# Patient Record
Sex: FEMALE | Race: WHITE | Employment: FULL TIME | URBAN - METROPOLITAN AREA
[De-identification: names, ages, dates, MRNs, and addresses within clinical notes are randomized per-mention and may not be internally consistent; named-entity substitution may affect disease eponyms.]

---

## 2017-01-06 DIAGNOSIS — M47.812 ARTHROPATHY OF CERVICAL FACET JOINT: Primary | ICD-10-CM

## 2017-01-06 DIAGNOSIS — M47.814 FACET ARTHROPATHY, THORACIC: ICD-10-CM

## 2017-01-06 NOTE — TELEPHONE ENCOUNTER
Patient informed of 48 hour refill policy excluding weekends and holidays. Further explained patient will not receive a call back once prescription is ready. Pt will  medication at [place] office, [address].

## 2017-01-09 RX ORDER — FENTANYL 75 UG/H
1 PATCH TRANSDERMAL
Qty: 10 PATCH | Refills: 0 | Status: SHIPPED | OUTPATIENT
Start: 2017-01-09 | End: 2017-02-07

## 2017-01-09 RX ORDER — HYDROCODONE BITARTRATE AND ACETAMINOPHEN 10; 325 MG/1; MG/1
1 TABLET ORAL EVERY 6 HOURS PRN
Qty: 120 TABLET | Refills: 0 | Status: SHIPPED | OUTPATIENT
Start: 2017-01-09 | End: 2017-02-07

## 2017-01-09 NOTE — TELEPHONE ENCOUNTER
Medication: Hydrocodone-acetaminophen  MG,  Fentanyl 75 MCG/HR    Date of last refill: both filled 12/10/16  [place] Reviewed both filled 12/10/16    Last office visit: 10/5/16  Due back to clinic per last office n Patch 72 Hr  10 patch  0       Sig: Place 1 patch onto the skin every third day.         TiZANidine HCl 4 MG Oral Tab  270 tablet  0       Sig: Take 1 tablet (4 mg total) by mouth 3 (three) times daily.         lidocaine 5 % External Patch  90 patch  0

## 2017-01-10 RX ORDER — PANTOPRAZOLE SODIUM 40 MG/1
TABLET, DELAYED RELEASE ORAL
Qty: 30 TABLET | Refills: 3 | Status: SHIPPED | OUTPATIENT
Start: 2017-01-10 | End: 2017-05-15

## 2017-01-10 NOTE — TELEPHONE ENCOUNTER
Medication: Lyrica    Date of last refill: 12/12/16  [place] 17[place]11 (?!)  -- called pharmacy; pt picked up Rx on 12/14/16    Last office visit: 10/5/16  Due back to clinic per last office note:  6 months  Date next off External Patch  90 patch  0       Sig: Place 3 patches onto the skin daily. Wear for 12 hours, remove for 12 hours.              Radiology orders and consultations:None    The patient indicates understanding of these issues and agrees to the plan.     Retur

## 2017-01-19 ENCOUNTER — TELEPHONE (OUTPATIENT)
Dept: INTERNAL MEDICINE CLINIC | Facility: CLINIC | Age: 72
End: 2017-01-19

## 2017-01-19 NOTE — TELEPHONE ENCOUNTER
Patient notified SD states the note from HARDEEP TAYLOR indicates Xarelto be held 3 days prior to the procedure and can restart day following procedure if ok with HARDEEP TAYLOR.  Pt verbalizes understanding.

## 2017-01-19 NOTE — TELEPHONE ENCOUNTER
Note from [facility] GI in Dec(under media) requests the xarelto be held 3 days prior to procedure. Restart day following the procedure if ok with GI pending procedure results.

## 2017-01-23 RX ORDER — SIMVASTATIN 40 MG
TABLET ORAL
Qty: 90 TABLET | Refills: 0 | Status: SHIPPED | OUTPATIENT
Start: 2017-01-23 | End: 2017-04-23

## 2017-02-01 ENCOUNTER — OFFICE VISIT (OUTPATIENT)
Dept: INTERNAL MEDICINE CLINIC | Facility: CLINIC | Age: 72
End: 2017-02-01

## 2017-02-01 VITALS
TEMPERATURE: 99 F | HEIGHT: 62 IN | DIASTOLIC BLOOD PRESSURE: 70 MMHG | HEART RATE: 92 BPM | SYSTOLIC BLOOD PRESSURE: 128 MMHG

## 2017-02-01 DIAGNOSIS — E55.9 VITAMIN D DEFICIENCY: ICD-10-CM

## 2017-02-01 DIAGNOSIS — F41.9 ANXIETY: ICD-10-CM

## 2017-02-01 DIAGNOSIS — I10 ESSENTIAL HYPERTENSION, BENIGN: ICD-10-CM

## 2017-02-01 DIAGNOSIS — R53.83 FATIGUE, UNSPECIFIED TYPE: Primary | ICD-10-CM

## 2017-02-01 DIAGNOSIS — E78.00 PURE HYPERCHOLESTEROLEMIA: ICD-10-CM

## 2017-02-01 DIAGNOSIS — D50.9 IRON DEFICIENCY ANEMIA, UNSPECIFIED IRON DEFICIENCY ANEMIA TYPE: ICD-10-CM

## 2017-02-01 DIAGNOSIS — IMO0001 UNCONTROLLED TYPE 2 DIABETES MELLITUS WITHOUT COMPLICATION, WITHOUT LONG-TERM CURRENT USE OF INSULIN: ICD-10-CM

## 2017-02-01 LAB
CARTRIDGE LOT#: 660 NUMERIC
HEMOGLOBIN A1C: 6.7 % (ref 4.3–5.6)

## 2017-02-01 PROCEDURE — 99214 OFFICE O/P EST MOD 30 MIN: CPT | Performed by: NURSE PRACTITIONER

## 2017-02-01 NOTE — PROGRESS NOTES
Chapo Craven is a 80year old female. Patient presents with:  Diarrhea: She is having diarrhea eveytime she eats and its getting worse.  She will be seeing gastro in April thats the soonest  can see her       HPI:   Presents to discuss diar BEFORE BREAKFAST Disp: 30 tablet Rfl: 3   HYDROcodone-acetaminophen  MG Oral Tab Take 1 tablet by mouth every 6 (six) hours as needed for Pain.  Disp: 120 tablet Rfl: 0   FentaNYL 75 MCG/HR Transdermal Patch 72 Hr Place 1 patch onto the skin every thi cell      Social History:    Smoking Status: Former Smoker                   Packs/Day: 0.00  Years:         Smokeless Status: Never Used                        Comment: quit 54 yrs ago    Alcohol Use: No              Family History   Problem Relation Age def   Uncontrolled type 2 diabetes mellitus without complication, without long-term current use of insulin (hcc)  Opted to hold metformin x 2 weeks to see if diarrhea improved. If yes, she will call for alternative treatment. If no, she will restart.    A

## 2017-02-02 ENCOUNTER — LAB ENCOUNTER (OUTPATIENT)
Dept: LAB | Age: 72
End: 2017-02-02
Attending: NURSE PRACTITIONER
Payer: MEDICARE

## 2017-02-02 DIAGNOSIS — D50.9 IRON DEFICIENCY ANEMIA, UNSPECIFIED IRON DEFICIENCY ANEMIA TYPE: ICD-10-CM

## 2017-02-02 DIAGNOSIS — R53.83 FATIGUE, UNSPECIFIED TYPE: ICD-10-CM

## 2017-02-02 DIAGNOSIS — F41.9 ANXIETY: ICD-10-CM

## 2017-02-02 DIAGNOSIS — E55.9 VITAMIN D DEFICIENCY: ICD-10-CM

## 2017-02-02 DIAGNOSIS — IMO0001 UNCONTROLLED TYPE 2 DIABETES MELLITUS WITHOUT COMPLICATION, WITHOUT LONG-TERM CURRENT USE OF INSULIN: ICD-10-CM

## 2017-02-02 LAB
25-HYDROXYVITAMIN D (TOTAL): 25.3 NG/ML (ref 30–100)
ALBUMIN SERPL-MCNC: 4.2 G/DL (ref 3.5–4.8)
ALP LIVER SERPL-CCNC: 82 U/L (ref 55–142)
ALT SERPL-CCNC: 25 U/L (ref 14–54)
AST SERPL-CCNC: 18 U/L (ref 15–41)
BASOPHILS # BLD AUTO: 0.05 X10(3) UL (ref 0–0.1)
BASOPHILS NFR BLD AUTO: 0.8 %
BILIRUB SERPL-MCNC: 0.4 MG/DL (ref 0.1–2)
BUN BLD-MCNC: 13 MG/DL (ref 8–20)
CALCIUM BLD-MCNC: 9.3 MG/DL (ref 8.3–10.3)
CHLORIDE: 104 MMOL/L (ref 101–111)
CHOLEST SMN-MCNC: 133 MG/DL (ref ?–200)
CO2: 26 MMOL/L (ref 22–32)
CREAT BLD-MCNC: 0.97 MG/DL (ref 0.55–1.02)
CREAT UR-SCNC: 135 MG/DL
EOSINOPHIL # BLD AUTO: 0.17 X10(3) UL (ref 0–0.3)
EOSINOPHIL NFR BLD AUTO: 2.8 %
ERYTHROCYTE [DISTWIDTH] IN BLOOD BY AUTOMATED COUNT: 12 % (ref 11.5–16)
GLUCOSE BLD-MCNC: 112 MG/DL (ref 70–99)
HAV AB SERPL IA-ACNC: 243 PG/ML (ref 193–986)
HCT VFR BLD AUTO: 38.9 % (ref 34–50)
HDLC SERPL-MCNC: 75 MG/DL (ref 45–?)
HDLC SERPL: 1.77 {RATIO} (ref ?–4.44)
HGB BLD-MCNC: 12.7 G/DL (ref 12–16)
IMMATURE GRANULOCYTE COUNT: 0.02 X10(3) UL (ref 0–1)
IMMATURE GRANULOCYTE RATIO %: 0.3 %
LDLC SERPL CALC-MCNC: 41 MG/DL (ref ?–130)
LYMPHOCYTES # BLD AUTO: 1.29 X10(3) UL (ref 0.9–4)
LYMPHOCYTES NFR BLD AUTO: 21.1 %
M PROTEIN MFR SERPL ELPH: 7.2 G/DL (ref 6.1–8.3)
MCH RBC QN AUTO: 28.7 PG (ref 27–33.2)
MCHC RBC AUTO-ENTMCNC: 32.6 G/DL (ref 31–37)
MCV RBC AUTO: 88 FL (ref 81–100)
MICROALBUMIN UR-MCNC: 3.11 MG/DL
MICROALBUMIN/CREAT 24H UR-RTO: 23 UG/MG (ref ?–30)
MONOCYTES # BLD AUTO: 0.48 X10(3) UL (ref 0.1–0.6)
MONOCYTES NFR BLD AUTO: 7.8 %
NEUTROPHIL ABS PRELIM: 4.11 X10 (3) UL (ref 1.3–6.7)
NEUTROPHILS # BLD AUTO: 4.11 X10(3) UL (ref 1.3–6.7)
NEUTROPHILS NFR BLD AUTO: 67.2 %
NONHDLC SERPL-MCNC: 58 MG/DL (ref ?–130)
PLATELET # BLD AUTO: 199 10(3)UL (ref 150–450)
POTASSIUM SERPL-SCNC: 5.1 MMOL/L (ref 3.6–5.1)
RBC # BLD AUTO: 4.42 X10(6)UL (ref 3.8–5.1)
RED CELL DISTRIBUTION WIDTH-SD: 38.7 FL (ref 35.1–46.3)
SODIUM SERPL-SCNC: 138 MMOL/L (ref 136–144)
TRIGLYCERIDES: 83 MG/DL (ref ?–150)
VLDL: 17 MG/DL (ref 5–40)
WBC # BLD AUTO: 6.1 X10(3) UL (ref 4–13)

## 2017-02-02 PROCEDURE — 80061 LIPID PANEL: CPT

## 2017-02-02 PROCEDURE — 82306 VITAMIN D 25 HYDROXY: CPT

## 2017-02-02 PROCEDURE — 82043 UR ALBUMIN QUANTITATIVE: CPT

## 2017-02-02 PROCEDURE — 82570 ASSAY OF URINE CREATININE: CPT

## 2017-02-02 PROCEDURE — 85025 COMPLETE CBC W/AUTO DIFF WBC: CPT

## 2017-02-02 PROCEDURE — 82607 VITAMIN B-12: CPT

## 2017-02-02 PROCEDURE — 80053 COMPREHEN METABOLIC PANEL: CPT

## 2017-02-03 ENCOUNTER — TELEPHONE (OUTPATIENT)
Dept: INTERNAL MEDICINE CLINIC | Facility: CLINIC | Age: 72
End: 2017-02-03

## 2017-02-03 NOTE — TELEPHONE ENCOUNTER
Pt states she could not  her Sertraline at the [address] [place] [address] because they were going to call our office to discuss drug interaction between Xarelto and Sertraline for increased risk of bleeding.   Per Nicole Orta at [facility], states all SSRI's with Xarelto

## 2017-02-03 NOTE — TELEPHONE ENCOUNTER
Called [place] to advise info per SD-Ok to trial sertraline. Karol[facility] pharmacist verbalized understanding.

## 2017-02-06 ENCOUNTER — OFFICE VISIT (OUTPATIENT)
Dept: INTERNAL MEDICINE CLINIC | Facility: CLINIC | Age: 72
End: 2017-02-06

## 2017-02-06 VITALS
TEMPERATURE: 98 F | HEART RATE: 74 BPM | HEIGHT: 62 IN | DIASTOLIC BLOOD PRESSURE: 66 MMHG | SYSTOLIC BLOOD PRESSURE: 120 MMHG

## 2017-02-06 DIAGNOSIS — E53.8 B12 DEFICIENCY: ICD-10-CM

## 2017-02-06 DIAGNOSIS — R19.7 DIARRHEA, UNSPECIFIED TYPE: ICD-10-CM

## 2017-02-06 DIAGNOSIS — R53.83 FATIGUE, UNSPECIFIED TYPE: Primary | ICD-10-CM

## 2017-02-06 DIAGNOSIS — J45.20 MILD INTERMITTENT ASTHMA WITHOUT COMPLICATION: ICD-10-CM

## 2017-02-06 DIAGNOSIS — E55.9 VITAMIN D DEFICIENCY: ICD-10-CM

## 2017-02-06 DIAGNOSIS — F41.9 ANXIETY: ICD-10-CM

## 2017-02-06 PROCEDURE — 96372 THER/PROPH/DIAG INJ SC/IM: CPT | Performed by: NURSE PRACTITIONER

## 2017-02-06 PROCEDURE — 99214 OFFICE O/P EST MOD 30 MIN: CPT | Performed by: NURSE PRACTITIONER

## 2017-02-06 RX ORDER — CYANOCOBALAMIN 1000 UG/ML
1000 INJECTION INTRAMUSCULAR; SUBCUTANEOUS ONCE
Status: COMPLETED | OUTPATIENT
Start: 2017-02-06 | End: 2017-02-06

## 2017-02-06 RX ADMIN — CYANOCOBALAMIN 1000 MCG: 1000 INJECTION INTRAMUSCULAR; SUBCUTANEOUS at 10:19:00

## 2017-02-06 NOTE — PROGRESS NOTES
Humberto Xie is a 80year old female. Patient presents with:  Test Results  Injection: vitamin B12 injection       HPI:   Presents to review her most recent labs. Excessive fatigue. B12 def. Level 243. Fatigue.   Her memory seems to be less Disp: 30 tablet Rfl: 3   HYDROcodone-acetaminophen  MG Oral Tab Take 1 tablet by mouth every 6 (six) hours as needed for Pain. Disp: 120 tablet Rfl: 0   FentaNYL 75 MCG/HR Transdermal Patch 72 Hr Place 1 patch onto the skin every third day.  Disp: 10 History:    Smoking Status: Former Smoker                   Packs/Day: 0.00  Years:         Smokeless Status: Never Used                        Comment: quit 54 yrs ago    Alcohol Use: No              Family History   Problem Relation Age of Onset   • Hear Stable. Reviewed and agree with plan as discussed. Diarrhea, unspecified type   Needs to see GI. She will call today for earlier appt.        Orders Placed This Encounter  Vitamin B12 [E]  Vitamin D, 25-Hydroxy [E]    Meds & Refills for this Visit:  No

## 2017-02-06 NOTE — PATIENT INSTRUCTIONS
B12 injections:  Weekly x 4 weeks then  Every other week x 2 doses then  Monthly   Vit B12 level in 6 months

## 2017-02-07 ENCOUNTER — TELEPHONE (OUTPATIENT)
Dept: SURGERY | Facility: CLINIC | Age: 72
End: 2017-02-07

## 2017-02-07 DIAGNOSIS — M47.812 ARTHROPATHY OF CERVICAL FACET JOINT: Primary | ICD-10-CM

## 2017-02-07 DIAGNOSIS — M47.814 FACET ARTHROPATHY, THORACIC: ICD-10-CM

## 2017-02-07 NOTE — TELEPHONE ENCOUNTER
Medication: Hydrocodone-Acetaminophen  MG,  Fentanyl 75 MCG/HR    Date of last refill: both filled 1/9/17  [place] Reviewed both filled 1/12/17    Last office visit: 10/5/16  Due back to clinic per last office note 72 Hr  10 patch  0       Sig: Place 1 patch onto the skin every third day.         TiZANidine HCl 4 MG Oral Tab  270 tablet  0       Sig: Take 1 tablet (4 mg total) by mouth 3 (three) times daily.         lidocaine 5 % External Patch  90 patch  0       Sig

## 2017-02-08 RX ORDER — FENTANYL 75 UG/H
1 PATCH TRANSDERMAL
Qty: 10 PATCH | Refills: 0 | Status: SHIPPED | OUTPATIENT
Start: 2017-02-10 | End: 2017-03-06

## 2017-02-08 RX ORDER — HYDROCODONE BITARTRATE AND ACETAMINOPHEN 10; 325 MG/1; MG/1
1 TABLET ORAL EVERY 6 HOURS PRN
Qty: 120 TABLET | Refills: 0 | Status: SHIPPED | OUTPATIENT
Start: 2017-02-10 | End: 2017-03-06

## 2017-02-09 ENCOUNTER — TELEPHONE (OUTPATIENT)
Dept: SURGERY | Facility: CLINIC | Age: 72
End: 2017-02-09

## 2017-02-09 NOTE — TELEPHONE ENCOUNTER
Medication: Lyrica 50 MG    Date of last refill: 1/12/17  [place] Reviewed 3/72/28    Last office visit: 10/5/16  Due back to clinic per last office note:  6 months  Date next office visit scheduled:  Nothing scheduled third day.         TiZANidine HCl 4 MG Oral Tab  270 tablet  0       Sig: Take 1 tablet (4 mg total) by mouth 3 (three) times daily.         lidocaine 5 % External Patch  90 patch  0       Sig: Place 3 patches onto the skin daily.  Wear for 12 hours, remove

## 2017-02-10 NOTE — TELEPHONE ENCOUNTER
Spoke w/ pharmacist James Smart regarding no signature on Rx. Rx for Lyrical called into pharmacy. Confirmed sig. No further needs.

## 2017-02-13 ENCOUNTER — NURSE ONLY (OUTPATIENT)
Dept: INTERNAL MEDICINE CLINIC | Facility: CLINIC | Age: 72
End: 2017-02-13

## 2017-02-13 PROCEDURE — 96372 THER/PROPH/DIAG INJ SC/IM: CPT | Performed by: NURSE PRACTITIONER

## 2017-02-13 RX ORDER — CYANOCOBALAMIN 1000 UG/ML
1000 INJECTION INTRAMUSCULAR; SUBCUTANEOUS ONCE
Status: COMPLETED | OUTPATIENT
Start: 2017-02-13 | End: 2017-02-13

## 2017-02-13 RX ADMIN — CYANOCOBALAMIN 1000 MCG: 1000 INJECTION INTRAMUSCULAR; SUBCUTANEOUS at 08:53:00

## 2017-02-20 ENCOUNTER — NURSE ONLY (OUTPATIENT)
Dept: INTERNAL MEDICINE CLINIC | Facility: CLINIC | Age: 72
End: 2017-02-20

## 2017-02-20 PROCEDURE — 96372 THER/PROPH/DIAG INJ SC/IM: CPT | Performed by: NURSE PRACTITIONER

## 2017-02-20 RX ORDER — CYANOCOBALAMIN 1000 UG/ML
1000 INJECTION INTRAMUSCULAR; SUBCUTANEOUS ONCE
Status: COMPLETED | OUTPATIENT
Start: 2017-02-20 | End: 2017-02-20

## 2017-02-20 RX ADMIN — CYANOCOBALAMIN 1000 MCG: 1000 INJECTION INTRAMUSCULAR; SUBCUTANEOUS at 09:49:00

## 2017-02-20 NOTE — PROGRESS NOTES
Per SD OV notes 2/6/17:   Patient Instructions    B12 injections:  Weekly x 4 weeks then  Every other week x 2 doses then  Monthly    Vit B12 level in 6 months

## 2017-02-22 ENCOUNTER — TELEPHONE (OUTPATIENT)
Dept: INTERNAL MEDICINE CLINIC | Facility: CLINIC | Age: 72
End: 2017-02-22

## 2017-02-22 ENCOUNTER — TELEPHONE (OUTPATIENT)
Dept: CASE MANAGEMENT | Age: 72
End: 2017-02-22

## 2017-02-22 NOTE — PROGRESS NOTES
2/22/2017  Spoke to pt at length about CCM, current care plan and performed CCM assessment with pt reviewed meds and compliance.  Reviewed pt Patient Active Problem List:     Fatigue     Essential hypertension, benign     Pure hypercholesterolemia     Anxie she has been \"negligent in this area\"; however, now that she is feeling better and not experiencing anxiety/depression symptoms, she plans to start walking more. Stress: Pt is having cataract sx on 03/01. Confirmed with pt that I will f/up post sx.  Pt e Plan: Reviewed, printed and sent to pt  Sending education on: Stretching and strengthening exercises  Time Spent Total: 22 min medical record review, telephone communication, and education. Provided acknowledgment and validation to patient's concerns.    Ph

## 2017-02-22 NOTE — TELEPHONE ENCOUNTER
Spoke to Keily regarding CCM and reminded her to schedule annual exam. She will be in the office tomorrow for pre-op with Dr. Zayda Acevedo. Also, I have contacted Dr. Lenora Escalante office and requested they fax DM eye exam from 01/2017.     TRIAGE: Please f/up w

## 2017-02-23 ENCOUNTER — OFFICE VISIT (OUTPATIENT)
Dept: INTERNAL MEDICINE CLINIC | Facility: CLINIC | Age: 72
End: 2017-02-23

## 2017-02-23 VITALS
BODY MASS INDEX: 30.18 KG/M2 | HEIGHT: 62 IN | RESPIRATION RATE: 16 BRPM | HEART RATE: 54 BPM | SYSTOLIC BLOOD PRESSURE: 116 MMHG | DIASTOLIC BLOOD PRESSURE: 52 MMHG | TEMPERATURE: 98 F | WEIGHT: 164 LBS

## 2017-02-23 DIAGNOSIS — J45.20 MILD INTERMITTENT ASTHMA WITHOUT COMPLICATION: ICD-10-CM

## 2017-02-23 DIAGNOSIS — H26.8 OTHER CATARACT OF BOTH EYES: ICD-10-CM

## 2017-02-23 DIAGNOSIS — I10 ESSENTIAL HYPERTENSION, BENIGN: ICD-10-CM

## 2017-02-23 DIAGNOSIS — E11.40 TYPE 2 DIABETES, CONTROLLED, WITH NEUROPATHY (HCC): ICD-10-CM

## 2017-02-23 DIAGNOSIS — Z01.818 PRE-OP EXAMINATION: Primary | ICD-10-CM

## 2017-02-23 DIAGNOSIS — M54.12 CERVICAL RADICULITIS: ICD-10-CM

## 2017-02-23 DIAGNOSIS — E78.00 PURE HYPERCHOLESTEROLEMIA: ICD-10-CM

## 2017-02-23 DIAGNOSIS — Z86.711 HISTORY OF PULMONARY EMBOLISM: ICD-10-CM

## 2017-02-23 DIAGNOSIS — M54.16 LUMBAR RADICULITIS: ICD-10-CM

## 2017-02-23 DIAGNOSIS — E53.8 B12 DEFICIENCY: ICD-10-CM

## 2017-02-23 PROCEDURE — 99214 OFFICE O/P EST MOD 30 MIN: CPT | Performed by: INTERNAL MEDICINE

## 2017-02-23 NOTE — PROGRESS NOTES
Medford Castleman is a 80year old female.   Patient presents with:  Pre-Op Exam: cataract surgery/ 3-8-17----3-22-17/ [facility] for surgery      HPI:     Patient with DM2 with neuropathy, HTN, hyperlipidemia, chronic back pain,asthma, h/o PE and DVT on SIMVASTATIN 40 MG Oral Tab TAKE 1 TABLET BY MOUTH EVERY NIGHT Disp: 90 tablet Rfl: 0   METFORMIN  MG Oral Tab TAKE 1 TABLET (500 MG TOTAL) BY MOUTH 2 (TWO) TIMES DAILY WITH MEALS.  Disp: 180 tablet Rfl: 1   PANTOPRAZOLE SODIUM 40 MG Oral Tab EC SOLITARIO clots    • Cancer ([facility].) 1999     breast, basal cell      Social History:    Smoking Status: Former Smoker                   Packs/Day: 0.00  Years:         Smokeless Status: Never Used                        Comment: quit 55 yrs ago    Alcohol Use: No focal deficits    ASSESSMENT AND PLAN:   Pre-op examination  - Patient is doing well and is medically cleared for cataract surgery without the need for further work up at this time.  Please call if any questions  Other cataract of both eyes- plan for left f

## 2017-02-28 ENCOUNTER — NURSE ONLY (OUTPATIENT)
Dept: INTERNAL MEDICINE CLINIC | Facility: CLINIC | Age: 72
End: 2017-02-28

## 2017-02-28 DIAGNOSIS — E53.8 B12 DEFICIENCY: Primary | ICD-10-CM

## 2017-02-28 PROCEDURE — 96372 THER/PROPH/DIAG INJ SC/IM: CPT | Performed by: NURSE PRACTITIONER

## 2017-02-28 RX ORDER — CYANOCOBALAMIN 1000 UG/ML
1000 INJECTION INTRAMUSCULAR; SUBCUTANEOUS ONCE
Status: COMPLETED | OUTPATIENT
Start: 2017-02-28 | End: 2017-02-28

## 2017-02-28 RX ADMIN — CYANOCOBALAMIN 1000 MCG: 1000 INJECTION INTRAMUSCULAR; SUBCUTANEOUS at 08:40:00

## 2017-02-28 NOTE — PROGRESS NOTES
Per SD Pt is to : B12 injections:Weekly x 4 weeks then , Every other week x 2 doses then Monthly . Vit B12 level in 6 months  Pt is here for B12.  Tolerated injection well

## 2017-03-06 DIAGNOSIS — F11.90 NARCOTIC DRUG USE: ICD-10-CM

## 2017-03-06 DIAGNOSIS — M47.812 ARTHROPATHY OF CERVICAL FACET JOINT: Primary | ICD-10-CM

## 2017-03-06 DIAGNOSIS — M47.814 FACET ARTHROPATHY, THORACIC: ICD-10-CM

## 2017-03-06 NOTE — PROGRESS NOTES
3/6/2017  Called to f/up with pt post left eye cataract sx on 03/01/17. Pt expressed feelings of frustration and anger stating \"it was the worst experience ever\".  Pt states that her anesthesiologist did not seem to \"understand a chronic pain pt\" and sh

## 2017-03-06 NOTE — TELEPHONE ENCOUNTER
**PATIENT NEEDS NARCOTIC CONTRACT ON FILE**    Medication: Fentanyl 75 MCG/HR,  Hydrocodone-Acetaminophen  MG    Date of last refill: both filled 2/10/17  [place] Reviewed both filled 2/10/17    Last office visit: 10/14/16.         FentaNYL 75 MCG/HR Transdermal Patch 72 Hr  10 patch  0       Sig: Place 1 patch onto the skin every third day.         TiZANidine HCl 4 MG Oral Tab  270 tablet  0       Sig: Take 1 tablet (4 mg total) by mouth 3 (three) times daily.

## 2017-03-07 RX ORDER — HYDROCODONE BITARTRATE AND ACETAMINOPHEN 10; 325 MG/1; MG/1
1 TABLET ORAL EVERY 6 HOURS PRN
Qty: 120 TABLET | Refills: 0 | Status: SHIPPED | OUTPATIENT
Start: 2017-03-11 | End: 2017-04-03

## 2017-03-07 RX ORDER — FENTANYL 75 UG/H
1 PATCH TRANSDERMAL
Qty: 10 PATCH | Refills: 0 | Status: SHIPPED | OUTPATIENT
Start: 2017-03-11 | End: 2017-04-03

## 2017-03-07 NOTE — TELEPHONE ENCOUNTER
Script ready for  at   RN needed            The following documentation was provided to patients at time of Rx pickup:      From the Office of Dr. Shanta Mitchell at [facility]:  [address] [facility] office is committed to providing the best care t

## 2017-03-13 ENCOUNTER — NURSE ONLY (OUTPATIENT)
Dept: INTERNAL MEDICINE CLINIC | Facility: CLINIC | Age: 72
End: 2017-03-13

## 2017-03-13 ENCOUNTER — MED REC SCAN ONLY (OUTPATIENT)
Dept: SURGERY | Facility: CLINIC | Age: 72
End: 2017-03-13

## 2017-03-13 ENCOUNTER — APPOINTMENT (OUTPATIENT)
Dept: LAB | Age: 72
End: 2017-03-13
Attending: ANESTHESIOLOGY
Payer: MEDICARE

## 2017-03-13 DIAGNOSIS — F11.90 NARCOTIC DRUG USE: ICD-10-CM

## 2017-03-13 PROCEDURE — 96372 THER/PROPH/DIAG INJ SC/IM: CPT | Performed by: NURSE PRACTITIONER

## 2017-03-13 PROCEDURE — 80307 DRUG TEST PRSMV CHEM ANLYZR: CPT

## 2017-03-13 RX ORDER — CYANOCOBALAMIN 1000 UG/ML
1000 INJECTION INTRAMUSCULAR; SUBCUTANEOUS ONCE
Status: COMPLETED | OUTPATIENT
Start: 2017-03-13 | End: 2017-03-13

## 2017-03-13 RX ADMIN — CYANOCOBALAMIN 1000 MCG: 1000 INJECTION INTRAMUSCULAR; SUBCUTANEOUS at 09:06:00

## 2017-03-13 NOTE — TELEPHONE ENCOUNTER
Signed narcotic contract with patient, verbalized understanding, copy of contract provided to patient.     Pt reports having a Fentanyl patch on and her last Norco dosage was this AM.

## 2017-03-16 LAB
6-ACETYLMORHINE CUTOFF 20 NG/ML: NOT DETECTED
7-AMINOCLONAZEPAM 40 NG/ML: NOT DETECTED
A-OH-ALPRAZOLM CUTOFF 20 NG/ML: NOT DETECTED
ALPRAZOLAM CUTOFF 40 NG/ML: NOT DETECTED
AMPHETAMINE CUTOFF 10 NG/ML: NOT DETECTED
BARBITURATES CUTOFF 200 NG/ML: NOT DETECTED
BENZOYLECGONINE  150 NG/ML: NOT DETECTED
BUPRENORPHINE CUTOFF 5 NG/ML: NOT DETECTED
CARISOPRODOL CUTOFF 100 NG/ML: NOT DETECTED
CODINE CUTOFF 40 NG/ML: NOT DETECTED
COLNAZEPAM CUTOFF 20 NG/ML: NOT DETECTED
CREATININE,URINE: 147 MG/DL
DIAZEPAM CUTOFF 50 NG/ML: NOT DETECTED
ETHYL-GLUCURONIDE 500 NG/ML: NOT DETECTED
FENTANYL CUTOFF 2 NG/ML: PRESENT
HYDROCODONE CUTOFF 40 NG/ML: PRESENT
HYDROMORPHONE CUTOFF 40 NG/ML: NOT DETECTED
LORAZEPAM CUTOFF 60 NG/ML: NOT DETECTED
MARIJUANA CUTOFF 20 NG/ML: NOT DETECTED
MDA CUTOFF 200 NG/ML: NOT DETECTED
MDEA EVE CUTOFF 200 NG/ML: NOT DETECTED
MDMA-ECSTASY CUTOFF 200 NG/ML: NOT DETECTED
MEPERIDINE MET CUTOFF 50 NG/ML: NOT DETECTED
METHADONE CUTOFF 150 NG/ML: NOT DETECTED
METHAMPHETMN CUTOFF 400 NG/ML: NOT DETECTED
METHYLPHENIDATE (CUTOFF 100 NG/ML): NOT DETECTED
MIDAZOLAM CUTOFF 20 NG/ML: NOT DETECTED
MORHINE CUTOFF 20 NG/ML: NOT DETECTED
NORBUPRENORPHINE  20 NG/ML: NOT DETECTED
NORDIAZEPAM CUTOFF 50 NG/ML: NOT DETECTED
NORFENTANYL CUTOFF 2 NG/ML: PRESENT
NORHYDRCODONE CUTOFF 100 NG/ML: PRESENT
NOROXYCODONE CUTOFF 100 NG/ML: NOT DETECTED
NOROXYMORPHNE CUTOFF 100 NG/ML: NOT DETECTED
OXAZEPAM CUTOFF 50 NG/ML: NOT DETECTED
OXYCODONE CUTOFF 40 NG/ML: NOT DETECTED
OXYMORPHONE CUTOFF 40 NG/ML: NOT DETECTED
PCP CUTOFF 25 NG/ML: NOT DETECTED
PHENTERMINE CUTOFF 100 NG/ML: NOT DETECTED
PROPOXYPHENE CUTOFF 300 NG/ML: NOT DETECTED
TAPENTADOL CUTOFF 100 NG/ML: NOT DETECTED
TAPENTADOL-O SULF 200 NG/ML: NOT DETECTED
TEMAZEPAM CUTOFF 50 NG/ML: NOT DETECTED
TRAMADOL CUTOFF 200 NG/ML: NOT DETECTED
ZOLPIDEM CUTOFF 20 NG/ML: NOT DETECTED

## 2017-03-16 NOTE — TELEPHONE ENCOUNTER
Medication: Lyrica 50 MG    Date of last refill: 2/9/17  [place] Reviewed 6/38/36    Last office visit: 10/5/16  Due back to clinic per last office note:  6 months  Date next office visit scheduled:  Nothing scheduled third day.         TiZANidine HCl 4 MG Oral Tab  270 tablet  0       Sig: Take 1 tablet (4 mg total) by mouth 3 (three) times daily.         lidocaine 5 % External Patch  90 patch  0       Sig: Place 3 patches onto the skin daily.  Wear for 12 hours, remove

## 2017-03-17 RX ORDER — TIZANIDINE 4 MG/1
TABLET ORAL
Qty: 270 TABLET | Refills: 0 | Status: ON HOLD | OUTPATIENT
Start: 2017-03-17 | End: 2017-06-28

## 2017-03-23 NOTE — PROGRESS NOTES
3/23/2017  Called to f/up with pt post right eye cataract sx. Pt was pleased with the sx and states the experience was so much better than previously with her left eye. She reports 20/20 vision in left eye and returns for post op in 3 weeks for right eye.

## 2017-03-27 ENCOUNTER — NURSE ONLY (OUTPATIENT)
Dept: INTERNAL MEDICINE CLINIC | Facility: CLINIC | Age: 72
End: 2017-03-27

## 2017-03-27 PROCEDURE — 96372 THER/PROPH/DIAG INJ SC/IM: CPT | Performed by: NURSE PRACTITIONER

## 2017-03-27 RX ORDER — CYANOCOBALAMIN 1000 UG/ML
1000 INJECTION INTRAMUSCULAR; SUBCUTANEOUS ONCE
Status: COMPLETED | OUTPATIENT
Start: 2017-03-27 | End: 2017-03-27

## 2017-03-27 RX ADMIN — CYANOCOBALAMIN 1000 MCG: 1000 INJECTION INTRAMUSCULAR; SUBCUTANEOUS at 08:50:00

## 2017-04-03 DIAGNOSIS — M47.814 FACET ARTHROPATHY, THORACIC: ICD-10-CM

## 2017-04-03 DIAGNOSIS — M47.812 ARTHROPATHY OF CERVICAL FACET JOINT: Primary | ICD-10-CM

## 2017-04-03 RX ORDER — HYDROCODONE BITARTRATE AND ACETAMINOPHEN 10; 325 MG/1; MG/1
1 TABLET ORAL EVERY 6 HOURS PRN
Qty: 120 TABLET | Refills: 0 | Status: SHIPPED | OUTPATIENT
Start: 2017-04-03 | End: 2017-05-03

## 2017-04-03 RX ORDER — FENTANYL 75 UG/H
1 PATCH TRANSDERMAL
Qty: 10 PATCH | Refills: 0 | Status: SHIPPED | OUTPATIENT
Start: 2017-04-03 | End: 2017-05-03

## 2017-04-03 NOTE — TELEPHONE ENCOUNTER
Patient informed of 48 hour refill policy excluding weekends and holidays. Informed patient only patient can  the prescription effective April 1, 2017. Further explained patient will not receive a call back once prescription is ready.

## 2017-04-03 NOTE — TELEPHONE ENCOUNTER
Rx ready for . The following documentation was provided to patients at time of Rx pickup:      From the Office of Dr. Shanta Mitchell at [facility]:  [address] [facility] office is committed to providing the best care to our patients.    Due to the high v

## 2017-04-03 NOTE — TELEPHONE ENCOUNTER
Medication: Norco, Fentanyl  **BOTH NOT DUE UNTIL 4/11/17**    Date of last refill: 3/13/17  [place] both refills on 3/13/17    Last office visit: 10/15/16   Due back to clinic per last office note:  6 MONTHS  Date next

## 2017-04-12 NOTE — TELEPHONE ENCOUNTER
Medication: Lyrica 50 MG    Date of last refill: 3/17/17  [place] Reviewed 8/71/70    Last office visit: 10/5/16  Due back to clinic per last office note:  6 months  Date next office visit scheduled:  Nothing scheduled third day.         TiZANidine HCl 4 MG Oral Tab  270 tablet  0       Sig: Take 1 tablet (4 mg total) by mouth 3 (three) times daily.         lidocaine 5 % External Patch  90 patch  0       Sig: Place 3 patches onto the skin daily.  Wear for 12 hours, remove

## 2017-04-13 RX ORDER — PREGABALIN 50 MG/1
CAPSULE ORAL
Qty: 60 CAPSULE | Refills: 0 | Status: SHIPPED | OUTPATIENT
Start: 2017-04-13 | End: 2017-05-17

## 2017-04-24 RX ORDER — SIMVASTATIN 40 MG
TABLET ORAL
Qty: 90 TABLET | Refills: 1 | Status: ON HOLD | OUTPATIENT
Start: 2017-04-24 | End: 2017-06-28

## 2017-05-01 RX ORDER — LISINOPRIL 20 MG/1
TABLET ORAL
Qty: 90 TABLET | Refills: 1 | Status: ON HOLD | OUTPATIENT
Start: 2017-05-01 | End: 2017-06-28

## 2017-05-03 DIAGNOSIS — M47.812 ARTHROPATHY OF CERVICAL FACET JOINT: Primary | ICD-10-CM

## 2017-05-03 DIAGNOSIS — M47.814 FACET ARTHROPATHY, THORACIC: ICD-10-CM

## 2017-05-03 NOTE — TELEPHONE ENCOUNTER
Medication: Hydrocodone-Acetaminophen  MG, Fentanyl 75 MCG/HR    Date of last refill: Both filled 4/3/17  [place] Review[place] Both filled 4/11/17    Last office visit: 10/5/16  Due back to clinic per last office note: 72 Hr  10 patch  0       Sig: Place 1 patch onto the skin every third day.         TiZANidine HCl 4 MG Oral Tab  270 tablet  0       Sig: Take 1 tablet (4 mg total) by mouth 3 (three) times daily.         lidocaine 5 % External Patch  90 patch  0       Sig

## 2017-05-04 RX ORDER — FENTANYL 75 UG/H
1 PATCH TRANSDERMAL
Qty: 10 PATCH | Refills: 0 | Status: SHIPPED | OUTPATIENT
Start: 2017-05-10 | End: 2017-06-01

## 2017-05-04 RX ORDER — HYDROCODONE BITARTRATE AND ACETAMINOPHEN 10; 325 MG/1; MG/1
1 TABLET ORAL EVERY 6 HOURS PRN
Qty: 120 TABLET | Refills: 0 | Status: SHIPPED | OUTPATIENT
Start: 2017-05-10 | End: 2017-06-01

## 2017-05-04 NOTE — TELEPHONE ENCOUNTER
Script ready for  at            The following documentation was provided to patients at time of Rx pickup:      From the Office of Dr. Mya Dior at [facility]:  [address] [facility] office is committed to providing the best care to our patien

## 2017-05-15 RX ORDER — RIVAROXABAN 20 MG/1
TABLET, FILM COATED ORAL
Qty: 90 TABLET | Refills: 1 | Status: ON HOLD | OUTPATIENT
Start: 2017-05-15 | End: 2017-06-28

## 2017-05-15 RX ORDER — PANTOPRAZOLE SODIUM 40 MG/1
TABLET, DELAYED RELEASE ORAL
Qty: 30 TABLET | Refills: 1 | Status: ON HOLD | OUTPATIENT
Start: 2017-05-15 | End: 2017-06-28

## 2017-05-15 NOTE — TELEPHONE ENCOUNTER
LOV: 2/23/17 TB Pre Op            2/6/17 SD Follow up   Future office visit: No upcoming visit   Last labs: 2/2/17 Cmp Lipid   Last RX: 11/15/16 #90 #1 Refill by SD  Per protocol: Route to provider

## 2017-05-19 ENCOUNTER — APPOINTMENT (OUTPATIENT)
Dept: LAB | Age: 72
End: 2017-05-19
Attending: NURSE PRACTITIONER
Payer: MEDICARE

## 2017-05-19 DIAGNOSIS — E55.9 VITAMIN D DEFICIENCY: ICD-10-CM

## 2017-05-19 DIAGNOSIS — E53.8 B12 DEFICIENCY: ICD-10-CM

## 2017-05-19 DIAGNOSIS — D64.9 ANEMIA, UNSPECIFIED TYPE: ICD-10-CM

## 2017-05-19 PROCEDURE — 83550 IRON BINDING TEST: CPT

## 2017-05-19 PROCEDURE — 82728 ASSAY OF FERRITIN: CPT

## 2017-05-19 PROCEDURE — 83540 ASSAY OF IRON: CPT

## 2017-05-23 ENCOUNTER — PATIENT OUTREACH (OUTPATIENT)
Dept: CASE MANAGEMENT | Age: 72
End: 2017-05-23

## 2017-05-23 NOTE — PROGRESS NOTES
Spoke with pt today and introduced myself as her new NCM for CCM. Provided my contact info to pt. Pt states she is feeling pretty good currently and has no issues or concerns at this time.   Pt states she had blood work for [facility] on Friday and is

## 2017-06-01 DIAGNOSIS — M47.812 ARTHROPATHY OF CERVICAL FACET JOINT: Primary | ICD-10-CM

## 2017-06-01 DIAGNOSIS — M47.814 FACET ARTHROPATHY, THORACIC: ICD-10-CM

## 2017-06-02 NOTE — TELEPHONE ENCOUNTER
NEED TO CALL PHARMACY TO CONFIRM LAST REFILL!       Medication: Norco  and Fentanyl 75 mcg patch     Date of last refill: [place] 5/10/17 Fentanyl 05/10/17   [place] 86/14/01   Fentanyl 04/11/17    Last office visit: for Pain.  DO NOT FILL BEFORE  10/14/16.         FentaNYL 75 MCG/HR Transdermal Patch 72 Hr  10 patch  0       Sig: Place 1 patch onto the skin every third day.         TiZANidine HCl 4 MG Oral Tab  270 tablet  0       Sig: Take 1 tablet (4 mg total) by asher

## 2017-06-05 RX ORDER — HYDROCODONE BITARTRATE AND ACETAMINOPHEN 10; 325 MG/1; MG/1
1 TABLET ORAL EVERY 6 HOURS PRN
Qty: 120 TABLET | Refills: 0 | Status: SHIPPED | OUTPATIENT
Start: 2017-06-08 | End: 2017-07-03

## 2017-06-05 RX ORDER — FENTANYL 75 UG/H
1 PATCH TRANSDERMAL
Qty: 10 PATCH | Refills: 0 | Status: SHIPPED | OUTPATIENT
Start: 2017-06-08 | End: 2017-07-03

## 2017-06-05 NOTE — TELEPHONE ENCOUNTER
[address] and spoke to pharmacist who states the last time pt refill Norco  mg and Fentanyl 75 mcg was on 3/11/17. Pt uses [facility] in [place] as well.  Called [facility] and spoke to pharmacist who confirms the last refill date was on 5

## 2017-06-05 NOTE — TELEPHONE ENCOUNTER
Rx ready for . The following documentation was provided to patients at time of Rx pickup:      From the Office of Dr. Tala Arias at [facility]:  [address] [facility] office is committed to providing the best care to our patients.    Due to the high v

## 2017-06-13 NOTE — TELEPHONE ENCOUNTER
Medication: Lyrica 50 MG    Date of last refill: 5/17/17  [place] Reviewed 3/53/34    Last office visit: 10/5/16  Due back to clinic per last office note:  6 months  Date next office visit scheduled:  Nothing scheduled third day.         TiZANidine HCl 4 MG Oral Tab  270 tablet  0       Sig: Take 1 tablet (4 mg total) by mouth 3 (three) times daily.         lidocaine 5 % External Patch  90 patch  0       Sig: Place 3 patches onto the skin daily.  Wear for 12 hours, remove

## 2017-06-13 NOTE — TELEPHONE ENCOUNTER
Prescription has been approved for this month, but she needs an office visit prior to any more narcotic/controlled medication refill.

## 2017-06-15 ENCOUNTER — HOSPITAL ENCOUNTER (OUTPATIENT)
Dept: ULTRASOUND IMAGING | Facility: HOSPITAL | Age: 72
Discharge: HOME | End: 2017-06-15
Attending: INTERNAL MEDICINE
Payer: MEDICARE

## 2017-06-15 DIAGNOSIS — D18.03 LIVER HEMANGIOMA: ICD-10-CM

## 2017-06-15 PROCEDURE — 76700 US EXAM ABDOM COMPLETE: CPT | Performed by: INTERNAL MEDICINE

## 2017-06-26 ENCOUNTER — PATIENT OUTREACH (OUTPATIENT)
Dept: CASE MANAGEMENT | Age: 72
End: 2017-06-26

## 2017-06-26 DIAGNOSIS — IMO0001 UNCONTROLLED TYPE 2 DIABETES MELLITUS WITHOUT COMPLICATION, WITHOUT LONG-TERM CURRENT USE OF INSULIN: ICD-10-CM

## 2017-06-26 DIAGNOSIS — E11.40 TYPE 2 DIABETES, CONTROLLED, WITH NEUROPATHY (HCC): ICD-10-CM

## 2017-06-26 DIAGNOSIS — E78.00 PURE HYPERCHOLESTEROLEMIA: ICD-10-CM

## 2017-06-26 DIAGNOSIS — I10 ESSENTIAL HYPERTENSION, BENIGN: ICD-10-CM

## 2017-06-26 DIAGNOSIS — F41.9 ANXIETY: ICD-10-CM

## 2017-06-26 DIAGNOSIS — J45.20 MILD INTERMITTENT ASTHMA WITHOUT COMPLICATION: ICD-10-CM

## 2017-06-26 DIAGNOSIS — R53.83 FATIGUE, UNSPECIFIED TYPE: ICD-10-CM

## 2017-06-26 PROCEDURE — 99490 CHRNC CARE MGMT STAFF 1ST 20: CPT

## 2017-06-26 NOTE — PROGRESS NOTES
Spoke to pt at length about CCM, current care plan and performed CCM assessment with pt, reviewed meds and compliance. Reviewed pt dx HTN, HLD, DM, asthma, anxiety   Support system: Grandson lives with pt. Diet: Pt states she eats more for energy.   Pt sta

## 2017-06-28 ENCOUNTER — HOSPITAL ENCOUNTER (INPATIENT)
Facility: HOSPITAL | Age: 72
LOS: 2 days | Discharge: HOME HEALTH | DRG: 871 | End: 2017-06-30
Attending: EMERGENCY MEDICINE | Admitting: HOSPITALIST
Payer: MEDICARE

## 2017-06-28 ENCOUNTER — APPOINTMENT (OUTPATIENT)
Dept: GENERAL RADIOLOGY | Facility: HOSPITAL | Age: 72
DRG: 871 | End: 2017-06-28
Attending: EMERGENCY MEDICINE
Payer: MEDICARE

## 2017-06-28 DIAGNOSIS — J18.9 SEPSIS DUE TO PNEUMONIA (HCC): ICD-10-CM

## 2017-06-28 DIAGNOSIS — A41.9 SEPSIS DUE TO PNEUMONIA (HCC): ICD-10-CM

## 2017-06-28 DIAGNOSIS — J18.9 COMMUNITY ACQUIRED PNEUMONIA: Primary | ICD-10-CM

## 2017-06-28 PROBLEM — E11.9 DIABETES MELLITUS TYPE 2 IN NONOBESE (HCC): Status: ACTIVE | Noted: 2017-02-23

## 2017-06-28 LAB
ALBUMIN SERPL-MCNC: 3.8 G/DL (ref 3.5–4.8)
ALLENS TEST: POSITIVE
ALP LIVER SERPL-CCNC: 86 U/L (ref 55–142)
ALT SERPL-CCNC: 29 U/L (ref 14–54)
APTT PPP: 28.2 SECONDS (ref 25–34)
ARTERIAL BLD GAS O2 SATURATION: 95 % (ref 92–100)
ARTERIAL BLOOD GAS BASE EXCESS: -4.1
ARTERIAL BLOOD GAS HCO3: 20.5 MEQ/L (ref 22–26)
ARTERIAL BLOOD GAS PCO2: 36 MM HG (ref 35–45)
ARTERIAL BLOOD GAS PH: 7.38 (ref 7.35–7.45)
ARTERIAL BLOOD GAS PO2: 82 MM HG (ref 80–105)
AST SERPL-CCNC: 33 U/L (ref 15–41)
ATRIAL RATE: 118 BPM
BASOPHILS # BLD AUTO: 0.02 X10(3) UL (ref 0–0.1)
BASOPHILS NFR BLD AUTO: 0.3 %
BILIRUB SERPL-MCNC: 0.6 MG/DL (ref 0.1–2)
BILIRUB UR QL STRIP.AUTO: NEGATIVE
BUN BLD-MCNC: 18 MG/DL (ref 8–20)
CALCIUM BLD-MCNC: 9 MG/DL (ref 8.3–10.3)
CALCULATED O2 SATURATION: 96 % (ref 92–100)
CARBOXYHEMOGLOBIN: 1 % SAT (ref 0–3)
CHLORIDE: 104 MMOL/L (ref 101–111)
CLARITY UR REFRACT.AUTO: CLEAR
CO2: 25 MMOL/L (ref 22–32)
COLOR UR AUTO: YELLOW
CREAT BLD-MCNC: 0.89 MG/DL (ref 0.55–1.02)
EOSINOPHIL # BLD AUTO: 0.03 X10(3) UL (ref 0–0.3)
EOSINOPHIL NFR BLD AUTO: 0.5 %
ERYTHROCYTE [DISTWIDTH] IN BLOOD BY AUTOMATED COUNT: 12.7 % (ref 11.5–16)
EST. AVERAGE GLUCOSE BLD GHB EST-MCNC: 166 MG/DL (ref 68–126)
GLUCOSE BLD-MCNC: 139 MG/DL (ref 65–99)
GLUCOSE BLD-MCNC: 148 MG/DL (ref 65–99)
GLUCOSE BLD-MCNC: 153 MG/DL (ref 70–99)
GLUCOSE UR STRIP.AUTO-MCNC: NEGATIVE MG/DL
HBA1C MFR BLD HPLC: 7.4 % (ref ?–5.7)
HCT VFR BLD AUTO: 35.5 % (ref 34–50)
HGB BLD-MCNC: 11.6 G/DL (ref 12–16)
IMMATURE GRANULOCYTE COUNT: 0.01 X10(3) UL (ref 0–1)
IMMATURE GRANULOCYTE RATIO %: 0.2 %
INR BLD: 1.16 (ref 0.89–1.11)
KETONES UR STRIP.AUTO-MCNC: NEGATIVE MG/DL
L/M: 2 L/MIN
LACTIC ACID: 1 MMOL/L (ref 0.5–2)
LACTIC ACID: 1.2 MMOL/L (ref 0.5–2)
LACTIC ACID: 2.1 MMOL/L (ref 0.5–2)
LACTIC ACID: 2.4 MMOL/L (ref 0.5–2)
LEUKOCYTE ESTERASE UR QL STRIP.AUTO: NEGATIVE
LYMPHOCYTES # BLD AUTO: 0.62 X10(3) UL (ref 0.9–4)
LYMPHOCYTES NFR BLD AUTO: 10.6 %
M PROTEIN MFR SERPL ELPH: 6.9 G/DL (ref 6.1–8.3)
MCH RBC QN AUTO: 28 PG (ref 27–33.2)
MCHC RBC AUTO-ENTMCNC: 32.7 G/DL (ref 31–37)
MCV RBC AUTO: 85.5 FL (ref 81–100)
METHEMOGLOBIN: 0.8 % SAT (ref 0.4–1.5)
MONOCYTES # BLD AUTO: 0.27 X10(3) UL (ref 0.1–0.6)
MONOCYTES NFR BLD AUTO: 4.6 %
NEUTROPHIL ABS PRELIM: 4.91 X10 (3) UL (ref 1.3–6.7)
NEUTROPHILS # BLD AUTO: 4.91 X10(3) UL (ref 1.3–6.7)
NEUTROPHILS NFR BLD AUTO: 83.8 %
NITRITE UR QL STRIP.AUTO: NEGATIVE
P AXIS: 40 DEGREES
P-R INTERVAL: 160 MS
PATIENT TEMPERATURE: 98.6 F
PH UR STRIP.AUTO: 5.5 [PH] (ref 4.5–8)
PLATELET # BLD AUTO: 179 10(3)UL (ref 150–450)
POTASSIUM SERPL-SCNC: 4.2 MMOL/L (ref 3.6–5.1)
PROCALCITONIN SERPL-MCNC: 19.98 NG/ML (ref ?–0.11)
PROT UR STRIP.AUTO-MCNC: NEGATIVE MG/DL
PSA SERPL DL<=0.01 NG/ML-MCNC: 14.9 SECONDS (ref 12–14.3)
Q-T INTERVAL: 324 MS
QRS DURATION: 82 MS
QTC CALCULATION (BEZET): 454 MS
R AXIS: -10 DEGREES
RBC # BLD AUTO: 4.15 X10(6)UL (ref 3.8–5.1)
RBC UR QL AUTO: NEGATIVE
RED CELL DISTRIBUTION WIDTH-SD: 39 FL (ref 35.1–46.3)
SODIUM SERPL-SCNC: 139 MMOL/L (ref 136–144)
SP GR UR STRIP.AUTO: 1.02 (ref 1–1.03)
T AXIS: 40 DEGREES
TOTAL HEMOGLOBIN: 10.2 G/DL (ref 11.7–16)
UROBILINOGEN UR STRIP.AUTO-MCNC: 0.2 MG/DL
VENTRICULAR RATE: 118 BPM
WBC # BLD AUTO: 5.9 X10(3) UL (ref 4–13)

## 2017-06-28 PROCEDURE — 99223 1ST HOSP IP/OBS HIGH 75: CPT | Performed by: INTERNAL MEDICINE

## 2017-06-28 PROCEDURE — 82962 GLUCOSE BLOOD TEST: CPT

## 2017-06-28 PROCEDURE — 71020 XR CHEST PA + LAT CHEST (CPT=71020): CPT | Performed by: EMERGENCY MEDICINE

## 2017-06-28 PROCEDURE — 83605 ASSAY OF LACTIC ACID: CPT | Performed by: EMERGENCY MEDICINE

## 2017-06-28 RX ORDER — HYDROCODONE BITARTRATE AND ACETAMINOPHEN 10; 325 MG/1; MG/1
1 TABLET ORAL EVERY 6 HOURS PRN
Status: DISCONTINUED | OUTPATIENT
Start: 2017-06-28 | End: 2017-06-30

## 2017-06-28 RX ORDER — MORPHINE SULFATE 2 MG/ML
2 INJECTION, SOLUTION INTRAMUSCULAR; INTRAVENOUS EVERY 2 HOUR PRN
Status: DISCONTINUED | OUTPATIENT
Start: 2017-06-28 | End: 2017-06-29

## 2017-06-28 RX ORDER — PANTOPRAZOLE SODIUM 40 MG/1
40 TABLET, DELAYED RELEASE ORAL
Status: DISCONTINUED | OUTPATIENT
Start: 2017-06-28 | End: 2017-06-30

## 2017-06-28 RX ORDER — PREGABALIN 50 MG/1
50 CAPSULE ORAL 2 TIMES DAILY
Status: DISCONTINUED | OUTPATIENT
Start: 2017-06-28 | End: 2017-06-30

## 2017-06-28 RX ORDER — MORPHINE SULFATE 4 MG/ML
4 INJECTION, SOLUTION INTRAMUSCULAR; INTRAVENOUS ONCE
Status: COMPLETED | OUTPATIENT
Start: 2017-06-28 | End: 2017-06-28

## 2017-06-28 RX ORDER — BUPROPION HYDROCHLORIDE 150 MG/1
150 TABLET, EXTENDED RELEASE ORAL 2 TIMES DAILY
Status: DISCONTINUED | OUTPATIENT
Start: 2017-06-28 | End: 2017-06-30

## 2017-06-28 RX ORDER — SODIUM CHLORIDE 9 MG/ML
INJECTION, SOLUTION INTRAVENOUS CONTINUOUS
Status: DISCONTINUED | OUTPATIENT
Start: 2017-06-28 | End: 2017-06-30

## 2017-06-28 RX ORDER — ALBUTEROL SULFATE 90 UG/1
2 AEROSOL, METERED RESPIRATORY (INHALATION) EVERY 4 HOURS PRN
COMMUNITY
End: 2017-07-10

## 2017-06-28 RX ORDER — DEXTROSE MONOHYDRATE 25 G/50ML
50 INJECTION, SOLUTION INTRAVENOUS
Status: DISCONTINUED | OUTPATIENT
Start: 2017-06-28 | End: 2017-06-30

## 2017-06-28 RX ORDER — ONDANSETRON 2 MG/ML
4 INJECTION INTRAMUSCULAR; INTRAVENOUS ONCE
Status: COMPLETED | OUTPATIENT
Start: 2017-06-28 | End: 2017-06-28

## 2017-06-28 RX ORDER — DOCUSATE SODIUM 100 MG/1
100 CAPSULE, LIQUID FILLED ORAL 2 TIMES DAILY
Status: DISCONTINUED | OUTPATIENT
Start: 2017-06-28 | End: 2017-06-30

## 2017-06-28 RX ORDER — IPRATROPIUM BROMIDE AND ALBUTEROL SULFATE 2.5; .5 MG/3ML; MG/3ML
3 SOLUTION RESPIRATORY (INHALATION)
Status: DISCONTINUED | OUTPATIENT
Start: 2017-06-28 | End: 2017-06-29

## 2017-06-28 RX ORDER — PREGABALIN 50 MG/1
50 CAPSULE ORAL 2 TIMES DAILY
COMMUNITY
End: 2017-07-12

## 2017-06-28 RX ORDER — PANTOPRAZOLE SODIUM 40 MG/1
40 TABLET, DELAYED RELEASE ORAL
COMMUNITY
End: 2017-07-17

## 2017-06-28 RX ORDER — ASPIRIN 81 MG/1
81 TABLET ORAL DAILY
Status: DISCONTINUED | OUTPATIENT
Start: 2017-06-28 | End: 2017-06-30

## 2017-06-28 RX ORDER — ACETAMINOPHEN 500 MG
1000 TABLET ORAL ONCE
Status: COMPLETED | OUTPATIENT
Start: 2017-06-28 | End: 2017-06-28

## 2017-06-28 RX ORDER — MORPHINE SULFATE 2 MG/ML
1 INJECTION, SOLUTION INTRAMUSCULAR; INTRAVENOUS EVERY 2 HOUR PRN
Status: DISCONTINUED | OUTPATIENT
Start: 2017-06-28 | End: 2017-06-29

## 2017-06-28 RX ORDER — SIMVASTATIN 40 MG
40 TABLET ORAL NIGHTLY
COMMUNITY
End: 2018-04-09

## 2017-06-28 RX ORDER — LISINOPRIL 20 MG/1
20 TABLET ORAL DAILY
COMMUNITY
End: 2018-04-09

## 2017-06-28 RX ORDER — TIZANIDINE 4 MG/1
4 TABLET ORAL 3 TIMES DAILY
COMMUNITY
End: 2017-07-12

## 2017-06-28 RX ORDER — ATORVASTATIN CALCIUM 20 MG/1
20 TABLET, FILM COATED ORAL NIGHTLY
Status: DISCONTINUED | OUTPATIENT
Start: 2017-06-28 | End: 2017-06-28

## 2017-06-28 RX ORDER — ONDANSETRON 2 MG/ML
4 INJECTION INTRAMUSCULAR; INTRAVENOUS EVERY 6 HOURS PRN
Status: DISCONTINUED | OUTPATIENT
Start: 2017-06-28 | End: 2017-06-30

## 2017-06-28 RX ORDER — POLYETHYLENE GLYCOL 3350 17 G/17G
17 POWDER, FOR SOLUTION ORAL DAILY PRN
Status: DISCONTINUED | OUTPATIENT
Start: 2017-06-28 | End: 2017-06-30

## 2017-06-28 RX ORDER — MONTELUKAST SODIUM 10 MG/1
10 TABLET ORAL NIGHTLY
COMMUNITY
End: 2017-07-06

## 2017-06-28 RX ORDER — SODIUM PHOSPHATE, DIBASIC AND SODIUM PHOSPHATE, MONOBASIC 7; 19 G/133ML; G/133ML
1 ENEMA RECTAL ONCE AS NEEDED
Status: DISCONTINUED | OUTPATIENT
Start: 2017-06-28 | End: 2017-06-30

## 2017-06-28 RX ORDER — FENTANYL 50 UG/H
1 PATCH TRANSDERMAL
Status: DISCONTINUED | OUTPATIENT
Start: 2017-06-28 | End: 2017-06-30

## 2017-06-28 RX ORDER — FENTANYL 75 UG/H
1 PATCH TRANSDERMAL
Status: DISCONTINUED | OUTPATIENT
Start: 2017-06-28 | End: 2017-06-28 | Stop reason: SDUPTHER

## 2017-06-28 RX ORDER — HYDROMORPHONE HYDROCHLORIDE 1 MG/ML
1 INJECTION, SOLUTION INTRAMUSCULAR; INTRAVENOUS; SUBCUTANEOUS ONCE
Status: DISCONTINUED | OUTPATIENT
Start: 2017-06-28 | End: 2017-06-28

## 2017-06-28 RX ORDER — MORPHINE SULFATE 4 MG/ML
4 INJECTION, SOLUTION INTRAMUSCULAR; INTRAVENOUS EVERY 2 HOUR PRN
Status: DISCONTINUED | OUTPATIENT
Start: 2017-06-28 | End: 2017-06-29

## 2017-06-28 RX ORDER — ACETAMINOPHEN 325 MG/1
650 TABLET ORAL EVERY 6 HOURS PRN
Status: DISCONTINUED | OUTPATIENT
Start: 2017-06-28 | End: 2017-06-30

## 2017-06-28 RX ORDER — MONTELUKAST SODIUM 10 MG/1
10 TABLET ORAL
Status: DISCONTINUED | OUTPATIENT
Start: 2017-06-28 | End: 2017-06-30

## 2017-06-28 RX ORDER — FENTANYL 25 UG/H
1 PATCH TRANSDERMAL
Status: DISCONTINUED | OUTPATIENT
Start: 2017-06-28 | End: 2017-06-30

## 2017-06-28 RX ORDER — RISEDRONATE SODIUM 150 MG/1
150 TABLET, FILM COATED ORAL
COMMUNITY
End: 2018-05-01

## 2017-06-28 RX ORDER — BISACODYL 10 MG
10 SUPPOSITORY, RECTAL RECTAL
Status: DISCONTINUED | OUTPATIENT
Start: 2017-06-28 | End: 2017-06-30

## 2017-06-28 RX ORDER — BUPROPION HYDROCHLORIDE 150 MG/1
150 TABLET, EXTENDED RELEASE ORAL 2 TIMES DAILY
COMMUNITY
End: 2017-07-06

## 2017-06-28 NOTE — PROGRESS NOTES
NURSING ADMISSION NOTE      Patient admitted via cart. Oriented to room. Safety precautions initiated. Bed in low position. Call light in reach. Health history obtained. Report given to [facility] YISEL.

## 2017-06-28 NOTE — PROGRESS NOTES
Received pt from ED. Pt alert and oriented x4. VSS. Maintaining 02 sats on 2L. Crackles to base of right lung. . Pt c/o a strong dry cough. Pt flushed and warm. Tachycardic. IVF infusing. Pt c/o cramping and pain to hands and legs. Oriented to unit.  Pt

## 2017-06-28 NOTE — SEPSIS REASSESSMENT
[facility]    Sepsis Reassessment Note    /67   Pulse 105   Temp 101.7 °F (38.7 °C) (Oral)   Resp 14   Ht 5' 2\" (1.575 m)   Wt 160 lb (72.6 kg)   SpO2 95%   BMI 29.26 kg/m²      11:21 AM    Cardiac:  Regularity: Regular  Rate: Normal  Heart S

## 2017-06-28 NOTE — ED INITIAL ASSESSMENT (HPI)
Patient presents with SOB x 2 days, bilateral quadriceps pain similar to DVTs she has had in the past, and fever. Per EMS her fever was >103F. She also reports nausea.

## 2017-06-28 NOTE — H&P
[facility]  History and Physical     Trupti Raymundover Patient Status:  Emergency    1945 MRN [id number]   Location [address] [facility] Attending Lisbet Lake MD   Hosp Day # 0 PCP Roscoe Conti MD     Chief Comp History:  reports that she has quit smoking. She has never used smokeless tobacco. She reports that she does not drink alcohol or use drugs.     Family History:   Family History   Problem Relation Age of Onset   • Heart Attack Paternal Grandfather    • Hear MG Oral Tab TAKE 1 TABLET (4 MG TOTAL) BY MOUTH 3 (THREE) TIMES DAILY. Disp: 270 tablet Rfl: 0   METFORMIN  MG Oral Tab TAKE 1 TABLET (500 MG TOTAL) BY MOUTH 2 (TWO) TIMES DAILY WITH MEALS.  Disp: 180 tablet Rfl: 1   BUPROPION HCL ER, SR, 150 MG Oral organomegaly. Neurologic: No focal neurological deficits. CNII-XII grossly intact. Musculoskeletal: Moves all extremities. Extremities: No edema or cyanosis. Integument: No rashes or lesions. Psychiatric: Appropriate mood and affect.       Diagnostic

## 2017-06-28 NOTE — ED PROVIDER NOTES
Patient Seen in: [facility] Emergency Department    History   Patient presents with:  Dyspnea HAYLIE SOB (respiratory)  Fever (infectious)  Deep Vein Thrombosis (cardiovascular)    Stated Complaint: SOB, FEVER, LEG PAIN    HPI    20-year-old white fema MOUTH TWO TIMES A DAY   HYDROcodone-acetaminophen  MG Oral Tab,  Take 1 tablet by mouth every 6 (six) hours as needed for Pain. DO NOT FILL BEFORE 6/8/17   FentaNYL 75 MCG/HR Transdermal Patch 72 Hr,  Place 1 patch onto the skin every third day.  DO N Smoking status: Former Smoker                                                              Packs/day: 0.00      Years: 0.00      Smokeless tobacco: Never Used                      Comment: quit 55 yrs ago  Alcohol use:  No                Review of Sys components within normal limits   COMP METABOLIC PANEL (14) - Abnormal; Notable for the following:     Glucose 153 (*)     All other components within normal limits   CBC W/ DIFFERENTIAL - Abnormal; Notable for the following:     HGB 11.6 (*)     Lymphocyt ============================================================  ED Course  ------------------------------------------------------------  MDM   Emergency room was placed on a cardiac monitor was given IV fluids and antipyretics and had laboratory

## 2017-06-29 ENCOUNTER — APPOINTMENT (OUTPATIENT)
Dept: GENERAL RADIOLOGY | Facility: HOSPITAL | Age: 72
DRG: 871 | End: 2017-06-29
Attending: INTERNAL MEDICINE
Payer: MEDICARE

## 2017-06-29 LAB
APTT PPP: 33.1 SECONDS (ref 25–34)
BASOPHILS # BLD AUTO: 0.02 X10(3) UL (ref 0–0.1)
BASOPHILS # BLD AUTO: 0.02 X10(3) UL (ref 0–0.1)
BASOPHILS NFR BLD AUTO: 0.2 %
BASOPHILS NFR BLD AUTO: 0.2 %
BUN BLD-MCNC: 13 MG/DL (ref 8–20)
CALCIUM BLD-MCNC: 8.1 MG/DL (ref 8.3–10.3)
CHLORIDE: 108 MMOL/L (ref 101–111)
CO2: 24 MMOL/L (ref 22–32)
CREAT BLD-MCNC: 0.67 MG/DL (ref 0.55–1.02)
DEPRECATED HBV CORE AB SER IA-ACNC: 56.4 NG/ML (ref 10–291)
EOSINOPHIL # BLD AUTO: 0 X10(3) UL (ref 0–0.3)
EOSINOPHIL # BLD AUTO: 0.03 X10(3) UL (ref 0–0.3)
EOSINOPHIL NFR BLD AUTO: 0 %
EOSINOPHIL NFR BLD AUTO: 0.3 %
ERYTHROCYTE [DISTWIDTH] IN BLOOD BY AUTOMATED COUNT: 13.2 % (ref 11.5–16)
ERYTHROCYTE [DISTWIDTH] IN BLOOD BY AUTOMATED COUNT: 13.3 % (ref 11.5–16)
FIBRINOGEN: 466 MG/DL (ref 200–446)
GLUCOSE BLD-MCNC: 104 MG/DL (ref 65–99)
GLUCOSE BLD-MCNC: 110 MG/DL (ref 65–99)
GLUCOSE BLD-MCNC: 117 MG/DL (ref 65–99)
GLUCOSE BLD-MCNC: 143 MG/DL (ref 65–99)
GLUCOSE BLD-MCNC: 95 MG/DL (ref 65–99)
GLUCOSE BLD-MCNC: 98 MG/DL (ref 70–99)
HCT VFR BLD AUTO: 25.5 % (ref 34–50)
HCT VFR BLD AUTO: 26.5 % (ref 34–50)
HGB BLD-MCNC: 7.9 G/DL (ref 12–16)
HGB BLD-MCNC: 8.5 G/DL (ref 12–16)
IMMATURE GRANULOCYTE COUNT: 0.05 X10(3) UL (ref 0–1)
IMMATURE GRANULOCYTE COUNT: 0.06 X10(3) UL (ref 0–1)
IMMATURE GRANULOCYTE RATIO %: 0.5 %
IMMATURE GRANULOCYTE RATIO %: 0.5 %
INR BLD: 1.52 (ref 0.89–1.11)
IRON SATURATION: 5 % (ref 13–45)
IRON: 13 UG/DL (ref 28–170)
LEGIONELLA PNEUMOPHILA AG, URI: NEGATIVE
LYMPHOCYTES # BLD AUTO: 0.98 X10(3) UL (ref 0.9–4)
LYMPHOCYTES # BLD AUTO: 1.09 X10(3) UL (ref 0.9–4)
LYMPHOCYTES NFR BLD AUTO: 8.1 %
LYMPHOCYTES NFR BLD AUTO: 9.9 %
MCH RBC QN AUTO: 27.7 PG (ref 27–33.2)
MCH RBC QN AUTO: 28.1 PG (ref 27–33.2)
MCHC RBC AUTO-ENTMCNC: 31 G/DL (ref 31–37)
MCHC RBC AUTO-ENTMCNC: 32.1 G/DL (ref 31–37)
MCV RBC AUTO: 87.5 FL (ref 81–100)
MCV RBC AUTO: 89.5 FL (ref 81–100)
MONOCYTES # BLD AUTO: 0.72 X10(3) UL (ref 0.1–0.6)
MONOCYTES # BLD AUTO: 0.75 X10(3) UL (ref 0.1–0.6)
MONOCYTES NFR BLD AUTO: 5.9 %
MONOCYTES NFR BLD AUTO: 6.8 %
NEUTROPHIL ABS PRELIM: 10.38 X10 (3) UL (ref 1.3–6.7)
NEUTROPHIL ABS PRELIM: 9.1 X10 (3) UL (ref 1.3–6.7)
NEUTROPHILS # BLD AUTO: 10.38 X10(3) UL (ref 1.3–6.7)
NEUTROPHILS # BLD AUTO: 9.1 X10(3) UL (ref 1.3–6.7)
NEUTROPHILS NFR BLD AUTO: 82.3 %
NEUTROPHILS NFR BLD AUTO: 85.3 %
PLATELET # BLD AUTO: 121 10(3)UL (ref 150–450)
PLATELET # BLD AUTO: 135 10(3)UL (ref 150–450)
POTASSIUM SERPL-SCNC: 3.9 MMOL/L (ref 3.6–5.1)
PSA SERPL DL<=0.01 NG/ML-MCNC: 18.5 SECONDS (ref 12–14.3)
RBC # BLD AUTO: 2.85 X10(6)UL (ref 3.8–5.1)
RBC # BLD AUTO: 3.03 X10(6)UL (ref 3.8–5.1)
RED CELL DISTRIBUTION WIDTH-SD: 41.8 FL (ref 35.1–46.3)
RED CELL DISTRIBUTION WIDTH-SD: 43 FL (ref 35.1–46.3)
RETIC ABS CALC AUTO: 39.4 X10(3) UL (ref 22.5–147.5)
RETIC IRF CALC: 0.1 RATIO (ref 0.09–0.3)
RETIC%: 1.3 % (ref 0.5–2.5)
RETICULOCYTE HEMOGLOBIN EQUIVALENT: 34 PG (ref 28.2–36.3)
SODIUM SERPL-SCNC: 139 MMOL/L (ref 136–144)
STREPTOCOCCUS PNEUMONIAE AG, U: NEGATIVE
TOTAL IRON BINDING CAPACITY: 277 UG/DL (ref 298–536)
TRANSFERRIN: 186 MG/DL (ref 200–360)
WBC # BLD AUTO: 11 X10(3) UL (ref 4–13)
WBC # BLD AUTO: 12.2 X10(3) UL (ref 4–13)

## 2017-06-29 PROCEDURE — 05H533Z INSERTION OF INFUSION DEVICE INTO RIGHT SUBCLAVIAN VEIN, PERCUTANEOUS APPROACH: ICD-10-PCS | Performed by: INTERNAL MEDICINE

## 2017-06-29 PROCEDURE — 71020 XR CHEST PA + LAT CHEST (CPT=71020): CPT | Performed by: INTERNAL MEDICINE

## 2017-06-29 PROCEDURE — 99232 SBSQ HOSP IP/OBS MODERATE 35: CPT | Performed by: INTERNAL MEDICINE

## 2017-06-29 PROCEDURE — 82962 GLUCOSE BLOOD TEST: CPT

## 2017-06-29 RX ORDER — SODIUM CHLORIDE 0.9 % (FLUSH) 0.9 %
10 SYRINGE (ML) INJECTION EVERY 12 HOURS
Status: DISCONTINUED | OUTPATIENT
Start: 2017-06-29 | End: 2017-06-30

## 2017-06-29 RX ORDER — IPRATROPIUM BROMIDE AND ALBUTEROL SULFATE 2.5; .5 MG/3ML; MG/3ML
3 SOLUTION RESPIRATORY (INHALATION)
Status: DISCONTINUED | OUTPATIENT
Start: 2017-06-29 | End: 2017-06-30

## 2017-06-29 RX ORDER — TIZANIDINE 4 MG/1
4 TABLET ORAL 3 TIMES DAILY
Status: DISCONTINUED | OUTPATIENT
Start: 2017-06-29 | End: 2017-06-30

## 2017-06-29 RX ORDER — MORPHINE SULFATE 4 MG/ML
2 INJECTION, SOLUTION INTRAMUSCULAR; INTRAVENOUS EVERY 2 HOUR PRN
Status: DISCONTINUED | OUTPATIENT
Start: 2017-06-29 | End: 2017-06-30

## 2017-06-29 RX ORDER — MORPHINE SULFATE 4 MG/ML
1 INJECTION, SOLUTION INTRAMUSCULAR; INTRAVENOUS EVERY 2 HOUR PRN
Status: DISCONTINUED | OUTPATIENT
Start: 2017-06-29 | End: 2017-06-30

## 2017-06-29 RX ORDER — MORPHINE SULFATE 4 MG/ML
4 INJECTION, SOLUTION INTRAMUSCULAR; INTRAVENOUS EVERY 2 HOUR PRN
Status: DISCONTINUED | OUTPATIENT
Start: 2017-06-29 | End: 2017-06-30

## 2017-06-29 NOTE — PLAN OF CARE
Diabetes/Glucose Control    • Glucose maintained within prescribed range Progressing        PAIN - ADULT    • Verbalizes/displays adequate comfort level or patient's stated pain goal Progressing        Patient/Family Goals    • Patient/Family [facility]

## 2017-06-29 NOTE — CM/SW NOTE
Patient is a 79 y/o woman admitted for pneumonia. Met with pt, pt's dtr Yuridia and pt's Ajith . Pt lives in her own 2 story town home. Her grandson, Sharda Loera lives with her. He is employed 20-25 hours/week, but otherwise home.   Her dtr, Alverto Mack

## 2017-06-29 NOTE — HOME CARE LIAISON
IAGNOSES AND PERTINENT MEDICAL HISTORY:  SOB  PNEUMONIA   WT 81.6KG  . 5CM    FACILITY NAME AND DC DATE:  [facility]  PENDING     BEDSIDE VISIT WITH: TNL MET WITH PT AT BEDSIDE    SERVICES ORDERED: RN/PT    VERIFIED PATIENT ADDRESS, PHONE NUMBER AND CAREG

## 2017-06-29 NOTE — HOME CARE LIAISON
Received referral for [facility] on d/c for SN. Met with patient who is agreeable to [facility] on d/c. Agency brochure given to patient.  Referral sent to [facility] via [facility]    Thank you for this referral,   Jay Lanza

## 2017-06-29 NOTE — PLAN OF CARE
Diabetes/Glucose Control    • Glucose maintained within prescribed range Progressing        PAIN - ADULT    • Verbalizes/displays adequate comfort level or patient's stated pain goal Progressing        Patient/Family Goals    • Patient/Family [facility] discharge: PNA    Anticipated discharge date: 6/30/17    Current discharge plan: home    Patient is at high risk for readmission.

## 2017-06-29 NOTE — PROGRESS NOTES
[facility]  Progress Note     [facility] Patient Status:  Inpatient    1945 MRN [id number]   [facility]NW-A Attending Joe Toussaint MD   Hosp Day # 1 PCP Shun Roberts MD     Chief Complaint: fever    S: Patient d Oral BID   • pregabalin  50 mg Oral BID   • Pantoprazole Sodium  40 mg Oral QAM AC   • Montelukast Sodium  10 mg Oral Daily   • Sertraline HCl  50 mg Oral Daily   • insulin aspart  1-5 Units Subcutaneous TID CC and HS   • fentaNYL  1 patch Transdermal Q72H

## 2017-06-30 VITALS
SYSTOLIC BLOOD PRESSURE: 139 MMHG | BODY MASS INDEX: 33.19 KG/M2 | HEART RATE: 77 BPM | HEIGHT: 62 IN | WEIGHT: 180.38 LBS | RESPIRATION RATE: 18 BRPM | OXYGEN SATURATION: 97 % | DIASTOLIC BLOOD PRESSURE: 64 MMHG | TEMPERATURE: 98 F

## 2017-06-30 LAB
BASOPHILS # BLD AUTO: 0 X10(3) UL (ref 0–0.1)
BASOPHILS NFR BLD AUTO: 0 %
EOSINOPHIL # BLD AUTO: 0.05 X10(3) UL (ref 0–0.3)
EOSINOPHIL NFR BLD AUTO: 0.6 %
ERYTHROCYTE [DISTWIDTH] IN BLOOD BY AUTOMATED COUNT: 13.2 % (ref 11.5–16)
HCT VFR BLD AUTO: 26.5 % (ref 34–50)
HGB BLD-MCNC: 8.3 G/DL (ref 12–16)
IMMATURE GRANULOCYTE COUNT: 0.05 X10(3) UL (ref 0–1)
IMMATURE GRANULOCYTE RATIO %: 0.6 %
LYMPHOCYTES # BLD AUTO: 0.74 X10(3) UL (ref 0.9–4)
LYMPHOCYTES NFR BLD AUTO: 8.4 %
MCH RBC QN AUTO: 28 PG (ref 27–33.2)
MCHC RBC AUTO-ENTMCNC: 31.3 G/DL (ref 31–37)
MCV RBC AUTO: 89.5 FL (ref 81–100)
MONOCYTES # BLD AUTO: 0.54 X10(3) UL (ref 0.1–0.6)
MONOCYTES NFR BLD AUTO: 6.1 %
NEUTROPHIL ABS PRELIM: 7.41 X10 (3) UL (ref 1.3–6.7)
NEUTROPHILS # BLD AUTO: 7.41 X10(3) UL (ref 1.3–6.7)
NEUTROPHILS NFR BLD AUTO: 84.3 %
PLATELET # BLD AUTO: 133 10(3)UL (ref 150–450)
RBC # BLD AUTO: 2.96 X10(6)UL (ref 3.8–5.1)
RED CELL DISTRIBUTION WIDTH-SD: 43.2 FL (ref 35.1–46.3)
WBC # BLD AUTO: 8.8 X10(3) UL (ref 4–13)

## 2017-06-30 PROCEDURE — 82962 GLUCOSE BLOOD TEST: CPT

## 2017-06-30 PROCEDURE — 99239 HOSP IP/OBS DSCHRG MGMT >30: CPT | Performed by: INTERNAL MEDICINE

## 2017-06-30 RX ORDER — ONDANSETRON 8 MG/1
8 TABLET, ORALLY DISINTEGRATING ORAL EVERY 12 HOURS PRN
Qty: 14 TABLET | Refills: 0 | Status: SHIPPED | OUTPATIENT
Start: 2017-06-30 | End: 2017-07-07

## 2017-06-30 RX ORDER — CEFDINIR 300 MG/1
300 CAPSULE ORAL 2 TIMES DAILY
Qty: 14 CAPSULE | Refills: 0 | Status: SHIPPED | OUTPATIENT
Start: 2017-06-30 | End: 2017-07-07

## 2017-06-30 RX ORDER — DOXYCYCLINE HYCLATE 100 MG/1
100 CAPSULE ORAL 2 TIMES DAILY
Qty: 14 CAPSULE | Refills: 0 | Status: SHIPPED | OUTPATIENT
Start: 2017-06-30 | End: 2017-06-30

## 2017-06-30 NOTE — CM/SW NOTE
BPCI:  Met with patient at bedside to explain the BPCI/Medicare program. Patient stated that she has a case manger through her PCP office and declined calls from [facility][note 2]. Patient was enrolled under .  BPCI/Medicare Letter and Brochure provid

## 2017-06-30 NOTE — PROGRESS NOTES
[facility]  Progress Note     Danica Colvin Patient Status:  Inpatient    1945 MRN [id number]   [facility]-A Attending Brittany Pichardo MD   Hosp Day # 2 PCP John Gonsalez MD     Chief Complaint: fever    S: Patient d mL Intravenous Q12H   • TiZANidine HCl  4 mg Oral TID   • cefTRIAXone  1 g Intravenous Q24H   • azithromycin  500 mg Intravenous Q24H   • docusate sodium  100 mg Oral BID   • aspirin  81 mg Oral Daily   • BuPROPion HCl ER (SR)  150 mg Oral BID   • pregabal

## 2017-06-30 NOTE — DISCHARGE SUMMARY
[facility]  DISCHARGE SUMMARY     Kei Mcdowell Patient Status:  Inpatient    1945 MRN [id number]   [facility] 5NW-A Attending Nicolás Casillas MD   [facility] Day # 2 PCP Antonio Michelle MD     Date of Admission: 2017  Date to follow-up with GI next week for the pill swallow. She will finish course of antibiotics as an outpatient for her pneumonia.     Procedures during hospitalization:   Xr Chest Pa + Lat Chest (dsn=[id number])    Result Date: 6/29/2017  CONCLUSION:   Substantial Refills:  0     BuPROPion HCl ER (SR) 150 MG Tb12  Commonly known as:  WELLBUTRIN SR      Take 150 mg by mouth 2 (two) times daily. Refills:  0     FentaNYL 75 MCG/HR Pt72  Commonly known as:  [address] 1 patch onto the skin every third day.  D Refills:  0        STOP taking these medications    Ondansetron HCl 4 mg tablet  Commonly known as:  Master Harmon              Where to Get Your Medications      These medications were sent to [place], [address] [facility], [address] [facility]

## 2017-06-30 NOTE — PLAN OF CARE
Diabetes/Glucose Control    • Glucose maintained within prescribed range Progressing        PAIN - ADULT    • Verbalizes/displays adequate comfort level or patient's stated pain goal Progressing        Patient/Family Goals    • Patient/Family [facility]

## 2017-06-30 NOTE — CM/SW NOTE
06/30/17 1600   Discharge disposition   Discharged to: Home-Health   Name of [facility] 1 services after discharge Skilled home care   Discharge transportation Private car

## 2017-07-03 ENCOUNTER — PATIENT OUTREACH (OUTPATIENT)
Dept: CASE MANAGEMENT | Age: 72
End: 2017-07-03

## 2017-07-03 ENCOUNTER — TELEPHONE (OUTPATIENT)
Dept: INTERNAL MEDICINE CLINIC | Facility: CLINIC | Age: 72
End: 2017-07-03

## 2017-07-03 DIAGNOSIS — Z02.9 ENCOUNTERS FOR ADMINISTRATIVE PURPOSE: ICD-10-CM

## 2017-07-03 RX ORDER — MONTELUKAST SODIUM 10 MG/1
TABLET ORAL
Qty: 90 TABLET | Refills: 0 | Status: SHIPPED | OUTPATIENT
Start: 2017-07-03 | End: 2017-10-04

## 2017-07-03 RX ORDER — BUPROPION HYDROCHLORIDE 150 MG/1
TABLET, EXTENDED RELEASE ORAL
Qty: 180 TABLET | Refills: 0 | Status: SHIPPED | OUTPATIENT
Start: 2017-07-03 | End: 2017-10-02

## 2017-07-03 NOTE — TELEPHONE ENCOUNTER
Spoke to pt for TCM today. Pt states she is not feeling well- still having abdominal pain after taking abx, slight SOB, nausea, diarrhea, no appetite, and no energy. Pt states she is not vomiting and is able to tolerate gatorade and a few crackers.   She

## 2017-07-03 NOTE — PROGRESS NOTES
Initial Post Discharge Follow Up   Discharge Date: 6/30/17  Contact Date: 7/3/2017    Consent Verification:  Assessment Completed With: Patient  HIPAA Verified?   Yes    Discharge Dx:  Pneumonia     General:   • How have you been since your discharge fro Disp:  Rfl:    lisinopril 20 MG Oral Tab Take 20 mg by mouth daily. Disp:  Rfl:    pregabalin (LYRICA) 50 MG Oral Cap Take 50 mg by mouth 2 (two) times daily. Disp:  Rfl:    MetFORMIN HCl 500 MG Oral Tab Take 500 mg by mouth 2 (two) times daily with meals. hospital? Yes  • May I go over your medications with you to make sure we are not missing anything? yes  • Are there any reasons that keep you from taking your medication as prescribed? No  Are you having any concerns with constipation?  No    Referrals/order Not Applicable     Overall Rating: How would you rate the care you received while in the hospital?  fair     Interventions by NCM:  All d/c instructions reviewed with pt. Reviewed when to call MD vs when to go to ER/call 911.   Pt states she is not fee

## 2017-07-03 NOTE — TELEPHONE ENCOUNTER
Medication: Hydrocodone-Acetaminophen  MG,  Fentanyl 75 MCG/HR    Date of last refill: 6/8/17 for both  [place] Reviewed 9/2/03 for both    Last office visit: 10/5/16  Due back to clinic per last office note:  6 mo Sig: Place 1 patch onto the skin every third day.        TiZANidine HCl 4 MG Oral Tab 270 tablet 0      Sig: Take 1 tablet (4 mg total) by mouth 3 (three) times daily.        lidocaine 5 % External Patch 90 patch 0      Sig: Place 3 patches onto the s

## 2017-07-03 NOTE — TELEPHONE ENCOUNTER
Called pt to advise info per SD. Pt verbalized understanding, agreed with POC to continue abx, add probiotic and seek ER if sxs worsen. Pt had no further questions.

## 2017-07-03 NOTE — TELEPHONE ENCOUNTER
LOV: 2/23/17  Future office visit: no upcoming visit   Last labs: 6/30/17 Cbc 6/29/17 Bmp Cbc  Last RX: 12/28/16 #180 #1 Refill  Per protocol: Route to provider

## 2017-07-03 NOTE — TELEPHONE ENCOUNTER
Will draw blood to check hbg on 7/6 at ov. Take antibiotic with food. Add probiotic.   If continues to have SOB and nausea, will need to proceed to ER

## 2017-07-05 LAB
GLUCOSE BLD-MCNC: 114 MG/DL (ref 65–99)
GLUCOSE BLD-MCNC: 141 MG/DL (ref 65–99)

## 2017-07-06 ENCOUNTER — OFFICE VISIT (OUTPATIENT)
Dept: INTERNAL MEDICINE CLINIC | Facility: CLINIC | Age: 72
End: 2017-07-06

## 2017-07-06 ENCOUNTER — LAB ENCOUNTER (OUTPATIENT)
Dept: LAB | Age: 72
End: 2017-07-06
Attending: NURSE PRACTITIONER
Payer: MEDICARE

## 2017-07-06 ENCOUNTER — APPOINTMENT (OUTPATIENT)
Dept: LAB | Age: 72
End: 2017-07-06
Attending: PHYSICIAN ASSISTANT
Payer: MEDICARE

## 2017-07-06 VITALS
HEART RATE: 66 BPM | RESPIRATION RATE: 16 BRPM | TEMPERATURE: 98 F | DIASTOLIC BLOOD PRESSURE: 64 MMHG | WEIGHT: 172.19 LBS | BODY MASS INDEX: 31.68 KG/M2 | SYSTOLIC BLOOD PRESSURE: 128 MMHG | OXYGEN SATURATION: 98 % | HEIGHT: 62 IN

## 2017-07-06 DIAGNOSIS — E11.9 DIABETES MELLITUS TYPE 2 IN NONOBESE (HCC): ICD-10-CM

## 2017-07-06 DIAGNOSIS — Z86.711 HISTORY OF PULMONARY EMBOLISM: ICD-10-CM

## 2017-07-06 DIAGNOSIS — F11.90 NARCOTIC DRUG USE: ICD-10-CM

## 2017-07-06 DIAGNOSIS — A41.9 SEPSIS DUE TO PNEUMONIA (HCC): Primary | ICD-10-CM

## 2017-07-06 DIAGNOSIS — D64.9 ANEMIA, UNSPECIFIED TYPE: ICD-10-CM

## 2017-07-06 DIAGNOSIS — D69.6 THROMBOCYTOPENIA (HCC): ICD-10-CM

## 2017-07-06 DIAGNOSIS — J18.9 COMMUNITY ACQUIRED PNEUMONIA: ICD-10-CM

## 2017-07-06 DIAGNOSIS — Z79.891 LONG TERM CURRENT USE OF OPIATE ANALGESIC: ICD-10-CM

## 2017-07-06 DIAGNOSIS — J18.9 SEPSIS DUE TO PNEUMONIA (HCC): Primary | ICD-10-CM

## 2017-07-06 DIAGNOSIS — R19.7 DIARRHEA, UNSPECIFIED TYPE: ICD-10-CM

## 2017-07-06 LAB
BASOPHILS # BLD AUTO: 0.04 X10(3) UL (ref 0–0.1)
BASOPHILS NFR BLD AUTO: 0.6 %
EOSINOPHIL # BLD AUTO: 0.14 X10(3) UL (ref 0–0.3)
EOSINOPHIL NFR BLD AUTO: 2.1 %
ERYTHROCYTE [DISTWIDTH] IN BLOOD BY AUTOMATED COUNT: 13.6 % (ref 11.5–16)
HCT VFR BLD AUTO: 30.5 % (ref 34–50)
HGB BLD-MCNC: 9.6 G/DL (ref 12–16)
IMMATURE GRANULOCYTE COUNT: 0.04 X10(3) UL (ref 0–1)
IMMATURE GRANULOCYTE RATIO %: 0.6 %
LYMPHOCYTES # BLD AUTO: 1.29 X10(3) UL (ref 0.9–4)
LYMPHOCYTES NFR BLD AUTO: 19.2 %
MCH RBC QN AUTO: 27.9 PG (ref 27–33.2)
MCHC RBC AUTO-ENTMCNC: 31.5 G/DL (ref 31–37)
MCV RBC AUTO: 88.7 FL (ref 81–100)
MONOCYTES # BLD AUTO: 0.6 X10(3) UL (ref 0.1–0.6)
MONOCYTES NFR BLD AUTO: 8.9 %
NEUTROPHIL ABS PRELIM: 4.62 X10 (3) UL (ref 1.3–6.7)
NEUTROPHILS # BLD AUTO: 4.62 X10(3) UL (ref 1.3–6.7)
NEUTROPHILS NFR BLD AUTO: 68.6 %
PLATELET # BLD AUTO: 261 10(3)UL (ref 150–450)
RBC # BLD AUTO: 3.44 X10(6)UL (ref 3.8–5.1)
RED CELL DISTRIBUTION WIDTH-SD: 43.3 FL (ref 35.1–46.3)
WBC # BLD AUTO: 6.7 X10(3) UL (ref 4–13)

## 2017-07-06 PROCEDURE — 80307 DRUG TEST PRSMV CHEM ANLYZR: CPT

## 2017-07-06 PROCEDURE — 99495 TRANSJ CARE MGMT MOD F2F 14D: CPT | Performed by: NURSE PRACTITIONER

## 2017-07-06 PROCEDURE — 85025 COMPLETE CBC W/AUTO DIFF WBC: CPT

## 2017-07-06 NOTE — TELEPHONE ENCOUNTER
Pt's language inappropriate- not following agreed pt etiquette. Please start discharge process. I have asked Dr Iglesia Alejandre to danay pt regarding discharge.

## 2017-07-06 NOTE — TELEPHONE ENCOUNTER
Rx ready for ; RN NEEDED     The following documentation was provided to patients at time of Rx pickup:      [facility]  [facility] office is committed to providing the best care to our patients.    Due to

## 2017-07-06 NOTE — PROGRESS NOTES
HPI:    Chrystal Floyd is a 80year old female here today for hospital follow up.    She was discharged from Inpatient hospital, [facility] to Home   Admission Date: 6/28/17   Discharge Date: 6/30/17  Hospital Discharge Diagnosis: No Value exist She did not see a pulmonologist inpt this hospitalization. Anemia:  EGD done in April  She follows w Dr Javier Gonzalez. D/c hgb 8.3  She is scheduled for video endoscopy. She had to cancel and reschedule. Feeling weak.      Hx PE  On xarelto    DM   Metformin Oral Tab Take 150 mg by mouth every 30 (thirty) days. Sertraline HCl 50 MG Oral Tab Take 50 mg by mouth daily. simvastatin 40 MG Oral Tab Take 40 mg by mouth nightly. TiZANidine HCl 4 MG Oral Tab Take 4 mg by mouth 3 (three) times daily.    Rivaroxaba HEALTH: feels ill  RESPIRATORY: as above  CARDIOVASCULAR: denies chest pain on exertion, no palpatations  GI: denies abdominal pain and denies heartburn, no diarrhea or constipation  MUSCULOSKELETAL:  No arthralgias or myalgias      EXAM:   See below.   GEN Options: moderate  · Amount and/or Complexity of Data to Be Reviewed: moderate  · Risk of Significant Complications, Morbidity, and/or Mortality: moderate    Overall Risk:   moderate    Patient seen within 14 days from date of discharge.      Edgard Khan,

## 2017-07-06 NOTE — TELEPHONE ENCOUNTER
Pt here in office for Rx pickup. Reviewed pain agreement with pt. Pt verbalized understanding. States last dose of Norco was noon today. Pt sent to lab for urine specimen. Pt to return to pickup Rx.     Patient was upset when told she need to provide ur

## 2017-07-08 LAB
6-ACETYLMORHINE CUTOFF 20 NG/ML: NOT DETECTED
7-AMINOCLONAZEPAM 40 NG/ML: NOT DETECTED
A-OH-ALPRAZOLM CUTOFF 20 NG/ML: NOT DETECTED
ALPRAZOLAM CUTOFF 40 NG/ML: NOT DETECTED
AMPHETAMINE CUTOFF 10 NG/ML: NOT DETECTED
BARBITURATES CUTOFF 200 NG/ML: NOT DETECTED
BENZOYLECGONINE  150 NG/ML: NOT DETECTED
BUPRENORPHINE CUTOFF 5 NG/ML: NOT DETECTED
CARISOPRODOL CUTOFF 100 NG/ML: NOT DETECTED
CODINE CUTOFF 40 NG/ML: NOT DETECTED
COLNAZEPAM CUTOFF 20 NG/ML: NOT DETECTED
CREATININE,URINE: 78.3 MG/DL
DIAZEPAM CUTOFF 50 NG/ML: NOT DETECTED
ETHYL-GLUCURONIDE 500 NG/ML: NOT DETECTED
FENTANYL CUTOFF 2 NG/ML: PRESENT
HYDROCODONE CUTOFF 40 NG/ML: PRESENT
HYDROMORPHONE CUTOFF 40 NG/ML: NOT DETECTED
LORAZEPAM CUTOFF 60 NG/ML: NOT DETECTED
MARIJUANA CUTOFF 20 NG/ML: NOT DETECTED
MDA CUTOFF 200 NG/ML: NOT DETECTED
MDEA EVE CUTOFF 200 NG/ML: NOT DETECTED
MDMA-ECSTASY CUTOFF 200 NG/ML: NOT DETECTED
MEPERIDINE MET CUTOFF 50 NG/ML: NOT DETECTED
METHADONE CUTOFF 150 NG/ML: NOT DETECTED
METHAMPHETMN CUTOFF 400 NG/ML: NOT DETECTED
METHYLPHENIDATE (CUTOFF 100 NG/ML): NOT DETECTED
MIDAZOLAM CUTOFF 20 NG/ML: NOT DETECTED
MORHINE CUTOFF 20 NG/ML: NOT DETECTED
NORBUPRENORPHINE  20 NG/ML: NOT DETECTED
NORDIAZEPAM CUTOFF 50 NG/ML: NOT DETECTED
NORFENTANYL CUTOFF 2 NG/ML: PRESENT
NORHYDRCODONE CUTOFF 100 NG/ML: PRESENT
NOROXYCODONE CUTOFF 100 NG/ML: NOT DETECTED
NOROXYMORPHNE CUTOFF 100 NG/ML: NOT DETECTED
OXAZEPAM CUTOFF 50 NG/ML: NOT DETECTED
OXYCODONE CUTOFF 40 NG/ML: NOT DETECTED
OXYMORPHONE CUTOFF 40 NG/ML: NOT DETECTED
PCP CUTOFF 25 NG/ML: NOT DETECTED
PHENTERMINE CUTOFF 100 NG/ML: NOT DETECTED
PROPOXYPHENE CUTOFF 300 NG/ML: NOT DETECTED
TAPENTADOL CUTOFF 100 NG/ML: NOT DETECTED
TAPENTADOL-O SULF 200 NG/ML: NOT DETECTED
TEMAZEPAM CUTOFF 50 NG/ML: NOT DETECTED
TRAMADOL CUTOFF 200 NG/ML: NOT DETECTED
ZOLPIDEM CUTOFF 20 NG/ML: NOT DETECTED

## 2017-07-10 RX ORDER — ALBUTEROL SULFATE 90 UG/1
2 AEROSOL, METERED RESPIRATORY (INHALATION) EVERY 4 HOURS PRN
Qty: 1 INHALER | Refills: 1 | Status: SHIPPED | OUTPATIENT
Start: 2017-07-10 | End: 2018-04-09

## 2017-07-10 NOTE — TELEPHONE ENCOUNTER
LOV: 7/6/17  Last refill: none  Last labs 7/6/17:  Future appt: None    rx pended for your approval if ok. Please advise. Thank you.

## 2017-07-12 ENCOUNTER — TELEPHONE (OUTPATIENT)
Dept: SURGERY | Facility: CLINIC | Age: 72
End: 2017-07-12

## 2017-07-12 DIAGNOSIS — M47.812 ARTHROPATHY OF CERVICAL FACET JOINT: ICD-10-CM

## 2017-07-12 DIAGNOSIS — M47.814 FACET ARTHROPATHY, THORACIC: ICD-10-CM

## 2017-07-12 RX ORDER — PREGABALIN 50 MG/1
CAPSULE ORAL
Qty: 60 CAPSULE | Refills: 0 | Status: SHIPPED | OUTPATIENT
Start: 2017-07-12 | End: 2017-08-15

## 2017-07-12 RX ORDER — TIZANIDINE 4 MG/1
TABLET ORAL
Qty: 90 TABLET | Refills: 0 | Status: SHIPPED | OUTPATIENT
Start: 2017-07-12 | End: 2018-07-24

## 2017-07-12 NOTE — TELEPHONE ENCOUNTER
Medication: Lyrica 50 MG     Date of last refill: 06/13/17  [place] 63/23/49 # 60/30     Last office visit: 10/5/16  Due back to clinic per last office note:  6 months  Date next office visit scheduled:  Nothing sched skin every third day.         TiZANidine HCl 4 MG Oral Tab  270 tablet  0       Sig: Take 1 tablet (4 mg total) by mouth 3 (three) times daily.         lidocaine 5 % External Patch  90 patch  0       Sig: Place 3 patches onto the skin daily.  Wear for 12 ho

## 2017-07-12 NOTE — TELEPHONE ENCOUNTER
Medication: Tizanidine 4 mg     Date of last refill: 03/17/17 # 270  [place] 41/65/74 # 60/30     Last office visit: 10/5/16  Due back to clinic per last office note:  6 months  Date next office visit scheduled:  Nothing the skin every third day.         TiZANidine HCl 4 MG Oral Tab  270 tablet  0       Sig: Take 1 tablet (4 mg total) by mouth 3 (three) times daily.         lidocaine 5 % External Patch  90 patch  0       Sig: Place 3 patches onto the skin daily.  Wear for 1

## 2017-07-14 ENCOUNTER — TELEPHONE (OUTPATIENT)
Dept: INTERNAL MEDICINE CLINIC | Facility: CLINIC | Age: 72
End: 2017-07-14

## 2017-07-14 RX ORDER — FLUTICASONE PROPIONATE AND SALMETEROL 250; 50 UG/1; UG/1
POWDER RESPIRATORY (INHALATION)
Qty: 60 EACH | Refills: 0 | Status: SHIPPED | OUTPATIENT
Start: 2017-07-14 | End: 2018-04-09

## 2017-07-14 NOTE — TELEPHONE ENCOUNTER
Pt states that her asthma is acting up and Pt already has an appt with pulmonology next week but needs a Rx for advair to be sent to help her until appt time, last time she refilled was last year since she rarely needs it.   Pt is taking Proair 4-6 puff wit

## 2017-07-14 NOTE — TELEPHONE ENCOUNTER
Spoke with [employer], stating they did not get the faxed prescription for the lyrica. Verbal order given for lyrica 50mg BID #60.

## 2017-07-17 RX ORDER — PANTOPRAZOLE SODIUM 40 MG/1
TABLET, DELAYED RELEASE ORAL
Qty: 90 TABLET | Refills: 1 | Status: SHIPPED | OUTPATIENT
Start: 2017-07-17 | End: 2018-01-09

## 2017-07-17 NOTE — TELEPHONE ENCOUNTER
LOV: 7/6/17  Future office visit: no upcoming visit   Last labs: 7/6/17 Cbc 6/30/17 Cbc   Last RX: 5/15/17 #30 #1 Refill  Per protocol: Route to provider

## 2017-07-25 NOTE — TELEPHONE ENCOUNTER
Discharge letter sent certified mail [phone] and standard mail. Patient discharged from [facility] effective 8/11/17.

## 2017-07-26 NOTE — TELEPHONE ENCOUNTER
Dr Matthieu Maxwell called and LM for pt that she will be discharged and she will need to find care elsewhere.

## 2017-07-27 ENCOUNTER — TELEPHONE (OUTPATIENT)
Dept: INTERNAL MEDICINE CLINIC | Facility: CLINIC | Age: 72
End: 2017-07-27

## 2017-07-27 DIAGNOSIS — M53.3 SACROILIAC JOINT DYSFUNCTION: ICD-10-CM

## 2017-07-27 DIAGNOSIS — M54.12 CERVICAL RADICULITIS: ICD-10-CM

## 2017-07-27 DIAGNOSIS — R53.83 FATIGUE, UNSPECIFIED TYPE: ICD-10-CM

## 2017-07-27 DIAGNOSIS — M47.812 ARTHROPATHY OF CERVICAL FACET JOINT: ICD-10-CM

## 2017-07-27 DIAGNOSIS — G89.29 OTHER CHRONIC PAIN: Primary | ICD-10-CM

## 2017-07-27 DIAGNOSIS — M54.9 CHRONIC BACK PAIN, UNSPECIFIED BACK LOCATION, UNSPECIFIED BACK PAIN LATERALITY: Chronic | ICD-10-CM

## 2017-07-27 DIAGNOSIS — M47.817 SPONDYLOSIS OF LUMBOSACRAL REGION, UNSPECIFIED SPINAL OSTEOARTHRITIS COMPLICATION STATUS: ICD-10-CM

## 2017-07-27 DIAGNOSIS — G89.29 CHRONIC BACK PAIN, UNSPECIFIED BACK LOCATION, UNSPECIFIED BACK PAIN LATERALITY: Chronic | ICD-10-CM

## 2017-07-27 DIAGNOSIS — M54.16 LUMBAR RADICULITIS: ICD-10-CM

## 2017-07-27 DIAGNOSIS — M47.819 ARTHROPATHY OF FACET JOINT: ICD-10-CM

## 2017-07-27 NOTE — TELEPHONE ENCOUNTER
Pt is looking for the name of another pain clinic, she is looking to see someone besides Dr. Robert Koroma.

## 2017-07-27 NOTE — TELEPHONE ENCOUNTER
Patient was sent a discharge letter from Dr. Tyrell Carson office dated 7/12/17-see letter in [facility].   LOV 7/6/17 with SD.

## 2017-07-28 NOTE — TELEPHONE ENCOUNTER
Patient notified/referred to [facility] Dr Nato Mcelroy, notified SD would be unable to fill chronic pain medications, notified to make sure she gets a refill from Dr. Saulo Alarcon office before the end of the 30 days.   Pt has an appt scheduled with Qi Rowland

## 2017-07-28 NOTE — TELEPHONE ENCOUNTER
Referral/order placed for Dr Quintana Hum [facility] and faxed to his office so pt can schedule.

## 2017-07-28 NOTE — TELEPHONE ENCOUNTER
She will need to see [facility] pain clinic if possible. I am unable to fill her chronic pain medications. Please ensure she gets a refill by Dr Erinn Vu office prior to end of 30 days and I would like to see her to discuss what happened at some point.

## 2017-07-31 ENCOUNTER — HOSPITAL ENCOUNTER (OUTPATIENT)
Dept: GENERAL RADIOLOGY | Age: 72
Discharge: HOME | End: 2017-07-31
Attending: NURSE PRACTITIONER
Payer: MEDICARE

## 2017-07-31 DIAGNOSIS — J18.9 COMMUNITY ACQUIRED PNEUMONIA: ICD-10-CM

## 2017-07-31 PROCEDURE — 71020 XR CHEST PA + LAT CHEST (CPT=71020): CPT | Performed by: NURSE PRACTITIONER

## 2017-08-02 ENCOUNTER — OFFICE VISIT (OUTPATIENT)
Dept: SURGERY | Facility: CLINIC | Age: 72
End: 2017-08-02

## 2017-08-02 ENCOUNTER — TELEPHONE (OUTPATIENT)
Dept: SURGERY | Facility: CLINIC | Age: 72
End: 2017-08-02

## 2017-08-02 ENCOUNTER — TELEPHONE (OUTPATIENT)
Dept: INTERNAL MEDICINE CLINIC | Facility: CLINIC | Age: 72
End: 2017-08-02

## 2017-08-02 VITALS
SYSTOLIC BLOOD PRESSURE: 130 MMHG | OXYGEN SATURATION: 94 % | WEIGHT: 158 LBS | HEIGHT: 62 IN | DIASTOLIC BLOOD PRESSURE: 78 MMHG | RESPIRATION RATE: 18 BRPM | BODY MASS INDEX: 29.08 KG/M2 | HEART RATE: 102 BPM

## 2017-08-02 DIAGNOSIS — M47.812 ARTHROPATHY OF CERVICAL FACET JOINT: ICD-10-CM

## 2017-08-02 DIAGNOSIS — M47.814 FACET ARTHROPATHY, THORACIC: ICD-10-CM

## 2017-08-02 PROCEDURE — 99213 OFFICE O/P EST LOW 20 MIN: CPT | Performed by: ANESTHESIOLOGY

## 2017-08-02 RX ORDER — FENTANYL 75 UG/H
1 PATCH TRANSDERMAL
Qty: 10 PATCH | Refills: 0 | Status: SHIPPED | OUTPATIENT
Start: 2017-08-06 | End: 2017-08-15

## 2017-08-02 RX ORDER — HYDROCODONE BITARTRATE AND ACETAMINOPHEN 10; 325 MG/1; MG/1
1 TABLET ORAL EVERY 6 HOURS PRN
Qty: 120 TABLET | Refills: 0 | Status: ON HOLD | OUTPATIENT
Start: 2017-08-06 | End: 2019-11-20

## 2017-08-02 NOTE — PATIENT INSTRUCTIONS
Refill policies:    • Allow 2-3 business days for refills; controlled substances may take longer.   • Contact your pharmacy at least 5 days prior to running out of medication and have them send an electronic request or submit request through the [facility] have a procedure or additional testing performed. [facility]) will contact your insurance carrier to obtain pre-certification or prior authorization.     Unfortunately, NICHOLAS has seen an increase in denial of payment even though the p

## 2017-08-02 NOTE — TELEPHONE ENCOUNTER
Called patient, message left advising ok to see [facility] Le[facility] no referral needed given insurance plan. Please call back with questions or concerns.
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
## 2017-08-02 NOTE — TELEPHONE ENCOUNTER
She will not need a referral to the provider she would like to see. I am unable to manage her chronic pain and therefore if she wants to go to [facility], that is fine with me.

## 2017-08-04 ENCOUNTER — PATIENT OUTREACH (OUTPATIENT)
Dept: CASE MANAGEMENT | Age: 72
End: 2017-08-04

## 2017-08-04 NOTE — PROGRESS NOTES
Reviewed chart for CCM. LM to pt to call back.   Coordination of education, review of chart, update to record, sending materials:[employer]  Time: 9 minutes

## 2017-08-07 NOTE — TELEPHONE ENCOUNTER
Last Office Visit: 7-6-17 with SD for hospital follow up   Last Rx Filled: historical   Last Labs: 6-30-17 cbc/fecal occult/poct glucose   Future Appointment: none     Per protocol to provider

## 2017-08-07 NOTE — PROGRESS NOTES
Name: John Aguiar   :    DOS: 2017     Pain Clinic Follow Up Visit:   John Aguiar is a 80year old  female who presents for recheck of her chronic low back, thoracic and neck pain.   She is status post radiofrequency ablation Oral Tab TAKE 1 TABLET (4 MG TOTAL) BY MOUTH 3 (THREE) TIMES DAILY.  Disp: 90 tablet Rfl: 0   pregabalin (LYRICA) 50 MG Oral Cap TAKE ONE CAPSULE BY MOUTH TWO TIMES A DAY Disp: 60 capsule Rfl: 0   Albuterol Sulfate HFA (PROAIR HFA) 108 (90 Base) MCG/ACT Inh find for an another pain physician or she could go back to [facility] pain clinic who was writing her prescription. Orders:No orders of the defined types were placed in this encounter.       Medications filled today:  Signed Prescriptio

## 2017-08-14 ENCOUNTER — PATIENT MESSAGE (OUTPATIENT)
Dept: SURGERY | Facility: CLINIC | Age: 72
End: 2017-08-14

## 2017-08-14 NOTE — TELEPHONE ENCOUNTER
Office notes faxed 8/2-confirmation received.  Copy of Dr Riley Wright 8/2 office note mailed to pts house per pts request.

## 2017-08-14 NOTE — TELEPHONE ENCOUNTER
From: Kei Mcdowell  To: Jos Roth MD  Sent: 8/14/2017 2:49 PM CDT  Subject: Referral Request    Dr Jules Berg,  I have called your office asking that your notes be sent by email to Dr. Cece Srivastava at [facility]. His fax number is [phone]

## 2017-08-15 ENCOUNTER — OFFICE VISIT (OUTPATIENT)
Dept: INTERNAL MEDICINE CLINIC | Facility: CLINIC | Age: 72
End: 2017-08-15

## 2017-08-15 VITALS
BODY MASS INDEX: 29.44 KG/M2 | SYSTOLIC BLOOD PRESSURE: 122 MMHG | HEART RATE: 104 BPM | DIASTOLIC BLOOD PRESSURE: 64 MMHG | WEIGHT: 160 LBS | TEMPERATURE: 99 F | HEIGHT: 62 IN

## 2017-08-15 DIAGNOSIS — R13.19 ESOPHAGEAL DYSPHAGIA: ICD-10-CM

## 2017-08-15 DIAGNOSIS — G89.29 CHRONIC BACK PAIN, UNSPECIFIED BACK LOCATION, UNSPECIFIED BACK PAIN LATERALITY: Chronic | ICD-10-CM

## 2017-08-15 DIAGNOSIS — Z85.828 HISTORY OF BASAL CELL CARCINOMA: ICD-10-CM

## 2017-08-15 DIAGNOSIS — M54.9 CHRONIC BACK PAIN, UNSPECIFIED BACK LOCATION, UNSPECIFIED BACK PAIN LATERALITY: Chronic | ICD-10-CM

## 2017-08-15 DIAGNOSIS — K21.9 GASTROESOPHAGEAL REFLUX DISEASE WITHOUT ESOPHAGITIS: ICD-10-CM

## 2017-08-15 DIAGNOSIS — Z87.01 HISTORY OF RECURRENT PNEUMONIA: ICD-10-CM

## 2017-08-15 DIAGNOSIS — J18.9 COMMUNITY ACQUIRED PNEUMONIA: ICD-10-CM

## 2017-08-15 DIAGNOSIS — M47.814 FACET ARTHROPATHY, THORACIC: ICD-10-CM

## 2017-08-15 DIAGNOSIS — S43.429D SPRAIN OF ROTATOR CUFF CAPSULE, UNSPECIFIED LATERALITY, SUBSEQUENT ENCOUNTER: ICD-10-CM

## 2017-08-15 DIAGNOSIS — M47.897 OTHER OSTEOARTHRITIS OF SPINE, LUMBOSACRAL REGION: ICD-10-CM

## 2017-08-15 DIAGNOSIS — M54.12 CERVICAL RADICULITIS: ICD-10-CM

## 2017-08-15 DIAGNOSIS — Z87.898 HISTORY OF SYNCOPE: ICD-10-CM

## 2017-08-15 DIAGNOSIS — M53.3 SACROILIAC JOINT DYSFUNCTION: ICD-10-CM

## 2017-08-15 DIAGNOSIS — G93.0 CYST OF BRAIN: ICD-10-CM

## 2017-08-15 DIAGNOSIS — M47.812 ARTHROPATHY OF CERVICAL FACET JOINT: ICD-10-CM

## 2017-08-15 DIAGNOSIS — E11.65 UNCONTROLLED TYPE 2 DIABETES MELLITUS WITH HYPERGLYCEMIA, WITHOUT LONG-TERM CURRENT USE OF INSULIN (HCC): ICD-10-CM

## 2017-08-15 DIAGNOSIS — I10 ESSENTIAL HYPERTENSION, BENIGN: ICD-10-CM

## 2017-08-15 DIAGNOSIS — M47.819 ARTHROPATHY OF FACET JOINT: ICD-10-CM

## 2017-08-15 DIAGNOSIS — Z12.31 ENCOUNTER FOR SCREENING MAMMOGRAM FOR MALIGNANT NEOPLASM OF BREAST: ICD-10-CM

## 2017-08-15 DIAGNOSIS — K29.50 ANTRAL GASTRITIS: ICD-10-CM

## 2017-08-15 DIAGNOSIS — F41.9 ANXIETY: ICD-10-CM

## 2017-08-15 DIAGNOSIS — D50.8 OTHER IRON DEFICIENCY ANEMIA: ICD-10-CM

## 2017-08-15 DIAGNOSIS — Z86.711 HISTORY OF PULMONARY EMBOLISM: ICD-10-CM

## 2017-08-15 DIAGNOSIS — Z00.00 ENCOUNTER FOR ANNUAL HEALTH EXAMINATION: Primary | ICD-10-CM

## 2017-08-15 DIAGNOSIS — Z85.3 HISTORY OF LEFT BREAST CANCER: ICD-10-CM

## 2017-08-15 DIAGNOSIS — Z78.0 POSTMENOPAUSAL: ICD-10-CM

## 2017-08-15 DIAGNOSIS — J45.20 REACTIVE AIRWAY DISEASE, MILD INTERMITTENT, UNCOMPLICATED: ICD-10-CM

## 2017-08-15 DIAGNOSIS — E53.8 B12 DEFICIENCY: ICD-10-CM

## 2017-08-15 DIAGNOSIS — M54.16 LUMBAR RADICULITIS: ICD-10-CM

## 2017-08-15 DIAGNOSIS — E04.9 ENLARGED THYROID: ICD-10-CM

## 2017-08-15 DIAGNOSIS — J45.20 MILD INTERMITTENT ASTHMA WITHOUT COMPLICATION: ICD-10-CM

## 2017-08-15 DIAGNOSIS — M85.859 OSTEOPENIA OF THIGH, UNSPECIFIED LATERALITY: ICD-10-CM

## 2017-08-15 DIAGNOSIS — E78.00 PURE HYPERCHOLESTEROLEMIA: ICD-10-CM

## 2017-08-15 DIAGNOSIS — R53.83 FATIGUE, UNSPECIFIED TYPE: ICD-10-CM

## 2017-08-15 DIAGNOSIS — M25.842 CYST OF JOINT OF LEFT HAND: ICD-10-CM

## 2017-08-15 DIAGNOSIS — D69.6 THROMBOCYTOPENIA (HCC): ICD-10-CM

## 2017-08-15 PROBLEM — E11.9 DIABETES MELLITUS TYPE 2 IN NONOBESE (HCC): Status: RESOLVED | Noted: 2017-02-23 | Resolved: 2017-08-15

## 2017-08-15 PROBLEM — A41.9 SEPSIS DUE TO PNEUMONIA (HCC): Status: RESOLVED | Noted: 2017-06-28 | Resolved: 2017-08-15

## 2017-08-15 PROCEDURE — G0439 PPPS, SUBSEQ VISIT: HCPCS | Performed by: NURSE PRACTITIONER

## 2017-08-15 PROCEDURE — 96372 THER/PROPH/DIAG INJ SC/IM: CPT | Performed by: NURSE PRACTITIONER

## 2017-08-15 PROCEDURE — 99214 OFFICE O/P EST MOD 30 MIN: CPT | Performed by: NURSE PRACTITIONER

## 2017-08-15 RX ORDER — CYANOCOBALAMIN 1000 UG/ML
1000 INJECTION INTRAMUSCULAR; SUBCUTANEOUS ONCE
Status: COMPLETED | OUTPATIENT
Start: 2017-08-15 | End: 2017-08-15

## 2017-08-15 RX ORDER — PREGABALIN 50 MG/1
CAPSULE ORAL
Qty: 60 CAPSULE | Refills: 3 | Status: SHIPPED | OUTPATIENT
Start: 2017-08-15 | End: 2018-05-21 | Stop reason: DRUGHIGH

## 2017-08-15 RX ADMIN — CYANOCOBALAMIN 1000 MCG: 1000 INJECTION INTRAMUSCULAR; SUBCUTANEOUS at 13:56:00

## 2017-08-15 NOTE — PROGRESS NOTES
HPI:   June Blackmon is a 80year old female who presents for a Medicare Subsequent Annual Wellness visit (Pt already had Initial Annual Wellness).   Encounter for annual health examination    Encounter for screening mammogram for malignant neoplasm basal cell carcinoma   Derm:  Taty    History of recurrent pneumonia  Seeing Dr Huma Mariano. Concern is for possible aspiration. Video swallow scheduled.       Chronic back pain, unspecified back location, unspecified back pain laterality   She is trying Merline Peach, MD (GASTROENTEROLOGY)  NICKIE Velasquez (PAIN MANAGEMENT)  Greg Lorenz, RN as CCM Care Manager (Registered Nurse)    Patient Active Problem List:     Fatigue     Essential hypertension, benign     Pure hypercholesterolemia     Anxiety     DM INFORMATION:   She  has a past medical history of Anxiety; Anxiety state, unspecified (6/29/2012); [facility] Essential hypertension, benign (6/29/2012); Fitting and adjustment of vascular catheter; HEART ATTACK;  History of blood clots; PE (pulmo mass index is 29.26 kg/m² as calculated from the following:    Height as of this encounter: 62\". Weight as of this encounter: 160 lb.     Medicare Hearing Assessment  (Required for AWV/SWV)    Finger Rub     Visual Acuity                           Gener 1000mcg, IM/SC Inj 07/17/2013, 07/18/2013, 07/19/2013, 07/20/2013, 07/26/2013, 08/02/2013, 09/13/2013, 09/27/2013, 10/11/2013, 10/25/2013, 11/08/2013, 11/22/2013, 12/06/2013, 12/20/2013       ASSESSMENT AND OTHER RELEVANT CHRONIC CONDITIONS:   Yi Lawrence intermittent asthma without complication  Controlled singulair daily. Not needing advair daily. Up to date with ACT. Dr Huma Mariano following. Cyst of brain    MRI to confirm stability.       History of basal cell carcinoma   Derm:  Taty    History o document but we do not have it in [facility], and patient is instructed to get our office a copy of it for scanning into [address] [place] [address] on file in Epic:    Lm Grimes does not have a Power of  for [employer] on file in urine?: 0-No    Do you have difficulty seeing?: 0-No    Do you have any difficulty walking or getting up?: 0-No    Do you have any tripping hazards?: 0-No    Are you on multiple medications?: 1-Yes    Does pain affect your day to day activities?: 1-Yes Colonoscopy,5 Years due on 04/21/2022 Update Health Maintenance if applicable    Flex Sigmoidoscopy Screen every 10 years No results found for this or any previous visit. No flowsheet data found.      Fecal Occult Blood Annually Occult Blood Result (no unit This may be covered with your prescription benefits, but Medicare does not cover unless Medically needed    Zoster  Not covered by Medicare Part B No vaccine history found This may be covered with your pharmacy  prescription benefits        SPECIFIC DISEAS

## 2017-08-15 NOTE — PATIENT INSTRUCTIONS
Hyun Morales's SCREENING SCHEDULE   Tests on this list are recommended by your physician but may not be covered, or covered at this frequency, by your insurer. Please check with your insurance carrier before scheduling to verify coverage.    PREVEN results found for this or any previous visit.  Limited to patients who meet one of the following criteria:   • Men who are 73-68 years old and have smoked more than 100 cigarettes in their lifetime   • Anyone with a family history    Colorectal Cancer Scree Recommend Annually to at least age 76, and as needed after 76 Mammogram,1 Yr due on 12/02/2017 Please get this Mammogram regularly   Immunizations      Influenza  Covered Annually   Orders placed or performed in visit on 10/31/16  -FLU VACC [address] [place] [address] Medical Society website. http://[email or web address] idph.state. [place]/public/books/advin.htm  A link to the ipnexus. This site has a lot of good information including definitions of the different types of Advance Directives.  It also has the [facility]

## 2017-08-15 NOTE — TELEPHONE ENCOUNTER
My Chart sent to office 8/14/17     Dr Nithya Mcdaniel,  I have called your office asking that your notes be sent by email to Dr. Reg Mims at [facility] & [facility]. [facility] fax number is [phone]. Jero Soloriont they get those notes, I can schedule an appointment with his off Sig: Take 1 tablet by mouth every 6 (six) hours as needed for Pain.  DO NOT FILL BEFORE 8/6/

## 2017-08-16 RX ORDER — PREGABALIN 50 MG/1
CAPSULE ORAL
Qty: 60 CAPSULE | Refills: 3 | Status: CANCELLED | OUTPATIENT
Start: 2017-08-16

## 2017-08-16 RX ORDER — FENTANYL 75 UG/H
1 PATCH TRANSDERMAL
Qty: 10 PATCH | Refills: 0 | Status: SHIPPED | OUTPATIENT
Start: 2017-09-04 | End: 2017-10-04

## 2017-08-16 NOTE — TELEPHONE ENCOUNTER
Script ready for  at         The following documentation was provided to patients at time of Rx pickup:      From the Office of Dr. Jonnathan Dixon at [facility]:  [facility] office is committed to providing the best care to our patients.

## 2017-08-17 RX ORDER — HYDROCODONE BITARTRATE AND ACETAMINOPHEN 10; 325 MG/1; MG/1
1 TABLET ORAL EVERY 6 HOURS PRN
Qty: 120 TABLET | Refills: 0 | OUTPATIENT
Start: 2017-09-04 | End: 2017-10-04

## 2017-08-17 NOTE — TELEPHONE ENCOUNTER
Discussed with Dr. Reggie Kathleen and we can give her a short script to last her until she sees Dr. Arie Matos.  The last prescription was filled on 8/6/17 and she can request for new prescription in September when it closer to the time to be filled and we can give her

## 2017-08-18 ENCOUNTER — OFFICE VISIT (OUTPATIENT)
Dept: INTERNAL MEDICINE CLINIC | Facility: CLINIC | Age: 72
End: 2017-08-18

## 2017-08-18 VITALS
BODY MASS INDEX: 29.44 KG/M2 | HEART RATE: 72 BPM | TEMPERATURE: 98 F | WEIGHT: 160 LBS | SYSTOLIC BLOOD PRESSURE: 120 MMHG | HEIGHT: 62 IN | RESPIRATION RATE: 12 BRPM | DIASTOLIC BLOOD PRESSURE: 70 MMHG

## 2017-08-18 DIAGNOSIS — E11.65 UNCONTROLLED TYPE 2 DIABETES MELLITUS WITH HYPERGLYCEMIA, WITHOUT LONG-TERM CURRENT USE OF INSULIN (HCC): ICD-10-CM

## 2017-08-18 DIAGNOSIS — S90.212A CONTUSION OF LEFT GREAT TOE WITH DAMAGE TO NAIL, INITIAL ENCOUNTER: Primary | ICD-10-CM

## 2017-08-18 PROCEDURE — 99213 OFFICE O/P EST LOW 20 MIN: CPT | Performed by: NURSE PRACTITIONER

## 2017-08-18 NOTE — PATIENT INSTRUCTIONS
Apply ice to left foot toe for 15-20 minutes twice daily. Apply Bacitracin ointment (available over-the-counter) to left foot great toe twice daily. May cover with bandaid if desired to keep ointment in place. Wear stiff soles shoes for support.

## 2017-08-18 NOTE — PROGRESS NOTES
Called Medication to pharmacy - S/w kirit who states that last rx was filled 7/14/17. Per JV ok to call in rx.    Nothing further

## 2017-08-18 NOTE — PROGRESS NOTES
Patient presents with: Toe Injury: left great toe       HPI:  Presents with 3 day history of redness, swelling and pain to left foot great toe, near nail after she accidentally hit it on a file cabinet. Denies N/T to toe.  Stated it has continued to ooze b back pain     Community acquired pneumonia     Anemia     Thrombocytopenia ([facility]     Reactive airway disease     Gastroesophageal reflux disease without esophagitis     Esophageal dysphagia     Enlarged thyroid     Antral gastritis     History of left heriberto mouth nightly. Disp:  Rfl:    Rivaroxaban (XARELTO) 20 MG Oral Tab Take 20 mg by mouth daily with food. Disp:  Rfl:    Cholecalciferol (VITAMIN D) 1000 units Oral Tab Take 1,000 Units by mouth daily.  Disp:  Rfl:    Acidophilus/Pectin (PROBIOTIC) Oral Cap T this Visit:  No prescriptions requested or ordered in this encounter    Imaging & Consults:  None    No Follow-up on file. Patient Instructions   Apply ice to left foot toe for 15-20 minutes twice daily.      Apply Bacitracin ointment (available over-the-c

## 2017-08-19 ENCOUNTER — HOSPITAL ENCOUNTER (OUTPATIENT)
Dept: GENERAL RADIOLOGY | Facility: HOSPITAL | Age: 72
Discharge: HOME | End: 2017-08-19
Attending: INTERNAL MEDICINE
Payer: MEDICARE

## 2017-08-19 DIAGNOSIS — R13.10 DYSPHAGIA, UNSPECIFIED TYPE: ICD-10-CM

## 2017-08-19 PROCEDURE — 92611 MOTION FLUOROSCOPY/SWALLOW: CPT

## 2017-08-19 PROCEDURE — 74230 X-RAY XM SWLNG FUNCJ C+: CPT | Performed by: INTERNAL MEDICINE

## 2017-08-19 NOTE — PROGRESS NOTES
ADULT VIDEOFLUOROSCOPIC SWALLOWING STUDY    Admission Date: 8/19/2017  Evaluation Date: 08/19/17  Radiologist: Jared Hashimoto    Dear Dr. Ember Ruiz,  This letter is to inform you of John Aguiar Videofluoroscopic Swallowing Study results and/or plan of Cancer [facility]) 1999    breast, basal cell   • Essential hypertension, benign 6/29/2012   • Fitting and adjustment of vascular catheter    • HEART ATTACK    • History of blood clots    • PE (pulmonary embolism)    • Personal history of malignant neoplasm of b

## 2017-08-22 NOTE — PROGRESS NOTES
Telephone Information:  Home Phone      [phone]  Work Phone      [phone]  Mobile          [phone] with patient and let her know.  Patient verbalized understanding

## 2017-08-23 ENCOUNTER — TELEPHONE (OUTPATIENT)
Dept: SURGERY | Facility: CLINIC | Age: 72
End: 2017-08-23

## 2017-08-23 ENCOUNTER — NURSE ONLY (OUTPATIENT)
Dept: INTERNAL MEDICINE CLINIC | Facility: CLINIC | Age: 72
End: 2017-08-23

## 2017-08-23 NOTE — TELEPHONE ENCOUNTER
Pt requesting past year's OV notes from Dr. Asmita Garcia. She is going to a different pain specialist. Dozier Schaumann her there would be a fee. She will come in tomorrow AM, 8/24/17. Pt will sign release and pay fee when she picks up the records.

## 2017-08-28 ENCOUNTER — OFFICE VISIT (OUTPATIENT)
Dept: INTERNAL MEDICINE CLINIC | Facility: CLINIC | Age: 72
End: 2017-08-28

## 2017-08-28 VITALS
RESPIRATION RATE: 17 BRPM | HEIGHT: 62 IN | BODY MASS INDEX: 29.63 KG/M2 | TEMPERATURE: 98 F | WEIGHT: 161 LBS | HEART RATE: 92 BPM | SYSTOLIC BLOOD PRESSURE: 118 MMHG | DIASTOLIC BLOOD PRESSURE: 64 MMHG

## 2017-08-28 DIAGNOSIS — S90.212D CONTUSION OF LEFT GREAT TOE WITH DAMAGE TO NAIL, SUBSEQUENT ENCOUNTER: Primary | ICD-10-CM

## 2017-08-28 PROBLEM — J18.9 COMMUNITY ACQUIRED PNEUMONIA: Status: RESOLVED | Noted: 2017-06-28 | Resolved: 2017-08-28

## 2017-08-28 PROCEDURE — 99213 OFFICE O/P EST LOW 20 MIN: CPT | Performed by: NURSE PRACTITIONER

## 2017-08-28 NOTE — PROGRESS NOTES
Patient presents with: Toe Injury: Room 7. L big toe. Tenderness, no discharge. HPI:  Presents with for follow up of left great toe injury after she accidentally hit toe on filing cabinet.  At prior visit toe was noted with edema and erythema along w facet joint     History of syncope     History of pulmonary embolism     Asthma     Cyst of brain     History of basal cell carcinoma     History of recurrent pneumonia     Chronic back pain     Anemia     Thrombocytopenia (HCC)     Reactive airway disease Disp:  Rfl:    Risedronate Sodium 150 MG Oral Tab Take 150 mg by mouth every 30 (thirty) days. Disp:  Rfl:    simvastatin 40 MG Oral Tab Take 40 mg by mouth nightly. Disp:  Rfl:    Rivaroxaban (XARELTO) 20 MG Oral Tab Take 20 mg by mouth daily with food.  D

## 2017-08-30 ENCOUNTER — PATIENT OUTREACH (OUTPATIENT)
Dept: CASE MANAGEMENT | Age: 72
End: 2017-08-30

## 2017-08-30 ENCOUNTER — NURSE ONLY (OUTPATIENT)
Dept: INTERNAL MEDICINE CLINIC | Facility: CLINIC | Age: 72
End: 2017-08-30

## 2017-08-30 PROCEDURE — 96372 THER/PROPH/DIAG INJ SC/IM: CPT | Performed by: NURSE PRACTITIONER

## 2017-08-30 RX ORDER — CYANOCOBALAMIN 1000 UG/ML
1000 INJECTION INTRAMUSCULAR; SUBCUTANEOUS ONCE
Status: COMPLETED | OUTPATIENT
Start: 2017-08-30 | End: 2017-08-30

## 2017-08-30 RX ADMIN — CYANOCOBALAMIN 1000 MCG: 1000 INJECTION INTRAMUSCULAR; SUBCUTANEOUS at 09:17:00

## 2017-08-30 NOTE — PROGRESS NOTES
Pt is here for B12 injection. Pt tolerated injection well. Pt aware she is to test b12 next ov      2/6/17 B12 injections:  Weekly x 4 weeks then  Every other week x 2 doses then  Monthly    Vit B12 level in 6 months.

## 2017-08-30 NOTE — PROGRESS NOTES
Reviewed chart for CCM. LM to call back.   Time spent this outreach: collecting and reviewing pt data 2 minutes

## 2017-08-31 ENCOUNTER — HOSPITAL ENCOUNTER (OUTPATIENT)
Dept: MRI IMAGING | Age: 72
Discharge: HOME | End: 2017-08-31
Attending: NURSE PRACTITIONER
Payer: MEDICARE

## 2017-08-31 ENCOUNTER — HOSPITAL ENCOUNTER (OUTPATIENT)
Dept: ULTRASOUND IMAGING | Age: 72
Discharge: HOME | End: 2017-08-31
Attending: NURSE PRACTITIONER
Payer: MEDICARE

## 2017-08-31 DIAGNOSIS — E04.9 ENLARGED THYROID: ICD-10-CM

## 2017-08-31 DIAGNOSIS — G93.0 CYST OF BRAIN: ICD-10-CM

## 2017-08-31 PROCEDURE — 70553 MRI BRAIN STEM W/O & W/DYE: CPT | Performed by: NURSE PRACTITIONER

## 2017-08-31 PROCEDURE — A9575 INJ GADOTERATE MEGLUMI 0.1ML: HCPCS | Performed by: NURSE PRACTITIONER

## 2017-08-31 PROCEDURE — 76536 US EXAM OF HEAD AND NECK: CPT | Performed by: NURSE PRACTITIONER

## 2017-09-05 PROBLEM — M65.342 TRIGGER RING FINGER OF LEFT HAND: Status: ACTIVE | Noted: 2017-09-05

## 2017-09-05 PROBLEM — G56.02 CARPAL TUNNEL SYNDROME OF LEFT WRIST: Status: ACTIVE | Noted: 2017-09-05

## 2017-09-06 ENCOUNTER — HOSPITAL ENCOUNTER (OUTPATIENT)
Dept: MRI IMAGING | Age: 72
Discharge: HOME | End: 2017-09-06
Attending: ANESTHESIOLOGY
Payer: MEDICARE

## 2017-09-06 ENCOUNTER — NURSE ONLY (OUTPATIENT)
Dept: INTERNAL MEDICINE CLINIC | Facility: CLINIC | Age: 72
End: 2017-09-06

## 2017-09-06 ENCOUNTER — LAB ENCOUNTER (OUTPATIENT)
Dept: LAB | Age: 72
End: 2017-09-06
Attending: NURSE PRACTITIONER
Payer: MEDICARE

## 2017-09-06 DIAGNOSIS — D50.8 OTHER IRON DEFICIENCY ANEMIA: ICD-10-CM

## 2017-09-06 DIAGNOSIS — R53.83 FATIGUE, UNSPECIFIED TYPE: ICD-10-CM

## 2017-09-06 DIAGNOSIS — E11.65 UNCONTROLLED TYPE 2 DIABETES MELLITUS WITH HYPERGLYCEMIA, WITHOUT LONG-TERM CURRENT USE OF INSULIN (HCC): ICD-10-CM

## 2017-09-06 DIAGNOSIS — E53.8 B12 DEFICIENCY: ICD-10-CM

## 2017-09-06 DIAGNOSIS — K21.9 GASTROESOPHAGEAL REFLUX DISEASE WITHOUT ESOPHAGITIS: ICD-10-CM

## 2017-09-06 DIAGNOSIS — I10 ESSENTIAL HYPERTENSION, BENIGN: ICD-10-CM

## 2017-09-06 DIAGNOSIS — E78.00 PURE HYPERCHOLESTEROLEMIA: ICD-10-CM

## 2017-09-06 DIAGNOSIS — M54.16 RADICULOPATHY OF LUMBAR REGION: ICD-10-CM

## 2017-09-06 DIAGNOSIS — E55.9 VITAMIN D DEFICIENCY: ICD-10-CM

## 2017-09-06 DIAGNOSIS — M54.12 RADICULOPATHY OF CERVICAL SPINE: ICD-10-CM

## 2017-09-06 LAB
25-HYDROXYVITAMIN D (TOTAL): 43.3 NG/ML (ref 30–100)
ALBUMIN SERPL-MCNC: 4.3 G/DL (ref 3.5–4.8)
ALP LIVER SERPL-CCNC: 91 U/L (ref 55–142)
ALT SERPL-CCNC: 21 U/L (ref 14–54)
AST SERPL-CCNC: 12 U/L (ref 15–41)
BASOPHILS # BLD AUTO: 0.04 X10(3) UL (ref 0–0.1)
BASOPHILS NFR BLD AUTO: 0.7 %
BILIRUB SERPL-MCNC: 0.5 MG/DL (ref 0.1–2)
BUN BLD-MCNC: 17 MG/DL (ref 8–20)
CALCIUM BLD-MCNC: 9.3 MG/DL (ref 8.3–10.3)
CHLORIDE: 104 MMOL/L (ref 101–111)
CHOLEST SMN-MCNC: 129 MG/DL (ref ?–200)
CO2: 24 MMOL/L (ref 22–32)
CREAT BLD-MCNC: 0.92 MG/DL (ref 0.55–1.02)
EOSINOPHIL # BLD AUTO: 0.14 X10(3) UL (ref 0–0.3)
EOSINOPHIL NFR BLD AUTO: 2.5 %
ERYTHROCYTE [DISTWIDTH] IN BLOOD BY AUTOMATED COUNT: 14.1 % (ref 11.5–16)
EST. AVERAGE GLUCOSE BLD GHB EST-MCNC: 154 MG/DL (ref 68–126)
GLUCOSE BLD-MCNC: 111 MG/DL (ref 70–99)
HAV AB SERPL IA-ACNC: 696 PG/ML (ref 193–986)
HBA1C MFR BLD HPLC: 7 % (ref ?–5.7)
HCT VFR BLD AUTO: 39 % (ref 34–50)
HDLC SERPL-MCNC: 72 MG/DL (ref 45–?)
HDLC SERPL: 1.79 {RATIO} (ref ?–4.44)
HGB BLD-MCNC: 12.4 G/DL (ref 12–16)
IMMATURE GRANULOCYTE COUNT: 0.02 X10(3) UL (ref 0–1)
IMMATURE GRANULOCYTE RATIO %: 0.4 %
LDLC SERPL CALC-MCNC: 38 MG/DL (ref ?–130)
LDLC SERPL-MCNC: 19 MG/DL (ref 5–40)
LYMPHOCYTES # BLD AUTO: 1.39 X10(3) UL (ref 0.9–4)
LYMPHOCYTES NFR BLD AUTO: 24.9 %
M PROTEIN MFR SERPL ELPH: 7.6 G/DL (ref 6.1–8.3)
MCH RBC QN AUTO: 26.9 PG (ref 27–33.2)
MCHC RBC AUTO-ENTMCNC: 31.8 G/DL (ref 31–37)
MCV RBC AUTO: 84.6 FL (ref 81–100)
MONOCYTES # BLD AUTO: 0.45 X10(3) UL (ref 0.1–0.6)
MONOCYTES NFR BLD AUTO: 8.1 %
NEUTROPHIL ABS PRELIM: 3.55 X10 (3) UL (ref 1.3–6.7)
NEUTROPHILS # BLD AUTO: 3.55 X10(3) UL (ref 1.3–6.7)
NEUTROPHILS NFR BLD AUTO: 63.4 %
NONHDLC SERPL-MCNC: 57 MG/DL (ref ?–130)
PLATELET # BLD AUTO: 227 10(3)UL (ref 150–450)
POTASSIUM SERPL-SCNC: 4.4 MMOL/L (ref 3.6–5.1)
RBC # BLD AUTO: 4.61 X10(6)UL (ref 3.8–5.1)
RED CELL DISTRIBUTION WIDTH-SD: 43.3 FL (ref 35.1–46.3)
SODIUM SERPL-SCNC: 138 MMOL/L (ref 136–144)
TRIGLYCERIDES: 95 MG/DL (ref ?–150)
WBC # BLD AUTO: 5.6 X10(3) UL (ref 4–13)

## 2017-09-06 PROCEDURE — 96372 THER/PROPH/DIAG INJ SC/IM: CPT | Performed by: NURSE PRACTITIONER

## 2017-09-06 PROCEDURE — 82607 VITAMIN B-12: CPT

## 2017-09-06 PROCEDURE — 80061 LIPID PANEL: CPT

## 2017-09-06 PROCEDURE — 72148 MRI LUMBAR SPINE W/O DYE: CPT | Performed by: ANESTHESIOLOGY

## 2017-09-06 PROCEDURE — 82306 VITAMIN D 25 HYDROXY: CPT

## 2017-09-06 PROCEDURE — 72141 MRI NECK SPINE W/O DYE: CPT | Performed by: ANESTHESIOLOGY

## 2017-09-06 PROCEDURE — 83036 HEMOGLOBIN GLYCOSYLATED A1C: CPT

## 2017-09-06 PROCEDURE — 85025 COMPLETE CBC W/AUTO DIFF WBC: CPT

## 2017-09-06 PROCEDURE — 80053 COMPREHEN METABOLIC PANEL: CPT

## 2017-09-06 RX ORDER — CYANOCOBALAMIN 1000 UG/ML
1000 INJECTION INTRAMUSCULAR; SUBCUTANEOUS ONCE
Status: COMPLETED | OUTPATIENT
Start: 2017-09-06 | End: 2017-09-06

## 2017-09-06 RX ADMIN — CYANOCOBALAMIN 1000 MCG: 1000 INJECTION INTRAMUSCULAR; SUBCUTANEOUS at 10:03:00

## 2017-09-13 ENCOUNTER — NURSE ONLY (OUTPATIENT)
Dept: INTERNAL MEDICINE CLINIC | Facility: CLINIC | Age: 72
End: 2017-09-13

## 2017-09-13 ENCOUNTER — TELEPHONE (OUTPATIENT)
Dept: INTERNAL MEDICINE CLINIC | Facility: CLINIC | Age: 72
End: 2017-09-13

## 2017-09-13 DIAGNOSIS — Z23 NEED FOR PNEUMOCOCCAL VACCINATION: Primary | ICD-10-CM

## 2017-09-13 DIAGNOSIS — Z23 NEED FOR INFLUENZA VACCINATION: Primary | ICD-10-CM

## 2017-09-13 PROCEDURE — 90653 IIV ADJUVANT VACCINE IM: CPT | Performed by: INTERNAL MEDICINE

## 2017-09-13 PROCEDURE — G0008 ADMIN INFLUENZA VIRUS VAC: HCPCS | Performed by: INTERNAL MEDICINE

## 2017-09-13 RX ORDER — CYANOCOBALAMIN 1000 UG/ML
1000 INJECTION INTRAMUSCULAR; SUBCUTANEOUS ONCE
Status: DISCONTINUED | OUTPATIENT
Start: 2017-09-13 | End: 2018-05-07

## 2017-09-13 NOTE — TELEPHONE ENCOUNTER
Called pt to advise info per JV. Pt verbalized understanding, agreed with POC and had  no further questions. Pt is aware orders are in.  Nothing further

## 2017-09-13 NOTE — TELEPHONE ENCOUNTER
LOV 8/28/17 with ALPESH    Pt would like to know if d/t her medical hx of recurrent pneumonia - she should get pneumovax 23- PPSV23    Prevnar 13 - 2/22/16  Prevnar 7 - 11/13/2008    Pt would like recs. Orders pended if appropriate.  Please advise  If appropria

## 2017-09-20 ENCOUNTER — PATIENT OUTREACH (OUTPATIENT)
Dept: CASE MANAGEMENT | Age: 72
End: 2017-09-20

## 2017-09-20 DIAGNOSIS — E78.00 PURE HYPERCHOLESTEROLEMIA: ICD-10-CM

## 2017-09-20 DIAGNOSIS — E11.65 UNCONTROLLED TYPE 2 DIABETES MELLITUS WITH HYPERGLYCEMIA, WITHOUT LONG-TERM CURRENT USE OF INSULIN (HCC): ICD-10-CM

## 2017-09-20 DIAGNOSIS — M54.9 CHRONIC BACK PAIN, UNSPECIFIED BACK LOCATION, UNSPECIFIED BACK PAIN LATERALITY: Chronic | ICD-10-CM

## 2017-09-20 DIAGNOSIS — I10 ESSENTIAL HYPERTENSION, BENIGN: ICD-10-CM

## 2017-09-20 DIAGNOSIS — J45.20 MILD INTERMITTENT ASTHMA WITHOUT COMPLICATION: ICD-10-CM

## 2017-09-20 DIAGNOSIS — G89.29 CHRONIC BACK PAIN, UNSPECIFIED BACK LOCATION, UNSPECIFIED BACK PAIN LATERALITY: Chronic | ICD-10-CM

## 2017-09-20 DIAGNOSIS — F41.9 ANXIETY: ICD-10-CM

## 2017-09-20 DIAGNOSIS — Z85.3 HISTORY OF LEFT BREAST CANCER: ICD-10-CM

## 2017-09-20 PROCEDURE — 99490 CHRNC CARE MGMT STAFF 1ST 20: CPT

## 2017-09-20 NOTE — PROGRESS NOTES
Spoke to pt for CCM today. Pt states she is in a meeting and will call me back as she has some questions. Provided my direct contact number to pt to call back. Time spent: 7 minutes collecting, reviewing pt data, communication and documentation.   Pt danay

## 2017-09-25 ENCOUNTER — OFFICE VISIT (OUTPATIENT)
Dept: INTERNAL MEDICINE CLINIC | Facility: CLINIC | Age: 72
End: 2017-09-25

## 2017-09-25 ENCOUNTER — TELEPHONE (OUTPATIENT)
Dept: INTERNAL MEDICINE CLINIC | Facility: CLINIC | Age: 72
End: 2017-09-25

## 2017-09-25 VITALS
TEMPERATURE: 98 F | RESPIRATION RATE: 16 BRPM | SYSTOLIC BLOOD PRESSURE: 118 MMHG | HEART RATE: 76 BPM | DIASTOLIC BLOOD PRESSURE: 70 MMHG

## 2017-09-25 DIAGNOSIS — Z23 NEED FOR PNEUMOCOCCAL VACCINE: ICD-10-CM

## 2017-09-25 DIAGNOSIS — E53.8 B12 DEFICIENCY: ICD-10-CM

## 2017-09-25 DIAGNOSIS — M79.675 GREAT TOE PAIN, LEFT: Primary | ICD-10-CM

## 2017-09-25 PROCEDURE — 99213 OFFICE O/P EST LOW 20 MIN: CPT | Performed by: NURSE PRACTITIONER

## 2017-09-25 PROCEDURE — 90732 PPSV23 VACC 2 YRS+ SUBQ/IM: CPT | Performed by: NURSE PRACTITIONER

## 2017-09-25 PROCEDURE — 96372 THER/PROPH/DIAG INJ SC/IM: CPT | Performed by: NURSE PRACTITIONER

## 2017-09-25 PROCEDURE — G0009 ADMIN PNEUMOCOCCAL VACCINE: HCPCS | Performed by: NURSE PRACTITIONER

## 2017-09-25 RX ORDER — CEPHALEXIN 500 MG/1
500 CAPSULE ORAL 3 TIMES DAILY
Qty: 30 CAPSULE | Refills: 0 | Status: SHIPPED | OUTPATIENT
Start: 2017-09-25 | End: 2017-10-05

## 2017-09-25 RX ORDER — CYANOCOBALAMIN 1000 UG/ML
1000 INJECTION INTRAMUSCULAR; SUBCUTANEOUS ONCE
Status: COMPLETED | OUTPATIENT
Start: 2017-09-25 | End: 2017-09-25

## 2017-09-25 RX ORDER — ONDANSETRON 4 MG/1
4 TABLET, FILM COATED ORAL EVERY 8 HOURS PRN
Qty: 27 TABLET | Refills: 0 | Status: SHIPPED | OUTPATIENT
Start: 2017-09-25 | End: 2018-08-03

## 2017-09-25 RX ADMIN — CYANOCOBALAMIN 1000 MCG: 1000 INJECTION INTRAMUSCULAR; SUBCUTANEOUS at 08:30:00

## 2017-09-25 NOTE — PROGRESS NOTES
Patient presents with: Toe Injury: re check left big toe ROOM 7       HPI:  Presents with with approx 6 week history of left foot great toe redness, swelling and mild tenderness.  I have seen her for this twice previously with conservative treatment of linda dysfunction     Cervical radiculitis     Arthropathy of cervical facet joint     History of syncope     History of pulmonary embolism     Asthma     Cyst of brain     History of basal cell carcinoma     History of recurrent pneumonia     Chronic back pain Inhalation Aero Soln Inhale 2 puffs into the lungs every 4 (four) hours as needed (cough).  Disp: 1 Inhaler Rfl: 1   MONTELUKAST SODIUM 10 MG Oral Tab TAKE 1 TABLET BY MOUTH DAILY Disp: 90 tablet Rfl: 0   BUPROPION HCL ER, SR, 150 MG Oral Tablet 12 Hr TAKE administered today.       Orders Placed This Encounter      Pneumococcal 23, Adult (Pneumovax) (28242) (Dx V03.82/Z23)    Meds & Refills for this Visit:  Signed Prescriptions Disp Refills    cephALEXin 500 MG Oral Cap 30 capsule 0      Sig: Take 1 capsule (

## 2017-09-25 NOTE — TELEPHONE ENCOUNTER
Re cephALEXin . .. Pt has pencillin allergy , is this script OK? ONDASETRON. ..  Needs prior Javier Nguyễn faxed us PW for prior auth

## 2017-09-25 NOTE — TELEPHONE ENCOUNTER
She told me in office she as tolerated PCN recently, she has many allergies to many antibiotics. I think she should tolerate this one without problems. Thanks.

## 2017-09-25 NOTE — TELEPHONE ENCOUNTER
Jose Mcduffie Pharmacist notified Cephalexin should be tolerated ok per JV. Pharmacist verbalizes understanding. Still need to address PA for Ondasetron.

## 2017-09-26 NOTE — TELEPHONE ENCOUNTER
JJOSÉ would you like for PA for Ondasetron medication usually only auth for chemo therapy   Cover my med key: Tera No

## 2017-09-26 NOTE — TELEPHONE ENCOUNTER
PA initated  via cover my meds- awaiting response  If you have any questions about your PA submission, contact [employer] at [phone], Option 5.

## 2017-09-28 ENCOUNTER — HOSPITAL ENCOUNTER (OUTPATIENT)
Dept: GENERAL RADIOLOGY | Age: 72
Discharge: HOME | End: 2017-09-28
Attending: NURSE PRACTITIONER
Payer: MEDICARE

## 2017-09-28 ENCOUNTER — TELEPHONE (OUTPATIENT)
Dept: INTERNAL MEDICINE CLINIC | Facility: CLINIC | Age: 72
End: 2017-09-28

## 2017-09-28 DIAGNOSIS — M79.675 GREAT TOE PAIN, LEFT: ICD-10-CM

## 2017-09-28 PROCEDURE — 73660 X-RAY EXAM OF TOE(S): CPT | Performed by: NURSE PRACTITIONER

## 2017-09-28 NOTE — TELEPHONE ENCOUNTER
S/w pharmacist. Roe PITT approved. They verified. They will notify pt. Called pt and notified her as well. She is very thankful for the update.

## 2017-09-28 NOTE — TELEPHONE ENCOUNTER
[facility]ena called about rx for Ondansetron HCl (ZOFRAN) 4 mg tablet stating prior auth needed for Medicare part D-please call [phone] to get prior auth

## 2017-10-02 RX ORDER — BUPROPION HYDROCHLORIDE 150 MG/1
TABLET, EXTENDED RELEASE ORAL
Qty: 180 TABLET | Refills: 0 | Status: SHIPPED | OUTPATIENT
Start: 2017-10-02 | End: 2018-01-07

## 2017-10-04 RX ORDER — MONTELUKAST SODIUM 10 MG/1
TABLET ORAL
Qty: 90 TABLET | Refills: 0 | Status: SHIPPED | OUTPATIENT
Start: 2017-10-04 | End: 2017-12-31

## 2017-10-11 ENCOUNTER — NURSE ONLY (OUTPATIENT)
Dept: INTERNAL MEDICINE CLINIC | Facility: CLINIC | Age: 72
End: 2017-10-11

## 2017-10-11 PROCEDURE — 96372 THER/PROPH/DIAG INJ SC/IM: CPT | Performed by: NURSE PRACTITIONER

## 2017-10-11 RX ORDER — CYANOCOBALAMIN 1000 UG/ML
1000 INJECTION INTRAMUSCULAR; SUBCUTANEOUS ONCE
Status: COMPLETED | OUTPATIENT
Start: 2017-10-11 | End: 2017-10-11

## 2017-10-11 RX ADMIN — CYANOCOBALAMIN 1000 MCG: 1000 INJECTION INTRAMUSCULAR; SUBCUTANEOUS at 09:36:00

## 2017-10-11 NOTE — PROGRESS NOTES
Notes Recorded by NICKIE Tan on 9/6/2017 at 4:45 PM CDT  Labs stable.  Cont monthly b12 injections.  Cont same meds.

## 2017-10-12 ENCOUNTER — PATIENT OUTREACH (OUTPATIENT)
Dept: CASE MANAGEMENT | Age: 72
End: 2017-10-12

## 2017-10-12 NOTE — PROGRESS NOTES
Coordination of education, review of chart, update to pt record, compilation and mailing resources/materials: Flu education, Why get the flu vaccine, and request to get flu vaccine with PCP and or Emani beard.   Time: 5 min

## 2017-10-26 RX ORDER — SIMVASTATIN 40 MG
TABLET ORAL
Qty: 90 TABLET | Refills: 0 | Status: SHIPPED | OUTPATIENT
Start: 2017-10-26 | End: 2018-01-07

## 2017-10-26 RX ORDER — LISINOPRIL 20 MG/1
TABLET ORAL
Qty: 90 TABLET | Refills: 0 | Status: SHIPPED | OUTPATIENT
Start: 2017-10-26 | End: 2018-01-22

## 2017-10-27 ENCOUNTER — PATIENT OUTREACH (OUTPATIENT)
Dept: CASE MANAGEMENT | Age: 72
End: 2017-10-27

## 2017-10-27 NOTE — PROGRESS NOTES
Reviewed chart for CCM. LM for pt to call back. Time spent this outreach: 3 minutes collecting, reviewing pt data, communication and documentation.

## 2017-11-02 ENCOUNTER — NURSE ONLY (OUTPATIENT)
Dept: INTERNAL MEDICINE CLINIC | Facility: CLINIC | Age: 72
End: 2017-11-02

## 2017-11-02 DIAGNOSIS — E53.8 B12 DEFICIENCY: Primary | ICD-10-CM

## 2017-11-02 PROCEDURE — 96372 THER/PROPH/DIAG INJ SC/IM: CPT | Performed by: NURSE PRACTITIONER

## 2017-11-02 RX ORDER — CYANOCOBALAMIN 1000 UG/ML
1000 INJECTION INTRAMUSCULAR; SUBCUTANEOUS ONCE
Status: COMPLETED | OUTPATIENT
Start: 2017-11-02 | End: 2017-11-02

## 2017-11-02 RX ADMIN — CYANOCOBALAMIN 1000 MCG: 1000 INJECTION INTRAMUSCULAR; SUBCUTANEOUS at 09:03:00

## 2017-11-08 ENCOUNTER — MED REC SCAN ONLY (OUTPATIENT)
Dept: INTERNAL MEDICINE CLINIC | Facility: CLINIC | Age: 72
End: 2017-11-08

## 2017-11-17 ENCOUNTER — PATIENT OUTREACH (OUTPATIENT)
Dept: CASE MANAGEMENT | Age: 72
End: 2017-11-17

## 2017-11-17 DIAGNOSIS — E11.65 UNCONTROLLED TYPE 2 DIABETES MELLITUS WITH HYPERGLYCEMIA, WITHOUT LONG-TERM CURRENT USE OF INSULIN (HCC): ICD-10-CM

## 2017-11-17 DIAGNOSIS — J45.909 UNCOMPLICATED ASTHMA, UNSPECIFIED ASTHMA SEVERITY, UNSPECIFIED WHETHER PERSISTENT: ICD-10-CM

## 2017-11-17 DIAGNOSIS — I10 ESSENTIAL HYPERTENSION, BENIGN: ICD-10-CM

## 2017-11-17 DIAGNOSIS — Z85.828 HISTORY OF BASAL CELL CARCINOMA: ICD-10-CM

## 2017-11-17 DIAGNOSIS — G89.29 CHRONIC BACK PAIN, UNSPECIFIED BACK LOCATION, UNSPECIFIED BACK PAIN LATERALITY: Chronic | ICD-10-CM

## 2017-11-17 DIAGNOSIS — F41.9 ANXIETY: ICD-10-CM

## 2017-11-17 DIAGNOSIS — E78.00 PURE HYPERCHOLESTEROLEMIA: ICD-10-CM

## 2017-11-17 DIAGNOSIS — M54.9 CHRONIC BACK PAIN, UNSPECIFIED BACK LOCATION, UNSPECIFIED BACK PAIN LATERALITY: Chronic | ICD-10-CM

## 2017-11-17 DIAGNOSIS — Z85.3 HISTORY OF LEFT BREAST CANCER: ICD-10-CM

## 2017-11-17 PROCEDURE — 99490 CHRNC CARE MGMT STAFF 1ST 20: CPT

## 2017-11-17 NOTE — PROGRESS NOTES
Spoke to pt at length about CCM, current care plan and performed CCM assessment with pt, reviewed meds and compliance. Reviewed pt dx HTN, HLD, DM2, asthma, hx breast CA, and chronic back pain. Support system: Lives alone. Grandson support person.   Diet:

## 2017-11-21 ENCOUNTER — PATIENT OUTREACH (OUTPATIENT)
Dept: CASE MANAGEMENT | Age: 72
End: 2017-11-21

## 2017-12-06 RX ORDER — RIVAROXABAN 20 MG/1
TABLET, FILM COATED ORAL
Qty: 90 TABLET | Refills: 1 | Status: SHIPPED | OUTPATIENT
Start: 2017-12-06 | End: 2018-05-31

## 2017-12-07 ENCOUNTER — LAB ENCOUNTER (OUTPATIENT)
Dept: LAB | Age: 72
End: 2017-12-07
Attending: INTERNAL MEDICINE
Payer: MEDICARE

## 2017-12-07 ENCOUNTER — PRIOR ORIGINAL RECORDS (OUTPATIENT)
Dept: OTHER | Age: 72
End: 2017-12-07

## 2017-12-07 DIAGNOSIS — E78.00 PURE HYPERCHOLESTEROLEMIA: Primary | ICD-10-CM

## 2017-12-07 PROCEDURE — 80061 LIPID PANEL: CPT

## 2017-12-07 PROCEDURE — 80053 COMPREHEN METABOLIC PANEL: CPT

## 2017-12-08 LAB
ALBUMIN: 3.8 G/DL
ALKALINE PHOSPHATATE(ALK PHOS): 82 IU/L
BILIRUBIN TOTAL: 0.6 MG/DL
BUN: 17 MG/DL
CALCIUM: 9.2 MG/DL
CHLORIDE: 106 MEQ/L
CHOLESTEROL, TOTAL: 139 MG/DL
CREATININE, SERUM: 0.84 MG/DL
GLUCOSE: 106 MG/DL
HDL CHOLESTEROL: 78 MG/DL
LDL CHOLESTEROL: 42 MG/DL
POTASSIUM, SERUM: 4.3 MEQ/L
PROTEIN, TOTAL: 7.2 G/DL
SGOT (AST): 12 IU/L
SGPT (ALT): 22 IU/L
SODIUM: 141 MEQ/L
TRIGLYCERIDES: 94 MG/DL

## 2017-12-18 ENCOUNTER — NURSE ONLY (OUTPATIENT)
Dept: INTERNAL MEDICINE CLINIC | Facility: CLINIC | Age: 72
End: 2017-12-18

## 2017-12-18 PROCEDURE — 96372 THER/PROPH/DIAG INJ SC/IM: CPT | Performed by: NURSE PRACTITIONER

## 2017-12-18 RX ORDER — CYANOCOBALAMIN 1000 UG/ML
1000 INJECTION INTRAMUSCULAR; SUBCUTANEOUS ONCE
Status: COMPLETED | OUTPATIENT
Start: 2017-12-18 | End: 2017-12-18

## 2017-12-18 RX ADMIN — CYANOCOBALAMIN 1000 MCG: 1000 INJECTION INTRAMUSCULAR; SUBCUTANEOUS at 08:50:00

## 2017-12-18 NOTE — PROGRESS NOTES
Notes Recorded by NICKIE Vee on 9/6/2017 at 4:45 PM CDT  Labs stable.  Cont monthly b12 injections.  Cont same meds.

## 2017-12-19 ENCOUNTER — PATIENT OUTREACH (OUTPATIENT)
Dept: CASE MANAGEMENT | Age: 72
End: 2017-12-19

## 2017-12-19 ENCOUNTER — MYAURORA ACCOUNT LINK (OUTPATIENT)
Dept: OTHER | Age: 72
End: 2017-12-19

## 2017-12-19 ENCOUNTER — PRIOR ORIGINAL RECORDS (OUTPATIENT)
Dept: OTHER | Age: 72
End: 2017-12-19

## 2017-12-19 DIAGNOSIS — J45.909 UNCOMPLICATED ASTHMA, UNSPECIFIED ASTHMA SEVERITY, UNSPECIFIED WHETHER PERSISTENT: ICD-10-CM

## 2017-12-19 DIAGNOSIS — E11.65 UNCONTROLLED TYPE 2 DIABETES MELLITUS WITH HYPERGLYCEMIA, WITHOUT LONG-TERM CURRENT USE OF INSULIN (HCC): ICD-10-CM

## 2017-12-19 DIAGNOSIS — F41.9 ANXIETY: ICD-10-CM

## 2017-12-19 DIAGNOSIS — Z85.3 HISTORY OF LEFT BREAST CANCER: ICD-10-CM

## 2017-12-19 DIAGNOSIS — I10 ESSENTIAL HYPERTENSION, BENIGN: ICD-10-CM

## 2017-12-19 DIAGNOSIS — E78.00 PURE HYPERCHOLESTEROLEMIA: ICD-10-CM

## 2017-12-19 DIAGNOSIS — Z85.828 HISTORY OF BASAL CELL CARCINOMA: ICD-10-CM

## 2017-12-19 PROCEDURE — 99490 CHRNC CARE MGMT STAFF 1ST 20: CPT

## 2017-12-19 NOTE — PROGRESS NOTES
Spoke to pt at length about CCM, current care plan and performed CCM assessment with pt, reviewed meds and compliance. Reviewed pt dx HTN, HLD, DM2, anxiety, and chronic back pain. Support system: Lives with grandson. Daughter lives nearby.   Close frien

## 2017-12-22 ENCOUNTER — HOSPITAL ENCOUNTER (OUTPATIENT)
Dept: CV DIAGNOSTICS | Facility: HOSPITAL | Age: 72
Discharge: HOME | End: 2017-12-22
Attending: INTERNAL MEDICINE
Payer: MEDICARE

## 2017-12-22 DIAGNOSIS — R06.00 DYSPNEA: ICD-10-CM

## 2017-12-22 DIAGNOSIS — I25.10 CAD (CORONARY ARTERY DISEASE): ICD-10-CM

## 2017-12-22 PROCEDURE — 93018 CV STRESS TEST I&R ONLY: CPT | Performed by: INTERNAL MEDICINE

## 2017-12-22 PROCEDURE — 93017 CV STRESS TEST TRACING ONLY: CPT | Performed by: INTERNAL MEDICINE

## 2017-12-22 PROCEDURE — 78452 HT MUSCLE IMAGE SPECT MULT: CPT | Performed by: INTERNAL MEDICINE

## 2017-12-26 ENCOUNTER — PRIOR ORIGINAL RECORDS (OUTPATIENT)
Dept: OTHER | Age: 72
End: 2017-12-26

## 2018-01-02 RX ORDER — MONTELUKAST SODIUM 10 MG/1
TABLET ORAL
Qty: 90 TABLET | Refills: 1 | Status: SHIPPED | OUTPATIENT
Start: 2018-01-02 | End: 2018-07-24

## 2018-01-05 NOTE — LETTER
[address] [facility], [facility], [address] [place] [address], [address] [place] [address]  Rubin Bandaarez [phone]        7/12/2017   Personal and Confidential  -  Via Certified Mail    Trupti Donald [address] [facility] [address] [address]        NICKIE De Dios, NICKIE Gramajo TOKEN:'[id number]'

## 2018-01-08 RX ORDER — BUPROPION HYDROCHLORIDE 150 MG/1
TABLET, EXTENDED RELEASE ORAL
Qty: 180 TABLET | Refills: 0 | Status: SHIPPED | OUTPATIENT
Start: 2018-01-08 | End: 2018-04-05

## 2018-01-08 RX ORDER — SIMVASTATIN 40 MG
TABLET ORAL
Qty: 90 TABLET | Refills: 0 | Status: SHIPPED | OUTPATIENT
Start: 2018-01-08 | End: 2018-04-15

## 2018-01-08 NOTE — TELEPHONE ENCOUNTER
LOV: 9/25/17   Future office visit: no upcoming visit  Last labs: 12/7/17 Lipid Cmp  Last RX: 10/2/17 #180 No Refills  Per protocol: Route to provider

## 2018-01-09 RX ORDER — PANTOPRAZOLE SODIUM 40 MG/1
TABLET, DELAYED RELEASE ORAL
Qty: 90 TABLET | Refills: 0 | Status: SHIPPED | OUTPATIENT
Start: 2018-01-09 | End: 2018-04-15

## 2018-01-09 NOTE — TELEPHONE ENCOUNTER
LOV: 9/25/17  Future office visit: No upcoming visit   Last labs: 12/7/17 Lipid Cmp  Last RX: 7/17/17 #90 #1 Refill  Per protocol: Route to provider

## 2018-01-22 RX ORDER — LISINOPRIL 20 MG/1
TABLET ORAL
Qty: 90 TABLET | Refills: 1 | Status: SHIPPED | OUTPATIENT
Start: 2018-01-22 | End: 2018-07-24

## 2018-01-25 ENCOUNTER — PATIENT OUTREACH (OUTPATIENT)
Dept: CASE MANAGEMENT | Age: 73
End: 2018-01-25

## 2018-01-25 NOTE — PROGRESS NOTES
Reviewed chart for ccm. LVM requesting a call back. Time spent for ccm: 2 min collecting, reviewing pt data, communication and documentation.

## 2018-01-29 ENCOUNTER — HOSPITAL ENCOUNTER (OUTPATIENT)
Dept: PHYSICAL THERAPY | Facility: HOSPITAL | Age: 73
Setting detail: THERAPIES SERIES
Discharge: HOME | End: 2018-01-29
Attending: ANESTHESIOLOGY
Payer: MEDICARE

## 2018-01-29 PROCEDURE — 97110 THERAPEUTIC EXERCISES: CPT

## 2018-01-29 PROCEDURE — 97162 PT EVAL MOD COMPLEX 30 MIN: CPT

## 2018-01-29 NOTE — PROGRESS NOTES
Physical Therapy  EVALUATION:   Referring Physician: Dr. Khoury ref.  provider found  Diagnosis: cerv and lumbar arthropathy, radiculopathy    Date of Service: 1/29/2018     PATIENT SUMMARY   Medford Castleman is a 71year old y/o female who presents to  Response:  Patient education provided on eval findings and treatment expectations and precautions. Patient was instructed in and issued a HEP for LTR, Supported Bridging, and sdly glut med clam shell ex. Pt performed ex in clinic under PT direction.  JUAQUIN my care.     X___________________________________________________ Date____________________    Certification From: 6/95/2418  To:4/29/2018

## 2018-02-02 ENCOUNTER — HOSPITAL ENCOUNTER (OUTPATIENT)
Dept: PHYSICAL THERAPY | Facility: HOSPITAL | Age: 73
Setting detail: THERAPIES SERIES
Discharge: HOME | End: 2018-02-02
Attending: ANESTHESIOLOGY
Payer: MEDICARE

## 2018-02-02 DIAGNOSIS — M54.16 LUMBAR RADICULOPATHY: ICD-10-CM

## 2018-02-02 DIAGNOSIS — M54.12 CERVICAL RADICULOPATHY: ICD-10-CM

## 2018-02-02 DIAGNOSIS — M47.812 ARTHROPATHY OF CERVICAL FACET JOINT: ICD-10-CM

## 2018-02-02 DIAGNOSIS — M47.816 ARTHROPATHY OF LUMBAR FACET JOINT: ICD-10-CM

## 2018-02-02 PROCEDURE — 97110 THERAPEUTIC EXERCISES: CPT

## 2018-02-02 NOTE — PROGRESS NOTES
Dx: cerv/lumbar pain        Authorized # of Visits:  8        Next MD visit: none scheduled  Fall Risk: standard         Precautions: n/a             Subjective: no endurance. Would like to get ball for HEP  Objective: progressed clinic ex.  Substituted hip

## 2018-02-05 ENCOUNTER — HOSPITAL ENCOUNTER (OUTPATIENT)
Dept: PHYSICAL THERAPY | Facility: HOSPITAL | Age: 73
Setting detail: THERAPIES SERIES
Discharge: HOME | End: 2018-02-05
Attending: ANESTHESIOLOGY
Payer: MEDICARE

## 2018-02-05 PROCEDURE — 97110 THERAPEUTIC EXERCISES: CPT

## 2018-02-05 NOTE — PROGRESS NOTES
Dx: cerv/lumbar pain        Authorized # of Visits:  8        Next MD visit: none scheduled  Fall Risk: standard         Precautions: n/a             Subjective: got a yoga step, ball and balance board. fatigued after ex at home.   Objective: progressed clin

## 2018-02-09 ENCOUNTER — APPOINTMENT (OUTPATIENT)
Dept: PHYSICAL THERAPY | Facility: HOSPITAL | Age: 73
End: 2018-02-09
Attending: ANESTHESIOLOGY
Payer: MEDICARE

## 2018-02-12 ENCOUNTER — HOSPITAL ENCOUNTER (OUTPATIENT)
Dept: PHYSICAL THERAPY | Facility: HOSPITAL | Age: 73
Setting detail: THERAPIES SERIES
Discharge: HOME | End: 2018-02-12
Attending: ANESTHESIOLOGY
Payer: MEDICARE

## 2018-02-12 PROCEDURE — 97110 THERAPEUTIC EXERCISES: CPT

## 2018-02-12 NOTE — PROGRESS NOTES
Dx: cerv/lumbar pain        Authorized # of Visits:  8        Next MD visit: none scheduled  Fall Risk: standard         Precautions: n/a             Subjective: shoveled at home. Had migraine at home due to weather. Objective: progressed clinic ex.     As

## 2018-02-14 ENCOUNTER — HOSPITAL ENCOUNTER (OUTPATIENT)
Dept: PHYSICAL THERAPY | Facility: HOSPITAL | Age: 73
Setting detail: THERAPIES SERIES
Discharge: HOME | End: 2018-02-14
Attending: ANESTHESIOLOGY
Payer: MEDICARE

## 2018-02-14 PROCEDURE — 97110 THERAPEUTIC EXERCISES: CPT

## 2018-02-14 NOTE — PROGRESS NOTES
Dx: cerv/lumbar pain        Authorized # of Visits:  8        Next MD visit: none scheduled  Fall Risk: standard         Precautions: n/a             Subjective: back pain settled down in 2 days    Objective: progressed clinic ex to include cruz Naylor

## 2018-02-19 ENCOUNTER — HOSPITAL ENCOUNTER (OUTPATIENT)
Dept: PHYSICAL THERAPY | Facility: HOSPITAL | Age: 73
Setting detail: THERAPIES SERIES
Discharge: HOME | End: 2018-02-19
Attending: ANESTHESIOLOGY
Payer: MEDICARE

## 2018-02-19 PROCEDURE — 97110 THERAPEUTIC EXERCISES: CPT

## 2018-02-19 PROCEDURE — 97140 MANUAL THERAPY 1/> REGIONS: CPT

## 2018-02-19 NOTE — PROGRESS NOTES
Dx: cerv/lumbar pain        Authorized # of Visits:  8        Next MD visit: none scheduled  Fall Risk: standard         Precautions: n/a             Subjective:very sore w/ weather change. Feels she needs B12 injection for anemia  Objective: ex per daniel Salmeron [facility]

## 2018-02-21 ENCOUNTER — HOSPITAL ENCOUNTER (OUTPATIENT)
Dept: PHYSICAL THERAPY | Facility: HOSPITAL | Age: 73
Setting detail: THERAPIES SERIES
Discharge: HOME | End: 2018-02-21
Attending: ANESTHESIOLOGY
Payer: MEDICARE

## 2018-02-21 PROCEDURE — 97110 THERAPEUTIC EXERCISES: CPT

## 2018-02-21 NOTE — PROGRESS NOTES
Dx: cerv/lumbar pain        Authorized # of Visits:  8        Next MD visit: none scheduled  Fall Risk: standard         Precautions: n/a              Progress Summary    Pt has attended 7 visits in Physical Therapy.      Subjective: R leg continues to feel 3/8   Date: 2/12/2018                 TX#: 4/ Date:  2/14/2018               TX#: 5/ Date: 2/19/2018              TX#: 6/ Date:  2/21/2018               TX#: 7/ Date:               TX#: 8/   nustep :L 5 x 5 in, 7 x 2.5, 6 x 2,5 L-6 x 10 min L-7x 5 min, L6

## 2018-02-22 ENCOUNTER — PATIENT OUTREACH (OUTPATIENT)
Dept: CASE MANAGEMENT | Age: 73
End: 2018-02-22

## 2018-02-22 NOTE — PROGRESS NOTES
Spoke to pt for ccm today. Pt states she is doing ok however is more fatigued lately and thinks she needs to have some blood work again. Pt states she is due for a B12 and CBC. Pt states she will call PCP office to schedule.   Pt states she is doing well

## 2018-03-01 ENCOUNTER — HOSPITAL ENCOUNTER (OUTPATIENT)
Dept: PHYSICAL THERAPY | Facility: HOSPITAL | Age: 73
Setting detail: THERAPIES SERIES
Discharge: HOME | End: 2018-03-01
Attending: INTERNAL MEDICINE
Payer: MEDICARE

## 2018-03-01 PROCEDURE — 97110 THERAPEUTIC EXERCISES: CPT

## 2018-03-01 NOTE — PROGRESS NOTES
Dx: cerv/lumbar pain        Authorized # of Visits:  8        Next MD visit: none scheduled  Fall Risk: standard         Precautions: n/a              Subjective: Dr. Childs Fail cont'd PT.  Scheduled for lumbar injection      Objective: MMT of glut med 5/5, glut ext on SB.  Advised re UE assit on retrurn from flex stretch --  Retro step up 6\" L, 4\" R x 10 ea cont'd            Skilled Services: ex insturction, progression and correction    Charges: ex3    Total Timed Treatment: 45 min  Total Treatment Time: 45min

## 2018-03-09 ENCOUNTER — HOSPITAL ENCOUNTER (OUTPATIENT)
Dept: PHYSICAL THERAPY | Facility: HOSPITAL | Age: 73
Setting detail: THERAPIES SERIES
Discharge: HOME | End: 2018-03-09
Attending: INTERNAL MEDICINE
Payer: MEDICARE

## 2018-03-09 PROCEDURE — 97110 THERAPEUTIC EXERCISES: CPT

## 2018-03-09 PROCEDURE — 97140 MANUAL THERAPY 1/> REGIONS: CPT

## 2018-03-09 NOTE — PROGRESS NOTES
Dx: cerv/lumbar pain        Authorized # of Visits:  8        Next MD visit: none scheduled  Fall Risk: standard         Precautions: n/a              Subjective: feeling OK-     Objective: cont'd per grid.  Added lateral quad in STM due to pt report of inc sls airex> bosu blue up wt shift> black up cw/ccw rotation  >>0HH cont'd + target tap L/R Green tband resisted hip ex x 10 L/R --  ll bars lateral step up + hip hike x 15 on 6\" step.   cont'd bosu blue- step on and hold alternating L/r         Seated fle

## 2018-03-14 ENCOUNTER — PATIENT OUTREACH (OUTPATIENT)
Dept: CASE MANAGEMENT | Age: 73
End: 2018-03-14

## 2018-03-15 ENCOUNTER — TELEPHONE (OUTPATIENT)
Dept: ENDOCRINOLOGY CLINIC | Facility: CLINIC | Age: 73
End: 2018-03-15

## 2018-03-15 DIAGNOSIS — E11.65 UNCONTROLLED TYPE 2 DIABETES MELLITUS WITH HYPERGLYCEMIA, WITHOUT LONG-TERM CURRENT USE OF INSULIN (HCC): Primary | ICD-10-CM

## 2018-03-16 ENCOUNTER — APPOINTMENT (OUTPATIENT)
Dept: PHYSICAL THERAPY | Facility: HOSPITAL | Age: 73
End: 2018-03-16
Attending: INTERNAL MEDICINE
Payer: MEDICARE

## 2018-03-23 ENCOUNTER — HOSPITAL ENCOUNTER (OUTPATIENT)
Dept: PHYSICAL THERAPY | Facility: HOSPITAL | Age: 73
Setting detail: THERAPIES SERIES
Discharge: HOME | End: 2018-03-23
Attending: INTERNAL MEDICINE
Payer: MEDICARE

## 2018-03-23 PROCEDURE — 97110 THERAPEUTIC EXERCISES: CPT

## 2018-03-23 PROCEDURE — 97140 MANUAL THERAPY 1/> REGIONS: CPT

## 2018-03-23 NOTE — PROGRESS NOTES
Dx: cerv/lumbar pain        Authorized # of Visits:  8        Next MD visit: none scheduled  Fall Risk: standard         Precautions: n/a           Progress Summary    Pt has [id number] visits in Physical Therapy.      Subjective: feeling much better after Maximiliano Price    Physician's certification required: Yes  I certify the need for these services furnished under this plan of treatment and while under my care.     X___________________________________________________ Date____________________    Certific rotation  >>0HH cont'd + target tap L/R Green tband resisted hip ex x 10 L/R --  ll bars lateral step up + hip hike x 15 on 6\" step. cont'd bosu blue- step on and hold alternating L/r         Seated flex ext on SB.  Advised re UE assit on retrurn from fle

## 2018-04-04 ENCOUNTER — PRIOR ORIGINAL RECORDS (OUTPATIENT)
Dept: OTHER | Age: 73
End: 2018-04-04

## 2018-04-04 ENCOUNTER — HOSPITAL ENCOUNTER (OUTPATIENT)
Dept: MAMMOGRAPHY | Age: 73
Discharge: HOME | End: 2018-04-04
Attending: NURSE PRACTITIONER
Payer: MEDICARE

## 2018-04-04 ENCOUNTER — LAB ENCOUNTER (OUTPATIENT)
Dept: LAB | Age: 73
End: 2018-04-04
Attending: NURSE PRACTITIONER
Payer: MEDICARE

## 2018-04-04 ENCOUNTER — HOSPITAL ENCOUNTER (OUTPATIENT)
Dept: BONE DENSITY | Age: 73
Discharge: HOME | End: 2018-04-04
Attending: NURSE PRACTITIONER
Payer: MEDICARE

## 2018-04-04 DIAGNOSIS — E11.65 UNCONTROLLED TYPE 2 DIABETES MELLITUS WITH HYPERGLYCEMIA, WITHOUT LONG-TERM CURRENT USE OF INSULIN (HCC): ICD-10-CM

## 2018-04-04 DIAGNOSIS — Z12.31 ENCOUNTER FOR SCREENING MAMMOGRAM FOR MALIGNANT NEOPLASM OF BREAST: ICD-10-CM

## 2018-04-04 DIAGNOSIS — Z78.0 POSTMENOPAUSAL: ICD-10-CM

## 2018-04-04 PROCEDURE — 77080 DXA BONE DENSITY AXIAL: CPT | Performed by: NURSE PRACTITIONER

## 2018-04-04 PROCEDURE — 82570 ASSAY OF URINE CREATININE: CPT

## 2018-04-04 PROCEDURE — 82043 UR ALBUMIN QUANTITATIVE: CPT

## 2018-04-04 PROCEDURE — 83036 HEMOGLOBIN GLYCOSYLATED A1C: CPT

## 2018-04-04 PROCEDURE — 77067 SCR MAMMO BI INCL CAD: CPT | Performed by: NURSE PRACTITIONER

## 2018-04-04 PROCEDURE — 80061 LIPID PANEL: CPT

## 2018-04-04 PROCEDURE — 80053 COMPREHEN METABOLIC PANEL: CPT

## 2018-04-05 RX ORDER — BUPROPION HYDROCHLORIDE 150 MG/1
TABLET, EXTENDED RELEASE ORAL
Qty: 180 TABLET | Refills: 0 | Status: SHIPPED | OUTPATIENT
Start: 2018-04-05 | End: 2018-07-04

## 2018-04-05 NOTE — TELEPHONE ENCOUNTER
LOV: 9/25/17 JV   Future office visit: 4/9/18   Last labs: 4/4/18 Micro A1C Lipid Cmp   Last RX: Bupropion 1/8/18 #180 No Refills  Per protocol: Route to provider

## 2018-04-06 ENCOUNTER — PATIENT OUTREACH (OUTPATIENT)
Dept: CASE MANAGEMENT | Age: 73
End: 2018-04-06

## 2018-04-06 DIAGNOSIS — J45.909 UNCOMPLICATED ASTHMA, UNSPECIFIED ASTHMA SEVERITY, UNSPECIFIED WHETHER PERSISTENT: ICD-10-CM

## 2018-04-06 DIAGNOSIS — E78.00 PURE HYPERCHOLESTEROLEMIA: ICD-10-CM

## 2018-04-06 DIAGNOSIS — Z86.711 HISTORY OF PULMONARY EMBOLISM: ICD-10-CM

## 2018-04-06 DIAGNOSIS — Z85.828 HISTORY OF BASAL CELL CARCINOMA: ICD-10-CM

## 2018-04-06 DIAGNOSIS — E11.65 UNCONTROLLED TYPE 2 DIABETES MELLITUS WITH HYPERGLYCEMIA, WITHOUT LONG-TERM CURRENT USE OF INSULIN (HCC): ICD-10-CM

## 2018-04-06 DIAGNOSIS — Z85.3 HISTORY OF LEFT BREAST CANCER: ICD-10-CM

## 2018-04-06 DIAGNOSIS — I10 ESSENTIAL HYPERTENSION, BENIGN: ICD-10-CM

## 2018-04-06 DIAGNOSIS — F41.9 ANXIETY: ICD-10-CM

## 2018-04-06 PROCEDURE — 99490 CHRNC CARE MGMT STAFF 1ST 20: CPT

## 2018-04-06 NOTE — PROGRESS NOTES
4/6/2018  Spoke to Keily for CCM. Updates to patient care team/ comments: No changes to care team  Patient reported changes in medications: No changes to medications  Med Adherence  Comment: Reviewed.   Pt states she follows her medication regiment an and to assess pt progress. Reason For Follow Up: review progress and or barriers towards patients goals. Care managers interventions: Encouraged healthy balanced diet low in carb. Encouraged daily exercises to help with pain, balance and strength.   E

## 2018-04-09 ENCOUNTER — OFFICE VISIT (OUTPATIENT)
Dept: INTERNAL MEDICINE CLINIC | Facility: CLINIC | Age: 73
End: 2018-04-09

## 2018-04-09 ENCOUNTER — APPOINTMENT (OUTPATIENT)
Dept: LAB | Age: 73
End: 2018-04-09
Attending: NURSE PRACTITIONER
Payer: MEDICARE

## 2018-04-09 VITALS
BODY MASS INDEX: 31.72 KG/M2 | HEART RATE: 104 BPM | HEIGHT: 61 IN | TEMPERATURE: 98 F | RESPIRATION RATE: 16 BRPM | SYSTOLIC BLOOD PRESSURE: 116 MMHG | DIASTOLIC BLOOD PRESSURE: 64 MMHG | WEIGHT: 168 LBS

## 2018-04-09 DIAGNOSIS — R06.02 SOB (SHORTNESS OF BREATH): ICD-10-CM

## 2018-04-09 DIAGNOSIS — E53.8 B12 DEFICIENCY: ICD-10-CM

## 2018-04-09 DIAGNOSIS — D50.8 OTHER IRON DEFICIENCY ANEMIA: ICD-10-CM

## 2018-04-09 DIAGNOSIS — J45.20 MILD INTERMITTENT ASTHMA WITHOUT COMPLICATION: Primary | ICD-10-CM

## 2018-04-09 DIAGNOSIS — I10 ESSENTIAL HYPERTENSION, BENIGN: ICD-10-CM

## 2018-04-09 DIAGNOSIS — E78.00 PURE HYPERCHOLESTEROLEMIA: ICD-10-CM

## 2018-04-09 DIAGNOSIS — E11.65 UNCONTROLLED TYPE 2 DIABETES MELLITUS WITH HYPERGLYCEMIA, WITHOUT LONG-TERM CURRENT USE OF INSULIN (HCC): ICD-10-CM

## 2018-04-09 DIAGNOSIS — Z87.01 HISTORY OF PNEUMONIA: ICD-10-CM

## 2018-04-09 DIAGNOSIS — F41.9 ANXIETY: ICD-10-CM

## 2018-04-09 DIAGNOSIS — R53.83 FATIGUE, UNSPECIFIED TYPE: ICD-10-CM

## 2018-04-09 PROCEDURE — 84443 ASSAY THYROID STIM HORMONE: CPT

## 2018-04-09 PROCEDURE — 96372 THER/PROPH/DIAG INJ SC/IM: CPT | Performed by: NURSE PRACTITIONER

## 2018-04-09 PROCEDURE — 82607 VITAMIN B-12: CPT

## 2018-04-09 PROCEDURE — 99214 OFFICE O/P EST MOD 30 MIN: CPT | Performed by: NURSE PRACTITIONER

## 2018-04-09 RX ORDER — CYANOCOBALAMIN 1000 UG/ML
1000 INJECTION INTRAMUSCULAR; SUBCUTANEOUS ONCE
Status: COMPLETED | OUTPATIENT
Start: 2018-04-09 | End: 2018-04-09

## 2018-04-09 RX ORDER — ALBUTEROL SULFATE 90 UG/1
2 AEROSOL, METERED RESPIRATORY (INHALATION) EVERY 6 HOURS PRN
Qty: 1 INHALER | Refills: 1 | Status: SHIPPED | OUTPATIENT
Start: 2018-04-09 | End: 2018-08-09

## 2018-04-09 RX ORDER — FLUTICASONE PROPIONATE AND SALMETEROL 250; 50 UG/1; UG/1
POWDER RESPIRATORY (INHALATION)
Qty: 1 PACKAGE | Refills: 3 | Status: ON HOLD | OUTPATIENT
Start: 2018-04-09 | End: 2018-07-26

## 2018-04-09 RX ORDER — METFORMIN HYDROCHLORIDE 500 MG/1
500 TABLET, EXTENDED RELEASE ORAL 2 TIMES DAILY WITH MEALS
Qty: 180 TABLET | Refills: 0 | Status: SHIPPED | OUTPATIENT
Start: 2018-04-09 | End: 2018-07-06

## 2018-04-09 RX ADMIN — CYANOCOBALAMIN 1000 MCG: 1000 INJECTION INTRAMUSCULAR; SUBCUTANEOUS at 11:20:00

## 2018-04-09 NOTE — PROGRESS NOTES
Medford Castleman is a 71year old female. Patient presents with: Follow - Up: to review labs. LB-room 11      HPI:   Here for lab follow up  Diabetes:  Metformin 500mg bid   On ACE and statin. Her weight is up.   Her a1c up to 7.3  Intolerant to high Reactive airway disease     Gastroesophageal reflux disease without esophagitis     Esophageal dysphagia     Enlarged thyroid     Antral gastritis     History of left breast cancer     Carpal tunnel syndrome of left wrist     Trigger ring finger of left h Rfl:    Cholecalciferol (VITAMIN D) 1000 units Oral Tab Take 1,000 Units by mouth daily. Disp:  Rfl:    Acidophilus/Pectin (PROBIOTIC) Oral Cap Take 1 capsule by mouth nightly. Disp:  Rfl:    aspirin 81 MG Oral Tab Take 81 mg by mouth daily.  Disp:  Rfl: Nausea only, Dizziness  Penicillins             Rash  Quinapril Hcl           Fatigue    REVIEW OF SYSTEMS:   GENERAL HEALTH: as above   RESPIRATORY: as above   CARDIOVASCULAR: denies chest pain on exertion, no palpatations  GI: as above   MUSCULOSKELETAL: Encounter      TSH W Reflex To Free T4 [E]      Vitamin B12 [E]    Meds & Refills for this Visit:  Signed Prescriptions Disp Refills    MetFORMIN HCl  MG Oral Tablet 24 Hr 180 tablet 0      Sig: Take 1 tablet (500 mg total) by mouth 2 (two) times erica

## 2018-04-16 RX ORDER — PANTOPRAZOLE SODIUM 40 MG/1
TABLET, DELAYED RELEASE ORAL
Qty: 90 TABLET | Refills: 3 | Status: SHIPPED | OUTPATIENT
Start: 2018-04-16 | End: 2018-07-24

## 2018-04-16 RX ORDER — SIMVASTATIN 40 MG
TABLET ORAL
Qty: 90 TABLET | Refills: 0 | Status: SHIPPED | OUTPATIENT
Start: 2018-04-16 | End: 2018-07-24

## 2018-04-16 NOTE — TELEPHONE ENCOUNTER
LFV:4/9/18 with SD  Future Appt: 7/9/18 with SD for follow up  Last Rx:1/9/18 for 90 days  Last Labs:4/4/18 cmp stable  Per protocol to provider

## 2018-04-19 ENCOUNTER — HOSPITAL ENCOUNTER (OUTPATIENT)
Dept: GENERAL RADIOLOGY | Age: 73
Discharge: HOME | End: 2018-04-19
Attending: NURSE PRACTITIONER
Payer: MEDICARE

## 2018-04-19 DIAGNOSIS — J45.20 MILD INTERMITTENT ASTHMA WITHOUT COMPLICATION: ICD-10-CM

## 2018-04-19 DIAGNOSIS — R53.83 FATIGUE, UNSPECIFIED TYPE: ICD-10-CM

## 2018-04-19 DIAGNOSIS — R06.02 SOB (SHORTNESS OF BREATH): ICD-10-CM

## 2018-04-19 DIAGNOSIS — Z87.01 HISTORY OF PNEUMONIA: ICD-10-CM

## 2018-04-19 PROCEDURE — 71046 X-RAY EXAM CHEST 2 VIEWS: CPT | Performed by: NURSE PRACTITIONER

## 2018-05-01 ENCOUNTER — OFFICE VISIT (OUTPATIENT)
Dept: HEMATOLOGY/ONCOLOGY | Facility: HOSPITAL | Age: 73
End: 2018-05-01
Attending: INTERNAL MEDICINE
Payer: MEDICARE

## 2018-05-01 ENCOUNTER — TELEPHONE (OUTPATIENT)
Dept: HEMATOLOGY/ONCOLOGY | Facility: HOSPITAL | Age: 73
End: 2018-05-01

## 2018-05-01 VITALS
SYSTOLIC BLOOD PRESSURE: 160 MMHG | TEMPERATURE: 99 F | OXYGEN SATURATION: 97 % | BODY MASS INDEX: 33.07 KG/M2 | RESPIRATION RATE: 18 BRPM | WEIGHT: 175.19 LBS | DIASTOLIC BLOOD PRESSURE: 83 MMHG | HEIGHT: 60.98 IN | HEART RATE: 89 BPM

## 2018-05-01 DIAGNOSIS — R53.83 FATIGUE, UNSPECIFIED TYPE: ICD-10-CM

## 2018-05-01 DIAGNOSIS — D50.0 IRON DEFICIENCY ANEMIA DUE TO CHRONIC BLOOD LOSS: Primary | ICD-10-CM

## 2018-05-01 DIAGNOSIS — D80.3 IGG DEFICIENCY (HCC): ICD-10-CM

## 2018-05-01 PROCEDURE — 99214 OFFICE O/P EST MOD 30 MIN: CPT | Performed by: INTERNAL MEDICINE

## 2018-05-01 RX ORDER — FENTANYL 50 UG/H
1 PATCH TRANSDERMAL
COMMUNITY
End: 2019-05-31 | Stop reason: DRUGHIGH

## 2018-05-01 NOTE — PROGRESS NOTES
Patient is here for MD visit. Last seen in 2015 while in the hospital for blood clots. Patient reports she has been hospitalized for Pneumonia and Sepsis in 2016 and 2017. In 2017 patient was found to be anemic.  Pt c/o feeling very exhausted and tired all

## 2018-05-01 NOTE — TELEPHONE ENCOUNTER
Test(s) completed: Iron Studies   Results: low iron studies, per Dr Maximiliano Bateman \"Her iron levels are low.  Needs iron dextran. Worthy Starch put therapy plan in place. \"  Plan: transferred to the , PSRs, to schedule appt.

## 2018-05-02 ENCOUNTER — TELEPHONE (OUTPATIENT)
Dept: HEMATOLOGY/ONCOLOGY | Facility: HOSPITAL | Age: 73
End: 2018-05-02

## 2018-05-02 NOTE — TELEPHONE ENCOUNTER
Left message on her cell that her iron levels are low and she needs iron dextran - intolerant of PO iron. Also discussed low IgG levels - may be an answer as to why she had episodes of sepsis/pneumonia.   Should consider IVIG infusions over the fall/winter

## 2018-05-04 ENCOUNTER — PATIENT OUTREACH (OUTPATIENT)
Dept: CASE MANAGEMENT | Age: 73
End: 2018-05-04

## 2018-05-04 NOTE — PROGRESS NOTES
LVM requesting a call back for ccm today. Will f/up after appt this month. Time spent: 4 min collecting, reviewing pt data, communication and documentation.

## 2018-05-07 ENCOUNTER — NURSE ONLY (OUTPATIENT)
Dept: INTERNAL MEDICINE CLINIC | Facility: CLINIC | Age: 73
End: 2018-05-07

## 2018-05-07 PROCEDURE — 96372 THER/PROPH/DIAG INJ SC/IM: CPT | Performed by: NURSE PRACTITIONER

## 2018-05-07 RX ORDER — CYANOCOBALAMIN 1000 UG/ML
1000 INJECTION INTRAMUSCULAR; SUBCUTANEOUS ONCE
Status: COMPLETED | OUTPATIENT
Start: 2018-05-07 | End: 2018-05-07

## 2018-05-07 RX ADMIN — CYANOCOBALAMIN 1000 MCG: 1000 INJECTION INTRAMUSCULAR; SUBCUTANEOUS at 08:40:00

## 2018-05-08 ENCOUNTER — OFFICE VISIT (OUTPATIENT)
Dept: HEMATOLOGY/ONCOLOGY | Facility: HOSPITAL | Age: 73
End: 2018-05-08
Attending: INTERNAL MEDICINE
Payer: MEDICARE

## 2018-05-08 VITALS
TEMPERATURE: 98 F | HEART RATE: 88 BPM | OXYGEN SATURATION: 94 % | SYSTOLIC BLOOD PRESSURE: 115 MMHG | DIASTOLIC BLOOD PRESSURE: 67 MMHG | RESPIRATION RATE: 16 BRPM

## 2018-05-08 DIAGNOSIS — D50.0 IRON DEFICIENCY ANEMIA DUE TO CHRONIC BLOOD LOSS: Primary | ICD-10-CM

## 2018-05-08 PROBLEM — D80.3 IGG DEFICIENCY (HCC): Status: ACTIVE | Noted: 2018-05-08

## 2018-05-08 PROBLEM — D84.9 IMMUNODEFICIENCY (HCC): Status: ACTIVE | Noted: 2018-05-08

## 2018-05-08 PROCEDURE — 96376 TX/PRO/DX INJ SAME DRUG ADON: CPT

## 2018-05-08 PROCEDURE — 96365 THER/PROPH/DIAG IV INF INIT: CPT

## 2018-05-08 NOTE — PATIENT INSTRUCTIONS
Iron Dextran injection  Brand Name: Jono Morales  What is this medicine? IRON DEXTRAN (AHY velasquez DEX neumann) is an iron complex. Iron is used to make healthy red blood cells, which carry oxygen and nutrients through the body.  This medicine is used to treat people It is important not to miss your dose. Call your doctor or health care professional if you are unable to keep an appointment. Where should I keep my medicine? This drug is given in a hospital or clinic and will not be stored at home.   What should I tell

## 2018-05-08 NOTE — PROGRESS NOTES
[facility]    PATIENT'S NAME: Florence Valdes   ATTENDING PHYSICIAN: Tree Argueta M.D.    PATIENT ACCOUNT #: [de-identified] LOCATION: [address] [place] [address] RECORD #: [id number] YOB: 1945   DATE OF SERVICE: 05/01/2018       CANCER pain for which she is followed by a pain specialist, undergoes physical therapy, and takes a chronic relatively stable dose of narcotics.   Her current medications include a probiotic daily; ProAir inhaler 2 puffs q.6 h. p.r.n.; aspirin 81 mg daily; bupropi has a low iron saturation at 9%. Her ferritin is borderline low at 19. IMPRESSION:    1. Iron deficiency:  The patient has ongoing iron deficiency. She has had upper and lower endoscopies.   She states she did have a capsule endoscopy at one point

## 2018-05-08 NOTE — PROGRESS NOTES
Education Record    Learner:  Patient    Disease / Diagnosis: Iron deficiency anemia due to chronic blood loss    Barriers / Limitations:  None   Comments:    Method:  Brief focused and Reinforcement   Comments:    General Topics:  Plan of care reviewed

## 2018-05-09 ENCOUNTER — TELEPHONE (OUTPATIENT)
Dept: HEMATOLOGY/ONCOLOGY | Facility: HOSPITAL | Age: 73
End: 2018-05-09

## 2018-05-09 ENCOUNTER — OFFICE VISIT (OUTPATIENT)
Dept: INTERNAL MEDICINE CLINIC | Facility: CLINIC | Age: 73
End: 2018-05-09

## 2018-05-09 VITALS
SYSTOLIC BLOOD PRESSURE: 122 MMHG | DIASTOLIC BLOOD PRESSURE: 70 MMHG | RESPIRATION RATE: 16 BRPM | TEMPERATURE: 98 F | HEIGHT: 61 IN | HEART RATE: 80 BPM

## 2018-05-09 DIAGNOSIS — R35.0 URINARY FREQUENCY: Primary | ICD-10-CM

## 2018-05-09 DIAGNOSIS — R30.0 DYSURIA: ICD-10-CM

## 2018-05-09 PROCEDURE — 87077 CULTURE AEROBIC IDENTIFY: CPT | Performed by: NURSE PRACTITIONER

## 2018-05-09 PROCEDURE — 81003 URINALYSIS AUTO W/O SCOPE: CPT | Performed by: NURSE PRACTITIONER

## 2018-05-09 PROCEDURE — 87086 URINE CULTURE/COLONY COUNT: CPT | Performed by: NURSE PRACTITIONER

## 2018-05-09 PROCEDURE — 99213 OFFICE O/P EST LOW 20 MIN: CPT | Performed by: NURSE PRACTITIONER

## 2018-05-09 PROCEDURE — 87186 SC STD MICRODIL/AGAR DIL: CPT | Performed by: NURSE PRACTITIONER

## 2018-05-09 RX ORDER — CEPHALEXIN 500 MG/1
500 CAPSULE ORAL 2 TIMES DAILY
Qty: 14 CAPSULE | Refills: 0 | Status: SHIPPED | OUTPATIENT
Start: 2018-05-09 | End: 2018-05-17

## 2018-05-09 NOTE — PROGRESS NOTES
Nicole Simmons is a 71year old female. Patient presents with:  Urinary Frequency: painful urination, bladder spasms and pain on low back. LB-room 10      HPI:   Here for 1 day history of hematuria, back pain and dysuria.    Noted blood in her urine t SIMVASTATIN 40 MG Oral Tab TAKE 1 TABLET BY MOUTH EVERY NIGHT Disp: 90 tablet Rfl: 0   MetFORMIN HCl  MG Oral Tablet 24 Hr Take 1 tablet (500 mg total) by mouth 2 (two) times daily with meals.  Disp: 180 tablet Rfl: 0   fluticasone-salmeterol (ADVAI HEART ATTACK    • History of blood clots    • PE (pulmonary embolism)    • Personal history of malignant neoplasm of breast    • Pulmonary embolism (HCC)    • Pure hypercholesterolemia 6/29/2012   • Type II or unspecified type diabetes mellitus without men no apparent distress  LUNGS: normal rate without respiratory distress, lungs clear to auscultation  CARDIO: RRR without murmur  GI: normal bowel sounds, no masses, HSM or tenderness  Bladder soft  No CVA tenderness.   EXTREMITIES: no edema, normal strength

## 2018-05-09 NOTE — TELEPHONE ENCOUNTER
----- Message from Chey Gamboa MD sent at 5/9/2018  6:42 AM CDT -----  Regarding: repeat labs  She just needs them done in three months and to see me then. Will not plan IVIG until then.   Domenic  ----- Message -----  From: Tressa Brenner RN  Sent

## 2018-05-10 ENCOUNTER — TELEPHONE (OUTPATIENT)
Dept: HEMATOLOGY/ONCOLOGY | Facility: HOSPITAL | Age: 73
End: 2018-05-10

## 2018-05-10 NOTE — TELEPHONE ENCOUNTER
Patient had her first INFED infusion on 5/8/18. Asked Dr. Maximiliano Bateman regarding follow up labs and MD.    Per Dr. Maximiliano Bateman: She just needs them done in three months and to see me then. Worthy Starch not plan IVIG until then.      Spoke to the patient about this, verbalize

## 2018-05-15 NOTE — PROGRESS NOTES
Pt called today for CCM stating that she had and US done of her toe and has not been given the results as of yet. Pt states it has been since end of October since she had imaging done.   Pt advised to contact 's office with result and treatment r
No lymphadedenopathy

## 2018-05-17 ENCOUNTER — TELEPHONE (OUTPATIENT)
Dept: INTERNAL MEDICINE CLINIC | Facility: CLINIC | Age: 73
End: 2018-05-17

## 2018-05-17 ENCOUNTER — OFFICE VISIT (OUTPATIENT)
Dept: INTERNAL MEDICINE CLINIC | Facility: CLINIC | Age: 73
End: 2018-05-17

## 2018-05-17 VITALS
WEIGHT: 170 LBS | HEIGHT: 61 IN | TEMPERATURE: 98 F | SYSTOLIC BLOOD PRESSURE: 112 MMHG | BODY MASS INDEX: 32.1 KG/M2 | RESPIRATION RATE: 18 BRPM | HEART RATE: 72 BPM | DIASTOLIC BLOOD PRESSURE: 70 MMHG

## 2018-05-17 DIAGNOSIS — L03.314 CELLULITIS OF GROIN: Primary | ICD-10-CM

## 2018-05-17 PROCEDURE — 99213 OFFICE O/P EST LOW 20 MIN: CPT | Performed by: PHYSICIAN ASSISTANT

## 2018-05-17 RX ORDER — AMOXICILLIN AND CLAVULANATE POTASSIUM 500; 125 MG/1; MG/1
1 TABLET, FILM COATED ORAL 2 TIMES DAILY
Qty: 14 TABLET | Refills: 0 | Status: SHIPPED | OUTPATIENT
Start: 2018-05-17 | End: 2018-05-24

## 2018-05-17 NOTE — TELEPHONE ENCOUNTER
Patient has an area in her groin that is red and swollen, soreness. Scheduled an appt with CB for today 5/17/18 at 11:30am.  Pt verbalizes understanding.

## 2018-05-17 NOTE — TELEPHONE ENCOUNTER
S/w pt she stated she is no longer taking Sertraline and it was an automated refill from the pharmacy. Per protocol denied.

## 2018-05-17 NOTE — PROGRESS NOTES
Patient presents with:  Eval-G (gynecologic): right side groin pain/redness/swelling/odor noticed since this AM--#2      HPI:  Pt presents with f/o a tender red spot in her R groin area that started today.   Given her hx and she is leaving town next wee Carpal tunnel syndrome of left wrist     Trigger ring finger of left hand     Iron deficiency anemia due to chronic blood loss     IgG deficiency (HCC)     Immunodeficiency (HCC)        Current Outpatient Prescriptions:  Amoxicillin-Pot Clavulanate 500-1 Take 1,000 Units by mouth daily. Disp:  Rfl:    Acidophilus/Pectin (PROBIOTIC) Oral Cap Take 1 capsule by mouth nightly. Disp:  Rfl:    aspirin 81 MG Oral Tab Take 81 mg by mouth daily.  Disp:  Rfl:    HYDROcodone-acetaminophen  MG Oral Tab Take 1 tab

## 2018-05-21 ENCOUNTER — OFFICE VISIT (OUTPATIENT)
Dept: INTERNAL MEDICINE CLINIC | Facility: CLINIC | Age: 73
End: 2018-05-21

## 2018-05-21 VITALS
DIASTOLIC BLOOD PRESSURE: 60 MMHG | TEMPERATURE: 98 F | SYSTOLIC BLOOD PRESSURE: 100 MMHG | HEIGHT: 61 IN | HEART RATE: 100 BPM

## 2018-05-21 DIAGNOSIS — L03.314 CELLULITIS OF RIGHT GROIN: Primary | ICD-10-CM

## 2018-05-21 PROCEDURE — 99213 OFFICE O/P EST LOW 20 MIN: CPT | Performed by: NURSE PRACTITIONER

## 2018-05-21 NOTE — PROGRESS NOTES
Anna Renae is a 71year old female. Patient presents with: Other: LG. Room 10. Right sided groin infection follow up. She states its much better       HPI:   Here for follow up   Seen by CB last week for cellulitis.   Placed on augmentin   Right NIGHT Disp: 90 tablet Rfl: 0   MetFORMIN HCl  MG Oral Tablet 24 Hr Take 1 tablet (500 mg total) by mouth 2 (two) times daily with meals.  Disp: 180 tablet Rfl: 0   fluticasone-salmeterol (ADVAIR DISKUS) 250-50 MCG/DOSE Inhalation Aerosol Powder, Michelle Pure hypercholesterolemia 6/29/2012   • Type II or unspecified type diabetes mellitus without mention of complication, not stated as uncontrolled 6/29/2012   • Unspecified essential hypertension       Social History:  Smoking status: Former Smoker for this Visit:  No prescriptions requested or ordered in this encounter    Imaging & Consults:  None    No Follow-up on file. There are no Patient Instructions on file for this visit.

## 2018-05-24 ENCOUNTER — PATIENT OUTREACH (OUTPATIENT)
Dept: CASE MANAGEMENT | Age: 73
End: 2018-05-24

## 2018-05-24 DIAGNOSIS — J45.20 MILD INTERMITTENT ASTHMA WITHOUT COMPLICATION: ICD-10-CM

## 2018-05-24 DIAGNOSIS — E11.65 UNCONTROLLED TYPE 2 DIABETES MELLITUS WITH HYPERGLYCEMIA, WITHOUT LONG-TERM CURRENT USE OF INSULIN (HCC): ICD-10-CM

## 2018-05-24 DIAGNOSIS — E78.00 PURE HYPERCHOLESTEROLEMIA: ICD-10-CM

## 2018-05-24 DIAGNOSIS — I10 ESSENTIAL HYPERTENSION, BENIGN: ICD-10-CM

## 2018-05-24 DIAGNOSIS — Z85.3 HISTORY OF LEFT BREAST CANCER: ICD-10-CM

## 2018-05-24 DIAGNOSIS — F41.9 ANXIETY: ICD-10-CM

## 2018-05-24 PROCEDURE — 99490 CHRNC CARE MGMT STAFF 1ST 20: CPT

## 2018-05-24 NOTE — PROGRESS NOTES
5/24/2018  Spoke to Keily for CCM. Updates to patient care team/comments: No changes to care team  Patient reported changes in medications: No changes to medications  Med Adherence  Comment: Reviewed. Taking as prescribed with no issues or concerns. balanced diet and regular exercise. - When: Daily/weekly/monthly           - How: Pt to follow new treatment plan for her infusions with  and also B12 injections per PCP.    Pt to also be eating healthy diet and doing home exercises as di

## 2018-05-31 RX ORDER — RIVAROXABAN 20 MG/1
TABLET, FILM COATED ORAL
Qty: 90 TABLET | Refills: 0 | Status: SHIPPED | OUTPATIENT
Start: 2018-05-31 | End: 2018-07-24

## 2018-05-31 NOTE — TELEPHONE ENCOUNTER
LOV: 5/21/18 w/ SD  FOV: 7/9/18  Last labs: 5/9/18 u/a  Last Refill: 12/6/17 qt:90 1 refill    Per protocol sent to provider

## 2018-06-12 ENCOUNTER — TELEPHONE (OUTPATIENT)
Dept: INTERNAL MEDICINE CLINIC | Facility: CLINIC | Age: 73
End: 2018-06-12

## 2018-06-12 RX ORDER — FLUTICASONE PROPIONATE AND SALMETEROL 113; 14 UG/1; UG/1
1 POWDER, METERED RESPIRATORY (INHALATION) 2 TIMES DAILY
Qty: 1 EACH | Refills: 1 | Status: ON HOLD | OUTPATIENT
Start: 2018-06-12 | End: 2018-07-26

## 2018-06-12 NOTE — TELEPHONE ENCOUNTER
Changed. Please inform patient. Switching from Advair to Airduo can be challenging as the dosages between the two vary, sometimes significantly (especially in regard to the Advair Diskus product).  [employer], the  of Airduo, suggests switching

## 2018-06-12 NOTE — TELEPHONE ENCOUNTER
Pharmacy calling regarding the script sent over for Jefry Cohen; Jefry Cohen is not preferred with patient's Insurance. Insurance will cover AirDuo and there is generic.

## 2018-06-14 NOTE — TELEPHONE ENCOUNTER
Patient given info from [address] [place] [address] regarding changing from Advair to [address]. Pt verbalizes understanding and will call if any problems.

## 2018-06-26 ENCOUNTER — PATIENT OUTREACH (OUTPATIENT)
Dept: CASE MANAGEMENT | Age: 73
End: 2018-06-26

## 2018-06-26 NOTE — PROGRESS NOTES
LVM requesting a call back for ccm. Next appt scheduled with NICKIE Norwood on 07/09/18. Time spent: 2 min collecting, reviewing pt data, communication and documentation.

## 2018-07-05 RX ORDER — BUPROPION HYDROCHLORIDE 150 MG/1
TABLET, EXTENDED RELEASE ORAL
Qty: 180 TABLET | Refills: 3 | Status: SHIPPED | OUTPATIENT
Start: 2018-07-05 | End: 2018-11-20 | Stop reason: ALTCHOICE

## 2018-07-05 NOTE — TELEPHONE ENCOUNTER
LOV: 5/21/18 w/ SD  FOV: 8/9/18 MWV  Last labs: 5/9/18 u/a  Last Refill: 4/5/18 qt:90    Per protocol sent to provider

## 2018-07-06 RX ORDER — METFORMIN HYDROCHLORIDE 500 MG/1
TABLET, EXTENDED RELEASE ORAL
Qty: 180 TABLET | Refills: 0 | Status: SHIPPED | OUTPATIENT
Start: 2018-07-06 | End: 2018-07-24

## 2018-07-10 ENCOUNTER — PATIENT OUTREACH (OUTPATIENT)
Dept: CASE MANAGEMENT | Age: 73
End: 2018-07-10

## 2018-07-10 NOTE — PROGRESS NOTES
Spoke with pt today for ccm. Pt states she is unable to talk at this time and will call back. Care plan: Reviewed. Pt due for a diabetic eye exam.  AWV scheduled for 08/09/18.    Time spent: 5 min collecting, reviewing pt data, communication and document

## 2018-07-24 ENCOUNTER — HOSPITAL ENCOUNTER (INPATIENT)
Facility: HOSPITAL | Age: 73
LOS: 2 days | Discharge: HOME | DRG: 871 | End: 2018-07-26
Attending: EMERGENCY MEDICINE | Admitting: HOSPITALIST
Payer: MEDICARE

## 2018-07-24 ENCOUNTER — APPOINTMENT (OUTPATIENT)
Dept: GENERAL RADIOLOGY | Facility: HOSPITAL | Age: 73
DRG: 871 | End: 2018-07-24
Attending: EMERGENCY MEDICINE
Payer: MEDICARE

## 2018-07-24 DIAGNOSIS — J18.9 SEPSIS DUE TO PNEUMONIA (HCC): Primary | ICD-10-CM

## 2018-07-24 DIAGNOSIS — A41.9 SEPSIS DUE TO PNEUMONIA (HCC): Primary | ICD-10-CM

## 2018-07-24 PROBLEM — D80.3 IGG DEFICIENCY (HCC): Status: ACTIVE | Noted: 2018-07-24

## 2018-07-24 LAB
ADENOVIRUS PCR:: NEGATIVE
ALBUMIN SERPL-MCNC: 3.6 G/DL (ref 3.5–4.8)
ALBUMIN/GLOB SERPL: 1.1 {RATIO} (ref 1–2)
ALP LIVER SERPL-CCNC: 83 U/L (ref 55–142)
ALT SERPL-CCNC: 28 U/L (ref 14–54)
ANION GAP SERPL CALC-SCNC: 11 MMOL/L (ref 0–18)
AST SERPL-CCNC: 14 U/L (ref 15–41)
ATRIAL RATE: 111 BPM
B PERT DNA SPEC QL NAA+PROBE: NEGATIVE
BASOPHILS # BLD AUTO: 0.03 X10(3) UL (ref 0–0.1)
BASOPHILS NFR BLD AUTO: 0.3 %
BILIRUB SERPL-MCNC: 0.6 MG/DL (ref 0.1–2)
BILIRUB UR QL STRIP.AUTO: NEGATIVE
BILIRUB UR QL STRIP.AUTO: NEGATIVE
BUN BLD-MCNC: 15 MG/DL (ref 8–20)
BUN/CREAT SERPL: 17.4 (ref 10–20)
C PNEUM DNA SPEC QL NAA+PROBE: NEGATIVE
CALCIUM BLD-MCNC: 9.1 MG/DL (ref 8.3–10.3)
CHLORIDE SERPL-SCNC: 104 MMOL/L (ref 101–111)
CLARITY UR REFRACT.AUTO: CLEAR
CLARITY UR REFRACT.AUTO: CLEAR
CO2 SERPL-SCNC: 25 MMOL/L (ref 22–32)
CORONAVIRUS 229E PCR:: NEGATIVE
CORONAVIRUS HKU1 PCR:: NEGATIVE
CORONAVIRUS NL63 PCR:: NEGATIVE
CORONAVIRUS OC43 PCR:: NEGATIVE
CREAT BLD-MCNC: 0.86 MG/DL (ref 0.55–1.02)
EOSINOPHIL # BLD AUTO: 0.11 X10(3) UL (ref 0–0.3)
EOSINOPHIL NFR BLD AUTO: 1.2 %
ERYTHROCYTE [DISTWIDTH] IN BLOOD BY AUTOMATED COUNT: 13.1 % (ref 11.5–16)
EST. AVERAGE GLUCOSE BLD GHB EST-MCNC: 160 MG/DL (ref 68–126)
FLUAV RNA SPEC QL NAA+PROBE: NEGATIVE
FLUBV RNA SPEC QL NAA+PROBE: NEGATIVE
GLOBULIN PLAS-MCNC: 3.3 G/DL (ref 2.5–3.7)
GLUCOSE BLD-MCNC: 120 MG/DL (ref 65–99)
GLUCOSE BLD-MCNC: 124 MG/DL (ref 65–99)
GLUCOSE BLD-MCNC: 125 MG/DL (ref 65–99)
GLUCOSE BLD-MCNC: 143 MG/DL (ref 65–99)
GLUCOSE BLD-MCNC: 144 MG/DL (ref 65–99)
GLUCOSE BLD-MCNC: 165 MG/DL (ref 70–99)
GLUCOSE UR STRIP.AUTO-MCNC: NEGATIVE MG/DL
GLUCOSE UR STRIP.AUTO-MCNC: NEGATIVE MG/DL
HBA1C MFR BLD HPLC: 7.2 % (ref ?–5.7)
HCT VFR BLD AUTO: 39.4 % (ref 34–50)
HGB BLD-MCNC: 13.2 G/DL (ref 12–16)
IMMATURE GRANULOCYTE COUNT: 0.02 X10(3) UL (ref 0–1)
IMMATURE GRANULOCYTE RATIO %: 0.2 %
KETONES UR STRIP.AUTO-MCNC: NEGATIVE MG/DL
KETONES UR STRIP.AUTO-MCNC: NEGATIVE MG/DL
LACTIC ACID: 1.3 MMOL/L (ref 0.5–2)
LACTIC ACID: 3.3 MMOL/L (ref 0.5–2)
LEUKOCYTE ESTERASE UR QL STRIP.AUTO: NEGATIVE
LEUKOCYTE ESTERASE UR QL STRIP.AUTO: NEGATIVE
LIPASE: 170 U/L (ref 73–393)
LYMPHOCYTES # BLD AUTO: 1 X10(3) UL (ref 0.9–4)
LYMPHOCYTES NFR BLD AUTO: 10.7 %
M PROTEIN MFR SERPL ELPH: 6.9 G/DL (ref 6.1–8.3)
MCH RBC QN AUTO: 29.8 PG (ref 27–33.2)
MCHC RBC AUTO-ENTMCNC: 33.5 G/DL (ref 31–37)
MCV RBC AUTO: 88.9 FL (ref 81–100)
METAPNEUMOVIRUS PCR:: NEGATIVE
MONOCYTES # BLD AUTO: 0.41 X10(3) UL (ref 0.1–1)
MONOCYTES NFR BLD AUTO: 4.4 %
MYCOPLASMA PNEUMONIA PCR:: NEGATIVE
NEUTROPHIL ABS PRELIM: 7.81 X10 (3) UL (ref 1.3–6.7)
NEUTROPHILS # BLD AUTO: 7.81 X10(3) UL (ref 1.3–6.7)
NEUTROPHILS NFR BLD AUTO: 83.2 %
NITRITE UR QL STRIP.AUTO: NEGATIVE
NITRITE UR QL STRIP.AUTO: NEGATIVE
OSMOLALITY SERPL CALC.SUM OF ELEC: 295 MOSM/KG (ref 275–295)
P AXIS: 29 DEGREES
P-R INTERVAL: 172 MS
PARAINFLUENZA 1 PCR:: NEGATIVE
PARAINFLUENZA 2 PCR:: NEGATIVE
PARAINFLUENZA 3 PCR:: NEGATIVE
PARAINFLUENZA 4 PCR:: NEGATIVE
PH UR STRIP.AUTO: 5 [PH] (ref 4.5–8)
PH UR STRIP.AUTO: 7 [PH] (ref 4.5–8)
PLATELET # BLD AUTO: 188 10(3)UL (ref 150–450)
POTASSIUM SERPL-SCNC: 4.1 MMOL/L (ref 3.6–5.1)
PROT UR STRIP.AUTO-MCNC: NEGATIVE MG/DL
PROT UR STRIP.AUTO-MCNC: NEGATIVE MG/DL
Q-T INTERVAL: 304 MS
QRS DURATION: 60 MS
QTC CALCULATION (BEZET): 413 MS
R AXIS: -13 DEGREES
RBC # BLD AUTO: 4.43 X10(6)UL (ref 3.8–5.1)
RBC UR QL AUTO: NEGATIVE
RED CELL DISTRIBUTION WIDTH-SD: 42.5 FL (ref 35.1–46.3)
RHINOVIRUS/ENTERO PCR:: NEGATIVE
RSV RNA SPEC QL NAA+PROBE: NEGATIVE
SODIUM SERPL-SCNC: 140 MMOL/L (ref 136–144)
SP GR UR STRIP.AUTO: 1 (ref 1–1.03)
SP GR UR STRIP.AUTO: 1.01 (ref 1–1.03)
T AXIS: -6 DEGREES
UROBILINOGEN UR STRIP.AUTO-MCNC: <2 MG/DL
UROBILINOGEN UR STRIP.AUTO-MCNC: <2 MG/DL
VENTRICULAR RATE: 111 BPM
WBC # BLD AUTO: 9.4 X10(3) UL (ref 4–13)

## 2018-07-24 PROCEDURE — 71045 X-RAY EXAM CHEST 1 VIEW: CPT | Performed by: EMERGENCY MEDICINE

## 2018-07-24 PROCEDURE — 99222 1ST HOSP IP/OBS MODERATE 55: CPT | Performed by: INTERNAL MEDICINE

## 2018-07-24 PROCEDURE — 99222 1ST HOSP IP/OBS MODERATE 55: CPT | Performed by: HOSPITALIST

## 2018-07-24 RX ORDER — BUPROPION HYDROCHLORIDE 150 MG/1
150 TABLET, EXTENDED RELEASE ORAL DAILY
Status: DISCONTINUED | OUTPATIENT
Start: 2018-07-24 | End: 2018-07-26

## 2018-07-24 RX ORDER — GARLIC EXTRACT 500 MG
1 CAPSULE ORAL NIGHTLY
Status: DISCONTINUED | OUTPATIENT
Start: 2018-07-24 | End: 2018-07-26

## 2018-07-24 RX ORDER — ALBUTEROL SULFATE 90 UG/1
2 AEROSOL, METERED RESPIRATORY (INHALATION) EVERY 6 HOURS PRN
Status: DISCONTINUED | OUTPATIENT
Start: 2018-07-24 | End: 2018-07-26

## 2018-07-24 RX ORDER — ASPIRIN 81 MG/1
81 TABLET, CHEWABLE ORAL DAILY
Status: DISCONTINUED | OUTPATIENT
Start: 2018-07-24 | End: 2018-07-26

## 2018-07-24 RX ORDER — PANTOPRAZOLE SODIUM 40 MG/1
40 TABLET, DELAYED RELEASE ORAL
COMMUNITY
End: 2019-05-31

## 2018-07-24 RX ORDER — METFORMIN HYDROCHLORIDE 500 MG/1
500 TABLET, EXTENDED RELEASE ORAL 2 TIMES DAILY WITH MEALS
COMMUNITY
End: 2018-10-16

## 2018-07-24 RX ORDER — IPRATROPIUM BROMIDE AND ALBUTEROL SULFATE 2.5; .5 MG/3ML; MG/3ML
3 SOLUTION RESPIRATORY (INHALATION)
Status: DISCONTINUED | OUTPATIENT
Start: 2018-07-24 | End: 2018-07-26

## 2018-07-24 RX ORDER — MONTELUKAST SODIUM 10 MG/1
10 TABLET ORAL NIGHTLY
COMMUNITY
End: 2018-08-09

## 2018-07-24 RX ORDER — PANTOPRAZOLE SODIUM 40 MG/1
40 TABLET, DELAYED RELEASE ORAL
Status: DISCONTINUED | OUTPATIENT
Start: 2018-07-24 | End: 2018-07-26

## 2018-07-24 RX ORDER — HYDROCODONE BITARTRATE AND ACETAMINOPHEN 10; 325 MG/1; MG/1
1 TABLET ORAL EVERY 6 HOURS PRN
Status: DISCONTINUED | OUTPATIENT
Start: 2018-07-24 | End: 2018-07-26

## 2018-07-24 RX ORDER — DEXTROSE MONOHYDRATE 25 G/50ML
50 INJECTION, SOLUTION INTRAVENOUS
Status: DISCONTINUED | OUTPATIENT
Start: 2018-07-24 | End: 2018-07-26

## 2018-07-24 RX ORDER — MONTELUKAST SODIUM 10 MG/1
10 TABLET ORAL NIGHTLY
Status: DISCONTINUED | OUTPATIENT
Start: 2018-07-24 | End: 2018-07-26

## 2018-07-24 RX ORDER — TIZANIDINE 4 MG/1
4 TABLET ORAL 3 TIMES DAILY
Status: DISCONTINUED | OUTPATIENT
Start: 2018-07-24 | End: 2018-07-26

## 2018-07-24 RX ORDER — IBUPROFEN 600 MG/1
600 TABLET ORAL ONCE
Status: COMPLETED | OUTPATIENT
Start: 2018-07-24 | End: 2018-07-24

## 2018-07-24 RX ORDER — SODIUM CHLORIDE 9 MG/ML
INJECTION, SOLUTION INTRAVENOUS CONTINUOUS
Status: ACTIVE | OUTPATIENT
Start: 2018-07-24 | End: 2018-07-24

## 2018-07-24 RX ORDER — PREGABALIN 75 MG/1
75 CAPSULE ORAL 2 TIMES DAILY
Status: DISCONTINUED | OUTPATIENT
Start: 2018-07-24 | End: 2018-07-26

## 2018-07-24 RX ORDER — LISINOPRIL 20 MG/1
20 TABLET ORAL DAILY
COMMUNITY
End: 2018-10-09

## 2018-07-24 RX ORDER — CHOLECALCIFEROL (VITAMIN D3) 50 MCG
2000 TABLET ORAL DAILY
COMMUNITY
End: 2018-11-20 | Stop reason: ALTCHOICE

## 2018-07-24 RX ORDER — FLUTICASONE PROPIONATE AND SALMETEROL 113; 14 UG/1; UG/1
1 POWDER, METERED RESPIRATORY (INHALATION) 2 TIMES DAILY
Status: DISCONTINUED | OUTPATIENT
Start: 2018-07-24 | End: 2018-07-24

## 2018-07-24 RX ORDER — LISINOPRIL 20 MG/1
20 TABLET ORAL DAILY
Status: DISCONTINUED | OUTPATIENT
Start: 2018-07-24 | End: 2018-07-26

## 2018-07-24 RX ORDER — SIMVASTATIN 40 MG
40 TABLET ORAL NIGHTLY
COMMUNITY
End: 2018-08-23

## 2018-07-24 RX ORDER — TIZANIDINE 4 MG/1
16 TABLET ORAL NIGHTLY
COMMUNITY
End: 2021-06-02 | Stop reason: CLARIF

## 2018-07-24 RX ORDER — FENTANYL 50 UG/H
1 PATCH TRANSDERMAL
Status: DISCONTINUED | OUTPATIENT
Start: 2018-07-24 | End: 2018-07-26

## 2018-07-24 RX ORDER — ONDANSETRON 4 MG/1
4 TABLET, FILM COATED ORAL EVERY 8 HOURS PRN
Status: DISCONTINUED | OUTPATIENT
Start: 2018-07-24 | End: 2018-07-26

## 2018-07-24 RX ORDER — ONDANSETRON 2 MG/ML
4 INJECTION INTRAMUSCULAR; INTRAVENOUS ONCE
Status: COMPLETED | OUTPATIENT
Start: 2018-07-24 | End: 2018-07-24

## 2018-07-24 RX ORDER — ATORVASTATIN CALCIUM 20 MG/1
20 TABLET, FILM COATED ORAL NIGHTLY
Status: DISCONTINUED | OUTPATIENT
Start: 2018-07-24 | End: 2018-07-26

## 2018-07-24 NOTE — PROGRESS NOTES
Full consult dictated. Pt well known to me. Distant history of breast cancer. Recurrent history of MATTY presumed related to chronic GI blood loss. Has had 3 episodes of sepsis/pneumonia over the last few years.  I had seen her recently and documented IgG

## 2018-07-24 NOTE — ED NOTES
Pt tolerated straight cath well, changed into clean dry sheets and pads. IV infusing, no distress. Pt and family updated with plan of care.

## 2018-07-24 NOTE — SEPSIS REASSESSMENT
[facility]    Sepsis Reassessment Note    /76   Pulse 116   Temp (!) 103 °F (39.4 °C) (Oral)   Resp 24   Wt 77 kg   SpO2 94%   BMI 32.07 kg/m²      7:00 AM    Cardiac:  Regularity: Regular  Rate: Normal  Heart Sounds: S1,S2    Lungs:   Right:

## 2018-07-24 NOTE — H&P
[facility]  History and Physical     Murtis Broaden Patient Status:  Inpatient    1945 MRN [id number]   [facility] 0SW-A Attending Karyn Laird MD   Hosp Day # 0 PCP Jeremy Jackson MD     Chief Complaint: Fever chill OOPHORECTOMY      Comment: @ age 40  37: OTHER SURGICAL HISTORY      Comment: endoscopy - papillotomy  2016: OTHER SURGICAL HISTORY      Comment:  under right eye, skin cancer removed  1999: RADIATION LEFT    Social History:  reports that she has quit sm BEFORE 8/6/17 Disp: 120 tablet Rfl: 0   aspirin 81 MG Oral Tab Take 81 mg by mouth daily. Disp:  Rfl:    fentaNYL 50 MCG/HR Transdermal Patch 72 Hr Place 1 patch onto the skin every third day.  Disp:  Rfl:    fluticasone-salmeterol (ADVAIR DISKUS) 250-50 MC 104   CO2  25.0   ALKPHO  83   AST  14*   ALT  28   BILT  0.6   TP  6.9       Estimated Creatinine Clearance: 47.5 mL/min (based on SCr of 0.86 mg/dL). No results for input(s): PTP, INR in the last 168 hours.     No results for input(s): TROP, CK in the

## 2018-07-24 NOTE — ED PROVIDER NOTES
Patient Seen in: [facility] Emergency Department    History   Patient presents with:  Dyspnea HAYLIE SOB (respiratory)  Fever (infectious)    Stated Complaint: HAYLIE, fever    HPI    23-year-old female with history of pulmonary embolism on Xarelto, IgG d OTHER SURGICAL HISTORY      Comment: endoscopy - papillotomy  2016: OTHER SURGICAL HISTORY      Comment:  under right eye, skin cancer removed  1999: RADIATION LEFT        Smoking status: Former Smoker REFLEX - Abnormal; Notable for the following:        Result Value    Blood Urine Small (*)     Bacteria Urine Rare (*)     All other components within normal limits   COMP METABOLIC PANEL (14) - Abnormal; Notable for the following:     Glucose 165 (*) better, patient has remained hemodynamically stable. Discussed with pulmonologist Olive Keyes, Dr Shawna Love , and Dr. Mary Toscano. Patient will be admitted for further evaluation and treatment.           Disposition and Plan     Clinical Impression:  Sepsis due to pn

## 2018-07-24 NOTE — PLAN OF CARE
NURSING ADMISSION NOTE      Patient admitted via Cart  Oriented to room. Safety precautions initiated. Bed in low position. Call light in reach. Admission complete  Report received through ED. Dr Mily Cordero in to see.

## 2018-07-24 NOTE — PROGRESS NOTES
[facility] Pharmacy Note:  Renal Adjustment for meropenem (MERREM)    Lora Fermin is a 71year old female who has been prescribed meropenem (MERREM) 500 mg every 8 hrs. CrCl is estimated creatinine clearance is 45.3 mL/min (based on SCr of 0.86 mg/dL).  s

## 2018-07-24 NOTE — CONSULTS
Lloyd [address] Patient Status:  Inpatient    1945 MRN [id number]   [facility] 0SW-A Attending Florian Caba MD   [facility] Day # 0 PCP Speedy Alejandra MD     Date of Admission: 2018  5:54 AM  Admission Diagnos Comment: endoscopy - papillotomy  2016: OTHER SURGICAL HISTORY      Comment:  under right eye, skin cancer removed  1999: RADIATION LEFT     Bactrim                 HIVES  Ciprofloxacin           HIVES  Mold                    REACTIVE AIRWAY DISEASE  Clin AS DIRECTED Disp: 180 tablet Rfl: 3   Fluticasone-Salmeterol (AIRDUO RESPICLICK 538/42) 968-50 MCG/ACT Inhalation Aerosol Powder, Breath Activated Inhale 1 Inhaler into the lungs 2 (two) times daily.  Disp: 1 each Rfl: 1   LYRICA 75 MG Oral Cap Take 75 mg b (DEX4) oral liquid 30 g, 30 g, Oral, Q15 Min PRN **OR** Glucose-Vitamin C (DEX-4) 4-0.006 g chewable tab 8 tablet, 8 tablet, Oral, Q15 Min PRN  •  Insulin Aspart Pen (NOVOLOG) 100 UNIT/ML flexpen 1-5 Units, 1-5 Units, Subcutaneous, TID CC and HS     REVIEW kg)      Intake/Output Summary (Last 24 hours) at 07/24/18 1626  Last data filed at 07/24/18 1538   Gross per 24 hour   Intake              500 ml   Output             1100 ml   Net             -600 ml        Physical Exam:                          [id number]

## 2018-07-24 NOTE — CONSULTS
[facility]    PATIENT'S NAME: Edi Tsang   ATTENDING PHYSICIAN: NOLAN Means Ada: Baljit Caicedo M.D.    PATIENT ACCOUNT#:   [de-identified]    LOCATION:  [facility]  MEDICAL RECORD #:   [id number]       DATE OF BI another episode. She had her relative bring her to the hospital.  Here in the emergency room, she had no fever but she had a chest x-ray that showed a dense right upper lobe infiltrate. She is having some shaking chills.   She had a lactic acid that was m disorders that she is aware of. Family history was reviewed. REVIEW OF SYSTEMS:  Remarkable for fever and chills. She has had a little bit of shortness of breath. She has felt as though she has wheezed a little bit.   She did have nausea and vomiting of iron deficiency anemia. She did get iron repletion in early May and her hemoglobin is better, and she states that her fatigue is improved except for today when she was acutely ill. 3.   History of breast cancer.   This is distant and seems to be inacti

## 2018-07-25 ENCOUNTER — TELEPHONE (OUTPATIENT)
Dept: HEMATOLOGY/ONCOLOGY | Facility: HOSPITAL | Age: 73
End: 2018-07-25

## 2018-07-25 LAB
GLUCOSE BLD-MCNC: 102 MG/DL (ref 65–99)
GLUCOSE BLD-MCNC: 104 MG/DL (ref 65–99)
GLUCOSE BLD-MCNC: 124 MG/DL (ref 65–99)
GLUCOSE BLD-MCNC: 191 MG/DL (ref 65–99)

## 2018-07-25 PROCEDURE — 99232 SBSQ HOSP IP/OBS MODERATE 35: CPT | Performed by: STUDENT IN AN ORGANIZED HEALTH CARE EDUCATION/TRAINING PROGRAM

## 2018-07-25 PROCEDURE — 99232 SBSQ HOSP IP/OBS MODERATE 35: CPT | Performed by: INTERNAL MEDICINE

## 2018-07-25 NOTE — PROGRESS NOTES
Heme/Onc Progress Note    Patient Name: Kiki Irving   YOB: 1945   Medical Record Number: [id number]   CSN: [id number]   Attending Physician: Melvin Gregg M.D.      Subjective:  Feels better this am.  Still had some chills overnight Q15 Min PRN **OR** glucose (DEX4) oral liquid 30 g, 30 g, Oral, Q15 Min PRN **OR** Glucose-Vitamin C (DEX-4) 4-0.006 g chewable tab 8 tablet, 8 tablet, Oral, Q15 Min PRN  •  Insulin Aspart Pen (NOVOLOG) 100 UNIT/ML flexpen 1-5 Units, 1-5 Units, Subcutaneou EXPANDED   Collection Time: 07/24/18  5:50 PM   Result Value Ref Range   Adenovirus PCR: Negative Negative   Coronavirus 229E PCR: Negative Negative   Coronavirus Hku1 PCR: Negative Negative   Coronavirus Nl63 PCR: Negative Negative   Coronavirus Oc43 PCR:

## 2018-07-25 NOTE — PLAN OF CARE
AxO x4. Room air, lungs diminished bilaterally. On tele, NSR. On xarelto. Voids. C/o chronic back pain, scheduled lyrica and zanaflex, pt refuses zanaflex due to drowsiness throughout the day.  Prn norco with relief, new fentanyl patch on left lower abd. IV

## 2018-07-25 NOTE — CM/SW NOTE
Care Management/BPCI:    Met with patient at bedside to explain the BPCI/Medicare program. Patient agreed with phone f/u for 3 months from 0 [facility] after discharge from [facility]. Patient was enrolled under .  BPCI/Medicare Letter and [place]

## 2018-07-25 NOTE — CM/SW NOTE
07/25/18 1400   CM/SW Screening   Referral Source    Information Source Chart review;Nursing rounds   Patient's Mental Status Alert;Oriented   Patient's [address]   Patient lives with (grandson)   Patient Status Prior to [employer]

## 2018-07-25 NOTE — PROGRESS NOTES
[facility] HOSPITALIST  Progress Note     [facility] Patient Status:  Inpatient    1945 MRN [id number]   [facility] 5NW-A Attending Enrique Srivastava MD   Hosp Day # 1 PCP Jocelyn Frazier MD     Chief Complaint: PNA     S: Patient 20 mg Oral Nightly   • Insulin Aspart Pen  1-5 Units Subcutaneous TID CC and HS   • azithromycin  500 mg Intravenous Q24H   • cefTRIAXone  1 g Intravenous Q24H   • ipratropium-albuterol  3 mL Nebulization Q6H WA   • TiZANidine HCl  4 mg Oral TID       ASS

## 2018-07-25 NOTE — PROGRESS NOTES
Pulmonary Progress Note        NAME: Dmitriy Steele - ROOM: [facility]712-O - MRN: [id number] - Age: 71year old - : 1945        SUBJECTIVE: No events overnight, feeling better    OBJECTIVE:   18  0428 18  0718 18  0740 18  11 MCV 88.9   .0         Recent Labs   07/24/18  0611      K 4.1      CO2 25.0   BUN 15   CA 9.1         Recent Labs   07/24/18  0611   ALT 28   AST 14*   ALB 3.6       Invalid input(s): ARTERIALPH, ARTERIALPO2, ARTERIALPCO2, ARTERIALHCO3

## 2018-07-25 NOTE — TELEPHONE ENCOUNTER
----- Message from Marco Antonio Machuca MD sent at 7/25/2018  7:41 AM CDT -----  Regarding: IVIG  She was admitted with pnemonia. Third episode in three years. IgG deficient. Will need IVIG in a week or two after discharge. I am entering therapy plan.   Evelyn Altamirano

## 2018-07-25 NOTE — DIETARY NOTE
[facility]    NUTRITION INITIAL ASSESSMENT    Pt does not meet malnutrition criteria. NUTRITION DIAGNOSIS/PROBLEM:    Unintentional weight loss related to decreased physiological causes as evidenced by 8# (4.7%)     NUTRITION INTERVENTION:       1. or per MD discretion    MONITOR AND EVALUATE/NUTRITION GOALS:    1. PO intake to meet at least 75% patient nutrition prescription  2. At least 75% intake of oral supplements  3. No signs of skin breakdown  4.  Maintain lean body mass    MEDICATIONS:  Noted

## 2018-07-26 VITALS
DIASTOLIC BLOOD PRESSURE: 82 MMHG | BODY MASS INDEX: 29.9 KG/M2 | RESPIRATION RATE: 18 BRPM | HEART RATE: 88 BPM | TEMPERATURE: 98 F | HEIGHT: 62 IN | SYSTOLIC BLOOD PRESSURE: 100 MMHG | WEIGHT: 162.5 LBS | OXYGEN SATURATION: 97 %

## 2018-07-26 LAB
GLUCOSE BLD-MCNC: 105 MG/DL (ref 65–99)
IMMUNOGLOBULIN G SUBCLASS 1: 319 MG/DL
IMMUNOGLOBULIN G SUBCLASS 2: 129 MG/DL
IMMUNOGLOBULIN G SUBCLASS 3: 17 MG/DL
IMMUNOGLOBULIN G SUBCLASS 4: 5 MG/DL
LEGIONELLA PNEUMOPHILA AG, URI: NEGATIVE
STREPTOCOCCUS PNEUMONIAE AG, U: NEGATIVE

## 2018-07-26 PROCEDURE — 99239 HOSP IP/OBS DSCHRG MGMT >30: CPT | Performed by: STUDENT IN AN ORGANIZED HEALTH CARE EDUCATION/TRAINING PROGRAM

## 2018-07-26 RX ORDER — ONDANSETRON 4 MG/1
4 TABLET, FILM COATED ORAL EVERY 8 HOURS PRN
Qty: 20 TABLET | Refills: 0 | Status: SHIPPED | OUTPATIENT
Start: 2018-07-26 | End: 2018-12-11

## 2018-07-26 RX ORDER — CEFUROXIME AXETIL 500 MG/1
500 TABLET ORAL 2 TIMES DAILY
Qty: 14 TABLET | Refills: 0 | Status: SHIPPED | OUTPATIENT
Start: 2018-07-26 | End: 2018-08-03 | Stop reason: ALTCHOICE

## 2018-07-26 NOTE — DISCHARGE SUMMARY
[facility] HOSPITALIST  DISCHARGE SUMMARY     [facility] Patient Status:  Inpatient    1945 MRN [id number]   [facility] 5NW-A Attending Andres Brower MD   Hosp Day # 2 PCP Tresa Gonzalez MD     Date of Admission: 2018  [place] · no    Consultants:  • Pulm, Hem/ Onc    Discharge Medication List:     Discharge Medications      START taking these medications      Instructions Prescription details   Cefuroxime Axetil 500 MG Tabs  Commonly known as:  CEFTIN      Take 1 tablet (500 fentaNYL 50 MCG/HR Pt72  Commonly known as:  [address] 1 patch onto the skin every third day.    Refills:  0     HYDROcodone-acetaminophen  MG Tabs  Commonly known as:  NORCO      Take 1 tablet by mouth every 6 (six) hours as needed for [place]    Follow-up appointment:   Javid Stover MD  [address] [address]    In 2 weeks      [address]  [address] [facility] [address] [phone]    In

## 2018-07-26 NOTE — PROGRESS NOTES
[address] Patient Status:  Inpatient    1945 MRN [id number]   [facility] 5NW-A Attending Andres Brower MD   Hosp Day # 2 PCP Tresa Gonzalez MD     Pulm / Critical Care Progress Note     S: pt states she atraumatic. Lungs: ctab   Chest wall: No tenderness or deformity. Heart: Regular rate and rhythm, normal S1S2, no murmur. Abdomen: soft, non-tender, non-distended, positive BS.    Extremity: no edema         Recent Labs   Lab  07/24/18   0611   WBC  9

## 2018-07-27 ENCOUNTER — PATIENT OUTREACH (OUTPATIENT)
Dept: CASE MANAGEMENT | Age: 73
End: 2018-07-27

## 2018-07-27 DIAGNOSIS — J18.9 SEPSIS DUE TO PNEUMONIA (HCC): ICD-10-CM

## 2018-07-27 DIAGNOSIS — D80.3 IGG DEFICIENCY (HCC): ICD-10-CM

## 2018-07-27 DIAGNOSIS — A41.9 SEPSIS DUE TO PNEUMONIA (HCC): ICD-10-CM

## 2018-07-27 DIAGNOSIS — D50.0 IRON DEFICIENCY ANEMIA DUE TO CHRONIC BLOOD LOSS: ICD-10-CM

## 2018-07-30 RX ORDER — MONTELUKAST SODIUM 10 MG/1
TABLET ORAL
Qty: 90 TABLET | Refills: 3 | Status: SHIPPED | OUTPATIENT
Start: 2018-07-30 | End: 2019-07-11

## 2018-07-30 NOTE — TELEPHONE ENCOUNTER
LOV: 05/21/2018 SD   Future Visit: 08/09/2018  Last Labs: 07/24/2018 Hemoglobin A1C, Lactic, Lipase, UA, CBC, COMP  Last Rx:  Historical     Per protocol failed last ACT score 18

## 2018-07-31 NOTE — PROGRESS NOTES
Initial Post Discharge Follow Up   Discharge Date: 7/26/18  Contact Date: 7/27/2018    Consent Verification:  Assessment Completed With: Patient  HIPAA Verified?   Yes    Discharge Dx:    Pneumonia, sepsis D/T pneumonia    General:   • How have you been Prescriptions:  MONTELUKAST SODIUM 10 MG Oral Tab TAKE 1 TABLET BY MOUTH DAILY Disp: 90 tablet Rfl: 3   Cefuroxime Axetil 500 MG Oral Tab Take 1 tablet (500 mg total) by mouth 2 (two) times daily.  Disp: 14 tablet Rfl: 0   Ondansetron HCl (ZOFRAN) 4 mg tabl Pain. DO NOT FILL BEFORE 8/6/17 Disp: 120 tablet Rfl: 0   Acidophilus/Pectin (PROBIOTIC) Oral Cap Take 1 capsule by mouth nightly. Disp:  Rfl:    aspirin 81 MG Oral Tab Take 81 mg by mouth daily.  Disp:  Rfl:      • When you were leaving the hospital were a [facility] in [facility])    Aug 09, 2018  8:45 AM CDT Medicare Annual Well Visit with NICKIE Baldwin [address] [facility] [place] Julia Joe ([facility]  [facility])    Aug 23, 2018  8:40 A reconciled with patient, and orders reviewed and discussed. Any changes or updates to medications and or orders sent to PCP.

## 2018-08-03 ENCOUNTER — OFFICE VISIT (OUTPATIENT)
Dept: HEMATOLOGY/ONCOLOGY | Facility: HOSPITAL | Age: 73
End: 2018-08-03
Attending: INTERNAL MEDICINE
Payer: MEDICARE

## 2018-08-03 ENCOUNTER — SOCIAL WORK SERVICES (OUTPATIENT)
Dept: HEMATOLOGY/ONCOLOGY | Facility: HOSPITAL | Age: 73
End: 2018-08-03

## 2018-08-03 VITALS
TEMPERATURE: 97 F | HEIGHT: 60.98 IN | BODY MASS INDEX: 32.17 KG/M2 | RESPIRATION RATE: 18 BRPM | DIASTOLIC BLOOD PRESSURE: 72 MMHG | WEIGHT: 170.38 LBS | SYSTOLIC BLOOD PRESSURE: 130 MMHG | HEART RATE: 101 BPM | OXYGEN SATURATION: 97 %

## 2018-08-03 DIAGNOSIS — D50.0 IRON DEFICIENCY ANEMIA DUE TO CHRONIC BLOOD LOSS: ICD-10-CM

## 2018-08-03 DIAGNOSIS — D80.3 SELECTIVE DEFICIENCY OF IGG SUBCLASSES (HCC): Primary | ICD-10-CM

## 2018-08-03 DIAGNOSIS — D50.0 IRON DEFICIENCY ANEMIA DUE TO CHRONIC BLOOD LOSS: Primary | ICD-10-CM

## 2018-08-03 DIAGNOSIS — J18.9 SEPSIS DUE TO PNEUMONIA (HCC): ICD-10-CM

## 2018-08-03 DIAGNOSIS — A41.9 SEPSIS DUE TO PNEUMONIA (HCC): ICD-10-CM

## 2018-08-03 DIAGNOSIS — D80.3 IGG DEFICIENCY (HCC): ICD-10-CM

## 2018-08-03 PROCEDURE — 96365 THER/PROPH/DIAG IV INF INIT: CPT

## 2018-08-03 PROCEDURE — 99214 OFFICE O/P EST MOD 30 MIN: CPT | Performed by: INTERNAL MEDICINE

## 2018-08-03 PROCEDURE — 96366 THER/PROPH/DIAG IV INF ADDON: CPT

## 2018-08-03 PROCEDURE — 96375 TX/PRO/DX INJ NEW DRUG ADDON: CPT

## 2018-08-03 RX ORDER — DIPHENHYDRAMINE HCL 25 MG
25 CAPSULE ORAL ONCE
Status: CANCELLED | OUTPATIENT
Start: 2018-08-03

## 2018-08-03 RX ORDER — DIPHENHYDRAMINE HYDROCHLORIDE 50 MG/ML
25 INJECTION INTRAMUSCULAR; INTRAVENOUS ONCE
Status: COMPLETED | OUTPATIENT
Start: 2018-08-03 | End: 2018-08-03

## 2018-08-03 RX ORDER — DIPHENHYDRAMINE HYDROCHLORIDE 50 MG/ML
25 INJECTION INTRAMUSCULAR; INTRAVENOUS ONCE
Status: CANCELLED | OUTPATIENT
Start: 2018-08-03

## 2018-08-03 RX ORDER — ACETAMINOPHEN 325 MG/1
650 TABLET ORAL ONCE
Status: CANCELLED | OUTPATIENT
Start: 2018-08-03

## 2018-08-03 RX ORDER — ACETAMINOPHEN 325 MG/1
650 TABLET ORAL ONCE
Status: COMPLETED | OUTPATIENT
Start: 2018-08-03 | End: 2018-08-03

## 2018-08-03 RX ADMIN — ACETAMINOPHEN 650 MG: 325 TABLET ORAL at 10:03:00

## 2018-08-03 RX ADMIN — DIPHENHYDRAMINE HYDROCHLORIDE 25 MG: 50 INJECTION INTRAMUSCULAR; INTRAVENOUS at 10:28:00

## 2018-08-03 NOTE — PROGRESS NOTES
SW met with patient who scored 10 on PHQ-9. RN was concerned because she scored a 0 in May. Patient advises that she always gets \"teary\" after a stay in the hospital for pneumonia.   She reports that she is a busy person, and doesn't like when she is slo

## 2018-08-03 NOTE — PROGRESS NOTES
Patient is here for MD f/u and IVIG infusion. Patient is recovering from Pneumonia. Still coughing and SOB with exertion. Following up with Pulmonologist in 2 weeks. Appetite has been poor these past few weeks.

## 2018-08-03 NOTE — PROGRESS NOTES
Education Record    Learner:  Patient    Disease / Diagnosis:    Barriers / Limitations:  None   Comments:    Method:  Brief focused and Reinforcement   Comments:    General Topics:  Side effects and symptom management and Plan of care reviewed   Comments:

## 2018-08-06 NOTE — PROGRESS NOTES
[facility]    PATIENT'S NAME: Marija Medico   ATTENDING PHYSICIAN: Christy Gomez M.D.    PATIENT ACCOUNT #: [de-identified] LOCATION: [address] [place] [address] RECORD #: [id number] YOB: 1945   DATE OF SERVICE: 08/03/2018       CANCER q.6 h. p.r.n., lisinopril 20 mg daily, Lyrica 75 mg twice daily, metformin 500 mg twice daily, montelukast sodium 10 mg at bedtime, ondansetron 4 mg q.8 h. p.r.n., pantoprazole 40 mg daily, rivaroxaban 20 mg daily, simvastatin 40 mg at bedtime, and tizanid M.D.  d: 08/06/2018 06:29:53  t: 08/06/2018 07:03:04  [facility] [id number]  /    cc: NOLAN Martel M.D. Olevia Brady, A.P.N. Elicia Dunker.  Zaida Gill MD

## 2018-08-07 ENCOUNTER — APPOINTMENT (OUTPATIENT)
Dept: HEMATOLOGY/ONCOLOGY | Facility: HOSPITAL | Age: 73
End: 2018-08-07
Attending: INTERNAL MEDICINE
Payer: MEDICARE

## 2018-08-09 ENCOUNTER — OFFICE VISIT (OUTPATIENT)
Dept: INTERNAL MEDICINE CLINIC | Facility: CLINIC | Age: 73
End: 2018-08-09
Payer: MEDICARE

## 2018-08-09 VITALS
TEMPERATURE: 98 F | BODY MASS INDEX: 32.1 KG/M2 | RESPIRATION RATE: 16 BRPM | DIASTOLIC BLOOD PRESSURE: 70 MMHG | OXYGEN SATURATION: 98 % | HEIGHT: 61 IN | SYSTOLIC BLOOD PRESSURE: 124 MMHG | WEIGHT: 170 LBS | HEART RATE: 92 BPM

## 2018-08-09 DIAGNOSIS — J45.20 MILD INTERMITTENT ASTHMA WITHOUT COMPLICATION: ICD-10-CM

## 2018-08-09 DIAGNOSIS — F41.9 ANXIETY: ICD-10-CM

## 2018-08-09 DIAGNOSIS — Z86.711 HISTORY OF PULMONARY EMBOLISM: ICD-10-CM

## 2018-08-09 DIAGNOSIS — G89.29 OTHER CHRONIC PAIN: ICD-10-CM

## 2018-08-09 DIAGNOSIS — Z87.01 HISTORY OF PNEUMONIA: Primary | ICD-10-CM

## 2018-08-09 DIAGNOSIS — A41.9 SEPSIS DUE TO PNEUMONIA (HCC): ICD-10-CM

## 2018-08-09 DIAGNOSIS — D80.3 IGG DEFICIENCY (HCC): ICD-10-CM

## 2018-08-09 DIAGNOSIS — E11.65 UNCONTROLLED TYPE 2 DIABETES MELLITUS WITH HYPERGLYCEMIA, WITHOUT LONG-TERM CURRENT USE OF INSULIN (HCC): ICD-10-CM

## 2018-08-09 DIAGNOSIS — E78.00 PURE HYPERCHOLESTEROLEMIA: ICD-10-CM

## 2018-08-09 DIAGNOSIS — D84.9 IMMUNODEFICIENCY (HCC): ICD-10-CM

## 2018-08-09 DIAGNOSIS — J18.9 SEPSIS DUE TO PNEUMONIA (HCC): ICD-10-CM

## 2018-08-09 DIAGNOSIS — I10 ESSENTIAL HYPERTENSION, BENIGN: ICD-10-CM

## 2018-08-09 PROCEDURE — 99495 TRANSJ CARE MGMT MOD F2F 14D: CPT | Performed by: NURSE PRACTITIONER

## 2018-08-09 PROCEDURE — 1111F DSCHRG MED/CURRENT MED MERGE: CPT | Performed by: NURSE PRACTITIONER

## 2018-08-09 RX ORDER — ALBUTEROL SULFATE 90 UG/1
2 AEROSOL, METERED RESPIRATORY (INHALATION) EVERY 6 HOURS PRN
Qty: 1 INHALER | Refills: 1 | Status: SHIPPED | OUTPATIENT
Start: 2018-08-09 | End: 2020-04-04

## 2018-08-09 NOTE — PROGRESS NOTES
HPI:    Chapo Craven is a 71year old female here today for hospital follow up.    She was discharged from Inpatient hospital, [facility] to Home   Admission Date: 7/24/18   Discharge Date: 7/26/18  Hospital Discharge Diagnoses (since 7/10/201 mellitus with hyperglycemia, without long-term current use of insulin (HCC)  Metformin 500mg bid. a1c in the hospital 7.2 which is stable for her. On statin and ace.       Other chronic pain  Seeing pain service (not [facility])  Trying to wean her fentanyl needed for Pain. DO NOT FILL BEFORE 8/6/17   Acidophilus/Pectin (PROBIOTIC) Oral Cap Take 1 capsule by mouth nightly. aspirin 81 MG Oral Tab Take 81 mg by mouth daily. No current facility-administered medications on file prior to visit.        HISTORY this encounter: 61\". Weight as of this encounter: 170 lb. /70 (BP Location: Right arm, Patient Position: Sitting, Cuff Size: adult)   Pulse 92   Temp 98.4 °F (36.9 °C) (Oral)   Resp 16   Ht 61\"   Wt 170 lb   SpO2 98%   Breastfeeding?  No   BMI above   Medical Decision Making- Based on service period of discharge to 30 days:   · Number of Possible Diagnoses and/or Management Options: moderate  · Amount and/or Complexity of Data to Be Reviewed: moderate  · Risk of Significant Complications, Morbid

## 2018-08-13 RX ORDER — SIMVASTATIN 40 MG
TABLET ORAL
Qty: 90 TABLET | Refills: 0 | Status: SHIPPED | OUTPATIENT
Start: 2018-08-13 | End: 2018-11-19

## 2018-08-20 ENCOUNTER — TELEPHONE (OUTPATIENT)
Dept: HEMATOLOGY/ONCOLOGY | Facility: HOSPITAL | Age: 73
End: 2018-08-20

## 2018-08-20 NOTE — TELEPHONE ENCOUNTER
Pt states she thinks she needs her labs drawn. She complains of leg pain and fatigue. She states she feels like this when her iron is low. Last labs drawn May 1. Per Dr. Lois Vicente note repeat labs every 4 months.    Pt transferred to [facility] to schedule la

## 2018-08-21 NOTE — TELEPHONE ENCOUNTER
Health Maintenance Summary     Topic Due On Due Status Completed On Postpone Until Reason    IMMUNIZATION - HPV   Aged Out       IMMUNIZATION - DTaP/Tdap/Td Aug 30, 2010 Postponed  Aug 21, 2018 Patient Refused    Immunization-Influenza Sep 1, 2018 Not Due       Depression Screening Aug 21, 2019 Not Due Aug 21, 2018          Over the last 2 weeks, how often have you been bothered by the following problems?         PHQ2 Score:  0  1. Little interest or pleasure in doing things?:  Not at all  2. Feeling down, depressed, or hopeless?:  Not at all       Patient is due for topics as listed above, he wishes to decline at this time .    Writer will check imm in past records        Medication: Tizanidine HCL 4 MG    Date of last refill: 10/5/16  [place]    Last office visit: 10/5/16  Due back to clinic per last office note:  6 months  Date next office visit scheduled:  Nothing scheduled    Last day.         TiZANidine HCl 4 MG Oral Tab  270 tablet  0       Sig: Take 1 tablet (4 mg total) by mouth 3 (three) times daily.         lidocaine 5 % External Patch  90 patch  0       Sig: Place 3 patches onto the skin daily.  Wear for 12 hours, remove for 1

## 2018-08-22 ENCOUNTER — NURSE ONLY (OUTPATIENT)
Dept: HEMATOLOGY/ONCOLOGY | Facility: HOSPITAL | Age: 73
End: 2018-08-22
Attending: INTERNAL MEDICINE
Payer: MEDICARE

## 2018-08-22 DIAGNOSIS — R25.2 LEG CRAMPING: ICD-10-CM

## 2018-08-22 DIAGNOSIS — D50.0 IRON DEFICIENCY ANEMIA DUE TO CHRONIC BLOOD LOSS: ICD-10-CM

## 2018-08-22 DIAGNOSIS — D80.3 IGG DEFICIENCY (HCC): ICD-10-CM

## 2018-08-22 LAB
ALBUMIN SERPL-MCNC: 4 G/DL (ref 3.5–4.8)
ALBUMIN/GLOB SERPL: 1.2 {RATIO} (ref 1–2)
ALP LIVER SERPL-CCNC: 89 U/L (ref 55–142)
ALT SERPL-CCNC: 39 U/L (ref 14–54)
ANION GAP SERPL CALC-SCNC: 9 MMOL/L (ref 0–18)
AST SERPL-CCNC: 23 U/L (ref 15–41)
BASOPHILS # BLD AUTO: 0.05 X10(3) UL (ref 0–0.1)
BASOPHILS NFR BLD AUTO: 0.9 %
BILIRUB SERPL-MCNC: 0.5 MG/DL (ref 0.1–2)
BUN BLD-MCNC: 12 MG/DL (ref 8–20)
BUN/CREAT SERPL: 14.3 (ref 10–20)
CALCIUM BLD-MCNC: 9.3 MG/DL (ref 8.3–10.3)
CHLORIDE SERPL-SCNC: 106 MMOL/L (ref 101–111)
CO2 SERPL-SCNC: 25 MMOL/L (ref 22–32)
CREAT BLD-MCNC: 0.84 MG/DL (ref 0.55–1.02)
DEPRECATED HBV CORE AB SER IA-ACNC: 355.9 NG/ML (ref 18–340)
EOSINOPHIL # BLD AUTO: 0.11 X10(3) UL (ref 0–0.3)
EOSINOPHIL NFR BLD AUTO: 2 %
ERYTHROCYTE [DISTWIDTH] IN BLOOD BY AUTOMATED COUNT: 13 % (ref 11.5–16)
GLOBULIN PLAS-MCNC: 3.4 G/DL (ref 2.5–4)
GLUCOSE BLD-MCNC: 130 MG/DL (ref 70–99)
HCT VFR BLD AUTO: 41 % (ref 34–50)
HGB BLD-MCNC: 13.5 G/DL (ref 12–16)
IMMATURE GRANULOCYTE COUNT: 0.01 X10(3) UL (ref 0–1)
IMMATURE GRANULOCYTE RATIO %: 0.2 %
IRON SATURATION: 28 % (ref 15–50)
IRON: 80 UG/DL (ref 28–170)
LYMPHOCYTES # BLD AUTO: 1.49 X10(3) UL (ref 0.9–4)
LYMPHOCYTES NFR BLD AUTO: 27.2 %
M PROTEIN MFR SERPL ELPH: 7.4 G/DL (ref 6.1–8.3)
MCH RBC QN AUTO: 29.5 PG (ref 27–33.2)
MCHC RBC AUTO-ENTMCNC: 32.9 G/DL (ref 31–37)
MCV RBC AUTO: 89.7 FL (ref 81–100)
MONOCYTES # BLD AUTO: 0.52 X10(3) UL (ref 0.1–1)
MONOCYTES NFR BLD AUTO: 9.5 %
NEUTROPHIL ABS PRELIM: 3.3 X10 (3) UL (ref 1.3–6.7)
NEUTROPHILS # BLD AUTO: 3.3 X10(3) UL (ref 1.3–6.7)
NEUTROPHILS NFR BLD AUTO: 60.2 %
OSMOLALITY SERPL CALC.SUM OF ELEC: 292 MOSM/KG (ref 275–295)
PLATELET # BLD AUTO: 211 10(3)UL (ref 150–450)
POTASSIUM SERPL-SCNC: 4 MMOL/L (ref 3.6–5.1)
RBC # BLD AUTO: 4.57 X10(6)UL (ref 3.8–5.1)
RED CELL DISTRIBUTION WIDTH-SD: 42.7 FL (ref 35.1–46.3)
SODIUM SERPL-SCNC: 140 MMOL/L (ref 136–144)
TOTAL IRON BINDING CAPACITY: 289 UG/DL (ref 240–450)
TRANSFERRIN SERPL-MCNC: 194 MG/DL (ref 200–360)
WBC # BLD AUTO: 5.5 X10(3) UL (ref 4–13)

## 2018-08-22 PROCEDURE — 85025 COMPLETE CBC W/AUTO DIFF WBC: CPT

## 2018-08-22 PROCEDURE — 80053 COMPREHEN METABOLIC PANEL: CPT

## 2018-08-22 PROCEDURE — 83540 ASSAY OF IRON: CPT

## 2018-08-22 PROCEDURE — 83735 ASSAY OF MAGNESIUM: CPT

## 2018-08-22 PROCEDURE — 36415 COLL VENOUS BLD VENIPUNCTURE: CPT

## 2018-08-22 PROCEDURE — 82728 ASSAY OF FERRITIN: CPT

## 2018-08-22 PROCEDURE — 83550 IRON BINDING TEST: CPT

## 2018-08-23 LAB — HAV IGM SER QL: 2 MG/DL (ref 1.8–2.5)

## 2018-08-27 RX ORDER — RIVAROXABAN 20 MG/1
TABLET, FILM COATED ORAL
Qty: 90 TABLET | Refills: 0 | Status: SHIPPED | OUTPATIENT
Start: 2018-08-27 | End: 2018-08-28

## 2018-08-27 NOTE — TELEPHONE ENCOUNTER
Last Office Visit: 8-9-18 with SD for hospital follow up   Last Rx Filled: Historical  Last Labs: 8-22-18 magnesium/ferritin/iron/tibc/cmp/cbc  Future Appointment: 8-28-18    Per protocol to provider

## 2018-08-28 ENCOUNTER — OFFICE VISIT (OUTPATIENT)
Dept: INTERNAL MEDICINE CLINIC | Facility: CLINIC | Age: 73
End: 2018-08-28
Payer: MEDICARE

## 2018-08-28 VITALS
SYSTOLIC BLOOD PRESSURE: 116 MMHG | HEART RATE: 92 BPM | HEIGHT: 61 IN | BODY MASS INDEX: 32.1 KG/M2 | DIASTOLIC BLOOD PRESSURE: 64 MMHG | TEMPERATURE: 98 F | WEIGHT: 170 LBS | RESPIRATION RATE: 12 BRPM

## 2018-08-28 DIAGNOSIS — E11.65 UNCONTROLLED TYPE 2 DIABETES MELLITUS WITH HYPERGLYCEMIA, WITHOUT LONG-TERM CURRENT USE OF INSULIN (HCC): ICD-10-CM

## 2018-08-28 DIAGNOSIS — M53.3 SACROILIAC JOINT DYSFUNCTION: ICD-10-CM

## 2018-08-28 DIAGNOSIS — D50.0 IRON DEFICIENCY ANEMIA DUE TO CHRONIC BLOOD LOSS: ICD-10-CM

## 2018-08-28 DIAGNOSIS — M85.859 OSTEOPENIA OF THIGH, UNSPECIFIED LATERALITY: ICD-10-CM

## 2018-08-28 DIAGNOSIS — J18.9 SEPSIS DUE TO PNEUMONIA (HCC): ICD-10-CM

## 2018-08-28 DIAGNOSIS — M47.819 ARTHROPATHY OF FACET JOINT: ICD-10-CM

## 2018-08-28 DIAGNOSIS — Z85.828 HISTORY OF BASAL CELL CARCINOMA: ICD-10-CM

## 2018-08-28 DIAGNOSIS — E04.9 ENLARGED THYROID: ICD-10-CM

## 2018-08-28 DIAGNOSIS — Z11.59 NEED FOR HEPATITIS C SCREENING TEST: ICD-10-CM

## 2018-08-28 DIAGNOSIS — M47.812 ARTHROPATHY OF CERVICAL FACET JOINT: ICD-10-CM

## 2018-08-28 DIAGNOSIS — J45.20 MILD INTERMITTENT ASTHMA WITHOUT COMPLICATION: ICD-10-CM

## 2018-08-28 DIAGNOSIS — D80.3 SELECTIVE DEFICIENCY OF IGG SUBCLASSES (HCC): ICD-10-CM

## 2018-08-28 DIAGNOSIS — M54.16 LUMBAR RADICULITIS: ICD-10-CM

## 2018-08-28 DIAGNOSIS — M65.342 TRIGGER RING FINGER OF LEFT HAND: ICD-10-CM

## 2018-08-28 DIAGNOSIS — G93.0 CYST OF BRAIN: ICD-10-CM

## 2018-08-28 DIAGNOSIS — Z85.3 HISTORY OF LEFT BREAST CANCER: ICD-10-CM

## 2018-08-28 DIAGNOSIS — A41.9 SEPSIS DUE TO PNEUMONIA (HCC): ICD-10-CM

## 2018-08-28 DIAGNOSIS — E78.00 PURE HYPERCHOLESTEROLEMIA: ICD-10-CM

## 2018-08-28 DIAGNOSIS — I10 ESSENTIAL HYPERTENSION, BENIGN: ICD-10-CM

## 2018-08-28 DIAGNOSIS — Z87.01 HISTORY OF RECURRENT PNEUMONIA: ICD-10-CM

## 2018-08-28 DIAGNOSIS — E55.9 VITAMIN D DEFICIENCY: ICD-10-CM

## 2018-08-28 DIAGNOSIS — G56.02 CARPAL TUNNEL SYNDROME OF LEFT WRIST: ICD-10-CM

## 2018-08-28 DIAGNOSIS — F41.9 ANXIETY: ICD-10-CM

## 2018-08-28 DIAGNOSIS — Z87.898 HISTORY OF SYNCOPE: ICD-10-CM

## 2018-08-28 DIAGNOSIS — M54.12 CERVICAL RADICULITIS: ICD-10-CM

## 2018-08-28 DIAGNOSIS — Z00.00 ENCOUNTER FOR ANNUAL HEALTH EXAMINATION: ICD-10-CM

## 2018-08-28 DIAGNOSIS — I70.0 ATHEROSCLEROSIS OF AORTA (HCC): Primary | ICD-10-CM

## 2018-08-28 DIAGNOSIS — E53.8 B12 DEFICIENCY: ICD-10-CM

## 2018-08-28 DIAGNOSIS — Z86.711 HISTORY OF PULMONARY EMBOLISM: ICD-10-CM

## 2018-08-28 DIAGNOSIS — G89.4 CHRONIC PAIN SYNDROME: ICD-10-CM

## 2018-08-28 DIAGNOSIS — M47.897 OTHER OSTEOARTHRITIS OF SPINE, LUMBOSACRAL REGION: ICD-10-CM

## 2018-08-28 DIAGNOSIS — D69.6 THROMBOCYTOPENIA (HCC): ICD-10-CM

## 2018-08-28 DIAGNOSIS — D50.8 OTHER IRON DEFICIENCY ANEMIA: ICD-10-CM

## 2018-08-28 DIAGNOSIS — K21.9 GASTROESOPHAGEAL REFLUX DISEASE WITHOUT ESOPHAGITIS: ICD-10-CM

## 2018-08-28 PROBLEM — D84.9 IMMUNODEFICIENCY (HCC): Status: RESOLVED | Noted: 2018-05-08 | Resolved: 2018-08-28

## 2018-08-28 PROBLEM — K29.50 ANTRAL GASTRITIS: Status: RESOLVED | Noted: 2017-08-15 | Resolved: 2018-08-28

## 2018-08-28 PROBLEM — R13.19 ESOPHAGEAL DYSPHAGIA: Status: RESOLVED | Noted: 2017-08-15 | Resolved: 2018-08-28

## 2018-08-28 PROCEDURE — G0439 PPPS, SUBSEQ VISIT: HCPCS | Performed by: NURSE PRACTITIONER

## 2018-08-28 PROCEDURE — 99214 OFFICE O/P EST MOD 30 MIN: CPT | Performed by: NURSE PRACTITIONER

## 2018-08-28 NOTE — PATIENT INSTRUCTIONS
[facility] Morales's SCREENING SCHEDULE   Tests on this list are recommended by your physician but may not be covered, or covered at this frequency, by your insurer. Please check with your insurance carrier before scheduling to verify coverage.    PREVEN results found for this or any previous visit.  Limited to patients who meet one of the following criteria:   • Men who are 73-68 years old and have smoked more than 100 cigarettes in their lifetime   • Anyone with a family history    Colorectal Cancer Scree Annually to at least age 76, and as needed after 76 Mammogram due on 04/04/2019 Please get this Mammogram regularly   Immunizations      Influenza  Covered Annually   Orders placed or performed in visit on 10/31/16  -FLU VACC PRSV FREE INC ANTIG   Orders p [employer]. [email or web address]  A link to the [employer]. This site has a lot of good information including definitions of the different types of Advance Directives.  It als

## 2018-08-28 NOTE — PROGRESS NOTES
HPI:   Regis Iglesias is a 71year old female who presents for a Medicare Subsequent Annual Wellness visit (Pt already had Initial Annual Wellness).   Atherosclerosis of aorta (HCC)  Stable   Cont statin    Encounter for annual health examination iron deficiency anemia  Stable  Following closley with IgG / Dr Jaquelin Winslow    Thrombocytopenia ([facility] [address]  Stable  Following closley with IgG / Dr Jaquelin Winslow    Gastroesophageal reflux disease without esophagitis  Stable  PPI    Enlarged thyroid  Stable  Normal US 2017 dressing and bathing based on screening of functional status. She has difficulties Affording Meds based on screening of functional status.    Are you able to afford your medications?: No              She has problems with Memory based on (OPHTHALMOLOGY)  Cecy Lynch MD ([employer] [facility])  NICKIE Rolle (PAIN MANAGEMENT)  NICKIE Osullivan (Internal Medicine)  Radha Méndez RN as Gomez Saleh (Registered Nurse)  Scarlet Arellano [address] [place] [address] Keeley as [facility] Care Manager lipitor [atorvastatin calcium]; omnicef; quinapril hcl; and sulfa antibiotics.     CURRENT MEDICATIONS:     Outpatient Prescriptions Marked as Taking for the 8/28/18 encounter (Office Visit) with NICKIE Isabel:  SIMVASTATIN 40 MG Oral Tab TAKE 1 TABLET embolism ([facility] [address] Pure hypercholesterolemia (6/29/2012); Type II or unspecified type diabetes mellitus without mention of complication, not stated as uncontrolled (6/29/2012); and Unspecified essential hypertension.     She  has a past surgical history that i Weight as of this encounter: 170 lb.     Medicare Hearing Assessment  (Required for AWV/SWV)    Finger Rub       Visual Acuity                           General Appearance:  Alert, cooperative, no distress, appears stated age   Head:  Normocephalic, withou 07/20/2013, 07/26/2013, 08/02/2013, 09/13/2013, 09/27/2013, 10/11/2013, 10/25/2013, 11/08/2013, 11/22/2013, 12/06/2013, 12/20/2013        ASSESSMENT AND OTHER RELEVANT CHRONIC CONDITIONS:   Nelly Leyva is a 71year old female who presents for a Med Cyst of brain  Stable  Cont ot monitor. History of basal cell carcinoma  Stable  Up to date with exams.      History of recurrent pneumonia  Stable  Per pulmonary     Other iron deficiency anemia  Stable  Following closley with IgG / Dr Vegas Larger External Lab or Procedure   Diabetes Screening      HbgA1C   Annually   Lab Results  Component Value Date   A1C 7.2 (H) 07/24/2018    No flowsheet data found.     Fasting Blood Sugar (FSB)Annually   Glucose (mg/dL)   Date Value   08/22/2018 130 (H)   ------ (Prevnar)  Covered Once after 65 02/22/2016 Please get once after your 65th birthday    Pneumococcal 23 (Pneumovax)  Covered Once after 65 09/25/2017 Please get once after your 65th birthday    Hepatitis B for Moderate/High Risk No vaccine history found Me 1/19/2017   Last Dilated Eye Exam 1/19/2017     No flowsheet data found.             Template: [facility] AMB MEDICARE ANNUAL ASSESSMENT FEMALE [[id number]]

## 2018-08-29 ENCOUNTER — HOSPITAL ENCOUNTER (OUTPATIENT)
Dept: INTERVENTIONAL RADIOLOGY/VASCULAR | Facility: HOSPITAL | Age: 73
Discharge: HOME | End: 2018-08-29
Attending: INTERNAL MEDICINE | Admitting: INTERNAL MEDICINE
Payer: MEDICARE

## 2018-08-29 VITALS
SYSTOLIC BLOOD PRESSURE: 126 MMHG | RESPIRATION RATE: 12 BRPM | OXYGEN SATURATION: 94 % | WEIGHT: 170 LBS | DIASTOLIC BLOOD PRESSURE: 75 MMHG | TEMPERATURE: 98 F | BODY MASS INDEX: 31.28 KG/M2 | HEART RATE: 82 BPM | HEIGHT: 62 IN

## 2018-08-29 DIAGNOSIS — D80.3 IGG DEFICIENCY (HCC): ICD-10-CM

## 2018-08-29 DIAGNOSIS — D50.0 IRON DEFICIENCY ANEMIA DUE TO CHRONIC BLOOD LOSS: ICD-10-CM

## 2018-08-29 LAB
ERYTHROCYTE [DISTWIDTH] IN BLOOD BY AUTOMATED COUNT: 13 % (ref 11.5–16)
HCT VFR BLD AUTO: 37.4 % (ref 34–50)
HGB BLD-MCNC: 12.5 G/DL (ref 12–16)
INR: 1 (ref 0.8–1.3)
MCH RBC QN AUTO: 30 PG (ref 27–33.2)
MCHC RBC AUTO-ENTMCNC: 33.4 G/DL (ref 31–37)
MCV RBC AUTO: 89.7 FL (ref 81–100)
PLATELET # BLD AUTO: 188 10(3)UL (ref 150–450)
RBC # BLD AUTO: 4.17 X10(6)UL (ref 3.8–5.1)
RED CELL DISTRIBUTION WIDTH-SD: 42.5 FL (ref 35.1–46.3)
WBC # BLD AUTO: 5.9 X10(3) UL (ref 4–13)

## 2018-08-29 PROCEDURE — 77001 FLUOROGUIDE FOR VEIN DEVICE: CPT

## 2018-08-29 PROCEDURE — 85027 COMPLETE CBC AUTOMATED: CPT | Performed by: INTERNAL MEDICINE

## 2018-08-29 PROCEDURE — 02HV33Z INSERTION OF INFUSION DEVICE INTO SUPERIOR VENA CAVA, PERCUTANEOUS APPROACH: ICD-10-PCS | Performed by: RADIOLOGY

## 2018-08-29 PROCEDURE — 36561 INSERT TUNNELED CV CATH: CPT

## 2018-08-29 PROCEDURE — 0JH60WZ INSERTION OF TOTALLY IMPLANTABLE VASCULAR ACCESS DEVICE INTO CHEST SUBCUTANEOUS TISSUE AND FASCIA, OPEN APPROACH: ICD-10-PCS | Performed by: RADIOLOGY

## 2018-08-29 PROCEDURE — 99152 MOD SED SAME PHYS/QHP 5/>YRS: CPT

## 2018-08-29 PROCEDURE — 82962 GLUCOSE BLOOD TEST: CPT

## 2018-08-29 PROCEDURE — 99153 MOD SED SAME PHYS/QHP EA: CPT

## 2018-08-29 PROCEDURE — 76937 US GUIDE VASCULAR ACCESS: CPT

## 2018-08-29 RX ORDER — MIDAZOLAM HYDROCHLORIDE 1 MG/ML
INJECTION INTRAMUSCULAR; INTRAVENOUS
Status: COMPLETED
Start: 2018-08-29 | End: 2018-08-29

## 2018-08-29 RX ORDER — LIDOCAINE HYDROCHLORIDE AND EPINEPHRINE 15; 5 MG/ML; UG/ML
INJECTION, SOLUTION EPIDURAL
Status: COMPLETED
Start: 2018-08-29 | End: 2018-08-29

## 2018-08-29 RX ORDER — BACITRACIN 50000 [USP'U]/1
INJECTION, POWDER, LYOPHILIZED, FOR SOLUTION INTRAMUSCULAR
Status: COMPLETED
Start: 2018-08-29 | End: 2018-08-29

## 2018-08-29 RX ORDER — DIPHENHYDRAMINE HYDROCHLORIDE 50 MG/ML
INJECTION INTRAMUSCULAR; INTRAVENOUS
Status: COMPLETED
Start: 2018-08-29 | End: 2018-08-29

## 2018-08-29 RX ORDER — HEPARIN SODIUM 5000 [USP'U]/ML
INJECTION, SOLUTION INTRAVENOUS; SUBCUTANEOUS
Status: COMPLETED
Start: 2018-08-29 | End: 2018-08-29

## 2018-08-29 RX ORDER — LIDOCAINE HYDROCHLORIDE 10 MG/ML
INJECTION, SOLUTION EPIDURAL; INFILTRATION; INTRACAUDAL; PERINEURAL
Status: COMPLETED
Start: 2018-08-29 | End: 2018-08-29

## 2018-08-29 RX ORDER — CHLORHEXIDINE GLUCONATE 4 G/100ML
30 SOLUTION TOPICAL ONCE
Status: COMPLETED | OUTPATIENT
Start: 2018-08-29 | End: 2018-08-29

## 2018-08-29 RX ORDER — SODIUM CHLORIDE 9 MG/ML
INJECTION, SOLUTION INTRAVENOUS CONTINUOUS
Status: DISCONTINUED | OUTPATIENT
Start: 2018-08-29 | End: 2018-08-29

## 2018-08-29 RX ADMIN — CHLORHEXIDINE GLUCONATE 30 ML: 4 SOLUTION TOPICAL at 07:30:00

## 2018-08-29 RX ADMIN — SODIUM CHLORIDE: 9 INJECTION, SOLUTION INTRAVENOUS at 08:00:00

## 2018-08-29 NOTE — PROGRESS NOTES
Discharge instructions explained to patient. VS WNL, port site clean, dry, intact. Patient able to verbalize understanding of all discharge instructions including when to remove dressings. IV removed, fully intact.  Patient taken by wheelchair to car with f

## 2018-08-30 LAB — GLUCOSE BLD-MCNC: 119 MG/DL (ref 65–99)

## 2018-08-31 ENCOUNTER — OFFICE VISIT (OUTPATIENT)
Dept: HEMATOLOGY/ONCOLOGY | Facility: HOSPITAL | Age: 73
End: 2018-08-31
Attending: INTERNAL MEDICINE
Payer: MEDICARE

## 2018-08-31 VITALS
WEIGHT: 167 LBS | OXYGEN SATURATION: 96 % | HEIGHT: 60.98 IN | TEMPERATURE: 98 F | HEART RATE: 98 BPM | BODY MASS INDEX: 31.53 KG/M2 | SYSTOLIC BLOOD PRESSURE: 136 MMHG | DIASTOLIC BLOOD PRESSURE: 79 MMHG | RESPIRATION RATE: 16 BRPM

## 2018-08-31 DIAGNOSIS — D80.3 SELECTIVE DEFICIENCY OF IGG SUBCLASSES (HCC): Primary | ICD-10-CM

## 2018-08-31 DIAGNOSIS — D50.0 IRON DEFICIENCY ANEMIA DUE TO CHRONIC BLOOD LOSS: ICD-10-CM

## 2018-08-31 PROCEDURE — 96366 THER/PROPH/DIAG IV INF ADDON: CPT

## 2018-08-31 PROCEDURE — 96365 THER/PROPH/DIAG IV INF INIT: CPT

## 2018-08-31 PROCEDURE — 96375 TX/PRO/DX INJ NEW DRUG ADDON: CPT

## 2018-08-31 RX ORDER — DIPHENHYDRAMINE HCL 25 MG
25 CAPSULE ORAL ONCE
Status: CANCELLED | OUTPATIENT
Start: 2018-08-31

## 2018-08-31 RX ORDER — DIPHENHYDRAMINE HYDROCHLORIDE 50 MG/ML
25 INJECTION INTRAMUSCULAR; INTRAVENOUS ONCE
Status: COMPLETED | OUTPATIENT
Start: 2018-08-31 | End: 2018-08-31

## 2018-08-31 RX ORDER — ACETAMINOPHEN 325 MG/1
650 TABLET ORAL ONCE
Status: CANCELLED | OUTPATIENT
Start: 2018-08-31

## 2018-08-31 RX ORDER — ACETAMINOPHEN 325 MG/1
650 TABLET ORAL ONCE
Status: COMPLETED | OUTPATIENT
Start: 2018-08-31 | End: 2018-08-31

## 2018-08-31 RX ORDER — DIPHENHYDRAMINE HYDROCHLORIDE 50 MG/ML
25 INJECTION INTRAMUSCULAR; INTRAVENOUS ONCE
Status: CANCELLED | OUTPATIENT
Start: 2018-08-31

## 2018-08-31 RX ADMIN — ACETAMINOPHEN 650 MG: 325 TABLET ORAL at 10:02:00

## 2018-08-31 RX ADMIN — DIPHENHYDRAMINE HYDROCHLORIDE 25 MG: 50 INJECTION INTRAMUSCULAR; INTRAVENOUS at 10:03:00

## 2018-09-19 ENCOUNTER — PATIENT OUTREACH (OUTPATIENT)
Dept: CASE MANAGEMENT | Age: 73
End: 2018-09-19

## 2018-09-19 DIAGNOSIS — E11.65 UNCONTROLLED TYPE 2 DIABETES MELLITUS WITH HYPERGLYCEMIA, WITHOUT LONG-TERM CURRENT USE OF INSULIN (HCC): ICD-10-CM

## 2018-09-19 DIAGNOSIS — D50.8 OTHER IRON DEFICIENCY ANEMIA: ICD-10-CM

## 2018-09-19 DIAGNOSIS — F41.9 ANXIETY: ICD-10-CM

## 2018-09-19 DIAGNOSIS — I10 ESSENTIAL HYPERTENSION, BENIGN: ICD-10-CM

## 2018-09-19 DIAGNOSIS — E53.8 B12 DEFICIENCY: ICD-10-CM

## 2018-09-19 DIAGNOSIS — E78.00 PURE HYPERCHOLESTEROLEMIA: ICD-10-CM

## 2018-09-19 NOTE — PROGRESS NOTES
Contacting patient to follow up on CCM for the month. LMCB    Time Spent This Encounter Total: 2 min medical record review, telephone communication, care plan updates where needed, and education.  Provided acknowledgment and validation to patient's concerns

## 2018-09-20 RX ORDER — LISINOPRIL 20 MG/1
TABLET ORAL
Qty: 90 TABLET | Refills: 0 | Status: SHIPPED | OUTPATIENT
Start: 2018-09-20 | End: 2018-12-16

## 2018-09-25 ENCOUNTER — TELEPHONE (OUTPATIENT)
Dept: INTERNAL MEDICINE CLINIC | Facility: CLINIC | Age: 73
End: 2018-09-25

## 2018-09-25 NOTE — TELEPHONE ENCOUNTER
Patient called states Wellbutrin is no longer covered through her insurance. Patient is requesting a substitute. Please advise.

## 2018-09-25 NOTE — TELEPHONE ENCOUNTER
Called pt, asked for pt to reach out to her insurance plan to find out covered alternatives. Pt agreed and will call us back.   FYI to SD.

## 2018-09-25 NOTE — TELEPHONE ENCOUNTER
Please call pharmacy   I would like to know reason why wellbutrin is not covered? Quantity? Is wellbutrin XL 150mg daily covered?

## 2018-09-25 NOTE — PROGRESS NOTES
Spoke to Matthias Michaud verified for CCM call.     Medical History  Patient Active Problem List:     Essential hypertension, benign     Pure hypercholesterolemia     Anxiety     DM (diabetes mellitus), type 2, uncontrolled ([facility] [address]     Osteopenia     B12 defici just wants to stay in bed all day. She has not been sleeping very well, states someday's she will sleep 4-5 hours and others 2-3 hours. Grandson and daughter live with patient and help patient out a lot with house work.     Pharmacy contacted patient states

## 2018-09-28 ENCOUNTER — OFFICE VISIT (OUTPATIENT)
Dept: HEMATOLOGY/ONCOLOGY | Facility: HOSPITAL | Age: 73
End: 2018-09-28
Attending: INTERNAL MEDICINE
Payer: MEDICARE

## 2018-09-28 VITALS
HEART RATE: 99 BPM | SYSTOLIC BLOOD PRESSURE: 118 MMHG | BODY MASS INDEX: 32 KG/M2 | TEMPERATURE: 99 F | OXYGEN SATURATION: 99 % | RESPIRATION RATE: 16 BRPM | DIASTOLIC BLOOD PRESSURE: 63 MMHG | WEIGHT: 167.81 LBS

## 2018-09-28 DIAGNOSIS — D50.0 IRON DEFICIENCY ANEMIA DUE TO CHRONIC BLOOD LOSS: ICD-10-CM

## 2018-09-28 DIAGNOSIS — D80.3 SELECTIVE DEFICIENCY OF IGG SUBCLASSES (HCC): Primary | ICD-10-CM

## 2018-09-28 PROCEDURE — 96375 TX/PRO/DX INJ NEW DRUG ADDON: CPT

## 2018-09-28 PROCEDURE — 96366 THER/PROPH/DIAG IV INF ADDON: CPT

## 2018-09-28 PROCEDURE — 96365 THER/PROPH/DIAG IV INF INIT: CPT

## 2018-09-28 RX ORDER — ACETAMINOPHEN 325 MG/1
650 TABLET ORAL ONCE
Status: COMPLETED | OUTPATIENT
Start: 2018-09-28 | End: 2018-09-28

## 2018-09-28 RX ORDER — ACETAMINOPHEN 325 MG/1
650 TABLET ORAL ONCE
Status: CANCELLED | OUTPATIENT
Start: 2018-09-28

## 2018-09-28 RX ORDER — DIPHENHYDRAMINE HYDROCHLORIDE 50 MG/ML
25 INJECTION INTRAMUSCULAR; INTRAVENOUS ONCE
Status: COMPLETED | OUTPATIENT
Start: 2018-09-28 | End: 2018-09-28

## 2018-09-28 RX ORDER — DIPHENHYDRAMINE HCL 25 MG
25 CAPSULE ORAL ONCE
Status: CANCELLED | OUTPATIENT
Start: 2018-09-28

## 2018-09-28 RX ORDER — DIPHENHYDRAMINE HYDROCHLORIDE 50 MG/ML
25 INJECTION INTRAMUSCULAR; INTRAVENOUS ONCE
Status: CANCELLED | OUTPATIENT
Start: 2018-09-28

## 2018-09-28 RX ORDER — SODIUM CHLORIDE 0.9 % (FLUSH) 0.9 %
10 SYRINGE (ML) INJECTION ONCE
Status: CANCELLED | OUTPATIENT
Start: 2018-09-28

## 2018-09-28 RX ADMIN — DIPHENHYDRAMINE HYDROCHLORIDE 25 MG: 50 INJECTION INTRAMUSCULAR; INTRAVENOUS at 09:00:00

## 2018-09-28 RX ADMIN — ACETAMINOPHEN 650 MG: 325 TABLET ORAL at 09:00:00

## 2018-09-28 NOTE — PROGRESS NOTES
Education Record    Learner:  Patient    Disease / Cedric. Wolfgang deficiency    Barriers / Limitations:  None   Comments:    Method:  Discussion   Comments:    General Topics:  Medication, Side effects and symptom management, Plan of care reviewed and Fall

## 2018-09-28 NOTE — TELEPHONE ENCOUNTER
Spoke with [address] [facility] on file stating they do not have anything on file being denied, patient picked up Wellbutrin back in July 2018 and it was only $6 dollars out of pocket.      Spoke with patient stating there was a confusion with her pharmacy, h

## 2018-09-30 PROCEDURE — 99490 CHRNC CARE MGMT STAFF 1ST 20: CPT

## 2018-10-05 ENCOUNTER — TELEPHONE (OUTPATIENT)
Dept: HEMATOLOGY/ONCOLOGY | Facility: HOSPITAL | Age: 73
End: 2018-10-05

## 2018-10-05 NOTE — TELEPHONE ENCOUNTER
Patient with c/o nausea, migraine type headache and feeling very exhausted. Patient receives IVIG monthly. Symptoms started after IVIG tx in August. Per Dr Caro Mistry, patient to f/u in the office regarding symptoms. Appt scheduled for Tuesday 10/9.

## 2018-10-09 ENCOUNTER — OFFICE VISIT (OUTPATIENT)
Dept: HEMATOLOGY/ONCOLOGY | Facility: HOSPITAL | Age: 73
End: 2018-10-09
Attending: INTERNAL MEDICINE
Payer: MEDICARE

## 2018-10-09 ENCOUNTER — PRIOR ORIGINAL RECORDS (OUTPATIENT)
Dept: OTHER | Age: 73
End: 2018-10-09

## 2018-10-09 ENCOUNTER — SOCIAL WORK SERVICES (OUTPATIENT)
Dept: HEMATOLOGY/ONCOLOGY | Facility: HOSPITAL | Age: 73
End: 2018-10-09

## 2018-10-09 VITALS
HEIGHT: 60.98 IN | WEIGHT: 165.81 LBS | DIASTOLIC BLOOD PRESSURE: 76 MMHG | HEART RATE: 106 BPM | SYSTOLIC BLOOD PRESSURE: 123 MMHG | TEMPERATURE: 98 F | BODY MASS INDEX: 31.3 KG/M2 | RESPIRATION RATE: 16 BRPM | OXYGEN SATURATION: 94 %

## 2018-10-09 DIAGNOSIS — D50.0 IRON DEFICIENCY ANEMIA DUE TO CHRONIC BLOOD LOSS: ICD-10-CM

## 2018-10-09 DIAGNOSIS — D80.3 SELECTIVE DEFICIENCY OF IGG SUBCLASSES (HCC): ICD-10-CM

## 2018-10-09 DIAGNOSIS — R53.83 FATIGUE, UNSPECIFIED TYPE: Primary | ICD-10-CM

## 2018-10-09 DIAGNOSIS — M25.50 ARTHRALGIA, UNSPECIFIED JOINT: ICD-10-CM

## 2018-10-09 PROCEDURE — 99214 OFFICE O/P EST MOD 30 MIN: CPT | Performed by: INTERNAL MEDICINE

## 2018-10-09 PROCEDURE — 36591 DRAW BLOOD OFF VENOUS DEVICE: CPT

## 2018-10-09 RX ORDER — DIPHENHYDRAMINE HYDROCHLORIDE 50 MG/ML
12.5 INJECTION INTRAMUSCULAR; INTRAVENOUS ONCE
Status: CANCELLED | OUTPATIENT
Start: 2018-10-09

## 2018-10-09 NOTE — PROGRESS NOTES
Patient is here for MD f/u. Receiving IVIG monthly. Since August patient has had a terrible headache and severe fatigue. Patient states she could sleep 18 hours a day. No cough or fevers.        Outpatient Oncology Care Plan  Problem list:  fatigue    Inter

## 2018-10-09 NOTE — PROGRESS NOTES
SW met with patient who scored 13 on PHQ-9. Patient advised that she knows now that this depression is beyond what she can manage, and will take steps necessary to feel better. Validated her wisdom in reaching out for additional help.       She states that

## 2018-10-09 NOTE — PROGRESS NOTES
Education Record    Learner:  Patient    Disease / Diagnosis: MATTY    Barriers / Limitations:  None   Comments:    Method:  Brief focused   Comments:    General Topics:  Plan of care reviewed   Comments:    Outcome:  Shows understanding   Comments:

## 2018-10-11 ENCOUNTER — PATIENT OUTREACH (OUTPATIENT)
Dept: CASE MANAGEMENT | Age: 73
End: 2018-10-11

## 2018-10-11 ENCOUNTER — TELEPHONE (OUTPATIENT)
Dept: HEMATOLOGY/ONCOLOGY | Facility: HOSPITAL | Age: 73
End: 2018-10-11

## 2018-10-11 DIAGNOSIS — Z85.828 HISTORY OF BASAL CELL CARCINOMA: ICD-10-CM

## 2018-10-11 DIAGNOSIS — Z85.3 HISTORY OF LEFT BREAST CANCER: ICD-10-CM

## 2018-10-11 DIAGNOSIS — I10 ESSENTIAL HYPERTENSION, BENIGN: ICD-10-CM

## 2018-10-11 DIAGNOSIS — E53.8 B12 DEFICIENCY: ICD-10-CM

## 2018-10-11 DIAGNOSIS — D50.8 OTHER IRON DEFICIENCY ANEMIA: ICD-10-CM

## 2018-10-11 DIAGNOSIS — E78.00 PURE HYPERCHOLESTEROLEMIA: ICD-10-CM

## 2018-10-11 NOTE — PROGRESS NOTES
Spoke to Matthias Michaud verified for CCM call.     Medical History  Patient Active Problem List:     Essential hypertension, benign     Pure hypercholesterolemia     Anxiety     DM (diabetes mellitus), type 2, uncontrolled ([facility] [address]     Osteopenia     B12 defici for now and will keep seeing  every two weeks. Patient currently has labs pending for Dr. Aric Gonzalez which she will have drawn next week.  Patient continues to eat a heart healthy diet and increasing her protein but states chemo treatments are hard

## 2018-10-12 ENCOUNTER — NURSE ONLY (OUTPATIENT)
Dept: HEMATOLOGY/ONCOLOGY | Facility: HOSPITAL | Age: 73
End: 2018-10-12

## 2018-10-12 NOTE — PROGRESS NOTES
MD Tommy Medellin, [facility]   Cc: Bella Chappell RN   Caller: Unspecified (Yesterday, 12:15 PM)             She will not respond to a flu shot - is getting IVIG to prevent infection.  No point.     Previous Messages               Medicat

## 2018-10-16 RX ORDER — METFORMIN HYDROCHLORIDE 500 MG/1
TABLET, EXTENDED RELEASE ORAL
Qty: 180 TABLET | Refills: 1 | Status: SHIPPED | OUTPATIENT
Start: 2018-10-16 | End: 2019-04-10

## 2018-10-18 ENCOUNTER — APPOINTMENT (OUTPATIENT)
Dept: LAB | Age: 73
End: 2018-10-18
Attending: NURSE PRACTITIONER
Payer: MEDICARE

## 2018-10-18 DIAGNOSIS — E53.8 B12 DEFICIENCY: ICD-10-CM

## 2018-10-18 DIAGNOSIS — Z11.59 NEED FOR HEPATITIS C SCREENING TEST: ICD-10-CM

## 2018-10-18 DIAGNOSIS — E55.9 VITAMIN D DEFICIENCY: ICD-10-CM

## 2018-10-18 PROCEDURE — 82607 VITAMIN B-12: CPT

## 2018-10-18 PROCEDURE — 82306 VITAMIN D 25 HYDROXY: CPT

## 2018-10-18 PROCEDURE — 86803 HEPATITIS C AB TEST: CPT

## 2018-10-23 NOTE — PROGRESS NOTES
[facility]    PATIENT'S NAME: Shaista Murtaza   ATTENDING PHYSICIAN: Eros Caba M.D.    PATIENT ACCOUNT #: [de-identified] LOCATION: [address] [place] [address] RECORD #: [id number] YOB: 1945   DATE OF SERVICE: 10/09/2018       CANCER 40 mg daily, rivaroxaban 20 mg daily, simvastatin 40 mg at bedtime, and tizanidine 4 mg t.i.d. p.r.n. which she is not using routine. PHYSICAL EXAMINATION:    GENERAL:  She is a relatively well appearing female in no acute distress.   VITAL SIGNS:  Perf CHLOÉ Oquendos.  Semaj Espinoza MD

## 2018-10-26 ENCOUNTER — OFFICE VISIT (OUTPATIENT)
Dept: HEMATOLOGY/ONCOLOGY | Facility: HOSPITAL | Age: 73
End: 2018-10-26
Attending: INTERNAL MEDICINE
Payer: MEDICARE

## 2018-10-26 VITALS
RESPIRATION RATE: 18 BRPM | SYSTOLIC BLOOD PRESSURE: 132 MMHG | BODY MASS INDEX: 32 KG/M2 | HEART RATE: 79 BPM | DIASTOLIC BLOOD PRESSURE: 85 MMHG | WEIGHT: 167.81 LBS | TEMPERATURE: 99 F

## 2018-10-26 DIAGNOSIS — D50.0 IRON DEFICIENCY ANEMIA DUE TO CHRONIC BLOOD LOSS: ICD-10-CM

## 2018-10-26 DIAGNOSIS — D80.3 SELECTIVE DEFICIENCY OF IGG SUBCLASSES (HCC): Primary | ICD-10-CM

## 2018-10-26 PROCEDURE — 96366 THER/PROPH/DIAG IV INF ADDON: CPT

## 2018-10-26 PROCEDURE — 96375 TX/PRO/DX INJ NEW DRUG ADDON: CPT

## 2018-10-26 PROCEDURE — 96365 THER/PROPH/DIAG IV INF INIT: CPT

## 2018-10-26 RX ORDER — DIPHENHYDRAMINE HCL 25 MG
25 CAPSULE ORAL ONCE
Status: CANCELLED | OUTPATIENT
Start: 2018-10-26

## 2018-10-26 RX ORDER — DIPHENHYDRAMINE HYDROCHLORIDE 50 MG/ML
12.5 INJECTION INTRAMUSCULAR; INTRAVENOUS ONCE
Status: COMPLETED | OUTPATIENT
Start: 2018-10-26 | End: 2018-10-26

## 2018-10-26 RX ORDER — DIPHENHYDRAMINE HYDROCHLORIDE 50 MG/ML
12.5 INJECTION INTRAMUSCULAR; INTRAVENOUS ONCE
Status: CANCELLED | OUTPATIENT
Start: 2018-10-26

## 2018-10-26 RX ORDER — ACETAMINOPHEN 325 MG/1
650 TABLET ORAL ONCE
Status: COMPLETED | OUTPATIENT
Start: 2018-10-26 | End: 2018-10-26

## 2018-10-26 RX ORDER — SODIUM CHLORIDE 0.9 % (FLUSH) 0.9 %
10 SYRINGE (ML) INJECTION ONCE
Status: COMPLETED | OUTPATIENT
Start: 2018-10-26 | End: 2018-10-26

## 2018-10-26 RX ORDER — SODIUM CHLORIDE 0.9 % (FLUSH) 0.9 %
10 SYRINGE (ML) INJECTION ONCE
Status: CANCELLED | OUTPATIENT
Start: 2018-10-26

## 2018-10-26 RX ORDER — DIPHENHYDRAMINE HCL 25 MG
25 CAPSULE ORAL ONCE
Status: DISCONTINUED | OUTPATIENT
Start: 2018-10-26 | End: 2018-10-26

## 2018-10-26 RX ORDER — ACETAMINOPHEN 325 MG/1
650 TABLET ORAL ONCE
Status: CANCELLED | OUTPATIENT
Start: 2018-10-26

## 2018-10-26 RX ADMIN — ACETAMINOPHEN 650 MG: 325 TABLET ORAL at 09:09:00

## 2018-10-26 RX ADMIN — DIPHENHYDRAMINE HYDROCHLORIDE 12.5 MG: 50 INJECTION INTRAMUSCULAR; INTRAVENOUS at 09:10:00

## 2018-10-26 RX ADMIN — SODIUM CHLORIDE 0.9 % (FLUSH) 10 ML: 0.9 % SYRINGE (ML) INJECTION at 12:50:00

## 2018-10-26 NOTE — PROGRESS NOTES
Education Record    Learner:  Patient    Disease / Diagnosis:  IgG Deficiency    Barriers / Limitations:  None   Comments:    Method:  Brief focused   Comments:    General Topics:  Plan of care reviewed   Comments:    Outcome:  Shows understanding   Comment

## 2018-10-31 PROCEDURE — 99490 CHRNC CARE MGMT STAFF 1ST 20: CPT

## 2018-11-05 ENCOUNTER — PATIENT OUTREACH (OUTPATIENT)
Dept: CASE MANAGEMENT | Age: 73
End: 2018-11-05

## 2018-11-06 ENCOUNTER — OFFICE VISIT (OUTPATIENT)
Dept: SLEEP CENTER | Facility: HOSPITAL | Age: 73
End: 2018-11-06
Attending: INTERNAL MEDICINE
Payer: MEDICARE

## 2018-11-06 DIAGNOSIS — R06.83 SNORING: ICD-10-CM

## 2018-11-06 DIAGNOSIS — R40.0 DAYTIME SOMNOLENCE: ICD-10-CM

## 2018-11-06 PROCEDURE — 95810 POLYSOM 6/> YRS 4/> PARAM: CPT

## 2018-11-09 NOTE — PROCEDURES
[address]       Accredited by the [facility] ([facility])    PATIENT'S NAME:        Florence Valdes  ATTENDING PHYSICIAN:   Devora Brunson M.D. REFERRING PHYSICIAN:   Jerry Mckinney.  Mann Boyer MD of 43.  Oxygen justa was 72%. The patient spent 22% of the record with a saturation below 90%, but only 3% of the record with a saturation below 88%. PERIODIC LIMB MOVEMENTS:  PLM index is 0.     EEG:  With the limited recording montage, no EEG abnormal

## 2018-11-19 ENCOUNTER — HOSPITAL ENCOUNTER (OUTPATIENT)
Age: 73
Discharge: HOME | End: 2018-11-19
Attending: FAMILY MEDICINE
Payer: MEDICARE

## 2018-11-19 ENCOUNTER — TELEPHONE (OUTPATIENT)
Dept: INTERNAL MEDICINE CLINIC | Facility: CLINIC | Age: 73
End: 2018-11-19

## 2018-11-19 ENCOUNTER — APPOINTMENT (OUTPATIENT)
Dept: GENERAL RADIOLOGY | Age: 73
End: 2018-11-19
Attending: FAMILY MEDICINE
Payer: MEDICARE

## 2018-11-19 VITALS
DIASTOLIC BLOOD PRESSURE: 93 MMHG | OXYGEN SATURATION: 95 % | TEMPERATURE: 98 F | HEIGHT: 61 IN | SYSTOLIC BLOOD PRESSURE: 130 MMHG | HEART RATE: 110 BPM | BODY MASS INDEX: 30.21 KG/M2 | RESPIRATION RATE: 16 BRPM | WEIGHT: 160 LBS

## 2018-11-19 DIAGNOSIS — S83.412A SPRAIN OF MEDIAL COLLATERAL LIGAMENT OF LEFT KNEE, INITIAL ENCOUNTER: Primary | ICD-10-CM

## 2018-11-19 PROCEDURE — 99203 OFFICE O/P NEW LOW 30 MIN: CPT

## 2018-11-19 PROCEDURE — 73560 X-RAY EXAM OF KNEE 1 OR 2: CPT | Performed by: FAMILY MEDICINE

## 2018-11-19 PROCEDURE — 99213 OFFICE O/P EST LOW 20 MIN: CPT

## 2018-11-19 RX ORDER — SIMVASTATIN 40 MG
TABLET ORAL
Qty: 90 TABLET | Refills: 0 | Status: SHIPPED | OUTPATIENT
Start: 2018-11-19 | End: 2019-02-07

## 2018-11-19 NOTE — TELEPHONE ENCOUNTER
Pt hurt her knee last Friday, just from walking. Thinks she may have torn her meniscus. Has tried icing it and elevating it but that has not helped. She has a lot of pain. Pain level is at least an 8. OK to wait to hear from our office.  Is asking if she sh

## 2018-11-19 NOTE — TELEPHONE ENCOUNTER
Spoke with patient stating she did not injure her left knee but believes she has a torn meniscus, left knee pain below kneecap, sore, she has tried icing and elevating the leg but pain is at around 8/10, patient does not take any thing over the counter, in

## 2018-11-21 PROBLEM — F33.9 RECURRENT MAJOR DEPRESSIVE DISORDER: Status: ACTIVE | Noted: 2018-11-21

## 2018-11-21 PROBLEM — F33.9 RECURRENT MAJOR DEPRESSIVE DISORDER (HCC): Status: ACTIVE | Noted: 2018-11-21

## 2018-11-21 NOTE — ED PROVIDER NOTES
Patient Seen in: [facility]    History   Patient presents with:  Lower Extremity Injury (musculoskeletal)    Stated Complaint: left knee injury x3 days    HPI    68year old female presents for left knee pain.  States she ha removed   • RADIATION LEFT  1999   • RADIOFREQUENCY DENERVATION OF CERVICAL MEDIAL BRANCH Left 3/5/2015    Performed by Adrienne Hoyt MD at [facility] MAIN OR       Family history reviewed and is not pertinent to presenting problem.     Social History    Tobacc

## 2018-11-21 NOTE — ED NOTES
I called the pt responding to her smart er. Pt states she was still having pain. Pt did state her pain had decreased since she started using a knee brace. Pt states pain was less when she was resting.  Pt encouraged to continue to rest , evaluate and wrap h

## 2018-11-23 ENCOUNTER — OFFICE VISIT (OUTPATIENT)
Dept: RHEUMATOLOGY | Facility: CLINIC | Age: 73
End: 2018-11-23
Payer: MEDICARE

## 2018-11-23 VITALS
RESPIRATION RATE: 16 BRPM | SYSTOLIC BLOOD PRESSURE: 122 MMHG | BODY MASS INDEX: 31 KG/M2 | WEIGHT: 166 LBS | HEART RATE: 72 BPM | DIASTOLIC BLOOD PRESSURE: 72 MMHG

## 2018-11-23 DIAGNOSIS — R76.8 RHEUMATOID FACTOR POSITIVE: Primary | ICD-10-CM

## 2018-11-23 DIAGNOSIS — M25.50 ARTHRALGIA, UNSPECIFIED JOINT: ICD-10-CM

## 2018-11-23 DIAGNOSIS — G89.4 CHRONIC PAIN SYNDROME: ICD-10-CM

## 2018-11-23 DIAGNOSIS — M15.9 PRIMARY OSTEOARTHRITIS INVOLVING MULTIPLE JOINTS: ICD-10-CM

## 2018-11-23 DIAGNOSIS — R76.8 ANA POSITIVE: ICD-10-CM

## 2018-11-23 DIAGNOSIS — Z82.69 FAMILY HISTORY OF GOUT: ICD-10-CM

## 2018-11-23 PROCEDURE — 99205 OFFICE O/P NEW HI 60 MIN: CPT | Performed by: INTERNAL MEDICINE

## 2018-11-23 NOTE — PROGRESS NOTES
?  RHEUMATOLOGY NEW PATIENT   Date of visit: 11/23/2018  ? 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 Has monthly IGG infusions.  Father and brother- gout, mother- osteoporosis Pt states 'has had 2 months or sooner depending on results. Patient verbalized understanding of above instructions. No further questions at this time.     ?  Plan:  Diagnoses and all orders for this visit:    Rheumatoid factor positive  -     DARREN BY IFA WITH REFLEX TO ENAS; F yearly. Follows with pulmonology for the chronic asthma as well as pneumonia. Does get hypoxemia with sleeping. Follows with psychiatry for years, on buproprion for years for depression.  Just changed buproprion to extended release and trying to monitor ABDOMEN SURGERY PROC UNLISTED      duodenal biopsy   • BREAST SURGERY     • CERVICAL FACET INJECTION Left 5/29/2014    Performed by Travon Simon MD at [facility] MAIN OR   • CERVICAL FACET INJECTION Right 5/15/2014    Performed by Travon Simon MD at [facility] the lungs daily. Disp: 3 each Rfl: 0   Albuterol Sulfate HFA (PROAIR HFA) 108 (90 Base) MCG/ACT Inhalation Aero Soln Inhale 2 puffs into the lungs every 6 (six) hours as needed (cough).  Disp: 1 Inhaler Rfl: 1   MONTELUKAST SODIUM 10 MG Oral Tab TAKE 1 TABL palpitations and leg swelling. Gastrointestinal: Positive for heartburn. Negative for abdominal pain and vomiting. Sees GI   Genitourinary: Negative for frequency and urgency. Skin: Positive for itching and rash.         Feels something crawling feet.  Bilateral shoulders with full ROM, no evidence of impingement with provocative maneuvers. SI joints non-tender. Bilateral knees with medial joint line tenderness, moderate crepitus, no effusion.    Lymphadenopathy:     She has no cervical adenopat age matched controls:  81      Change from prior hip examination:  -1.1%     This places patient at 10 year fracture risk at: Major Osteoporotic Fracture: 12 %  Hip Fracture:  2.4 %              ADDITIONAL FINDINGS:  No significant additional findings. 39 08/22/2018    BILT 0.5 08/22/2018    TP 7.4 08/22/2018    ALB 4.0 08/22/2018    GLOBULT 2.2 06/15/2015    GLOBULIN 3.4 08/22/2018    ALBGLOBRAT 2.2 06/15/2015     08/22/2018    K 4.0 08/22/2018     08/22/2018    CO2 25.0 08/22/2018       Add

## 2018-11-23 NOTE — ED PROVIDER NOTES
Patient Seen in: [facility]    History   Patient presents with:  Lower Extremity Injury (musculoskeletal)    Stated Complaint: left knee injury x3 days    HPI    68year old female presents for left knee pain.  States she ha removed   • RADIATION LEFT  1999   • RADIOFREQUENCY DENERVATION OF CERVICAL MEDIAL BRANCH Left 3/5/2015    Performed by Aloysius Klinefelter, MD at [facility] MAIN OR       Family history reviewed and is not pertinent to presenting problem.     Social History    Tobacc to use OTC knee brace.    Follow up with Orthopedics      Disposition and Plan     Clinical Impression:  Sprain of medial collateral ligament of left knee, initial encounter  (primary encounter diagnosis)    Disposition:  Discharge  11/19/2018 12:29 pm    F

## 2018-11-27 ENCOUNTER — OFFICE VISIT (OUTPATIENT)
Dept: INTERNAL MEDICINE CLINIC | Facility: CLINIC | Age: 73
End: 2018-11-27
Payer: MEDICARE

## 2018-11-27 VITALS
DIASTOLIC BLOOD PRESSURE: 68 MMHG | HEART RATE: 106 BPM | OXYGEN SATURATION: 98 % | TEMPERATURE: 98 F | BODY MASS INDEX: 31.34 KG/M2 | RESPIRATION RATE: 16 BRPM | SYSTOLIC BLOOD PRESSURE: 124 MMHG | WEIGHT: 166 LBS | HEIGHT: 60.98 IN

## 2018-11-27 DIAGNOSIS — E78.00 PURE HYPERCHOLESTEROLEMIA: ICD-10-CM

## 2018-11-27 DIAGNOSIS — I10 ESSENTIAL HYPERTENSION, BENIGN: ICD-10-CM

## 2018-11-27 DIAGNOSIS — G89.4 CHRONIC PAIN SYNDROME: Primary | ICD-10-CM

## 2018-11-27 DIAGNOSIS — F33.1 MODERATE EPISODE OF RECURRENT MAJOR DEPRESSIVE DISORDER (HCC): ICD-10-CM

## 2018-11-27 DIAGNOSIS — E11.65 UNCONTROLLED TYPE 2 DIABETES MELLITUS WITH HYPERGLYCEMIA (HCC): ICD-10-CM

## 2018-11-27 DIAGNOSIS — E53.8 B12 DEFICIENCY: ICD-10-CM

## 2018-11-27 PROCEDURE — 99214 OFFICE O/P EST MOD 30 MIN: CPT | Performed by: NURSE PRACTITIONER

## 2018-11-27 NOTE — PROGRESS NOTES
John Aguiar is a 68year old female. Patient presents with:  Lab Results: ROOM 11- Discuss lab results       HPI:   Here for lab follow up      It was suggested she see rheumatology   She was seen by Dr Bill Junior last week. Notes reviewed.    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  Disp: 90 Anxiety state, unspecified 6/29/2012   • Cancer ([facility] [address] 1999    breast, basal cell   • Essential hypertension, benign 6/29/2012   • Fitting and adjustment of vascular catheter    • HEART ATTACK    • History of blood clots    • MUKUND (obstructive sleep apnea) 1 or constipation  MUSCULOSKELETAL:  No arthralgias or myalgias  NEURO: denies headaches,     EXAM:   /68   Pulse 106   Temp 97.6 °F (36.4 °C) (Oral)   Resp 16   Ht 60.98\"   Wt 166 lb   SpO2 98%   Breastfeeding?  No   BMI 31.39 kg/m²   GENERAL: well de

## 2018-11-30 ENCOUNTER — OFFICE VISIT (OUTPATIENT)
Dept: HEMATOLOGY/ONCOLOGY | Facility: HOSPITAL | Age: 73
End: 2018-11-30
Attending: INTERNAL MEDICINE
Payer: MEDICARE

## 2018-11-30 VITALS
OXYGEN SATURATION: 99 % | TEMPERATURE: 98 F | DIASTOLIC BLOOD PRESSURE: 79 MMHG | RESPIRATION RATE: 18 BRPM | WEIGHT: 163.81 LBS | BODY MASS INDEX: 30.93 KG/M2 | SYSTOLIC BLOOD PRESSURE: 126 MMHG | HEIGHT: 60.98 IN | HEART RATE: 97 BPM

## 2018-11-30 DIAGNOSIS — D50.0 IRON DEFICIENCY ANEMIA DUE TO CHRONIC BLOOD LOSS: ICD-10-CM

## 2018-11-30 DIAGNOSIS — R76.8 RHEUMATOID FACTOR POSITIVE: Primary | ICD-10-CM

## 2018-11-30 DIAGNOSIS — D80.3 SELECTIVE DEFICIENCY OF IGG SUBCLASSES (HCC): ICD-10-CM

## 2018-11-30 DIAGNOSIS — M25.50 ARTHRALGIA, UNSPECIFIED JOINT: ICD-10-CM

## 2018-11-30 DIAGNOSIS — R76.8 ANA POSITIVE: ICD-10-CM

## 2018-11-30 DIAGNOSIS — Z82.69 FAMILY HISTORY OF GOUT: ICD-10-CM

## 2018-11-30 PROCEDURE — 96366 THER/PROPH/DIAG IV INF ADDON: CPT

## 2018-11-30 PROCEDURE — 86200 CCP ANTIBODY: CPT

## 2018-11-30 PROCEDURE — 84550 ASSAY OF BLOOD/URIC ACID: CPT

## 2018-11-30 PROCEDURE — 96365 THER/PROPH/DIAG IV INF INIT: CPT

## 2018-11-30 PROCEDURE — 86038 ANTINUCLEAR ANTIBODIES: CPT

## 2018-11-30 PROCEDURE — 86431 RHEUMATOID FACTOR QUANT: CPT

## 2018-11-30 PROCEDURE — 96375 TX/PRO/DX INJ NEW DRUG ADDON: CPT

## 2018-11-30 RX ORDER — DIPHENHYDRAMINE HYDROCHLORIDE 50 MG/ML
12.5 INJECTION INTRAMUSCULAR; INTRAVENOUS ONCE
Status: CANCELLED | OUTPATIENT
Start: 2018-11-30

## 2018-11-30 RX ORDER — DIPHENHYDRAMINE HCL 25 MG
25 CAPSULE ORAL ONCE
Status: CANCELLED | OUTPATIENT
Start: 2018-11-30

## 2018-11-30 RX ORDER — ACETAMINOPHEN 325 MG/1
650 TABLET ORAL ONCE
Status: CANCELLED | OUTPATIENT
Start: 2018-11-30

## 2018-11-30 RX ORDER — ACETAMINOPHEN 325 MG/1
650 TABLET ORAL ONCE
Status: COMPLETED | OUTPATIENT
Start: 2018-11-30 | End: 2018-11-30

## 2018-11-30 RX ORDER — DIPHENHYDRAMINE HCL 25 MG
25 CAPSULE ORAL ONCE
Status: DISCONTINUED | OUTPATIENT
Start: 2018-11-30 | End: 2018-11-30

## 2018-11-30 RX ORDER — DIPHENHYDRAMINE HYDROCHLORIDE 50 MG/ML
12.5 INJECTION INTRAMUSCULAR; INTRAVENOUS ONCE
Status: COMPLETED | OUTPATIENT
Start: 2018-11-30 | End: 2018-11-30

## 2018-11-30 RX ORDER — SODIUM CHLORIDE 0.9 % (FLUSH) 0.9 %
10 SYRINGE (ML) INJECTION ONCE
Status: COMPLETED | OUTPATIENT
Start: 2018-11-30 | End: 2018-11-30

## 2018-11-30 RX ORDER — SODIUM CHLORIDE 0.9 % (FLUSH) 0.9 %
10 SYRINGE (ML) INJECTION ONCE
Status: CANCELLED | OUTPATIENT
Start: 2018-11-30

## 2018-11-30 RX ADMIN — DIPHENHYDRAMINE HYDROCHLORIDE 12.5 MG: 50 INJECTION INTRAMUSCULAR; INTRAVENOUS at 09:46:00

## 2018-11-30 RX ADMIN — SODIUM CHLORIDE 0.9 % (FLUSH) 10 ML: 0.9 % SYRINGE (ML) INJECTION at 14:05:00

## 2018-11-30 RX ADMIN — ACETAMINOPHEN 650 MG: 325 TABLET ORAL at 09:47:00

## 2018-11-30 NOTE — PROGRESS NOTES
Education Record    Learner:  Patient     Disease / Diagnosis: IgG deficiency - IVIG infusion every 4 weeks. appt made for next infusion, AVS provided. Patient aware of POC.      Barriers / Limitations:  None   Comments:    Method:  Brief focused and Discus

## 2018-12-06 ENCOUNTER — PATIENT OUTREACH (OUTPATIENT)
Dept: CASE MANAGEMENT | Age: 73
End: 2018-12-06

## 2018-12-06 DIAGNOSIS — F33.1 MODERATE EPISODE OF RECURRENT MAJOR DEPRESSIVE DISORDER (HCC): ICD-10-CM

## 2018-12-06 DIAGNOSIS — Z85.3 HISTORY OF LEFT BREAST CANCER: ICD-10-CM

## 2018-12-06 DIAGNOSIS — E78.00 PURE HYPERCHOLESTEROLEMIA: ICD-10-CM

## 2018-12-06 DIAGNOSIS — E11.65 UNCONTROLLED TYPE 2 DIABETES MELLITUS WITH HYPERGLYCEMIA (HCC): ICD-10-CM

## 2018-12-06 DIAGNOSIS — F41.9 ANXIETY: ICD-10-CM

## 2018-12-06 NOTE — PROGRESS NOTES
Spoke to Matthias Michaud verified for CCM call.     Medical History  Patient Active Problem List:     Essential hypertension, benign     Pure hypercholesterolemia     Anxiety     DM (diabetes mellitus), type 2, uncontrolled ([facility] [address]     Osteopenia     B12 defici they are not helping. Patient is seeing psychiatrist two times a week.  Patient followed up with Rheumotology states her rheumatologist gave her a list of medication names that might help with depression and work well with medications patient is currently o 23  Physical assessment, complete health history, and need for CCM established by Wan Cross MD.

## 2018-12-13 ENCOUNTER — TELEPHONE (OUTPATIENT)
Dept: RHEUMATOLOGY | Facility: CLINIC | Age: 73
End: 2018-12-13

## 2018-12-13 NOTE — TELEPHONE ENCOUNTER
Called pt and notified of normal RF/CCP as well as negative DARREN. Discussed borderline uric acid level as urate crystals can precipitate in joints at above serum >6.9.  Recommended pt monitor her diet and try to make modifications that way to decrease her

## 2018-12-17 RX ORDER — LISINOPRIL 20 MG/1
TABLET ORAL
Qty: 90 TABLET | Refills: 1 | Status: SHIPPED | OUTPATIENT
Start: 2018-12-17 | End: 2019-04-12

## 2018-12-20 RX ORDER — RIVAROXABAN 20 MG/1
TABLET, FILM COATED ORAL
Qty: 90 TABLET | Refills: 0 | Status: SHIPPED | OUTPATIENT
Start: 2018-12-20 | End: 2019-03-17

## 2018-12-20 NOTE — TELEPHONE ENCOUNTER
Per patient she is currently taking medication , patient only goes off for testing and is down to her last 3 pills     Please advise,    LOV:11/27/18 SD  FOV:none on file   LAST RX:8/27/18 20 mg take 1 tab once daily 90 tabs 0 refills   LAST LABS:11/30/18

## 2018-12-21 ENCOUNTER — TELEPHONE (OUTPATIENT)
Dept: INTERNAL MEDICINE CLINIC | Facility: CLINIC | Age: 73
End: 2018-12-21

## 2018-12-28 ENCOUNTER — OFFICE VISIT (OUTPATIENT)
Dept: HEMATOLOGY/ONCOLOGY | Facility: HOSPITAL | Age: 73
End: 2018-12-28
Attending: INTERNAL MEDICINE
Payer: MEDICARE

## 2018-12-28 VITALS
HEART RATE: 94 BPM | OXYGEN SATURATION: 91 % | TEMPERATURE: 98 F | HEIGHT: 60.98 IN | BODY MASS INDEX: 30.51 KG/M2 | DIASTOLIC BLOOD PRESSURE: 79 MMHG | WEIGHT: 161.63 LBS | RESPIRATION RATE: 20 BRPM | SYSTOLIC BLOOD PRESSURE: 144 MMHG

## 2018-12-28 DIAGNOSIS — D80.3 SELECTIVE DEFICIENCY OF IGG SUBCLASSES (HCC): Primary | ICD-10-CM

## 2018-12-28 DIAGNOSIS — D50.0 IRON DEFICIENCY ANEMIA DUE TO CHRONIC BLOOD LOSS: ICD-10-CM

## 2018-12-28 PROCEDURE — 96366 THER/PROPH/DIAG IV INF ADDON: CPT

## 2018-12-28 PROCEDURE — 96375 TX/PRO/DX INJ NEW DRUG ADDON: CPT

## 2018-12-28 PROCEDURE — 96365 THER/PROPH/DIAG IV INF INIT: CPT

## 2018-12-28 RX ORDER — DIPHENHYDRAMINE HYDROCHLORIDE 50 MG/ML
12.5 INJECTION INTRAMUSCULAR; INTRAVENOUS ONCE
Status: CANCELLED | OUTPATIENT
Start: 2018-12-28

## 2018-12-28 RX ORDER — DIPHENHYDRAMINE HCL 25 MG
25 CAPSULE ORAL ONCE
Status: CANCELLED | OUTPATIENT
Start: 2018-12-28

## 2018-12-28 RX ORDER — SODIUM CHLORIDE 0.9 % (FLUSH) 0.9 %
10 SYRINGE (ML) INJECTION ONCE
Status: COMPLETED | OUTPATIENT
Start: 2018-12-28 | End: 2018-12-28

## 2018-12-28 RX ORDER — ACETAMINOPHEN 325 MG/1
650 TABLET ORAL ONCE
Status: CANCELLED | OUTPATIENT
Start: 2018-12-28

## 2018-12-28 RX ORDER — SODIUM CHLORIDE 0.9 % (FLUSH) 0.9 %
10 SYRINGE (ML) INJECTION ONCE
Status: CANCELLED | OUTPATIENT
Start: 2018-12-28

## 2018-12-28 RX ORDER — ACETAMINOPHEN 325 MG/1
650 TABLET ORAL ONCE
Status: COMPLETED | OUTPATIENT
Start: 2018-12-28 | End: 2018-12-28

## 2018-12-28 RX ORDER — DIPHENHYDRAMINE HYDROCHLORIDE 50 MG/ML
12.5 INJECTION INTRAMUSCULAR; INTRAVENOUS ONCE
Status: COMPLETED | OUTPATIENT
Start: 2018-12-28 | End: 2018-12-28

## 2018-12-28 RX ADMIN — SODIUM CHLORIDE 0.9 % (FLUSH) 10 ML: 0.9 % SYRINGE (ML) INJECTION at 12:25:00

## 2018-12-28 RX ADMIN — DIPHENHYDRAMINE HYDROCHLORIDE 12.5 MG: 50 INJECTION INTRAMUSCULAR; INTRAVENOUS at 09:02:00

## 2018-12-28 RX ADMIN — ACETAMINOPHEN 650 MG: 325 TABLET ORAL at 09:00:00

## 2018-12-31 PROCEDURE — 99490 CHRNC CARE MGMT STAFF 1ST 20: CPT

## 2019-01-23 ENCOUNTER — OFFICE VISIT (OUTPATIENT)
Dept: RHEUMATOLOGY | Facility: CLINIC | Age: 74
End: 2019-01-23
Payer: MEDICARE

## 2019-01-23 ENCOUNTER — PATIENT OUTREACH (OUTPATIENT)
Dept: CASE MANAGEMENT | Age: 74
End: 2019-01-23

## 2019-01-23 VITALS
SYSTOLIC BLOOD PRESSURE: 112 MMHG | BODY MASS INDEX: 30 KG/M2 | RESPIRATION RATE: 12 BRPM | WEIGHT: 159 LBS | HEART RATE: 72 BPM | DIASTOLIC BLOOD PRESSURE: 64 MMHG | TEMPERATURE: 98 F

## 2019-01-23 DIAGNOSIS — F41.9 ANXIETY: ICD-10-CM

## 2019-01-23 DIAGNOSIS — E11.65 UNCONTROLLED TYPE 2 DIABETES MELLITUS WITH HYPERGLYCEMIA (HCC): ICD-10-CM

## 2019-01-23 DIAGNOSIS — M25.50 ARTHRALGIA, UNSPECIFIED JOINT: ICD-10-CM

## 2019-01-23 DIAGNOSIS — E78.00 PURE HYPERCHOLESTEROLEMIA: ICD-10-CM

## 2019-01-23 DIAGNOSIS — R76.8 RHEUMATOID FACTOR POSITIVE: ICD-10-CM

## 2019-01-23 DIAGNOSIS — F33.1 MODERATE EPISODE OF RECURRENT MAJOR DEPRESSIVE DISORDER (HCC): ICD-10-CM

## 2019-01-23 DIAGNOSIS — G89.4 CHRONIC PAIN SYNDROME: ICD-10-CM

## 2019-01-23 DIAGNOSIS — I10 ESSENTIAL HYPERTENSION, BENIGN: ICD-10-CM

## 2019-01-23 DIAGNOSIS — M54.16 LUMBAR RADICULITIS: ICD-10-CM

## 2019-01-23 DIAGNOSIS — M15.9 PRIMARY OSTEOARTHRITIS INVOLVING MULTIPLE JOINTS: Primary | ICD-10-CM

## 2019-01-23 PROCEDURE — 99214 OFFICE O/P EST MOD 30 MIN: CPT | Performed by: INTERNAL MEDICINE

## 2019-01-23 NOTE — PROGRESS NOTES
?  RHEUMATOLOGY Follow up   Date of visit: 1/23/2019  ? Patient presents with: Follow - Up: 2 month f/u. Pt states 'has finished PT- girl that was working with has left, was working.'     ?   ASSESSMENT, DISCUSSION & PLAN   Assessment:  Primary osteoart increased Wellbutrin. Her new psychiatrist agrees with the addition of the Cymbalta. States the medication has improved with her pain but still feels like her depression is still not well controlled. Did need a few zofran in there.  She had noticed some sha and sepsis at least once yearly. Follows with pulmonology for the chronic asthma as well as pneumonia. Does get hypoxemia with sleeping. Follows with psychiatry for years, on buproprion for years for depression.  Just changed buproprion to extended rele Procedure Laterality Date   • ABDOMEN SURGERY PROC UNLISTED      duodenal biopsy   • BREAST SURGERY     • CERVICAL FACET INJECTION Left 5/29/2014    Performed by Delfina Gold MD at [address]   • CERVICAL FACET INJECTION Right 5/15/2014    Performed COMMENTS)    Comment:Leg cramping,  Omnicef                 NAUSEA ONLY, DIZZINESS  Quinapril Hcl           FATIGUE  Sulfa Antibiotics       UNKNOWN  ? REVIEW OF SYSTEMS   ? Review of Systems   Constitutional: Negative for malaise/fatigue.    HENT: Bridgette Herrera and breath sounds normal. No respiratory distress. She has no wheezes. Musculoskeletal:   Diffuse small heberden and joslyn nodes of the fingers, no basilar joint tenderness of the 1st CMC bilaterally.   No other swelling, tenderness, redness or restric LYMPH 5 03/08/2016    MON 5 03/08/2016    EOS 0 03/08/2016    BASO 0 03/08/2016    NEPERCENT 62.0 10/09/2018    LYPERCENT 25.4 10/09/2018    MOPERCENT 10.2 10/09/2018    EOPERCENT 1.5 10/09/2018    BAPERCENT 0.6 10/09/2018    NE 4.12 10/09/2018    LYMABS 1

## 2019-01-23 NOTE — PROGRESS NOTES
Spoke to Matthias Michaud verified for CCM call.     Medical History  Patient Active Problem List:     Essential hypertension, benign     Pure hypercholesterolemia     Anxiety     DM (diabetes mellitus), type 2, uncontrolled ([facility] [address]     Osteopenia     B12 defici 11/09/2018 indicates sleep apnea. Patient needs to schedule CPAP titration. Provided patient with sleep center scheduling phone number. Continues with a low fat,low carb diet. Exercises 2-3 times a week for 30 minutes focusing on core.     Patient sees S

## 2019-01-24 ENCOUNTER — PRIOR ORIGINAL RECORDS (OUTPATIENT)
Dept: OTHER | Age: 74
End: 2019-01-24

## 2019-01-24 ENCOUNTER — MYAURORA ACCOUNT LINK (OUTPATIENT)
Dept: OTHER | Age: 74
End: 2019-01-24

## 2019-01-25 ENCOUNTER — OFFICE VISIT (OUTPATIENT)
Dept: HEMATOLOGY/ONCOLOGY | Facility: HOSPITAL | Age: 74
End: 2019-01-25
Attending: INTERNAL MEDICINE
Payer: MEDICARE

## 2019-01-25 VITALS
WEIGHT: 159.5 LBS | SYSTOLIC BLOOD PRESSURE: 157 MMHG | TEMPERATURE: 98 F | DIASTOLIC BLOOD PRESSURE: 82 MMHG | HEIGHT: 60.98 IN | RESPIRATION RATE: 18 BRPM | OXYGEN SATURATION: 95 % | BODY MASS INDEX: 30.11 KG/M2 | HEART RATE: 99 BPM

## 2019-01-25 DIAGNOSIS — D80.3 SELECTIVE DEFICIENCY OF IGG SUBCLASSES (HCC): Primary | ICD-10-CM

## 2019-01-25 DIAGNOSIS — D50.0 IRON DEFICIENCY ANEMIA DUE TO CHRONIC BLOOD LOSS: ICD-10-CM

## 2019-01-25 PROCEDURE — 96366 THER/PROPH/DIAG IV INF ADDON: CPT

## 2019-01-25 PROCEDURE — 96375 TX/PRO/DX INJ NEW DRUG ADDON: CPT

## 2019-01-25 PROCEDURE — 96365 THER/PROPH/DIAG IV INF INIT: CPT

## 2019-01-25 RX ORDER — ACETAMINOPHEN 325 MG/1
650 TABLET ORAL ONCE
Status: CANCELLED | OUTPATIENT
Start: 2019-01-25

## 2019-01-25 RX ORDER — DIPHENHYDRAMINE HCL 25 MG
25 CAPSULE ORAL ONCE
Status: CANCELLED | OUTPATIENT
Start: 2019-01-25

## 2019-01-25 RX ORDER — DIPHENHYDRAMINE HYDROCHLORIDE 50 MG/ML
12.5 INJECTION INTRAMUSCULAR; INTRAVENOUS ONCE
Status: CANCELLED | OUTPATIENT
Start: 2019-01-25

## 2019-01-25 RX ORDER — DIPHENHYDRAMINE HYDROCHLORIDE 50 MG/ML
12.5 INJECTION INTRAMUSCULAR; INTRAVENOUS ONCE
Status: COMPLETED | OUTPATIENT
Start: 2019-01-25 | End: 2019-01-25

## 2019-01-25 RX ORDER — SODIUM CHLORIDE 0.9 % (FLUSH) 0.9 %
10 SYRINGE (ML) INJECTION ONCE
Status: CANCELLED | OUTPATIENT
Start: 2019-01-25

## 2019-01-25 RX ORDER — ACETAMINOPHEN 325 MG/1
650 TABLET ORAL ONCE
Status: COMPLETED | OUTPATIENT
Start: 2019-01-25 | End: 2019-01-25

## 2019-01-25 RX ORDER — SODIUM CHLORIDE 0.9 % (FLUSH) 0.9 %
10 SYRINGE (ML) INJECTION ONCE
Status: COMPLETED | OUTPATIENT
Start: 2019-01-25 | End: 2019-01-25

## 2019-01-25 RX ADMIN — DIPHENHYDRAMINE HYDROCHLORIDE 12.5 MG: 50 INJECTION INTRAMUSCULAR; INTRAVENOUS at 09:13:00

## 2019-01-25 RX ADMIN — SODIUM CHLORIDE 0.9 % (FLUSH) 10 ML: 0.9 % SYRINGE (ML) INJECTION at 12:50:00

## 2019-01-25 RX ADMIN — ACETAMINOPHEN 650 MG: 325 TABLET ORAL at 09:11:00

## 2019-01-25 NOTE — PROGRESS NOTES
Education Record    Learner:  Patient    Disease / Diagnosis: Deficiency of IgG- IVIG infusion    Barriers / Limitations:  None   Comments:    Method:  Brief focused and Reinforcement   Comments:    General Topics:  Plan of care reviewed   Comments:     Out

## 2019-01-31 PROCEDURE — 99490 CHRNC CARE MGMT STAFF 1ST 20: CPT

## 2019-02-06 ENCOUNTER — OFFICE VISIT (OUTPATIENT)
Dept: SLEEP CENTER | Facility: HOSPITAL | Age: 74
End: 2019-02-06
Attending: INTERNAL MEDICINE
Payer: MEDICARE

## 2019-02-06 DIAGNOSIS — G47.33 OSA (OBSTRUCTIVE SLEEP APNEA): ICD-10-CM

## 2019-02-06 PROCEDURE — 95811 POLYSOM 6/>YRS CPAP 4/> PARM: CPT

## 2019-02-07 LAB
CHOLESTEROL, TOTAL: 138 MG/DL
FREE T4: 1 MG/DL
HDL CHOLESTEROL: 75 MG/DL
HEMATOCRIT: 39.4 %
HEMOGLOBIN: 13.3 G/DL
LDL CHOLESTEROL: 48 MG/DL
PLATELETS: 218 K/UL
RED BLOOD COUNT: 4.4 X 10-6/U
THYROID STIMULATING HORMONE: 1.9 MLU/L
TRIGLYCERIDES: 77 MG/DL
WHITE BLOOD COUNT: 6.7 X 10-3/U

## 2019-02-07 RX ORDER — SIMVASTATIN 40 MG
TABLET ORAL
Qty: 90 TABLET | Refills: 0 | Status: SHIPPED | OUTPATIENT
Start: 2019-02-07 | End: 2019-05-02

## 2019-02-17 ENCOUNTER — HOSPITAL ENCOUNTER (EMERGENCY)
Facility: HOSPITAL | Age: 74
Discharge: HOME | End: 2019-02-17
Attending: EMERGENCY MEDICINE
Payer: MEDICARE

## 2019-02-17 ENCOUNTER — APPOINTMENT (OUTPATIENT)
Dept: CT IMAGING | Facility: HOSPITAL | Age: 74
End: 2019-02-17
Attending: EMERGENCY MEDICINE
Payer: MEDICARE

## 2019-02-17 ENCOUNTER — APPOINTMENT (OUTPATIENT)
Dept: GENERAL RADIOLOGY | Facility: HOSPITAL | Age: 74
End: 2019-02-17
Attending: EMERGENCY MEDICINE
Payer: MEDICARE

## 2019-02-17 VITALS
WEIGHT: 160 LBS | TEMPERATURE: 98 F | HEART RATE: 85 BPM | SYSTOLIC BLOOD PRESSURE: 123 MMHG | RESPIRATION RATE: 18 BRPM | OXYGEN SATURATION: 93 % | BODY MASS INDEX: 30.21 KG/M2 | HEIGHT: 61 IN | DIASTOLIC BLOOD PRESSURE: 83 MMHG

## 2019-02-17 DIAGNOSIS — S00.93XA CONTUSION OF HEAD, UNSPECIFIED PART OF HEAD, INITIAL ENCOUNTER: Primary | ICD-10-CM

## 2019-02-17 DIAGNOSIS — S63.681A OTHER SPRAIN OF RIGHT THUMB, INITIAL ENCOUNTER: ICD-10-CM

## 2019-02-17 DIAGNOSIS — S93.401A SPRAIN OF RIGHT ANKLE, UNSPECIFIED LIGAMENT, INITIAL ENCOUNTER: ICD-10-CM

## 2019-02-17 DIAGNOSIS — S80.02XA CONTUSION OF LEFT KNEE, INITIAL ENCOUNTER: ICD-10-CM

## 2019-02-17 PROCEDURE — 73610 X-RAY EXAM OF ANKLE: CPT | Performed by: EMERGENCY MEDICINE

## 2019-02-17 PROCEDURE — 73130 X-RAY EXAM OF HAND: CPT | Performed by: EMERGENCY MEDICINE

## 2019-02-17 PROCEDURE — 73140 X-RAY EXAM OF FINGER(S): CPT | Performed by: EMERGENCY MEDICINE

## 2019-02-17 PROCEDURE — 73562 X-RAY EXAM OF KNEE 3: CPT | Performed by: EMERGENCY MEDICINE

## 2019-02-17 PROCEDURE — 70450 CT HEAD/BRAIN W/O DYE: CPT | Performed by: EMERGENCY MEDICINE

## 2019-02-17 PROCEDURE — 99284 EMERGENCY DEPT VISIT MOD MDM: CPT

## 2019-02-17 RX ORDER — TRAMADOL HYDROCHLORIDE 50 MG/1
50 TABLET ORAL ONCE
Status: DISCONTINUED | OUTPATIENT
Start: 2019-02-17 | End: 2019-02-17

## 2019-02-17 NOTE — ED INITIAL ASSESSMENT (HPI)
Pt presents to the ED with complaints of fall yesterday. Pt landed on left knee, hit head. Pt denies LOC, but is on xarelto. Pt states she was vomiting last night and this morning but did not have anyone to take her until now.  Pt awake and alert, skin w/d,

## 2019-02-17 NOTE — ED PROVIDER NOTES
Patient Seen in: [facility] Emergency Department    History   Patient presents with:  Fall (musculoskeletal, neurologic)  Lower Extremity Injury (musculoskeletal)  Head Neck Injury (neurologic, musculoskeletal)    Stated Complaint: fall yesterday, k Performed by aLney Yeh MD at [facility] MAIN OR   • COLONOSCOPY  10/1/10, 4/21/17   • EGD  4/21/17   • FEMUR/KNEE SURG UNLISTED      right knee arthroscopy   • HERNIA SURGERY     • HYSTERECTOMY      @ age 46   • LUMPECTOMY LEFT  1999   • OOPHORECTOMY Right ankle: She exhibits normal range of motion, no swelling and no ecchymosis. No tenderness. Achilles tendon normal.        Hands:       Legs:  Neurological: She is alert. Skin: Skin is warm and dry.    Psychiatric: She has a normal mood and affec PROCEDURE:  XR KNEE ROUTINE (3 VIEWS), LEFT (CPT=73562)  TECHNIQUE:  Three views were obtained including patellar view. COMPARISON:  [facility], XR KNEE (1 OR 2 VIEWS), LEFT (CPT=73560), 11/19/2018, 11:50.   INDICATIONS:  PATIENT STATED HISTORY: (As PROCEDURE:  CT BRAIN OR HEAD (14697)  COMPARISON:  [address], MRI BRAIN (W+WO) (CPT=70553), 8/31/2017, 8:31.   INDICATIONS:  fall yesterday, knee pain, hit head, on xarelto--pt with vomiting  TECHNIQUE:  Noncontrast CT scanning is performed through the br Close outpatient follow-up was encouraged. Additional evaluation and intervention may be required, depending on the patient's clinical course. Patient was invited to return for reevaluation of any problems, worsening symptoms, or as discussed.     Lucia

## 2019-02-21 ENCOUNTER — PATIENT OUTREACH (OUTPATIENT)
Dept: CASE MANAGEMENT | Age: 74
End: 2019-02-21

## 2019-02-21 NOTE — PROGRESS NOTES
Contacted Fran Frey to follow up on CCM and recent ED visit. Patient states she's fine but asked if I may call her back tomorrow. I will follow up tomorrow.       Time Spent This Encounter Total: 3 min medical record review, telephone communication, care plan

## 2019-02-27 ENCOUNTER — OFFICE VISIT (OUTPATIENT)
Dept: INTERNAL MEDICINE CLINIC | Facility: CLINIC | Age: 74
End: 2019-02-27
Payer: MEDICARE

## 2019-02-27 VITALS
SYSTOLIC BLOOD PRESSURE: 110 MMHG | DIASTOLIC BLOOD PRESSURE: 70 MMHG | HEART RATE: 88 BPM | BODY MASS INDEX: 29.68 KG/M2 | HEIGHT: 60.98 IN | WEIGHT: 157.19 LBS | RESPIRATION RATE: 18 BRPM

## 2019-02-27 DIAGNOSIS — Z86.711 HISTORY OF PULMONARY EMBOLISM: ICD-10-CM

## 2019-02-27 DIAGNOSIS — M25.562 ACUTE PAIN OF LEFT KNEE: ICD-10-CM

## 2019-02-27 DIAGNOSIS — S09.90XD INJURY OF HEAD, SUBSEQUENT ENCOUNTER: ICD-10-CM
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
## 2019-02-27 DIAGNOSIS — S63.681D OTHER SPRAIN OF RIGHT THUMB, SUBSEQUENT ENCOUNTER: ICD-10-CM

## 2019-02-27 DIAGNOSIS — I10 ESSENTIAL HYPERTENSION, BENIGN: ICD-10-CM

## 2019-02-27 DIAGNOSIS — E11.65 UNCONTROLLED TYPE 2 DIABETES MELLITUS WITH HYPERGLYCEMIA (HCC): ICD-10-CM

## 2019-02-27 PROBLEM — J18.9 SEPSIS DUE TO PNEUMONIA (HCC): Status: RESOLVED | Noted: 2018-07-24 | Resolved: 2019-02-27

## 2019-02-27 PROBLEM — A41.9 SEPSIS DUE TO PNEUMONIA (HCC): Status: RESOLVED | Noted: 2018-07-24 | Resolved: 2019-02-27

## 2019-02-27 PROCEDURE — 99214 OFFICE O/P EST MOD 30 MIN: CPT | Performed by: NURSE PRACTITIONER

## 2019-02-27 NOTE — PROGRESS NOTES
Xin Morin is a 68year old female. Patient presents with:  Er F/u: RB exam rm 11-Patient was recently in the ER for constusion.        HPI:   Here for ER follow up   Seen in ER 2/17 S/p fall   Hit her head  No LOC    W/u done in ER including CT h Disp: 30 capsule Rfl: 0   BREO ELLIPTA 100-25 MCG/INH Inhalation Aerosol Powder, Breath Activated INHALE 1 PUFF INTO THE LUNGS DAILY Disp: 3 each Rfl: 3   XARELTO 20 MG Oral Tab TAKE ONE TABLET BY MOUTH ONE TIME DAILY  Disp: 90 tablet Rfl: 0   LISINOPRIL 2 adjustment of vascular catheter    • HEART ATTACK    • History of blood clots    • IgG deficiency (HCC)     ? ?? pt states she gets IgG infusions due to lack of IgG, unable to fight off infections   • MUKUND (obstructive sleep apnea) 11/06/2018 PS[facility]    AHI 27 R diarrhea or constipation  MUSCULOSKELETAL: knee pain and thumb pain   NEURO: denies headaches,     EXAM:   /70 (BP Location: Right arm, Patient Position: Sitting, Cuff Size: adult)   Pulse 88   Resp 18   Ht 60.98\"   Wt 157 lb 3.2 oz   BMI 29.72 kg/m

## 2019-02-28 VITALS
HEIGHT: 62 IN | DIASTOLIC BLOOD PRESSURE: 60 MMHG | BODY MASS INDEX: 32.02 KG/M2 | HEART RATE: 118 BPM | WEIGHT: 174 LBS | SYSTOLIC BLOOD PRESSURE: 122 MMHG

## 2019-02-28 VITALS
BODY MASS INDEX: 28.89 KG/M2 | HEART RATE: 104 BPM | WEIGHT: 157 LBS | DIASTOLIC BLOOD PRESSURE: 58 MMHG | SYSTOLIC BLOOD PRESSURE: 104 MMHG | HEIGHT: 62 IN

## 2019-03-01 ENCOUNTER — OFFICE VISIT (OUTPATIENT)
Dept: HEMATOLOGY/ONCOLOGY | Facility: HOSPITAL | Age: 74
End: 2019-03-01
Attending: INTERNAL MEDICINE
Payer: MEDICARE

## 2019-03-01 ENCOUNTER — PATIENT OUTREACH (OUTPATIENT)
Dept: CASE MANAGEMENT | Age: 74
End: 2019-03-01

## 2019-03-01 VITALS
HEART RATE: 100 BPM | TEMPERATURE: 99 F | WEIGHT: 158 LBS | DIASTOLIC BLOOD PRESSURE: 79 MMHG | OXYGEN SATURATION: 99 % | HEIGHT: 60.98 IN | BODY MASS INDEX: 29.83 KG/M2 | RESPIRATION RATE: 18 BRPM | SYSTOLIC BLOOD PRESSURE: 149 MMHG

## 2019-03-01 DIAGNOSIS — D80.3 SELECTIVE DEFICIENCY OF IGG SUBCLASSES (HCC): Primary | ICD-10-CM

## 2019-03-01 DIAGNOSIS — D50.0 IRON DEFICIENCY ANEMIA DUE TO CHRONIC BLOOD LOSS: ICD-10-CM

## 2019-03-01 PROCEDURE — 96365 THER/PROPH/DIAG IV INF INIT: CPT

## 2019-03-01 PROCEDURE — 96366 THER/PROPH/DIAG IV INF ADDON: CPT

## 2019-03-01 RX ORDER — DIPHENHYDRAMINE HYDROCHLORIDE 50 MG/ML
12.5 INJECTION INTRAMUSCULAR; INTRAVENOUS ONCE
Status: DISCONTINUED | OUTPATIENT
Start: 2019-03-01 | End: 2019-03-01

## 2019-03-01 RX ORDER — ACETAMINOPHEN 325 MG/1
650 TABLET ORAL ONCE
Status: COMPLETED | OUTPATIENT
Start: 2019-03-01 | End: 2019-03-01

## 2019-03-01 RX ORDER — ACETAMINOPHEN 325 MG/1
650 TABLET ORAL ONCE
Status: CANCELLED | OUTPATIENT
Start: 2019-03-01

## 2019-03-01 RX ORDER — SODIUM CHLORIDE 0.9 % (FLUSH) 0.9 %
10 SYRINGE (ML) INJECTION ONCE
Status: CANCELLED | OUTPATIENT
Start: 2019-03-01

## 2019-03-01 RX ORDER — DIPHENHYDRAMINE HYDROCHLORIDE 50 MG/ML
12.5 INJECTION INTRAMUSCULAR; INTRAVENOUS ONCE
Status: CANCELLED | OUTPATIENT
Start: 2019-03-01

## 2019-03-01 RX ORDER — DIPHENHYDRAMINE HCL 25 MG
25 CAPSULE ORAL ONCE
Status: COMPLETED | OUTPATIENT
Start: 2019-03-01 | End: 2019-03-01

## 2019-03-01 RX ORDER — DIPHENHYDRAMINE HCL 25 MG
25 CAPSULE ORAL ONCE
Status: CANCELLED | OUTPATIENT
Start: 2019-03-01

## 2019-03-01 RX ORDER — SODIUM CHLORIDE 0.9 % (FLUSH) 0.9 %
10 SYRINGE (ML) INJECTION ONCE
Status: COMPLETED | OUTPATIENT
Start: 2019-03-01 | End: 2019-03-01

## 2019-03-01 RX ADMIN — SODIUM CHLORIDE 0.9 % (FLUSH) 10 ML: 0.9 % SYRINGE (ML) INJECTION at 12:50:00

## 2019-03-01 RX ADMIN — ACETAMINOPHEN 650 MG: 325 TABLET ORAL at 09:19:00

## 2019-03-01 RX ADMIN — DIPHENHYDRAMINE HCL 25 MG: 25 MG CAPSULE ORAL at 09:19:00

## 2019-03-01 NOTE — PROGRESS NOTES
Spoke to Keily states she is feeling fine. States last month she tripped over side walk and fell hit her left knee and right thumb. Patient was seen at ED states several diagnostic test were performed. States all test ruled put any fractures.  Left knee is

## 2019-03-01 NOTE — PROGRESS NOTES
Education Record    Learner:  Patient    Disease / Diagnosis:   IgG deficiency    Barriers / Limitations:  None   Comments:    Method:  Brief focused   Comments:    General Topics:  Medication, Procedure, Side effects and symptom management and Plan of care

## 2019-03-07 ENCOUNTER — TELEPHONE (OUTPATIENT)
Dept: INTERNAL MEDICINE CLINIC | Facility: CLINIC | Age: 74
End: 2019-03-07

## 2019-03-18 RX ORDER — RIVAROXABAN 20 MG/1
TABLET, FILM COATED ORAL
Qty: 90 TABLET | Refills: 2 | Status: SHIPPED | OUTPATIENT
Start: 2019-03-18 | End: 2019-12-11

## 2019-03-18 NOTE — TELEPHONE ENCOUNTER
Last Office Visit: 2-27-19 with SD for ER follow up   Last Rx Filled: 12-20-18 90 tab with no refills   Last Labs: 10-18-18 vitamin D/vitamin B12/hcv antibody  Future Appointment: none    Per protocol to provider

## 2019-03-29 ENCOUNTER — OFFICE VISIT (OUTPATIENT)
Dept: HEMATOLOGY/ONCOLOGY | Facility: HOSPITAL | Age: 74
End: 2019-03-29
Attending: INTERNAL MEDICINE
Payer: MEDICARE

## 2019-03-29 VITALS
OXYGEN SATURATION: 95 % | TEMPERATURE: 99 F | BODY MASS INDEX: 30 KG/M2 | SYSTOLIC BLOOD PRESSURE: 118 MMHG | HEART RATE: 101 BPM | WEIGHT: 160.38 LBS | DIASTOLIC BLOOD PRESSURE: 80 MMHG | RESPIRATION RATE: 18 BRPM

## 2019-03-29 DIAGNOSIS — D50.0 IRON DEFICIENCY ANEMIA DUE TO CHRONIC BLOOD LOSS: ICD-10-CM

## 2019-03-29 DIAGNOSIS — D80.3 SELECTIVE DEFICIENCY OF IGG SUBCLASSES (HCC): Primary | ICD-10-CM

## 2019-03-29 PROCEDURE — 96365 THER/PROPH/DIAG IV INF INIT: CPT

## 2019-03-29 PROCEDURE — 96366 THER/PROPH/DIAG IV INF ADDON: CPT

## 2019-03-29 RX ORDER — DIPHENHYDRAMINE HYDROCHLORIDE 50 MG/ML
12.5 INJECTION INTRAMUSCULAR; INTRAVENOUS ONCE
Status: CANCELLED | OUTPATIENT
Start: 2019-03-29

## 2019-03-29 RX ORDER — ACETAMINOPHEN 325 MG/1
650 TABLET ORAL ONCE
Status: COMPLETED | OUTPATIENT
Start: 2019-03-29 | End: 2019-03-29

## 2019-03-29 RX ORDER — DIPHENHYDRAMINE HCL 25 MG
25 CAPSULE ORAL ONCE
Status: COMPLETED | OUTPATIENT
Start: 2019-03-29 | End: 2019-03-29

## 2019-03-29 RX ORDER — ACETAMINOPHEN 325 MG/1
650 TABLET ORAL ONCE
Status: CANCELLED | OUTPATIENT
Start: 2019-03-29

## 2019-03-29 RX ORDER — SODIUM CHLORIDE 0.9 % (FLUSH) 0.9 %
10 SYRINGE (ML) INJECTION ONCE
Status: COMPLETED | OUTPATIENT
Start: 2019-03-29 | End: 2019-03-29

## 2019-03-29 RX ORDER — SODIUM CHLORIDE 0.9 % (FLUSH) 0.9 %
10 SYRINGE (ML) INJECTION ONCE
Status: CANCELLED | OUTPATIENT
Start: 2019-03-29

## 2019-03-29 RX ORDER — DIPHENHYDRAMINE HCL 25 MG
25 CAPSULE ORAL ONCE
Status: CANCELLED | OUTPATIENT
Start: 2019-03-29

## 2019-03-29 RX ADMIN — ACETAMINOPHEN 650 MG: 325 TABLET ORAL at 09:21:00

## 2019-03-29 RX ADMIN — SODIUM CHLORIDE 0.9 % (FLUSH) 10 ML: 0.9 % SYRINGE (ML) INJECTION at 13:45:00

## 2019-03-29 RX ADMIN — DIPHENHYDRAMINE HCL 25 MG: 25 MG CAPSULE ORAL at 09:21:00

## 2019-03-29 NOTE — PROGRESS NOTES
Education Record    Learner:  Patient    Disease / Diagnosis:    Barriers / Limitations:  None   Comments:    Method:  Discussion   Comments:    General Topics:  Medication, Procedure, Side effects and symptom management and Plan of care reviewed   Comment

## 2019-04-10 RX ORDER — METFORMIN HYDROCHLORIDE 500 MG/1
TABLET, EXTENDED RELEASE ORAL
Qty: 180 TABLET | Refills: 0 | Status: SHIPPED | OUTPATIENT
Start: 2019-04-10 | End: 2019-07-06

## 2019-04-10 RX ORDER — PANTOPRAZOLE SODIUM 40 MG/1
TABLET, DELAYED RELEASE ORAL
Qty: 90 TABLET | Refills: 0 | Status: SHIPPED | OUTPATIENT
Start: 2019-04-10 | End: 2019-07-06

## 2019-04-10 NOTE — TELEPHONE ENCOUNTER
LOV: 2/27/19 w/ SD  FOV: None  Last labs: 10/18/18 VIT D,VIT B12,HCV  Last Refill: 4/16/18 qt:90 3 refills    Per protocol routed to provider

## 2019-04-12 RX ORDER — LISINOPRIL 20 MG/1
TABLET ORAL
Qty: 90 TABLET | Refills: 0 | Status: SHIPPED | OUTPATIENT
Start: 2019-04-12 | End: 2019-07-11

## 2019-04-15 ENCOUNTER — PATIENT OUTREACH (OUTPATIENT)
Dept: CASE MANAGEMENT | Age: 74
End: 2019-04-15

## 2019-04-24 RX ORDER — LIDOCAINE 50 MG/G
PATCH TOPICAL
COMMUNITY

## 2019-04-24 RX ORDER — PREGABALIN 75 MG/1
CAPSULE ORAL
COMMUNITY
End: 2021-01-20 | Stop reason: ALTCHOICE

## 2019-04-24 RX ORDER — SIMVASTATIN 40 MG
TABLET ORAL
COMMUNITY

## 2019-04-24 RX ORDER — PANTOPRAZOLE SODIUM 40 MG/1
TABLET, DELAYED RELEASE ORAL
COMMUNITY

## 2019-04-24 RX ORDER — TIZANIDINE 4 MG/1
TABLET ORAL
COMMUNITY

## 2019-04-24 RX ORDER — FENTANYL 50 UG/1
PATCH TRANSDERMAL
COMMUNITY
End: 2019-11-01 | Stop reason: DRUGHIGH

## 2019-04-24 RX ORDER — HYDROCODONE BITARTRATE AND ACETAMINOPHEN 10; 325 MG/1; MG/1
TABLET ORAL
COMMUNITY

## 2019-04-24 RX ORDER — MONTELUKAST SODIUM 10 MG/1
TABLET ORAL
COMMUNITY

## 2019-04-24 RX ORDER — LISINOPRIL 20 MG/1
TABLET ORAL
COMMUNITY

## 2019-04-24 RX ORDER — BUPROPION HYDROCHLORIDE 150 MG/1
TABLET ORAL
COMMUNITY
End: 2019-11-01 | Stop reason: DRUGHIGH

## 2019-04-26 ENCOUNTER — OFFICE VISIT (OUTPATIENT)
Dept: HEMATOLOGY/ONCOLOGY | Facility: HOSPITAL | Age: 74
End: 2019-04-26
Attending: INTERNAL MEDICINE
Payer: MEDICARE

## 2019-04-26 VITALS
TEMPERATURE: 98 F | HEART RATE: 62 BPM | SYSTOLIC BLOOD PRESSURE: 129 MMHG | WEIGHT: 161.63 LBS | DIASTOLIC BLOOD PRESSURE: 69 MMHG | BODY MASS INDEX: 31 KG/M2 | RESPIRATION RATE: 18 BRPM

## 2019-04-26 DIAGNOSIS — D80.3 SELECTIVE DEFICIENCY OF IGG SUBCLASSES (HCC): Primary | ICD-10-CM

## 2019-04-26 DIAGNOSIS — D50.0 IRON DEFICIENCY ANEMIA DUE TO CHRONIC BLOOD LOSS: ICD-10-CM

## 2019-04-26 PROCEDURE — 96365 THER/PROPH/DIAG IV INF INIT: CPT

## 2019-04-26 PROCEDURE — 96366 THER/PROPH/DIAG IV INF ADDON: CPT

## 2019-04-26 RX ORDER — SODIUM CHLORIDE 0.9 % (FLUSH) 0.9 %
10 SYRINGE (ML) INJECTION ONCE
Status: CANCELLED | OUTPATIENT
Start: 2019-05-24

## 2019-04-26 RX ORDER — DIPHENHYDRAMINE HCL 25 MG
25 CAPSULE ORAL ONCE
Status: CANCELLED | OUTPATIENT
Start: 2019-05-24

## 2019-04-26 RX ORDER — ACETAMINOPHEN 325 MG/1
650 TABLET ORAL ONCE
Status: COMPLETED | OUTPATIENT
Start: 2019-04-26 | End: 2019-04-26

## 2019-04-26 RX ORDER — DIPHENHYDRAMINE HYDROCHLORIDE 50 MG/ML
12.5 INJECTION INTRAMUSCULAR; INTRAVENOUS ONCE
Status: CANCELLED | OUTPATIENT
Start: 2019-05-24

## 2019-04-26 RX ORDER — SODIUM CHLORIDE 0.9 % (FLUSH) 0.9 %
10 SYRINGE (ML) INJECTION ONCE
Status: COMPLETED | OUTPATIENT
Start: 2019-04-26 | End: 2019-04-26

## 2019-04-26 RX ORDER — ACETAMINOPHEN 325 MG/1
650 TABLET ORAL ONCE
Status: CANCELLED | OUTPATIENT
Start: 2019-05-24

## 2019-04-26 RX ORDER — DIPHENHYDRAMINE HCL 25 MG
25 CAPSULE ORAL ONCE
Status: COMPLETED | OUTPATIENT
Start: 2019-04-26 | End: 2019-04-26

## 2019-04-26 RX ADMIN — SODIUM CHLORIDE 0.9 % (FLUSH) 10 ML: 0.9 % SYRINGE (ML) INJECTION at 12:45:00

## 2019-04-26 RX ADMIN — ACETAMINOPHEN 325 MG: 325 TABLET ORAL at 09:31:00

## 2019-04-26 RX ADMIN — DIPHENHYDRAMINE HCL 25 MG: 25 MG CAPSULE ORAL at 09:31:00

## 2019-04-26 NOTE — PROGRESS NOTES
Dr Scotty Alcantar made aware that patient was due to see him in January and that igg's haven't been drawn since July of 2018. Orders received to proceed with treatment today and have her follow up with physician with next month's infusion.   Patient states underst

## 2019-04-29 ENCOUNTER — OFFICE VISIT (OUTPATIENT)
Dept: INTERNAL MEDICINE CLINIC | Facility: CLINIC | Age: 74
End: 2019-04-29
Payer: MEDICARE

## 2019-04-29 VITALS
BODY MASS INDEX: 30.66 KG/M2 | RESPIRATION RATE: 16 BRPM | HEIGHT: 61 IN | DIASTOLIC BLOOD PRESSURE: 70 MMHG | WEIGHT: 162.38 LBS | SYSTOLIC BLOOD PRESSURE: 128 MMHG | TEMPERATURE: 98 F | HEART RATE: 68 BPM
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
## 2019-04-29 DIAGNOSIS — L75.0 URINARY BODY ODOR: ICD-10-CM

## 2019-04-29 DIAGNOSIS — E11.65 UNCONTROLLED TYPE 2 DIABETES MELLITUS WITH HYPERGLYCEMIA (HCC): ICD-10-CM

## 2019-04-29 DIAGNOSIS — M25.562 ACUTE PAIN OF LEFT KNEE: ICD-10-CM

## 2019-04-29 DIAGNOSIS — I10 ESSENTIAL HYPERTENSION, BENIGN: ICD-10-CM

## 2019-04-29 DIAGNOSIS — R35.0 URINARY FREQUENCY: Primary | ICD-10-CM

## 2019-04-29 PROCEDURE — 87186 SC STD MICRODIL/AGAR DIL: CPT | Performed by: NURSE PRACTITIONER

## 2019-04-29 PROCEDURE — 87086 URINE CULTURE/COLONY COUNT: CPT | Performed by: NURSE PRACTITIONER

## 2019-04-29 PROCEDURE — 87077 CULTURE AEROBIC IDENTIFY: CPT | Performed by: NURSE PRACTITIONER

## 2019-04-29 PROCEDURE — 81003 URINALYSIS AUTO W/O SCOPE: CPT | Performed by: NURSE PRACTITIONER

## 2019-04-29 PROCEDURE — 99214 OFFICE O/P EST MOD 30 MIN: CPT | Performed by: NURSE PRACTITIONER

## 2019-04-29 RX ORDER — CEPHALEXIN 500 MG/1
500 CAPSULE ORAL 2 TIMES DAILY
Qty: 14 CAPSULE | Refills: 0 | Status: SHIPPED | OUTPATIENT
Start: 2019-04-29 | End: 2019-05-31 | Stop reason: ALTCHOICE

## 2019-04-29 NOTE — PROGRESS NOTES
Chapo Craven is a 68year old female. Patient presents with:  Urinary: TN Exam Room 10: Urinary frequency for over one week, odor and bladder spasms. HPI:   presnets for eval of foul odor urine and frequency  Over 1 week duration.    Bladder sp 90 tablet Rfl: 0   PANTOPRAZOLE SODIUM 40 MG Oral Tab EC TAKE ONE TABLET BY MOUTH ONE TIME DAILY BEFORE BREAKFAST  Disp: 90 tablet Rfl: 0   METFORMIN HCL  MG Oral Tablet 24 Hr TAKE ONE TABLET BY MOUTH TWICE DAILY WITH MEALS Disp: 180 tablet Rfl: 0 mouth daily.  Disp:  Rfl:       Past Medical History:   Diagnosis Date   • Anxiety    • Anxiety state, unspecified 6/29/2012   • [facility]    breast, basal cell   • Essential hypertension, benign 6/29/2012   • Fitting and adjustment of vascular cath HEALTH: as above   RESPIRATORY: denies shortness of breath with exertion, no cough  CARDIOVASCULAR: denies chest pain on exertion, no palpatations  GI: denies abdominal pain and denies heartburn, no diarrhea or constipation  As above   MUSCULOSKELETAL:  As

## 2019-04-29 NOTE — TELEPHONE ENCOUNTER
Patient would like to know if Dr. Lluvia Dean recommends the Flu vaccine for her and if she should get the high dose. The H&P was reviewed, the patient was examined, and no change has occurred in the patient's condition since the H&P was completed.

## 2019-04-30 ENCOUNTER — TELEPHONE (OUTPATIENT)
Dept: INTERNAL MEDICINE CLINIC | Facility: CLINIC | Age: 74
End: 2019-04-30

## 2019-04-30 DIAGNOSIS — Z12.39 SCREENING FOR MALIGNANT NEOPLASM OF BREAST: Primary | ICD-10-CM

## 2019-04-30 RX ORDER — ONDANSETRON 4 MG/1
4 TABLET, FILM COATED ORAL EVERY 12 HOURS PRN
Qty: 20 TABLET | Refills: 1 | Status: SHIPPED | OUTPATIENT
Start: 2019-04-30 | End: 2020-08-03

## 2019-04-30 NOTE — TELEPHONE ENCOUNTER
zofran ordered. Also  Please have her complete all her diabetic labs prior to leaving for [facility] if she can. Thanks.

## 2019-04-30 NOTE — TELEPHONE ENCOUNTER
Last mammogram completed on 4/4/2018, order pended for your review and approval if ok. Please advise.

## 2019-05-01 ENCOUNTER — HOSPITAL ENCOUNTER (EMERGENCY)
Facility: HOSPITAL | Age: 74
Discharge: HOME | End: 2019-05-01
Attending: EMERGENCY MEDICINE
Payer: MEDICARE

## 2019-05-01 ENCOUNTER — TELEPHONE (OUTPATIENT)
Dept: INTERNAL MEDICINE CLINIC | Facility: CLINIC | Age: 74
End: 2019-05-01

## 2019-05-01 ENCOUNTER — HOSPITAL ENCOUNTER (OUTPATIENT)
Dept: ULTRASOUND IMAGING | Facility: HOSPITAL | Age: 74
Discharge: HOME | End: 2019-05-01
Attending: NURSE PRACTITIONER
Payer: MEDICARE

## 2019-05-01 VITALS
OXYGEN SATURATION: 93 % | WEIGHT: 161 LBS | SYSTOLIC BLOOD PRESSURE: 132 MMHG | DIASTOLIC BLOOD PRESSURE: 75 MMHG | TEMPERATURE: 99 F | RESPIRATION RATE: 16 BRPM | HEIGHT: 61 IN | HEART RATE: 86 BPM | BODY MASS INDEX: 30.4 KG/M2

## 2019-05-01 DIAGNOSIS — M25.562 ACUTE PAIN OF LEFT KNEE: ICD-10-CM
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
## 2019-05-01 DIAGNOSIS — M65.9 SYNOVITIS: Primary | ICD-10-CM

## 2019-05-01 DIAGNOSIS — S82.143D CLOSED FRACTURE OF TIBIAL PLATEAU WITH ROUTINE HEALING, UNSPECIFIED LATERALITY, SUBSEQUENT ENCOUNTER: ICD-10-CM

## 2019-05-01 DIAGNOSIS — S83.242D TEAR OF MEDIAL MENISCUS OF LEFT KNEE, CURRENT, UNSPECIFIED TEAR TYPE, SUBSEQUENT ENCOUNTER: Primary | ICD-10-CM

## 2019-05-01 PROCEDURE — 99282 EMERGENCY DEPT VISIT SF MDM: CPT | Performed by: EMERGENCY MEDICINE

## 2019-05-01 PROCEDURE — 76881 US COMPL JOINT R-T W/IMG: CPT | Performed by: NURSE PRACTITIONER

## 2019-05-01 PROCEDURE — 76882 US LMTD JT/FCL EVL NVASC XTR: CPT | Performed by: NURSE PRACTITIONER

## 2019-05-01 PROCEDURE — 99283 EMERGENCY DEPT VISIT LOW MDM: CPT | Performed by: EMERGENCY MEDICINE

## 2019-05-01 NOTE — ED PROVIDER NOTES
Patient Seen in: [facility] Emergency Department    History   Patient presents with:  Joint Effusion    Stated Complaint: knee/effusion    HPI    80-year-old female who has had pain in her left knee after suffering a fall approximately 1 month ago. • OOPHORECTOMY      @ age 46   • OTHER SURGICAL HISTORY  2010    endoscopy - papillotomy   • OTHER SURGICAL HISTORY  2016     under right eye, skin cancer removed   • PORT FOR VASCULAR ACCESS     • RADIATION LEFT  1999   • RADIOFREQUENCY DENERVATION OF C that is occurred after her initial injury. She does have an appointment to see orthopedics in approximately 1 week.   She is told to continue to try conservative therapy at this time which she agreed and did not wish to have pursuit of tap with risk benefi

## 2019-05-01 NOTE — ED INITIAL ASSESSMENT (HPI)
Rakesh Magallanes on left knee in mid march. Knee has been progressively becoming more painful. She had an ultrasound on the left knee about an hour ago and was sent straight here.

## 2019-05-02 ENCOUNTER — HOSPITAL ENCOUNTER (OUTPATIENT)
Dept: MRI IMAGING | Age: 74
Discharge: HOME | End: 2019-05-02
Attending: NURSE PRACTITIONER
Payer: MEDICARE
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
## 2019-05-02 DIAGNOSIS — M25.562 ACUTE PAIN OF LEFT KNEE: ICD-10-CM

## 2019-05-02 PROCEDURE — 73721 MRI JNT OF LWR EXTRE W/O DYE: CPT | Performed by: NURSE PRACTITIONER

## 2019-05-02 RX ORDER — SIMVASTATIN 40 MG
TABLET ORAL
Qty: 90 TABLET | Refills: 0 | Status: SHIPPED | OUTPATIENT
Start: 2019-05-02 | End: 2019-07-11

## 2019-05-02 NOTE — TELEPHONE ENCOUNTER
Did not pass protocol- pt due for LIPID   PSR please notify pt to have fasting labs done that have been ordered to [facility] lab.     Per protocol routed to provider

## 2019-05-08 ENCOUNTER — PATIENT OUTREACH (OUTPATIENT)
Dept: CASE MANAGEMENT | Age: 74
End: 2019-05-08

## 2019-05-08 ENCOUNTER — TELEPHONE (OUTPATIENT)
Dept: INTERNAL MEDICINE CLINIC | Facility: CLINIC | Age: 74
End: 2019-05-08

## 2019-05-08 DIAGNOSIS — I10 ESSENTIAL HYPERTENSION, BENIGN: ICD-10-CM

## 2019-05-08 DIAGNOSIS — F33.1 MODERATE EPISODE OF RECURRENT MAJOR DEPRESSIVE DISORDER (HCC): ICD-10-CM

## 2019-05-08 DIAGNOSIS — Z85.828 HISTORY OF BASAL CELL CARCINOMA: ICD-10-CM

## 2019-05-08 DIAGNOSIS — M85.859 OSTEOPENIA OF THIGH, UNSPECIFIED LATERALITY: ICD-10-CM

## 2019-05-08 DIAGNOSIS — E78.00 PURE HYPERCHOLESTEROLEMIA: ICD-10-CM

## 2019-05-08 NOTE — PROGRESS NOTES
Spoke to Matthias Michaud verified for CCM call.     Medical History  Patient Active Problem List:     Essential hypertension, benign     Pure hypercholesterolemia     Anxiety     DM (diabetes mellitus), type 2, uncontrolled ([facility] [address]     Osteopenia     B12 defici patient tore her left knee meniscus to the bone. Patient followed up with Ortho and states she is now on crutches for the next 3 months. States she is disappointed she is not able to obtain Cortizone injections due to contraindication of IGG.  States she ha

## 2019-05-08 NOTE — TELEPHONE ENCOUNTER
Patient called states she needs a temporary parking placard. Patient tore her left knee meniscus  States she is currently on crutches and will be for some time.  Patient wondererd if parking placard was available in office, states she could come in and fill

## 2019-05-08 NOTE — TELEPHONE ENCOUNTER
Spoke with pt informed her info listed below. Pt stated she will complete the labs now that she is all done with the antibiotic. Appreciated the call.

## 2019-05-08 NOTE — TELEPHONE ENCOUNTER
Left detailed message ok per hipaa to notify her parking placard form is ready for . Form placed at the .

## 2019-05-10 ENCOUNTER — APPOINTMENT (OUTPATIENT)
Dept: LAB | Age: 74
End: 2019-05-10
Attending: NURSE PRACTITIONER
Payer: MEDICARE

## 2019-05-10 DIAGNOSIS — E11.65 UNCONTROLLED TYPE 2 DIABETES MELLITUS WITH HYPERGLYCEMIA (HCC): ICD-10-CM

## 2019-05-10 PROCEDURE — 83036 HEMOGLOBIN GLYCOSYLATED A1C: CPT

## 2019-05-10 PROCEDURE — 80053 COMPREHEN METABOLIC PANEL: CPT

## 2019-05-10 PROCEDURE — 80061 LIPID PANEL: CPT

## 2019-05-31 ENCOUNTER — OFFICE VISIT (OUTPATIENT)
Dept: HEMATOLOGY/ONCOLOGY | Facility: HOSPITAL | Age: 74
End: 2019-05-31
Attending: INTERNAL MEDICINE
Payer: MEDICARE

## 2019-05-31 VITALS
TEMPERATURE: 99 F | RESPIRATION RATE: 18 BRPM | DIASTOLIC BLOOD PRESSURE: 77 MMHG | HEART RATE: 93 BPM | BODY MASS INDEX: 30 KG/M2 | OXYGEN SATURATION: 97 % | SYSTOLIC BLOOD PRESSURE: 151 MMHG | WEIGHT: 160 LBS

## 2019-05-31 DIAGNOSIS — D80.3 SELECTIVE DEFICIENCY OF IGG SUBCLASSES (HCC): Primary | ICD-10-CM

## 2019-05-31 DIAGNOSIS — D80.3 IGG DEFICIENCY (HCC): ICD-10-CM

## 2019-05-31 DIAGNOSIS — D50.0 IRON DEFICIENCY ANEMIA DUE TO CHRONIC BLOOD LOSS: Primary | ICD-10-CM

## 2019-05-31 DIAGNOSIS — D80.3 SELECTIVE DEFICIENCY OF IGG SUBCLASSES (HCC): ICD-10-CM

## 2019-05-31 PROCEDURE — 82728 ASSAY OF FERRITIN: CPT

## 2019-05-31 PROCEDURE — 80053 COMPREHEN METABOLIC PANEL: CPT

## 2019-05-31 PROCEDURE — 99490 CHRNC CARE MGMT STAFF 1ST 20: CPT

## 2019-05-31 PROCEDURE — 96365 THER/PROPH/DIAG IV INF INIT: CPT

## 2019-05-31 PROCEDURE — 82784 ASSAY IGA/IGD/IGG/IGM EACH: CPT

## 2019-05-31 PROCEDURE — 85025 COMPLETE CBC W/AUTO DIFF WBC: CPT

## 2019-05-31 PROCEDURE — 83550 IRON BINDING TEST: CPT

## 2019-05-31 PROCEDURE — 96366 THER/PROPH/DIAG IV INF ADDON: CPT

## 2019-05-31 PROCEDURE — 83540 ASSAY OF IRON: CPT

## 2019-05-31 RX ORDER — DIPHENHYDRAMINE HCL 25 MG
25 CAPSULE ORAL ONCE
Status: CANCELLED | OUTPATIENT
Start: 2019-06-21

## 2019-05-31 RX ORDER — FENTANYL 12 UG/H
PATCH TRANSDERMAL
Refills: 0 | COMMUNITY
Start: 2019-05-12 | End: 2019-09-16

## 2019-05-31 RX ORDER — DIPHENHYDRAMINE HYDROCHLORIDE 50 MG/ML
12.5 INJECTION INTRAMUSCULAR; INTRAVENOUS ONCE
Status: CANCELLED | OUTPATIENT
Start: 2019-06-21

## 2019-05-31 RX ORDER — ACETAMINOPHEN 325 MG/1
650 TABLET ORAL ONCE
Status: COMPLETED | OUTPATIENT
Start: 2019-05-31 | End: 2019-05-31

## 2019-05-31 RX ORDER — DIPHENHYDRAMINE HCL 25 MG
25 CAPSULE ORAL ONCE
Status: COMPLETED | OUTPATIENT
Start: 2019-05-31 | End: 2019-05-31

## 2019-05-31 RX ORDER — SODIUM CHLORIDE 0.9 % (FLUSH) 0.9 %
10 SYRINGE (ML) INJECTION ONCE
Status: COMPLETED | OUTPATIENT
Start: 2019-05-31 | End: 2019-05-31

## 2019-05-31 RX ORDER — SODIUM CHLORIDE 0.9 % (FLUSH) 0.9 %
10 SYRINGE (ML) INJECTION ONCE
Status: CANCELLED | OUTPATIENT
Start: 2019-06-21

## 2019-05-31 RX ORDER — FENTANYL 25 UG/H
PATCH TRANSDERMAL
Refills: 0 | COMMUNITY
Start: 2019-05-15 | End: 2019-09-10

## 2019-05-31 RX ORDER — ACETAMINOPHEN 325 MG/1
650 TABLET ORAL ONCE
Status: CANCELLED | OUTPATIENT
Start: 2019-06-21

## 2019-05-31 RX ADMIN — ACETAMINOPHEN 650 MG: 325 TABLET ORAL at 10:22:00

## 2019-05-31 RX ADMIN — SODIUM CHLORIDE 0.9 % (FLUSH) 10 ML: 0.9 % SYRINGE (ML) INJECTION at 14:20:00

## 2019-05-31 RX ADMIN — DIPHENHYDRAMINE HCL 25 MG: 25 MG CAPSULE ORAL at 10:22:00

## 2019-05-31 NOTE — PROGRESS NOTES
Patient presents with: Follow - Up: APN assessment     Pt is here for treatment; gammagard is expected. Pt has 10/10 pain in her left knee due to fall a few months ago - she is being treated by her chronic pain MD and orthopedics.  Aside from her knee issu

## 2019-05-31 NOTE — PROGRESS NOTES
ANP Visit Note    Patient Name: Gloria Foreman   YOB: 1945   Medical Record Number: [id number]   CSN: [id number]   Date of visit: 5/31/2019       Chief Complaint/Reason for Visit: Followup for history of recurrent iron deficiency, history 6/29/2012   • Fitting and adjustment of vascular catheter    • HEART ATTACK    • History of blood clots    • IgG deficiency ([facility] [address]     ? ?? pt states she gets IgG infusions due to lack of IgG, unable to fight off infections   • MUKUND (obstructive sleep apnea) NAUSEA ONLY, DIZZINESS  Quinapril Hcl           FATIGUE  Sulfa Antibiotics       UNKNOWN    Family History:  Family History   Problem Relation Age of Onset   • Heart Attack Paternal Grandfather    • Heart Attack Paternal Grandmother    • Other (CAD[o Caffeine Concern: Yes          1 coke a day        Occupational Exposure: Not Asked        Hobby Hazards: Not Asked        Sleep Concern: No        Stress Concern: Not Asked        Weight Concern: Not Asked        Special Diet: Not Asked        Back Care: Disp: , Rfl:   •  LYRICA 75 MG Oral Cap, Take 75 mg by mouth 2 (two) times daily. , Disp: , Rfl: 0  •  HYDROcodone-acetaminophen  MG Oral Tab, Take 1 tablet by mouth every 6 (six) hours as needed for Pain.  DO NOT FILL BEFORE 8/6/17, Disp: 120 tablet American Diabetes Association. eAG levels reflect the long term average glucose and may not correlate with random or fasting glucose levels since these represent specific points in time.          Cholesterol, Total                            Date: 05/10/201 Status: Final  Potassium                                     Date: 05/10/2019  Value: 4.2         Ref range: 3.5 - 5.1 mmol/L   Status: Final  Chloride                                      Date: 05/10/2019  Value: 104         Ref range: 98 - 112 mmol/L Date: 05/10/2019  Value: 7.8         Ref range: 6.4 - 8.2 g/dL     Status: Final  Albumin                                       Date: 05/10/2019  Value: 3.9         Ref range: 3.4 - 5.0 g/dL     Status: Final  Globulin

## 2019-06-04 ENCOUNTER — TELEPHONE (OUTPATIENT)
Dept: HEMATOLOGY/ONCOLOGY | Facility: HOSPITAL | Age: 74
End: 2019-06-04

## 2019-06-04 NOTE — TELEPHONE ENCOUNTER
Told patient it was ok for her to get cortisone into knee. Won't affect ehr pre-existing hypogammaglobulinemia.   RAFAEL Burr

## 2019-06-26 ENCOUNTER — PATIENT OUTREACH (OUTPATIENT)
Dept: CASE MANAGEMENT | Age: 74
End: 2019-06-26

## 2019-06-26 DIAGNOSIS — E78.00 PURE HYPERCHOLESTEROLEMIA: ICD-10-CM

## 2019-06-26 DIAGNOSIS — M54.12 CERVICAL RADICULITIS: ICD-10-CM

## 2019-06-26 DIAGNOSIS — F41.9 ANXIETY: ICD-10-CM

## 2019-06-26 DIAGNOSIS — F33.1 MODERATE EPISODE OF RECURRENT MAJOR DEPRESSIVE DISORDER (HCC): ICD-10-CM

## 2019-06-26 DIAGNOSIS — I10 ESSENTIAL HYPERTENSION, BENIGN: ICD-10-CM

## 2019-06-26 DIAGNOSIS — E11.65 UNCONTROLLED TYPE 2 DIABETES MELLITUS WITH HYPERGLYCEMIA (HCC): ICD-10-CM

## 2019-06-26 NOTE — PROGRESS NOTES
Spoke to Matthias Michaud verified for CCM call.     Medical History  Patient Active Problem List:     Essential hypertension, benign     Pure hypercholesterolemia     Anxiety     DM (diabetes mellitus), type 2, uncontrolled ([facility] [address]     Osteopenia     B12 defici some more. Continues to use crutches, states her hands and arms  are very sore and hands have little blisters due to crutches. States she received parking placard and its a great help.  Uses motorized scooters when available mostly at stores and if hands hu CCM established by Zulma Gunter MD.

## 2019-06-28 ENCOUNTER — OFFICE VISIT (OUTPATIENT)
Dept: HEMATOLOGY/ONCOLOGY | Facility: HOSPITAL | Age: 74
End: 2019-06-28
Attending: INTERNAL MEDICINE
Payer: MEDICARE

## 2019-06-28 VITALS
SYSTOLIC BLOOD PRESSURE: 156 MMHG | TEMPERATURE: 97 F | DIASTOLIC BLOOD PRESSURE: 92 MMHG | HEART RATE: 116 BPM | BODY MASS INDEX: 30 KG/M2 | RESPIRATION RATE: 18 BRPM | WEIGHT: 158 LBS | OXYGEN SATURATION: 98 %

## 2019-06-28 DIAGNOSIS — D80.3 SELECTIVE DEFICIENCY OF IGG SUBCLASSES (HCC): Primary | ICD-10-CM

## 2019-06-28 DIAGNOSIS — D50.0 IRON DEFICIENCY ANEMIA DUE TO CHRONIC BLOOD LOSS: ICD-10-CM

## 2019-06-28 PROCEDURE — 96365 THER/PROPH/DIAG IV INF INIT: CPT

## 2019-06-28 PROCEDURE — 96366 THER/PROPH/DIAG IV INF ADDON: CPT

## 2019-06-28 RX ORDER — DIPHENHYDRAMINE HCL 25 MG
25 CAPSULE ORAL ONCE
Status: CANCELLED | OUTPATIENT
Start: 2019-07-19

## 2019-06-28 RX ORDER — ACETAMINOPHEN 325 MG/1
650 TABLET ORAL ONCE
Status: COMPLETED | OUTPATIENT
Start: 2019-06-28 | End: 2019-06-28

## 2019-06-28 RX ORDER — DIPHENHYDRAMINE HCL 25 MG
25 CAPSULE ORAL ONCE
Status: COMPLETED | OUTPATIENT
Start: 2019-06-28 | End: 2019-06-28

## 2019-06-28 RX ORDER — SODIUM CHLORIDE 0.9 % (FLUSH) 0.9 %
10 SYRINGE (ML) INJECTION ONCE
Status: CANCELLED | OUTPATIENT
Start: 2019-07-19

## 2019-06-28 RX ORDER — DIPHENHYDRAMINE HYDROCHLORIDE 50 MG/ML
12.5 INJECTION INTRAMUSCULAR; INTRAVENOUS ONCE
Status: CANCELLED | OUTPATIENT
Start: 2019-07-19

## 2019-06-28 RX ORDER — ACETAMINOPHEN 325 MG/1
650 TABLET ORAL ONCE
Status: CANCELLED | OUTPATIENT
Start: 2019-07-19

## 2019-06-28 RX ORDER — SODIUM CHLORIDE 0.9 % (FLUSH) 0.9 %
10 SYRINGE (ML) INJECTION ONCE
Status: COMPLETED | OUTPATIENT
Start: 2019-06-28 | End: 2019-06-28

## 2019-06-28 RX ADMIN — SODIUM CHLORIDE 0.9 % (FLUSH) 10 ML: 0.9 % SYRINGE (ML) INJECTION at 13:15:00

## 2019-06-28 RX ADMIN — DIPHENHYDRAMINE HCL 25 MG: 25 MG CAPSULE ORAL at 09:59:00

## 2019-06-28 RX ADMIN — ACETAMINOPHEN 650 MG: 325 TABLET ORAL at 09:59:00

## 2019-06-28 NOTE — PROGRESS NOTES
Education Record    Learner:  Patient  thrombocytopenia  Disease / Diagnosis:    Barriers / Limitations:  None   Comments:    Method:  Discussion and Reinforcement   Comments:    General Topics:  Medication, Precautions, Procedure, Side effects and symptom

## 2019-06-30 PROCEDURE — 99490 CHRNC CARE MGMT STAFF 1ST 20: CPT

## 2019-07-06 RX ORDER — PANTOPRAZOLE SODIUM 40 MG/1
TABLET, DELAYED RELEASE ORAL
Qty: 90 TABLET | Refills: 0 | Status: SHIPPED | OUTPATIENT
Start: 2019-07-06 | End: 2019-10-02

## 2019-07-06 RX ORDER — METFORMIN HYDROCHLORIDE 500 MG/1
TABLET, EXTENDED RELEASE ORAL
Qty: 180 TABLET | Refills: 0 | Status: ON HOLD | OUTPATIENT
Start: 2019-07-06 | End: 2019-11-20

## 2019-07-06 NOTE — TELEPHONE ENCOUNTER
LOV:4/29/19 SD  FOV:none on file   LAST RX:4/10/19 40 mg take 1 tab dailhy 90 tabs 0 refills   LAST LABS:5/31/19 ferritin,iron and tibc,cmp,cbc,immunoglobulin A/G/M   PER PROTOCOL: to provider

## 2019-07-11 RX ORDER — MONTELUKAST SODIUM 10 MG/1
TABLET ORAL
Qty: 90 TABLET | Refills: 3 | Status: ON HOLD | OUTPATIENT
Start: 2019-07-11 | End: 2019-11-20

## 2019-07-11 RX ORDER — LISINOPRIL 20 MG/1
TABLET ORAL
Qty: 90 TABLET | Refills: 0 | Status: SHIPPED | OUTPATIENT
Start: 2019-07-11 | End: 2019-10-09

## 2019-07-11 RX ORDER — SIMVASTATIN 40 MG
TABLET ORAL
Qty: 90 TABLET | Refills: 0 | Status: SHIPPED | OUTPATIENT
Start: 2019-07-11 | End: 2019-10-09

## 2019-07-11 NOTE — TELEPHONE ENCOUNTER
Last Ov: 4/29/19, SD, acute  Upcoming appt: no upcoming appt  Last labs: UA, UA w Cx, A1c, Lipid, CMP 5/10/19  Last Rx:  Montelukast 10mg, #90, 3R 7/30/18  Simvastatin 40mg, #90, 0R 5/2/19    Per Protocol, montelukast failed. Pt's last Asthma action score was out of range and pt is due for AAP/ACT. Refill pending, please sign if appropriate. Simvastatin passed, Rx refill sent to pharmacy.

## 2019-07-11 NOTE — TELEPHONE ENCOUNTER
Last Ov: 4/29/19, SD, acute  Upcoming appt: no upcoming appt  Last labs: UA, UA Cx, A1c, Lipid, CMP 5/10/19  Last Rx: lisinopril 20mg, #90, 0R 4/12/19    Per Protocol, passed. Rx refill sent to pharmacy.

## 2019-07-24 ENCOUNTER — OFFICE VISIT (OUTPATIENT)
Dept: RHEUMATOLOGY | Facility: CLINIC | Age: 74
End: 2019-07-24
Payer: MEDICARE

## 2019-07-24 VITALS
HEART RATE: 60 BPM | WEIGHT: 156 LBS | SYSTOLIC BLOOD PRESSURE: 126 MMHG | RESPIRATION RATE: 12 BRPM | DIASTOLIC BLOOD PRESSURE: 86 MMHG | TEMPERATURE: 99 F | BODY MASS INDEX: 29 KG/M2

## 2019-07-24 DIAGNOSIS — Z82.69 FAMILY HISTORY OF GOUT: ICD-10-CM

## 2019-07-24 DIAGNOSIS — M15.9 PRIMARY OSTEOARTHRITIS INVOLVING MULTIPLE JOINTS: Primary | ICD-10-CM

## 2019-07-24 DIAGNOSIS — R76.8 RHEUMATOID FACTOR POSITIVE: ICD-10-CM

## 2019-07-24 DIAGNOSIS — G89.4 CHRONIC PAIN SYNDROME: ICD-10-CM

## 2019-07-24 PROCEDURE — 99213 OFFICE O/P EST LOW 20 MIN: CPT | Performed by: INTERNAL MEDICINE

## 2019-07-24 NOTE — PROGRESS NOTES
?  RHEUMATOLOGY Follow up   Date of visit: 7/24/2019  ? Patient presents with:  Osteoarthritis: 6 month f/u. Pt states 'has a left cracked kneecap and torn mensicus in March.      ?  ASSESSMENT, DISCUSSION & PLAN   Assessment:  Primary osteoarthritis inv potentially Cymbalta with her psychiatrist and presents for routine follow up today. Since her last visit, she suffered a fall in March.  Initial xray was negative, but pt had continued pain in the left knee and had an MRI performed which showed a mensi pulmonology for the chronic asthma as well as pneumonia. Does get hypoxemia with sleeping. Follows with psychiatry for years, on buproprion for years for depression.  Just changed buproprion to extended release and trying to monitor for a response.      + hypertension      Past Surgical History:  Past Surgical History:   Procedure Laterality Date   • ABDOMEN SURGERY PROC UNLISTED      duodenal biopsy   • BREAST SURGERY     • CERVICAL FACET INJECTION Left 5/29/2014    Performed by Lugenia Cowden, MD at [facility] VOMITING  Green Pepper            OTHER (SEE COMMENTS)    Comment:Sensitivity  Lipitor [Atorvastat*    OTHER (SEE COMMENTS)    Comment:Leg cramping,  Omnicef                 NAUSEA ONLY, DIZZINESS  Quinapril Hcl           FATIGUE  Sulfa Antibiotics       U regular rhythm, normal heart sounds and intact distal pulses. No murmur heard. Pulmonary/Chest: Effort normal and breath sounds normal. No respiratory distress. She has no wheezes. Musculoskeletal: She exhibits edema and tenderness.    Diffuse small he surfaces. TENDONS:            The biceps femorals, iliotibial band and femoral fibular ligaments are intact. The popliteus tendon is intact. Medial collateral ligament complex is intact.   There is mild edema adjacent to the medial collateral ligament co 05/31/2019    MOABSO 0.50 05/31/2019    EOABSO 0.20 05/31/2019    BAABSO 0.04 05/31/2019     Lab Results   Component Value Date     (H) 05/31/2019    BUN 10 05/31/2019    BUNCREA 11.9 05/31/2019    CREATSERUM 0.84 05/31/2019    ANIONGAP 5 05/31/2019

## 2019-07-26 ENCOUNTER — OFFICE VISIT (OUTPATIENT)
Dept: HEMATOLOGY/ONCOLOGY | Facility: HOSPITAL | Age: 74
End: 2019-07-26
Attending: INTERNAL MEDICINE
Payer: MEDICARE

## 2019-07-26 VITALS
RESPIRATION RATE: 18 BRPM | HEART RATE: 78 BPM | TEMPERATURE: 98 F | WEIGHT: 164.19 LBS | SYSTOLIC BLOOD PRESSURE: 139 MMHG | DIASTOLIC BLOOD PRESSURE: 81 MMHG | OXYGEN SATURATION: 96 % | BODY MASS INDEX: 31 KG/M2

## 2019-07-26 DIAGNOSIS — D80.3 SELECTIVE DEFICIENCY OF IGG SUBCLASSES (HCC): Primary | ICD-10-CM

## 2019-07-26 DIAGNOSIS — D50.0 IRON DEFICIENCY ANEMIA DUE TO CHRONIC BLOOD LOSS: ICD-10-CM

## 2019-07-26 PROCEDURE — 96366 THER/PROPH/DIAG IV INF ADDON: CPT

## 2019-07-26 PROCEDURE — 96365 THER/PROPH/DIAG IV INF INIT: CPT

## 2019-07-26 RX ORDER — DIPHENHYDRAMINE HYDROCHLORIDE 50 MG/ML
12.5 INJECTION INTRAMUSCULAR; INTRAVENOUS ONCE
Status: CANCELLED | OUTPATIENT
Start: 2019-08-16

## 2019-07-26 RX ORDER — SODIUM CHLORIDE 0.9 % (FLUSH) 0.9 %
10 SYRINGE (ML) INJECTION ONCE
Status: CANCELLED | OUTPATIENT
Start: 2019-08-16

## 2019-07-26 RX ORDER — DIPHENHYDRAMINE HCL 25 MG
25 CAPSULE ORAL ONCE
Status: COMPLETED | OUTPATIENT
Start: 2019-07-26 | End: 2019-07-26

## 2019-07-26 RX ORDER — SODIUM CHLORIDE 0.9 % (FLUSH) 0.9 %
10 SYRINGE (ML) INJECTION ONCE
Status: COMPLETED | OUTPATIENT
Start: 2019-07-26 | End: 2019-07-26

## 2019-07-26 RX ORDER — ACETAMINOPHEN 325 MG/1
650 TABLET ORAL ONCE
Status: COMPLETED | OUTPATIENT
Start: 2019-07-26 | End: 2019-07-26

## 2019-07-26 RX ADMIN — DIPHENHYDRAMINE HCL 25 MG: 25 MG CAPSULE ORAL at 09:36:00

## 2019-07-26 RX ADMIN — SODIUM CHLORIDE 0.9 % (FLUSH) 10 ML: 0.9 % SYRINGE (ML) INJECTION at 12:55:00

## 2019-07-26 RX ADMIN — ACETAMINOPHEN 650 MG: 325 TABLET ORAL at 09:36:00

## 2019-07-26 NOTE — PROGRESS NOTES
Pt here for IVIG infusion.   Arrives Ambulating independently, accompanied by Self           Modifications in dose or schedule: No     Frequency of blood return and site check throughout administration: Prior to administration and At completion of therapy

## 2019-07-29 ENCOUNTER — PATIENT OUTREACH (OUTPATIENT)
Dept: CASE MANAGEMENT | Age: 74
End: 2019-07-29

## 2019-07-29 DIAGNOSIS — Z87.828 HISTORY OF TORN MENISCUS OF LEFT KNEE: ICD-10-CM

## 2019-07-29 DIAGNOSIS — F41.9 ANXIETY: ICD-10-CM

## 2019-07-29 DIAGNOSIS — F33.1 MODERATE EPISODE OF RECURRENT MAJOR DEPRESSIVE DISORDER (HCC): ICD-10-CM

## 2019-07-30 NOTE — PROGRESS NOTES
Contacted Luis Delvalle office waited on hold for a long period of time. Managed to obtain an appointment for patient to be seen next week August 6, 2019 at 2:00 pm patient was also placed on waiting list incase of any cancellation.     Time Spent This Encoun

## 2019-07-30 NOTE — PROGRESS NOTES
Contacted patient and advised on appointment details. Patient was very pleased and thankful. Time Spent This Encounter Total: 2 min medical record review, telephone communication, care plan updates where needed, and education.  Provided acknowledgment a

## 2019-07-30 NOTE — PROGRESS NOTES
Spoke to Matthias Michaud verified for CCM call.     Medical History  Patient Active Problem List:     Essential hypertension, benign     Pure hypercholesterolemia     Anxiety     DM (diabetes mellitus), type 2, uncontrolled ([facility] [address]     Osteopenia     B12 defici managed by Dr. Pedro Darling at pain clinic. Pt. States she has been trying to schedule appointment with Dr. Justina haji but she has left several messages for his office without a return call.     Patient is seeing therapist Kieran Ren two times a week to lencho

## 2019-07-31 PROCEDURE — 99490 CHRNC CARE MGMT STAFF 1ST 20: CPT

## 2019-08-19 ENCOUNTER — PATIENT OUTREACH (OUTPATIENT)
Dept: CASE MANAGEMENT | Age: 74
End: 2019-08-19

## 2019-08-19 DIAGNOSIS — F33.1 MODERATE EPISODE OF RECURRENT MAJOR DEPRESSIVE DISORDER (HCC): ICD-10-CM

## 2019-08-19 DIAGNOSIS — I10 ESSENTIAL HYPERTENSION, BENIGN: ICD-10-CM

## 2019-08-19 DIAGNOSIS — F41.9 ANXIETY: ICD-10-CM

## 2019-08-19 DIAGNOSIS — E78.00 PURE HYPERCHOLESTEROLEMIA: ICD-10-CM

## 2019-08-19 DIAGNOSIS — E11.65 UNCONTROLLED TYPE 2 DIABETES MELLITUS WITH HYPERGLYCEMIA (HCC): ICD-10-CM

## 2019-08-19 DIAGNOSIS — M85.859 OSTEOPENIA OF THIGH, UNSPECIFIED LATERALITY: ICD-10-CM

## 2019-08-19 NOTE — PROGRESS NOTES
8/19/2019  Spoke to Keily for CCM.       Updates to patient care team/ comments: None  Patient reported changes in medications: None  Med Adherence  Comment: Taking as directed     Health Maintenance: Reviewed  Mammogram due on 04/04/2019  Influenza Vaccin goal, 5= very confident               - confidence: : 5      Care Manager Follow Up: 1 month  Reason For Follow Up: review progress and or barriers towards patients goals.      Care managers interventions: Advised pt to f/u with surgeon and pcp re: Mason Saenz

## 2019-08-23 ENCOUNTER — OFFICE VISIT (OUTPATIENT)
Dept: HEMATOLOGY/ONCOLOGY | Facility: HOSPITAL | Age: 74
End: 2019-08-23
Attending: INTERNAL MEDICINE
Payer: MEDICARE

## 2019-08-23 VITALS — BODY MASS INDEX: 31 KG/M2 | WEIGHT: 164.31 LBS

## 2019-08-23 DIAGNOSIS — D50.0 IRON DEFICIENCY ANEMIA DUE TO CHRONIC BLOOD LOSS: ICD-10-CM

## 2019-08-23 DIAGNOSIS — D80.3 SELECTIVE DEFICIENCY OF IGG SUBCLASSES (HCC): Primary | ICD-10-CM

## 2019-08-23 PROCEDURE — 96365 THER/PROPH/DIAG IV INF INIT: CPT

## 2019-08-23 PROCEDURE — 96366 THER/PROPH/DIAG IV INF ADDON: CPT

## 2019-08-23 RX ORDER — DIPHENHYDRAMINE HCL 25 MG
25 CAPSULE ORAL ONCE
Status: COMPLETED | OUTPATIENT
Start: 2019-08-23 | End: 2019-08-23

## 2019-08-23 RX ORDER — SODIUM CHLORIDE 0.9 % (FLUSH) 0.9 %
10 SYRINGE (ML) INJECTION ONCE
Status: CANCELLED | OUTPATIENT
Start: 2019-09-13

## 2019-08-23 RX ORDER — ACETAMINOPHEN 325 MG/1
650 TABLET ORAL ONCE
Status: COMPLETED | OUTPATIENT
Start: 2019-08-23 | End: 2019-08-23

## 2019-08-23 RX ORDER — DIPHENHYDRAMINE HYDROCHLORIDE 50 MG/ML
12.5 INJECTION INTRAMUSCULAR; INTRAVENOUS ONCE
Status: CANCELLED | OUTPATIENT
Start: 2019-09-13

## 2019-08-23 RX ADMIN — DIPHENHYDRAMINE HCL 25 MG: 25 MG CAPSULE ORAL at 10:16:00

## 2019-08-23 RX ADMIN — ACETAMINOPHEN 650 MG: 325 TABLET ORAL at 10:16:00

## 2019-08-23 NOTE — PROGRESS NOTES
Pt tolerated monthly IVIG infusion well today without incident or complaint. AVS printed and reviewed next scheduled appointments.      Education Record    Learner:  Patient    Disease / Diagnosis: hypogammaglobulinemia     Barriers / Limitations:  None   C

## 2019-08-30 ENCOUNTER — APPOINTMENT (OUTPATIENT)
Dept: HEMATOLOGY/ONCOLOGY | Facility: HOSPITAL | Age: 74
End: 2019-08-30
Attending: INTERNAL MEDICINE
Payer: MEDICARE

## 2019-08-31 PROCEDURE — 99490 CHRNC CARE MGMT STAFF 1ST 20: CPT

## 2019-09-10 ENCOUNTER — OFFICE VISIT (OUTPATIENT)
Dept: HEMATOLOGY/ONCOLOGY | Facility: HOSPITAL | Age: 74
End: 2019-09-10
Attending: INTERNAL MEDICINE
Payer: MEDICARE

## 2019-09-10 VITALS
SYSTOLIC BLOOD PRESSURE: 123 MMHG | OXYGEN SATURATION: 97 % | RESPIRATION RATE: 18 BRPM | BODY MASS INDEX: 30.96 KG/M2 | TEMPERATURE: 98 F | WEIGHT: 164 LBS | HEART RATE: 101 BPM | HEIGHT: 60.98 IN | DIASTOLIC BLOOD PRESSURE: 71 MMHG

## 2019-09-10 DIAGNOSIS — D50.0 IRON DEFICIENCY ANEMIA DUE TO CHRONIC BLOOD LOSS: ICD-10-CM

## 2019-09-10 DIAGNOSIS — D80.3 IGG DEFICIENCY (HCC): ICD-10-CM

## 2019-09-10 DIAGNOSIS — D80.3 SELECTIVE DEFICIENCY OF IGG SUBCLASSES (HCC): Primary | ICD-10-CM

## 2019-09-10 DIAGNOSIS — D50.0 IRON DEFICIENCY ANEMIA DUE TO CHRONIC BLOOD LOSS: Primary | ICD-10-CM

## 2019-09-10 LAB
ALBUMIN SERPL-MCNC: 3.8 G/DL (ref 3.4–5)
ALBUMIN/GLOB SERPL: 1.1 {RATIO} (ref 1–2)
ALP LIVER SERPL-CCNC: 78 U/L (ref 55–142)
ALT SERPL-CCNC: 25 U/L (ref 13–56)
ANION GAP SERPL CALC-SCNC: 9 MMOL/L (ref 0–18)
AST SERPL-CCNC: 17 U/L (ref 15–37)
BASOPHILS # BLD AUTO: 0.05 X10(3) UL (ref 0–0.2)
BASOPHILS NFR BLD AUTO: 0.6 %
BILIRUB SERPL-MCNC: 0.3 MG/DL (ref 0.1–2)
BUN BLD-MCNC: 15 MG/DL (ref 7–18)
BUN/CREAT SERPL: 14.3 (ref 10–20)
CALCIUM BLD-MCNC: 8.7 MG/DL (ref 8.5–10.1)
CHLORIDE SERPL-SCNC: 107 MMOL/L (ref 98–112)
CO2 SERPL-SCNC: 24 MMOL/L (ref 21–32)
CREAT BLD-MCNC: 1.05 MG/DL (ref 0.55–1.02)
DEPRECATED HBV CORE AB SER IA-ACNC: 128.7 NG/ML (ref 18–340)
DEPRECATED RDW RBC AUTO: 46.5 FL (ref 35.1–46.3)
EOSINOPHIL # BLD AUTO: 0.14 X10(3) UL (ref 0–0.7)
EOSINOPHIL NFR BLD AUTO: 1.8 %
ERYTHROCYTE [DISTWIDTH] IN BLOOD BY AUTOMATED COUNT: 13.8 % (ref 11–15)
GLOBULIN PLAS-MCNC: 3.4 G/DL (ref 2.8–4.4)
GLUCOSE BLD-MCNC: 160 MG/DL (ref 70–99)
HCT VFR BLD AUTO: 38.3 % (ref 35–48)
HGB BLD-MCNC: 12.4 G/DL (ref 12–16)
IMM GRANULOCYTES # BLD AUTO: 0.02 X10(3) UL (ref 0–1)
IMM GRANULOCYTES NFR BLD: 0.3 %
IRON SATURATION: 14 % (ref 15–50)
IRON SERPL-MCNC: 40 UG/DL (ref 50–170)
LYMPHOCYTES # BLD AUTO: 1.75 X10(3) UL (ref 1–4)
LYMPHOCYTES NFR BLD AUTO: 22.6 %
M PROTEIN MFR SERPL ELPH: 7.2 G/DL (ref 6.4–8.2)
MCH RBC QN AUTO: 29.8 PG (ref 26–34)
MCHC RBC AUTO-ENTMCNC: 32.4 G/DL (ref 31–37)
MCV RBC AUTO: 92.1 FL (ref 80–100)
MONOCYTES # BLD AUTO: 0.66 X10(3) UL (ref 0.1–1)
MONOCYTES NFR BLD AUTO: 8.5 %
NEUTROPHILS # BLD AUTO: 5.13 X10 (3) UL (ref 1.5–7.7)
NEUTROPHILS # BLD AUTO: 5.13 X10(3) UL (ref 1.5–7.7)
NEUTROPHILS NFR BLD AUTO: 66.2 %
OSMOLALITY SERPL CALC.SUM OF ELEC: 294 MOSM/KG (ref 275–295)
PLATELET # BLD AUTO: 233 10(3)UL (ref 150–450)
POTASSIUM SERPL-SCNC: 3.7 MMOL/L (ref 3.5–5.1)
RBC # BLD AUTO: 4.16 X10(6)UL (ref 3.8–5.3)
SODIUM SERPL-SCNC: 140 MMOL/L (ref 136–145)
TOTAL IRON BINDING CAPACITY: 288 UG/DL (ref 240–450)
TRANSFERRIN SERPL-MCNC: 193 MG/DL (ref 200–360)
WBC # BLD AUTO: 7.8 X10(3) UL (ref 4–11)

## 2019-09-10 PROCEDURE — 99214 OFFICE O/P EST MOD 30 MIN: CPT | Performed by: INTERNAL MEDICINE

## 2019-09-10 PROCEDURE — 36591 DRAW BLOOD OFF VENOUS DEVICE: CPT

## 2019-09-10 RX ORDER — SODIUM CHLORIDE 0.9 % (FLUSH) 0.9 %
10 SYRINGE (ML) INJECTION ONCE
Status: COMPLETED | OUTPATIENT
Start: 2019-09-10 | End: 2019-09-10

## 2019-09-10 RX ORDER — DIPHENHYDRAMINE HYDROCHLORIDE 50 MG/ML
12.5 INJECTION INTRAMUSCULAR; INTRAVENOUS ONCE
Status: CANCELLED | OUTPATIENT
Start: 2019-09-17

## 2019-09-10 RX ORDER — SODIUM CHLORIDE 0.9 % (FLUSH) 0.9 %
10 SYRINGE (ML) INJECTION ONCE
Status: CANCELLED | OUTPATIENT
Start: 2019-09-17

## 2019-09-10 RX ADMIN — SODIUM CHLORIDE 0.9 % (FLUSH) 10 ML: 0.9 % SYRINGE (ML) INJECTION at 15:40:00

## 2019-09-10 NOTE — PROGRESS NOTES
Patient is here for MD f/u for anemia. Patient reports she is feeling much better since the last time she saw Dr Ranjith Orr. Continues on IVIG monthly. She is being weaned off Fentanyl patch. Chronic bilateral knee pain.  Patient is scheduled for a left knee re

## 2019-09-17 NOTE — PROGRESS NOTES
[facility]    PATIENT'S NAME: Sylvie Carrasco   ATTENDING PHYSICIAN: Huma Dinh M.D.    PATIENT ACCOUNT #: [de-identified] LOCATION: [address] [place] [address] RECORD #: [id number] YOB: 1945   DATE OF SERVICE: 09/10/2019       CANCER CENT tizanidine 4 mg t.i.d. p.r.n., Xarelto 20 mg daily. PHYSICAL EXAMINATION:    GENERAL:  She is a well-appearing female in no acute distress. VITAL SIGNS:  Her performance status is 0. Weight 164.   Blood pressure 123/71, pulse 101, respiratory rate 20,

## 2019-09-20 ENCOUNTER — OFFICE VISIT (OUTPATIENT)
Dept: HEMATOLOGY/ONCOLOGY | Facility: HOSPITAL | Age: 74
End: 2019-09-20
Attending: INTERNAL MEDICINE
Payer: MEDICARE

## 2019-09-20 VITALS
WEIGHT: 161.19 LBS | RESPIRATION RATE: 16 BRPM | SYSTOLIC BLOOD PRESSURE: 168 MMHG | OXYGEN SATURATION: 95 % | HEIGHT: 60.98 IN | TEMPERATURE: 97 F | HEART RATE: 99 BPM | BODY MASS INDEX: 30.43 KG/M2 | DIASTOLIC BLOOD PRESSURE: 78 MMHG

## 2019-09-20 DIAGNOSIS — D80.3 SELECTIVE DEFICIENCY OF IGG SUBCLASSES (HCC): ICD-10-CM

## 2019-09-20 DIAGNOSIS — D50.0 IRON DEFICIENCY ANEMIA DUE TO CHRONIC BLOOD LOSS: Primary | ICD-10-CM

## 2019-09-20 PROCEDURE — 96366 THER/PROPH/DIAG IV INF ADDON: CPT

## 2019-09-20 PROCEDURE — 96365 THER/PROPH/DIAG IV INF INIT: CPT

## 2019-09-20 RX ORDER — ACETAMINOPHEN 325 MG/1
650 TABLET ORAL ONCE
Status: COMPLETED | OUTPATIENT
Start: 2019-09-20 | End: 2019-09-20

## 2019-09-20 RX ORDER — SODIUM CHLORIDE 0.9 % (FLUSH) 0.9 %
10 SYRINGE (ML) INJECTION ONCE
Status: COMPLETED | OUTPATIENT
Start: 2019-09-20 | End: 2019-09-20

## 2019-09-20 RX ORDER — DIPHENHYDRAMINE HCL 25 MG
25 CAPSULE ORAL ONCE
Status: COMPLETED | OUTPATIENT
Start: 2019-09-20 | End: 2019-09-20

## 2019-09-20 RX ORDER — DIPHENHYDRAMINE HYDROCHLORIDE 50 MG/ML
12.5 INJECTION INTRAMUSCULAR; INTRAVENOUS ONCE
Status: CANCELLED | OUTPATIENT
Start: 2019-10-11

## 2019-09-20 RX ORDER — SODIUM CHLORIDE 0.9 % (FLUSH) 0.9 %
10 SYRINGE (ML) INJECTION ONCE
Status: CANCELLED | OUTPATIENT
Start: 2019-10-11

## 2019-09-20 RX ADMIN — SODIUM CHLORIDE 0.9 % (FLUSH) 10 ML: 0.9 % SYRINGE (ML) INJECTION at 12:55:00

## 2019-09-20 RX ADMIN — ACETAMINOPHEN 650 MG: 325 TABLET ORAL at 09:16:00

## 2019-09-20 RX ADMIN — DIPHENHYDRAMINE HCL 25 MG: 25 MG CAPSULE ORAL at 09:16:00

## 2019-09-20 NOTE — PROGRESS NOTES
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

## 2019-09-23 ENCOUNTER — PATIENT OUTREACH (OUTPATIENT)
Dept: CASE MANAGEMENT | Age: 74
End: 2019-09-23

## 2019-09-23 DIAGNOSIS — E11.65 UNCONTROLLED TYPE 2 DIABETES MELLITUS WITH HYPERGLYCEMIA (HCC): ICD-10-CM

## 2019-09-23 DIAGNOSIS — F41.9 ANXIETY: ICD-10-CM

## 2019-09-23 DIAGNOSIS — I10 ESSENTIAL HYPERTENSION, BENIGN: ICD-10-CM

## 2019-09-23 RX ORDER — FENTANYL 12 UG/H
0.5 PATCH TRANSDERMAL
COMMUNITY
End: 2020-01-07

## 2019-09-23 NOTE — PROGRESS NOTES
9/23/2019  Spoke to Keily for CCM. Updates to patient care team/ comments: None  Patient reported changes in medications: Added Fentanyl Patch to medication list. Prescribed at [address] [place] [address] Pain clinic.     Med Adherence  Comment: Taking medication lately.     • Patient Reported New Barriers And Concerns: Needs to reschedule knee surgery to a sooner date                   - Plan for overcoming all barriers: Pt will contact surgeon office and inquire about sooner appointment las well as schedule pre-op

## 2019-09-24 ENCOUNTER — TELEPHONE (OUTPATIENT)
Dept: INTERNAL MEDICINE CLINIC | Facility: CLINIC | Age: 74
End: 2019-09-24

## 2019-09-24 DIAGNOSIS — Z12.31 ENCOUNTER FOR SCREENING MAMMOGRAM FOR MALIGNANT NEOPLASM OF BREAST: Primary | ICD-10-CM

## 2019-09-24 NOTE — TELEPHONE ENCOUNTER
Mammogram order diagnoses code doesn't pass medicare     Please place new order.     Pt is scheduled for 09/28/19 for her mammo

## 2019-09-25 ENCOUNTER — TELEPHONE (OUTPATIENT)
Dept: HEMATOLOGY/ONCOLOGY | Facility: HOSPITAL | Age: 74
End: 2019-09-25

## 2019-09-25 NOTE — TELEPHONE ENCOUNTER
Dr Ismael Jones office note from 9/10 faxed to [facility] preadmission testing @ [phone].

## 2019-09-25 NOTE — PROGRESS NOTES
[facility]    PATIENT'S NAME: Summer Brice   ATTENDING PHYSICIAN: Cora Jose M.D.    PATIENT ACCOUNT #: [de-identified] LOCATION: [address] [place] [address] RECORD #: [id number] YOB: 1945   DATE OF SERVICE: 09/10/2019       CANCER CENT daily, simvastatin 40 mg daily, tizanidine 4 mg t.i.d. p.r.n., Xarelto 20 mg daily. PHYSICAL EXAMINATION:    GENERAL:  She is a well-appearing female in no acute distress. VITAL SIGNS:  Her performance status is 0. Weight 164.   Blood pressure 123/71, afterward per the orthopedic protocol and then resumption of full anticoagulation as soon as the surgeon is comfortable with this afterward. She understands and agrees. Her risks from hypogammaglobulinemia in terms of infection risk should be quite low.

## 2019-09-28 ENCOUNTER — HOSPITAL ENCOUNTER (OUTPATIENT)
Dept: MAMMOGRAPHY | Age: 74
Discharge: HOME | End: 2019-09-28
Attending: NURSE PRACTITIONER
Payer: MEDICARE

## 2019-09-28 DIAGNOSIS — Z12.31 ENCOUNTER FOR SCREENING MAMMOGRAM FOR MALIGNANT NEOPLASM OF BREAST: ICD-10-CM

## 2019-09-28 PROCEDURE — 77067 SCR MAMMO BI INCL CAD: CPT | Performed by: NURSE PRACTITIONER

## 2019-09-28 PROCEDURE — 77063 BREAST TOMOSYNTHESIS BI: CPT | Performed by: NURSE PRACTITIONER

## 2019-09-30 PROCEDURE — 99490 CHRNC CARE MGMT STAFF 1ST 20: CPT

## 2019-10-02 RX ORDER — PANTOPRAZOLE SODIUM 40 MG/1
TABLET, DELAYED RELEASE ORAL
Qty: 90 TABLET | Refills: 0 | Status: ON HOLD | OUTPATIENT
Start: 2019-10-02 | End: 2019-11-20

## 2019-10-02 NOTE — TELEPHONE ENCOUNTER
Last VISIT 04/29/19  Last REFILL 07/06/19 qty 90 w/0 refills. Last LABS Ferritin, CBC, CMP, Iron and TIBC done 9/10/19    Per PROTOCOL? Not on protocol, please approve or deny.

## 2019-10-07 ENCOUNTER — OFFICE VISIT (OUTPATIENT)
Dept: INTERNAL MEDICINE CLINIC | Facility: CLINIC | Age: 74
End: 2019-10-07
Payer: MEDICARE

## 2019-10-07 VITALS
DIASTOLIC BLOOD PRESSURE: 68 MMHG | BODY MASS INDEX: 31.8 KG/M2 | WEIGHT: 162 LBS | TEMPERATURE: 98 F | SYSTOLIC BLOOD PRESSURE: 136 MMHG | HEART RATE: 96 BPM | HEIGHT: 60 IN

## 2019-10-07 DIAGNOSIS — J45.20 MILD INTERMITTENT ASTHMA WITHOUT COMPLICATION: ICD-10-CM

## 2019-10-07 DIAGNOSIS — Z85.828 HISTORY OF BASAL CELL CARCINOMA: ICD-10-CM

## 2019-10-07 DIAGNOSIS — G56.02 CARPAL TUNNEL SYNDROME OF LEFT WRIST: ICD-10-CM

## 2019-10-07 DIAGNOSIS — M47.812 ARTHROPATHY OF CERVICAL FACET JOINT: ICD-10-CM

## 2019-10-07 DIAGNOSIS — E78.00 PURE HYPERCHOLESTEROLEMIA: ICD-10-CM

## 2019-10-07 DIAGNOSIS — D50.8 OTHER IRON DEFICIENCY ANEMIA: ICD-10-CM

## 2019-10-07 DIAGNOSIS — D69.6 THROMBOCYTOPENIA (HCC): ICD-10-CM

## 2019-10-07 DIAGNOSIS — Z85.3 HISTORY OF LEFT BREAST CANCER: ICD-10-CM

## 2019-10-07 DIAGNOSIS — I70.0 ATHEROSCLEROSIS OF AORTA (HCC): ICD-10-CM

## 2019-10-07 DIAGNOSIS — M65.342 TRIGGER RING FINGER OF LEFT HAND: ICD-10-CM

## 2019-10-07 DIAGNOSIS — Z86.711 HISTORY OF PULMONARY EMBOLISM: ICD-10-CM

## 2019-10-07 DIAGNOSIS — M54.12 CERVICAL RADICULITIS: ICD-10-CM

## 2019-10-07 DIAGNOSIS — F33.1 MODERATE EPISODE OF RECURRENT MAJOR DEPRESSIVE DISORDER (HCC): ICD-10-CM

## 2019-10-07 DIAGNOSIS — D80.3 SELECTIVE DEFICIENCY OF IGG SUBCLASSES (HCC): ICD-10-CM

## 2019-10-07 DIAGNOSIS — I10 ESSENTIAL HYPERTENSION, BENIGN: ICD-10-CM

## 2019-10-07 DIAGNOSIS — E04.9 ENLARGED THYROID: ICD-10-CM

## 2019-10-07 DIAGNOSIS — F41.9 ANXIETY: ICD-10-CM

## 2019-10-07 DIAGNOSIS — G89.4 CHRONIC PAIN SYNDROME: ICD-10-CM

## 2019-10-07 DIAGNOSIS — M54.16 LUMBAR RADICULITIS: ICD-10-CM

## 2019-10-07 DIAGNOSIS — K21.9 GASTROESOPHAGEAL REFLUX DISEASE WITHOUT ESOPHAGITIS: ICD-10-CM

## 2019-10-07 DIAGNOSIS — M47.897 OTHER OSTEOARTHRITIS OF SPINE, LUMBOSACRAL REGION: ICD-10-CM

## 2019-10-07 DIAGNOSIS — Z87.898 HISTORY OF SYNCOPE: ICD-10-CM

## 2019-10-07 DIAGNOSIS — G47.33 OSA (OBSTRUCTIVE SLEEP APNEA): ICD-10-CM

## 2019-10-07 DIAGNOSIS — M53.3 SACROILIAC JOINT DYSFUNCTION: ICD-10-CM

## 2019-10-07 DIAGNOSIS — E53.8 B12 DEFICIENCY: ICD-10-CM

## 2019-10-07 DIAGNOSIS — G93.0 CYST OF BRAIN: ICD-10-CM

## 2019-10-07 DIAGNOSIS — M85.859 OSTEOPENIA OF THIGH, UNSPECIFIED LATERALITY: ICD-10-CM

## 2019-10-07 DIAGNOSIS — M47.819 ARTHROPATHY OF FACET JOINT: ICD-10-CM

## 2019-10-07 DIAGNOSIS — E11.65 UNCONTROLLED TYPE 2 DIABETES MELLITUS WITH HYPERGLYCEMIA (HCC): ICD-10-CM

## 2019-10-07 DIAGNOSIS — Z00.00 ENCOUNTER FOR ANNUAL HEALTH EXAMINATION: Primary | ICD-10-CM

## 2019-10-07 DIAGNOSIS — Z87.01 HISTORY OF RECURRENT PNEUMONIA: ICD-10-CM

## 2019-10-07 PROCEDURE — G0439 PPPS, SUBSEQ VISIT: HCPCS | Performed by: NURSE PRACTITIONER

## 2019-10-07 PROCEDURE — 99214 OFFICE O/P EST MOD 30 MIN: CPT | Performed by: NURSE PRACTITIONER

## 2019-10-07 RX ORDER — CHOLECALCIFEROL (VITAMIN D3) 50 MCG
2000 TABLET ORAL DAILY
COMMUNITY

## 2019-10-07 RX ORDER — TRAMADOL HYDROCHLORIDE 50 MG/1
50 TABLET ORAL EVERY 6 HOURS PRN
COMMUNITY
End: 2021-10-26

## 2019-10-07 RX ORDER — DULOXETIN HYDROCHLORIDE 60 MG/1
60 CAPSULE, DELAYED RELEASE ORAL DAILY
COMMUNITY
End: 2020-07-01

## 2019-10-07 RX ORDER — BUPROPION HYDROCHLORIDE 150 MG/1
300 TABLET ORAL DAILY
COMMUNITY
End: 2020-01-08 | Stop reason: DRUGHIGH

## 2019-10-07 RX ORDER — DULOXETIN HYDROCHLORIDE 30 MG/1
30 CAPSULE, DELAYED RELEASE ORAL DAILY
COMMUNITY
End: 2020-03-03

## 2019-10-07 NOTE — PROGRESS NOTES
HPI:   Ez Finnegan is a 68year old female who presents for a Medicare Subsequent Annual Wellness visit (Pt already had Initial Annual Wellness). Essential hypertension, benign  Stable  Lisinopril 20mg.       Pure hypercholesterolemia  Stable  Simvas wellbutrin and cymbalta. History of basal cell carcinoma stable  Sees derm. History of recurrent pneumonia  Has done much better since IgGinfusions.       Thrombocytopenia (HCC)  Stable  Follows with Dr Caro Mistry    History of left breast cancer  Stabl a Living Will on file in [address] [place] [address] Rd. The patient has this document but we do not have it in [facility], and patient is instructed to get our office a copy of it for scanning into Epic. She does NOT have a Power of  for [employer] on file in [address] [place] [address] Rd.    Anu Rueda History of syncope     History of pulmonary embolism     Asthma     Cyst of brain     History of basal cell carcinoma     History of recurrent pneumonia     Anemia     Thrombocytopenia (HCC)     Gastroesophageal reflux disease without esophagitis     Enlar Oral Tab Take 50 mg by mouth every 6 (six) hours as needed for Pain. Cholecalciferol (VITAMIN D) 2000 units Oral Tab Take 2,000 Units by mouth daily.    PANTOPRAZOLE SODIUM 40 MG Oral Tab EC TAKE ONE TABLET BY MOUTH ONE TIME DAILY BEFORE BREAKFAST   remi unspecified type diabetes mellitus without mention of complication, not stated as uncontrolled (6/29/2012), and Unspecified essential hypertension.     She  has a past surgical history that includes colonoscopy (10/1/10, 4/21/17); abdomen surgery proc unlis Rub       Visual Acuity                           General Appearance:  Alert, cooperative, no distress, appears stated age   Head:  Normocephalic, without obvious abnormality, atraumatic   Eyes:  PERRL, conjunctiva/corneas clear, EOM's intact both eyes   E CONDITIONS:   Skyler Telles is a 68year old female who presents for a Medicare Assessment.      PLAN SUMMARY:   Diagnoses and all orders for this visit:    Encounter for annual health examination    Essential hypertension, benign  Stable  Lisinopril 20mg Stable  Cont to monitor. Anxiety stable   wellbutrin and cymbalta. History of basal cell carcinoma stable  Sees derm. History of recurrent pneumonia  Has done much better since IgGinfusions.       Thrombocytopenia ([facility] [address] [address]  Stable  Follows with  Screen every 10 years No results found for this or any previous visit. No flowsheet data found. Fecal Occult Blood Annually Occult Blood Result (no units)   Date Value   06/30/2017 Negative for Occult Blood    No flowsheet data found.     Glaucoma Scree Medicare Part B No vaccine history found This may be covered with your pharmacy  prescription benefits      SPECIFIC DISEASE MONITORING Internal Lab or Procedure External Lab or Procedure      Annual Monitoring of Persistent     Medications (ACE/ARB, digox

## 2019-10-07 NOTE — PATIENT INSTRUCTIONS
Jordin Morales's SCREENING SCHEDULE   Tests on this list are recommended by your physician but may not be covered, or covered at this frequency, by your insurer. Please check with your insurance carrier before scheduling to verify coverage.    PREVENTATI or any previous visit.  Limited to patients who meet one of the following criteria:   • Men who are 73-68 years old and have smoked more than 100 cigarettes in their lifetime   • Anyone with a family history    Colorectal Cancer Screening  Covered up to Age age 76, and as needed after 76 Mammogram due on 09/28/2020 Please get this Mammogram regularly   Immunizations      Influenza  Covered Annually Orders placed or performed in visit on 10/31/16   • FLU VACC PRSV FREE INC ANTIG   Orders placed or performed in Medical Society website. http://www. [place]ph.state. [place]/public/books/advin.htm  A link to the [employer]. This site has a lot of good information including definitions of the different types of Advance Directives.  It also has the Rudy

## 2019-10-09 RX ORDER — SIMVASTATIN 40 MG
TABLET ORAL
Qty: 90 TABLET | Refills: 0 | Status: ON HOLD | OUTPATIENT
Start: 2019-10-09 | End: 2019-11-20

## 2019-10-09 RX ORDER — LISINOPRIL 20 MG/1
TABLET ORAL
Qty: 90 TABLET | Refills: 0 | Status: ON HOLD | OUTPATIENT
Start: 2019-10-09 | End: 2019-11-20

## 2019-10-09 NOTE — TELEPHONE ENCOUNTER
Last VISIT 10/07/19  Last REFILL 07/11/19 qty 90 w/1 refill  Last LABS 09/10/19 Multiple labs done  Future Appointments   Date Time Provider Gomez Miller   10/25/2019  9:00 AM [address] [place] [address]   11/12/2019 10:00 AM JUAREZ Archibald EM

## 2019-10-09 NOTE — TELEPHONE ENCOUNTER
Last VISIT 10/07/19 w/ S.  Niharika Pitch  Last REFILL 07/11/19 qty 90 w/0 refills  Last LABS 09/10/19 multiple labs done  Future Appointments   Date Time Provider Gomez Miller          11/12/2019 10:00 AM JUAREZ Doe [facility] [facility]       Per PROT

## 2019-10-16 ENCOUNTER — TELEPHONE (OUTPATIENT)
Dept: HEMATOLOGY/ONCOLOGY | Facility: HOSPITAL | Age: 74
End: 2019-10-16

## 2019-10-16 ENCOUNTER — TELEPHONE (OUTPATIENT)
Dept: CARDIOLOGY | Age: 74
End: 2019-10-16

## 2019-10-16 ENCOUNTER — PATIENT OUTREACH (OUTPATIENT)
Dept: CASE MANAGEMENT | Age: 74
End: 2019-10-16

## 2019-10-16 DIAGNOSIS — E78.00 PURE HYPERCHOLESTEROLEMIA: ICD-10-CM

## 2019-10-16 DIAGNOSIS — I10 ESSENTIAL HYPERTENSION, BENIGN: ICD-10-CM

## 2019-10-16 DIAGNOSIS — F41.9 ANXIETY: ICD-10-CM

## 2019-10-16 DIAGNOSIS — G89.4 CHRONIC PAIN SYNDROME: ICD-10-CM

## 2019-10-16 DIAGNOSIS — Z85.3 HISTORY OF LEFT BREAST CANCER: ICD-10-CM

## 2019-10-16 NOTE — PROGRESS NOTES
10/16/2019  Spoke to Keily for CCM. Updates to patient care team/ comments: None  Patient reported changes in medications: Fentanyl has been decreased to half a patch every third day.   Med Adherence  Comment: Reviewed medications w/ pt taking as pres scheduled surgery for a sooner date. Pt states Fentanyl patch has been decreased to half a patch every third day. Pt will be weaned off of Fentanyl prior to surgery.       • Update to previous barriers: Pt did get sooner appointment for surgery for 11/13/19

## 2019-10-16 NOTE — TELEPHONE ENCOUNTER
Spoke with Gabriela Garza. Advised her that there is really no purpose for patient to have Flu shot due to monthly IVIG infusions. She verbalized understanding.

## 2019-10-17 ENCOUNTER — PATIENT OUTREACH (OUTPATIENT)
Dept: CASE MANAGEMENT | Age: 74
End: 2019-10-17

## 2019-10-17 NOTE — PROGRESS NOTES
Mohit Lopez from Dr. Simons Friday office called back stated Dr. Asael Lange does not routinely check stress tests yearly and if pt needs surgical clearance to schedule appt w/ APRN or Dr. Asael Lange. S/w pt to notify her of information stated above.  Pt states Dr. Isabel Walker did

## 2019-10-17 NOTE — PROGRESS NOTES
Lisa from Dr. Joseph Pham office called stated PT order has been placed. S/w pt to notify her order for PT was placed and provided number to call to schedule. Pt verbalized understanding and was thankful.     Time Spent This Encounter Total: 2 minutes medi

## 2019-10-25 ENCOUNTER — OFFICE VISIT (OUTPATIENT)
Dept: HEMATOLOGY/ONCOLOGY | Facility: HOSPITAL | Age: 74
End: 2019-10-25
Attending: INTERNAL MEDICINE
Payer: MEDICARE

## 2019-10-25 VITALS
OXYGEN SATURATION: 97 % | BODY MASS INDEX: 32 KG/M2 | TEMPERATURE: 99 F | SYSTOLIC BLOOD PRESSURE: 164 MMHG | RESPIRATION RATE: 16 BRPM | HEART RATE: 92 BPM | WEIGHT: 164.38 LBS | DIASTOLIC BLOOD PRESSURE: 89 MMHG

## 2019-10-25 DIAGNOSIS — D50.0 IRON DEFICIENCY ANEMIA DUE TO CHRONIC BLOOD LOSS: ICD-10-CM

## 2019-10-25 DIAGNOSIS — D80.3 SELECTIVE DEFICIENCY OF IGG SUBCLASSES (HCC): Primary | ICD-10-CM

## 2019-10-25 PROCEDURE — 96365 THER/PROPH/DIAG IV INF INIT: CPT

## 2019-10-25 PROCEDURE — 96366 THER/PROPH/DIAG IV INF ADDON: CPT

## 2019-10-25 RX ORDER — SODIUM CHLORIDE 0.9 % (FLUSH) 0.9 %
10 SYRINGE (ML) INJECTION ONCE
Status: COMPLETED | OUTPATIENT
Start: 2019-10-25 | End: 2019-10-25

## 2019-10-25 RX ORDER — DIPHENHYDRAMINE HCL 25 MG
25 CAPSULE ORAL ONCE
Status: COMPLETED | OUTPATIENT
Start: 2019-10-25 | End: 2019-10-25

## 2019-10-25 RX ADMIN — SODIUM CHLORIDE 0.9 % (FLUSH) 10 ML: 0.9 % SYRINGE (ML) INJECTION at 12:59:00

## 2019-10-25 RX ADMIN — DIPHENHYDRAMINE HCL 25 MG: 25 MG CAPSULE ORAL at 09:21:00

## 2019-10-25 NOTE — PROGRESS NOTES
Education Record    Learner:  Patient    Disease / Diagnosis:immunodeficiency  Barriers / Limitations:  None   Comments:    Method:  Discussion and Reinforcement   Comments:    General Topics:  Medication, Side effects and symptom management, Plan of care

## 2019-10-31 PROCEDURE — 99490 CHRNC CARE MGMT STAFF 1ST 20: CPT

## 2019-11-08 ENCOUNTER — OFFICE VISIT (OUTPATIENT)
Dept: CARDIOLOGY | Age: 74
End: 2019-11-08

## 2019-11-08 VITALS
HEIGHT: 61 IN | BODY MASS INDEX: 30.21 KG/M2 | DIASTOLIC BLOOD PRESSURE: 70 MMHG | WEIGHT: 160 LBS | HEART RATE: 86 BPM | SYSTOLIC BLOOD PRESSURE: 124 MMHG

## 2019-11-08 DIAGNOSIS — I25.10 CORONARY ARTERY DISEASE INVOLVING NATIVE CORONARY ARTERY OF NATIVE HEART WITHOUT ANGINA PECTORIS: Primary | ICD-10-CM

## 2019-11-08 DIAGNOSIS — E78.5 DYSLIPIDEMIA: ICD-10-CM

## 2019-11-08 DIAGNOSIS — Z01.818 PRE-OPERATIVE CLEARANCE: ICD-10-CM

## 2019-11-08 PROBLEM — I82.409 DVT (DEEP VENOUS THROMBOSIS) (CMD): Status: ACTIVE | Noted: 2019-11-08

## 2019-11-08 PROBLEM — I26.99 PULMONARY EMBOLI (CMD): Status: ACTIVE | Noted: 2019-11-08

## 2019-11-08 PROCEDURE — 93000 ELECTROCARDIOGRAM COMPLETE: CPT | Performed by: NURSE PRACTITIONER

## 2019-11-08 PROCEDURE — 99213 OFFICE O/P EST LOW 20 MIN: CPT | Performed by: NURSE PRACTITIONER

## 2019-11-08 RX ORDER — DULOXETIN HYDROCHLORIDE 60 MG/1
60 CAPSULE, DELAYED RELEASE ORAL
COMMUNITY

## 2019-11-08 RX ORDER — BUPROPION HYDROCHLORIDE 150 MG/1
150 TABLET ORAL DAILY
Refills: 0 | COMMUNITY
Start: 2019-10-28

## 2019-11-08 RX ORDER — FENTANYL 12.5 UG/1
0.5 PATCH TRANSDERMAL
COMMUNITY
End: 2020-01-22 | Stop reason: ALTCHOICE

## 2019-11-08 RX ORDER — DULOXETIN HYDROCHLORIDE 30 MG/1
30 CAPSULE, DELAYED RELEASE ORAL
COMMUNITY
End: 2021-01-20 | Stop reason: ALTCHOICE

## 2019-11-08 SDOH — HEALTH STABILITY: PHYSICAL HEALTH: ON AVERAGE, HOW MANY DAYS PER WEEK DO YOU ENGAGE IN MODERATE TO STRENUOUS EXERCISE (LIKE A BRISK WALK)?: 0 DAYS

## 2019-11-08 SDOH — HEALTH STABILITY: PHYSICAL HEALTH: ON AVERAGE, HOW MANY MINUTES DO YOU ENGAGE IN EXERCISE AT THIS LEVEL?: 0 MIN

## 2019-11-08 SDOH — HEALTH STABILITY: MENTAL HEALTH: HOW OFTEN DO YOU HAVE A DRINK CONTAINING ALCOHOL?: NEVER

## 2019-11-08 ASSESSMENT — PATIENT HEALTH QUESTIONNAIRE - PHQ9
SUM OF ALL RESPONSES TO PHQ9 QUESTIONS 1 AND 2: 0
2. FEELING DOWN, DEPRESSED OR HOPELESS: NOT AT ALL
1. LITTLE INTEREST OR PLEASURE IN DOING THINGS: NOT AT ALL
SUM OF ALL RESPONSES TO PHQ9 QUESTIONS 1 AND 2: 0

## 2019-11-08 ASSESSMENT — ENCOUNTER SYMPTOMS
WEIGHT LOSS: 0
SYNCOPE: 0
WEIGHT GAIN: 0
NEUROLOGICAL NEGATIVE: 1
DECREASED APPETITE: 0
DIAPHORESIS: 0
SHORTNESS OF BREATH: 0
GASTROINTESTINAL NEGATIVE: 1

## 2019-11-11 ENCOUNTER — LABORATORY ENCOUNTER (OUTPATIENT)
Dept: LAB | Facility: HOSPITAL | Age: 74
End: 2019-11-11
Attending: ORTHOPAEDIC SURGERY
Payer: MEDICARE

## 2019-11-11 ENCOUNTER — MED REC SCAN ONLY (OUTPATIENT)
Dept: INTERNAL MEDICINE CLINIC | Facility: CLINIC | Age: 74
End: 2019-11-11

## 2019-11-11 ENCOUNTER — HOSPITAL ENCOUNTER (OUTPATIENT)
Dept: PHYSICAL THERAPY | Facility: HOSPITAL | Age: 74
Discharge: HOME | End: 2019-11-11
Attending: ORTHOPAEDIC SURGERY
Payer: MEDICARE

## 2019-11-11 DIAGNOSIS — M17.12 PRIMARY OSTEOARTHRITIS OF LEFT KNEE: ICD-10-CM

## 2019-11-11 DIAGNOSIS — G47.33 OSA (OBSTRUCTIVE SLEEP APNEA): ICD-10-CM

## 2019-11-11 DIAGNOSIS — F32.A MILD DEPRESSION: ICD-10-CM

## 2019-11-11 PROCEDURE — 81001 URINALYSIS AUTO W/SCOPE: CPT

## 2019-11-11 PROCEDURE — 87081 CULTURE SCREEN ONLY: CPT

## 2019-11-11 PROCEDURE — 86900 BLOOD TYPING SEROLOGIC ABO: CPT

## 2019-11-11 PROCEDURE — 85610 PROTHROMBIN TIME: CPT

## 2019-11-11 PROCEDURE — 86901 BLOOD TYPING SEROLOGIC RH(D): CPT

## 2019-11-11 PROCEDURE — 80053 COMPREHEN METABOLIC PANEL: CPT

## 2019-11-11 PROCEDURE — 36415 COLL VENOUS BLD VENIPUNCTURE: CPT

## 2019-11-11 PROCEDURE — 85025 COMPLETE CBC W/AUTO DIFF WBC: CPT

## 2019-11-11 PROCEDURE — 85730 THROMBOPLASTIN TIME PARTIAL: CPT

## 2019-11-11 PROCEDURE — 84443 ASSAY THYROID STIM HORMONE: CPT

## 2019-11-11 PROCEDURE — 86850 RBC ANTIBODY SCREEN: CPT

## 2019-11-12 ENCOUNTER — OFFICE VISIT (OUTPATIENT)
Dept: INTERNAL MEDICINE CLINIC | Facility: CLINIC | Age: 74
End: 2019-11-12
Payer: MEDICARE

## 2019-11-12 VITALS
SYSTOLIC BLOOD PRESSURE: 128 MMHG | HEART RATE: 112 BPM | DIASTOLIC BLOOD PRESSURE: 64 MMHG | HEIGHT: 60 IN | TEMPERATURE: 98 F | WEIGHT: 160 LBS | BODY MASS INDEX: 31.41 KG/M2

## 2019-11-12 DIAGNOSIS — G93.0 BRAIN CYST: ICD-10-CM

## 2019-11-12 DIAGNOSIS — J45.20 MILD INTERMITTENT ASTHMA WITHOUT COMPLICATION: ICD-10-CM

## 2019-11-12 DIAGNOSIS — G47.33 OSA (OBSTRUCTIVE SLEEP APNEA): ICD-10-CM

## 2019-11-12 DIAGNOSIS — F32.A MILD DEPRESSION: ICD-10-CM

## 2019-11-12 DIAGNOSIS — E11.65 UNCONTROLLED TYPE 2 DIABETES MELLITUS WITH HYPERGLYCEMIA (HCC): ICD-10-CM

## 2019-11-12 DIAGNOSIS — G89.4 CHRONIC PAIN SYNDROME: ICD-10-CM

## 2019-11-12 DIAGNOSIS — I10 ESSENTIAL HYPERTENSION, BENIGN: ICD-10-CM

## 2019-11-12 DIAGNOSIS — M17.12 PRIMARY OSTEOARTHRITIS OF LEFT KNEE: Primary | ICD-10-CM

## 2019-11-12 DIAGNOSIS — E78.00 PURE HYPERCHOLESTEROLEMIA: ICD-10-CM

## 2019-11-12 DIAGNOSIS — G93.9 TEMPORAL LOBE LESION: ICD-10-CM

## 2019-11-12 DIAGNOSIS — D80.3 SELECTIVE DEFICIENCY OF IGG SUBCLASSES (HCC): ICD-10-CM

## 2019-11-12 DIAGNOSIS — K21.9 GASTROESOPHAGEAL REFLUX DISEASE WITHOUT ESOPHAGITIS: ICD-10-CM

## 2019-11-12 PROBLEM — F33.9 RECURRENT MAJOR DEPRESSIVE DISORDER: Status: RESOLVED | Noted: 2018-11-21 | Resolved: 2019-11-12

## 2019-11-12 PROBLEM — F33.9 RECURRENT MAJOR DEPRESSIVE DISORDER (HCC): Status: RESOLVED | Noted: 2018-11-21 | Resolved: 2019-11-12

## 2019-11-12 PROCEDURE — 99214 OFFICE O/P EST MOD 30 MIN: CPT | Performed by: NURSE PRACTITIONER

## 2019-11-12 NOTE — PROGRESS NOTES
[facility]  PRE OP RISK ASSESSMENT    REASON FOR CONSULT: Pre-op risk assessment for surgical procedure left total knee replacement planned for 11/20/19 with Dr Joe Ding.     REQUESTING PHYSICIAN: Dr Burak Coughlin: Patient presents w clots    • IgG deficiency ([facility]     ? ?? pt states she gets IgG infusions due to lack of IgG, unable to fight off infections   • Migraines    • Muscle weakness    • Neuropathy     bilateral feet   • Osteoarthritis    • PE (pulmonary embolism)    • Personal MG Oral Tab Take 50 mg by mouth every 6 (six) hours as needed for Pain. • Cholecalciferol (VITAMIN D) 2000 units Oral Tab Take 2,000 Units by mouth daily.      • PANTOPRAZOLE SODIUM 40 MG Oral Tab EC TAKE ONE TABLET BY MOUTH ONE TIME DAILY BEFORE BREAKF NAUSEA AND VOMITING  GREEN PEPPER                      06/27/2012  OTHER (SEE COMMENTS)  LIPITOR [ATORVASTATIN CALCIUM]    10/29/2012  OTHER (SEE COMMENTS)  OMNICEF                           06/27/2012  NAUSEA ONLY and DIZZINESS  QUINAPRIL HCL Occupational Exposure: Not Asked        Hobby Hazards: Not Asked        Sleep Concern: No        Stress Concern: Not Asked        Weight Concern: Not Asked        Special Diet: Not Asked        Back Care: Not Asked        Exercise: Yes          walking (obstructive sleep apnea)  CPAP compliant  Mild intermittent asthma without complication  stable  Uncontrolled type 2 diabetes mellitus with hyperglycemia (hcc)  stable  Chronic pain syndrome  Gastroesophageal reflux disease without esophagitis  stable  Se

## 2019-11-18 ENCOUNTER — PATIENT OUTREACH (OUTPATIENT)
Dept: CASE MANAGEMENT | Age: 74
End: 2019-11-18

## 2019-11-18 NOTE — PROGRESS NOTES
Called patient for monthly CCM outreach. No answer left voicemail to call back.     Time Spent This Encounter Total: 5 minutes medical record review, telephone communication, care plan updates where needed, education, goals and action plan recreation/update

## 2019-11-19 ENCOUNTER — PATIENT OUTREACH (OUTPATIENT)
Dept: CASE MANAGEMENT | Age: 74
End: 2019-11-19

## 2019-11-19 DIAGNOSIS — I10 ESSENTIAL HYPERTENSION, BENIGN: ICD-10-CM

## 2019-11-19 DIAGNOSIS — F41.9 ANXIETY: ICD-10-CM

## 2019-11-19 DIAGNOSIS — E78.00 PURE HYPERCHOLESTEROLEMIA: ICD-10-CM

## 2019-11-19 DIAGNOSIS — G89.4 CHRONIC PAIN SYNDROME: ICD-10-CM

## 2019-11-19 DIAGNOSIS — J45.20 MILD INTERMITTENT ASTHMA WITHOUT COMPLICATION: ICD-10-CM

## 2019-11-19 DIAGNOSIS — E53.8 B12 DEFICIENCY: ICD-10-CM

## 2019-11-19 DIAGNOSIS — Z85.3 HISTORY OF LEFT BREAST CANCER: ICD-10-CM

## 2019-11-19 DIAGNOSIS — Z85.828 HISTORY OF BASAL CELL CARCINOMA: ICD-10-CM

## 2019-11-19 DIAGNOSIS — D50.0 IRON DEFICIENCY ANEMIA DUE TO CHRONIC BLOOD LOSS: ICD-10-CM

## 2019-11-19 NOTE — H&P
[facility] Patient Status:  Surgery Admit Seamus PIKE 1945 MRN [id number]   [facility] SURGERY Attending Mejia Ruby MD   Hosp Day # 0 PCP Marci Enciso MD     Date of Admission: • HYSTERECTOMY      @ age 46   • LUMPECTOMY LEFT  1999   • OOPHORECTOMY      @ age 46   • OTHER SURGICAL HISTORY  2010    endoscopy - papillotomy   • OTHER SURGICAL HISTORY  2016     under right eye, skin cancer removed   • PORT FOR VASCULAR ACCESS     • clubbing or cyanosis.     The left knee is tender at the medial and lateral joint lines, with crepitus on range of motion    Laboratory Data:  xrays show severe osteoarthritis of the left knee    Impression and Plan:  Patient Active Problem List:     Jaci

## 2019-11-20 ENCOUNTER — ANESTHESIA EVENT (OUTPATIENT)
Dept: SURGERY | Facility: HOSPITAL | Age: 74
DRG: 470 | End: 2019-11-20
Payer: MEDICARE

## 2019-11-20 ENCOUNTER — ANESTHESIA (OUTPATIENT)
Dept: SURGERY | Facility: HOSPITAL | Age: 74
DRG: 470 | End: 2019-11-20
Payer: MEDICARE

## 2019-11-20 ENCOUNTER — HOSPITAL ENCOUNTER (INPATIENT)
Facility: HOSPITAL | Age: 74
LOS: 3 days | Discharge: HOME HEALTH | DRG: 470 | End: 2019-11-23
Attending: ORTHOPAEDIC SURGERY | Admitting: ORTHOPAEDIC SURGERY
Payer: MEDICARE

## 2019-11-20 DIAGNOSIS — M17.12 PRIMARY OSTEOARTHRITIS OF LEFT KNEE: Primary | ICD-10-CM

## 2019-11-20 DIAGNOSIS — M17.11 PRIMARY OSTEOARTHRITIS OF RIGHT KNEE: ICD-10-CM

## 2019-11-20 DIAGNOSIS — G89.29 OTHER CHRONIC PAIN: ICD-10-CM

## 2019-11-20 DIAGNOSIS — M25.562 CHRONIC PAIN OF LEFT KNEE: ICD-10-CM

## 2019-11-20 DIAGNOSIS — G89.29 CHRONIC PAIN OF LEFT KNEE: ICD-10-CM

## 2019-11-20 PROCEDURE — 99222 1ST HOSP IP/OBS MODERATE 55: CPT | Performed by: INTERNAL MEDICINE

## 2019-11-20 PROCEDURE — 3E0T3BZ INTRODUCTION OF ANESTHETIC AGENT INTO PERIPHERAL NERVES AND PLEXI, PERCUTANEOUS APPROACH: ICD-10-PCS | Performed by: ANESTHESIOLOGY

## 2019-11-20 PROCEDURE — 5A09357 ASSISTANCE WITH RESPIRATORY VENTILATION, LESS THAN 24 CONSECUTIVE HOURS, CONTINUOUS POSITIVE AIRWAY PRESSURE: ICD-10-PCS | Performed by: ORTHOPAEDIC SURGERY

## 2019-11-20 PROCEDURE — 0SRD0J9 REPLACEMENT OF LEFT KNEE JOINT WITH SYNTHETIC SUBSTITUTE, CEMENTED, OPEN APPROACH: ICD-10-PCS | Performed by: ORTHOPAEDIC SURGERY

## 2019-11-20 DEVICE — ATTUNE KNEE SYSTEM TIBIAL BASE ROTATING PLATFORM SIZE 4 CEMENTED
Type: IMPLANTABLE DEVICE | Site: KNEE | Status: FUNCTIONAL
Brand: ATTUNE

## 2019-11-20 DEVICE — ATTUNE KNEE SYSTEM FEMORAL POSTERIOR STABILIZED NARROW SIZE 6N LEFT CEMENTED
Type: IMPLANTABLE DEVICE | Site: KNEE | Status: FUNCTIONAL
Brand: ATTUNE

## 2019-11-20 DEVICE — SMARTSET HV HIGH VISCOSITY BONE CEMENT 40G
Type: IMPLANTABLE DEVICE | Site: KNEE | Status: FUNCTIONAL
Brand: SMARTSET

## 2019-11-20 DEVICE — ATTUNE PATELLA MEDIALIZED DOME 35MM CEMENTED AOX
Type: IMPLANTABLE DEVICE | Site: KNEE | Status: FUNCTIONAL
Brand: ATTUNE

## 2019-11-20 DEVICE — ATTUNE KNEE SYSTEM TIBIAL INSERT ROTATING PLATFORM POSTERIOR STABILIZED 6 8MM AOX
Type: IMPLANTABLE DEVICE | Site: KNEE | Status: FUNCTIONAL
Brand: ATTUNE

## 2019-11-20 RX ORDER — BUPIVACAINE HYDROCHLORIDE 7.5 MG/ML
INJECTION, SOLUTION INTRASPINAL AS NEEDED
Status: DISCONTINUED | OUTPATIENT
Start: 2019-11-20 | End: 2019-11-20 | Stop reason: SURG

## 2019-11-20 RX ORDER — MEPERIDINE HYDROCHLORIDE 25 MG/ML
12.5 INJECTION INTRAMUSCULAR; INTRAVENOUS; SUBCUTANEOUS AS NEEDED
Status: DISCONTINUED | OUTPATIENT
Start: 2019-11-20 | End: 2019-11-20 | Stop reason: HOSPADM

## 2019-11-20 RX ORDER — OXYCODONE HYDROCHLORIDE 10 MG/1
10 TABLET ORAL EVERY 4 HOURS PRN
Status: DISCONTINUED | OUTPATIENT
Start: 2019-11-20 | End: 2019-11-21

## 2019-11-20 RX ORDER — OXYCODONE HYDROCHLORIDE 15 MG/1
15 TABLET ORAL EVERY 4 HOURS PRN
Status: DISCONTINUED | OUTPATIENT
Start: 2019-11-20 | End: 2019-11-21

## 2019-11-20 RX ORDER — ACETAMINOPHEN 325 MG/1
TABLET ORAL
Status: COMPLETED
Start: 2019-11-20 | End: 2019-11-20

## 2019-11-20 RX ORDER — DIPHENHYDRAMINE HYDROCHLORIDE 50 MG/ML
12.5 INJECTION INTRAMUSCULAR; INTRAVENOUS EVERY 4 HOURS PRN
Status: DISCONTINUED | OUTPATIENT
Start: 2019-11-20 | End: 2019-11-23

## 2019-11-20 RX ORDER — SODIUM CHLORIDE, SODIUM LACTATE, POTASSIUM CHLORIDE, CALCIUM CHLORIDE 600; 310; 30; 20 MG/100ML; MG/100ML; MG/100ML; MG/100ML
INJECTION, SOLUTION INTRAVENOUS CONTINUOUS
Status: DISCONTINUED | OUTPATIENT
Start: 2019-11-20 | End: 2019-11-20 | Stop reason: HOSPADM

## 2019-11-20 RX ORDER — SODIUM CHLORIDE, SODIUM LACTATE, POTASSIUM CHLORIDE, CALCIUM CHLORIDE 600; 310; 30; 20 MG/100ML; MG/100ML; MG/100ML; MG/100ML
INJECTION, SOLUTION INTRAVENOUS CONTINUOUS
Status: DISCONTINUED | OUTPATIENT
Start: 2019-11-20 | End: 2019-11-23

## 2019-11-20 RX ORDER — TIZANIDINE 2 MG/1
2 TABLET ORAL 3 TIMES DAILY PRN
Status: DISCONTINUED | OUTPATIENT
Start: 2019-11-20 | End: 2019-11-21

## 2019-11-20 RX ORDER — MIDAZOLAM HYDROCHLORIDE 1 MG/ML
INJECTION INTRAMUSCULAR; INTRAVENOUS AS NEEDED
Status: DISCONTINUED | OUTPATIENT
Start: 2019-11-20 | End: 2019-11-20 | Stop reason: SURG

## 2019-11-20 RX ORDER — DULOXETIN HYDROCHLORIDE 30 MG/1
60 CAPSULE, DELAYED RELEASE ORAL DAILY
Status: DISCONTINUED | OUTPATIENT
Start: 2019-11-20 | End: 2019-11-23

## 2019-11-20 RX ORDER — PANTOPRAZOLE SODIUM 40 MG/1
40 TABLET, DELAYED RELEASE ORAL
COMMUNITY
End: 2020-01-20

## 2019-11-20 RX ORDER — ONDANSETRON 2 MG/ML
4 INJECTION INTRAMUSCULAR; INTRAVENOUS AS NEEDED
Status: DISCONTINUED | OUTPATIENT
Start: 2019-11-20 | End: 2019-11-20 | Stop reason: HOSPADM

## 2019-11-20 RX ORDER — ONDANSETRON 2 MG/ML
4 INJECTION INTRAMUSCULAR; INTRAVENOUS EVERY 4 HOURS PRN
Status: DISPENSED | OUTPATIENT
Start: 2019-11-20 | End: 2019-11-22

## 2019-11-20 RX ORDER — SODIUM PHOSPHATE, DIBASIC AND SODIUM PHOSPHATE, MONOBASIC 7; 19 G/133ML; G/133ML
1 ENEMA RECTAL ONCE AS NEEDED
Status: DISCONTINUED | OUTPATIENT
Start: 2019-11-20 | End: 2019-11-23

## 2019-11-20 RX ORDER — ACETAMINOPHEN 325 MG/1
650 TABLET ORAL EVERY 6 HOURS PRN
Status: DISCONTINUED | OUTPATIENT
Start: 2019-11-20 | End: 2019-11-20 | Stop reason: HOSPADM

## 2019-11-20 RX ORDER — BUPROPION HYDROCHLORIDE 300 MG/1
300 TABLET ORAL DAILY
Status: DISCONTINUED | OUTPATIENT
Start: 2019-11-20 | End: 2019-11-23

## 2019-11-20 RX ORDER — DIPHENHYDRAMINE HYDROCHLORIDE 50 MG/ML
25 INJECTION INTRAMUSCULAR; INTRAVENOUS ONCE AS NEEDED
Status: ACTIVE | OUTPATIENT
Start: 2019-11-20 | End: 2019-11-20

## 2019-11-20 RX ORDER — SIMVASTATIN 40 MG
40 TABLET ORAL NIGHTLY
COMMUNITY
End: 2020-01-07

## 2019-11-20 RX ORDER — DOCUSATE SODIUM 100 MG/1
100 CAPSULE, LIQUID FILLED ORAL 2 TIMES DAILY
Status: DISCONTINUED | OUTPATIENT
Start: 2019-11-20 | End: 2019-11-23

## 2019-11-20 RX ORDER — METOCLOPRAMIDE HYDROCHLORIDE 5 MG/ML
10 INJECTION INTRAMUSCULAR; INTRAVENOUS AS NEEDED
Status: DISCONTINUED | OUTPATIENT
Start: 2019-11-20 | End: 2019-11-20 | Stop reason: HOSPADM

## 2019-11-20 RX ORDER — BUPIVACAINE HYDROCHLORIDE AND EPINEPHRINE 2.5; 5 MG/ML; UG/ML
INJECTION, SOLUTION EPIDURAL; INFILTRATION; INTRACAUDAL; PERINEURAL AS NEEDED
Status: DISCONTINUED | OUTPATIENT
Start: 2019-11-20 | End: 2019-11-20 | Stop reason: SURG

## 2019-11-20 RX ORDER — BISACODYL 10 MG
10 SUPPOSITORY, RECTAL RECTAL
Status: DISCONTINUED | OUTPATIENT
Start: 2019-11-20 | End: 2019-11-23

## 2019-11-20 RX ORDER — MONTELUKAST SODIUM 10 MG/1
10 TABLET ORAL NIGHTLY
Status: DISCONTINUED | OUTPATIENT
Start: 2019-11-20 | End: 2019-11-23

## 2019-11-20 RX ORDER — ASPIRIN 325 MG
325 TABLET ORAL 2 TIMES DAILY
Status: CANCELLED | OUTPATIENT
Start: 2019-11-20

## 2019-11-20 RX ORDER — HYDROCODONE BITARTRATE AND ACETAMINOPHEN 10; 325 MG/1; MG/1
2 TABLET ORAL AS NEEDED
Status: DISCONTINUED | OUTPATIENT
Start: 2019-11-20 | End: 2019-11-20 | Stop reason: HOSPADM

## 2019-11-20 RX ORDER — LISINOPRIL 20 MG/1
20 TABLET ORAL DAILY
COMMUNITY
End: 2020-01-07

## 2019-11-20 RX ORDER — POLYETHYLENE GLYCOL 3350 17 G/17G
17 POWDER, FOR SOLUTION ORAL DAILY PRN
Status: DISCONTINUED | OUTPATIENT
Start: 2019-11-20 | End: 2019-11-23

## 2019-11-20 RX ORDER — HYDROMORPHONE HYDROCHLORIDE 1 MG/ML
0.4 INJECTION, SOLUTION INTRAMUSCULAR; INTRAVENOUS; SUBCUTANEOUS EVERY 2 HOUR PRN
Status: DISPENSED | OUTPATIENT
Start: 2019-11-20 | End: 2019-11-22

## 2019-11-20 RX ORDER — HYDROMORPHONE HYDROCHLORIDE 1 MG/ML
0.2 INJECTION, SOLUTION INTRAMUSCULAR; INTRAVENOUS; SUBCUTANEOUS EVERY 2 HOUR PRN
Status: ACTIVE | OUTPATIENT
Start: 2019-11-20 | End: 2019-11-22

## 2019-11-20 RX ORDER — HYDROCODONE BITARTRATE AND ACETAMINOPHEN 10; 325 MG/1; MG/1
1 TABLET ORAL AS NEEDED
Status: DISCONTINUED | OUTPATIENT
Start: 2019-11-20 | End: 2019-11-20 | Stop reason: HOSPADM

## 2019-11-20 RX ORDER — DEXTROSE MONOHYDRATE 25 G/50ML
50 INJECTION, SOLUTION INTRAVENOUS
Status: DISCONTINUED | OUTPATIENT
Start: 2019-11-20 | End: 2019-11-20 | Stop reason: HOSPADM

## 2019-11-20 RX ORDER — HYDROMORPHONE HYDROCHLORIDE 1 MG/ML
0.4 INJECTION, SOLUTION INTRAMUSCULAR; INTRAVENOUS; SUBCUTANEOUS EVERY 5 MIN PRN
Status: DISCONTINUED | OUTPATIENT
Start: 2019-11-20 | End: 2019-11-20 | Stop reason: HOSPADM

## 2019-11-20 RX ORDER — ATORVASTATIN CALCIUM 20 MG/1
20 TABLET, FILM COATED ORAL NIGHTLY
Status: DISCONTINUED | OUTPATIENT
Start: 2019-11-20 | End: 2019-11-23

## 2019-11-20 RX ORDER — DULOXETIN HYDROCHLORIDE 30 MG/1
30 CAPSULE, DELAYED RELEASE ORAL DAILY
Status: DISCONTINUED | OUTPATIENT
Start: 2019-11-20 | End: 2019-11-23

## 2019-11-20 RX ORDER — SENNOSIDES 8.6 MG
17.2 TABLET ORAL NIGHTLY
Status: DISCONTINUED | OUTPATIENT
Start: 2019-11-20 | End: 2019-11-23

## 2019-11-20 RX ORDER — LISINOPRIL 20 MG/1
20 TABLET ORAL DAILY
Status: DISCONTINUED | OUTPATIENT
Start: 2019-11-20 | End: 2019-11-23

## 2019-11-20 RX ORDER — OXYCODONE HYDROCHLORIDE 5 MG/1
5 TABLET ORAL EVERY 4 HOURS PRN
Status: DISCONTINUED | OUTPATIENT
Start: 2019-11-20 | End: 2019-11-21

## 2019-11-20 RX ORDER — PANTOPRAZOLE SODIUM 40 MG/1
40 TABLET, DELAYED RELEASE ORAL
Status: DISCONTINUED | OUTPATIENT
Start: 2019-11-21 | End: 2019-11-23

## 2019-11-20 RX ORDER — ZOLPIDEM TARTRATE 5 MG/1
5 TABLET ORAL NIGHTLY PRN
Status: DISCONTINUED | OUTPATIENT
Start: 2019-11-20 | End: 2019-11-23

## 2019-11-20 RX ORDER — ACETAMINOPHEN 325 MG/1
650 TABLET ORAL 4 TIMES DAILY
Status: DISPENSED | OUTPATIENT
Start: 2019-11-20 | End: 2019-11-22

## 2019-11-20 RX ORDER — HYDROCODONE BITARTRATE AND ACETAMINOPHEN 10; 325 MG/1; MG/1
1-2 TABLET ORAL EVERY 4 HOURS PRN
Qty: 60 TABLET | Refills: 0 | Status: SHIPPED | OUTPATIENT
Start: 2019-11-20 | End: 2020-09-16

## 2019-11-20 RX ORDER — METOCLOPRAMIDE HYDROCHLORIDE 5 MG/ML
10 INJECTION INTRAMUSCULAR; INTRAVENOUS EVERY 6 HOURS PRN
Status: DISPENSED | OUTPATIENT
Start: 2019-11-20 | End: 2019-11-22

## 2019-11-20 RX ORDER — METFORMIN HYDROCHLORIDE 500 MG/1
500 TABLET, EXTENDED RELEASE ORAL 2 TIMES DAILY WITH MEALS
COMMUNITY
End: 2020-01-07

## 2019-11-20 RX ORDER — GARLIC EXTRACT 500 MG
1 CAPSULE ORAL NIGHTLY
Status: DISCONTINUED | OUTPATIENT
Start: 2019-11-20 | End: 2019-11-23

## 2019-11-20 RX ORDER — DEXTROSE MONOHYDRATE 25 G/50ML
50 INJECTION, SOLUTION INTRAVENOUS
Status: DISCONTINUED | OUTPATIENT
Start: 2019-11-20 | End: 2019-11-23

## 2019-11-20 RX ORDER — DIPHENHYDRAMINE HCL 25 MG
25 CAPSULE ORAL EVERY 4 HOURS PRN
Status: DISCONTINUED | OUTPATIENT
Start: 2019-11-20 | End: 2019-11-23

## 2019-11-20 RX ORDER — ROPIVACAINE HYDROCHLORIDE 5 MG/ML
INJECTION, SOLUTION EPIDURAL; INFILTRATION; PERINEURAL AS NEEDED
Status: DISCONTINUED | OUTPATIENT
Start: 2019-11-20 | End: 2019-11-20 | Stop reason: SURG

## 2019-11-20 RX ORDER — ALBUTEROL SULFATE 90 UG/1
2 AEROSOL, METERED RESPIRATORY (INHALATION) EVERY 6 HOURS PRN
Status: DISCONTINUED | OUTPATIENT
Start: 2019-11-20 | End: 2019-11-23

## 2019-11-20 RX ORDER — MELATONIN
325
Status: DISCONTINUED | OUTPATIENT
Start: 2019-11-21 | End: 2019-11-23

## 2019-11-20 RX ORDER — MONTELUKAST SODIUM 10 MG/1
10 TABLET ORAL NIGHTLY
COMMUNITY
End: 2020-07-06

## 2019-11-20 RX ORDER — NALOXONE HYDROCHLORIDE 0.4 MG/ML
80 INJECTION, SOLUTION INTRAMUSCULAR; INTRAVENOUS; SUBCUTANEOUS AS NEEDED
Status: DISCONTINUED | OUTPATIENT
Start: 2019-11-20 | End: 2019-11-20 | Stop reason: HOSPADM

## 2019-11-20 RX ORDER — TIZANIDINE 4 MG/1
8 TABLET ORAL NIGHTLY
Status: DISCONTINUED | OUTPATIENT
Start: 2019-11-20 | End: 2019-11-23

## 2019-11-20 RX ORDER — FENTANYL 12 UG/H
0.5 PATCH TRANSDERMAL
Status: DISCONTINUED | OUTPATIENT
Start: 2019-11-20 | End: 2019-11-20

## 2019-11-20 RX ORDER — HYDROMORPHONE HYDROCHLORIDE 1 MG/ML
0.8 INJECTION, SOLUTION INTRAMUSCULAR; INTRAVENOUS; SUBCUTANEOUS EVERY 2 HOUR PRN
Status: DISPENSED | OUTPATIENT
Start: 2019-11-20 | End: 2019-11-22

## 2019-11-20 RX ORDER — ACETAMINOPHEN 500 MG
1000 TABLET ORAL ONCE
Status: DISCONTINUED | OUTPATIENT
Start: 2019-11-20 | End: 2019-11-20 | Stop reason: HOSPADM

## 2019-11-20 RX ADMIN — BUPIVACAINE HYDROCHLORIDE AND EPINEPHRINE 30 ML: 2.5; 5 INJECTION, SOLUTION EPIDURAL; INFILTRATION; INTRACAUDAL; PERINEURAL at 14:11:00

## 2019-11-20 RX ADMIN — ROPIVACAINE HYDROCHLORIDE 25 ML: 5 INJECTION, SOLUTION EPIDURAL; INFILTRATION; PERINEURAL at 14:09:00

## 2019-11-20 RX ADMIN — BUPIVACAINE HYDROCHLORIDE 1.7 ML: 7.5 INJECTION, SOLUTION INTRASPINAL at 13:59:00

## 2019-11-20 RX ADMIN — MIDAZOLAM HYDROCHLORIDE 6 MG: 1 INJECTION INTRAMUSCULAR; INTRAVENOUS at 14:00:00

## 2019-11-20 NOTE — ANESTHESIA PROCEDURE NOTES
Regional Block  Performed by: Vera Petersen MD  Authorized by: Vera Petersen MD       General Information and Staff    Start Time:  11/20/2019 2:09 PM  End Time:  11/20/2019 2:11 PM  Anesthesiologist:  Vera Petersen MD  Performed by:   Anesth

## 2019-11-20 NOTE — ANESTHESIA PROCEDURE NOTES
Spinal Block  Performed by: Patsy Odonnell MD  Authorized by: Patsy Odonnell MD       General Information and Staff    Start Time:  11/20/2019 1:59 PM  End Time:  11/20/2019 2:08 PM  Anesthesiologist:  Patsy Odonnell MD  Performed by:   Anesthes

## 2019-11-20 NOTE — OPERATIVE REPORT
DATE OF PROCEDURE:  11/20/2019  PREOPERATIVE DIAGNOSIS: Left knee osteoarthritis. POSTOPERATIVE DIAGNOSIS: Left knee osteoarthritis. PROCEDURE PERFORMED: Cemented left total knee arthroplasty. SURGEON:  Renita Rebollar M.D.   FIRST ASSISTANT:  Xenia Junior extension, good tracking of the patella. Drill holes were made in the distal femoral condyles in the trial template. Trial components were removed. Bony surfaces were irrigated and dried.  Final components were precoated with cement which had been mixed und

## 2019-11-20 NOTE — ANESTHESIA PREPROCEDURE EVALUATION
PRE-OP EVALUATION    Patient Name: Leigh Madera    Pre-op Diagnosis: Primary osteoarthritis of right knee [M17.11]  Chronic pain of left knee [M25.562, G89.29]  Other chronic pain [G89.29]  Primary osteoarthritis of left knee [M17.12]    Procedure(s HCl ER, XL, 150 MG Oral Tablet 24 Hr, Take 300 mg by mouth daily. , Disp: , Rfl:   DULoxetine HCl 30 MG Oral Cap DR Particles, Take 30 mg by mouth daily.  To take along with 60mg to equal 90mg daily, Disp: , Rfl:   DULoxetine HCl 60 MG Oral Cap DR Particles, (+) arthritis       Pulmonary      (+) asthma              (+) sleep apnea       Neuro/Psych                 (+) neuromuscular disease                   Past Surgical History:   Procedure Laterality Date   • ABDOMEN SURGERY PROC UNLISTED normal.  Rhythm: regular  Rate: normal     Dental             Pulmonary    Pulmonary exam normal.  Breath sounds clear to auscultation bilaterally.                Other findings            ASA: 2   Plan: regional, MAC and spinal  NPO status verified and pat

## 2019-11-20 NOTE — ANESTHESIA POSTPROCEDURE EVALUATION
[address] Patient Status:  Surgery Admit - Inpt   Age/Gender 69year old female MRN [id number]   [facility] SURGERY Attending Phong Wilson MD   Hosp Day # 0 PCP Chiqui Sahu MD       Anesthesia Post-op Not

## 2019-11-20 NOTE — ANESTHESIA PROCEDURE NOTES
Regional Block  Performed by: Kami Stokes MD  Authorized by: Kami Stokes MD       General Information and Staff    Start Time:  11/20/2019 2:11 PM  End Time:  11/20/2019 2:13 PM  Anesthesiologist:  Kami Stokes MD  Performed by:   Anesth

## 2019-11-20 NOTE — INTERVAL H&P NOTE
Pre-op Diagnosis: Primary osteoarthritis of right knee [M17.11]  Chronic pain of left knee [M25.562, G89.29]  Other chronic pain [G89.29]  Primary osteoarthritis of left knee [M17.12]    The above referenced H&P was reviewed by Lolly Lorenz MD on 11/20

## 2019-11-21 ENCOUNTER — APPOINTMENT (OUTPATIENT)
Dept: GENERAL RADIOLOGY | Facility: HOSPITAL | Age: 74
DRG: 470 | End: 2019-11-21
Attending: INTERNAL MEDICINE
Payer: MEDICARE

## 2019-11-21 PROCEDURE — 71046 X-RAY EXAM CHEST 2 VIEWS: CPT | Performed by: INTERNAL MEDICINE

## 2019-11-21 PROCEDURE — 99232 SBSQ HOSP IP/OBS MODERATE 35: CPT | Performed by: INTERNAL MEDICINE

## 2019-11-21 RX ORDER — TRAMADOL HYDROCHLORIDE 50 MG/1
50 TABLET ORAL EVERY 6 HOURS PRN
Status: DISCONTINUED | OUTPATIENT
Start: 2019-11-21 | End: 2019-11-22

## 2019-11-21 RX ORDER — OXYCODONE HYDROCHLORIDE 15 MG/1
15 TABLET ORAL EVERY 4 HOURS PRN
Status: DISPENSED | OUTPATIENT
Start: 2019-11-21 | End: 2019-11-22

## 2019-11-21 RX ORDER — HYDROCODONE BITARTRATE AND ACETAMINOPHEN 10; 325 MG/1; MG/1
1 TABLET ORAL EVERY 4 HOURS PRN
Status: DISCONTINUED | OUTPATIENT
Start: 2019-11-22 | End: 2019-11-23

## 2019-11-21 RX ORDER — TIZANIDINE 4 MG/1
4 TABLET ORAL 3 TIMES DAILY PRN
Status: DISCONTINUED | OUTPATIENT
Start: 2019-11-21 | End: 2019-11-23

## 2019-11-21 RX ORDER — HYDROCODONE BITARTRATE AND ACETAMINOPHEN 10; 325 MG/1; MG/1
2 TABLET ORAL EVERY 4 HOURS PRN
Status: DISCONTINUED | OUTPATIENT
Start: 2019-11-22 | End: 2019-11-23

## 2019-11-21 RX ORDER — OXYCODONE HYDROCHLORIDE 5 MG/1
5 TABLET ORAL EVERY 4 HOURS PRN
Status: ACTIVE | OUTPATIENT
Start: 2019-11-21 | End: 2019-11-22

## 2019-11-21 RX ORDER — OXYCODONE HYDROCHLORIDE 10 MG/1
10 TABLET ORAL EVERY 4 HOURS PRN
Status: DISPENSED | OUTPATIENT
Start: 2019-11-21 | End: 2019-11-22

## 2019-11-21 NOTE — PHYSICAL THERAPY NOTE
PHYSICAL THERAPY KNEE TREATMENT NOTE - INPATIENT     Room Number: 365/365-A     Session: 1  Number of Visits to Meet Established Goals: 4    Presenting Problem: s/p left TKA 11/20/19     History related to current admission: Pt admitted 11/20/19 for elect CHOLECYSTECTOMY     • COLONOSCOPY  10/1/10, 4/21/17   • EGD  4/21/17   • FEMUR/KNEE SURG UNLISTED      right knee arthroscopy   • HERNIA SURGERY     • HYSTERECTOMY      @ age 46   • LUMPECTOMY LEFT  1999   • OOPHORECTOMY      @ age 46   • OTHER SURGICAL HI assist  Distance (ft): 150  Assistive Device: Rolling walker  Pattern: L Decreased stance time;L Flexed knee  Stoop/Curb Assistance: Not tested  Comment : na    Skilled Therapy Provided: AM:  Pt brought to PT gym via bedside chair.   Pt educated in goals fo Patient End of Session: Up in chair; With [facility] staff;Needs met;RN aware of session/findings; All patient questions and concerns addressed    ASSESSMENT   Pt participated in group PT QD today with fair tolerance, pt unable ot come to second session due to c

## 2019-11-21 NOTE — PHYSICAL THERAPY NOTE
PHYSICAL THERAPY KNEE EVALUATION - INPATIENT     Room Number: 365/365-A  Evaluation Date: 11/21/2019  Type of Evaluation: Initial  Physician Order: PT Eval and Treat    Presenting Problem: s/p left TKA 11/20/19  Reason for Therapy: Mobility Dysfunction and by Laney Yeh MD at [facility] MAIN OR   • CHOLECYSTECTOMY     • COLONOSCOPY  10/1/10, 4/21/17   • EGD  4/21/17   • FEMUR/KNEE SURG UNLISTED      right knee arthroscopy   • HERNIA SURGERY     • HYSTERECTOMY      @ age 46   • LUMPECTOMY LEFT  1999   • Miles Musa ADDITIONAL TESTS                                 ACTIVITY TOLERANCE                         O2 WALK                  AM-PAC '6-Clicks' INPATIENT SHORT FORM - BASIC MOBILITY  How much difficulty does the patient current discussed assist need with PCT.           Exercise/Education Provided:  Energy conservation  Functional activity tolerated  Gait training  Posture  Transfer training    Patient End of Session: Up in chair;Needs met;Call light within reach;RN aware of sandhya level: modified  independent    Goal #4    Patient will negotiate 4 stairs/one curb w/ assistive device and supervision    Goal #5    AROM 0 degrees extension to 95 degrees flexion      Goal #6        Goal Comments: Goals established on 11/21/2019

## 2019-11-21 NOTE — PLAN OF CARE
RECD ALERT ,AWAKE, ORIENTED, FROM RR PER BED ACC BY DAUGHTER. NO RESP DISTRESS. SEE ASSESSMENT. CALL LIGHT W/I REACH. OMFORTABLE. DENIES ANY PAIN YET. R.T. FOR CPAP SET UP. CALL LIGHT W/IN REACH. TO CALL FOR ALL NEEDS AND ASSISTANCE.  PT AND DAUGHTER AWARE

## 2019-11-21 NOTE — PROGRESS NOTES
[facility]  Progress Note    Xin Morin Patient Status:  Inpatient    1945 MRN [id number]   [facility] 3SW-A Attending Chaya Newell MD   Hosp Day # 1 PCP Branden North MD     CC: Medical management status post lef .0 11/21/2019    INR 1.04 11/20/2019    PTP 14.0 11/20/2019    PGLU 149 11/21/2019       Imaging:  Imaging reviewed in Epic.     Meds:   rivaroxaban (XARELTO) tab 10 mg, 10 mg, Oral, Daily with dinner  lactated ringers infusion, , Intravenous, Contin hr tab 300 mg, 300 mg, Oral, Daily  DULoxetine HCl (CYMBALTA) DR particles cap 30 mg, 30 mg, Oral, Daily  DULoxetine HCl (CYMBALTA) DR particles cap 60 mg, 60 mg, Oral, Daily  Fluticasone Furoate-Vilanterol (BREO ELLIPTA) 100-25 MCG/INH inhaler 1 puff, 1 p bedside.             Flako Gutierres MD  11/21/2019  10:45 AM

## 2019-11-21 NOTE — PROGRESS NOTES
[address] Patient Status:  Inpatient    1945 MRN [id number]   [facility] 3SW-A Attending Ilene Murray MD   Hosp Day # 1 PCP Jeremy Jackson MD     Subjective:  S/P LEFT Total Knee Arthroplasty  System

## 2019-11-21 NOTE — HOME CARE LIAISON
MET WITH PTNT AND OFFERED CHOICE  OF AGENCIES. PTNT AGREEABLE TO [facility]. MET WITH PTNT TO DISCUSS HOME HEALTH SERVICES AND COVERAGE CRITERIA. PTNT AGREEABLE TO [facility]. PTNT GIVEN RESIDENTIAL BROCHURE.  RESIDENTIAL WITH PROVIDE SN/PT ON DISC

## 2019-11-21 NOTE — PLAN OF CARE
Assumed care of patient at [facility]. Monitor on tele-NSR,  on RA, CPAP HS. L knee drsg C/D/I, ice therapy, tens. C/o L knee pain. PRN meds provided. Fall precautions in place. IV antibiotic.  Labs in am. Will continue to monitor

## 2019-11-21 NOTE — CONSULTS
[facility]  [address] Patient Status:  Inpatient    1945 MRN [id number]   [facility] 3SW-A Attending Diana Reddy MD   Hosp Day # 0 PCP Eunice Ramsay MD     Reason for consult: med management CHOLECYSTECTOMY     • COLONOSCOPY  10/1/10, 4/21/17   • EGD  4/21/17   • FEMUR/KNEE SURG UNLISTED      right knee arthroscopy   • HERNIA SURGERY     • HYSTERECTOMY      @ age 46   • LUMPECTOMY LEFT  1999   • OOPHORECTOMY      @ age 46   • OTHER SURGICAL HI Take 500 mg by mouth 2 (two) times daily with meals. , Disp: , Rfl:   Acetaminophen (ACETAMINOPHEN EXTRA STRENGTH) 500 MG Oral Cap, Take 1,000 mg by mouth one time. , Disp: , Rfl:   Montelukast Sodium 10 MG Oral Tab, Take 10 mg by mouth nightly., Disp: , Rfl rhonchi. Cardiovascular: S1, S2. Regular rate and rhythm. No murmurs, rubs or gallops. Equal pulses. Chest and Back: No tenderness or deformity. Abdomen: Soft, nontender, nondistended. Positive bowel sounds. No rebound, guarding or organomegaly.   Diana Jean-Baptiste

## 2019-11-21 NOTE — OCCUPATIONAL THERAPY NOTE
OCCUPATIONAL THERAPY EVALUATION - INPATIENT     Room Number: 365/365-A  Evaluation Date: 11/21/2019  Type of Evaluation: Initial  Presenting Problem: s/p L TKA 11/20/19    Physician Order: IP Consult to Occupational Therapy  Reason for Therapy: ADL/IADL Dy MAIN OR   • CERVICAL FACET INJECTION Right 5/15/2014    Performed by Deyanira Rea MD at [facility] MAIN OR   • CHOLECYSTECTOMY     • COLONOSCOPY  10/1/10, 4/21/17   • EGD  4/21/17   • FEMUR/KNEE SURG UNLISTED      right knee arthroscopy   • HERNIA SURGERY TESTS                                    NEUROLOGICAL FINDINGS  Neurological Findings: None                ACTIVITY TOLERANCE                         O2 SATURATIONS                ACTIVITIES OF DAILY LIVING ASSESSMENT  AM-PAC ‘6-Clicks’ Inpatient Daily Act noted above. Functional outcome measures completed include: AM-PAC \"6 Clicks\".  In this OT evaluation patient presents with the following performance deficits: increased pain, decreased balance, decreased endurance, decreased knowledge of adaptive techniq

## 2019-11-21 NOTE — RESPIRATORY THERAPY NOTE
MUKUND - Equipment Use Daily Summary:                  . Set Mode: AUTO CPAP WITH C-FLEX                . Usage in hours: 0:29                . 90% Pressure (EPAP) level: 9.0                . 90% Insp. Pressure (IPAP): Yohannes Neal  AHI: 38.8

## 2019-11-22 ENCOUNTER — APPOINTMENT (OUTPATIENT)
Dept: HEMATOLOGY/ONCOLOGY | Facility: HOSPITAL | Age: 74
End: 2019-11-22
Attending: INTERNAL MEDICINE
Payer: MEDICARE

## 2019-11-22 PROCEDURE — 99232 SBSQ HOSP IP/OBS MODERATE 35: CPT | Performed by: INTERNAL MEDICINE

## 2019-11-22 RX ORDER — PREGABALIN 75 MG/1
75 CAPSULE ORAL 2 TIMES DAILY
Status: DISCONTINUED | OUTPATIENT
Start: 2019-11-22 | End: 2019-11-23

## 2019-11-22 RX ORDER — PSEUDOEPHEDRINE HCL 30 MG
TABLET ORAL
Qty: 60 CAPSULE | Refills: 0 | Status: SHIPPED | COMMUNITY
Start: 2019-11-22 | End: 2020-01-07

## 2019-11-22 RX ORDER — TRAMADOL HYDROCHLORIDE 50 MG/1
50 TABLET ORAL EVERY 6 HOURS PRN
Status: DISCONTINUED | OUTPATIENT
Start: 2019-11-22 | End: 2019-11-23

## 2019-11-22 RX ORDER — POLYETHYLENE GLYCOL 3350 17 G/17G
17 POWDER, FOR SOLUTION ORAL DAILY PRN
Qty: 10 EACH | Refills: 0 | Status: SHIPPED | COMMUNITY
Start: 2019-11-22 | End: 2020-01-07

## 2019-11-22 NOTE — OCCUPATIONAL THERAPY NOTE
OCCUPATIONAL THERAPY TREATMENT NOTE - INPATIENT     Room Number: 365/365-A  Session: 1   Number of Visits to Meet Established Goals: 3    Presenting Problem: s/p L TKA 11/20/19    History related to current admission: Patient is 69year old female admitted CHOLECYSTECTOMY     • COLONOSCOPY  10/1/10, 4/21/17   • EGD  4/21/17   • FEMUR/KNEE SURG UNLISTED      right knee arthroscopy   • HERNIA SURGERY     • HYSTERECTOMY      @ age 46   • KNEE TOTAL REPLACEMENT Left 11/20/2019    Performed by Luna Bloch received sup in chair for session, pt completed sit to stand from chair height with supervision, pt doffed socks and donned shoes with supervision, pt amb in room with CGA and RW, pt completed t/f to and from toilet with supervision.   Patient End of Sessio

## 2019-11-22 NOTE — PROGRESS NOTES
[facility]  Progress Note    Roulazenaida Joshua Patient Status:  Inpatient    1945 MRN [id number]   [facility] 3SW-A Attending Zane Garcia MD   Hosp Day # 2 PCP Durand Sandifer, MD     CC: Medical management status post lef + LAT CHEST (CPT=71046)     INDICATIONS:  fever     COMPARISON:  [facility] , XR, XR CHEST AP PORTABLE  (CPT=71045), 7/24/2018, 6:22. TECHNIQUE:  PA and lateral chest radiographs were obtained.      PATIENT STATED HISTORY: (As transcribed by Technologist) (AMBIEN) tab 5 mg, 5 mg, Oral, Nightly PRN  acetaminophen (TYLENOL) tab 650 mg, 650 mg, Oral, QID  HYDROmorphone HCl (DILAUDID) 1 MG/ML injection 0.2 mg, 0.2 mg, Intravenous, Q2H PRN    Or  HYDROmorphone HCl (DILAUDID) 1 MG/ML injection 0.4 mg, 0.4 mg, Int insulin Novolog correction factor coverage as needed  3. Chronic pain  1. continue home medication  4. Hyperlipidemia-statin  5. History of DVT and PE  1. patient on Xarelto  6. Low-grade fever possibly due to atelectasis  1.  Fever resolved  2. chest x-ray

## 2019-11-22 NOTE — PROGRESS NOTES
[address] Patient Status:  Inpatient    1945 MRN [id number]   [facility] 3SW-A Attending Levon Last MD   [address] Day # 2 PCP Guille Vicente MD     Subjective:  S/P LEFT Total Knee Arthroplasty  System

## 2019-11-22 NOTE — PLAN OF CARE
Pt c/o severe pain to left knee today. Left leg pain is \"cramping. \" Pain improves with walking. Oxy ir 15 mg, iv dilaudid 0.8 mg, and zanaflex 4 mg given for pain control. Pt had a temp of 100.2 this am. Dr. Arlyn Rivera notified, chest xray completed.  Pt c/o

## 2019-11-22 NOTE — PLAN OF CARE
Pain controlled with PO PRN meds. BP decreased after giving scheduled Zanaflex. Denied dizziness. Ace wrap is clean and intact. Refusing ice packs. Impulsive at times when getting up to bathroom. Bed alarm on for safety. Tele monitoring, per MUKUND protocol.

## 2019-11-22 NOTE — CM/SW NOTE
68 yo sp total knee replacement. Post op protocol orders for discharge planning. No preop joint journey plan.      HOME SITUATION  Type of Home: House   Home Layout: One level  Stairs to Enter : 2  Stairs to Bedroom: 0     Lives With: Family(adult grand

## 2019-11-22 NOTE — PROGRESS NOTES
Post Op Day 2 Ortho Note: Left TKA    Assessed patient in chair. States pain is managed with medications; denies itching/nausea/dizziness. Able to bear weight on sx leg; equal sensation in BLE. No further recommendations. Will sign-off.     Plan di

## 2019-11-22 NOTE — RESPIRATORY THERAPY NOTE
MUKUND - Equipment Use Daily Summary:                  . Set Mode:                . Usage in hours:                . 90% Pressure (EPAP) level:                . 90% Insp. Pressure (IPAP): Laron Mcdaniel AHI:                .  Supplemental Oxygen:    LPM

## 2019-11-23 VITALS
BODY MASS INDEX: 30.88 KG/M2 | SYSTOLIC BLOOD PRESSURE: 147 MMHG | DIASTOLIC BLOOD PRESSURE: 65 MMHG | HEIGHT: 61 IN | WEIGHT: 163.56 LBS | TEMPERATURE: 98 F | HEART RATE: 108 BPM | OXYGEN SATURATION: 93 % | RESPIRATION RATE: 20 BRPM

## 2019-11-23 PROBLEM — Z99.89 OSA ON CPAP: Status: ACTIVE | Noted: 2019-10-07

## 2019-11-23 PROBLEM — G47.33 OSA ON CPAP: Status: ACTIVE | Noted: 2019-10-07

## 2019-11-23 PROCEDURE — 99232 SBSQ HOSP IP/OBS MODERATE 35: CPT | Performed by: INTERNAL MEDICINE

## 2019-11-23 NOTE — PLAN OF CARE
Left knee pain moderately controlled on norco and zanaflex today. Pt nausea and severe pain during physical therapy session today and was unable to complete the stairs. IV zofran and reglan given for nausea.  Plan for pt to have another physical therapy ses

## 2019-11-23 NOTE — PLAN OF CARE
PT aox4 tonight. VSS, using cpap at night. Voiding freely, ambulating to bathroom. Denies numbness or tingling. Pain controlled via po pain meds. Dressing CDI. Safety precautions in place, bed in lowest position, call light in reach. Will CTM.

## 2019-11-23 NOTE — PLAN OF CARE
States ready to go home. Patient and family viewed discharge video. Verbal and written discharge instructions reviewed with all. Verbalizes understanding. Discharged with prescription for norco, via wheel chair, accompanied by support staff.

## 2019-11-23 NOTE — PHYSICAL THERAPY NOTE
PHYSICAL THERAPY KNEE TREATMENT NOTE - INPATIENT     Room Number: 365/365-A     Session: 2  Number of Visits to Meet Established Goals: 4    Presenting Problem: s/p left TKA 11/20/19     History related to current admission: Pt admitted 11/20/19 for elect MAIN OR   • CHOLECYSTECTOMY     • COLONOSCOPY  10/1/10, 4/21/17   • EGD  4/21/17   • FEMUR/KNEE SURG UNLISTED      right knee arthroscopy   • HERNIA SURGERY     • HYSTERECTOMY      @ age 46   • KNEE TOTAL REPLACEMENT Left 11/20/2019    Performed by Deepak Graves 3-5 steps with a railing?: A Little       AM-PAC Score:  Raw Score: 22   Approx Degree of Impairment: 20.91%   Standardized Score (AM-PAC Scale): 53.28   CMS Modifier (G-Code): CJ    FUNCTIONAL ABILITY STATUS  Gait Assessment   Gait Assistance: Supervision mobility impairment. The patient is below their baseline and would benefit from continued skilled PT to address the activity limitations identified by the AM-PAC \"6 clicks\".      At this time, Pt. presents with decreased balance, impaired strength, diffi

## 2019-11-23 NOTE — RESPIRATORY THERAPY NOTE
MUKUND : EQUIPMENT USE: DAILY SUMMARY                                            SET MODE: AUTO CPAP WITH CFLEX                                          USAGE IN HOURS: 5:24                                          90

## 2019-11-23 NOTE — PROGRESS NOTES
[facility]  Progress Note    Abraham Santy Patient Status:  Inpatient    1945 MRN [id number]   [facility] 3SW-A Attending Sid Bain MD   Hosp Day # 3 PCP Diomedes Ruby MD     CC: Medical management status post lef INDICATIONS:  fever     COMPARISON:  [facility] , XR, XR CHEST AP PORTABLE  (CPT=71045), 7/24/2018, 6:22. TECHNIQUE:  PA and lateral chest radiographs were obtained.      PATIENT STATED HISTORY: (As transcribed by Technologist)           FINDINGS:    LUNG PRN  Acidophilus/Pectin (PROBIOTIC) CAPS 1 capsule, 1 capsule, Oral, Nightly  Albuterol Sulfate  (90 Base) MCG/ACT inhaler 2 puff, 2 puff, Inhalation, Q6H PRN  buPROPion HCl ER (XL) (WELLBUTRIN XL) 300 MG 24 hr tab 300 mg, 300 mg, Oral, Daily  DULox Xarelto      Estimated date of discharge: per ortho  Discharge is dependent on: Pain control, orthopedics  At this point Ms. Kaylene Baum  is expected to be discharge to:Home with home health PT; Intermittent Supervision Per PT OT      Questions/concerns an

## 2019-11-23 NOTE — PROGRESS NOTES
[address] Patient Status:  Inpatient    1945 MRN [id number]   [facility] 3SW-A Attending Cirilo Barrera MD   Hosp Day # 3 PCP Zuleyma Maxwell MD     Subjective:  POD #3 left total knee arthroplasty    P

## 2019-11-23 NOTE — PLAN OF CARE
Sitting up in chair. Cap refill to ble < 3 seconds. Denies numbness, tingling. States pain adequately controlled on oral pain medication. Also wearing fentanyl duragesic patch. States ready to go home late today after physical therapy.   Instructed on

## 2019-11-25 ENCOUNTER — TELEPHONE (OUTPATIENT)
Dept: INTERNAL MEDICINE CLINIC | Facility: CLINIC | Age: 74
End: 2019-11-25

## 2019-11-25 ENCOUNTER — PATIENT OUTREACH (OUTPATIENT)
Dept: CASE MANAGEMENT | Age: 74
End: 2019-11-25

## 2019-11-25 DIAGNOSIS — Z02.9 ENCOUNTERS FOR UNSPECIFIED ADMINISTRATIVE PURPOSE: ICD-10-CM

## 2019-11-25 DIAGNOSIS — M17.12 PRIMARY OSTEOARTHRITIS OF LEFT KNEE: ICD-10-CM

## 2019-11-25 PROCEDURE — 1111F DSCHRG MED/CURRENT MED MERGE: CPT

## 2019-11-25 NOTE — CM/SW NOTE
11/25/19 0800   Discharge disposition   Expected discharge disposition Home-Health   Name of Facillity/Home Care/Hospice Residential   Discharge transportation Private car   11/23/19

## 2019-11-25 NOTE — TELEPHONE ENCOUNTER
Spoke with Laura Tamayo RN-[facility], per SD ok to continue both Xarelto and ASA 81mg. Laura Tamayo verbalized understanding.

## 2019-11-25 NOTE — DISCHARGE SUMMARY
[facility]  Discharge Summary    Trupti Lutz Patient Status:  Inpatient    1945 MRN [id number]   [facility] 3SW-A Attending No att. providers found   Hosp Day # 3 PCP Roscoe Conti MD     Date of Admission: 2019 D This is colace, a stool softener. Over-the-counter medication. Take twice daily while you are taking pain medications. Notes to patient:   This is an over-the-counter medication, to be purchased from your pharmacy      PEG 3350 Pack  Commonly known as:  MI Commonly known as:  BETTIE     Ondansetron HCl 4 mg tablet  Commonly known as:  ZOFRAN  Take 1 tablet (4 mg total) by mouth every 12 (twelve) hours as needed for Nausea.   Notes to patient:  Last dose: 11/22/19: @ 8:30 am     Pantoprazole Sodium 40 MG Tb

## 2019-11-25 NOTE — TELEPHONE ENCOUNTER
Calling to inform start of care yesterday for nursing and PT due to just having a knee replacement, Also there is a drug interaction xarelto and aspirin. Is SD aware and if there any changes that SD would like to do?  Please advise

## 2019-11-26 NOTE — PROGRESS NOTES
Initial Post Discharge Follow Up   Discharge Date: 11/23/19  Contact Date: 11/25/2019    Consent Verification:  Assessment Completed With: Patient  HIPAA Verified?   Yes    Discharge Dx:    Primary Osteoarthritis left knee, S/P left TKA  HX: DM II    Was was your diagnoses explained to you? Yes  • Do you have any questions about your diagnoses? No  • Are you able to perform normal daily activities of living as you have prior to your hospital stay (dressing, bathing, ambulating to the bathroom, etc)?  yes  • 12 MCG/HR Transdermal Patch 72 Hr Place 0.5 patches onto the skin. Every 5 days      • Ondansetron HCl (ZOFRAN) 4 mg tablet Take 1 tablet (4 mg total) by mouth every 12 (twelve) hours as needed for Nausea.  20 tablet 1   • XARELTO 20 MG Oral Tab TAKE ONE TA remember what this might have been?         (put X by all the patient mentions:       X   repositioning            sleep/relaxation kit              music           TV         I-pad                       reading         X   Cold-pack      other____________ LISANDRO, ROHINI  [address] [place] [address] Olesya Schwarz [phone]          PCP TCM/HFU appointment: scheduled at D/C within 7-14 days  no     NCM Reviewed/scheduled/rescheduled PCP TCM/HFU appointment with pt:  Yes, N

## 2019-11-29 ENCOUNTER — OFFICE VISIT (OUTPATIENT)
Dept: HEMATOLOGY/ONCOLOGY | Facility: HOSPITAL | Age: 74
End: 2019-11-29
Attending: INTERNAL MEDICINE
Payer: MEDICARE

## 2019-11-29 VITALS
SYSTOLIC BLOOD PRESSURE: 145 MMHG | HEART RATE: 100 BPM | TEMPERATURE: 99 F | HEIGHT: 60.98 IN | OXYGEN SATURATION: 97 % | BODY MASS INDEX: 31.23 KG/M2 | DIASTOLIC BLOOD PRESSURE: 73 MMHG | WEIGHT: 165.38 LBS | RESPIRATION RATE: 18 BRPM

## 2019-11-29 DIAGNOSIS — D50.0 IRON DEFICIENCY ANEMIA DUE TO CHRONIC BLOOD LOSS: ICD-10-CM

## 2019-11-29 DIAGNOSIS — D80.3 SELECTIVE DEFICIENCY OF IGG SUBCLASSES (HCC): Primary | ICD-10-CM

## 2019-11-29 PROCEDURE — 96366 THER/PROPH/DIAG IV INF ADDON: CPT

## 2019-11-29 PROCEDURE — 96365 THER/PROPH/DIAG IV INF INIT: CPT

## 2019-11-29 RX ORDER — DIPHENHYDRAMINE HCL 25 MG
25 CAPSULE ORAL ONCE
Status: COMPLETED | OUTPATIENT
Start: 2019-11-29 | End: 2019-11-29

## 2019-11-29 RX ORDER — ACETAMINOPHEN 325 MG/1
650 TABLET ORAL ONCE
Status: COMPLETED | OUTPATIENT
Start: 2019-11-29 | End: 2019-11-29

## 2019-11-29 RX ORDER — SODIUM CHLORIDE 0.9 % (FLUSH) 0.9 %
10 SYRINGE (ML) INJECTION ONCE
Status: COMPLETED | OUTPATIENT
Start: 2019-11-29 | End: 2019-11-29

## 2019-11-29 RX ADMIN — SODIUM CHLORIDE 0.9 % (FLUSH) 10 ML: 0.9 % SYRINGE (ML) INJECTION at 12:15:00

## 2019-11-29 RX ADMIN — ACETAMINOPHEN 650 MG: 325 TABLET ORAL at 09:02:00

## 2019-11-29 RX ADMIN — DIPHENHYDRAMINE HCL 25 MG: 25 MG CAPSULE ORAL at 09:02:00

## 2019-11-29 NOTE — PROGRESS NOTES
Education Record    Learner:  Patient    Disease / Diagnosis:  IgG deficiency - IVIG infusion    Barriers / Limitations:  None   Comments:    Method:  Brief focused and Reinforcement   Comments:    General Topics:  Plan of care reviewed   Comments:    Janet Barrera

## 2019-11-30 PROCEDURE — 99490 CHRNC CARE MGMT STAFF 1ST 20: CPT

## 2019-12-03 ENCOUNTER — PATIENT OUTREACH (OUTPATIENT)
Dept: CASE MANAGEMENT | Age: 74
End: 2019-12-03

## 2019-12-03 DIAGNOSIS — Z85.828 HISTORY OF BASAL CELL CARCINOMA: ICD-10-CM

## 2019-12-03 DIAGNOSIS — G47.33 OSA ON CPAP: ICD-10-CM

## 2019-12-03 DIAGNOSIS — I10 BENIGN ESSENTIAL HTN: ICD-10-CM

## 2019-12-03 DIAGNOSIS — Z99.89 OSA ON CPAP: ICD-10-CM

## 2019-12-03 DIAGNOSIS — E78.00 PURE HYPERCHOLESTEROLEMIA: ICD-10-CM

## 2019-12-03 DIAGNOSIS — G89.4 CHRONIC PAIN SYNDROME: ICD-10-CM

## 2019-12-03 DIAGNOSIS — Z85.3 HISTORY OF LEFT BREAST CANCER: ICD-10-CM

## 2019-12-03 DIAGNOSIS — E53.8 B12 DEFICIENCY: ICD-10-CM

## 2019-12-03 DIAGNOSIS — M17.12 PRIMARY OSTEOARTHRITIS OF LEFT KNEE: ICD-10-CM

## 2019-12-03 DIAGNOSIS — F41.9 ANXIETY: ICD-10-CM

## 2019-12-03 DIAGNOSIS — E78.5 DYSLIPIDEMIA: ICD-10-CM

## 2019-12-03 DIAGNOSIS — F32.A MILD DEPRESSION: ICD-10-CM

## 2019-12-03 NOTE — PROGRESS NOTES
12/3/2019  Spoke to Keily for CCM.       Updates to patient care team/ comments: None  Patient reported changes in medications: None   Med Adherence  Comment: Taking as prescribed     Health Maintenance:   Influenza Vaccine(1) due on 09/01/2019 Pt does not Improve energy level and fatigue. Pain management from torn meniscus. • Patient reported progress toward goal: Pt is using Fentanyl every 4-5 days. Pt relates her energy level is low at this time due to recent knee replacement.       • Update to previous

## 2019-12-11 ENCOUNTER — OFFICE VISIT (OUTPATIENT)
Dept: INTERNAL MEDICINE CLINIC | Facility: CLINIC | Age: 74
End: 2019-12-11
Payer: MEDICARE

## 2019-12-11 ENCOUNTER — LAB ENCOUNTER (OUTPATIENT)
Dept: LAB | Age: 74
End: 2019-12-11
Attending: NURSE PRACTITIONER
Payer: MEDICARE

## 2019-12-11 VITALS
HEART RATE: 120 BPM | HEIGHT: 60 IN | WEIGHT: 157 LBS | SYSTOLIC BLOOD PRESSURE: 128 MMHG | TEMPERATURE: 98 F | DIASTOLIC BLOOD PRESSURE: 64 MMHG | BODY MASS INDEX: 30.82 KG/M2

## 2019-12-11 DIAGNOSIS — M17.12 PRIMARY OSTEOARTHRITIS OF LEFT KNEE: ICD-10-CM

## 2019-12-11 DIAGNOSIS — D64.9 ANEMIA, UNSPECIFIED TYPE: ICD-10-CM

## 2019-12-11 DIAGNOSIS — Z99.89 OSA ON CPAP: ICD-10-CM

## 2019-12-11 DIAGNOSIS — I10 BENIGN ESSENTIAL HTN: ICD-10-CM

## 2019-12-11 DIAGNOSIS — G47.33 OSA ON CPAP: ICD-10-CM

## 2019-12-11 DIAGNOSIS — M17.12 PRIMARY OSTEOARTHRITIS OF LEFT KNEE: Primary | ICD-10-CM

## 2019-12-11 DIAGNOSIS — E78.00 PURE HYPERCHOLESTEROLEMIA: ICD-10-CM

## 2019-12-11 DIAGNOSIS — E11.65 UNCONTROLLED TYPE 2 DIABETES MELLITUS WITH HYPERGLYCEMIA (HCC): ICD-10-CM

## 2019-12-11 DIAGNOSIS — J45.20 MILD INTERMITTENT ASTHMA WITHOUT COMPLICATION: ICD-10-CM

## 2019-12-11 PROCEDURE — 99495 TRANSJ CARE MGMT MOD F2F 14D: CPT | Performed by: NURSE PRACTITIONER

## 2019-12-11 PROCEDURE — 80053 COMPREHEN METABOLIC PANEL: CPT

## 2019-12-11 PROCEDURE — 85025 COMPLETE CBC W/AUTO DIFF WBC: CPT

## 2019-12-11 PROCEDURE — 1111F DSCHRG MED/CURRENT MED MERGE: CPT | Performed by: NURSE PRACTITIONER

## 2019-12-11 RX ORDER — RIVAROXABAN 20 MG/1
TABLET, FILM COATED ORAL
Qty: 90 TABLET | Refills: 1 | Status: SHIPPED | OUTPATIENT
Start: 2019-12-11 | End: 2020-06-05

## 2019-12-11 NOTE — TELEPHONE ENCOUNTER
Last Ov: 11/12/19, SD, pre op  Upcoming appt: no upcoming appt  Last labs: Multiple labs from multiple providers  Last Rx: xarelto 20mg, #90, 2R 3/18/19    Per Protocol, not on protocol. Rx pending.

## 2019-12-16 PROBLEM — Z96.652 STATUS POST TOTAL LEFT KNEE REPLACEMENT: Status: ACTIVE | Noted: 2019-12-16

## 2019-12-16 RX ORDER — METFORMIN HYDROCHLORIDE 500 MG/1
TABLET, EXTENDED RELEASE ORAL
Qty: 180 TABLET | Refills: 0 | Status: SHIPPED | OUTPATIENT
Start: 2019-12-16 | End: 2020-03-12

## 2019-12-27 ENCOUNTER — OFFICE VISIT (OUTPATIENT)
Dept: HEMATOLOGY/ONCOLOGY | Facility: HOSPITAL | Age: 74
End: 2019-12-27
Attending: INTERNAL MEDICINE
Payer: MEDICARE

## 2019-12-27 VITALS
TEMPERATURE: 98 F | RESPIRATION RATE: 18 BRPM | HEART RATE: 88 BPM | DIASTOLIC BLOOD PRESSURE: 81 MMHG | BODY MASS INDEX: 32 KG/M2 | WEIGHT: 164 LBS | SYSTOLIC BLOOD PRESSURE: 154 MMHG

## 2019-12-27 DIAGNOSIS — D50.0 IRON DEFICIENCY ANEMIA DUE TO CHRONIC BLOOD LOSS: ICD-10-CM

## 2019-12-27 DIAGNOSIS — D80.3 SELECTIVE DEFICIENCY OF IGG SUBCLASSES (HCC): Primary | ICD-10-CM

## 2019-12-27 PROCEDURE — 96366 THER/PROPH/DIAG IV INF ADDON: CPT

## 2019-12-27 PROCEDURE — 96365 THER/PROPH/DIAG IV INF INIT: CPT

## 2019-12-27 RX ORDER — ACETAMINOPHEN 325 MG/1
650 TABLET ORAL ONCE
Status: COMPLETED | OUTPATIENT
Start: 2019-12-27 | End: 2019-12-27

## 2019-12-27 RX ORDER — DIPHENHYDRAMINE HCL 25 MG
25 CAPSULE ORAL ONCE
Status: COMPLETED | OUTPATIENT
Start: 2019-12-27 | End: 2019-12-27

## 2019-12-27 RX ADMIN — ACETAMINOPHEN 325 MG: 325 TABLET ORAL at 09:45:00

## 2019-12-27 RX ADMIN — DIPHENHYDRAMINE HCL 25 MG: 25 MG CAPSULE ORAL at 09:45:00

## 2019-12-31 PROCEDURE — 99490 CHRNC CARE MGMT STAFF 1ST 20: CPT

## 2020-01-07 ENCOUNTER — PATIENT OUTREACH (OUTPATIENT)
Dept: CASE MANAGEMENT | Age: 75
End: 2020-01-07

## 2020-01-07 DIAGNOSIS — Z85.828 HISTORY OF BASAL CELL CARCINOMA: ICD-10-CM

## 2020-01-07 DIAGNOSIS — E78.00 PURE HYPERCHOLESTEROLEMIA: ICD-10-CM

## 2020-01-07 DIAGNOSIS — G89.4 CHRONIC PAIN SYNDROME: ICD-10-CM

## 2020-01-07 DIAGNOSIS — Z99.89 OSA ON CPAP: ICD-10-CM

## 2020-01-07 DIAGNOSIS — E78.5 DYSLIPIDEMIA: ICD-10-CM

## 2020-01-07 DIAGNOSIS — F41.9 ANXIETY: ICD-10-CM

## 2020-01-07 DIAGNOSIS — F32.A MILD DEPRESSION: ICD-10-CM

## 2020-01-07 DIAGNOSIS — Z85.3 HISTORY OF LEFT BREAST CANCER: ICD-10-CM

## 2020-01-07 DIAGNOSIS — E11.65 UNCONTROLLED TYPE 2 DIABETES MELLITUS WITH HYPERGLYCEMIA (HCC): ICD-10-CM

## 2020-01-07 DIAGNOSIS — E53.8 B12 DEFICIENCY: ICD-10-CM

## 2020-01-07 DIAGNOSIS — I10 BENIGN ESSENTIAL HTN: ICD-10-CM

## 2020-01-07 DIAGNOSIS — G47.33 OSA ON CPAP: ICD-10-CM

## 2020-01-07 RX ORDER — SIMVASTATIN 40 MG
TABLET ORAL
Qty: 90 TABLET | Refills: 0 | Status: SHIPPED | OUTPATIENT
Start: 2020-01-07 | End: 2020-02-06

## 2020-01-07 RX ORDER — LISINOPRIL 20 MG/1
TABLET ORAL
Qty: 90 TABLET | Refills: 0 | Status: SHIPPED | OUTPATIENT
Start: 2020-01-07 | End: 2020-04-06

## 2020-01-07 NOTE — PROGRESS NOTES
1/7/2020  Spoke to Keily for CCM. Updates to patient care team/ comments: None  Patient reported changes in medications: None  Med Adherence  Comment: Taking as prescribed.     Health Maintenance:  Influenza Vaccine(1) due on 09/01/2019   Diabetes Car Pt verbalized understanding. Brain cyst- Pt states she is overdue for imaging for brain cyst. Advised pt to call and schedule MRI of brain which has been ordered on 11/12/19. Pt verbalized understanding.     Previous goal met: Goal for pain management fr

## 2020-01-08 ENCOUNTER — OFFICE VISIT (OUTPATIENT)
Dept: INTERNAL MEDICINE CLINIC | Facility: CLINIC | Age: 75
End: 2020-01-08
Payer: MEDICARE

## 2020-01-08 VITALS
WEIGHT: 161 LBS | HEART RATE: 96 BPM | TEMPERATURE: 98 F | HEIGHT: 60 IN | DIASTOLIC BLOOD PRESSURE: 78 MMHG | SYSTOLIC BLOOD PRESSURE: 136 MMHG | BODY MASS INDEX: 31.61 KG/M2

## 2020-01-08 DIAGNOSIS — G89.4 CHRONIC PAIN SYNDROME: ICD-10-CM

## 2020-01-08 DIAGNOSIS — R25.1 TREMOR: ICD-10-CM

## 2020-01-08 DIAGNOSIS — G93.0 CYST OF BRAIN: ICD-10-CM

## 2020-01-08 DIAGNOSIS — F32.A MILD DEPRESSION: ICD-10-CM

## 2020-01-08 DIAGNOSIS — I10 BENIGN ESSENTIAL HTN: ICD-10-CM

## 2020-01-08 DIAGNOSIS — E11.9 TYPE 2 DIABETES MELLITUS WITHOUT COMPLICATION, WITHOUT LONG-TERM CURRENT USE OF INSULIN (HCC): Primary | ICD-10-CM

## 2020-01-08 PROCEDURE — 99214 OFFICE O/P EST MOD 30 MIN: CPT | Performed by: NURSE PRACTITIONER

## 2020-01-08 RX ORDER — BUPROPION HYDROCHLORIDE 300 MG/1
300 TABLET ORAL DAILY
COMMUNITY
End: 2020-01-27

## 2020-01-08 NOTE — PROGRESS NOTES
Vikki Bliss is a 69year old female. Patient presents with:  Referral: Room 10.  PT told her to talk about a spine specialist   Tremors: for about 3 weeks   Other: She would like to talk about getting tested for myositis       HPI:   Presents for krista subclasses (HCC)      Atherosclerosis of aorta (HCC)     MUKUND on CPAP     Chronic pain syndrome     Mild depression ([address]     Diabetes mellitus ([address]     Dyslipidemia     Primary osteoarthritis of left knee     Status post total left knee replacement    Cur MG Oral Cap Take 75 mg by mouth 2 (two) times daily. 0   • Acidophilus/Pectin (PROBIOTIC) Oral Cap Take 1 capsule by mouth nightly. • aspirin 81 MG Oral Tab Take 81 mg by mouth daily.         Past Medical History:   Diagnosis Date   • Anesthesia comp Comment:Sensitivity  Lipitor [Atorvastat*    OTHER (SEE COMMENTS)    Comment:Leg cramping,  Omnicef                 NAUSEA ONLY, DIZZINESS  Quinapril Hcl           FATIGUE  Sulfa Antibiotics       UNKNOWN  Clindamycin             DIARRHEA, NAUSEA ONLY    R requested or ordered in this encounter       Imaging & Consults:  None    No follow-ups on file. There are no Patient Instructions on file for this visit.

## 2020-01-17 ENCOUNTER — HOSPITAL ENCOUNTER (OUTPATIENT)
Dept: MRI IMAGING | Facility: HOSPITAL | Age: 75
Discharge: HOME | End: 2020-01-17
Attending: NURSE PRACTITIONER
Payer: MEDICARE

## 2020-01-17 DIAGNOSIS — G93.0 BRAIN CYST: ICD-10-CM

## 2020-01-17 DIAGNOSIS — G93.9 TEMPORAL LOBE LESION: ICD-10-CM

## 2020-01-17 PROCEDURE — A9575 INJ GADOTERATE MEGLUMI 0.1ML: HCPCS | Performed by: NURSE PRACTITIONER

## 2020-01-17 PROCEDURE — 70553 MRI BRAIN STEM W/O & W/DYE: CPT | Performed by: NURSE PRACTITIONER

## 2020-01-20 RX ORDER — PANTOPRAZOLE SODIUM 40 MG/1
TABLET, DELAYED RELEASE ORAL
Qty: 90 TABLET | Refills: 0 | Status: SHIPPED | OUTPATIENT
Start: 2020-01-20 | End: 2020-04-16

## 2020-01-20 NOTE — TELEPHONE ENCOUNTER
EQO:7/0/5828 SD  FOV:none on file   LAST RX: 10/2/19 40 mg take 1 tab daily 90 tabs 0 refills   LAST LABS:12/11/19 cbc,cmp  PER PROTOCOL: to provider

## 2020-01-22 ENCOUNTER — OFFICE VISIT (OUTPATIENT)
Dept: CARDIOLOGY | Age: 75
End: 2020-01-22

## 2020-01-22 VITALS
DIASTOLIC BLOOD PRESSURE: 66 MMHG | HEART RATE: 84 BPM | SYSTOLIC BLOOD PRESSURE: 122 MMHG | WEIGHT: 160.4 LBS | BODY MASS INDEX: 30.29 KG/M2 | HEIGHT: 61 IN

## 2020-01-22 DIAGNOSIS — E78.00 PURE HYPERCHOLESTEROLEMIA: Primary | Chronic | ICD-10-CM

## 2020-01-22 DIAGNOSIS — E11.9 DIABETES MELLITUS TYPE 2 IN NONOBESE (CMD): ICD-10-CM

## 2020-01-22 DIAGNOSIS — I25.10 CORONARY ARTERY DISEASE INVOLVING NATIVE CORONARY ARTERY OF NATIVE HEART WITHOUT ANGINA PECTORIS: ICD-10-CM

## 2020-01-22 PROBLEM — Z86.711 HISTORY OF MATERNAL PULMONARY EMBOLUS: Status: ACTIVE | Noted: 2020-01-22

## 2020-01-22 PROBLEM — Z87.59 HISTORY OF MATERNAL PULMONARY EMBOLUS: Status: RESOLVED | Noted: 2020-01-22 | Resolved: 2020-01-22

## 2020-01-22 PROBLEM — Z87.59 HISTORY OF MATERNAL PULMONARY EMBOLUS: Status: ACTIVE | Noted: 2020-01-22

## 2020-01-22 PROBLEM — Z86.711 HISTORY OF MATERNAL PULMONARY EMBOLUS: Status: RESOLVED | Noted: 2020-01-22 | Resolved: 2020-01-22

## 2020-01-22 PROCEDURE — 99214 OFFICE O/P EST MOD 30 MIN: CPT | Performed by: INTERNAL MEDICINE

## 2020-01-22 ASSESSMENT — PATIENT HEALTH QUESTIONNAIRE - PHQ9
2. FEELING DOWN, DEPRESSED OR HOPELESS: NOT AT ALL
1. LITTLE INTEREST OR PLEASURE IN DOING THINGS: NOT AT ALL
SUM OF ALL RESPONSES TO PHQ9 QUESTIONS 1 AND 2: 0
SUM OF ALL RESPONSES TO PHQ9 QUESTIONS 1 AND 2: 0

## 2020-01-22 ASSESSMENT — ENCOUNTER SYMPTOMS
HEMOPTYSIS: 0
COUGH: 0
BRUISES/BLEEDS EASILY: 0
FEVER: 0
HEMATOCHEZIA: 0
ALLERGIC/IMMUNOLOGIC COMMENTS: NO NEW FOOD ALLERGIES
SUSPICIOUS LESIONS: 0
CHILLS: 0
WEIGHT GAIN: 0
WEIGHT LOSS: 0

## 2020-01-24 ENCOUNTER — APPOINTMENT (OUTPATIENT)
Dept: HEMATOLOGY/ONCOLOGY | Facility: HOSPITAL | Age: 75
End: 2020-01-24
Attending: INTERNAL MEDICINE
Payer: MEDICARE

## 2020-01-24 ENCOUNTER — OFFICE VISIT (OUTPATIENT)
Dept: RHEUMATOLOGY | Facility: CLINIC | Age: 75
End: 2020-01-24
Payer: MEDICARE

## 2020-01-24 VITALS
HEART RATE: 94 BPM | WEIGHT: 160.38 LBS | RESPIRATION RATE: 16 BRPM | HEIGHT: 61 IN | BODY MASS INDEX: 30.28 KG/M2 | SYSTOLIC BLOOD PRESSURE: 160 MMHG | DIASTOLIC BLOOD PRESSURE: 82 MMHG

## 2020-01-24 DIAGNOSIS — M15.9 PRIMARY OSTEOARTHRITIS INVOLVING MULTIPLE JOINTS: ICD-10-CM

## 2020-01-24 DIAGNOSIS — M79.10 MYALGIA: ICD-10-CM

## 2020-01-24 DIAGNOSIS — M25.50 POLYARTHRALGIA: ICD-10-CM

## 2020-01-24 DIAGNOSIS — G89.4 CHRONIC PAIN SYNDROME: ICD-10-CM

## 2020-01-24 DIAGNOSIS — R76.8 RHEUMATOID FACTOR POSITIVE: Primary | ICD-10-CM

## 2020-01-24 PROCEDURE — 99214 OFFICE O/P EST MOD 30 MIN: CPT | Performed by: INTERNAL MEDICINE

## 2020-01-24 NOTE — PROGRESS NOTES
RHEUMATOLOGY Follow up   Date of visit: 1/24/2020  ?   Patient presents with:  Osteoarthritis: est pt, fu 6mo- Pt had left knee replaced 11/20/20- pt having trouble getting up and down      ASSESSMENT, DISCUSSION & PLAN   Assessment:  Rheumatoid factor po syndrome    Polyarthralgia  -     RHEUMATOID ARTHRITIS FACTOR; Future  -     CYCLIC CITRULLINATE PEP. IGG; Future  -     SED RATE, WESTERGREN (AUTOMATED); Future  -     C-REACTIVE PROTEIN; Future    Myalgia  -     CK CREATINE KINASE (NOT CREATININE);  Futur knees and hands. Morning stiffness significant but lasting for only 2 minutes. Pain gets worse as the day goes on. Pain also affected by changes in barometric pressure. Does have neuropathy in her feet for which she takes Lyrica.   Does note nodules ove Anxiety    • Anxiety state, unspecified 6/29/2012   • Asthma    • [facility] ([facility] 1999    breast, basal cell   • Deep vein thrombosis ([facility]    • Exposure to medical diagnostic radiation    • Fitting and adjustment of vascular catheter    • HEART ATTACK    • H Grandfather    • Heart Attack Paternal Grandmother    • Other (CAD[other]) Father    • Other (CAD[other]) Mother    • Other (CAD[other]) Brother    • Stroke Maternal Grandfather    • Stroke Maternal Grandmother    • Breast Cancer Self 49     Social History 1  Albuterol Sulfate HFA (PROAIR HFA) 108 (90 Base) MCG/ACT Inhalation Aero Soln, Inhale 2 puffs into the lungs every 6 (six) hours as needed (cough). , Disp: 1 Inhaler, Rfl: 1  TiZANidine HCl 4 MG Oral Tab, Take 16 mg by mouth nightly.  , Disp: , Rfl:   LY Pressure Heart Rate Resp Rate SpO2     BP: 160/82 Pulse: 94 Resp: 16     ? Current Weight Height BMI BSA Pain   Wt Readings from Last 1 Encounters:  01/24/20 : 160 lb 6.4 oz (72.8 kg)   Height: 61\" Body mass index is 30.31 kg/m².  Body surface area is 1.7 Previously noted focal region of volume loss in the right anterior temporal lobe is again identified. The associated FLAIR abnormalities are identified on prior exam have resolved. This is overall stable in size. 2. No enhancing lesions are identified. micro trabecular fracture involving the medial 2/3 of the tibial plateau extending to the medial metaphysis is noted. 2. Marked large tear of the posterior horn medial meniscus that has associated significant volume loss.   This tear extends into the madelyn negative     10/2018  Vit D 48.7  Vit B12 373  Hep C nonreactive  CRP 0.46 (N)  RF 15 (N<15)  ESR 36 (given age and sex, this is normal)     DO SYEDA [facility]  01/24/2020

## 2020-01-27 RX ORDER — BUPROPION HYDROCHLORIDE 300 MG/1
300 TABLET ORAL DAILY
Qty: 90 TABLET | Refills: 1 | Status: SHIPPED | OUTPATIENT
Start: 2020-01-27 | End: 2020-03-02

## 2020-01-27 NOTE — TELEPHONE ENCOUNTER
Pended order, please approve or deny. Last VISIT 11/12/19    Last REFILL Not refilled by our office. Last LABS 12/11/19 CBC, CMP done.     No Future Appointments

## 2020-01-27 NOTE — TELEPHONE ENCOUNTER
Pt was getting a rx from Dr. Travis Hartman for Welbutrin 300mg 1/day-now wants to get from SD-needs new rx sent in please

## 2020-01-31 ENCOUNTER — OFFICE VISIT (OUTPATIENT)
Dept: HEMATOLOGY/ONCOLOGY | Facility: HOSPITAL | Age: 75
End: 2020-01-31
Attending: INTERNAL MEDICINE
Payer: MEDICARE

## 2020-01-31 VITALS
DIASTOLIC BLOOD PRESSURE: 87 MMHG | WEIGHT: 154.19 LBS | RESPIRATION RATE: 16 BRPM | BODY MASS INDEX: 29.11 KG/M2 | HEART RATE: 104 BPM | OXYGEN SATURATION: 98 % | SYSTOLIC BLOOD PRESSURE: 150 MMHG | TEMPERATURE: 98 F | HEIGHT: 61 IN

## 2020-01-31 DIAGNOSIS — D80.3 SELECTIVE DEFICIENCY OF IGG SUBCLASSES (HCC): Primary | ICD-10-CM

## 2020-01-31 DIAGNOSIS — D50.0 IRON DEFICIENCY ANEMIA DUE TO CHRONIC BLOOD LOSS: ICD-10-CM

## 2020-01-31 PROCEDURE — 96365 THER/PROPH/DIAG IV INF INIT: CPT

## 2020-01-31 PROCEDURE — 96366 THER/PROPH/DIAG IV INF ADDON: CPT

## 2020-01-31 PROCEDURE — 99490 CHRNC CARE MGMT STAFF 1ST 20: CPT

## 2020-01-31 RX ORDER — DIPHENHYDRAMINE HCL 25 MG
25 CAPSULE ORAL ONCE
Status: COMPLETED | OUTPATIENT
Start: 2020-01-31 | End: 2020-01-31

## 2020-01-31 RX ORDER — ACETAMINOPHEN 325 MG/1
650 TABLET ORAL ONCE
Status: COMPLETED | OUTPATIENT
Start: 2020-01-31 | End: 2020-01-31

## 2020-01-31 RX ADMIN — ACETAMINOPHEN 650 MG: 325 TABLET ORAL at 09:40:00

## 2020-01-31 RX ADMIN — DIPHENHYDRAMINE HCL 25 MG: 25 MG CAPSULE ORAL at 09:40:00

## 2020-01-31 NOTE — PROGRESS NOTES
Education Record    Learner:  Patient    Disease / Diagnosis: monthly ivig    Barriers / Limitations:  None   Comments:    Method:  Brief focused   Comments:    General Topics:  Plan of care reviewed   Comments:    Outcome:  Shows understanding   Comments:

## 2020-02-04 ENCOUNTER — TELEPHONE (OUTPATIENT)
Dept: INTERNAL MEDICINE CLINIC | Facility: CLINIC | Age: 75
End: 2020-02-04

## 2020-02-04 ENCOUNTER — PATIENT OUTREACH (OUTPATIENT)
Dept: CASE MANAGEMENT | Age: 75
End: 2020-02-04

## 2020-02-04 DIAGNOSIS — G89.4 CHRONIC PAIN SYNDROME: ICD-10-CM

## 2020-02-04 DIAGNOSIS — E53.8 B12 DEFICIENCY: ICD-10-CM

## 2020-02-04 DIAGNOSIS — M17.12 PRIMARY OSTEOARTHRITIS OF LEFT KNEE: ICD-10-CM

## 2020-02-04 DIAGNOSIS — Z85.3 HISTORY OF LEFT BREAST CANCER: ICD-10-CM

## 2020-02-04 DIAGNOSIS — E11.65 UNCONTROLLED TYPE 2 DIABETES MELLITUS WITH HYPERGLYCEMIA (HCC): ICD-10-CM

## 2020-02-04 DIAGNOSIS — F32.A MILD DEPRESSION: ICD-10-CM

## 2020-02-04 DIAGNOSIS — G47.33 OSA ON CPAP: ICD-10-CM

## 2020-02-04 DIAGNOSIS — J45.20 MILD INTERMITTENT ASTHMA WITHOUT COMPLICATION: ICD-10-CM

## 2020-02-04 DIAGNOSIS — E78.00 PURE HYPERCHOLESTEROLEMIA: ICD-10-CM

## 2020-02-04 DIAGNOSIS — F41.9 ANXIETY: ICD-10-CM

## 2020-02-04 DIAGNOSIS — E78.5 DYSLIPIDEMIA: ICD-10-CM

## 2020-02-04 DIAGNOSIS — I10 BENIGN ESSENTIAL HTN: ICD-10-CM

## 2020-02-04 DIAGNOSIS — Z85.828 HISTORY OF BASAL CELL CARCINOMA: ICD-10-CM

## 2020-02-04 DIAGNOSIS — Z99.89 OSA ON CPAP: ICD-10-CM

## 2020-02-04 DIAGNOSIS — D50.0 IRON DEFICIENCY ANEMIA DUE TO CHRONIC BLOOD LOSS: ICD-10-CM

## 2020-02-04 NOTE — PROGRESS NOTES
2/4/2020  Spoke to Keily for CCM. Updates to patient care team/ comments: Added Dr. Mark Haynes  Patient reported changes in medications: None  Med Adherence  Comment: Taking as prescribed.      Health Maintenance:  Influenza Vaccine(1) due on 09/01/2019  LDL could be heart related. Pt mentioned she does not have symptoms of an ear infection. I will send message to PCP regarding this. Other- Pt talked to me about her knee surgery. Pt relates she has a lot of family support.  Pt states she had MRI of brain and

## 2020-02-06 ENCOUNTER — OFFICE VISIT (OUTPATIENT)
Dept: INTERNAL MEDICINE CLINIC | Facility: CLINIC | Age: 75
End: 2020-02-06
Payer: MEDICARE

## 2020-02-06 VITALS
SYSTOLIC BLOOD PRESSURE: 160 MMHG | HEIGHT: 61 IN | RESPIRATION RATE: 16 BRPM | TEMPERATURE: 98 F | WEIGHT: 156.63 LBS | DIASTOLIC BLOOD PRESSURE: 80 MMHG | OXYGEN SATURATION: 98 % | BODY MASS INDEX: 29.57 KG/M2 | HEART RATE: 92 BPM

## 2020-02-06 DIAGNOSIS — E11.65 UNCONTROLLED TYPE 2 DIABETES MELLITUS WITH HYPERGLYCEMIA (HCC): ICD-10-CM

## 2020-02-06 DIAGNOSIS — F32.A MILD DEPRESSION: ICD-10-CM

## 2020-02-06 DIAGNOSIS — E78.00 PURE HYPERCHOLESTEROLEMIA: ICD-10-CM

## 2020-02-06 DIAGNOSIS — F41.9 ANXIETY: ICD-10-CM

## 2020-02-06 DIAGNOSIS — E53.8 B12 DEFICIENCY: ICD-10-CM

## 2020-02-06 DIAGNOSIS — I10 BENIGN ESSENTIAL HTN: ICD-10-CM

## 2020-02-06 DIAGNOSIS — R42 VERTIGO: Primary | ICD-10-CM

## 2020-02-06 DIAGNOSIS — R25.1 TREMOR: ICD-10-CM

## 2020-02-06 DIAGNOSIS — J45.20 MILD INTERMITTENT ASTHMA WITHOUT COMPLICATION: ICD-10-CM

## 2020-02-06 PROCEDURE — 99214 OFFICE O/P EST MOD 30 MIN: CPT | Performed by: NURSE PRACTITIONER

## 2020-02-06 RX ORDER — MECLIZINE HYDROCHLORIDE 25 MG/1
25 TABLET ORAL NIGHTLY
Qty: 30 TABLET | Refills: 0 | Status: SHIPPED | OUTPATIENT
Start: 2020-02-06 | End: 2020-07-16 | Stop reason: ALTCHOICE

## 2020-02-06 RX ORDER — ONDANSETRON 4 MG/1
4 TABLET, FILM COATED ORAL EVERY 12 HOURS PRN
Qty: 20 TABLET | Refills: 0 | Status: SHIPPED | OUTPATIENT
Start: 2020-02-06 | End: 2020-05-29

## 2020-02-06 RX ORDER — ROSUVASTATIN CALCIUM 10 MG/1
10 TABLET, COATED ORAL NIGHTLY
Qty: 90 TABLET | Refills: 3 | Status: SHIPPED | OUTPATIENT
Start: 2020-02-06 | End: 2021-01-19

## 2020-02-06 NOTE — PROGRESS NOTES
Marianna Denver is a 69year old female. Patient presents with:  Dizziness: cn room 10: dizziness x 3 weeks       HPI:   Presents for eval of dizziness. Was having visual changes at first and she went to Dr Epi Paris. Exam done. Persistent dizziness.   Worse without esophagitis     Enlarged thyroid     History of left breast cancer     Carpal tunnel syndrome of left wrist     Trigger ring finger of left hand     Iron deficiency anemia due to chronic blood loss     Selective deficiency of IgG subclasses (HCC) Oral Tab Take 50 mg by mouth every 6 (six) hours as needed for Pain. • Cholecalciferol (VITAMIN D) 2000 units Oral Tab Take 2,000 Units by mouth daily.      • Ondansetron HCl (ZOFRAN) 4 mg tablet Take 1 tablet (4 mg total) by mouth every 12 (twelve) pao Attack Paternal Grandfather    • Heart Attack Paternal Grandmother    • Other (CAD[other]) Father    • Other (CAD[other]) Mother    • Other (CAD[other]) Brother    • Stroke Maternal Grandfather    • Stroke Maternal Grandmother    • Breast Cancer Self 49 Vertigo  (primary encounter diagnosis)  Meclizine hs only. Warned pt about sedation with this medication and advised pt about necessary precautions and activities to avoid. She would like to trial PT for vestibular exercises.    Benign essential htn

## 2020-02-13 ENCOUNTER — OFFICE VISIT (OUTPATIENT)
Dept: PHYSICAL THERAPY | Facility: HOSPITAL | Age: 75
End: 2020-02-13
Attending: NURSE PRACTITIONER
Payer: MEDICARE

## 2020-02-13 DIAGNOSIS — R42 VERTIGO: ICD-10-CM

## 2020-02-13 PROCEDURE — 95992 CANALITH REPOSITIONING PROC: CPT

## 2020-02-13 PROCEDURE — 97162 PT EVAL MOD COMPLEX 30 MIN: CPT

## 2020-02-13 NOTE — PROGRESS NOTES
VESTIBULAR EVALUATION:   Referring Physician: Dr. Juan Carlos Brooke  Diagnosis:    Vertigo (R42) Date of Service: 2/13/2020     PATIENT SUMMARY   Beverley Ovalles is a 69year old female who presents to therapy today with reports of room spinning dizziness that star dizziness, return to PLOF  Past medical history was reviewed with Kacy Hyde.  Significant findings include   has a past medical history of Anesthesia complication, Anxiety, Anxiety state, unspecified (6/29/2012), Asthma, [facility] ([facility] ([facility]), Deep vein thrombos is medically necessary to address the above impairments and reach functional goals.     Precautions:  Fall Risk  OBJECTIVE:   Physical Exam:  Posture/Observation: fwd head, rounded shoulders, kyphotic   Neuro Screen: Sensation: NT     Coordination Testing: dizziness when sitting up.   Patient was instructed in and issued a HEP for: VORx1, horizontal and vertical 1 min each x2, 3 times a day for 12 min total for efficacy per research, 2x30\" B UT stretch, 2x15 sec SLS B. Educated on nature of vertigo and avoid you for your referral. Please co-sign or sign and return this letter via fax as soon as possible to [phone].  If you have any questions, please contact me at Dept: [phone]    Sincerely,  Electronically signed by therapist: Adam Crowe PT, DPT

## 2020-02-20 ENCOUNTER — OFFICE VISIT (OUTPATIENT)
Dept: PHYSICAL THERAPY | Facility: HOSPITAL | Age: 75
End: 2020-02-20
Attending: NURSE PRACTITIONER
Payer: MEDICARE

## 2020-02-20 DIAGNOSIS — R42 VERTIGO: ICD-10-CM

## 2020-02-20 PROCEDURE — 97140 MANUAL THERAPY 1/> REGIONS: CPT

## 2020-02-20 PROCEDURE — 95992 CANALITH REPOSITIONING PROC: CPT

## 2020-02-20 PROCEDURE — 97112 NEUROMUSCULAR REEDUCATION: CPT

## 2020-02-25 ENCOUNTER — TELEPHONE (OUTPATIENT)
Dept: PHYSICAL THERAPY | Facility: HOSPITAL | Age: 75
End: 2020-02-25

## 2020-02-27 ENCOUNTER — OFFICE VISIT (OUTPATIENT)
Dept: PHYSICAL THERAPY | Facility: HOSPITAL | Age: 75
End: 2020-02-27
Attending: NURSE PRACTITIONER
Payer: MEDICARE

## 2020-02-27 DIAGNOSIS — R42 VERTIGO: ICD-10-CM

## 2020-02-27 PROCEDURE — 97112 NEUROMUSCULAR REEDUCATION: CPT

## 2020-02-27 PROCEDURE — 97140 MANUAL THERAPY 1/> REGIONS: CPT

## 2020-02-27 NOTE — PROGRESS NOTES
Dx: vertigo         Insurance (Authorized # of Visits):  Medicare: 8 per POC         Authorizing Physician: Dr. Sophia Li  Next MD visit: none scheduled  Fall Risk: standard         Precautions: n/a               Discharge Summary  Pt has attended 3, cancelled computer work and driving. GOAL MET. 6. Pt will report decreased frequency of headaches to <2/week. Progressing. Pt will be independent and compliant with comprehensive HEP to maintain progress achieved in PT. Goal met.       Plan: DC with HEP     Lucia ayala

## 2020-02-28 ENCOUNTER — OFFICE VISIT (OUTPATIENT)
Dept: HEMATOLOGY/ONCOLOGY | Facility: HOSPITAL | Age: 75
End: 2020-02-28
Attending: INTERNAL MEDICINE
Payer: MEDICARE

## 2020-02-28 VITALS
WEIGHT: 154.81 LBS | BODY MASS INDEX: 29 KG/M2 | OXYGEN SATURATION: 97 % | TEMPERATURE: 98 F | SYSTOLIC BLOOD PRESSURE: 165 MMHG | DIASTOLIC BLOOD PRESSURE: 80 MMHG | RESPIRATION RATE: 20 BRPM | HEART RATE: 93 BPM

## 2020-02-28 DIAGNOSIS — D50.0 IRON DEFICIENCY ANEMIA DUE TO CHRONIC BLOOD LOSS: ICD-10-CM

## 2020-02-28 DIAGNOSIS — D80.3 SELECTIVE DEFICIENCY OF IGG SUBCLASSES (HCC): Primary | ICD-10-CM

## 2020-02-28 PROCEDURE — 96366 THER/PROPH/DIAG IV INF ADDON: CPT

## 2020-02-28 PROCEDURE — 96365 THER/PROPH/DIAG IV INF INIT: CPT

## 2020-02-28 RX ORDER — ACETAMINOPHEN 325 MG/1
650 TABLET ORAL ONCE
Status: COMPLETED | OUTPATIENT
Start: 2020-02-28 | End: 2020-02-28

## 2020-02-28 RX ORDER — DIPHENHYDRAMINE HCL 25 MG
25 CAPSULE ORAL ONCE
Status: COMPLETED | OUTPATIENT
Start: 2020-02-28 | End: 2020-02-28

## 2020-02-28 RX ADMIN — ACETAMINOPHEN 650 MG: 325 TABLET ORAL at 09:36:00

## 2020-02-28 RX ADMIN — DIPHENHYDRAMINE HCL 25 MG: 25 MG CAPSULE ORAL at 09:36:00

## 2020-02-28 NOTE — PROGRESS NOTES
Pt tolerated IVIG infusion well today without incident or complaint. Pt will continue to return monthly for infusions as directed. She is due to f/u with Dr. Lopez Altamirano in Sept.     Education Record    Learner:  Patient    Disease / Diagnosis:  IgG deficiency

## 2020-02-29 PROCEDURE — 99490 CHRNC CARE MGMT STAFF 1ST 20: CPT

## 2020-02-29 PROCEDURE — G2058 CCM ADD 20MIN: HCPCS

## 2020-03-02 ENCOUNTER — PATIENT OUTREACH (OUTPATIENT)
Dept: CASE MANAGEMENT | Age: 75
End: 2020-03-02

## 2020-03-02 ENCOUNTER — TELEPHONE (OUTPATIENT)
Dept: INTERNAL MEDICINE CLINIC | Facility: CLINIC | Age: 75
End: 2020-03-02

## 2020-03-02 DIAGNOSIS — G47.33 OSA ON CPAP: ICD-10-CM

## 2020-03-02 DIAGNOSIS — E53.8 B12 DEFICIENCY: ICD-10-CM

## 2020-03-02 DIAGNOSIS — I10 BENIGN ESSENTIAL HTN: ICD-10-CM

## 2020-03-02 DIAGNOSIS — F41.9 ANXIETY: ICD-10-CM

## 2020-03-02 DIAGNOSIS — F32.A MILD DEPRESSION: ICD-10-CM

## 2020-03-02 DIAGNOSIS — E78.00 PURE HYPERCHOLESTEROLEMIA: ICD-10-CM

## 2020-03-02 DIAGNOSIS — G89.4 CHRONIC PAIN SYNDROME: ICD-10-CM

## 2020-03-02 DIAGNOSIS — E11.65 UNCONTROLLED TYPE 2 DIABETES MELLITUS WITH HYPERGLYCEMIA (HCC): ICD-10-CM

## 2020-03-02 DIAGNOSIS — J45.20 MILD INTERMITTENT ASTHMA WITHOUT COMPLICATION: ICD-10-CM

## 2020-03-02 DIAGNOSIS — Z99.89 OSA ON CPAP: ICD-10-CM

## 2020-03-02 DIAGNOSIS — Z85.828 HISTORY OF BASAL CELL CARCINOMA: ICD-10-CM

## 2020-03-02 DIAGNOSIS — Z85.3 HISTORY OF LEFT BREAST CANCER: ICD-10-CM

## 2020-03-02 DIAGNOSIS — E78.5 DYSLIPIDEMIA: ICD-10-CM

## 2020-03-02 DIAGNOSIS — D50.8 OTHER IRON DEFICIENCY ANEMIA: ICD-10-CM

## 2020-03-02 DIAGNOSIS — G93.0 CYST OF BRAIN: ICD-10-CM

## 2020-03-02 DIAGNOSIS — M17.12 PRIMARY OSTEOARTHRITIS OF LEFT KNEE: ICD-10-CM

## 2020-03-02 RX ORDER — BUPROPION HYDROCHLORIDE 150 MG/1
150 TABLET ORAL DAILY
Qty: 90 TABLET | Refills: 1 | Status: SHIPPED | OUTPATIENT
Start: 2020-03-02 | End: 2020-08-10

## 2020-03-02 NOTE — TELEPHONE ENCOUNTER
Spoke with patient for [employer]. Pt states she continues to have tremors with increased Wellbutrin  mg. Pt discussed with SD at recent [address] on 2/6/20. See below. Pt is requesting lower dose of Wellbutrin to be sent to [facility].  Please advise

## 2020-03-02 NOTE — PROGRESS NOTES
3/2/2020  Spoke to Keily for CCM. Updates to patient care team/ comments: None  Patient reported changes in medications: None  Med Adherence  Comment: Taking as prescribed.      Health Maintenance:  Influenza Vaccine(1) due on 09/01/2019  LDL Control Patient aware exercise will help with her fatigue and pain. Patient states exercise will make her feel better. Patient relates [facility] and possibly sign up or she will do [facility].     HTN- Patient states blood p Provided acknowledgment and validation to patient's concerns.    Monthly Minute Total including today: 22 minutes    Physical assessment, complete health history, and care plan established by Tawnya Morirs MD, need for CCM determined from these assessmen

## 2020-03-02 NOTE — TELEPHONE ENCOUNTER
Patient notified SD send new script for Wellbutrin  XL 150mg to pt's pharmacy. Pt verbalizes understanding.

## 2020-03-03 ENCOUNTER — TELEPHONE (OUTPATIENT)
Dept: INTERNAL MEDICINE CLINIC | Facility: CLINIC | Age: 75
End: 2020-03-03

## 2020-03-03 RX ORDER — DULOXETIN HYDROCHLORIDE 30 MG/1
30 CAPSULE, DELAYED RELEASE ORAL DAILY
Qty: 90 CAPSULE | Refills: 0 | Status: SHIPPED | OUTPATIENT
Start: 2020-03-03 | End: 2020-06-01

## 2020-03-03 NOTE — TELEPHONE ENCOUNTER
Please see previous TE  Medication not filled in office    Please advise,    LOV:2/6/20 SD  FOV:none on file   LAST RX:30 mg to take along with 60 mg to equal 90 mg  Daily   LAST LABS:12/11/19 cbc,cmp  PER PROTOCOL: to provider

## 2020-03-05 ENCOUNTER — APPOINTMENT (OUTPATIENT)
Dept: PHYSICAL THERAPY | Facility: HOSPITAL | Age: 75
End: 2020-03-05
Attending: NURSE PRACTITIONER
Payer: MEDICARE

## 2020-03-12 RX ORDER — METFORMIN HYDROCHLORIDE 500 MG/1
TABLET, EXTENDED RELEASE ORAL
Qty: 180 TABLET | Refills: 0 | Status: SHIPPED | OUTPATIENT
Start: 2020-03-12 | End: 2020-07-01

## 2020-03-27 ENCOUNTER — OFFICE VISIT (OUTPATIENT)
Dept: HEMATOLOGY/ONCOLOGY | Facility: HOSPITAL | Age: 75
End: 2020-03-27
Attending: INTERNAL MEDICINE
Payer: MEDICARE

## 2020-03-27 VITALS
DIASTOLIC BLOOD PRESSURE: 87 MMHG | WEIGHT: 158.38 LBS | BODY MASS INDEX: 29.9 KG/M2 | OXYGEN SATURATION: 97 % | HEART RATE: 100 BPM | TEMPERATURE: 97 F | HEIGHT: 60.98 IN | SYSTOLIC BLOOD PRESSURE: 170 MMHG | RESPIRATION RATE: 18 BRPM

## 2020-03-27 DIAGNOSIS — D50.0 IRON DEFICIENCY ANEMIA DUE TO CHRONIC BLOOD LOSS: ICD-10-CM

## 2020-03-27 DIAGNOSIS — D80.3 SELECTIVE DEFICIENCY OF IGG SUBCLASSES (HCC): Primary | ICD-10-CM

## 2020-03-27 PROCEDURE — 96365 THER/PROPH/DIAG IV INF INIT: CPT

## 2020-03-27 PROCEDURE — 96366 THER/PROPH/DIAG IV INF ADDON: CPT

## 2020-03-27 RX ORDER — SODIUM CHLORIDE 0.9 % (FLUSH) 0.9 %
10 SYRINGE (ML) INJECTION ONCE
Status: COMPLETED | OUTPATIENT
Start: 2020-03-27 | End: 2020-03-27

## 2020-03-27 RX ORDER — SODIUM CHLORIDE 0.9 % (FLUSH) 0.9 %
10 SYRINGE (ML) INJECTION ONCE
Status: DISCONTINUED | OUTPATIENT
Start: 2020-03-27 | End: 2020-03-27

## 2020-03-27 RX ORDER — DIPHENHYDRAMINE HCL 25 MG
25 CAPSULE ORAL ONCE
Status: COMPLETED | OUTPATIENT
Start: 2020-03-27 | End: 2020-03-27

## 2020-03-27 RX ORDER — ACETAMINOPHEN 325 MG/1
650 TABLET ORAL ONCE
Status: COMPLETED | OUTPATIENT
Start: 2020-03-27 | End: 2020-03-27

## 2020-03-27 RX ADMIN — ACETAMINOPHEN 650 MG: 325 TABLET ORAL at 09:23:00

## 2020-03-27 RX ADMIN — DIPHENHYDRAMINE HCL 25 MG: 25 MG CAPSULE ORAL at 09:23:00

## 2020-03-27 RX ADMIN — SODIUM CHLORIDE 0.9 % (FLUSH) 10 ML: 0.9 % SYRINGE (ML) INJECTION at 13:30:00

## 2020-03-27 NOTE — PROGRESS NOTES
Education Record    Learner:  Patient    Disease / Diagnosis:IVIG    Barriers / Limitations:  None   Comments:    Method:  Discussion   Comments:    General Topics:  Medication and Plan of care reviewed   Comments:    Outcome:  Shows understanding   Commen

## 2020-03-31 PROCEDURE — 99490 CHRNC CARE MGMT STAFF 1ST 20: CPT

## 2020-04-03 NOTE — TELEPHONE ENCOUNTER
Last Ov:2/6/2020  Upcoming appt:none  Last labs:12/11/19 CMP, CBC  Last Rx:8/9/18 [facility]    Per Protocol sent for review

## 2020-04-06 RX ORDER — SIMVASTATIN 40 MG
TABLET ORAL
Qty: 90 TABLET | Refills: 0 | OUTPATIENT
Start: 2020-04-06

## 2020-04-06 RX ORDER — LISINOPRIL 20 MG/1
TABLET ORAL
Qty: 90 TABLET | Refills: 0 | Status: SHIPPED | OUTPATIENT
Start: 2020-04-06 | End: 2020-07-06

## 2020-04-06 NOTE — TELEPHONE ENCOUNTER
Changed to Crestor at [facility] in Feb  Please confirm which statin she is taking. Simvastatin refused.

## 2020-04-10 ENCOUNTER — PATIENT OUTREACH (OUTPATIENT)
Dept: CASE MANAGEMENT | Age: 75
End: 2020-04-10

## 2020-04-10 NOTE — PROGRESS NOTES
Called patient. Patient states that she is doing well and has no concerns at this time.     TIme spent 5 min

## 2020-04-16 RX ORDER — PANTOPRAZOLE SODIUM 40 MG/1
TABLET, DELAYED RELEASE ORAL
Qty: 90 TABLET | Refills: 0 | Status: SHIPPED | OUTPATIENT
Start: 2020-04-16 | End: 2020-07-16

## 2020-04-16 NOTE — TELEPHONE ENCOUNTER
Last VISIT 2/6/20 SD    Last REFILL 1/20/20 Pantoprazole 90T 0R    Last LABS 12/11/19 CBC, CMP    Future Appointments   Date Time Provider Gomez Miller   4/24/2020  9:00 AM [address] [place] [address]   5/29/2020  9:00 AM Velma Dawkins [facility]

## 2020-04-24 ENCOUNTER — OFFICE VISIT (OUTPATIENT)
Dept: HEMATOLOGY/ONCOLOGY | Facility: HOSPITAL | Age: 75
End: 2020-04-24
Attending: INTERNAL MEDICINE
Payer: MEDICARE

## 2020-04-24 VITALS
WEIGHT: 162 LBS | BODY MASS INDEX: 30.58 KG/M2 | TEMPERATURE: 98 F | RESPIRATION RATE: 18 BRPM | DIASTOLIC BLOOD PRESSURE: 68 MMHG | HEIGHT: 60.98 IN | HEART RATE: 101 BPM | OXYGEN SATURATION: 95 % | SYSTOLIC BLOOD PRESSURE: 117 MMHG

## 2020-04-24 DIAGNOSIS — D50.0 IRON DEFICIENCY ANEMIA DUE TO CHRONIC BLOOD LOSS: ICD-10-CM

## 2020-04-24 DIAGNOSIS — D80.3 SELECTIVE DEFICIENCY OF IGG SUBCLASSES (HCC): Primary | ICD-10-CM

## 2020-04-24 PROCEDURE — 96366 THER/PROPH/DIAG IV INF ADDON: CPT

## 2020-04-24 PROCEDURE — 96365 THER/PROPH/DIAG IV INF INIT: CPT

## 2020-04-24 RX ORDER — ACETAMINOPHEN 325 MG/1
650 TABLET ORAL ONCE
Status: COMPLETED | OUTPATIENT
Start: 2020-04-24 | End: 2020-04-24

## 2020-04-24 RX ORDER — DIPHENHYDRAMINE HYDROCHLORIDE 50 MG/ML
12.5 INJECTION INTRAMUSCULAR; INTRAVENOUS ONCE
Status: CANCELLED | OUTPATIENT
Start: 2020-04-24

## 2020-04-24 RX ORDER — DIPHENHYDRAMINE HCL 25 MG
25 CAPSULE ORAL ONCE
Status: COMPLETED | OUTPATIENT
Start: 2020-04-24 | End: 2020-04-24

## 2020-04-24 RX ORDER — ACETAMINOPHEN 325 MG/1
650 TABLET ORAL ONCE
Status: CANCELLED | OUTPATIENT
Start: 2020-05-22

## 2020-04-24 RX ORDER — DIPHENHYDRAMINE HYDROCHLORIDE 50 MG/ML
12.5 INJECTION INTRAMUSCULAR; INTRAVENOUS ONCE
Status: CANCELLED | OUTPATIENT
Start: 2020-05-22

## 2020-04-24 RX ORDER — DIPHENHYDRAMINE HCL 25 MG
25 CAPSULE ORAL ONCE
Status: CANCELLED | OUTPATIENT
Start: 2020-05-22

## 2020-04-24 RX ADMIN — ACETAMINOPHEN 650 MG: 325 TABLET ORAL at 09:11:00

## 2020-04-24 RX ADMIN — DIPHENHYDRAMINE HCL 25 MG: 25 MG CAPSULE ORAL at 09:11:00

## 2020-04-24 NOTE — PROGRESS NOTES
Education Record    Learner:  Patient    Disease / Diagnosis:Selective deficient IGG    Barriers / Limitations:  None   Comments:    Method:  Discussion   Comments:    General Topics:  Medication and Plan of care reviewed   Comments:    Outcome:  Shows und

## 2020-04-29 ENCOUNTER — VIRTUAL PHONE E/M (OUTPATIENT)
Dept: INTERNAL MEDICINE CLINIC | Facility: CLINIC | Age: 75
End: 2020-04-29
Payer: MEDICARE

## 2020-04-29 DIAGNOSIS — G47.33 OSA ON CPAP: ICD-10-CM

## 2020-04-29 DIAGNOSIS — E11.65 UNCONTROLLED TYPE 2 DIABETES MELLITUS WITH HYPERGLYCEMIA (HCC): Primary | ICD-10-CM

## 2020-04-29 DIAGNOSIS — J45.31 MILD PERSISTENT ASTHMA WITH ACUTE EXACERBATION: ICD-10-CM

## 2020-04-29 DIAGNOSIS — Z99.89 OSA ON CPAP: ICD-10-CM

## 2020-04-29 DIAGNOSIS — F32.A MILD DEPRESSION: ICD-10-CM

## 2020-04-29 DIAGNOSIS — I10 BENIGN ESSENTIAL HTN: ICD-10-CM

## 2020-04-29 DIAGNOSIS — E78.00 PURE HYPERCHOLESTEROLEMIA: ICD-10-CM

## 2020-04-29 PROCEDURE — 99443 PHONE E/M BY PHYS 21-30 MIN: CPT | Performed by: NURSE PRACTITIONER

## 2020-04-29 NOTE — PROGRESS NOTES
Due to COVID-19 ACTION PLAN, the patient's office visit was converted to a phone or video visit. This visit is conducted using live video and/or audio. The patient consents to this service.   The patient understands and accepts financial responsibility fo History of basal cell carcinoma     History of recurrent pneumonia     Anemia     Thrombocytopenia (HCC)     Gastroesophageal reflux disease without esophagitis     Enlarged thyroid     History of left breast cancer     Carpal tunnel syndrome of left wrist Oral Tab Take 1-2 tablets by mouth every 4 (four) hours as needed for Pain. 60 tablet 0   • Montelukast Sodium 10 MG Oral Tab Take 10 mg by mouth nightly. • DULoxetine HCl 60 MG Oral Cap DR Particles Take 60 mg by mouth daily.  To take along with 30mg t diagnosis)  metfomin bid   Due for labs  Will have done when COVID restrictions are lifted.    Duran on cpap  Stable  complinat  Benign essential htn  Stable  Cont lisionopril   Pure hypercholesterolemia  Stable  Cont statin  Mild persistent asthma with acute

## 2020-05-14 ENCOUNTER — TELEPHONE (OUTPATIENT)
Dept: INTERNAL MEDICINE CLINIC | Facility: CLINIC | Age: 75
End: 2020-05-14

## 2020-05-14 NOTE — TELEPHONE ENCOUNTER
Received fax from [facility] w/ attached Cancer Genetics Requisition Form. Line 6 with ICD 10 codes need to be filled out and fax back. [facility]

## 2020-05-21 NOTE — TELEPHONE ENCOUNTER
Spoke with Megan Bruner, pt states this was a \"cold call\" from an outside lab. Pt notified per SD, would need to be ordered by heme/onc or by cancer risk assessment. Pt states we can discard order, is no longer interested.

## 2020-05-22 ENCOUNTER — OFFICE VISIT (OUTPATIENT)
Dept: HEMATOLOGY/ONCOLOGY | Facility: HOSPITAL | Age: 75
End: 2020-05-22
Attending: INTERNAL MEDICINE
Payer: MEDICARE

## 2020-05-22 VITALS
RESPIRATION RATE: 18 BRPM | HEART RATE: 92 BPM | DIASTOLIC BLOOD PRESSURE: 77 MMHG | OXYGEN SATURATION: 95 % | WEIGHT: 166.38 LBS | TEMPERATURE: 99 F | SYSTOLIC BLOOD PRESSURE: 134 MMHG | BODY MASS INDEX: 31 KG/M2

## 2020-05-22 DIAGNOSIS — D50.0 IRON DEFICIENCY ANEMIA DUE TO CHRONIC BLOOD LOSS: ICD-10-CM

## 2020-05-22 DIAGNOSIS — D80.3 SELECTIVE DEFICIENCY OF IGG SUBCLASSES (HCC): Primary | ICD-10-CM

## 2020-05-22 PROCEDURE — 96366 THER/PROPH/DIAG IV INF ADDON: CPT

## 2020-05-22 PROCEDURE — 96365 THER/PROPH/DIAG IV INF INIT: CPT

## 2020-05-22 RX ORDER — DIPHENHYDRAMINE HCL 25 MG
25 CAPSULE ORAL ONCE
Status: CANCELLED | OUTPATIENT
Start: 2020-06-19

## 2020-05-22 RX ORDER — ACETAMINOPHEN 325 MG/1
650 TABLET ORAL ONCE
Status: COMPLETED | OUTPATIENT
Start: 2020-05-22 | End: 2020-05-22

## 2020-05-22 RX ORDER — ACETAMINOPHEN 325 MG/1
650 TABLET ORAL ONCE
Status: CANCELLED | OUTPATIENT
Start: 2020-06-19

## 2020-05-22 RX ORDER — DIPHENHYDRAMINE HYDROCHLORIDE 50 MG/ML
12.5 INJECTION INTRAMUSCULAR; INTRAVENOUS ONCE
Status: CANCELLED | OUTPATIENT
Start: 2020-06-19

## 2020-05-22 RX ORDER — DIPHENHYDRAMINE HCL 25 MG
25 CAPSULE ORAL ONCE
Status: COMPLETED | OUTPATIENT
Start: 2020-05-22 | End: 2020-05-22

## 2020-05-22 RX ADMIN — DIPHENHYDRAMINE HCL 25 MG: 25 MG CAPSULE ORAL at 09:14:00

## 2020-05-22 RX ADMIN — ACETAMINOPHEN 325 MG: 325 TABLET ORAL at 09:14:00

## 2020-05-22 NOTE — PROGRESS NOTES
Education Record    Learner:  Patient    Disease / Diagnosis:pt here for ivig    Barriers / Limitations:  None    Method:  Brief focused, printed material and  reinforcement    General Topics:  Plan of care reviewed    Outcome:  Shows understanding

## 2020-05-29 ENCOUNTER — TELEMEDICINE (OUTPATIENT)
Dept: RHEUMATOLOGY | Facility: CLINIC | Age: 75
End: 2020-05-29

## 2020-05-29 VITALS — WEIGHT: 166 LBS | BODY MASS INDEX: 31.34 KG/M2 | HEIGHT: 60.98 IN

## 2020-05-29 DIAGNOSIS — M25.50 POLYARTHRALGIA: ICD-10-CM

## 2020-05-29 DIAGNOSIS — G89.4 CHRONIC PAIN SYNDROME: ICD-10-CM

## 2020-05-29 DIAGNOSIS — R76.8 RHEUMATOID FACTOR POSITIVE: ICD-10-CM

## 2020-05-29 DIAGNOSIS — M15.9 PRIMARY OSTEOARTHRITIS INVOLVING MULTIPLE JOINTS: Primary | ICD-10-CM

## 2020-05-29 PROCEDURE — 99214 OFFICE O/P EST MOD 30 MIN: CPT | Performed by: INTERNAL MEDICINE

## 2020-05-29 NOTE — PROGRESS NOTES
[facility] TeleHealth Audio and Visual Visit   Office visit canceled due to coronavirus pandemic    This was an audio/video conversation using Epic/Fetch It in lieu of an in-person visit due to need to limit person to person contact during the [facility] as well as depression. She was initially sent to be for evaluation of a borderline RF as well as hand and back pain. I think the RF is positive due to age as pt's history and exam is not consistent with an inflammatory arthropathy. since her knee replacement in November, her back pain has been worse. States the sciatica has been severe. Has been able to get off fentanyl patches completely. Has had her Lyrica increased to 100mg BID.  States she tried to decrease her medications but the sleeping. Follows with psychiatry for years, on buproprion for years for depression.  Just changed buproprion to extended release and trying to monitor for a response.      + iron deficiency anemia   + hx of DVT/PE on Xarelto, first one thought to be due complication, not stated as uncontrolled 6/29/2012     Past Surgical History:  Past Surgical History:   Procedure Laterality Date   • ABDOMEN SURGERY PROC UNLISTED      duodenal biopsy   • BREAST SURGERY     • CERVICAL FACET INJECTION Left 5/29/2014    Per 20 MG Oral Tab, TAKE 1 TABLET BY MOUTH DAILY, Disp: 90 tablet, Rfl: 0  PROAIR  (90 Base) MCG/ACT Inhalation Aero Soln, INHALE 2 PUFFS INTO THE LUNGS EVERY SIX HOURS AS NEEDED (COUGH), Disp: 8.5 g, Rfl: 0  METFORMIN HCL  MG Oral Tablet 24 Hr, T Duplicate therapy  ? ?   Allergies:    Bactrim                 HIVES  Ciprofloxacin           HIVES  Mold                    REACTIVE AIRWAY DISEASE  Diovan [Valsartan]      NAUSEA AND VOMITING  Dilaudid [[employer]*    NAUSEA AND VOMITING  Natalie Casillas Head: Normocephalic and atraumatic. Eyes: Conjunctivae and EOM are normal. No scleral icterus. Neck: No JVD present. No tracheal deviation present. Pulmonary/Chest: Effort normal. No respiratory distress.    Musculoskeletal:         General: Deformi collateral ligament complex. MUSCULATURE:        No evidence of strain, edema, or atrophy. BONY COMPARTMENTS:  Marked high T2 and low T1 signal abnormality extending throughout the medial 2/3 of the tibial plateau and into the medial metaphysis is noted. ANIONGAP 9 12/11/2019    GFR 70 12/07/2017    GFRNAA 53 (L) 12/11/2019    GFRAA 61 12/11/2019    CA 9.4 12/11/2019    OSMOCALC 292 12/11/2019    ALKPHO 98 12/11/2019    AST 14 (L) 12/11/2019    ALT 18 12/11/2019    BILT 0.5 12/11/2019    TP 8.0 12/11/2019

## 2020-06-01 RX ORDER — DULOXETIN HYDROCHLORIDE 30 MG/1
30 CAPSULE, DELAYED RELEASE ORAL DAILY
Qty: 90 CAPSULE | Refills: 0 | Status: SHIPPED | OUTPATIENT
Start: 2020-06-01 | End: 2020-09-25

## 2020-06-01 NOTE — TELEPHONE ENCOUNTER
LOV:11/12/19-multi issue with SD  Last CPE: 10/07/19-cpe with SD  Last refill:DULoxetine HCl 30 MG Oral Cap DR Particles-3/3/20   Quantity:90 capsules, 0 refills  Last labs that are related: cbc 12/11/19  Future appointment:  Future Appointments   Date Amee Hardin

## 2020-06-03 ENCOUNTER — TELEMEDICINE (OUTPATIENT)
Dept: INTERNAL MEDICINE CLINIC | Facility: CLINIC | Age: 75
End: 2020-06-03
Payer: MEDICARE

## 2020-06-03 DIAGNOSIS — Z99.89 OSA ON CPAP: ICD-10-CM

## 2020-06-03 DIAGNOSIS — B37.0 THRUSH: Primary | ICD-10-CM

## 2020-06-03 DIAGNOSIS — J45.31 MILD PERSISTENT ASTHMA WITH ACUTE EXACERBATION: ICD-10-CM

## 2020-06-03 DIAGNOSIS — Z86.711 HISTORY OF PULMONARY EMBOLISM: ICD-10-CM

## 2020-06-03 DIAGNOSIS — F32.A MILD DEPRESSION: ICD-10-CM

## 2020-06-03 DIAGNOSIS — G47.33 OSA ON CPAP: ICD-10-CM

## 2020-06-03 DIAGNOSIS — I10 BENIGN ESSENTIAL HTN: ICD-10-CM

## 2020-06-03 DIAGNOSIS — E11.65 UNCONTROLLED TYPE 2 DIABETES MELLITUS WITH HYPERGLYCEMIA (HCC): ICD-10-CM

## 2020-06-03 PROCEDURE — 99214 OFFICE O/P EST MOD 30 MIN: CPT | Performed by: NURSE PRACTITIONER

## 2020-06-03 RX ORDER — FLUTICASONE PROPIONATE 50 MCG
1 SPRAY, SUSPENSION (ML) NASAL 2 TIMES DAILY
Qty: 1 BOTTLE | Refills: 3 | Status: SHIPPED | OUTPATIENT
Start: 2020-06-03

## 2020-06-03 RX ORDER — CLOTRIMAZOLE 10 MG/1
10 LOZENGE ORAL; TOPICAL
Qty: 35 TROCHE | Refills: 0 | Status: SHIPPED | OUTPATIENT
Start: 2020-06-03 | End: 2020-11-04 | Stop reason: ALTCHOICE

## 2020-06-03 NOTE — PROGRESS NOTES
Due to COVID-19 ACTION PLAN, the patient's office visit was converted to a video visit. This visit is conducted using video and audio. The patient consents to this service.   The patient understands and accepts financial responsibility for any deductible, depression ([facility]     Diabetes mellitus ([facility]     Dyslipidemia     Primary osteoarthritis of left knee     Status post total left knee replacement    Current Outpatient Medications   Medication Sig Dispense Refill   • DULOXETINE HCL 30 MG Oral Cap DR Moon (Patient not taking: Reported on 5/29/2020 ) 20 tablet 1   • TiZANidine HCl 4 MG Oral Tab Take 16 mg by mouth nightly. • LYRICA 75 MG Oral Cap Take 150 mg by mouth 2 (two) times daily.     0   • Acidophilus/Pectin (PROBIOTIC) Oral Cap Take 1 capsule b No orders of the defined types were placed in this encounter.       Meds & Refills for this Visit:  Requested Prescriptions      No prescriptions requested or ordered in this encounter       Imaging & Consults:  None      Please note that the above visit

## 2020-06-03 NOTE — ADDENDUM NOTE
Addended by: Shira Shaw on: 6/3/2020 02:44 PM     Modules accepted: Orders Pt will be coming in for HFU appt on 5/9/18. May be switching to in-home PCP, Dr. Praful Davis. Dtr will notify office if pt decides to switch. Thank you.

## 2020-06-05 RX ORDER — RIVAROXABAN 20 MG/1
TABLET, FILM COATED ORAL
Qty: 90 TABLET | Refills: 1 | Status: SHIPPED | OUTPATIENT
Start: 2020-06-05 | End: 2020-12-24

## 2020-06-05 NOTE — TELEPHONE ENCOUNTER
Last Ov: 6/3/20, SD, virtual visit  Last labs: CBC, CMP 12/11/19  Last Rx: Xarelto 20mg, #90, 1R 12/11/19    Future Appointments   Date Time Provider Gomez Noi   6/19/2020  9:00 AM Yessenia Sparkledarell Gideon Toth [address] [place] [address]   8/28/2020  9:15 AM Solomon Zaldivar MD [facility]   11/16/2020  8:50 AM Marquita Dupont MD MMO NP ORTHO [facility]   11/20/2020  9:30 AM Makayla Farley, DO [facility]       Per Protocol - not on protocol. Rx pending.

## 2020-06-17 ENCOUNTER — TELEPHONE (OUTPATIENT)
Dept: INTERNAL MEDICINE CLINIC | Facility: CLINIC | Age: 75
End: 2020-06-17

## 2020-06-19 ENCOUNTER — OFFICE VISIT (OUTPATIENT)
Dept: HEMATOLOGY/ONCOLOGY | Facility: HOSPITAL | Age: 75
End: 2020-06-19
Attending: INTERNAL MEDICINE
Payer: MEDICARE

## 2020-06-19 VITALS
DIASTOLIC BLOOD PRESSURE: 80 MMHG | TEMPERATURE: 99 F | OXYGEN SATURATION: 94 % | RESPIRATION RATE: 16 BRPM | WEIGHT: 170 LBS | HEART RATE: 90 BPM | HEIGHT: 60.98 IN | BODY MASS INDEX: 32.1 KG/M2 | SYSTOLIC BLOOD PRESSURE: 160 MMHG

## 2020-06-19 DIAGNOSIS — D80.3 SELECTIVE DEFICIENCY OF IGG SUBCLASSES (HCC): Primary | ICD-10-CM

## 2020-06-19 DIAGNOSIS — D50.0 IRON DEFICIENCY ANEMIA DUE TO CHRONIC BLOOD LOSS: ICD-10-CM

## 2020-06-19 PROCEDURE — 96365 THER/PROPH/DIAG IV INF INIT: CPT

## 2020-06-19 PROCEDURE — 96366 THER/PROPH/DIAG IV INF ADDON: CPT

## 2020-06-19 RX ORDER — ACETAMINOPHEN 325 MG/1
650 TABLET ORAL ONCE
Status: COMPLETED | OUTPATIENT
Start: 2020-06-19 | End: 2020-06-19

## 2020-06-19 RX ORDER — DIPHENHYDRAMINE HYDROCHLORIDE 50 MG/ML
12.5 INJECTION INTRAMUSCULAR; INTRAVENOUS ONCE
Status: CANCELLED | OUTPATIENT
Start: 2020-07-17

## 2020-06-19 RX ORDER — DIPHENHYDRAMINE HCL 25 MG
25 CAPSULE ORAL ONCE
Status: COMPLETED | OUTPATIENT
Start: 2020-06-19 | End: 2020-06-19

## 2020-06-19 RX ORDER — ACETAMINOPHEN 325 MG/1
650 TABLET ORAL ONCE
Status: CANCELLED | OUTPATIENT
Start: 2020-07-17

## 2020-06-19 RX ORDER — DIPHENHYDRAMINE HCL 25 MG
25 CAPSULE ORAL ONCE
Status: CANCELLED | OUTPATIENT
Start: 2020-07-17

## 2020-06-19 RX ADMIN — ACETAMINOPHEN 650 MG: 325 TABLET ORAL at 09:45:00

## 2020-06-19 RX ADMIN — DIPHENHYDRAMINE HCL 25 MG: 25 MG CAPSULE ORAL at 09:45:00

## 2020-06-19 NOTE — PROGRESS NOTES
IVIG infused per MD order w/out incident. Pt d/c home in stable condition, no complaints. AVS provided.

## 2020-06-30 ENCOUNTER — PATIENT OUTREACH (OUTPATIENT)
Dept: CASE MANAGEMENT | Age: 75
End: 2020-06-30

## 2020-07-01 ENCOUNTER — PATIENT OUTREACH (OUTPATIENT)
Dept: CASE MANAGEMENT | Age: 75
End: 2020-07-01

## 2020-07-01 DIAGNOSIS — Z99.89 OSA ON CPAP: ICD-10-CM

## 2020-07-01 DIAGNOSIS — J45.31 MILD PERSISTENT ASTHMA WITH ACUTE EXACERBATION: ICD-10-CM

## 2020-07-01 DIAGNOSIS — I10 BENIGN ESSENTIAL HTN: ICD-10-CM

## 2020-07-01 DIAGNOSIS — M47.812 ARTHROPATHY OF CERVICAL FACET JOINT: ICD-10-CM

## 2020-07-01 DIAGNOSIS — E78.00 PURE HYPERCHOLESTEROLEMIA: ICD-10-CM

## 2020-07-01 DIAGNOSIS — M65.342 TRIGGER RING FINGER OF LEFT HAND: ICD-10-CM

## 2020-07-01 DIAGNOSIS — G56.02 CARPAL TUNNEL SYNDROME OF LEFT WRIST: ICD-10-CM

## 2020-07-01 DIAGNOSIS — D50.0 IRON DEFICIENCY ANEMIA DUE TO CHRONIC BLOOD LOSS: ICD-10-CM

## 2020-07-01 DIAGNOSIS — Z85.3 HISTORY OF LEFT BREAST CANCER: ICD-10-CM

## 2020-07-01 DIAGNOSIS — E78.5 DYSLIPIDEMIA: ICD-10-CM

## 2020-07-01 DIAGNOSIS — D69.6 THROMBOCYTOPENIA (HCC): ICD-10-CM

## 2020-07-01 DIAGNOSIS — E11.65 UNCONTROLLED TYPE 2 DIABETES MELLITUS WITH HYPERGLYCEMIA (HCC): ICD-10-CM

## 2020-07-01 DIAGNOSIS — F32.A MILD DEPRESSION: ICD-10-CM

## 2020-07-01 DIAGNOSIS — G47.33 OSA ON CPAP: ICD-10-CM

## 2020-07-01 DIAGNOSIS — D50.8 OTHER IRON DEFICIENCY ANEMIA: ICD-10-CM

## 2020-07-01 DIAGNOSIS — K21.9 GASTROESOPHAGEAL REFLUX DISEASE WITHOUT ESOPHAGITIS: ICD-10-CM

## 2020-07-01 DIAGNOSIS — F41.9 ANXIETY: ICD-10-CM

## 2020-07-01 DIAGNOSIS — G89.4 CHRONIC PAIN SYNDROME: ICD-10-CM

## 2020-07-01 DIAGNOSIS — M85.859 OSTEOPENIA OF THIGH, UNSPECIFIED LATERALITY: ICD-10-CM

## 2020-07-01 DIAGNOSIS — D80.3 SELECTIVE DEFICIENCY OF IGG SUBCLASSES (HCC): ICD-10-CM

## 2020-07-01 DIAGNOSIS — M17.12 PRIMARY OSTEOARTHRITIS OF LEFT KNEE: ICD-10-CM

## 2020-07-01 DIAGNOSIS — I70.0 ATHEROSCLEROSIS OF AORTA (HCC): ICD-10-CM

## 2020-07-01 DIAGNOSIS — E53.8 B12 DEFICIENCY: ICD-10-CM

## 2020-07-01 DIAGNOSIS — Z85.828 HISTORY OF BASAL CELL CARCINOMA: ICD-10-CM

## 2020-07-01 RX ORDER — DULOXETIN HYDROCHLORIDE 60 MG/1
60 CAPSULE, DELAYED RELEASE ORAL DAILY
Qty: 90 CAPSULE | Refills: 0 | Status: SHIPPED | OUTPATIENT
Start: 2020-07-01 | End: 2020-10-05

## 2020-07-01 RX ORDER — METFORMIN HYDROCHLORIDE 500 MG/1
500 TABLET, EXTENDED RELEASE ORAL
Qty: 180 TABLET | Refills: 0 | Status: SHIPPED | OUTPATIENT
Start: 2020-07-01 | End: 2020-10-05

## 2020-07-01 NOTE — TELEPHONE ENCOUNTER
S/w pt states she has been monitoring her BP and she's having elevated BP reading for the past 2 months pt states BP is around 755-975 systolic and 28-23 diastolic. Pt states her normal systolic BP is in the 522'O.  Pt took her BP while on the phone with me

## 2020-07-01 NOTE — TELEPHONE ENCOUNTER
Refills sent   She is overdue for all labs  Orders are in the system. She can take 1 1/2 lisinopril (30mg) for her bp and continue to monitor until ov.

## 2020-07-06 RX ORDER — LISINOPRIL 20 MG/1
TABLET ORAL
Qty: 90 TABLET | Refills: 0 | OUTPATIENT
Start: 2020-07-06

## 2020-07-06 RX ORDER — LISINOPRIL 20 MG/1
TABLET ORAL
Qty: 90 TABLET | Refills: 0 | Status: SHIPPED | OUTPATIENT
Start: 2020-07-06 | End: 2020-09-28

## 2020-07-06 RX ORDER — MONTELUKAST SODIUM 10 MG/1
TABLET ORAL
Qty: 90 TABLET | Refills: 1 | Status: SHIPPED | OUTPATIENT
Start: 2020-07-06 | End: 2020-07-16 | Stop reason: ALTCHOICE

## 2020-07-06 NOTE — TELEPHONE ENCOUNTER
Protocol failed due to AAP/ACT given in last 12 months,Asthma Action Score greater than or equal to 20    Please advise,    LOV:2/6/20 SD  FOV:7/15/20  LAST RX:7/11/19 10 mg take 1 tab daily 90 tabs 3 refills   LAST LABS:12/11/19 cbc,cmp  PER PROTOCOL: to provider

## 2020-07-07 ENCOUNTER — APPOINTMENT (OUTPATIENT)
Dept: LAB | Age: 75
End: 2020-07-07
Attending: NURSE PRACTITIONER
Payer: MEDICARE

## 2020-07-07 DIAGNOSIS — E53.8 B12 DEFICIENCY: ICD-10-CM

## 2020-07-07 DIAGNOSIS — E11.9 TYPE 2 DIABETES MELLITUS WITHOUT COMPLICATION, WITHOUT LONG-TERM CURRENT USE OF INSULIN (HCC): ICD-10-CM

## 2020-07-07 LAB
ALBUMIN SERPL-MCNC: 4.1 G/DL (ref 3.4–5)
ALBUMIN/GLOB SERPL: 1.1 {RATIO} (ref 1–2)
ALP LIVER SERPL-CCNC: 85 U/L (ref 55–142)
ALT SERPL-CCNC: 30 U/L (ref 13–56)
ANION GAP SERPL CALC-SCNC: 9 MMOL/L (ref 0–18)
AST SERPL-CCNC: 19 U/L (ref 15–37)
BILIRUB SERPL-MCNC: 0.6 MG/DL (ref 0.1–2)
BUN BLD-MCNC: 18 MG/DL (ref 7–18)
BUN/CREAT SERPL: 20.2 (ref 10–20)
CALCIUM BLD-MCNC: 9.4 MG/DL (ref 8.5–10.1)
CHLORIDE SERPL-SCNC: 106 MMOL/L (ref 98–112)
CHOLEST SMN-MCNC: 131 MG/DL (ref ?–200)
CO2 SERPL-SCNC: 24 MMOL/L (ref 21–32)
CREAT BLD-MCNC: 0.89 MG/DL (ref 0.55–1.02)
CREAT UR-SCNC: 116 MG/DL
EST. AVERAGE GLUCOSE BLD GHB EST-MCNC: 177 MG/DL (ref 68–126)
GLOBULIN PLAS-MCNC: 3.6 G/DL (ref 2.8–4.4)
GLUCOSE BLD-MCNC: 137 MG/DL (ref 70–99)
HBA1C MFR BLD HPLC: 7.8 % (ref ?–5.7)
HDLC SERPL-MCNC: 76 MG/DL (ref 40–59)
LDLC SERPL CALC-MCNC: 36 MG/DL (ref ?–100)
M PROTEIN MFR SERPL ELPH: 7.7 G/DL (ref 6.4–8.2)
MICROALBUMIN UR-MCNC: 6.45 MG/DL
MICROALBUMIN/CREAT 24H UR-RTO: 55.6 UG/MG (ref ?–30)
NONHDLC SERPL-MCNC: 55 MG/DL (ref ?–130)
OSMOLALITY SERPL CALC.SUM OF ELEC: 292 MOSM/KG (ref 275–295)
PATIENT FASTING Y/N/NP: YES
PATIENT FASTING Y/N/NP: YES
POTASSIUM SERPL-SCNC: 4.5 MMOL/L (ref 3.5–5.1)
SODIUM SERPL-SCNC: 139 MMOL/L (ref 136–145)
TRIGL SERPL-MCNC: 94 MG/DL (ref 30–149)
VIT B12 SERPL-MCNC: 157 PG/ML (ref 193–986)
VLDLC SERPL CALC-MCNC: 19 MG/DL (ref 0–30)

## 2020-07-07 PROCEDURE — 80061 LIPID PANEL: CPT

## 2020-07-07 PROCEDURE — 82607 VITAMIN B-12: CPT

## 2020-07-07 PROCEDURE — 82043 UR ALBUMIN QUANTITATIVE: CPT

## 2020-07-07 PROCEDURE — 82570 ASSAY OF URINE CREATININE: CPT

## 2020-07-07 PROCEDURE — 80053 COMPREHEN METABOLIC PANEL: CPT

## 2020-07-07 PROCEDURE — 83036 HEMOGLOBIN GLYCOSYLATED A1C: CPT

## 2020-07-15 ENCOUNTER — OFFICE VISIT (OUTPATIENT)
Dept: INTERNAL MEDICINE CLINIC | Facility: CLINIC | Age: 75
End: 2020-07-15
Payer: MEDICARE

## 2020-07-15 VITALS
HEART RATE: 96 BPM | RESPIRATION RATE: 18 BRPM | HEIGHT: 60.98 IN | WEIGHT: 170 LBS | SYSTOLIC BLOOD PRESSURE: 126 MMHG | DIASTOLIC BLOOD PRESSURE: 60 MMHG | OXYGEN SATURATION: 98 % | TEMPERATURE: 98 F | BODY MASS INDEX: 32.1 KG/M2

## 2020-07-15 DIAGNOSIS — Z99.89 OSA ON CPAP: ICD-10-CM

## 2020-07-15 DIAGNOSIS — F32.A MILD DEPRESSION: ICD-10-CM

## 2020-07-15 DIAGNOSIS — I10 BENIGN ESSENTIAL HTN: ICD-10-CM

## 2020-07-15 DIAGNOSIS — F41.9 ANXIETY: ICD-10-CM

## 2020-07-15 DIAGNOSIS — E11.9 TYPE 2 DIABETES MELLITUS WITHOUT COMPLICATION, WITHOUT LONG-TERM CURRENT USE OF INSULIN (HCC): Primary | ICD-10-CM

## 2020-07-15 DIAGNOSIS — G47.33 OSA ON CPAP: ICD-10-CM

## 2020-07-15 DIAGNOSIS — E53.8 B12 DEFICIENCY: ICD-10-CM

## 2020-07-15 PROCEDURE — 99214 OFFICE O/P EST MOD 30 MIN: CPT | Performed by: NURSE PRACTITIONER

## 2020-07-15 RX ORDER — GLIMEPIRIDE 2 MG/1
2 TABLET ORAL
Qty: 90 TABLET | Refills: 1 | Status: SHIPPED | OUTPATIENT
Start: 2020-07-15 | End: 2020-08-07

## 2020-07-15 RX ORDER — CLOTRIMAZOLE 10 MG/1
10 LOZENGE ORAL; TOPICAL
Qty: 50 TROCHE | Refills: 0 | Status: SHIPPED | OUTPATIENT
Start: 2020-07-15 | End: 2020-09-16

## 2020-07-15 RX ORDER — CYANOCOBALAMIN 1000 UG/ML
1000 INJECTION INTRAMUSCULAR; SUBCUTANEOUS ONCE
Status: COMPLETED | OUTPATIENT
Start: 2020-07-15 | End: 2020-07-15

## 2020-07-15 NOTE — PROGRESS NOTES
Laurel Vieyra is a 69year old female. Patient presents with:  Blood Pressure: RG rm 10 discuss BP and labs      HPI:   here for f/u and discussion of bp as well as review labs. HTN  Lisinopril 20mg and bp runnign high.    Increased to 30mg and seems carcinoma     History of recurrent pneumonia     Anemia     Thrombocytopenia (HCC)     Gastroesophageal reflux disease without esophagitis     Enlarged thyroid     History of left breast cancer     Carpal tunnel syndrome of left wrist     Trigger ring fing PANTOPRAZOLE SODIUM 40 MG Oral Tab EC TAKE ONE TABLET BY MOUTH ONE TIME DAILY BEFORE BREAKFAST.  90 tablet 0   • PROAIR  (90 Base) MCG/ACT Inhalation Aero Soln INHALE 2 PUFFS INTO THE LUNGS EVERY SIX HOURS AS NEEDED (COUGH) 8.5 g 0   • buPROPion HCl embolism)    • Personal history of malignant neoplasm of breast    • Pneumonia due to organism    • Pulmonary embolism ([facility]    • Sleep apnea    • Type II or unspecified type diabetes mellitus without mention of complication, not stated as uncontrolled 6/2 tenderness  EXTREMITIES: no edema, normal strength and tone  PSYCH: alert and oriented x 3    ASSESSMENT AND PLAN:     Type 2 diabetes mellitus without complication, without long-term current use of insulin (hcc)  (primary encounter diagnosis)  Cont metfor

## 2020-07-16 ENCOUNTER — APPOINTMENT (OUTPATIENT)
Dept: CT IMAGING | Age: 75
End: 2020-07-16
Attending: EMERGENCY MEDICINE
Payer: OTHER MISCELLANEOUS

## 2020-07-16 ENCOUNTER — APPOINTMENT (OUTPATIENT)
Dept: GENERAL RADIOLOGY | Age: 75
End: 2020-07-16
Attending: EMERGENCY MEDICINE
Payer: OTHER MISCELLANEOUS

## 2020-07-16 ENCOUNTER — HOSPITAL ENCOUNTER (OUTPATIENT)
Age: 75
Discharge: HOME | End: 2020-07-16
Attending: EMERGENCY MEDICINE
Payer: OTHER MISCELLANEOUS

## 2020-07-16 ENCOUNTER — OCC HEALTH (OUTPATIENT)
Dept: OCCUPATIONAL MEDICINE | Age: 75
End: 2020-07-16
Attending: PREVENTIVE MEDICINE

## 2020-07-16 VITALS
RESPIRATION RATE: 20 BRPM | TEMPERATURE: 99 F | HEART RATE: 88 BPM | SYSTOLIC BLOOD PRESSURE: 148 MMHG | OXYGEN SATURATION: 97 % | DIASTOLIC BLOOD PRESSURE: 80 MMHG
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
## 2020-07-16 DIAGNOSIS — S52.124A CLOSED NONDISPLACED FRACTURE OF HEAD OF RIGHT RADIUS, INITIAL ENCOUNTER: ICD-10-CM

## 2020-07-16 DIAGNOSIS — S09.90XA MINOR HEAD INJURY, INITIAL ENCOUNTER: ICD-10-CM

## 2020-07-16 DIAGNOSIS — S80.02XA CONTUSION OF LEFT KNEE, INITIAL ENCOUNTER: ICD-10-CM

## 2020-07-16 DIAGNOSIS — S00.83XA CONTUSION OF FACE, INITIAL ENCOUNTER: ICD-10-CM

## 2020-07-16 DIAGNOSIS — S01.81XA FACIAL LACERATION, INITIAL ENCOUNTER: ICD-10-CM

## 2020-07-16 PROCEDURE — 99214 OFFICE O/P EST MOD 30 MIN: CPT

## 2020-07-16 PROCEDURE — 70160 X-RAY EXAM OF NASAL BONES: CPT | Performed by: EMERGENCY MEDICINE

## 2020-07-16 PROCEDURE — 70450 CT HEAD/BRAIN W/O DYE: CPT | Performed by: EMERGENCY MEDICINE

## 2020-07-16 PROCEDURE — 73562 X-RAY EXAM OF KNEE 3: CPT | Performed by: EMERGENCY MEDICINE

## 2020-07-16 PROCEDURE — 73060 X-RAY EXAM OF HUMERUS: CPT | Performed by: EMERGENCY MEDICINE

## 2020-07-16 PROCEDURE — 90471 IMMUNIZATION ADMIN: CPT

## 2020-07-16 PROCEDURE — 29105 APPLICATION LONG ARM SPLINT: CPT

## 2020-07-16 PROCEDURE — 12011 RPR F/E/E/N/L/M 2.5 CM/<: CPT

## 2020-07-16 RX ORDER — TETANUS AND DIPHTHERIA TOXOIDS ADSORBED 2; 2 [LF]/.5ML; [LF]/.5ML
0.5 INJECTION INTRAMUSCULAR ONCE
Status: COMPLETED | OUTPATIENT
Start: 2020-07-16 | End: 2020-07-16

## 2020-07-16 RX ORDER — PANTOPRAZOLE SODIUM 40 MG/1
TABLET, DELAYED RELEASE ORAL
Qty: 90 TABLET | Refills: 0 | Status: SHIPPED | OUTPATIENT
Start: 2020-07-16 | End: 2020-10-23

## 2020-07-16 NOTE — TELEPHONE ENCOUNTER
Last Ov: 7/15/20, SD, bp f/u  Last labs: Vit B12, Microalb/creat, Lipid, CMP 7/7/20  Last Rx: Pantoprazole 40mg, #90, 0R 4/16/20    Future Appointments   Date Time Provider Gomez Miller   7/17/2020  9:00  [address]   7/22/2020 1

## 2020-07-16 NOTE — ED INITIAL ASSESSMENT (HPI)
Pt states she tripped on an uneven sidewalk and fell forward onto her right elbow, hands, left knee and face. States her head bounced off of the sidewalk. Denies loss of consciousness. Sustained laceration to right forehead over right eyebrow.   Abrasion

## 2020-07-16 NOTE — ED PROVIDER NOTES
Patient Seen in: [facility]      History   Patient presents with:  Fall  Laceration Abrasion    Stated Complaint: W/COMP LEFT KNEE, R ELBOW, HEAD     HPI    67-year-old female presents to the immediate care after a trip and fall Performed by Lucy Simon MD at [facility] MAIN OR   • CHOLECYSTECTOMY     • COLONOSCOPY  10/1/10, 4/21/17   • EGD  4/21/17   • FEMUR/KNEE SURG UNLISTED      right knee arthroscopy   • HERNIA SURGERY     • HYSTERECTOMY      @ age 46   • [facility] Nose: Signs of injury and nasal tenderness present. No nasal deformity or laceration. Eyes:      Pupils: Pupils are equal, round, and reactive to light. Neck:      Musculoskeletal: Normal range of motion and neck supple.    Cardiovascular:      Rate PROCEDURE:  XR NASAL BONES, COMPLETE (MIN 3 VIEWS) (CPT=70160)  COMPARISON:  None.   INDICATIONS:  W/COMP LEFT KNEE, R ELBOW, HEAD  PATIENT STATED HISTORY: (As transcribed by Technologist)  Patient fell face forward at 2:30 pm today and sustained a large br PROCEDURE:  XR HUMERUS (MIN 2 VIEWS), RIGHT(CPT=73060)  INDICATIONS:  W/COMP LEFT KNEE, R ELBOW, HEAD  COMPARISON:  None.   PATIENT STATED HISTORY: (As transcribed by Technologist)  Patient fell face forward at 2:30 pm today and sustained a large bruise to PROCEDURE:  CT BRAIN OR HEAD ([id number])  COMPARISON:  [facility] , MR, MRI BRAIN (W+WO) (CPT=70553), 1/17/2020, 10:59 AM.  [facility] , CT, CT BRAIN OR HEAD ([id number]), 2/17/2019, 3:08 PM.  INDICATIONS:  W/COMP LEFT KNEE, R ELBOW, HEAD  TECHNIQUE:  Noncontrast CT scanni I reviewed patient's previous records and she had no recent tetanus that I could find. Therefore we updated her. She had the above x-rays and CT scan. I reviewed the results with her.   We discussed that her radial head fracture most likely not need any

## 2020-07-17 ENCOUNTER — OFFICE VISIT (OUTPATIENT)
Dept: HEMATOLOGY/ONCOLOGY | Facility: HOSPITAL | Age: 75
End: 2020-07-17
Attending: INTERNAL MEDICINE
Payer: MEDICARE

## 2020-07-17 VITALS
DIASTOLIC BLOOD PRESSURE: 68 MMHG | SYSTOLIC BLOOD PRESSURE: 153 MMHG | HEIGHT: 60.98 IN | HEART RATE: 84 BPM | BODY MASS INDEX: 32.44 KG/M2 | RESPIRATION RATE: 18 BRPM | WEIGHT: 171.81 LBS | TEMPERATURE: 98 F | OXYGEN SATURATION: 93 %

## 2020-07-17 DIAGNOSIS — D50.0 IRON DEFICIENCY ANEMIA DUE TO CHRONIC BLOOD LOSS: ICD-10-CM

## 2020-07-17 DIAGNOSIS — D80.3 SELECTIVE DEFICIENCY OF IGG SUBCLASSES (HCC): Primary | ICD-10-CM

## 2020-07-17 PROCEDURE — 96365 THER/PROPH/DIAG IV INF INIT: CPT

## 2020-07-17 PROCEDURE — 96366 THER/PROPH/DIAG IV INF ADDON: CPT

## 2020-07-17 RX ORDER — DIPHENHYDRAMINE HYDROCHLORIDE 50 MG/ML
12.5 INJECTION INTRAMUSCULAR; INTRAVENOUS ONCE
Status: CANCELLED | OUTPATIENT
Start: 2020-08-14

## 2020-07-17 RX ORDER — DIPHENHYDRAMINE HCL 25 MG
25 CAPSULE ORAL ONCE
Status: CANCELLED | OUTPATIENT
Start: 2020-08-14

## 2020-07-17 RX ORDER — ACETAMINOPHEN 325 MG/1
650 TABLET ORAL ONCE
Status: CANCELLED | OUTPATIENT
Start: 2020-08-14

## 2020-07-17 RX ORDER — ACETAMINOPHEN 325 MG/1
650 TABLET ORAL ONCE
Status: COMPLETED | OUTPATIENT
Start: 2020-07-17 | End: 2020-07-17

## 2020-07-17 RX ORDER — DIPHENHYDRAMINE HCL 25 MG
25 CAPSULE ORAL ONCE
Status: COMPLETED | OUTPATIENT
Start: 2020-07-17 | End: 2020-07-17

## 2020-07-17 RX ADMIN — DIPHENHYDRAMINE HCL 25 MG: 25 MG CAPSULE ORAL at 09:41:00

## 2020-07-17 RX ADMIN — ACETAMINOPHEN 650 MG: 325 TABLET ORAL at 09:41:00

## 2020-07-17 NOTE — PROGRESS NOTES
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

## 2020-07-22 ENCOUNTER — NURSE ONLY (OUTPATIENT)
Dept: INTERNAL MEDICINE CLINIC | Facility: CLINIC | Age: 75
End: 2020-07-22
Payer: MEDICARE

## 2020-07-22 DIAGNOSIS — E53.8 B12 DEFICIENCY: Primary | ICD-10-CM

## 2020-07-22 PROCEDURE — 96372 THER/PROPH/DIAG INJ SC/IM: CPT | Performed by: NURSE PRACTITIONER

## 2020-07-22 RX ORDER — CYANOCOBALAMIN 1000 UG/ML
1000 INJECTION INTRAMUSCULAR; SUBCUTANEOUS ONCE
Status: COMPLETED | OUTPATIENT
Start: 2020-07-22 | End: 2020-07-22

## 2020-07-22 RX ADMIN — CYANOCOBALAMIN 1000 MCG: 1000 INJECTION INTRAMUSCULAR; SUBCUTANEOUS at 10:30:00

## 2020-07-29 ENCOUNTER — NURSE ONLY (OUTPATIENT)
Dept: INTERNAL MEDICINE CLINIC | Facility: CLINIC | Age: 75
End: 2020-07-29
Payer: MEDICARE

## 2020-07-29 DIAGNOSIS — E53.8 B12 DEFICIENCY: Primary | ICD-10-CM

## 2020-07-29 PROCEDURE — 96372 THER/PROPH/DIAG INJ SC/IM: CPT | Performed by: NURSE PRACTITIONER

## 2020-07-29 RX ORDER — CYANOCOBALAMIN 1000 UG/ML
1000 INJECTION INTRAMUSCULAR; SUBCUTANEOUS ONCE
Status: COMPLETED | OUTPATIENT
Start: 2020-07-29 | End: 2020-07-29

## 2020-07-29 RX ADMIN — CYANOCOBALAMIN 1000 MCG: 1000 INJECTION INTRAMUSCULAR; SUBCUTANEOUS at 10:23:00

## 2020-07-31 ENCOUNTER — HOSPITAL ENCOUNTER (EMERGENCY)
Facility: HOSPITAL | Age: 75
Discharge: HOME | End: 2020-07-31
Attending: EMERGENCY MEDICINE
Payer: MEDICARE

## 2020-07-31 ENCOUNTER — APPOINTMENT (OUTPATIENT)
Dept: GENERAL RADIOLOGY | Facility: HOSPITAL | Age: 75
End: 2020-07-31
Attending: EMERGENCY MEDICINE
Payer: MEDICARE

## 2020-07-31 ENCOUNTER — APPOINTMENT (OUTPATIENT)
Dept: CT IMAGING | Facility: HOSPITAL | Age: 75
End: 2020-07-31
Attending: EMERGENCY MEDICINE
Payer: MEDICARE

## 2020-07-31 VITALS
DIASTOLIC BLOOD PRESSURE: 79 MMHG | BODY MASS INDEX: 26.52 KG/M2 | RESPIRATION RATE: 16 BRPM | TEMPERATURE: 99 F | OXYGEN SATURATION: 98 % | HEART RATE: 84 BPM | WEIGHT: 165 LBS | SYSTOLIC BLOOD PRESSURE: 165 MMHG | HEIGHT: 66 IN

## 2020-07-31 DIAGNOSIS — S52.121A CLOSED DISPLACED FRACTURE OF HEAD OF RIGHT RADIUS, INITIAL ENCOUNTER: Primary | ICD-10-CM

## 2020-07-31 DIAGNOSIS — S80.02XA CONTUSION OF LEFT KNEE, INITIAL ENCOUNTER: ICD-10-CM

## 2020-07-31 DIAGNOSIS — S63.502A SPRAIN OF LEFT WRIST, INITIAL ENCOUNTER: ICD-10-CM

## 2020-07-31 DIAGNOSIS — S01.81XA FOREHEAD LACERATION, INITIAL ENCOUNTER: ICD-10-CM

## 2020-07-31 DIAGNOSIS — S00.83XA CONTUSION OF FOREHEAD, INITIAL ENCOUNTER: ICD-10-CM

## 2020-07-31 PROCEDURE — 29105 APPLICATION LONG ARM SPLINT: CPT

## 2020-07-31 PROCEDURE — 96375 TX/PRO/DX INJ NEW DRUG ADDON: CPT

## 2020-07-31 PROCEDURE — 96374 THER/PROPH/DIAG INJ IV PUSH: CPT

## 2020-07-31 PROCEDURE — 73080 X-RAY EXAM OF ELBOW: CPT | Performed by: EMERGENCY MEDICINE

## 2020-07-31 PROCEDURE — 70450 CT HEAD/BRAIN W/O DYE: CPT | Performed by: EMERGENCY MEDICINE

## 2020-07-31 PROCEDURE — 99490 CHRNC CARE MGMT STAFF 1ST 20: CPT

## 2020-07-31 PROCEDURE — 72125 CT NECK SPINE W/O DYE: CPT | Performed by: EMERGENCY MEDICINE

## 2020-07-31 PROCEDURE — 73110 X-RAY EXAM OF WRIST: CPT | Performed by: EMERGENCY MEDICINE

## 2020-07-31 PROCEDURE — G2058 CCM ADD 20MIN: HCPCS

## 2020-07-31 PROCEDURE — 99285 EMERGENCY DEPT VISIT HI MDM: CPT

## 2020-07-31 PROCEDURE — 73560 X-RAY EXAM OF KNEE 1 OR 2: CPT | Performed by: EMERGENCY MEDICINE

## 2020-07-31 RX ORDER — ONDANSETRON 2 MG/ML
4 INJECTION INTRAMUSCULAR; INTRAVENOUS ONCE
Status: COMPLETED | OUTPATIENT
Start: 2020-07-31 | End: 2020-07-31

## 2020-07-31 RX ORDER — ONDANSETRON 2 MG/ML
INJECTION INTRAMUSCULAR; INTRAVENOUS
Status: COMPLETED
Start: 2020-07-31 | End: 2020-07-31

## 2020-07-31 RX ORDER — MORPHINE SULFATE 4 MG/ML
4 INJECTION, SOLUTION INTRAMUSCULAR; INTRAVENOUS ONCE
Status: COMPLETED | OUTPATIENT
Start: 2020-07-31 | End: 2020-07-31

## 2020-07-31 NOTE — ED INITIAL ASSESSMENT (HPI)
Pt states she was walking out in a parking lot and fell. Pt is on xarelto. Pt presences AOx4. Pt states she fell recently too. She denies any weakness but states she doesn't know why she has been falling.          Pt c/o left knee, left wrist, right elbow,

## 2020-07-31 NOTE — ED PROVIDER NOTES
Patient Seen in: [facility] Emergency Department      History   Patient presents with:  Fall  Trauma    Stated Complaint: PT fell lac to head and is on thinners. HPI    71-year-old female presents with a fall and head injury.   Injury occurred 30 • Type II or unspecified type diabetes mellitus without mention of complication, not stated as uncontrolled 6/29/2012              Past Surgical History:   Procedure Laterality Date   • ABDOMEN SURGERY PROC UNLISTED      duodenal biopsy   • BREAST SURGER Physical Exam    General:  Vitals as listed. No acute distress   HEENT: Right frontal scalp has laceration and hematoma. Sclerae anicteric. Conjunctivae show no pallor.   Oropharynx clear, mucous membranes moist   Neck: Midline bony tenderness of dislocation. No lytic or blastic lesion. SOFT TISSUES:  Negative. No visible soft tissue swelling. EFFUSION:  None visible. OTHER:  Negative. CONCLUSION:  Total knee arthroplasty with anatomic alignment.    Dictated by (CST): Melissa Aguilar MD on 7 Negative. No visible soft tissue swelling. EFFUSION:  There is a joint effusion. OTHER:  Negative. CONCLUSION:  Minimally displaced fracture with a 2-3 mm step-off involving the radial head.    Dictated by (CST): Noah Mario MD on 7/31/2020 at 6: by (CST): Edward Rosales MD on 7/31/2020 at 5:52 PM     Finalized by (CST): Edward Rosales MD on 7/31/2020 at 5:53 PM       Xr Wrist Complete (min 3 Views), Right (cpt=73110)    Result Date: 7/20/2020  X-rays 3 views the right wrist show some degenerative ch BRAIN (W+WO) (CPT=70553), 1/17/2020, 10:59 AM.  [facility] , CT, CT BRAIN OR HEAD ([id number]), 2/17/2019, 3:08 PM.  INDICATIONS:  W/COMP LEFT KNEE, R ELBOW, HEAD  TECHNIQUE:  Noncontrast CT scanning is performed through the brain.  Dose reduction techniques were us ([address]) which includes the Dose Index Registry.   PATIENT STATED HISTORY: (As transcribed by Technologist)  Patient presents after a fall hx of blood thinners    FINDINGS:  CRANIOCERVICAL AREA:  Normal foramen magnum with no Chiar macerated tissue and is not suturable. It is well approximated at rest and will heal with just a bandage. Patient is well-appearing. Immunizations are up-to-date. No intracranial hemorrhage or C-spine fracture.   Discharged to follow-up with her orthope

## 2020-08-03 ENCOUNTER — PATIENT OUTREACH (OUTPATIENT)
Dept: CASE MANAGEMENT | Age: 75
End: 2020-08-03

## 2020-08-03 RX ORDER — ONDANSETRON 4 MG/1
4 TABLET, FILM COATED ORAL EVERY 12 HOURS PRN
Qty: 20 TABLET | Refills: 1 | Status: SHIPPED | OUTPATIENT
Start: 2020-08-03 | End: 2020-11-04

## 2020-08-03 NOTE — TELEPHONE ENCOUNTER
Last OV: 7/15/20 BP f/u     Future Appointments   Date Time Provider Gomez Miller   8/4/2020 10:30 AM Susie Llanos MD [facility] NP ORTHO [facility]   8/7/2020 10:40 AM JUAREZ [place] [facility] [address] [facility]   8/10/2020 10:40 AM Susie Llanos MD

## 2020-08-07 ENCOUNTER — OFFICE VISIT (OUTPATIENT)
Dept: INTERNAL MEDICINE CLINIC | Facility: CLINIC | Age: 75
End: 2020-08-07
Payer: MEDICARE

## 2020-08-07 VITALS
DIASTOLIC BLOOD PRESSURE: 80 MMHG | WEIGHT: 162 LBS | HEIGHT: 62 IN | TEMPERATURE: 97 F | BODY MASS INDEX: 29.81 KG/M2 | HEART RATE: 110 BPM | OXYGEN SATURATION: 98 % | SYSTOLIC BLOOD PRESSURE: 132 MMHG | RESPIRATION RATE: 18 BRPM

## 2020-08-07 DIAGNOSIS — G47.33 OSA ON CPAP: ICD-10-CM

## 2020-08-07 DIAGNOSIS — F41.9 ANXIETY: ICD-10-CM

## 2020-08-07 DIAGNOSIS — I10 BENIGN ESSENTIAL HTN: ICD-10-CM

## 2020-08-07 DIAGNOSIS — S52.124D CLOSED NONDISPLACED FRACTURE OF HEAD OF RIGHT RADIUS WITH ROUTINE HEALING, SUBSEQUENT ENCOUNTER: ICD-10-CM

## 2020-08-07 DIAGNOSIS — S52.502D CLOSED FRACTURE OF DISTAL END OF LEFT RADIUS WITH ROUTINE HEALING, UNSPECIFIED FRACTURE MORPHOLOGY, SUBSEQUENT ENCOUNTER: ICD-10-CM

## 2020-08-07 DIAGNOSIS — E11.65 TYPE 2 DIABETES MELLITUS WITH HYPERGLYCEMIA, WITHOUT LONG-TERM CURRENT USE OF INSULIN (HCC): ICD-10-CM
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
## 2020-08-07 DIAGNOSIS — M85.80 OSTEOPENIA, UNSPECIFIED LOCATION: ICD-10-CM

## 2020-08-07 DIAGNOSIS — Z99.89 OSA ON CPAP: ICD-10-CM

## 2020-08-07 DIAGNOSIS — Z78.0 POSTMENOPAUSAL: ICD-10-CM

## 2020-08-07 DIAGNOSIS — E78.00 PURE HYPERCHOLESTEROLEMIA: ICD-10-CM

## 2020-08-07 PROBLEM — S52.126D CLOSED NONDISPLACED FRACTURE OF HEAD OF RADIUS WITH ROUTINE HEALING: Status: ACTIVE | Noted: 2020-08-07

## 2020-08-07 PROCEDURE — 99214 OFFICE O/P EST MOD 30 MIN: CPT | Performed by: NURSE PRACTITIONER

## 2020-08-07 NOTE — PROGRESS NOTES
Jose Morton is a 69year old female. Patient presents with:  ER F/U:  RG rm 11 ER visit for fall 07/31/20       HPI:   Here for ER follow up   Seen in ER 7/31 s/p fall. This is her second fall in a 2-3 week period. No syncope or LOC.   Denied dizzine of cervical facet joint     History of syncope     History of pulmonary embolism     Asthma     Cyst of brain     History of basal cell carcinoma     History of recurrent pneumonia     Anemia     Thrombocytopenia (HCC)     Gastroesophageal reflux disease w 1 spray by Nasal route 2 (two) times daily. 1 Bottle 3   • DULOXETINE HCL 30 MG Oral Cap DR Particles Take 1 capsule (30 mg total) by mouth daily.  To take along with 60mg to equal 90mg daily 90 capsule 0   • Fluticasone Furoate-Vilanterol (BREO ELLIPTA) 20 lack of IgG, unable to fight off infections   • Migraines    • Muscle weakness    • Neuropathy     bilateral feet   • Osteoarthritis    • PE (pulmonary embolism)    • Personal history of malignant neoplasm of breast    • Pneumonia due to organism    • Pulm no apparent distress  SKIN: ecchymosis and forehead laceration.   Contusion   LUNGS: normal rate without respiratory distress, lungs clear to auscultation  CARDIO: RRR  GI: normal bowel sounds, no masses, HSM or tenderness  EXTREMITIES: no edema, normal str

## 2020-08-10 RX ORDER — BUPROPION HYDROCHLORIDE 150 MG/1
150 TABLET ORAL DAILY
Qty: 90 TABLET | Refills: 0 | Status: SHIPPED | OUTPATIENT
Start: 2020-08-10 | End: 2021-01-12

## 2020-08-10 NOTE — TELEPHONE ENCOUNTER
Last OV 8.7.20 w/ SD (ER f/up)   Last PE 10.7.19   Last REFILL 3.2.20 Bupropion ER 150mg #90 1R  Last LABS 7.7.20 VitB12, Microalb, HgA1c, Lipid, CMP    Future Appointments   Date Time Provider Gomez Miller   8/14/2020  8:30  Arnulfo Moserklyndsey [address]

## 2020-08-14 ENCOUNTER — OFFICE VISIT (OUTPATIENT)
Dept: HEMATOLOGY/ONCOLOGY | Facility: HOSPITAL | Age: 75
End: 2020-08-14
Attending: INTERNAL MEDICINE
Payer: MEDICARE

## 2020-08-14 VITALS
SYSTOLIC BLOOD PRESSURE: 150 MMHG | RESPIRATION RATE: 16 BRPM | DIASTOLIC BLOOD PRESSURE: 83 MMHG | OXYGEN SATURATION: 97 % | HEART RATE: 101 BPM | TEMPERATURE: 98 F

## 2020-08-14 DIAGNOSIS — D80.3 SELECTIVE DEFICIENCY OF IGG SUBCLASSES (HCC): Primary | ICD-10-CM

## 2020-08-14 DIAGNOSIS — D50.0 IRON DEFICIENCY ANEMIA DUE TO CHRONIC BLOOD LOSS: ICD-10-CM

## 2020-08-14 PROCEDURE — 96366 THER/PROPH/DIAG IV INF ADDON: CPT

## 2020-08-14 PROCEDURE — 96365 THER/PROPH/DIAG IV INF INIT: CPT

## 2020-08-14 RX ORDER — ACETAMINOPHEN 325 MG/1
650 TABLET ORAL ONCE
Status: CANCELLED | OUTPATIENT
Start: 2020-09-11

## 2020-08-14 RX ORDER — DIPHENHYDRAMINE HCL 25 MG
25 CAPSULE ORAL ONCE
Status: COMPLETED | OUTPATIENT
Start: 2020-08-14 | End: 2020-08-14

## 2020-08-14 RX ORDER — ACETAMINOPHEN 325 MG/1
650 TABLET ORAL ONCE
Status: COMPLETED | OUTPATIENT
Start: 2020-08-14 | End: 2020-08-14

## 2020-08-14 RX ORDER — DIPHENHYDRAMINE HYDROCHLORIDE 50 MG/ML
12.5 INJECTION INTRAMUSCULAR; INTRAVENOUS ONCE
Status: CANCELLED | OUTPATIENT
Start: 2020-09-11

## 2020-08-14 RX ORDER — DIPHENHYDRAMINE HCL 25 MG
25 CAPSULE ORAL ONCE
Status: CANCELLED | OUTPATIENT
Start: 2020-09-11

## 2020-08-14 RX ADMIN — DIPHENHYDRAMINE HCL 25 MG: 25 MG CAPSULE ORAL at 08:50:00

## 2020-08-14 RX ADMIN — ACETAMINOPHEN 325 MG: 325 TABLET ORAL at 08:49:00

## 2020-08-20 ENCOUNTER — OFFICE VISIT (OUTPATIENT)
Dept: NEUROLOGY | Facility: CLINIC | Age: 75
End: 2020-08-20
Payer: MEDICARE

## 2020-08-20 VITALS
WEIGHT: 162 LBS | SYSTOLIC BLOOD PRESSURE: 112 MMHG | BODY MASS INDEX: 30 KG/M2 | DIASTOLIC BLOOD PRESSURE: 66 MMHG | HEART RATE: 94 BPM | RESPIRATION RATE: 16 BRPM

## 2020-08-20 DIAGNOSIS — R20.2 NUMBNESS AND TINGLING: ICD-10-CM

## 2020-08-20 DIAGNOSIS — G43.019 INTRACTABLE MIGRAINE WITHOUT AURA AND WITHOUT STATUS MIGRAINOSUS: ICD-10-CM

## 2020-08-20 DIAGNOSIS — R20.0 NUMBNESS AND TINGLING: ICD-10-CM

## 2020-08-20 DIAGNOSIS — F41.9 ANXIETY: ICD-10-CM

## 2020-08-20 DIAGNOSIS — R25.1 TREMOR OF BOTH HANDS: ICD-10-CM

## 2020-08-20 DIAGNOSIS — M47.812 NECK ARTHRITIS: ICD-10-CM

## 2020-08-20 DIAGNOSIS — E53.8 B12 DEFICIENCY: ICD-10-CM

## 2020-08-20 DIAGNOSIS — R29.6 FALLS FREQUENTLY: ICD-10-CM

## 2020-08-20 DIAGNOSIS — M54.16 LUMBAR RADICULITIS: Primary | ICD-10-CM

## 2020-08-20 DIAGNOSIS — M65.342 TRIGGER RING FINGER OF LEFT HAND: ICD-10-CM

## 2020-08-20 PROBLEM — G43.009 MIGRAINE WITHOUT AURA OR STATUS MIGRAINOSUS: Status: ACTIVE | Noted: 2020-08-20

## 2020-08-20 PROCEDURE — 96372 THER/PROPH/DIAG INJ SC/IM: CPT | Performed by: OTHER

## 2020-08-20 PROCEDURE — 99205 OFFICE O/P NEW HI 60 MIN: CPT | Performed by: OTHER

## 2020-08-20 RX ORDER — CYANOCOBALAMIN 1000 UG/ML
1000 INJECTION INTRAMUSCULAR; SUBCUTANEOUS ONCE
Status: COMPLETED | OUTPATIENT
Start: 2020-08-20 | End: 2020-08-20

## 2020-08-20 RX ADMIN — CYANOCOBALAMIN 1000 MCG: 1000 INJECTION INTRAMUSCULAR; SUBCUTANEOUS at 12:06:00

## 2020-08-20 NOTE — PROGRESS NOTES
Patient reports has fallen twice in the past 5 weeks. Pt here for 1 of 4 weekly injections. Consent form signed. B12 IM injection administered per MAR. Pt tolerated well.

## 2020-08-20 NOTE — PROGRESS NOTES
[address]   Neurology; INITIAL CLINIC VISIT  2020, 11:22 AM     Cherry Cortez Patient Status:  No patient class for patient encounter    1945 MRN [id number]   Location ED [facility], [facility] [place]    • Fitting and adjustment of vascular catheter    • HEART ATTACK    • High blood pressure    • High cholesterol    • History of blood clots    • IgG deficiency (HCC)     ? ?? pt states she gets IgG infusions due to lack of IgG, unable to fight o HISTORY:   reports that she quit smoking about 52 years ago. She smoked 0.00 packs per day. She has never used smokeless tobacco. She reports current drug use. Drug: Cannabis. She reports that she does not drink alcohol.     ALLERGIES:    Bactrim 2 (two) times daily. 1 Bottle 3   • DULOXETINE HCL 30 MG Oral Cap DR Particles Take 1 capsule (30 mg total) by mouth daily.  To take along with 60mg to equal 90mg daily 90 capsule 0   • Fluticasone Furoate-Vilanterol (BREO ELLIPTA) 200-25 MCG/INH Inhalation depression or anxiety  HEMATOLOGY:  denies bruising or excessive bleeding  ENDOCRINE: denies excessive thirst or urination; denies unexpected wt gain or wt loss  ALLERGY/IMM.: denies food or seasonal allergies      PHYSICAL EXAMINATION:  VITAL SIGNS: BP 11 distribution,   DTRs; Symmetric in both arms and legs, including biceps, triceps, traced at knees, absent in ankles,  Babinski sign is negative;   Coordination: Normal FTN    Gait: Normal based,  No shuffling, Romberg sign is negative,         LABS:     Vi frequently  Plan: EEG, cyanocobalamin (VITAMIN B12) 1000 MCG/ML         injection 1,000 mcg    (R20.0,  R20.2) Numbness and tingling  Plan: DARREN, DIRECT, REFLEX TO 9 ENAS, FOLIC ACID         SERUM(FOLATE), IMMUNOELECTROPHORESIS, SERUM,         VITAMIN B6, V

## 2020-08-21 ENCOUNTER — APPOINTMENT (OUTPATIENT)
Dept: HEMATOLOGY/ONCOLOGY | Facility: HOSPITAL | Age: 75
End: 2020-08-21
Attending: INTERNAL MEDICINE
Payer: MEDICARE

## 2020-08-26 ENCOUNTER — HOSPITAL ENCOUNTER (OUTPATIENT)
Dept: MRI IMAGING | Age: 75
Discharge: HOME | End: 2020-08-26
Attending: Other
Payer: MEDICARE

## 2020-08-26 DIAGNOSIS — M54.16 LUMBAR RADICULITIS: ICD-10-CM

## 2020-08-26 DIAGNOSIS — M47.812 NECK ARTHRITIS: ICD-10-CM

## 2020-08-26 PROCEDURE — 72148 MRI LUMBAR SPINE W/O DYE: CPT | Performed by: OTHER

## 2020-08-26 PROCEDURE — 72141 MRI NECK SPINE W/O DYE: CPT | Performed by: OTHER

## 2020-08-27 ENCOUNTER — NURSE ONLY (OUTPATIENT)
Dept: NEUROLOGY | Facility: CLINIC | Age: 75
End: 2020-08-27
Payer: MEDICARE

## 2020-08-27 ENCOUNTER — TELEPHONE (OUTPATIENT)
Dept: NEUROLOGY | Facility: CLINIC | Age: 75
End: 2020-08-27

## 2020-08-27 DIAGNOSIS — E53.8 B12 DEFICIENCY: Primary | ICD-10-CM

## 2020-08-27 PROCEDURE — 96372 THER/PROPH/DIAG INJ SC/IM: CPT | Performed by: OTHER

## 2020-08-27 RX ORDER — CYANOCOBALAMIN 1000 UG/ML
1000 INJECTION INTRAMUSCULAR; SUBCUTANEOUS ONCE
Status: COMPLETED | OUTPATIENT
Start: 2020-08-27 | End: 2020-08-27

## 2020-08-27 RX ADMIN — CYANOCOBALAMIN 1000 MCG: 1000 INJECTION INTRAMUSCULAR; SUBCUTANEOUS at 10:58:00

## 2020-08-27 NOTE — TELEPHONE ENCOUNTER
Pt's daughter picked up the phone, informed her of test results (ok per HIPAA). Verbalized understanding, no further questions.      ----- Message from Joe Kim MD sent at 8/27/2020  9:45 AM CDT -----  MRI L. Spine showed DJD, no change from previous one.

## 2020-08-27 NOTE — PROGRESS NOTES
Pt here for 2nd of 4 weekly injections. Consent form signed. B12 IM injection administered per MAR. Pt tolerated well.

## 2020-09-02 ENCOUNTER — PATIENT OUTREACH (OUTPATIENT)
Dept: CASE MANAGEMENT | Age: 75
End: 2020-09-02

## 2020-09-03 ENCOUNTER — NURSE ONLY (OUTPATIENT)
Dept: NEUROLOGY | Facility: CLINIC | Age: 75
End: 2020-09-03
Payer: MEDICARE

## 2020-09-03 DIAGNOSIS — E53.8 B12 DEFICIENCY: Primary | ICD-10-CM

## 2020-09-03 PROCEDURE — 96372 THER/PROPH/DIAG INJ SC/IM: CPT | Performed by: OTHER

## 2020-09-03 RX ORDER — CYANOCOBALAMIN 1000 UG/ML
1000 INJECTION INTRAMUSCULAR; SUBCUTANEOUS ONCE
Status: COMPLETED | OUTPATIENT
Start: 2020-09-03 | End: 2020-09-03

## 2020-09-03 RX ADMIN — CYANOCOBALAMIN 1000 MCG: 1000 INJECTION INTRAMUSCULAR; SUBCUTANEOUS at 11:00:00

## 2020-09-03 NOTE — PROGRESS NOTES
Pt here for 3 of 4 weekly injections. Consent form signed. B12 IM injection administered per MAR. Pt tolerated well.

## 2020-09-04 ENCOUNTER — LAB ENCOUNTER (OUTPATIENT)
Dept: LAB | Age: 75
End: 2020-09-04
Attending: Other
Payer: MEDICARE

## 2020-09-04 DIAGNOSIS — M79.10 MYALGIA: ICD-10-CM

## 2020-09-04 DIAGNOSIS — R76.8 RHEUMATOID FACTOR POSITIVE: ICD-10-CM

## 2020-09-04 DIAGNOSIS — R20.2 NUMBNESS AND TINGLING: ICD-10-CM

## 2020-09-04 DIAGNOSIS — M25.50 POLYARTHRALGIA: ICD-10-CM

## 2020-09-04 DIAGNOSIS — R20.0 NUMBNESS AND TINGLING: ICD-10-CM

## 2020-09-04 LAB
CK SERPL-CCNC: 50 U/L (ref 26–192)
CRP SERPL-MCNC: <0.29 MG/DL (ref ?–0.3)
FOLATE SERPL-MCNC: 10 NG/ML (ref 8.7–?)
RHEUMATOID FACT SERPL-ACNC: <10 IU/ML (ref ?–15)
SED RATE-ML: 17 MM/HR (ref 0–25)

## 2020-09-04 PROCEDURE — 84446 ASSAY OF VITAMIN E: CPT

## 2020-09-04 PROCEDURE — 86334 IMMUNOFIX E-PHORESIS SERUM: CPT

## 2020-09-04 PROCEDURE — 85652 RBC SED RATE AUTOMATED: CPT

## 2020-09-04 PROCEDURE — 86431 RHEUMATOID FACTOR QUANT: CPT

## 2020-09-04 PROCEDURE — 84207 ASSAY OF VITAMIN B-6: CPT

## 2020-09-04 PROCEDURE — 86200 CCP ANTIBODY: CPT

## 2020-09-04 PROCEDURE — 82550 ASSAY OF CK (CPK): CPT

## 2020-09-04 PROCEDURE — 82746 ASSAY OF FOLIC ACID SERUM: CPT

## 2020-09-04 PROCEDURE — 86140 C-REACTIVE PROTEIN: CPT

## 2020-09-04 PROCEDURE — 86038 ANTINUCLEAR ANTIBODIES: CPT

## 2020-09-07 LAB
ALPHA-TOCOPHEROL (VIT E) -MG/L: 7.8 MG/L
GAMMA-TOCOPHEROL (VIT E) -MG/L: 2.3 MG/L

## 2020-09-08 LAB
CCP IGG SERPL-ACNC: 1.1 U/ML (ref 0–6.9)
VITAMIN B6: 76.6 NMOL/L

## 2020-09-09 ENCOUNTER — HOSPITAL ENCOUNTER (OUTPATIENT)
Dept: BONE DENSITY | Age: 75
Discharge: HOME | End: 2020-09-09
Attending: NURSE PRACTITIONER
Payer: MEDICARE

## 2020-09-09 DIAGNOSIS — M85.80 OSTEOPENIA, UNSPECIFIED LOCATION: ICD-10-CM

## 2020-09-09 DIAGNOSIS — Z78.0 POSTMENOPAUSAL: ICD-10-CM

## 2020-09-09 LAB — ANA SCREEN: NEGATIVE

## 2020-09-09 PROCEDURE — 77080 DXA BONE DENSITY AXIAL: CPT | Performed by: NURSE PRACTITIONER

## 2020-09-10 ENCOUNTER — NURSE ONLY (OUTPATIENT)
Dept: NEUROLOGY | Facility: CLINIC | Age: 75
End: 2020-09-10
Payer: MEDICARE

## 2020-09-10 DIAGNOSIS — E53.8 B12 DEFICIENCY: Primary | ICD-10-CM

## 2020-09-10 PROCEDURE — 96372 THER/PROPH/DIAG INJ SC/IM: CPT | Performed by: OTHER

## 2020-09-10 RX ORDER — CYANOCOBALAMIN 1000 UG/ML
1000 INJECTION INTRAMUSCULAR; SUBCUTANEOUS ONCE
Status: COMPLETED | OUTPATIENT
Start: 2020-09-10 | End: 2020-09-10

## 2020-09-10 RX ADMIN — CYANOCOBALAMIN 1000 MCG: 1000 INJECTION INTRAMUSCULAR; SUBCUTANEOUS at 11:15:00

## 2020-09-10 NOTE — PROGRESS NOTES
Patient received injection #3/4 weekly. Patient handle the injection well. Patient had no complaints.

## 2020-09-11 ENCOUNTER — OFFICE VISIT (OUTPATIENT)
Dept: HEMATOLOGY/ONCOLOGY | Facility: HOSPITAL | Age: 75
End: 2020-09-11
Attending: INTERNAL MEDICINE
Payer: MEDICARE

## 2020-09-11 VITALS
BODY MASS INDEX: 29.43 KG/M2 | WEIGHT: 162 LBS | RESPIRATION RATE: 16 BRPM | HEART RATE: 84 BPM | HEIGHT: 62.01 IN | SYSTOLIC BLOOD PRESSURE: 142 MMHG | OXYGEN SATURATION: 95 % | TEMPERATURE: 98 F | DIASTOLIC BLOOD PRESSURE: 83 MMHG

## 2020-09-11 DIAGNOSIS — D50.0 IRON DEFICIENCY ANEMIA DUE TO CHRONIC BLOOD LOSS: ICD-10-CM

## 2020-09-11 DIAGNOSIS — D80.3 SELECTIVE DEFICIENCY OF IGG SUBCLASSES (HCC): Primary | ICD-10-CM

## 2020-09-11 PROCEDURE — 96366 THER/PROPH/DIAG IV INF ADDON: CPT

## 2020-09-11 PROCEDURE — 96365 THER/PROPH/DIAG IV INF INIT: CPT

## 2020-09-11 RX ORDER — DIPHENHYDRAMINE HCL 25 MG
25 CAPSULE ORAL ONCE
Status: CANCELLED | OUTPATIENT
Start: 2020-10-09

## 2020-09-11 RX ORDER — DIPHENHYDRAMINE HCL 25 MG
25 CAPSULE ORAL ONCE
Status: COMPLETED | OUTPATIENT
Start: 2020-09-11 | End: 2020-09-11

## 2020-09-11 RX ORDER — ACETAMINOPHEN 325 MG/1
650 TABLET ORAL ONCE
Status: CANCELLED | OUTPATIENT
Start: 2020-10-09

## 2020-09-11 RX ORDER — DIPHENHYDRAMINE HYDROCHLORIDE 50 MG/ML
12.5 INJECTION INTRAMUSCULAR; INTRAVENOUS ONCE
Status: CANCELLED | OUTPATIENT
Start: 2020-10-09

## 2020-09-11 RX ORDER — ACETAMINOPHEN 325 MG/1
650 TABLET ORAL ONCE
Status: COMPLETED | OUTPATIENT
Start: 2020-09-11 | End: 2020-09-11

## 2020-09-11 RX ADMIN — ACETAMINOPHEN 650 MG: 325 TABLET ORAL at 09:10:00

## 2020-09-11 RX ADMIN — DIPHENHYDRAMINE HCL 25 MG: 25 MG CAPSULE ORAL at 09:10:00

## 2020-09-11 NOTE — PROGRESS NOTES
Education Record    Learner:  Patient    Disease / Diagnosis: ivig infusion. Patient tolerated well without issue.      Barriers / Limitations:  None   Comments:    Method:  Brief focused   Comments:    General Topics:  Side effects and symptom management a

## 2020-09-14 ENCOUNTER — NURSE ONLY (OUTPATIENT)
Dept: ELECTROPHYSIOLOGY | Facility: HOSPITAL | Age: 75
End: 2020-09-14
Attending: Other
Payer: MEDICARE

## 2020-09-14 DIAGNOSIS — R29.6 FALLS FREQUENTLY: ICD-10-CM

## 2020-09-14 PROCEDURE — 95819 EEG AWAKE AND ASLEEP: CPT | Performed by: OTHER

## 2020-09-16 ENCOUNTER — TELEPHONE (OUTPATIENT)
Dept: NEUROLOGY | Facility: CLINIC | Age: 75
End: 2020-09-16

## 2020-09-16 ENCOUNTER — OFFICE VISIT (OUTPATIENT)
Dept: INTERNAL MEDICINE CLINIC | Facility: CLINIC | Age: 75
End: 2020-09-16
Payer: MEDICARE

## 2020-09-16 VITALS
HEART RATE: 100 BPM | SYSTOLIC BLOOD PRESSURE: 122 MMHG | TEMPERATURE: 97 F | WEIGHT: 163.81 LBS | OXYGEN SATURATION: 96 % | BODY MASS INDEX: 30 KG/M2 | DIASTOLIC BLOOD PRESSURE: 70 MMHG

## 2020-09-16 DIAGNOSIS — I10 BENIGN ESSENTIAL HTN: ICD-10-CM

## 2020-09-16 DIAGNOSIS — G89.4 CHRONIC PAIN SYNDROME: ICD-10-CM

## 2020-09-16 DIAGNOSIS — F32.A MILD DEPRESSION: ICD-10-CM

## 2020-09-16 DIAGNOSIS — M85.859 OSTEOPENIA OF THIGH, UNSPECIFIED LATERALITY: ICD-10-CM

## 2020-09-16 DIAGNOSIS — S52.502D CLOSED FRACTURE OF DISTAL END OF LEFT RADIUS WITH ROUTINE HEALING, UNSPECIFIED FRACTURE MORPHOLOGY, SUBSEQUENT ENCOUNTER: ICD-10-CM

## 2020-09-16 DIAGNOSIS — R25.1 TREMOR OF BOTH HANDS: ICD-10-CM

## 2020-09-16 DIAGNOSIS — E55.9 VITAMIN D DEFICIENCY: Primary | ICD-10-CM

## 2020-09-16 DIAGNOSIS — S52.124D CLOSED NONDISPLACED FRACTURE OF HEAD OF RIGHT RADIUS WITH ROUTINE HEALING, SUBSEQUENT ENCOUNTER: ICD-10-CM

## 2020-09-16 PROCEDURE — 99214 OFFICE O/P EST MOD 30 MIN: CPT | Performed by: NURSE PRACTITIONER

## 2020-09-16 NOTE — TELEPHONE ENCOUNTER
----- Message from Jose Carr MD sent at 9/16/2020  3:17 PM CDT -----  EEG is normal      Relayed above via [employer].

## 2020-09-16 NOTE — PROGRESS NOTES
Ihor Cockayne is a 69year old female. Patient presents with: Follow - Up: LM rm 11      HPI:   Here for follow up osteopenia. Osteopenia/Osteoperosis Visit:   Has been on oral biphosphonates in the past  Last 2016  GI intolerance.    Left wrist an reflux disease without esophagitis     Enlarged thyroid     History of left breast cancer     Carpal tunnel syndrome of left wrist     Trigger ring finger of left hand     Iron deficiency anemia due to chronic blood loss     Selective deficiency of IgG sub 1 capsule (30 mg total) by mouth daily. To take along with 60mg to equal 90mg daily 90 capsule 0   • Fluticasone Furoate-Vilanterol (BREO ELLIPTA) 200-25 MCG/INH Inhalation Aerosol Powder, Breath Activated Inhale 1 puff into the lungs daily.  1 each 3   • P mellitus without mention of complication, not stated as uncontrolled 2012      Social History:  Social History    Tobacco Use      Smoking status: Former Smoker        Packs/day: 0.00        Quit date:         Years since quittin.7      Smoke masses, HSM or tenderness  EXTREMITIES: no edema, normal strength and tone  PSYCH: alert and oriented x 3    ASSESSMENT AND PLAN:     Vitamin d deficiency  (primary encounter diagnosis)  Check with next labs.    Closed fracture of distal end of left radius

## 2020-09-16 NOTE — PROCEDURES
[address]      PATIENT'S NAME: Richard Carvajal   ATTENDING PHYSICIAN: Kathryn Osborne M.D.    PATIENT ACCOUNT #: [de-identified] LOCATION: [facility]   MEDICAL RECORD #: [id number] DATE OF BIRTH: [birth year]

## 2020-09-17 ENCOUNTER — NURSE ONLY (OUTPATIENT)
Dept: NEUROLOGY | Facility: CLINIC | Age: 75
End: 2020-09-17
Payer: MEDICARE

## 2020-09-17 DIAGNOSIS — E53.8 B12 DEFICIENCY: Primary | ICD-10-CM

## 2020-09-17 PROCEDURE — 96372 THER/PROPH/DIAG INJ SC/IM: CPT | Performed by: OTHER

## 2020-09-17 RX ORDER — CYANOCOBALAMIN 1000 UG/ML
1000 INJECTION INTRAMUSCULAR; SUBCUTANEOUS ONCE
Status: COMPLETED | OUTPATIENT
Start: 2020-09-17 | End: 2020-09-17

## 2020-09-17 RX ADMIN — CYANOCOBALAMIN 1000 MCG: 1000 INJECTION INTRAMUSCULAR; SUBCUTANEOUS at 11:30:00

## 2020-09-22 ENCOUNTER — TELEPHONE (OUTPATIENT)
Dept: INTERNAL MEDICINE CLINIC | Facility: CLINIC | Age: 75
End: 2020-09-22

## 2020-09-22 NOTE — TELEPHONE ENCOUNTER
Per [place] @ [facility]#[phone] pt's prior auth for Prolia injections were denied based on pt's diagnosis of osteopenia. SD was informed and pt was notified that at this time her Medicare plan does not cover Prolia injections for osteopenia.

## 2020-09-25 RX ORDER — DULOXETIN HYDROCHLORIDE 30 MG/1
30 CAPSULE, DELAYED RELEASE ORAL DAILY
Qty: 90 CAPSULE | Refills: 1 | Status: SHIPPED | OUTPATIENT
Start: 2020-09-25 | End: 2020-10-29

## 2020-09-25 NOTE — TELEPHONE ENCOUNTER
Last Ov: 9/16/20, SD, F/U  Last labs: RH factor, CCP, sed rate, CK, DARREN w Ref, immunoelectrophoresis, Vit B6, Vit E 9/4/20  Last Rx: duloxetine 30mg, #90, 0R 6/1/20    Future Appointments   Date Time Provider Gomez Noi   10/1/2020 11:00 AM EMG NURSE [place] [facility] [place]   10/9/2020  9:00 AM [facility] [place] RN3 [facility] CHEMO [facility]   10/15/2020 11:00 AM EMG NURSE [place] [facility] [place]   10/21/2020 12:00 PM Mora Garcia MD [facility] EMG [facility] AT [facility]   10/29/2020 10:40 AM Mora Garcia MD [facility] [place]   11/16/2020  8:50 AM Dao Marshall MD MMO NP ORTHO DMG NPV [facility]   11/20/2020  9:30 AM Dsilva, Karyle Leaven, DO EMGRHEUM EMG [facility]   2/26/2021 10:00 AM Nancie Cai MD [facility] DERM [facility] [place]       Per Protocol - not on protocol, Rx pending.

## 2020-09-28 RX ORDER — LISINOPRIL 20 MG/1
TABLET ORAL
Qty: 90 TABLET | Refills: 1 | Status: SHIPPED | OUTPATIENT
Start: 2020-09-28 | End: 2021-03-22

## 2020-10-01 ENCOUNTER — NURSE ONLY (OUTPATIENT)
Dept: NEUROLOGY | Facility: CLINIC | Age: 75
End: 2020-10-01
Payer: MEDICARE

## 2020-10-01 DIAGNOSIS — E53.8 B12 DEFICIENCY: Primary | ICD-10-CM

## 2020-10-01 PROCEDURE — 96372 THER/PROPH/DIAG INJ SC/IM: CPT | Performed by: OTHER

## 2020-10-01 RX ORDER — CYANOCOBALAMIN 1000 UG/ML
1000 INJECTION INTRAMUSCULAR; SUBCUTANEOUS ONCE
Status: COMPLETED | OUTPATIENT
Start: 2020-10-01 | End: 2020-10-01

## 2020-10-01 RX ADMIN — CYANOCOBALAMIN 1000 MCG: 1000 INJECTION INTRAMUSCULAR; SUBCUTANEOUS at 11:37:00

## 2020-10-01 NOTE — PROGRESS NOTES
Pt here for last  of  weekly injections. , then to receive monthly. Consent form signed. B12 IM injection administered per MAR. Pt tolerated well.

## 2020-10-05 RX ORDER — DULOXETIN HYDROCHLORIDE 60 MG/1
60 CAPSULE, DELAYED RELEASE ORAL DAILY
Qty: 90 CAPSULE | Refills: 0 | Status: SHIPPED | OUTPATIENT
Start: 2020-10-05 | End: 2021-01-12

## 2020-10-05 RX ORDER — METFORMIN HYDROCHLORIDE 500 MG/1
500 TABLET, EXTENDED RELEASE ORAL
Qty: 180 TABLET | Refills: 0 | Status: SHIPPED | OUTPATIENT
Start: 2020-10-05 | End: 2021-01-28

## 2020-10-05 NOTE — TELEPHONE ENCOUNTER
Last OV: 9/16/20 F/u    Future Appointments   Date Time Provider Gomez Miller   10/9/2020  9:00 AM [address] [facility]   10/15/2020 11:00 AM EMG NURSE JOSLYN SIMENTAL [facility] [place]   10/21/2020 12:00 PM Dori Modi MD [facility] EMG [facility] AT [facility]

## 2020-10-06 ENCOUNTER — PATIENT OUTREACH (OUTPATIENT)
Dept: CASE MANAGEMENT | Age: 75
End: 2020-10-06

## 2020-10-06 DIAGNOSIS — I10 BENIGN ESSENTIAL HTN: ICD-10-CM

## 2020-10-06 DIAGNOSIS — F41.9 ANXIETY: ICD-10-CM

## 2020-10-06 DIAGNOSIS — K21.9 GASTROESOPHAGEAL REFLUX DISEASE WITHOUT ESOPHAGITIS: ICD-10-CM

## 2020-10-06 DIAGNOSIS — G89.4 CHRONIC PAIN SYNDROME: ICD-10-CM

## 2020-10-06 DIAGNOSIS — E11.65 UNCONTROLLED TYPE 2 DIABETES MELLITUS WITH HYPERGLYCEMIA (HCC): ICD-10-CM

## 2020-10-06 DIAGNOSIS — E78.00 PURE HYPERCHOLESTEROLEMIA: ICD-10-CM

## 2020-10-06 DIAGNOSIS — Z85.828 HISTORY OF BASAL CELL CARCINOMA: ICD-10-CM

## 2020-10-06 DIAGNOSIS — M17.12 PRIMARY OSTEOARTHRITIS OF LEFT KNEE: ICD-10-CM

## 2020-10-06 DIAGNOSIS — Z99.89 OSA ON CPAP: ICD-10-CM

## 2020-10-06 DIAGNOSIS — E53.8 B12 DEFICIENCY: ICD-10-CM

## 2020-10-06 DIAGNOSIS — F32.A MILD DEPRESSION: ICD-10-CM

## 2020-10-06 DIAGNOSIS — J45.31 MILD PERSISTENT ASTHMA WITH ACUTE EXACERBATION: ICD-10-CM

## 2020-10-06 DIAGNOSIS — M47.812 NECK ARTHRITIS: ICD-10-CM

## 2020-10-06 DIAGNOSIS — Z85.3 HISTORY OF LEFT BREAST CANCER: ICD-10-CM

## 2020-10-06 DIAGNOSIS — D50.8 OTHER IRON DEFICIENCY ANEMIA: ICD-10-CM

## 2020-10-06 DIAGNOSIS — G47.33 OSA ON CPAP: ICD-10-CM

## 2020-10-06 DIAGNOSIS — E78.5 DYSLIPIDEMIA: ICD-10-CM

## 2020-10-06 NOTE — PROGRESS NOTES
10/6/2020  Spoke to Keily for CCM. Updates to patient care team/ comments: None  Patient reported changes in medications: None  Med Adherence  Comment: Taking as prescribed per patient.      Health Maintenance:  Mammogram due on 09/28/2020  Influenza 15, 2020 11:00 AM  Injection with EMG [facility] ([address] [place] [address]) [address] [facility], [facility] [address] [place] [id number] [phone]      Wednesday October 21, 2020 12:00 PM  EMG <5 Mu Up Visit with Purvi Madrid, [address] ([address] Elsa [address]) [address] [facility],Fourth Floor, [address] [place] [address] [phone]      Friday February 26, 2021 10:00 AM  Established Patient with Edgar Amezcua MD  [address]

## 2020-10-09 ENCOUNTER — OFFICE VISIT (OUTPATIENT)
Dept: HEMATOLOGY/ONCOLOGY | Facility: HOSPITAL | Age: 75
End: 2020-10-09
Attending: INTERNAL MEDICINE
Payer: MEDICARE

## 2020-10-09 VITALS
DIASTOLIC BLOOD PRESSURE: 76 MMHG | WEIGHT: 168.38 LBS | HEART RATE: 98 BPM | OXYGEN SATURATION: 95 % | TEMPERATURE: 98 F | RESPIRATION RATE: 18 BRPM | SYSTOLIC BLOOD PRESSURE: 158 MMHG | BODY MASS INDEX: 30.6 KG/M2 | HEIGHT: 62.01 IN

## 2020-10-09 DIAGNOSIS — D50.0 IRON DEFICIENCY ANEMIA DUE TO CHRONIC BLOOD LOSS: ICD-10-CM

## 2020-10-09 DIAGNOSIS — D80.3 SELECTIVE DEFICIENCY OF IGG SUBCLASSES (HCC): Primary | ICD-10-CM

## 2020-10-09 PROCEDURE — 96365 THER/PROPH/DIAG IV INF INIT: CPT

## 2020-10-09 PROCEDURE — 96366 THER/PROPH/DIAG IV INF ADDON: CPT

## 2020-10-09 RX ORDER — DIPHENHYDRAMINE HCL 25 MG
25 CAPSULE ORAL ONCE
Status: COMPLETED | OUTPATIENT
Start: 2020-10-09 | End: 2020-10-09

## 2020-10-09 RX ORDER — DIPHENHYDRAMINE HYDROCHLORIDE 50 MG/ML
12.5 INJECTION INTRAMUSCULAR; INTRAVENOUS ONCE
Status: CANCELLED | OUTPATIENT
Start: 2020-11-06

## 2020-10-09 RX ORDER — DIPHENHYDRAMINE HCL 25 MG
25 CAPSULE ORAL ONCE
Status: CANCELLED | OUTPATIENT
Start: 2020-11-06

## 2020-10-09 RX ORDER — ACETAMINOPHEN 325 MG/1
650 TABLET ORAL ONCE
Status: CANCELLED | OUTPATIENT
Start: 2020-11-06

## 2020-10-09 RX ORDER — ACETAMINOPHEN 325 MG/1
650 TABLET ORAL ONCE
Status: COMPLETED | OUTPATIENT
Start: 2020-10-09 | End: 2020-10-09

## 2020-10-09 RX ADMIN — ACETAMINOPHEN 650 MG: 325 TABLET ORAL at 09:42:00

## 2020-10-09 RX ADMIN — DIPHENHYDRAMINE HCL 25 MG: 25 MG CAPSULE ORAL at 09:42:00

## 2020-10-09 NOTE — PROGRESS NOTES
Education Record    Learner:  Patient    Disease / Diagnosis:selective deficiency of IGG    Barriers / Limitations:  None   Comments:    Method:  Discussion   Comments:    General Topics:  Medication, Side effects and symptom management, Plan of care revie

## 2020-10-15 ENCOUNTER — NURSE ONLY (OUTPATIENT)
Dept: NEUROLOGY | Facility: CLINIC | Age: 75
End: 2020-10-15
Payer: MEDICARE

## 2020-10-15 DIAGNOSIS — E53.8 B12 DEFICIENCY: Primary | ICD-10-CM

## 2020-10-15 PROCEDURE — 96372 THER/PROPH/DIAG INJ SC/IM: CPT | Performed by: OTHER

## 2020-10-15 RX ORDER — CYANOCOBALAMIN 1000 UG/ML
1000 INJECTION INTRAMUSCULAR; SUBCUTANEOUS ONCE
Status: COMPLETED | OUTPATIENT
Start: 2020-10-15 | End: 2020-10-15

## 2020-10-15 RX ADMIN — CYANOCOBALAMIN 1000 MCG: 1000 INJECTION INTRAMUSCULAR; SUBCUTANEOUS at 11:30:00

## 2020-10-15 NOTE — PROGRESS NOTES
Pt here for 1 of  monthly injections. Consent form signed. B12 IM injection administered per MAR. Pt tolerated well.

## 2020-10-21 ENCOUNTER — OFFICE VISIT (OUTPATIENT)
Dept: ELECTROPHYSIOLOGY | Facility: HOSPITAL | Age: 75
End: 2020-10-21
Attending: Other
Payer: MEDICARE

## 2020-10-21 DIAGNOSIS — R20.0 NUMBNESS AND TINGLING: ICD-10-CM

## 2020-10-21 DIAGNOSIS — R20.2 NUMBNESS AND TINGLING: ICD-10-CM

## 2020-10-21 PROCEDURE — 95886 MUSC TEST DONE W/N TEST COMP: CPT | Performed by: OTHER

## 2020-10-21 PROCEDURE — 95913 NRV CNDJ TEST 13/> STUDIES: CPT | Performed by: OTHER

## 2020-10-22 NOTE — PROCEDURES
[facility], [address] [place] [address]      PATIENT'S NAME: Florence Valdes   REFERRING PHYSICIAN: Jorge Leblanc M.D.    PATIENT ACCOUNT #: [de-identified] LOCATION: [facility]   MEDICAL RECORD #: [id number] DATE OF BIRTH: [birth year]/ right L4-5 and L5-S1 levels. Correlation with cervical lumbar spine imaging study may be helpful for further evaluation. 3.  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   Test re

## 2020-10-23 RX ORDER — PANTOPRAZOLE SODIUM 40 MG/1
TABLET, DELAYED RELEASE ORAL
Qty: 90 TABLET | Refills: 0 | Status: SHIPPED | OUTPATIENT
Start: 2020-10-23 | End: 2021-01-12

## 2020-10-23 NOTE — TELEPHONE ENCOUNTER
Last OV: 9/16/20 f/u    Future Appointments   Date Time Provider Gomez Miller   10/29/2020 10:40 AM Dinesh Kee MD [facility] [place]   11/5/2020 11:00 AM EMG NURSE JOSLYN SIMENTAL [facility] [place]   11/6/2020  9:00  [address]

## 2020-10-29 ENCOUNTER — OFFICE VISIT (OUTPATIENT)
Dept: NEUROLOGY | Facility: CLINIC | Age: 75
End: 2020-10-29
Payer: MEDICARE

## 2020-10-29 VITALS
DIASTOLIC BLOOD PRESSURE: 64 MMHG | HEART RATE: 68 BPM | RESPIRATION RATE: 16 BRPM | BODY MASS INDEX: 31 KG/M2 | SYSTOLIC BLOOD PRESSURE: 122 MMHG | WEIGHT: 168 LBS

## 2020-10-29 DIAGNOSIS — G56.02 CARPAL TUNNEL SYNDROME OF LEFT WRIST: ICD-10-CM

## 2020-10-29 DIAGNOSIS — M54.16 LUMBAR RADICULITIS: ICD-10-CM

## 2020-10-29 DIAGNOSIS — R29.6 FALLS FREQUENTLY: Primary | ICD-10-CM

## 2020-10-29 DIAGNOSIS — M54.12 CERVICAL RADICULITIS: ICD-10-CM

## 2020-10-29 DIAGNOSIS — R25.1 TREMOR OF BOTH HANDS: ICD-10-CM

## 2020-10-29 DIAGNOSIS — E53.8 B12 DEFICIENCY: ICD-10-CM

## 2020-10-29 PROCEDURE — 99214 OFFICE O/P EST MOD 30 MIN: CPT | Performed by: OTHER

## 2020-10-29 NOTE — PROGRESS NOTES
Alan 1827   Neurology; follow up CLINIC VISIT  10/29/2020    Nicole Simmons Patient Status:  No patient class for patient encounter    1945 MRN [id number]   [facility] difficulty waking up after gallbladder surgery   • Anxiety    • Anxiety state, unspecified 6/29/2012   • Asthma    • Cancer ([facility] 1999    breast, basal cell   • Deep vein thrombosis ([facility]    • Exposure to medical diagnostic radiation    • Fitting and adjus Breast Cancer (age of onset: 49) in her self; CAD in her brother, father, and mother; Heart Attack in her paternal grandfather and paternal grandmother; Stroke in her maternal grandfather and maternal grandmother.     SOCIAL HISTORY:   reports that she quit tablet (10 mg total) by mouth 5 (five) times daily. 35 [place] 0   • Fluticasone Propionate 50 MCG/ACT Nasal Suspension 1 spray by Nasal route 2 (two) times daily.  1 Bottle 3   • Fluticasone Furoate-Vilanterol (BREO ELLIPTA) 200-25 MCG/INH Inhalation Aeroso bleeding  ENDOCRINE: denies excessive thirst or urination; denies unexpected wt gain or wt loss  ALLERGY/IMM.: denies food or seasonal allergies      PHYSICAL EXAMINATION:  VITAL SIGNS: /64   Pulse 68   Resp 16   Wt 168 lb (76.2 kg)   LMP  (LMP Unkno Babinski sign is negative;   Coordination: Normal FTN    Gait: Normal based,  No shuffling, Romberg sign is negative,         LABS:     Vitamin B12  193 - 986 pg/mL 157Low     Comment: Vitamin B12 Reference Range      Deficient:      <150 pg/mL   Micheal Fields Dictated by (CST): Pranay King MD on 8/26/2020 at 11:19 AM       Finalized by (CST): Pranay King MD on 8/26/2020 at 11:22 AM       Cervical spine MRI  CONCLUSION:       1. Multilevel degenerative changes in the cervical spine as above witho treatment included  Better control her DM,   B12 injection weekly times 4, then biweekly times 4, then monthly. Use cane, fall prevention, gait PT is another option, but she declined. Since she has L. Wrist splint. She has severe CTS in L.  Hand, but sh

## 2020-10-31 PROCEDURE — 99490 CHRNC CARE MGMT STAFF 1ST 20: CPT

## 2020-11-04 ENCOUNTER — DOCUMENTATION ONLY (OUTPATIENT)
Dept: CASE MANAGEMENT | Age: 75
End: 2020-11-04

## 2020-11-04 ENCOUNTER — PATIENT OUTREACH (OUTPATIENT)
Dept: CASE MANAGEMENT | Age: 75
End: 2020-11-04

## 2020-11-04 DIAGNOSIS — E11.65 TYPE 2 DIABETES MELLITUS WITH HYPERGLYCEMIA, WITHOUT LONG-TERM CURRENT USE OF INSULIN (HCC): ICD-10-CM

## 2020-11-04 DIAGNOSIS — Z86.711 HISTORY OF PULMONARY EMBOLISM: ICD-10-CM

## 2020-11-04 DIAGNOSIS — F32.A MILD DEPRESSION: ICD-10-CM

## 2020-11-04 DIAGNOSIS — M17.12 PRIMARY OSTEOARTHRITIS OF LEFT KNEE: ICD-10-CM

## 2020-11-04 DIAGNOSIS — E53.8 B12 DEFICIENCY: ICD-10-CM

## 2020-11-04 DIAGNOSIS — M47.812 NECK ARTHRITIS: ICD-10-CM

## 2020-11-04 DIAGNOSIS — Z85.828 HISTORY OF BASAL CELL CARCINOMA: ICD-10-CM

## 2020-11-04 DIAGNOSIS — I10 BENIGN ESSENTIAL HTN: ICD-10-CM

## 2020-11-04 DIAGNOSIS — J45.31 MILD PERSISTENT ASTHMA WITH ACUTE EXACERBATION: ICD-10-CM

## 2020-11-04 DIAGNOSIS — Z85.3 HISTORY OF LEFT BREAST CANCER: ICD-10-CM

## 2020-11-04 DIAGNOSIS — G47.33 OSA ON CPAP: ICD-10-CM

## 2020-11-04 DIAGNOSIS — Z99.89 OSA ON CPAP: ICD-10-CM

## 2020-11-04 DIAGNOSIS — G56.02 CARPAL TUNNEL SYNDROME OF LEFT WRIST: ICD-10-CM

## 2020-11-04 DIAGNOSIS — E78.00 PURE HYPERCHOLESTEROLEMIA: ICD-10-CM

## 2020-11-04 DIAGNOSIS — E78.5 DYSLIPIDEMIA: ICD-10-CM

## 2020-11-04 DIAGNOSIS — F41.9 ANXIETY: ICD-10-CM

## 2020-11-04 RX ORDER — ONDANSETRON 4 MG/1
4 TABLET, FILM COATED ORAL EVERY 12 HOURS PRN
Qty: 20 TABLET | Refills: 1 | Status: ON HOLD | OUTPATIENT
Start: 2020-11-04 | End: 2021-06-04

## 2020-11-04 NOTE — TELEPHONE ENCOUNTER
Last Ov:12/9/20  Upcoming appt:9/16/20  Last labs:9/4/20 vitamin e, b6, folic acid, ck creatine, c-reactive, sed-rate, RA  Last Rx:8/3/20 ondansetron 4mg    Per Protocol sent for review

## 2020-11-04 NOTE — PROGRESS NOTES
11/4/2020  Spoke to Keily for CCM. Updates to patient care team/comments: yes, reviewed and updated. Patient reported changes in medications: together we reviewed and updated.     Med Adherence  Comment: pt reports taking all medications as prescri Friday February 26, 2021 10:00 AM  Established Patient with Nilam Cain MD  [address] [place] [address] ([address]) [facility], [facility]2 [facility] [phone]      Thursday April 29, 2021 10:20 AM  Follow Up Visit w confident in achieving goal, 5= very confident               - confidence: 4    Continue independent living, healthy diet and exercise routine. Continue to provide encouragement, support, and education for healthy coping and dx.           Care Manager [facility] AT [facility]

## 2020-11-04 NOTE — TELEPHONE ENCOUNTER
Triage: Good Afternoon :)     Pt would like to know if Dr. Narciso Bonilla would refill her Zofran sent to [facility] on file. Notified I would contact Dr. Bowers Read office and a nurse will be in contact with her if any further questions.   Understanding verbalized

## 2020-11-05 ENCOUNTER — NURSE ONLY (OUTPATIENT)
Dept: NEUROLOGY | Facility: CLINIC | Age: 75
End: 2020-11-05
Payer: MEDICARE

## 2020-11-05 DIAGNOSIS — E53.8 B12 DEFICIENCY: Primary | ICD-10-CM

## 2020-11-05 PROCEDURE — 96372 THER/PROPH/DIAG INJ SC/IM: CPT | Performed by: OTHER

## 2020-11-05 RX ORDER — CYANOCOBALAMIN 1000 UG/ML
1000 INJECTION INTRAMUSCULAR; SUBCUTANEOUS ONCE
Status: COMPLETED | OUTPATIENT
Start: 2020-11-05 | End: 2020-11-05

## 2020-11-05 RX ADMIN — CYANOCOBALAMIN 1000 MCG: 1000 INJECTION INTRAMUSCULAR; SUBCUTANEOUS at 11:39:00

## 2020-11-06 ENCOUNTER — OFFICE VISIT (OUTPATIENT)
Dept: HEMATOLOGY/ONCOLOGY | Facility: HOSPITAL | Age: 75
End: 2020-11-06
Attending: INTERNAL MEDICINE
Payer: MEDICARE

## 2020-11-06 VITALS
HEART RATE: 88 BPM | SYSTOLIC BLOOD PRESSURE: 144 MMHG | RESPIRATION RATE: 18 BRPM | WEIGHT: 170.38 LBS | DIASTOLIC BLOOD PRESSURE: 79 MMHG | OXYGEN SATURATION: 96 % | TEMPERATURE: 98 F | BODY MASS INDEX: 30.96 KG/M2 | HEIGHT: 62.01 IN

## 2020-11-06 DIAGNOSIS — D80.3 SELECTIVE DEFICIENCY OF IGG SUBCLASSES (HCC): Primary | ICD-10-CM

## 2020-11-06 DIAGNOSIS — D50.0 IRON DEFICIENCY ANEMIA DUE TO CHRONIC BLOOD LOSS: ICD-10-CM

## 2020-11-06 PROCEDURE — 96366 THER/PROPH/DIAG IV INF ADDON: CPT

## 2020-11-06 PROCEDURE — 96365 THER/PROPH/DIAG IV INF INIT: CPT

## 2020-11-06 RX ORDER — DIPHENHYDRAMINE HYDROCHLORIDE 50 MG/ML
12.5 INJECTION INTRAMUSCULAR; INTRAVENOUS ONCE
Status: CANCELLED | OUTPATIENT
Start: 2020-12-04

## 2020-11-06 RX ORDER — DIPHENHYDRAMINE HCL 25 MG
25 CAPSULE ORAL ONCE
Status: CANCELLED | OUTPATIENT
Start: 2020-12-04

## 2020-11-06 RX ORDER — ACETAMINOPHEN 325 MG/1
650 TABLET ORAL ONCE
Status: CANCELLED | OUTPATIENT
Start: 2020-12-04

## 2020-11-06 RX ORDER — ACETAMINOPHEN 325 MG/1
650 TABLET ORAL ONCE
Status: COMPLETED | OUTPATIENT
Start: 2020-11-06 | End: 2020-11-06

## 2020-11-06 RX ORDER — DIPHENHYDRAMINE HCL 25 MG
25 CAPSULE ORAL ONCE
Status: COMPLETED | OUTPATIENT
Start: 2020-11-06 | End: 2020-11-06

## 2020-11-06 RX ORDER — SODIUM CHLORIDE 0.9 % (FLUSH) 0.9 %
10 SYRINGE (ML) INJECTION ONCE
Status: COMPLETED | OUTPATIENT
Start: 2020-11-06 | End: 2020-11-06

## 2020-11-06 RX ADMIN — ACETAMINOPHEN 650 MG: 325 TABLET ORAL at 09:21:00

## 2020-11-06 RX ADMIN — SODIUM CHLORIDE 0.9 % (FLUSH) 10 ML: 0.9 % SYRINGE (ML) INJECTION at 12:50:00

## 2020-11-06 RX ADMIN — DIPHENHYDRAMINE HCL 25 MG: 25 MG CAPSULE ORAL at 09:21:00

## 2020-11-06 NOTE — PROGRESS NOTES
Pt here for IVIG.   Arrives Ambulating with cane, accompanied by Self           Patient reports possible pregnancy since last therapy cycle: No    Modifications in dose or schedule: No     Frequency of blood return and site check throughout administration:

## 2020-11-17 ENCOUNTER — TELEPHONE (OUTPATIENT)
Dept: INTERNAL MEDICINE CLINIC | Facility: CLINIC | Age: 75
End: 2020-11-17

## 2020-11-17 DIAGNOSIS — Z12.31 ENCOUNTER FOR SCREENING MAMMOGRAM FOR MALIGNANT NEOPLASM OF BREAST: Primary | ICD-10-CM

## 2020-11-17 NOTE — TELEPHONE ENCOUNTER
Received fax from [facility] with  forms to be completed by provider for Hereditary Cardiology Genetic testing. Placed in SD bin to review and complete.

## 2020-11-20 ENCOUNTER — OFFICE VISIT (OUTPATIENT)
Dept: RHEUMATOLOGY | Facility: CLINIC | Age: 75
End: 2020-11-20
Payer: MEDICARE

## 2020-11-20 VITALS
SYSTOLIC BLOOD PRESSURE: 140 MMHG | WEIGHT: 174 LBS | TEMPERATURE: 97 F | RESPIRATION RATE: 16 BRPM | BODY MASS INDEX: 32.02 KG/M2 | HEIGHT: 62 IN | HEART RATE: 60 BPM | DIASTOLIC BLOOD PRESSURE: 74 MMHG

## 2020-11-20 DIAGNOSIS — M25.50 POLYARTHRALGIA: ICD-10-CM

## 2020-11-20 DIAGNOSIS — M15.9 PRIMARY OSTEOARTHRITIS INVOLVING MULTIPLE JOINTS: Primary | ICD-10-CM

## 2020-11-20 DIAGNOSIS — G89.4 CHRONIC PAIN SYNDROME: ICD-10-CM

## 2020-11-20 DIAGNOSIS — M79.10 MYALGIA: ICD-10-CM

## 2020-11-20 PROCEDURE — 99214 OFFICE O/P EST MOD 30 MIN: CPT | Performed by: INTERNAL MEDICINE

## 2020-11-20 RX ORDER — HYDROCODONE BITARTRATE AND ACETAMINOPHEN 10; 325 MG/1; MG/1
1 TABLET ORAL
COMMUNITY

## 2020-11-20 NOTE — PROGRESS NOTES
RHEUMATOLOGY Follow up   Date of visit: 11/20/2020  ? Patient presents with:  Osteoarthritis: previous video visit. Feeling pretty good. Has fallen twice this year with fractures. Feel more awake on lower dose of duloxetine.      ASSESSMENT, DISCUSSION & meantime. Patient verbalized understanding of above instructions. No further questions at this time. Spent 25 min face to face with patient, >50% of that time spent discussing potential etiology to symptoms, treatment options and coordinating care. fatigue and shaking  But has noticed some worsened depression  Has also gained about 25 pounds which she thinks is due to the inactivity. Is still working but will be transitioning to working from home.      HPI from initial consultation  States she has h rashes, Raynaud's phenomenon, prior renal disease, or history of seizures. The patient denies any history of uveitis. There are no symptoms of severe dry eyes, dry mouth, recurrent cavities, or swelling of the cheeks or under the jawbone.  No prior histor arthroscopy   • HERNIA SURGERY     • HYSTERECTOMY      @ age 46   • KNEE TOTAL REPLACEMENT Left 11/20/2019    Performed by Diana Reddy MD at [facility] MAIN OR   • LUMPECTOMY LEFT  1999   • OOPHORECTOMY      @ age 46   • OTHER SURGICAL HISTORY  2010    endos total) by mouth daily.  To take along with 30mg to equal 90mg daily, Disp: 90 capsule, Rfl: 0    •  LISINOPRIL 20 MG Oral Tab, TAKE 1 TABLET BY MOUTH DAILY, Disp: 90 tablet, Rfl: 1    •  BUPROPION HCL ER, XL, 150 MG Oral Tablet 24 Hr, Take 1 tablet (150 mg Comment:Leg cramping,  Omnicef                 NAUSEA ONLY, DIZZINESS  Quinapril Hcl           FATIGUE  Sulfa Antibiotics       UNKNOWN  Clindamycin             DIARRHEA, NAUSEA ONLY  ? REVIEW OF SYSTEMS   ?   Review of Systems   Constitutional: Negative tenderness of the 1st CMC bilaterally. Slightly larger middle finger DIPs compared to last visit. No other swelling or restriction of motion of the MCPs, wrists, elbows. Lymphadenopathy:     She has no cervical adenopathy.    Neurological: She is alert WHO definition (T score between -1.0 and -2.5). If therapy is initiated, follow-up study is recommended in 2 years for further evaluation of   therapeutic efficacy.         PROCEDURE:  MRI SPINE LUMBAR (CPT=72148)     COMPARISON:  [address], MR, MRI SPIN examination.   No significant change since prior exam.        Dictated by (CST): Yi Forrester MD on 8/26/2020 at 11:19 AM         PROCEDURE:  MRI SPINE CERVICAL (CPT=72141)     COMPARISON:  [facility] , CT, CT SPINE CERVICAL (CPT=72125), 7/31/2020, 4:30 P neural foraminal stenosis. C5-6: There is a posterior disk osteophyte complex with mild-to-moderate uncovertebral and facet joint degenerative changes. There is no significant spinal canal or neural foraminal stenosis.      C6-7: There is a posterior d 13.9 12/11/2019    NEPRELIM 3.91 12/11/2019    NEUTABS 9.36 (H) 03/08/2016    LYMPHABS 0.52 (L) 03/08/2016    EOSABS 0.00 03/08/2016    BASABS 0.00 03/08/2016    NEUT 67 03/08/2016    LYMPH 5 03/08/2016    MON 5 03/08/2016    EOS 0 03/08/2016    BASO 0 03/

## 2020-11-21 ENCOUNTER — HOSPITAL ENCOUNTER (OUTPATIENT)
Dept: MAMMOGRAPHY | Age: 75
Discharge: HOME | End: 2020-11-21
Attending: NURSE PRACTITIONER
Payer: MEDICARE

## 2020-11-21 DIAGNOSIS — Z12.31 ENCOUNTER FOR SCREENING MAMMOGRAM FOR MALIGNANT NEOPLASM OF BREAST: ICD-10-CM

## 2020-11-21 PROCEDURE — 77067 SCR MAMMO BI INCL CAD: CPT | Performed by: NURSE PRACTITIONER

## 2020-11-21 PROCEDURE — 77063 BREAST TOMOSYNTHESIS BI: CPT | Performed by: NURSE PRACTITIONER

## 2020-11-24 ENCOUNTER — OFFICE VISIT (OUTPATIENT)
Dept: RHEUMATOLOGY | Facility: CLINIC | Age: 75
End: 2020-11-24
Payer: MEDICARE

## 2020-11-24 VITALS — TEMPERATURE: 97 F | DIASTOLIC BLOOD PRESSURE: 70 MMHG | SYSTOLIC BLOOD PRESSURE: 160 MMHG

## 2020-11-24 DIAGNOSIS — G56.02 LEFT CARPAL TUNNEL SYNDROME: Primary | ICD-10-CM

## 2020-11-24 PROCEDURE — 20526 THER INJECTION CARP TUNNEL: CPT | Performed by: INTERNAL MEDICINE

## 2020-11-24 PROCEDURE — 76942 ECHO GUIDE FOR BIOPSY: CPT | Performed by: INTERNAL MEDICINE

## 2020-11-24 RX ORDER — METHYLPREDNISOLONE ACETATE 40 MG/ML
20 INJECTION, SUSPENSION INTRA-ARTICULAR; INTRALESIONAL; INTRAMUSCULAR; SOFT TISSUE ONCE
Status: COMPLETED | OUTPATIENT
Start: 2020-11-24 | End: 2020-11-24

## 2020-11-24 RX ORDER — LIDOCAINE HYDROCHLORIDE 10 MG/ML
1 INJECTION, SOLUTION INFILTRATION; PERINEURAL ONCE
Status: COMPLETED | OUTPATIENT
Start: 2020-11-24 | End: 2020-11-24

## 2020-11-24 RX ADMIN — METHYLPREDNISOLONE ACETATE 20 MG: 40 INJECTION, SUSPENSION INTRA-ARTICULAR; INTRALESIONAL; INTRAMUSCULAR; SOFT TISSUE at 15:52:00

## 2020-11-24 RX ADMIN — LIDOCAINE HYDROCHLORIDE 1 ML: 10 INJECTION, SOLUTION INFILTRATION; PERINEURAL at 15:51:00

## 2020-11-24 NOTE — PROCEDURES
Carpal Tunnel Procedure Note    Ultrasound examination of the left carpal tunnel  Equipment: Fashion To Figure II  Indication: Pain/paresthesias in fingers    Findings: Ultrasound examination is performed according to standard EULAR recommendations(1).  . Using both transverse and longitudinal views as well as identifying neurovascular structures the area was cleansed with Chloraprep x 2.  Then using sterile gel and a 13-6 MHz transducer the area around the median nerve was injected with a total solution including

## 2020-11-28 NOTE — LETTER
22      RE: Dolan Carrel     :     Dear Dr. Fernanda Hardy,    Pulmonary clearance is being requested for surgery. Your patient is being scheduled for CERVICAL 5 AND 6 LAMINECTOMIES, PARTIAL C7 LAMINECTOMY, LEFT CERVICAL 5/CERVICAL 6 AND /CERVICAL 7 FORAMINOTOMIES, POSSIBLE C6/C7 ARTHRODESIS, AUTOGRAFT, ALLOGRAFT on 22 with Dr. Mary Kim. Pre-op labs will be done at THE [facility] and are scheduled by our [facility] department. Please fax your clearance to the pre-admission department at [address] [phone] or our office [phone]    The following labs will be placed with the surgery order and are scheduled by Bertin Yates:  CBC  Comprehensive Metabolic Panel  PT/PTT/INR  UA with reflex to culture  EKG  CXR  MRSA/MSSA Nasal Swab    If you have any questions, you may contact our office at [phone], option # 2.     Thank you,     Clinical Staff Nurse  [facility]
22      RE: Shannan Eaton     : [birth year]    Dear Dr. Juancarlos Esparza clearance is being requested for surgery. Your patient is being scheduled for CERVICAL 5 AND 6 LAMINECTOMIES, PARTIAL C7 LAMINECTOMY, LEFT CERVICAL 5/CERVICAL 6 AND /CERVICAL 7 FORAMINOTOMIES, POSSIBLE C6/C7 ARTHRODESIS, AUTOGRAFT, ALLOGRAFT on 22 with Dr. Zoë Mccormack. Pre-op labs will be done at  and are scheduled by our [facility] department. Please fax your clearance to the pre-admission department at [address] [phone] or our office [phone]    The following labs will be placed with the surgery order and are scheduled by Camden Morgan:  CBC  Comprehensive Metabolic Panel  PT/PTT/INR  UA with reflex to culture  EKG  CXR  MRSA/MSSA Nasal Swab    Please give guidance when patient should hold and resume Xarelto and ASA    If you have any questions, you may contact our office at [phone], option # 2.     Thank you,     Clinical Staff Nurse  [facility]
22  RE: Danisha Scott     :     Dear Dr. Julito Hussein,    This letter is to inform you that your patient is being scheduled for surgery with Dr. Lamont Tim on 22 at [facility]. We have asked the patient to contact your office to schedule a pre-operative exam/visit. Diagnosis: Cervical myelopathy and radiculopathy, cervical radiculitis   Procedure: CERVICAL 5 AND 6 LAMINECTOMIES, PARTIAL C7 LAMINECTOMY, LEFT CERVICAL 5/CERVICAL 6 AND /CERVICAL 7 FORAMINOTOMIES, POSSIBLE C6/C7 ARTHRODESIS, AUTOGRAFT, ALLOGRAFT     A Pre-operative History & Physical is needed for medical clearance. Please address patient's active problems (i.e high blood pressure, breathing issues etc.)  Pre-op labs are scheduled through the [facility] 56.. If any labs/testing are being done through the PCP office, then results should be faxed to the pre-admission testing department at [facility] [phone]. [phone]. Our pre-operative lab orders are located in our surgery order, if the patient would like these done through your office, you will need to place separate orders. Please fax clearance letter/office visit note to the Pre-Admission department or our office at fax #: [phone]. Your office note must clearly indicate if the patient is medically cleared for surgery or not. The following orders will be placed by pre-admission testing:  CXR  EKG  CBC  Comprehensive Metabolic Panel  Type and Screen (if applicable)  PT/PTT/INR  MRSA/MSSA Nasal Swab  Covid-19 testing  (*And any other pertinent testing based on patient's current clinical condition.)    If you have any questions, you may contact our office at [phone], option # 3.     Thank you,     Clinical Staff Nurse  [facility]
will see patient in ED

## 2020-11-30 PROCEDURE — 99490 CHRNC CARE MGMT STAFF 1ST 20: CPT

## 2020-12-01 ENCOUNTER — TELEMEDICINE (OUTPATIENT)
Dept: INTERNAL MEDICINE CLINIC | Facility: CLINIC | Age: 75
End: 2020-12-01
Payer: MEDICARE

## 2020-12-01 ENCOUNTER — APPOINTMENT (OUTPATIENT)
Dept: GENERAL RADIOLOGY | Age: 75
End: 2020-12-01
Attending: PHYSICIAN ASSISTANT
Payer: MEDICARE

## 2020-12-01 ENCOUNTER — HOSPITAL ENCOUNTER (EMERGENCY)
Facility: HOSPITAL | Age: 75
Discharge: HOME | End: 2020-12-01
Attending: EMERGENCY MEDICINE
Payer: MEDICARE

## 2020-12-01 ENCOUNTER — HOSPITAL ENCOUNTER (OUTPATIENT)
Age: 75
Discharge: ANOTHER HOSPITAL | End: 2020-12-01
Payer: MEDICARE

## 2020-12-01 VITALS
SYSTOLIC BLOOD PRESSURE: 116 MMHG | WEIGHT: 160 LBS | HEART RATE: 92 BPM | TEMPERATURE: 99 F | DIASTOLIC BLOOD PRESSURE: 72 MMHG | HEIGHT: 61 IN | OXYGEN SATURATION: 92 % | BODY MASS INDEX: 30.21 KG/M2 | RESPIRATION RATE: 20 BRPM

## 2020-12-01 VITALS
HEART RATE: 112 BPM | WEIGHT: 160 LBS | TEMPERATURE: 97 F | BODY MASS INDEX: 30.21 KG/M2 | SYSTOLIC BLOOD PRESSURE: 140 MMHG | RESPIRATION RATE: 20 BRPM | OXYGEN SATURATION: 97 % | HEIGHT: 61 IN | DIASTOLIC BLOOD PRESSURE: 80 MMHG

## 2020-12-01 DIAGNOSIS — U07.1 COVID-19: Primary | ICD-10-CM

## 2020-12-01 DIAGNOSIS — R06.02 SHORTNESS OF BREATH: ICD-10-CM

## 2020-12-01 DIAGNOSIS — R05.8 PRODUCTIVE COUGH: Primary | ICD-10-CM

## 2020-12-01 LAB
ALBUMIN SERPL-MCNC: 3.6 G/DL (ref 3.4–5)
ALBUMIN/GLOB SERPL: 1 {RATIO} (ref 1–2)
ALP LIVER SERPL-CCNC: 91 U/L
ALT SERPL-CCNC: 45 U/L
ANION GAP SERPL CALC-SCNC: 7 MMOL/L (ref 0–18)
AST SERPL-CCNC: 30 U/L (ref 15–37)
BASOPHILS # BLD AUTO: 0.03 X10(3) UL (ref 0–0.2)
BASOPHILS NFR BLD AUTO: 0.5 %
BILIRUB SERPL-MCNC: 0.4 MG/DL (ref 0.1–2)
BUN BLD-MCNC: 25 MG/DL (ref 7–18)
BUN/CREAT SERPL: 24.5 (ref 10–20)
CALCIUM BLD-MCNC: 9.1 MG/DL (ref 8.5–10.1)
CHLORIDE SERPL-SCNC: 105 MMOL/L (ref 98–112)
CO2 SERPL-SCNC: 23 MMOL/L (ref 21–32)
CREAT BLD-MCNC: 1.02 MG/DL
DEPRECATED RDW RBC AUTO: 42.9 FL (ref 35.1–46.3)
EOSINOPHIL # BLD AUTO: 0.01 X10(3) UL (ref 0–0.7)
EOSINOPHIL NFR BLD AUTO: 0.2 %
ERYTHROCYTE [DISTWIDTH] IN BLOOD BY AUTOMATED COUNT: 13.7 % (ref 11–15)
GLOBULIN PLAS-MCNC: 3.5 G/DL (ref 2.8–4.4)
GLUCOSE BLD-MCNC: 115 MG/DL (ref 70–99)
HCT VFR BLD AUTO: 38 %
HGB BLD-MCNC: 12.7 G/DL
IMM GRANULOCYTES # BLD AUTO: 0.02 X10(3) UL (ref 0–1)
IMM GRANULOCYTES NFR BLD: 0.3 %
LYMPHOCYTES # BLD AUTO: 1.53 X10(3) UL (ref 1–4)
LYMPHOCYTES NFR BLD AUTO: 23.4 %
M PROTEIN MFR SERPL ELPH: 7.1 G/DL (ref 6.4–8.2)
MCH RBC QN AUTO: 28.9 PG (ref 26–34)
MCHC RBC AUTO-ENTMCNC: 33.4 G/DL (ref 31–37)
MCV RBC AUTO: 86.4 FL
MONOCYTES # BLD AUTO: 0.79 X10(3) UL (ref 0.1–1)
MONOCYTES NFR BLD AUTO: 12.1 %
NEUTROPHILS # BLD AUTO: 4.16 X10 (3) UL (ref 1.5–7.7)
NEUTROPHILS # BLD AUTO: 4.16 X10(3) UL (ref 1.5–7.7)
NEUTROPHILS NFR BLD AUTO: 63.5 %
OSMOLALITY SERPL CALC.SUM OF ELEC: 285 MOSM/KG (ref 275–295)
PLATELET # BLD AUTO: 197 10(3)UL (ref 150–450)
POTASSIUM SERPL-SCNC: 4.3 MMOL/L (ref 3.5–5.1)
RBC # BLD AUTO: 4.4 X10(6)UL
SARS-COV-2 RNA RESP QL NAA+PROBE: DETECTED
SODIUM SERPL-SCNC: 135 MMOL/L (ref 136–145)
WBC # BLD AUTO: 6.5 X10(3) UL (ref 4–11)

## 2020-12-01 PROCEDURE — 99284 EMERGENCY DEPT VISIT MOD MDM: CPT

## 2020-12-01 PROCEDURE — 80053 COMPREHEN METABOLIC PANEL: CPT | Performed by: EMERGENCY MEDICINE

## 2020-12-01 PROCEDURE — 99213 OFFICE O/P EST LOW 20 MIN: CPT | Performed by: PHYSICIAN ASSISTANT

## 2020-12-01 PROCEDURE — 71046 X-RAY EXAM CHEST 2 VIEWS: CPT | Performed by: PHYSICIAN ASSISTANT

## 2020-12-01 PROCEDURE — 99213 OFFICE O/P EST LOW 20 MIN: CPT | Performed by: INTERNAL MEDICINE

## 2020-12-01 PROCEDURE — 85025 COMPLETE CBC W/AUTO DIFF WBC: CPT | Performed by: EMERGENCY MEDICINE

## 2020-12-01 NOTE — ED PROVIDER NOTES
Patient Seen in: Immediate [address] [place] [address]      History   Patient presents with:  Cough/URI  Body ache and/or chills    Stated Complaint: Chest Congestion/body aches    TOMMY    Keily is a 76-year-old female who presents today for evaluation of coug unspecified type diabetes mellitus without mention of complication, not stated as uncontrolled 6/29/2012              Past Surgical History:   Procedure Laterality Date   • ABDOMEN SURGERY PROC UNLISTED      duodenal biopsy   • BREAST SURGERY     • CERVICA except as noted above.     Physical Exam     ED Triage Vitals [12/01/20 1511]   /80   Pulse 112   Resp 20   Temp 97.2 °F (36.2 °C)   Temp src Temporal   SpO2 97 %   O2 Device None (Room air)       Current:/80   Pulse 112   Temp 97.2 °F (36.2 °C) No acute abnormality is seen. If clinical symptoms persist consider followup radiographs or CT.    Dictated by (CST): Javier Christy MD on 12/01/2020 at 4:06 PM     Finalized by (CST): Javier Christy MD on 12/01/2020 at 4:07 PM           Given the fact that the pa

## 2020-12-01 NOTE — PROGRESS NOTES
Claire Lang  11/4/1945    No chief complaint on file. Video encounter    SUBJECTIVE   Claire Lang is a 77year old female who presents with a productive cough and shortness of breath.     The patient describes a three day history of product 200-25 MCG/INH Inhalation Aerosol Powder, Breath Activated Inhale 1 puff into the lungs daily.  1 each 3   • PROAIR  (90 Base) MCG/ACT Inhalation Aero Soln INHALE 2 PUFFS INTO THE LUNGS EVERY SIX HOURS AS NEEDED (COUGH) 8.5 g 0   • Rosuvastatin Calci weakness    • Neuropathy     bilateral feet   • Osteoarthritis    • PE (pulmonary embolism)    • Personal history of malignant neoplasm of breast    • Pneumonia due to organism    • Pulmonary embolism (HCC)    • Sleep apnea    • Type II or unspecified type CERVICAL FACET INJECTION Right 5/15/2014    Performed by Ava Jon [facility] at [facility] MAIN OR   • CHOLECYSTECTOMY     • COLONOSCOPY  10/1/10, 4/21/17   • EGD  4/21/17   • FEMUR/KNEE SURG UNLISTED      right knee arthroscopy   • HERNIA SURGERY     • HYSTERECT Refills:  Requested Prescriptions      No prescriptions requested or ordered in this encounter       Imaging & Consults:  None    No follow-ups on file. There are no Patient Instructions on file for this visit.     All questions were answered and the marty

## 2020-12-01 NOTE — ED INITIAL ASSESSMENT (HPI)
Pt sent over from [address] [place] [address] for rapid test due to coughing; with possible consideration for monoclonal antibodies    Pt reports hx of bronchitis, and that this feels similar

## 2020-12-01 NOTE — ED PROVIDER NOTES
Patient Seen in: [facility] Emergency Department      History   Patient presents with:  Cough/URI    Stated Complaint: pt c/o productive cough. cxr is negative.  pt coming to ed for rapid covid testi*    HPI    72-year-old female presents with 3 days ABDOMEN SURGERY PROC UNLISTED      duodenal biopsy   • BREAST SURGERY     • CERVICAL FACET INJECTION Left 5/29/2014    Performed by Lucy Simon MD at [facility] MAIN OR   • CERVICAL FACET INJECTION Right 5/15/2014    Performed by Lucy Simon MD at [facility] src 12/01/20 1706 Temporal   SpO2 12/01/20 1700 97 %   O2 Device 12/01/20 1706 None (Room air)       Current:/67   Pulse 91   Temp 98.8 °F (37.1 °C) (Temporal)   Resp 20   Ht 154.9 cm (5' 1\")   Wt 72.6 kg   LMP  (LMP Unknown)   SpO2 95%   BMI 30.23 CHEST AP PORTABLE  (CPT=71045), 7/24/2018, 6:22 AM.  TECHNIQUE:  PA and lateral chest radiographs were obtained. PATIENT STATED HISTORY: (As transcribed by Technologist)  Fatigue, prodctive cough, body aches since last Saturday.     FINDINGS:  Heart size i ROUTINE MAMMOGRAM AND CLINICAL EVALUATION IN 12 MONTHS. A letter explaining the results in lay terms has been sent to the patient. This exam was evaluated with a computer-aided device.   This patient's information has been entered into a reminder syste infusion and be monitored for 60 minutes after.    They have been advised that they should continue to self-isolate and use infection control measures (e.g., wear mask, isolate, social distance, avoid sharing personal items, clean and disinfect “high touch”

## 2020-12-02 ENCOUNTER — PATIENT OUTREACH (OUTPATIENT)
Dept: CASE MANAGEMENT | Age: 75
End: 2020-12-02

## 2020-12-02 NOTE — ED NOTES
Patient was provided with Incentive Spirometry/pulse ox , and instructed on use. Patient verbalized understanding.

## 2020-12-02 NOTE — PATIENT INSTRUCTIONS
Patient Instructions for Home Pulse Oximetry due to COVID-19 Pneumonia  Overview and Terminology  A pulse oximeter a small device that clips on a finger and measures oxygen saturation levels along with your heart rate.  It works by passing small beams of [id number]         [address] Monitoring Program    Your provider has placed an order for the [address] Monitoring program.  Marquis Butt will be receiving a follow up phone call during business hours, soon after discharge.   If

## 2020-12-02 NOTE — PROGRESS NOTES
Home Monitoring Condition Update    Covid19+ test date: 12/1/2020      Consent Verification:  Assessment Completed With: Patient  HIPAA Verified?   Yes    COVID-19 HOME MONITORING 12/2/2020   SPO2 (No Data)   Short of breath No   Cough Yes   Cough Lazarus Heys wheezing? No  If yes, we will contact your doctor/on call provider or if you are experiencing a severe reaction, please call 911 for emergency assistance.    If no, and you develop any symptoms, please contact your doctor/on call provider or if you are expe

## 2020-12-02 NOTE — ED NOTES
PT SATURATIONS DROPPED TO 88% WITH GOOD WAVE FORM ON MONITOR; UPON RN ARRIVAL SATURATIONS JUMPED TO 92%

## 2020-12-03 ENCOUNTER — PATIENT OUTREACH (OUTPATIENT)
Dept: CASE MANAGEMENT | Age: 75
End: 2020-12-03

## 2020-12-03 NOTE — PROGRESS NOTES
Home Monitoring Condition Update    Covid19+ test date: 12/1/20      Consent Verification:  Assessment Completed With: Patient  HIPAA Verified?   Yes    COVID-19 HOME MONITORING 12/3/2020   Temperature 97.1   Reading From Mouth   SPO2 96   Pulse 98   Puls for f/u now with NP she requested, Nena Martínez  Future Appointments   Date Time Provider Gomez Miller   12/4/2020 10:40 AM Kai Salcedo, APRN [facility] [address] [facility]     Has mychart on phone      Patient advised to inform their [address]

## 2020-12-04 ENCOUNTER — APPOINTMENT (OUTPATIENT)
Dept: HEMATOLOGY/ONCOLOGY | Facility: HOSPITAL | Age: 75
End: 2020-12-04
Attending: INTERNAL MEDICINE
Payer: MEDICARE

## 2020-12-04 ENCOUNTER — TELEMEDICINE (OUTPATIENT)
Dept: INTERNAL MEDICINE CLINIC | Facility: CLINIC | Age: 75
End: 2020-12-04

## 2020-12-04 DIAGNOSIS — D80.3 SELECTIVE DEFICIENCY OF IGG SUBCLASSES (HCC): ICD-10-CM

## 2020-12-04 DIAGNOSIS — I10 BENIGN ESSENTIAL HTN: ICD-10-CM

## 2020-12-04 DIAGNOSIS — U07.1 COVID-19 VIRUS INFECTION: Primary | ICD-10-CM

## 2020-12-04 PROCEDURE — 99214 OFFICE O/P EST MOD 30 MIN: CPT | Performed by: NURSE PRACTITIONER

## 2020-12-04 NOTE — PROGRESS NOTES
Due to COVID-19 ACTION PLAN, the patient's office visit was converted to a video visit. This visit is conducted using video and audio. The patient consents to this service.   The patient understands and accepts financial responsibility for any deductible, depression ([facility]     Diabetes mellitus ([facility]     Dyslipidemia     Primary osteoarthritis of left knee     Status post total left knee replacement     Closed fracture of lower end of left radius with routine healing     Closed nondisplaced fracture of head tablet 3   • traMADol HCl 50 MG Oral Tab Take 50 mg by mouth every 6 (six) hours as needed for Pain. • Cholecalciferol (VITAMIN D) 2000 units Oral Tab Take 2,000 Units by mouth daily. • TiZANidine HCl 4 MG Oral Tab Take 16 mg by mouth nightly. Imaging & Consults:  None      Please note that the above visit was completed using two-way, real-time interactive audio and video communication.   This has been done in good shanel to provide continuity of care in the best interest of the provider-pat

## 2020-12-07 ENCOUNTER — PATIENT OUTREACH (OUTPATIENT)
Dept: CASE MANAGEMENT | Age: 75
End: 2020-12-07

## 2020-12-07 ENCOUNTER — TELEPHONE (OUTPATIENT)
Dept: INTERNAL MEDICINE CLINIC | Facility: CLINIC | Age: 75
End: 2020-12-07

## 2020-12-07 NOTE — TELEPHONE ENCOUNTER
Received result from [employer] [employer] [facility] in regards to Comprehensive CVD panel  Placed in provider bin for review.

## 2020-12-07 NOTE — PROGRESS NOTES
Home Monitoring Condition Update    Covid19+ test date: 12/1/20      Consent Verification:  Assessment Completed With: Patient  HIPAA Verified?   Yes    COVID-19 HOME MONITORING 12/7/2020   Temperature (No Data)   Reading From -   SPO2 98   Pulse 138   Pu advised to inform their Employee Health department or Manager when they have tested positive for COVID-19.       The patient was also directed to continue to isolate away from other household members when possible and stay completely isolated from the gener

## 2020-12-08 ENCOUNTER — PATIENT OUTREACH (OUTPATIENT)
Dept: CASE MANAGEMENT | Age: 75
End: 2020-12-08

## 2020-12-08 ENCOUNTER — TELEPHONE (OUTPATIENT)
Dept: CASE MANAGEMENT | Age: 75
End: 2020-12-08

## 2020-12-08 NOTE — PROGRESS NOTES
Per PCP in TE 12/8/20, patient is cleared to be removed from program.       JUAREZ Nielsen         12/8/20 12:21 PM  Note     Spoke to patient. She is doing well. Ok to stop home monitoring program  Thanks.

## 2020-12-08 NOTE — TELEPHONE ENCOUNTER
Patient had virtual visit 12/7/20 and said she was told she is off quarantine 12/9/20  Please advise if patient is to continue home monitoring program (and indicate frequency of calls - daily, every other day, etc) and duration of days to be contacted and

## 2020-12-08 NOTE — PROGRESS NOTES
Home Monitoring Condition Update    Covid19+ test date: 12/1/20      Consent Verification:  Assessment Completed With: Patient  HIPAA Verified?   Yes    COVID-19 HOME MONITORING 12/8/2020   Temperature 96.8   Reading From Forehead   SPO2 96   Pulse 66   P questions at time of call. Patient said she is off quarantine tomorrow. Will verify with pcp if she is to continue HM. Patient advised to inform their Employee Health department or Manager when they have tested positive for COVID-19.       The patie

## 2020-12-11 ENCOUNTER — PATIENT OUTREACH (OUTPATIENT)
Dept: CASE MANAGEMENT | Age: 75
End: 2020-12-11

## 2020-12-11 ENCOUNTER — TELEPHONE (OUTPATIENT)
Dept: CASE MANAGEMENT | Age: 75
End: 2020-12-11

## 2020-12-11 ENCOUNTER — APPOINTMENT (OUTPATIENT)
Dept: HEMATOLOGY/ONCOLOGY | Facility: HOSPITAL | Age: 75
End: 2020-12-11
Attending: INTERNAL MEDICINE
Payer: MEDICARE

## 2020-12-11 DIAGNOSIS — G56.02 CARPAL TUNNEL SYNDROME OF LEFT WRIST: ICD-10-CM

## 2020-12-11 DIAGNOSIS — I10 BENIGN ESSENTIAL HTN: ICD-10-CM

## 2020-12-11 DIAGNOSIS — Z86.711 HISTORY OF PULMONARY EMBOLISM: ICD-10-CM

## 2020-12-11 DIAGNOSIS — E78.00 PURE HYPERCHOLESTEROLEMIA: ICD-10-CM

## 2020-12-11 DIAGNOSIS — F41.9 ANXIETY: ICD-10-CM

## 2020-12-11 DIAGNOSIS — Z85.828 HISTORY OF BASAL CELL CARCINOMA: ICD-10-CM

## 2020-12-11 DIAGNOSIS — E11.65 TYPE 2 DIABETES MELLITUS WITH HYPERGLYCEMIA, WITHOUT LONG-TERM CURRENT USE OF INSULIN (HCC): ICD-10-CM

## 2020-12-11 DIAGNOSIS — M17.12 PRIMARY OSTEOARTHRITIS OF LEFT KNEE: ICD-10-CM

## 2020-12-11 DIAGNOSIS — F32.A MILD DEPRESSION: ICD-10-CM

## 2020-12-11 DIAGNOSIS — E78.5 DYSLIPIDEMIA: ICD-10-CM

## 2020-12-11 DIAGNOSIS — G47.33 OSA ON CPAP: ICD-10-CM

## 2020-12-11 DIAGNOSIS — J45.31 MILD PERSISTENT ASTHMA WITH ACUTE EXACERBATION: ICD-10-CM

## 2020-12-11 DIAGNOSIS — Z99.89 OSA ON CPAP: ICD-10-CM

## 2020-12-11 DIAGNOSIS — Z85.3 HISTORY OF LEFT BREAST CANCER: ICD-10-CM

## 2020-12-11 DIAGNOSIS — K21.9 GASTROESOPHAGEAL REFLUX DISEASE WITHOUT ESOPHAGITIS: ICD-10-CM

## 2020-12-11 DIAGNOSIS — E53.8 B12 DEFICIENCY: ICD-10-CM

## 2020-12-11 NOTE — PROGRESS NOTES
12/11/2020  Spoke to Keily for CCM. Updates to patient care team/comments: reviewed with no updates. Patient reported changes in medications: yes, added reviewed and updated together.     Med Adherence  Comment: pt reports taking all medications as incorporate some exercises and resistant bands to daily routine. Has an order from Dr. Lisa Nogueira. Pt reports she is thinking to schedule soon but she is trying to wait for surge in Covid to come down.       · Active goal from previous ou

## 2020-12-11 NOTE — TELEPHONE ENCOUNTER
OK to add but watch for new sxs. If she is taking Zinc for prevention of COVID there is no solid evidence this will help.

## 2020-12-11 NOTE — TELEPHONE ENCOUNTER
Triage: Happy Friday :)     Pt would like to know if she can add Vitamin C and Zinc to her list of supplements she is taking? Notified I would contact Dr. Stephanie Villarreal office and a nurse will call her back for further assessment.   Understanding verbalized

## 2020-12-18 ENCOUNTER — OFFICE VISIT (OUTPATIENT)
Dept: HEMATOLOGY/ONCOLOGY | Facility: HOSPITAL | Age: 75
End: 2020-12-18
Attending: INTERNAL MEDICINE
Payer: MEDICARE

## 2020-12-18 VITALS
DIASTOLIC BLOOD PRESSURE: 87 MMHG | BODY MASS INDEX: 31 KG/M2 | HEIGHT: 62.01 IN | WEIGHT: 170.63 LBS | TEMPERATURE: 99 F | RESPIRATION RATE: 18 BRPM | OXYGEN SATURATION: 100 % | SYSTOLIC BLOOD PRESSURE: 158 MMHG | HEART RATE: 87 BPM

## 2020-12-18 DIAGNOSIS — T80.90XA INFUSION REACTION, INITIAL ENCOUNTER: Primary | ICD-10-CM

## 2020-12-18 DIAGNOSIS — D80.3 SELECTIVE DEFICIENCY OF IGG SUBCLASSES (HCC): Primary | ICD-10-CM

## 2020-12-18 DIAGNOSIS — D50.0 IRON DEFICIENCY ANEMIA DUE TO CHRONIC BLOOD LOSS: ICD-10-CM

## 2020-12-18 DIAGNOSIS — D80.3 SELECTIVE DEFICIENCY OF IGG SUBCLASSES (HCC): ICD-10-CM

## 2020-12-18 PROCEDURE — 96366 THER/PROPH/DIAG IV INF ADDON: CPT

## 2020-12-18 PROCEDURE — 96365 THER/PROPH/DIAG IV INF INIT: CPT

## 2020-12-18 PROCEDURE — 96375 TX/PRO/DX INJ NEW DRUG ADDON: CPT

## 2020-12-18 PROCEDURE — S0028 INJECTION, FAMOTIDINE, 20 MG: HCPCS | Performed by: CLINICAL NURSE SPECIALIST

## 2020-12-18 PROCEDURE — 99214 OFFICE O/P EST MOD 30 MIN: CPT | Performed by: CLINICAL NURSE SPECIALIST

## 2020-12-18 RX ORDER — ACETAMINOPHEN 325 MG/1
325 TABLET ORAL ONCE
Status: COMPLETED | OUTPATIENT
Start: 2020-12-18 | End: 2020-12-18

## 2020-12-18 RX ORDER — ACETAMINOPHEN 325 MG/1
650 TABLET ORAL ONCE
Status: CANCELLED | OUTPATIENT
Start: 2021-01-01

## 2020-12-18 RX ORDER — FAMOTIDINE 10 MG/ML
20 INJECTION, SOLUTION INTRAVENOUS 2 TIMES DAILY
Status: DISCONTINUED | OUTPATIENT
Start: 2020-12-18 | End: 2020-12-18

## 2020-12-18 RX ORDER — ALBUTEROL SULFATE 90 UG/1
1 AEROSOL, METERED RESPIRATORY (INHALATION) ONCE
Status: COMPLETED | OUTPATIENT
Start: 2020-12-18 | End: 2020-12-18

## 2020-12-18 RX ORDER — DIPHENHYDRAMINE HCL 25 MG
25 CAPSULE ORAL ONCE
Status: COMPLETED | OUTPATIENT
Start: 2020-12-18 | End: 2020-12-18

## 2020-12-18 RX ORDER — METHYLPREDNISOLONE SODIUM SUCCINATE 125 MG/2ML
125 INJECTION, POWDER, LYOPHILIZED, FOR SOLUTION INTRAMUSCULAR; INTRAVENOUS ONCE
Status: COMPLETED | OUTPATIENT
Start: 2020-12-18 | End: 2020-12-18

## 2020-12-18 RX ORDER — DIPHENHYDRAMINE HYDROCHLORIDE 50 MG/ML
12.5 INJECTION INTRAMUSCULAR; INTRAVENOUS ONCE
Status: CANCELLED | OUTPATIENT
Start: 2021-01-01

## 2020-12-18 RX ORDER — DIPHENHYDRAMINE HCL 25 MG
25 CAPSULE ORAL ONCE
Status: CANCELLED | OUTPATIENT
Start: 2021-01-01

## 2020-12-18 RX ORDER — DIPHENHYDRAMINE HYDROCHLORIDE 50 MG/ML
12.5 INJECTION INTRAMUSCULAR; INTRAVENOUS ONCE
Status: DISCONTINUED | OUTPATIENT
Start: 2020-12-18 | End: 2020-12-18

## 2020-12-18 RX ADMIN — METHYLPREDNISOLONE SODIUM SUCCINATE 125 MG: 125 INJECTION, POWDER, LYOPHILIZED, FOR SOLUTION INTRAMUSCULAR; INTRAVENOUS at 16:17:00

## 2020-12-18 RX ADMIN — DIPHENHYDRAMINE HCL 25 MG: 25 MG CAPSULE ORAL at 13:40:00

## 2020-12-18 RX ADMIN — FAMOTIDINE 20 MG: 10 INJECTION, SOLUTION INTRAVENOUS at 16:23:00

## 2020-12-18 RX ADMIN — ALBUTEROL SULFATE 1 PUFF: 90 AEROSOL, METERED RESPIRATORY (INHALATION) at 16:15:00

## 2020-12-18 RX ADMIN — ACETAMINOPHEN 325 MG: 325 TABLET ORAL at 13:39:00

## 2020-12-18 NOTE — PROGRESS NOTES
[facility] Progress Note    Patient Name: Lora Fermin   YOB: 1945   Medical Record Number: [id number]   CSN: [id number]   Date of visit: 12/18/2020   Provider: JUAREZ Sun  Referring Physician: Claudia Vasquez COVID 19 on 12/1/2020. She states only residual symptoms from COVID incl fatigue and no taste/smell. Currently without itching, diffic swallowing, swelling. .  VS are ok with sl elev BP. O2 sat remains in the mid-high 90's.     Allergies:    Bactrim daily, Disp: 90 capsule, Rfl: 0  •  LISINOPRIL 20 MG Oral Tab, TAKE 1 TABLET BY MOUTH DAILY, Disp: 90 tablet, Rfl: 1  •  BUPROPION HCL ER, XL, 150 MG Oral Tablet 24 Hr, Take 1 tablet (150 mg total) by mouth daily. , Disp: 90 tablet, Rfl: 0  •  XARELTO 20 MG chest pain, rigors. Due to IVIG. Pt has had IVIG over the course of 2 years. Never had history of reactions. ?more fragile from COVID?   Albuterol inhaler 2 puffs   Solumedrol 125mg  Tylenol 650mg  Benadryl 50mg  Pepcid 20mg    Eventual complete resolut

## 2020-12-18 NOTE — PROGRESS NOTES
Pt tolerated IVIG infusion well today without incident or complaint. Will return in 4 weeks for MD f/u and IVIG.      Education Record    Learner:  Patient    Disease / Diagnosis: IGG deficiency     Barriers / Limitations:  None   Comments:    Method:  Disc

## 2020-12-18 NOTE — PROGRESS NOTES
Medication - IVIG    Type of reaction, patient symptoms at time of reaction - 1610; pt c/o SOB and chest pain; rigors noted    Vital Signs taken - see VS documention    MD/APN called to room, orders received - give solumedrol 125 mg along with pepcid 20 mg

## 2020-12-21 RX ORDER — MONTELUKAST SODIUM 10 MG/1
TABLET ORAL
Qty: 90 TABLET | Refills: 1 | OUTPATIENT
Start: 2020-12-21

## 2020-12-21 RX ORDER — SIMVASTATIN 40 MG
TABLET ORAL
Qty: 90 TABLET | Refills: 0 | OUTPATIENT
Start: 2020-12-21

## 2020-12-21 NOTE — TELEPHONE ENCOUNTER
Both medication denied. Per protocol, Montelukast failed due to Asthma Action Score greater than or equal to 20 and AAP/ACT given in last 12 months. Rx denied due to this medication was discontinued due to therapy completed.     Per protocol, Simvastatin

## 2020-12-21 NOTE — TELEPHONE ENCOUNTER
Patient was switched to different Statin therapy. Deny this Rx request due to no currently on med anymore.

## 2020-12-24 RX ORDER — RIVAROXABAN 20 MG/1
TABLET, FILM COATED ORAL
Qty: 90 TABLET | Refills: 0 | Status: SHIPPED | OUTPATIENT
Start: 2020-12-24 | End: 2021-04-01

## 2020-12-24 NOTE — TELEPHONE ENCOUNTER
Last Ov: 9/16/20, SD, F/U  Last labs: CMP, CBC 12/1/20  Last Rx: xarelto 20mg, #90, 1R 6/5/20    Future Appointments   Date Time Provider Gomez Miller   1/15/2021 10:45 AM Stew García APRN [facility] HEM [address]   1/15/2021 11:00 AM [address]

## 2020-12-31 PROCEDURE — 99490 CHRNC CARE MGMT STAFF 1ST 20: CPT

## 2021-01-03 NOTE — TELEPHONE ENCOUNTER
Spoke with pt, notes that this genetic testing was solicited to her  Pt states that she will defer this. Pt requested mammogram, per SD ok to order. Order placed. The ECG shows a sinus rhythm. ECG rate  = 91 bpm.

## 2021-01-12 RX ORDER — BUPROPION HYDROCHLORIDE 150 MG/1
150 TABLET ORAL DAILY
Qty: 90 TABLET | Refills: 0 | Status: SHIPPED | OUTPATIENT
Start: 2021-01-12 | End: 2021-04-12

## 2021-01-12 RX ORDER — DULOXETIN HYDROCHLORIDE 60 MG/1
60 CAPSULE, DELAYED RELEASE ORAL DAILY
Qty: 90 CAPSULE | Refills: 0 | Status: SHIPPED | OUTPATIENT
Start: 2021-01-12 | End: 2021-04-12

## 2021-01-12 RX ORDER — PANTOPRAZOLE SODIUM 40 MG/1
TABLET, DELAYED RELEASE ORAL
Qty: 90 TABLET | Refills: 0 | Status: SHIPPED | OUTPATIENT
Start: 2021-01-12 | End: 2021-04-12

## 2021-01-12 NOTE — TELEPHONE ENCOUNTER
Last VISIT 09/16/20    Last REFILL 10/05/20 qty 90 w/0 refills    Last LABS 12/01/20 CBC, CMP done    No Future Appointments      Per PROTOCOL? Not on protocol      Please Approve or Deny.

## 2021-01-12 NOTE — TELEPHONE ENCOUNTER
Last Ov:12/4/20 phone  Upcoming appt:none  Last labs:12/1/20 cbc,cmp  Last Rx:10/23/20 pantoprazole 40mg    Per Protocol sent for review

## 2021-01-12 NOTE — TELEPHONE ENCOUNTER
Last Ov:12/4/20   Upcoming appt:none  Last labs:12/1/20 cbc, cmp  Last Rx:8/10/20 bupropion hcl 150mg    Per Protocol sent for review

## 2021-01-14 RX ORDER — SODIUM CHLORIDE 0.9 % (FLUSH) 0.9 %
10 SYRINGE (ML) INJECTION ONCE
Status: CANCELLED | OUTPATIENT
Start: 2021-02-01

## 2021-01-14 RX ORDER — DIPHENHYDRAMINE HCL 25 MG
25 CAPSULE ORAL ONCE
Status: CANCELLED | OUTPATIENT
Start: 2021-02-01

## 2021-01-15 ENCOUNTER — OFFICE VISIT (OUTPATIENT)
Dept: HEMATOLOGY/ONCOLOGY | Facility: HOSPITAL | Age: 76
End: 2021-01-15
Attending: INTERNAL MEDICINE
Payer: MEDICARE

## 2021-01-15 VITALS
TEMPERATURE: 98 F | RESPIRATION RATE: 16 BRPM | HEIGHT: 62.01 IN | WEIGHT: 170.38 LBS | OXYGEN SATURATION: 94 % | DIASTOLIC BLOOD PRESSURE: 84 MMHG | SYSTOLIC BLOOD PRESSURE: 143 MMHG | HEART RATE: 105 BPM | BODY MASS INDEX: 30.96 KG/M2

## 2021-01-15 DIAGNOSIS — D50.0 IRON DEFICIENCY ANEMIA DUE TO CHRONIC BLOOD LOSS: ICD-10-CM

## 2021-01-15 DIAGNOSIS — D80.3 SELECTIVE DEFICIENCY OF IGG SUBCLASSES (HCC): Primary | ICD-10-CM

## 2021-01-15 DIAGNOSIS — Z85.3 HISTORY OF LEFT BREAST CANCER: ICD-10-CM

## 2021-01-15 PROCEDURE — 96375 TX/PRO/DX INJ NEW DRUG ADDON: CPT

## 2021-01-15 PROCEDURE — 96366 THER/PROPH/DIAG IV INF ADDON: CPT

## 2021-01-15 PROCEDURE — 96365 THER/PROPH/DIAG IV INF INIT: CPT

## 2021-01-15 PROCEDURE — 99213 OFFICE O/P EST LOW 20 MIN: CPT | Performed by: CLINICAL NURSE SPECIALIST

## 2021-01-15 RX ORDER — DIPHENHYDRAMINE HCL 25 MG
50 CAPSULE ORAL ONCE
Status: CANCELLED | OUTPATIENT
Start: 2021-02-01

## 2021-01-15 RX ORDER — ACETAMINOPHEN 325 MG/1
650 TABLET ORAL ONCE
Status: CANCELLED | OUTPATIENT
Start: 2021-02-01

## 2021-01-15 RX ORDER — DIPHENHYDRAMINE HCL 25 MG
50 CAPSULE ORAL ONCE
Status: COMPLETED | OUTPATIENT
Start: 2021-01-15 | End: 2021-01-15

## 2021-01-15 RX ORDER — SODIUM CHLORIDE 0.9 % (FLUSH) 0.9 %
10 SYRINGE (ML) INJECTION ONCE
Status: CANCELLED | OUTPATIENT
Start: 2021-02-01

## 2021-01-15 RX ORDER — ACETAMINOPHEN 325 MG/1
650 TABLET ORAL ONCE
Status: COMPLETED | OUTPATIENT
Start: 2021-01-15 | End: 2021-01-15

## 2021-01-15 RX ADMIN — ACETAMINOPHEN 650 MG: 325 TABLET ORAL at 11:27:00

## 2021-01-15 RX ADMIN — DIPHENHYDRAMINE HCL 50 MG: 25 MG CAPSULE ORAL at 11:27:00

## 2021-01-15 NOTE — PROGRESS NOTES
[facility] Progress Note    Patient Name: Marko Rivas   YOB: 1945   Medical Record Number: [id number]   CSN: [id number]   Date of visit: 1/15/2021   Provider: JUAREZ Melendrez  Referring Physician: Huma Cronin with the exception of last month when she developed transient acute respiratory distress characterized by bronchospasm, chest pain, and dyspnea which resolved with albuterol inhaler, solumedrol and antihistamines.   She was also recovering from COVID (3 wks Ondansetron HCl (ZOFRAN) 4 mg tablet, Take 1 tablet (4 mg total) by mouth every 12 (twelve) hours as needed for Nausea., Disp: 20 tablet, Rfl: 1  •  GNP GLUCOSAMINE-CHONDROITIN OR, Take 2 capsules by mouth daily.   , Disp: , Rfl:   •  METFORMIN HCL  M Regular rate and rhythm  Abdomen:  Non-distended, normoactive bowel sounds, soft,nontender, no hepatosplenomegaly. Extremities:  No edema, no tenderness  Neuro:  CN 2-12 intact      Impression/Plan: IgG subclass deficiency:  Continues on monthly IVIG.   Nick Orellana

## 2021-01-15 NOTE — PROGRESS NOTES
Patient presents with: Follow - Up: apn assessment prior to treatment    Apn assessment prior to IVIG infusion. Patient is doing well pain today is 6/10 on pain scale do to taking Norco today. No concerns at this time.  Seeing apn do to reaction at last v

## 2021-01-15 NOTE — PROGRESS NOTES
Education Record    Learner:  Patient    Disease / Diagnosis:Selective Deficiency of IgG    Barriers / Limitations:  None   Comments:    Method:  Discussion   Comments:    General Topics:  Plan of care reviewed   Comments:    Outcome:  Shows understanding

## 2021-01-18 ENCOUNTER — TELEPHONE (OUTPATIENT)
Dept: INTERNAL MEDICINE CLINIC | Facility: CLINIC | Age: 76
End: 2021-01-18

## 2021-01-18 DIAGNOSIS — I10 BENIGN ESSENTIAL HTN: ICD-10-CM

## 2021-01-18 DIAGNOSIS — E11.65 TYPE 2 DIABETES MELLITUS WITH HYPERGLYCEMIA, WITHOUT LONG-TERM CURRENT USE OF INSULIN (HCC): ICD-10-CM

## 2021-01-18 DIAGNOSIS — Z00.00 ROUTINE GENERAL MEDICAL EXAMINATION AT A HEALTH CARE FACILITY: Primary | ICD-10-CM

## 2021-01-18 NOTE — TELEPHONE ENCOUNTER
Future Appointments   Date Time Provider Gomez Miller   1/27/2021  9:45 AM REFERENCE [id number]   2/4/2021  9:40 AM JUAREZ Archibald EM[facility] [address] [facility] [address]     Orders to edward- Pt informed that labs need to be completed no sooner th

## 2021-01-18 NOTE — TELEPHONE ENCOUNTER
A1c, urine microalb/creat, lipid, and TSH w Ref ordered for Edward per protocol.     Pt has pending CBC, CMP, Ferritin, and Iron & TIBC  Please advise if any further labs necessary prior to appt

## 2021-01-19 RX ORDER — ROSUVASTATIN CALCIUM 10 MG/1
10 TABLET, COATED ORAL NIGHTLY
Qty: 90 TABLET | Refills: 0 | Status: SHIPPED | OUTPATIENT
Start: 2021-01-19 | End: 2021-04-19

## 2021-01-20 ENCOUNTER — OFFICE VISIT (OUTPATIENT)
Dept: CARDIOLOGY | Age: 76
End: 2021-01-20

## 2021-01-20 ENCOUNTER — PATIENT OUTREACH (OUTPATIENT)
Dept: CASE MANAGEMENT | Age: 76
End: 2021-01-20

## 2021-01-20 VITALS
WEIGHT: 170 LBS | HEART RATE: 92 BPM | DIASTOLIC BLOOD PRESSURE: 80 MMHG | SYSTOLIC BLOOD PRESSURE: 140 MMHG | BODY MASS INDEX: 32.1 KG/M2 | HEIGHT: 61 IN

## 2021-01-20 DIAGNOSIS — R06.09 DOE (DYSPNEA ON EXERTION): ICD-10-CM

## 2021-01-20 DIAGNOSIS — E78.00 PURE HYPERCHOLESTEROLEMIA: Chronic | ICD-10-CM

## 2021-01-20 DIAGNOSIS — Z86.718 HISTORY OF DVT (DEEP VEIN THROMBOSIS): Chronic | ICD-10-CM

## 2021-01-20 DIAGNOSIS — E11.9 DIABETES MELLITUS TYPE 2 IN NONOBESE (CMD): ICD-10-CM

## 2021-01-20 DIAGNOSIS — Z86.711 PERSONAL HISTORY OF PULMONARY EMBOLISM: ICD-10-CM

## 2021-01-20 DIAGNOSIS — I25.10 CORONARY ARTERY DISEASE INVOLVING NATIVE CORONARY ARTERY OF NATIVE HEART WITHOUT ANGINA PECTORIS: Primary | ICD-10-CM

## 2021-01-20 PROBLEM — Z86.16 HISTORY OF 2019 NOVEL CORONAVIRUS DISEASE (COVID-19): Status: ACTIVE | Noted: 2021-01-20

## 2021-01-20 PROBLEM — I26.99 PULMONARY EMBOLI (CMD): Status: RESOLVED | Noted: 2019-11-08 | Resolved: 2021-01-20

## 2021-01-20 PROBLEM — I82.409 DVT (DEEP VENOUS THROMBOSIS) (CMD): Status: RESOLVED | Noted: 2019-11-08 | Resolved: 2021-01-20

## 2021-01-20 PROBLEM — D80.3 SELECTIVE DEFICIENCY OF IGG (CMD): Chronic | Status: ACTIVE | Noted: 2021-01-20

## 2021-01-20 PROCEDURE — 93000 ELECTROCARDIOGRAM COMPLETE: CPT | Performed by: INTERNAL MEDICINE

## 2021-01-20 PROCEDURE — 99215 OFFICE O/P EST HI 40 MIN: CPT | Performed by: INTERNAL MEDICINE

## 2021-01-20 ASSESSMENT — PATIENT HEALTH QUESTIONNAIRE - PHQ9
CLINICAL INTERPRETATION OF PHQ2 SCORE: NO FURTHER SCREENING NEEDED
SUM OF ALL RESPONSES TO PHQ9 QUESTIONS 1 AND 2: 0
SUM OF ALL RESPONSES TO PHQ9 QUESTIONS 1 AND 2: 0
2. FEELING DOWN, DEPRESSED OR HOPELESS: NOT AT ALL
CLINICAL INTERPRETATION OF PHQ9 SCORE: NO FURTHER SCREENING NEEDED
1. LITTLE INTEREST OR PLEASURE IN DOING THINGS: NOT AT ALL

## 2021-01-20 ASSESSMENT — ENCOUNTER SYMPTOMS
WEIGHT LOSS: 0
CHILLS: 0
HEMOPTYSIS: 0
COUGH: 0
FEVER: 0
ALLERGIC/IMMUNOLOGIC COMMENTS: NO NEW FOOD ALLERGIES
SUSPICIOUS LESIONS: 0
HEMATOCHEZIA: 0
BRUISES/BLEEDS EASILY: 0
WEIGHT GAIN: 0

## 2021-01-27 ENCOUNTER — LAB ENCOUNTER (OUTPATIENT)
Dept: LAB | Age: 76
End: 2021-01-27
Attending: NURSE PRACTITIONER
Payer: MEDICARE

## 2021-01-27 DIAGNOSIS — E55.9 VITAMIN D DEFICIENCY: ICD-10-CM

## 2021-01-27 DIAGNOSIS — S52.502D CLOSED FRACTURE OF DISTAL END OF LEFT RADIUS WITH ROUTINE HEALING, UNSPECIFIED FRACTURE MORPHOLOGY, SUBSEQUENT ENCOUNTER: ICD-10-CM

## 2021-01-27 DIAGNOSIS — Z00.00 ROUTINE GENERAL MEDICAL EXAMINATION AT A HEALTH CARE FACILITY: ICD-10-CM

## 2021-01-27 DIAGNOSIS — M85.859 OSTEOPENIA OF THIGH, UNSPECIFIED LATERALITY: ICD-10-CM

## 2021-01-27 DIAGNOSIS — I10 BENIGN ESSENTIAL HTN: ICD-10-CM

## 2021-01-27 DIAGNOSIS — E11.65 TYPE 2 DIABETES MELLITUS WITH HYPERGLYCEMIA, WITHOUT LONG-TERM CURRENT USE OF INSULIN (HCC): ICD-10-CM

## 2021-01-27 DIAGNOSIS — Z23 NEED FOR VACCINATION: ICD-10-CM

## 2021-01-27 DIAGNOSIS — E11.9 TYPE 2 DIABETES MELLITUS WITHOUT COMPLICATION, WITHOUT LONG-TERM CURRENT USE OF INSULIN (HCC): ICD-10-CM

## 2021-01-27 LAB
ALBUMIN SERPL-MCNC: 4 G/DL (ref 3.4–5)
ALBUMIN/GLOB SERPL: 1.1 {RATIO} (ref 1–2)
ALP LIVER SERPL-CCNC: 98 U/L
ALT SERPL-CCNC: 31 U/L
ANION GAP SERPL CALC-SCNC: 7 MMOL/L (ref 0–18)
AST SERPL-CCNC: 25 U/L (ref 15–37)
BILIRUB SERPL-MCNC: 0.6 MG/DL (ref 0.1–2)
BUN BLD-MCNC: 18 MG/DL (ref 7–18)
BUN/CREAT SERPL: 18 (ref 10–20)
CALCIUM BLD-MCNC: 9.2 MG/DL (ref 8.5–10.1)
CHLORIDE SERPL-SCNC: 107 MMOL/L (ref 98–112)
CHOLEST SMN-MCNC: 122 MG/DL (ref ?–200)
CO2 SERPL-SCNC: 22 MMOL/L (ref 21–32)
CREAT BLD-MCNC: 1 MG/DL
CREAT UR-SCNC: 295 MG/DL
EST. AVERAGE GLUCOSE BLD GHB EST-MCNC: 183 MG/DL (ref 68–126)
GLOBULIN PLAS-MCNC: 3.8 G/DL (ref 2.8–4.4)
GLUCOSE BLD-MCNC: 149 MG/DL (ref 70–99)
HAV IGM SER QL: 2 MG/DL (ref 1.6–2.6)
HBA1C MFR BLD HPLC: 8 % (ref ?–5.7)
HDLC SERPL-MCNC: 75 MG/DL (ref 40–59)
LDLC SERPL CALC-MCNC: 26 MG/DL (ref ?–100)
M PROTEIN MFR SERPL ELPH: 7.8 G/DL (ref 6.4–8.2)
MICROALBUMIN UR-MCNC: 7.1 MG/DL
MICROALBUMIN/CREAT 24H UR-RTO: 24.1 UG/MG (ref ?–30)
NONHDLC SERPL-MCNC: 47 MG/DL (ref ?–130)
OSMOLALITY SERPL CALC.SUM OF ELEC: 287 MOSM/KG (ref 275–295)
PATIENT FASTING Y/N/NP: YES
PATIENT FASTING Y/N/NP: YES
PHOSPHATE SERPL-MCNC: 4.3 MG/DL (ref 2.5–4.9)
POTASSIUM SERPL-SCNC: 4.1 MMOL/L (ref 3.5–5.1)
SODIUM SERPL-SCNC: 136 MMOL/L (ref 136–145)
TRIGL SERPL-MCNC: 104 MG/DL (ref 30–149)
TSI SER-ACNC: 1.81 MIU/ML (ref 0.36–3.74)
VIT D+METAB SERPL-MCNC: 39.3 NG/ML (ref 30–100)
VLDLC SERPL CALC-MCNC: 21 MG/DL (ref 0–30)

## 2021-01-27 PROCEDURE — 80053 COMPREHEN METABOLIC PANEL: CPT

## 2021-01-27 PROCEDURE — 84443 ASSAY THYROID STIM HORMONE: CPT

## 2021-01-27 PROCEDURE — 82570 ASSAY OF URINE CREATININE: CPT

## 2021-01-27 PROCEDURE — 84100 ASSAY OF PHOSPHORUS: CPT

## 2021-01-27 PROCEDURE — 82306 VITAMIN D 25 HYDROXY: CPT

## 2021-01-27 PROCEDURE — 83036 HEMOGLOBIN GLYCOSYLATED A1C: CPT

## 2021-01-27 PROCEDURE — 80061 LIPID PANEL: CPT

## 2021-01-27 PROCEDURE — 82043 UR ALBUMIN QUANTITATIVE: CPT

## 2021-01-27 PROCEDURE — 36415 COLL VENOUS BLD VENIPUNCTURE: CPT

## 2021-01-27 PROCEDURE — 83735 ASSAY OF MAGNESIUM: CPT

## 2021-01-28 RX ORDER — METFORMIN HYDROCHLORIDE 500 MG/1
500 TABLET, EXTENDED RELEASE ORAL
Qty: 180 TABLET | Refills: 0 | Status: SHIPPED | OUTPATIENT
Start: 2021-01-28 | End: 2021-02-04

## 2021-01-28 NOTE — TELEPHONE ENCOUNTER
Last VISIT 09/16/20    Last REFILL 10/05/20 qty 180 w/0 refills    Last LABS Multiple labs done yesterday. Future Appointments   Date Time Provider Gomez Miller   2/4/2021  9:40 AM JUAREZ Elizabeth [facility] [address] [facility] [address]       Per PROTOCOL?  Faile

## 2021-02-02 ENCOUNTER — HOSPITAL ENCOUNTER (OUTPATIENT)
Dept: CV DIAGNOSTICS | Facility: HOSPITAL | Age: 76
Discharge: HOME | End: 2021-02-02
Attending: INTERNAL MEDICINE
Payer: MEDICARE

## 2021-02-02 DIAGNOSIS — R06.00 DOE (DYSPNEA ON EXERTION): ICD-10-CM

## 2021-02-02 DIAGNOSIS — E11.9 DIABETES MELLITUS (HCC): ICD-10-CM

## 2021-02-02 DIAGNOSIS — I25.10 CORONARY ARTERY DISEASE INVOLVING NATIVE CORONARY ARTERY OF NATIVE HEART WITHOUT ANGINA PECTORIS: ICD-10-CM

## 2021-02-02 DIAGNOSIS — E78.00 PURE HYPERCHOLESTEROLEMIA: ICD-10-CM

## 2021-02-02 PROCEDURE — 93018 CV STRESS TEST I&R ONLY: CPT | Performed by: INTERNAL MEDICINE

## 2021-02-02 PROCEDURE — 78452 HT MUSCLE IMAGE SPECT MULT: CPT | Performed by: INTERNAL MEDICINE

## 2021-02-02 PROCEDURE — 93017 CV STRESS TEST TRACING ONLY: CPT | Performed by: INTERNAL MEDICINE

## 2021-02-04 ENCOUNTER — OFFICE VISIT (OUTPATIENT)
Dept: INTERNAL MEDICINE CLINIC | Facility: CLINIC | Age: 76
End: 2021-02-04
Payer: MEDICARE

## 2021-02-04 VITALS
HEIGHT: 62 IN | WEIGHT: 170.38 LBS | DIASTOLIC BLOOD PRESSURE: 62 MMHG | BODY MASS INDEX: 31.35 KG/M2 | TEMPERATURE: 97 F | RESPIRATION RATE: 16 BRPM | SYSTOLIC BLOOD PRESSURE: 122 MMHG | HEART RATE: 97 BPM

## 2021-02-04 DIAGNOSIS — D69.6 THROMBOCYTOPENIA (HCC): ICD-10-CM

## 2021-02-04 DIAGNOSIS — E78.00 PURE HYPERCHOLESTEROLEMIA: ICD-10-CM

## 2021-02-04 DIAGNOSIS — F32.A MILD DEPRESSION: ICD-10-CM

## 2021-02-04 DIAGNOSIS — M54.12 CERVICAL RADICULITIS: ICD-10-CM

## 2021-02-04 DIAGNOSIS — G93.0 CYST OF BRAIN: ICD-10-CM

## 2021-02-04 DIAGNOSIS — D50.8 OTHER IRON DEFICIENCY ANEMIA: ICD-10-CM

## 2021-02-04 DIAGNOSIS — M85.859 OSTEOPENIA OF THIGH, UNSPECIFIED LATERALITY: ICD-10-CM

## 2021-02-04 DIAGNOSIS — Z00.00 ENCOUNTER FOR ANNUAL HEALTH EXAMINATION: Primary | ICD-10-CM

## 2021-02-04 DIAGNOSIS — M47.812 ARTHROPATHY OF CERVICAL FACET JOINT: ICD-10-CM

## 2021-02-04 DIAGNOSIS — M54.16 LUMBAR RADICULITIS: ICD-10-CM

## 2021-02-04 DIAGNOSIS — E53.8 B12 DEFICIENCY: ICD-10-CM

## 2021-02-04 DIAGNOSIS — Z87.01 HISTORY OF RECURRENT PNEUMONIA: ICD-10-CM

## 2021-02-04 DIAGNOSIS — Z85.3 HISTORY OF LEFT BREAST CANCER: ICD-10-CM

## 2021-02-04 DIAGNOSIS — M47.897 OTHER OSTEOARTHRITIS OF SPINE, LUMBOSACRAL REGION: ICD-10-CM

## 2021-02-04 DIAGNOSIS — K21.9 GASTROESOPHAGEAL REFLUX DISEASE WITHOUT ESOPHAGITIS: ICD-10-CM

## 2021-02-04 DIAGNOSIS — Z86.16 HISTORY OF COVID-19: ICD-10-CM

## 2021-02-04 DIAGNOSIS — R20.2 NUMBNESS AND TINGLING: ICD-10-CM

## 2021-02-04 DIAGNOSIS — R25.1 TREMOR OF BOTH HANDS: ICD-10-CM

## 2021-02-04 DIAGNOSIS — E04.9 ENLARGED THYROID: ICD-10-CM

## 2021-02-04 DIAGNOSIS — Z99.89 OSA ON CPAP: ICD-10-CM

## 2021-02-04 DIAGNOSIS — L72.9 SCALP CYST: ICD-10-CM

## 2021-02-04 DIAGNOSIS — M53.3 SACROILIAC JOINT DYSFUNCTION: ICD-10-CM

## 2021-02-04 DIAGNOSIS — F41.9 ANXIETY: ICD-10-CM

## 2021-02-04 DIAGNOSIS — G89.4 CHRONIC PAIN SYNDROME: ICD-10-CM

## 2021-02-04 DIAGNOSIS — I70.0 ATHEROSCLEROSIS OF AORTA (HCC): ICD-10-CM

## 2021-02-04 DIAGNOSIS — D80.3 SELECTIVE DEFICIENCY OF IGG SUBCLASSES (HCC): ICD-10-CM

## 2021-02-04 DIAGNOSIS — G47.33 OSA ON CPAP: ICD-10-CM

## 2021-02-04 DIAGNOSIS — Z85.828 HISTORY OF BASAL CELL CARCINOMA: ICD-10-CM

## 2021-02-04 DIAGNOSIS — D50.0 IRON DEFICIENCY ANEMIA DUE TO CHRONIC BLOOD LOSS: ICD-10-CM

## 2021-02-04 DIAGNOSIS — G56.02 CARPAL TUNNEL SYNDROME OF LEFT WRIST: ICD-10-CM

## 2021-02-04 DIAGNOSIS — R29.6 FALLS FREQUENTLY: ICD-10-CM

## 2021-02-04 DIAGNOSIS — Z87.81 HISTORY OF RADIUS FRACTURE: ICD-10-CM

## 2021-02-04 DIAGNOSIS — E11.65 UNCONTROLLED TYPE 2 DIABETES MELLITUS WITH HYPERGLYCEMIA (HCC): ICD-10-CM

## 2021-02-04 DIAGNOSIS — R20.0 NUMBNESS AND TINGLING: ICD-10-CM

## 2021-02-04 DIAGNOSIS — I10 BENIGN ESSENTIAL HTN: ICD-10-CM

## 2021-02-04 DIAGNOSIS — G43.019 INTRACTABLE MIGRAINE WITHOUT AURA AND WITHOUT STATUS MIGRAINOSUS: ICD-10-CM

## 2021-02-04 DIAGNOSIS — Z86.711 HISTORY OF PULMONARY EMBOLISM: ICD-10-CM

## 2021-02-04 DIAGNOSIS — J45.31 MILD PERSISTENT ASTHMA WITH ACUTE EXACERBATION: ICD-10-CM

## 2021-02-04 DIAGNOSIS — Z96.652 STATUS POST TOTAL LEFT KNEE REPLACEMENT: ICD-10-CM

## 2021-02-04 DIAGNOSIS — M17.12 PRIMARY OSTEOARTHRITIS OF LEFT KNEE: ICD-10-CM

## 2021-02-04 DIAGNOSIS — M65.342 TRIGGER RING FINGER OF LEFT HAND: ICD-10-CM

## 2021-02-04 DIAGNOSIS — Z87.898 HISTORY OF SYNCOPE: ICD-10-CM

## 2021-02-04 PROBLEM — S52.126D CLOSED NONDISPLACED FRACTURE OF HEAD OF RADIUS WITH ROUTINE HEALING: Status: RESOLVED | Noted: 2020-08-07 | Resolved: 2021-02-04

## 2021-02-04 PROCEDURE — G0439 PPPS, SUBSEQ VISIT: HCPCS | Performed by: NURSE PRACTITIONER

## 2021-02-04 PROCEDURE — 99214 OFFICE O/P EST MOD 30 MIN: CPT | Performed by: NURSE PRACTITIONER

## 2021-02-04 RX ORDER — METFORMIN HYDROCHLORIDE 500 MG/1
TABLET, EXTENDED RELEASE ORAL
Qty: 180 TABLET | Refills: 1 | Status: SHIPPED | OUTPATIENT
Start: 2021-02-04 | End: 2021-04-19

## 2021-02-04 NOTE — PATIENT INSTRUCTIONS
Dina Morales's SCREENING SCHEDULE   Tests on this list are recommended by your physician but may not be covered, or covered at this frequency, by your insurer. Please check with your insurance carrier before scheduling to verify coverage.    PREVEN However, due to the size of the patient record, not all encounters were searched. Please check Results Review for a complete set of results.  Limited to patients who meet one of the following criteria:   • Men who are 73-68 years old and have smoked more th no preventive care reminders to display for this patient. Chlamydia  Annually if high risk No results found for: CHLAMYDIA No flowsheet data found.     Screening Mammogram      Mammogram    Recommend Annually to at least age 76, and as needed after 76 M in the same house as a HepB virus carrier   Homosexual men   Illicit injectable drug abusers     Tetanus Toxoid- Only covered with a cut with metal- TD and TDaP Not covered by Medicare Part B) No results found.  However, due to the size of the patient recor

## 2021-02-04 NOTE — PROGRESS NOTES
HPI:   Nelly Leyva is a 77year old female who presents for a Medicare Subsequent Annual Wellness visit (Pt already had Initial Annual Wellness).   History of pulmonary embolism  Stable  xarelto      History of syncope  No further episodes  Cont t Sacroiliac joint dysfunction Has been stable  No recent flares. Cont to monitor. Primary osteoarthritis of left knee  Sp replacement. Resolved.      Osteopenia of thigh, unspecified laterality    Other osteoarthritis of spine, lumbosacral region functions as listed below:  She has Meal Preparation difficulties based on screening of functional status. Preparing your meals: Need some help  She has difficulties Shopping for Groceries based on screening of functional status.    Shop for groceries: Ne (OPHTHALMOLOGY)  Filomena Altamirano MD ([facility])  NICKIE Miranda (PAIN MANAGEMENT)  JUAREZ Cao (Internal Medicine)  Megan Man (Anesthesiology)  Mela Lyn MD (SURGERY, [facility])  Nely Enriquez, PT as Physical Louise Ovalles Labs:   Lab Results   Component Value Date    AST 25 01/27/2021    ALT 31 01/27/2021    CA 9.2 01/27/2021    ALB 4.0 01/27/2021    TSH 1.810 01/27/2021    CREATSERUM 1.00 01/27/2021     (H) 01/27/2021        CBC  (most recent labs)   Lab Results   C Base) MCG/ACT Inhalation Aero Soln, INHALE 2 PUFFS INTO THE LUNGS EVERY SIX HOURS AS NEEDED (COUGH)    •  traMADol HCl 50 MG Oral Tab, Take 50 mg by mouth every 6 (six) hours as needed for Pain.     •  Cholecalciferol (VITAMIN D) 2000 units Oral Tab, Take 2 mother; Heart Attack in her paternal grandfather and paternal grandmother; Stroke in her maternal grandfather and maternal grandmother. SOCIAL HISTORY:   She  reports that she quit smoking about 53 years ago. She smoked 0.00 packs per day.  She has never trachea midline, no adenopathy;  thyroid: not enlarged, symmetric,no JVD   Back:   Symmetric, no curvature, ROM normal, no CVA tenderness   Lungs:   Clear to auscultation bilaterally, respirations unlabored   Heart:  Regular rate and rhythm, S1 and S2 norm Assessment. PLAN SUMMARY:   Diagnoses and all orders for this visit:    Encounter for annual health examination    History of pulmonary embolism  Stable  xarelto      History of syncope  No further episodes  Cont to monitor.      Benign essential HTN  b No recent flares. Cont to monitor. Primary osteoarthritis of left knee  Sp replacement. Resolved. Osteopenia of thigh, unspecified laterality    Other osteoarthritis of spine, lumbosacral region Has been stable  No recent flares.   Cont to [place] Rolanda level?: Other  How would you describe your current health state?: Good  How do you maintain positive mental well-being?: Social Interaction      This section provided for quick review of chart, separate sheet to patient  PREVENTATIVE SERVICES  INDICATIONS Chlamydia  Annually if high risk No results found for: CHLAMYDIA No flowsheet data found.     Screening Mammogram      Mammogram Annually to 77, then as discussed Mammogram due on 11/21/2021 Update Health Maintenance if applicable     Immunizations (Update Value   01/27/2021 8.0 (H)       No flowsheet data found.     Creat/alb ratio  Annually Malb/Cre Calc (ug/mg)   Date Value   01/27/2021 24.1        LDL  Annually LDL Cholesterol (mg/dL)   Date Value   01/27/2021 26     LDL-CHOLESTEROL (mg/dL (calc))   Date

## 2021-02-05 ENCOUNTER — PATIENT OUTREACH (OUTPATIENT)
Dept: CASE MANAGEMENT | Age: 76
End: 2021-02-05

## 2021-02-05 ENCOUNTER — TELEPHONE (OUTPATIENT)
Dept: CASE MANAGEMENT | Age: 76
End: 2021-02-05

## 2021-02-05 DIAGNOSIS — F32.A MILD DEPRESSION: ICD-10-CM

## 2021-02-05 DIAGNOSIS — E78.00 PURE HYPERCHOLESTEROLEMIA: ICD-10-CM

## 2021-02-05 DIAGNOSIS — G56.02 CARPAL TUNNEL SYNDROME OF LEFT WRIST: ICD-10-CM

## 2021-02-05 DIAGNOSIS — Z99.89 OSA ON CPAP: ICD-10-CM

## 2021-02-05 DIAGNOSIS — J45.31 MILD PERSISTENT ASTHMA WITH ACUTE EXACERBATION: ICD-10-CM

## 2021-02-05 DIAGNOSIS — Z85.828 HISTORY OF BASAL CELL CARCINOMA: ICD-10-CM

## 2021-02-05 DIAGNOSIS — E53.8 B12 DEFICIENCY: ICD-10-CM

## 2021-02-05 DIAGNOSIS — F41.9 ANXIETY: ICD-10-CM

## 2021-02-05 DIAGNOSIS — G47.33 OSA ON CPAP: ICD-10-CM

## 2021-02-05 DIAGNOSIS — R29.6 FALLS FREQUENTLY: ICD-10-CM

## 2021-02-05 DIAGNOSIS — E11.65 UNCONTROLLED TYPE 2 DIABETES MELLITUS WITH HYPERGLYCEMIA (HCC): ICD-10-CM

## 2021-02-05 DIAGNOSIS — I10 BENIGN ESSENTIAL HTN: ICD-10-CM

## 2021-02-05 DIAGNOSIS — K21.9 GASTROESOPHAGEAL REFLUX DISEASE WITHOUT ESOPHAGITIS: ICD-10-CM

## 2021-02-05 DIAGNOSIS — M17.12 PRIMARY OSTEOARTHRITIS OF LEFT KNEE: ICD-10-CM

## 2021-02-05 DIAGNOSIS — Z85.3 HISTORY OF LEFT BREAST CANCER: ICD-10-CM

## 2021-02-05 NOTE — TELEPHONE ENCOUNTER
Triage:  Happy Friday :)     Pt would like to know if Kyle Weiner can fill out a disability placard for at least a year. She will  when complete. Notified I would contact Michelle's office and a nurse will call him back for further assessment.   Under

## 2021-02-05 NOTE — PROGRESS NOTES
2/5/2021  Spoke to Keily for CCM. Updates to patient care team/comments: UTD. Patient reported changes in medications: together we reviewed with no updates. Med Adherence  Comment: pt reports taking all medications as prescribed.        [address] She considers JUAREZ Naidu/ Dr. María Zimmerman and Dr. Sejal Jones blessing in her life. Reports they have all been great in the care they provide her. She's very grateful.         States she use to be very afraid of passing from a heart attack because it runs in diet and exercise routine. Continue to provide encouragement, support, and education for healthy coping and dx.   Time Spent This Encounter Total: 21 min medical record review, telephone communication, care plan updates where needed, education, goals and ac

## 2021-02-09 ENCOUNTER — TELEPHONE (OUTPATIENT)
Dept: CARDIOLOGY | Age: 76
End: 2021-02-09

## 2021-02-12 RX ORDER — MONTELUKAST SODIUM 10 MG/1
TABLET ORAL
Qty: 90 TABLET | Refills: 1 | OUTPATIENT
Start: 2021-02-12

## 2021-02-16 ENCOUNTER — OFFICE VISIT (OUTPATIENT)
Dept: HEMATOLOGY/ONCOLOGY | Facility: HOSPITAL | Age: 76
End: 2021-02-16
Attending: INTERNAL MEDICINE
Payer: MEDICARE

## 2021-02-16 VITALS
WEIGHT: 175 LBS | OXYGEN SATURATION: 95 % | BODY MASS INDEX: 31.8 KG/M2 | DIASTOLIC BLOOD PRESSURE: 91 MMHG | SYSTOLIC BLOOD PRESSURE: 163 MMHG | HEART RATE: 100 BPM | RESPIRATION RATE: 16 BRPM | HEIGHT: 62.01 IN

## 2021-02-16 DIAGNOSIS — Z86.39 HISTORY OF IRON DEFICIENCY: ICD-10-CM

## 2021-02-16 DIAGNOSIS — E11.65 UNCONTROLLED TYPE 2 DIABETES MELLITUS WITH HYPERGLYCEMIA (HCC): ICD-10-CM

## 2021-02-16 DIAGNOSIS — D80.3 SELECTIVE DEFICIENCY OF IGG SUBCLASSES (HCC): Primary | ICD-10-CM

## 2021-02-16 DIAGNOSIS — D50.0 IRON DEFICIENCY ANEMIA DUE TO CHRONIC BLOOD LOSS: ICD-10-CM

## 2021-02-16 DIAGNOSIS — D80.3 IGG DEFICIENCY (HCC): ICD-10-CM

## 2021-02-16 LAB
ALBUMIN SERPL-MCNC: 3.9 G/DL (ref 3.4–5)
ALBUMIN/GLOB SERPL: 1.1 {RATIO} (ref 1–2)
ALP LIVER SERPL-CCNC: 97 U/L
ALT SERPL-CCNC: 31 U/L
ANION GAP SERPL CALC-SCNC: 5 MMOL/L (ref 0–18)
AST SERPL-CCNC: 18 U/L (ref 15–37)
BASOPHILS # BLD AUTO: 0.05 X10(3) UL (ref 0–0.2)
BASOPHILS NFR BLD AUTO: 0.8 %
BILIRUB SERPL-MCNC: 0.5 MG/DL (ref 0.1–2)
BUN BLD-MCNC: 13 MG/DL (ref 7–18)
BUN/CREAT SERPL: 16.5 (ref 10–20)
CALCIUM BLD-MCNC: 9.2 MG/DL (ref 8.5–10.1)
CHLORIDE SERPL-SCNC: 108 MMOL/L (ref 98–112)
CO2 SERPL-SCNC: 28 MMOL/L (ref 21–32)
CREAT BLD-MCNC: 0.79 MG/DL
DEPRECATED HBV CORE AB SER IA-ACNC: 31.7 NG/ML
DEPRECATED RDW RBC AUTO: 43.6 FL (ref 35.1–46.3)
EOSINOPHIL # BLD AUTO: 0.21 X10(3) UL (ref 0–0.7)
EOSINOPHIL NFR BLD AUTO: 3.4 %
ERYTHROCYTE [DISTWIDTH] IN BLOOD BY AUTOMATED COUNT: 13.5 % (ref 11–15)
EST. AVERAGE GLUCOSE BLD GHB EST-MCNC: 194 MG/DL (ref 68–126)
GLOBULIN PLAS-MCNC: 3.4 G/DL (ref 2.8–4.4)
GLUCOSE BLD-MCNC: 137 MG/DL (ref 70–99)
HBA1C MFR BLD HPLC: 8.4 % (ref ?–5.7)
HCT VFR BLD AUTO: 38.6 %
HGB BLD-MCNC: 12.6 G/DL
IMM GRANULOCYTES # BLD AUTO: 0.01 X10(3) UL (ref 0–1)
IMM GRANULOCYTES NFR BLD: 0.2 %
IRON SATURATION: 12 %
IRON SERPL-MCNC: 52 UG/DL
LYMPHOCYTES # BLD AUTO: 1.74 X10(3) UL (ref 1–4)
LYMPHOCYTES NFR BLD AUTO: 28.2 %
M PROTEIN MFR SERPL ELPH: 7.3 G/DL (ref 6.4–8.2)
MCH RBC QN AUTO: 29.1 PG (ref 26–34)
MCHC RBC AUTO-ENTMCNC: 32.6 G/DL (ref 31–37)
MCV RBC AUTO: 89.1 FL
MONOCYTES # BLD AUTO: 0.58 X10(3) UL (ref 0.1–1)
MONOCYTES NFR BLD AUTO: 9.4 %
NEUTROPHILS # BLD AUTO: 3.57 X10 (3) UL (ref 1.5–7.7)
NEUTROPHILS # BLD AUTO: 3.57 X10(3) UL (ref 1.5–7.7)
NEUTROPHILS NFR BLD AUTO: 58 %
OSMOLALITY SERPL CALC.SUM OF ELEC: 294 MOSM/KG (ref 275–295)
PATIENT FASTING Y/N/NP: NO
PLATELET # BLD AUTO: 231 10(3)UL (ref 150–450)
POTASSIUM SERPL-SCNC: 3.9 MMOL/L (ref 3.5–5.1)
RBC # BLD AUTO: 4.33 X10(6)UL
SODIUM SERPL-SCNC: 141 MMOL/L (ref 136–145)
TOTAL IRON BINDING CAPACITY: 417 UG/DL (ref 240–450)
TRANSFERRIN SERPL-MCNC: 280 MG/DL (ref 200–360)
WBC # BLD AUTO: 6.2 X10(3) UL (ref 4–11)

## 2021-02-16 PROCEDURE — 85025 COMPLETE CBC W/AUTO DIFF WBC: CPT

## 2021-02-16 PROCEDURE — 96366 THER/PROPH/DIAG IV INF ADDON: CPT

## 2021-02-16 PROCEDURE — 83036 HEMOGLOBIN GLYCOSYLATED A1C: CPT

## 2021-02-16 PROCEDURE — 83550 IRON BINDING TEST: CPT

## 2021-02-16 PROCEDURE — 99214 OFFICE O/P EST MOD 30 MIN: CPT | Performed by: INTERNAL MEDICINE

## 2021-02-16 PROCEDURE — 80053 COMPREHEN METABOLIC PANEL: CPT

## 2021-02-16 PROCEDURE — 96365 THER/PROPH/DIAG IV INF INIT: CPT

## 2021-02-16 PROCEDURE — 82728 ASSAY OF FERRITIN: CPT

## 2021-02-16 PROCEDURE — 83540 ASSAY OF IRON: CPT

## 2021-02-16 RX ORDER — DIPHENHYDRAMINE HCL 25 MG
50 CAPSULE ORAL ONCE
Status: CANCELLED | OUTPATIENT
Start: 2021-03-01

## 2021-02-16 RX ORDER — SODIUM CHLORIDE 0.9 % (FLUSH) 0.9 %
10 SYRINGE (ML) INJECTION ONCE
Status: CANCELLED | OUTPATIENT
Start: 2021-03-01

## 2021-02-16 RX ORDER — ACETAMINOPHEN 325 MG/1
650 TABLET ORAL ONCE
Status: COMPLETED | OUTPATIENT
Start: 2021-02-16 | End: 2021-02-16

## 2021-02-16 RX ORDER — DIPHENHYDRAMINE HCL 25 MG
50 CAPSULE ORAL ONCE
Status: COMPLETED | OUTPATIENT
Start: 2021-02-16 | End: 2021-02-16

## 2021-02-16 RX ORDER — ACETAMINOPHEN 325 MG/1
650 TABLET ORAL ONCE
Status: CANCELLED | OUTPATIENT
Start: 2021-03-01

## 2021-02-16 RX ADMIN — DIPHENHYDRAMINE HCL 50 MG: 25 MG CAPSULE ORAL at 09:51:00

## 2021-02-16 RX ADMIN — ACETAMINOPHEN 650 MG: 325 TABLET ORAL at 09:51:00

## 2021-02-22 ENCOUNTER — HOSPITAL ENCOUNTER (OUTPATIENT)
Dept: ULTRASOUND IMAGING | Age: 76
Discharge: HOME | End: 2021-02-22
Attending: NURSE PRACTITIONER
Payer: MEDICARE

## 2021-02-22 DIAGNOSIS — E04.9 ENLARGED THYROID: ICD-10-CM

## 2021-02-22 PROCEDURE — 76536 US EXAM OF HEAD AND NECK: CPT | Performed by: NURSE PRACTITIONER

## 2021-02-24 ENCOUNTER — DIABETIC EDUCATION (OUTPATIENT)
Dept: ENDOCRINOLOGY CLINIC | Facility: CLINIC | Age: 76
End: 2021-02-24
Payer: MEDICARE

## 2021-02-24 DIAGNOSIS — E11.65 TYPE 2 DIABETES MELLITUS WITH HYPERGLYCEMIA, WITHOUT LONG-TERM CURRENT USE OF INSULIN (HCC): Primary | ICD-10-CM

## 2021-02-24 PROCEDURE — G0108 DIAB MANAGE TRN  PER INDIV: HCPCS | Performed by: DIETITIAN, REGISTERED

## 2021-02-24 NOTE — PROGRESS NOTES
Gem Weinstein  11/4/1945 was seen for [facility] Continuous Glucose Sensor Instruction:    2/24/2021  Start time: 12:30 End time: 1:00   Referring Provider: Jody Doshi    [facility] professional sensor back of right arm.  Pt instructed that it is wa

## 2021-02-28 PROCEDURE — 99490 CHRNC CARE MGMT STAFF 1ST 20: CPT

## 2021-03-01 RX ORDER — CLOTRIMAZOLE 10 MG/1
10 LOZENGE ORAL; TOPICAL
Refills: 0 | OUTPATIENT
Start: 2021-03-01

## 2021-03-02 NOTE — PROGRESS NOTES
[facility]    PATIENT'S NAME: Oswaldo Sultana   ATTENDING PHYSICIAN: Toshia Dalton M.D.    PATIENT ACCOUNT #: [de-identified] LOCATION: [address] [place] [address] RECORD #: [id number] YOB: 1945   DATE OF SERVICE: 02/16/2021       CANCER she takes sparingly, lisinopril 20 mg daily, Lyrica 100 mg 3 times daily, metformin 1000 mg in the morning and 500 mg at night, ondansetron 4 mg q.12 h. p.r.n. nausea, pantoprazole 40 mg daily, ProAir inhaler 2 puffs q.6 h. p.r.n., rosuvastatin 10 mg night Edda Rico M.D.  d: 03/01/2021 15:03:05  t: 03/01/2021 18:14:34  [facility] [id number]  /

## 2021-03-05 ENCOUNTER — PATIENT OUTREACH (OUTPATIENT)
Dept: CASE MANAGEMENT | Age: 76
End: 2021-03-05

## 2021-03-05 DIAGNOSIS — Z85.3 HISTORY OF LEFT BREAST CANCER: ICD-10-CM

## 2021-03-05 DIAGNOSIS — Z86.711 HISTORY OF PULMONARY EMBOLISM: ICD-10-CM

## 2021-03-05 DIAGNOSIS — M17.12 PRIMARY OSTEOARTHRITIS OF LEFT KNEE: ICD-10-CM

## 2021-03-05 DIAGNOSIS — F41.9 ANXIETY: ICD-10-CM

## 2021-03-05 DIAGNOSIS — Z85.828 HISTORY OF BASAL CELL CARCINOMA: ICD-10-CM

## 2021-03-05 DIAGNOSIS — E11.65 UNCONTROLLED TYPE 2 DIABETES MELLITUS WITH HYPERGLYCEMIA (HCC): ICD-10-CM

## 2021-03-05 DIAGNOSIS — D50.8 OTHER IRON DEFICIENCY ANEMIA: ICD-10-CM

## 2021-03-05 DIAGNOSIS — J45.31 MILD PERSISTENT ASTHMA WITH ACUTE EXACERBATION: ICD-10-CM

## 2021-03-05 DIAGNOSIS — Z99.89 OSA ON CPAP: ICD-10-CM

## 2021-03-05 DIAGNOSIS — G47.33 OSA ON CPAP: ICD-10-CM

## 2021-03-05 DIAGNOSIS — I10 BENIGN ESSENTIAL HTN: ICD-10-CM

## 2021-03-05 DIAGNOSIS — F32.A MILD DEPRESSION: ICD-10-CM

## 2021-03-05 DIAGNOSIS — E78.00 PURE HYPERCHOLESTEROLEMIA: ICD-10-CM

## 2021-03-05 NOTE — PROGRESS NOTES
3/5/2021  Spoke to Keily for CCM. Updates to patient care team/comments: UTD. Patient reported changes in medications: together we reviewed with no updates.   Pt states Black Glimpse increased her Metformin but her medication list reflects the new ch Form  TVRaw.pl. pdf     COVID-19 Vaccine Consent Form – [place]ian version  [email or web address] Dr. Stephanie Roberts managing. Followed up 2/16/21. Does infusions. Pt complains of weight gain and states her A1C was elevated. Reports drinking to much soda and has started to cut done. She loves ice cream and skips breakfast usually eats one meal a day. towards patients goals. Care Managers Interventions: Continue to provide encouragement, support, and education for healthy coping and dx.   Time Spent This Encounter Total: 27 min medical record review, telephone communication, care plan updates where n

## 2021-03-08 ENCOUNTER — IMMUNIZATION (OUTPATIENT)
Dept: LAB | Age: 76
End: 2021-03-08
Attending: HOSPITALIST
Payer: MEDICARE

## 2021-03-08 DIAGNOSIS — Z23 NEED FOR VACCINATION: Primary | ICD-10-CM

## 2021-03-08 PROCEDURE — 0001A SARSCOV2 VAC 30MCG/0.3ML IM: CPT

## 2021-03-10 ENCOUNTER — DIABETIC EDUCATION (OUTPATIENT)
Dept: ENDOCRINOLOGY CLINIC | Facility: CLINIC | Age: 76
End: 2021-03-10
Payer: MEDICARE

## 2021-03-10 DIAGNOSIS — E11.65 TYPE 2 DIABETES MELLITUS WITH HYPERGLYCEMIA, WITHOUT LONG-TERM CURRENT USE OF INSULIN (HCC): Primary | ICD-10-CM

## 2021-03-10 PROCEDURE — G0108 DIAB MANAGE TRN  PER INDIV: HCPCS | Performed by: DIETITIAN, REGISTERED

## 2021-03-10 RX ORDER — BLOOD-GLUCOSE METER
1 KIT MISCELLANEOUS DAILY
Qty: 100 STRIP | Refills: 3 | Status: SHIPPED | OUTPATIENT
Start: 2021-03-10 | End: 2021-03-11

## 2021-03-10 RX ORDER — BLOOD-GLUCOSE METER
1 KIT MISCELLANEOUS ONCE
Qty: 1 KIT | Refills: 0 | Status: SHIPPED | OUTPATIENT
Start: 2021-03-10 | End: 2021-03-11

## 2021-03-10 RX ORDER — LANCETS 28 GAUGE
1 EACH MISCELLANEOUS DAILY
Qty: 100 EACH | Refills: 3 | Status: SHIPPED | OUTPATIENT
Start: 2021-03-10 | End: 2021-03-11

## 2021-03-10 NOTE — PROGRESS NOTES
Ihor Cockayne  11/4/1945 was seen for Continuous Glucose Sensor Follow-up Visit:    3/10/2021   Referring Provider: Edgard Khan   Start time: 11:00 End time: 12:00    Sensor Type: Chelly Fernandez    Reviewed CGMS download:    Reason for CGM placement: unc follow-up with Blake Ruano MS, RD, CDE, LDN

## 2021-03-11 RX ORDER — BLOOD-GLUCOSE METER
1 KIT MISCELLANEOUS DAILY
Qty: 100 STRIP | Refills: 3 | Status: SHIPPED | OUTPATIENT
Start: 2021-03-11 | End: 2021-03-12

## 2021-03-11 RX ORDER — BLOOD-GLUCOSE METER
1 KIT MISCELLANEOUS ONCE
Qty: 1 KIT | Refills: 0 | Status: SHIPPED | OUTPATIENT
Start: 2021-03-11 | End: 2021-03-12

## 2021-03-11 RX ORDER — LANCETS 28 GAUGE
1 EACH MISCELLANEOUS DAILY
Qty: 100 EACH | Refills: 3 | Status: SHIPPED | OUTPATIENT
Start: 2021-03-11 | End: 2021-03-12

## 2021-03-12 DIAGNOSIS — E11.65 TYPE 2 DIABETES MELLITUS WITH HYPERGLYCEMIA, WITHOUT LONG-TERM CURRENT USE OF INSULIN (HCC): ICD-10-CM

## 2021-03-12 RX ORDER — LANCETS 28 GAUGE
1 EACH MISCELLANEOUS DAILY
Qty: 100 EACH | Refills: 3 | Status: SHIPPED | OUTPATIENT
Start: 2021-03-12 | End: 2021-06-02 | Stop reason: CLARIF

## 2021-03-12 RX ORDER — BLOOD-GLUCOSE METER
1 KIT MISCELLANEOUS ONCE
Qty: 1 KIT | Refills: 0 | Status: SHIPPED | OUTPATIENT
Start: 2021-03-12 | End: 2021-03-12

## 2021-03-12 RX ORDER — BLOOD-GLUCOSE METER
1 KIT MISCELLANEOUS DAILY
Qty: 100 STRIP | Refills: 3 | Status: SHIPPED | OUTPATIENT
Start: 2021-03-12 | End: 2021-06-02 | Stop reason: CLARIF

## 2021-03-19 ENCOUNTER — OFFICE VISIT (OUTPATIENT)
Dept: HEMATOLOGY/ONCOLOGY | Facility: HOSPITAL | Age: 76
End: 2021-03-19
Attending: INTERNAL MEDICINE
Payer: MEDICARE

## 2021-03-19 VITALS
TEMPERATURE: 97 F | OXYGEN SATURATION: 95 % | HEIGHT: 62.01 IN | SYSTOLIC BLOOD PRESSURE: 153 MMHG | BODY MASS INDEX: 31.98 KG/M2 | DIASTOLIC BLOOD PRESSURE: 82 MMHG | WEIGHT: 176 LBS | HEART RATE: 85 BPM | RESPIRATION RATE: 16 BRPM

## 2021-03-19 DIAGNOSIS — Z85.3 HISTORY OF LEFT BREAST CANCER: ICD-10-CM

## 2021-03-19 DIAGNOSIS — D80.3 SELECTIVE DEFICIENCY OF IGG SUBCLASSES (HCC): Primary | ICD-10-CM

## 2021-03-19 PROCEDURE — 96365 THER/PROPH/DIAG IV INF INIT: CPT

## 2021-03-19 PROCEDURE — 96366 THER/PROPH/DIAG IV INF ADDON: CPT

## 2021-03-19 RX ORDER — ACETAMINOPHEN 325 MG/1
650 TABLET ORAL ONCE
Status: COMPLETED | OUTPATIENT
Start: 2021-03-19 | End: 2021-03-19

## 2021-03-19 RX ORDER — SODIUM CHLORIDE 0.9 % (FLUSH) 0.9 %
10 SYRINGE (ML) INJECTION ONCE
Status: CANCELLED | OUTPATIENT
Start: 2021-04-01

## 2021-03-19 RX ORDER — DIPHENHYDRAMINE HCL 25 MG
50 CAPSULE ORAL ONCE
Status: CANCELLED | OUTPATIENT
Start: 2021-04-01

## 2021-03-19 RX ORDER — ACETAMINOPHEN 325 MG/1
650 TABLET ORAL ONCE
Status: CANCELLED | OUTPATIENT
Start: 2021-04-01

## 2021-03-19 RX ORDER — SODIUM CHLORIDE 0.9 % (FLUSH) 0.9 %
10 SYRINGE (ML) INJECTION ONCE
Status: COMPLETED | OUTPATIENT
Start: 2021-03-19 | End: 2021-03-19

## 2021-03-19 RX ORDER — DIPHENHYDRAMINE HCL 25 MG
50 CAPSULE ORAL ONCE
Status: COMPLETED | OUTPATIENT
Start: 2021-03-19 | End: 2021-03-19

## 2021-03-19 RX ADMIN — DIPHENHYDRAMINE HCL 50 MG: 25 MG CAPSULE ORAL at 09:26:00

## 2021-03-19 RX ADMIN — SODIUM CHLORIDE 0.9 % (FLUSH) 10 ML: 0.9 % SYRINGE (ML) INJECTION at 12:49:00

## 2021-03-19 RX ADMIN — ACETAMINOPHEN 650 MG: 325 TABLET ORAL at 09:26:00

## 2021-03-19 NOTE — PROGRESS NOTES
Education Record    Learner:  Patient    Disease / Diagnosis: IVIG    Barriers / Limitations:  None   Comments:    Method:  Discussion   Comments:    General Topics:  Plan of care reviewed   Comments:    Outcome:  Shows understanding   Comments:

## 2021-03-22 RX ORDER — LISINOPRIL 20 MG/1
TABLET ORAL
Qty: 90 TABLET | Refills: 1 | Status: SHIPPED | OUTPATIENT
Start: 2021-03-22 | End: 2021-06-02 | Stop reason: CLARIF

## 2021-03-29 ENCOUNTER — IMMUNIZATION (OUTPATIENT)
Dept: LAB | Age: 76
End: 2021-03-29
Attending: HOSPITALIST
Payer: MEDICARE

## 2021-03-29 DIAGNOSIS — Z23 NEED FOR VACCINATION: Primary | ICD-10-CM

## 2021-03-29 PROCEDURE — 0002A SARSCOV2 VAC 30MCG/0.3ML IM: CPT

## 2021-03-31 PROCEDURE — 99490 CHRNC CARE MGMT STAFF 1ST 20: CPT

## 2021-04-01 ENCOUNTER — TELEPHONE (OUTPATIENT)
Dept: INTERNAL MEDICINE CLINIC | Facility: CLINIC | Age: 76
End: 2021-04-01

## 2021-04-01 NOTE — TELEPHONE ENCOUNTER
Prescription Refill Request - Patient advised can take 48-72 hours. Pharmacy calling to request refill, and notify us patient took her last pill today.     Name of Medication (strength, dose, qty requested: xarelto 20 mg 1 tab by mourth daily  Qty: 90    W

## 2021-04-07 ENCOUNTER — PATIENT OUTREACH (OUTPATIENT)
Dept: CASE MANAGEMENT | Age: 76
End: 2021-04-07

## 2021-04-07 DIAGNOSIS — E53.8 B12 DEFICIENCY: ICD-10-CM

## 2021-04-07 DIAGNOSIS — I10 BENIGN ESSENTIAL HTN: ICD-10-CM

## 2021-04-07 DIAGNOSIS — M85.859 OSTEOPENIA OF THIGH, UNSPECIFIED LATERALITY: ICD-10-CM

## 2021-04-07 DIAGNOSIS — Z85.3 HISTORY OF LEFT BREAST CANCER: ICD-10-CM

## 2021-04-07 DIAGNOSIS — G47.33 OSA ON CPAP: ICD-10-CM

## 2021-04-07 DIAGNOSIS — F32.A MILD DEPRESSION: ICD-10-CM

## 2021-04-07 DIAGNOSIS — Z86.711 HISTORY OF PULMONARY EMBOLISM: ICD-10-CM

## 2021-04-07 DIAGNOSIS — E78.00 PURE HYPERCHOLESTEROLEMIA: ICD-10-CM

## 2021-04-07 DIAGNOSIS — J45.31 MILD PERSISTENT ASTHMA WITH ACUTE EXACERBATION: ICD-10-CM

## 2021-04-07 DIAGNOSIS — D50.8 OTHER IRON DEFICIENCY ANEMIA: ICD-10-CM

## 2021-04-07 DIAGNOSIS — M17.12 PRIMARY OSTEOARTHRITIS OF LEFT KNEE: ICD-10-CM

## 2021-04-07 DIAGNOSIS — Z99.89 OSA ON CPAP: ICD-10-CM

## 2021-04-07 DIAGNOSIS — E11.65 UNCONTROLLED TYPE 2 DIABETES MELLITUS WITH HYPERGLYCEMIA (HCC): ICD-10-CM

## 2021-04-07 DIAGNOSIS — Z85.828 HISTORY OF BASAL CELL CARCINOMA: ICD-10-CM

## 2021-04-07 RX ORDER — PREGABALIN 100 MG/1
CAPSULE ORAL
COMMUNITY
Start: 2021-02-26 | End: 2021-04-29

## 2021-04-07 NOTE — PROGRESS NOTES
4/7/2021   Spoke to Keily for CCM. Updates to patient care team/comments: yes, tried adding but no appearing on Care Team.  Updated in notes.     Swapnil Ibarra 27 [phone] (added to Mounika Del Valle)   Patient need to use a desktop/laptop, please follow the below steps:        1. Close out all other open apps (could be competing for audio resources)  2. Disable Bluetooth  3.       Reboot mobile device before joining the video  4.        Come off Wi-Fi so she will reduce them somewhat  - only 1 starch per meal and smaller portions of sweets. 3. Goal: eat a small amount of carbs and protein regularly throughout the day rather than not eating anything until the afternoon and then getting hypo.   4. Yfn Blank and validation to patient's concerns.    Monthly Minute Total including today: 26    Physical assessment, complete health history, and need for CCM established by Katelin Miranda MD.

## 2021-04-12 RX ORDER — PANTOPRAZOLE SODIUM 40 MG/1
TABLET, DELAYED RELEASE ORAL
Qty: 90 TABLET | Refills: 0 | Status: SHIPPED | OUTPATIENT
Start: 2021-04-12 | End: 2021-06-02 | Stop reason: CLARIF

## 2021-04-12 RX ORDER — BUPROPION HYDROCHLORIDE 150 MG/1
150 TABLET ORAL DAILY
Qty: 90 TABLET | Refills: 0 | Status: SHIPPED | OUTPATIENT
Start: 2021-04-12 | End: 2021-07-12

## 2021-04-12 RX ORDER — DULOXETIN HYDROCHLORIDE 60 MG/1
60 CAPSULE, DELAYED RELEASE ORAL DAILY
Qty: 90 CAPSULE | Refills: 0 | Status: SHIPPED | OUTPATIENT
Start: 2021-04-12 | End: 2021-04-29

## 2021-04-12 NOTE — TELEPHONE ENCOUNTER
Last OV: 2/4/21 annual PE    Future Appointments   Date Time Provider Gomez Angela   4/16/2021  9:00 AM [address]   4/27/2021 10:00 AM Chiqui Farley, DO EMGRHEUM EM[facility]   4/29/2021 10:20 AM MD KAMILLE Valenzuela EMG [place]

## 2021-04-12 NOTE — TELEPHONE ENCOUNTER
Last VISIT 02/04/21    Last REFILL 01/12/21 qty 90 w/0 refills    Last LABS   02/16/21 CBC, A1c, CMP, Iron, Ferritin done    No Future Appointments        Per PROTOCOL? Not on protocol      Please Approve or Deny.

## 2021-04-13 DIAGNOSIS — E11.65 UNCONTROLLED TYPE 2 DIABETES MELLITUS WITH HYPERGLYCEMIA (HCC): ICD-10-CM

## 2021-04-13 RX ORDER — METFORMIN HYDROCHLORIDE 500 MG/1
TABLET, EXTENDED RELEASE ORAL
Qty: 180 TABLET | Refills: 0 | OUTPATIENT
Start: 2021-04-13

## 2021-04-13 RX ORDER — PANTOPRAZOLE SODIUM 40 MG/1
TABLET, DELAYED RELEASE ORAL
Qty: 90 TABLET | Refills: 0 | OUTPATIENT
Start: 2021-04-13

## 2021-04-16 ENCOUNTER — OFFICE VISIT (OUTPATIENT)
Dept: HEMATOLOGY/ONCOLOGY | Facility: HOSPITAL | Age: 76
End: 2021-04-16
Attending: INTERNAL MEDICINE
Payer: MEDICARE

## 2021-04-16 VITALS
BODY MASS INDEX: 31.14 KG/M2 | RESPIRATION RATE: 18 BRPM | HEART RATE: 86 BPM | TEMPERATURE: 98 F | SYSTOLIC BLOOD PRESSURE: 140 MMHG | DIASTOLIC BLOOD PRESSURE: 76 MMHG | WEIGHT: 171.38 LBS | HEIGHT: 62.01 IN | OXYGEN SATURATION: 97 %

## 2021-04-16 DIAGNOSIS — E11.65 UNCONTROLLED TYPE 2 DIABETES MELLITUS WITH HYPERGLYCEMIA (HCC): ICD-10-CM

## 2021-04-16 DIAGNOSIS — D80.3 SELECTIVE DEFICIENCY OF IGG SUBCLASSES (HCC): Primary | ICD-10-CM

## 2021-04-16 PROCEDURE — 96366 THER/PROPH/DIAG IV INF ADDON: CPT

## 2021-04-16 PROCEDURE — 96365 THER/PROPH/DIAG IV INF INIT: CPT

## 2021-04-16 RX ORDER — ACETAMINOPHEN 325 MG/1
650 TABLET ORAL ONCE
Status: COMPLETED | OUTPATIENT
Start: 2021-04-16 | End: 2021-04-16

## 2021-04-16 RX ORDER — ACETAMINOPHEN 325 MG/1
650 TABLET ORAL ONCE
Status: CANCELLED | OUTPATIENT
Start: 2021-05-01

## 2021-04-16 RX ORDER — DIPHENHYDRAMINE HCL 25 MG
50 CAPSULE ORAL ONCE
Status: CANCELLED | OUTPATIENT
Start: 2021-05-01

## 2021-04-16 RX ORDER — SODIUM CHLORIDE 0.9 % (FLUSH) 0.9 %
10 SYRINGE (ML) INJECTION ONCE
Status: CANCELLED | OUTPATIENT
Start: 2021-05-01

## 2021-04-16 RX ORDER — DIPHENHYDRAMINE HCL 25 MG
50 CAPSULE ORAL ONCE
Status: COMPLETED | OUTPATIENT
Start: 2021-04-16 | End: 2021-04-16

## 2021-04-16 RX ADMIN — ACETAMINOPHEN 650 MG: 325 TABLET ORAL at 09:25:00

## 2021-04-16 RX ADMIN — DIPHENHYDRAMINE HCL 50 MG: 25 MG CAPSULE ORAL at 09:25:00

## 2021-04-16 NOTE — TELEPHONE ENCOUNTER
Prescription Refill Request - Patient advised can take 48-72 hours.     Name of Medication (strength, dose, qty requested:   metFORMIN HCl  MG Oral Tablet 24 Hr 180 tablet 1 2/4/2021 5/5/2021   Sig:   Take 2 tablets (1,000 mg total) by mouth daily wit

## 2021-04-17 NOTE — TELEPHONE ENCOUNTER
Last VISIT 02/04/21    Last REFILL 02/04/21 qty 180 w/1 refill    Last LABS 02/16/21 CBC, CMP, A1c, Iron, Ferritin done    No Future Appointments        Per PROTOCOL? Failed       Please Approve or Deny.

## 2021-04-19 RX ORDER — METFORMIN HYDROCHLORIDE 500 MG/1
TABLET, EXTENDED RELEASE ORAL
Qty: 180 TABLET | Refills: 0 | Status: SHIPPED | OUTPATIENT
Start: 2021-04-19 | End: 2021-06-14

## 2021-04-19 RX ORDER — ROSUVASTATIN CALCIUM 10 MG/1
10 TABLET, COATED ORAL NIGHTLY
Qty: 90 TABLET | Refills: 0 | Status: SHIPPED | OUTPATIENT
Start: 2021-04-19 | End: 2021-07-12

## 2021-04-29 ENCOUNTER — OFFICE VISIT (OUTPATIENT)
Dept: NEUROLOGY | Facility: CLINIC | Age: 76
End: 2021-04-29
Payer: MEDICARE

## 2021-04-29 VITALS
HEART RATE: 80 BPM | DIASTOLIC BLOOD PRESSURE: 72 MMHG | WEIGHT: 171 LBS | SYSTOLIC BLOOD PRESSURE: 126 MMHG | RESPIRATION RATE: 16 BRPM | BODY MASS INDEX: 31 KG/M2

## 2021-04-29 DIAGNOSIS — R20.2 NUMBNESS AND TINGLING: ICD-10-CM

## 2021-04-29 DIAGNOSIS — E53.8 B12 DEFICIENCY: Primary | ICD-10-CM

## 2021-04-29 DIAGNOSIS — R20.0 NUMBNESS AND TINGLING: ICD-10-CM

## 2021-04-29 DIAGNOSIS — R25.1 TREMOR OF BOTH HANDS: ICD-10-CM

## 2021-04-29 DIAGNOSIS — G43.019 INTRACTABLE MIGRAINE WITHOUT AURA AND WITHOUT STATUS MIGRAINOSUS: ICD-10-CM

## 2021-04-29 PROCEDURE — 99214 OFFICE O/P EST MOD 30 MIN: CPT | Performed by: OTHER

## 2021-04-29 RX ORDER — PREGABALIN 100 MG/1
100 CAPSULE ORAL 3 TIMES DAILY
Qty: 90 CAPSULE | Refills: 5 | Status: SHIPPED | OUTPATIENT
Start: 2021-04-29 | End: 2021-11-04

## 2021-04-29 RX ORDER — DULOXETIN HYDROCHLORIDE 60 MG/1
60 CAPSULE, DELAYED RELEASE ORAL DAILY
Qty: 90 CAPSULE | Refills: 3 | Status: SHIPPED | OUTPATIENT
Start: 2021-04-29 | End: 2021-06-02 | Stop reason: CLARIF

## 2021-04-29 NOTE — PROGRESS NOTES
Alan [address]   Neurology; follow up CLINIC VISIT  2021    June Blackmon Patient Status:  No patient class for patient encounter    1945 MRN [id number]   Location [address] [place] [address] Devora NOVA B12. Her B12 is normal now.    She had covid infection last December 2020, when she felt sick, body aches, cough, SOB, she went to ER, check CT, she got treated with antibody, she felt better quickly,  She had covid vaccine in 3/2021, she was very sick from endoscopy - papillotomy   • OTHER SURGICAL HISTORY  2016     under right eye, skin cancer removed   • OTHER SURGICAL HISTORY  2019    knee   • PORT FOR VASCULAR ACCESS     • RADIATION LEFT Left 1999    left lumpectomy and radiation.    • REMOVAL GALLBLADDER Take 1 capsule (60 mg total) by mouth daily.  To take along with 30mg to equal 90mg daily 90 capsule 0   • pregabalin 100 MG Oral Cap TAKE ONE CAPSULE THREE TIMES A DAY FOR NEUROPATHIC PAIN     • Rivaroxaban (XARELTO) 20 MG Oral Tab Take 1 tablet (20 mg tot throat; no  hearing loss;  RESPIRATORY: denies shortness of breath, wheezing or cough   CARDIOVASCULAR: denies chest pain, no palpitations   GI: denies nausea, vomiting, constipation, diarrhea; no rectal bleeding; no heartburn  GENITAL/: no dysuria, urge movement       CN VIII:  Normal hearing       CN IX, XI:  Normal Gag, uvula palate midline       CN XII:  SCM strong, equal shoulder shrug  Motor:  No drift, rapid finger movement symmetric. 5/5 in all limbs with normal tone.  No tremor of any kind; mild we 10/21/2020 14:37:21  [facility]  [id number]  /    EEG  IMPRESSION:  This is a normal study for patient's age. No epileptiform activity was identified during this recording.   Clinical correlation is indicated.     Dictated By NOLAN López:     0 then biweekly times 4, then monthly. Use cane, fall prevention, gait PT is another option, but she declined. 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 No follow-ups on file.     Edy Dumont

## 2021-04-30 PROCEDURE — 99490 CHRNC CARE MGMT STAFF 1ST 20: CPT

## 2021-05-07 ENCOUNTER — PATIENT OUTREACH (OUTPATIENT)
Dept: CASE MANAGEMENT | Age: 76
End: 2021-05-07

## 2021-05-07 DIAGNOSIS — Z86.711 HISTORY OF PULMONARY EMBOLISM: ICD-10-CM

## 2021-05-07 DIAGNOSIS — G47.33 OSA ON CPAP: ICD-10-CM

## 2021-05-07 DIAGNOSIS — F41.9 ANXIETY: ICD-10-CM

## 2021-05-07 DIAGNOSIS — E78.00 PURE HYPERCHOLESTEROLEMIA: ICD-10-CM

## 2021-05-07 DIAGNOSIS — M17.12 PRIMARY OSTEOARTHRITIS OF LEFT KNEE: ICD-10-CM

## 2021-05-07 DIAGNOSIS — E53.8 B12 DEFICIENCY: ICD-10-CM

## 2021-05-07 DIAGNOSIS — K21.9 GASTROESOPHAGEAL REFLUX DISEASE WITHOUT ESOPHAGITIS: ICD-10-CM

## 2021-05-07 DIAGNOSIS — F32.A MILD DEPRESSION: ICD-10-CM

## 2021-05-07 DIAGNOSIS — I70.0 ATHEROSCLEROSIS OF AORTA (HCC): ICD-10-CM

## 2021-05-07 DIAGNOSIS — E11.65 UNCONTROLLED TYPE 2 DIABETES MELLITUS WITH HYPERGLYCEMIA (HCC): ICD-10-CM

## 2021-05-07 DIAGNOSIS — I10 BENIGN ESSENTIAL HTN: ICD-10-CM

## 2021-05-07 DIAGNOSIS — Z99.89 OSA ON CPAP: ICD-10-CM

## 2021-05-07 DIAGNOSIS — Z85.828 HISTORY OF BASAL CELL CARCINOMA: ICD-10-CM

## 2021-05-07 DIAGNOSIS — Z85.3 HISTORY OF LEFT BREAST CANCER: ICD-10-CM

## 2021-05-07 DIAGNOSIS — J45.31 MILD PERSISTENT ASTHMA WITH ACUTE EXACERBATION: ICD-10-CM

## 2021-05-07 RX ORDER — BLOOD-GLUCOSE METER
1 KIT MISCELLANEOUS ONCE
COMMUNITY
Start: 2021-03-28 | End: 2021-06-02 | Stop reason: CLARIF

## 2021-05-07 NOTE — PROGRESS NOTES
5/7/2021  Spoke to Keily for CCM.       Updates to patient care team/comments: yes, added   NICKIE Mcarthur   [phone] Nurse Practitioner Adult Health   Pulmonary    Patient reported changes in medications: together we reviewed with no update mobile device before joining the video  4. Come off Wi-Fi and switch over to Data    Please see our Video Visit Tip Sheet if you need additional assistance. If you believe this is an emergency, please dial 911 immediately.          Wednesday Joss Martínez monthly. Use cane, fall prevention, gait PT is another option, but she declined. 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    Has scheduled follow up in Nov.     Self Management Goals

## 2021-05-14 ENCOUNTER — OFFICE VISIT (OUTPATIENT)
Dept: HEMATOLOGY/ONCOLOGY | Facility: HOSPITAL | Age: 76
End: 2021-05-14
Attending: INTERNAL MEDICINE
Payer: MEDICARE

## 2021-05-14 VITALS
HEART RATE: 85 BPM | WEIGHT: 172.63 LBS | TEMPERATURE: 98 F | SYSTOLIC BLOOD PRESSURE: 155 MMHG | RESPIRATION RATE: 18 BRPM | DIASTOLIC BLOOD PRESSURE: 86 MMHG | BODY MASS INDEX: 31.37 KG/M2 | OXYGEN SATURATION: 97 % | HEIGHT: 62.01 IN

## 2021-05-14 DIAGNOSIS — D80.3 SELECTIVE DEFICIENCY OF IGG SUBCLASSES (HCC): Primary | ICD-10-CM

## 2021-05-14 PROCEDURE — 96366 THER/PROPH/DIAG IV INF ADDON: CPT

## 2021-05-14 PROCEDURE — 96365 THER/PROPH/DIAG IV INF INIT: CPT

## 2021-05-14 PROCEDURE — 96375 TX/PRO/DX INJ NEW DRUG ADDON: CPT

## 2021-05-14 RX ORDER — ACETAMINOPHEN 325 MG/1
650 TABLET ORAL ONCE
Status: COMPLETED | OUTPATIENT
Start: 2021-05-14 | End: 2021-05-14

## 2021-05-14 RX ORDER — DIPHENHYDRAMINE HCL 25 MG
50 CAPSULE ORAL ONCE
Status: COMPLETED | OUTPATIENT
Start: 2021-05-14 | End: 2021-05-14

## 2021-05-14 RX ORDER — DIPHENHYDRAMINE HCL 25 MG
50 CAPSULE ORAL ONCE
Status: CANCELLED | OUTPATIENT
Start: 2021-06-01

## 2021-05-14 RX ORDER — ACETAMINOPHEN 325 MG/1
650 TABLET ORAL ONCE
Status: CANCELLED | OUTPATIENT
Start: 2021-06-01

## 2021-05-14 RX ORDER — SODIUM CHLORIDE 0.9 % (FLUSH) 0.9 %
10 SYRINGE (ML) INJECTION ONCE
Status: CANCELLED | OUTPATIENT
Start: 2021-06-01

## 2021-05-14 RX ADMIN — DIPHENHYDRAMINE HCL 25 MG: 25 MG CAPSULE ORAL at 08:15:00

## 2021-05-14 RX ADMIN — ACETAMINOPHEN 650 MG: 325 TABLET ORAL at 08:14:00

## 2021-05-31 PROCEDURE — 99490 CHRNC CARE MGMT STAFF 1ST 20: CPT

## 2021-06-02 ENCOUNTER — TELEPHONE (OUTPATIENT)
Dept: INTERNAL MEDICINE CLINIC | Facility: CLINIC | Age: 76
End: 2021-06-02

## 2021-06-02 ENCOUNTER — HOSPITAL ENCOUNTER (OUTPATIENT)
Facility: HOSPITAL | Age: 76
Setting detail: OBSERVATION
Discharge: HOME | End: 2021-06-04
Attending: EMERGENCY MEDICINE | Admitting: HOSPITALIST
Payer: MEDICARE

## 2021-06-02 ENCOUNTER — APPOINTMENT (OUTPATIENT)
Dept: GENERAL RADIOLOGY | Facility: HOSPITAL | Age: 76
End: 2021-06-02
Attending: EMERGENCY MEDICINE
Payer: MEDICARE

## 2021-06-02 DIAGNOSIS — J45.909 ACUTE ASTHMA: ICD-10-CM

## 2021-06-02 DIAGNOSIS — J18.9 PNEUMONIA OF RIGHT UPPER LOBE DUE TO INFECTIOUS ORGANISM: Primary | ICD-10-CM

## 2021-06-02 PROCEDURE — 71045 X-RAY EXAM CHEST 1 VIEW: CPT | Performed by: EMERGENCY MEDICINE

## 2021-06-02 PROCEDURE — 99220 INITIAL OBSERVATION CARE,LEVL III: CPT | Performed by: INTERNAL MEDICINE

## 2021-06-02 RX ORDER — IPRATROPIUM BROMIDE AND ALBUTEROL SULFATE 2.5; .5 MG/3ML; MG/3ML
3 SOLUTION RESPIRATORY (INHALATION) EVERY 6 HOURS PRN
Status: DISCONTINUED | OUTPATIENT
Start: 2021-06-02 | End: 2021-06-04

## 2021-06-02 RX ORDER — ALBUTEROL SULFATE 90 UG/1
2 AEROSOL, METERED RESPIRATORY (INHALATION) EVERY 6 HOURS PRN
Status: DISCONTINUED | OUTPATIENT
Start: 2021-06-02 | End: 2021-06-04

## 2021-06-02 RX ORDER — ACETAMINOPHEN 325 MG/1
650 TABLET ORAL EVERY 6 HOURS PRN
Status: DISCONTINUED | OUTPATIENT
Start: 2021-06-02 | End: 2021-06-04

## 2021-06-02 RX ORDER — TIZANIDINE 2 MG/1
4 TABLET ORAL DAILY PRN
Status: DISCONTINUED | OUTPATIENT
Start: 2021-06-02 | End: 2021-06-04

## 2021-06-02 RX ORDER — LISINOPRIL 20 MG/1
20 TABLET ORAL DAILY
COMMUNITY
End: 2021-09-27

## 2021-06-02 RX ORDER — TIZANIDINE 4 MG/1
8 TABLET ORAL NIGHTLY
Status: DISCONTINUED | OUTPATIENT
Start: 2021-06-02 | End: 2021-06-04

## 2021-06-02 RX ORDER — PREGABALIN 100 MG/1
100 CAPSULE ORAL 3 TIMES DAILY
Status: DISCONTINUED | OUTPATIENT
Start: 2021-06-02 | End: 2021-06-04

## 2021-06-02 RX ORDER — ROSUVASTATIN CALCIUM 10 MG/1
10 TABLET, COATED ORAL NIGHTLY
Status: DISCONTINUED | OUTPATIENT
Start: 2021-06-02 | End: 2021-06-04

## 2021-06-02 RX ORDER — TIZANIDINE 4 MG/1
4 TABLET ORAL DAILY PRN
COMMUNITY

## 2021-06-02 RX ORDER — PANTOPRAZOLE SODIUM 40 MG/1
40 TABLET, DELAYED RELEASE ORAL
Status: DISCONTINUED | OUTPATIENT
Start: 2021-06-03 | End: 2021-06-04

## 2021-06-02 RX ORDER — PREDNISONE 20 MG/1
40 TABLET ORAL ONCE
Status: COMPLETED | OUTPATIENT
Start: 2021-06-02 | End: 2021-06-02

## 2021-06-02 RX ORDER — ONDANSETRON 2 MG/ML
4 INJECTION INTRAMUSCULAR; INTRAVENOUS EVERY 6 HOURS PRN
Status: DISCONTINUED | OUTPATIENT
Start: 2021-06-02 | End: 2021-06-04

## 2021-06-02 RX ORDER — METOCLOPRAMIDE HYDROCHLORIDE 5 MG/ML
5 INJECTION INTRAMUSCULAR; INTRAVENOUS EVERY 8 HOURS PRN
Status: DISCONTINUED | OUTPATIENT
Start: 2021-06-02 | End: 2021-06-04

## 2021-06-02 RX ORDER — ALBUTEROL SULFATE 90 UG/1
2 AEROSOL, METERED RESPIRATORY (INHALATION) EVERY 6 HOURS PRN
COMMUNITY

## 2021-06-02 RX ORDER — HEPARIN SODIUM 5000 [USP'U]/ML
5000 INJECTION, SOLUTION INTRAVENOUS; SUBCUTANEOUS EVERY 8 HOURS SCHEDULED
Status: DISCONTINUED | OUTPATIENT
Start: 2021-06-02 | End: 2021-06-02

## 2021-06-02 RX ORDER — BUPROPION HYDROCHLORIDE 150 MG/1
150 TABLET ORAL DAILY
Status: DISCONTINUED | OUTPATIENT
Start: 2021-06-03 | End: 2021-06-04

## 2021-06-02 RX ORDER — SODIUM CHLORIDE 9 MG/ML
INJECTION, SOLUTION INTRAVENOUS CONTINUOUS
Status: DISCONTINUED | OUTPATIENT
Start: 2021-06-02 | End: 2021-06-03

## 2021-06-02 RX ORDER — ALBUTEROL SULFATE 90 UG/1
4 AEROSOL, METERED RESPIRATORY (INHALATION) 4 TIMES DAILY
Status: DISCONTINUED | OUTPATIENT
Start: 2021-06-02 | End: 2021-06-04

## 2021-06-02 RX ORDER — TIZANIDINE 4 MG/1
8 TABLET ORAL NIGHTLY
COMMUNITY
End: 2021-06-14 | Stop reason: ALTCHOICE

## 2021-06-02 RX ORDER — ASPIRIN 81 MG/1
81 TABLET ORAL DAILY
Status: DISCONTINUED | OUTPATIENT
Start: 2021-06-03 | End: 2021-06-04

## 2021-06-02 RX ORDER — DULOXETIN HYDROCHLORIDE 30 MG/1
60 CAPSULE, DELAYED RELEASE ORAL DAILY
Status: DISCONTINUED | OUTPATIENT
Start: 2021-06-03 | End: 2021-06-04

## 2021-06-02 RX ORDER — DOCUSATE SODIUM 100 MG/1
100 CAPSULE, LIQUID FILLED ORAL 2 TIMES DAILY
Status: DISCONTINUED | OUTPATIENT
Start: 2021-06-02 | End: 2021-06-04

## 2021-06-02 RX ORDER — DULOXETIN HYDROCHLORIDE 60 MG/1
60 CAPSULE, DELAYED RELEASE ORAL DAILY
COMMUNITY
End: 2021-12-22

## 2021-06-02 RX ORDER — TRAMADOL HYDROCHLORIDE 50 MG/1
50 TABLET ORAL EVERY 6 HOURS PRN
Status: DISCONTINUED | OUTPATIENT
Start: 2021-06-02 | End: 2021-06-04

## 2021-06-02 RX ORDER — DEXTROSE MONOHYDRATE 25 G/50ML
50 INJECTION, SOLUTION INTRAVENOUS
Status: DISCONTINUED | OUTPATIENT
Start: 2021-06-02 | End: 2021-06-04

## 2021-06-02 RX ORDER — PANTOPRAZOLE SODIUM 40 MG/1
40 TABLET, DELAYED RELEASE ORAL
COMMUNITY
End: 2021-07-12

## 2021-06-02 RX ORDER — PREGABALIN 100 MG/1
100 CAPSULE ORAL 3 TIMES DAILY
Status: DISCONTINUED | OUTPATIENT
Start: 2021-06-02 | End: 2021-06-02

## 2021-06-02 RX ORDER — HYDROCODONE BITARTRATE AND ACETAMINOPHEN 10; 325 MG/1; MG/1
1 TABLET ORAL EVERY 4 HOURS PRN
Status: DISCONTINUED | OUTPATIENT
Start: 2021-06-02 | End: 2021-06-04

## 2021-06-02 RX ORDER — FLUTICASONE PROPIONATE 50 MCG
1 SPRAY, SUSPENSION (ML) NASAL 2 TIMES DAILY
Status: DISCONTINUED | OUTPATIENT
Start: 2021-06-02 | End: 2021-06-04

## 2021-06-02 NOTE — TELEPHONE ENCOUNTER
Patient is calling and has been sick since the weekend. Patient has been extremely fatigued all weekend and they yesterday was doubled over in pain and throwing up. She can't stop coughing and having trouble taking a breath. Also no taste again.   Patient did have covid already  Sending to triage

## 2021-06-02 NOTE — TELEPHONE ENCOUNTER
Patient calling today and states over the weekend didn't change our of her rosales due to not feeling well; yesterday abdominal pain 6/10 abdominal pain and 10 episodes of vomiting since yesterday. Very tired, dry cough present, no fevers. Patient SOB without activity, lightheaded, chest tightness, heartburn. No ill contacts. No numbness/tinging, chest pain, fevers, dizzy, arm/jaw pain. No current bleeding reported. Patient states she does not monitor her BP, nor glucose. Hx PE, MI, cancer, asthma, DM    Patient advised to be seen in the ER; patient agreeable on plan of care.  TIFFANY BECERRA.

## 2021-06-02 NOTE — ED INITIAL ASSESSMENT (HPI)
C/o abd pain, nausea and vomiting,  Sob and lightheadedness since last night. Nausea better since zofran.   Denies C.P.

## 2021-06-02 NOTE — H&P
ARACELI HOSPITALIST  History and Physical     [facility] Patient Status:  Emergency    1945 MRN [id number]   Location [address] Attending Caryle Fuelling, MD   Hosp Day # 0 PCP Shun Roberts MD     Chief Complaint without mention of complication, not stated as uncontrolled 6/29/2012        Past Surgical History:   Past Surgical History:   Procedure Laterality Date   • ABDOMEN SURGERY PROC UNLISTED      duodenal biopsy   • BREAST SURGERY     • CHOLECYSTECTOMY     • C UNKNOWN  Clindamycin             DIARRHEA, NAUSEA ONLY    Medications:  No current facility-administered medications on file prior to encounter. fluconazole 100 MG Oral Tab, Take 1 tablet (100 mg total) by mouth daily.  X 7 days, Disp: 7 tablet, Rfl: 0  Bl capsules by mouth daily. , Disp: , Rfl:   Fluticasone Propionate 50 MCG/ACT Nasal Suspension, 1 spray by Nasal route 2 (two) times daily. , Disp: 1 Bottle, Rfl: 3  PROAIR  (90 Base) MCG/ACT Inhalation Aero Soln, INHALE 2 PUFFS INTO THE LUNGS EVERY S GFRAA 36*   GFRNAA 31*   CA 8.8   ALB 3.4   *   K 5.3*      CO2 23.0   ALKPHO 87   AST 25   ALT 27   BILT 0.5   TP 7.2       Estimated Creatinine Clearance: 23.9 mL/min (A) (based on SCr of 1.63 mg/dL (H)).     No results for input(s): PTP, IN

## 2021-06-02 NOTE — PROGRESS NOTES
NURSING ADMISSION NOTE      Patient admitted via Cart  Oriented to room. Safety precautions initiated. Bed in low position. Call light in reach. Admit to unit at 1400. Oriented to room and call light. Admission navigator completed by NICOLLE King

## 2021-06-02 NOTE — ED QUICK NOTES
This tech did a walking pulse ox with this patient, patient stated she did not feel dizzy and she was visibly short of breath. When walking started the initial oxygen was 93% and after the road test patients oxygen was at 88%.  When the patient was seated o

## 2021-06-02 NOTE — ED PROVIDER NOTES
Patient Seen in: [facility] Emergency Department      History   Patient presents with:  Difficulty Breathing  Vomiting  Abdomen/Flank Pain    Stated Complaint: thom / abdominal pain    HPI/Subjective:   HPI    Patient here concerned about cough and s total hystero. • KNEE REPLACEMENT SURGERY     • LUMPECTOMY LEFT Left 1999    lt lumpectomy and radiation. • OOPHORECTOMY Bilateral 1984    total hystero.    • OTHER SURGICAL HISTORY  2010    endoscopy - papillotomy   • OTHER SURGICAL HISTORY  2016 guarding. Musculoskeletal: No swelling, deformity or ecchymosis. Neurological: Pt is alert and oriented to person, place, and time. No cranial nerve deficit. Skin: Skin is warm and dry. Psychiatric: Normal mood and affect.  Thought content norm COMPARISON:   Home	[place], XR, XR CHEST PA + LAT CHEST (CPT=71046), 12/01/2020, 3:55 PM.  INDICATIONS:  thom / abdominal pain  PATIENT STATED HISTORY: (As transcribed by Technologist)  The patient has shortness of breath.                CONCLUSION:  The Hospital Problems     Present on Admission  Date Reviewed: 5/17/2021        ICD-10-CM Noted POA    * (Principal) Pneumonia of right upper lobe due to infectious organism J18.9 6/2/2021 Unknown

## 2021-06-03 PROCEDURE — 99225 SUBSEQUENT OBSERVATION CARE: CPT | Performed by: INTERNAL MEDICINE

## 2021-06-03 RX ORDER — LISINOPRIL 20 MG/1
20 TABLET ORAL DAILY
Status: DISCONTINUED | OUTPATIENT
Start: 2021-06-04 | End: 2021-06-04

## 2021-06-03 NOTE — PROGRESS NOTES
A0x4. Vss, afebrile. Maintaining Sp02 > 93% on RA, wears cpap at night. Reports dyspnea with exertion. Dry cough present. St on tele ( Low 100s). Xarelto. Voids.  Complains of chronic neck/back pain, managed with oral norco. Ambulates with standby assist (H

## 2021-06-03 NOTE — PROGRESS NOTES
[facility] HOSPITALIST  Progress Note     Bubba Knows Patient Status:  Observation    1945 MRN [id number]   [facility] 5NW-A Attending Homer Tobar,    Hosp Day # 0 PCP Fernanda Talbot MD     Chief Complaint: dyspnea    S: Shani Pro-Calcitonin  Recent Labs   Lab 06/02/21  1005   PCT 0.22*       Cardiac  Recent Labs   Lab 06/02/21  1005   TROP <0.045       Creatinine Kinase  No results for input(s): CK in the last 168 hours.     Inflammatory Markers  No results for input(s): C

## 2021-06-03 NOTE — CONSULTS
Pulmonary / Critical Care H&P/Consult       NAME: [address] Ne: [id number]/429-M - MRN: [id number] - Age: 77year old - :  1945    Date of Admission: 2021  9:57 AM  Admission Diagnosis: Acute asthma [J45.909]  Pneumonia of right upper lob HYSTERECTOMY  1984    total hystero. • KNEE REPLACEMENT SURGERY     • LUMPECTOMY LEFT Left 1999    lt lumpectomy and radiation. • OOPHORECTOMY Bilateral 1984    total hystero.    • OTHER SURGICAL HISTORY  2010    endoscopy - papillotomy   • OTHER SURGIC Hazards: Not Asked        Sleep Concern: No        Stress Concern: Not Asked        Weight Concern: Not Asked        Special Diet: Not Asked        Back Care: Not Asked        Exercise: Yes          walking        Bike Helmet: Not Asked        Seat Belt: N Disp: , Rfl:   Pantoprazole Sodium 40 MG Oral Tab EC, Take 40 mg by mouth every morning before breakfast., Disp: , Rfl:   DULoxetine HCl 60 MG Oral Cap DR Particles, Take 60 mg by mouth daily. , Disp: , Rfl:   Fluticasone Furoate-Vilanterol (BREO ELLIPTA) 2 • Rivaroxaban  20 mg Oral Daily   • Rosuvastatin Calcium  10 mg Oral Nightly   • tiZANidine HCl  8 mg Oral Nightly   • cefTRIAXone  1 g Intravenous Q24H   • azithromycin (ZITHROMAX) IVPB  500 mg Intravenous Daily   • docusate sodium  100 mg Oral BID   • auscultation bilaterally, respirations unlabored   Chest wall:    No tenderness or deformity   Heart:    Regular rate and rhythm, S1 and S2 normal, no murmur, rub   or gallop   Abdomen:     Soft, non-tender, bowel sounds active all four quadrants,     no m

## 2021-06-03 NOTE — CM/SW NOTE
06/03/21 1400   CM/SW Referral Data   Referral Source Other   Reason for Referral Protocol order set   Specify order set   (phq4)   Informant Patient   Patient Info   Patient's Mental Status Alert;Oriented   Patient's [address]   Patient li

## 2021-06-03 NOTE — PROGRESS NOTES
06/03/21 1200   Mobility   O2 walk?  Yes   SPO2% on Room Air at Rest 95   SPO2% Ambulation on Room Air 89   SPO2% Ambulation on Oxygen 92   Ambulation oxygen flow (liters per minute) 1

## 2021-06-03 NOTE — PROGRESS NOTES
Patient alert and oriented x4. Glasses. VSS. Afebrile. Tele NSR. : RA at rest. O2 walk on shift. 89% on RA walking. Lungs are diminished in bases but improved from yesterday. Continue IV ABX and steroids. Hopeful for discharge tomorrow.   QID accuchec

## 2021-06-04 ENCOUNTER — TELEPHONE (OUTPATIENT)
Dept: HEMATOLOGY/ONCOLOGY | Facility: HOSPITAL | Age: 76
End: 2021-06-04

## 2021-06-04 VITALS
SYSTOLIC BLOOD PRESSURE: 156 MMHG | WEIGHT: 176 LBS | HEIGHT: 61 IN | HEART RATE: 90 BPM | DIASTOLIC BLOOD PRESSURE: 78 MMHG | BODY MASS INDEX: 33.23 KG/M2 | TEMPERATURE: 98 F | OXYGEN SATURATION: 95 % | RESPIRATION RATE: 16 BRPM

## 2021-06-04 PROCEDURE — 99217 OBSERVATION CARE DISCHARGE: CPT | Performed by: INTERNAL MEDICINE

## 2021-06-04 RX ORDER — ONDANSETRON 4 MG/1
4 TABLET, FILM COATED ORAL EVERY 8 HOURS PRN
Qty: 30 TABLET | Refills: 0 | Status: SHIPPED | OUTPATIENT
Start: 2021-06-04 | End: 2021-10-22

## 2021-06-04 RX ORDER — AZITHROMYCIN 250 MG/1
250 TABLET, FILM COATED ORAL DAILY
Qty: 5 TABLET | Refills: 0 | Status: SHIPPED | OUTPATIENT
Start: 2021-06-04 | End: 2021-07-09 | Stop reason: SINTOL

## 2021-06-04 NOTE — PROGRESS NOTES
Patient alert and oriented x4. Up with standby. NSR on tele. VSS. Maintaining o2 sats >95% on RA. CPAP at night. C/o back pain, PRN norco given with relief. Right port, accessed and flushed.  Safety precautions in place, call light within reach, bed alarm o

## 2021-06-04 NOTE — PROGRESS NOTES
Pulmonary Progress Note     Assessment / Plan:  1. Pneumonia: vague infiltrate in RUL but sx seem consistent, with elevated procalc  - oral azithromycin for 5 doses at discharge  2. Asthma: no exac  - cont breo  - hold prednisone  3.  H/o recurrent PE:   -

## 2021-06-04 NOTE — DISCHARGE PLANNING
NURSING DISCHARGE NOTE    Discharged Home via Wheelchair. Accompanied by Support staff  Belongings Taken by patient/family. Port de-accessed per protocol. Tele removed & returned to drawer. Discharge navigator complete.    Discharge instructions rev

## 2021-06-04 NOTE — TELEPHONE ENCOUNTER
Patient is leaving hospital with pneumonia. Patient needs to speak with someone at Dr. Maribell Osei office to speak with patient about her infusion treatments. She gets the infusions to help her NOT get infusions. Please call patient as soon as you can.

## 2021-06-04 NOTE — DISCHARGE SUMMARY
[facility]OSPITAL[facility]  DISCHARGE SUMMARY     Bubba Armendariz Patient Status:  Observation    1945 MRN [id number]   [facility] 5NW-A Attending No att. providers found   Hosp Day # 0 PCP Fernanda Talbot MD     Date of Admission: 20 antibiotics, she initially received a dose of prednisone in the emergency department but she did not appear to be in any significant asthma exacerbation and this was not continued. She remained stable and was able to saturate on room air.   She initially h DULoxetine HCl 60 MG Cpep  Commonly known as: CYMBALTA      Take 60 mg by mouth daily. Refills: 0     fluconazole 100 MG Tabs  Commonly known as: DIFLUCAN      Take 1 tablet (100 mg total) by mouth daily.  X 7 days   Quantity: 7 tablet  Refills: 0     F known as: ULTRAM      Take 50 mg by mouth every 6 (six) hours as needed for Pain. Refills: 0     Vitamin D 50 MCG (2000 UT) Tabs      Take 2,000 Units by mouth daily.    Refills: 0           Where to Get Your Medications      These medications were sent t

## 2021-06-04 NOTE — TELEPHONE ENCOUNTER
Returned call. Pt wanted to make sure Dr Piyush Alcantara was aware that she was just discharged from the hospital for pneumonia. Wanted to see if her would change anything in her plan/ schedule of IVIG.    I let her know I would update him, and let her know if h

## 2021-06-07 ENCOUNTER — PATIENT OUTREACH (OUTPATIENT)
Dept: CASE MANAGEMENT | Age: 76
End: 2021-06-07

## 2021-06-07 ENCOUNTER — TELEPHONE (OUTPATIENT)
Dept: INTERNAL MEDICINE CLINIC | Facility: CLINIC | Age: 76
End: 2021-06-07

## 2021-06-07 DIAGNOSIS — Z02.9 ENCOUNTERS FOR UNSPECIFIED ADMINISTRATIVE PURPOSE: ICD-10-CM

## 2021-06-07 PROCEDURE — 1111F DSCHRG MED/CURRENT MED MERGE: CPT

## 2021-06-07 NOTE — PROGRESS NOTES
Initial Post Discharge Follow Up   Discharge Date: 6/4/21  Contact Date: 6/7/2021    Consent Verification:  Assessment Completed With: Patient  HIPAA Verified?   Yes    Discharge Dx:    SANDY CAP  Exertional dyspnea/hypoxia, history of Asthma  History of H condition update. NCM did instruct the patient to contact the PCP office if symptoms worsen. NCM also reviewed signs/symptoms that would require the patient to return to the ED.     • How well was your pain managed while in the hospital?   o On a scale of 1 HCl 60 MG Oral Cap DR Particles Take 60 mg by mouth daily. • Fluticasone Furoate-Vilanterol (BREO ELLIPTA) 200-25 MCG/INH Inhalation Aerosol Powder, Breath Activated Inhale 1 puff into the lungs daily.      • fluconazole 100 MG Oral Tab Take 1 tablet (1 medication? No; NCM did stress the importance of completing the antibiotic medication as prescribed regardless of symptom improvement.    • Did you  your discharge medications when you left the hospital? Yes  • May I go over your medications with you Jul 14, 2021  4:00 PM CDT Video Visit with DO JAMIE [address] ([address] )    Please verify your telehealth insurance benefits prior to your appointment.     You must be in the state of [place] during 1997 [facility]   [address] [address] [phone]          PCP TCM/HFU appointment: scheduled at D/C within 7-14 days  yes;  The patient is carmella 6/18/2021    [x]  Discharge Summary, Discharge medications reviewed/discussed/and reconciled against outpatient medications with patient,  and orders reviewed and discussed. Any changes or updates to medications and or orders sent to PCP.

## 2021-06-07 NOTE — TELEPHONE ENCOUNTER
SD can use same day appts prior to 6/15-ok to book her in 2 same day appts? Please let us know and route back to .

## 2021-06-07 NOTE — TELEPHONE ENCOUNTER
Spoke to the pt today for TCM to follow up on the recent hospitalization for pneumonia. The patient has a TCM-HFU appointment scheduled for 6/15/2021, however, a TCM/HFU appt is recommended by 6/11/2021, as the pt is a high risk for readmission.  RICHY unable

## 2021-06-08 ENCOUNTER — PATIENT OUTREACH (OUTPATIENT)
Dept: CASE MANAGEMENT | Age: 76
End: 2021-06-08

## 2021-06-08 DIAGNOSIS — J45.909 ACUTE ASTHMA: ICD-10-CM

## 2021-06-08 DIAGNOSIS — M17.12 PRIMARY OSTEOARTHRITIS OF LEFT KNEE: ICD-10-CM

## 2021-06-08 DIAGNOSIS — E11.65 UNCONTROLLED TYPE 2 DIABETES MELLITUS WITH HYPERGLYCEMIA (HCC): ICD-10-CM

## 2021-06-08 DIAGNOSIS — D80.3 SELECTIVE DEFICIENCY OF IGG SUBCLASSES (HCC): ICD-10-CM

## 2021-06-08 DIAGNOSIS — F41.9 ANXIETY: ICD-10-CM

## 2021-06-08 DIAGNOSIS — I10 BENIGN ESSENTIAL HTN: ICD-10-CM

## 2021-06-08 DIAGNOSIS — J45.31 MILD PERSISTENT ASTHMA WITH ACUTE EXACERBATION: ICD-10-CM

## 2021-06-08 DIAGNOSIS — I70.0 ATHEROSCLEROSIS OF AORTA (HCC): ICD-10-CM

## 2021-06-08 DIAGNOSIS — M85.859 OSTEOPENIA OF THIGH, UNSPECIFIED LATERALITY: ICD-10-CM

## 2021-06-08 DIAGNOSIS — Z99.89 OSA ON CPAP: ICD-10-CM

## 2021-06-08 DIAGNOSIS — Z85.828 HISTORY OF BASAL CELL CARCINOMA: ICD-10-CM

## 2021-06-08 DIAGNOSIS — F32.A MILD DEPRESSION: ICD-10-CM

## 2021-06-08 DIAGNOSIS — Z85.3 HISTORY OF LEFT BREAST CANCER: ICD-10-CM

## 2021-06-08 DIAGNOSIS — G56.02 CARPAL TUNNEL SYNDROME OF LEFT WRIST: ICD-10-CM

## 2021-06-08 DIAGNOSIS — G47.33 OSA ON CPAP: ICD-10-CM

## 2021-06-08 DIAGNOSIS — R29.6 FALLS FREQUENTLY: ICD-10-CM

## 2021-06-08 DIAGNOSIS — E78.00 PURE HYPERCHOLESTEROLEMIA: ICD-10-CM

## 2021-06-08 DIAGNOSIS — Z86.711 HISTORY OF PULMONARY EMBOLISM: ICD-10-CM

## 2021-06-08 DIAGNOSIS — E53.8 B12 DEFICIENCY: ICD-10-CM

## 2021-06-08 DIAGNOSIS — K21.9 GASTROESOPHAGEAL REFLUX DISEASE WITHOUT ESOPHAGITIS: ICD-10-CM

## 2021-06-08 NOTE — PROGRESS NOTES
6/8/2021  Spoke to Keily for CCM. Updates to patient care team/comments: UTD. Patient reported changes in medications: together we reviewed with no changes. Med Adherence  Comment: pt reports taking all medications as prescribed.        [facility] [place] October 06, 2021 12:30 PM  Exam - Established with Leonidas Severance, [address] ([address]) [address] [facility],[address], [address] [facility] [place] [address] [phone]      Thursday November 04, 2021 10:00 AM  Follow Up Visit with Ryley Bello and concerns.                   - Plan for overcoming all barriers: N/A             Patient agrees to goal action plan.   Self-Management Abilities (patient reported)             1= least confident in achieving goal, 5= very confident               - confid

## 2021-06-11 ENCOUNTER — OFFICE VISIT (OUTPATIENT)
Dept: HEMATOLOGY/ONCOLOGY | Facility: HOSPITAL | Age: 76
End: 2021-06-11
Attending: INTERNAL MEDICINE
Payer: MEDICARE

## 2021-06-11 VITALS
TEMPERATURE: 97 F | BODY MASS INDEX: 30.52 KG/M2 | OXYGEN SATURATION: 97 % | SYSTOLIC BLOOD PRESSURE: 158 MMHG | WEIGHT: 168 LBS | HEART RATE: 102 BPM | RESPIRATION RATE: 18 BRPM | HEIGHT: 62.01 IN | DIASTOLIC BLOOD PRESSURE: 88 MMHG

## 2021-06-11 DIAGNOSIS — D80.3 SELECTIVE DEFICIENCY OF IGG SUBCLASSES (HCC): Primary | ICD-10-CM

## 2021-06-11 PROCEDURE — 96375 TX/PRO/DX INJ NEW DRUG ADDON: CPT

## 2021-06-11 PROCEDURE — 96365 THER/PROPH/DIAG IV INF INIT: CPT

## 2021-06-11 PROCEDURE — 96366 THER/PROPH/DIAG IV INF ADDON: CPT

## 2021-06-11 RX ORDER — DIPHENHYDRAMINE HCL 25 MG
50 CAPSULE ORAL ONCE
Status: COMPLETED | OUTPATIENT
Start: 2021-06-11 | End: 2021-06-11

## 2021-06-11 RX ORDER — ACETAMINOPHEN 325 MG/1
650 TABLET ORAL ONCE
Status: CANCELLED | OUTPATIENT
Start: 2021-07-01

## 2021-06-11 RX ORDER — DIPHENHYDRAMINE HCL 25 MG
50 CAPSULE ORAL ONCE
Status: CANCELLED | OUTPATIENT
Start: 2021-07-01

## 2021-06-11 RX ORDER — ACETAMINOPHEN 325 MG/1
650 TABLET ORAL ONCE
Status: COMPLETED | OUTPATIENT
Start: 2021-06-11 | End: 2021-06-11

## 2021-06-11 RX ORDER — SODIUM CHLORIDE 0.9 % (FLUSH) 0.9 %
10 SYRINGE (ML) INJECTION ONCE
Status: CANCELLED | OUTPATIENT
Start: 2021-07-01

## 2021-06-11 RX ADMIN — DIPHENHYDRAMINE HCL 50 MG: 25 MG CAPSULE ORAL at 09:26:00

## 2021-06-11 RX ADMIN — ACETAMINOPHEN 650 MG: 325 TABLET ORAL at 09:26:00

## 2021-06-11 NOTE — PROGRESS NOTES
Education Record    Learner:  Patient    Disease / Diagnosis:  IgG deficiency - IVIG infusion    Barriers / Limitations:  None   Comments:    Method:  Brief focused and Reinforcement   Comments:    General Topics:  Plan of care reviewed   Comments:    Jo Solis

## 2021-06-14 DIAGNOSIS — E11.65 UNCONTROLLED TYPE 2 DIABETES MELLITUS WITH HYPERGLYCEMIA (HCC): ICD-10-CM

## 2021-06-14 RX ORDER — METFORMIN HYDROCHLORIDE 500 MG/1
TABLET, EXTENDED RELEASE ORAL
Qty: 180 TABLET | Refills: 0 | Status: SHIPPED | OUTPATIENT
Start: 2021-06-14 | End: 2021-09-01

## 2021-06-14 NOTE — TELEPHONE ENCOUNTER
Last OV: 2/4/21 annual PE    Future Appointments   Date Time Provider Gomez Miller   6/15/2021 11:20 AM JUAREZ Robison [facility] [address] [facility]   7/9/2021  9:00 AM [facility] [place] RN6 [address]   7/9/2021  9:15 AM Matilde Gill MD [facility] HEM ONC Ed

## 2021-06-15 ENCOUNTER — OFFICE VISIT (OUTPATIENT)
Dept: INTERNAL MEDICINE CLINIC | Facility: CLINIC | Age: 76
End: 2021-06-15
Payer: MEDICARE

## 2021-06-15 ENCOUNTER — TELEPHONE (OUTPATIENT)
Dept: INTERNAL MEDICINE CLINIC | Facility: CLINIC | Age: 76
End: 2021-06-15

## 2021-06-15 VITALS
WEIGHT: 170 LBS | TEMPERATURE: 98 F | HEART RATE: 100 BPM | OXYGEN SATURATION: 97 % | BODY MASS INDEX: 32 KG/M2 | SYSTOLIC BLOOD PRESSURE: 140 MMHG | DIASTOLIC BLOOD PRESSURE: 84 MMHG

## 2021-06-15 DIAGNOSIS — J18.9 PNEUMONIA OF RIGHT UPPER LOBE DUE TO INFECTIOUS ORGANISM: Primary | ICD-10-CM

## 2021-06-15 DIAGNOSIS — Z99.89 OSA ON CPAP: ICD-10-CM

## 2021-06-15 DIAGNOSIS — E78.00 PURE HYPERCHOLESTEROLEMIA: ICD-10-CM

## 2021-06-15 DIAGNOSIS — M79.671 RIGHT FOOT PAIN: ICD-10-CM

## 2021-06-15 DIAGNOSIS — F41.9 ANXIETY: ICD-10-CM

## 2021-06-15 DIAGNOSIS — G47.33 OSA ON CPAP: ICD-10-CM

## 2021-06-15 DIAGNOSIS — E11.65 UNCONTROLLED TYPE 2 DIABETES MELLITUS WITH HYPERGLYCEMIA (HCC): ICD-10-CM

## 2021-06-15 DIAGNOSIS — J45.21 MILD INTERMITTENT ASTHMA WITH EXACERBATION: ICD-10-CM

## 2021-06-15 DIAGNOSIS — I10 BENIGN ESSENTIAL HTN: ICD-10-CM

## 2021-06-15 PROCEDURE — 99496 TRANSJ CARE MGMT HIGH F2F 7D: CPT | Performed by: NURSE PRACTITIONER

## 2021-06-15 PROCEDURE — 1111F DSCHRG MED/CURRENT MED MERGE: CPT | Performed by: NURSE PRACTITIONER

## 2021-06-15 RX ORDER — SEMAGLUTIDE 1.34 MG/ML
0.5 INJECTION, SOLUTION SUBCUTANEOUS
Qty: 3 EACH | Refills: 1 | Status: SHIPPED | OUTPATIENT
Start: 2021-06-15 | End: 2021-07-13

## 2021-06-15 RX ORDER — PEN NEEDLE, DIABETIC 30 GX3/16"
1 NEEDLE, DISPOSABLE MISCELLANEOUS
Qty: 30 EACH | Refills: 0 | Status: SHIPPED | OUTPATIENT
Start: 2021-06-15

## 2021-06-15 NOTE — TELEPHONE ENCOUNTER
quantity states 3 each, Gavin Morinting wants to know if it's 3ml or 3 pens. Please advise to clarify.      Semaglutide,0.25 or 0.5MG/DOS, (OZEMPIC, 0.25 OR 0.5 MG/DOSE,) 2 MG/1.5ML Subcutaneous Solution Pen-injector 3 each 1 6/15/2021 7/13/2021   Sig:   Inject 0

## 2021-06-15 NOTE — PROGRESS NOTES
HPI:    Ashlee Mendoza is a 77year old female here today for hospital follow up.    She was discharged from Inpatient hospital, [facility] to Home   Admission Date: 6/2/21   Discharge Date: 6/4/21  Hospital Discharge Diagnoses (since 5/16/2021) ozempic. She agrees to try ozempic. a1c 3 months. Anxiety/ depression. Has upcoming app[facility] with Dr [facility] Rushing[facility] JUAN [facility]. Right foot pain with swelling. Intermittent but now more persistent.   Other providers suspected gout but has be Take 1 tablet by mouth daily. HYDROcodone-acetaminophen  MG Oral Tab, Take 1 tablet by mouth every 4 to 6 hours as needed. GNP GLUCOSAMINE-CHONDROITIN OR, Take 2 capsules by mouth daily.     Fluticasone Propionate 50 MCG/ACT Nasal Suspension, 1 and knee replacement surgery.     She family history includes Breast Cancer (age of onset: 48) in her self; CAD in her brother, father, and mother; Heart Attack in her paternal grandfather and paternal grandmother; Stroke in her maternal grandfather and mat COMPLETE (MIN 3 VIEWS), RIGHT (CPT=73630); Future  -     PODIATRY - INTERNAL    Uncontrolled type 2 diabetes mellitus with hyperglycemia (HCC) cont metformin  Add ozempic. Check glucoses at home.   See me for followup  Wants to have TKR  -     COMP METABOL

## 2021-06-16 NOTE — TELEPHONE ENCOUNTER
Outpatient Physical Therapy Ortho Treatment Note   [place]     Patient Name: Desire Hernandez  : 1966  MRN: [id number]  Today's Date: 2019      Visit Date: 2019    Visit Dx:    ICD-10-CM ICD-9-CM   1. Adhesive capsulitis of right shoulder M75.01 726.0       Patient Active Problem List   Diagnosis   • Menopausal symptoms   • Left breast mass   • Menometrorrhagia   • Adhesive capsulitis of right shoulder        Past Medical History:   Diagnosis Date   • ADHD (attention deficit hyperactivity disorder)     back in high school   • Allergic     seasonal   • ASCUS favor dysplasia     07; 08; 6/25/10; 12; 13; 8/21/15 (HPV negative)   • Blood type, Rh negative    • Bunion     surgery   • Chlamydia     as a teen   • H/O nipple discharge     Bilateral diagnostic mammogram negative   • HSV-1 (herpes simplex virus 1) infection    • HSV-2 (herpes simplex virus 2) infection    • LGSIL (low grade squamous intraepithelial dysplasia) 2006   • Ovarian cyst     Left   • PONV (postoperative nausea and vomiting)         Past Surgical History:   Procedure Laterality Date   • BUNIONECTOMY Right 2016    Dr. Durbin   • CERVICAL CONIZATION, LEEP     • COLPOSCOPY W/ BIOPSY / CURETTAGE  2006    benign   • D&C WITH SUCTION  1998    10 week SAB   • ENDOMETRIAL ABLATION  Dec. 2017   • INDUCED       years ago   • ND HYSTEROSCOPY,W/ENDOMETRIAL ABLATION N/A 2017    Procedure: HYSTEROSCOPY WITH ENDOMETRIAL ABLATION; d&c;  Surgeon: Sandie Hu DO;  Location: [facility];  Service: Obstetrics/Gynecology                       PT Assessment/Plan     Row Name 19 1230          PT Assessment    Assessment Comments  Pt does seem to have some increase in her right shoulder range with her stretches.  -GC        PT Plan    PT Plan Comments  P tis to continue her HEP 2x daily.  -       User Key  (r) = Recorded By, (t) = Taken By, (c) = Cosigned By    Initials  Called and clarified.  Closing TE Name Provider Type    GC Deny High, PT Physical Therapist          Modalities     Row Name 09/09/19 1230             Subjective Comments    Subjective Comments  Pt states her shoulder is still not moving well.  -GC         Moist Heat    MH Applied  Yes  -GC      Location  right hsoulder with pt supine and roll under knees  -GC      Rx Minutes  10 mins  -GC      MH Prior to Rx  Yes  -GC        User Key  (r) = Recorded By, (t) = Taken By, (c) = Cosigned By    Initials Name Provider Type    GC Deny High, PT Physical Therapist        OP Exercises     Row Name 09/09/19 1230             Subjective Comments    Subjective Comments  Pt states her shoulder is still not moving well.  -GC         Exercise 1    Exercise Name 1  Cane stretch in standing for FLEX-ABD-ER  -GC      Cueing 1  Verbal;Demo  -GC      Reps 1  15x each  -GC      Time 1  5 secs   -GC         Exercise 2    Exercise Name 2  IR stretch behind back with towel  -GC      Cueing 2  Verbal;Demo  -GC      Reps 2  15  -GC      Time 2  5 secs  -GC         Exercise 3    Exercise Name 3  Pulley-FLEX  -GC      Cueing 3  Verbal  -GC      Time 3  5 min  -GC         Exercise 4    Exercise Name 4  Pulley-Scaption  -GC      Cueing 4  Verbal  -GC      Time 4  5 min  -GC        User Key  (r) = Recorded By, (t) = Taken By, (c) = Cosigned By    Initials Name Provider Type    GC Deny High, PT Physical Therapist                      Manual Rx (last 36 hours)      Manual Treatments     Row Name 09/09/19 1230             Manual Rx 1    Manual Rx 1 Location  right shoulder  -GC      Manual Rx 1 Type  GH joint mobilizations  -GC      Manual Rx 1 Grade  grade II  -GC      Manual Rx 1 Duration  5 min  -GC         Manual Rx 2    Manual Rx 2 Location  right shoulder  -GC      Manual Rx 2 Type  PROM in FLEX-ABD-ER-IR  -GC      Manual Rx 2 Duration  15 min  -GC        User Key  (r) = Recorded By, (t) = Taken By, (c) = Cosigned By    Initials Name Provider Type    GC Lennox  Deny PT Physical Therapist                             Time Calculation:   Start Time: 1230  Stop Time: 1317  Time Calculation (min): 47 min  Therapy Charges for Today     Code Description Service Date Service Provider Modifiers Qty    [id number]  PT MANUAL THERAPY EA 15 MIN 9/9/2019 Deny High, PT GP 1    [id number]  PT THER PROC EA 15 MIN 9/9/2019 Deny High, PT GP 1                    Deny High PT  9/9/2019

## 2021-06-18 ENCOUNTER — HOSPITAL ENCOUNTER (OUTPATIENT)
Dept: GENERAL RADIOLOGY | Age: 76
Discharge: HOME | End: 2021-06-18
Attending: NURSE PRACTITIONER
Payer: MEDICARE

## 2021-06-18 DIAGNOSIS — M79.671 RIGHT FOOT PAIN: ICD-10-CM

## 2021-06-18 PROCEDURE — 73630 X-RAY EXAM OF FOOT: CPT | Performed by: NURSE PRACTITIONER

## 2021-06-30 ENCOUNTER — TELEPHONE (OUTPATIENT)
Dept: PODIATRY CLINIC | Facility: CLINIC | Age: 76
End: 2021-06-30

## 2021-06-30 ENCOUNTER — OFFICE VISIT (OUTPATIENT)
Dept: PODIATRY CLINIC | Facility: CLINIC | Age: 76
End: 2021-06-30
Payer: MEDICARE

## 2021-06-30 DIAGNOSIS — M79.671 RIGHT FOOT PAIN: Primary | ICD-10-CM

## 2021-06-30 DIAGNOSIS — L03.115 CELLULITIS OF RIGHT FOOT: ICD-10-CM

## 2021-06-30 DIAGNOSIS — E11.65 UNCONTROLLED TYPE 2 DIABETES MELLITUS WITH HYPERGLYCEMIA (HCC): ICD-10-CM

## 2021-06-30 DIAGNOSIS — M84.374A STRESS FRACTURE, RIGHT FOOT, INITIAL ENCOUNTER FOR FRACTURE: ICD-10-CM

## 2021-06-30 DIAGNOSIS — M10.9 GOUT OF RIGHT FOOT, UNSPECIFIED CAUSE, UNSPECIFIED CHRONICITY: ICD-10-CM

## 2021-06-30 DIAGNOSIS — M10.371 GOUT DUE TO RENAL IMPAIRMENT INVOLVING TOE OF RIGHT FOOT, UNSPECIFIED CHRONICITY: ICD-10-CM

## 2021-06-30 PROCEDURE — 1111F DSCHRG MED/CURRENT MED MERGE: CPT

## 2021-06-30 PROCEDURE — 99203 OFFICE O/P NEW LOW 30 MIN: CPT | Performed by: PODIATRIST

## 2021-06-30 PROCEDURE — 99490 CHRNC CARE MGMT STAFF 1ST 20: CPT

## 2021-06-30 RX ORDER — AZITHROMYCIN 250 MG/1
TABLET, FILM COATED ORAL
Qty: 6 TABLET | Refills: 0 | Status: SHIPPED | OUTPATIENT
Start: 2021-06-30 | End: 2021-07-09 | Stop reason: SINTOL

## 2021-06-30 RX ORDER — DOXYCYCLINE HYCLATE 100 MG/1
100 CAPSULE ORAL 2 TIMES DAILY
Qty: 20 CAPSULE | Refills: 0 | Status: SHIPPED | OUTPATIENT
Start: 2021-06-30 | End: 2021-07-09 | Stop reason: SINTOL

## 2021-06-30 RX ORDER — AMOXICILLIN AND CLAVULANATE POTASSIUM 500; 125 MG/1; MG/1
1 TABLET, FILM COATED ORAL 2 TIMES DAILY
Qty: 20 TABLET | Refills: 0 | Status: SHIPPED | OUTPATIENT
Start: 2021-06-30 | End: 2021-06-30

## 2021-06-30 NOTE — TELEPHONE ENCOUNTER
pharmacy called said patient is allergic to Augmentin and would like something else prescribed please advise

## 2021-06-30 NOTE — TELEPHONE ENCOUNTER
Spoke to pharmacist again this afternoon. Pharmacist stated that the Z-Anuel could potentially react with the Xarelto. Discontinued Z-Anuel.   Called in to pharmacist I am speaking with a prescription for doxycycline 100 mg instructions take 1 cap twice daily

## 2021-06-30 NOTE — TELEPHONE ENCOUNTER
NO PA needed for Medicare/supp plan.  Please let pt know to call Central scheduling to make an appt for the MRI

## 2021-06-30 NOTE — TELEPHONE ENCOUNTER
Augmentin initially for cellulitis of her right foot as patient stated that she has taken penicillin before and tolerated well. Pharmacy called stating patient is allergic to Augmentin.   Called pharmacist, discontinued Augmentin, and changed patient's pre

## 2021-06-30 NOTE — TELEPHONE ENCOUNTER
Called and spoke with the pharmacy. They state they have already spoken with Dr. Ruth Young who changed the prescription to doxycycline.

## 2021-07-02 ENCOUNTER — HOSPITAL ENCOUNTER (OUTPATIENT)
Dept: GENERAL RADIOLOGY | Age: 76
Discharge: HOME | End: 2021-07-02
Attending: INTERNAL MEDICINE
Payer: MEDICARE

## 2021-07-02 ENCOUNTER — LABORATORY ENCOUNTER (OUTPATIENT)
Dept: LAB | Age: 76
End: 2021-07-02
Attending: PODIATRIST
Payer: MEDICARE

## 2021-07-02 DIAGNOSIS — M10.9 GOUT OF RIGHT FOOT, UNSPECIFIED CAUSE, UNSPECIFIED CHRONICITY: ICD-10-CM

## 2021-07-02 DIAGNOSIS — J18.9 PNEUMONIA OF RIGHT UPPER LOBE DUE TO INFECTIOUS ORGANISM: ICD-10-CM

## 2021-07-02 DIAGNOSIS — L03.115 CELLULITIS OF RIGHT FOOT: ICD-10-CM

## 2021-07-02 DIAGNOSIS — M79.671 RIGHT FOOT PAIN: ICD-10-CM

## 2021-07-02 DIAGNOSIS — Z86.39 HISTORY OF IRON DEFICIENCY: ICD-10-CM

## 2021-07-02 LAB
BASOPHILS # BLD AUTO: 0.04 X10(3) UL (ref 0–0.2)
BASOPHILS NFR BLD AUTO: 0.8 %
CRP SERPL-MCNC: <0.29 MG/DL (ref ?–0.3)
DEPRECATED HBV CORE AB SER IA-ACNC: 41.2 NG/ML
DEPRECATED RDW RBC AUTO: 46.5 FL (ref 35.1–46.3)
EOSINOPHIL # BLD AUTO: 0.12 X10(3) UL (ref 0–0.7)
EOSINOPHIL NFR BLD AUTO: 2.5 %
ERYTHROCYTE [DISTWIDTH] IN BLOOD BY AUTOMATED COUNT: 13.6 % (ref 11–15)
HCT VFR BLD AUTO: 36.3 %
HGB BLD-MCNC: 11.3 G/DL
IMM GRANULOCYTES # BLD AUTO: 0.01 X10(3) UL (ref 0–1)
IMM GRANULOCYTES NFR BLD: 0.2 %
IRON SATURATION: 18 %
IRON SERPL-MCNC: 63 UG/DL
LYMPHOCYTES # BLD AUTO: 2.18 X10(3) UL (ref 1–4)
LYMPHOCYTES NFR BLD AUTO: 44.6 %
MCH RBC QN AUTO: 28.8 PG (ref 26–34)
MCHC RBC AUTO-ENTMCNC: 31.1 G/DL (ref 31–37)
MCV RBC AUTO: 92.4 FL
MONOCYTES # BLD AUTO: 0.54 X10(3) UL (ref 0.1–1)
MONOCYTES NFR BLD AUTO: 11 %
NEUTROPHILS # BLD AUTO: 2 X10 (3) UL (ref 1.5–7.7)
NEUTROPHILS # BLD AUTO: 2 X10(3) UL (ref 1.5–7.7)
NEUTROPHILS NFR BLD AUTO: 40.9 %
PLATELET # BLD AUTO: 177 10(3)UL (ref 150–450)
RBC # BLD AUTO: 3.93 X10(6)UL
SED RATE-ML: 15 MM/HR
TOTAL IRON BINDING CAPACITY: 353 UG/DL (ref 240–450)
TRANSFERRIN SERPL-MCNC: 237 MG/DL (ref 200–360)
URATE SERPL-MCNC: 5.6 MG/DL
WBC # BLD AUTO: 4.9 X10(3) UL (ref 4–11)

## 2021-07-02 PROCEDURE — 86140 C-REACTIVE PROTEIN: CPT

## 2021-07-02 PROCEDURE — 83550 IRON BINDING TEST: CPT

## 2021-07-02 PROCEDURE — 85652 RBC SED RATE AUTOMATED: CPT

## 2021-07-02 PROCEDURE — 83540 ASSAY OF IRON: CPT

## 2021-07-02 PROCEDURE — 71046 X-RAY EXAM CHEST 2 VIEWS: CPT | Performed by: INTERNAL MEDICINE

## 2021-07-02 PROCEDURE — 85025 COMPLETE CBC W/AUTO DIFF WBC: CPT

## 2021-07-02 PROCEDURE — 84550 ASSAY OF BLOOD/URIC ACID: CPT

## 2021-07-02 PROCEDURE — 36415 COLL VENOUS BLD VENIPUNCTURE: CPT

## 2021-07-02 PROCEDURE — 82728 ASSAY OF FERRITIN: CPT

## 2021-07-02 RX ORDER — RIVAROXABAN 20 MG/1
TABLET, FILM COATED ORAL
Qty: 90 TABLET | Refills: 0 | Status: SHIPPED | OUTPATIENT
Start: 2021-07-02 | End: 2021-10-12

## 2021-07-02 NOTE — TELEPHONE ENCOUNTER
Last OV: 6/15/21 hosp f/u    Future Appointments   Date Time Provider Gomez Angela   7/9/2021  9:00 AM [address] [facility]   7/9/2021  9:15 AM Rubin Cummins MD [address] [facility] HEM Vida Hernandez   7/13/2021  2:30 PM [facility] MR RM3 (3T WIDE) [facility] MRI Edwa

## 2021-07-09 ENCOUNTER — OFFICE VISIT (OUTPATIENT)
Dept: HEMATOLOGY/ONCOLOGY | Facility: HOSPITAL | Age: 76
End: 2021-07-09
Attending: INTERNAL MEDICINE
Payer: MEDICARE

## 2021-07-09 VITALS
DIASTOLIC BLOOD PRESSURE: 71 MMHG | OXYGEN SATURATION: 96 % | TEMPERATURE: 97 F | HEART RATE: 95 BPM | BODY MASS INDEX: 32 KG/M2 | WEIGHT: 170 LBS | SYSTOLIC BLOOD PRESSURE: 123 MMHG | RESPIRATION RATE: 18 BRPM

## 2021-07-09 DIAGNOSIS — D80.1 HYPOGAMMAGLOBULINEMIA (HCC): ICD-10-CM

## 2021-07-09 DIAGNOSIS — E53.8 B12 DEFICIENCY: ICD-10-CM

## 2021-07-09 DIAGNOSIS — D80.3 SELECTIVE DEFICIENCY OF IGG SUBCLASSES (HCC): Primary | ICD-10-CM

## 2021-07-09 DIAGNOSIS — D50.0 IRON DEFICIENCY ANEMIA DUE TO CHRONIC BLOOD LOSS: ICD-10-CM

## 2021-07-09 DIAGNOSIS — Z86.16 HISTORY OF COVID-19: Primary | ICD-10-CM

## 2021-07-09 PROCEDURE — 96375 TX/PRO/DX INJ NEW DRUG ADDON: CPT

## 2021-07-09 PROCEDURE — 96366 THER/PROPH/DIAG IV INF ADDON: CPT

## 2021-07-09 PROCEDURE — 96365 THER/PROPH/DIAG IV INF INIT: CPT

## 2021-07-09 PROCEDURE — 99214 OFFICE O/P EST MOD 30 MIN: CPT | Performed by: INTERNAL MEDICINE

## 2021-07-09 RX ORDER — DIPHENHYDRAMINE HCL 25 MG
50 CAPSULE ORAL ONCE
Status: CANCELLED | OUTPATIENT
Start: 2021-08-01

## 2021-07-09 RX ORDER — ACETAMINOPHEN 325 MG/1
650 TABLET ORAL ONCE
Status: CANCELLED | OUTPATIENT
Start: 2021-08-01

## 2021-07-09 RX ORDER — SODIUM CHLORIDE 0.9 % (FLUSH) 0.9 %
10 SYRINGE (ML) INJECTION ONCE
Status: CANCELLED | OUTPATIENT
Start: 2021-08-01

## 2021-07-09 RX ORDER — ACETAMINOPHEN 325 MG/1
650 TABLET ORAL ONCE
Status: COMPLETED | OUTPATIENT
Start: 2021-07-09 | End: 2021-07-09

## 2021-07-09 RX ORDER — DIPHENHYDRAMINE HCL 25 MG
50 CAPSULE ORAL ONCE
Status: COMPLETED | OUTPATIENT
Start: 2021-07-09 | End: 2021-07-09

## 2021-07-09 RX ADMIN — DIPHENHYDRAMINE HCL 50 MG: 25 MG CAPSULE ORAL at 09:55:00

## 2021-07-09 RX ADMIN — ACETAMINOPHEN 650 MG: 325 TABLET ORAL at 09:55:00

## 2021-07-09 NOTE — PROGRESS NOTES
Patient is here for MD f/u for Hypogammaglobulinemia. Continues monthly IVIG infusions. Patient c/o pain and swelling on the top of her right foot. Possible gout or stress fracture. MRI of the foot ordered by Dr Arvin Boswell and is scheduled next week.  Patient repo

## 2021-07-09 NOTE — PROGRESS NOTES
Education Record    Learner:  Patient    Disease / Diagnosis:   IVIG- hypogammaglobulinemia    Barriers / Limitations:  None   Comments:    Method:  Discussion   Comments:    General Topics:  Side effects and symptom management and Plan of care reviewed

## 2021-07-10 NOTE — PROGRESS NOTES
[facility]    PATIENT'S NAME: Lisa Gonsalves   ATTENDING PHYSICIAN: Chey Gamboa M.D.    PATIENT ACCOUNT #: [de-identified] LOCATION: [address] [place] [address] RECORD #: [id number] YOB: 1945   DATE OF SERVICE: 07/09/2021       CANCER duloxetine 60 mg daily, Breo Ellipta 1 puff daily, fluticasone propionate nasal spray 1 spray per nose twice daily, glucosamine/chondroitin sulfate daily, hydrocodone/acetaminophen 10/325 one tablet q.6 h. p.r.n., lisinopril 20 mg daily, metformin 1000 mg year.    Dictated By Dana Morrison M.D.  d: 07/09/2021 16:18:42  t: 07/10/2021 02:26:40  Carine Hackett [id number]  AT/    cc: Mila Palmer M.D.

## 2021-07-12 RX ORDER — ROSUVASTATIN CALCIUM 10 MG/1
10 TABLET, COATED ORAL NIGHTLY
Qty: 90 TABLET | Refills: 0 | Status: SHIPPED | OUTPATIENT
Start: 2021-07-12 | End: 2021-10-08

## 2021-07-12 RX ORDER — BUPROPION HYDROCHLORIDE 150 MG/1
150 TABLET ORAL DAILY
Qty: 90 TABLET | Refills: 0 | Status: SHIPPED | OUTPATIENT
Start: 2021-07-12 | End: 2021-07-14

## 2021-07-12 RX ORDER — PANTOPRAZOLE SODIUM 40 MG/1
TABLET, DELAYED RELEASE ORAL
Qty: 90 TABLET | Refills: 0 | Status: SHIPPED | OUTPATIENT
Start: 2021-07-12 | End: 2021-10-08

## 2021-07-12 NOTE — TELEPHONE ENCOUNTER
Last OV: 6/15/21 hosp f/u    Future Appointments   Date Time Provider Gomez Miller   7/13/2021  2:30 PM [facility] MR RM3 (3T WIDE) [facility] MRI Isidra Analia   7/14/2021  4:00 PM Kari grigsby [facility]   7/23/2021  8:45 AM PELON GilmanN

## 2021-07-12 NOTE — PROGRESS NOTES
Ashlee Mendoza is a 77year old female. Patient presents with: Foot Pain: Right foot pain, for the past 2 weeks. Patient sates that there is redness. Patient had xrays done on 6/18/21. Pain gets to 6/10 with patient taking pain meds for arthritis. daily.     • tiZANidine HCl 4 MG Oral Tab Take 4 mg by mouth daily as needed. • Pantoprazole Sodium 40 MG Oral Tab EC Take 40 mg by mouth every morning before breakfast.     • DULoxetine HCl 60 MG Oral Cap DR Particles Take 60 mg by mouth daily.      • cholesterol    • History of blood clots    • IgG deficiency (HCC)     ? ?? pt states she gets IgG infusions due to lack of IgG, unable to fight off infections   • Migraines    • Muscle weakness    • Neuropathy     bilateral feet   • Osteoarthritis    • PE ( 0.00        Quit date: 26        Years since quittin.5      Smokeless tobacco: Never Used    Vaping Use      Vaping Use: Never used    Substance and Sexual Activity      Alcohol use: No        Alcohol/week: 0.0 standard drinks      Drug use:  Yes times daily.  -     SED RATE, WESTERGREN (AUTOMATED); Future  -     C-REACTIVE PROTEIN; Future  -     CBC WITH DIFFERENTIAL WITH PLATELET; Future    Cellulitis of right foot  Comments:  forefoot  Orders:  -     MRI FOOT (W+WO), RIGHT (CPT=48629);  Future  - patient indicates understanding of these issues and agrees to the plan. Return in about 2 weeks (around 7/14/2021). Will call her with the results of the MRI and laboratory studies. Wale Tsai will call the office and return sooner if required.     Lord Rosales

## 2021-07-13 ENCOUNTER — TELEPHONE (OUTPATIENT)
Dept: PODIATRY CLINIC | Facility: CLINIC | Age: 76
End: 2021-07-13

## 2021-07-13 ENCOUNTER — HOSPITAL ENCOUNTER (OUTPATIENT)
Dept: MRI IMAGING | Facility: HOSPITAL | Age: 76
Discharge: HOME | End: 2021-07-13
Attending: PODIATRIST
Payer: MEDICARE

## 2021-07-13 DIAGNOSIS — M84.374A STRESS FRACTURE, RIGHT FOOT, INITIAL ENCOUNTER FOR FRACTURE: ICD-10-CM

## 2021-07-13 DIAGNOSIS — M79.671 RIGHT FOOT PAIN: ICD-10-CM

## 2021-07-13 DIAGNOSIS — M10.9 GOUT OF RIGHT FOOT, UNSPECIFIED CAUSE, UNSPECIFIED CHRONICITY: ICD-10-CM

## 2021-07-13 DIAGNOSIS — L03.115 CELLULITIS OF RIGHT FOOT: ICD-10-CM

## 2021-07-13 PROCEDURE — 73718 MRI LOWER EXTREMITY W/O DYE: CPT | Performed by: PODIATRIST

## 2021-07-13 NOTE — TELEPHONE ENCOUNTER
Called patient and informed her of the results of her MRI studies as well as of her laboratory studies.   Patient was instructed to take it easy, refrain from any heavy activity, and to wear a good supportive stiff soled shoe until she can come in for immob

## 2021-07-14 ENCOUNTER — PATIENT OUTREACH (OUTPATIENT)
Dept: CASE MANAGEMENT | Age: 76
End: 2021-07-14

## 2021-07-14 PROBLEM — F33.1 MAJOR DEPRESSIVE DISORDER, RECURRENT EPISODE, MODERATE (HCC): Chronic | Status: ACTIVE | Noted: 2021-07-14

## 2021-07-14 PROBLEM — F32.A MILD DEPRESSION: Status: RESOLVED | Noted: 2019-11-12 | Resolved: 2021-07-14

## 2021-07-14 PROBLEM — F33.1 MAJOR DEPRESSIVE DISORDER, RECURRENT EPISODE, MODERATE (HCC): Status: ACTIVE | Noted: 2021-07-14

## 2021-07-16 ENCOUNTER — OFFICE VISIT (OUTPATIENT)
Dept: PODIATRY CLINIC | Facility: CLINIC | Age: 76
End: 2021-07-16
Payer: MEDICARE

## 2021-07-16 DIAGNOSIS — M84.374A STRESS FRACTURE OF RIGHT FOOT, INITIAL ENCOUNTER: ICD-10-CM

## 2021-07-16 DIAGNOSIS — M79.671 RIGHT FOOT PAIN: ICD-10-CM

## 2021-07-16 DIAGNOSIS — E11.65 UNCONTROLLED TYPE 2 DIABETES MELLITUS WITH HYPERGLYCEMIA (HCC): Primary | ICD-10-CM

## 2021-07-16 PROCEDURE — 99213 OFFICE O/P EST LOW 20 MIN: CPT | Performed by: PODIATRIST

## 2021-08-06 ENCOUNTER — OFFICE VISIT (OUTPATIENT)
Dept: HEMATOLOGY/ONCOLOGY | Facility: HOSPITAL | Age: 76
End: 2021-08-06
Attending: INTERNAL MEDICINE
Payer: MEDICARE

## 2021-08-06 VITALS
HEART RATE: 75 BPM | RESPIRATION RATE: 16 BRPM | SYSTOLIC BLOOD PRESSURE: 116 MMHG | TEMPERATURE: 97 F | BODY MASS INDEX: 30.58 KG/M2 | OXYGEN SATURATION: 96 % | HEIGHT: 60.98 IN | DIASTOLIC BLOOD PRESSURE: 73 MMHG | WEIGHT: 162 LBS

## 2021-08-06 DIAGNOSIS — D80.3 SELECTIVE DEFICIENCY OF IGG SUBCLASSES (HCC): Primary | ICD-10-CM

## 2021-08-06 DIAGNOSIS — Z86.16 HISTORY OF COVID-19: ICD-10-CM

## 2021-08-06 DIAGNOSIS — D50.0 IRON DEFICIENCY ANEMIA DUE TO CHRONIC BLOOD LOSS: ICD-10-CM

## 2021-08-06 DIAGNOSIS — E53.8 B12 DEFICIENCY: ICD-10-CM

## 2021-08-06 LAB
BASOPHILS # BLD AUTO: 0.04 X10(3) UL (ref 0–0.2)
BASOPHILS NFR BLD AUTO: 0.7 %
DEPRECATED HBV CORE AB SER IA-ACNC: 29.1 NG/ML
EOSINOPHIL # BLD AUTO: 0.2 X10(3) UL (ref 0–0.7)
EOSINOPHIL NFR BLD AUTO: 3.6 %
ERYTHROCYTE [DISTWIDTH] IN BLOOD BY AUTOMATED COUNT: 13.7 %
HCT VFR BLD AUTO: 34.9 %
HGB BLD-MCNC: 11.4 G/DL
IMM GRANULOCYTES # BLD AUTO: 0.01 X10(3) UL (ref 0–1)
IMM GRANULOCYTES NFR BLD: 0.2 %
IRON SATURATION: 11 %
IRON SERPL-MCNC: 37 UG/DL
LYMPHOCYTES # BLD AUTO: 1.65 X10(3) UL (ref 1–4)
LYMPHOCYTES NFR BLD AUTO: 29.7 %
MCH RBC QN AUTO: 29.1 PG (ref 26–34)
MCHC RBC AUTO-ENTMCNC: 32.7 G/DL (ref 31–37)
MCV RBC AUTO: 89 FL
MONOCYTES # BLD AUTO: 0.54 X10(3) UL (ref 0.1–1)
MONOCYTES NFR BLD AUTO: 9.7 %
NEUTROPHILS # BLD AUTO: 3.11 X10 (3) UL (ref 1.5–7.7)
NEUTROPHILS # BLD AUTO: 3.11 X10(3) UL (ref 1.5–7.7)
NEUTROPHILS NFR BLD AUTO: 56.1 %
PLATELET # BLD AUTO: 167 10(3)UL (ref 150–450)
RBC # BLD AUTO: 3.92 X10(6)UL
SARS-COV-2 IGG+IGM SERPL QL IA: REACTIVE
TOTAL IRON BINDING CAPACITY: 328 UG/DL (ref 240–450)
TRANSFERRIN SERPL-MCNC: 220 MG/DL (ref 200–360)
VIT B12 SERPL-MCNC: 366 PG/ML (ref 193–986)
WBC # BLD AUTO: 5.6 X10(3) UL (ref 4–11)

## 2021-08-06 PROCEDURE — 96365 THER/PROPH/DIAG IV INF INIT: CPT

## 2021-08-06 PROCEDURE — 96366 THER/PROPH/DIAG IV INF ADDON: CPT

## 2021-08-06 PROCEDURE — 86769 SARS-COV-2 COVID-19 ANTIBODY: CPT

## 2021-08-06 PROCEDURE — 82728 ASSAY OF FERRITIN: CPT

## 2021-08-06 PROCEDURE — 96375 TX/PRO/DX INJ NEW DRUG ADDON: CPT

## 2021-08-06 PROCEDURE — 85025 COMPLETE CBC W/AUTO DIFF WBC: CPT

## 2021-08-06 PROCEDURE — 83540 ASSAY OF IRON: CPT

## 2021-08-06 PROCEDURE — 83550 IRON BINDING TEST: CPT

## 2021-08-06 PROCEDURE — 82607 VITAMIN B-12: CPT

## 2021-08-06 RX ORDER — ACETAMINOPHEN 325 MG/1
650 TABLET ORAL ONCE
Status: COMPLETED | OUTPATIENT
Start: 2021-08-06 | End: 2021-08-06

## 2021-08-06 RX ORDER — SODIUM CHLORIDE 0.9 % (FLUSH) 0.9 %
10 SYRINGE (ML) INJECTION ONCE
Status: CANCELLED | OUTPATIENT
Start: 2021-09-01

## 2021-08-06 RX ORDER — DIPHENHYDRAMINE HCL 25 MG
50 CAPSULE ORAL ONCE
Status: COMPLETED | OUTPATIENT
Start: 2021-08-06 | End: 2021-08-06

## 2021-08-06 RX ORDER — DIPHENHYDRAMINE HCL 25 MG
50 CAPSULE ORAL ONCE
Status: CANCELLED | OUTPATIENT
Start: 2021-09-01

## 2021-08-06 RX ORDER — ACETAMINOPHEN 325 MG/1
650 TABLET ORAL ONCE
Status: CANCELLED | OUTPATIENT
Start: 2021-09-01

## 2021-08-06 RX ADMIN — DIPHENHYDRAMINE HCL 50 MG: 25 MG CAPSULE ORAL at 09:35:00

## 2021-08-06 RX ADMIN — ACETAMINOPHEN 650 MG: 325 TABLET ORAL at 09:35:00

## 2021-08-06 NOTE — PROGRESS NOTES
Education Record    Learner:  Patient    Disease / Diagnosis: IVIG and Infed infusion. 30 min post vs stable.     Barriers / Limitations:  None   Comments:    Method:  Discussion   Comments:    General Topics:  Plan of care reviewed   Comments:    Outcome:

## 2021-08-08 NOTE — PROGRESS NOTES
Mallika Saunders is a 77year old female. Patient presents with: Follow-up: Right foot pain    HPI:     This 42-year-old female patient presents to clinic for follow-up of right foot pain and recent MRI and blood studies.    Her last hemoglobin A1c was Oral Cap Take 1 capsule (100 mg total) by mouth 3 (three) times daily. 90 capsule 5   • B Complex Vitamins (B COMPLEX OR) Take 1 tablet by mouth daily.        • HYDROcodone-acetaminophen  MG Oral Tab Take 1 tablet by mouth every 4 to 6 hours as needed • FEMUR/KNEE SURG UNLISTED      right knee arthroscopy   • HERNIA SURGERY     • HYSTERECTOMY  1984    total hystero. • KNEE REPLACEMENT SURGERY     • LUMPECTOMY LEFT Left 1999    lt lumpectomy and radiation.    • OOPHORECTOMY Bilateral 1984    total hys chest pain on exertion  GI: denies abdominal pain and denies heartburn  NEURO: denies headaches    EXAM:   LMP  (LMP Unknown)   Physical Exam   GENERAL: well developed, well nourished, in no apparent distress  EXTREMITIES:   1.  Integument: Normal skin temp impact/exercise/aerobic-like activity). She is to monitor her right foot and to inform the office if signs or symptoms worsen and/or with any new acute issues. The patient indicates understanding of these issues and agrees to the plan.     She will retu

## 2021-08-11 ENCOUNTER — OFFICE VISIT (OUTPATIENT)
Dept: PODIATRY CLINIC | Facility: CLINIC | Age: 76
End: 2021-08-11
Payer: MEDICARE

## 2021-08-11 ENCOUNTER — PATIENT OUTREACH (OUTPATIENT)
Dept: CASE MANAGEMENT | Age: 76
End: 2021-08-11

## 2021-08-11 DIAGNOSIS — Z99.89 OSA ON CPAP: ICD-10-CM

## 2021-08-11 DIAGNOSIS — F33.1 MAJOR DEPRESSIVE DISORDER, RECURRENT EPISODE, MODERATE (HCC): Chronic | ICD-10-CM

## 2021-08-11 DIAGNOSIS — G47.33 OSA ON CPAP: ICD-10-CM

## 2021-08-11 DIAGNOSIS — F41.9 ANXIETY: ICD-10-CM

## 2021-08-11 DIAGNOSIS — M84.374D STRESS FRACTURE OF RIGHT FOOT WITH ROUTINE HEALING, SUBSEQUENT ENCOUNTER: ICD-10-CM

## 2021-08-11 DIAGNOSIS — M17.12 PRIMARY OSTEOARTHRITIS OF LEFT KNEE: ICD-10-CM

## 2021-08-11 DIAGNOSIS — Z85.828 HISTORY OF BASAL CELL CARCINOMA: ICD-10-CM

## 2021-08-11 DIAGNOSIS — J45.31 MILD PERSISTENT ASTHMA WITH ACUTE EXACERBATION: ICD-10-CM

## 2021-08-11 DIAGNOSIS — Z86.711 HISTORY OF PULMONARY EMBOLISM: ICD-10-CM

## 2021-08-11 DIAGNOSIS — E11.65 UNCONTROLLED TYPE 2 DIABETES MELLITUS WITH HYPERGLYCEMIA (HCC): ICD-10-CM

## 2021-08-11 DIAGNOSIS — E53.8 B12 DEFICIENCY: ICD-10-CM

## 2021-08-11 DIAGNOSIS — E11.65 UNCONTROLLED TYPE 2 DIABETES MELLITUS WITH HYPERGLYCEMIA (HCC): Primary | ICD-10-CM

## 2021-08-11 DIAGNOSIS — I10 BENIGN ESSENTIAL HTN: ICD-10-CM

## 2021-08-11 DIAGNOSIS — Z85.3 HISTORY OF LEFT BREAST CANCER: ICD-10-CM

## 2021-08-11 DIAGNOSIS — D50.8 OTHER IRON DEFICIENCY ANEMIA: ICD-10-CM

## 2021-08-11 DIAGNOSIS — M85.859 OSTEOPENIA OF THIGH, UNSPECIFIED LATERALITY: ICD-10-CM

## 2021-08-11 DIAGNOSIS — E78.00 PURE HYPERCHOLESTEROLEMIA: ICD-10-CM

## 2021-08-11 PROCEDURE — 99213 OFFICE O/P EST LOW 20 MIN: CPT | Performed by: PODIATRIST

## 2021-08-11 RX ORDER — SEMAGLUTIDE 1.34 MG/ML
0.5 INJECTION, SOLUTION SUBCUTANEOUS WEEKLY
COMMUNITY
End: 2021-12-14

## 2021-08-11 NOTE — PROGRESS NOTES
8/11/2021  Spoke to Kirti Azar for CCM. Updates to patient care team/comments: yes, added   Shanta Bland, [address] PODIATRIST   Patient reported changes in medications: together we reviewed and updated. Pt states she is on Ozempic weekly.   [address] ([address], [place]) [address] [place] [address] [phone]   For the safety of our patients, visitors and care teams, all patients and visitors are required to wear a mask during their entire v fatigue.  Weight management.       · Patient reported progress toward goal: Patient is receiving vitamin b12 injections and is following up with therapist.  Bouchra Sparks  · Update to previous barriers: N/A     · Patient Reported New Barriers And Concerns: Patient re

## 2021-08-31 DIAGNOSIS — E11.65 UNCONTROLLED TYPE 2 DIABETES MELLITUS WITH HYPERGLYCEMIA (HCC): ICD-10-CM

## 2021-08-31 PROCEDURE — 99490 CHRNC CARE MGMT STAFF 1ST 20: CPT

## 2021-09-01 RX ORDER — METFORMIN HYDROCHLORIDE 500 MG/1
TABLET, EXTENDED RELEASE ORAL
Qty: 180 TABLET | Refills: 0 | Status: SHIPPED | OUTPATIENT
Start: 2021-09-01 | End: 2021-09-22

## 2021-09-01 NOTE — TELEPHONE ENCOUNTER
Last VISIT - 6/15/21 HFU    Last CPE - 2/4/21 Physical    Last REFILL    METFORMIN HCL  MG Oral Tablet 24 Hr 180 tablet 0 6/14/2021 9/12/2021     Last LABS - 8/6/21 CBC, 6/15/21 A1C, CMP      Per PROTOCOL? FAILED    Please Approve or Deny.

## 2021-09-03 ENCOUNTER — OFFICE VISIT (OUTPATIENT)
Dept: HEMATOLOGY/ONCOLOGY | Facility: HOSPITAL | Age: 76
End: 2021-09-03
Attending: INTERNAL MEDICINE
Payer: MEDICARE

## 2021-09-03 VITALS
TEMPERATURE: 98 F | DIASTOLIC BLOOD PRESSURE: 72 MMHG | WEIGHT: 161 LBS | SYSTOLIC BLOOD PRESSURE: 148 MMHG | HEART RATE: 97 BPM | OXYGEN SATURATION: 94 % | HEIGHT: 60.98 IN | RESPIRATION RATE: 16 BRPM | BODY MASS INDEX: 30.4 KG/M2

## 2021-09-03 DIAGNOSIS — Z85.3 HISTORY OF LEFT BREAST CANCER: ICD-10-CM

## 2021-09-03 DIAGNOSIS — D80.3 SELECTIVE DEFICIENCY OF IGG SUBCLASSES (HCC): Primary | ICD-10-CM

## 2021-09-03 PROCEDURE — 96365 THER/PROPH/DIAG IV INF INIT: CPT

## 2021-09-03 PROCEDURE — 96375 TX/PRO/DX INJ NEW DRUG ADDON: CPT

## 2021-09-03 RX ORDER — ACETAMINOPHEN 325 MG/1
650 TABLET ORAL ONCE
Status: CANCELLED | OUTPATIENT
Start: 2021-10-01

## 2021-09-03 RX ORDER — ACETAMINOPHEN 325 MG/1
650 TABLET ORAL ONCE
Status: COMPLETED | OUTPATIENT
Start: 2021-09-03 | End: 2021-09-03

## 2021-09-03 RX ORDER — DIPHENHYDRAMINE HCL 25 MG
50 CAPSULE ORAL ONCE
Status: COMPLETED | OUTPATIENT
Start: 2021-09-03 | End: 2021-09-03

## 2021-09-03 RX ORDER — DIPHENHYDRAMINE HCL 25 MG
50 CAPSULE ORAL ONCE
Status: CANCELLED | OUTPATIENT
Start: 2021-10-01

## 2021-09-03 RX ORDER — SODIUM CHLORIDE 0.9 % (FLUSH) 0.9 %
10 SYRINGE (ML) INJECTION ONCE
Status: CANCELLED | OUTPATIENT
Start: 2021-10-01

## 2021-09-03 RX ADMIN — ACETAMINOPHEN 650 MG: 325 TABLET ORAL at 09:22:00

## 2021-09-03 RX ADMIN — DIPHENHYDRAMINE HCL 50 MG: 25 MG CAPSULE ORAL at 09:21:00

## 2021-09-03 NOTE — PROGRESS NOTES
Education Record    Learner:  Patient    Disease / Diagnosis:selective deficiency of Igg    Barriers / Limitations:  None   Comments:    Method:  Discussion   Comments:    General Topics:  Medication and Plan of care reviewed   Comments:    Outcome:  Shows

## 2021-09-06 NOTE — PROGRESS NOTES
Kei Mcdowell is a 77year old female. Patient presents with: Follow-up: Right foot stress fracture  HPI:     This 52-year-old female patient presents to clinic for follow-up. She states her right foot is feeling better.   Her last hemoglobin A1c w HYDROcodone-acetaminophen  MG Oral Tab Take 1 tablet by mouth every 4 to 6 hours as needed. • Fluticasone Propionate 50 MCG/ACT Nasal Suspension 1 spray by Nasal route 2 (two) times daily.  1 Bottle 3   • traMADol HCl 50 MG Oral Tab Take 50 mg b • EGD  4/21/17   • FEMUR/KNEE SURG UNLISTED      right knee arthroscopy   • HERNIA SURGERY     • HYSTERECTOMY  1984    total hystero. • KNEE REPLACEMENT SURGERY     • LUMPECTOMY LEFT Left 1999    lt lumpectomy and radiation.    • OOPHORECTOMY Bilateral exertion  CARDIOVASCULAR: denies chest pain on exertion  GI: denies abdominal pain and denies heartburn  NEURO: denies headaches    EXAM:   LMP  (LMP Unknown)   Physical Exam   GENERAL: well developed, well nourished, in no apparent distress  EXTREMITIES: the cam walker for sleeping, showering, and for driving. For driving she needs to wear a good supportive stiff soled athletic shoe.   She is to also take it easy and refrain from any heavy activity (including but not limited to extended periods of walking/

## 2021-09-08 ENCOUNTER — OFFICE VISIT (OUTPATIENT)
Dept: PODIATRY CLINIC | Facility: CLINIC | Age: 76
End: 2021-09-08
Payer: MEDICARE

## 2021-09-08 DIAGNOSIS — M84.374D STRESS FRACTURE OF RIGHT FOOT WITH ROUTINE HEALING, SUBSEQUENT ENCOUNTER: Primary | ICD-10-CM

## 2021-09-08 DIAGNOSIS — E11.65 UNCONTROLLED TYPE 2 DIABETES MELLITUS WITH HYPERGLYCEMIA (HCC): ICD-10-CM

## 2021-09-08 PROCEDURE — 73610 X-RAY EXAM OF ANKLE: CPT | Performed by: PODIATRIST

## 2021-09-08 PROCEDURE — 99213 OFFICE O/P EST LOW 20 MIN: CPT | Performed by: PODIATRIST

## 2021-09-10 ENCOUNTER — TELEPHONE (OUTPATIENT)
Dept: INTERNAL MEDICINE CLINIC | Facility: CLINIC | Age: 76
End: 2021-09-10

## 2021-09-10 NOTE — TELEPHONE ENCOUNTER
LOV 6/15/21 with SD.  Dr Olga Plasencia appt 9/8/21, 9/6/21 XR right foot shows:    Findings show a small lucency within the distal second metatarsal at the   level of the stress reaction found on recent MRI studies consistent with a   fracture line.  The fracture is

## 2021-09-10 NOTE — TELEPHONE ENCOUNTER
Completed  Please mail to patient. Should f/u with glucoses if she is checking at home to ensure her a1c will be improved since adding ozempic.

## 2021-09-10 NOTE — TELEPHONE ENCOUNTER
Patient indicates she is having a delay for knee surgery with Dr Justina Curtis because of  her blood sugar, needs Placard due to the delay in surgery. Alia Kaminski

## 2021-09-13 ENCOUNTER — PATIENT OUTREACH (OUTPATIENT)
Dept: CASE MANAGEMENT | Age: 76
End: 2021-09-13

## 2021-09-13 DIAGNOSIS — G56.02 CARPAL TUNNEL SYNDROME OF LEFT WRIST: ICD-10-CM

## 2021-09-13 DIAGNOSIS — I10 BENIGN ESSENTIAL HTN: ICD-10-CM

## 2021-09-13 DIAGNOSIS — Z85.3 HISTORY OF LEFT BREAST CANCER: ICD-10-CM

## 2021-09-13 DIAGNOSIS — E53.8 B12 DEFICIENCY: ICD-10-CM

## 2021-09-13 DIAGNOSIS — Z86.711 HISTORY OF PULMONARY EMBOLISM: ICD-10-CM

## 2021-09-13 DIAGNOSIS — Z99.89 OSA ON CPAP: ICD-10-CM

## 2021-09-13 DIAGNOSIS — G47.33 OSA ON CPAP: ICD-10-CM

## 2021-09-13 DIAGNOSIS — J45.909 ACUTE ASTHMA: ICD-10-CM

## 2021-09-13 DIAGNOSIS — E78.00 PURE HYPERCHOLESTEROLEMIA: ICD-10-CM

## 2021-09-13 DIAGNOSIS — E11.65 UNCONTROLLED TYPE 2 DIABETES MELLITUS WITH HYPERGLYCEMIA (HCC): ICD-10-CM

## 2021-09-13 DIAGNOSIS — F33.1 MAJOR DEPRESSIVE DISORDER, RECURRENT EPISODE, MODERATE (HCC): Chronic | ICD-10-CM

## 2021-09-13 DIAGNOSIS — F41.9 ANXIETY: ICD-10-CM

## 2021-09-13 DIAGNOSIS — Z85.828 HISTORY OF BASAL CELL CARCINOMA: ICD-10-CM

## 2021-09-13 DIAGNOSIS — M85.859 OSTEOPENIA OF THIGH, UNSPECIFIED LATERALITY: ICD-10-CM

## 2021-09-13 DIAGNOSIS — R29.6 FALLS FREQUENTLY: ICD-10-CM

## 2021-09-13 DIAGNOSIS — M17.12 PRIMARY OSTEOARTHRITIS OF LEFT KNEE: ICD-10-CM

## 2021-09-13 DIAGNOSIS — K21.9 GASTROESOPHAGEAL REFLUX DISEASE WITHOUT ESOPHAGITIS: ICD-10-CM

## 2021-09-13 DIAGNOSIS — D80.3 SELECTIVE DEFICIENCY OF IGG SUBCLASSES (HCC): ICD-10-CM

## 2021-09-13 NOTE — PROGRESS NOTES
9/13/2021  Spoke to Keily for CCM. Updates to patient care team/comments: UTD. Patient reported changes in medications: together we reviewed with no updates. Med Adherence  Comment: pt reports taking all medications as prescribed.        Health Ma safety of our patients, visitors and care teams, all patients and visitors are required to wear a mask during their entire visit, only to be removed if asked to do so by your provider.   We are working to limit the number of people in our waiting rooms and plan.  Self-Management Abilities (patient reported)             1= least confident in achieving goal, 5= very confident               - confidence: 4     Continue independent living, healthy diet and exercise routine.  Continue to provide encouragement, sup

## 2021-09-16 ENCOUNTER — LAB ENCOUNTER (OUTPATIENT)
Dept: LAB | Age: 76
End: 2021-09-16
Attending: INTERNAL MEDICINE
Payer: MEDICARE

## 2021-09-16 DIAGNOSIS — E11.65 UNCONTROLLED TYPE 2 DIABETES MELLITUS WITH HYPERGLYCEMIA (HCC): ICD-10-CM

## 2021-09-16 LAB
ALBUMIN SERPL-MCNC: 3.8 G/DL (ref 3.4–5)
ALBUMIN/GLOB SERPL: 1 {RATIO} (ref 1–2)
ALP LIVER SERPL-CCNC: 73 U/L
ALT SERPL-CCNC: 34 U/L
ANION GAP SERPL CALC-SCNC: 10 MMOL/L (ref 0–18)
AST SERPL-CCNC: 34 U/L (ref 15–37)
BILIRUB SERPL-MCNC: 0.6 MG/DL (ref 0.1–2)
BUN BLD-MCNC: 13 MG/DL (ref 7–18)
CALCIUM BLD-MCNC: 9.3 MG/DL (ref 8.5–10.1)
CHLORIDE SERPL-SCNC: 107 MMOL/L (ref 98–112)
CO2 SERPL-SCNC: 24 MMOL/L (ref 21–32)
CREAT BLD-MCNC: 0.65 MG/DL
EST. AVERAGE GLUCOSE BLD GHB EST-MCNC: 137 MG/DL (ref 68–126)
GLOBULIN PLAS-MCNC: 3.8 G/DL (ref 2.8–4.4)
GLUCOSE BLD-MCNC: 106 MG/DL (ref 70–99)
HBA1C MFR BLD HPLC: 6.4 % (ref ?–5.7)
OSMOLALITY SERPL CALC.SUM OF ELEC: 293 MOSM/KG (ref 275–295)
PATIENT FASTING Y/N/NP: YES
POTASSIUM SERPL-SCNC: 3.7 MMOL/L (ref 3.5–5.1)
PROT SERPL-MCNC: 7.6 G/DL (ref 6.4–8.2)
SODIUM SERPL-SCNC: 141 MMOL/L (ref 136–145)

## 2021-09-16 PROCEDURE — 36415 COLL VENOUS BLD VENIPUNCTURE: CPT

## 2021-09-16 PROCEDURE — 83036 HEMOGLOBIN GLYCOSYLATED A1C: CPT

## 2021-09-16 PROCEDURE — 80053 COMPREHEN METABOLIC PANEL: CPT

## 2021-09-21 ENCOUNTER — TELEPHONE (OUTPATIENT)
Dept: INTERNAL MEDICINE CLINIC | Facility: CLINIC | Age: 76
End: 2021-09-21

## 2021-09-21 NOTE — TELEPHONE ENCOUNTER
Pt had fallen a week ago and her tailbone is still really bothering her, she would like to be seen by SD this week, ok for hold spot?

## 2021-09-21 NOTE — TELEPHONE ENCOUNTER
Future Appointments   Date Time Provider Gomez Miller   9/22/2021  3:20 PM JUAREZ Elizabeth [facility] [address] [facility]

## 2021-09-22 ENCOUNTER — OFFICE VISIT (OUTPATIENT)
Dept: INTERNAL MEDICINE CLINIC | Facility: CLINIC | Age: 76
End: 2021-09-22
Payer: MEDICARE

## 2021-09-22 VITALS
SYSTOLIC BLOOD PRESSURE: 132 MMHG | RESPIRATION RATE: 18 BRPM | WEIGHT: 157.81 LBS | DIASTOLIC BLOOD PRESSURE: 72 MMHG | HEIGHT: 61 IN | HEART RATE: 98 BPM | BODY MASS INDEX: 29.79 KG/M2 | TEMPERATURE: 97 F | OXYGEN SATURATION: 98 %

## 2021-09-22 DIAGNOSIS — R26.81 UNSTEADY GAIT: Primary | ICD-10-CM

## 2021-09-22 DIAGNOSIS — M17.11 PRIMARY OSTEOARTHRITIS OF RIGHT KNEE: ICD-10-CM
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
## 2021-09-22 DIAGNOSIS — G62.9 NEUROPATHY: ICD-10-CM

## 2021-09-22 DIAGNOSIS — Z96.652 HISTORY OF LEFT KNEE REPLACEMENT: ICD-10-CM

## 2021-09-22 DIAGNOSIS — E11.65 UNCONTROLLED TYPE 2 DIABETES MELLITUS WITH HYPERGLYCEMIA (HCC): ICD-10-CM

## 2021-09-22 PROCEDURE — 99214 OFFICE O/P EST MOD 30 MIN: CPT | Performed by: NURSE PRACTITIONER

## 2021-09-22 RX ORDER — METFORMIN HYDROCHLORIDE 500 MG/1
TABLET, EXTENDED RELEASE ORAL
Qty: 180 TABLET | Refills: 0 | COMMUNITY
Start: 2021-09-22 | End: 2021-10-26

## 2021-09-22 NOTE — PROGRESS NOTES
Kei Mcdowell is a 77year old female. Patient presents with:  Low Back Pain: ES rm - 11- 9/12/21 Intermittent tailbone pain at a 8/10. HPI:   Presents for eval of low back pain and intermittent tailbone pain   S/p fall.   She was pulling weeds knee replacement     Status post total left knee replacement     History of radius fracture     Tremor of both hands     Falls frequently     Numbness and tingling     Migraine without aura or status migrainosus     History of COVID-19     Pneumonia of rig HYDROcodone-acetaminophen  MG Oral Tab Take 1 tablet by mouth every 4 to 6 hours as needed. • Fluticasone Propionate 50 MCG/ACT Nasal Suspension 1 spray by Nasal route 2 (two) times daily.  1 Bottle 3   • traMADol HCl 50 MG Oral Tab Take 50 mg b Yes      Types: Cannabis      Comment: uses cream    Family History   Problem Relation Age of Onset   • Heart Attack Paternal Grandfather    • Heart Attack Paternal Grandmother    • Other (CAD) Father    • Other (CAD) Mother    • Other (CAD) Brother    • S bid.  Cont ozempic and consider. 0.25mg instead of 0.5mg   Chronic pain syndrome  Follows with pain service. No orders of the defined types were placed in this encounter.       Meds & Refills for this Visit:  Requested Prescriptions      No prescriptions

## 2021-09-27 RX ORDER — LISINOPRIL 20 MG/1
TABLET ORAL
Qty: 90 TABLET | Refills: 0 | Status: SHIPPED | OUTPATIENT
Start: 2021-09-27 | End: 2021-12-29

## 2021-09-30 PROCEDURE — 99490 CHRNC CARE MGMT STAFF 1ST 20: CPT

## 2021-10-01 ENCOUNTER — OFFICE VISIT (OUTPATIENT)
Dept: HEMATOLOGY/ONCOLOGY | Facility: HOSPITAL | Age: 76
End: 2021-10-01
Attending: INTERNAL MEDICINE
Payer: MEDICARE

## 2021-10-01 VITALS
SYSTOLIC BLOOD PRESSURE: 154 MMHG | HEART RATE: 99 BPM | DIASTOLIC BLOOD PRESSURE: 83 MMHG | OXYGEN SATURATION: 97 % | TEMPERATURE: 97 F | WEIGHT: 156 LBS | RESPIRATION RATE: 16 BRPM | HEIGHT: 60.98 IN | BODY MASS INDEX: 29.45 KG/M2

## 2021-10-01 DIAGNOSIS — D80.3 SELECTIVE DEFICIENCY OF IGG SUBCLASSES (HCC): Primary | ICD-10-CM

## 2021-10-01 DIAGNOSIS — Z85.3 HISTORY OF LEFT BREAST CANCER: ICD-10-CM

## 2021-10-01 PROCEDURE — 96366 THER/PROPH/DIAG IV INF ADDON: CPT

## 2021-10-01 PROCEDURE — 96365 THER/PROPH/DIAG IV INF INIT: CPT

## 2021-10-01 PROCEDURE — 96375 TX/PRO/DX INJ NEW DRUG ADDON: CPT

## 2021-10-01 RX ORDER — ACETAMINOPHEN 325 MG/1
650 TABLET ORAL ONCE
Status: COMPLETED | OUTPATIENT
Start: 2021-10-01 | End: 2021-10-01

## 2021-10-01 RX ORDER — DIPHENHYDRAMINE HCL 25 MG
50 CAPSULE ORAL ONCE
Status: COMPLETED | OUTPATIENT
Start: 2021-10-01 | End: 2021-10-01

## 2021-10-01 RX ORDER — DIPHENHYDRAMINE HCL 25 MG
50 CAPSULE ORAL ONCE
Status: CANCELLED | OUTPATIENT
Start: 2021-11-01

## 2021-10-01 RX ORDER — SODIUM CHLORIDE 0.9 % (FLUSH) 0.9 %
10 SYRINGE (ML) INJECTION ONCE
Status: CANCELLED | OUTPATIENT
Start: 2021-11-01

## 2021-10-01 RX ORDER — ACETAMINOPHEN 325 MG/1
650 TABLET ORAL ONCE
Status: CANCELLED | OUTPATIENT
Start: 2021-11-01

## 2021-10-01 RX ADMIN — ACETAMINOPHEN 650 MG: 325 TABLET ORAL at 08:35:00

## 2021-10-01 RX ADMIN — DIPHENHYDRAMINE HCL 50 MG: 25 MG CAPSULE ORAL at 08:35:00

## 2021-10-01 NOTE — PROGRESS NOTES
Pt here for IVIG.   Arrives Ambulating independently, accompanied by Self           Pregnancy screening: Denies possibility of pregnancy    Modifications in dose or schedule: No     Frequency of blood return and site check throughout administration: Prior t

## 2021-10-06 ENCOUNTER — TELEMEDICINE (OUTPATIENT)
Dept: RHEUMATOLOGY | Facility: CLINIC | Age: 76
End: 2021-10-06

## 2021-10-06 ENCOUNTER — TELEPHONE (OUTPATIENT)
Dept: RHEUMATOLOGY | Facility: CLINIC | Age: 76
End: 2021-10-06

## 2021-10-06 VITALS — WEIGHT: 154 LBS | HEIGHT: 61 IN | BODY MASS INDEX: 29.07 KG/M2

## 2021-10-06 DIAGNOSIS — R76.8 RHEUMATOID FACTOR POSITIVE: ICD-10-CM

## 2021-10-06 DIAGNOSIS — M15.9 PRIMARY OSTEOARTHRITIS INVOLVING MULTIPLE JOINTS: Primary | ICD-10-CM

## 2021-10-06 DIAGNOSIS — M25.50 POLYARTHRALGIA: ICD-10-CM

## 2021-10-06 DIAGNOSIS — G89.4 CHRONIC PAIN SYNDROME: ICD-10-CM

## 2021-10-06 PROCEDURE — 99213 OFFICE O/P EST LOW 20 MIN: CPT | Performed by: INTERNAL MEDICINE

## 2021-10-06 NOTE — TELEPHONE ENCOUNTER
Pt called c/o migraine, nausea, coughing; requesting to cancel today's 12:30 appt with Dr PETERS. Offered to change to virtual visit? Dr PETERS okay with this; pt agreed. Phone # confirmed. Advised pt to call PCP for further direction re: current illness.   She

## 2021-10-06 NOTE — PROGRESS NOTES
[facility] TeleHealth Audio and Visual Visit   Office visit canceled due to coronavirus pandemic    This was an audio/video conversation using Epic/Needle in lieu of an in-person visit due to need to limit person to person contact [facility] She was initially sent to be for evaluation of a borderline RF as well as hand and back pain. I think the RF is positive due to age as pt's history and exam is not consistent with an inflammatory arthropathy.  Repeat testing showed negative RF/CCP as well a patient/family/caregiver and care coordination with the patient's other providers.         ?  Plan:  Diagnoses and all orders for this visit:    Primary osteoarthritis involving multiple joints    Chronic pain syndrome    Polyarthralgia    Rheumatoid factor HPI from initial consultation  States she has had arthritis throughout her whole spine from neck down to her lower back for over 30 years. States she has been diagnosed with significant degenerative disc disease.  She also has selective immunodeficien swelling of the cheeks or under the jawbone. No prior history of punctal plugs being applied. No fevers, chills, lymphadenopathy, night sweats, unexpected weight loss, bony pain, easy bruising or bleeding, or unexplained weakness.   Denies chronic sinus in cancer removed   • OTHER SURGICAL HISTORY  2019    knee   • PORT FOR VASCULAR ACCESS     • RADIATION LEFT Left 1999    left lumpectomy and radiation.    • REMOVAL GALLBLADDER     • REPAIR ING HERNIA,5+Y/O,REDUCIBL     • TOTAL ABDOM HYSTERECTOMY       Family as needed for Nausea., Disp: 30 tablet, Rfl: 0  Albuterol Sulfate  (90 Base) MCG/ACT Inhalation Aero Soln, Inhale 2 puffs into the lungs every 6 (six) hours as needed for Wheezing., Disp: , Rfl:   tiZANidine HCl 4 MG Oral Tab, Take 4 mg by mouth erica throat. Eyes: Negative for blurred vision, double vision and photophobia. Respiratory: Positive for shortness of breath. Negative for cough and wheezing. Cardiovascular: Negative for chest pain and leg swelling.    Gastrointestinal: Negative for hea Thought content normal.       ?  Radiology review:     PROCEDURE:  XR DEXA BONE DENSITOMETRY (CPT=77080)     COMPARISON:  [address] [address] [facility], XR, XR DEXA BONE DENSITOMETRY (CPT=77080), 4/04/2018, 9:37 AM.     INDICATIONS:    Z78.0 Asymptomatic menopausal state M8 fat suppression sequences. Images acquired in sagittal and axial planes. PATIENT STATED HISTORY: (As transcribed by Technologist)  Per patient, uncontrollable falling for two months.        FINDINGS:  Alignment of the lumbar spine is normal.  Monster Calle transcribed by Technologist)  Per patient, uncontrollable falling for two months. FINDINGS:  There is normal cervical lordosis with anatomic alignment. Vertebral body heights are well-maintained.   Mild to moderate degenerative disc space loss, endp disk osteophyte complex with minimal uncovertebral and facet joint degenerative changes. There is no significant spinal canal or neural foraminal stenosis.      Scattered small disc protrusions in the partially imaged upper thoracic spine could be further EOABSO 0.20 08/06/2021    BAABSO 0.04 08/06/2021     Lab Results   Component Value Date     (H) 09/16/2021    BUN 13 09/16/2021    BUNCREA 26.1 (H) 06/03/2021    CREATSERUM 0.65 09/16/2021    ANIONGAP 10 09/16/2021    GFR 70 12/07/2017    GFRNAA 88

## 2021-10-08 RX ORDER — BUPROPION HYDROCHLORIDE 150 MG/1
TABLET ORAL
Qty: 90 TABLET | Refills: 0 | OUTPATIENT
Start: 2021-10-08

## 2021-10-08 RX ORDER — PANTOPRAZOLE SODIUM 40 MG/1
TABLET, DELAYED RELEASE ORAL
Qty: 90 TABLET | Refills: 0 | Status: SHIPPED | OUTPATIENT
Start: 2021-10-08 | End: 2022-01-17

## 2021-10-08 RX ORDER — ROSUVASTATIN CALCIUM 10 MG/1
10 TABLET, COATED ORAL NIGHTLY
Qty: 90 TABLET | Refills: 0 | Status: SHIPPED | OUTPATIENT
Start: 2021-10-08

## 2021-10-08 NOTE — TELEPHONE ENCOUNTER
Been Following SD  Last OV 9/22/21  Last CPE 2/4/21  Last Labs CMP, A1c 9/16/21    Last Rx fill Pantoprazole 40mg #90 0R 7/12/21       Future Appointments   Date Time Provider Gomez Miller   10/13/2021  1:00 PM Delano Lundborg K, PT MMO NP PT [facility]

## 2021-10-12 RX ORDER — RIVAROXABAN 20 MG/1
TABLET, FILM COATED ORAL
Qty: 90 TABLET | Refills: 0 | Status: SHIPPED | OUTPATIENT
Start: 2021-10-12 | End: 2022-01-17

## 2021-10-13 ENCOUNTER — PATIENT OUTREACH (OUTPATIENT)
Dept: CASE MANAGEMENT | Age: 76
End: 2021-10-13

## 2021-10-18 ENCOUNTER — APPOINTMENT (OUTPATIENT)
Dept: GENERAL RADIOLOGY | Facility: HOSPITAL | Age: 76
End: 2021-10-18
Attending: EMERGENCY MEDICINE
Payer: MEDICARE

## 2021-10-18 ENCOUNTER — APPOINTMENT (OUTPATIENT)
Dept: CT IMAGING | Facility: HOSPITAL | Age: 76
End: 2021-10-18
Payer: MEDICARE

## 2021-10-18 ENCOUNTER — HOSPITAL ENCOUNTER (EMERGENCY)
Facility: HOSPITAL | Age: 76
Discharge: HOME | End: 2021-10-18
Attending: EMERGENCY MEDICINE
Payer: MEDICARE

## 2021-10-18 VITALS
WEIGHT: 150 LBS | OXYGEN SATURATION: 96 % | TEMPERATURE: 97 F | BODY MASS INDEX: 28.32 KG/M2 | DIASTOLIC BLOOD PRESSURE: 74 MMHG | SYSTOLIC BLOOD PRESSURE: 140 MMHG | HEART RATE: 90 BPM | RESPIRATION RATE: 16 BRPM | HEIGHT: 61 IN

## 2021-10-18 DIAGNOSIS — S52.532A CLOSED COLLES' FRACTURE OF LEFT RADIUS, INITIAL ENCOUNTER: ICD-10-CM

## 2021-10-18 DIAGNOSIS — S80.10XA CONTUSION OF LOWER EXTREMITY, UNSPECIFIED LATERALITY, INITIAL ENCOUNTER: ICD-10-CM

## 2021-10-18 DIAGNOSIS — S09.90XA INJURY OF HEAD, INITIAL ENCOUNTER: Primary | ICD-10-CM

## 2021-10-18 DIAGNOSIS — S00.81XA ABRASION OF FACE, INITIAL ENCOUNTER: ICD-10-CM

## 2021-10-18 PROCEDURE — 96375 TX/PRO/DX INJ NEW DRUG ADDON: CPT

## 2021-10-18 PROCEDURE — 70450 CT HEAD/BRAIN W/O DYE: CPT | Performed by: EMERGENCY MEDICINE

## 2021-10-18 PROCEDURE — 96374 THER/PROPH/DIAG INJ IV PUSH: CPT

## 2021-10-18 PROCEDURE — 73110 X-RAY EXAM OF WRIST: CPT | Performed by: EMERGENCY MEDICINE

## 2021-10-18 PROCEDURE — 96376 TX/PRO/DX INJ SAME DRUG ADON: CPT

## 2021-10-18 PROCEDURE — 99284 EMERGENCY DEPT VISIT MOD MDM: CPT

## 2021-10-18 PROCEDURE — 99285 EMERGENCY DEPT VISIT HI MDM: CPT

## 2021-10-18 PROCEDURE — 72125 CT NECK SPINE W/O DYE: CPT | Performed by: EMERGENCY MEDICINE

## 2021-10-18 RX ORDER — LORAZEPAM 2 MG/ML
0.5 INJECTION INTRAMUSCULAR ONCE
Status: DISCONTINUED | OUTPATIENT
Start: 2021-10-18 | End: 2021-10-18

## 2021-10-18 RX ORDER — LORAZEPAM 1 MG/1
0.5 TABLET ORAL ONCE
Status: COMPLETED | OUTPATIENT
Start: 2021-10-18 | End: 2021-10-18

## 2021-10-18 RX ORDER — MORPHINE SULFATE 4 MG/ML
4 INJECTION, SOLUTION INTRAMUSCULAR; INTRAVENOUS EVERY 30 MIN PRN
Status: DISCONTINUED | OUTPATIENT
Start: 2021-10-18 | End: 2021-10-18

## 2021-10-18 RX ORDER — ONDANSETRON 2 MG/ML
4 INJECTION INTRAMUSCULAR; INTRAVENOUS ONCE
Status: COMPLETED | OUTPATIENT
Start: 2021-10-18 | End: 2021-10-18

## 2021-10-18 RX ORDER — HYDROCODONE BITARTRATE AND ACETAMINOPHEN 5; 325 MG/1; MG/1
2 TABLET ORAL ONCE
Status: COMPLETED | OUTPATIENT
Start: 2021-10-18 | End: 2021-10-18

## 2021-10-18 RX ORDER — MORPHINE SULFATE 4 MG/ML
4 INJECTION, SOLUTION INTRAMUSCULAR; INTRAVENOUS ONCE
Status: COMPLETED | OUTPATIENT
Start: 2021-10-18 | End: 2021-10-18

## 2021-10-18 RX ORDER — HYDROCODONE BITARTRATE AND ACETAMINOPHEN 5; 325 MG/1; MG/1
TABLET ORAL
Status: DISCONTINUED
Start: 2021-10-18 | End: 2021-10-18

## 2021-10-18 NOTE — ED PROVIDER NOTES
Patient Seen in: [facility] Emergency Department      History   Patient presents with:  Trauma  Fall    Stated Complaint: fall    Subjective:   HPI    Tripped over a significant crack in the sidewalk and fell forward striking face and knees and heriberto COLONOSCOPY  10/1/10, 4/21/17   • EGD  4/21/17   • FEMUR/KNEE SURG UNLISTED      right knee arthroscopy   • HERNIA SURGERY     • HYSTERECTOMY  1984    total hystero.    • KNEE REPLACEMENT SURGERY     • LUMPECTOMY LEFT Left 1999    lt lumpectomy and radiatio anterior face and bridge of her nose. Small laceration left lower lip     Right Ear: External ear normal.      Left Ear: External ear normal.      Nose: Nose normal.   Eyes:      General: No scleral icterus. Right eye: No discharge.          Left ey and Affect: Mood normal.         Behavior: Behavior normal.         Judgment: Judgment normal.             ED Course     Labs Reviewed   RAINBOW DRAW BLUE   RAINBOW DRAW LAVENDER   RAINBOW DRAW LIGHT GREEN   RAINBOW DRAW GOLD          XR WRIST COMPLETE (MI or midline     shift. There is mild diffuse atrophy and periventricular/subcortical     white matter disease. No hyperdense intracranial vessels noted. SINUSES:           There is a left maxillary sinus retention cyst     measuring 1.9 cm.   No acute s There is mild to     moderate multilevel arthropathy of the cervical spine, including moderate     to severe osteoarthritis of the atlantoaxial joint.   These findings are     stable from the prior exam.  There     is also noted to be osseous fusion across department, she did get anxious towards the end of her stay was given a milligram of Ativan. She is actually really quite steady and despite her past medical history with cancer, patient is a very vibrant 20-year-old.   She will be discharged home in impro

## 2021-10-19 ENCOUNTER — TELEPHONE (OUTPATIENT)
Dept: INTERNAL MEDICINE CLINIC | Facility: CLINIC | Age: 76
End: 2021-10-19

## 2021-10-19 NOTE — TELEPHONE ENCOUNTER
Pt called to ask who she can see for fx wrist? She  Needs a plate put in and [employer] cannot get her in anytime soon-call to advise

## 2021-10-22 RX ORDER — ONDANSETRON 4 MG/1
4 TABLET, FILM COATED ORAL EVERY 8 HOURS PRN
Qty: 30 TABLET | Refills: 0 | Status: SHIPPED | OUTPATIENT
Start: 2021-10-22

## 2021-10-22 NOTE — TELEPHONE ENCOUNTER
Been Following SD  Last OV 9/22/21  Last CPE 2/4/21  Last Labs CMP, A1c 9/16/21    Last Rx fill Zofran 4mg ODT #30 0R 6/24/21     Future Appointments   Date Time Provider Gomez Miller   10/25/2021 10:00 AM Klarissa DELGADO, PT MMO NP PT [facility]   10

## 2021-10-22 NOTE — TELEPHONE ENCOUNTER
Patient calling to ask if SD will give her a refill of Zofran 4 mg tab as needed. Patient has been taking because her pain medication makes her nauseated.   Send to [address] [place] [address] [phone], [phone]

## 2021-10-26 ENCOUNTER — TELEPHONE (OUTPATIENT)
Dept: INTERNAL MEDICINE CLINIC | Facility: CLINIC | Age: 76
End: 2021-10-26

## 2021-10-26 ENCOUNTER — OFFICE VISIT (OUTPATIENT)
Dept: INTERNAL MEDICINE CLINIC | Facility: CLINIC | Age: 76
End: 2021-10-26
Payer: COMMERCIAL

## 2021-10-26 ENCOUNTER — OFFICE VISIT (OUTPATIENT)
Dept: INTERNAL MEDICINE CLINIC | Facility: CLINIC | Age: 76
End: 2021-10-26

## 2021-10-26 VITALS
BODY MASS INDEX: 27.82 KG/M2 | DIASTOLIC BLOOD PRESSURE: 66 MMHG | TEMPERATURE: 98 F | OXYGEN SATURATION: 98 % | HEIGHT: 62 IN | RESPIRATION RATE: 16 BRPM | SYSTOLIC BLOOD PRESSURE: 118 MMHG | WEIGHT: 151.19 LBS | HEART RATE: 120 BPM

## 2021-10-26 DIAGNOSIS — S62.102P CLOSED FRACTURE OF LEFT WRIST WITH MALUNION, SUBSEQUENT ENCOUNTER: Primary | ICD-10-CM

## 2021-10-26 DIAGNOSIS — D50.8 OTHER IRON DEFICIENCY ANEMIA: ICD-10-CM

## 2021-10-26 DIAGNOSIS — G47.33 OSA ON CPAP: ICD-10-CM

## 2021-10-26 DIAGNOSIS — J45.31 MILD PERSISTENT ASTHMA WITH ACUTE EXACERBATION: ICD-10-CM

## 2021-10-26 DIAGNOSIS — E78.00 PURE HYPERCHOLESTEROLEMIA: ICD-10-CM

## 2021-10-26 DIAGNOSIS — Z99.89 OSA ON CPAP: ICD-10-CM

## 2021-10-26 DIAGNOSIS — I10 BENIGN ESSENTIAL HTN: ICD-10-CM

## 2021-10-26 DIAGNOSIS — I25.10 CORONARY ARTERY DISEASE INVOLVING NATIVE HEART WITHOUT ANGINA PECTORIS, UNSPECIFIED VESSEL OR LESION TYPE: ICD-10-CM

## 2021-10-26 DIAGNOSIS — I26.99 RECURRENT PULMONARY EMBOLISM (HCC): ICD-10-CM

## 2021-10-26 DIAGNOSIS — Z02.9 ADMINISTRATIVE ENCOUNTER: Primary | ICD-10-CM

## 2021-10-26 DIAGNOSIS — E11.65 UNCONTROLLED TYPE 2 DIABETES MELLITUS WITH HYPERGLYCEMIA (HCC): ICD-10-CM

## 2021-10-26 DIAGNOSIS — G89.4 CHRONIC PAIN SYNDROME: ICD-10-CM

## 2021-10-26 DIAGNOSIS — F33.1 MAJOR DEPRESSIVE DISORDER, RECURRENT EPISODE, MODERATE (HCC): Chronic | ICD-10-CM

## 2021-10-26 PROBLEM — J45.909 ACUTE ASTHMA (HCC): Status: RESOLVED | Noted: 2021-06-02 | Resolved: 2021-10-26

## 2021-10-26 PROBLEM — J45.909 ACUTE ASTHMA: Status: RESOLVED | Noted: 2021-06-02 | Resolved: 2021-10-26

## 2021-10-26 PROCEDURE — 3074F SYST BP LT 130 MM HG: CPT | Performed by: NURSE PRACTITIONER

## 2021-10-26 PROCEDURE — 3008F BODY MASS INDEX DOCD: CPT | Performed by: NURSE PRACTITIONER

## 2021-10-26 PROCEDURE — 3078F DIAST BP <80 MM HG: CPT | Performed by: NURSE PRACTITIONER

## 2021-10-26 PROCEDURE — 99214 OFFICE O/P EST MOD 30 MIN: CPT | Performed by: NURSE PRACTITIONER

## 2021-10-26 RX ORDER — METFORMIN HYDROCHLORIDE 500 MG/1
500 TABLET, EXTENDED RELEASE ORAL
Qty: 90 TABLET | Refills: 0 | COMMUNITY
Start: 2021-10-26 | End: 2021-11-27

## 2021-10-26 NOTE — PROGRESS NOTES
[facility]  PRE OP RISK ASSESSMENT    REASON FOR CONSULT: Pre-op risk assessment for surgical procedure CLOSED REDUCTION AND PINNING OF LEFT WRIST FRACTURE  planned for October 28, 2021 with Dr Nikkie Mistry .     REQUESTING PHYSICIAN: Dr Nikkie Mistry and rad.    • Cancer [facility]) 1999    breast, basal cell   • COVID     12/2020 no sx   • Deep vein thrombosis (HCC)    • Esophageal reflux    • Exposure to medical diagnostic radiation    • Fitting and adjustment of vascular catheter    • HEART ATTACK    • Hig mg total) by mouth every 8 (eight) hours as needed for Nausea. 30 tablet 0   • XARELTO 20 MG Oral Tab Take 1 tablet (20 mg total) by mouth daily. 90 tablet 0   • ROSUVASTATIN 10 MG Oral Tab Take 1 tablet (10 mg total) by mouth nightly.  90 tablet 0   • PANT 06/27/2012  HIVES  MOLD                              06/27/2012  REACTIVE AIRWAY DISEASE  SULFA ANTIBIOTICS                 08/14/2018  HIVES  DIOVAN [VALSARTAN]                06/27/2012  NAUSEA AND VOMITING  OMNICEF                           06/27/2012 normocephalic,ears and throat are clear  EYES:PERRLA, EOMI, conjunctiva are clear  NECK: supple,no adenopathy  CHEST: no chest tenderness  LUNGS: clear to auscultation  CARDIO: RRR without murmur  GI: good BS's,no masses, HSM or tenderness  MUSCULOSKELETAL file for this visit.

## 2021-10-29 ENCOUNTER — OFFICE VISIT (OUTPATIENT)
Dept: HEMATOLOGY/ONCOLOGY | Facility: HOSPITAL | Age: 76
End: 2021-10-29
Attending: INTERNAL MEDICINE
Payer: MEDICARE

## 2021-10-29 VITALS
SYSTOLIC BLOOD PRESSURE: 146 MMHG | RESPIRATION RATE: 16 BRPM | BODY MASS INDEX: 29.15 KG/M2 | OXYGEN SATURATION: 97 % | HEIGHT: 60.98 IN | WEIGHT: 154.38 LBS | HEART RATE: 96 BPM | DIASTOLIC BLOOD PRESSURE: 74 MMHG | TEMPERATURE: 97 F

## 2021-10-29 DIAGNOSIS — D80.3 SELECTIVE DEFICIENCY OF IGG SUBCLASSES (HCC): Primary | ICD-10-CM

## 2021-10-29 PROCEDURE — 96366 THER/PROPH/DIAG IV INF ADDON: CPT

## 2021-10-29 PROCEDURE — 96365 THER/PROPH/DIAG IV INF INIT: CPT

## 2021-10-29 PROCEDURE — 96375 TX/PRO/DX INJ NEW DRUG ADDON: CPT

## 2021-10-29 RX ORDER — ACETAMINOPHEN 325 MG/1
650 TABLET ORAL ONCE
Status: COMPLETED | OUTPATIENT
Start: 2021-10-29 | End: 2021-10-29

## 2021-10-29 RX ORDER — DIPHENHYDRAMINE HCL 25 MG
50 CAPSULE ORAL ONCE
Status: COMPLETED | OUTPATIENT
Start: 2021-10-29 | End: 2021-10-29

## 2021-10-29 RX ORDER — DIPHENHYDRAMINE HCL 25 MG
50 CAPSULE ORAL ONCE
Status: CANCELLED | OUTPATIENT
Start: 2021-11-01

## 2021-10-29 RX ORDER — ACETAMINOPHEN 325 MG/1
650 TABLET ORAL ONCE
Status: CANCELLED | OUTPATIENT
Start: 2021-11-01

## 2021-10-29 RX ORDER — SODIUM CHLORIDE 0.9 % (FLUSH) 0.9 %
10 SYRINGE (ML) INJECTION ONCE
Status: CANCELLED | OUTPATIENT
Start: 2021-11-01

## 2021-10-29 RX ADMIN — DIPHENHYDRAMINE HCL 50 MG: 25 MG CAPSULE ORAL at 09:21:00

## 2021-10-29 RX ADMIN — ACETAMINOPHEN 650 MG: 325 TABLET ORAL at 09:21:00

## 2021-11-04 ENCOUNTER — OFFICE VISIT (OUTPATIENT)
Dept: NEUROLOGY | Facility: CLINIC | Age: 76
End: 2021-11-04
Payer: MEDICARE

## 2021-11-04 VITALS
DIASTOLIC BLOOD PRESSURE: 78 MMHG | SYSTOLIC BLOOD PRESSURE: 140 MMHG | HEART RATE: 78 BPM | RESPIRATION RATE: 18 BRPM | WEIGHT: 153.81 LBS | BODY MASS INDEX: 29 KG/M2

## 2021-11-04 DIAGNOSIS — G43.009 MIGRAINE WITHOUT AURA AND WITHOUT STATUS MIGRAINOSUS, NOT INTRACTABLE: ICD-10-CM

## 2021-11-04 DIAGNOSIS — E53.8 B12 DEFICIENCY: ICD-10-CM

## 2021-11-04 DIAGNOSIS — M47.817 SPONDYLOSIS OF LUMBOSACRAL REGION WITHOUT MYELOPATHY OR RADICULOPATHY: ICD-10-CM

## 2021-11-04 DIAGNOSIS — R20.2 NUMBNESS AND TINGLING: ICD-10-CM

## 2021-11-04 DIAGNOSIS — R25.1 TREMOR OF BOTH HANDS: ICD-10-CM

## 2021-11-04 DIAGNOSIS — M47.812 ARTHROPATHY OF CERVICAL FACET JOINT: ICD-10-CM

## 2021-11-04 DIAGNOSIS — R29.6 FALLS FREQUENTLY: Primary | ICD-10-CM

## 2021-11-04 DIAGNOSIS — R20.0 NUMBNESS AND TINGLING: ICD-10-CM

## 2021-11-04 PROCEDURE — 3078F DIAST BP <80 MM HG: CPT | Performed by: OTHER

## 2021-11-04 PROCEDURE — 3077F SYST BP >= 140 MM HG: CPT | Performed by: OTHER

## 2021-11-04 PROCEDURE — 99214 OFFICE O/P EST MOD 30 MIN: CPT | Performed by: OTHER

## 2021-11-04 RX ORDER — PREGABALIN 100 MG/1
100 CAPSULE ORAL 3 TIMES DAILY
Qty: 90 CAPSULE | Refills: 5 | Status: SHIPPED | OUTPATIENT
Start: 2021-11-04

## 2021-11-04 NOTE — PROGRESS NOTES
Alan [address]   Neurology; follow up CLINIC VISIT  2021    Claudio Spicer Patient Status:  No patient class for patient encounter    1945 MRN [id number]   Location [address], [facility] came back to review, Hand tremor is better after stopped 30 mg every day Cymbalta, she is on Wellbutrin, increased dose, she feels hand shaking is more often, hard to type, she is on 60 mg qd only, it helped her enough.  She has been doing B12 injection for 4/21/17   • EGD  4/21/17   • FEMUR/KNEE SURG UNLISTED      right knee arthroscopy   • HERNIA SURGERY     • HYSTERECTOMY  1984    total hystero. • KNEE REPLACEMENT SURGERY     • LUMPECTOMY LEFT Left 1999    lt lumpectomy and radiation.    • OOPHORECTOMY Bi tablet Take 1 tablet (4 mg total) by mouth every 8 (eight) hours as needed for Nausea. 30 tablet 0   • XARELTO 20 MG Oral Tab Take 1 tablet (20 mg total) by mouth daily.  90 tablet 0   • ROSUVASTATIN 10 MG Oral Tab Take 1 tablet (10 mg total) by mouth night chest pain, no palpitations   GI: denies nausea, vomiting, constipation, diarrhea; no rectal bleeding; no heartburn  GENITAL/: no dysuria, urgency or frequency;  no hernias; no nocturia  GYNE/: no dysuria, urgency or frequency; no urinary incontinence movement       CN VIII:  Normal hearing       CN IX, XI:  Normal Gag, uvula palate midline       CN XII:  SCM strong, equal shoulder shrug  Motor:  No drift, rapid finger movement symmetric. 5/5 in all limbs with normal tone.  No tremor of any kind; mild we neuropathy at the wrist compatible with bilateral carpal tunnel syndrome of a mild to moderate degree on the right and to a moderate to severe degree on the left side.     2.  Bilateral C6-7 and L4-5, L5-S1 cervical and lumbar radiculopathy predominantly on MD on 8/26/2020 at 10:37 AM       Problem List Items Addressed This Visit     Falls frequently - Primary    Relevant Orders    NEUROSURGERY - INTERNAL    Migraine without aura or status migrainosus    Numbness and tingling    Tremor of both hands

## 2021-11-11 ENCOUNTER — PATIENT OUTREACH (OUTPATIENT)
Dept: CASE MANAGEMENT | Age: 76
End: 2021-11-11

## 2021-11-26 ENCOUNTER — OFFICE VISIT (OUTPATIENT)
Dept: HEMATOLOGY/ONCOLOGY | Facility: HOSPITAL | Age: 76
End: 2021-11-26
Attending: INTERNAL MEDICINE
Payer: MEDICARE

## 2021-11-26 VITALS
OXYGEN SATURATION: 97 % | DIASTOLIC BLOOD PRESSURE: 97 MMHG | RESPIRATION RATE: 18 BRPM | WEIGHT: 150 LBS | SYSTOLIC BLOOD PRESSURE: 168 MMHG | HEIGHT: 60.98 IN | BODY MASS INDEX: 28.32 KG/M2 | HEART RATE: 99 BPM | TEMPERATURE: 97 F

## 2021-11-26 DIAGNOSIS — D80.3 SELECTIVE DEFICIENCY OF IGG SUBCLASSES (HCC): Primary | ICD-10-CM

## 2021-11-26 PROCEDURE — 96375 TX/PRO/DX INJ NEW DRUG ADDON: CPT

## 2021-11-26 PROCEDURE — 96366 THER/PROPH/DIAG IV INF ADDON: CPT

## 2021-11-26 PROCEDURE — 96365 THER/PROPH/DIAG IV INF INIT: CPT

## 2021-11-26 RX ORDER — SODIUM CHLORIDE 0.9 % (FLUSH) 0.9 %
10 SYRINGE (ML) INJECTION ONCE
Status: CANCELLED | OUTPATIENT
Start: 2021-12-01

## 2021-11-26 RX ORDER — DIPHENHYDRAMINE HCL 25 MG
50 CAPSULE ORAL ONCE
Status: COMPLETED | OUTPATIENT
Start: 2021-11-26 | End: 2021-11-26

## 2021-11-26 RX ORDER — ACETAMINOPHEN 325 MG/1
650 TABLET ORAL ONCE
Status: CANCELLED | OUTPATIENT
Start: 2021-12-01

## 2021-11-26 RX ORDER — DIPHENHYDRAMINE HCL 25 MG
50 CAPSULE ORAL ONCE
Status: CANCELLED | OUTPATIENT
Start: 2021-12-01

## 2021-11-26 RX ORDER — ACETAMINOPHEN 325 MG/1
650 TABLET ORAL ONCE
Status: COMPLETED | OUTPATIENT
Start: 2021-11-26 | End: 2021-11-26

## 2021-11-26 RX ADMIN — DIPHENHYDRAMINE HCL 50 MG: 25 MG CAPSULE ORAL at 09:28:00

## 2021-11-26 RX ADMIN — ACETAMINOPHEN 325 MG: 325 TABLET ORAL at 09:28:00

## 2021-11-26 NOTE — PROGRESS NOTES
Education Record    Learner:  Patient    Disease / Diagnosis:IVIG  Barriers / Limitations:  None   Comments:    Method:  Discussion   Comments:    General Topics:  Medication and Plan of care reviewed   Comments:    Outcome:  Shows understanding   Comments

## 2021-11-27 DIAGNOSIS — E11.65 UNCONTROLLED TYPE 2 DIABETES MELLITUS WITH HYPERGLYCEMIA (HCC): ICD-10-CM

## 2021-11-27 RX ORDER — METFORMIN HYDROCHLORIDE 500 MG/1
TABLET, EXTENDED RELEASE ORAL
Qty: 180 TABLET | Refills: 0 | Status: SHIPPED | OUTPATIENT
Start: 2021-11-27

## 2021-11-29 ENCOUNTER — PATIENT OUTREACH (OUTPATIENT)
Dept: CASE MANAGEMENT | Age: 76
End: 2021-11-29

## 2021-11-29 DIAGNOSIS — F41.9 ANXIETY: ICD-10-CM

## 2021-11-29 DIAGNOSIS — Z99.89 OSA ON CPAP: ICD-10-CM

## 2021-11-29 DIAGNOSIS — G47.33 OSA ON CPAP: ICD-10-CM

## 2021-11-29 DIAGNOSIS — E78.00 PURE HYPERCHOLESTEROLEMIA: ICD-10-CM

## 2021-11-29 DIAGNOSIS — E11.65 UNCONTROLLED TYPE 2 DIABETES MELLITUS WITH HYPERGLYCEMIA (HCC): ICD-10-CM

## 2021-11-29 DIAGNOSIS — I10 BENIGN ESSENTIAL HTN: ICD-10-CM

## 2021-11-29 DIAGNOSIS — I70.0 ATHEROSCLEROSIS OF AORTA (HCC): ICD-10-CM

## 2021-11-29 DIAGNOSIS — J45.31 MILD PERSISTENT ASTHMA WITH ACUTE EXACERBATION: ICD-10-CM

## 2021-11-29 DIAGNOSIS — Z86.711 HISTORY OF PULMONARY EMBOLISM: ICD-10-CM

## 2021-11-29 DIAGNOSIS — F33.1 MAJOR DEPRESSIVE DISORDER, RECURRENT EPISODE, MODERATE (HCC): Chronic | ICD-10-CM

## 2021-11-29 DIAGNOSIS — G56.02 CARPAL TUNNEL SYNDROME OF LEFT WRIST: ICD-10-CM

## 2021-11-29 DIAGNOSIS — Z85.3 HISTORY OF LEFT BREAST CANCER: ICD-10-CM

## 2021-11-29 DIAGNOSIS — Z85.828 HISTORY OF BASAL CELL CARCINOMA: ICD-10-CM

## 2021-11-29 DIAGNOSIS — E53.8 B12 DEFICIENCY: ICD-10-CM

## 2021-11-29 NOTE — PROGRESS NOTES
11/29/2021  Spoke to Keily for CCM. Updates to patient care team/comments: UTD. Patient reported changes in medications: together we reviewed with no updates. Med Adherence  Comment: pt reports taking all medications as prescribed.        Health visitors and care teams, all patients and visitors are required to wear a mask during their entire visit, only to be removed if asked to do so by your provider.   We are working to limit the number of people in our waiting rooms and ask that patients do not reported progress toward goal: Patient is receiving vitamin b12 injections and is following up with therapist.  Mell Lopez  · Update to previous barriers: N/A     · Patient Reported New Barriers And Concerns: Patient reports no new barriers and concerns.

## 2021-11-30 PROCEDURE — 99490 CHRNC CARE MGMT STAFF 1ST 20: CPT

## 2021-12-06 ENCOUNTER — OFFICE VISIT (OUTPATIENT)
Dept: SURGERY | Facility: CLINIC | Age: 76
End: 2021-12-06
Payer: MEDICARE

## 2021-12-06 VITALS
WEIGHT: 150 LBS | DIASTOLIC BLOOD PRESSURE: 88 MMHG | HEIGHT: 60.98 IN | BODY MASS INDEX: 28.32 KG/M2 | SYSTOLIC BLOOD PRESSURE: 128 MMHG

## 2021-12-06 DIAGNOSIS — R26.9 NEUROLOGIC GAIT DYSFUNCTION: ICD-10-CM

## 2021-12-06 DIAGNOSIS — M54.12 CERVICAL MYELOPATHY WITH CERVICAL RADICULOPATHY (HCC): Primary | ICD-10-CM

## 2021-12-06 DIAGNOSIS — R29.898 WEAKNESS OF BOTH UPPER EXTREMITIES: ICD-10-CM

## 2021-12-06 DIAGNOSIS — M48.02 CERVICAL STENOSIS OF SPINAL CANAL: ICD-10-CM

## 2021-12-06 DIAGNOSIS — R29.898 WEAKNESS OF BOTH LOWER EXTREMITIES: ICD-10-CM

## 2021-12-06 DIAGNOSIS — G95.9 CERVICAL MYELOPATHY WITH CERVICAL RADICULOPATHY (HCC): Primary | ICD-10-CM

## 2021-12-06 PROCEDURE — 99214 OFFICE O/P EST MOD 30 MIN: CPT | Performed by: PHYSICIAN ASSISTANT

## 2021-12-06 NOTE — PROGRESS NOTES
Seemed to have increased since last year    Has some older MRI    When she falls it's always forward  Nothing that happens prior that indicates that she is going to fall    Has neuropathy in Right foot  Knows that she has pinched nerves in neck  And lower

## 2021-12-06 NOTE — PATIENT INSTRUCTIONS
PLAN:  -Patient was offered physical therapy with traction, Vista therapy collar for traction.   She declined she would like to try and fix this  -Has had injections before she would like to go ahead and try and surgically correct the problem so she can imp

## 2021-12-06 NOTE — PROGRESS NOTES
Neurosurgery Consultation        HISTORY OF PRESENT Rai Robbie is a 77year old right-handed female here for neurosurgical evaluation. She has a history of developing falls in July 2020 with no known cause.   She is followed on multiple • High blood pressure    • High cholesterol    • History of blood clots    • IgG deficiency (HCC)     ? ?? pt states she gets IgG infusions due to lack of IgG, unable to fight off infections   • Migraines    • Muscle weakness    • Neuropathy     rt foot b that she does not drink alcohol.     ALLERGIES:    Bactrim                 HIVES  Ciprofloxacin           HIVES  Mold                    REACTIVE AIRWAY DISEASE  Sulfa Antibiotics       HIVES  Diovan [Valsartan]      NAUSEA AND VOMITING  Omnicef Sulfate  (90 Base) MCG/ACT Inhalation Aero Soln, Inhale 2 puffs into the lungs every 6 (six) hours as needed for Wheezing., Disp: , Rfl:   tiZANidine HCl 4 MG Oral Tab, Take 4 mg by mouth daily as needed. , Disp: , Rfl:   DULoxetine HCl 60 MG Oral Ca adduction Intrinsics   Right         4*        5         4+*          4+ 5 5 4+ 5 4   Left         4*        5         4+*          5 - 5 4+ 5 4     Lower extremity strength:     Iliopsoas  Hamstrings   Quads    D-flexion P-flexion Eversion   Right       4 voids are present. CT of the cervical spine from 10/18/2021 shows loss of lordosis.  spondylosis C-4-5-6-7    ASSESSMENT:  -C4-5 C5-6 C6-7 spondylosis with axial neck pain  -C4-5, C5-6, C6-7 spinal stenosis with myelopathy  -Weakness in the upper and lo

## 2021-12-14 RX ORDER — SEMAGLUTIDE 1.34 MG/ML
INJECTION, SOLUTION SUBCUTANEOUS
Qty: 3 EACH | Refills: 0 | Status: SHIPPED | OUTPATIENT
Start: 2021-12-14

## 2021-12-23 ENCOUNTER — OFFICE VISIT (OUTPATIENT)
Dept: HEMATOLOGY/ONCOLOGY | Facility: HOSPITAL | Age: 76
End: 2021-12-23
Attending: INTERNAL MEDICINE
Payer: MEDICARE

## 2021-12-23 VITALS
DIASTOLIC BLOOD PRESSURE: 89 MMHG | RESPIRATION RATE: 18 BRPM | TEMPERATURE: 97 F | HEART RATE: 90 BPM | WEIGHT: 149 LBS | OXYGEN SATURATION: 96 % | SYSTOLIC BLOOD PRESSURE: 170 MMHG | BODY MASS INDEX: 26 KG/M2

## 2021-12-23 DIAGNOSIS — D80.3 SELECTIVE DEFICIENCY OF IGG SUBCLASSES (HCC): Primary | ICD-10-CM

## 2021-12-23 PROCEDURE — 96375 TX/PRO/DX INJ NEW DRUG ADDON: CPT

## 2021-12-23 PROCEDURE — 96365 THER/PROPH/DIAG IV INF INIT: CPT

## 2021-12-23 PROCEDURE — 96366 THER/PROPH/DIAG IV INF ADDON: CPT

## 2021-12-23 RX ORDER — DIPHENHYDRAMINE HCL 25 MG
50 CAPSULE ORAL ONCE
Status: COMPLETED | OUTPATIENT
Start: 2021-12-23 | End: 2021-12-23

## 2021-12-23 RX ORDER — DIPHENHYDRAMINE HCL 25 MG
50 CAPSULE ORAL ONCE
Status: CANCELLED | OUTPATIENT
Start: 2022-01-01

## 2021-12-23 RX ORDER — ACETAMINOPHEN 325 MG/1
650 TABLET ORAL ONCE
Status: COMPLETED | OUTPATIENT
Start: 2021-12-23 | End: 2021-12-23

## 2021-12-23 RX ORDER — SODIUM CHLORIDE 0.9 % (FLUSH) 0.9 %
10 SYRINGE (ML) INJECTION ONCE
Status: CANCELLED | OUTPATIENT
Start: 2022-01-01

## 2021-12-23 RX ORDER — ACETAMINOPHEN 325 MG/1
650 TABLET ORAL ONCE
Status: CANCELLED | OUTPATIENT
Start: 2022-01-01

## 2021-12-23 RX ADMIN — ACETAMINOPHEN 650 MG: 325 TABLET ORAL at 09:31:00

## 2021-12-23 RX ADMIN — DIPHENHYDRAMINE HCL 50 MG: 25 MG CAPSULE ORAL at 09:31:00

## 2021-12-29 ENCOUNTER — PATIENT OUTREACH (OUTPATIENT)
Dept: CASE MANAGEMENT | Age: 76
End: 2021-12-29

## 2021-12-29 ENCOUNTER — HOSPITAL ENCOUNTER (OUTPATIENT)
Dept: MRI IMAGING | Age: 76
Discharge: HOME | End: 2021-12-29
Attending: PHYSICIAN ASSISTANT
Payer: MEDICARE

## 2021-12-29 DIAGNOSIS — G95.9 CERVICAL MYELOPATHY WITH CERVICAL RADICULOPATHY (HCC): ICD-10-CM

## 2021-12-29 DIAGNOSIS — M48.02 CERVICAL STENOSIS OF SPINAL CANAL: ICD-10-CM

## 2021-12-29 DIAGNOSIS — R29.898 WEAKNESS OF BOTH LOWER EXTREMITIES: ICD-10-CM

## 2021-12-29 DIAGNOSIS — R29.898 WEAKNESS OF BOTH UPPER EXTREMITIES: ICD-10-CM

## 2021-12-29 DIAGNOSIS — M54.12 CERVICAL MYELOPATHY WITH CERVICAL RADICULOPATHY (HCC): ICD-10-CM

## 2021-12-29 DIAGNOSIS — R26.9 NEUROLOGIC GAIT DYSFUNCTION: ICD-10-CM

## 2021-12-29 PROCEDURE — 72141 MRI NECK SPINE W/O DYE: CPT | Performed by: PHYSICIAN ASSISTANT

## 2021-12-29 RX ORDER — LISINOPRIL 20 MG/1
TABLET ORAL
Qty: 90 TABLET | Refills: 0 | Status: SHIPPED | OUTPATIENT
Start: 2021-12-29

## 2021-12-29 NOTE — PROGRESS NOTES
Will call later currently at testing site.    Total time spent with patient including chart review: 2  Time spent with patient this month: 2    Total time spent with communication and chart review this month to date: 2

## 2022-01-01 NOTE — TELEPHONE ENCOUNTER
Pt states she is having episodes of dizziness which started 2 weeks ago. Pt states when she is laying down and gets up the room is spinning. Episodes last for about 20 seconds. Pt concerned this could be heart related.  Please advise and thank you in advanc
Spoke with patient confirmed symptoms, she is having dizziness x2 weeks when she is laying down and gets up the room is spinning but she has steady gait able to walk straight. She indicates these episodes last for about 20 seconds.  Patient scheduled with S
Adequate: hears normal conversation without difficulty

## 2022-01-05 ENCOUNTER — TELEPHONE (OUTPATIENT)
Dept: SURGERY | Facility: CLINIC | Age: 77
End: 2022-01-05

## 2022-01-05 NOTE — TELEPHONE ENCOUNTER
Patient is scheduled for visit with Dr Tommy Guajardo on 1-20 and has not completed the cervical x-ray. [employer] message sent as reminder to get this done prior to her appointment.

## 2022-01-17 ENCOUNTER — TELEPHONE (OUTPATIENT)
Dept: INTERNAL MEDICINE CLINIC | Facility: CLINIC | Age: 77
End: 2022-01-17

## 2022-01-17 RX ORDER — PANTOPRAZOLE SODIUM 40 MG/1
40 TABLET, DELAYED RELEASE ORAL
Qty: 90 TABLET | Refills: 1 | Status: SHIPPED | OUTPATIENT
Start: 2022-01-17 | End: 2022-08-05

## 2022-01-17 NOTE — TELEPHONE ENCOUNTER
Last OV: 10/26/21 pre-op  Last PE: 2/4/21    Future Appointments   Date Time Provider [facility]   1/20/2022 11:30 AM Elina Rosario MD ENINAPER2 [facility] [place]   1/21/2022  9:00 AM [address] [place] RN5 [address] [facility]   5/5/2022 10:20 AM Kalyn Duval MD [facility] [place]   6/13/2022  9:40 AM Juan Reddy MD SP PULM [facility]   10/5/2022  9:00 AM Brian Mahmood,  [facility]        Latest labs: 9/16/21 A1c and CMP 8/6/21 Vit B12, Iron and TIBC, Ferritin and CBC    Latest RX: pantoprazole- 90 tabs 0 refills on 10/8/21  Xarelto- 90 tabs 0 refills on 10/12/21    Per protocol, both medications not on protocol. Rx pending.

## 2022-01-17 NOTE — TELEPHONE ENCOUNTER
Spoke to pt regarding Medicare physical. She is awaiting a surgery date for neck procedure.  Once that is taken care of she will call us to schedule physical.

## 2022-01-20 ENCOUNTER — OFFICE VISIT (OUTPATIENT)
Dept: SURGERY | Facility: CLINIC | Age: 77
End: 2022-01-20
Payer: MEDICARE

## 2022-01-20 ENCOUNTER — TELEPHONE (OUTPATIENT)
Dept: INTERNAL MEDICINE CLINIC | Facility: CLINIC | Age: 77
End: 2022-01-20

## 2022-01-20 ENCOUNTER — HOSPITAL ENCOUNTER (OUTPATIENT)
Dept: GENERAL RADIOLOGY | Facility: HOSPITAL | Age: 77
Discharge: HOME | End: 2022-01-20
Attending: PHYSICIAN ASSISTANT
Payer: MEDICARE

## 2022-01-20 ENCOUNTER — APPOINTMENT (OUTPATIENT)
Dept: HEMATOLOGY/ONCOLOGY | Facility: HOSPITAL | Age: 77
End: 2022-01-20
Attending: INTERNAL MEDICINE
Payer: MEDICARE

## 2022-01-20 ENCOUNTER — TELEPHONE (OUTPATIENT)
Dept: SURGERY | Facility: CLINIC | Age: 77
End: 2022-01-20

## 2022-01-20 VITALS
BODY MASS INDEX: 26.4 KG/M2 | DIASTOLIC BLOOD PRESSURE: 74 MMHG | SYSTOLIC BLOOD PRESSURE: 118 MMHG | WEIGHT: 149 LBS | HEIGHT: 63 IN

## 2022-01-20 DIAGNOSIS — G95.9 CERVICAL MYELOPATHY WITH CERVICAL RADICULOPATHY (HCC): ICD-10-CM

## 2022-01-20 DIAGNOSIS — G95.9 CERVICAL MYELOPATHY WITH CERVICAL RADICULOPATHY (HCC): Primary | ICD-10-CM

## 2022-01-20 DIAGNOSIS — M48.02 CERVICAL STENOSIS OF SPINAL CANAL: ICD-10-CM

## 2022-01-20 DIAGNOSIS — M54.12 CERVICAL RADICULITIS: ICD-10-CM

## 2022-01-20 DIAGNOSIS — M54.12 CERVICAL MYELOPATHY WITH CERVICAL RADICULOPATHY (HCC): ICD-10-CM

## 2022-01-20 DIAGNOSIS — R29.898 WEAKNESS OF BOTH UPPER EXTREMITIES: ICD-10-CM

## 2022-01-20 DIAGNOSIS — R26.9 NEUROLOGIC GAIT DYSFUNCTION: ICD-10-CM

## 2022-01-20 DIAGNOSIS — M54.12 CERVICAL MYELOPATHY WITH CERVICAL RADICULOPATHY (HCC): Primary | ICD-10-CM

## 2022-01-20 DIAGNOSIS — R29.898 WEAKNESS OF BOTH LOWER EXTREMITIES: ICD-10-CM

## 2022-01-20 PROCEDURE — 99213 OFFICE O/P EST LOW 20 MIN: CPT | Performed by: NEUROLOGICAL SURGERY

## 2022-01-20 PROCEDURE — 72052 X-RAY EXAM NECK SPINE 6/>VWS: CPT | Performed by: PHYSICIAN ASSISTANT

## 2022-01-20 NOTE — TELEPHONE ENCOUNTER
Patient is scheduled for CERVICAL 5 AND 6 LAMINECTOMIES, PARTIAL C7 LAMINECTOMY, LEFT CERVICAL 5/CERVICAL 6 AND /CERVICAL 7 FORAMINOTOMIES, POSSIBLE C6/C7 ARTHRODESIS, AUTOGRAFT, ALLOGRAFT on 2/21/22 with Dr Natanael Orellana. Y  form completed  Y Surgery order signed   Sophia Guerra on surgery sheet  Y Placed on outlook calendar  Y [employer] message sent to patient with sx instructions  Y Faxed pre-op clearance request to PCP Dr. Maile Potter pre-op clearance request to Hemo-onc Dr. Cait Musa pre-op clearance request to Pulmonologist Dr. Sam Laird appointments made   Y 3 day follow up reminder placed. Y SA request sent to [facility]      [employer] order printed and placed in YOANDY Flores for review and signature

## 2022-01-20 NOTE — PROGRESS NOTES
Neurosurgery Clinic Visit  2022    Gene Calvin PCP:  Rachana Rider MD    1945 MRN [id number]     HISTORY OF PRESENT ILLNESS:  Gene Calvin is a(n) 77year old female here for follow-up  She complains of pain on her left arm d [facility]  1/20/2022  12:54 PM   Dictated but not proofread

## 2022-01-20 NOTE — TELEPHONE ENCOUNTER
Future Appointments   Date Time Provider Gomez Angela   2/7/2022  1:40 PM Valeria Spain, APRN [facility] [address] [facility]     Pt having surgery w/Dr. Sanket Calle on 2/21-paperwork in red folder

## 2022-01-21 ENCOUNTER — OFFICE VISIT (OUTPATIENT)
Dept: HEMATOLOGY/ONCOLOGY | Facility: HOSPITAL | Age: 77
End: 2022-01-21
Attending: INTERNAL MEDICINE
Payer: MEDICARE

## 2022-01-21 VITALS
BODY MASS INDEX: 27.98 KG/M2 | OXYGEN SATURATION: 98 % | DIASTOLIC BLOOD PRESSURE: 93 MMHG | HEIGHT: 60.98 IN | SYSTOLIC BLOOD PRESSURE: 167 MMHG | TEMPERATURE: 98 F | RESPIRATION RATE: 18 BRPM | WEIGHT: 148.19 LBS | HEART RATE: 107 BPM

## 2022-01-21 DIAGNOSIS — D80.3 SELECTIVE DEFICIENCY OF IGG SUBCLASSES (HCC): ICD-10-CM

## 2022-01-21 DIAGNOSIS — Z86.39 HISTORY OF IRON DEFICIENCY: Primary | ICD-10-CM

## 2022-01-21 LAB
DEPRECATED HBV CORE AB SER IA-ACNC: 304.4 NG/ML
IRON SATN MFR SERPL: 32 %
IRON SERPL-MCNC: 103 UG/DL
TIBC SERPL-MCNC: 317 UG/DL (ref 240–450)
TRANSFERRIN SERPL-MCNC: 213 MG/DL (ref 200–360)

## 2022-01-21 PROCEDURE — 96375 TX/PRO/DX INJ NEW DRUG ADDON: CPT

## 2022-01-21 PROCEDURE — 96365 THER/PROPH/DIAG IV INF INIT: CPT

## 2022-01-21 PROCEDURE — 96366 THER/PROPH/DIAG IV INF ADDON: CPT

## 2022-01-21 PROCEDURE — 82728 ASSAY OF FERRITIN: CPT

## 2022-01-21 PROCEDURE — 83550 IRON BINDING TEST: CPT

## 2022-01-21 PROCEDURE — 83540 ASSAY OF IRON: CPT

## 2022-01-21 RX ORDER — ACETAMINOPHEN 325 MG/1
650 TABLET ORAL ONCE
Status: COMPLETED | OUTPATIENT
Start: 2022-01-21 | End: 2022-01-21

## 2022-01-21 RX ORDER — DIPHENHYDRAMINE HCL 25 MG
50 CAPSULE ORAL ONCE
Status: COMPLETED | OUTPATIENT
Start: 2022-01-21 | End: 2022-01-21

## 2022-01-21 RX ADMIN — DIPHENHYDRAMINE HCL 25 MG: 25 MG CAPSULE ORAL at 09:24:00

## 2022-01-21 RX ADMIN — ACETAMINOPHEN 325 MG: 325 TABLET ORAL at 09:24:00

## 2022-01-21 NOTE — PROGRESS NOTES
Education Record    Learner:  Patient    Disease / Diagnosis: Hypogammaglobulinemia    Barriers / Limitations:  None   Comments:    Method:  Brief focused   Comments:    General Topics:  Plan of care reviewed and Fall risk and prevention   Comments:     Out

## 2022-01-26 ENCOUNTER — PATIENT OUTREACH (OUTPATIENT)
Dept: CASE MANAGEMENT | Age: 77
End: 2022-01-26

## 2022-01-26 DIAGNOSIS — Z85.3 HISTORY OF LEFT BREAST CANCER: ICD-10-CM

## 2022-01-26 DIAGNOSIS — I70.0 ATHEROSCLEROSIS OF AORTA (HCC): ICD-10-CM

## 2022-01-26 DIAGNOSIS — I10 BENIGN ESSENTIAL HTN: ICD-10-CM

## 2022-01-26 DIAGNOSIS — Z86.711 HISTORY OF PULMONARY EMBOLISM: ICD-10-CM

## 2022-01-26 DIAGNOSIS — J45.31 MILD PERSISTENT ASTHMA WITH ACUTE EXACERBATION: ICD-10-CM

## 2022-01-26 DIAGNOSIS — D80.3 SELECTIVE DEFICIENCY OF IGG SUBCLASSES (HCC): ICD-10-CM

## 2022-01-26 DIAGNOSIS — E78.00 PURE HYPERCHOLESTEROLEMIA: ICD-10-CM

## 2022-01-26 DIAGNOSIS — E53.8 B12 DEFICIENCY: ICD-10-CM

## 2022-01-26 DIAGNOSIS — F41.9 ANXIETY: ICD-10-CM

## 2022-01-26 DIAGNOSIS — R29.6 FALLS FREQUENTLY: ICD-10-CM

## 2022-01-26 DIAGNOSIS — E11.65 UNCONTROLLED TYPE 2 DIABETES MELLITUS WITH HYPERGLYCEMIA (HCC): ICD-10-CM

## 2022-01-26 DIAGNOSIS — F33.1 MAJOR DEPRESSIVE DISORDER, RECURRENT EPISODE, MODERATE (HCC): Chronic | ICD-10-CM

## 2022-01-26 DIAGNOSIS — Z99.89 OSA ON CPAP: ICD-10-CM

## 2022-01-26 DIAGNOSIS — Z85.828 HISTORY OF BASAL CELL CARCINOMA: ICD-10-CM

## 2022-01-26 DIAGNOSIS — G47.33 OSA ON CPAP: ICD-10-CM

## 2022-01-26 DIAGNOSIS — K21.9 GASTROESOPHAGEAL REFLUX DISEASE WITHOUT ESOPHAGITIS: ICD-10-CM

## 2022-01-26 NOTE — PROGRESS NOTES
1/26/2022  Spoke to Keily for CCM. Updates to patient care team/comments: yes, added   Divya Landeros [place] Physician Assistant [phone]   NeuroSurgeon     Patient reported changes in medications: together we reviewed and updated.   Pt states she after your  procedure/surgery, call the doctor who manages your insulin for  instructions for taking your insulin the day before surgery and the  morning of surgery  If you are taking oral medications for your diabetes, you will need to  hold the morning d often found with first aid supplies. Shower daily with this soap for five days prior to your surgery. Use the  entire bottle over 5 days.   Do not shave near the area of your surgery for at least 48 hours before  surgery Bathing Instructions: CAUTION: It i decrease your nausea after surgery · The carbohydrates in Gatorade help reduce your body’s stress response to  surgery  Why does your anesthesiologist ask you to take acetaminophen before  surgery?  · The acetaminophen will help you reduce your pain after s [address] [address]  [phone]      Monday June 13, 2022  9:40 AM  Follow Up Visit with MD Aneta [address], [place] ([address], [place]) [phone]  with his fiance. Pt still thinking of moving with him but still unsure. He's in the very beginning stages of Autism but does very well for himself. At one point he was in a bad place but is currently doing well.   Listened to pt express her thoughts and diet and exercise routine. Continue to provide encouragement, support, and education for healthy coping and dx          Care Manager Follow Up: in 1 month. Reason For Follow Up: review progress and or barriers towards patients goals.      Care Managers In

## 2022-01-26 NOTE — TELEPHONE ENCOUNTER
[facility] Faxed with last OV note, insurance card and face sheet.

## 2022-01-27 NOTE — TELEPHONE ENCOUNTER
Received signed Anticoagulation Clearance Request from Dr Pack.   Endorsed to [facility]

## 2022-01-27 NOTE — TELEPHONE ENCOUNTER
Received anticoagulation hold instructions from Hemo-onc Dr. Thaddeus Israel     \" Luis Antonio Anger 3 days before and ASA 7 days before procedure\"     Can be located in Letter's tab 1/27/22      PCP appt scheduled for 2/7/22; will check on status at that time.

## 2022-01-28 ENCOUNTER — OFFICE VISIT (OUTPATIENT)
Dept: HEMATOLOGY/ONCOLOGY | Facility: HOSPITAL | Age: 77
End: 2022-01-28
Attending: INTERNAL MEDICINE
Payer: MEDICARE

## 2022-01-28 VITALS
OXYGEN SATURATION: 100 % | TEMPERATURE: 98 F | BODY MASS INDEX: 28 KG/M2 | SYSTOLIC BLOOD PRESSURE: 180 MMHG | RESPIRATION RATE: 18 BRPM | HEART RATE: 97 BPM | WEIGHT: 148.38 LBS | DIASTOLIC BLOOD PRESSURE: 84 MMHG

## 2022-01-28 DIAGNOSIS — Z86.39 HISTORY OF IRON DEFICIENCY: Primary | ICD-10-CM

## 2022-01-28 DIAGNOSIS — Z86.711 HISTORY OF PULMONARY EMBOLISM: ICD-10-CM

## 2022-01-28 DIAGNOSIS — D80.1 HYPOGAMMAGLOBULINEMIA (HCC): ICD-10-CM

## 2022-01-28 DIAGNOSIS — D80.3 SELECTIVE DEFICIENCY OF IGG SUBCLASSES (HCC): ICD-10-CM

## 2022-01-28 PROCEDURE — 99214 OFFICE O/P EST MOD 30 MIN: CPT | Performed by: INTERNAL MEDICINE

## 2022-01-28 NOTE — PROGRESS NOTES
Patient is here for MD visit. She is scheduled for neck surgery with Dr Pippa Aguirre on 2/21. Patient reports chronic migraines, numbness in her arms and frequent falls. Using a cane to ambulate. Patient is on Xarelto and needs clearance.  She continues on jose alfredo

## 2022-01-31 PROCEDURE — 99490 CHRNC CARE MGMT STAFF 1ST 20: CPT

## 2022-01-31 NOTE — PROGRESS NOTES
[facility]    PATIENT'S NAME: Kaveh Kathleen   ATTENDING PHYSICIAN: Nereida Toussaint M.D.    PATIENT ACCOUNT #: [de-identified] LOCATION: [address] [place] [address] RECORD #: [id number] YOB: 1945   DATE OF SERVICE: 01/28/2022       CANCER pressure is 180/84, which is high for her; she is usually much more normal.  Pulse is 97. Respiratory rate is 20. Temperature 97.8. HEENT:  Unremarkable. LYMPHATICS:  She has no adenopathy. LUNGS:  Clear.   HEART:  Normal.  ABDOMEN:  No hepatosplenomeg

## 2022-02-07 ENCOUNTER — OFFICE VISIT (OUTPATIENT)
Dept: INTERNAL MEDICINE CLINIC | Facility: CLINIC | Age: 77
End: 2022-02-07
Payer: MEDICARE

## 2022-02-07 ENCOUNTER — TELEPHONE (OUTPATIENT)
Dept: INTERNAL MEDICINE CLINIC | Facility: CLINIC | Age: 77
End: 2022-02-07

## 2022-02-07 VITALS
HEART RATE: 112 BPM | RESPIRATION RATE: 18 BRPM | BODY MASS INDEX: 28.2 KG/M2 | HEIGHT: 61 IN | SYSTOLIC BLOOD PRESSURE: 114 MMHG | WEIGHT: 149.38 LBS | DIASTOLIC BLOOD PRESSURE: 82 MMHG

## 2022-02-07 DIAGNOSIS — I10 BENIGN ESSENTIAL HTN: ICD-10-CM

## 2022-02-07 DIAGNOSIS — K21.9 GASTROESOPHAGEAL REFLUX DISEASE WITHOUT ESOPHAGITIS: ICD-10-CM

## 2022-02-07 DIAGNOSIS — D50.8 OTHER IRON DEFICIENCY ANEMIA: ICD-10-CM

## 2022-02-07 DIAGNOSIS — E11.65 UNCONTROLLED TYPE 2 DIABETES MELLITUS WITH HYPERGLYCEMIA (HCC): ICD-10-CM

## 2022-02-07 DIAGNOSIS — J45.31 MILD PERSISTENT ASTHMA WITH ACUTE EXACERBATION: ICD-10-CM

## 2022-02-07 DIAGNOSIS — G95.9 CERVICAL MYELOPATHY (HCC): Primary | ICD-10-CM

## 2022-02-07 DIAGNOSIS — E78.00 PURE HYPERCHOLESTEROLEMIA: ICD-10-CM

## 2022-02-07 DIAGNOSIS — D80.3 SELECTIVE DEFICIENCY OF IGG SUBCLASSES (HCC): ICD-10-CM

## 2022-02-07 DIAGNOSIS — D69.6 THROMBOCYTOPENIA (HCC): ICD-10-CM

## 2022-02-07 DIAGNOSIS — F41.9 ANXIETY: ICD-10-CM

## 2022-02-07 DIAGNOSIS — M54.12 CERVICAL RADICULOPATHY: ICD-10-CM

## 2022-02-07 DIAGNOSIS — M54.12 CERVICAL RADICULITIS: ICD-10-CM

## 2022-02-07 DIAGNOSIS — Z99.89 OSA ON CPAP: ICD-10-CM

## 2022-02-07 DIAGNOSIS — G47.33 OSA ON CPAP: ICD-10-CM

## 2022-02-07 DIAGNOSIS — F33.1 MAJOR DEPRESSIVE DISORDER, RECURRENT EPISODE, MODERATE (HCC): Chronic | ICD-10-CM

## 2022-02-07 DIAGNOSIS — Z86.711 HISTORY OF PULMONARY EMBOLISM: ICD-10-CM

## 2022-02-07 DIAGNOSIS — I25.10 CORONARY ARTERY DISEASE INVOLVING NATIVE CORONARY ARTERY OF NATIVE HEART WITHOUT ANGINA PECTORIS: ICD-10-CM

## 2022-02-07 PROBLEM — Z87.01 HISTORY OF PNEUMONIA: Status: ACTIVE | Noted: 2021-06-02

## 2022-02-07 PROCEDURE — 83036 HEMOGLOBIN GLYCOSYLATED A1C: CPT | Performed by: NURSE PRACTITIONER

## 2022-02-07 PROCEDURE — 93000 ELECTROCARDIOGRAM COMPLETE: CPT | Performed by: NURSE PRACTITIONER

## 2022-02-07 PROCEDURE — 99214 OFFICE O/P EST MOD 30 MIN: CPT | Performed by: NURSE PRACTITIONER

## 2022-02-07 NOTE — TELEPHONE ENCOUNTER
Received call from pt PCP office informing that they are advising against holding pt Duloxetine as she may have withdrawal symptoms and deferring to the ordering provider to give guidance. Informed that we will inquire if Dr. Miguel Lim is okay with pt proceeding with surgery with out holding medication or if he would like her weaned off and if so we will reach out to pt to inform.      Routed to Dr. Miguel Lim

## 2022-02-08 ENCOUNTER — TELEPHONE (OUTPATIENT)
Dept: INTERNAL MEDICINE CLINIC | Facility: CLINIC | Age: 77
End: 2022-02-08

## 2022-02-08 NOTE — TELEPHONE ENCOUNTER
Faxed EKG, labs and pre-op clearance to [address] [place] [address] - Dr. Vannesa Alejandra at [phone]. Ph - [phone]. Confirmation received. Copy in EKG folder and ES bin. Awaiting labs.

## 2022-02-09 ENCOUNTER — HOSPITAL ENCOUNTER (OUTPATIENT)
Dept: GENERAL RADIOLOGY | Age: 77
Discharge: HOME | End: 2022-02-09
Attending: NURSE PRACTITIONER
Payer: MEDICARE

## 2022-02-09 ENCOUNTER — LAB ENCOUNTER (OUTPATIENT)
Dept: LAB | Age: 77
End: 2022-02-09
Attending: INTERNAL MEDICINE
Payer: MEDICARE

## 2022-02-09 ENCOUNTER — LAB ENCOUNTER (OUTPATIENT)
Dept: LAB | Age: 77
End: 2022-02-09
Attending: NURSE PRACTITIONER
Payer: MEDICARE

## 2022-02-09 DIAGNOSIS — R94.31 NONSPECIFIC ABNORMAL ELECTROCARDIOGRAM (ECG) (EKG): Primary | ICD-10-CM

## 2022-02-09 DIAGNOSIS — M54.12 CERVICAL RADICULOPATHY: ICD-10-CM

## 2022-02-09 DIAGNOSIS — R26.9 NEUROLOGIC GAIT DYSFUNCTION: ICD-10-CM

## 2022-02-09 DIAGNOSIS — E78.00 PURE HYPERCHOLESTEROLEMIA: ICD-10-CM

## 2022-02-09 DIAGNOSIS — G95.9 CERVICAL MYELOPATHY (HCC): ICD-10-CM

## 2022-02-09 DIAGNOSIS — I10 BENIGN ESSENTIAL HTN: ICD-10-CM

## 2022-02-09 DIAGNOSIS — Z01.818 PREOP EXAMINATION: ICD-10-CM

## 2022-02-09 DIAGNOSIS — M54.12 CERVICAL RADICULITIS: ICD-10-CM

## 2022-02-09 DIAGNOSIS — E11.65 UNCONTROLLED TYPE 2 DIABETES MELLITUS WITH HYPERGLYCEMIA (HCC): ICD-10-CM

## 2022-02-09 LAB
ALBUMIN SERPL-MCNC: 4.3 G/DL (ref 3.4–5)
ALBUMIN/GLOB SERPL: 1.3 {RATIO} (ref 1–2)
ALP LIVER SERPL-CCNC: 94 U/L
ALT SERPL-CCNC: 36 U/L
ANION GAP SERPL CALC-SCNC: 7 MMOL/L (ref 0–18)
ANTIBODY SCREEN: NEGATIVE
APTT PPP: 40.2 SECONDS (ref 23.3–35.6)
AST SERPL-CCNC: 35 U/L (ref 15–37)
BASOPHILS # BLD AUTO: 0.04 X10(3) UL (ref 0–0.2)
BASOPHILS NFR BLD AUTO: 0.8 %
BILIRUB SERPL-MCNC: 0.6 MG/DL (ref 0.1–2)
BUN BLD-MCNC: 18 MG/DL (ref 7–18)
CALCIUM BLD-MCNC: 9.8 MG/DL (ref 8.5–10.1)
CARTRIDGE LOT#: 867 NUMERIC
CHLORIDE SERPL-SCNC: 105 MMOL/L (ref 98–112)
CHOLEST SERPL-MCNC: 122 MG/DL (ref ?–200)
CO2 SERPL-SCNC: 27 MMOL/L (ref 21–32)
CREAT BLD-MCNC: 0.79 MG/DL
CREAT UR-SCNC: 173 MG/DL
EOSINOPHIL # BLD AUTO: 0.05 X10(3) UL (ref 0–0.7)
EOSINOPHIL NFR BLD AUTO: 1 %
ERYTHROCYTE [DISTWIDTH] IN BLOOD BY AUTOMATED COUNT: 13.2 %
FASTING PATIENT LIPID ANSWER: YES
GLOBULIN PLAS-MCNC: 3.3 G/DL (ref 2.8–4.4)
GLUCOSE BLD-MCNC: 97 MG/DL (ref 70–99)
HCT VFR BLD AUTO: 42 %
HDLC SERPL-MCNC: 67 MG/DL (ref 40–59)
HEMOGLOBIN A1C: 6.3 % (ref 4.3–5.6)
HGB BLD-MCNC: 14 G/DL
IMM GRANULOCYTES # BLD AUTO: 0.02 X10(3) UL (ref 0–1)
IMM GRANULOCYTES NFR BLD: 0.4 %
INR BLD: 2.58 (ref 0.8–1.2)
LDLC SERPL CALC-MCNC: 35 MG/DL (ref ?–100)
LYMPHOCYTES # BLD AUTO: 1.47 X10(3) UL (ref 1–4)
LYMPHOCYTES NFR BLD AUTO: 30.4 %
MCH RBC QN AUTO: 30.8 PG (ref 26–34)
MCHC RBC AUTO-ENTMCNC: 33.3 G/DL (ref 31–37)
MCV RBC AUTO: 92.3 FL
MICROALBUMIN UR-MCNC: 3.09 MG/DL
MICROALBUMIN/CREAT 24H UR-RTO: 17.9 UG/MG (ref ?–30)
MONOCYTES # BLD AUTO: 0.57 X10(3) UL (ref 0.1–1)
MONOCYTES NFR BLD AUTO: 11.8 %
NEUTROPHILS # BLD AUTO: 2.69 X10 (3) UL (ref 1.5–7.7)
NEUTROPHILS # BLD AUTO: 2.69 X10(3) UL (ref 1.5–7.7)
NEUTROPHILS NFR BLD AUTO: 55.6 %
NONHDLC SERPL-MCNC: 55 MG/DL (ref ?–130)
OSMOLALITY SERPL CALC.SUM OF ELEC: 290 MOSM/KG (ref 275–295)
PLATELET # BLD AUTO: 193 10(3)UL (ref 150–450)
POTASSIUM SERPL-SCNC: 4.5 MMOL/L (ref 3.5–5.1)
PROT SERPL-MCNC: 7.6 G/DL (ref 6.4–8.2)
PROTHROMBIN TIME: 27.8 SECONDS (ref 11.6–14.8)
RBC # BLD AUTO: 4.55 X10(6)UL
RH BLOOD TYPE: NEGATIVE
SODIUM SERPL-SCNC: 139 MMOL/L (ref 136–145)
TRIGL SERPL-MCNC: 110 MG/DL (ref 30–149)
VLDLC SERPL CALC-MCNC: 15 MG/DL (ref 0–30)
WBC # BLD AUTO: 4.8 X10(3) UL (ref 4–11)

## 2022-02-09 PROCEDURE — 71046 X-RAY EXAM CHEST 2 VIEWS: CPT | Performed by: NURSE PRACTITIONER

## 2022-02-09 PROCEDURE — 86900 BLOOD TYPING SEROLOGIC ABO: CPT

## 2022-02-09 PROCEDURE — 80061 LIPID PANEL: CPT

## 2022-02-09 PROCEDURE — 87081 CULTURE SCREEN ONLY: CPT

## 2022-02-09 PROCEDURE — 86901 BLOOD TYPING SEROLOGIC RH(D): CPT

## 2022-02-09 PROCEDURE — 85610 PROTHROMBIN TIME: CPT

## 2022-02-09 PROCEDURE — 86850 RBC ANTIBODY SCREEN: CPT

## 2022-02-09 PROCEDURE — 82043 UR ALBUMIN QUANTITATIVE: CPT

## 2022-02-09 PROCEDURE — 36415 COLL VENOUS BLD VENIPUNCTURE: CPT

## 2022-02-09 PROCEDURE — 80053 COMPREHEN METABOLIC PANEL: CPT

## 2022-02-09 PROCEDURE — 82570 ASSAY OF URINE CREATININE: CPT

## 2022-02-09 PROCEDURE — 85730 THROMBOPLASTIN TIME PARTIAL: CPT

## 2022-02-09 PROCEDURE — 85025 COMPLETE CBC W/AUTO DIFF WBC: CPT

## 2022-02-10 ENCOUNTER — TELEPHONE (OUTPATIENT)
Dept: SURGERY | Facility: CLINIC | Age: 77
End: 2022-02-10

## 2022-02-11 ENCOUNTER — TELEPHONE (OUTPATIENT)
Dept: SURGERY | Facility: CLINIC | Age: 77
End: 2022-02-11

## 2022-02-11 NOTE — TELEPHONE ENCOUNTER
Routed to Dr Gloria Oh. PCP. Per PCP \"Ms Bruna Lopez has had surgery before without incident. I defer cardiac clearance to Dr Steve Murphy. Dr [facility]akeda Best [facility] for recommendations if needed. Recommend close monitoring post operatively given her history of sleep apnea. Pending labs as above, there are no absolute contraindications proceeding with surgery. Please feel free to call with questions or concerns. \"    Called patient as we have not received clearance from Dr Jas Mohr. She stated she saw him on 2-8 and got clearance and they were supposed to fax the note to us. Called Dr Yale Aguilera office requesting note. Awaiting clearance letter. Informed patient she does not need to stop taking the cymbalta. Reminded to stop taking xarelto 3 days prior. She has already stopped taking her asa.

## 2022-02-12 NOTE — TELEPHONE ENCOUNTER
Received clearance from Dr Dunia Bailon via outside messages. Forwarded to Dr Eze Gambino and clinical staff. Please fax to neurosurgery office.

## 2022-02-14 NOTE — TELEPHONE ENCOUNTER
Received verbal confirmation from Ale Hansen [address] that Jhonatan Anderson will be assisting Dr. Pippa Aguirre. Stewart updated.

## 2022-02-14 NOTE — TELEPHONE ENCOUNTER
PCP clearance documents received from JUAREZ Madsen. Per provider on 2/7/22:    \"Ms Darlin Donahue has had surgery before without incident. I defer cardiac clearance to Dr Karla Marin. Dr Kellie Walton and Dr Lencho Olmedo notes can be reviewed for recommendations if needed. Pulmonologist, Dr. Cahn Mathew has cleared patient per Aki Lopez RN's note below. Per Dr. Kellie Walton on 1/30/22:    \"IMPRESSION:    1. Anticoagulant management. She is to stop her Xarelto 72 hours prior to her surgery. Resumption of this postoperatively will be up to Dr. Miguel Lim. Given the fact that she is not getting lower lumbar spine surgery then she should be reasonably ambulatory postoperatively, I do not think that we need to place a filter. She can resume anticoagulation as soon as Dr. Miguel Lim feels it is safe including prophylaxis postoperatively as needed. \"    Messaged patient with Xarelto-hold instructions, taking last dose on 2/17/22. Patient encouraged to call PAT with any questions or concerns she may have for pre op hold instructions prior to 2/21 Surgery with Dr. Miguel Lim:    CERVICAL 5 AND CERVICAL 6 LAMINECTOMIES, PARTIAL CERVICAL 7 LAMINECTOMY, LEFT CERVICAL 5, CERVICAL 6 AND CERVICAL 7 FORAMINOTOMIES, POSSIBLE CERVICAL 6 AND CERVICAL 7 ARTHRODESIS, AUTOGRAFT, ALLOGRAFT AND ALL INDICATED PROCEDURES    EKG with provider's handwritten note from 2/7/22 sent to scan into chart. [place] updated. Awaiting PA confirmation and SA assignment.

## 2022-02-16 ENCOUNTER — ANESTHESIA EVENT (OUTPATIENT)
Dept: SURGERY | Facility: HOSPITAL | Age: 77
DRG: 472 | End: 2022-02-16
Payer: MEDICARE

## 2022-02-18 ENCOUNTER — OFFICE VISIT (OUTPATIENT)
Dept: HEMATOLOGY/ONCOLOGY | Facility: HOSPITAL | Age: 77
End: 2022-02-18
Attending: INTERNAL MEDICINE
Payer: MEDICARE

## 2022-02-18 VITALS
SYSTOLIC BLOOD PRESSURE: 159 MMHG | WEIGHT: 150.63 LBS | DIASTOLIC BLOOD PRESSURE: 90 MMHG | TEMPERATURE: 97 F | OXYGEN SATURATION: 96 % | HEART RATE: 101 BPM | BODY MASS INDEX: 28 KG/M2 | RESPIRATION RATE: 18 BRPM

## 2022-02-18 VITALS
RESPIRATION RATE: 18 BRPM | SYSTOLIC BLOOD PRESSURE: 140 MMHG | TEMPERATURE: 99 F | HEART RATE: 87 BPM | DIASTOLIC BLOOD PRESSURE: 65 MMHG

## 2022-02-18 DIAGNOSIS — D80.3 SELECTIVE DEFICIENCY OF IGG SUBCLASSES (HCC): Primary | ICD-10-CM

## 2022-02-18 PROCEDURE — 96365 THER/PROPH/DIAG IV INF INIT: CPT

## 2022-02-18 PROCEDURE — 96366 THER/PROPH/DIAG IV INF ADDON: CPT

## 2022-02-18 PROCEDURE — 96375 TX/PRO/DX INJ NEW DRUG ADDON: CPT

## 2022-02-18 RX ORDER — ACETAMINOPHEN 325 MG/1
650 TABLET ORAL ONCE
Status: COMPLETED | OUTPATIENT
Start: 2022-02-18 | End: 2022-02-18

## 2022-02-18 RX ORDER — ACETAMINOPHEN 325 MG/1
650 TABLET ORAL ONCE
Status: CANCELLED | OUTPATIENT
Start: 2022-03-01

## 2022-02-18 RX ORDER — ACETAMINOPHEN 325 MG/1
650 TABLET ORAL ONCE
Status: CANCELLED | OUTPATIENT
Start: 2022-04-01

## 2022-02-18 RX ORDER — DIPHENHYDRAMINE HCL 25 MG
50 CAPSULE ORAL ONCE
Status: CANCELLED | OUTPATIENT
Start: 2022-03-01

## 2022-02-18 RX ORDER — DIPHENHYDRAMINE HCL 25 MG
50 CAPSULE ORAL ONCE
Status: CANCELLED | OUTPATIENT
Start: 2022-04-01

## 2022-02-18 RX ORDER — DIPHENHYDRAMINE HCL 25 MG
50 CAPSULE ORAL ONCE
Status: COMPLETED | OUTPATIENT
Start: 2022-02-18 | End: 2022-02-18

## 2022-02-18 RX ADMIN — DIPHENHYDRAMINE HCL 25 MG: 25 MG CAPSULE ORAL at 09:24:00

## 2022-02-18 RX ADMIN — ACETAMINOPHEN 650 MG: 325 TABLET ORAL at 09:24:00

## 2022-02-18 NOTE — PROGRESS NOTES
Education Record    Learner:  Patient    Disease / Diagnosis: Hypogamaglobulinemia    Barriers / Limitations:  None   Comments:    Method: Brief focused Comments:    General Topics:  Medication, Procedure, Side effects and symptom management, Plan of care reviewed and Fall risk and prevention   Comments:    Outcome:  Shows understanding   Comments:

## 2022-02-19 ENCOUNTER — LAB ENCOUNTER (OUTPATIENT)
Dept: LAB | Age: 77
DRG: 472 | End: 2022-02-19
Attending: NEUROLOGICAL SURGERY
Payer: MEDICARE

## 2022-02-19 DIAGNOSIS — Z20.822 ENCOUNTER FOR PREPROCEDURE SCREENING LABORATORY TESTING FOR COVID-19: ICD-10-CM

## 2022-02-19 DIAGNOSIS — Z01.812 ENCOUNTER FOR PREPROCEDURE SCREENING LABORATORY TESTING FOR COVID-19: ICD-10-CM

## 2022-02-20 LAB — SARS-COV-2 RNA RESP QL NAA+PROBE: NOT DETECTED

## 2022-02-21 ENCOUNTER — ANESTHESIA (OUTPATIENT)
Dept: SURGERY | Facility: HOSPITAL | Age: 77
DRG: 472 | End: 2022-02-21
Payer: MEDICARE

## 2022-02-21 ENCOUNTER — APPOINTMENT (OUTPATIENT)
Dept: GENERAL RADIOLOGY | Facility: HOSPITAL | Age: 77
DRG: 472 | End: 2022-02-21
Attending: NEUROLOGICAL SURGERY
Payer: MEDICARE

## 2022-02-21 ENCOUNTER — HOSPITAL ENCOUNTER (INPATIENT)
Facility: HOSPITAL | Age: 77
LOS: 3 days | Discharge: HOME | DRG: 472 | End: 2022-02-24
Attending: NEUROLOGICAL SURGERY | Admitting: NEUROLOGICAL SURGERY
Payer: MEDICARE

## 2022-02-21 DIAGNOSIS — M54.12 CERVICAL RADICULITIS: ICD-10-CM

## 2022-02-21 DIAGNOSIS — Z20.822 ENCOUNTER FOR PREPROCEDURE SCREENING LABORATORY TESTING FOR COVID-19: Primary | ICD-10-CM

## 2022-02-21 DIAGNOSIS — R26.9 NEUROLOGIC GAIT DYSFUNCTION: ICD-10-CM

## 2022-02-21 DIAGNOSIS — R29.898 WEAKNESS OF BOTH LOWER EXTREMITIES: ICD-10-CM

## 2022-02-21 DIAGNOSIS — Z01.812 ENCOUNTER FOR PREPROCEDURE SCREENING LABORATORY TESTING FOR COVID-19: Primary | ICD-10-CM

## 2022-02-21 DIAGNOSIS — R29.898 WEAKNESS OF BOTH UPPER EXTREMITIES: ICD-10-CM

## 2022-02-21 DIAGNOSIS — G95.9 CERVICAL MYELOPATHY WITH CERVICAL RADICULOPATHY (HCC): ICD-10-CM

## 2022-02-21 DIAGNOSIS — M48.02 CERVICAL STENOSIS OF SPINAL CANAL: ICD-10-CM

## 2022-02-21 DIAGNOSIS — M54.12 CERVICAL MYELOPATHY WITH CERVICAL RADICULOPATHY (HCC): ICD-10-CM

## 2022-02-21 LAB
APTT PPP: 27.9 SECONDS (ref 23.3–35.6)
GLUCOSE BLD-MCNC: 104 MG/DL (ref 70–99)
GLUCOSE BLD-MCNC: 115 MG/DL (ref 70–99)
GLUCOSE BLD-MCNC: 130 MG/DL (ref 70–99)
GLUCOSE BLD-MCNC: 175 MG/DL (ref 70–99)
GLUCOSE BLD-MCNC: 196 MG/DL (ref 70–99)
INR BLD: 1 (ref 0.8–1.2)
PROTHROMBIN TIME: 13.2 SECONDS (ref 11.6–14.8)

## 2022-02-21 PROCEDURE — 76000 FLUOROSCOPY <1 HR PHYS/QHP: CPT | Performed by: NEUROLOGICAL SURGERY

## 2022-02-21 PROCEDURE — 99223 1ST HOSP IP/OBS HIGH 75: CPT | Performed by: HOSPITALIST

## 2022-02-21 PROCEDURE — 0RG1071 FUSION OF CERVICAL VERTEBRAL JOINT WITH AUTOLOGOUS TISSUE SUBSTITUTE, POSTERIOR APPROACH, POSTERIOR COLUMN, OPEN APPROACH: ICD-10-PCS | Performed by: NEUROLOGICAL SURGERY

## 2022-02-21 PROCEDURE — 01N10ZZ RELEASE CERVICAL NERVE, OPEN APPROACH: ICD-10-PCS | Performed by: NEUROLOGICAL SURGERY

## 2022-02-21 DEVICE — BIO DBM GEL
Type: IMPLANTABLE DEVICE | Site: SPINE CERVICAL | Status: FUNCTIONAL
Brand: BIO DBM

## 2022-02-21 RX ORDER — CYCLOBENZAPRINE HCL 5 MG
2.5 TABLET ORAL EVERY 6 HOURS PRN
Status: DISCONTINUED | OUTPATIENT
Start: 2022-02-21 | End: 2022-02-22

## 2022-02-21 RX ORDER — NICOTINE POLACRILEX 4 MG
30 LOZENGE BUCCAL
Status: DISCONTINUED | OUTPATIENT
Start: 2022-02-21 | End: 2022-02-24

## 2022-02-21 RX ORDER — ONDANSETRON 4 MG/1
4 TABLET, FILM COATED ORAL EVERY 8 HOURS PRN
Status: DISCONTINUED | OUTPATIENT
Start: 2022-02-21 | End: 2022-02-24

## 2022-02-21 RX ORDER — DOCUSATE SODIUM 100 MG/1
100 CAPSULE, LIQUID FILLED ORAL 2 TIMES DAILY
Status: DISCONTINUED | OUTPATIENT
Start: 2022-02-21 | End: 2022-02-24

## 2022-02-21 RX ORDER — OXYCODONE HYDROCHLORIDE AND ACETAMINOPHEN 5; 325 MG/1; MG/1
2 TABLET ORAL EVERY 4 HOURS PRN
Status: DISCONTINUED | OUTPATIENT
Start: 2022-02-21 | End: 2022-02-22

## 2022-02-21 RX ORDER — LISINOPRIL 20 MG/1
20 TABLET ORAL DAILY
Status: DISCONTINUED | OUTPATIENT
Start: 2022-02-21 | End: 2022-02-24

## 2022-02-21 RX ORDER — NEOSTIGMINE METHYLSULFATE 1 MG/ML
INJECTION INTRAVENOUS AS NEEDED
Status: DISCONTINUED | OUTPATIENT
Start: 2022-02-21 | End: 2022-02-21 | Stop reason: SURG

## 2022-02-21 RX ORDER — INSULIN ASPART 100 [IU]/ML
INJECTION, SOLUTION INTRAVENOUS; SUBCUTANEOUS ONCE
Status: DISCONTINUED | OUTPATIENT
Start: 2022-02-21 | End: 2022-02-21 | Stop reason: HOSPADM

## 2022-02-21 RX ORDER — GLYCOPYRROLATE 0.2 MG/ML
INJECTION, SOLUTION INTRAMUSCULAR; INTRAVENOUS AS NEEDED
Status: DISCONTINUED | OUTPATIENT
Start: 2022-02-21 | End: 2022-02-21 | Stop reason: SURG

## 2022-02-21 RX ORDER — MIDAZOLAM HYDROCHLORIDE 1 MG/ML
INJECTION INTRAMUSCULAR; INTRAVENOUS
Status: COMPLETED
Start: 2022-02-21 | End: 2022-02-21

## 2022-02-21 RX ORDER — DEXAMETHASONE SODIUM PHOSPHATE 4 MG/ML
4 VIAL (ML) INJECTION AS NEEDED
Status: DISCONTINUED | OUTPATIENT
Start: 2022-02-21 | End: 2022-02-21 | Stop reason: HOSPADM

## 2022-02-21 RX ORDER — FLUTICASONE PROPIONATE 50 MCG
1 SPRAY, SUSPENSION (ML) NASAL 2 TIMES DAILY
Status: DISCONTINUED | OUTPATIENT
Start: 2022-02-21 | End: 2022-02-24

## 2022-02-21 RX ORDER — BUPIVACAINE HYDROCHLORIDE AND EPINEPHRINE 2.5; 5 MG/ML; UG/ML
INJECTION, SOLUTION EPIDURAL; INFILTRATION; INTRACAUDAL; PERINEURAL AS NEEDED
Status: DISCONTINUED | OUTPATIENT
Start: 2022-02-21 | End: 2022-02-21 | Stop reason: HOSPADM

## 2022-02-21 RX ORDER — ACETAMINOPHEN 325 MG/1
650 TABLET ORAL EVERY 6 HOURS PRN
Status: DISCONTINUED | OUTPATIENT
Start: 2022-02-21 | End: 2022-02-21

## 2022-02-21 RX ORDER — LABETALOL HYDROCHLORIDE 5 MG/ML
5 INJECTION, SOLUTION INTRAVENOUS EVERY 5 MIN PRN
Status: DISCONTINUED | OUTPATIENT
Start: 2022-02-21 | End: 2022-02-21 | Stop reason: HOSPADM

## 2022-02-21 RX ORDER — ACETAMINOPHEN 500 MG
1000 TABLET ORAL ONCE
Status: DISCONTINUED | OUTPATIENT
Start: 2022-02-21 | End: 2022-02-21 | Stop reason: HOSPADM

## 2022-02-21 RX ORDER — SODIUM CHLORIDE, SODIUM LACTATE, POTASSIUM CHLORIDE, CALCIUM CHLORIDE 600; 310; 30; 20 MG/100ML; MG/100ML; MG/100ML; MG/100ML
INJECTION, SOLUTION INTRAVENOUS CONTINUOUS
Status: DISCONTINUED | OUTPATIENT
Start: 2022-02-21 | End: 2022-02-21 | Stop reason: HOSPADM

## 2022-02-21 RX ORDER — HYDROCODONE BITARTRATE AND ACETAMINOPHEN 5; 325 MG/1; MG/1
2 TABLET ORAL AS NEEDED
Status: DISCONTINUED | OUTPATIENT
Start: 2022-02-21 | End: 2022-02-21 | Stop reason: HOSPADM

## 2022-02-21 RX ORDER — ONDANSETRON 2 MG/ML
4 INJECTION INTRAMUSCULAR; INTRAVENOUS EVERY 4 HOURS PRN
Status: ACTIVE | OUTPATIENT
Start: 2022-02-21 | End: 2022-02-22

## 2022-02-21 RX ORDER — ROSUVASTATIN CALCIUM 5 MG/1
10 TABLET, COATED ORAL NIGHTLY
Status: DISCONTINUED | OUTPATIENT
Start: 2022-02-21 | End: 2022-02-24

## 2022-02-21 RX ORDER — CHOLECALCIFEROL (VITAMIN D3) 125 MCG
2000 CAPSULE ORAL DAILY
Status: DISCONTINUED | OUTPATIENT
Start: 2022-02-21 | End: 2022-02-24

## 2022-02-21 RX ORDER — ALBUTEROL SULFATE 90 UG/1
2 AEROSOL, METERED RESPIRATORY (INHALATION) EVERY 6 HOURS PRN
Status: DISCONTINUED | OUTPATIENT
Start: 2022-02-21 | End: 2022-02-24

## 2022-02-21 RX ORDER — HYDROCODONE BITARTRATE AND ACETAMINOPHEN 5; 325 MG/1; MG/1
1 TABLET ORAL AS NEEDED
Status: DISCONTINUED | OUTPATIENT
Start: 2022-02-21 | End: 2022-02-21 | Stop reason: HOSPADM

## 2022-02-21 RX ORDER — ACETAMINOPHEN 325 MG/1
650 TABLET ORAL EVERY 4 HOURS PRN
Status: DISCONTINUED | OUTPATIENT
Start: 2022-02-21 | End: 2022-02-24

## 2022-02-21 RX ORDER — MORPHINE SULFATE 10 MG/ML
INJECTION, SOLUTION INTRAMUSCULAR; INTRAVENOUS
Status: DISCONTINUED
Start: 2022-02-21 | End: 2022-02-21 | Stop reason: WASHOUT

## 2022-02-21 RX ORDER — SODIUM CHLORIDE, SODIUM LACTATE, POTASSIUM CHLORIDE, CALCIUM CHLORIDE 600; 310; 30; 20 MG/100ML; MG/100ML; MG/100ML; MG/100ML
INJECTION, SOLUTION INTRAVENOUS CONTINUOUS
Status: DISCONTINUED | OUTPATIENT
Start: 2022-02-21 | End: 2022-02-21

## 2022-02-21 RX ORDER — PROCHLORPERAZINE EDISYLATE 5 MG/ML
10 INJECTION INTRAMUSCULAR; INTRAVENOUS EVERY 6 HOURS PRN
Status: ACTIVE | OUTPATIENT
Start: 2022-02-21 | End: 2022-02-23

## 2022-02-21 RX ORDER — ACETAMINOPHEN 500 MG
1000 TABLET ORAL ONCE AS NEEDED
Status: DISCONTINUED | OUTPATIENT
Start: 2022-02-21 | End: 2022-02-21 | Stop reason: HOSPADM

## 2022-02-21 RX ORDER — DEXAMETHASONE SODIUM PHOSPHATE 4 MG/ML
VIAL (ML) INJECTION AS NEEDED
Status: DISCONTINUED | OUTPATIENT
Start: 2022-02-21 | End: 2022-02-21 | Stop reason: SURG

## 2022-02-21 RX ORDER — NALOXONE HYDROCHLORIDE 0.4 MG/ML
80 INJECTION, SOLUTION INTRAMUSCULAR; INTRAVENOUS; SUBCUTANEOUS AS NEEDED
Status: DISCONTINUED | OUTPATIENT
Start: 2022-02-21 | End: 2022-02-21 | Stop reason: HOSPADM

## 2022-02-21 RX ORDER — MORPHINE SULFATE 4 MG/ML
INJECTION, SOLUTION INTRAMUSCULAR; INTRAVENOUS
Status: COMPLETED
Start: 2022-02-21 | End: 2022-02-21

## 2022-02-21 RX ORDER — PREGABALIN 100 MG/1
100 CAPSULE ORAL 3 TIMES DAILY
Status: DISCONTINUED | OUTPATIENT
Start: 2022-02-21 | End: 2022-02-24

## 2022-02-21 RX ORDER — MORPHINE SULFATE 2 MG/ML
1 INJECTION, SOLUTION INTRAMUSCULAR; INTRAVENOUS EVERY 2 HOUR PRN
Status: DISCONTINUED | OUTPATIENT
Start: 2022-02-21 | End: 2022-02-24

## 2022-02-21 RX ORDER — NICOTINE POLACRILEX 4 MG
15 LOZENGE BUCCAL
Status: DISCONTINUED | OUTPATIENT
Start: 2022-02-21 | End: 2022-02-24

## 2022-02-21 RX ORDER — DULOXETIN HYDROCHLORIDE 20 MG/1
20 CAPSULE, DELAYED RELEASE ORAL DAILY
Status: DISCONTINUED | OUTPATIENT
Start: 2022-02-21 | End: 2022-02-24

## 2022-02-21 RX ORDER — DEXTROSE MONOHYDRATE 25 G/50ML
50 INJECTION, SOLUTION INTRAVENOUS
Status: DISCONTINUED | OUTPATIENT
Start: 2022-02-21 | End: 2022-02-21 | Stop reason: HOSPADM

## 2022-02-21 RX ORDER — TIZANIDINE 4 MG/1
4 TABLET ORAL EVERY 8 HOURS PRN
Status: DISCONTINUED | OUTPATIENT
Start: 2022-02-21 | End: 2022-02-22

## 2022-02-21 RX ORDER — MORPHINE SULFATE 4 MG/ML
4 INJECTION, SOLUTION INTRAMUSCULAR; INTRAVENOUS EVERY 2 HOUR PRN
Status: DISCONTINUED | OUTPATIENT
Start: 2022-02-21 | End: 2022-02-24

## 2022-02-21 RX ORDER — MORPHINE SULFATE 4 MG/ML
2 INJECTION, SOLUTION INTRAMUSCULAR; INTRAVENOUS EVERY 5 MIN PRN
Status: DISCONTINUED | OUTPATIENT
Start: 2022-02-21 | End: 2022-02-21 | Stop reason: HOSPADM

## 2022-02-21 RX ORDER — MORPHINE SULFATE 2 MG/ML
2 INJECTION, SOLUTION INTRAMUSCULAR; INTRAVENOUS EVERY 2 HOUR PRN
Status: DISCONTINUED | OUTPATIENT
Start: 2022-02-21 | End: 2022-02-24

## 2022-02-21 RX ORDER — MEPERIDINE HYDROCHLORIDE 25 MG/ML
12.5 INJECTION INTRAMUSCULAR; INTRAVENOUS; SUBCUTANEOUS AS NEEDED
Status: DISCONTINUED | OUTPATIENT
Start: 2022-02-21 | End: 2022-02-21 | Stop reason: HOSPADM

## 2022-02-21 RX ORDER — ROCURONIUM BROMIDE 10 MG/ML
INJECTION, SOLUTION INTRAVENOUS AS NEEDED
Status: DISCONTINUED | OUTPATIENT
Start: 2022-02-21 | End: 2022-02-21 | Stop reason: SURG

## 2022-02-21 RX ORDER — DIPHENHYDRAMINE HCL 25 MG
25 CAPSULE ORAL EVERY 4 HOURS PRN
Status: DISCONTINUED | OUTPATIENT
Start: 2022-02-21 | End: 2022-02-24

## 2022-02-21 RX ORDER — OXYCODONE HYDROCHLORIDE AND ACETAMINOPHEN 5; 325 MG/1; MG/1
1 TABLET ORAL EVERY 4 HOURS PRN
Status: DISCONTINUED | OUTPATIENT
Start: 2022-02-21 | End: 2022-02-22

## 2022-02-21 RX ORDER — DIPHENHYDRAMINE HYDROCHLORIDE 50 MG/ML
12.5 INJECTION INTRAMUSCULAR; INTRAVENOUS AS NEEDED
Status: DISCONTINUED | OUTPATIENT
Start: 2022-02-21 | End: 2022-02-21 | Stop reason: HOSPADM

## 2022-02-21 RX ORDER — DULOXETIN HYDROCHLORIDE 30 MG/1
60 CAPSULE, DELAYED RELEASE ORAL DAILY
Status: DISCONTINUED | OUTPATIENT
Start: 2022-02-21 | End: 2022-02-24

## 2022-02-21 RX ORDER — SODIUM CHLORIDE AND POTASSIUM CHLORIDE .9; .15 G/100ML; G/100ML
SOLUTION INTRAVENOUS CONTINUOUS
Status: DISCONTINUED | OUTPATIENT
Start: 2022-02-21 | End: 2022-02-24

## 2022-02-21 RX ORDER — BISACODYL 10 MG
10 SUPPOSITORY, RECTAL RECTAL
Status: DISCONTINUED | OUTPATIENT
Start: 2022-02-21 | End: 2022-02-24

## 2022-02-21 RX ORDER — POLYETHYLENE GLYCOL 3350 17 G/17G
17 POWDER, FOR SOLUTION ORAL DAILY PRN
Status: DISCONTINUED | OUTPATIENT
Start: 2022-02-21 | End: 2022-02-24

## 2022-02-21 RX ORDER — HYDROMORPHONE HYDROCHLORIDE 1 MG/ML
0.4 INJECTION, SOLUTION INTRAMUSCULAR; INTRAVENOUS; SUBCUTANEOUS EVERY 5 MIN PRN
Status: DISCONTINUED | OUTPATIENT
Start: 2022-02-21 | End: 2022-02-21

## 2022-02-21 RX ORDER — SENNOSIDES 8.6 MG
17.2 TABLET ORAL NIGHTLY
Status: DISCONTINUED | OUTPATIENT
Start: 2022-02-21 | End: 2022-02-24

## 2022-02-21 RX ORDER — ACETAMINOPHEN 325 MG/1
650 TABLET ORAL EVERY 6 HOURS PRN
COMMUNITY
End: 2022-02-24

## 2022-02-21 RX ORDER — LIDOCAINE HYDROCHLORIDE 10 MG/ML
INJECTION, SOLUTION EPIDURAL; INFILTRATION; INTRACAUDAL; PERINEURAL AS NEEDED
Status: DISCONTINUED | OUTPATIENT
Start: 2022-02-21 | End: 2022-02-21 | Stop reason: SURG

## 2022-02-21 RX ORDER — MIDAZOLAM HYDROCHLORIDE 1 MG/ML
INJECTION INTRAMUSCULAR; INTRAVENOUS AS NEEDED
Status: DISCONTINUED | OUTPATIENT
Start: 2022-02-21 | End: 2022-02-21 | Stop reason: SURG

## 2022-02-21 RX ORDER — PHENYLEPHRINE HCL 10 MG/ML
VIAL (ML) INJECTION AS NEEDED
Status: DISCONTINUED | OUTPATIENT
Start: 2022-02-21 | End: 2022-02-21 | Stop reason: SURG

## 2022-02-21 RX ORDER — DIPHENHYDRAMINE HYDROCHLORIDE 50 MG/ML
25 INJECTION INTRAMUSCULAR; INTRAVENOUS EVERY 4 HOURS PRN
Status: DISCONTINUED | OUTPATIENT
Start: 2022-02-21 | End: 2022-02-24

## 2022-02-21 RX ORDER — ONDANSETRON 2 MG/ML
4 INJECTION INTRAMUSCULAR; INTRAVENOUS AS NEEDED
Status: DISCONTINUED | OUTPATIENT
Start: 2022-02-21 | End: 2022-02-21 | Stop reason: HOSPADM

## 2022-02-21 RX ORDER — EPHEDRINE SULFATE 50 MG/ML
INJECTION INTRAVENOUS AS NEEDED
Status: DISCONTINUED | OUTPATIENT
Start: 2022-02-21 | End: 2022-02-21 | Stop reason: SURG

## 2022-02-21 RX ORDER — ONDANSETRON 2 MG/ML
INJECTION INTRAMUSCULAR; INTRAVENOUS AS NEEDED
Status: DISCONTINUED | OUTPATIENT
Start: 2022-02-21 | End: 2022-02-21 | Stop reason: SURG

## 2022-02-21 RX ORDER — DEXTROSE MONOHYDRATE 25 G/50ML
50 INJECTION, SOLUTION INTRAVENOUS
Status: DISCONTINUED | OUTPATIENT
Start: 2022-02-21 | End: 2022-02-24

## 2022-02-21 RX ORDER — SODIUM PHOSPHATE, DIBASIC AND SODIUM PHOSPHATE, MONOBASIC 7; 19 G/133ML; G/133ML
1 ENEMA RECTAL ONCE AS NEEDED
Status: DISCONTINUED | OUTPATIENT
Start: 2022-02-21 | End: 2022-02-24

## 2022-02-21 RX ORDER — MIDAZOLAM HYDROCHLORIDE 1 MG/ML
1 INJECTION INTRAMUSCULAR; INTRAVENOUS EVERY 5 MIN PRN
Status: DISCONTINUED | OUTPATIENT
Start: 2022-02-21 | End: 2022-02-21 | Stop reason: HOSPADM

## 2022-02-21 RX ORDER — PANTOPRAZOLE SODIUM 40 MG/1
40 TABLET, DELAYED RELEASE ORAL
Status: DISCONTINUED | OUTPATIENT
Start: 2022-02-22 | End: 2022-02-24

## 2022-02-21 RX ADMIN — EPHEDRINE SULFATE 10 MG: 50 INJECTION INTRAVENOUS at 08:39:00

## 2022-02-21 RX ADMIN — EPHEDRINE SULFATE 5 MG: 50 INJECTION INTRAVENOUS at 08:59:00

## 2022-02-21 RX ADMIN — EPHEDRINE SULFATE 5 MG: 50 INJECTION INTRAVENOUS at 08:52:00

## 2022-02-21 RX ADMIN — GLYCOPYRROLATE 0.4 MG: 0.2 INJECTION, SOLUTION INTRAMUSCULAR; INTRAVENOUS at 09:43:00

## 2022-02-21 RX ADMIN — EPHEDRINE SULFATE 5 MG: 50 INJECTION INTRAVENOUS at 08:55:00

## 2022-02-21 RX ADMIN — ROCURONIUM BROMIDE 50 MG: 10 INJECTION, SOLUTION INTRAVENOUS at 07:44:00

## 2022-02-21 RX ADMIN — LIDOCAINE HYDROCHLORIDE 50 MG: 10 INJECTION, SOLUTION EPIDURAL; INFILTRATION; INTRACAUDAL; PERINEURAL at 07:44:00

## 2022-02-21 RX ADMIN — EPHEDRINE SULFATE 5 MG: 50 INJECTION INTRAVENOUS at 09:35:00

## 2022-02-21 RX ADMIN — SODIUM CHLORIDE, SODIUM LACTATE, POTASSIUM CHLORIDE, CALCIUM CHLORIDE: 600; 310; 30; 20 INJECTION, SOLUTION INTRAVENOUS at 09:15:00

## 2022-02-21 RX ADMIN — PHENYLEPHRINE HCL 100 MCG: 10 MG/ML VIAL (ML) INJECTION at 09:26:00

## 2022-02-21 RX ADMIN — EPHEDRINE SULFATE 10 MG: 50 INJECTION INTRAVENOUS at 09:33:00

## 2022-02-21 RX ADMIN — EPHEDRINE SULFATE 5 MG: 50 INJECTION INTRAVENOUS at 08:37:00

## 2022-02-21 RX ADMIN — NEOSTIGMINE METHYLSULFATE 3 MG: 1 INJECTION INTRAVENOUS at 09:43:00

## 2022-02-21 RX ADMIN — DEXAMETHASONE SODIUM PHOSPHATE 4 MG: 4 MG/ML VIAL (ML) INJECTION at 09:30:00

## 2022-02-21 RX ADMIN — MIDAZOLAM HYDROCHLORIDE 2 MG: 1 INJECTION INTRAMUSCULAR; INTRAVENOUS at 07:42:00

## 2022-02-21 RX ADMIN — ONDANSETRON 4 MG: 2 INJECTION INTRAMUSCULAR; INTRAVENOUS at 09:30:00

## 2022-02-21 NOTE — OPERATIVE REPORT
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]  Cervical radiculitis [M54.12]  Cervical myelopathy with cervical radiculopathy (HCC) [G95.9, M54.12]  Weakness of both upper extremities [R29.898]  Cervical stenosis of spinal canal [M48.02]    Postoperative Diagnosis: same    Primary Surgeon: Arthurine Gaucher, MD    Assistant: David Yates, was essential the case including opening, closing and retraction    Procedures: CERVICAL 5 AND CERVICAL 6 LAMINECTOMIES, PARTIAL CERVICAL 7 LAMINECTOMY, LEFT CERVICAL 5/CERVICAL 6 AND CERVICAL 6/CERVICAL 7 FORAMINOTOMIES, CERVICAL 6 AND CERVICAL 7 ARTHRODESIS, AUTOGRAFT, ALLOGRAFT    Surgical Findings: See dictation    Anesthesia: General    Complications: none    Implants: DBM gel    Specimen: None    Drains: None    Condition: Stable    Estimated Blood Loss: 5 mL    Indication for Procedure: 52-year-old female with neck and left arm pain. Patient with cervical myelopathy and radiculopathy. Patient seen in clinic. Patient failed nonoperative treatments. After discussions, patient wished to proceed with surgery. Procedure: Patient properly identified and brought back to the OR 15. Patient placed under general anesthesia by anesthesia staff. Valle was attached to the patient's head. Patient position on the OR table, all pressure points padded, and Valle was attached to the OR table. Fluoroscopy shot to confirm her levels. Patient was sterilely prepped and draped in usual fashion. 10 mils of quarter percent Marcaine with epinephrine was given as local anesthetic. Incision was made dissection down to fascia. A subperiosteal dissection then performed along C5-6 and 7. Hemostasis was achieved. The spinous processes of C6 and C5 were removed with rongeurs. Then it is C5 and 6 laminectomies using a trough technique.   We have the underbite C7 to give her good decompression. Once this was complete the dura was nicely pulsatile and relaxed. We then turned our attention to her left side foraminotomies. We used Kerrison rongeurs to perform our foraminotomies at C5/6 and C6/7 on the left. The probe would pass out nicely once the foraminotomies were done. Fluoroscopy confirmed all of her levels. Hemostasis was achieved. We then tested C6-7. This moved slightly. I felt it would be beneficial to place bone graft. The bone was then decorticated. A mix of DBM with autograft was then placed over the bone. Again hemostasis was achieved. The wound was then closed in layers. The Valle was attached on the OR table. The patient was placed back in the hospital bed. The Valle was removed. The patient was awakened taken recovery.     Dictated but not proofread

## 2022-02-21 NOTE — ANESTHESIA POSTPROCEDURE EVALUATION
[address] Patient Status:  Inpatient   Age/Gender 77year old female MRN [id number]   [facility] SURGERY Attending Ezzard Kawasaki, MD   Hosp Day # 0 PCP Ben Thakkar MD       Anesthesia Post-op Note    CERVICAL 5 AND CERVICAL 6 LAMINECTOMIES, PARTIAL CERVICAL 7 LAMINECTOMY, LEFT CERVICAL 5/CERVICAL 6 AND CERVICAL 6/CERVICAL 7 FORAMINOTOMIES, CERVICAL 6 AND CERVICAL 7 ARTHRODESIS, AUTOGRAFT, ALLOGRAFT    Procedure Summary     Date: 02/21/22 Room / Location: [address] [place] [address] OR 15 / 1404 [facility] OR    Anesthesia Start: 8981 Anesthesia Stop:     Procedures:       CERVICAL 5 AND CERVICAL 6 LAMINECTOMIES, PARTIAL CERVICAL 7 LAMINECTOMY, LEFT CERVICAL 5/CERVICAL 6 AND CERVICAL 6/CERVICAL 7 FORAMINOTOMIES, CERVICAL 6 AND CERVICAL 7 ARTHRODESIS, AUTOGRAFT, ALLOGRAFT (N/A Spine Cervical)      . (N/A Spine Cervical) Diagnosis:       Weakness of both lower extremities      Neurologic gait dysfunction      Cervical radiculitis      Cervical myelopathy with cervical radiculopathy (HCC)      Weakness of both upper extremities      Cervical stenosis of spinal canal      (Weakness of both lower extremities [R29.898]Neurologic gait dysfunction [R26. 9]Cervical radiculitis [M54.12]Cervical myelopathy with cervical radiculopathy (HCC) [G95.9, M54.12]Weakness of both upper extremities [R29.898]Cervical stenosis of spinal canal )    Surgeons: Ezzard Kawasaki, MD Anesthesiologist: Barry Manzanares MD    Anesthesia Type: general ASA Status: 3          Anesthesia Type: general    Vitals Value Taken Time   /97 02/21/22 1011   Temp 96.6 02/21/22 1013   Pulse 105 02/21/22 1012   Resp 23 02/21/22 1012   SpO2 100 % 02/21/22 1012   Vitals shown include unvalidated device data.     Patient Location: PACU    Anesthesia Type: general    Airway Patency: patent and extubated    Postop Pain Control: inadequate, being treated    Mental Status: mildly sedated but able to meaningfully participate in the post-anesthesia evaluation    Nausea/Vomiting: none    Cardiopulmonary/Hydration status: stable euvolemic    Complications: no apparent anesthesia related complications    Postop vital signs: stable    Dental Exam: Unchanged from Preop    Patient to be discharged from PACU when criteria met.

## 2022-02-21 NOTE — PLAN OF CARE
Morphine and Percocet given for severe posterior neck pain. Denies any numbness, tingling, or pain to all 4 extremities. Ice to posterior neck. Vitals stable on 1.5 L nasal cannula. SCD's and zoya hose bilaterally. IS teaching done. Due to void. Passed dysphagia screening. PT/OT to see tomorrow. Hospitalist consulted and orders received. Plan of care discussed with patient who agrees.

## 2022-02-21 NOTE — ADDENDUM NOTE
Addendum  created 02/21/22 1610 by Michelle Broussard MD    Flowsheet accepted, Intraprocedure Meds edited

## 2022-02-21 NOTE — ANESTHESIA PROCEDURE NOTES
Airway  Date/Time: 2/21/2022 7:47 AM  Urgency: elective      General Information and Staff    Patient location during procedure: OR  Anesthesiologist: Ko Castillo MD  Performed: anesthesiologist     Indications and Patient Condition  Indications for airway management: anesthesia  Sedation level: deep  Preoxygenated: yes  Patient position: cervical spine neutral.  Mask difficulty assessment: 1 - vent by mask    Final Airway Details  Final airway type: endotracheal airway      Successful airway: ETT  Cuffed: yes   Successful intubation technique: direct laryngoscopy  Facilitating devices/methods: cricoid pressure  Endotracheal tube insertion site: oral  Blade: Rory  Blade size: #3  ETT size (mm): 7.0    Cormack-Lehane Classification: grade IIA - partial view of glottis  Placement verified by: chest auscultation and capnometry   Cuff volume (mL): 7  Measured from: lips  ETT to lips (cm): 21  Number of attempts at approach: 1  Ventilation between attempts: none  Number of other approaches attempted: 0

## 2022-02-21 NOTE — PROGRESS NOTES
Patient admitted via bed from PACU. Oriented to room. Safety precautions initiated. Call light in reach. Family member at bedside. Passed dysphagia screening.

## 2022-02-21 NOTE — BRIEF OP NOTE
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 9]Cervical radiculitis [M54.12]Cervical myelopathy with cervical radiculopathy (HCC) [G95.9, M54.12]Weakness of both upper extremities [R29.898]Cervical stenosis of spinal canal       Procedure Performed:   CERVICAL 5 AND CERVICAL 6 LAMINECTOMIES, PARTIAL CERVICAL 7 LAMINECTOMY, LEFT CERVICAL 5/CERVICAL 6 AND CERVICAL 6/CERVICAL 7 FORAMINOTOMIES, CERVICAL 6 AND CERVICAL 7 ARTHRODESIS, AUTOGRAFT, ALLOGRAFT    Surgeon(s) and Role:     * Cyril Rosario MD - Primary    Assistant(s):  Surgical Assistant.: Leonie Lara     Surgical Findings: see dictation     Specimen: none     Estimated Blood Loss: Blood Output: 5 mL (2/21/2022  9:31 AM)        Zari Slaughter MD  2/21/2022  10:04 AM

## 2022-02-21 NOTE — ANESTHESIA PROCEDURE NOTES
Arterial Line  Performed by: Regena Snellen, MD  Authorized by: Regena Snellen, MD     General Information and Staff    Procedure Start:  2/21/2022 7:51 AM  Procedure End:  2/21/2022 7:54 AM  Anesthesiologist:  Regena Snellen, MD  Performed By:  Anesthesiologist  Patient Location:  OR  Indication: continuous blood pressure monitoring and blood sampling needed    Site Identification: surface landmarks    Preanesthetic Checklist: 2 patient identifiers, IV checked, risks and benefits discussed, monitors and equipment checked, pre-op evaluation, timeout performed, anesthesia consent and sterile technique used    Procedure Details    Catheter Size:  20 G  Catheter Length:  1 and 3/4 inchCatheter Type:  Arrow  Seldinger Technique?: Yes    Laterality:  RightSite:  Radial artery  Site Prep: chlorhexidine  Line Secured:  Wrist Brace, tape and Tegaderm    Assessment    Events: patient tolerated procedure well with no complications      Medications      Additional Comments

## 2022-02-22 LAB
GLUCOSE BLD-MCNC: 145 MG/DL (ref 70–99)
GLUCOSE BLD-MCNC: 146 MG/DL (ref 70–99)
GLUCOSE BLD-MCNC: 178 MG/DL (ref 70–99)
GLUCOSE BLD-MCNC: 182 MG/DL (ref 70–99)
HCT VFR BLD AUTO: 34.2 %
HGB BLD-MCNC: 11 G/DL

## 2022-02-22 PROCEDURE — 99232 SBSQ HOSP IP/OBS MODERATE 35: CPT | Performed by: HOSPITALIST

## 2022-02-22 RX ORDER — DIAZEPAM 2 MG/1
2 TABLET ORAL EVERY 8 HOURS PRN
Status: DISCONTINUED | OUTPATIENT
Start: 2022-02-22 | End: 2022-02-23

## 2022-02-22 RX ORDER — OXYCODONE AND ACETAMINOPHEN 10; 325 MG/1; MG/1
2 TABLET ORAL EVERY 4 HOURS PRN
Status: DISCONTINUED | OUTPATIENT
Start: 2022-02-22 | End: 2022-02-24

## 2022-02-22 RX ORDER — OXYCODONE AND ACETAMINOPHEN 10; 325 MG/1; MG/1
1 TABLET ORAL EVERY 4 HOURS PRN
Status: DISCONTINUED | OUTPATIENT
Start: 2022-02-22 | End: 2022-02-24

## 2022-02-22 RX ORDER — ORPHENADRINE CITRATE 30 MG/ML
30 INJECTION INTRAMUSCULAR; INTRAVENOUS EVERY 6 HOURS
Status: DISCONTINUED | OUTPATIENT
Start: 2022-02-22 | End: 2022-02-24

## 2022-02-22 NOTE — PLAN OF CARE
Patient resting in room, vitals WDL, on RA. Tele. Teds & SCD's bilaterally. IS encouraged. Voiding freely. Up min with gait belt and walker. Soft collar in place for comfort. Patient denies n/t to all extremities. Patient reports pain is moderate-severe, relieved by PO medication. Plan of care discussed with patient, all questions answered.

## 2022-02-22 NOTE — PLAN OF CARE
Zanaflex given for moderate posterior neck pain. Denies any numbness, tingling, or pain to all 4 extremities. Reports that she was having spasms down her arms last night when the pain would get very severe. Feels like her pain is not well controlled. Vitals stable on room air. SCD's and zoya hose bilaterally. IS encouraged. Passed dysphagia screening. PT/OT to see today. Plan of care discussed with patient who agrees. 1500 Pain still in severe pain and did not feel like pain was controlled with Valium and Norflex. Spoke with Reyes Oliveros, Percocet dose increased. Was not able to tolerate walks in [place] today due to pain. Refused walk this afternoon. 1730 Patient reports pain as 4/10, much better controlled this afternoon with increased Percocet dose.

## 2022-02-22 NOTE — PROGRESS NOTES
Reviewed swallowing precautions. Reviewed indications, side effects of pain medication/narcotics and constipation prevention. Stressed importance of increased fluids/roughage in diet, continued use stool softeners along with laxatives and suppositories as needed while taking narcotics even when at home. Pt verbalized understanding. Teachback done on ankle pumps and incentive spirometry use 10 times every hour w/a for each. Reviewed incision care, showering, restrictions, activity and when to contact surgeon for concerns. Encouraged patient to watch discharge education video later. Solicited and answered any questions regarding care. Patient verbalized understanding. Assisted to reposition in bed to eat lunch.  Cold pack provided between shoulder blades at patient request.

## 2022-02-23 ENCOUNTER — PATIENT OUTREACH (OUTPATIENT)
Dept: CASE MANAGEMENT | Age: 77
End: 2022-02-23

## 2022-02-23 LAB
GLUCOSE BLD-MCNC: 126 MG/DL (ref 70–99)
GLUCOSE BLD-MCNC: 127 MG/DL (ref 70–99)
GLUCOSE BLD-MCNC: 133 MG/DL (ref 70–99)
GLUCOSE BLD-MCNC: 175 MG/DL (ref 70–99)

## 2022-02-23 PROCEDURE — 99232 SBSQ HOSP IP/OBS MODERATE 35: CPT | Performed by: HOSPITALIST

## 2022-02-23 RX ORDER — DIAZEPAM 5 MG/1
5 TABLET ORAL EVERY 8 HOURS PRN
Status: DISCONTINUED | OUTPATIENT
Start: 2022-02-23 | End: 2022-02-23

## 2022-02-23 RX ORDER — DIAZEPAM 5 MG/1
5 TABLET ORAL EVERY 6 HOURS PRN
Status: DISCONTINUED | OUTPATIENT
Start: 2022-02-23 | End: 2022-02-24

## 2022-02-23 RX ORDER — HYDRALAZINE HYDROCHLORIDE 20 MG/ML
10 INJECTION INTRAMUSCULAR; INTRAVENOUS EVERY 6 HOURS PRN
Status: DISCONTINUED | OUTPATIENT
Start: 2022-02-23 | End: 2022-02-24

## 2022-02-23 RX ORDER — DEXAMETHASONE 4 MG/1
4 TABLET ORAL EVERY 12 HOURS SCHEDULED
Status: DISCONTINUED | OUTPATIENT
Start: 2022-02-23 | End: 2022-02-24

## 2022-02-23 NOTE — PROGRESS NOTES
Patient sitting up in chair. Encouraged to order lunch. Patient states pain is less today than yesterday but spasms still bad. Encouraged to use IS and do ankle pumps hourly when awake. Reminded to prevent constipation side effect  from narcotics and take stool softners and laxatives. Teachback done again on ankle pumps and incentive spriometry use. Solicited and answered any questions pt had about care. Pt verbalized understanding.

## 2022-02-23 NOTE — PLAN OF CARE
Sitting in chair. Posterior neck incision clean, dry, intact. Soft collar in place. Moves all extremities. States a lot of muscle spasms, relieved with medication. Instructed on plan of care, pain management, call for all asst needed, discomfort felt. Verbalized understanding. Last bm 3 days ago, declines laxative.

## 2022-02-23 NOTE — PLAN OF CARE
Patient A&O X4 on 2L O2 with pain medication use- hx MUKUND w/ CPAP. VSS, /IS/telemetry- NSR. SCDs/TEDs. Left arm precautions. Voiding freely, LBM 2/20- Miralax given earlier. Surgical incision to posterior neck covered with dermabond, JESSE. Denies n/t. C/o occasional spasms to upper posterior back that radiates down bilateral arms. PRN pain medication/muscle relaxer given with some relief, patient also on scheduled muscle relaxer. Soft collar on for comfort. Ice as needed. PT/OT to see again tomorrow. Min assist with walker. Reminded to use call light. Plan to dc home when pain controlled.

## 2022-02-24 VITALS
BODY MASS INDEX: 27.94 KG/M2 | OXYGEN SATURATION: 93 % | HEART RATE: 92 BPM | RESPIRATION RATE: 18 BRPM | WEIGHT: 148 LBS | SYSTOLIC BLOOD PRESSURE: 133 MMHG | DIASTOLIC BLOOD PRESSURE: 67 MMHG | HEIGHT: 61 IN | TEMPERATURE: 98 F

## 2022-02-24 LAB
GLUCOSE BLD-MCNC: 173 MG/DL (ref 70–99)
GLUCOSE BLD-MCNC: 223 MG/DL (ref 70–99)
GLUCOSE BLD-MCNC: 241 MG/DL (ref 70–99)

## 2022-02-24 PROCEDURE — 99232 SBSQ HOSP IP/OBS MODERATE 35: CPT | Performed by: HOSPITALIST

## 2022-02-24 RX ORDER — DIAZEPAM 2 MG/1
2 TABLET ORAL EVERY 6 HOURS PRN
Qty: 40 TABLET | Refills: 0 | Status: SHIPPED
Start: 2022-02-24 | End: 2022-03-07

## 2022-02-24 RX ORDER — DIAZEPAM 2 MG/1
2 TABLET ORAL EVERY 6 HOURS PRN
Qty: 45 TABLET | Refills: 0 | Status: SHIPPED | OUTPATIENT
Start: 2022-02-24 | End: 2022-03-07

## 2022-02-24 RX ORDER — DIAZEPAM 2 MG/1
2 TABLET ORAL EVERY 6 HOURS PRN
Status: DISCONTINUED | OUTPATIENT
Start: 2022-02-24 | End: 2022-02-24

## 2022-02-24 RX ORDER — DEXAMETHASONE 4 MG/1
4 TABLET ORAL EVERY 12 HOURS SCHEDULED
Qty: 6 TABLET | Refills: 0 | Status: SHIPPED | OUTPATIENT
Start: 2022-02-24 | End: 2022-02-27

## 2022-02-24 RX ORDER — TIZANIDINE 4 MG/1
4 TABLET ORAL EVERY 8 HOURS PRN
Qty: 30 TABLET | Refills: 0 | Status: SHIPPED | OUTPATIENT
Start: 2022-02-24 | End: 2022-03-07

## 2022-02-24 RX ORDER — OXYCODONE AND ACETAMINOPHEN 10; 325 MG/1; MG/1
1-2 TABLET ORAL EVERY 4 HOURS PRN
Qty: 60 TABLET | Refills: 0 | Status: SHIPPED | OUTPATIENT
Start: 2022-02-24 | End: 2022-03-07

## 2022-02-24 NOTE — PLAN OF CARE
Patient A&O X4 on 2L O2 with pain medication use- hx MUKUND w/ CPAP. VSS, /IS/telemetry- NSR. SCDs/TEDs. Left arm precautions. Voiding freely, LBM 2/20- Milk of Mag given earlier. Surgical incision to posterior neck covered with dermabond, JESSE. Denies n/t. C/o occasional spasms to upper posterior back that radiates down bilateral arms. PRN pain medication/muscle relaxer given with some relief, patient also on scheduled muscle relaxer. Soft collar on for comfort. Ice as needed. PT/OT to see again tomorrow. Min assist with walker. Reminded to use call light. Plan to dc home when pain controlled.

## 2022-02-24 NOTE — PROGRESS NOTES
Daughter and grandson at bedside. Reviewed indications, side effects of pain medication/narcotics and constipation prevention. Stressed importance of increased fluids/roughage in diet, continued use stool softeners along with laxatives and suppositories as needed while taking narcotics even when at home. Pt/famiily verbalized understanding. Reviewed dressing changes, incision care, showering, restrictions, activity and when to contact surgeon for concerns. They already watched discharge education video. Solicited and answered any questions regarding care. Patient verbalized understanding.

## 2022-02-24 NOTE — PLAN OF CARE
Pt is alert and oriented X4. . Tele. IV hydralazine given for BP. Last bowel movement on 2/20/22. PO medication given for pain with moderate relief. Incision to posterior neck is clean and JESSE. PO medication given for nausea. Call light within reach. Safety precautions in place.

## 2022-02-25 ENCOUNTER — PATIENT OUTREACH (OUTPATIENT)
Dept: CASE MANAGEMENT | Age: 77
End: 2022-02-25

## 2022-02-25 NOTE — PLAN OF CARE
Posterior neck incision clean, dry, intact. Wears soft cervical collar as needed for comfort. Sitting up in chair. Ambulated to restroom with asst and participated with physical therapy, states tolerated activity well. Instructed on plan of care, call don't fall protocol, call for all asst  needed, discomfort felt. States pain adequately controlled on oral pain med and ready to go home later. Daughter at bedside, both viewed discharge instruction video. Both verbalized understanding.

## 2022-02-25 NOTE — PLAN OF CARE
Grandson & daughter at bedside. Verbal and written discharge instructions reviewed. Verbalized understanding. Prescription for diazepam re-submitted to pharmacy electronically by Dr. Jyoti Barreto. [facility],  confirmed receipt of prescriptions for dexamethasone, oxycodone, tizanidine & diazepam.  Discharged via wheel chair, accompanied by transport staff.

## 2022-02-25 NOTE — PROGRESS NOTES
NCM attempted to contact the patient for HFU. No answer after about a minute of ringing and no VM turned on. NCM will try again another time.

## 2022-02-28 ENCOUNTER — TELEPHONE (OUTPATIENT)
Dept: INTERNAL MEDICINE CLINIC | Facility: CLINIC | Age: 77
End: 2022-02-28

## 2022-02-28 NOTE — TELEPHONE ENCOUNTER
Spoke to pt for TCM today. Pt does not have HFU appt scheduled at this time. Patient is scheduled to follow-up with neurosurgery on 3/7/22. TCM/HFU appt recommended by 3/10/22 as pt is a moderate risk for readmission. Please advise. BOOK BY DATE (last date for TCM): 3/10/22    TRIAGE:  Please f/u with pt and try to get them to schedule as pt would greatly benefit from TCM/HFU appt. Thank you!

## 2022-03-04 NOTE — TELEPHONE ENCOUNTER
#3 not able to leave  And no answer-sent mychart letter to call to sched [facility]

## 2022-03-07 ENCOUNTER — OFFICE VISIT (OUTPATIENT)
Dept: SURGERY | Facility: CLINIC | Age: 77
End: 2022-03-07
Payer: MEDICARE

## 2022-03-07 VITALS — DIASTOLIC BLOOD PRESSURE: 80 MMHG | SYSTOLIC BLOOD PRESSURE: 130 MMHG

## 2022-03-07 DIAGNOSIS — Z98.1 S/P CERVICAL SPINAL FUSION: ICD-10-CM

## 2022-03-07 DIAGNOSIS — S12.8XXS: ICD-10-CM

## 2022-03-07 DIAGNOSIS — M48.02 CERVICAL STENOSIS OF SPINAL CANAL: Primary | ICD-10-CM

## 2022-03-07 PROCEDURE — 1111F DSCHRG MED/CURRENT MED MERGE: CPT | Performed by: PHYSICIAN ASSISTANT

## 2022-03-07 PROCEDURE — 99024 POSTOP FOLLOW-UP VISIT: CPT | Performed by: PHYSICIAN ASSISTANT

## 2022-03-07 RX ORDER — HYDROCODONE BITARTRATE AND ACETAMINOPHEN 10; 325 MG/1; MG/1
1 TABLET ORAL EVERY 4 HOURS PRN
Qty: 60 TABLET | Refills: 0 | Status: SHIPPED | OUTPATIENT
Start: 2022-03-07

## 2022-03-07 RX ORDER — TIZANIDINE 4 MG/1
4 TABLET ORAL EVERY 8 HOURS PRN
Qty: 60 TABLET | Refills: 1 | Status: SHIPPED | OUTPATIENT
Start: 2022-03-07

## 2022-03-07 RX ORDER — HYDROCODONE BITARTRATE AND ACETAMINOPHEN 10; 325 MG/1; MG/1
TABLET ORAL
COMMUNITY
Start: 2022-02-27 | End: 2022-03-07

## 2022-03-09 ENCOUNTER — PATIENT OUTREACH (OUTPATIENT)
Dept: CASE MANAGEMENT | Age: 77
End: 2022-03-09

## 2022-03-15 ENCOUNTER — OFFICE VISIT (OUTPATIENT)
Dept: INTERNAL MEDICINE CLINIC | Facility: CLINIC | Age: 77
End: 2022-03-15
Payer: MEDICARE

## 2022-03-15 VITALS
SYSTOLIC BLOOD PRESSURE: 96 MMHG | WEIGHT: 151 LBS | DIASTOLIC BLOOD PRESSURE: 62 MMHG | HEART RATE: 92 BPM | TEMPERATURE: 97 F | HEIGHT: 61 IN | BODY MASS INDEX: 28.51 KG/M2

## 2022-03-15 DIAGNOSIS — E11.65 UNCONTROLLED TYPE 2 DIABETES MELLITUS WITH HYPERGLYCEMIA (HCC): ICD-10-CM

## 2022-03-15 DIAGNOSIS — G47.33 OSA ON CPAP: ICD-10-CM

## 2022-03-15 DIAGNOSIS — I10 BENIGN ESSENTIAL HTN: ICD-10-CM

## 2022-03-15 DIAGNOSIS — J45.30 MILD PERSISTENT ASTHMA WITHOUT COMPLICATION: ICD-10-CM

## 2022-03-15 DIAGNOSIS — M54.12 CERVICAL RADICULITIS: ICD-10-CM

## 2022-03-15 DIAGNOSIS — Z98.890 HISTORY OF CERVICAL SPINAL SURGERY: Primary | ICD-10-CM

## 2022-03-15 DIAGNOSIS — D50.8 OTHER IRON DEFICIENCY ANEMIA: ICD-10-CM

## 2022-03-15 DIAGNOSIS — Z99.89 OSA ON CPAP: ICD-10-CM

## 2022-03-15 DIAGNOSIS — F41.9 ANXIETY: ICD-10-CM

## 2022-03-15 PROCEDURE — 99214 OFFICE O/P EST MOD 30 MIN: CPT | Performed by: NURSE PRACTITIONER

## 2022-03-15 RX ORDER — ROSUVASTATIN CALCIUM 10 MG/1
10 TABLET, COATED ORAL NIGHTLY
Qty: 90 TABLET | Refills: 0 | Status: SHIPPED | OUTPATIENT
Start: 2022-03-15

## 2022-03-18 ENCOUNTER — OFFICE VISIT (OUTPATIENT)
Dept: HEMATOLOGY/ONCOLOGY | Facility: HOSPITAL | Age: 77
End: 2022-03-18
Attending: INTERNAL MEDICINE
Payer: MEDICARE

## 2022-03-18 VITALS
HEIGHT: 60.98 IN | TEMPERATURE: 98 F | WEIGHT: 151 LBS | SYSTOLIC BLOOD PRESSURE: 150 MMHG | DIASTOLIC BLOOD PRESSURE: 80 MMHG | HEART RATE: 78 BPM | OXYGEN SATURATION: 97 % | BODY MASS INDEX: 28.51 KG/M2 | RESPIRATION RATE: 16 BRPM

## 2022-03-18 VITALS
DIASTOLIC BLOOD PRESSURE: 90 MMHG | RESPIRATION RATE: 16 BRPM | SYSTOLIC BLOOD PRESSURE: 167 MMHG | OXYGEN SATURATION: 96 % | TEMPERATURE: 97 F | HEART RATE: 90 BPM

## 2022-03-18 DIAGNOSIS — D80.3 SELECTIVE DEFICIENCY OF IGG SUBCLASSES (HCC): Primary | ICD-10-CM

## 2022-03-18 PROCEDURE — 96365 THER/PROPH/DIAG IV INF INIT: CPT

## 2022-03-18 PROCEDURE — 96366 THER/PROPH/DIAG IV INF ADDON: CPT

## 2022-03-18 PROCEDURE — 96375 TX/PRO/DX INJ NEW DRUG ADDON: CPT

## 2022-03-18 RX ORDER — DIPHENHYDRAMINE HCL 25 MG
50 CAPSULE ORAL ONCE
Status: CANCELLED | OUTPATIENT
Start: 2022-04-01

## 2022-03-18 RX ORDER — ACETAMINOPHEN 325 MG/1
650 TABLET ORAL ONCE
OUTPATIENT
Start: 2022-05-01

## 2022-03-18 RX ORDER — DIPHENHYDRAMINE HCL 25 MG
50 CAPSULE ORAL ONCE
OUTPATIENT
Start: 2022-05-01

## 2022-03-18 RX ORDER — ACETAMINOPHEN 325 MG/1
650 TABLET ORAL ONCE
Status: CANCELLED | OUTPATIENT
Start: 2022-04-01

## 2022-03-18 RX ORDER — ACETAMINOPHEN 325 MG/1
650 TABLET ORAL ONCE
Status: DISCONTINUED | OUTPATIENT
Start: 2022-03-18 | End: 2022-03-18

## 2022-03-18 RX ORDER — DIPHENHYDRAMINE HCL 25 MG
50 CAPSULE ORAL ONCE
Status: COMPLETED | OUTPATIENT
Start: 2022-03-18 | End: 2022-03-18

## 2022-03-18 RX ADMIN — DIPHENHYDRAMINE HCL 50 MG: 25 MG CAPSULE ORAL at 09:33:00

## 2022-03-18 NOTE — PROGRESS NOTES
Education Record    Learner:  Patient    Disease / Diagnosis: IVIG    Barriers / Limitations:  None   Comments:    Method:  Brief focused and Reinforcement   Comments:    General Topics:  Plan of care reviewed   Comments:    Outcome:  Shows understanding   Comments: Patient stated she took one Hydrocodone-acetaminophen 10/325mg tab this am prior to arrival. No Tylenol tab given today for pre-meds. Patient was given Benadryl and Solucortef only prior to the start of IVIG. Patient tolerated infusion with no adverse events.

## 2022-03-29 RX ORDER — SEMAGLUTIDE 1.34 MG/ML
INJECTION, SOLUTION SUBCUTANEOUS
Qty: 4.5 ML | Refills: 0 | Status: SHIPPED | OUTPATIENT
Start: 2022-03-29

## 2022-03-29 NOTE — PROGRESS NOTES
SP02% on room air at rest 97%. SP02% ambulation on room air 94%. Pt did not require oxygen with ambulation. Stable.

## 2022-03-30 ENCOUNTER — TELEPHONE (OUTPATIENT)
Dept: SURGERY | Facility: CLINIC | Age: 77
End: 2022-03-30

## 2022-03-30 RX ORDER — LISINOPRIL 20 MG/1
TABLET ORAL
Qty: 90 TABLET | Refills: 0 | Status: SHIPPED | OUTPATIENT
Start: 2022-03-30

## 2022-03-30 NOTE — TELEPHONE ENCOUNTER
Last VISIT - 3/15/22 post op neck surgery    Last CPE - 2/4/21    Last REFILL -     LISINOPRIL 20 MG Oral Tab 90 tablet 0 12/29/2021     Last LABS - 2/9/22 lipid    Per PROTOCOL? PASSED     Please Approve or Deny.

## 2022-03-30 NOTE — TELEPHONE ENCOUNTER
Patient is PO SX 2/21/22. Patient states she is having severe pain on the right side of her neck. Patient states she has been unable to work due to pain.

## 2022-03-31 ENCOUNTER — OFFICE VISIT (OUTPATIENT)
Dept: SURGERY | Facility: CLINIC | Age: 77
End: 2022-03-31
Payer: MEDICARE

## 2022-03-31 ENCOUNTER — TELEPHONE (OUTPATIENT)
Dept: INTERNAL MEDICINE CLINIC | Facility: CLINIC | Age: 77
End: 2022-03-31

## 2022-03-31 VITALS — HEART RATE: 90 BPM | DIASTOLIC BLOOD PRESSURE: 90 MMHG | SYSTOLIC BLOOD PRESSURE: 140 MMHG

## 2022-03-31 DIAGNOSIS — B02.8 HERPES ZOSTER WITH COMPLICATION: ICD-10-CM

## 2022-03-31 DIAGNOSIS — Z98.1 S/P CERVICAL SPINAL FUSION: ICD-10-CM

## 2022-03-31 DIAGNOSIS — M48.02 CERVICAL STENOSIS OF SPINAL CANAL: Primary | ICD-10-CM

## 2022-03-31 PROCEDURE — 99490 CHRNC CARE MGMT STAFF 1ST 20: CPT

## 2022-03-31 PROCEDURE — 99024 POSTOP FOLLOW-UP VISIT: CPT | Performed by: PHYSICIAN ASSISTANT

## 2022-03-31 RX ORDER — FAMCICLOVIR 500 MG/1
500 TABLET, FILM COATED ORAL 3 TIMES DAILY
Qty: 21 TABLET | Refills: 0 | Status: SHIPPED | OUTPATIENT
Start: 2022-03-31

## 2022-03-31 NOTE — PROGRESS NOTES
Pt states she has rash around were her surgery site and very pain full. Cant lift her right arm up and she cant turn her head. Been going on for a weak.

## 2022-04-04 ENCOUNTER — HOSPITAL ENCOUNTER (OUTPATIENT)
Dept: GENERAL RADIOLOGY | Age: 77
Discharge: HOME | End: 2022-04-04
Attending: PHYSICIAN ASSISTANT
Payer: MEDICARE

## 2022-04-04 DIAGNOSIS — M48.02 CERVICAL STENOSIS OF SPINAL CANAL: ICD-10-CM

## 2022-04-04 DIAGNOSIS — S12.8XXS: ICD-10-CM

## 2022-04-04 DIAGNOSIS — Z98.1 S/P CERVICAL SPINAL FUSION: ICD-10-CM

## 2022-04-04 PROCEDURE — 72040 X-RAY EXAM NECK SPINE 2-3 VW: CPT | Performed by: PHYSICIAN ASSISTANT

## 2022-04-05 ENCOUNTER — TELEPHONE (OUTPATIENT)
Dept: INTERNAL MEDICINE CLINIC | Facility: CLINIC | Age: 77
End: 2022-04-05

## 2022-04-06 RX ORDER — METFORMIN HYDROCHLORIDE 500 MG/1
TABLET, EXTENDED RELEASE ORAL
Qty: 180 TABLET | Refills: 0 | Status: SHIPPED | OUTPATIENT
Start: 2022-04-06

## 2022-04-07 ENCOUNTER — OFFICE VISIT (OUTPATIENT)
Dept: SURGERY | Facility: CLINIC | Age: 77
End: 2022-04-07
Payer: MEDICARE

## 2022-04-07 VITALS — DIASTOLIC BLOOD PRESSURE: 90 MMHG | SYSTOLIC BLOOD PRESSURE: 140 MMHG

## 2022-04-07 DIAGNOSIS — M48.02 CERVICAL STENOSIS OF SPINAL CANAL: ICD-10-CM

## 2022-04-07 DIAGNOSIS — Z98.1 S/P CERVICAL SPINAL FUSION: Primary | ICD-10-CM

## 2022-04-07 PROCEDURE — 99024 POSTOP FOLLOW-UP VISIT: CPT | Performed by: PHYSICIAN ASSISTANT

## 2022-04-07 NOTE — TELEPHONE ENCOUNTER
Future Appointments   Date Time Provider Gomez Miller   4/20/2022  3:40 PM JUAREZ Johns [facility] [facility]        Orders to  THE [facility]  aware must fast no call back required

## 2022-04-08 NOTE — TELEPHONE ENCOUNTER
Any further labs needed for upcoming visit. Pending to complete are CBC and CMP. Last labs done were 2/9/22 microalb, lipid, CBC, CMP and A1c. Please advise.

## 2022-04-11 ENCOUNTER — PATIENT OUTREACH (OUTPATIENT)
Dept: CASE MANAGEMENT | Age: 77
End: 2022-04-11

## 2022-04-15 ENCOUNTER — OFFICE VISIT (OUTPATIENT)
Dept: HEMATOLOGY/ONCOLOGY | Facility: HOSPITAL | Age: 77
End: 2022-04-15
Attending: INTERNAL MEDICINE
Payer: MEDICARE

## 2022-04-15 VITALS
WEIGHT: 147.38 LBS | RESPIRATION RATE: 18 BRPM | BODY MASS INDEX: 27.83 KG/M2 | TEMPERATURE: 97 F | DIASTOLIC BLOOD PRESSURE: 83 MMHG | OXYGEN SATURATION: 99 % | SYSTOLIC BLOOD PRESSURE: 134 MMHG | HEART RATE: 94 BPM | HEIGHT: 60.98 IN

## 2022-04-15 DIAGNOSIS — D80.3 SELECTIVE DEFICIENCY OF IGG SUBCLASSES (HCC): Primary | ICD-10-CM

## 2022-04-15 PROCEDURE — 96375 TX/PRO/DX INJ NEW DRUG ADDON: CPT

## 2022-04-15 PROCEDURE — 96365 THER/PROPH/DIAG IV INF INIT: CPT

## 2022-04-15 PROCEDURE — 96366 THER/PROPH/DIAG IV INF ADDON: CPT

## 2022-04-15 RX ORDER — DIPHENHYDRAMINE HCL 25 MG
50 CAPSULE ORAL ONCE
OUTPATIENT
Start: 2022-05-01

## 2022-04-15 RX ORDER — DIPHENHYDRAMINE HCL 25 MG
50 CAPSULE ORAL ONCE
Status: COMPLETED | OUTPATIENT
Start: 2022-04-15 | End: 2022-04-15

## 2022-04-15 RX ORDER — ACETAMINOPHEN 325 MG/1
650 TABLET ORAL ONCE
Status: COMPLETED | OUTPATIENT
Start: 2022-04-15 | End: 2022-04-15

## 2022-04-15 RX ORDER — ACETAMINOPHEN 325 MG/1
650 TABLET ORAL ONCE
OUTPATIENT
Start: 2022-05-01

## 2022-04-15 RX ADMIN — ACETAMINOPHEN 325 MG: 325 TABLET ORAL at 08:49:00

## 2022-04-15 RX ADMIN — DIPHENHYDRAMINE HCL 25 MG: 25 MG CAPSULE ORAL at 08:50:00

## 2022-04-15 NOTE — PROGRESS NOTES
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 given avs

## 2022-04-19 ENCOUNTER — LAB ENCOUNTER (OUTPATIENT)
Dept: LAB | Age: 77
End: 2022-04-19
Attending: NURSE PRACTITIONER
Payer: MEDICARE

## 2022-04-19 DIAGNOSIS — I10 BENIGN ESSENTIAL HTN: ICD-10-CM

## 2022-04-19 DIAGNOSIS — D50.8 OTHER IRON DEFICIENCY ANEMIA: ICD-10-CM

## 2022-04-19 LAB
ALBUMIN SERPL-MCNC: 3.8 G/DL (ref 3.4–5)
ALBUMIN/GLOB SERPL: 1.1 {RATIO} (ref 1–2)
ALP LIVER SERPL-CCNC: 82 U/L
ALT SERPL-CCNC: 35 U/L
ANION GAP SERPL CALC-SCNC: 9 MMOL/L (ref 0–18)
AST SERPL-CCNC: 25 U/L (ref 15–37)
BASOPHILS # BLD AUTO: 0.04 X10(3) UL (ref 0–0.2)
BASOPHILS NFR BLD AUTO: 0.7 %
BILIRUB SERPL-MCNC: 0.6 MG/DL (ref 0.1–2)
BUN BLD-MCNC: 14 MG/DL (ref 7–18)
CALCIUM BLD-MCNC: 9.3 MG/DL (ref 8.5–10.1)
CHLORIDE SERPL-SCNC: 107 MMOL/L (ref 98–112)
CO2 SERPL-SCNC: 26 MMOL/L (ref 21–32)
CREAT BLD-MCNC: 0.6 MG/DL
EOSINOPHIL # BLD AUTO: 0.15 X10(3) UL (ref 0–0.7)
EOSINOPHIL NFR BLD AUTO: 2.7 %
ERYTHROCYTE [DISTWIDTH] IN BLOOD BY AUTOMATED COUNT: 14.4 %
FASTING STATUS PATIENT QL REPORTED: YES
GLOBULIN PLAS-MCNC: 3.5 G/DL (ref 2.8–4.4)
GLUCOSE BLD-MCNC: 106 MG/DL (ref 70–99)
HCT VFR BLD AUTO: 43.4 %
HGB BLD-MCNC: 13.6 G/DL
IMM GRANULOCYTES # BLD AUTO: 0 X10(3) UL (ref 0–1)
IMM GRANULOCYTES NFR BLD: 0 %
LYMPHOCYTES # BLD AUTO: 1.91 X10(3) UL (ref 1–4)
LYMPHOCYTES NFR BLD AUTO: 34.9 %
MCH RBC QN AUTO: 29.8 PG (ref 26–34)
MCHC RBC AUTO-ENTMCNC: 31.3 G/DL (ref 31–37)
MCV RBC AUTO: 95 FL
MONOCYTES # BLD AUTO: 0.48 X10(3) UL (ref 0.1–1)
MONOCYTES NFR BLD AUTO: 8.8 %
NEUTROPHILS # BLD AUTO: 2.89 X10 (3) UL (ref 1.5–7.7)
NEUTROPHILS # BLD AUTO: 2.89 X10(3) UL (ref 1.5–7.7)
NEUTROPHILS NFR BLD AUTO: 52.9 %
OSMOLALITY SERPL CALC.SUM OF ELEC: 295 MOSM/KG (ref 275–295)
PLATELET # BLD AUTO: 178 10(3)UL (ref 150–450)
POTASSIUM SERPL-SCNC: 3.9 MMOL/L (ref 3.5–5.1)
PROT SERPL-MCNC: 7.3 G/DL (ref 6.4–8.2)
RBC # BLD AUTO: 4.57 X10(6)UL
SODIUM SERPL-SCNC: 142 MMOL/L (ref 136–145)
WBC # BLD AUTO: 5.5 X10(3) UL (ref 4–11)

## 2022-04-19 PROCEDURE — 36415 COLL VENOUS BLD VENIPUNCTURE: CPT

## 2022-04-19 PROCEDURE — 80053 COMPREHEN METABOLIC PANEL: CPT

## 2022-04-19 PROCEDURE — 85025 COMPLETE CBC W/AUTO DIFF WBC: CPT

## 2022-04-20 ENCOUNTER — OFFICE VISIT (OUTPATIENT)
Dept: INTERNAL MEDICINE CLINIC | Facility: CLINIC | Age: 77
End: 2022-04-20
Payer: MEDICARE

## 2022-04-20 VITALS
TEMPERATURE: 97 F | WEIGHT: 149 LBS | OXYGEN SATURATION: 97 % | SYSTOLIC BLOOD PRESSURE: 144 MMHG | HEIGHT: 60 IN | HEART RATE: 104 BPM | BODY MASS INDEX: 29.25 KG/M2 | DIASTOLIC BLOOD PRESSURE: 88 MMHG

## 2022-04-20 DIAGNOSIS — Z12.31 ENCOUNTER FOR SCREENING MAMMOGRAM FOR MALIGNANT NEOPLASM OF BREAST: ICD-10-CM

## 2022-04-20 DIAGNOSIS — I70.0 ATHEROSCLEROSIS OF AORTA (HCC): ICD-10-CM

## 2022-04-20 DIAGNOSIS — G89.4 CHRONIC PAIN SYNDROME: ICD-10-CM

## 2022-04-20 DIAGNOSIS — M85.859 OSTEOPENIA OF THIGH, UNSPECIFIED LATERALITY: ICD-10-CM

## 2022-04-20 DIAGNOSIS — E04.9 ENLARGED THYROID: ICD-10-CM

## 2022-04-20 DIAGNOSIS — E78.00 PURE HYPERCHOLESTEROLEMIA: ICD-10-CM

## 2022-04-20 DIAGNOSIS — Z85.828 HISTORY OF BASAL CELL CARCINOMA: ICD-10-CM

## 2022-04-20 DIAGNOSIS — Z86.718 HISTORY OF DVT (DEEP VEIN THROMBOSIS): ICD-10-CM

## 2022-04-20 DIAGNOSIS — E53.8 B12 DEFICIENCY: ICD-10-CM

## 2022-04-20 DIAGNOSIS — J45.30 MILD PERSISTENT ASTHMA WITHOUT COMPLICATION: ICD-10-CM

## 2022-04-20 DIAGNOSIS — D69.6 THROMBOCYTOPENIA (HCC): ICD-10-CM

## 2022-04-20 DIAGNOSIS — Z86.711 HISTORY OF PULMONARY EMBOLISM: ICD-10-CM

## 2022-04-20 DIAGNOSIS — D80.3 IGG DEFICIENCY (HCC): ICD-10-CM

## 2022-04-20 DIAGNOSIS — D50.8 OTHER IRON DEFICIENCY ANEMIA: ICD-10-CM

## 2022-04-20 DIAGNOSIS — F33.1 MAJOR DEPRESSIVE DISORDER, RECURRENT EPISODE, MODERATE (HCC): Chronic | ICD-10-CM

## 2022-04-20 DIAGNOSIS — K21.9 GASTROESOPHAGEAL REFLUX DISEASE WITHOUT ESOPHAGITIS: ICD-10-CM

## 2022-04-20 DIAGNOSIS — D80.3 SELECTIVE DEFICIENCY OF IGG SUBCLASSES (HCC): ICD-10-CM

## 2022-04-20 DIAGNOSIS — I25.10 CORONARY ARTERY DISEASE INVOLVING NATIVE CORONARY ARTERY OF NATIVE HEART WITHOUT ANGINA PECTORIS: ICD-10-CM

## 2022-04-20 DIAGNOSIS — Z00.00 ENCOUNTER FOR ANNUAL HEALTH EXAMINATION: Primary | ICD-10-CM

## 2022-04-20 DIAGNOSIS — G43.009 MIGRAINE WITHOUT AURA AND WITHOUT STATUS MIGRAINOSUS, NOT INTRACTABLE: ICD-10-CM

## 2022-04-20 DIAGNOSIS — E11.65 CONTROLLED TYPE 2 DIABETES MELLITUS WITH HYPERGLYCEMIA, WITHOUT LONG-TERM CURRENT USE OF INSULIN (HCC): ICD-10-CM

## 2022-04-20 DIAGNOSIS — G47.33 OSA ON CPAP: ICD-10-CM

## 2022-04-20 DIAGNOSIS — F41.9 ANXIETY: ICD-10-CM

## 2022-04-20 DIAGNOSIS — I10 BENIGN ESSENTIAL HTN: ICD-10-CM

## 2022-04-20 DIAGNOSIS — Z85.3 HISTORY OF BREAST CANCER: ICD-10-CM

## 2022-04-20 DIAGNOSIS — Z99.89 OSA ON CPAP: ICD-10-CM

## 2022-04-20 DIAGNOSIS — G62.9 NEUROPATHY: ICD-10-CM

## 2022-04-20 DIAGNOSIS — Z98.1 S/P CERVICAL SPINAL FUSION: ICD-10-CM

## 2022-04-20 PROBLEM — R20.0 NUMBNESS AND TINGLING: Status: RESOLVED | Noted: 2020-08-20 | Resolved: 2022-04-20

## 2022-04-20 PROBLEM — R20.2 NUMBNESS AND TINGLING: Status: RESOLVED | Noted: 2020-08-20 | Resolved: 2022-04-20

## 2022-04-20 PROBLEM — Z96.652 STATUS POST TOTAL LEFT KNEE REPLACEMENT: Status: RESOLVED | Noted: 2019-12-16 | Resolved: 2022-04-20

## 2022-04-20 PROBLEM — D50.0 IRON DEFICIENCY ANEMIA DUE TO CHRONIC BLOOD LOSS: Status: RESOLVED | Noted: 2018-05-01 | Resolved: 2022-04-20

## 2022-04-20 PROBLEM — R29.6 FALLS FREQUENTLY: Status: RESOLVED | Noted: 2020-08-20 | Resolved: 2022-04-20

## 2022-04-20 PROCEDURE — G0439 PPPS, SUBSEQ VISIT: HCPCS | Performed by: NURSE PRACTITIONER

## 2022-04-20 PROCEDURE — 99214 OFFICE O/P EST MOD 30 MIN: CPT | Performed by: NURSE PRACTITIONER

## 2022-04-20 RX ORDER — FAMCICLOVIR 500 MG/1
1 TABLET, FILM COATED ORAL 3 TIMES DAILY
COMMUNITY
Start: 2022-03-31 | End: 2022-04-20

## 2022-04-20 RX ORDER — HYDROCHLOROTHIAZIDE 12.5 MG/1
12.5 CAPSULE, GELATIN COATED ORAL DAILY
Qty: 90 CAPSULE | Refills: 1 | Status: SHIPPED | OUTPATIENT
Start: 2022-04-20

## 2022-04-20 NOTE — PROGRESS NOTES
Please refer to the letter section of Chart Review for the Asthma Action Plan created 4/20/2022. Medications     Sympathomimetics Instructions     BREO ELLIPTA 200-25 MCG/INH Inhalation Aerosol Powder, Breath Activated    INHALE 1 PUFF INTO THE LUNGS DAILY. Albuterol Sulfate  (90 Base) MCG/ACT Inhalation Aero Soln    Inhale 2 puffs into the lungs every 6 (six) hours as needed for Wheezing.

## 2022-04-21 ENCOUNTER — DOCUMENTATION ONLY (OUTPATIENT)
Dept: INTERNAL MEDICINE CLINIC | Facility: CLINIC | Age: 77
End: 2022-04-21

## 2022-04-21 NOTE — PROGRESS NOTES
Patient was seen in the office on 4/20/2022 and left without handicap placard application. Handicap placard application mailed to patient on 4/21/2022.

## 2022-05-05 ENCOUNTER — OFFICE VISIT (OUTPATIENT)
Dept: NEUROLOGY | Facility: CLINIC | Age: 77
End: 2022-05-05
Payer: MEDICARE

## 2022-05-05 VITALS
HEART RATE: 92 BPM | WEIGHT: 147 LBS | DIASTOLIC BLOOD PRESSURE: 82 MMHG | BODY MASS INDEX: 29 KG/M2 | RESPIRATION RATE: 16 BRPM | SYSTOLIC BLOOD PRESSURE: 132 MMHG

## 2022-05-05 DIAGNOSIS — R25.1 TREMOR OF BOTH HANDS: Primary | ICD-10-CM

## 2022-05-05 DIAGNOSIS — G62.9 NEUROPATHY: ICD-10-CM

## 2022-05-05 DIAGNOSIS — G56.02 CARPAL TUNNEL SYNDROME OF LEFT WRIST: ICD-10-CM

## 2022-05-05 DIAGNOSIS — G43.009 MIGRAINE WITHOUT AURA AND WITHOUT STATUS MIGRAINOSUS, NOT INTRACTABLE: ICD-10-CM

## 2022-05-05 DIAGNOSIS — F33.1 MAJOR DEPRESSIVE DISORDER, RECURRENT EPISODE, MODERATE (HCC): ICD-10-CM

## 2022-05-05 PROCEDURE — 99214 OFFICE O/P EST MOD 30 MIN: CPT | Performed by: OTHER

## 2022-05-05 RX ORDER — DULOXETIN HYDROCHLORIDE 60 MG/1
60 CAPSULE, DELAYED RELEASE ORAL DAILY
Qty: 90 CAPSULE | Refills: 3 | Status: SHIPPED | OUTPATIENT
Start: 2022-05-05

## 2022-05-05 RX ORDER — PREGABALIN 100 MG/1
100 CAPSULE ORAL 3 TIMES DAILY
Qty: 90 CAPSULE | Refills: 5 | Status: SHIPPED | OUTPATIENT
Start: 2022-05-05

## 2022-05-05 RX ORDER — DULOXETIN HYDROCHLORIDE 20 MG/1
20 CAPSULE, DELAYED RELEASE ORAL DAILY
Qty: 90 CAPSULE | Refills: 3 | Status: SHIPPED | OUTPATIENT
Start: 2022-05-05

## 2022-05-05 RX ORDER — TIZANIDINE 4 MG/1
4 TABLET ORAL EVERY 8 HOURS PRN
Qty: 60 TABLET | Refills: 3 | Status: SHIPPED | OUTPATIENT
Start: 2022-05-05

## 2022-05-05 NOTE — PROGRESS NOTES
Patient states she is no longer having migraines/headaches since her cervical procedure. Patient denies recent falls.

## 2022-05-10 ENCOUNTER — PATIENT OUTREACH (OUTPATIENT)
Dept: CASE MANAGEMENT | Age: 77
End: 2022-05-10

## 2022-05-11 ENCOUNTER — HOSPITAL ENCOUNTER (OUTPATIENT)
Age: 77
Discharge: ANOTHER HOSPITAL | End: 2022-05-11
Payer: MEDICARE

## 2022-05-11 ENCOUNTER — APPOINTMENT (OUTPATIENT)
Dept: GENERAL RADIOLOGY | Age: 77
End: 2022-05-11
Attending: NURSE PRACTITIONER
Payer: MEDICARE

## 2022-05-11 ENCOUNTER — APPOINTMENT (OUTPATIENT)
Dept: GENERAL RADIOLOGY | Facility: HOSPITAL | Age: 77
End: 2022-05-11
Attending: EMERGENCY MEDICINE
Payer: MEDICARE

## 2022-05-11 ENCOUNTER — HOSPITAL ENCOUNTER (INPATIENT)
Facility: HOSPITAL | Age: 77
LOS: 2 days | Discharge: HOME | End: 2022-05-13
Attending: EMERGENCY MEDICINE | Admitting: HOSPITALIST
Payer: MEDICARE

## 2022-05-11 ENCOUNTER — TELEPHONE (OUTPATIENT)
Dept: INTERNAL MEDICINE CLINIC | Facility: CLINIC | Age: 77
End: 2022-05-11

## 2022-05-11 VITALS
OXYGEN SATURATION: 94 % | BODY MASS INDEX: 27.75 KG/M2 | RESPIRATION RATE: 16 BRPM | HEART RATE: 116 BPM | SYSTOLIC BLOOD PRESSURE: 92 MMHG | HEIGHT: 61 IN | WEIGHT: 147 LBS | DIASTOLIC BLOOD PRESSURE: 60 MMHG | TEMPERATURE: 98 F

## 2022-05-11 DIAGNOSIS — J11.1 INFLUENZA-LIKE ILLNESS: ICD-10-CM

## 2022-05-11 DIAGNOSIS — J18.9 COMMUNITY ACQUIRED PNEUMONIA, UNSPECIFIED LATERALITY: Primary | ICD-10-CM

## 2022-05-11 DIAGNOSIS — J18.9 PNEUMONIA OF RIGHT MIDDLE LOBE DUE TO INFECTIOUS ORGANISM: ICD-10-CM

## 2022-05-11 DIAGNOSIS — J18.9 SEPSIS DUE TO PNEUMONIA (HCC): Primary | ICD-10-CM

## 2022-05-11 DIAGNOSIS — A41.9 SEPSIS DUE TO PNEUMONIA (HCC): Primary | ICD-10-CM

## 2022-05-11 PROBLEM — N17.9 ACUTE RENAL FAILURE (ARF) (HCC): Status: ACTIVE | Noted: 2022-05-11

## 2022-05-11 PROBLEM — N17.9 ACUTE KIDNEY INJURY: Status: ACTIVE | Noted: 2022-05-11

## 2022-05-11 PROBLEM — D72.829 LEUKOCYTOSIS: Status: ACTIVE | Noted: 2022-05-11

## 2022-05-11 PROBLEM — N17.9 ACUTE RENAL FAILURE (ARF): Status: ACTIVE | Noted: 2022-05-11

## 2022-05-11 PROBLEM — N17.9 ACUTE KIDNEY INJURY (HCC): Status: ACTIVE | Noted: 2022-05-11

## 2022-05-11 PROBLEM — E87.1 HYPONATREMIA: Status: ACTIVE | Noted: 2022-05-11

## 2022-05-11 LAB
#MXD IC: 0.7 X10ˆ3/UL (ref 0.1–1)
ALBUMIN SERPL-MCNC: 3.2 G/DL (ref 3.4–5)
ALBUMIN/GLOB SERPL: 0.9 {RATIO} (ref 1–2)
ALP LIVER SERPL-CCNC: 86 U/L
ALT SERPL-CCNC: 33 U/L
ANION GAP SERPL CALC-SCNC: 10 MMOL/L (ref 0–18)
AST SERPL-CCNC: 36 U/L (ref 15–37)
ATRIAL RATE: 103 BPM
ATRIAL RATE: 107 BPM
BASOPHILS # BLD AUTO: 0.02 X10(3) UL (ref 0–0.2)
BASOPHILS # BLD AUTO: 0.04 X10(3) UL (ref 0–0.2)
BASOPHILS NFR BLD AUTO: 0.1 %
BASOPHILS NFR BLD AUTO: 0.2 %
BILIRUB SERPL-MCNC: 0.7 MG/DL (ref 0.1–2)
BUN BLD-MCNC: 33 MG/DL (ref 7–18)
BUN BLD-MCNC: 37 MG/DL (ref 7–18)
CALCIUM BLD-MCNC: 8.5 MG/DL (ref 8.5–10.1)
CHLORIDE BLD-SCNC: 101 MMOL/L (ref 98–112)
CHLORIDE SERPL-SCNC: 97 MMOL/L (ref 98–112)
CO2 BLD-SCNC: 24 MMOL/L (ref 21–32)
CO2 SERPL-SCNC: 24 MMOL/L (ref 21–32)
CREAT BLD-MCNC: 1.67 MG/DL
CREAT BLD-MCNC: 1.7 MG/DL
D DIMER PPP FEU-MCNC: 0.37 UG/ML FEU (ref ?–0.75)
EOSINOPHIL # BLD AUTO: 0.01 X10(3) UL (ref 0–0.7)
EOSINOPHIL # BLD AUTO: 0.01 X10(3) UL (ref 0–0.7)
EOSINOPHIL NFR BLD AUTO: 0 %
EOSINOPHIL NFR BLD AUTO: 0.1 %
ERYTHROCYTE [DISTWIDTH] IN BLOOD BY AUTOMATED COUNT: 12.8 %
ERYTHROCYTE [DISTWIDTH] IN BLOOD BY AUTOMATED COUNT: 12.9 %
EST. AVERAGE GLUCOSE BLD GHB EST-MCNC: 148 MG/DL (ref 68–126)
GLOBULIN PLAS-MCNC: 3.7 G/DL (ref 2.8–4.4)
GLUCOSE BLD-MCNC: 145 MG/DL (ref 70–99)
GLUCOSE BLD-MCNC: 159 MG/DL (ref 70–99)
GLUCOSE BLD-MCNC: 178 MG/DL (ref 70–99)
GLUCOSE BLD-MCNC: 196 MG/DL (ref 70–99)
HBA1C MFR BLD: 6.8 % (ref ?–5.7)
HCT VFR BLD AUTO: 35.1 %
HCT VFR BLD AUTO: 36.2 %
HCT VFR BLD AUTO: 37.7 %
HCT VFR BLD CALC: 38 %
HGB BLD-MCNC: 11.9 G/DL
HGB BLD-MCNC: 12.4 G/DL
HGB BLD-MCNC: 12.5 G/DL
IMM GRANULOCYTES # BLD AUTO: 0.21 X10(3) UL (ref 0–1)
IMM GRANULOCYTES # BLD AUTO: 0.27 X10(3) UL (ref 0–1)
IMM GRANULOCYTES NFR BLD: 1.1 %
IMM GRANULOCYTES NFR BLD: 1.3 %
ISTAT IONIZED CALCIUM FOR CHEM 8: 1.06 MMOL/L (ref 1.12–1.32)
LACTATE SERPL-SCNC: 1.7 MMOL/L (ref 0.4–2)
LACTATE SERPL-SCNC: 2.7 MMOL/L (ref 0.4–2)
LACTATE SERPL-SCNC: 2.9 MMOL/L (ref 0.4–2)
LYMPHOCYTES # BLD AUTO: 0.89 X10(3) UL (ref 1–4)
LYMPHOCYTES # BLD AUTO: 0.9 X10ˆ3/UL (ref 1–4)
LYMPHOCYTES # BLD AUTO: 1.09 X10(3) UL (ref 1–4)
LYMPHOCYTES NFR BLD AUTO: 4.5 %
LYMPHOCYTES NFR BLD AUTO: 4.5 %
LYMPHOCYTES NFR BLD AUTO: 5.4 %
MCH RBC QN AUTO: 29.8 PG (ref 26–34)
MCH RBC QN AUTO: 30.3 PG (ref 26–34)
MCH RBC QN AUTO: 30.4 PG (ref 26–34)
MCHC RBC AUTO-ENTMCNC: 33.2 G/DL (ref 31–37)
MCHC RBC AUTO-ENTMCNC: 33.9 G/DL (ref 31–37)
MCHC RBC AUTO-ENTMCNC: 34.3 G/DL (ref 31–37)
MCV RBC AUTO: 88.5 FL
MCV RBC AUTO: 89.8 FL
MCV RBC AUTO: 90 FL (ref 80–100)
MIXED CELL %: 3.6 %
MONOCYTES # BLD AUTO: 0.89 X10(3) UL (ref 0.1–1)
MONOCYTES # BLD AUTO: 0.91 X10(3) UL (ref 0.1–1)
MONOCYTES NFR BLD AUTO: 4.5 %
MONOCYTES NFR BLD AUTO: 4.5 %
NEUTROPHILS # BLD AUTO: 17.6 X10ˆ3/UL (ref 1.5–7.7)
NEUTROPHILS # BLD AUTO: 17.74 X10 (3) UL (ref 1.5–7.7)
NEUTROPHILS # BLD AUTO: 17.74 X10(3) UL (ref 1.5–7.7)
NEUTROPHILS # BLD AUTO: 17.93 X10 (3) UL (ref 1.5–7.7)
NEUTROPHILS # BLD AUTO: 17.93 X10(3) UL (ref 1.5–7.7)
NEUTROPHILS NFR BLD AUTO: 88.6 %
NEUTROPHILS NFR BLD AUTO: 89.7 %
NEUTROPHILS NFR BLD AUTO: 91.9 %
NT-PROBNP SERPL-MCNC: 839 PG/ML (ref ?–450)
OSMOLALITY SERPL CALC.SUM OF ELEC: 282 MOSM/KG (ref 275–295)
P AXIS: 25 DEGREES
P AXIS: 31 DEGREES
P-R INTERVAL: 160 MS
P-R INTERVAL: 162 MS
PLATELET # BLD AUTO: 205 10(3)UL (ref 150–450)
PLATELET # BLD AUTO: 216 10(3)UL (ref 150–450)
POCT BLOOD URINE: NEGATIVE
POCT GLUCOSE URINE: NEGATIVE MG/DL
POCT INFLUENZA A: NEGATIVE
POCT INFLUENZA B: NEGATIVE
POCT LEUKOCYTE ESTERASE URINE: NEGATIVE
POCT NITRITE URINE: NEGATIVE
POCT PH URINE: 5.5 (ref 5–8)
POCT SPECIFIC GRAVITY URINE: 1.02
POCT URINE CLARITY: CLEAR
POCT UROBILINOGEN URINE: 0.2 MG/DL
POTASSIUM BLD-SCNC: 4.4 MMOL/L (ref 3.6–5.1)
POTASSIUM SERPL-SCNC: 4.6 MMOL/L (ref 3.5–5.1)
PROT SERPL-MCNC: 6.9 G/DL (ref 6.4–8.2)
Q-T INTERVAL: 342 MS
Q-T INTERVAL: 344 MS
QRS DURATION: 76 MS
QRS DURATION: 80 MS
QTC CALCULATION (BEZET): 450 MS
QTC CALCULATION (BEZET): 456 MS
R AXIS: -17 DEGREES
R AXIS: -19 DEGREES
RBC # BLD AUTO: 3.91 X10(6)UL
RBC # BLD AUTO: 4.09 X10(6)UL
RBC # BLD AUTO: 4.19 X10ˆ6/UL
SARS-COV-2 RNA RESP QL NAA+PROBE: NOT DETECTED
SODIUM BLD-SCNC: 133 MMOL/L (ref 136–145)
SODIUM SERPL-SCNC: 131 MMOL/L (ref 136–145)
T AXIS: -5 DEGREES
T AXIS: 2 DEGREES
TROPONIN I BLD-MCNC: <0.02 NG/ML
TROPONIN I HIGH SENSITIVITY: 15 NG/L
VENTRICULAR RATE: 103 BPM
VENTRICULAR RATE: 107 BPM
WBC # BLD AUTO: 19.2 X10ˆ3/UL (ref 4–11)
WBC # BLD AUTO: 19.8 X10(3) UL (ref 4–11)
WBC # BLD AUTO: 20.3 X10(3) UL (ref 4–11)

## 2022-05-11 PROCEDURE — 87502 INFLUENZA DNA AMP PROBE: CPT | Performed by: NURSE PRACTITIONER

## 2022-05-11 PROCEDURE — 81002 URINALYSIS NONAUTO W/O SCOPE: CPT | Performed by: NURSE PRACTITIONER

## 2022-05-11 PROCEDURE — 84484 ASSAY OF TROPONIN QUANT: CPT | Performed by: NURSE PRACTITIONER

## 2022-05-11 PROCEDURE — 85025 COMPLETE CBC W/AUTO DIFF WBC: CPT | Performed by: NURSE PRACTITIONER

## 2022-05-11 PROCEDURE — 99223 1ST HOSP IP/OBS HIGH 75: CPT | Performed by: HOSPITALIST

## 2022-05-11 PROCEDURE — U0002 COVID-19 LAB TEST NON-CDC: HCPCS | Performed by: NURSE PRACTITIONER

## 2022-05-11 PROCEDURE — 99215 OFFICE O/P EST HI 40 MIN: CPT | Performed by: NURSE PRACTITIONER

## 2022-05-11 PROCEDURE — 80047 BASIC METABLC PNL IONIZED CA: CPT | Performed by: NURSE PRACTITIONER

## 2022-05-11 PROCEDURE — 71046 X-RAY EXAM CHEST 2 VIEWS: CPT | Performed by: NURSE PRACTITIONER

## 2022-05-11 PROCEDURE — 93000 ELECTROCARDIOGRAM COMPLETE: CPT | Performed by: NURSE PRACTITIONER

## 2022-05-11 RX ORDER — DEXTROSE MONOHYDRATE 25 G/50ML
50 INJECTION, SOLUTION INTRAVENOUS
Status: DISCONTINUED | OUTPATIENT
Start: 2022-05-11 | End: 2022-05-13

## 2022-05-11 RX ORDER — ROSUVASTATIN CALCIUM 5 MG/1
10 TABLET, COATED ORAL NIGHTLY
Status: DISCONTINUED | OUTPATIENT
Start: 2022-05-11 | End: 2022-05-13

## 2022-05-11 RX ORDER — SODIUM CHLORIDE 9 MG/ML
INJECTION, SOLUTION INTRAVENOUS CONTINUOUS
Status: DISCONTINUED | OUTPATIENT
Start: 2022-05-11 | End: 2022-05-13

## 2022-05-11 RX ORDER — ACETAMINOPHEN 325 MG/1
650 TABLET ORAL EVERY 6 HOURS PRN
Status: DISCONTINUED | OUTPATIENT
Start: 2022-05-11 | End: 2022-05-13

## 2022-05-11 RX ORDER — DULOXETIN HYDROCHLORIDE 20 MG/1
20 CAPSULE, DELAYED RELEASE ORAL DAILY
Status: DISCONTINUED | OUTPATIENT
Start: 2022-05-12 | End: 2022-05-13

## 2022-05-11 RX ORDER — LISINOPRIL 20 MG/1
20 TABLET ORAL DAILY
COMMUNITY

## 2022-05-11 RX ORDER — FLUTICASONE PROPIONATE 50 MCG
1 SPRAY, SUSPENSION (ML) NASAL 2 TIMES DAILY
Status: DISCONTINUED | OUTPATIENT
Start: 2022-05-11 | End: 2022-05-13

## 2022-05-11 RX ORDER — DULOXETIN HYDROCHLORIDE 30 MG/1
60 CAPSULE, DELAYED RELEASE ORAL DAILY
Status: DISCONTINUED | OUTPATIENT
Start: 2022-05-12 | End: 2022-05-13

## 2022-05-11 RX ORDER — PANTOPRAZOLE SODIUM 40 MG/1
40 TABLET, DELAYED RELEASE ORAL
Status: DISCONTINUED | OUTPATIENT
Start: 2022-05-11 | End: 2022-05-13

## 2022-05-11 RX ORDER — METOCLOPRAMIDE HYDROCHLORIDE 5 MG/ML
5 INJECTION INTRAMUSCULAR; INTRAVENOUS EVERY 8 HOURS PRN
Status: DISCONTINUED | OUTPATIENT
Start: 2022-05-11 | End: 2022-05-13

## 2022-05-11 RX ORDER — NICOTINE POLACRILEX 4 MG
15 LOZENGE BUCCAL
Status: DISCONTINUED | OUTPATIENT
Start: 2022-05-11 | End: 2022-05-13

## 2022-05-11 RX ORDER — TIZANIDINE 4 MG/1
4 TABLET ORAL EVERY 8 HOURS PRN
Status: DISCONTINUED | OUTPATIENT
Start: 2022-05-11 | End: 2022-05-13

## 2022-05-11 RX ORDER — PREGABALIN 50 MG/1
100 CAPSULE ORAL 3 TIMES DAILY
Status: DISCONTINUED | OUTPATIENT
Start: 2022-05-11 | End: 2022-05-13

## 2022-05-11 RX ORDER — MELATONIN
3 NIGHTLY PRN
Status: DISCONTINUED | OUTPATIENT
Start: 2022-05-11 | End: 2022-05-13

## 2022-05-11 RX ORDER — ONDANSETRON 2 MG/ML
4 INJECTION INTRAMUSCULAR; INTRAVENOUS EVERY 6 HOURS PRN
Status: DISCONTINUED | OUTPATIENT
Start: 2022-05-11 | End: 2022-05-13

## 2022-05-11 RX ORDER — ALBUTEROL SULFATE 90 UG/1
2 AEROSOL, METERED RESPIRATORY (INHALATION) EVERY 6 HOURS PRN
Status: DISCONTINUED | OUTPATIENT
Start: 2022-05-11 | End: 2022-05-13

## 2022-05-11 RX ORDER — SEMAGLUTIDE 1.34 MG/ML
0.5 INJECTION, SOLUTION SUBCUTANEOUS WEEKLY
COMMUNITY

## 2022-05-11 RX ORDER — SODIUM CHLORIDE 9 MG/ML
INJECTION, SOLUTION INTRAVENOUS CONTINUOUS
Status: ACTIVE | OUTPATIENT
Start: 2022-05-11 | End: 2022-05-12

## 2022-05-11 RX ORDER — HYDROCODONE BITARTRATE AND ACETAMINOPHEN 10; 325 MG/1; MG/1
1 TABLET ORAL EVERY 4 HOURS PRN
Status: DISCONTINUED | OUTPATIENT
Start: 2022-05-11 | End: 2022-05-13

## 2022-05-11 RX ORDER — NICOTINE POLACRILEX 4 MG
30 LOZENGE BUCCAL
Status: DISCONTINUED | OUTPATIENT
Start: 2022-05-11 | End: 2022-05-13

## 2022-05-11 NOTE — ED QUICK NOTES
Orders for admission, patient is aware of plan and ready to go upstairs. Any questions, please call ED RN aJzz at extension [phone]. Patient Covid vaccination status: Fully vaccinated     COVID Test Ordered in ED: None    COVID Suspicion at Admission: No risk with negative rapid    Running Infusions:   Azrithromycin at 250 lm/hr     Mental Status/LOC at time of transport: A&Ox3    Other pertinent information: PT to received a total of 2001 ml per sepsis bolus protocol. PT is currently on bag #1.  Repeat lactic due at 1848  CIWA score: N/A   NIH score:  N/A

## 2022-05-11 NOTE — PROGRESS NOTES
NURSING ADMISSION NOTE      Patient admitted via Cart  Oriented to room. Safety precautions initiated. Bed in low position. Call light in reach. Patient A&Ox4, reported no pain. On 3L NC O2 sat=96%. Admission navigator and med rec completed. IVF running per [facility].

## 2022-05-11 NOTE — TELEPHONE ENCOUNTER
Spoke to pt. Pt informed this RN that SD switched her med to hydrochlorothiazide on 4/20/22. Since then, she has been having on/off dizziness, but today was much worse. Pt having lightheadedness and dizziness. No longer having leg cramps. Asked pt if she has been monitoring BP. Pt said, \"No, but don't tell Michelle\". Had pt take BP while on phone. Pt took her BP and it is 88/63 . Given sx and hypotension, advised to to be seen in UC for eval. Pt stated, \"But I have to go to work. I already know what you are going to say, my health is more important than work\". Pt wanted this RN to get SD opinion. Informed her that SD will likely have the same recommendation, but will check with her. SD, advise to be seen in UC or any other recs?

## 2022-05-11 NOTE — ED INITIAL ASSESSMENT (HPI)
PT seen at [facility] [place] for shortness of breath.  At , PT had hypotension and low O2 sats

## 2022-05-11 NOTE — TELEPHONE ENCOUNTER
Patient is calling and not feeling well at all  She is running a fever and has bad leg cramps, dizzy,lightheaded  triage

## 2022-05-11 NOTE — TELEPHONE ENCOUNTER
Spoke to pt. Informed her SD recommends she be seen in UC for eval. Pt stated understanding and agreed to plan.      FYI to SD.

## 2022-05-11 NOTE — ED INITIAL ASSESSMENT (HPI)
None exertional dyspnea & hypotension onset x 1 week. Recent increase in BP meds. Called PCP advised to IC for evaluation. Also having chest tightness - thinks its her Asthma. Has been using inhaler q 4 hours w minimal relief. Non productive cough. Pt also type 2 diabetic - states under control.

## 2022-05-12 LAB
ANION GAP SERPL CALC-SCNC: 5 MMOL/L (ref 0–18)
BASOPHILS # BLD AUTO: 0.04 X10(3) UL (ref 0–0.2)
BASOPHILS NFR BLD AUTO: 0.2 %
BILIRUB UR QL STRIP.AUTO: NEGATIVE
BUN BLD-MCNC: 28 MG/DL (ref 7–18)
CALCIUM BLD-MCNC: 8.6 MG/DL (ref 8.5–10.1)
CHLORIDE SERPL-SCNC: 104 MMOL/L (ref 98–112)
CLARITY UR REFRACT.AUTO: CLEAR
CO2 SERPL-SCNC: 23 MMOL/L (ref 21–32)
CREAT BLD-MCNC: 1.03 MG/DL
EOSINOPHIL # BLD AUTO: 0.06 X10(3) UL (ref 0–0.7)
EOSINOPHIL NFR BLD AUTO: 0.3 %
ERYTHROCYTE [DISTWIDTH] IN BLOOD BY AUTOMATED COUNT: 13 %
GLUCOSE BLD-MCNC: 122 MG/DL (ref 70–99)
GLUCOSE BLD-MCNC: 128 MG/DL (ref 70–99)
GLUCOSE BLD-MCNC: 135 MG/DL (ref 70–99)
GLUCOSE BLD-MCNC: 151 MG/DL (ref 70–99)
GLUCOSE BLD-MCNC: 162 MG/DL (ref 70–99)
GLUCOSE UR STRIP.AUTO-MCNC: NEGATIVE MG/DL
HCT VFR BLD AUTO: 32.9 %
HGB BLD-MCNC: 10.9 G/DL
IMM GRANULOCYTES # BLD AUTO: 0.45 X10(3) UL (ref 0–1)
IMM GRANULOCYTES NFR BLD: 2.4 %
KETONES UR STRIP.AUTO-MCNC: NEGATIVE MG/DL
LEUKOCYTE ESTERASE UR QL STRIP.AUTO: NEGATIVE
LYMPHOCYTES # BLD AUTO: 1.88 X10(3) UL (ref 1–4)
LYMPHOCYTES NFR BLD AUTO: 10.1 %
MCH RBC QN AUTO: 30.4 PG (ref 26–34)
MCHC RBC AUTO-ENTMCNC: 33.1 G/DL (ref 31–37)
MCV RBC AUTO: 91.9 FL
MONOCYTES # BLD AUTO: 0.97 X10(3) UL (ref 0.1–1)
MONOCYTES NFR BLD AUTO: 5.2 %
NEUTROPHILS # BLD AUTO: 15.24 X10 (3) UL (ref 1.5–7.7)
NEUTROPHILS # BLD AUTO: 15.24 X10(3) UL (ref 1.5–7.7)
NEUTROPHILS NFR BLD AUTO: 81.8 %
NITRITE UR QL STRIP.AUTO: NEGATIVE
OSMOLALITY SERPL CALC.SUM OF ELEC: 282 MOSM/KG (ref 275–295)
PH UR STRIP.AUTO: 5 [PH] (ref 5–8)
PLATELET # BLD AUTO: 161 10(3)UL (ref 150–450)
POTASSIUM SERPL-SCNC: 4.7 MMOL/L (ref 3.5–5.1)
PROT UR STRIP.AUTO-MCNC: NEGATIVE MG/DL
RBC # BLD AUTO: 3.58 X10(6)UL
SODIUM SERPL-SCNC: 132 MMOL/L (ref 136–145)
SP GR UR STRIP.AUTO: 1.01 (ref 1–1.03)
UROBILINOGEN UR STRIP.AUTO-MCNC: <2 MG/DL
WBC # BLD AUTO: 18.6 X10(3) UL (ref 4–11)

## 2022-05-12 PROCEDURE — 99232 SBSQ HOSP IP/OBS MODERATE 35: CPT | Performed by: HOSPITALIST

## 2022-05-12 NOTE — PLAN OF CARE
Pt alert and oriented, no fever, vitals stable. IVF and IV anbx  Given per [facility] No new complaints . Bed alarm is on, call light within reach, will continue to monitor. Pt is possible discharge tomorrow.      Problem: RESPIRATORY - ADULT  Goal: Achieves optimal ventilation and oxygenation  Description: INTERVENTIONS:  - Assess for changes in respiratory status  - Assess for changes in mentation and behavior  - Position to facilitate oxygenation and minimize respiratory effort  - Oxygen supplementation based on oxygen saturation or ABGs  - Provide Smoking Cessation handout, if applicable  - Encourage broncho-pulmonary hygiene including cough, deep breathe, Incentive Spirometry  - Assess the need for suctioning and perform as needed  - Assess and instruct to report SOB or any respiratory difficulty  - Respiratory Therapy support as indicated  - Manage/alleviate anxiety  - Monitor for signs/symptoms of CO2 retention  Outcome: Progressing     Problem: HEMATOLOGIC - ADULT  Goal: Maintains hematologic stability  Description: INTERVENTIONS  - Assess for signs and symptoms of bleeding or hemorrhage  - Monitor labs and vital signs for trends  - Administer supportive blood products/factors, fluids and medications as ordered and appropriate  - Administer supportive blood products/factors as ordered and appropriate  Outcome: Progressing  Goal: Free from bleeding injury  Description: (Example usage: patient with low platelets)  INTERVENTIONS:  - Avoid intramuscular injections, enemas and rectal medication administration  - Ensure safe mobilization of patient  - Hold pressure on venipuncture sites to achieve adequate hemostasis  - Assess for signs and symptoms of internal bleeding  - Monitor lab trends  - Patient is to report abnormal signs of bleeding to staff  - Avoid use of toothpicks and dental floss  - Use electric shaver for shaving  - Use soft bristle tooth brush  - Limit straining and forceful nose blowing  Outcome: Progressing     Problem: SAFETY ADULT - FALL  Goal: Free from fall injury  Description: INTERVENTIONS:  - Assess pt frequently for physical needs  - Identify cognitive and physical deficits and behaviors that affect risk of falls.   - [place] fall precautions as indicated by assessment.  - Educate pt/family on patient safety including physical limitations  - Instruct pt to call for assistance with activity based on assessment  - Modify environment to reduce risk of injury  - Provide assistive devices as appropriate  - Consider OT/PT consult to assist with strengthening/mobility  - Encourage toileting schedule  Outcome: Progressing

## 2022-05-12 NOTE — PLAN OF CARE
Patient A/O x4, VSS on 3L, denies pain at this time. Hx MUKUND, declines use of CPAP here. Tele. IVF. Plan to continue IV abx and await blood culture results. Safety measures in place.

## 2022-05-12 NOTE — RESPIRATORY THERAPY NOTE
Patient uses a cpap at home, but is declining to be set-up with one this admission. RN aware. Patient on MUKUND protocol.

## 2022-05-12 NOTE — PLAN OF CARE
Patient alert and oriented x4. VSS. Afebrile. PRN pain medication administered per MAR. Medications administered per MAR. Patient declined use of CPAP. Patient on 3L of O2 satting in mid to high 90s. Safety precautions continued. Call light within reach. Care endorsed to [facility].     Problem: RESPIRATORY - ADULT  Goal: Achieves optimal ventilation and oxygenation  Description: INTERVENTIONS:  - Assess for changes in respiratory status  - Assess for changes in mentation and behavior  - Position to facilitate oxygenation and minimize respiratory effort  - Oxygen supplementation based on oxygen saturation or ABGs  - Provide Smoking Cessation handout, if applicable  - Encourage broncho-pulmonary hygiene including cough, deep breathe, Incentive Spirometry  - Assess the need for suctioning and perform as needed  - Assess and instruct to report SOB or any respiratory difficulty  - Respiratory Therapy support as indicated  - Manage/alleviate anxiety  - Monitor for signs/symptoms of CO2 retention  Outcome: Progressing     Problem: PAIN - ADULT  Goal: Verbalizes/displays adequate comfort level or patient's stated pain goal  Description: INTERVENTIONS:  - Encourage pt to monitor pain and request assistance  - Assess pain using appropriate pain scale  - Administer analgesics based on type and severity of pain and evaluate response  - Implement non-pharmacological measures as appropriate and evaluate response  - Consider cultural and social influences on pain and pain management  - Manage/alleviate anxiety  - Utilize distraction and/or relaxation techniques  - Monitor for opioid side effects  - Notify MD/LIP if interventions unsuccessful or patient reports new pain  - Anticipate increased pain with activity and pre-medicate as appropriate  Outcome: Progressing     Problem: SAFETY ADULT - FALL  Goal: Free from fall injury  Description: INTERVENTIONS:  - Assess pt frequently for physical needs  - Identify cognitive and physical deficits and behaviors that affect risk of falls.   - [place] fall precautions as indicated by assessment.  - Educate pt/family on patient safety including physical limitations  - Instruct pt to call for assistance with activity based on assessment  - Modify environment to reduce risk of injury  - Provide assistive devices as appropriate  - Consider OT/PT consult to assist with strengthening/mobility  - Encourage toileting schedule  Outcome: Progressing

## 2022-05-13 ENCOUNTER — APPOINTMENT (OUTPATIENT)
Dept: HEMATOLOGY/ONCOLOGY | Facility: HOSPITAL | Age: 77
End: 2022-05-13
Attending: INTERNAL MEDICINE
Payer: MEDICARE

## 2022-05-13 VITALS
HEIGHT: 61 IN | HEART RATE: 71 BPM | OXYGEN SATURATION: 98 % | TEMPERATURE: 97 F | RESPIRATION RATE: 18 BRPM | BODY MASS INDEX: 27.97 KG/M2 | DIASTOLIC BLOOD PRESSURE: 63 MMHG | SYSTOLIC BLOOD PRESSURE: 141 MMHG | WEIGHT: 148.13 LBS

## 2022-05-13 LAB
ANION GAP SERPL CALC-SCNC: 6 MMOL/L (ref 0–18)
BASOPHILS # BLD AUTO: 0.02 X10(3) UL (ref 0–0.2)
BASOPHILS NFR BLD AUTO: 0.2 %
BUN BLD-MCNC: 16 MG/DL (ref 7–18)
CALCIUM BLD-MCNC: 8.7 MG/DL (ref 8.5–10.1)
CHLORIDE SERPL-SCNC: 110 MMOL/L (ref 98–112)
CO2 SERPL-SCNC: 25 MMOL/L (ref 21–32)
CREAT BLD-MCNC: 0.64 MG/DL
EOSINOPHIL # BLD AUTO: 0.27 X10(3) UL (ref 0–0.7)
EOSINOPHIL NFR BLD AUTO: 2.8 %
ERYTHROCYTE [DISTWIDTH] IN BLOOD BY AUTOMATED COUNT: 13.2 %
GLUCOSE BLD-MCNC: 103 MG/DL (ref 70–99)
GLUCOSE BLD-MCNC: 109 MG/DL (ref 70–99)
GLUCOSE BLD-MCNC: 154 MG/DL (ref 70–99)
HCT VFR BLD AUTO: 30.8 %
HGB BLD-MCNC: 10.1 G/DL
IMM GRANULOCYTES # BLD AUTO: 0.05 X10(3) UL (ref 0–1)
IMM GRANULOCYTES NFR BLD: 0.5 %
LYMPHOCYTES # BLD AUTO: 1.8 X10(3) UL (ref 1–4)
LYMPHOCYTES NFR BLD AUTO: 18.3 %
MCH RBC QN AUTO: 30.7 PG (ref 26–34)
MCHC RBC AUTO-ENTMCNC: 32.8 G/DL (ref 31–37)
MCV RBC AUTO: 93.6 FL
MONOCYTES # BLD AUTO: 0.56 X10(3) UL (ref 0.1–1)
MONOCYTES NFR BLD AUTO: 5.7 %
NEUTROPHILS # BLD AUTO: 7.11 X10 (3) UL (ref 1.5–7.7)
NEUTROPHILS # BLD AUTO: 7.11 X10(3) UL (ref 1.5–7.7)
NEUTROPHILS NFR BLD AUTO: 72.5 %
OSMOLALITY SERPL CALC.SUM OF ELEC: 293 MOSM/KG (ref 275–295)
PLATELET # BLD AUTO: 168 10(3)UL (ref 150–450)
POTASSIUM SERPL-SCNC: 4.1 MMOL/L (ref 3.5–5.1)
RBC # BLD AUTO: 3.29 X10(6)UL
SODIUM SERPL-SCNC: 141 MMOL/L (ref 136–145)
WBC # BLD AUTO: 9.8 X10(3) UL (ref 4–11)

## 2022-05-13 PROCEDURE — 99239 HOSP IP/OBS DSCHRG MGMT >30: CPT | Performed by: HOSPITALIST

## 2022-05-13 RX ORDER — AMOXICILLIN AND CLAVULANATE POTASSIUM 875; 125 MG/1; MG/1
1 TABLET, FILM COATED ORAL 2 TIMES DAILY
Qty: 8 TABLET | Refills: 0 | Status: SHIPPED | OUTPATIENT
Start: 2022-05-13 | End: 2022-05-17

## 2022-05-13 NOTE — PROGRESS NOTES
Pt A&Ox4 on 3L, VSS. PRN pain medications administered per mar. Tolerated medications per mar. Call light within reach, frequent checks made, needs meet.

## 2022-05-16 ENCOUNTER — TELEMEDICINE (OUTPATIENT)
Dept: INTERNAL MEDICINE CLINIC | Facility: CLINIC | Age: 77
End: 2022-05-16

## 2022-05-16 ENCOUNTER — PATIENT OUTREACH (OUTPATIENT)
Dept: CASE MANAGEMENT | Age: 77
End: 2022-05-16

## 2022-05-16 DIAGNOSIS — E86.1 HYPOTENSION DUE TO HYPOVOLEMIA: ICD-10-CM

## 2022-05-16 DIAGNOSIS — I95.89 HYPOTENSION DUE TO HYPOVOLEMIA: ICD-10-CM

## 2022-05-16 DIAGNOSIS — E11.65 CONTROLLED TYPE 2 DIABETES MELLITUS WITH HYPERGLYCEMIA, WITHOUT LONG-TERM CURRENT USE OF INSULIN (HCC): ICD-10-CM

## 2022-05-16 DIAGNOSIS — E87.1 HYPONATREMIA: ICD-10-CM

## 2022-05-16 DIAGNOSIS — I10 PRIMARY HYPERTENSION: ICD-10-CM

## 2022-05-16 DIAGNOSIS — J18.9 COMMUNITY ACQUIRED PNEUMONIA, UNSPECIFIED LATERALITY: Primary | ICD-10-CM

## 2022-05-16 RX ORDER — METFORMIN HYDROCHLORIDE 500 MG/1
500 TABLET, EXTENDED RELEASE ORAL 2 TIMES DAILY WITH MEALS
COMMUNITY

## 2022-05-20 ENCOUNTER — OFFICE VISIT (OUTPATIENT)
Dept: HEMATOLOGY/ONCOLOGY | Facility: HOSPITAL | Age: 77
End: 2022-05-20
Attending: INTERNAL MEDICINE
Payer: MEDICARE

## 2022-05-20 VITALS
BODY MASS INDEX: 28.63 KG/M2 | DIASTOLIC BLOOD PRESSURE: 77 MMHG | HEIGHT: 60.98 IN | WEIGHT: 151.63 LBS | SYSTOLIC BLOOD PRESSURE: 160 MMHG | TEMPERATURE: 98 F | OXYGEN SATURATION: 93 % | HEART RATE: 89 BPM | RESPIRATION RATE: 18 BRPM

## 2022-05-20 DIAGNOSIS — D80.3 SELECTIVE DEFICIENCY OF IGG SUBCLASSES (HCC): Primary | ICD-10-CM

## 2022-05-20 PROCEDURE — 96365 THER/PROPH/DIAG IV INF INIT: CPT

## 2022-05-20 PROCEDURE — 96375 TX/PRO/DX INJ NEW DRUG ADDON: CPT

## 2022-05-20 PROCEDURE — 96366 THER/PROPH/DIAG IV INF ADDON: CPT

## 2022-05-20 RX ORDER — DIPHENHYDRAMINE HCL 25 MG
50 CAPSULE ORAL ONCE
OUTPATIENT
Start: 2022-06-01

## 2022-05-20 RX ORDER — DIPHENHYDRAMINE HCL 25 MG
50 CAPSULE ORAL ONCE
Status: COMPLETED | OUTPATIENT
Start: 2022-05-20 | End: 2022-05-20

## 2022-05-20 RX ORDER — ACETAMINOPHEN 325 MG/1
650 TABLET ORAL ONCE
OUTPATIENT
Start: 2022-06-01

## 2022-05-20 RX ORDER — ACETAMINOPHEN 325 MG/1
650 TABLET ORAL ONCE
Status: COMPLETED | OUTPATIENT
Start: 2022-05-20 | End: 2022-05-20

## 2022-05-20 RX ADMIN — DIPHENHYDRAMINE HCL 50 MG: 25 MG CAPSULE ORAL at 09:01:00

## 2022-05-20 RX ADMIN — ACETAMINOPHEN 325 MG: 325 TABLET ORAL at 09:01:00

## 2022-05-20 NOTE — PROGRESS NOTES
Education Record    Learner:  Patient    Disease / Diagnosis: Pt here for IVIG infusion    Barriers / Limitations:  None    Method:  Brief focused, printed material and  reinforcement    General Topics:  Plan of care reviewed    Outcome:  Shows understanding    Pt tolerated infusion. Pt next appt scheduled. Pt left in stable condition.

## 2022-06-10 ENCOUNTER — PATIENT OUTREACH (OUTPATIENT)
Dept: CASE MANAGEMENT | Age: 77
End: 2022-06-10

## 2022-06-10 DIAGNOSIS — E53.8 B12 DEFICIENCY: ICD-10-CM

## 2022-06-10 DIAGNOSIS — F41.9 ANXIETY: ICD-10-CM

## 2022-06-10 DIAGNOSIS — I25.10 CORONARY ARTERY DISEASE INVOLVING NATIVE CORONARY ARTERY OF NATIVE HEART WITHOUT ANGINA PECTORIS: ICD-10-CM

## 2022-06-10 DIAGNOSIS — E78.00 PURE HYPERCHOLESTEROLEMIA: ICD-10-CM

## 2022-06-10 DIAGNOSIS — I10 PRIMARY HYPERTENSION: ICD-10-CM

## 2022-06-10 DIAGNOSIS — Z85.3 HISTORY OF BREAST CANCER: ICD-10-CM

## 2022-06-10 DIAGNOSIS — Z86.718 HISTORY OF DVT (DEEP VEIN THROMBOSIS): ICD-10-CM

## 2022-06-10 DIAGNOSIS — Z99.89 OSA ON CPAP: ICD-10-CM

## 2022-06-10 DIAGNOSIS — I70.0 ATHEROSCLEROSIS OF AORTA (HCC): ICD-10-CM

## 2022-06-10 DIAGNOSIS — E11.65 CONTROLLED TYPE 2 DIABETES MELLITUS WITH HYPERGLYCEMIA, WITHOUT LONG-TERM CURRENT USE OF INSULIN (HCC): ICD-10-CM

## 2022-06-10 DIAGNOSIS — G47.33 OSA ON CPAP: ICD-10-CM

## 2022-06-10 DIAGNOSIS — K21.9 GASTROESOPHAGEAL REFLUX DISEASE WITHOUT ESOPHAGITIS: ICD-10-CM

## 2022-06-10 DIAGNOSIS — F33.1 MAJOR DEPRESSIVE DISORDER, RECURRENT EPISODE, MODERATE (HCC): Chronic | ICD-10-CM

## 2022-06-10 DIAGNOSIS — J45.30 MILD PERSISTENT ASTHMA WITHOUT COMPLICATION: ICD-10-CM

## 2022-06-10 DIAGNOSIS — D80.3 IGG DEFICIENCY (HCC): ICD-10-CM

## 2022-06-10 DIAGNOSIS — G56.02 CARPAL TUNNEL SYNDROME OF LEFT WRIST: ICD-10-CM

## 2022-06-10 DIAGNOSIS — N17.9 ACUTE RENAL FAILURE, UNSPECIFIED ACUTE RENAL FAILURE TYPE (HCC): ICD-10-CM

## 2022-06-13 ENCOUNTER — LAB ENCOUNTER (OUTPATIENT)
Dept: LAB | Age: 77
End: 2022-06-13
Attending: NURSE PRACTITIONER
Payer: MEDICARE

## 2022-06-13 ENCOUNTER — OFFICE VISIT (OUTPATIENT)
Dept: INTERNAL MEDICINE CLINIC | Facility: CLINIC | Age: 77
End: 2022-06-13
Payer: MEDICARE

## 2022-06-13 VITALS
WEIGHT: 151 LBS | HEIGHT: 61 IN | DIASTOLIC BLOOD PRESSURE: 68 MMHG | SYSTOLIC BLOOD PRESSURE: 128 MMHG | TEMPERATURE: 97 F | OXYGEN SATURATION: 97 % | HEART RATE: 88 BPM | BODY MASS INDEX: 28.51 KG/M2

## 2022-06-13 DIAGNOSIS — J18.9 COMMUNITY ACQUIRED PNEUMONIA, UNSPECIFIED LATERALITY: Primary | ICD-10-CM

## 2022-06-13 DIAGNOSIS — I10 PRIMARY HYPERTENSION: ICD-10-CM

## 2022-06-13 DIAGNOSIS — D50.8 OTHER IRON DEFICIENCY ANEMIA: ICD-10-CM

## 2022-06-13 DIAGNOSIS — E87.1 HYPONATREMIA: ICD-10-CM

## 2022-06-13 DIAGNOSIS — N17.9 ACUTE KIDNEY INJURY (HCC): ICD-10-CM

## 2022-06-13 DIAGNOSIS — E11.65 CONTROLLED TYPE 2 DIABETES MELLITUS WITH HYPERGLYCEMIA, WITHOUT LONG-TERM CURRENT USE OF INSULIN (HCC): ICD-10-CM

## 2022-06-13 DIAGNOSIS — N17.9 ACUTE RENAL FAILURE, UNSPECIFIED ACUTE RENAL FAILURE TYPE (HCC): ICD-10-CM

## 2022-06-13 DIAGNOSIS — D80.3 IGG DEFICIENCY (HCC): ICD-10-CM

## 2022-06-13 DIAGNOSIS — D72.829 LEUKOCYTOSIS, UNSPECIFIED TYPE: ICD-10-CM

## 2022-06-13 LAB
ALBUMIN SERPL-MCNC: 3.9 G/DL (ref 3.4–5)
ALBUMIN/GLOB SERPL: 1.1 {RATIO} (ref 1–2)
ALP LIVER SERPL-CCNC: 92 U/L
ALT SERPL-CCNC: 44 U/L
ANION GAP SERPL CALC-SCNC: 7 MMOL/L (ref 0–18)
AST SERPL-CCNC: 34 U/L (ref 15–37)
BASOPHILS # BLD AUTO: 0.06 X10(3) UL (ref 0–0.2)
BASOPHILS NFR BLD AUTO: 1 %
BILIRUB SERPL-MCNC: 0.5 MG/DL (ref 0.1–2)
BUN BLD-MCNC: 18 MG/DL (ref 7–18)
CALCIUM BLD-MCNC: 9.4 MG/DL (ref 8.5–10.1)
CHLORIDE SERPL-SCNC: 107 MMOL/L (ref 98–112)
CO2 SERPL-SCNC: 25 MMOL/L (ref 21–32)
CREAT BLD-MCNC: 0.96 MG/DL
EOSINOPHIL # BLD AUTO: 0.21 X10(3) UL (ref 0–0.7)
EOSINOPHIL NFR BLD AUTO: 3.5 %
ERYTHROCYTE [DISTWIDTH] IN BLOOD BY AUTOMATED COUNT: 13.1 %
FASTING STATUS PATIENT QL REPORTED: YES
GLOBULIN PLAS-MCNC: 3.4 G/DL (ref 2.8–4.4)
GLUCOSE BLD-MCNC: 97 MG/DL (ref 70–99)
HCT VFR BLD AUTO: 39 %
HGB BLD-MCNC: 12.4 G/DL
IMM GRANULOCYTES # BLD AUTO: 0.02 X10(3) UL (ref 0–1)
IMM GRANULOCYTES NFR BLD: 0.3 %
LYMPHOCYTES # BLD AUTO: 1.99 X10(3) UL (ref 1–4)
LYMPHOCYTES NFR BLD AUTO: 33.3 %
MCH RBC QN AUTO: 30.2 PG (ref 26–34)
MCHC RBC AUTO-ENTMCNC: 31.8 G/DL (ref 31–37)
MCV RBC AUTO: 95.1 FL
MONOCYTES # BLD AUTO: 0.58 X10(3) UL (ref 0.1–1)
MONOCYTES NFR BLD AUTO: 9.7 %
NEUTROPHILS # BLD AUTO: 3.11 X10 (3) UL (ref 1.5–7.7)
NEUTROPHILS # BLD AUTO: 3.11 X10(3) UL (ref 1.5–7.7)
NEUTROPHILS NFR BLD AUTO: 52.2 %
OSMOLALITY SERPL CALC.SUM OF ELEC: 290 MOSM/KG (ref 275–295)
PLATELET # BLD AUTO: 173 10(3)UL (ref 150–450)
POTASSIUM SERPL-SCNC: 3.9 MMOL/L (ref 3.5–5.1)
PROT SERPL-MCNC: 7.3 G/DL (ref 6.4–8.2)
RBC # BLD AUTO: 4.1 X10(6)UL
SODIUM SERPL-SCNC: 139 MMOL/L (ref 136–145)
WBC # BLD AUTO: 6 X10(3) UL (ref 4–11)

## 2022-06-13 PROCEDURE — 80053 COMPREHEN METABOLIC PANEL: CPT

## 2022-06-13 PROCEDURE — 1111F DSCHRG MED/CURRENT MED MERGE: CPT | Performed by: NURSE PRACTITIONER

## 2022-06-13 PROCEDURE — 85025 COMPLETE CBC W/AUTO DIFF WBC: CPT

## 2022-06-13 PROCEDURE — 36415 COLL VENOUS BLD VENIPUNCTURE: CPT

## 2022-06-13 PROCEDURE — 99214 OFFICE O/P EST MOD 30 MIN: CPT | Performed by: NURSE PRACTITIONER

## 2022-06-13 RX ORDER — ALBUTEROL SULFATE 90 UG/1
2 AEROSOL, METERED RESPIRATORY (INHALATION) EVERY 6 HOURS PRN
Qty: 1 EACH | Refills: 3 | Status: SHIPPED | OUTPATIENT
Start: 2022-06-13

## 2022-06-17 ENCOUNTER — OFFICE VISIT (OUTPATIENT)
Dept: HEMATOLOGY/ONCOLOGY | Facility: HOSPITAL | Age: 77
End: 2022-06-17
Attending: INTERNAL MEDICINE
Payer: MEDICARE

## 2022-06-17 VITALS
SYSTOLIC BLOOD PRESSURE: 166 MMHG | BODY MASS INDEX: 28.13 KG/M2 | HEART RATE: 85 BPM | HEIGHT: 60.98 IN | TEMPERATURE: 97 F | WEIGHT: 149 LBS | OXYGEN SATURATION: 97 % | RESPIRATION RATE: 16 BRPM | DIASTOLIC BLOOD PRESSURE: 83 MMHG

## 2022-06-17 DIAGNOSIS — D80.3 SELECTIVE DEFICIENCY OF IGG SUBCLASSES (HCC): Primary | ICD-10-CM

## 2022-06-17 PROCEDURE — 96375 TX/PRO/DX INJ NEW DRUG ADDON: CPT

## 2022-06-17 PROCEDURE — 96365 THER/PROPH/DIAG IV INF INIT: CPT

## 2022-06-17 PROCEDURE — 96366 THER/PROPH/DIAG IV INF ADDON: CPT

## 2022-06-17 RX ORDER — ACETAMINOPHEN 325 MG/1
650 TABLET ORAL ONCE
Status: COMPLETED | OUTPATIENT
Start: 2022-06-17 | End: 2022-06-17

## 2022-06-17 RX ORDER — DIPHENHYDRAMINE HCL 25 MG
50 CAPSULE ORAL ONCE
Status: COMPLETED | OUTPATIENT
Start: 2022-06-17 | End: 2022-06-17

## 2022-06-17 RX ORDER — DIPHENHYDRAMINE HCL 25 MG
50 CAPSULE ORAL ONCE
OUTPATIENT
Start: 2022-07-01

## 2022-06-17 RX ORDER — ACETAMINOPHEN 325 MG/1
650 TABLET ORAL ONCE
OUTPATIENT
Start: 2022-07-01

## 2022-06-17 RX ADMIN — ACETAMINOPHEN 650 MG: 325 TABLET ORAL at 09:12:00

## 2022-06-17 RX ADMIN — DIPHENHYDRAMINE HCL 50 MG: 25 MG CAPSULE ORAL at 09:12:00

## 2022-06-17 NOTE — PROGRESS NOTES
Education Record    Learner:  Patient    Disease / Diagnosis:IVIG    Barriers / Limitations:  None   Comments:    Method:  Discussion   Comments:    General Topics:  Medication and Plan of care reviewed   Comments:    Outcome:  Shows understanding   Comments:

## 2022-06-17 NOTE — PROGRESS NOTES
Pt here for C 15 D 1.   Arrives Ambulating independently, accompanied by Self           Pregnancy screening: Not applicable    Modifications in dose or schedule: No    Drugs/infusions dual verified for appearance and physical integrity: yes     Frequency of blood return and site check throughout administration: Prior to administration and At completion of therapy   Discharged to Home, Ambulating independently, accompanied by:Self    Outpatient Oncology Care Plan  Problem list:  fatigue  knowledge deficit  Problems related to:  side effect of treatment  Interventions:  monitor effect of therapy  monitor lab values  promoted rest  Expected outcomes:  safe in environment  symptoms relieved/minimized  understands plan of care  verbalize how to care for self  Progress towards outcome:  unchanged    Education Record    Learner:  Patient  Barriers / Limitations:  None  Method:  Discussion  Outcome:  Shows understanding  Comments:

## 2022-06-21 RX ORDER — SEMAGLUTIDE 1.34 MG/ML
INJECTION, SOLUTION SUBCUTANEOUS
Qty: 4.5 ML | Refills: 0 | Status: SHIPPED | OUTPATIENT
Start: 2022-06-21

## 2022-06-22 ENCOUNTER — HOSPITAL ENCOUNTER (OUTPATIENT)
Dept: MAMMOGRAPHY | Age: 77
Discharge: HOME | End: 2022-06-22
Attending: NURSE PRACTITIONER
Payer: MEDICARE

## 2022-06-22 DIAGNOSIS — Z12.31 ENCOUNTER FOR SCREENING MAMMOGRAM FOR MALIGNANT NEOPLASM OF BREAST: ICD-10-CM

## 2022-06-22 PROCEDURE — 77063 BREAST TOMOSYNTHESIS BI: CPT | Performed by: NURSE PRACTITIONER

## 2022-06-22 PROCEDURE — 77067 SCR MAMMO BI INCL CAD: CPT | Performed by: NURSE PRACTITIONER

## 2022-06-27 RX ORDER — ROSUVASTATIN CALCIUM 10 MG/1
10 TABLET, COATED ORAL NIGHTLY
Qty: 90 TABLET | Refills: 0 | Status: SHIPPED | OUTPATIENT
Start: 2022-06-27

## 2022-06-27 RX ORDER — LISINOPRIL 20 MG/1
TABLET ORAL
Qty: 90 TABLET | Refills: 0 | Status: SHIPPED | OUTPATIENT
Start: 2022-06-27

## 2022-06-29 ENCOUNTER — OFFICE VISIT (OUTPATIENT)
Dept: PODIATRY CLINIC | Facility: CLINIC | Age: 77
End: 2022-06-29
Payer: MEDICARE

## 2022-06-29 DIAGNOSIS — S92.535A CLOSED NONDISPLACED FRACTURE OF DISTAL PHALANX OF LESSER TOE OF LEFT FOOT, INITIAL ENCOUNTER: Primary | ICD-10-CM

## 2022-06-29 DIAGNOSIS — M84.375A STRESS FRACTURE, LEFT FOOT, INITIAL ENCOUNTER FOR FRACTURE: ICD-10-CM

## 2022-06-29 DIAGNOSIS — E11.65 CONTROLLED TYPE 2 DIABETES MELLITUS WITH HYPERGLYCEMIA, WITHOUT LONG-TERM CURRENT USE OF INSULIN (HCC): ICD-10-CM

## 2022-06-29 DIAGNOSIS — M79.672 LEFT FOOT PAIN: ICD-10-CM

## 2022-06-29 PROCEDURE — 1125F AMNT PAIN NOTED PAIN PRSNT: CPT | Performed by: STUDENT IN AN ORGANIZED HEALTH CARE EDUCATION/TRAINING PROGRAM

## 2022-06-29 PROCEDURE — 99213 OFFICE O/P EST LOW 20 MIN: CPT | Performed by: STUDENT IN AN ORGANIZED HEALTH CARE EDUCATION/TRAINING PROGRAM

## 2022-06-29 NOTE — PATIENT INSTRUCTIONS
- Use surgical shoe for ambulation over the next 3 to 4 weeks. Take breaks to ice/elevate feet to help with swelling. We will plan to obtain repeat x-rays in 3 to 4 weeks. Can consider MRI if needed in the future.

## 2022-07-03 NOTE — PROGRESS NOTES
11/19/2019  Spoke to Jerri Paniagua for CCM. Updates to patient care team/ comments: None  Patient reported changes in medications: Pt continues to wean off Fentanyl.   Med Adherence  Comment: Taking as prescribed     Health Maintenance:   Influenza Vaccine(1) achieving goal, 5= very confident               - confidence: : 4      Care Manager Follow Up: 1 month  Reason For Follow Up: review progress and or barriers towards patients goals.      Care managers interventions: Continue to provide encouragement, suppor Heterosexual

## 2022-07-12 ENCOUNTER — PATIENT OUTREACH (OUTPATIENT)
Dept: CASE MANAGEMENT | Age: 77
End: 2022-07-12

## 2022-07-14 ENCOUNTER — OFFICE VISIT (OUTPATIENT)
Dept: PODIATRY CLINIC | Facility: CLINIC | Age: 77
End: 2022-07-14
Payer: MEDICARE

## 2022-07-14 DIAGNOSIS — M79.672 LEFT FOOT PAIN: ICD-10-CM

## 2022-07-14 DIAGNOSIS — S92.533D: Primary | ICD-10-CM

## 2022-07-14 DIAGNOSIS — T14.8XXA CONTUSION OF BONE: ICD-10-CM

## 2022-07-14 PROCEDURE — 1125F AMNT PAIN NOTED PAIN PRSNT: CPT | Performed by: STUDENT IN AN ORGANIZED HEALTH CARE EDUCATION/TRAINING PROGRAM

## 2022-07-14 PROCEDURE — 99213 OFFICE O/P EST LOW 20 MIN: CPT | Performed by: STUDENT IN AN ORGANIZED HEALTH CARE EDUCATION/TRAINING PROGRAM

## 2022-07-15 ENCOUNTER — OFFICE VISIT (OUTPATIENT)
Dept: HEMATOLOGY/ONCOLOGY | Facility: HOSPITAL | Age: 77
End: 2022-07-15
Attending: INTERNAL MEDICINE
Payer: MEDICARE

## 2022-07-15 VITALS
BODY MASS INDEX: 28.42 KG/M2 | TEMPERATURE: 99 F | RESPIRATION RATE: 18 BRPM | HEIGHT: 60.98 IN | DIASTOLIC BLOOD PRESSURE: 78 MMHG | HEART RATE: 90 BPM | OXYGEN SATURATION: 96 % | SYSTOLIC BLOOD PRESSURE: 145 MMHG | WEIGHT: 150.5 LBS

## 2022-07-15 DIAGNOSIS — Z86.39 HISTORY OF IRON DEFICIENCY: Primary | ICD-10-CM

## 2022-07-15 DIAGNOSIS — D80.3 SELECTIVE DEFICIENCY OF IGG SUBCLASSES (HCC): ICD-10-CM

## 2022-07-15 LAB
BASOPHILS # BLD AUTO: 0.03 X10(3) UL (ref 0–0.2)
BASOPHILS NFR BLD AUTO: 0.6 %
DEPRECATED HBV CORE AB SER IA-ACNC: 201.5 NG/ML
EOSINOPHIL # BLD AUTO: 0.04 X10(3) UL (ref 0–0.7)
EOSINOPHIL NFR BLD AUTO: 0.8 %
ERYTHROCYTE [DISTWIDTH] IN BLOOD BY AUTOMATED COUNT: 12.4 %
HCT VFR BLD AUTO: 37.2 %
HGB BLD-MCNC: 12.1 G/DL
IMM GRANULOCYTES # BLD AUTO: 0.02 X10(3) UL (ref 0–1)
IMM GRANULOCYTES NFR BLD: 0.4 %
IRON SATN MFR SERPL: 26 %
IRON SERPL-MCNC: 75 UG/DL
LYMPHOCYTES # BLD AUTO: 0.77 X10(3) UL (ref 1–4)
LYMPHOCYTES NFR BLD AUTO: 16.1 %
MCH RBC QN AUTO: 30 PG (ref 26–34)
MCHC RBC AUTO-ENTMCNC: 32.5 G/DL (ref 31–37)
MCV RBC AUTO: 92.1 FL
MONOCYTES # BLD AUTO: 0.31 X10(3) UL (ref 0.1–1)
MONOCYTES NFR BLD AUTO: 6.5 %
NEUTROPHILS # BLD AUTO: 3.6 X10 (3) UL (ref 1.5–7.7)
NEUTROPHILS # BLD AUTO: 3.6 X10(3) UL (ref 1.5–7.7)
NEUTROPHILS NFR BLD AUTO: 75.6 %
PLATELET # BLD AUTO: 189 10(3)UL (ref 150–450)
RBC # BLD AUTO: 4.04 X10(6)UL
TIBC SERPL-MCNC: 292 UG/DL (ref 240–450)
TRANSFERRIN SERPL-MCNC: 196 MG/DL (ref 200–360)
WBC # BLD AUTO: 4.8 X10(3) UL (ref 4–11)

## 2022-07-15 PROCEDURE — 83550 IRON BINDING TEST: CPT

## 2022-07-15 PROCEDURE — 96366 THER/PROPH/DIAG IV INF ADDON: CPT

## 2022-07-15 PROCEDURE — 96375 TX/PRO/DX INJ NEW DRUG ADDON: CPT

## 2022-07-15 PROCEDURE — 96365 THER/PROPH/DIAG IV INF INIT: CPT

## 2022-07-15 PROCEDURE — 85025 COMPLETE CBC W/AUTO DIFF WBC: CPT

## 2022-07-15 PROCEDURE — 82728 ASSAY OF FERRITIN: CPT

## 2022-07-15 PROCEDURE — 83540 ASSAY OF IRON: CPT

## 2022-07-15 RX ORDER — ACETAMINOPHEN 325 MG/1
650 TABLET ORAL ONCE
Status: COMPLETED | OUTPATIENT
Start: 2022-07-15 | End: 2022-07-15

## 2022-07-15 RX ORDER — METFORMIN HYDROCHLORIDE 500 MG/1
TABLET, EXTENDED RELEASE ORAL
Qty: 180 TABLET | Refills: 0 | Status: SHIPPED | OUTPATIENT
Start: 2022-07-15

## 2022-07-15 RX ORDER — DIPHENHYDRAMINE HCL 25 MG
50 CAPSULE ORAL ONCE
OUTPATIENT
Start: 2022-08-01

## 2022-07-15 RX ORDER — DIPHENHYDRAMINE HCL 25 MG
50 CAPSULE ORAL ONCE
Status: COMPLETED | OUTPATIENT
Start: 2022-07-15 | End: 2022-07-15

## 2022-07-15 RX ORDER — ACETAMINOPHEN 325 MG/1
650 TABLET ORAL ONCE
OUTPATIENT
Start: 2022-08-01

## 2022-07-15 RX ADMIN — DIPHENHYDRAMINE HCL 25 MG: 25 MG CAPSULE ORAL at 08:56:00

## 2022-07-15 RX ADMIN — ACETAMINOPHEN 650 MG: 325 TABLET ORAL at 08:56:00

## 2022-07-15 NOTE — PROGRESS NOTES
Education Record    Learner:  Patient    Disease / Diagnosis: IVIG (gammagard) infusion    Barriers / Limitations:  None   Comments:    Method:  Brief focused   Comments:    General Topics:  Plan of care reviewed   Comments:    Outcome:  Shows understanding   Comments:    Patient tolerated well without issue. Requested she take only 25mg of po benadryl instead of the 50 because she had work to do and did well. Also received 100mg of solu-cortef as ordered.

## 2022-07-21 NOTE — PATIENT INSTRUCTIONS
-Continue ambulating with stiff soled shoe/surgical shoe to left foot as site fully heals. -OK to gradually increase activity as tolerated, letting pain be guide.  -If pain worsens or other concerns arise, follow up for re-evaluation.

## 2022-07-27 RX ORDER — DULOXETIN HYDROCHLORIDE 30 MG/1
30 CAPSULE, DELAYED RELEASE ORAL DAILY
Qty: 90 CAPSULE | Refills: 0 | Status: SHIPPED | OUTPATIENT
Start: 2022-07-27

## 2022-07-27 RX ORDER — RIVAROXABAN 20 MG/1
TABLET, FILM COATED ORAL
Qty: 90 TABLET | Refills: 0 | Status: SHIPPED | OUTPATIENT
Start: 2022-07-27

## 2022-07-27 NOTE — TELEPHONE ENCOUNTER
For future. Richard Chiu Let her know it will probably be easier to have heme/onc fill her xarelto as they will need to manage with any procedure. Dr Phil Barragan will be retiring and whoever she follows with next should manage.

## 2022-07-27 NOTE — TELEPHONE ENCOUNTER
Last visit- 04/20/2022 cpe seen by SD    Last refill- 10/12/2021 xarelto 20mg QTY90 0R    Last labs- 06/13/2022 cmp, cbc  Future Appointments   Date Time Provider Gomez Miller   8/12/2022  8:30 AM Yessenia Toth [address] [place] [address]   9/19/2022  3:00 PM Rosa Maria Dale [address] [place] [address]   10/5/2022  9:00 AM Sheryl Farley, DO [facility] [place]   11/9/2022 10:20 AM Yumiko Motta, DO ENIWCAROLYNN [id number]       Per Protocol?   Please approve or deny

## 2022-08-01 ENCOUNTER — HOSPITAL ENCOUNTER (OUTPATIENT)
Dept: GENERAL RADIOLOGY | Age: 77
Discharge: HOME | End: 2022-08-01
Attending: NEUROLOGICAL SURGERY
Payer: MEDICARE

## 2022-08-01 DIAGNOSIS — R26.9 NEUROLOGIC GAIT DYSFUNCTION: ICD-10-CM

## 2022-08-01 DIAGNOSIS — M54.12 CERVICAL RADICULITIS: ICD-10-CM

## 2022-08-01 PROCEDURE — 71046 X-RAY EXAM CHEST 2 VIEWS: CPT | Performed by: NEUROLOGICAL SURGERY

## 2022-08-05 RX ORDER — PANTOPRAZOLE SODIUM 40 MG/1
40 TABLET, DELAYED RELEASE ORAL
Qty: 90 TABLET | Refills: 1 | Status: SHIPPED | OUTPATIENT
Start: 2022-08-05 | End: 2023-01-30

## 2022-08-05 NOTE — TELEPHONE ENCOUNTER
Last OV: 6/13/22 hosp f/u  Last PE: 4/20/22    Future Appointments   Date Time Provider Gomez Miller   8/12/2022  8:30 AM Yessenia Toth [address] [place] [address]   9/19/2022  3:00 PM Rosa Maria Dale [address] [place] [address]   10/5/2022  9:00 AM Makayla Farley, DO [facility] [place]   11/9/2022 10:20 AM Ada Parrish, DO ENIWOODJOAN [id number]        Latest labs: 7/15/22 Ferritin, iron and tibc and CBC    Latest RX: Pantoprazole Sodium Ec 40 Mg Tab Auro 90 tabs 1 refills on 1/17/22    Per protocol, not on protocol. Rx pending.

## 2022-08-12 ENCOUNTER — PATIENT OUTREACH (OUTPATIENT)
Dept: CASE MANAGEMENT | Age: 77
End: 2022-08-12

## 2022-08-12 ENCOUNTER — OFFICE VISIT (OUTPATIENT)
Dept: HEMATOLOGY/ONCOLOGY | Facility: HOSPITAL | Age: 77
End: 2022-08-12
Attending: INTERNAL MEDICINE
Payer: COMMERCIAL

## 2022-08-12 VITALS
WEIGHT: 152 LBS | RESPIRATION RATE: 16 BRPM | BODY MASS INDEX: 28.7 KG/M2 | TEMPERATURE: 98 F | HEIGHT: 60.98 IN | OXYGEN SATURATION: 96 % | SYSTOLIC BLOOD PRESSURE: 157 MMHG | HEART RATE: 98 BPM | DIASTOLIC BLOOD PRESSURE: 75 MMHG

## 2022-08-12 DIAGNOSIS — D80.3 IGG DEFICIENCY (HCC): ICD-10-CM

## 2022-08-12 DIAGNOSIS — G56.02 CARPAL TUNNEL SYNDROME OF LEFT WRIST: ICD-10-CM

## 2022-08-12 DIAGNOSIS — Z86.718 HISTORY OF DVT (DEEP VEIN THROMBOSIS): ICD-10-CM

## 2022-08-12 DIAGNOSIS — I25.10 CORONARY ARTERY DISEASE INVOLVING NATIVE CORONARY ARTERY OF NATIVE HEART WITHOUT ANGINA PECTORIS: ICD-10-CM

## 2022-08-12 DIAGNOSIS — K21.9 GASTROESOPHAGEAL REFLUX DISEASE WITHOUT ESOPHAGITIS: ICD-10-CM

## 2022-08-12 DIAGNOSIS — E11.65 CONTROLLED TYPE 2 DIABETES MELLITUS WITH HYPERGLYCEMIA, WITHOUT LONG-TERM CURRENT USE OF INSULIN (HCC): ICD-10-CM

## 2022-08-12 DIAGNOSIS — E78.00 PURE HYPERCHOLESTEROLEMIA: ICD-10-CM

## 2022-08-12 DIAGNOSIS — J45.30 MILD PERSISTENT ASTHMA WITHOUT COMPLICATION: ICD-10-CM

## 2022-08-12 DIAGNOSIS — Z99.89 OSA ON CPAP: ICD-10-CM

## 2022-08-12 DIAGNOSIS — G47.33 OSA ON CPAP: ICD-10-CM

## 2022-08-12 DIAGNOSIS — N17.9 ACUTE RENAL FAILURE, UNSPECIFIED ACUTE RENAL FAILURE TYPE (HCC): ICD-10-CM

## 2022-08-12 DIAGNOSIS — Z86.711 HISTORY OF PULMONARY EMBOLISM: ICD-10-CM

## 2022-08-12 DIAGNOSIS — F33.1 MAJOR DEPRESSIVE DISORDER, RECURRENT EPISODE, MODERATE (HCC): Chronic | ICD-10-CM

## 2022-08-12 DIAGNOSIS — F41.9 ANXIETY: ICD-10-CM

## 2022-08-12 DIAGNOSIS — D50.8 OTHER IRON DEFICIENCY ANEMIA: ICD-10-CM

## 2022-08-12 DIAGNOSIS — I10 PRIMARY HYPERTENSION: ICD-10-CM

## 2022-08-12 DIAGNOSIS — Z85.3 HISTORY OF BREAST CANCER: ICD-10-CM

## 2022-08-12 DIAGNOSIS — D80.3 SELECTIVE DEFICIENCY OF IGG SUBCLASSES (HCC): Primary | ICD-10-CM

## 2022-08-12 DIAGNOSIS — E53.8 B12 DEFICIENCY: ICD-10-CM

## 2022-08-12 DIAGNOSIS — I70.0 ATHEROSCLEROSIS OF AORTA (HCC): ICD-10-CM

## 2022-08-12 PROCEDURE — 96365 THER/PROPH/DIAG IV INF INIT: CPT

## 2022-08-12 PROCEDURE — 96366 THER/PROPH/DIAG IV INF ADDON: CPT

## 2022-08-12 PROCEDURE — 96375 TX/PRO/DX INJ NEW DRUG ADDON: CPT

## 2022-08-12 RX ORDER — DIPHENHYDRAMINE HCL 25 MG
50 CAPSULE ORAL ONCE
Status: COMPLETED | OUTPATIENT
Start: 2022-08-12 | End: 2022-08-12

## 2022-08-12 RX ORDER — DIPHENHYDRAMINE HCL 25 MG
50 CAPSULE ORAL ONCE
OUTPATIENT
Start: 2022-09-01

## 2022-08-12 RX ORDER — ACETAMINOPHEN 325 MG/1
650 TABLET ORAL ONCE
Status: DISCONTINUED | OUTPATIENT
Start: 2022-08-12 | End: 2022-08-12

## 2022-08-12 RX ORDER — ACETAMINOPHEN 325 MG/1
650 TABLET ORAL ONCE
OUTPATIENT
Start: 2022-09-01

## 2022-08-12 RX ADMIN — DIPHENHYDRAMINE HCL 25 MG: 25 MG CAPSULE ORAL at 09:20:00

## 2022-08-12 NOTE — PROGRESS NOTES
Education Record    Learner:  Patient    Disease / Diagnosis:Selective Deficiency of IgG    Barriers / Limitations:  None   Comments:    Method:  Discussion   Comments:    General Topics:  Plan of care reviewed   Comments:    Outcome:  Shows understanding   Comments:Pt tolerated inf well. Will continue to monitor.

## 2022-08-19 ENCOUNTER — APPOINTMENT (OUTPATIENT)
Dept: HEMATOLOGY/ONCOLOGY | Facility: HOSPITAL | Age: 77
End: 2022-08-19
Attending: INTERNAL MEDICINE
Payer: MEDICARE

## 2022-08-22 ENCOUNTER — TELEPHONE (OUTPATIENT)
Dept: PODIATRY CLINIC | Facility: CLINIC | Age: 77
End: 2022-08-22

## 2022-08-22 NOTE — TELEPHONE ENCOUNTER
Patient called stating same pain of her left foot, possible broken toe or other part and had said an MRI had been discussed before but no order given. I do not have any available appointment slots this week. Patient is diabetic. Please advise.

## 2022-08-22 NOTE — TELEPHONE ENCOUNTER
Can we please have her return to post op shoe and come in this week or next week for x-ray first? Thanks.

## 2022-09-01 DIAGNOSIS — M54.12 CERVICAL RADICULITIS: ICD-10-CM

## 2022-09-01 DIAGNOSIS — M54.16 LUMBAR RADICULITIS: Primary | ICD-10-CM

## 2022-09-01 RX ORDER — TIZANIDINE 4 MG/1
4 TABLET ORAL EVERY 8 HOURS PRN
Qty: 90 TABLET | Refills: 2 | Status: SHIPPED | OUTPATIENT
Start: 2022-09-01

## 2022-09-09 ENCOUNTER — OFFICE VISIT (OUTPATIENT)
Dept: HEMATOLOGY/ONCOLOGY | Facility: HOSPITAL | Age: 77
End: 2022-09-09
Attending: INTERNAL MEDICINE
Payer: MEDICARE

## 2022-09-09 VITALS
HEIGHT: 60.98 IN | OXYGEN SATURATION: 98 % | WEIGHT: 154.13 LBS | DIASTOLIC BLOOD PRESSURE: 73 MMHG | RESPIRATION RATE: 16 BRPM | TEMPERATURE: 98 F | SYSTOLIC BLOOD PRESSURE: 139 MMHG | HEART RATE: 87 BPM | BODY MASS INDEX: 29.1 KG/M2

## 2022-09-09 DIAGNOSIS — D80.3 SELECTIVE DEFICIENCY OF IGG SUBCLASSES (HCC): Primary | ICD-10-CM

## 2022-09-09 PROCEDURE — 96374 THER/PROPH/DIAG INJ IV PUSH: CPT

## 2022-09-09 PROCEDURE — 96366 THER/PROPH/DIAG IV INF ADDON: CPT

## 2022-09-09 PROCEDURE — 96375 TX/PRO/DX INJ NEW DRUG ADDON: CPT

## 2022-09-09 PROCEDURE — 96365 THER/PROPH/DIAG IV INF INIT: CPT

## 2022-09-09 PROCEDURE — 96361 HYDRATE IV INFUSION ADD-ON: CPT

## 2022-09-09 PROCEDURE — 96360 HYDRATION IV INFUSION INIT: CPT

## 2022-09-09 RX ORDER — DIPHENHYDRAMINE HCL 25 MG
50 CAPSULE ORAL ONCE
Status: COMPLETED | OUTPATIENT
Start: 2022-09-09 | End: 2022-09-09

## 2022-09-09 RX ORDER — ACETAMINOPHEN 325 MG/1
650 TABLET ORAL ONCE
OUTPATIENT
Start: 2022-10-01

## 2022-09-09 RX ORDER — ACETAMINOPHEN 325 MG/1
650 TABLET ORAL ONCE
Status: COMPLETED | OUTPATIENT
Start: 2022-09-09 | End: 2022-09-09

## 2022-09-09 RX ORDER — DIPHENHYDRAMINE HCL 25 MG
50 CAPSULE ORAL ONCE
OUTPATIENT
Start: 2022-10-01

## 2022-09-09 RX ADMIN — ACETAMINOPHEN 325 MG: 325 TABLET ORAL at 08:56:00

## 2022-09-09 RX ADMIN — DIPHENHYDRAMINE HCL 50 MG: 25 MG CAPSULE ORAL at 08:56:00

## 2022-09-09 NOTE — PROGRESS NOTES
Education Record    Learner:  Patient    Disease / Diagnosis: IgG deficient     Barriers / Limitations:  None   Comments:    Method:  Discussion   Comments:    General Topics:  Plan of care reviewed   Comments:    Outcome:  Shows understanding   Comments:    Patient here for IVIG. All pre-medications administered as ordered.

## 2022-09-13 ENCOUNTER — PATIENT OUTREACH (OUTPATIENT)
Dept: CASE MANAGEMENT | Age: 77
End: 2022-09-13

## 2022-09-13 DIAGNOSIS — G56.02 CARPAL TUNNEL SYNDROME OF LEFT WRIST: ICD-10-CM

## 2022-09-13 DIAGNOSIS — E53.8 B12 DEFICIENCY: ICD-10-CM

## 2022-09-13 DIAGNOSIS — E11.65 CONTROLLED TYPE 2 DIABETES MELLITUS WITH HYPERGLYCEMIA, WITHOUT LONG-TERM CURRENT USE OF INSULIN (HCC): ICD-10-CM

## 2022-09-13 DIAGNOSIS — I25.10 CORONARY ARTERY DISEASE INVOLVING NATIVE CORONARY ARTERY OF NATIVE HEART WITHOUT ANGINA PECTORIS: ICD-10-CM

## 2022-09-13 DIAGNOSIS — Z85.3 HISTORY OF BREAST CANCER: ICD-10-CM

## 2022-09-13 DIAGNOSIS — G47.33 OSA ON CPAP: ICD-10-CM

## 2022-09-13 DIAGNOSIS — K21.9 GASTROESOPHAGEAL REFLUX DISEASE WITHOUT ESOPHAGITIS: ICD-10-CM

## 2022-09-13 DIAGNOSIS — Z86.718 HISTORY OF DVT (DEEP VEIN THROMBOSIS): ICD-10-CM

## 2022-09-13 DIAGNOSIS — E78.00 PURE HYPERCHOLESTEROLEMIA: ICD-10-CM

## 2022-09-13 DIAGNOSIS — F33.1 MAJOR DEPRESSIVE DISORDER, RECURRENT EPISODE, MODERATE (HCC): Chronic | ICD-10-CM

## 2022-09-13 DIAGNOSIS — J45.30 MILD PERSISTENT ASTHMA WITHOUT COMPLICATION: ICD-10-CM

## 2022-09-13 DIAGNOSIS — N17.9 ACUTE RENAL FAILURE, UNSPECIFIED ACUTE RENAL FAILURE TYPE (HCC): ICD-10-CM

## 2022-09-13 DIAGNOSIS — F41.9 ANXIETY: ICD-10-CM

## 2022-09-13 DIAGNOSIS — D80.3 IGG DEFICIENCY (HCC): ICD-10-CM

## 2022-09-13 DIAGNOSIS — Z85.3 HISTORY OF LEFT BREAST CANCER: ICD-10-CM

## 2022-09-13 DIAGNOSIS — Z86.711 HISTORY OF PULMONARY EMBOLISM: ICD-10-CM

## 2022-09-13 DIAGNOSIS — I70.0 ATHEROSCLEROSIS OF AORTA (HCC): ICD-10-CM

## 2022-09-13 DIAGNOSIS — Z99.89 OSA ON CPAP: ICD-10-CM

## 2022-09-13 DIAGNOSIS — I10 PRIMARY HYPERTENSION: ICD-10-CM

## 2022-09-19 ENCOUNTER — APPOINTMENT (OUTPATIENT)
Dept: HEMATOLOGY/ONCOLOGY | Facility: HOSPITAL | Age: 77
End: 2022-09-19
Attending: INTERNAL MEDICINE
Payer: MEDICARE

## 2022-09-21 RX ORDER — SEMAGLUTIDE 1.34 MG/ML
INJECTION, SOLUTION SUBCUTANEOUS
Qty: 4.5 ML | Refills: 1 | Status: SHIPPED | OUTPATIENT
Start: 2022-09-21

## 2022-09-28 RX ORDER — LISINOPRIL 20 MG/1
TABLET ORAL
Qty: 90 TABLET | Refills: 2 | Status: SHIPPED | OUTPATIENT
Start: 2022-09-28

## 2022-09-28 RX ORDER — ONDANSETRON 4 MG/1
4 TABLET, FILM COATED ORAL EVERY 8 HOURS PRN
Qty: 30 TABLET | Refills: 0 | Status: SHIPPED | OUTPATIENT
Start: 2022-09-28

## 2022-09-28 RX ORDER — ROSUVASTATIN CALCIUM 10 MG/1
10 TABLET, COATED ORAL NIGHTLY
Qty: 90 TABLET | Refills: 3 | Status: SHIPPED | OUTPATIENT
Start: 2022-09-28

## 2022-10-05 ENCOUNTER — OFFICE VISIT (OUTPATIENT)
Dept: RHEUMATOLOGY | Facility: CLINIC | Age: 77
End: 2022-10-05
Payer: MEDICARE

## 2022-10-05 VITALS
SYSTOLIC BLOOD PRESSURE: 132 MMHG | WEIGHT: 155 LBS | TEMPERATURE: 98 F | OXYGEN SATURATION: 99 % | DIASTOLIC BLOOD PRESSURE: 72 MMHG | BODY MASS INDEX: 32.54 KG/M2 | HEART RATE: 90 BPM | HEIGHT: 58 IN | RESPIRATION RATE: 16 BRPM

## 2022-10-05 DIAGNOSIS — G89.29 CHRONIC PAIN OF RIGHT KNEE: ICD-10-CM

## 2022-10-05 DIAGNOSIS — M25.561 CHRONIC PAIN OF RIGHT KNEE: ICD-10-CM

## 2022-10-05 DIAGNOSIS — G89.4 CHRONIC PAIN SYNDROME: ICD-10-CM

## 2022-10-05 DIAGNOSIS — M25.50 POLYARTHRALGIA: ICD-10-CM

## 2022-10-05 DIAGNOSIS — G56.02 LEFT CARPAL TUNNEL SYNDROME: ICD-10-CM

## 2022-10-05 DIAGNOSIS — M15.9 PRIMARY OSTEOARTHRITIS INVOLVING MULTIPLE JOINTS: Primary | ICD-10-CM

## 2022-10-05 DIAGNOSIS — R76.8 RHEUMATOID FACTOR POSITIVE: ICD-10-CM

## 2022-10-05 PROCEDURE — 99214 OFFICE O/P EST MOD 30 MIN: CPT | Performed by: INTERNAL MEDICINE

## 2022-10-06 RX ORDER — METFORMIN HYDROCHLORIDE 500 MG/1
TABLET, EXTENDED RELEASE ORAL
Qty: 270 TABLET | Refills: 1 | Status: SHIPPED | OUTPATIENT
Start: 2022-10-06

## 2022-10-07 ENCOUNTER — OFFICE VISIT (OUTPATIENT)
Dept: HEMATOLOGY/ONCOLOGY | Facility: HOSPITAL | Age: 77
End: 2022-10-07
Attending: INTERNAL MEDICINE
Payer: COMMERCIAL

## 2022-10-07 VITALS
HEART RATE: 76 BPM | TEMPERATURE: 98 F | WEIGHT: 155.63 LBS | BODY MASS INDEX: 29.38 KG/M2 | RESPIRATION RATE: 16 BRPM | DIASTOLIC BLOOD PRESSURE: 76 MMHG | OXYGEN SATURATION: 97 % | SYSTOLIC BLOOD PRESSURE: 150 MMHG | HEIGHT: 60.98 IN

## 2022-10-07 DIAGNOSIS — D80.3 SELECTIVE DEFICIENCY OF IGG SUBCLASSES (HCC): Primary | ICD-10-CM

## 2022-10-07 PROCEDURE — 96365 THER/PROPH/DIAG IV INF INIT: CPT

## 2022-10-07 PROCEDURE — 96375 TX/PRO/DX INJ NEW DRUG ADDON: CPT

## 2022-10-07 PROCEDURE — 96366 THER/PROPH/DIAG IV INF ADDON: CPT

## 2022-10-07 RX ORDER — DIPHENHYDRAMINE HCL 25 MG
50 CAPSULE ORAL ONCE
Status: COMPLETED | OUTPATIENT
Start: 2022-10-07 | End: 2022-10-07

## 2022-10-07 RX ORDER — ACETAMINOPHEN 325 MG/1
650 TABLET ORAL ONCE
Status: CANCELLED | OUTPATIENT
Start: 2022-11-01

## 2022-10-07 RX ORDER — DIPHENHYDRAMINE HCL 25 MG
50 CAPSULE ORAL ONCE
Status: CANCELLED | OUTPATIENT
Start: 2022-11-01

## 2022-10-07 RX ORDER — ACETAMINOPHEN 325 MG/1
650 TABLET ORAL ONCE
OUTPATIENT
Start: 2022-11-01

## 2022-10-07 RX ORDER — ACETAMINOPHEN 325 MG/1
650 TABLET ORAL ONCE
Status: COMPLETED | OUTPATIENT
Start: 2022-10-07 | End: 2022-10-07

## 2022-10-07 RX ORDER — DIPHENHYDRAMINE HCL 25 MG
50 CAPSULE ORAL ONCE
OUTPATIENT
Start: 2022-11-01

## 2022-10-07 RX ADMIN — DIPHENHYDRAMINE HCL 25 MG: 25 MG CAPSULE ORAL at 09:19:00

## 2022-10-07 RX ADMIN — ACETAMINOPHEN 325 MG: 325 TABLET ORAL at 09:20:00

## 2022-10-07 NOTE — PROGRESS NOTES
Education Record    Learner:  Patient    Disease / Diagnosis: IgG deficiency    Barriers / Limitations:  None   Comments:    Method:  Brief focused and Discussion   Comments:    General Topics:  Medication, Side effects and symptom management and Plan of care reviewed   Comments:    Outcome:  Shows understanding     Comments: Patient stated she had already taken norco this morning around 7am so did not want to take too much tylenol - took 325 out of 650 ordered. Also only wanted to take 25mg of benadryl instead of the ordered 50 as she said it makes her \"feel sick\". Stated she had done this in the past and tolerated the IVIG fine. Did receive the 100mg of solucortef. At the end of the second rate, patient c/o feeling chilled. IVIG was stopped. Second 25mg of benadryl was given. VSS. Denied any shortness of breath or chest pain. Patient was re-challenged 30 minutes later at same rate (80 ml/hr) for 20 minutes. Tolerated without issue. Bumped up to last rate of 160ml/hr. Patient instructed she will have to take all pre-meds next time. Verbalized understanding.

## 2022-10-13 ENCOUNTER — PATIENT OUTREACH (OUTPATIENT)
Dept: CASE MANAGEMENT | Age: 77
End: 2022-10-13

## 2022-10-13 DIAGNOSIS — N17.9 ACUTE RENAL FAILURE, UNSPECIFIED ACUTE RENAL FAILURE TYPE (HCC): ICD-10-CM

## 2022-10-13 DIAGNOSIS — F41.9 ANXIETY: ICD-10-CM

## 2022-10-13 DIAGNOSIS — K21.9 GASTROESOPHAGEAL REFLUX DISEASE WITHOUT ESOPHAGITIS: ICD-10-CM

## 2022-10-13 DIAGNOSIS — G56.02 CARPAL TUNNEL SYNDROME OF LEFT WRIST: ICD-10-CM

## 2022-10-13 DIAGNOSIS — I10 PRIMARY HYPERTENSION: ICD-10-CM

## 2022-10-13 DIAGNOSIS — I25.10 CORONARY ARTERY DISEASE INVOLVING NATIVE CORONARY ARTERY OF NATIVE HEART WITHOUT ANGINA PECTORIS: ICD-10-CM

## 2022-10-13 DIAGNOSIS — E78.00 PURE HYPERCHOLESTEROLEMIA: ICD-10-CM

## 2022-10-13 DIAGNOSIS — Z86.718 HISTORY OF DVT (DEEP VEIN THROMBOSIS): ICD-10-CM

## 2022-10-13 DIAGNOSIS — E11.65 CONTROLLED TYPE 2 DIABETES MELLITUS WITH HYPERGLYCEMIA, WITHOUT LONG-TERM CURRENT USE OF INSULIN (HCC): ICD-10-CM

## 2022-10-13 DIAGNOSIS — F33.1 MAJOR DEPRESSIVE DISORDER, RECURRENT EPISODE, MODERATE (HCC): Chronic | ICD-10-CM

## 2022-10-13 DIAGNOSIS — Z99.89 OSA ON CPAP: ICD-10-CM

## 2022-10-13 DIAGNOSIS — J45.30 MILD PERSISTENT ASTHMA WITHOUT COMPLICATION: ICD-10-CM

## 2022-10-13 DIAGNOSIS — Z86.711 HISTORY OF PULMONARY EMBOLISM: ICD-10-CM

## 2022-10-13 DIAGNOSIS — G47.33 OSA ON CPAP: ICD-10-CM

## 2022-10-13 DIAGNOSIS — Z85.3 HISTORY OF LEFT BREAST CANCER: ICD-10-CM

## 2022-10-13 DIAGNOSIS — E53.8 B12 DEFICIENCY: ICD-10-CM

## 2022-10-13 DIAGNOSIS — D80.3 IGG DEFICIENCY (HCC): ICD-10-CM

## 2022-10-13 DIAGNOSIS — I70.0 ATHEROSCLEROSIS OF AORTA (HCC): ICD-10-CM

## 2022-10-13 DIAGNOSIS — Z85.3 HISTORY OF BREAST CANCER: ICD-10-CM

## 2022-10-14 ENCOUNTER — OFFICE VISIT (OUTPATIENT)
Dept: RHEUMATOLOGY | Facility: CLINIC | Age: 77
End: 2022-10-14
Payer: MEDICARE

## 2022-10-14 VITALS — SYSTOLIC BLOOD PRESSURE: 140 MMHG | DIASTOLIC BLOOD PRESSURE: 70 MMHG

## 2022-10-14 DIAGNOSIS — M79.642 LEFT HAND PAIN: ICD-10-CM

## 2022-10-14 DIAGNOSIS — G56.02 LEFT CARPAL TUNNEL SYNDROME: Primary | ICD-10-CM

## 2022-10-14 PROCEDURE — 20526 THER INJECTION CARP TUNNEL: CPT | Performed by: INTERNAL MEDICINE

## 2022-10-14 PROCEDURE — 76942 ECHO GUIDE FOR BIOPSY: CPT | Performed by: INTERNAL MEDICINE

## 2022-10-14 RX ORDER — LIDOCAINE HYDROCHLORIDE 10 MG/ML
2 INJECTION, SOLUTION INFILTRATION; PERINEURAL ONCE
Status: COMPLETED | OUTPATIENT
Start: 2022-10-14 | End: 2022-10-14

## 2022-10-14 RX ORDER — METHYLPREDNISOLONE ACETATE 40 MG/ML
40 INJECTION, SUSPENSION INTRA-ARTICULAR; INTRALESIONAL; INTRAMUSCULAR; SOFT TISSUE ONCE
Status: COMPLETED | OUTPATIENT
Start: 2022-10-14 | End: 2022-10-14

## 2022-10-14 RX ADMIN — METHYLPREDNISOLONE ACETATE 40 MG: 40 INJECTION, SUSPENSION INTRA-ARTICULAR; INTRALESIONAL; INTRAMUSCULAR; SOFT TISSUE at 11:50:00

## 2022-10-14 RX ADMIN — LIDOCAINE HYDROCHLORIDE 2 ML: 10 INJECTION, SOLUTION INFILTRATION; PERINEURAL at 11:50:00

## 2022-10-14 NOTE — PROCEDURES
Left Carpal Tunnel Procedure Note    Ultrasound examination of the left carpal tunnel  Equipment: [employer] II  Indication: Pain/paresthesias in fingers    Findings: Ultrasound examination is performed according to standard EULAR recommendations(1). . Using the 13-6 MHz transducer, as well as color power Doppler settings, real-time imaging of the left wrist was performed to evaluate the bones, subcutaneous tissues, muscles, tendons and the joint spaces. Longitudinal and transverse images were obtained. This was at the distal crease tendon proximally from the carpal tunnel. The median nerve was localized as well as measured. It was found to be just ulnar and the superior to the flexor pollicis longus. Numerous longus was noted. The nerve was followed approximately in between the superficial digitorum and profundus. There was no evidence of this significant synovitis causing compression of the median nerve or any masses. Color Doppler over the median nerve revealed no evidence of vascularity. All findings were normal with the exception of flattened median nerve and possible bifid anatomy    Impression: Median nerve compression    Clinical correlation and other imaging studies may be indicated. Reference:  Guidelines for musculoskeletal ultrasound in rheumatology  James Dorantes., Burmester Francisco Baumgarten., [facility] [place], [address] [place], [place] [address].; working group for musculoskeletal ultrasound in the EULAR standing committee on international clinical studies including therapeutic trials. Annals rheumatic disease 2001 July; 60(7): 641-9    Procedure:Left carpal tunnel injection and median nerve release by hydrolysis    After obtaining consent and discussing alternative treatments. The median nerve was evaluated beginning in the forearm and down onto the distal crease. The nerve was measured at the proximal and distal carpal tunnel. A13 - 6 MHz transducer was used.  After localizing the median nerve in both transverse and longitudinal views as well as identifying neurovascular structures the area was cleansed with hibiclens and alcohol. Then using sterile gel and a 13-6 MHz transducer the area around the median nerve was injected with a total solution including 5mL of sterile saline 2 mL of 1% Xylocaine and 40 mg of Depomedrol . A 26-gauge needle was used with an in-line approach from the radial side under real time imaging. Injections were careful to avoid damage to the median nerve and other neurovascular structures. is seen to surround the nerve. All injections were done under real-time time imaging. There were no complications. Post procedure instructions included to call for any questions or concerns, to wear a static wrist splint 24 hours a day except when washing for one week and then at nighttime for least 2 weeks. Patient verbalized understanding.     Kasandra García DO  [facility]  10/14/2022

## 2022-10-17 NOTE — PROGRESS NOTES
Phoned pt, notified no PA required for gel injection, will add pt to cancellation list for her injection. Voiced understanding.

## 2022-10-28 RX ORDER — DULOXETIN HYDROCHLORIDE 30 MG/1
30 CAPSULE, DELAYED RELEASE ORAL DAILY
Qty: 90 CAPSULE | Refills: 0 | Status: SHIPPED | OUTPATIENT
Start: 2022-10-28

## 2022-10-28 RX ORDER — RIVAROXABAN 20 MG/1
TABLET, FILM COATED ORAL
Qty: 90 TABLET | Refills: 0 | Status: SHIPPED | OUTPATIENT
Start: 2022-10-28

## 2022-11-04 ENCOUNTER — OFFICE VISIT (OUTPATIENT)
Dept: HEMATOLOGY/ONCOLOGY | Facility: HOSPITAL | Age: 77
End: 2022-11-04
Attending: INTERNAL MEDICINE
Payer: COMMERCIAL

## 2022-11-04 VITALS
OXYGEN SATURATION: 98 % | BODY MASS INDEX: 30 KG/M2 | TEMPERATURE: 98 F | SYSTOLIC BLOOD PRESSURE: 129 MMHG | WEIGHT: 156.19 LBS | RESPIRATION RATE: 16 BRPM | HEART RATE: 85 BPM | DIASTOLIC BLOOD PRESSURE: 81 MMHG

## 2022-11-04 DIAGNOSIS — D80.3 SELECTIVE DEFICIENCY OF IGG SUBCLASSES (HCC): Primary | ICD-10-CM

## 2022-11-04 PROCEDURE — 96375 TX/PRO/DX INJ NEW DRUG ADDON: CPT

## 2022-11-04 PROCEDURE — 96365 THER/PROPH/DIAG IV INF INIT: CPT

## 2022-11-04 PROCEDURE — 96366 THER/PROPH/DIAG IV INF ADDON: CPT

## 2022-11-04 RX ORDER — DIPHENHYDRAMINE HCL 25 MG
50 CAPSULE ORAL ONCE
Status: COMPLETED | OUTPATIENT
Start: 2022-11-04 | End: 2022-11-04

## 2022-11-04 RX ORDER — ACETAMINOPHEN 325 MG/1
650 TABLET ORAL ONCE
OUTPATIENT
Start: 2022-12-01

## 2022-11-04 RX ORDER — DIPHENHYDRAMINE HCL 25 MG
50 CAPSULE ORAL ONCE
OUTPATIENT
Start: 2022-12-01

## 2022-11-04 RX ORDER — ACETAMINOPHEN 325 MG/1
650 TABLET ORAL ONCE
Status: COMPLETED | OUTPATIENT
Start: 2022-11-04 | End: 2022-11-04

## 2022-11-04 RX ADMIN — ACETAMINOPHEN 325 MG: 325 TABLET ORAL at 09:04:00

## 2022-11-04 RX ADMIN — DIPHENHYDRAMINE HCL 50 MG: 25 MG CAPSULE ORAL at 09:02:00

## 2022-11-04 NOTE — PROGRESS NOTES
Education Record    Learner:  Patient    Disease / Diagnosis: pt here for IVIG    Barriers / Limitations:  None   Comments:    Method:  Brief focused   Comments:    General Topics:  Plan of care reviewed   Comments:    Outcome:  Shows understanding   Comments:

## 2022-11-08 ENCOUNTER — OFFICE VISIT (OUTPATIENT)
Dept: RHEUMATOLOGY | Facility: CLINIC | Age: 77
End: 2022-11-08
Payer: MEDICARE

## 2022-11-08 VITALS — SYSTOLIC BLOOD PRESSURE: 128 MMHG | DIASTOLIC BLOOD PRESSURE: 70 MMHG

## 2022-11-08 DIAGNOSIS — M25.561 CHRONIC PAIN OF RIGHT KNEE: Primary | ICD-10-CM

## 2022-11-08 DIAGNOSIS — M17.11 PRIMARY OSTEOARTHRITIS OF RIGHT KNEE: ICD-10-CM

## 2022-11-08 DIAGNOSIS — G89.29 CHRONIC PAIN OF RIGHT KNEE: Primary | ICD-10-CM

## 2022-11-08 PROCEDURE — 20611 DRAIN/INJ JOINT/BURSA W/US: CPT | Performed by: INTERNAL MEDICINE

## 2022-11-08 RX ORDER — LIDOCAINE HYDROCHLORIDE 10 MG/ML
6 INJECTION, SOLUTION INFILTRATION; PERINEURAL ONCE
Status: COMPLETED | OUTPATIENT
Start: 2022-11-08 | End: 2022-11-08

## 2022-11-08 RX ORDER — TRIAMCINOLONE ACETONIDE 40 MG/ML
40 INJECTION, SUSPENSION INTRA-ARTICULAR; INTRAMUSCULAR ONCE
Status: COMPLETED | OUTPATIENT
Start: 2022-11-08 | End: 2022-11-08

## 2022-11-08 RX ADMIN — TRIAMCINOLONE ACETONIDE 40 MG: 40 INJECTION, SUSPENSION INTRA-ARTICULAR; INTRAMUSCULAR at 11:20:00

## 2022-11-08 RX ADMIN — LIDOCAINE HYDROCHLORIDE 6 ML: 10 INJECTION, SOLUTION INFILTRATION; PERINEURAL at 11:20:00

## 2022-11-09 ENCOUNTER — TELEPHONE (OUTPATIENT)
Dept: NEUROLOGY | Facility: CLINIC | Age: 77
End: 2022-11-09

## 2022-11-09 ENCOUNTER — OFFICE VISIT (OUTPATIENT)
Dept: NEUROLOGY | Facility: CLINIC | Age: 77
End: 2022-11-09
Payer: MEDICARE

## 2022-11-09 VITALS
DIASTOLIC BLOOD PRESSURE: 66 MMHG | BODY MASS INDEX: 29 KG/M2 | SYSTOLIC BLOOD PRESSURE: 136 MMHG | WEIGHT: 155 LBS | RESPIRATION RATE: 15 BRPM | HEART RATE: 70 BPM

## 2022-11-09 DIAGNOSIS — G62.9 NEUROPATHY: Primary | ICD-10-CM

## 2022-11-09 PROCEDURE — 99213 OFFICE O/P EST LOW 20 MIN: CPT | Performed by: OTHER

## 2022-11-09 NOTE — TELEPHONE ENCOUNTER
[facility].  has 1020 appt and it is now 1032. Attempted to reach patient to determine if she was on her way to appt.

## 2022-11-14 ENCOUNTER — PATIENT OUTREACH (OUTPATIENT)
Dept: CASE MANAGEMENT | Age: 77
End: 2022-11-14

## 2022-11-17 ENCOUNTER — APPOINTMENT (OUTPATIENT)
Dept: GENERAL RADIOLOGY | Facility: HOSPITAL | Age: 77
End: 2022-11-17
Attending: STUDENT IN AN ORGANIZED HEALTH CARE EDUCATION/TRAINING PROGRAM
Payer: MEDICARE

## 2022-11-17 ENCOUNTER — TELEPHONE (OUTPATIENT)
Dept: INTERNAL MEDICINE CLINIC | Facility: CLINIC | Age: 77
End: 2022-11-17

## 2022-11-17 ENCOUNTER — HOSPITAL ENCOUNTER (INPATIENT)
Facility: HOSPITAL | Age: 77
LOS: 3 days | Discharge: HOME | End: 2022-11-20
Attending: STUDENT IN AN ORGANIZED HEALTH CARE EDUCATION/TRAINING PROGRAM | Admitting: HOSPITALIST
Payer: MEDICARE

## 2022-11-17 DIAGNOSIS — J18.9 COMMUNITY ACQUIRED PNEUMONIA OF RIGHT LUNG, UNSPECIFIED PART OF LUNG: Primary | ICD-10-CM

## 2022-11-17 DIAGNOSIS — G89.29 CHRONIC PAIN OF RIGHT KNEE: ICD-10-CM

## 2022-11-17 DIAGNOSIS — M79.642 LEFT HAND PAIN: ICD-10-CM

## 2022-11-17 DIAGNOSIS — M25.561 CHRONIC PAIN OF RIGHT KNEE: ICD-10-CM

## 2022-11-17 DIAGNOSIS — M17.11 PRIMARY OSTEOARTHRITIS OF RIGHT KNEE: ICD-10-CM

## 2022-11-17 DIAGNOSIS — D64.9 ANEMIA, NORMOCYTIC NORMOCHROMIC: ICD-10-CM

## 2022-11-17 DIAGNOSIS — G56.02 LEFT CARPAL TUNNEL SYNDROME: ICD-10-CM

## 2022-11-17 LAB
ADENOVIRUS PCR:: NOT DETECTED
ANION GAP SERPL CALC-SCNC: 6 MMOL/L (ref 0–18)
ATRIAL RATE: 106 BPM
B PARAPERT DNA SPEC QL NAA+PROBE: NOT DETECTED
B PERT DNA SPEC QL NAA+PROBE: NOT DETECTED
BASOPHILS # BLD AUTO: 0.02 X10(3) UL (ref 0–0.2)
BASOPHILS NFR BLD AUTO: 0.2 %
BILIRUB UR QL STRIP.AUTO: NEGATIVE
BUN BLD-MCNC: 16 MG/DL (ref 7–18)
C PNEUM DNA SPEC QL NAA+PROBE: NOT DETECTED
CALCIUM BLD-MCNC: 8.7 MG/DL (ref 8.5–10.1)
CHLORIDE SERPL-SCNC: 104 MMOL/L (ref 98–112)
CLARITY UR REFRACT.AUTO: CLEAR
CO2 SERPL-SCNC: 27 MMOL/L (ref 21–32)
COLOR UR AUTO: YELLOW
CORONAVIRUS 229E PCR:: NOT DETECTED
CORONAVIRUS HKU1 PCR:: NOT DETECTED
CORONAVIRUS NL63 PCR:: NOT DETECTED
CORONAVIRUS OC43 PCR:: NOT DETECTED
CREAT BLD-MCNC: 0.81 MG/DL
EOSINOPHIL # BLD AUTO: 0.01 X10(3) UL (ref 0–0.7)
EOSINOPHIL NFR BLD AUTO: 0.1 %
ERYTHROCYTE [DISTWIDTH] IN BLOOD BY AUTOMATED COUNT: 13.2 %
EST. AVERAGE GLUCOSE BLD GHB EST-MCNC: 160 MG/DL (ref 68–126)
FLUAV + FLUBV RNA SPEC NAA+PROBE: NEGATIVE
FLUAV + FLUBV RNA SPEC NAA+PROBE: NEGATIVE
FLUAV RNA SPEC QL NAA+PROBE: NOT DETECTED
FLUBV RNA SPEC QL NAA+PROBE: NOT DETECTED
GFR SERPLBLD BASED ON 1.73 SQ M-ARVRAT: 75 ML/MIN/1.73M2 (ref 60–?)
GLUCOSE BLD-MCNC: 109 MG/DL (ref 70–99)
GLUCOSE BLD-MCNC: 161 MG/DL (ref 70–99)
GLUCOSE UR STRIP.AUTO-MCNC: NEGATIVE MG/DL
HBA1C MFR BLD: 7.2 % (ref ?–5.7)
HCT VFR BLD AUTO: 35.4 %
HGB BLD-MCNC: 11.9 G/DL
IMM GRANULOCYTES # BLD AUTO: 0.06 X10(3) UL (ref 0–1)
IMM GRANULOCYTES NFR BLD: 0.5 %
KETONES UR STRIP.AUTO-MCNC: NEGATIVE MG/DL
LACTATE SERPL-SCNC: 2 MMOL/L (ref 0.4–2)
LEUKOCYTE ESTERASE UR QL STRIP.AUTO: NEGATIVE
LYMPHOCYTES # BLD AUTO: 0.66 X10(3) UL (ref 1–4)
LYMPHOCYTES NFR BLD AUTO: 6 %
MCH RBC QN AUTO: 30.7 PG (ref 26–34)
MCHC RBC AUTO-ENTMCNC: 33.6 G/DL (ref 31–37)
MCV RBC AUTO: 91.2 FL
METAPNEUMOVIRUS PCR:: NOT DETECTED
MONOCYTES # BLD AUTO: 0.82 X10(3) UL (ref 0.1–1)
MONOCYTES NFR BLD AUTO: 7.5 %
MRSA DNA SPEC QL NAA+PROBE: NEGATIVE
MYCOPLASMA PNEUMONIA PCR:: NOT DETECTED
NEUTROPHILS # BLD AUTO: 9.39 X10 (3) UL (ref 1.5–7.7)
NEUTROPHILS # BLD AUTO: 9.39 X10(3) UL (ref 1.5–7.7)
NEUTROPHILS NFR BLD AUTO: 85.7 %
NITRITE UR QL STRIP.AUTO: NEGATIVE
OSMOLALITY SERPL CALC.SUM OF ELEC: 286 MOSM/KG (ref 275–295)
P AXIS: 43 DEGREES
P-R INTERVAL: 168 MS
PARAINFLUENZA 1 PCR:: NOT DETECTED
PARAINFLUENZA 2 PCR:: NOT DETECTED
PARAINFLUENZA 3 PCR:: NOT DETECTED
PARAINFLUENZA 4 PCR:: NOT DETECTED
PH UR STRIP.AUTO: 5 [PH] (ref 5–8)
PLATELET # BLD AUTO: 165 10(3)UL (ref 150–450)
POTASSIUM SERPL-SCNC: 4 MMOL/L (ref 3.5–5.1)
PROT UR STRIP.AUTO-MCNC: NEGATIVE MG/DL
Q-T INTERVAL: 352 MS
QRS DURATION: 94 MS
QTC CALCULATION (BEZET): 467 MS
R AXIS: -8 DEGREES
RBC # BLD AUTO: 3.88 X10(6)UL
RBC UR QL AUTO: NEGATIVE
RHINOVIRUS/ENTERO PCR:: NOT DETECTED
RSV RNA SPEC NAA+PROBE: NEGATIVE
RSV RNA SPEC QL NAA+PROBE: NOT DETECTED
SARS-COV-2 RNA NPH QL NAA+NON-PROBE: NOT DETECTED
SARS-COV-2 RNA RESP QL NAA+PROBE: NOT DETECTED
SARS-COV-2 RNA RESP QL NAA+PROBE: NOT DETECTED
SODIUM SERPL-SCNC: 137 MMOL/L (ref 136–145)
SP GR UR STRIP.AUTO: 1.02 (ref 1–1.03)
T AXIS: 32 DEGREES
TROPONIN I HIGH SENSITIVITY: 20 NG/L
UROBILINOGEN UR STRIP.AUTO-MCNC: <2 MG/DL
VENTRICULAR RATE: 106 BPM
WBC # BLD AUTO: 11 X10(3) UL (ref 4–11)

## 2022-11-17 PROCEDURE — 71045 X-RAY EXAM CHEST 1 VIEW: CPT | Performed by: STUDENT IN AN ORGANIZED HEALTH CARE EDUCATION/TRAINING PROGRAM

## 2022-11-17 PROCEDURE — 99223 1ST HOSP IP/OBS HIGH 75: CPT | Performed by: HOSPITALIST

## 2022-11-17 RX ORDER — SENNOSIDES 8.6 MG
17.2 TABLET ORAL NIGHTLY PRN
Status: DISCONTINUED | OUTPATIENT
Start: 2022-11-17 | End: 2022-11-20

## 2022-11-17 RX ORDER — HYDROCODONE BITARTRATE AND ACETAMINOPHEN 10; 325 MG/1; MG/1
1 TABLET ORAL EVERY 4 HOURS PRN
Status: DISCONTINUED | OUTPATIENT
Start: 2022-11-17 | End: 2022-11-20

## 2022-11-17 RX ORDER — ROSUVASTATIN CALCIUM 10 MG/1
10 TABLET, COATED ORAL NIGHTLY
Status: DISCONTINUED | OUTPATIENT
Start: 2022-11-17 | End: 2022-11-20

## 2022-11-17 RX ORDER — BISACODYL 10 MG
10 SUPPOSITORY, RECTAL RECTAL
Status: DISCONTINUED | OUTPATIENT
Start: 2022-11-17 | End: 2022-11-20

## 2022-11-17 RX ORDER — GUAIFENESIN 400 MG/1
400 TABLET ORAL
Status: DISCONTINUED | OUTPATIENT
Start: 2022-11-17 | End: 2022-11-20

## 2022-11-17 RX ORDER — SODIUM CHLORIDE 9 MG/ML
INJECTION, SOLUTION INTRAVENOUS CONTINUOUS
Status: DISCONTINUED | OUTPATIENT
Start: 2022-11-17 | End: 2022-11-20

## 2022-11-17 RX ORDER — IPRATROPIUM BROMIDE AND ALBUTEROL SULFATE 2.5; .5 MG/3ML; MG/3ML
3 SOLUTION RESPIRATORY (INHALATION) EVERY 6 HOURS PRN
Status: DISCONTINUED | OUTPATIENT
Start: 2022-11-17 | End: 2022-11-20

## 2022-11-17 RX ORDER — TIZANIDINE 4 MG/1
12 TABLET ORAL NIGHTLY
Status: DISCONTINUED | OUTPATIENT
Start: 2022-11-17 | End: 2022-11-20

## 2022-11-17 RX ORDER — NICOTINE POLACRILEX 4 MG
15 LOZENGE BUCCAL
Status: DISCONTINUED | OUTPATIENT
Start: 2022-11-17 | End: 2022-11-20

## 2022-11-17 RX ORDER — TIZANIDINE 4 MG/1
4 TABLET ORAL EVERY 8 HOURS PRN
Status: DISCONTINUED | OUTPATIENT
Start: 2022-11-17 | End: 2022-11-17

## 2022-11-17 RX ORDER — BENZONATATE 100 MG/1
100 CAPSULE ORAL 3 TIMES DAILY
Status: DISCONTINUED | OUTPATIENT
Start: 2022-11-17 | End: 2022-11-20

## 2022-11-17 RX ORDER — SODIUM PHOSPHATE, DIBASIC AND SODIUM PHOSPHATE, MONOBASIC 7; 19 G/133ML; G/133ML
1 ENEMA RECTAL ONCE AS NEEDED
Status: DISCONTINUED | OUTPATIENT
Start: 2022-11-17 | End: 2022-11-20

## 2022-11-17 RX ORDER — PANTOPRAZOLE SODIUM 40 MG/1
40 TABLET, DELAYED RELEASE ORAL
Status: DISCONTINUED | OUTPATIENT
Start: 2022-11-18 | End: 2022-11-20

## 2022-11-17 RX ORDER — METFORMIN HYDROCHLORIDE 500 MG/1
500 TABLET, EXTENDED RELEASE ORAL EVERY EVENING
COMMUNITY
End: 2022-12-01

## 2022-11-17 RX ORDER — ONDANSETRON 2 MG/ML
4 INJECTION INTRAMUSCULAR; INTRAVENOUS EVERY 6 HOURS PRN
Status: DISCONTINUED | OUTPATIENT
Start: 2022-11-17 | End: 2022-11-20

## 2022-11-17 RX ORDER — ACETAMINOPHEN 500 MG
500 TABLET ORAL EVERY 4 HOURS PRN
Status: DISCONTINUED | OUTPATIENT
Start: 2022-11-17 | End: 2022-11-20

## 2022-11-17 RX ORDER — ALBUTEROL SULFATE 90 UG/1
2 AEROSOL, METERED RESPIRATORY (INHALATION)
Status: DISCONTINUED | OUTPATIENT
Start: 2022-11-17 | End: 2022-11-20

## 2022-11-17 RX ORDER — NICOTINE POLACRILEX 4 MG
30 LOZENGE BUCCAL
Status: DISCONTINUED | OUTPATIENT
Start: 2022-11-17 | End: 2022-11-20

## 2022-11-17 RX ORDER — DEXTROSE MONOHYDRATE 25 G/50ML
50 INJECTION, SOLUTION INTRAVENOUS
Status: DISCONTINUED | OUTPATIENT
Start: 2022-11-17 | End: 2022-11-20

## 2022-11-17 RX ORDER — ECHINACEA PURPUREA EXTRACT 125 MG
1 TABLET ORAL
Status: DISCONTINUED | OUTPATIENT
Start: 2022-11-17 | End: 2022-11-20

## 2022-11-17 RX ORDER — POLYETHYLENE GLYCOL 3350 17 G/17G
17 POWDER, FOR SOLUTION ORAL DAILY PRN
Status: DISCONTINUED | OUTPATIENT
Start: 2022-11-17 | End: 2022-11-20

## 2022-11-17 RX ORDER — LISINOPRIL 20 MG/1
20 TABLET ORAL DAILY
Status: DISCONTINUED | OUTPATIENT
Start: 2022-11-18 | End: 2022-11-20

## 2022-11-17 RX ORDER — FLUTICASONE FUROATE AND VILANTEROL 200; 25 UG/1; UG/1
1 POWDER RESPIRATORY (INHALATION) DAILY
Status: DISCONTINUED | OUTPATIENT
Start: 2022-11-18 | End: 2022-11-20

## 2022-11-17 RX ORDER — PREGABALIN 100 MG/1
100 CAPSULE ORAL 3 TIMES DAILY
Status: DISCONTINUED | OUTPATIENT
Start: 2022-11-17 | End: 2022-11-20

## 2022-11-17 RX ORDER — IPRATROPIUM BROMIDE AND ALBUTEROL SULFATE 2.5; .5 MG/3ML; MG/3ML
3 SOLUTION RESPIRATORY (INHALATION) ONCE
Status: COMPLETED | OUTPATIENT
Start: 2022-11-17 | End: 2022-11-17

## 2022-11-17 RX ORDER — FLUTICASONE PROPIONATE 50 MCG
1 SPRAY, SUSPENSION (ML) NASAL 2 TIMES DAILY
Status: DISCONTINUED | OUTPATIENT
Start: 2022-11-17 | End: 2022-11-20

## 2022-11-17 RX ORDER — CHOLECALCIFEROL (VITAMIN D3) 125 MCG
2000 CAPSULE ORAL DAILY
Status: DISCONTINUED | OUTPATIENT
Start: 2022-11-18 | End: 2022-11-20

## 2022-11-17 RX ORDER — METOCLOPRAMIDE HYDROCHLORIDE 5 MG/ML
10 INJECTION INTRAMUSCULAR; INTRAVENOUS EVERY 8 HOURS PRN
Status: DISCONTINUED | OUTPATIENT
Start: 2022-11-17 | End: 2022-11-20

## 2022-11-17 RX ORDER — METFORMIN HYDROCHLORIDE 500 MG/1
1000 TABLET, EXTENDED RELEASE ORAL EVERY MORNING
COMMUNITY
End: 2022-12-01

## 2022-11-17 RX ORDER — HYDROCODONE BITARTRATE AND ACETAMINOPHEN 5; 325 MG/1; MG/1
2 TABLET ORAL ONCE
Status: COMPLETED | OUTPATIENT
Start: 2022-11-17 | End: 2022-11-17

## 2022-11-17 RX ORDER — LISINOPRIL 20 MG/1
20 TABLET ORAL DAILY
COMMUNITY

## 2022-11-17 NOTE — TELEPHONE ENCOUNTER
Today woke up shaking/very cold/cough/very thirsty-pt has been around people that have a virus with a cough and now she has it-nothing available today-she said when she gets sick usually goes into pneumonia

## 2022-11-17 NOTE — TELEPHONE ENCOUNTER
Patient called today with choppy voice and she reports shaking nonstop. She has not taken her temp. She was around friends that were sick unknown illness. She states hasn't checked her temperature as she cannot stop shaking. Advised patient to call 911 and not drive due to concerns of how patient heard over phone. Patient states she gets this way when she gets pneumonia. She voiced understanding and will call 911 at this time. SD notified.

## 2022-11-17 NOTE — ED INITIAL ASSESSMENT (HPI)
Patient to ER from home w/ complaints of chills, coughing,  & HAYLIE which started this AM @ 0800. Took inhaler this AM PTA.

## 2022-11-17 NOTE — ED QUICK NOTES
Orders for admission, patient is aware of plan and ready to go upstairs.  Any questions, please call ED RN Cris Meehan at extension [phone]    Patient Covid vaccination status: Fully vaccinated     COVID Test Ordered in ED: Rapid SARS-CoV-2 by PCR    COVID Suspicion at Admission: N/A    Running Infusions:  None    Mental Status/LOC at time of transport: alert    Other pertinent information:   CIWA score: N/A   NIH score:  N/A

## 2022-11-18 PROBLEM — D64.9 ANEMIA, NORMOCYTIC NORMOCHROMIC: Status: ACTIVE | Noted: 2022-11-18

## 2022-11-18 LAB
ALBUMIN SERPL-MCNC: 2.6 G/DL (ref 3.4–5)
ALBUMIN/GLOB SERPL: 0.9 {RATIO} (ref 1–2)
ALP LIVER SERPL-CCNC: 53 U/L
ALT SERPL-CCNC: 26 U/L
ANION GAP SERPL CALC-SCNC: 2 MMOL/L (ref 0–18)
AST SERPL-CCNC: 12 U/L (ref 15–37)
BASOPHILS # BLD AUTO: 0.02 X10(3) UL (ref 0–0.2)
BASOPHILS NFR BLD AUTO: 0.2 %
BILIRUB SERPL-MCNC: 0.8 MG/DL (ref 0.1–2)
BUN BLD-MCNC: 11 MG/DL (ref 7–18)
CALCIUM BLD-MCNC: 7.8 MG/DL (ref 8.5–10.1)
CHLORIDE SERPL-SCNC: 111 MMOL/L (ref 98–112)
CO2 SERPL-SCNC: 26 MMOL/L (ref 21–32)
CREAT BLD-MCNC: 0.6 MG/DL
DEPRECATED HBV CORE AB SER IA-ACNC: 214.3 NG/ML
EOSINOPHIL # BLD AUTO: 0.03 X10(3) UL (ref 0–0.7)
EOSINOPHIL NFR BLD AUTO: 0.2 %
ERYTHROCYTE [DISTWIDTH] IN BLOOD BY AUTOMATED COUNT: 13.5 %
GFR SERPLBLD BASED ON 1.73 SQ M-ARVRAT: 93 ML/MIN/1.73M2 (ref 60–?)
GLOBULIN PLAS-MCNC: 2.9 G/DL (ref 2.8–4.4)
GLUCOSE BLD-MCNC: 104 MG/DL (ref 70–99)
GLUCOSE BLD-MCNC: 133 MG/DL (ref 70–99)
GLUCOSE BLD-MCNC: 142 MG/DL (ref 70–99)
GLUCOSE BLD-MCNC: 147 MG/DL (ref 70–99)
GLUCOSE BLD-MCNC: 181 MG/DL (ref 70–99)
HCT VFR BLD AUTO: 28.3 %
HGB BLD-MCNC: 9.4 G/DL
IMM GRANULOCYTES # BLD AUTO: 0.06 X10(3) UL (ref 0–1)
IMM GRANULOCYTES NFR BLD: 0.5 %
IRON SATN MFR SERPL: 6 %
IRON SERPL-MCNC: 14 UG/DL
L PNEUMO AG UR QL: NEGATIVE
LYMPHOCYTES # BLD AUTO: 1.72 X10(3) UL (ref 1–4)
LYMPHOCYTES NFR BLD AUTO: 13.6 %
MCH RBC QN AUTO: 30.6 PG (ref 26–34)
MCHC RBC AUTO-ENTMCNC: 33.2 G/DL (ref 31–37)
MCV RBC AUTO: 92.2 FL
MONOCYTES # BLD AUTO: 0.97 X10(3) UL (ref 0.1–1)
MONOCYTES NFR BLD AUTO: 7.6 %
NEUTROPHILS # BLD AUTO: 9.88 X10 (3) UL (ref 1.5–7.7)
NEUTROPHILS # BLD AUTO: 9.88 X10(3) UL (ref 1.5–7.7)
NEUTROPHILS NFR BLD AUTO: 77.9 %
OSMOLALITY SERPL CALC.SUM OF ELEC: 288 MOSM/KG (ref 275–295)
PLATELET # BLD AUTO: 147 10(3)UL (ref 150–450)
POTASSIUM SERPL-SCNC: 3.5 MMOL/L (ref 3.5–5.1)
PROT SERPL-MCNC: 5.5 G/DL (ref 6.4–8.2)
RBC # BLD AUTO: 3.07 X10(6)UL
SODIUM SERPL-SCNC: 139 MMOL/L (ref 136–145)
STREP PNEUMO ANTIGEN, URINE: NEGATIVE
TIBC SERPL-MCNC: 219 UG/DL (ref 240–450)
TRANSFERRIN SERPL-MCNC: 147 MG/DL (ref 200–360)
WBC # BLD AUTO: 12.7 X10(3) UL (ref 4–11)

## 2022-11-18 PROCEDURE — 99232 SBSQ HOSP IP/OBS MODERATE 35: CPT | Performed by: HOSPITALIST

## 2022-11-18 PROCEDURE — 99222 1ST HOSP IP/OBS MODERATE 55: CPT | Performed by: INTERNAL MEDICINE

## 2022-11-18 RX ORDER — HYDRALAZINE HYDROCHLORIDE 20 MG/ML
10 INJECTION INTRAMUSCULAR; INTRAVENOUS EVERY 4 HOURS PRN
Status: DISCONTINUED | OUTPATIENT
Start: 2022-11-18 | End: 2022-11-20

## 2022-11-18 NOTE — PROGRESS NOTES
11/17/22 2007   Clinical Encounter Type   Visited With Patient   Continue Visiting Yes   Patient Spiritual Encounters   Spiritual Assessment Completed Yes   Taxonomy   Intended Effects Convey a calming presence   Methods Offer support   Interventions Assist someone with Advance Directives; Active listening; Ask guided questions;[place]      responded to consult request for POA.  was not able to reach nurse via phone to verify if patient decisional.  found POA from 2019 in chart.  visited with patient to verify if any changes need to be made. Patient is visibly drowsy and states that she thinks her  is her POA but she would like a family member to be Agent.  will schedule a visit for the following day to address this concern as patient confirms she is tired.  offers spiritual support through prayer. Spiritual Care can be requested via an mDialog consult or by pager at [phone]. MEKA Sen [facility]  Extension: [phone]

## 2022-11-18 NOTE — PROGRESS NOTES
11/18/22 1448   Clinical Encounter Type   Visited With Patient; Health care provider   Routine Visit Follow-up   Referral From    Referral To    Patient Spiritual Encounters   Spiritual Assessment Completed No   Taxonomy   Intended Effects Aligning care plan with patient's values   Methods Collaborate with care team member;Offer support   Interventions Acknowledge current situation; Active listening; Ask guided questions;Assist someone with Advance Directives     Discussion:   responded to the [facility] consult. Prior to visit, reviewed the patient's EMR and paper chart to identify any existing POA documentation. A previously completed POA document was located. The document was reviewed with the patient during the visit. Patient states that the current document is accurate. No changes or updates are required. A copy of the existing document was printed and given to the patient. Spiritual Care can be requested via an Epic consult. MEKA Garland

## 2022-11-18 NOTE — CM/SW NOTE
11/18/22 1700   CM/SW Referral Data   Referral Source Physician   Reason for Referral Discharge planning   Informant EMR;Clinical Staff Member     Protocol order for [facility] due to pna. Pt uses CPAP at night    PT/OT did evals and no needs identified. Per PT:    HOME SITUATION  Type of Home: House   Home Layout: Two level     Patient Owned Equipment: Rolling walker     Prior Level of Angelina: Pt reports ind PTA.

## 2022-11-18 NOTE — PROGRESS NOTES
22 1733   Over the last 2 weeks, how often have you been bothered by any of the following problems? Little interest or pleasure in doing things 0   Feeling down, depressed, or hopeless 1   Trouble falling or staying asleep, or sleeping too much 1   Feeling tired or having little energy 1   Poor appetite or overeating 1   Feeling bad about yourself - or that you are a failure or have let yourself or your family down 0   Trouble concentrating on things, such as reading the newspaper or watching television 0   Moving or speaking so slowly that other people could have noticed. Or the opposite - being so fidgety or restless that you have been moving around a lot more than usual 0   Thoughts that you would be better off dead, or of hurting yourself in some way 0   PHQ-9 TOTAL SCORE 4   If you checked off any problems, how difficult have these problems made it for you to do your work, take care of things at home, or get along with other people? Somewhat difficult   [address] [facility] Resource Referral Counselor Note    [facility] Child Patient Status:  Inpatient    1945 MRN [id number]   [facility] [place] [facility] Igor Baez MD       S(subjective) Patient admits to a history of anxiety. She said, she is on medication per psychiatrist, Dr. Vivian Graham with [facility]. She states she has been stable with the symptoms now. She denies any suicidal thoughts. O(objective) The patient is alert and oriented x4. Her mood and affect is wnl. A(assessment) The phq9 was completed.     P(plan) She will follow up with her own psychiatrist.      Mazin Rubin RN  2022  5:35 PM

## 2022-11-18 NOTE — PLAN OF CARE
Patient is A+Ox4. Maintaining sats >90% on RA while awake, refusing CPAP at night. NSR on tele, Xarelto. Voids. Up SBA. IV Abx + IVF per MAR. Right chest Port, Unit draw. Tolerating diet, QID accu check. No c/o pain. Patient updated on POC, PT recommending home.      Problem: Patient/Family Goals  Goal: Patient/Family Long Term Goal  Description: Patient's Long Term Goal: discharge home with appropriate means    Interventions:  - consults  -Iv abx  -labs  - See additional Care Plan goals for specific interventions  Outcome: Progressing  Goal: Patient/Family Short Term Goal  Description: Patient's Short Term Goal:   11/17 noc: mange pain  11/18AM: control pain    Interventions:   - prn pain meds per mar  - See additional Care Plan goals for specific interventions  Outcome: Progressing     Problem: RESPIRATORY - ADULT  Goal: Achieves optimal ventilation and oxygenation  Description: INTERVENTIONS:  - Assess for changes in respiratory status  - Assess for changes in mentation and behavior  - Position to facilitate oxygenation and minimize respiratory effort  - Oxygen supplementation based on oxygen saturation or ABGs  - Provide Smoking Cessation handout, if applicable  - Encourage broncho-pulmonary hygiene including cough, deep breathe, Incentive Spirometry  - Assess the need for suctioning and perform as needed  - Assess and instruct to report SOB or any respiratory difficulty  - Respiratory Therapy support as indicated  - Manage/alleviate anxiety  - Monitor for signs/symptoms of CO2 retention  Outcome: Progressing     Problem: PAIN - ADULT  Goal: Verbalizes/displays adequate comfort level or patient's stated pain goal  Description: INTERVENTIONS:  - Encourage pt to monitor pain and request assistance  - Assess pain using appropriate pain scale  - Administer analgesics based on type and severity of pain and evaluate response  - Implement non-pharmacological measures as appropriate and evaluate response  - Consider cultural and social influences on pain and pain management  - Manage/alleviate anxiety  - Utilize distraction and/or relaxation techniques  - Monitor for opioid side effects  - Notify MD/LIP if interventions unsuccessful or patient reports new pain  - Anticipate increased pain with activity and pre-medicate as appropriate  Outcome: Progressing     Problem: SAFETY ADULT - FALL  Goal: Free from fall injury  Description: INTERVENTIONS:  - Assess pt frequently for physical needs  - Identify cognitive and physical deficits and behaviors that affect risk of falls.   - [place] fall precautions as indicated by assessment.  - Educate pt/family on patient safety including physical limitations  - Instruct pt to call for assistance with activity based on assessment  - Modify environment to reduce risk of injury  - Provide assistive devices as appropriate  - Consider OT/PT consult to assist with strengthening/mobility  - Encourage toileting schedule  Outcome: Progressing     Problem: Diabetes/Glucose Control  Goal: Glucose maintained within prescribed range  Description: INTERVENTIONS:  - Monitor Blood Glucose as ordered  - Assess for signs and symptoms of hyperglycemia and hypoglycemia  - Administer ordered medications to maintain glucose within target range  - Assess barriers to adequate nutritional intake and initiate nutrition consult as needed  - Instruct patient on self management of diabetes  Outcome: Progressing

## 2022-11-18 NOTE — PLAN OF CARE
Patient is alert and oriented x4. wdl on RA while awake. HX MUKUND, refusing cpap. Pt placed on 2L while sleeping. NS on tele. VSS. On xarelto. Voids. Up with 1 assist. PT/OT to see. IV abx. right chest port. IVF infusing. QID accuchecks. POC discussed with patient, all questions answered at this time.    Problem: Patient/Family Goals  Goal: Patient/Family Long Term Goal  Description: Patient's Long Term Goal: discharge home with appropriate means    Interventions:  - consults  -Iv abx  -labs  - See additional Care Plan goals for specific interventions  Outcome: Progressing  Goal: Patient/Family Short Term Goal  Description: Patient's Short Term Goal:   11/17 noc: mange pain    Interventions:   - prn pain meds per mar  - See additional Care Plan goals for specific interventions  Outcome: Progressing     Problem: RESPIRATORY - ADULT  Goal: Achieves optimal ventilation and oxygenation  Description: INTERVENTIONS:  - Assess for changes in respiratory status  - Assess for changes in mentation and behavior  - Position to facilitate oxygenation and minimize respiratory effort  - Oxygen supplementation based on oxygen saturation or ABGs  - Provide Smoking Cessation handout, if applicable  - Encourage broncho-pulmonary hygiene including cough, deep breathe, Incentive Spirometry  - Assess the need for suctioning and perform as needed  - Assess and instruct to report SOB or any respiratory difficulty  - Respiratory Therapy support as indicated  - Manage/alleviate anxiety  - Monitor for signs/symptoms of CO2 retention  Outcome: Progressing     Problem: PAIN - ADULT  Goal: Verbalizes/displays adequate comfort level or patient's stated pain goal  Description: INTERVENTIONS:  - Encourage pt to monitor pain and request assistance  - Assess pain using appropriate pain scale  - Administer analgesics based on type and severity of pain and evaluate response  - Implement non-pharmacological measures as appropriate and evaluate response  - Consider cultural and social influences on pain and pain management  - Manage/alleviate anxiety  - Utilize distraction and/or relaxation techniques  - Monitor for opioid side effects  - Notify MD/LIP if interventions unsuccessful or patient reports new pain  - Anticipate increased pain with activity and pre-medicate as appropriate  Outcome: Progressing     Problem: SAFETY ADULT - FALL  Goal: Free from fall injury  Description: INTERVENTIONS:  - Assess pt frequently for physical needs  - Identify cognitive and physical deficits and behaviors that affect risk of falls.   - [place] fall precautions as indicated by assessment.  - Educate pt/family on patient safety including physical limitations  - Instruct pt to call for assistance with activity based on assessment  - Modify environment to reduce risk of injury  - Provide assistive devices as appropriate  - Consider OT/PT consult to assist with strengthening/mobility  - Encourage toileting schedule  Outcome: Progressing     Problem: Diabetes/Glucose Control  Goal: Glucose maintained within prescribed range  Description: INTERVENTIONS:  - Monitor Blood Glucose as ordered  - Assess for signs and symptoms of hyperglycemia and hypoglycemia  - Administer ordered medications to maintain glucose within target range  - Assess barriers to adequate nutritional intake and initiate nutrition consult as needed  - Instruct patient on self management of diabetes  Outcome: Progressing

## 2022-11-19 LAB
BASOPHILS # BLD AUTO: 0.03 X10(3) UL (ref 0–0.2)
BASOPHILS NFR BLD AUTO: 0.3 %
EOSINOPHIL # BLD AUTO: 0.16 X10(3) UL (ref 0–0.7)
EOSINOPHIL NFR BLD AUTO: 1.5 %
ERYTHROCYTE [DISTWIDTH] IN BLOOD BY AUTOMATED COUNT: 13.5 %
GLUCOSE BLD-MCNC: 107 MG/DL (ref 70–99)
GLUCOSE BLD-MCNC: 113 MG/DL (ref 70–99)
GLUCOSE BLD-MCNC: 140 MG/DL (ref 70–99)
GLUCOSE BLD-MCNC: 168 MG/DL (ref 70–99)
HCT VFR BLD AUTO: 28.7 %
HGB BLD-MCNC: 9.4 G/DL
IMM GRANULOCYTES # BLD AUTO: 0.03 X10(3) UL (ref 0–1)
IMM GRANULOCYTES NFR BLD: 0.3 %
IMMUNOGLOBULIN PNL SER-MCNC: 608 MG/DL (ref 791–1643)
LYMPHOCYTES # BLD AUTO: 1.16 X10(3) UL (ref 1–4)
LYMPHOCYTES NFR BLD AUTO: 10.9 %
MCH RBC QN AUTO: 30.3 PG (ref 26–34)
MCHC RBC AUTO-ENTMCNC: 32.8 G/DL (ref 31–37)
MCV RBC AUTO: 92.6 FL
MONOCYTES # BLD AUTO: 0.73 X10(3) UL (ref 0.1–1)
MONOCYTES NFR BLD AUTO: 6.9 %
NEUTROPHILS # BLD AUTO: 8.49 X10 (3) UL (ref 1.5–7.7)
NEUTROPHILS # BLD AUTO: 8.49 X10(3) UL (ref 1.5–7.7)
NEUTROPHILS NFR BLD AUTO: 80.1 %
PLATELET # BLD AUTO: 149 10(3)UL (ref 150–450)
RBC # BLD AUTO: 3.1 X10(6)UL
WBC # BLD AUTO: 10.6 X10(3) UL (ref 4–11)

## 2022-11-19 PROCEDURE — 99232 SBSQ HOSP IP/OBS MODERATE 35: CPT | Performed by: HOSPITALIST

## 2022-11-19 RX ORDER — MAGNESIUM OXIDE 400 MG (241.3 MG MAGNESIUM) TABLET
1 TABLET NIGHTLY
Status: DISCONTINUED | OUTPATIENT
Start: 2022-11-19 | End: 2022-11-20

## 2022-11-19 NOTE — PLAN OF CARE
Alert x4. Room air-2LNC. Denies any c/o SOB or respiratory distress. Accuchecks QID- no insulin coverage needed. Voids. Up SBA. Port a cath, accessed- unit draw. Denies any c/o pain. No n/v/d. Updated on POC, all questions answered, verbalized understanding. Call light within reach.      Problem: Patient/Family Goals  Goal: Patient/Family Long Term Goal  Description: Patient's Long Term Goal: discharge home with appropriate means    Interventions:  - consults  -Iv abx  -labs  - See additional Care Plan goals for specific interventions  Outcome: Progressing  Goal: Patient/Family Short Term Goal  Description: Patient's Short Term Goal:   11/17 noc: mange pain  11/18AM: control pain    Interventions:   - prn pain meds per mar  - See additional Care Plan goals for specific interventions  Outcome: Progressing     Problem: RESPIRATORY - ADULT  Goal: Achieves optimal ventilation and oxygenation  Description: INTERVENTIONS:  - Assess for changes in respiratory status  - Assess for changes in mentation and behavior  - Position to facilitate oxygenation and minimize respiratory effort  - Oxygen supplementation based on oxygen saturation or ABGs  - Provide Smoking Cessation handout, if applicable  - Encourage broncho-pulmonary hygiene including cough, deep breathe, Incentive Spirometry  - Assess the need for suctioning and perform as needed  - Assess and instruct to report SOB or any respiratory difficulty  - Respiratory Therapy support as indicated  - Manage/alleviate anxiety  - Monitor for signs/symptoms of CO2 retention  Outcome: Progressing     Problem: PAIN - ADULT  Goal: Verbalizes/displays adequate comfort level or patient's stated pain goal  Description: INTERVENTIONS:  - Encourage pt to monitor pain and request assistance  - Assess pain using appropriate pain scale  - Administer analgesics based on type and severity of pain and evaluate response  - Implement non-pharmacological measures as appropriate and evaluate response  - Consider cultural and social influences on pain and pain management  - Manage/alleviate anxiety  - Utilize distraction and/or relaxation techniques  - Monitor for opioid side effects  - Notify MD/LIP if interventions unsuccessful or patient reports new pain  - Anticipate increased pain with activity and pre-medicate as appropriate  Outcome: Progressing     Problem: SAFETY ADULT - FALL  Goal: Free from fall injury  Description: INTERVENTIONS:  - Assess pt frequently for physical needs  - Identify cognitive and physical deficits and behaviors that affect risk of falls.   - [place] fall precautions as indicated by assessment.  - Educate pt/family on patient safety including physical limitations  - Instruct pt to call for assistance with activity based on assessment  - Modify environment to reduce risk of injury  - Provide assistive devices as appropriate  - Consider OT/PT consult to assist with strengthening/mobility  - Encourage toileting schedule  Outcome: Progressing     Problem: Diabetes/Glucose Control  Goal: Glucose maintained within prescribed range  Description: INTERVENTIONS:  - Monitor Blood Glucose as ordered  - Assess for signs and symptoms of hyperglycemia and hypoglycemia  - Administer ordered medications to maintain glucose within target range  - Assess barriers to adequate nutritional intake and initiate nutrition consult as needed  - Instruct patient on self management of diabetes  Outcome: Progressing

## 2022-11-20 VITALS
HEART RATE: 87 BPM | RESPIRATION RATE: 18 BRPM | OXYGEN SATURATION: 98 % | BODY MASS INDEX: 28.32 KG/M2 | TEMPERATURE: 98 F | WEIGHT: 150 LBS | HEIGHT: 61 IN | SYSTOLIC BLOOD PRESSURE: 165 MMHG | DIASTOLIC BLOOD PRESSURE: 68 MMHG

## 2022-11-20 LAB
GLUCOSE BLD-MCNC: 124 MG/DL (ref 70–99)
GLUCOSE BLD-MCNC: 126 MG/DL (ref 70–99)
GLUCOSE BLD-MCNC: 207 MG/DL (ref 70–99)

## 2022-11-20 PROCEDURE — 99239 HOSP IP/OBS DSCHRG MGMT >30: CPT | Performed by: HOSPITALIST

## 2022-11-20 RX ORDER — AMOXICILLIN AND CLAVULANATE POTASSIUM 875; 125 MG/1; MG/1
1 TABLET, FILM COATED ORAL 2 TIMES DAILY
Qty: 20 TABLET | Refills: 0 | Status: SHIPPED | OUTPATIENT
Start: 2022-11-20 | End: 2022-11-30

## 2022-11-20 RX ORDER — ONDANSETRON 4 MG/1
4 TABLET, FILM COATED ORAL EVERY 8 HOURS PRN
Qty: 30 TABLET | Refills: 0 | Status: SHIPPED | OUTPATIENT
Start: 2022-11-20

## 2022-11-20 NOTE — PLAN OF CARE
Alert x4. Room air. Denies any c/o SOB or respiratory distress. Accuchecks QID- no insulin coverage needed. Voids. Up SBA. Port a cath, accessed- unit draw. C/o generalized pain- PRN Norco given per pt request. No n/v/d. Updated on POC, all questions answered, verbalized understanding. Call light within reach.      Problem: Patient/Family Goals  Goal: Patient/Family Long Term Goal  Description: Patient's Long Term Goal: discharge home with appropriate means    Interventions:  - consults  -Iv abx  -labs  - See additional Care Plan goals for specific interventions  Outcome: Progressing  Goal: Patient/Family Short Term Goal  Description: Patient's Short Term Goal:   11/17 noc: mange pain  11/18AM: control pain    Interventions:   - prn pain meds per mar  - See additional Care Plan goals for specific interventions  Outcome: Progressing     Problem: RESPIRATORY - ADULT  Goal: Achieves optimal ventilation and oxygenation  Description: INTERVENTIONS:  - Assess for changes in respiratory status  - Assess for changes in mentation and behavior  - Position to facilitate oxygenation and minimize respiratory effort  - Oxygen supplementation based on oxygen saturation or ABGs  - Provide Smoking Cessation handout, if applicable  - Encourage broncho-pulmonary hygiene including cough, deep breathe, Incentive Spirometry  - Assess the need for suctioning and perform as needed  - Assess and instruct to report SOB or any respiratory difficulty  - Respiratory Therapy support as indicated  - Manage/alleviate anxiety  - Monitor for signs/symptoms of CO2 retention  Outcome: Progressing     Problem: PAIN - ADULT  Goal: Verbalizes/displays adequate comfort level or patient's stated pain goal  Description: INTERVENTIONS:  - Encourage pt to monitor pain and request assistance  - Assess pain using appropriate pain scale  - Administer analgesics based on type and severity of pain and evaluate response  - Implement non-pharmacological measures as appropriate and evaluate response  - Consider cultural and social influences on pain and pain management  - Manage/alleviate anxiety  - Utilize distraction and/or relaxation techniques  - Monitor for opioid side effects  - Notify MD/LIP if interventions unsuccessful or patient reports new pain  - Anticipate increased pain with activity and pre-medicate as appropriate  Outcome: Progressing     Problem: SAFETY ADULT - FALL  Goal: Free from fall injury  Description: INTERVENTIONS:  - Assess pt frequently for physical needs  - Identify cognitive and physical deficits and behaviors that affect risk of falls.   - [place] fall precautions as indicated by assessment.  - Educate pt/family on patient safety including physical limitations  - Instruct pt to call for assistance with activity based on assessment  - Modify environment to reduce risk of injury  - Provide assistive devices as appropriate  - Consider OT/PT consult to assist with strengthening/mobility  - Encourage toileting schedule  Outcome: Progressing     Problem: Diabetes/Glucose Control  Goal: Glucose maintained within prescribed range  Description: INTERVENTIONS:  - Monitor Blood Glucose as ordered  - Assess for signs and symptoms of hyperglycemia and hypoglycemia  - Administer ordered medications to maintain glucose within target range  - Assess barriers to adequate nutritional intake and initiate nutrition consult as needed  - Instruct patient on self management of diabetes  Outcome: Progressing

## 2022-11-20 NOTE — PROGRESS NOTES
NURSING DISCHARGE NOTE    Discharged Home via Wheelchair. Accompanied by Family member and Support staff  Belongings Taken by patient/family. Discharge instructions explained to patient at bedside. Port de-accessed prior to DC. Patient's belongings taken by patient. No further needs/questions at this time.

## 2022-11-20 NOTE — OCCUPATIONAL THERAPY NOTE
OT orders received and pt chart reviewed. Per PT eval, pt is IND in mobility and at baseline. RN reports today, pt has no explicit OT related concerns. OT will sign off at this time.

## 2022-11-21 ENCOUNTER — PATIENT OUTREACH (OUTPATIENT)
Dept: CASE MANAGEMENT | Age: 77
End: 2022-11-21

## 2022-11-21 DIAGNOSIS — Z02.9 ENCOUNTERS FOR UNSPECIFIED ADMINISTRATIVE PURPOSE: ICD-10-CM

## 2022-11-21 DIAGNOSIS — J18.9 COMMUNITY ACQUIRED PNEUMONIA OF RIGHT LUNG, UNSPECIFIED PART OF LUNG: ICD-10-CM

## 2022-11-21 PROCEDURE — 1111F DSCHRG MED/CURRENT MED MERGE: CPT

## 2022-12-01 ENCOUNTER — OFFICE VISIT (OUTPATIENT)
Dept: INTERNAL MEDICINE CLINIC | Facility: CLINIC | Age: 77
End: 2022-12-01
Payer: MEDICARE

## 2022-12-01 VITALS
WEIGHT: 157 LBS | OXYGEN SATURATION: 99 % | SYSTOLIC BLOOD PRESSURE: 110 MMHG | BODY MASS INDEX: 29.64 KG/M2 | HEIGHT: 61 IN | DIASTOLIC BLOOD PRESSURE: 70 MMHG | HEART RATE: 100 BPM | TEMPERATURE: 97 F

## 2022-12-01 DIAGNOSIS — D64.9 ANEMIA, NORMOCYTIC NORMOCHROMIC: ICD-10-CM

## 2022-12-01 DIAGNOSIS — E11.65 CONTROLLED TYPE 2 DIABETES MELLITUS WITH HYPERGLYCEMIA, WITHOUT LONG-TERM CURRENT USE OF INSULIN (HCC): ICD-10-CM

## 2022-12-01 DIAGNOSIS — J45.30 MILD PERSISTENT ASTHMA WITHOUT COMPLICATION: ICD-10-CM

## 2022-12-01 DIAGNOSIS — G47.33 OSA ON CPAP: ICD-10-CM

## 2022-12-01 DIAGNOSIS — Z86.718 HISTORY OF DVT (DEEP VEIN THROMBOSIS): ICD-10-CM

## 2022-12-01 DIAGNOSIS — D72.829 LEUKOCYTOSIS, UNSPECIFIED TYPE: ICD-10-CM

## 2022-12-01 DIAGNOSIS — D69.6 THROMBOCYTOPENIA (HCC): ICD-10-CM

## 2022-12-01 DIAGNOSIS — Z99.89 OSA ON CPAP: ICD-10-CM

## 2022-12-01 DIAGNOSIS — Z86.711 HISTORY OF PULMONARY EMBOLISM: ICD-10-CM

## 2022-12-01 DIAGNOSIS — Z87.01 HISTORY OF PNEUMONIA: ICD-10-CM

## 2022-12-01 DIAGNOSIS — D80.3 IGG DEFICIENCY (HCC): ICD-10-CM

## 2022-12-01 DIAGNOSIS — Z87.01 HISTORY OF RECURRENT PNEUMONIA: ICD-10-CM

## 2022-12-01 DIAGNOSIS — I10 PRIMARY HYPERTENSION: ICD-10-CM

## 2022-12-01 DIAGNOSIS — J18.9 COMMUNITY ACQUIRED PNEUMONIA OF RIGHT LUNG, UNSPECIFIED PART OF LUNG: Primary | ICD-10-CM

## 2022-12-01 DIAGNOSIS — E78.00 PURE HYPERCHOLESTEROLEMIA: ICD-10-CM

## 2022-12-01 DIAGNOSIS — N17.9 ACUTE KIDNEY INJURY (HCC): ICD-10-CM

## 2022-12-01 PROCEDURE — 1111F DSCHRG MED/CURRENT MED MERGE: CPT | Performed by: NURSE PRACTITIONER

## 2022-12-01 PROCEDURE — 99495 TRANSJ CARE MGMT MOD F2F 14D: CPT | Performed by: NURSE PRACTITIONER

## 2022-12-01 RX ORDER — METFORMIN HYDROCHLORIDE 500 MG/1
1000 TABLET, EXTENDED RELEASE ORAL EVERY MORNING
Refills: 0 | COMMUNITY
Start: 2022-12-01

## 2022-12-02 ENCOUNTER — TELEPHONE (OUTPATIENT)
Dept: HEMATOLOGY/ONCOLOGY | Facility: HOSPITAL | Age: 77
End: 2022-12-02

## 2022-12-02 ENCOUNTER — OFFICE VISIT (OUTPATIENT)
Dept: HEMATOLOGY/ONCOLOGY | Facility: HOSPITAL | Age: 77
End: 2022-12-02
Attending: INTERNAL MEDICINE
Payer: MEDICARE

## 2022-12-02 VITALS
OXYGEN SATURATION: 96 % | TEMPERATURE: 97 F | DIASTOLIC BLOOD PRESSURE: 78 MMHG | RESPIRATION RATE: 18 BRPM | HEART RATE: 93 BPM | SYSTOLIC BLOOD PRESSURE: 115 MMHG | HEIGHT: 60.98 IN | BODY MASS INDEX: 29.68 KG/M2 | WEIGHT: 157.19 LBS

## 2022-12-02 DIAGNOSIS — Z86.39 HISTORY OF IRON DEFICIENCY: Primary | ICD-10-CM

## 2022-12-02 DIAGNOSIS — D80.3 SELECTIVE DEFICIENCY OF IGG SUBCLASSES (HCC): Primary | ICD-10-CM

## 2022-12-02 DIAGNOSIS — D80.3 IGG DEFICIENCY (HCC): ICD-10-CM

## 2022-12-02 PROCEDURE — 96366 THER/PROPH/DIAG IV INF ADDON: CPT

## 2022-12-02 PROCEDURE — 96365 THER/PROPH/DIAG IV INF INIT: CPT

## 2022-12-02 PROCEDURE — 96375 TX/PRO/DX INJ NEW DRUG ADDON: CPT

## 2022-12-02 RX ORDER — ACETAMINOPHEN 325 MG/1
650 TABLET ORAL ONCE
Status: CANCELLED | OUTPATIENT
Start: 2023-01-01

## 2022-12-02 RX ORDER — DIPHENHYDRAMINE HCL 25 MG
50 CAPSULE ORAL ONCE
OUTPATIENT
Start: 2022-12-23

## 2022-12-02 RX ORDER — DIPHENHYDRAMINE HCL 25 MG
50 CAPSULE ORAL ONCE
Status: COMPLETED | OUTPATIENT
Start: 2022-12-02 | End: 2022-12-02

## 2022-12-02 RX ORDER — ACETAMINOPHEN 325 MG/1
650 TABLET ORAL ONCE
Status: COMPLETED | OUTPATIENT
Start: 2022-12-02 | End: 2022-12-02

## 2022-12-02 RX ORDER — ACETAMINOPHEN 325 MG/1
650 TABLET ORAL ONCE
OUTPATIENT
Start: 2022-12-23

## 2022-12-02 RX ORDER — DIPHENHYDRAMINE HCL 25 MG
50 CAPSULE ORAL ONCE
Status: CANCELLED | OUTPATIENT
Start: 2023-01-01

## 2022-12-02 RX ADMIN — ACETAMINOPHEN 650 MG: 325 TABLET ORAL at 08:53:00

## 2022-12-02 RX ADMIN — DIPHENHYDRAMINE HCL 50 MG: 25 MG CAPSULE ORAL at 08:53:00

## 2022-12-02 NOTE — TELEPHONE ENCOUNTER
Spoke with patient. Will shorten interval between IVIG infusions since she has had two episodes of pneumonia and sepsis this year despite monthly IVIG infusions and the level of IgG was low when she was in house just two weeks post infusion. Also will recheck her iron studies at next visit. Had 600 mg of iron in house. Still tired.   RAFAEL Burr

## 2022-12-02 NOTE — PROGRESS NOTES
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 He also wanted labs in 3 weeks.  All schedule changed and printed AVS given and verbalized

## 2022-12-14 ENCOUNTER — TELEPHONE (OUTPATIENT)
Dept: INTERNAL MEDICINE CLINIC | Facility: CLINIC | Age: 77
End: 2022-12-14

## 2022-12-14 NOTE — TELEPHONE ENCOUNTER
Spoke with pt, scheduled in hold spot for tomorrow. She said is was not painful and ok to wait until tomorrow.        Future Appointments   Date Time Provider Gomez Miller   12/15/2022  9:40 AM JUAREZ Justice [facility]

## 2022-12-14 NOTE — TELEPHONE ENCOUNTER
Pt woke up with rash under left armpit. She is concerned as she has had breast cancer. Her pulmonologist looked at it today and told her to call PCP right away for guidance. Please call pt back.

## 2022-12-15 ENCOUNTER — LAB ENCOUNTER (OUTPATIENT)
Dept: LAB | Age: 77
End: 2022-12-15
Attending: NURSE PRACTITIONER
Payer: COMMERCIAL

## 2022-12-15 ENCOUNTER — OFFICE VISIT (OUTPATIENT)
Dept: INTERNAL MEDICINE CLINIC | Facility: CLINIC | Age: 77
End: 2022-12-15
Payer: MEDICARE

## 2022-12-15 VITALS
HEART RATE: 96 BPM | SYSTOLIC BLOOD PRESSURE: 102 MMHG | HEIGHT: 61 IN | OXYGEN SATURATION: 98 % | WEIGHT: 157 LBS | DIASTOLIC BLOOD PRESSURE: 60 MMHG | TEMPERATURE: 97 F | BODY MASS INDEX: 29.64 KG/M2

## 2022-12-15 DIAGNOSIS — B35.4 TINEA CORPORIS: Primary | ICD-10-CM

## 2022-12-15 DIAGNOSIS — I10 PRIMARY HYPERTENSION: ICD-10-CM

## 2022-12-15 DIAGNOSIS — E11.65 CONTROLLED TYPE 2 DIABETES MELLITUS WITH HYPERGLYCEMIA, WITHOUT LONG-TERM CURRENT USE OF INSULIN (HCC): ICD-10-CM

## 2022-12-15 PROCEDURE — 99214 OFFICE O/P EST MOD 30 MIN: CPT | Performed by: NURSE PRACTITIONER

## 2022-12-15 PROCEDURE — 1111F DSCHRG MED/CURRENT MED MERGE: CPT | Performed by: NURSE PRACTITIONER

## 2022-12-15 RX ORDER — CLOTRIMAZOLE AND BETAMETHASONE DIPROPIONATE 10; .64 MG/G; MG/G
1 CREAM TOPICAL 2 TIMES DAILY PRN
Qty: 30 G | Refills: 0 | Status: SHIPPED | OUTPATIENT
Start: 2022-12-15

## 2022-12-15 RX ORDER — LISINOPRIL 20 MG/1
10 TABLET ORAL DAILY
Refills: 0 | COMMUNITY
Start: 2022-12-15

## 2022-12-15 NOTE — PROGRESS NOTES
MEDICARE PT - AWV Completed 4/20/2023  Visual Aquity - Completed 4/20/2023  COLONOSCOPY - Due Referral Pended    DIABETIC PT  Eye Exam - Due Referral Issued 6/13/2022  Foot Exam - Completed 4/20/2022  Micro - Completed 2/9/2022  A1C - Last 11/17/2022 was 7.2

## 2022-12-19 ENCOUNTER — TELEPHONE (OUTPATIENT)
Dept: INTERNAL MEDICINE CLINIC | Facility: CLINIC | Age: 77
End: 2022-12-19

## 2022-12-19 ENCOUNTER — PATIENT OUTREACH (OUTPATIENT)
Dept: CASE MANAGEMENT | Age: 77
End: 2022-12-19

## 2022-12-19 DIAGNOSIS — D80.3 IGG DEFICIENCY (HCC): ICD-10-CM

## 2022-12-19 DIAGNOSIS — I70.0 ATHEROSCLEROSIS OF AORTA (HCC): ICD-10-CM

## 2022-12-19 DIAGNOSIS — I10 PRIMARY HYPERTENSION: ICD-10-CM

## 2022-12-19 DIAGNOSIS — F33.1 MAJOR DEPRESSIVE DISORDER, RECURRENT EPISODE, MODERATE (HCC): Chronic | ICD-10-CM

## 2022-12-19 DIAGNOSIS — Z86.718 HISTORY OF DVT (DEEP VEIN THROMBOSIS): ICD-10-CM

## 2022-12-19 DIAGNOSIS — G43.009 MIGRAINE WITHOUT AURA AND WITHOUT STATUS MIGRAINOSUS, NOT INTRACTABLE: ICD-10-CM

## 2022-12-19 DIAGNOSIS — E78.00 PURE HYPERCHOLESTEROLEMIA: ICD-10-CM

## 2022-12-19 DIAGNOSIS — G56.02 CARPAL TUNNEL SYNDROME OF LEFT WRIST: ICD-10-CM

## 2022-12-19 DIAGNOSIS — E53.8 B12 DEFICIENCY: ICD-10-CM

## 2022-12-19 DIAGNOSIS — J45.30 MILD PERSISTENT ASTHMA WITHOUT COMPLICATION: ICD-10-CM

## 2022-12-19 DIAGNOSIS — G47.33 OSA ON CPAP: ICD-10-CM

## 2022-12-19 DIAGNOSIS — Z99.89 OSA ON CPAP: ICD-10-CM

## 2022-12-19 DIAGNOSIS — E11.65 CONTROLLED TYPE 2 DIABETES MELLITUS WITH HYPERGLYCEMIA, WITHOUT LONG-TERM CURRENT USE OF INSULIN (HCC): ICD-10-CM

## 2022-12-19 DIAGNOSIS — Z85.828 HISTORY OF BASAL CELL CARCINOMA: ICD-10-CM

## 2022-12-19 DIAGNOSIS — Z86.711 HISTORY OF PULMONARY EMBOLISM: ICD-10-CM

## 2022-12-19 DIAGNOSIS — F41.9 ANXIETY: ICD-10-CM

## 2022-12-19 NOTE — TELEPHONE ENCOUNTER
Patient notified of SD message and instructions. She will call us back if finds a location with medication in stock.

## 2022-12-19 NOTE — TELEPHONE ENCOUNTER
Diabetes Has dose of ozempic for today and will try to refill May need to consider options due to availability  LOV 12/15/2022, sending to SD for review of ozempic r/t DM.

## 2022-12-19 NOTE — TELEPHONE ENCOUNTER
Spoke to diabetic clinic. Rumor is the shortage should resolve mid January. Suggest calling alternative pharmacies to see if some have in stock. Being without for a few weeks will be ok as well     I have learned alternatives are limited.

## 2022-12-19 NOTE — TELEPHONE ENCOUNTER
Difficulty getting Ozempic pt is calling to see what's the alternative. She was told by several pharmacies its hard to get a lot of pt's are getting it to diet so makes it impossible for those who truly need it for medical reasons to get. Breath sounds clear and equal bilaterally.

## 2022-12-21 ENCOUNTER — LAB ENCOUNTER (OUTPATIENT)
Dept: LAB | Age: 77
End: 2022-12-21
Attending: INTERNAL MEDICINE
Payer: MEDICARE

## 2022-12-21 ENCOUNTER — LAB ENCOUNTER (OUTPATIENT)
Dept: LAB | Age: 77
End: 2022-12-21
Attending: NURSE PRACTITIONER
Payer: COMMERCIAL

## 2022-12-21 DIAGNOSIS — D72.829 LEUKOCYTOSIS, UNSPECIFIED TYPE: ICD-10-CM

## 2022-12-21 DIAGNOSIS — D69.6 THROMBOCYTOPENIA (HCC): ICD-10-CM

## 2022-12-21 DIAGNOSIS — Z86.39 HISTORY OF IRON DEFICIENCY: ICD-10-CM

## 2022-12-21 DIAGNOSIS — N17.9 ACUTE KIDNEY INJURY (HCC): ICD-10-CM

## 2022-12-21 DIAGNOSIS — E11.65 CONTROLLED TYPE 2 DIABETES MELLITUS WITH HYPERGLYCEMIA, WITHOUT LONG-TERM CURRENT USE OF INSULIN (HCC): ICD-10-CM

## 2022-12-21 DIAGNOSIS — D80.3 IGG DEFICIENCY (HCC): ICD-10-CM

## 2022-12-21 DIAGNOSIS — D64.9 ANEMIA, NORMOCYTIC NORMOCHROMIC: ICD-10-CM

## 2022-12-21 LAB
ALBUMIN SERPL-MCNC: 4 G/DL (ref 3.4–5)
ALBUMIN/GLOB SERPL: 1.1 {RATIO} (ref 1–2)
ALP LIVER SERPL-CCNC: 83 U/L
ALT SERPL-CCNC: 31 U/L
ANION GAP SERPL CALC-SCNC: 3 MMOL/L (ref 0–18)
AST SERPL-CCNC: 30 U/L (ref 15–37)
BASOPHILS # BLD AUTO: 0.05 X10(3) UL (ref 0–0.2)
BASOPHILS NFR BLD AUTO: 0.8 %
BILIRUB SERPL-MCNC: 0.6 MG/DL (ref 0.1–2)
BUN BLD-MCNC: 17 MG/DL (ref 7–18)
CALCIUM BLD-MCNC: 9.8 MG/DL (ref 8.5–10.1)
CHLORIDE SERPL-SCNC: 111 MMOL/L (ref 98–112)
CO2 SERPL-SCNC: 28 MMOL/L (ref 21–32)
CREAT BLD-MCNC: 0.68 MG/DL
DEPRECATED HBV CORE AB SER IA-ACNC: 710.3 NG/ML
EOSINOPHIL # BLD AUTO: 0.09 X10(3) UL (ref 0–0.7)
EOSINOPHIL NFR BLD AUTO: 1.4 %
ERYTHROCYTE [DISTWIDTH] IN BLOOD BY AUTOMATED COUNT: 14.1 %
FASTING STATUS PATIENT QL REPORTED: YES
GFR SERPLBLD BASED ON 1.73 SQ M-ARVRAT: 90 ML/MIN/1.73M2 (ref 60–?)
GLOBULIN PLAS-MCNC: 3.6 G/DL (ref 2.8–4.4)
GLUCOSE BLD-MCNC: 102 MG/DL (ref 70–99)
HCT VFR BLD AUTO: 41 %
HGB BLD-MCNC: 13.2 G/DL
IGA SERPL-MCNC: 149 MG/DL (ref 70–312)
IGM SERPL-MCNC: 104 MG/DL (ref 43–279)
IMM GRANULOCYTES # BLD AUTO: 0.02 X10(3) UL (ref 0–1)
IMM GRANULOCYTES NFR BLD: 0.3 %
IMMUNOGLOBULIN PNL SER-MCNC: 992 MG/DL (ref 791–1643)
IRON SATN MFR SERPL: 23 %
IRON SERPL-MCNC: 74 UG/DL
LYMPHOCYTES # BLD AUTO: 2.04 X10(3) UL (ref 1–4)
LYMPHOCYTES NFR BLD AUTO: 31 %
MCH RBC QN AUTO: 30.6 PG (ref 26–34)
MCHC RBC AUTO-ENTMCNC: 32.2 G/DL (ref 31–37)
MCV RBC AUTO: 94.9 FL
MONOCYTES # BLD AUTO: 0.57 X10(3) UL (ref 0.1–1)
MONOCYTES NFR BLD AUTO: 8.7 %
NEUTROPHILS # BLD AUTO: 3.81 X10 (3) UL (ref 1.5–7.7)
NEUTROPHILS # BLD AUTO: 3.81 X10(3) UL (ref 1.5–7.7)
NEUTROPHILS NFR BLD AUTO: 57.8 %
OSMOLALITY SERPL CALC.SUM OF ELEC: 296 MOSM/KG (ref 275–295)
PLATELET # BLD AUTO: 181 10(3)UL (ref 150–450)
POTASSIUM SERPL-SCNC: 4.4 MMOL/L (ref 3.5–5.1)
PROT SERPL-MCNC: 7.6 G/DL (ref 6.4–8.2)
RBC # BLD AUTO: 4.32 X10(6)UL
SODIUM SERPL-SCNC: 142 MMOL/L (ref 136–145)
TIBC SERPL-MCNC: 326 UG/DL (ref 240–450)
TRANSFERRIN SERPL-MCNC: 219 MG/DL (ref 200–360)
WBC # BLD AUTO: 6.6 X10(3) UL (ref 4–11)

## 2022-12-21 PROCEDURE — 36415 COLL VENOUS BLD VENIPUNCTURE: CPT

## 2022-12-21 PROCEDURE — 85025 COMPLETE CBC W/AUTO DIFF WBC: CPT

## 2022-12-21 PROCEDURE — 83540 ASSAY OF IRON: CPT

## 2022-12-21 PROCEDURE — 82784 ASSAY IGA/IGD/IGG/IGM EACH: CPT

## 2022-12-21 PROCEDURE — 82728 ASSAY OF FERRITIN: CPT

## 2022-12-21 PROCEDURE — 83550 IRON BINDING TEST: CPT

## 2022-12-21 PROCEDURE — 80053 COMPREHEN METABOLIC PANEL: CPT

## 2022-12-23 ENCOUNTER — OFFICE VISIT (OUTPATIENT)
Dept: HEMATOLOGY/ONCOLOGY | Facility: HOSPITAL | Age: 77
End: 2022-12-23
Attending: INTERNAL MEDICINE
Payer: MEDICARE

## 2022-12-23 VITALS
HEIGHT: 60.98 IN | RESPIRATION RATE: 18 BRPM | OXYGEN SATURATION: 96 % | WEIGHT: 161.38 LBS | SYSTOLIC BLOOD PRESSURE: 147 MMHG | BODY MASS INDEX: 30.47 KG/M2 | TEMPERATURE: 97 F | HEART RATE: 96 BPM | DIASTOLIC BLOOD PRESSURE: 80 MMHG

## 2022-12-23 DIAGNOSIS — D80.3 SELECTIVE DEFICIENCY OF IGG SUBCLASSES (HCC): Primary | ICD-10-CM

## 2022-12-23 PROCEDURE — 96365 THER/PROPH/DIAG IV INF INIT: CPT

## 2022-12-23 PROCEDURE — 96366 THER/PROPH/DIAG IV INF ADDON: CPT

## 2022-12-23 PROCEDURE — 96375 TX/PRO/DX INJ NEW DRUG ADDON: CPT

## 2022-12-23 RX ORDER — DIPHENHYDRAMINE HCL 25 MG
50 CAPSULE ORAL ONCE
OUTPATIENT
Start: 2023-01-13

## 2022-12-23 RX ORDER — ACETAMINOPHEN 325 MG/1
650 TABLET ORAL ONCE
OUTPATIENT
Start: 2023-01-13

## 2022-12-23 RX ORDER — DIPHENHYDRAMINE HCL 25 MG
50 CAPSULE ORAL ONCE
Status: COMPLETED | OUTPATIENT
Start: 2022-12-23 | End: 2022-12-23

## 2022-12-23 RX ORDER — ACETAMINOPHEN 325 MG/1
650 TABLET ORAL ONCE
Status: COMPLETED | OUTPATIENT
Start: 2022-12-23 | End: 2022-12-23

## 2022-12-23 RX ADMIN — DIPHENHYDRAMINE HCL 50 MG: 25 MG CAPSULE ORAL at 09:27:00

## 2022-12-23 RX ADMIN — ACETAMINOPHEN 325 MG: 325 TABLET ORAL at 09:27:00

## 2022-12-23 NOTE — PROGRESS NOTES
Education Record    Learner:  Patient    Disease / Diagnosis: IVIG     Barriers / Limitations:  None   Comments:    Method:  Brief focused and Reinforcement   Comments:    General Topics:  Diet, Medication, Side effects and symptom management and Plan of care reviewed   Comments:    Outcome:  Shows understanding   Comments: Patient tolerated IVIG and discharged in stable condition.

## 2022-12-30 ENCOUNTER — APPOINTMENT (OUTPATIENT)
Dept: HEMATOLOGY/ONCOLOGY | Facility: HOSPITAL | Age: 77
End: 2022-12-30
Attending: INTERNAL MEDICINE
Payer: MEDICARE

## 2023-01-05 DIAGNOSIS — M54.12 CERVICAL RADICULITIS: ICD-10-CM

## 2023-01-05 RX ORDER — TIZANIDINE 4 MG/1
4 TABLET ORAL EVERY 8 HOURS PRN
Qty: 90 TABLET | Refills: 2 | Status: SHIPPED | OUTPATIENT
Start: 2023-01-05

## 2023-01-05 RX ORDER — PREGABALIN 100 MG/1
100 CAPSULE ORAL 3 TIMES DAILY
Qty: 90 CAPSULE | Refills: 5 | Status: SHIPPED | OUTPATIENT
Start: 2023-01-05

## 2023-01-13 ENCOUNTER — OFFICE VISIT (OUTPATIENT)
Dept: HEMATOLOGY/ONCOLOGY | Facility: HOSPITAL | Age: 78
End: 2023-01-13
Attending: INTERNAL MEDICINE
Payer: COMMERCIAL

## 2023-01-13 VITALS
RESPIRATION RATE: 16 BRPM | DIASTOLIC BLOOD PRESSURE: 81 MMHG | HEART RATE: 93 BPM | OXYGEN SATURATION: 94 % | HEIGHT: 60.98 IN | WEIGHT: 158 LBS | BODY MASS INDEX: 29.83 KG/M2 | TEMPERATURE: 97 F | SYSTOLIC BLOOD PRESSURE: 155 MMHG

## 2023-01-13 DIAGNOSIS — L72.11 PILAR CYST: Primary | ICD-10-CM

## 2023-01-13 DIAGNOSIS — D80.1 HYPOGAMMAGLOBULINEMIA (HCC): ICD-10-CM

## 2023-01-13 DIAGNOSIS — D80.3 IGG DEFICIENCY (HCC): Primary | ICD-10-CM

## 2023-01-13 DIAGNOSIS — D80.3 SELECTIVE DEFICIENCY OF IGG SUBCLASSES (HCC): ICD-10-CM

## 2023-01-13 DIAGNOSIS — R52 TENDERNESS: ICD-10-CM

## 2023-01-13 DIAGNOSIS — Z87.01 HISTORY OF RECURRENT PNEUMONIA: ICD-10-CM

## 2023-01-13 DIAGNOSIS — D80.3 IGG DEFICIENCY (HCC): ICD-10-CM

## 2023-01-13 LAB
ALBUMIN SERPL-MCNC: 3.8 G/DL (ref 3.4–5)
ALBUMIN/GLOB SERPL: 1.1 {RATIO} (ref 1–2)
ALP LIVER SERPL-CCNC: 81 U/L
ALT SERPL-CCNC: 34 U/L
ANION GAP SERPL CALC-SCNC: 9 MMOL/L (ref 0–18)
AST SERPL-CCNC: 36 U/L (ref 15–37)
BASOPHILS # BLD AUTO: 0.06 X10(3) UL (ref 0–0.2)
BASOPHILS NFR BLD AUTO: 1.1 %
BILIRUB SERPL-MCNC: 0.5 MG/DL (ref 0.1–2)
BUN BLD-MCNC: 16 MG/DL (ref 7–18)
CALCIUM BLD-MCNC: 9.3 MG/DL (ref 8.5–10.1)
CHLORIDE SERPL-SCNC: 109 MMOL/L (ref 98–112)
CO2 SERPL-SCNC: 24 MMOL/L (ref 21–32)
CREAT BLD-MCNC: 0.73 MG/DL
DEPRECATED HBV CORE AB SER IA-ACNC: 604.8 NG/ML
EOSINOPHIL # BLD AUTO: 0.19 X10(3) UL (ref 0–0.7)
EOSINOPHIL NFR BLD AUTO: 3.5 %
ERYTHROCYTE [DISTWIDTH] IN BLOOD BY AUTOMATED COUNT: 13.2 %
FASTING STATUS PATIENT QL REPORTED: NO
GFR SERPLBLD BASED ON 1.73 SQ M-ARVRAT: 85 ML/MIN/1.73M2 (ref 60–?)
GLOBULIN PLAS-MCNC: 3.4 G/DL (ref 2.8–4.4)
GLUCOSE BLD-MCNC: 119 MG/DL (ref 70–99)
HCT VFR BLD AUTO: 38 %
HGB BLD-MCNC: 12.7 G/DL
IGA SERPL-MCNC: 128 MG/DL (ref 70–312)
IGM SERPL-MCNC: 89.3 MG/DL (ref 43–279)
IMM GRANULOCYTES # BLD AUTO: 0.02 X10(3) UL (ref 0–1)
IMM GRANULOCYTES NFR BLD: 0.4 %
IMMUNOGLOBULIN PNL SER-MCNC: 849 MG/DL (ref 791–1643)
IRON SATN MFR SERPL: 26 %
IRON SERPL-MCNC: 78 UG/DL
LYMPHOCYTES # BLD AUTO: 2.23 X10(3) UL (ref 1–4)
LYMPHOCYTES NFR BLD AUTO: 41.5 %
MCH RBC QN AUTO: 30.8 PG (ref 26–34)
MCHC RBC AUTO-ENTMCNC: 33.4 G/DL (ref 31–37)
MCV RBC AUTO: 92 FL
MONOCYTES # BLD AUTO: 0.54 X10(3) UL (ref 0.1–1)
MONOCYTES NFR BLD AUTO: 10.1 %
NEUTROPHILS # BLD AUTO: 2.33 X10 (3) UL (ref 1.5–7.7)
NEUTROPHILS # BLD AUTO: 2.33 X10(3) UL (ref 1.5–7.7)
NEUTROPHILS NFR BLD AUTO: 43.4 %
OSMOLALITY SERPL CALC.SUM OF ELEC: 296 MOSM/KG (ref 275–295)
PLATELET # BLD AUTO: 167 10(3)UL (ref 150–450)
POTASSIUM SERPL-SCNC: 3.9 MMOL/L (ref 3.5–5.1)
PROT SERPL-MCNC: 7.2 G/DL (ref 6.4–8.2)
RBC # BLD AUTO: 4.13 X10(6)UL
SODIUM SERPL-SCNC: 142 MMOL/L (ref 136–145)
TIBC SERPL-MCNC: 305 UG/DL (ref 240–450)
TRANSFERRIN SERPL-MCNC: 205 MG/DL (ref 200–360)
WBC # BLD AUTO: 5.4 X10(3) UL (ref 4–11)

## 2023-01-13 PROCEDURE — 96365 THER/PROPH/DIAG IV INF INIT: CPT

## 2023-01-13 PROCEDURE — 85025 COMPLETE CBC W/AUTO DIFF WBC: CPT

## 2023-01-13 PROCEDURE — 80053 COMPREHEN METABOLIC PANEL: CPT

## 2023-01-13 PROCEDURE — 99214 OFFICE O/P EST MOD 30 MIN: CPT | Performed by: INTERNAL MEDICINE

## 2023-01-13 PROCEDURE — 82784 ASSAY IGA/IGD/IGG/IGM EACH: CPT

## 2023-01-13 PROCEDURE — 96375 TX/PRO/DX INJ NEW DRUG ADDON: CPT

## 2023-01-13 PROCEDURE — 96366 THER/PROPH/DIAG IV INF ADDON: CPT

## 2023-01-13 PROCEDURE — 83540 ASSAY OF IRON: CPT

## 2023-01-13 PROCEDURE — 82728 ASSAY OF FERRITIN: CPT

## 2023-01-13 PROCEDURE — 83550 IRON BINDING TEST: CPT

## 2023-01-13 RX ORDER — ACETAMINOPHEN 325 MG/1
650 TABLET ORAL ONCE
Status: COMPLETED | OUTPATIENT
Start: 2023-01-13 | End: 2023-01-13

## 2023-01-13 RX ORDER — DIPHENHYDRAMINE HCL 25 MG
50 CAPSULE ORAL ONCE
OUTPATIENT
Start: 2023-02-03

## 2023-01-13 RX ORDER — ACETAMINOPHEN 325 MG/1
650 TABLET ORAL ONCE
OUTPATIENT
Start: 2023-02-03

## 2023-01-13 RX ORDER — DIPHENHYDRAMINE HCL 25 MG
50 CAPSULE ORAL ONCE
Status: COMPLETED | OUTPATIENT
Start: 2023-01-13 | End: 2023-01-13

## 2023-01-13 RX ADMIN — ACETAMINOPHEN 650 MG: 325 TABLET ORAL at 09:51:00

## 2023-01-13 RX ADMIN — DIPHENHYDRAMINE HCL 50 MG: 25 MG CAPSULE ORAL at 09:51:00

## 2023-01-13 NOTE — PROGRESS NOTES
Education Record    Learner:  Patient    Disease / Diagnosis: hypogammaglobulinemia. Here for Q 3 week IVIG    Barriers / Limitations:  None   Comments:    Method:  Discussion   Comments:    General Topics:  Medication and Plan of care reviewed   Comments:    Outcome:  Shows understanding   Comments:    Patient tolerated IVIG without incident. Patient stated she will check my chart for next appointment. Patient discharged in stable condition.
- - -

## 2023-01-16 ENCOUNTER — TELEPHONE (OUTPATIENT)
Dept: HEMATOLOGY/ONCOLOGY | Facility: HOSPITAL | Age: 78
End: 2023-01-16

## 2023-01-16 NOTE — TELEPHONE ENCOUNTER
Spoke with patient. She verbalized understanding. She will call [facility] to schedule an appointment. As she continues to have persistent iron deficiency and had been recommended repeat scope in 2022, I would recommend establishing with one of the other GI docs at [address] [place] [address]. She will need to have the scope so that we can rule out GI bleeding as a source of loss. Please let her know that this is the recommendation and that I will be forwarding her message to Dr. Kraen Méndez for his review upon his return next week. Let me know if you have any questions.

## 2023-01-19 ENCOUNTER — PATIENT OUTREACH (OUTPATIENT)
Dept: CASE MANAGEMENT | Age: 78
End: 2023-01-19

## 2023-01-19 DIAGNOSIS — E78.5 DYSLIPIDEMIA: ICD-10-CM

## 2023-01-19 DIAGNOSIS — I26.99 OTHER PULMONARY EMBOLISM WITHOUT ACUTE COR PULMONALE, UNSPECIFIED CHRONICITY (HCC): ICD-10-CM

## 2023-01-19 DIAGNOSIS — Z86.711 HISTORY OF PULMONARY EMBOLISM: ICD-10-CM

## 2023-01-19 DIAGNOSIS — I25.10 CORONARY ARTERY DISEASE INVOLVING NATIVE CORONARY ARTERY OF NATIVE HEART WITHOUT ANGINA PECTORIS: ICD-10-CM

## 2023-01-19 DIAGNOSIS — F41.9 ANXIETY: ICD-10-CM

## 2023-01-19 DIAGNOSIS — F33.1 MAJOR DEPRESSIVE DISORDER, RECURRENT EPISODE, MODERATE (HCC): Chronic | ICD-10-CM

## 2023-01-19 DIAGNOSIS — Z85.3 HISTORY OF BREAST CANCER: ICD-10-CM

## 2023-01-19 DIAGNOSIS — E53.8 B12 DEFICIENCY: ICD-10-CM

## 2023-01-19 DIAGNOSIS — K21.9 GASTROESOPHAGEAL REFLUX DISEASE WITHOUT ESOPHAGITIS: ICD-10-CM

## 2023-01-19 DIAGNOSIS — Z86.718 HISTORY OF DVT (DEEP VEIN THROMBOSIS): ICD-10-CM

## 2023-01-19 DIAGNOSIS — I70.0 ATHEROSCLEROSIS OF AORTA (HCC): ICD-10-CM

## 2023-01-19 DIAGNOSIS — G47.33 OSA ON CPAP: ICD-10-CM

## 2023-01-19 DIAGNOSIS — D80.3 IGG DEFICIENCY (HCC): ICD-10-CM

## 2023-01-19 DIAGNOSIS — I10 BENIGN ESSENTIAL HYPERTENSION: ICD-10-CM

## 2023-01-19 DIAGNOSIS — E78.00 PURE HYPERCHOLESTEROLEMIA: ICD-10-CM

## 2023-01-19 DIAGNOSIS — Z99.89 OSA ON CPAP: ICD-10-CM

## 2023-01-19 DIAGNOSIS — J45.30 MILD PERSISTENT ASTHMA WITHOUT COMPLICATION: ICD-10-CM

## 2023-01-19 DIAGNOSIS — E11.65 CONTROLLED TYPE 2 DIABETES MELLITUS WITH HYPERGLYCEMIA, WITHOUT LONG-TERM CURRENT USE OF INSULIN (HCC): ICD-10-CM

## 2023-01-19 PROBLEM — R06.09 DOE (DYSPNEA ON EXERTION): Status: ACTIVE | Noted: 2021-01-20

## 2023-01-24 ENCOUNTER — MED REC SCAN ONLY (OUTPATIENT)
Dept: INTERNAL MEDICINE CLINIC | Facility: CLINIC | Age: 78
End: 2023-01-24

## 2023-01-24 NOTE — PROGRESS NOTES
Xarelto clearance completed and faxed to [address] [place] [address] at [phone] ; confirmation obtained.

## 2023-01-27 ENCOUNTER — APPOINTMENT (OUTPATIENT)
Dept: HEMATOLOGY/ONCOLOGY | Facility: HOSPITAL | Age: 78
End: 2023-01-27
Attending: INTERNAL MEDICINE
Payer: COMMERCIAL

## 2023-01-30 RX ORDER — RIVAROXABAN 20 MG/1
TABLET, FILM COATED ORAL
Qty: 90 TABLET | Refills: 3 | Status: SHIPPED | OUTPATIENT
Start: 2023-01-30

## 2023-01-30 RX ORDER — PANTOPRAZOLE SODIUM 40 MG/1
40 TABLET, DELAYED RELEASE ORAL
Qty: 90 TABLET | Refills: 3 | Status: SHIPPED | OUTPATIENT
Start: 2023-01-30

## 2023-01-30 RX ORDER — DULOXETIN HYDROCHLORIDE 30 MG/1
30 CAPSULE, DELAYED RELEASE ORAL DAILY
Qty: 90 CAPSULE | Refills: 3 | Status: SHIPPED | OUTPATIENT
Start: 2023-01-30

## 2023-02-03 ENCOUNTER — OFFICE VISIT (OUTPATIENT)
Dept: HEMATOLOGY/ONCOLOGY | Facility: HOSPITAL | Age: 78
End: 2023-02-03
Attending: INTERNAL MEDICINE
Payer: COMMERCIAL

## 2023-02-03 VITALS
WEIGHT: 159.38 LBS | RESPIRATION RATE: 18 BRPM | SYSTOLIC BLOOD PRESSURE: 179 MMHG | HEIGHT: 60.98 IN | DIASTOLIC BLOOD PRESSURE: 94 MMHG | HEART RATE: 98 BPM | TEMPERATURE: 98 F | BODY MASS INDEX: 30.09 KG/M2 | OXYGEN SATURATION: 98 %

## 2023-02-03 DIAGNOSIS — D80.3 SELECTIVE DEFICIENCY OF IGG SUBCLASSES (HCC): Primary | ICD-10-CM

## 2023-02-03 PROCEDURE — 96375 TX/PRO/DX INJ NEW DRUG ADDON: CPT

## 2023-02-03 PROCEDURE — 96365 THER/PROPH/DIAG IV INF INIT: CPT

## 2023-02-03 PROCEDURE — 96366 THER/PROPH/DIAG IV INF ADDON: CPT

## 2023-02-03 RX ORDER — DIPHENHYDRAMINE HCL 25 MG
50 CAPSULE ORAL ONCE
Status: COMPLETED | OUTPATIENT
Start: 2023-02-03 | End: 2023-02-03

## 2023-02-03 RX ORDER — DIPHENHYDRAMINE HCL 25 MG
50 CAPSULE ORAL ONCE
OUTPATIENT
Start: 2023-02-24

## 2023-02-03 RX ORDER — ACETAMINOPHEN 325 MG/1
650 TABLET ORAL ONCE
Status: COMPLETED | OUTPATIENT
Start: 2023-02-03 | End: 2023-02-03

## 2023-02-03 RX ORDER — ACETAMINOPHEN 325 MG/1
650 TABLET ORAL ONCE
OUTPATIENT
Start: 2023-02-24

## 2023-02-03 RX ADMIN — ACETAMINOPHEN 325 MG: 325 TABLET ORAL at 10:01:00

## 2023-02-03 RX ADMIN — DIPHENHYDRAMINE HCL 50 MG: 25 MG CAPSULE ORAL at 10:02:00

## 2023-02-03 NOTE — PROGRESS NOTES
Education Record    Learner:  Patient    Disease / Diagnosis: ivig infusion    Barriers / Limitations:  None   Comments:    Method:  Brief focused   Comments:    General Topics:  Plan of care reviewed   Comments:    Outcome:  Shows understanding   Comments:    No labs needed today per MD - will need next set of labs in April.

## 2023-02-09 ENCOUNTER — TELEPHONE (OUTPATIENT)
Dept: INTERNAL MEDICINE CLINIC | Facility: CLINIC | Age: 78
End: 2023-02-09

## 2023-02-09 DIAGNOSIS — I10 BENIGN ESSENTIAL HYPERTENSION: ICD-10-CM

## 2023-02-09 DIAGNOSIS — K21.9 GASTROESOPHAGEAL REFLUX DISEASE WITHOUT ESOPHAGITIS: ICD-10-CM

## 2023-02-09 DIAGNOSIS — E78.00 PURE HYPERCHOLESTEROLEMIA: Primary | ICD-10-CM

## 2023-02-09 DIAGNOSIS — F41.9 ANXIETY: ICD-10-CM

## 2023-02-09 DIAGNOSIS — E11.65 CONTROLLED TYPE 2 DIABETES MELLITUS WITH HYPERGLYCEMIA, WITHOUT LONG-TERM CURRENT USE OF INSULIN (HCC): ICD-10-CM

## 2023-02-09 NOTE — TELEPHONE ENCOUNTER
Future Appointments   Date Time Provider Gomez Angela   4/10/2023 10:40 AM Tan Adams, APRN [facility]     Orders to edward- Pt informed that labs need to be completed no sooner than 2 weeks prior to the appt.  Pt aware to fast-no call back required

## 2023-02-17 ENCOUNTER — PATIENT OUTREACH (OUTPATIENT)
Dept: CASE MANAGEMENT | Age: 78
End: 2023-02-17

## 2023-02-17 RX ORDER — ASPIRIN 81 MG/1
81 TABLET, CHEWABLE ORAL DAILY
COMMUNITY

## 2023-02-24 ENCOUNTER — OFFICE VISIT (OUTPATIENT)
Dept: HEMATOLOGY/ONCOLOGY | Facility: HOSPITAL | Age: 78
End: 2023-02-24
Attending: INTERNAL MEDICINE
Payer: COMMERCIAL

## 2023-02-24 ENCOUNTER — LAB ENCOUNTER (OUTPATIENT)
Dept: LAB | Facility: HOSPITAL | Age: 78
End: 2023-02-24
Attending: INTERNAL MEDICINE
Payer: MEDICARE

## 2023-02-24 VITALS
WEIGHT: 161.38 LBS | BODY MASS INDEX: 30.47 KG/M2 | TEMPERATURE: 97 F | HEIGHT: 60.98 IN | HEART RATE: 80 BPM | DIASTOLIC BLOOD PRESSURE: 87 MMHG | SYSTOLIC BLOOD PRESSURE: 147 MMHG | RESPIRATION RATE: 18 BRPM | OXYGEN SATURATION: 94 %

## 2023-02-24 DIAGNOSIS — D80.3 SELECTIVE DEFICIENCY OF IGG SUBCLASSES (HCC): Primary | ICD-10-CM

## 2023-02-24 DIAGNOSIS — Z01.818 PRE-OP TESTING: ICD-10-CM

## 2023-02-24 PROCEDURE — 96366 THER/PROPH/DIAG IV INF ADDON: CPT

## 2023-02-24 PROCEDURE — 96375 TX/PRO/DX INJ NEW DRUG ADDON: CPT

## 2023-02-24 PROCEDURE — 96365 THER/PROPH/DIAG IV INF INIT: CPT

## 2023-02-24 RX ORDER — ACETAMINOPHEN 325 MG/1
650 TABLET ORAL ONCE
Status: COMPLETED | OUTPATIENT
Start: 2023-02-24 | End: 2023-02-24

## 2023-02-24 RX ORDER — ACETAMINOPHEN 325 MG/1
650 TABLET ORAL ONCE
OUTPATIENT
Start: 2023-03-17

## 2023-02-24 RX ORDER — DIPHENHYDRAMINE HCL 25 MG
50 CAPSULE ORAL ONCE
OUTPATIENT
Start: 2023-03-17

## 2023-02-24 RX ORDER — DIPHENHYDRAMINE HCL 25 MG
50 CAPSULE ORAL ONCE
Status: COMPLETED | OUTPATIENT
Start: 2023-02-24 | End: 2023-02-24

## 2023-02-24 RX ADMIN — DIPHENHYDRAMINE HCL 50 MG: 25 MG CAPSULE ORAL at 09:57:00

## 2023-02-24 RX ADMIN — ACETAMINOPHEN 325 MG: 325 TABLET ORAL at 09:58:00

## 2023-02-24 NOTE — PROGRESS NOTES
Education Record    Learner:  Patient    Disease / Diagnosis: Pt here for IVIG     Barriers / Limitations:  None    Method:  Brief focused, printed material and  reinforcement    General Topics:  Plan of care reviewed    Outcome:  Shows understanding

## 2023-02-25 LAB — SARS-COV-2 RNA RESP QL NAA+PROBE: NOT DETECTED

## 2023-02-27 ENCOUNTER — ANESTHESIA (OUTPATIENT)
Dept: ENDOSCOPY | Facility: HOSPITAL | Age: 78
End: 2023-02-27
Payer: MEDICARE

## 2023-02-27 ENCOUNTER — HOSPITAL ENCOUNTER (OUTPATIENT)
Facility: HOSPITAL | Age: 78
Setting detail: HOSPITAL OUTPATIENT SURGERY
Discharge: HOME | End: 2023-02-27
Attending: INTERNAL MEDICINE | Admitting: INTERNAL MEDICINE
Payer: MEDICARE

## 2023-02-27 ENCOUNTER — ANESTHESIA EVENT (OUTPATIENT)
Dept: ENDOSCOPY | Facility: HOSPITAL | Age: 78
End: 2023-02-27
Payer: MEDICARE

## 2023-02-27 VITALS
RESPIRATION RATE: 20 BRPM | HEIGHT: 61.5 IN | OXYGEN SATURATION: 95 % | BODY MASS INDEX: 28.89 KG/M2 | DIASTOLIC BLOOD PRESSURE: 74 MMHG | HEART RATE: 80 BPM | WEIGHT: 155 LBS | SYSTOLIC BLOOD PRESSURE: 150 MMHG | TEMPERATURE: 98 F

## 2023-02-27 DIAGNOSIS — Z01.818 PRE-OP TESTING: Primary | ICD-10-CM

## 2023-02-27 DIAGNOSIS — D50.9 IRON DEFICIENCY ANEMIA, UNSPECIFIED IRON DEFICIENCY ANEMIA TYPE: ICD-10-CM

## 2023-02-27 LAB — GLUCOSE BLD-MCNC: 97 MG/DL (ref 70–99)

## 2023-02-27 PROCEDURE — 82962 GLUCOSE BLOOD TEST: CPT

## 2023-02-27 PROCEDURE — 0DB98ZX EXCISION OF DUODENUM, VIA NATURAL OR ARTIFICIAL OPENING ENDOSCOPIC, DIAGNOSTIC: ICD-10-PCS | Performed by: INTERNAL MEDICINE

## 2023-02-27 PROCEDURE — 88305 TISSUE EXAM BY PATHOLOGIST: CPT | Performed by: INTERNAL MEDICINE

## 2023-02-27 PROCEDURE — 0DBN8ZX EXCISION OF SIGMOID COLON, VIA NATURAL OR ARTIFICIAL OPENING ENDOSCOPIC, DIAGNOSTIC: ICD-10-PCS | Performed by: INTERNAL MEDICINE

## 2023-02-27 PROCEDURE — 0DBH8ZX EXCISION OF CECUM, VIA NATURAL OR ARTIFICIAL OPENING ENDOSCOPIC, DIAGNOSTIC: ICD-10-PCS | Performed by: INTERNAL MEDICINE

## 2023-02-27 PROCEDURE — 0DB78ZX EXCISION OF STOMACH, PYLORUS, VIA NATURAL OR ARTIFICIAL OPENING ENDOSCOPIC, DIAGNOSTIC: ICD-10-PCS | Performed by: INTERNAL MEDICINE

## 2023-02-27 PROCEDURE — 0DBK8ZX EXCISION OF ASCENDING COLON, VIA NATURAL OR ARTIFICIAL OPENING ENDOSCOPIC, DIAGNOSTIC: ICD-10-PCS | Performed by: INTERNAL MEDICINE

## 2023-02-27 RX ORDER — DEXTROSE MONOHYDRATE 25 G/50ML
50 INJECTION, SOLUTION INTRAVENOUS
Status: DISCONTINUED | OUTPATIENT
Start: 2023-02-27 | End: 2023-02-27

## 2023-02-27 RX ORDER — NICOTINE POLACRILEX 4 MG
15 LOZENGE BUCCAL
Status: DISCONTINUED | OUTPATIENT
Start: 2023-02-27 | End: 2023-02-27

## 2023-02-27 RX ORDER — SODIUM CHLORIDE, SODIUM LACTATE, POTASSIUM CHLORIDE, CALCIUM CHLORIDE 600; 310; 30; 20 MG/100ML; MG/100ML; MG/100ML; MG/100ML
INJECTION, SOLUTION INTRAVENOUS CONTINUOUS
Status: DISCONTINUED | OUTPATIENT
Start: 2023-02-27 | End: 2023-02-27

## 2023-02-27 RX ORDER — METOCLOPRAMIDE HYDROCHLORIDE 5 MG/ML
10 INJECTION INTRAMUSCULAR; INTRAVENOUS AS NEEDED
OUTPATIENT
Start: 2023-02-27 | End: 2023-02-27

## 2023-02-27 RX ORDER — NALOXONE HYDROCHLORIDE 0.4 MG/ML
80 INJECTION, SOLUTION INTRAMUSCULAR; INTRAVENOUS; SUBCUTANEOUS AS NEEDED
OUTPATIENT
Start: 2023-02-27 | End: 2023-02-27

## 2023-02-27 RX ORDER — NICOTINE POLACRILEX 4 MG
30 LOZENGE BUCCAL
Status: DISCONTINUED | OUTPATIENT
Start: 2023-02-27 | End: 2023-02-27

## 2023-02-27 RX ORDER — LIDOCAINE HYDROCHLORIDE 10 MG/ML
INJECTION, SOLUTION EPIDURAL; INFILTRATION; INTRACAUDAL; PERINEURAL AS NEEDED
Status: DISCONTINUED | OUTPATIENT
Start: 2023-02-27 | End: 2023-02-27 | Stop reason: SURG

## 2023-02-27 RX ORDER — LABETALOL HYDROCHLORIDE 5 MG/ML
5 INJECTION, SOLUTION INTRAVENOUS EVERY 5 MIN PRN
OUTPATIENT
Start: 2023-02-27 | End: 2023-02-27

## 2023-02-27 RX ORDER — SODIUM CHLORIDE, SODIUM LACTATE, POTASSIUM CHLORIDE, CALCIUM CHLORIDE 600; 310; 30; 20 MG/100ML; MG/100ML; MG/100ML; MG/100ML
INJECTION, SOLUTION INTRAVENOUS CONTINUOUS
OUTPATIENT
Start: 2023-02-27

## 2023-02-27 RX ORDER — ONDANSETRON 2 MG/ML
4 INJECTION INTRAMUSCULAR; INTRAVENOUS AS NEEDED
OUTPATIENT
Start: 2023-02-27 | End: 2023-02-27

## 2023-02-27 RX ADMIN — SODIUM CHLORIDE, SODIUM LACTATE, POTASSIUM CHLORIDE, CALCIUM CHLORIDE: 600; 310; 30; 20 INJECTION, SOLUTION INTRAVENOUS at 13:46:00

## 2023-02-27 RX ADMIN — SODIUM CHLORIDE, SODIUM LACTATE, POTASSIUM CHLORIDE, CALCIUM CHLORIDE: 600; 310; 30; 20 INJECTION, SOLUTION INTRAVENOUS at 14:33:00

## 2023-02-27 RX ADMIN — LIDOCAINE HYDROCHLORIDE 50 MG: 10 INJECTION, SOLUTION EPIDURAL; INFILTRATION; INTRACAUDAL; PERINEURAL at 13:50:00

## 2023-02-27 NOTE — OPERATIVE REPORT
Last TSH 6/28/18, last Lipid 6/6/17? Operative Report-Esophagogastroduodenoscopy      PREOPERATIVE DIAGNOSIS/INDICATION: Iron Deficiency, GERD    POSTOPERTATIVE DIAGNOSIS: Gastritis, hiatal hernia, gastric polyps     PROCEDURE PERFORMED: EGD    INFORMED CONSENT: Once a brief history and physical examination was performed, the risks, benefits and alternatives to the procedure were discussed with the patient and/or family and informed consent was obtained. The risks of sedation, perforation, missed lesions and need for surgery were all discussed. Patient expressed understanding of the risks and agreed to proceed. PROCEDURE DESCRIPTION:  The patient was then brought to the endoscopy suite where his/her pulse, pulse oximetry and blood pressure were monitored. He/she was placed in the left lateral decubitus position and deep sedation was administered. Once adequate sedation was achieved, a bite block was placed and a lubricated tip of an Olympus video upper endoscope was inserted through the oropharynx and gently manipulated through the esophagus into the stomach and the distal duodenum. Upon withdrawal of the endoscope, careful visualization of the mucosa was performed. FINDINGS:    - ESOPHAGUS: Normal esophagus.    - EGJ: Z line regular at 34 cm.     - STOMACH: 3 cm hiatal hernia. Mild antral gastritis. Random gastric biopsies taken with cold forceps for histology. Pt with a few small to medium gastric polyps. The largest of these polys were resected with cold snare for histology.     - DUODENUM: Normal to examined extent. Biopsies taken with cold forceps from the bulb and second portion of the duodenum. THERAPEUTICS: Biopsies were performed.     RECOMMENDATIONS:     - Post EGD precautions, watch for bleeding, infection, perforation, adverse drug reactions     - Follow biopsies.    - Pantoprazole 40 mg qday        Marcie Douglass MD  2/27/2023  2:03 PM

## 2023-02-27 NOTE — OPERATIVE REPORT
Operative Report-Colonoscopy    PREOPERATIVE DIAGNOSIS/INDICATION: Iron defiency, history of polyps    POSTOPERTATIVE DIAGNOSIS: colon polyps, hemorrhoids, diverticulosis     PROCEDURE PERFORMED: COLONOSCOPY    INFORMED CONSENT:  Once a brief history and physical examination was performed, the risks, benefits and alternatives to the procedure were discussed with the patient and/or family and informed consent was obtained. The risks of sedation, perforation, missed lesions and need for surgery were all discussed. Patient expressed understanding of the risks and agreed to proceed. PROCEDURE DESCRIPTION:The patient was then brought to the endoscopy suite where his pulse, pulse oximetry and blood pressure were monitored. Patient was placed in the left lateral decubitus position and deep sedation was administered. Once adequate sedation was achieved, a rectal exam was performed which was normal. A lubricated tip of an Olympus video colonoscope was then inserted through the rectum and gently manipulated under direct visualization to the cecal pole and the terminal ileum. The quality of the preparation was fair. Upon withdrawal of the colonoscope, careful visualization of the mucosa was performed. [place] Prep Score: Right Colon 2 Transverse colon 2 Left colon 2    FINDINGS/THERAPEUTICS:    - TERMINAL ILEUM: Normal terminal ileum     - COLON: Sigmoid diverticulosis found   - 8 mm pedunculated ascending colon polyp. Resected with hot snare.  - 5 mm ascending colon polyp. Resected with cold snare. - 3 mm ascending colon polyp. Resected with cold biopsy forceps. - 4 mm cecal polyp. Resected with cold snare. - 3 mm cecal polyp resected with cold biopsy forceps. - 5 mm sigmoid colon polyp.   Resected with cold snare.    - RECTUM: Grade II internal hemorrhoids found on retroflexion     RECOMMENDATIONS:     - Post Colonoscopy/polypectomy precautions, watch for bleeding, infection, perforation, adverse drug reactions     - Follow biopsies.    - Repeat colonoscopy in 1 year with extended prep due to fair preparation and colon polyp burden.    - Okay to resume Xarelto in 24 hours    Stephanie Ratliff MD  2/27/2023  2:33 PM

## 2023-02-27 NOTE — ANESTHESIA POSTPROCEDURE EVALUATION
[facility] Patient Status:  Hospital Outpatient Surgery   Age/Gender 69year old female MRN [id number]   Location [address] [address] Attending Liza Vidal MD   Hosp Day # 0 PCP Rahul Saldivar MD       Anesthesia Post-op Note    ESOPHAGOGASTRODUODENOSCOPY (EGD) with biopsies and cold snare polypectomies COLONOSCOPY with hot snare polypectomy x1, cold snare polypectomy x3 and forcep polypectomy x2    Procedure Summary     Date: 02/27/23 Room / Location: [address] [place] [address] ENDOSCOPY 04 / [address] [facility] ENDOSCOPY    Anesthesia Start: 5152 Anesthesia Stop: 5791    Procedures:       ESOPHAGOGASTRODUODENOSCOPY (EGD) with biopsies and cold snare polypectomies COLONOSCOPY with hot snare polypectomy x1, cold snare polypectomy x3 and forcep polypectomy x2      COLONOSCOPY Diagnosis:       Iron deficiency anemia, unspecified iron deficiency anemia type      (EGD: gastritis, hiatel hernia, gastric polyps COLON: polyps, hemorrhoids, diverticulosis )    Surgeons: Liza Vidal MD Anesthesiologist: Cece Contreras MD    Anesthesia Type: MAC ASA Status: 3          Anesthesia Type: MAC    Vitals Value Taken Time   /68 02/27/23 1447   Temp  02/27/23 1447   Pulse 82 02/27/23 1447   Resp 14 02/27/23 1447   SpO2 100% 02/27/23 1447       Patient Location: Endoscopy    Anesthesia Type: MAC    Airway Patency: patent    Postop Pain Control: adequate    Mental Status: preanesthetic baseline    Nausea/Vomiting: none    Cardiopulmonary/Hydration status: stable euvolemic    Complications: no apparent anesthesia related complications    Postop vital signs: stable    Dental Exam: Unchanged from Preop    Patient to be discharged home when criteria met.

## 2023-03-17 ENCOUNTER — OFFICE VISIT (OUTPATIENT)
Dept: HEMATOLOGY/ONCOLOGY | Facility: HOSPITAL | Age: 78
End: 2023-03-17
Attending: INTERNAL MEDICINE
Payer: MEDICARE

## 2023-03-17 VITALS
WEIGHT: 161.81 LBS | OXYGEN SATURATION: 96 % | RESPIRATION RATE: 18 BRPM | BODY MASS INDEX: 30.55 KG/M2 | HEIGHT: 60.98 IN | SYSTOLIC BLOOD PRESSURE: 155 MMHG | DIASTOLIC BLOOD PRESSURE: 85 MMHG | TEMPERATURE: 98 F | HEART RATE: 95 BPM

## 2023-03-17 DIAGNOSIS — D80.3 SELECTIVE DEFICIENCY OF IGG SUBCLASSES (HCC): Primary | ICD-10-CM

## 2023-03-17 PROCEDURE — 96375 TX/PRO/DX INJ NEW DRUG ADDON: CPT

## 2023-03-17 PROCEDURE — 96365 THER/PROPH/DIAG IV INF INIT: CPT

## 2023-03-17 PROCEDURE — 96366 THER/PROPH/DIAG IV INF ADDON: CPT

## 2023-03-17 RX ORDER — DIPHENHYDRAMINE HCL 25 MG
50 CAPSULE ORAL ONCE
OUTPATIENT
Start: 2023-04-07

## 2023-03-17 RX ORDER — ACETAMINOPHEN 325 MG/1
650 TABLET ORAL ONCE
OUTPATIENT
Start: 2023-04-07

## 2023-03-17 RX ORDER — DIPHENHYDRAMINE HCL 25 MG
50 CAPSULE ORAL ONCE
Status: COMPLETED | OUTPATIENT
Start: 2023-03-17 | End: 2023-03-17

## 2023-03-17 RX ORDER — ACETAMINOPHEN 325 MG/1
650 TABLET ORAL ONCE
Status: COMPLETED | OUTPATIENT
Start: 2023-03-17 | End: 2023-03-17

## 2023-03-17 RX ADMIN — DIPHENHYDRAMINE HCL 50 MG: 25 MG CAPSULE ORAL at 09:44:00

## 2023-03-17 RX ADMIN — ACETAMINOPHEN 650 MG: 325 TABLET ORAL at 09:44:00

## 2023-03-17 NOTE — PROGRESS NOTES
Education Record    Learner:  Patient    Disease / Diagnosis: Deficiency of IgG, here for IVIG    Barriers / Limitations:  None   Comments:    Method:  Discussion   Comments:    General Topics:  Plan of care reviewed   Comments:    Outcome:  Shows understanding   Comments:    IVIG infused per MD order without incident. Tolerated well. Reviewed next appointment. Due for MD in January 2024. Pt declined to make appointment at this time. Discharged home in stable condition, no new complaints.

## 2023-03-20 ENCOUNTER — PATIENT OUTREACH (OUTPATIENT)
Dept: CASE MANAGEMENT | Age: 78
End: 2023-03-20

## 2023-04-05 ENCOUNTER — OFFICE VISIT (OUTPATIENT)
Dept: RHEUMATOLOGY | Facility: CLINIC | Age: 78
End: 2023-04-05
Payer: MEDICARE

## 2023-04-05 VITALS
TEMPERATURE: 97 F | DIASTOLIC BLOOD PRESSURE: 60 MMHG | BODY MASS INDEX: 31.44 KG/M2 | OXYGEN SATURATION: 97 % | HEIGHT: 60 IN | HEART RATE: 88 BPM | WEIGHT: 160.13 LBS | SYSTOLIC BLOOD PRESSURE: 158 MMHG | RESPIRATION RATE: 16 BRPM

## 2023-04-05 DIAGNOSIS — R76.8 RHEUMATOID FACTOR POSITIVE: ICD-10-CM

## 2023-04-05 DIAGNOSIS — M25.50 POLYARTHRALGIA: ICD-10-CM

## 2023-04-05 DIAGNOSIS — M15.9 PRIMARY OSTEOARTHRITIS INVOLVING MULTIPLE JOINTS: Primary | ICD-10-CM

## 2023-04-05 DIAGNOSIS — G89.4 CHRONIC PAIN SYNDROME: ICD-10-CM

## 2023-04-05 PROCEDURE — 99214 OFFICE O/P EST MOD 30 MIN: CPT | Performed by: INTERNAL MEDICINE

## 2023-04-05 RX ORDER — DULOXETIN HYDROCHLORIDE 30 MG/1
30 CAPSULE, DELAYED RELEASE ORAL DAILY
Qty: 30 CAPSULE | Refills: 0 | Status: SHIPPED | OUTPATIENT
Start: 2023-04-05 | End: 2023-04-10 | Stop reason: DRUGHIGH

## 2023-04-06 PROBLEM — E78.5 DYSLIPIDEMIA: Status: RESOLVED | Noted: 2019-11-08 | Resolved: 2023-04-06

## 2023-04-07 ENCOUNTER — OFFICE VISIT (OUTPATIENT)
Dept: HEMATOLOGY/ONCOLOGY | Facility: HOSPITAL | Age: 78
End: 2023-04-07
Attending: INTERNAL MEDICINE
Payer: MEDICARE

## 2023-04-07 VITALS
RESPIRATION RATE: 18 BRPM | HEIGHT: 60.98 IN | TEMPERATURE: 98 F | BODY MASS INDEX: 30.74 KG/M2 | HEART RATE: 93 BPM | OXYGEN SATURATION: 94 % | WEIGHT: 162.81 LBS | DIASTOLIC BLOOD PRESSURE: 83 MMHG | SYSTOLIC BLOOD PRESSURE: 173 MMHG

## 2023-04-07 DIAGNOSIS — K21.9 GASTROESOPHAGEAL REFLUX DISEASE WITHOUT ESOPHAGITIS: ICD-10-CM

## 2023-04-07 DIAGNOSIS — F41.9 ANXIETY: ICD-10-CM

## 2023-04-07 DIAGNOSIS — D80.3 SELECTIVE DEFICIENCY OF IGG SUBCLASSES (HCC): ICD-10-CM

## 2023-04-07 DIAGNOSIS — E11.65 CONTROLLED TYPE 2 DIABETES MELLITUS WITH HYPERGLYCEMIA, WITHOUT LONG-TERM CURRENT USE OF INSULIN (HCC): Primary | ICD-10-CM

## 2023-04-07 DIAGNOSIS — I10 BENIGN ESSENTIAL HYPERTENSION: ICD-10-CM

## 2023-04-07 LAB
ALBUMIN SERPL-MCNC: 3.8 G/DL (ref 3.4–5)
ALBUMIN/GLOB SERPL: 1.2 {RATIO} (ref 1–2)
ALP LIVER SERPL-CCNC: 80 U/L
ALT SERPL-CCNC: 39 U/L
ANION GAP SERPL CALC-SCNC: 5 MMOL/L (ref 0–18)
AST SERPL-CCNC: 29 U/L (ref 15–37)
BASOPHILS # BLD AUTO: 0.04 X10(3) UL (ref 0–0.2)
BASOPHILS NFR BLD AUTO: 0.8 %
BILIRUB SERPL-MCNC: 0.5 MG/DL (ref 0.1–2)
BUN BLD-MCNC: 15 MG/DL (ref 7–18)
CALCIUM BLD-MCNC: 8.9 MG/DL (ref 8.5–10.1)
CHLORIDE SERPL-SCNC: 109 MMOL/L (ref 98–112)
CHOLEST SERPL-MCNC: 114 MG/DL (ref ?–200)
CO2 SERPL-SCNC: 27 MMOL/L (ref 21–32)
CREAT BLD-MCNC: 0.68 MG/DL
CREAT UR-SCNC: 36.3 MG/DL
EOSINOPHIL # BLD AUTO: 0.17 X10(3) UL (ref 0–0.7)
EOSINOPHIL NFR BLD AUTO: 3.5 %
ERYTHROCYTE [DISTWIDTH] IN BLOOD BY AUTOMATED COUNT: 12.8 %
EST. AVERAGE GLUCOSE BLD GHB EST-MCNC: 154 MG/DL (ref 68–126)
FASTING PATIENT LIPID ANSWER: NO
FASTING STATUS PATIENT QL REPORTED: NO
GFR SERPLBLD BASED ON 1.73 SQ M-ARVRAT: 90 ML/MIN/1.73M2 (ref 60–?)
GLOBULIN PLAS-MCNC: 3.2 G/DL (ref 2.8–4.4)
GLUCOSE BLD-MCNC: 104 MG/DL (ref 70–99)
HBA1C MFR BLD: 7 % (ref ?–5.7)
HCT VFR BLD AUTO: 37.1 %
HDLC SERPL-MCNC: 62 MG/DL (ref 40–59)
HGB BLD-MCNC: 12.2 G/DL
IMM GRANULOCYTES # BLD AUTO: 0.01 X10(3) UL (ref 0–1)
IMM GRANULOCYTES NFR BLD: 0.2 %
LDLC SERPL CALC-MCNC: 35 MG/DL (ref ?–100)
LYMPHOCYTES # BLD AUTO: 2.08 X10(3) UL (ref 1–4)
LYMPHOCYTES NFR BLD AUTO: 42.4 %
MCH RBC QN AUTO: 30.3 PG (ref 26–34)
MCHC RBC AUTO-ENTMCNC: 32.9 G/DL (ref 31–37)
MCV RBC AUTO: 92.3 FL
MICROALBUMIN UR-MCNC: 0.7 MG/DL
MICROALBUMIN/CREAT 24H UR-RTO: 19.3 UG/MG (ref ?–30)
MONOCYTES # BLD AUTO: 0.5 X10(3) UL (ref 0.1–1)
MONOCYTES NFR BLD AUTO: 10.2 %
NEUTROPHILS # BLD AUTO: 2.11 X10 (3) UL (ref 1.5–7.7)
NEUTROPHILS # BLD AUTO: 2.11 X10(3) UL (ref 1.5–7.7)
NEUTROPHILS NFR BLD AUTO: 42.9 %
NONHDLC SERPL-MCNC: 52 MG/DL (ref ?–130)
OSMOLALITY SERPL CALC.SUM OF ELEC: 293 MOSM/KG (ref 275–295)
PLATELET # BLD AUTO: 167 10(3)UL (ref 150–450)
POTASSIUM SERPL-SCNC: 3.9 MMOL/L (ref 3.5–5.1)
PROT SERPL-MCNC: 7 G/DL (ref 6.4–8.2)
RBC # BLD AUTO: 4.02 X10(6)UL
SODIUM SERPL-SCNC: 141 MMOL/L (ref 136–145)
TRIGL SERPL-MCNC: 92 MG/DL (ref 30–149)
TSI SER-ACNC: 2.7 MIU/ML (ref 0.36–3.74)
VLDLC SERPL CALC-MCNC: 12 MG/DL (ref 0–30)
WBC # BLD AUTO: 4.9 X10(3) UL (ref 4–11)

## 2023-04-07 PROCEDURE — 84443 ASSAY THYROID STIM HORMONE: CPT

## 2023-04-07 PROCEDURE — 83036 HEMOGLOBIN GLYCOSYLATED A1C: CPT

## 2023-04-07 PROCEDURE — 80061 LIPID PANEL: CPT

## 2023-04-07 PROCEDURE — 96375 TX/PRO/DX INJ NEW DRUG ADDON: CPT

## 2023-04-07 PROCEDURE — 85025 COMPLETE CBC W/AUTO DIFF WBC: CPT

## 2023-04-07 PROCEDURE — 82570 ASSAY OF URINE CREATININE: CPT

## 2023-04-07 PROCEDURE — 96365 THER/PROPH/DIAG IV INF INIT: CPT

## 2023-04-07 PROCEDURE — 80053 COMPREHEN METABOLIC PANEL: CPT

## 2023-04-07 PROCEDURE — 82043 UR ALBUMIN QUANTITATIVE: CPT

## 2023-04-07 PROCEDURE — 96366 THER/PROPH/DIAG IV INF ADDON: CPT

## 2023-04-07 RX ORDER — ACETAMINOPHEN 325 MG/1
650 TABLET ORAL ONCE
Status: COMPLETED | OUTPATIENT
Start: 2023-04-07 | End: 2023-04-07

## 2023-04-07 RX ORDER — DIPHENHYDRAMINE HCL 25 MG
50 CAPSULE ORAL ONCE
OUTPATIENT
Start: 2023-04-28

## 2023-04-07 RX ORDER — ACETAMINOPHEN 325 MG/1
650 TABLET ORAL ONCE
OUTPATIENT
Start: 2023-04-28

## 2023-04-07 RX ORDER — DIPHENHYDRAMINE HCL 25 MG
50 CAPSULE ORAL ONCE
Status: COMPLETED | OUTPATIENT
Start: 2023-04-07 | End: 2023-04-07

## 2023-04-07 RX ADMIN — ACETAMINOPHEN 650 MG: 325 TABLET ORAL at 09:45:00

## 2023-04-07 RX ADMIN — DIPHENHYDRAMINE HCL 50 MG: 25 MG CAPSULE ORAL at 09:45:00

## 2023-04-07 NOTE — PROGRESS NOTES
Education Record    Learner:  Patient    Disease / Diagnosis: IVIG    Barriers / Limitations:  None    Method:  Brief focused, printed material and  reinforcement    General Topics:  Plan of care reviewed    Outcome:  Shows understanding    Here for IVIG. Labs drawn that were ordered. Tolerated well. Next appt made.

## 2023-04-10 ENCOUNTER — OFFICE VISIT (OUTPATIENT)
Dept: INTERNAL MEDICINE CLINIC | Facility: CLINIC | Age: 78
End: 2023-04-10
Payer: MEDICARE

## 2023-04-10 ENCOUNTER — APPOINTMENT (OUTPATIENT)
Dept: HEMATOLOGY/ONCOLOGY | Facility: HOSPITAL | Age: 78
End: 2023-04-10
Attending: INTERNAL MEDICINE
Payer: MEDICARE

## 2023-04-10 VITALS
DIASTOLIC BLOOD PRESSURE: 80 MMHG | SYSTOLIC BLOOD PRESSURE: 148 MMHG | WEIGHT: 159 LBS | HEART RATE: 92 BPM | BODY MASS INDEX: 31.22 KG/M2 | HEIGHT: 60 IN | RESPIRATION RATE: 18 BRPM | TEMPERATURE: 98 F

## 2023-04-10 DIAGNOSIS — G89.4 CHRONIC PAIN SYNDROME: ICD-10-CM

## 2023-04-10 DIAGNOSIS — G47.33 OSA ON CPAP: ICD-10-CM

## 2023-04-10 DIAGNOSIS — I25.10 CORONARY ARTERY DISEASE INVOLVING NATIVE CORONARY ARTERY OF NATIVE HEART WITHOUT ANGINA PECTORIS: ICD-10-CM

## 2023-04-10 DIAGNOSIS — M48.02 CERVICAL SPINAL STENOSIS: ICD-10-CM

## 2023-04-10 DIAGNOSIS — J45.30 MILD PERSISTENT ASTHMA WITHOUT COMPLICATION: ICD-10-CM

## 2023-04-10 DIAGNOSIS — G56.02 CARPAL TUNNEL SYNDROME OF LEFT WRIST: ICD-10-CM

## 2023-04-10 DIAGNOSIS — Z86.718 HISTORY OF DVT (DEEP VEIN THROMBOSIS): ICD-10-CM

## 2023-04-10 DIAGNOSIS — I70.0 ATHEROSCLEROSIS OF AORTA (HCC): ICD-10-CM

## 2023-04-10 DIAGNOSIS — Z87.81 HISTORY OF RADIUS FRACTURE: ICD-10-CM

## 2023-04-10 DIAGNOSIS — R06.09 DOE (DYSPNEA ON EXERTION): ICD-10-CM

## 2023-04-10 DIAGNOSIS — D69.6 THROMBOCYTOPENIA (HCC): ICD-10-CM

## 2023-04-10 DIAGNOSIS — G93.0 CYST OF BRAIN: ICD-10-CM

## 2023-04-10 DIAGNOSIS — G62.9 NEUROPATHY: ICD-10-CM

## 2023-04-10 DIAGNOSIS — D80.3 IGG DEFICIENCY (HCC): ICD-10-CM

## 2023-04-10 DIAGNOSIS — M54.12 CERVICAL RADICULOPATHY: ICD-10-CM

## 2023-04-10 DIAGNOSIS — Z85.3 HISTORY OF LEFT BREAST CANCER: ICD-10-CM

## 2023-04-10 DIAGNOSIS — F41.9 ANXIETY: ICD-10-CM

## 2023-04-10 DIAGNOSIS — F33.1 MAJOR DEPRESSIVE DISORDER, RECURRENT EPISODE, MODERATE (HCC): Chronic | ICD-10-CM

## 2023-04-10 DIAGNOSIS — Z00.00 ENCOUNTER FOR ANNUAL HEALTH EXAMINATION: Primary | ICD-10-CM

## 2023-04-10 DIAGNOSIS — I10 BENIGN ESSENTIAL HYPERTENSION: ICD-10-CM

## 2023-04-10 DIAGNOSIS — Z87.898 HISTORY OF SYNCOPE: ICD-10-CM

## 2023-04-10 DIAGNOSIS — Z12.31 ENCOUNTER FOR SCREENING MAMMOGRAM FOR MALIGNANT NEOPLASM OF BREAST: ICD-10-CM

## 2023-04-10 DIAGNOSIS — G43.009 MIGRAINE WITHOUT AURA AND WITHOUT STATUS MIGRAINOSUS, NOT INTRACTABLE: ICD-10-CM

## 2023-04-10 DIAGNOSIS — E11.65 CONTROLLED TYPE 2 DIABETES MELLITUS WITH HYPERGLYCEMIA, WITHOUT LONG-TERM CURRENT USE OF INSULIN (HCC): ICD-10-CM

## 2023-04-10 DIAGNOSIS — D80.3 SELECTIVE DEFICIENCY OF IGG SUBCLASSES (HCC): ICD-10-CM

## 2023-04-10 DIAGNOSIS — E04.9 ENLARGED THYROID: ICD-10-CM

## 2023-04-10 DIAGNOSIS — Z99.89 OSA ON CPAP: ICD-10-CM

## 2023-04-10 DIAGNOSIS — D64.9 ANEMIA, NORMOCYTIC NORMOCHROMIC: ICD-10-CM

## 2023-04-10 DIAGNOSIS — M17.11 PRIMARY OSTEOARTHRITIS OF RIGHT KNEE: ICD-10-CM

## 2023-04-10 DIAGNOSIS — M53.3 SACROILIAC JOINT DYSFUNCTION: ICD-10-CM

## 2023-04-10 DIAGNOSIS — Z87.01 HISTORY OF RECURRENT PNEUMONIA: ICD-10-CM

## 2023-04-10 DIAGNOSIS — R53.83 FATIGUE, UNSPECIFIED TYPE: ICD-10-CM

## 2023-04-10 DIAGNOSIS — K21.9 GASTROESOPHAGEAL REFLUX DISEASE WITHOUT ESOPHAGITIS: ICD-10-CM

## 2023-04-10 DIAGNOSIS — R25.1 TREMOR OF BOTH HANDS: ICD-10-CM

## 2023-04-10 DIAGNOSIS — E11.65 TYPE 2 DIABETES MELLITUS WITH HYPERGLYCEMIA, WITHOUT LONG-TERM CURRENT USE OF INSULIN (HCC): ICD-10-CM

## 2023-04-10 DIAGNOSIS — M54.12 CERVICAL RADICULITIS: ICD-10-CM

## 2023-04-10 DIAGNOSIS — Z85.828 HISTORY OF BASAL CELL CARCINOMA: ICD-10-CM

## 2023-04-10 DIAGNOSIS — E78.00 PURE HYPERCHOLESTEROLEMIA: ICD-10-CM

## 2023-04-10 DIAGNOSIS — Z96.652 HISTORY OF LEFT KNEE REPLACEMENT: ICD-10-CM

## 2023-04-10 DIAGNOSIS — M47.817 SPONDYLOSIS OF LUMBOSACRAL REGION WITHOUT MYELOPATHY OR RADICULOPATHY: ICD-10-CM

## 2023-04-10 DIAGNOSIS — Z98.1 S/P CERVICAL SPINAL FUSION: ICD-10-CM

## 2023-04-10 DIAGNOSIS — Z78.0 POSTMENOPAUSAL: ICD-10-CM

## 2023-04-10 DIAGNOSIS — M79.9 SOFT TISSUE LESION OF SHOULDER REGION: ICD-10-CM

## 2023-04-10 DIAGNOSIS — E53.8 B12 DEFICIENCY: ICD-10-CM

## 2023-04-10 DIAGNOSIS — M85.859 OSTEOPENIA OF THIGH, UNSPECIFIED LATERALITY: ICD-10-CM

## 2023-04-10 DIAGNOSIS — Z86.711 HISTORY OF PULMONARY EMBOLISM: ICD-10-CM

## 2023-04-10 PROBLEM — J18.9 COMMUNITY ACQUIRED PNEUMONIA OF RIGHT LUNG, UNSPECIFIED PART OF LUNG: Status: RESOLVED | Noted: 2022-11-17 | Resolved: 2023-04-10

## 2023-04-10 PROBLEM — I26.99 OTHER PULMONARY EMBOLISM WITHOUT ACUTE COR PULMONALE (HCC): Status: RESOLVED | Noted: 2019-11-08 | Resolved: 2023-04-10

## 2023-04-10 PROBLEM — Z86.16 HISTORY OF COVID-19: Status: RESOLVED | Noted: 2021-02-04 | Resolved: 2023-04-10

## 2023-04-10 RX ORDER — LISINOPRIL 20 MG/1
20 TABLET ORAL DAILY
Qty: 90 TABLET | Refills: 3 | COMMUNITY
Start: 2023-04-10

## 2023-04-10 RX ORDER — AMLODIPINE BESYLATE 5 MG/1
5 TABLET ORAL DAILY
Qty: 90 TABLET | Refills: 3 | Status: SHIPPED | OUTPATIENT
Start: 2023-04-10 | End: 2024-04-04

## 2023-04-10 RX ORDER — ONDANSETRON 4 MG/1
4 TABLET, FILM COATED ORAL EVERY 8 HOURS PRN
Qty: 30 TABLET | Refills: 0 | Status: SHIPPED | OUTPATIENT
Start: 2023-04-10

## 2023-04-18 RX ORDER — SEMAGLUTIDE 1.34 MG/ML
INJECTION, SOLUTION SUBCUTANEOUS
Qty: 4.5 ML | Refills: 0 | Status: SHIPPED | OUTPATIENT
Start: 2023-04-18

## 2023-04-18 NOTE — TELEPHONE ENCOUNTER
Diabetic Medication Protocol Passed 04/17/2023 09:59 PM   Protocol Details  HgBA1C procedure resulted in past 6 months    Last HgBA1C < 7.5    Microalbumin procedure in past 12 months or taking ACE/ARB    Appointment in past 6 or next 3 months        LOV    4/10/23 sd     LAST LAB  4/7/23    LAST RX   12/2/22 4.5      Next OV   Future Appointments   Date Time Provider Gomez Noi   4/28/2023  9:00 AM [address] [place] [address]   5/10/2023 10:20 AM Shruthi Barrett DO ENIWCAROLYNN [id number]   10/4/2023 10:30 AM Fransico Farley DO EMGRHEUMHBSN [facility]         PROTOCOL pass

## 2023-04-20 ENCOUNTER — PATIENT OUTREACH (OUTPATIENT)
Dept: CASE MANAGEMENT | Age: 78
End: 2023-04-20

## 2023-04-20 DIAGNOSIS — Z99.89 OSA ON CPAP: ICD-10-CM

## 2023-04-20 DIAGNOSIS — Z86.711 HISTORY OF PULMONARY EMBOLISM: ICD-10-CM

## 2023-04-20 DIAGNOSIS — K21.9 GASTROESOPHAGEAL REFLUX DISEASE WITHOUT ESOPHAGITIS: ICD-10-CM

## 2023-04-20 DIAGNOSIS — F41.9 ANXIETY: ICD-10-CM

## 2023-04-20 DIAGNOSIS — E11.65 TYPE 2 DIABETES MELLITUS WITH HYPERGLYCEMIA, WITHOUT LONG-TERM CURRENT USE OF INSULIN (HCC): ICD-10-CM

## 2023-04-20 DIAGNOSIS — D80.3 SELECTIVE DEFICIENCY OF IGG SUBCLASSES (HCC): ICD-10-CM

## 2023-04-20 DIAGNOSIS — F33.1 MAJOR DEPRESSIVE DISORDER, RECURRENT EPISODE, MODERATE (HCC): Chronic | ICD-10-CM

## 2023-04-20 DIAGNOSIS — E53.8 B12 DEFICIENCY: ICD-10-CM

## 2023-04-20 DIAGNOSIS — Z85.3 HISTORY OF LEFT BREAST CANCER: ICD-10-CM

## 2023-04-20 DIAGNOSIS — D80.3 IGG DEFICIENCY (HCC): ICD-10-CM

## 2023-04-20 DIAGNOSIS — I10 BENIGN ESSENTIAL HYPERTENSION: ICD-10-CM

## 2023-04-20 DIAGNOSIS — Z86.718 HISTORY OF DVT (DEEP VEIN THROMBOSIS): ICD-10-CM

## 2023-04-20 DIAGNOSIS — J45.30 MILD PERSISTENT ASTHMA WITHOUT COMPLICATION: ICD-10-CM

## 2023-04-20 DIAGNOSIS — I70.0 ATHEROSCLEROSIS OF AORTA (HCC): ICD-10-CM

## 2023-04-20 DIAGNOSIS — G47.33 OSA ON CPAP: ICD-10-CM

## 2023-04-20 DIAGNOSIS — I25.10 CORONARY ARTERY DISEASE INVOLVING NATIVE CORONARY ARTERY OF NATIVE HEART WITHOUT ANGINA PECTORIS: ICD-10-CM

## 2023-04-28 ENCOUNTER — OFFICE VISIT (OUTPATIENT)
Dept: HEMATOLOGY/ONCOLOGY | Facility: HOSPITAL | Age: 78
End: 2023-04-28
Attending: INTERNAL MEDICINE
Payer: MEDICARE

## 2023-04-28 VITALS
SYSTOLIC BLOOD PRESSURE: 104 MMHG | WEIGHT: 156.19 LBS | HEIGHT: 60.98 IN | BODY MASS INDEX: 29.49 KG/M2 | DIASTOLIC BLOOD PRESSURE: 67 MMHG | HEART RATE: 76 BPM | TEMPERATURE: 98 F | RESPIRATION RATE: 18 BRPM

## 2023-04-28 DIAGNOSIS — D80.3 SELECTIVE DEFICIENCY OF IGG SUBCLASSES (HCC): Primary | ICD-10-CM

## 2023-04-28 PROCEDURE — 96365 THER/PROPH/DIAG IV INF INIT: CPT

## 2023-04-28 PROCEDURE — 96375 TX/PRO/DX INJ NEW DRUG ADDON: CPT

## 2023-04-28 PROCEDURE — 96366 THER/PROPH/DIAG IV INF ADDON: CPT

## 2023-04-28 RX ORDER — DIPHENHYDRAMINE HCL 25 MG
50 CAPSULE ORAL ONCE
OUTPATIENT
Start: 2023-05-19

## 2023-04-28 RX ORDER — DIPHENHYDRAMINE HCL 25 MG
50 CAPSULE ORAL ONCE
Status: COMPLETED | OUTPATIENT
Start: 2023-04-28 | End: 2023-04-28

## 2023-04-28 RX ORDER — ACETAMINOPHEN 325 MG/1
650 TABLET ORAL ONCE
Status: COMPLETED | OUTPATIENT
Start: 2023-04-28 | End: 2023-04-28

## 2023-04-28 RX ORDER — ACETAMINOPHEN 325 MG/1
650 TABLET ORAL ONCE
OUTPATIENT
Start: 2023-05-19

## 2023-04-28 RX ADMIN — ACETAMINOPHEN 325 MG: 325 TABLET ORAL at 08:57:00

## 2023-04-28 RX ADMIN — DIPHENHYDRAMINE HCL 25 MG: 25 MG CAPSULE ORAL at 08:57:00

## 2023-04-28 NOTE — PROGRESS NOTES
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 Patient would like to switch to Dr. Janel Elizondo for her new physician, appt scheduled.

## 2023-05-19 ENCOUNTER — OFFICE VISIT (OUTPATIENT)
Dept: HEMATOLOGY/ONCOLOGY | Facility: HOSPITAL | Age: 78
End: 2023-05-19
Attending: INTERNAL MEDICINE
Payer: COMMERCIAL

## 2023-05-19 VITALS
WEIGHT: 159 LBS | HEART RATE: 99 BPM | BODY MASS INDEX: 30.02 KG/M2 | SYSTOLIC BLOOD PRESSURE: 168 MMHG | RESPIRATION RATE: 16 BRPM | OXYGEN SATURATION: 96 % | HEIGHT: 60.98 IN | DIASTOLIC BLOOD PRESSURE: 73 MMHG | TEMPERATURE: 98 F

## 2023-05-19 DIAGNOSIS — Z86.39 HISTORY OF IRON DEFICIENCY: ICD-10-CM

## 2023-05-19 DIAGNOSIS — Z86.711 HISTORY OF PULMONARY EMBOLISM: ICD-10-CM

## 2023-05-19 DIAGNOSIS — D80.3 IGG DEFICIENCY (HCC): ICD-10-CM

## 2023-05-19 DIAGNOSIS — Z86.39 HISTORY OF IRON DEFICIENCY: Primary | ICD-10-CM

## 2023-05-19 DIAGNOSIS — E53.8 B12 DEFICIENCY: ICD-10-CM

## 2023-05-19 DIAGNOSIS — Z85.3 HISTORY OF LEFT BREAST CANCER: ICD-10-CM

## 2023-05-19 LAB
ALBUMIN SERPL-MCNC: 3.5 G/DL (ref 3.4–5)
ALBUMIN/GLOB SERPL: 1 {RATIO} (ref 1–2)
ALP LIVER SERPL-CCNC: 73 U/L
ALT SERPL-CCNC: 39 U/L
ANION GAP SERPL CALC-SCNC: 5 MMOL/L (ref 0–18)
AST SERPL-CCNC: 29 U/L (ref 15–37)
BASOPHILS # BLD AUTO: 0.08 X10(3) UL (ref 0–0.2)
BASOPHILS NFR BLD AUTO: 1.3 %
BILIRUB SERPL-MCNC: 0.6 MG/DL (ref 0.1–2)
BUN BLD-MCNC: 15 MG/DL (ref 7–18)
CALCIUM BLD-MCNC: 9.1 MG/DL (ref 8.5–10.1)
CHLORIDE SERPL-SCNC: 106 MMOL/L (ref 98–112)
CO2 SERPL-SCNC: 25 MMOL/L (ref 21–32)
CREAT BLD-MCNC: 0.7 MG/DL
DEPRECATED HBV CORE AB SER IA-ACNC: 434.4 NG/ML
EOSINOPHIL # BLD AUTO: 0.26 X10(3) UL (ref 0–0.7)
EOSINOPHIL NFR BLD AUTO: 4.2 %
ERYTHROCYTE [DISTWIDTH] IN BLOOD BY AUTOMATED COUNT: 13.1 %
GFR SERPLBLD BASED ON 1.73 SQ M-ARVRAT: 90 ML/MIN/1.73M2 (ref 60–?)
GLOBULIN PLAS-MCNC: 3.4 G/DL (ref 2.8–4.4)
GLUCOSE BLD-MCNC: 143 MG/DL (ref 70–99)
HCT VFR BLD AUTO: 37.3 %
HGB BLD-MCNC: 12.2 G/DL
IGA SERPL-MCNC: 114 MG/DL (ref 70–312)
IGM SERPL-MCNC: 63.3 MG/DL (ref 43–279)
IMM GRANULOCYTES # BLD AUTO: 0.02 X10(3) UL (ref 0–1)
IMM GRANULOCYTES NFR BLD: 0.3 %
IMMUNOGLOBULIN PNL SER-MCNC: 800 MG/DL (ref 791–1643)
IRON SATN MFR SERPL: 32 %
IRON SERPL-MCNC: 102 UG/DL
LYMPHOCYTES # BLD AUTO: 1.81 X10(3) UL (ref 1–4)
LYMPHOCYTES NFR BLD AUTO: 29.3 %
MCH RBC QN AUTO: 30.5 PG (ref 26–34)
MCHC RBC AUTO-ENTMCNC: 32.7 G/DL (ref 31–37)
MCV RBC AUTO: 93.3 FL
MONOCYTES # BLD AUTO: 0.62 X10(3) UL (ref 0.1–1)
MONOCYTES NFR BLD AUTO: 10 %
NEUTROPHILS # BLD AUTO: 3.38 X10 (3) UL (ref 1.5–7.7)
NEUTROPHILS # BLD AUTO: 3.38 X10(3) UL (ref 1.5–7.7)
NEUTROPHILS NFR BLD AUTO: 54.9 %
OSMOLALITY SERPL CALC.SUM OF ELEC: 285 MOSM/KG (ref 275–295)
PLATELET # BLD AUTO: 190 10(3)UL (ref 150–450)
POTASSIUM SERPL-SCNC: 4.2 MMOL/L (ref 3.5–5.1)
PROT SERPL-MCNC: 6.9 G/DL (ref 6.4–8.2)
RBC # BLD AUTO: 4 X10(6)UL
SODIUM SERPL-SCNC: 136 MMOL/L (ref 136–145)
TIBC SERPL-MCNC: 314 UG/DL (ref 240–450)
TRANSFERRIN SERPL-MCNC: 211 MG/DL (ref 200–360)
VIT B12 SERPL-MCNC: 138 PG/ML (ref 193–986)
WBC # BLD AUTO: 6.2 X10(3) UL (ref 4–11)

## 2023-05-19 PROCEDURE — 83550 IRON BINDING TEST: CPT

## 2023-05-19 PROCEDURE — 99214 OFFICE O/P EST MOD 30 MIN: CPT | Performed by: INTERNAL MEDICINE

## 2023-05-19 PROCEDURE — 82728 ASSAY OF FERRITIN: CPT

## 2023-05-19 PROCEDURE — 36591 DRAW BLOOD OFF VENOUS DEVICE: CPT

## 2023-05-19 PROCEDURE — 83540 ASSAY OF IRON: CPT

## 2023-05-19 PROCEDURE — 82607 VITAMIN B-12: CPT

## 2023-05-19 RX ORDER — TIZANIDINE 2 MG/1
2 TABLET ORAL EVERY EVENING
COMMUNITY
Start: 2023-02-26

## 2023-05-19 RX ORDER — SEMAGLUTIDE 0.68 MG/ML
0.5 INJECTION, SOLUTION SUBCUTANEOUS WEEKLY
COMMUNITY
Start: 2023-04-20

## 2023-05-19 RX ORDER — DULOXETIN HYDROCHLORIDE 30 MG/1
30 CAPSULE, DELAYED RELEASE ORAL DAILY
COMMUNITY
Start: 2023-05-04

## 2023-05-19 NOTE — PROGRESS NOTES
Education Record    Learner:  Patient    Disease / Diagnosis:     Barriers / Limitations:  None   Comments:    Method:  Discussion   Comments:    General Topics:  Plan of care reviewed   Comments:    Outcome:  Shows understanding   Comments:    Here for IVIG infusion and follow up. Meeting Dr Vijaya Spicer for the first time. Having significant dizziness and nausea for the past week. She eats only 1 meal/day due to [address]. No shortness of breath, fever, cough, chills. She is not hungry. No pain. Feels like she has some sort of infection brewing. No urinary symptoms.

## 2023-05-22 ENCOUNTER — TELEPHONE (OUTPATIENT)
Dept: INTERNAL MEDICINE CLINIC | Facility: CLINIC | Age: 78
End: 2023-05-22

## 2023-05-22 ENCOUNTER — NURSE ONLY (OUTPATIENT)
Dept: INTERNAL MEDICINE CLINIC | Facility: CLINIC | Age: 78
End: 2023-05-22
Payer: MEDICARE

## 2023-05-22 DIAGNOSIS — E53.8 B12 DEFICIENCY: Primary | ICD-10-CM

## 2023-05-22 PROCEDURE — 96372 THER/PROPH/DIAG INJ SC/IM: CPT | Performed by: NURSE PRACTITIONER

## 2023-05-22 RX ORDER — CYANOCOBALAMIN 1000 UG/ML
1000 INJECTION, SOLUTION INTRAMUSCULAR; SUBCUTANEOUS ONCE
Status: COMPLETED | OUTPATIENT
Start: 2023-05-22 | End: 2023-05-22

## 2023-05-22 RX ADMIN — CYANOCOBALAMIN 1000 MCG: 1000 INJECTION, SOLUTION INTRAMUSCULAR; SUBCUTANEOUS at 09:49:00

## 2023-05-22 NOTE — TELEPHONE ENCOUNTER
Information sent to pt via [facility] (active today). Also put SD recs in snapshot comment for future reference. PSRs- please reach out to schedule NV for pt to begin injections. Thanks!

## 2023-05-22 NOTE — TELEPHONE ENCOUNTER
Start injections and see me in 6-8 weeks to see if symptoms have improved.       B12 injections:  Weekly x 4 weeks then  Every other week x 2 doses then  Monthly   Vit B12 level in 6 months

## 2023-05-23 ENCOUNTER — PATIENT OUTREACH (OUTPATIENT)
Dept: CASE MANAGEMENT | Age: 78
End: 2023-05-23

## 2023-05-30 ENCOUNTER — NURSE ONLY (OUTPATIENT)
Dept: INTERNAL MEDICINE CLINIC | Facility: CLINIC | Age: 78
End: 2023-05-30
Payer: MEDICARE

## 2023-05-30 VITALS
WEIGHT: 159 LBS | BODY MASS INDEX: 30.02 KG/M2 | OXYGEN SATURATION: 94 % | HEART RATE: 102 BPM | DIASTOLIC BLOOD PRESSURE: 84 MMHG | RESPIRATION RATE: 20 BRPM | SYSTOLIC BLOOD PRESSURE: 136 MMHG | HEIGHT: 61 IN

## 2023-05-30 DIAGNOSIS — E53.8 B12 DEFICIENCY: ICD-10-CM

## 2023-05-30 DIAGNOSIS — R30.0 DYSURIA: Primary | ICD-10-CM

## 2023-05-30 LAB
APPEARANCE: CLEAR
BILIRUBIN: NEGATIVE
GLUCOSE (URINE DIPSTICK): NEGATIVE MG/DL
KETONES (URINE DIPSTICK): NEGATIVE MG/DL
MULTISTIX LOT#: ABNORMAL NUMERIC
NITRITE, URINE: POSITIVE
PH, URINE: 5.5 (ref 4.5–8)
PROTEIN (URINE DIPSTICK): 30 MG/DL
SPECIFIC GRAVITY: 1.02 (ref 1–1.03)
UROBILINOGEN,SEMI-QN: 0.2 MG/DL (ref 0–1.9)

## 2023-05-30 PROCEDURE — 87077 CULTURE AEROBIC IDENTIFY: CPT | Performed by: NURSE PRACTITIONER

## 2023-05-30 PROCEDURE — 96372 THER/PROPH/DIAG INJ SC/IM: CPT | Performed by: NURSE PRACTITIONER

## 2023-05-30 PROCEDURE — 87086 URINE CULTURE/COLONY COUNT: CPT | Performed by: NURSE PRACTITIONER

## 2023-05-30 PROCEDURE — 87186 SC STD MICRODIL/AGAR DIL: CPT | Performed by: NURSE PRACTITIONER

## 2023-05-30 PROCEDURE — 99213 OFFICE O/P EST LOW 20 MIN: CPT | Performed by: NURSE PRACTITIONER

## 2023-05-30 PROCEDURE — 81003 URINALYSIS AUTO W/O SCOPE: CPT | Performed by: NURSE PRACTITIONER

## 2023-05-30 RX ORDER — CYANOCOBALAMIN 1000 UG/ML
1000 INJECTION, SOLUTION INTRAMUSCULAR; SUBCUTANEOUS ONCE
Status: COMPLETED | OUTPATIENT
Start: 2023-05-30 | End: 2023-05-30

## 2023-05-30 RX ORDER — CEPHALEXIN 500 MG/1
500 CAPSULE ORAL 2 TIMES DAILY
Qty: 14 CAPSULE | Refills: 0 | Status: SHIPPED | OUTPATIENT
Start: 2023-05-30

## 2023-05-30 RX ADMIN — CYANOCOBALAMIN 1000 MCG: 1000 INJECTION, SOLUTION INTRAMUSCULAR; SUBCUTANEOUS at 09:08:00

## 2023-06-05 ENCOUNTER — NURSE ONLY (OUTPATIENT)
Dept: INTERNAL MEDICINE CLINIC | Facility: CLINIC | Age: 78
End: 2023-06-05
Payer: MEDICARE

## 2023-06-05 DIAGNOSIS — E53.8 B12 DEFICIENCY: Primary | ICD-10-CM

## 2023-06-05 PROCEDURE — 96372 THER/PROPH/DIAG INJ SC/IM: CPT | Performed by: NURSE PRACTITIONER

## 2023-06-05 RX ORDER — CYANOCOBALAMIN 1000 UG/ML
1000 INJECTION, SOLUTION INTRAMUSCULAR; SUBCUTANEOUS ONCE
Status: COMPLETED | OUTPATIENT
Start: 2023-06-05 | End: 2023-06-05

## 2023-06-05 RX ADMIN — CYANOCOBALAMIN 1000 MCG: 1000 INJECTION, SOLUTION INTRAMUSCULAR; SUBCUTANEOUS at 08:50:00

## 2023-06-06 ENCOUNTER — OFFICE VISIT (OUTPATIENT)
Dept: NEUROLOGY | Facility: CLINIC | Age: 78
End: 2023-06-06
Payer: MEDICARE

## 2023-06-06 VITALS
RESPIRATION RATE: 16 BRPM | DIASTOLIC BLOOD PRESSURE: 70 MMHG | HEART RATE: 72 BPM | BODY MASS INDEX: 32 KG/M2 | WEIGHT: 167 LBS | SYSTOLIC BLOOD PRESSURE: 128 MMHG

## 2023-06-06 DIAGNOSIS — G62.9 NEUROPATHY: Primary | ICD-10-CM

## 2023-06-06 DIAGNOSIS — M54.12 CERVICAL RADICULITIS: ICD-10-CM

## 2023-06-06 DIAGNOSIS — Z98.1 S/P CERVICAL SPINAL FUSION: ICD-10-CM

## 2023-06-06 PROCEDURE — 99213 OFFICE O/P EST LOW 20 MIN: CPT | Performed by: OTHER

## 2023-06-06 RX ORDER — PREGABALIN 100 MG/1
100 CAPSULE ORAL 3 TIMES DAILY
Qty: 270 CAPSULE | Refills: 1 | Status: SHIPPED | OUTPATIENT
Start: 2023-06-06

## 2023-06-06 RX ORDER — TIZANIDINE 4 MG/1
4 TABLET ORAL EVERY 8 HOURS PRN
Qty: 90 TABLET | Refills: 2 | Status: SHIPPED | OUTPATIENT
Start: 2023-06-06

## 2023-06-08 ENCOUNTER — OFFICE VISIT (OUTPATIENT)
Dept: FAMILY MEDICINE CLINIC | Facility: CLINIC | Age: 78
End: 2023-06-08
Payer: MEDICARE

## 2023-06-08 ENCOUNTER — HOSPITAL ENCOUNTER (EMERGENCY)
Facility: HOSPITAL | Age: 78
Discharge: HOME | End: 2023-06-09
Attending: EMERGENCY MEDICINE
Payer: MEDICARE

## 2023-06-08 VITALS
RESPIRATION RATE: 20 BRPM | DIASTOLIC BLOOD PRESSURE: 86 MMHG | HEART RATE: 93 BPM | HEIGHT: 61 IN | TEMPERATURE: 98 F | SYSTOLIC BLOOD PRESSURE: 170 MMHG | OXYGEN SATURATION: 96 % | BODY MASS INDEX: 29.64 KG/M2 | WEIGHT: 157 LBS

## 2023-06-08 DIAGNOSIS — M79.89 LEG SWELLING: ICD-10-CM

## 2023-06-08 DIAGNOSIS — M79.604 PAIN OF RIGHT LOWER EXTREMITY: ICD-10-CM

## 2023-06-08 DIAGNOSIS — R60.0 EDEMA OF RIGHT LOWER EXTREMITY: ICD-10-CM

## 2023-06-08 DIAGNOSIS — N39.0 URINARY TRACT INFECTION WITHOUT HEMATURIA, SITE UNSPECIFIED: Primary | ICD-10-CM

## 2023-06-08 DIAGNOSIS — R03.0 ELEVATED BLOOD PRESSURE READING: ICD-10-CM

## 2023-06-08 DIAGNOSIS — R39.9 UTI SYMPTOMS: Primary | ICD-10-CM

## 2023-06-08 LAB
ALBUMIN SERPL-MCNC: 3.8 G/DL (ref 3.4–5)
ALBUMIN/GLOB SERPL: 1.1 {RATIO} (ref 1–2)
ALP LIVER SERPL-CCNC: 93 U/L
ALT SERPL-CCNC: 30 U/L
ANION GAP SERPL CALC-SCNC: 5 MMOL/L (ref 0–18)
AST SERPL-CCNC: 21 U/L (ref 15–37)
BASOPHILS # BLD AUTO: 0.05 X10(3) UL (ref 0–0.2)
BASOPHILS NFR BLD AUTO: 0.7 %
BILIRUB SERPL-MCNC: 0.4 MG/DL (ref 0.1–2)
BILIRUB UR QL STRIP.AUTO: NEGATIVE
BILIRUBIN: NEGATIVE
BUN BLD-MCNC: 16 MG/DL (ref 7–18)
CALCIUM BLD-MCNC: 9.1 MG/DL (ref 8.5–10.1)
CHLORIDE SERPL-SCNC: 107 MMOL/L (ref 98–112)
CLARITY UR REFRACT.AUTO: CLEAR
CO2 SERPL-SCNC: 28 MMOL/L (ref 21–32)
COLOR UR AUTO: YELLOW
CREAT BLD-MCNC: 0.76 MG/DL
EOSINOPHIL # BLD AUTO: 0.26 X10(3) UL (ref 0–0.7)
EOSINOPHIL NFR BLD AUTO: 3.6 %
ERYTHROCYTE [DISTWIDTH] IN BLOOD BY AUTOMATED COUNT: 13.5 %
GFR SERPLBLD BASED ON 1.73 SQ M-ARVRAT: 81 ML/MIN/1.73M2 (ref 60–?)
GLOBULIN PLAS-MCNC: 3.4 G/DL (ref 2.8–4.4)
GLUCOSE (URINE DIPSTICK): 100 MG/DL
GLUCOSE BLD-MCNC: 128 MG/DL (ref 70–99)
GLUCOSE UR STRIP.AUTO-MCNC: NEGATIVE MG/DL
HCT VFR BLD AUTO: 38.9 %
HGB BLD-MCNC: 12.5 G/DL
IMM GRANULOCYTES # BLD AUTO: 0.03 X10(3) UL (ref 0–1)
IMM GRANULOCYTES NFR BLD: 0.4 %
KETONES UR STRIP.AUTO-MCNC: NEGATIVE MG/DL
LYMPHOCYTES # BLD AUTO: 2.28 X10(3) UL (ref 1–4)
LYMPHOCYTES NFR BLD AUTO: 31.3 %
MCH RBC QN AUTO: 30.2 PG (ref 26–34)
MCHC RBC AUTO-ENTMCNC: 32.1 G/DL (ref 31–37)
MCV RBC AUTO: 94 FL
MONOCYTES # BLD AUTO: 0.71 X10(3) UL (ref 0.1–1)
MONOCYTES NFR BLD AUTO: 9.8 %
MULTISTIX LOT#: ABNORMAL NUMERIC
NEUTROPHILS # BLD AUTO: 3.95 X10 (3) UL (ref 1.5–7.7)
NEUTROPHILS # BLD AUTO: 3.95 X10(3) UL (ref 1.5–7.7)
NEUTROPHILS NFR BLD AUTO: 54.2 %
NITRITE UR QL STRIP.AUTO: NEGATIVE
NITRITE, URINE: NEGATIVE
NT-PROBNP SERPL-MCNC: 775 PG/ML (ref ?–450)
OSMOLALITY SERPL CALC.SUM OF ELEC: 293 MOSM/KG (ref 275–295)
PH UR STRIP.AUTO: 6 [PH] (ref 5–8)
PH, URINE: 5.5 (ref 4.5–8)
PLATELET # BLD AUTO: 248 10(3)UL (ref 150–450)
POTASSIUM SERPL-SCNC: 3.6 MMOL/L (ref 3.5–5.1)
PROT SERPL-MCNC: 7.2 G/DL (ref 6.4–8.2)
PROT UR STRIP.AUTO-MCNC: NEGATIVE MG/DL
PROTEIN (URINE DIPSTICK): 30 MG/DL
RBC # BLD AUTO: 4.14 X10(6)UL
RBC UR QL AUTO: NEGATIVE
SODIUM SERPL-SCNC: 140 MMOL/L (ref 136–145)
SP GR UR STRIP.AUTO: 1.01 (ref 1–1.03)
SPECIFIC GRAVITY: >=1.03 (ref 1–1.03)
URINE-COLOR: YELLOW
UROBILINOGEN UR STRIP.AUTO-MCNC: <2 MG/DL
UROBILINOGEN,SEMI-QN: 0.2 MG/DL (ref 0–1.9)
WBC # BLD AUTO: 7.3 X10(3) UL (ref 4–11)

## 2023-06-08 PROCEDURE — 87086 URINE CULTURE/COLONY COUNT: CPT | Performed by: NURSE PRACTITIONER

## 2023-06-08 PROCEDURE — 99285 EMERGENCY DEPT VISIT HI MDM: CPT

## 2023-06-08 PROCEDURE — 99213 OFFICE O/P EST LOW 20 MIN: CPT | Performed by: NURSE PRACTITIONER

## 2023-06-08 PROCEDURE — 99284 EMERGENCY DEPT VISIT MOD MDM: CPT

## 2023-06-08 PROCEDURE — 85025 COMPLETE CBC W/AUTO DIFF WBC: CPT | Performed by: EMERGENCY MEDICINE

## 2023-06-08 PROCEDURE — 83880 ASSAY OF NATRIURETIC PEPTIDE: CPT | Performed by: EMERGENCY MEDICINE

## 2023-06-08 PROCEDURE — 81001 URINALYSIS AUTO W/SCOPE: CPT | Performed by: EMERGENCY MEDICINE

## 2023-06-08 PROCEDURE — 81003 URINALYSIS AUTO W/O SCOPE: CPT | Performed by: NURSE PRACTITIONER

## 2023-06-08 PROCEDURE — 80053 COMPREHEN METABOLIC PANEL: CPT | Performed by: EMERGENCY MEDICINE

## 2023-06-09 ENCOUNTER — APPOINTMENT (OUTPATIENT)
Dept: ULTRASOUND IMAGING | Facility: HOSPITAL | Age: 78
End: 2023-06-09
Attending: EMERGENCY MEDICINE
Payer: MEDICARE

## 2023-06-09 VITALS
RESPIRATION RATE: 17 BRPM | HEART RATE: 81 BPM | OXYGEN SATURATION: 95 % | DIASTOLIC BLOOD PRESSURE: 94 MMHG | SYSTOLIC BLOOD PRESSURE: 190 MMHG | TEMPERATURE: 97 F

## 2023-06-09 PROCEDURE — 96365 THER/PROPH/DIAG IV INF INIT: CPT

## 2023-06-09 PROCEDURE — 93971 EXTREMITY STUDY: CPT | Performed by: EMERGENCY MEDICINE

## 2023-06-09 RX ORDER — PHENAZOPYRIDINE HYDROCHLORIDE 100 MG/1
100 TABLET, FILM COATED ORAL 3 TIMES DAILY PRN
Qty: 6 TABLET | Refills: 0 | Status: SHIPPED | OUTPATIENT
Start: 2023-06-09 | End: 2023-06-16

## 2023-06-09 RX ORDER — CEFPODOXIME PROXETIL 200 MG/1
200 TABLET, FILM COATED ORAL 2 TIMES DAILY
Qty: 28 TABLET | Refills: 0 | Status: SHIPPED | OUTPATIENT
Start: 2023-06-09 | End: 2023-06-23

## 2023-06-09 NOTE — ED INITIAL ASSESSMENT (HPI)
A&Ox3 ambulatory patient p/w worsening UTI    Patient recently completed ABx course for UTI and kidney infection    Reports increased foul odor in urine, increased flank pain and urgency +pain w/ urination    Denies any fevers/n/v/d  Endorses increased swelling in BLE, R > L  pmh asthma but increased PHILLIPS over the past 2 days    RR even/NL, speaking in full clear sentences

## 2023-06-20 ENCOUNTER — NURSE ONLY (OUTPATIENT)
Dept: INTERNAL MEDICINE CLINIC | Facility: CLINIC | Age: 78
End: 2023-06-20
Payer: MEDICARE

## 2023-06-20 DIAGNOSIS — E53.8 B12 DEFICIENCY: Primary | ICD-10-CM

## 2023-06-20 PROCEDURE — 96372 THER/PROPH/DIAG INJ SC/IM: CPT | Performed by: NURSE PRACTITIONER

## 2023-06-20 RX ORDER — CYANOCOBALAMIN 1000 UG/ML
1000 INJECTION, SOLUTION INTRAMUSCULAR; SUBCUTANEOUS ONCE
Status: COMPLETED | OUTPATIENT
Start: 2023-06-20 | End: 2023-06-20

## 2023-06-20 RX ADMIN — CYANOCOBALAMIN 1000 MCG: 1000 INJECTION, SOLUTION INTRAMUSCULAR; SUBCUTANEOUS at 08:49:00

## 2023-06-21 ENCOUNTER — PATIENT OUTREACH (OUTPATIENT)
Dept: CASE MANAGEMENT | Age: 78
End: 2023-06-21

## 2023-06-22 RX ORDER — LISINOPRIL 20 MG/1
TABLET ORAL
Qty: 90 TABLET | Refills: 3 | Status: SHIPPED | OUTPATIENT
Start: 2023-06-22

## 2023-06-23 ENCOUNTER — HOSPITAL ENCOUNTER (OUTPATIENT)
Dept: BONE DENSITY | Age: 78
Discharge: HOME | End: 2023-06-23
Attending: NURSE PRACTITIONER
Payer: MEDICARE

## 2023-06-23 ENCOUNTER — HOSPITAL ENCOUNTER (OUTPATIENT)
Dept: MAMMOGRAPHY | Age: 78
Discharge: HOME | End: 2023-06-23
Attending: NURSE PRACTITIONER
Payer: MEDICARE

## 2023-06-23 DIAGNOSIS — Z78.0 POSTMENOPAUSAL: ICD-10-CM

## 2023-06-23 DIAGNOSIS — Z12.31 ENCOUNTER FOR SCREENING MAMMOGRAM FOR MALIGNANT NEOPLASM OF BREAST: ICD-10-CM

## 2023-06-23 PROCEDURE — 77067 SCR MAMMO BI INCL CAD: CPT | Performed by: NURSE PRACTITIONER

## 2023-06-23 PROCEDURE — 77080 DXA BONE DENSITY AXIAL: CPT | Performed by: NURSE PRACTITIONER

## 2023-06-23 PROCEDURE — 77063 BREAST TOMOSYNTHESIS BI: CPT | Performed by: NURSE PRACTITIONER

## 2023-06-25 DIAGNOSIS — E55.9 VITAMIN D DEFICIENCY: Primary | ICD-10-CM

## 2023-06-30 ENCOUNTER — TELEPHONE (OUTPATIENT)
Dept: INTERNAL MEDICINE CLINIC | Facility: CLINIC | Age: 78
End: 2023-06-30

## 2023-07-01 RX ORDER — ALBUTEROL SULFATE 90 UG/1
2 AEROSOL, METERED RESPIRATORY (INHALATION) EVERY 6 HOURS PRN
Qty: 1 EACH | Refills: 3 | Status: SHIPPED | OUTPATIENT
Start: 2023-07-01

## 2023-07-03 ENCOUNTER — NURSE ONLY (OUTPATIENT)
Dept: INTERNAL MEDICINE CLINIC | Facility: CLINIC | Age: 78
End: 2023-07-03
Payer: MEDICARE

## 2023-07-03 DIAGNOSIS — E53.8 B12 DEFICIENCY: Primary | ICD-10-CM

## 2023-07-03 RX ORDER — CYANOCOBALAMIN 1000 UG/ML
1000 INJECTION, SOLUTION INTRAMUSCULAR; SUBCUTANEOUS ONCE
Status: COMPLETED | OUTPATIENT
Start: 2023-07-03 | End: 2023-07-03

## 2023-07-03 RX ADMIN — CYANOCOBALAMIN 1000 MCG: 1000 INJECTION, SOLUTION INTRAMUSCULAR; SUBCUTANEOUS at 09:00:00

## 2023-07-17 ENCOUNTER — NURSE ONLY (OUTPATIENT)
Dept: INTERNAL MEDICINE CLINIC | Facility: CLINIC | Age: 78
End: 2023-07-17
Payer: MEDICARE

## 2023-07-17 DIAGNOSIS — E53.8 B12 DEFICIENCY: Primary | ICD-10-CM

## 2023-07-17 PROCEDURE — 96372 THER/PROPH/DIAG INJ SC/IM: CPT | Performed by: NURSE PRACTITIONER

## 2023-07-17 RX ORDER — CYANOCOBALAMIN 1000 UG/ML
1000 INJECTION, SOLUTION INTRAMUSCULAR; SUBCUTANEOUS ONCE
Status: COMPLETED | OUTPATIENT
Start: 2023-07-17 | End: 2023-07-17

## 2023-07-17 RX ADMIN — CYANOCOBALAMIN 1000 MCG: 1000 INJECTION, SOLUTION INTRAMUSCULAR; SUBCUTANEOUS at 09:07:00

## 2023-07-18 ENCOUNTER — OFFICE VISIT (OUTPATIENT)
Dept: OBGYN CLINIC | Facility: CLINIC | Age: 78
End: 2023-07-18
Payer: MEDICARE

## 2023-07-18 VITALS
SYSTOLIC BLOOD PRESSURE: 128 MMHG | DIASTOLIC BLOOD PRESSURE: 78 MMHG | WEIGHT: 167.38 LBS | HEART RATE: 78 BPM | BODY MASS INDEX: 32 KG/M2

## 2023-07-18 DIAGNOSIS — Q38.6 FORDYCE SPOTS: Primary | ICD-10-CM

## 2023-07-18 PROCEDURE — 99212 OFFICE O/P EST SF 10 MIN: CPT | Performed by: NURSE PRACTITIONER

## 2023-07-18 NOTE — PROGRESS NOTES
Subjective:  69year old    Patient presents with:  Physical: Pt. States she possibly have genital warts    Pt here today with concerns about genital warts  Hx of hysterectomy for fibroids  Denies PMB  Last time she was sexually active was   Denies vaginal discharge, vaginal irritation and vaginal odor    Review of Systems:  Pertinent items are noted in the HPI. Objective:  /78   Pulse 78   Wt 167 lb 6 oz (75.9 kg)   LMP  (LMP Unknown)     Physical Examination:  General appearance: Well dressed, well nourished in no apparent distress  Neurologic/Psychiatric: Alert and oriented to person, place and time, mood normal, affect appropriate  Abdomen: Soft, non-tender, non-distended, no masses, no hepatosplenomegaly, no hernias, no inguinal lymphadenopathy  Pelvic:    External genitalia- + multiple yogi spots to bilateral labia majora, Bartholin's, urethra, skeins glands normal   Vagina- + atrophic vaginitis, No vaginal lesions   Cervix- No lesions, long/closed, no cervical motion tenderness   Uterus- surgically absent   Adnexa-  surgically absent    Assessment/Plan:      Diagnoses and all orders for this visit:    Bad Axe spots  - discussed with patient  - she can return with any questions or concerns      Return if symptoms worsen or fail to improve.

## 2023-07-31 ENCOUNTER — TELEPHONE (OUTPATIENT)
Dept: CASE MANAGEMENT | Age: 78
End: 2023-07-31

## 2023-07-31 ENCOUNTER — PATIENT OUTREACH (OUTPATIENT)
Dept: CASE MANAGEMENT | Age: 78
End: 2023-07-31

## 2023-07-31 DIAGNOSIS — E11.65 TYPE 2 DIABETES MELLITUS WITH HYPERGLYCEMIA, WITHOUT LONG-TERM CURRENT USE OF INSULIN (HCC): ICD-10-CM

## 2023-07-31 DIAGNOSIS — D80.3 IGG DEFICIENCY (HCC): Primary | ICD-10-CM

## 2023-07-31 DIAGNOSIS — Z85.3 HISTORY OF LEFT BREAST CANCER: ICD-10-CM

## 2023-07-31 DIAGNOSIS — I25.10 CORONARY ARTERY DISEASE INVOLVING NATIVE CORONARY ARTERY OF NATIVE HEART WITHOUT ANGINA PECTORIS: ICD-10-CM

## 2023-07-31 DIAGNOSIS — D80.3 IGG DEFICIENCY (HCC): ICD-10-CM

## 2023-07-31 DIAGNOSIS — Z86.718 HISTORY OF DVT (DEEP VEIN THROMBOSIS): ICD-10-CM

## 2023-07-31 DIAGNOSIS — F41.9 ANXIETY: ICD-10-CM

## 2023-07-31 DIAGNOSIS — J45.30 MILD PERSISTENT ASTHMA WITHOUT COMPLICATION: ICD-10-CM

## 2023-07-31 DIAGNOSIS — I10 BENIGN ESSENTIAL HYPERTENSION: ICD-10-CM

## 2023-07-31 DIAGNOSIS — K21.9 GASTROESOPHAGEAL REFLUX DISEASE WITHOUT ESOPHAGITIS: ICD-10-CM

## 2023-07-31 DIAGNOSIS — I70.0 ATHEROSCLEROSIS OF AORTA (HCC): ICD-10-CM

## 2023-07-31 DIAGNOSIS — E78.00 PURE HYPERCHOLESTEROLEMIA: ICD-10-CM

## 2023-07-31 DIAGNOSIS — M81.0 AGE-RELATED OSTEOPOROSIS WITHOUT CURRENT PATHOLOGICAL FRACTURE: Primary | ICD-10-CM

## 2023-07-31 DIAGNOSIS — G47.33 OSA ON CPAP: ICD-10-CM

## 2023-07-31 DIAGNOSIS — F33.1 MAJOR DEPRESSIVE DISORDER, RECURRENT EPISODE, MODERATE (HCC): ICD-10-CM

## 2023-07-31 NOTE — TELEPHONE ENCOUNTER
Triage: Good Day :)     Pt states she'd like a second opinion on IgG and is wondering if Berta Sanderson can refer her? Notified I would contact the office and a nurse will call her back for further assessment. Understanding verbalized by pt. Please address and thank you in advance!

## 2023-07-31 NOTE — PROGRESS NOTES
Spoke to Keily for CCM. Updates to patient care team/comments: yes, updated today. Patient reported changes in medications: together we reviewed with no updates. Med Adherence  Comment: pt reports taking all medications as prescribed. Health Maintenance:   Reviewed with pt. Zoster Vaccines(1 of 2) Never done- plans on getting   Diabetes Care Dilated Eye Exam due on 01/09/2021- reminded   COVID-19 Vaccine(4 - Godinez Peter series) due on 02/18/2022- will wait til fall   Influenza Vaccine(1) due on 10/01/2023  Diabetes Care A1C due on 10/07/2023  Colorectal Cancer Screening due on 02/27/2024  Diabetes Care: Microalb/Creat Ratio due on 04/07/2024  Annual Physical due on 04/10/2024  Diabetes Care: GFR due on 06/08/2024  DEXA Scan Completed  Annual Depression Screening Completed  Fall Risk Screening (Annual) Completed  Diabetes Care Foot Exam (Annual) Completed  Pneumococcal Vaccine: 65+ Years Completed  Mammogram Discontinued    Patient updates/concerns:   Listened to pt and provided support. States air quality has bothered her. She is staying indoors mostly on higher alert days. Orthopedics   Recent visit with Dr. Chiquis Salinas. Needs right knee replacement seeing Dr. Chiquis Salinas. Hematology/Oncology  Pt states she would like a second opinion on her IgG. She is asking if Ellis Crabtree could refer her to new Hematology/Oncology. Telephone encounter sent to [facility]  Recent visit with Dr. Jed Estevez. Per note:  Impression & Plan:      ?IgG deficiency  We extensively discussed this. I reviewed her past immunoglobulin levels and I discussed that she never had hypogammaglobulinemia. True hypogammaglobulinemia is associated with IgG < 500 and a higher risk of infection (recent studies have suggested possibly even <400), but she has never been below 500. Given this, I discussed with Keily that this does not justify continued IVIG use. Her recurrent pneumonia may simply be due to quiescent age-related immunity.  In addition, IVIG comes with it certain side effects, including possible increased risk of thrombosis, so given her hx of VTE/PE I could not recommend continuing IVIG. After discussion, Alia Guerra agreed to stop IVIG. We will recheck immunoglobulin levels today, and in 3 months. If IgG continues to be >500 in 3 months, we will remove her port. 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 Last PE was in 2014. Hx breast cancer  - 1999 s/p lumpectomy/RT + 5 years endocrine therapy. Since then no evidence of recurrence  - continue annual bilateral mammograms     Hx Iron deficiency  - recheck iron levels today  - colonoscopy by Dr Alfredo Bryan on 2/2023 showed polyps, grade II internal hemorrhoids. To repeat colonoscopy in 1 year due to polyp burden ~2/2024    Goals/Action Plan: Active goal from previous outreach: Improve energy level and fatigue. Weight management. Watching her portion intake but states she only usually eats one meal due to Ozempic. Patient reported progress toward goal: ongoing        Patient Reported New Barriers And Concerns:   States she is always tired has no energy. IgG. - Plan for overcoming all barriers: second opinion on IgG. Care Managers Interventions: sent telephone encounter to [facility]. Encouraged patient:   Self care: Take the time to do the things you love. Nutrition:  Good nutrition helps us to maintain our weight, fight off infection, and help reduce the risk of developing other chronic issues. Physical activity:  Physical activity is important to help maintain independence and improve quality of life.      Future Appointments:   Future Appointments   Date Time Provider Gomez Miller   8/11/2023  9:45 AM Park Cockayne, MD [facility]   8/14/2023 11:00 AM [facility] NURSE [facility]   8/18/2023  9:30 AM [address]   8/18/2023 10:00 AM Elmer Webb MD [facility] [employer] [facility] 10/4/2023 10:30 AM Chandni Farley DO EMGRHEUMHBSN [facility]         Next Care Manager Follow Up Date: in 1 month. Reason For Follow Up: review progress and or barriers towards patient's goals. Time Spent This Encounter Total: 21 min medical record review, telephone communication, care plan updates where needed, education, goals, and action plan recreation/update. Provided acknowledgment and validation to patient's concerns.    Monthly Minute Total including today: 21  Physical assessment, complete health history, and need for CCM established by Evelyn Cain MD.

## 2023-08-01 RX ORDER — SEMAGLUTIDE 0.68 MG/ML
0.5 INJECTION, SOLUTION SUBCUTANEOUS WEEKLY
Qty: 9 ML | Refills: 0 | Status: SHIPPED | OUTPATIENT
Start: 2023-08-01

## 2023-08-01 NOTE — TELEPHONE ENCOUNTER
Called and spoke to pt. Jo her SD recommends Dr. Marko Alpers. Pt would like info sent via [facility]. Info sent.

## 2023-08-01 NOTE — TELEPHONE ENCOUNTER
Last visit annual 4/10/23        Component  Ref Range & Units 4/7/23  9:19 AM   HgbA1C  <5.7 % 7.0 High        No future visits scheduled

## 2023-08-09 ENCOUNTER — APPOINTMENT (OUTPATIENT)
Dept: CT IMAGING | Facility: HOSPITAL | Age: 78
End: 2023-08-09
Payer: OTHER MISCELLANEOUS

## 2023-08-09 ENCOUNTER — HOSPITAL ENCOUNTER (EMERGENCY)
Facility: HOSPITAL | Age: 78
Discharge: HOME | End: 2023-08-09
Attending: EMERGENCY MEDICINE
Payer: OTHER MISCELLANEOUS

## 2023-08-09 ENCOUNTER — APPOINTMENT (OUTPATIENT)
Dept: CT IMAGING | Facility: HOSPITAL | Age: 78
End: 2023-08-09
Attending: EMERGENCY MEDICINE
Payer: OTHER MISCELLANEOUS

## 2023-08-09 ENCOUNTER — APPOINTMENT (OUTPATIENT)
Dept: GENERAL RADIOLOGY | Facility: HOSPITAL | Age: 78
End: 2023-08-09
Attending: EMERGENCY MEDICINE
Payer: OTHER MISCELLANEOUS

## 2023-08-09 VITALS
DIASTOLIC BLOOD PRESSURE: 93 MMHG | HEART RATE: 88 BPM | OXYGEN SATURATION: 96 % | WEIGHT: 160 LBS | SYSTOLIC BLOOD PRESSURE: 167 MMHG | TEMPERATURE: 97 F | HEIGHT: 61 IN | RESPIRATION RATE: 18 BRPM | BODY MASS INDEX: 30.21 KG/M2
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
## 2023-08-09 DIAGNOSIS — S46.001A ROTATOR CUFF INJURY, RIGHT, INITIAL ENCOUNTER: ICD-10-CM

## 2023-08-09 DIAGNOSIS — S80.02XA CONTUSION OF LEFT KNEE, INITIAL ENCOUNTER: ICD-10-CM

## 2023-08-09 PROCEDURE — 73030 X-RAY EXAM OF SHOULDER: CPT | Performed by: EMERGENCY MEDICINE

## 2023-08-09 PROCEDURE — 96374 THER/PROPH/DIAG INJ IV PUSH: CPT

## 2023-08-09 PROCEDURE — 96376 TX/PRO/DX INJ SAME DRUG ADON: CPT

## 2023-08-09 PROCEDURE — 99285 EMERGENCY DEPT VISIT HI MDM: CPT

## 2023-08-09 PROCEDURE — 72125 CT NECK SPINE W/O DYE: CPT | Performed by: EMERGENCY MEDICINE

## 2023-08-09 PROCEDURE — 70450 CT HEAD/BRAIN W/O DYE: CPT

## 2023-08-09 PROCEDURE — 73560 X-RAY EXAM OF KNEE 1 OR 2: CPT | Performed by: EMERGENCY MEDICINE

## 2023-08-09 PROCEDURE — 96361 HYDRATE IV INFUSION ADD-ON: CPT

## 2023-08-09 PROCEDURE — 96375 TX/PRO/DX INJ NEW DRUG ADDON: CPT

## 2023-08-09 RX ORDER — HYDROMORPHONE HYDROCHLORIDE 1 MG/ML
0.5 INJECTION, SOLUTION INTRAMUSCULAR; INTRAVENOUS; SUBCUTANEOUS ONCE
Status: COMPLETED | OUTPATIENT
Start: 2023-08-09 | End: 2023-08-09

## 2023-08-09 RX ORDER — SODIUM CHLORIDE 9 MG/ML
INJECTION, SOLUTION INTRAVENOUS CONTINUOUS
Status: CANCELLED | OUTPATIENT
Start: 2023-08-09

## 2023-08-09 RX ORDER — HYDROMORPHONE HYDROCHLORIDE 1 MG/ML
0.5 INJECTION, SOLUTION INTRAMUSCULAR; INTRAVENOUS; SUBCUTANEOUS ONCE
Status: CANCELLED | OUTPATIENT
Start: 2023-08-09 | End: 2023-08-09

## 2023-08-09 RX ORDER — ONDANSETRON 2 MG/ML
INJECTION INTRAMUSCULAR; INTRAVENOUS
Status: COMPLETED
Start: 2023-08-09 | End: 2023-08-09

## 2023-08-09 RX ORDER — HYDROMORPHONE HYDROCHLORIDE 1 MG/ML
INJECTION, SOLUTION INTRAMUSCULAR; INTRAVENOUS; SUBCUTANEOUS EVERY 30 MIN PRN
Status: DISCONTINUED | OUTPATIENT
Start: 2023-08-09 | End: 2023-08-09

## 2023-08-09 RX ORDER — ONDANSETRON 2 MG/ML
4 INJECTION INTRAMUSCULAR; INTRAVENOUS ONCE
Status: COMPLETED | OUTPATIENT
Start: 2023-08-09 | End: 2023-08-09

## 2023-08-09 RX ORDER — ONDANSETRON 2 MG/ML
4 INJECTION INTRAMUSCULAR; INTRAVENOUS EVERY 4 HOURS PRN
Status: DISCONTINUED | OUTPATIENT
Start: 2023-08-09 | End: 2023-08-09

## 2023-08-09 NOTE — ED INITIAL ASSESSMENT (HPI)
A&Ox3 patient p/w fall    Patient had mechanical fall, landing on L side of body hitting L knee and posterior head    No LOC, patient on xarelto    Denies any cp/sob/n/v/d/c/f/LH/vision changes/urinary sx at this time    RR even/NL, speaking in full clear sentences, ambulatory w/ steady gait

## 2023-08-10 NOTE — DISCHARGE INSTRUCTIONS
Keep the affected area elevated, rested, protected. Ice for 10 minutes every hour while awake for one to 2 days. Avoid any activity which may further aggravate the injury. Use the sling. Keep moving your finger and wrist to encourage good circulation. Follow-up with orthopedics this week. Return for any problems.

## 2023-08-14 ENCOUNTER — NURSE ONLY (OUTPATIENT)
Dept: INTERNAL MEDICINE CLINIC | Facility: CLINIC | Age: 78
End: 2023-08-14
Payer: MEDICARE

## 2023-08-14 ENCOUNTER — PATIENT OUTREACH (OUTPATIENT)
Dept: CASE MANAGEMENT | Age: 78
End: 2023-08-14

## 2023-08-14 DIAGNOSIS — E53.8 B12 DEFICIENCY: Primary | ICD-10-CM

## 2023-08-14 PROCEDURE — 96372 THER/PROPH/DIAG INJ SC/IM: CPT | Performed by: NURSE PRACTITIONER

## 2023-08-14 RX ORDER — CYANOCOBALAMIN 1000 UG/ML
1000 INJECTION, SOLUTION INTRAMUSCULAR; SUBCUTANEOUS ONCE
Status: COMPLETED | OUTPATIENT
Start: 2023-08-14 | End: 2023-08-14

## 2023-08-14 RX ADMIN — CYANOCOBALAMIN 1000 MCG: 1000 INJECTION, SOLUTION INTRAMUSCULAR; SUBCUTANEOUS at 11:10:00

## 2023-08-14 NOTE — PROGRESS NOTES
1st attempt ED f/up apt request     Angi FU  PCP  [address] [facility]  [phone]  Apt: Aug 23 @10:40am     [address]  [place] [address]  [phone]  Apt: Aug 18 @9:50am     Get imagine on disk: [phone]    Confirmed w/ pt  Closing encounter

## 2023-08-18 ENCOUNTER — OFFICE VISIT (OUTPATIENT)
Dept: HEMATOLOGY/ONCOLOGY | Facility: HOSPITAL | Age: 78
End: 2023-08-18
Attending: INTERNAL MEDICINE
Payer: MEDICARE

## 2023-08-18 VITALS
WEIGHT: 166 LBS | BODY MASS INDEX: 31 KG/M2 | HEART RATE: 81 BPM | OXYGEN SATURATION: 97 % | TEMPERATURE: 98 F | SYSTOLIC BLOOD PRESSURE: 153 MMHG | RESPIRATION RATE: 16 BRPM | DIASTOLIC BLOOD PRESSURE: 79 MMHG

## 2023-08-18 DIAGNOSIS — D80.1 HYPOGAMMAGLOBULINEMIA (HCC): Primary | ICD-10-CM

## 2023-08-18 DIAGNOSIS — E53.8 B12 DEFICIENCY: ICD-10-CM

## 2023-08-18 DIAGNOSIS — Z86.711 HISTORY OF PULMONARY EMBOLISM: ICD-10-CM

## 2023-08-18 LAB
ALBUMIN SERPL-MCNC: 3.6 G/DL (ref 3.4–5)
ALBUMIN/GLOB SERPL: 1.1 {RATIO} (ref 1–2)
ALP LIVER SERPL-CCNC: 97 U/L
ALT SERPL-CCNC: 27 U/L
ANION GAP SERPL CALC-SCNC: 6 MMOL/L (ref 0–18)
AST SERPL-CCNC: 19 U/L (ref 15–37)
BASOPHILS # BLD AUTO: 0.04 X10(3) UL (ref 0–0.2)
BASOPHILS NFR BLD AUTO: 0.7 %
BILIRUB SERPL-MCNC: 0.5 MG/DL (ref 0.1–2)
BUN BLD-MCNC: 20 MG/DL (ref 7–18)
CALCIUM BLD-MCNC: 8.7 MG/DL (ref 8.5–10.1)
CHLORIDE SERPL-SCNC: 106 MMOL/L (ref 98–112)
CO2 SERPL-SCNC: 26 MMOL/L (ref 21–32)
CREAT BLD-MCNC: 0.83 MG/DL
EGFRCR SERPLBLD CKD-EPI 2021: 73 ML/MIN/1.73M2 (ref 60–?)
EOSINOPHIL # BLD AUTO: 0.38 X10(3) UL (ref 0–0.7)
EOSINOPHIL NFR BLD AUTO: 6.9 %
ERYTHROCYTE [DISTWIDTH] IN BLOOD BY AUTOMATED COUNT: 12.2 %
FASTING STATUS PATIENT QL REPORTED: NO
GLOBULIN PLAS-MCNC: 3.2 G/DL (ref 2.8–4.4)
GLUCOSE BLD-MCNC: 150 MG/DL (ref 70–99)
HCT VFR BLD AUTO: 37.1 %
HGB BLD-MCNC: 12.2 G/DL
IGA SERPL-MCNC: 116 MG/DL (ref 70–312)
IGM SERPL-MCNC: 50.9 MG/DL (ref 43–279)
IMM GRANULOCYTES # BLD AUTO: 0.01 X10(3) UL (ref 0–1)
IMM GRANULOCYTES NFR BLD: 0.2 %
IMMUNOGLOBULIN PNL SER-MCNC: 510 MG/DL (ref 791–1643)
LYMPHOCYTES # BLD AUTO: 1.9 X10(3) UL (ref 1–4)
LYMPHOCYTES NFR BLD AUTO: 34.4 %
MCH RBC QN AUTO: 29.8 PG (ref 26–34)
MCHC RBC AUTO-ENTMCNC: 32.9 G/DL (ref 31–37)
MCV RBC AUTO: 90.5 FL
MONOCYTES # BLD AUTO: 0.54 X10(3) UL (ref 0.1–1)
MONOCYTES NFR BLD AUTO: 9.8 %
NEUTROPHILS # BLD AUTO: 2.66 X10 (3) UL (ref 1.5–7.7)
NEUTROPHILS # BLD AUTO: 2.66 X10(3) UL (ref 1.5–7.7)
NEUTROPHILS NFR BLD AUTO: 48 %
OSMOLALITY SERPL CALC.SUM OF ELEC: 291 MOSM/KG (ref 275–295)
PLATELET # BLD AUTO: 167 10(3)UL (ref 150–450)
POTASSIUM SERPL-SCNC: 3.3 MMOL/L (ref 3.5–5.1)
PROT SERPL-MCNC: 6.8 G/DL (ref 6.4–8.2)
RBC # BLD AUTO: 4.1 X10(6)UL
SODIUM SERPL-SCNC: 138 MMOL/L (ref 136–145)
WBC # BLD AUTO: 5.5 X10(3) UL (ref 4–11)

## 2023-08-18 PROCEDURE — 36591 DRAW BLOOD OFF VENOUS DEVICE: CPT

## 2023-08-18 PROCEDURE — 99214 OFFICE O/P EST MOD 30 MIN: CPT | Performed by: INTERNAL MEDICINE

## 2023-08-18 NOTE — PROGRESS NOTES
Education Record    Learner:  Patient    Disease / Diagnosis:     Barriers / Limitations:  None   Comments:    Method:  Discussion   Comments:    General Topics:  Medication, Pain, and Plan of care reviewed   Comments:    Outcome:  Shows understanding   Comments:    Here for follow up. She has since stopped IVIG treatments. She did fall yesterday and is seeing her pain doctor today. Appetite and energy are unchanged.

## 2023-08-21 ENCOUNTER — HOSPITAL ENCOUNTER (EMERGENCY)
Facility: HOSPITAL | Age: 78
Discharge: HOME | End: 2023-08-21
Attending: EMERGENCY MEDICINE
Payer: MEDICARE

## 2023-08-21 VITALS
RESPIRATION RATE: 20 BRPM | SYSTOLIC BLOOD PRESSURE: 158 MMHG | OXYGEN SATURATION: 94 % | HEART RATE: 94 BPM | DIASTOLIC BLOOD PRESSURE: 75 MMHG | TEMPERATURE: 98 F

## 2023-08-21 DIAGNOSIS — R04.0 EPISTAXIS: Primary | ICD-10-CM

## 2023-08-21 LAB
BASOPHILS # BLD AUTO: 0.07 X10(3) UL (ref 0–0.2)
BASOPHILS NFR BLD AUTO: 0.8 %
EOSINOPHIL # BLD AUTO: 0.32 X10(3) UL (ref 0–0.7)
EOSINOPHIL NFR BLD AUTO: 3.5 %
ERYTHROCYTE [DISTWIDTH] IN BLOOD BY AUTOMATED COUNT: 12.1 %
HCT VFR BLD AUTO: 40 %
HGB BLD-MCNC: 13.2 G/DL
IMM GRANULOCYTES # BLD AUTO: 0.02 X10(3) UL (ref 0–1)
IMM GRANULOCYTES NFR BLD: 0.2 %
LYMPHOCYTES # BLD AUTO: 1.97 X10(3) UL (ref 1–4)
LYMPHOCYTES NFR BLD AUTO: 21.4 %
MCH RBC QN AUTO: 30.4 PG (ref 26–34)
MCHC RBC AUTO-ENTMCNC: 33 G/DL (ref 31–37)
MCV RBC AUTO: 92.2 FL
MONOCYTES # BLD AUTO: 0.63 X10(3) UL (ref 0.1–1)
MONOCYTES NFR BLD AUTO: 6.9 %
NEUTROPHILS # BLD AUTO: 6.18 X10 (3) UL (ref 1.5–7.7)
NEUTROPHILS # BLD AUTO: 6.18 X10(3) UL (ref 1.5–7.7)
NEUTROPHILS NFR BLD AUTO: 67.2 %
PLATELET # BLD AUTO: 249 10(3)UL (ref 150–450)
PLATELET MORPHOLOGY: NORMAL
RBC # BLD AUTO: 4.34 X10(6)UL
WBC # BLD AUTO: 9.2 X10(3) UL (ref 4–11)

## 2023-08-21 PROCEDURE — 96374 THER/PROPH/DIAG INJ IV PUSH: CPT

## 2023-08-21 PROCEDURE — 93005 ELECTROCARDIOGRAM TRACING: CPT

## 2023-08-21 PROCEDURE — 99284 EMERGENCY DEPT VISIT MOD MDM: CPT

## 2023-08-21 PROCEDURE — 30901 CONTROL OF NOSEBLEED: CPT

## 2023-08-21 PROCEDURE — 96361 HYDRATE IV INFUSION ADD-ON: CPT

## 2023-08-21 PROCEDURE — 85025 COMPLETE CBC W/AUTO DIFF WBC: CPT

## 2023-08-21 PROCEDURE — 93010 ELECTROCARDIOGRAM REPORT: CPT

## 2023-08-21 PROCEDURE — 85025 COMPLETE CBC W/AUTO DIFF WBC: CPT | Performed by: EMERGENCY MEDICINE

## 2023-08-21 RX ORDER — OXYMETAZOLINE HYDROCHLORIDE 0.05 G/100ML
1 SPRAY NASAL EVERY 12 HOURS PRN
Status: DISCONTINUED | OUTPATIENT
Start: 2023-08-21 | End: 2023-08-21

## 2023-08-21 RX ORDER — SODIUM CHLORIDE 9 MG/ML
INJECTION, SOLUTION INTRAVENOUS CONTINUOUS
Status: DISCONTINUED | OUTPATIENT
Start: 2023-08-21 | End: 2023-08-21

## 2023-08-21 RX ORDER — ONDANSETRON 2 MG/ML
4 INJECTION INTRAMUSCULAR; INTRAVENOUS
Status: DISCONTINUED | OUTPATIENT
Start: 2023-08-21 | End: 2023-08-21

## 2023-08-21 RX ORDER — AMOXICILLIN 875 MG/1
875 TABLET, COATED ORAL 2 TIMES DAILY
Qty: 10 TABLET | Refills: 0 | Status: SHIPPED | OUTPATIENT
Start: 2023-08-21 | End: 2023-08-26

## 2023-08-21 RX ORDER — ONDANSETRON 8 MG/1
8 TABLET, ORALLY DISINTEGRATING ORAL EVERY 6 HOURS PRN
Qty: 10 TABLET | Refills: 0 | Status: SHIPPED | OUTPATIENT
Start: 2023-08-21

## 2023-08-21 NOTE — ED INITIAL ASSESSMENT (HPI)
Patient reports c/o nosebleed for approximately one hour, patient is on Xarelto. Patient is tachycardiac in Triage, charge RN notified, nose clip placed.

## 2023-08-21 NOTE — ED QUICK NOTES
Patient pulled emergency alarm in bathroom, patient feeling faint, patient taken via wheelchair to assessment for vitals, nose bleed stopped.  Charge RN notified

## 2023-08-22 LAB
ATRIAL RATE: 122 BPM
P AXIS: 45 DEGREES
P-R INTERVAL: 170 MS
Q-T INTERVAL: 294 MS
QRS DURATION: 64 MS
QTC CALCULATION (BEZET): 418 MS
R AXIS: -21 DEGREES
T AXIS: 71 DEGREES
VENTRICULAR RATE: 122 BPM

## 2023-08-22 NOTE — DISCHARGE INSTRUCTIONS
Avoid bending, stooping, and straining.   No picking, rubbing, or blowing the nose  Rest  Contact the ENT specialist tomorrow to arrange follow-up reexamination packing removal later this week  Amoxicillin to prevent infection    Return if worse

## 2023-08-22 NOTE — ED QUICK NOTES
Rounding Completed    Plan of Care reviewed. Waiting for MD to eval  Elimination needs assessed. Provided warm blanket    Bed is locked and in lowest position. Call light within reach.

## 2023-08-23 ENCOUNTER — LAB ENCOUNTER (OUTPATIENT)
Dept: LAB | Age: 78
End: 2023-08-23
Attending: NURSE PRACTITIONER
Payer: MEDICARE

## 2023-08-23 ENCOUNTER — OFFICE VISIT (OUTPATIENT)
Dept: INTERNAL MEDICINE CLINIC | Facility: CLINIC | Age: 78
End: 2023-08-23
Payer: MEDICARE

## 2023-08-23 VITALS
RESPIRATION RATE: 17 BRPM | SYSTOLIC BLOOD PRESSURE: 118 MMHG | BODY MASS INDEX: 30.96 KG/M2 | OXYGEN SATURATION: 99 % | DIASTOLIC BLOOD PRESSURE: 70 MMHG | WEIGHT: 164 LBS | HEART RATE: 78 BPM | TEMPERATURE: 97 F | HEIGHT: 61 IN

## 2023-08-23 DIAGNOSIS — E78.00 PURE HYPERCHOLESTEROLEMIA: ICD-10-CM

## 2023-08-23 DIAGNOSIS — E55.9 VITAMIN D DEFICIENCY: ICD-10-CM

## 2023-08-23 DIAGNOSIS — R04.0 EPISTAXIS: Primary | ICD-10-CM

## 2023-08-23 DIAGNOSIS — F33.1 MAJOR DEPRESSIVE DISORDER, RECURRENT EPISODE, MODERATE (HCC): Chronic | ICD-10-CM

## 2023-08-23 DIAGNOSIS — J45.30 MILD PERSISTENT ASTHMA WITHOUT COMPLICATION: ICD-10-CM

## 2023-08-23 DIAGNOSIS — E87.6 HYPOKALEMIA: ICD-10-CM

## 2023-08-23 DIAGNOSIS — I25.10 CORONARY ARTERY DISEASE INVOLVING NATIVE CORONARY ARTERY OF NATIVE HEART WITHOUT ANGINA PECTORIS: ICD-10-CM

## 2023-08-23 DIAGNOSIS — R04.0 EPISTAXIS: ICD-10-CM

## 2023-08-23 DIAGNOSIS — D80.3 IGG DEFICIENCY (HCC): ICD-10-CM

## 2023-08-23 DIAGNOSIS — E53.8 B12 DEFICIENCY: ICD-10-CM

## 2023-08-23 DIAGNOSIS — E11.65 TYPE 2 DIABETES MELLITUS WITH HYPERGLYCEMIA, WITHOUT LONG-TERM CURRENT USE OF INSULIN (HCC): ICD-10-CM

## 2023-08-23 DIAGNOSIS — R06.09 DOE (DYSPNEA ON EXERTION): ICD-10-CM

## 2023-08-23 DIAGNOSIS — I10 BENIGN ESSENTIAL HYPERTENSION: ICD-10-CM

## 2023-08-23 DIAGNOSIS — E04.9 ENLARGED THYROID: ICD-10-CM

## 2023-08-23 LAB
ANION GAP SERPL CALC-SCNC: 6 MMOL/L (ref 0–18)
BASOPHILS # BLD AUTO: 0.06 X10(3) UL (ref 0–0.2)
BASOPHILS NFR BLD AUTO: 0.7 %
BUN BLD-MCNC: 27 MG/DL (ref 7–18)
CALCIUM BLD-MCNC: 9.5 MG/DL (ref 8.5–10.1)
CARTRIDGE LOT#: 507 NUMERIC
CHLORIDE SERPL-SCNC: 107 MMOL/L (ref 98–112)
CO2 SERPL-SCNC: 25 MMOL/L (ref 21–32)
CREAT BLD-MCNC: 0.98 MG/DL
EGFRCR SERPLBLD CKD-EPI 2021: 60 ML/MIN/1.73M2 (ref 60–?)
EOSINOPHIL # BLD AUTO: 0.22 X10(3) UL (ref 0–0.7)
EOSINOPHIL NFR BLD AUTO: 2.7 %
ERYTHROCYTE [DISTWIDTH] IN BLOOD BY AUTOMATED COUNT: 12.4 %
FASTING STATUS PATIENT QL REPORTED: YES
GLUCOSE BLD-MCNC: 112 MG/DL (ref 70–99)
HCT VFR BLD AUTO: 36.9 %
HEMOGLOBIN A1C: 7.4 % (ref 4.3–5.6)
HGB BLD-MCNC: 11.7 G/DL
IMM GRANULOCYTES # BLD AUTO: 0.04 X10(3) UL (ref 0–1)
IMM GRANULOCYTES NFR BLD: 0.5 %
LYMPHOCYTES # BLD AUTO: 1.95 X10(3) UL (ref 1–4)
LYMPHOCYTES NFR BLD AUTO: 24 %
MCH RBC QN AUTO: 30.1 PG (ref 26–34)
MCHC RBC AUTO-ENTMCNC: 31.7 G/DL (ref 31–37)
MCV RBC AUTO: 94.9 FL
MONOCYTES # BLD AUTO: 0.56 X10(3) UL (ref 0.1–1)
MONOCYTES NFR BLD AUTO: 6.9 %
NEUTROPHILS # BLD AUTO: 5.31 X10 (3) UL (ref 1.5–7.7)
NEUTROPHILS # BLD AUTO: 5.31 X10(3) UL (ref 1.5–7.7)
NEUTROPHILS NFR BLD AUTO: 65.2 %
OSMOLALITY SERPL CALC.SUM OF ELEC: 292 MOSM/KG (ref 275–295)
PLATELET # BLD AUTO: 173 10(3)UL (ref 150–450)
POTASSIUM SERPL-SCNC: 5.1 MMOL/L (ref 3.5–5.1)
RBC # BLD AUTO: 3.89 X10(6)UL
SODIUM SERPL-SCNC: 138 MMOL/L (ref 136–145)
VIT D+METAB SERPL-MCNC: 43.4 NG/ML (ref 30–100)
WBC # BLD AUTO: 8.1 X10(3) UL (ref 4–11)

## 2023-08-23 PROCEDURE — 83036 HEMOGLOBIN GLYCOSYLATED A1C: CPT | Performed by: NURSE PRACTITIONER

## 2023-08-23 PROCEDURE — 1125F AMNT PAIN NOTED PAIN PRSNT: CPT | Performed by: NURSE PRACTITIONER

## 2023-08-23 PROCEDURE — 99214 OFFICE O/P EST MOD 30 MIN: CPT | Performed by: NURSE PRACTITIONER

## 2023-08-23 PROCEDURE — 80048 BASIC METABOLIC PNL TOTAL CA: CPT

## 2023-08-23 PROCEDURE — 85025 COMPLETE CBC W/AUTO DIFF WBC: CPT

## 2023-08-23 PROCEDURE — 82306 VITAMIN D 25 HYDROXY: CPT

## 2023-08-23 PROCEDURE — 36415 COLL VENOUS BLD VENIPUNCTURE: CPT

## 2023-08-23 RX ORDER — DULOXETIN HYDROCHLORIDE 60 MG/1
60 CAPSULE, DELAYED RELEASE ORAL DAILY
Qty: 90 CAPSULE | Refills: 0 | Status: SHIPPED | OUTPATIENT
Start: 2023-08-23

## 2023-08-24 ENCOUNTER — OFFICE VISIT (OUTPATIENT)
Facility: LOCATION | Age: 78
End: 2023-08-24
Payer: MEDICARE

## 2023-08-24 DIAGNOSIS — H93.13 TINNITUS OF BOTH EARS: ICD-10-CM

## 2023-08-24 DIAGNOSIS — R04.0 EPISTAXIS: Primary | ICD-10-CM

## 2023-08-24 DIAGNOSIS — H90.3 SENSORINEURAL HEARING LOSS, BILATERAL: Primary | ICD-10-CM

## 2023-08-24 PROCEDURE — 92557 COMPREHENSIVE HEARING TEST: CPT | Performed by: AUDIOLOGIST

## 2023-08-24 PROCEDURE — 31231 NASAL ENDOSCOPY DX: CPT | Performed by: OTOLARYNGOLOGY

## 2023-08-24 PROCEDURE — 99204 OFFICE O/P NEW MOD 45 MIN: CPT | Performed by: OTOLARYNGOLOGY

## 2023-08-24 PROCEDURE — 92567 TYMPANOMETRY: CPT | Performed by: AUDIOLOGIST

## 2023-08-24 NOTE — PROGRESS NOTES
Elis Dewittbritt was seen for an audiometric evaluation and tympanogram today. Referred back to physician.     Felicia Baez

## 2023-08-24 NOTE — PROGRESS NOTES
[facility] MEDICAL [facility], [facility] Oneyda Gottlieb    Report of Consultation    Date of Consult: 8/24/2023     Reason for Consultation:   Right-sided nosebleed. History of Present Illness:   Patient is a 69year old female who is being seen for right-sided nasal packing. Patient has a history of using Xarelto and had a nosebleed earlier this week which was not able to be controlled. She had a pack placed in the emergency room. Denies fevers chills bruising. She has stopped the Xarelto for the last 3 days. She also complains of decreased hearing and is wondering about hearing aids. Past Medical History  Past Medical History:   Diagnosis Date    Abdominal distention     Abdominal pain     Anemia     Anesthesia complication     difficulty waking up after gallbladder surgery    Anxiety     Arthritis 2000    Asthma     Back pain 1966    Back problem     Black stools     Bloating     Blurred vision     Breast CA ([address] 1999    left lump and rad. Cancer [facility]) 1999    breast, basal cell    Cervical radiculopathy     Cervical spinal stenosis     COVID     12/2020 no sx    Deep vein thrombosis (HCC)     Diabetes (HCC)     Diabetes mellitus (HCC)     Diarrhea, unspecified     Dizziness     Easy bruising     On Xeralto    Esophageal reflux     Exposure to medical diagnostic radiation     Fatigue     Fitting and adjustment of vascular catheter     Headache disorder     Hearing loss     Heart attack ([address]     Heartburn     Hemorrhoids     High blood pressure     History of blood clots     History of COVID-19 12/01/2020    not hospitalized, no continued symptoms, cough, fever, loss of taste    History of depression     IgG deficiency ([address]     ? ?? pt states she gets IgG infusions due to lack of IgG, unable to fight off infections    Indigestion     Leaking of urine     Migraines     Muscle weakness     Nausea     Neuropathy     rt foot bilateral hands    Osteoarthritis     Pain in joints 2000    Personal history of malignant neoplasm of breast     Pneumonia due to organism     Pulmonary embolism (HCC)     Pulmonary embolism (HCC)     S/P cervical spinal fusion     Shortness of breath     Sleep apnea     cpap - not using - machine is broken    Stress     Uncomfortable fullness after meals     Visual impairment     glasses/contacts    Wears glasses     Weight gain        Past Surgical History  Past Surgical History:   Procedure Laterality Date    ABDOMEN SURGERY PROC UNLISTED      duodenal biopsy    BACK SURGERY  2/22    Neck surgery    BREAST SURGERY      CHOLECYSTECTOMY      COLONOSCOPY  10/1/10, 4/21/17    COLONOSCOPY      Due now Sept 2022    COLONOSCOPY N/A 2/27/2023    Procedure: COLONOSCOPY;  Surgeon: Evelyn Vang MD;  Location: [facility] ENDOSCOPY    EGD  04/21/2017    FEMUR/KNEE SURG UNLISTED      right knee arthroscopy    HEMORRHOIDECTOMY,INT/EXT,SIMPLE      HERNIA SURGERY      HYSTERECTOMY  1984    total hystero. KNEE REPLACEMENT SURGERY      LUMPECTOMY LEFT Left 1999    lt lumpectomy and radiation. OOPHORECTOMY Bilateral 1984    total hystero. OTHER  11/2021    left wrist, ORIF Dr. Artice Sacks    OTHER  02/21/2022    CERVICAL 5 AND CERVICAL 6 LAMINECTOMIES, PARTIAL CERVICAL 7 LAMINECTOMY, LEFT CERVICAL 5/CERVICAL 6 AND CERVICAL 6/CERVICAL 7 FORAMINOTOMIES, CERVICAL 6 AND CERVICAL 7 ARTHRODESIS, AUTOGRAFT, ALLOGRAFT    OTHER SURGICAL HISTORY  2010    endoscopy - papillotomy    OTHER SURGICAL HISTORY  2016     under right eye, skin cancer removed    OTHER SURGICAL HISTORY  2019    knee    OTHER SURGICAL HISTORY  02/2022    neck    PORT FOR VASCULAR ACCESS      RADIATION LEFT Left 1999    left lumpectomy and radiation.     REMOVAL GALLBLADDER      REPAIR ING HERNIA,5+Y/O,REDUCIBL      SIGMOIDOSCOPY,DIAGNOSTIC      SPINE SURGERY PROCEDURE UNLISTED      TONSILLECTOMY      TOTAL ABDOM HYSTERECTOMY         Family History  Family History   Problem Relation Age of Onset    Heart Attack Paternal Grandfather     Heart Attack Paternal Grandmother     Other (CAD) Father     Other (CAD) Mother     Hypertension Mother     Heart Attack Mother     Other (CAD) Brother     Hypertension Brother     Heart Attack Brother     Stroke Maternal Grandfather         Stroke    Stroke Maternal Grandmother         Stroke    Breast Cancer Self 49       Social History  Patient Guardians:  Not on file    Other Topics            Concern  Caffeine Concern        Yes    Comment:half of cup of coke daily  Sleep Concern           No  Exercise                Yes    Comment:walking  Seat Belt               Yes    Social History Narrative    None on file            Current Medications:  Current Outpatient Medications   Medication Sig Dispense Refill    DULoxetine 60 MG Oral Cap DR Particles Take 1 capsule (60 mg total) by mouth daily. 90 capsule 0    amoxicillin 875 MG Oral Tab Take 1 tablet (875 mg total) by mouth 2 (two) times daily for 5 days. 10 tablet 0    ondansetron 8 MG Oral Tablet Dispersible Take 1 tablet (8 mg total) by mouth every 6 (six) hours as needed for Nausea. 10 tablet 0    semaglutide (OZEMPIC, 0.25 OR 0.5 MG/DOSE,) 2 MG/3ML Subcutaneous Solution Pen-injector Inject 0.5 mg into the skin once a week. 9 mL 0    albuterol 108 (90 Base) MCG/ACT Inhalation Aero Soln Inhale 2 puffs into the lungs every 6 (six) hours as needed for Wheezing. 1 each 3    LISINOPRIL 20 MG Oral Tab TAKE 1 TABLET BY MOUTH DAILY 90 tablet 3    pregabalin 100 MG Oral Cap Take 1 capsule (100 mg total) by mouth in the morning and 1 capsule (100 mg total) at noon and 1 capsule (100 mg total) in the evening. 270 capsule 1    tiZANidine 4 MG Oral Tab Take 1 tablet (4 mg total) by mouth every 8 (eight) hours as needed. 90 tablet 2    DULoxetine 30 MG Oral Cap DR Particles Take 1 capsule (30 mg total) by mouth daily. ondansetron (ZOFRAN) 4 mg tablet Take 1 tablet (4 mg total) by mouth every 8 (eight) hours as needed for Nausea.  30 tablet 0    aspirin 81 MG Oral Chew Tab Chew 1 tablet (81 mg total) by mouth daily. NON FORMULARY IGG infusions at cancer center every 3 weeks. (Patient not taking: Reported on 8/23/2023)      XARELTO 20 MG Oral Tab Take 1 tablet (20 mg total) by mouth daily. 90 tablet 3    PANTOPRAZOLE 40 MG Oral Tab EC Take 1 tablet (40 mg total) by mouth before breakfast. 90 tablet 3    clotrimazole-betamethasone 1-0.05 % External Cream Apply 1 Application. topically 2 (two) times daily as needed. 30 g 0    metFORMIN  MG Oral Tablet 24 Hr Take 2 tablets (1,000 mg total) by mouth every morning.  0    ROSUVASTATIN 10 MG Oral Tab Take 1 tablet (10 mg total) by mouth nightly. 90 tablet 3    fluticasone furoate-vilanterol (BREO ELLIPTA) 200-25 MCG/INH Inhalation Aerosol Powder, Breath Activated Inhale 1 puff into the lungs daily. HYDROcodone-acetaminophen  MG Oral Tab Take 1 tablet by mouth every 4 (four) hours as needed for Pain. 60 tablet 0    Fluticasone Propionate 50 MCG/ACT Nasal Suspension 1 spray by Nasal route 2 (two) times daily. 1 Bottle 3    Cholecalciferol (VITAMIN D) 2000 units Oral Tab Take 2,000 Units by mouth daily. Allergies    Ciprofloxacin           HIVES  Mold                    REACTIVE AIRWAY DISEASE    Comment:Mold and smut  Sulfa Antibiotics       HIVES    Comment:Bactrim (Sulfamethoxazole/TMP)  Diovan [Valsartan]      NAUSEA AND VOMITING  Omnicef                 NAUSEA ONLY, DIZZINESS  Green Pepper            OTHER (SEE COMMENTS)    Comment:Sensitivity (all peppers - green, orange and red)  Lipitor [Atorvastat*    OTHER (SEE COMMENTS)    Comment:Leg cramping,  Clindamycin             DIARRHEA, NAUSEA ONLY  Quinapril Hcl           FATIGUE    Review of Systems:   A comprehensive review of systems was negative. Physical Exam:   not currently breastfeeding. Constitutional Normal Overall appearance - Normal.   Psychiatric Normal Orientation - Oriented to time, place, person & situation. Appropriate mood and affect. Head/Face Normal Facial features -- Normal. Skull - Normal.   Eyes Normal Pupils equal ,round ,react to light and accomidate   Ears Normal External Ear Right: Normal, Left: Normal. Canal - Right: Normal, Left: Normal. TM - Right: Normal, Left: Normal.   Nose Normal External Nose, Normal, pack was removed from the right side. Examination with 0 degree telescope found middle meatus widely patent bilateral.  There is no active sign of bleeding. Area in the middle turbinate on the right side appeared to be the origin and has a healing scab present. Sphenopalatine recess, nasofrontal duct appear normal.  Middle and inferior turbinates appear normal.  There is no polyps present. She is extremely prominent left middle turbinate. Mouth/Throat Normal Lips/teeth/gums - Normal. Tonsils - Normal. Oropharynx - Normal.   Neck Exam Normal Inspection - Normal. Palpation - Normal. Parotid gland - Normal. Thyroid gland - Normal.   Neurological Normal Memory - Normal. Cranial nerves - Cranial nerves II through XII grossly intact. Nasopharynx Normal  Normal        Skin Normal Inspection - Normal.        Lymph Detail Normal Submental. Submandibular. Anterior cervical. Posterior cervical. Supraclavicular. Audiogram shows sloping mild to moderate high-frequency loss which appears symmetric.   Results:     Laboratory Data:  Lab Results   Component Value Date    WBC 8.1 08/23/2023    HGB 11.7 (L) 08/23/2023    HCT 36.9 08/23/2023    .0 08/23/2023    CREATSERUM 0.98 08/23/2023    BUN 27 (H) 08/23/2023     08/23/2023    K 5.1 08/23/2023     08/23/2023    CO2 25.0 08/23/2023     (H) 08/23/2023    CA 9.5 08/23/2023    ALB 3.6 08/18/2023    ALKPHO 97 08/18/2023    TP 6.8 08/18/2023    AST 19 08/18/2023    ALT 27 08/18/2023    PTT 27.9 02/21/2022    INR 1.00 02/21/2022    PTP 13.2 02/21/2022    T4F 1.0 10/09/2018    TSH 2.700 04/07/2023     06/02/2021    DDIMER 0.37 05/11/2022    ESRML 15 07/02/2021    CRP <0.29 07/02/2021    BNP 19 07/26/2014    MG 2.0 01/27/2021    PHOS 4.3 01/27/2021    TROP <0.045 06/02/2021    CK 50 09/04/2020    B12 138 (L) 05/19/2023    POCGLU 96 07/29/2014         Imaging:  XR KNEE (1 OR 2 VIEWS), LEFT (CPT=73560)    Result Date: 8/9/2023  PROCEDURE:  XR KNEE (1 OR 2 VIEWS), LEFT (CPT=73560)  COMPARISON:  [facility] , XR, XR KNEE (1 OR 2 VIEWS), LEFT (CPT=73560), 7/31/2020, 5:29 PM.  INDICATIONS:  fell, +hit head no LOC, +xarelto left knee pain  PATIENT STATED HISTORY: (As transcribed by Technologist)  Patient stated she fell and is has pain and bruising on thte anterior part of her knee. FINDINGS:  BONES:  Status post left knee arthroplasty. No evidence of hardware failure. No acute fracture or dislocation. SOFT TISSUES:  Negative. No visible soft tissue swelling. EFFUSION:  None visible. OTHER:  Negative. CONCLUSION:  No acute fracture or dislocation in the left knee. LOCATION:  [id number]   Dictated by (CST): Gumaro Pelletier MD on 8/09/2023 at 5:43 PM     Finalized by (CST): Gumaro Pelletier MD on 8/09/2023 at 5:44 PM       XR SHOULDER, COMPLETE (MIN 2 VIEWS), RIGHT (CPT=73030)    Result Date: 8/9/2023  PROCEDURE:  XR SHOULDER, COMPLETE (MIN 2 VIEWS), RIGHT (CPT=73030)  TECHNIQUE:  Multiple views were obtained. COMPARISON:  None. INDICATIONS:  fell, +hit head no LOC, +xarelto right shoulder pain. PATIENT STATED HISTORY: (As transcribed by Technologist)  Patient stated she fell and is having pain on the neck and the posterior part of her shoulder. FINDINGS:  No acute fracture or dislocation. Narrowing of the rotator cuff interval is noted with mild superior subluxation of the wrist is present. These findings may represent rotator cuff injury. The [facility] joint is intact with mild DJD. Soft tissues are within normal limits. CONCLUSION:  No acute fracture or dislocation.   Findings suggestive of rotator cuff injury  LOCATION: [id number]   Dictated by (CST): Elena Ritchie MD on 8/09/2023 at 5:42 PM     Finalized by (CST): Elena Ritchie MD on 8/09/2023 at 5:43 PM       CT SPINE CERVICAL (CPT=72125)    Result Date: 8/9/2023  PROCEDURE:  CT SPINE CERVICAL (CPT=72125)  COMPARISON:  [facility] , CT, CT SPINE CERVICAL (CPT=72125), 10/18/2021, 2:03 PM.  INDICATIONS:  fell, +hit head no LOC, +xarelto. Neck trauma. TECHNIQUE:  Noncontrast CT scanning of the cervical spine is performed from the skull base through C7. Multiplanar reconstructions are generated. Dose reduction techniques were used. Dose information is transmitted to the [facility] [employer] [facility]) NRDR ([address] [place] [address]) which includes the Dose Index Registry. PATIENT STATED HISTORY: (As transcribed by Technologist)  Patient states she fell and hit her head, is now having neck pain. FINDINGS:  Cervical spine demonstrates normal vertebral body height and alignment. No acute fractures are identified. Multilevel degenerative changes are present. Laminectomy changes at C5-C6 are noted. The spinal canal is patent. The soft tissues are within normal limits. Lung apices are within normal limits. CONCLUSION:  No acute abnormality in the cervical spine. LOCATION:  [id number]   Dictated by (CST): Elena Ritchie MD on 8/09/2023 at 5:05 PM     Finalized by (CST): Elena Ritchie MD on 8/09/2023 at 5:07 PM       CT BRAIN OR HEAD (63339)    Result Date: 8/9/2023  PROCEDURE:  CT BRAIN OR HEAD (72767)  COMPARISON:  [facility] , CT, CT BRAIN OR HEAD (17943), 10/18/2021, 2:01 PM.  INDICATIONS:  Status post fall. Patient hit posterior head with pain. Patient is on Xarelto. TECHNIQUE:  Noncontrast CT scanning is performed through the brain. Dose reduction techniques were used. Dose information is transmitted to the [facility] [employer] [facility]) NRDR ([employer] [address]) which includes the Dose Index Registry.   PATIENT STATED HISTORY: (As transcribed by Technologist)  Patient states she fell back and hit the back of head today. No LOC. FINDINGS:  VENTRICLES/SULCI:  The ventricles and sulci are stable in size and mildly prominent. No intraventricular hemorrhage. INTRACRANIAL:  There are no abnormal extraaxial fluid collections. There is no midline shift. There are no intraparenchymal brain abnormalities. There is nothing specific for acute infarct. There is no hemorrhage or mass lesion. Left basal ganglia calcification noted incidentally. SINUSES:           There is a 1.8 x 1.3 cm retention cyst or polyp in the left maxillary sinus. No air-fluid levels are identified. MASTOIDS:          No sign of acute inflammation. SKULL:             No evidence for fracture or osseous abnormality. OTHER:             None. CONCLUSION:  1. No acute intracranial hemorrhage, mass effect or midline shift. No skull fracture. 2. Mild stable cerebral and cerebellar atrophy. 3. Large retention cyst or polyp noted in the left maxillary sinus. LOCATION:  [facility]   Dictated by (CST): Julia Manrique MD on 8/09/2023 at 2:27 PM     Finalized by (CST): Julia Manrique MD on 8/09/2023 at 2:31 PM           Impression:   Epistaxis appears resolved. Hearing loss consistent with her age. Recommendations:  She may resume her Xarelto tomorrow night. The patient will follow-up with audiology regarding amplification as it appears to be affecting her performance during meetings. Patient understands her treatment plan. Thank you for allowing me to participate in the care of your patient.       Cecilia Chan MD  8/24/2023

## 2023-08-25 ENCOUNTER — HOSPITAL ENCOUNTER (OUTPATIENT)
Dept: ULTRASOUND IMAGING | Age: 78
Discharge: HOME | End: 2023-08-25
Attending: NURSE PRACTITIONER
Payer: MEDICARE

## 2023-08-25 DIAGNOSIS — E04.9 ENLARGED THYROID: ICD-10-CM

## 2023-08-25 PROCEDURE — 76536 US EXAM OF HEAD AND NECK: CPT | Performed by: NURSE PRACTITIONER

## 2023-08-28 ENCOUNTER — PATIENT OUTREACH (OUTPATIENT)
Dept: CASE MANAGEMENT | Age: 78
End: 2023-08-28

## 2023-09-25 RX ORDER — ROSUVASTATIN CALCIUM 10 MG/1
10 TABLET, COATED ORAL NIGHTLY
Qty: 90 TABLET | Refills: 1 | Status: SHIPPED | OUTPATIENT
Start: 2023-09-25

## 2023-09-25 NOTE — TELEPHONE ENCOUNTER
Requested Prescriptions     Pending Prescriptions Disp Refills    ROSUVASTATIN 10 MG Oral Tab [Pharmacy Med Name: Rosuvastatin Calcium 10 Mg Tab Nort] 90 tablet 0     Sig: Take 1 tablet (10 mg total) by mouth nightly. LOV:  8--acute visit--sd    LAST CPE: 4--sd-physical    Last Labs:  8--cbc,bmp    Last Refill: 9-- 90 w/ 3 refills     Your appointments       Date & Time Appointment Department [facility])    Oct 02, 2023 12:45 PM CDT MRI LUMBAR  with GUILLERMINA MR RM1 (1.5T) [address] [facility] ([address])    Please arrive 30 minutes prior to your scheduled appointment time. You will need time to change your clothes, fill out screening forms, use the restroom, and may need an IV if your exam requires contrast.  If you arrive too late, your appointment may need to be rescheduled. IF YOU REQUIRE ORAL SEDATION FOR YOUR MRI: Your physician is responsible for giving you a prescription for oral medication which you would fill at your local pharmacy. If you will be taking oral sedation, you must bring a  who will drive you home (the  does not necessarily have to stay throughout the procedure).         Oct 04, 2023 10:30 AM CDT Exam - Established with D[facility] Duke DO [facility], [facility] ([address])        Feb 09, 2024 10:00 AM CST Established Patient with Jerel Espinoza MD [facility], [address] ([address])              [address]   [facility]  [address] [place] [address] Group, [place], [address] Group [place] [address] [place] [address] [facility], [address] [phone]

## 2023-09-27 ENCOUNTER — PATIENT OUTREACH (OUTPATIENT)
Dept: CASE MANAGEMENT | Age: 78
End: 2023-09-27

## 2023-09-28 ENCOUNTER — TELEPHONE (OUTPATIENT)
Dept: INTERNAL MEDICINE CLINIC | Facility: CLINIC | Age: 78
End: 2023-09-28

## 2023-09-28 NOTE — TELEPHONE ENCOUNTER
Per SD, pt was scheduled on 10/2 for possible bladder infection. Pt needing sooner eval in [facility]. Called and spoke to pt. Pt said that she has been having dysuria and frequency. Has not taken temp, but does not feel like she has a fever. States it is just painful when she is done urinating. Advised to be seen in [facility] today or tomorrow for eval, locations discussed. Explained risks of waiting for eval for this. Pt stated understanding and agreed to plan.      TIFFANY BECERRA

## 2023-09-29 ENCOUNTER — HOSPITAL ENCOUNTER (OUTPATIENT)
Dept: CV DIAGNOSTICS | Facility: HOSPITAL | Age: 78
Discharge: HOME | End: 2023-09-29
Attending: INTERNAL MEDICINE
Payer: MEDICARE

## 2023-09-29 DIAGNOSIS — I49.3 VENTRICULAR PREMATURE BEATS: ICD-10-CM

## 2023-09-29 DIAGNOSIS — R55 SYNCOPE: ICD-10-CM

## 2023-09-29 PROCEDURE — 93306 TTE W/DOPPLER COMPLETE: CPT | Performed by: INTERNAL MEDICINE

## 2023-10-02 ENCOUNTER — OFFICE VISIT (OUTPATIENT)
Dept: FAMILY MEDICINE CLINIC | Facility: CLINIC | Age: 78
End: 2023-10-02
Payer: MEDICARE

## 2023-10-02 ENCOUNTER — HOSPITAL ENCOUNTER (OUTPATIENT)
Dept: MRI IMAGING | Age: 78
Discharge: HOME | End: 2023-10-02
Attending: ANESTHESIOLOGY
Payer: MEDICARE

## 2023-10-02 VITALS
DIASTOLIC BLOOD PRESSURE: 80 MMHG | HEIGHT: 61 IN | RESPIRATION RATE: 16 BRPM | SYSTOLIC BLOOD PRESSURE: 150 MMHG | BODY MASS INDEX: 31.91 KG/M2 | OXYGEN SATURATION: 96 % | WEIGHT: 169 LBS | TEMPERATURE: 98 F | HEART RATE: 94 BPM

## 2023-10-02 DIAGNOSIS — R10.9 LEFT FLANK PAIN: ICD-10-CM

## 2023-10-02 DIAGNOSIS — R39.9 UTI SYMPTOMS: Primary | ICD-10-CM

## 2023-10-02 DIAGNOSIS — M46.96 INFLAMMATORY SPONDYLOPATHY OF LUMBAR REGION (HCC): ICD-10-CM

## 2023-10-02 DIAGNOSIS — M54.16 LUMBAR RADICULOPATHY: ICD-10-CM

## 2023-10-02 LAB
APPEARANCE: CLEAR
BILIRUBIN: NEGATIVE
GLUCOSE (URINE DIPSTICK): NEGATIVE MG/DL
KETONES (URINE DIPSTICK): NEGATIVE MG/DL
MULTISTIX LOT#: ABNORMAL NUMERIC
NITRITE, URINE: NEGATIVE
PH, URINE: 5 (ref 4.5–8)
PROTEIN (URINE DIPSTICK): NEGATIVE MG/DL
SPECIFIC GRAVITY: 1.02 (ref 1–1.03)
URINE-COLOR: YELLOW
UROBILINOGEN,SEMI-QN: 0.2 MG/DL (ref 0–1.9)

## 2023-10-02 PROCEDURE — 87088 URINE BACTERIA CULTURE: CPT | Performed by: PHYSICIAN ASSISTANT

## 2023-10-02 PROCEDURE — 72148 MRI LUMBAR SPINE W/O DYE: CPT | Performed by: ANESTHESIOLOGY

## 2023-10-02 PROCEDURE — 87086 URINE CULTURE/COLONY COUNT: CPT | Performed by: PHYSICIAN ASSISTANT

## 2023-10-02 PROCEDURE — 87186 SC STD MICRODIL/AGAR DIL: CPT | Performed by: PHYSICIAN ASSISTANT

## 2023-10-02 RX ORDER — CEFPODOXIME PROXETIL 200 MG/1
200 TABLET, FILM COATED ORAL 2 TIMES DAILY
Qty: 28 TABLET | Refills: 0 | Status: SHIPPED | OUTPATIENT
Start: 2023-10-02 | End: 2023-10-16

## 2023-10-02 RX ORDER — PHENAZOPYRIDINE HYDROCHLORIDE 100 MG/1
100 TABLET, FILM COATED ORAL 3 TIMES DAILY PRN
Qty: 12 TABLET | Refills: 0 | Status: SHIPPED | OUTPATIENT
Start: 2023-10-02

## 2023-10-02 NOTE — PATIENT INSTRUCTIONS
Take antibiotics with food and plenty of water. Eat yogurt daily or use probiotics. Make sure to finish the entire antibiotic treatment. We will send the urine specimen for culture and call you in 2-3 days with results  You may take phenazopyridine for urinary discomfort if needed. Increase fluid intake and bladder emptying  If urinary symptoms worsen or flank pain, nausea, headache or dizziness develop, please contact your PCP immediately or go to the ED for urgent evaluation.

## 2023-10-04 ENCOUNTER — OFFICE VISIT (OUTPATIENT)
Dept: RHEUMATOLOGY | Facility: CLINIC | Age: 78
End: 2023-10-04
Payer: MEDICARE

## 2023-10-04 VITALS
RESPIRATION RATE: 16 BRPM | DIASTOLIC BLOOD PRESSURE: 68 MMHG | SYSTOLIC BLOOD PRESSURE: 132 MMHG | HEIGHT: 61 IN | WEIGHT: 169 LBS | BODY MASS INDEX: 31.91 KG/M2 | TEMPERATURE: 97 F | OXYGEN SATURATION: 93 % | HEART RATE: 80 BPM

## 2023-10-04 DIAGNOSIS — R76.8 RHEUMATOID FACTOR POSITIVE: ICD-10-CM

## 2023-10-04 DIAGNOSIS — G89.4 CHRONIC PAIN SYNDROME: ICD-10-CM

## 2023-10-04 DIAGNOSIS — M15.9 PRIMARY OSTEOARTHRITIS INVOLVING MULTIPLE JOINTS: Primary | ICD-10-CM

## 2023-10-04 DIAGNOSIS — E55.9 VITAMIN D DEFICIENCY: ICD-10-CM

## 2023-10-04 DIAGNOSIS — M81.0 AGE-RELATED OSTEOPOROSIS WITHOUT CURRENT PATHOLOGICAL FRACTURE: ICD-10-CM

## 2023-10-04 PROCEDURE — 99214 OFFICE O/P EST MOD 30 MIN: CPT | Performed by: INTERNAL MEDICINE

## 2023-10-11 ENCOUNTER — TELEPHONE (OUTPATIENT)
Dept: HEMATOLOGY/ONCOLOGY | Facility: HOSPITAL | Age: 78
End: 2023-10-11

## 2023-10-16 ENCOUNTER — LAB ENCOUNTER (OUTPATIENT)
Dept: LAB | Age: 78
End: 2023-10-16
Attending: ALLERGY & IMMUNOLOGY
Payer: MEDICARE

## 2023-10-16 DIAGNOSIS — D80.1 COMMON VARIABLE AGAMMAGLOBULINEMIA (HCC): Primary | ICD-10-CM

## 2023-10-16 LAB
IGA SERPL-MCNC: 130 MG/DL (ref 70–312)
IGM SERPL-MCNC: 58.3 MG/DL (ref 43–279)
IMMUNOGLOBULIN PNL SER-MCNC: 531 MG/DL (ref 791–1643)

## 2023-10-16 PROCEDURE — 36415 COLL VENOUS BLD VENIPUNCTURE: CPT

## 2023-10-16 PROCEDURE — 86356 MONONUCLEAR CELL ANTIGEN: CPT

## 2023-10-16 PROCEDURE — 82784 ASSAY IGA/IGD/IGG/IGM EACH: CPT

## 2023-10-16 PROCEDURE — 86648 DIPHTHERIA ANTIBODY: CPT

## 2023-10-16 PROCEDURE — 86738 MYCOPLASMA ANTIBODY: CPT

## 2023-10-16 PROCEDURE — 86774 TETANUS ANTIBODY: CPT

## 2023-10-17 ENCOUNTER — LAB ENCOUNTER (OUTPATIENT)
Dept: LAB | Facility: HOSPITAL | Age: 78
End: 2023-10-17
Attending: ALLERGY & IMMUNOLOGY
Payer: MEDICARE

## 2023-10-17 DIAGNOSIS — D80.1 COMMON VARIABLE AGAMMAGLOBULINEMIA (HCC): ICD-10-CM

## 2023-10-17 LAB
DIPHTHERIA ANTITOXOID AB: <0.1 IU/ML
TETANUS ANTITOXOID IGG AB: 0.73 IU/ML

## 2023-10-17 PROCEDURE — 36415 COLL VENOUS BLD VENIPUNCTURE: CPT

## 2023-10-17 PROCEDURE — 86356 MONONUCLEAR CELL ANTIGEN: CPT

## 2023-10-18 ENCOUNTER — TELEPHONE (OUTPATIENT)
Dept: INTERNAL MEDICINE CLINIC | Facility: CLINIC | Age: 78
End: 2023-10-18

## 2023-10-19 LAB
ACTIVATED CD21LOW CD38- %: 3.4 % OF CD19
ACTIVATED CD21LOW CD38- %: 3.4 % OF CD19
ACTIVATED CD21LOW CD38-: 3 CELLS/UL
ACTIVATED CD21LOW CD38-: 3 CELLS/UL
CD19+ B CELLS %: 4.9 % LYMPHS
CD19+ B CELLS %: 4.9 % LYMPHS
CD19+ B CELLS: 85 CELLS/UL
CD19+ B CELLS: 85 CELLS/UL
CD20+ %: 97 % OF CD19
CD20+ %: 97 % OF CD19
CD20+: 83 CELLS/UL
CD20+: 83 CELLS/UL
CLASS-SWITCHED CD27+IGD-IGM- %: 10.4 % OF CD19
CLASS-SWITCHED CD27+IGD-IGM- %: 10.4 % OF CD19
CLASS-SWITCHED CD27+IGD-IGM-: 9 CELLS/UL
CLASS-SWITCHED CD27+IGD-IGM-: 9 CELLS/UL
NON SWITCHED CD27+IGD+IGM+ %: 20 % OF CD19
NON SWITCHED CD27+IGD+IGM+ %: 20 % OF CD19
NON SWITCHED CD27+IGD+IGM+: 17 CELLS/UL
NON SWITCHED CD27+IGD+IGM+: 17 CELLS/UL
PLASMABLASTS CD38+IGM- %: 1 % OF CD19
PLASMABLASTS CD38+IGM- %: 1 % OF CD19
PLASMABLASTS CD38+IGM-: 1 CELLS/UL
PLASMABLASTS CD38+IGM-: 1 CELLS/UL
TOTAL MEMORY CD27+ %: 35 % OF CD19
TOTAL MEMORY CD27+ %: 35 % OF CD19
TOTAL MEMORY CD27+: 30 CELLS/UL
TOTAL MEMORY CD27+: 30 CELLS/UL
TRANSITIONAL CD38+IGM+ %: 10.3 % OF CD19
TRANSITIONAL CD38+IGM+ %: 10.3 % OF CD19
TRANSITIONAL CD38+IGM+: 9 CELLS/UL
TRANSITIONAL CD38+IGM+: 9 CELLS/UL

## 2023-10-20 ENCOUNTER — OFFICE VISIT (OUTPATIENT)
Dept: HEMATOLOGY/ONCOLOGY | Facility: HOSPITAL | Age: 78
End: 2023-10-20
Attending: INTERNAL MEDICINE
Payer: MEDICARE

## 2023-10-20 VITALS
WEIGHT: 168 LBS | DIASTOLIC BLOOD PRESSURE: 68 MMHG | SYSTOLIC BLOOD PRESSURE: 156 MMHG | TEMPERATURE: 97 F | RESPIRATION RATE: 20 BRPM | OXYGEN SATURATION: 94 % | BODY MASS INDEX: 32 KG/M2 | HEART RATE: 98 BPM

## 2023-10-20 DIAGNOSIS — M81.0 AGE-RELATED OSTEOPOROSIS WITHOUT CURRENT PATHOLOGICAL FRACTURE: Primary | ICD-10-CM

## 2023-10-20 DIAGNOSIS — E55.9 VITAMIN D DEFICIENCY: ICD-10-CM

## 2023-10-20 LAB
ALBUMIN SERPL-MCNC: 3.8 G/DL (ref 3.4–5)
ALBUMIN/GLOB SERPL: 1.2 {RATIO} (ref 1–2)
ALP LIVER SERPL-CCNC: 91 U/L
ALT SERPL-CCNC: 28 U/L
ANION GAP SERPL CALC-SCNC: 5 MMOL/L (ref 0–18)
AST SERPL-CCNC: 26 U/L (ref 15–37)
BASOPHILS # BLD AUTO: 0.06 X10(3) UL (ref 0–0.2)
BASOPHILS NFR BLD AUTO: 1.1 %
BILIRUB SERPL-MCNC: 0.5 MG/DL (ref 0.1–2)
BUN BLD-MCNC: 19 MG/DL (ref 7–18)
CALCIUM BLD-MCNC: 9 MG/DL (ref 8.5–10.1)
CHLORIDE SERPL-SCNC: 106 MMOL/L (ref 98–112)
CO2 SERPL-SCNC: 27 MMOL/L (ref 21–32)
CREAT BLD-MCNC: 0.83 MG/DL
EGFRCR SERPLBLD CKD-EPI 2021: 73 ML/MIN/1.73M2 (ref 60–?)
EOSINOPHIL # BLD AUTO: 0.29 X10(3) UL (ref 0–0.7)
EOSINOPHIL NFR BLD AUTO: 5.5 %
ERYTHROCYTE [DISTWIDTH] IN BLOOD BY AUTOMATED COUNT: 12.7 %
FASTING STATUS PATIENT QL REPORTED: NO
GLOBULIN PLAS-MCNC: 3.1 G/DL (ref 2.8–4.4)
GLUCOSE BLD-MCNC: 137 MG/DL (ref 70–99)
HCT VFR BLD AUTO: 37.7 %
HGB BLD-MCNC: 12 G/DL
IMM GRANULOCYTES # BLD AUTO: 0.01 X10(3) UL (ref 0–1)
IMM GRANULOCYTES NFR BLD: 0.2 %
LYMPHOCYTES # BLD AUTO: 1.75 X10(3) UL (ref 1–4)
LYMPHOCYTES NFR BLD AUTO: 33.2 %
MAGNESIUM SERPL-MCNC: 1.7 MG/DL (ref 1.6–2.6)
MCH RBC QN AUTO: 29.2 PG (ref 26–34)
MCHC RBC AUTO-ENTMCNC: 31.8 G/DL (ref 31–37)
MCV RBC AUTO: 91.7 FL
MONOCYTES # BLD AUTO: 0.57 X10(3) UL (ref 0.1–1)
MONOCYTES NFR BLD AUTO: 10.8 %
NEUTROPHILS # BLD AUTO: 2.59 X10 (3) UL (ref 1.5–7.7)
NEUTROPHILS # BLD AUTO: 2.59 X10(3) UL (ref 1.5–7.7)
NEUTROPHILS NFR BLD AUTO: 49.2 %
OSMOLALITY SERPL CALC.SUM OF ELEC: 290 MOSM/KG (ref 275–295)
PHOSPHATE SERPL-MCNC: 3.2 MG/DL (ref 2.5–4.9)
PLATELET # BLD AUTO: 186 10(3)UL (ref 150–450)
POTASSIUM SERPL-SCNC: 4 MMOL/L (ref 3.5–5.1)
PROT SERPL-MCNC: 6.9 G/DL (ref 6.4–8.2)
RBC # BLD AUTO: 4.11 X10(6)UL
SODIUM SERPL-SCNC: 138 MMOL/L (ref 136–145)
VIT D+METAB SERPL-MCNC: 37 NG/ML (ref 30–100)
WBC # BLD AUTO: 5.3 X10(3) UL (ref 4–11)

## 2023-10-20 PROCEDURE — 80053 COMPREHEN METABOLIC PANEL: CPT

## 2023-10-20 PROCEDURE — 83735 ASSAY OF MAGNESIUM: CPT

## 2023-10-20 PROCEDURE — 96365 THER/PROPH/DIAG IV INF INIT: CPT

## 2023-10-20 PROCEDURE — 85025 COMPLETE CBC W/AUTO DIFF WBC: CPT

## 2023-10-20 PROCEDURE — 82306 VITAMIN D 25 HYDROXY: CPT

## 2023-10-20 PROCEDURE — 84100 ASSAY OF PHOSPHORUS: CPT

## 2023-10-20 RX ORDER — ZOLEDRONIC ACID 5 MG/100ML
5 INJECTION, SOLUTION INTRAVENOUS ONCE
Status: COMPLETED | OUTPATIENT
Start: 2023-10-20 | End: 2023-10-20

## 2023-10-20 RX ORDER — ZOLEDRONIC ACID 5 MG/100ML
5 INJECTION, SOLUTION INTRAVENOUS ONCE
Status: CANCELLED | OUTPATIENT
Start: 2023-10-20

## 2023-10-20 RX ADMIN — ZOLEDRONIC ACID 5 MG: 5 INJECTION, SOLUTION INTRAVENOUS at 15:10:00

## 2023-10-20 NOTE — PROGRESS NOTES
Pt here for labs and reclast infusion . Pt denies any issues or concerns. Ordering MD: Eunice Lewis  Order Exp: 10/4/23     Pt tolerated infusion without difficulty or complaint.  Reviewed next apt date/time: N/A - one dose      Education Record  Learner:  Patient  Disease / Diagnosis: osteoporosis  Barriers / Limitations:  None  Method:  Discussion  General Topics:  Medication and Plan of care reviewed  Outcome:  Shows understanding

## 2023-10-25 ENCOUNTER — PATIENT OUTREACH (OUTPATIENT)
Dept: CASE MANAGEMENT | Age: 78
End: 2023-10-25

## 2023-10-25 DIAGNOSIS — I25.10 CORONARY ARTERY DISEASE INVOLVING NATIVE CORONARY ARTERY OF NATIVE HEART WITHOUT ANGINA PECTORIS: ICD-10-CM

## 2023-10-25 DIAGNOSIS — Z86.718 HISTORY OF DVT (DEEP VEIN THROMBOSIS): ICD-10-CM

## 2023-10-25 DIAGNOSIS — E78.00 PURE HYPERCHOLESTEROLEMIA: ICD-10-CM

## 2023-10-25 DIAGNOSIS — M81.0 AGE-RELATED OSTEOPOROSIS WITHOUT CURRENT PATHOLOGICAL FRACTURE: Primary | ICD-10-CM

## 2023-10-25 DIAGNOSIS — I10 BENIGN ESSENTIAL HYPERTENSION: ICD-10-CM

## 2023-10-25 DIAGNOSIS — F41.9 ANXIETY: ICD-10-CM

## 2023-10-25 DIAGNOSIS — G47.33 OSA ON CPAP: ICD-10-CM

## 2023-10-25 DIAGNOSIS — E11.65 TYPE 2 DIABETES MELLITUS WITH HYPERGLYCEMIA, WITHOUT LONG-TERM CURRENT USE OF INSULIN (HCC): ICD-10-CM

## 2023-10-25 RX ORDER — AMLODIPINE BESYLATE 5 MG/1
5 TABLET ORAL DAILY
COMMUNITY
Start: 2023-10-02 | End: 2023-10-27

## 2023-10-25 NOTE — TELEPHONE ENCOUNTER
Pre op form placed in red folder.  Pt scheduled for     Future Appointments   Date Time Provider Gomez Angela   11/6/2023  1:40 PM JUAREZ Perdomo [facility]

## 2023-10-25 NOTE — PROGRESS NOTES
Spoke to Keily for CCM. Updates to patient care team/comments: Yes, removed Dr. Vanessa Rajput per pt and Dr. Paulina Vasquez. Updated. Patient reported changes in medications: together we reviewed with no updates. Med Adherence  Comment: pt reports taking all medications as prescribed. Health Maintenance:   Reviewed with pt     Zoster Vaccines(1 of 2) Never done- discussed   Diabetes Care Dilated Eye Exam due on 01/09/2021- reminded   COVID-19 Vaccine(4 - 2023-24 season) due on 09/01/2023- unsure   Influenza Vaccine(1) due on 10/01/2023- reminded   Colorectal Cancer Screening due on 02/27/2024  Diabetes Care A1C due on 02/23/2024  Diabetes Care: Microalb/Creat Ratio due on 04/07/2024  Annual Physical due on 04/10/2024  Diabetes Care: GFR due on 10/20/2024  DEXA Scan Completed  Annual Depression Screening Completed  Fall Risk Screening (Annual) Completed  Diabetes Care Foot Exam (Annual) Completed  Pneumococcal Vaccine: 65+ Years Completed  Mammogram Discontinued    Patient updates/concerns:   Listened to pt and provided support. Doing part-time for work. Has been getting some rest and reset time for herself as this is important for her mental.  Staying positive and on top of her visits. Cymbalta has helped. Recently saw Dr. Vanessa Rajput but wanted a second opinion so she saw Dr. Erin Lopez who she was quite impressed with. Having right knee replacement 11/22. Has pre op scheduled with Sarahi Weeks. She has had worsened low back pain and sciatica she continues to take Norco, tramadol and Lyrica as prescribed by pain management. Symptoms are overall better since increasing cymbalta and stopping welbutrin. She continues taking lyrica and tizanidine which are prescribed by neurology. She has been applying topical CBD/THC which is also helping with the pain. Allergy/Immunologist   Recent visit with Dr. Julia Zeng.   Per note:   Assessment & Plan:     Hypogammaglobulinemia (hcc) (primary encounter diagnosis)  Iron deficiency anemia due to chronic blood loss    Hypogammaglobulinemia: She has been on IVIG for 7-8 years due to recurrent pneumonia and sepsis. Recent IgG level is 510 with last IVIG dose in July 2023. Spring 2023 IgG level was 800 mg/dL. -Plan to clarify the frequency, duration, dosage, and product of IVIG infusions patient has been receiving. No prior PPSV23 or PCV  Orders Placed This Encounter  Immunoglobulin A/G/M, Quant  Pneumococcal Antibodies (23 Serotype)  Tetanus/Diphtheria Ab  B Cell Subset Analysis    -Consider subcutaneous immunoglobulin therapy as an alternative to IVIG, which may be better tolerated and can be self-administered at home. Recurrent Pneumonia: Despite IVIG therapy, patient continues to have recurrent pneumonia, albeit milder.  -Continue to monitor closely.  -Consider vaccination and subsequent titer check to assess immune response. Asthma: Patient reports a history of asthma.  -Continue current management and monitor for exacerbations. Follow up will be arranged pending results of labs above. Diet  Fair. Pt states lacks appetite most days. At times only has one meal but tries eating smaller meals she is on Ozempic. Discussed the importance of diet and nutrition together. Goals/Action Plan: Active goal from previous outreach: Improve energy level and fatigue. Weight management. Watching her portion intake but states she only usually eats one meal due to Ozempic. Patient reported progress toward goal: ongoing                - What: improve energy levels and fatigue so she can better manage her weight            - Where/When/How: pt plans on eating smaller meals throughout the day to help with appetite. She'd like to do some walking for better stamina. Patient Reported New Barriers And Concerns:   States she is always tired has no energy. IgG. - Plan for overcoming all barriers: second opinion on IgG.      Care Managers Interventions:   Encouraged patient:   Self care: Take the time to do the things you love. Nutrition:  Good nutrition helps us to maintain our weight, fight off infection, and help reduce the risk of developing other chronic issues. Physical activity:  Physical activity is important to help maintain independence and improve quality of life. Future Appointments:   Future Appointments   Date Time Provider Gomez Angela   11/6/2023  1:40 PM Frances No, APRN [facility]   2/9/2024 10:00 AM Hien Morrissey MD [facility]   4/10/2024 10:45 AM Danita Farley,  EMGRHEUMHBSN [facility]         Next Care Manager Follow Up Date: in 2 month. Reason For Follow Up: review progress and or barriers towards patient's goals. Time Spent This Encounter Total: 26 min medical record review, telephone communication, care plan updates where needed, education, goals, and action plan recreation/update. Provided acknowledgment and validation to patient's concerns.    Monthly Minute Total including today: 26  Physical assessment, complete health history, and need for CCM established by Amari Menon MD.

## 2023-11-02 ENCOUNTER — APPOINTMENT (OUTPATIENT)
Dept: GENERAL RADIOLOGY | Facility: HOSPITAL | Age: 78
End: 2023-11-02
Attending: EMERGENCY MEDICINE
Payer: MEDICARE

## 2023-11-02 ENCOUNTER — HOSPITAL ENCOUNTER (INPATIENT)
Facility: HOSPITAL | Age: 78
LOS: 2 days | Discharge: HOME | End: 2023-11-04
Attending: EMERGENCY MEDICINE | Admitting: HOSPITALIST
Payer: MEDICARE

## 2023-11-02 DIAGNOSIS — N10 ACUTE PYELONEPHRITIS: ICD-10-CM

## 2023-11-02 DIAGNOSIS — J18.9 COMMUNITY ACQUIRED PNEUMONIA OF RIGHT UPPER LOBE OF LUNG: ICD-10-CM

## 2023-11-02 DIAGNOSIS — D64.9 ANEMIA, UNSPECIFIED TYPE: ICD-10-CM

## 2023-11-02 DIAGNOSIS — R09.02 HYPOXIA: Primary | ICD-10-CM

## 2023-11-02 LAB
ALBUMIN SERPL-MCNC: 3.6 G/DL (ref 3.4–5)
ALBUMIN/GLOB SERPL: 1.2 {RATIO} (ref 1–2)
ALP LIVER SERPL-CCNC: 86 U/L
ALT SERPL-CCNC: 30 U/L
ANION GAP SERPL CALC-SCNC: 11 MMOL/L (ref 0–18)
AST SERPL-CCNC: 34 U/L (ref 15–37)
ATRIAL RATE: 116 BPM
BASOPHILS # BLD AUTO: 0.04 X10(3) UL (ref 0–0.2)
BASOPHILS NFR BLD AUTO: 0.4 %
BILIRUB SERPL-MCNC: 0.6 MG/DL (ref 0.1–2)
BILIRUB UR QL STRIP.AUTO: NEGATIVE
BUN BLD-MCNC: 22 MG/DL (ref 9–23)
CALCIUM BLD-MCNC: 8.6 MG/DL (ref 8.5–10.1)
CHLORIDE SERPL-SCNC: 105 MMOL/L (ref 98–112)
CO2 SERPL-SCNC: 22 MMOL/L (ref 21–32)
CREAT BLD-MCNC: 1.18 MG/DL
EGFRCR SERPLBLD CKD-EPI 2021: 48 ML/MIN/1.73M2 (ref 60–?)
EOSINOPHIL # BLD AUTO: 0.04 X10(3) UL (ref 0–0.7)
EOSINOPHIL NFR BLD AUTO: 0.4 %
ERYTHROCYTE [DISTWIDTH] IN BLOOD BY AUTOMATED COUNT: 12.6 %
GLOBULIN PLAS-MCNC: 3 G/DL (ref 2.8–4.4)
GLUCOSE BLD-MCNC: 109 MG/DL (ref 70–99)
GLUCOSE BLD-MCNC: 143 MG/DL (ref 70–99)
GLUCOSE BLD-MCNC: 145 MG/DL (ref 70–99)
GLUCOSE BLD-MCNC: 152 MG/DL (ref 70–99)
GLUCOSE UR STRIP.AUTO-MCNC: NORMAL MG/DL
HCT VFR BLD AUTO: 34.9 %
HGB BLD-MCNC: 11.8 G/DL
IMM GRANULOCYTES # BLD AUTO: 0.03 X10(3) UL (ref 0–1)
IMM GRANULOCYTES NFR BLD: 0.3 %
KETONES UR STRIP.AUTO-MCNC: NEGATIVE MG/DL
LACTATE SERPL-SCNC: 2 MMOL/L (ref 0.4–2)
LEUKOCYTE ESTERASE UR QL STRIP.AUTO: 250
LYMPHOCYTES # BLD AUTO: 0.82 X10(3) UL (ref 1–4)
LYMPHOCYTES NFR BLD AUTO: 7.5 %
MCH RBC QN AUTO: 29.1 PG (ref 26–34)
MCHC RBC AUTO-ENTMCNC: 33.8 G/DL (ref 31–37)
MCV RBC AUTO: 86 FL
MONOCYTES # BLD AUTO: 0.73 X10(3) UL (ref 0.1–1)
MONOCYTES NFR BLD AUTO: 6.7 %
NEUTROPHILS # BLD AUTO: 9.27 X10 (3) UL (ref 1.5–7.7)
NEUTROPHILS # BLD AUTO: 9.27 X10(3) UL (ref 1.5–7.7)
NEUTROPHILS NFR BLD AUTO: 84.7 %
NITRITE UR QL STRIP.AUTO: NEGATIVE
OSMOLALITY SERPL CALC.SUM OF ELEC: 292 MOSM/KG (ref 275–295)
P AXIS: 23 DEGREES
P-R INTERVAL: 168 MS
PH UR STRIP.AUTO: 5.5 [PH] (ref 5–8)
PLATELET # BLD AUTO: 152 10(3)UL (ref 150–450)
POTASSIUM SERPL-SCNC: 3.1 MMOL/L (ref 3.5–5.1)
PROCALCITONIN SERPL-MCNC: 1.45 NG/ML (ref ?–0.16)
PROT SERPL-MCNC: 6.6 G/DL (ref 6.4–8.2)
PROT UR STRIP.AUTO-MCNC: 70 MG/DL
Q-T INTERVAL: 296 MS
QRS DURATION: 68 MS
QTC CALCULATION (BEZET): 411 MS
R AXIS: -22 DEGREES
RBC # BLD AUTO: 4.06 X10(6)UL
SARS-COV-2 RNA RESP QL NAA+PROBE: NOT DETECTED
SODIUM SERPL-SCNC: 138 MMOL/L (ref 136–145)
SP GR UR STRIP.AUTO: 1.02 (ref 1–1.03)
T AXIS: -46 DEGREES
UROBILINOGEN UR STRIP.AUTO-MCNC: NORMAL MG/DL
VENTRICULAR RATE: 116 BPM
WBC # BLD AUTO: 10.9 X10(3) UL (ref 4–11)
WBC #/AREA URNS AUTO: >50 /HPF
WBC CLUMPS UR QL AUTO: PRESENT /HPF

## 2023-11-02 PROCEDURE — 99223 1ST HOSP IP/OBS HIGH 75: CPT | Performed by: INTERNAL MEDICINE

## 2023-11-02 PROCEDURE — 71045 X-RAY EXAM CHEST 1 VIEW: CPT | Performed by: EMERGENCY MEDICINE

## 2023-11-02 RX ORDER — ENEMA 19; 7 G/133ML; G/133ML
1 ENEMA RECTAL ONCE AS NEEDED
Status: DISCONTINUED | OUTPATIENT
Start: 2023-11-02 | End: 2023-11-04

## 2023-11-02 RX ORDER — POLYETHYLENE GLYCOL 3350 17 G/17G
17 POWDER, FOR SOLUTION ORAL DAILY PRN
Status: DISCONTINUED | OUTPATIENT
Start: 2023-11-02 | End: 2023-11-04

## 2023-11-02 RX ORDER — PREGABALIN 100 MG/1
100 CAPSULE ORAL 3 TIMES DAILY
Status: DISCONTINUED | OUTPATIENT
Start: 2023-11-02 | End: 2023-11-04

## 2023-11-02 RX ORDER — ROSUVASTATIN CALCIUM 10 MG/1
10 TABLET, COATED ORAL NIGHTLY
Status: DISCONTINUED | OUTPATIENT
Start: 2023-11-02 | End: 2023-11-04

## 2023-11-02 RX ORDER — ALBUTEROL SULFATE 90 UG/1
2 AEROSOL, METERED RESPIRATORY (INHALATION) EVERY 6 HOURS PRN
Status: DISCONTINUED | OUTPATIENT
Start: 2023-11-02 | End: 2023-11-04

## 2023-11-02 RX ORDER — PANTOPRAZOLE SODIUM 40 MG/1
40 TABLET, DELAYED RELEASE ORAL
Status: DISCONTINUED | OUTPATIENT
Start: 2023-11-03 | End: 2023-11-04

## 2023-11-02 RX ORDER — MELATONIN
3 NIGHTLY PRN
Status: DISCONTINUED | OUTPATIENT
Start: 2023-11-02 | End: 2023-11-04

## 2023-11-02 RX ORDER — ONDANSETRON 2 MG/ML
4 INJECTION INTRAMUSCULAR; INTRAVENOUS ONCE
Status: COMPLETED | OUTPATIENT
Start: 2023-11-02 | End: 2023-11-02

## 2023-11-02 RX ORDER — BENZONATATE 200 MG/1
200 CAPSULE ORAL 3 TIMES DAILY PRN
Status: DISCONTINUED | OUTPATIENT
Start: 2023-11-02 | End: 2023-11-04

## 2023-11-02 RX ORDER — HYDROCODONE BITARTRATE AND ACETAMINOPHEN 10; 325 MG/1; MG/1
1 TABLET ORAL
COMMUNITY

## 2023-11-02 RX ORDER — METOCLOPRAMIDE HYDROCHLORIDE 5 MG/ML
5 INJECTION INTRAMUSCULAR; INTRAVENOUS EVERY 8 HOURS PRN
Status: DISCONTINUED | OUTPATIENT
Start: 2023-11-02 | End: 2023-11-04

## 2023-11-02 RX ORDER — SENNOSIDES 8.6 MG
17.2 TABLET ORAL NIGHTLY PRN
Status: DISCONTINUED | OUTPATIENT
Start: 2023-11-02 | End: 2023-11-04

## 2023-11-02 RX ORDER — ONDANSETRON 2 MG/ML
4 INJECTION INTRAMUSCULAR; INTRAVENOUS EVERY 6 HOURS PRN
Status: DISCONTINUED | OUTPATIENT
Start: 2023-11-02 | End: 2023-11-04

## 2023-11-02 RX ORDER — ACETAMINOPHEN 500 MG
1000 TABLET ORAL ONCE
Status: COMPLETED | OUTPATIENT
Start: 2023-11-02 | End: 2023-11-02

## 2023-11-02 RX ORDER — NICOTINE POLACRILEX 4 MG
30 LOZENGE BUCCAL
Status: DISCONTINUED | OUTPATIENT
Start: 2023-11-02 | End: 2023-11-04

## 2023-11-02 RX ORDER — TIZANIDINE 4 MG/1
4 TABLET ORAL EVERY 8 HOURS PRN
Status: DISCONTINUED | OUTPATIENT
Start: 2023-11-02 | End: 2023-11-04

## 2023-11-02 RX ORDER — FLUTICASONE FUROATE AND VILANTEROL 200; 25 UG/1; UG/1
1 POWDER RESPIRATORY (INHALATION) DAILY
Status: DISCONTINUED | OUTPATIENT
Start: 2023-11-02 | End: 2023-11-04

## 2023-11-02 RX ORDER — SODIUM CHLORIDE 9 MG/ML
INJECTION, SOLUTION INTRAVENOUS CONTINUOUS
Status: DISCONTINUED | OUTPATIENT
Start: 2023-11-02 | End: 2023-11-03

## 2023-11-02 RX ORDER — ASPIRIN 81 MG/1
81 TABLET, CHEWABLE ORAL DAILY
Status: DISCONTINUED | OUTPATIENT
Start: 2023-11-02 | End: 2023-11-04

## 2023-11-02 RX ORDER — DULOXETIN HYDROCHLORIDE 30 MG/1
90 CAPSULE, DELAYED RELEASE ORAL DAILY
Status: DISCONTINUED | OUTPATIENT
Start: 2023-11-02 | End: 2023-11-04

## 2023-11-02 RX ORDER — ECHINACEA PURPUREA EXTRACT 125 MG
1 TABLET ORAL
Status: DISCONTINUED | OUTPATIENT
Start: 2023-11-02 | End: 2023-11-04

## 2023-11-02 RX ORDER — SODIUM CHLORIDE 9 MG/ML
INJECTION, SOLUTION INTRAVENOUS CONTINUOUS
Status: DISCONTINUED | OUTPATIENT
Start: 2023-11-02 | End: 2023-11-02

## 2023-11-02 RX ORDER — HYDROMORPHONE HYDROCHLORIDE 1 MG/ML
0.5 INJECTION, SOLUTION INTRAMUSCULAR; INTRAVENOUS; SUBCUTANEOUS EVERY 30 MIN PRN
Status: DISCONTINUED | OUTPATIENT
Start: 2023-11-02 | End: 2023-11-02

## 2023-11-02 RX ORDER — HYDROCODONE BITARTRATE AND ACETAMINOPHEN 10; 325 MG/1; MG/1
1 TABLET ORAL EVERY 4 HOURS PRN
Status: DISCONTINUED | OUTPATIENT
Start: 2023-11-02 | End: 2023-11-04

## 2023-11-02 RX ORDER — POTASSIUM CHLORIDE 20 MEQ/1
40 TABLET, EXTENDED RELEASE ORAL ONCE
Status: COMPLETED | OUTPATIENT
Start: 2023-11-02 | End: 2023-11-02

## 2023-11-02 RX ORDER — HYDROMORPHONE HYDROCHLORIDE 1 MG/ML
0.5 INJECTION, SOLUTION INTRAMUSCULAR; INTRAVENOUS; SUBCUTANEOUS EVERY 4 HOURS PRN
Status: DISCONTINUED | OUTPATIENT
Start: 2023-11-02 | End: 2023-11-04

## 2023-11-02 RX ORDER — LISINOPRIL 20 MG/1
20 TABLET ORAL DAILY
Status: DISCONTINUED | OUTPATIENT
Start: 2023-11-02 | End: 2023-11-04

## 2023-11-02 RX ORDER — NICOTINE POLACRILEX 4 MG
15 LOZENGE BUCCAL
Status: DISCONTINUED | OUTPATIENT
Start: 2023-11-02 | End: 2023-11-04

## 2023-11-02 RX ORDER — BISACODYL 10 MG
10 SUPPOSITORY, RECTAL RECTAL
Status: DISCONTINUED | OUTPATIENT
Start: 2023-11-02 | End: 2023-11-04

## 2023-11-02 RX ORDER — ACETAMINOPHEN 500 MG
500 TABLET ORAL EVERY 4 HOURS PRN
Status: DISCONTINUED | OUTPATIENT
Start: 2023-11-02 | End: 2023-11-04

## 2023-11-02 RX ORDER — DEXTROSE MONOHYDRATE 25 G/50ML
50 INJECTION, SOLUTION INTRAVENOUS
Status: DISCONTINUED | OUTPATIENT
Start: 2023-11-02 | End: 2023-11-04

## 2023-11-02 RX ORDER — IPRATROPIUM BROMIDE AND ALBUTEROL SULFATE 2.5; .5 MG/3ML; MG/3ML
3 SOLUTION RESPIRATORY (INHALATION) ONCE
Status: COMPLETED | OUTPATIENT
Start: 2023-11-02 | End: 2023-11-02

## 2023-11-02 NOTE — ED INITIAL ASSESSMENT (HPI)
Patient has been coughing and SOB since last night with difficulty breathing. She recently finished antibiotics for a UTI.  Started running fever today

## 2023-11-02 NOTE — ED QUICK NOTES
Orders for admission, patient is aware of plan and ready to go upstairs. Any questions, please call ED RN [place] [address] at extension [phone].      Patient Covid vaccination status: Fully vaccinated     COVID Test Ordered in ED: Rapid SARS-CoV-2 by PCR    COVID Suspicion at Admission: N/A    Running Infusions:    sodium chloride 1,722 mL (11/02/23 0919)    Has only received about 600 ml    Mental Status/LOC at time of transport: A/ox4    Other pertinent information: 3l NC, ra is bl  CIWA score: N/A   NIH score:  N/A

## 2023-11-02 NOTE — PLAN OF CARE
Received pt from ED. Pt alert and oriented x 4. Continuous tele monitoring in place. ST on the monitor. Reports pain to bilateral legs which she says is common for her to have this type of pain when she has a UTI. Dyspnea noted with activity and mildly at rest.  Up with SBA to chair. IV fluids continuously infusing per order. IV abx given x2. Seen by PT. IS encouraged. Potassium replaced per protocol. Intermittent nonproductive cough. Order for sputum culture awaiting sample form pt. Norco for pain. Safety and comfort maintained. Will continue to monitor.       Problem: RESPIRATORY - ADULT  Goal: Achieves optimal ventilation and oxygenation  Description: INTERVENTIONS:  - Assess for changes in respiratory status  - Assess for changes in mentation and behavior  - Position to facilitate oxygenation and minimize respiratory effort  - Oxygen supplementation based on oxygen saturation or ABGs  - Provide Smoking Cessation handout, if applicable  - Encourage broncho-pulmonary hygiene including cough, deep breathe, Incentive Spirometry  - Assess the need for suctioning and perform as needed  - Assess and instruct to report SOB or any respiratory difficulty  - Respiratory Therapy support as indicated  - Manage/alleviate anxiety  - Monitor for signs/symptoms of CO2 retention  Outcome: Progressing

## 2023-11-02 NOTE — PHYSICAL THERAPY NOTE
PHYSICAL THERAPY EVALUATION - INPATIENT     Room Number: 6167/4754-D  Evaluation Date: 11/2/2023  Type of Evaluation: Initial  Physician Order: PT Eval and Treat    Presenting Problem: Fever, cough, SOB, sepsis due to pneumonia and UTI  Co-Morbidities : Breast CA, OA, cervical spine surgery, anxiety, HTN, HLD, DM2, GERD, asthma, DVT  Reason for Therapy: Mobility Dysfunction and Discharge Planning    History related to current admission: Patient is a 69year old female admitted on 11/2/2023 from home for fever, cough, SOB. Pt diagnosed with pneumonia, UTI. ASSESSMENT   In this PT evaluation, the patient presents with the following impairments decreased functional mobility, endurance, increased fatigue. These impairments and comorbidities manifest themselves as functional limitations in independent bed mobility, transfers, stairs, and gait. The patient is below baseline and would benefit from skilled inpatient PT to address the above deficits to assist patient in returning to prior to level of function. Functional outcome measures completed include AMPAC. The AM-PAC '6-Clicks' Inpatient Basic Mobility Short Form was completed and this patient is demonstrating a Approx Degree of Impairment: 28.97%  degree of impairment in mobility. Research supports that patients with this level of impairment may benefit from home. DISCHARGE RECOMMENDATIONS  PT Discharge Recommendations: Home    PLAN  PT Treatment Plan: Body mechanics; Bed mobility; Endurance; Energy conservation; Family education;Gait training;Transfer training;Balance training;Strengthening;Stair training  Rehab Potential : Good  Frequency (Obs):  (2-3x/week)  Number of Visits to Meet Established Goals: 2      CURRENT GOALS    Goal #1 Patient is able to demonstrate supine - sit EOB @ level: independent     Goal #2 Patient is able to demonstrate transfers EOB to/from Chair/Wheelchair at assistance level: modified independent     Goal #3 Patient is able to ambulate 150 feet with assist device:  LRAD  at assistance level: independent     Goal #4 Pt is able to ascend and descend flight of stairs with supervision and railing   Goal #5    Goal #6    Goal Comments: Goals established on 2023    HOME SITUATION  Type of Home: House   Home Layout: Two level  Stairs to Enter : 2  Railing: No  Stairs to Bedroom: 14  Railing: Yes    Lives With:  (corrine)  Drives: Yes  Patient Owned Equipment: Cane;Rolling walker  Patient Regularly Uses: None    Prior Level of [place]: Pt reports that she is typically indep with mobility. Dtr assists with showering. Corrine helps with laundry, and she is teaching him how to cook. SUBJECTIVE  \"He's 26 going on 13\"      OBJECTIVE  Precautions: Bed/chair alarm  Fall Risk: Standard fall risk    WEIGHT BEARING RESTRICTION  Weight Bearing Restriction: None                PAIN ASSESSMENT  Ratin          COGNITION  Safety Judgement:  decreased awareness of need for safety    RANGE OF MOTION AND STRENGTH ASSESSMENT  Upper extremity ROM and strength are within functional limits     Lower extremity ROM is within functional limits     Lower extremity strength is within functional limits       BALANCE  Static Sitting: Fair +  Dynamic Sitting: Fair  Static Standing: Fair -  Dynamic Standing: Poor +    ADDITIONAL TESTS                                    ACTIVITY TOLERANCE                         O2 WALK  Oxygen Therapy  SPO2% on Room Air at Rest: 94  SPO2% on Oxygen at Rest: 95  At rest oxygen flow (liters per minute): 2    NEUROLOGICAL FINDINGS                        AM-PAC '6-Clicks' INPATIENT SHORT FORM - BASIC MOBILITY  How much difficulty does the patient currently have. ..   Patient Difficulty: Turning over in bed (including adjusting bedclothes, sheets and blankets)?: None   Patient Difficulty: Sitting down on and standing up from a chair with arms (e.g., wheelchair, bedside commode, etc.): None   Patient Difficulty: Moving from lying on back to sitting on the side of the bed?: A Little   How much help from another person does the patient currently need. .. Help from Another: Moving to and from a bed to a chair (including a wheelchair)?: None   Help from Another: Need to walk in hospital room?: A Little   Help from Another: Climbing 3-5 steps with a railing?: A Little       AM-PAC Score:  Raw Score: 21   Approx Degree of Impairment: 28.97%   Standardized Score (AM-PAC Scale): 50.25   CMS Modifier (G-Code): CJ    FUNCTIONAL ABILITY STATUS  Gait Assessment   Functional Mobility/Gait Assessment  Gait Assistance: Supervision  Distance (ft): 2  Assistive Device: None  Pattern: Within Functional Limits    Skilled Therapy Provided     Bed Mobility:  Rolling: NT  Supine to sit: supervision   Sit to supine: NT     Transfer Mobility:  Sit to stand: ind   Stand to sit: ind  Gait = supervision    Therapist's Comments: RN cleared for session. Pt agreeable for therapy, received supine. Pt not happy about transferring to chair, would prefer to sleep. However linens completely soaked, encouraged to get to chair in order for linen change. Pt refused gait belt and initiated transfer with mild impulsivity. Assisted with gown and brief change. Instructed to call for nursing staff for any needs and OOB mobility. Exercise/Education Provided:  Bed mobility  Body mechanics  Energy conservation  Functional activity tolerated  Gait training  Posture  Strengthening  Transfer training    Patient End of Session: Up in chair;Needs met;Call light within reach;RN aware of session/findings; All patient questions and concerns addressed; Alarm set      Patient Evaluation Complexity Level:  History Moderate - 1 or 2 personal factors and/or co-morbidities   Examination of body systems Low - addressing 1-2 elements   Clinical Presentation Low - Stable   Clinical Decision Making Low - Stable       PT Session Time: 30 minutes  Gait Training: 3 minutes  Therapeutic Activity: 3 minutes

## 2023-11-03 LAB
ANION GAP SERPL CALC-SCNC: 6 MMOL/L (ref 0–18)
ATRIAL RATE: 99 BPM
BASOPHILS # BLD AUTO: 0.03 X10(3) UL (ref 0–0.2)
BASOPHILS NFR BLD AUTO: 0.2 %
BUN BLD-MCNC: 11 MG/DL (ref 9–23)
CALCIUM BLD-MCNC: 7.7 MG/DL (ref 8.5–10.1)
CHLORIDE SERPL-SCNC: 117 MMOL/L (ref 98–112)
CO2 SERPL-SCNC: 19 MMOL/L (ref 21–32)
CREAT BLD-MCNC: 0.74 MG/DL
DEPRECATED HBV CORE AB SER IA-ACNC: 121.1 NG/ML
EGFRCR SERPLBLD CKD-EPI 2021: 84 ML/MIN/1.73M2 (ref 60–?)
EOSINOPHIL # BLD AUTO: 0.1 X10(3) UL (ref 0–0.7)
EOSINOPHIL NFR BLD AUTO: 0.8 %
ERYTHROCYTE [DISTWIDTH] IN BLOOD BY AUTOMATED COUNT: 13 %
GLUCOSE BLD-MCNC: 122 MG/DL (ref 70–99)
GLUCOSE BLD-MCNC: 124 MG/DL (ref 70–99)
GLUCOSE BLD-MCNC: 146 MG/DL (ref 70–99)
GLUCOSE BLD-MCNC: 177 MG/DL (ref 70–99)
GLUCOSE BLD-MCNC: 97 MG/DL (ref 70–99)
HCT VFR BLD AUTO: 29.8 %
HGB BLD-MCNC: 9.4 G/DL
IMM GRANULOCYTES # BLD AUTO: 0.06 X10(3) UL (ref 0–1)
IMM GRANULOCYTES NFR BLD: 0.5 %
IRON SATN MFR SERPL: 5 %
IRON SERPL-MCNC: 12 UG/DL
LYMPHOCYTES # BLD AUTO: 1.64 X10(3) UL (ref 1–4)
LYMPHOCYTES NFR BLD AUTO: 12.5 %
MCH RBC QN AUTO: 29.1 PG (ref 26–34)
MCHC RBC AUTO-ENTMCNC: 31.5 G/DL (ref 31–37)
MCV RBC AUTO: 92.3 FL
MONOCYTES # BLD AUTO: 0.88 X10(3) UL (ref 0.1–1)
MONOCYTES NFR BLD AUTO: 6.7 %
NEUTROPHILS # BLD AUTO: 10.39 X10 (3) UL (ref 1.5–7.7)
NEUTROPHILS # BLD AUTO: 10.39 X10(3) UL (ref 1.5–7.7)
NEUTROPHILS NFR BLD AUTO: 79.3 %
OSMOLALITY SERPL CALC.SUM OF ELEC: 295 MOSM/KG (ref 275–295)
P AXIS: 48 DEGREES
P-R INTERVAL: 174 MS
PLATELET # BLD AUTO: 113 10(3)UL (ref 150–450)
POTASSIUM SERPL-SCNC: 3.1 MMOL/L (ref 3.5–5.1)
POTASSIUM SERPL-SCNC: 3.1 MMOL/L (ref 3.5–5.1)
Q-T INTERVAL: 368 MS
QRS DURATION: 84 MS
QTC CALCULATION (BEZET): 472 MS
R AXIS: 4 DEGREES
RBC # BLD AUTO: 3.23 X10(6)UL
SODIUM SERPL-SCNC: 142 MMOL/L (ref 136–145)
T AXIS: 32 DEGREES
TIBC SERPL-MCNC: 264 UG/DL (ref 240–450)
TRANSFERRIN SERPL-MCNC: 177 MG/DL (ref 200–360)
VENTRICULAR RATE: 99 BPM
WBC # BLD AUTO: 13.1 X10(3) UL (ref 4–11)

## 2023-11-03 PROCEDURE — 99232 SBSQ HOSP IP/OBS MODERATE 35: CPT | Performed by: INTERNAL MEDICINE

## 2023-11-03 RX ORDER — POTASSIUM CHLORIDE 20 MEQ/1
40 TABLET, EXTENDED RELEASE ORAL ONCE
Status: COMPLETED | OUTPATIENT
Start: 2023-11-03 | End: 2023-11-03

## 2023-11-03 NOTE — PLAN OF CARE
Patient alert and oriented x4. Up with standby assist and walker. NSR/ST on tele. Maintaining o2 sats on RA. Needing 2L NC overnight while sleeping. Denies SOB. C/o moderate to severe pain, prn norco given with relief. IVF overnight, stopped this am. [place] in place. Updated patient on POC, verbalized understanding. Safety precautions put in place, bed alarm on.        Problem: Patient/Family Goals  Goal: Patient/Family Long Term Goal  Description: Patient's Long Term Goal:   Interventions:  -   - See additional Care Plan goals for specific interventions  Outcome: Progressing  Goal: Patient/Family Short Term Goal  Description: Patient's Short Term Goal:     Interventions:   -   - See additional Care Plan goals for specific interventions  Outcome: Progressing     Problem: RESPIRATORY - ADULT  Goal: Achieves optimal ventilation and oxygenation  Description: INTERVENTIONS:  - Assess for changes in respiratory status  - Assess for changes in mentation and behavior  - Position to facilitate oxygenation and minimize respiratory effort  - Oxygen supplementation based on oxygen saturation or ABGs  - Provide Smoking Cessation handout, if applicable  - Encourage broncho-pulmonary hygiene including cough, deep breathe, Incentive Spirometry  - Assess the need for suctioning and perform as needed  - Assess and instruct to report SOB or any respiratory difficulty  - Respiratory Therapy support as indicated  - Manage/alleviate anxiety  - Monitor for signs/symptoms of CO2 retention  Outcome: Progressing     Problem: Diabetes/Glucose Control  Goal: Glucose maintained within prescribed range  Description: INTERVENTIONS:  - Monitor Blood Glucose as ordered  - Assess for signs and symptoms of hyperglycemia and hypoglycemia  - Administer ordered medications to maintain glucose within target range  - Assess barriers to adequate nutritional intake and initiate nutrition consult as needed  - Instruct patient on self management of diabetes  Outcome: Progressing

## 2023-11-03 NOTE — PLAN OF CARE
Pt alert and oriented x 4. Continuous tele monitoring in place. NSR on the monitor. Reports pain to lower back but denied chest pain. Norco given for pain. Chest pressure noted in am.  EKG obtained. MD aware. No other new orders. Dyspnea with exertion. Denies dizziness. Low grade fever today. Tylenol given x1. Overall states she does not feel well. States she feels weak and she knows  her body is trying to fight infection. Potassium replaced per protocol. Safety and comfort maintained. Will continue to monitor.         Problem: RESPIRATORY - ADULT  Goal: Achieves optimal ventilation and oxygenation  Description: INTERVENTIONS:  - Assess for changes in respiratory status  - Assess for changes in mentation and behavior  - Position to facilitate oxygenation and minimize respiratory effort  - Oxygen supplementation based on oxygen saturation or ABGs  - Provide Smoking Cessation handout, if applicable  - Encourage broncho-pulmonary hygiene including cough, deep breathe, Incentive Spirometry  - Assess the need for suctioning and perform as needed  - Assess and instruct to report SOB or any respiratory difficulty  - Respiratory Therapy support as indicated  - Manage/alleviate anxiety  - Monitor for signs/symptoms of CO2 retention  Outcome: Progressing     Problem: Diabetes/Glucose Control  Goal: Glucose maintained within prescribed range  Description: INTERVENTIONS:  - Monitor Blood Glucose as ordered  - Assess for signs and symptoms of hyperglycemia and hypoglycemia  - Administer ordered medications to maintain glucose within target range  - Assess barriers to adequate nutritional intake and initiate nutrition consult as needed  - Instruct patient on self management of diabetes  Outcome: Progressing

## 2023-11-04 VITALS
DIASTOLIC BLOOD PRESSURE: 82 MMHG | SYSTOLIC BLOOD PRESSURE: 174 MMHG | BODY MASS INDEX: 31.47 KG/M2 | OXYGEN SATURATION: 96 % | HEIGHT: 61 IN | TEMPERATURE: 99 F | RESPIRATION RATE: 18 BRPM | HEART RATE: 85 BPM | WEIGHT: 166.69 LBS

## 2023-11-04 LAB
ANION GAP SERPL CALC-SCNC: 5 MMOL/L (ref 0–18)
BASOPHILS # BLD AUTO: 0.04 X10(3) UL (ref 0–0.2)
BASOPHILS NFR BLD AUTO: 0.4 %
BUN BLD-MCNC: 11 MG/DL (ref 9–23)
CALCIUM BLD-MCNC: 8.3 MG/DL (ref 8.5–10.1)
CHLORIDE SERPL-SCNC: 114 MMOL/L (ref 98–112)
CO2 SERPL-SCNC: 25 MMOL/L (ref 21–32)
CREAT BLD-MCNC: 0.84 MG/DL
EGFRCR SERPLBLD CKD-EPI 2021: 72 ML/MIN/1.73M2 (ref 60–?)
EOSINOPHIL # BLD AUTO: 0.19 X10(3) UL (ref 0–0.7)
EOSINOPHIL NFR BLD AUTO: 2 %
ERYTHROCYTE [DISTWIDTH] IN BLOOD BY AUTOMATED COUNT: 12.8 %
GLUCOSE BLD-MCNC: 120 MG/DL (ref 70–99)
GLUCOSE BLD-MCNC: 141 MG/DL (ref 70–99)
GLUCOSE BLD-MCNC: 148 MG/DL (ref 70–99)
HCT VFR BLD AUTO: 32.4 %
HGB BLD-MCNC: 10.4 G/DL
IMM GRANULOCYTES # BLD AUTO: 0.05 X10(3) UL (ref 0–1)
IMM GRANULOCYTES NFR BLD: 0.5 %
LYMPHOCYTES # BLD AUTO: 1.65 X10(3) UL (ref 1–4)
LYMPHOCYTES NFR BLD AUTO: 17.3 %
MCH RBC QN AUTO: 29.2 PG (ref 26–34)
MCHC RBC AUTO-ENTMCNC: 32.1 G/DL (ref 31–37)
MCV RBC AUTO: 91 FL
MONOCYTES # BLD AUTO: 0.67 X10(3) UL (ref 0.1–1)
MONOCYTES NFR BLD AUTO: 7 %
NEUTROPHILS # BLD AUTO: 6.96 X10 (3) UL (ref 1.5–7.7)
NEUTROPHILS # BLD AUTO: 6.96 X10(3) UL (ref 1.5–7.7)
NEUTROPHILS NFR BLD AUTO: 72.8 %
OSMOLALITY SERPL CALC.SUM OF ELEC: 300 MOSM/KG (ref 275–295)
PLATELET # BLD AUTO: 131 10(3)UL (ref 150–450)
POTASSIUM SERPL-SCNC: 3.2 MMOL/L (ref 3.5–5.1)
POTASSIUM SERPL-SCNC: 3.2 MMOL/L (ref 3.5–5.1)
RBC # BLD AUTO: 3.56 X10(6)UL
SODIUM SERPL-SCNC: 144 MMOL/L (ref 136–145)
WBC # BLD AUTO: 9.6 X10(3) UL (ref 4–11)

## 2023-11-04 PROCEDURE — 99239 HOSP IP/OBS DSCHRG MGMT >30: CPT | Performed by: INTERNAL MEDICINE

## 2023-11-04 RX ORDER — POTASSIUM CHLORIDE 20 MEQ/1
40 TABLET, EXTENDED RELEASE ORAL ONCE
Status: COMPLETED | OUTPATIENT
Start: 2023-11-04 | End: 2023-11-04

## 2023-11-04 RX ORDER — MELATONIN
325
Qty: 30 TABLET | Refills: 0 | Status: SHIPPED | OUTPATIENT
Start: 2023-11-04 | End: 2023-11-06

## 2023-11-04 RX ORDER — ONDANSETRON 4 MG/1
4 TABLET, FILM COATED ORAL EVERY 8 HOURS PRN
Qty: 30 TABLET | Refills: 0 | Status: SHIPPED | OUTPATIENT
Start: 2023-11-04

## 2023-11-04 RX ORDER — CEFDINIR 300 MG/1
300 CAPSULE ORAL 2 TIMES DAILY
Qty: 14 CAPSULE | Refills: 0 | Status: SHIPPED | OUTPATIENT
Start: 2023-11-04 | End: 2023-11-11

## 2023-11-04 NOTE — PLAN OF CARE
Patient alert and oriented x4. Up with standby assist and walker. NSR on tele. Maintaining o2 sats >90% on RA. Hx MUKUND, needing 2L NC overnight while sleeping. Encouraged patient to use IS. Briefed, [place] [facility] in place. C/o moderate to severe chronic back pain, prn norco given with relief. Updated patient on POC, verbalized understanding. Safety precautions put in place, bed alarm on.      Problem: Patient/Family Goals  Goal: Patient/Family Long Term Goal  Description: Patient's Long Term Goal: discharge home    Interventions:  - Hospitalist, IV abx, labs, chest xray, UA, IVF, EKG  - See additional Care Plan goals for specific interventions  11/3/2023 2323 by Heron Kiser RN  Outcome: Progressing  11/3/2023 2041 by Heron Kiser RN  Outcome: Progressing  Goal: Patient/Family Short Term Goal  Description: Patient's Short Term Goal: control pain/relieve SOB    Interventions:   - supplemental o2,IS, IV abx, prn pain meds  - See additional Care Plan goals for specific interventions  11/3/2023 2323 by Heron Kiser RN  Outcome: Progressing  11/3/2023 2041 by Heron Kiser RN  Outcome: Progressing     Problem: RESPIRATORY - ADULT  Goal: Achieves optimal ventilation and oxygenation  Description: INTERVENTIONS:  - Assess for changes in respiratory status  - Assess for changes in mentation and behavior  - Position to facilitate oxygenation and minimize respiratory effort  - Oxygen supplementation based on oxygen saturation or ABGs  - Provide Smoking Cessation handout, if applicable  - Encourage broncho-pulmonary hygiene including cough, deep breathe, Incentive Spirometry  - Assess the need for suctioning and perform as needed  - Assess and instruct to report SOB or any respiratory difficulty  - Respiratory Therapy support as indicated  - Manage/alleviate anxiety  - Monitor for signs/symptoms of CO2 retention  11/3/2023 2323 by Heron Kiser RN  Outcome: Progressing  11/3/2023 2041 by Heron Kiser RN  Outcome: Progressing     Problem: Diabetes/Glucose Control  Goal: Glucose maintained within prescribed range  Description: INTERVENTIONS:  - Monitor Blood Glucose as ordered  - Assess for signs and symptoms of hyperglycemia and hypoglycemia  - Administer ordered medications to maintain glucose within target range  - Assess barriers to adequate nutritional intake and initiate nutrition consult as needed  - Instruct patient on self management of diabetes  11/3/2023 2323 by Agueda Kang RN  Outcome: Progressing  11/3/2023 2041 by Agueda Kang RN  Outcome: Progressing

## 2023-11-04 NOTE — PLAN OF CARE
Problem: Patient/Family Goals  Goal: Patient/Family Long Term Goal  Description: Patient's Long Term Goal: discharge home    Interventions:  - Hospitalist, IV abx, labs, chest xray, UA, IVF, EKG  - See additional Care Plan goals for specific interventions  11/4/2023 1120 by Sarbjit Arcos RN  Outcome: Progressing  11/4/2023 1109 by Sarbjit Arcos RN  Outcome: Progressing  Goal: Patient/Family Short Term Goal  Description: Patient's Short Term Goal: control pain/relieve SOB    Interventions:   - supplemental o2,IS, IV abx, prn pain meds  - See additional Care Plan goals for specific interventions  11/4/2023 1120 by Sarbjit Arcos RN  Outcome: Progressing  11/4/2023 1109 by Sarbjit Arcos RN  Outcome: Progressing

## 2023-11-04 NOTE — PLAN OF CARE
Assumed patient care, patient is alert and oriented x4. Patient on room air, no shortness of breath. Sinus rhythm on tele, no complains of chest pain. Abdomen is soft, non-tendered, bowel sounds are active in all four quadrants. Bed in lower position, call light within reach. POC: After IV antibiotics later today, patient will discharge to home.      Problem: Patient/Family Goals  Goal: Patient/Family Long Term Goal  Description: Patient's Long Term Goal: discharge home    Interventions:  - Hospitalist, IV abx, labs, chest xray, UA, IVF, EKG  - See additional Care Plan goals for specific interventions  Outcome: Progressing  Goal: Patient/Family Short Term Goal  Description: Patient's Short Term Goal: control pain/relieve SOB    Interventions:   - supplemental o2,IS, IV abx, prn pain meds  - See additional Care Plan goals for specific interventions  Outcome: Progressing

## 2023-11-04 NOTE — PROGRESS NOTES
Nursing discharge note  Patient discharge to home, IV removed, Tele DC and returned to monitor tech. F/U instructions provided and discussed. Pt and family verbalized understanding. Prescriptions given, discussed adverse reactions and side effects of all new medications, and provided appropriate handouts. Pt and family verbalized understanding. Pt wheeled down with all belongings. Pt denies C/O pain, malaise , or cardiac S/S. All needs met by staff.

## 2023-11-06 ENCOUNTER — PATIENT OUTREACH (OUTPATIENT)
Dept: CASE MANAGEMENT | Age: 78
End: 2023-11-06

## 2023-11-06 ENCOUNTER — LAB ENCOUNTER (OUTPATIENT)
Dept: LAB | Age: 78
End: 2023-11-06
Attending: INTERNAL MEDICINE
Payer: MEDICARE

## 2023-11-06 ENCOUNTER — OFFICE VISIT (OUTPATIENT)
Dept: INTERNAL MEDICINE CLINIC | Facility: CLINIC | Age: 78
End: 2023-11-06
Payer: MEDICARE

## 2023-11-06 ENCOUNTER — LAB ENCOUNTER (OUTPATIENT)
Dept: LAB | Age: 78
End: 2023-11-06
Attending: NURSE PRACTITIONER
Payer: MEDICARE

## 2023-11-06 VITALS
WEIGHT: 165 LBS | DIASTOLIC BLOOD PRESSURE: 80 MMHG | SYSTOLIC BLOOD PRESSURE: 138 MMHG | BODY MASS INDEX: 30.75 KG/M2 | HEIGHT: 61.61 IN | HEART RATE: 82 BPM | TEMPERATURE: 98 F

## 2023-11-06 DIAGNOSIS — Z86.718 HISTORY OF DVT (DEEP VEIN THROMBOSIS): ICD-10-CM

## 2023-11-06 DIAGNOSIS — D80.3 IGG DEFICIENCY (HCC): ICD-10-CM

## 2023-11-06 DIAGNOSIS — E87.6 HYPOKALEMIA: ICD-10-CM

## 2023-11-06 DIAGNOSIS — M17.11 PRIMARY OSTEOARTHRITIS OF RIGHT KNEE: ICD-10-CM

## 2023-11-06 DIAGNOSIS — Z86.711 HISTORY OF PULMONARY EMBOLISM: ICD-10-CM

## 2023-11-06 DIAGNOSIS — E11.65 TYPE 2 DIABETES MELLITUS WITH HYPERGLYCEMIA, WITHOUT LONG-TERM CURRENT USE OF INSULIN (HCC): ICD-10-CM

## 2023-11-06 DIAGNOSIS — D80.3 SELECTIVE DEFICIENCY OF IGG SUBCLASSES (HCC): ICD-10-CM

## 2023-11-06 DIAGNOSIS — D80.1 HYPOGAMMAGLOBULINEMIA (HCC): ICD-10-CM

## 2023-11-06 DIAGNOSIS — I25.10 CORONARY ARTERY DISEASE INVOLVING NATIVE CORONARY ARTERY OF NATIVE HEART WITHOUT ANGINA PECTORIS: ICD-10-CM

## 2023-11-06 DIAGNOSIS — D64.9 ANEMIA, UNSPECIFIED TYPE: ICD-10-CM

## 2023-11-06 DIAGNOSIS — Z87.01 HISTORY OF RECURRENT PNEUMONIA: ICD-10-CM

## 2023-11-06 DIAGNOSIS — J18.9 COMMUNITY ACQUIRED PNEUMONIA OF RIGHT UPPER LOBE OF LUNG: Primary | ICD-10-CM

## 2023-11-06 DIAGNOSIS — J45.30 MILD PERSISTENT ASTHMA WITHOUT COMPLICATION: ICD-10-CM

## 2023-11-06 DIAGNOSIS — G43.009 MIGRAINE WITHOUT AURA AND WITHOUT STATUS MIGRAINOSUS, NOT INTRACTABLE: ICD-10-CM

## 2023-11-06 DIAGNOSIS — F33.1 MAJOR DEPRESSIVE DISORDER, RECURRENT EPISODE, MODERATE (HCC): Chronic | ICD-10-CM

## 2023-11-06 DIAGNOSIS — R09.02 HYPOXIA: ICD-10-CM

## 2023-11-06 DIAGNOSIS — K21.9 GASTROESOPHAGEAL REFLUX DISEASE WITHOUT ESOPHAGITIS: ICD-10-CM

## 2023-11-06 DIAGNOSIS — N39.0 RECURRENT UTI: ICD-10-CM

## 2023-11-06 DIAGNOSIS — G47.33 OSA ON CPAP: ICD-10-CM

## 2023-11-06 DIAGNOSIS — I10 BENIGN ESSENTIAL HYPERTENSION: ICD-10-CM

## 2023-11-06 DIAGNOSIS — R06.09 DOE (DYSPNEA ON EXERTION): ICD-10-CM

## 2023-11-06 DIAGNOSIS — Z02.9 ENCOUNTERS FOR UNSPECIFIED ADMINISTRATIVE PURPOSE: Primary | ICD-10-CM

## 2023-11-06 DIAGNOSIS — F41.9 ANXIETY: ICD-10-CM

## 2023-11-06 PROBLEM — N10 ACUTE PYELONEPHRITIS: Status: RESOLVED | Noted: 2023-11-02 | Resolved: 2023-11-06

## 2023-11-06 LAB
ALBUMIN SERPL-MCNC: 3.7 G/DL (ref 3.4–5)
ALBUMIN/GLOB SERPL: 1 {RATIO} (ref 1–2)
ALP LIVER SERPL-CCNC: 93 U/L
ALT SERPL-CCNC: 28 U/L
ANION GAP SERPL CALC-SCNC: 8 MMOL/L (ref 0–18)
AST SERPL-CCNC: 23 U/L (ref 15–37)
BILIRUB SERPL-MCNC: 0.5 MG/DL (ref 0.1–2)
BUN BLD-MCNC: 9 MG/DL (ref 9–23)
CALCIUM BLD-MCNC: 8.9 MG/DL (ref 8.5–10.1)
CHLORIDE SERPL-SCNC: 109 MMOL/L (ref 98–112)
CO2 SERPL-SCNC: 25 MMOL/L (ref 21–32)
CREAT BLD-MCNC: 1.08 MG/DL
EGFRCR SERPLBLD CKD-EPI 2021: 53 ML/MIN/1.73M2 (ref 60–?)
FASTING STATUS PATIENT QL REPORTED: NO
GLOBULIN PLAS-MCNC: 3.6 G/DL (ref 2.8–4.4)
GLUCOSE BLD-MCNC: 156 MG/DL (ref 70–99)
IGA SERPL-MCNC: 148 MG/DL (ref 70–312)
IGM SERPL-MCNC: 54.2 MG/DL (ref 43–279)
IMMUNOGLOBULIN PNL SER-MCNC: 517 MG/DL (ref 791–1643)
MAGNESIUM SERPL-MCNC: 1.7 MG/DL (ref 1.6–2.6)
OSMOLALITY SERPL CALC.SUM OF ELEC: 296 MOSM/KG (ref 275–295)
POTASSIUM SERPL-SCNC: 3.2 MMOL/L (ref 3.5–5.1)
PROT SERPL-MCNC: 7.3 G/DL (ref 6.4–8.2)
SODIUM SERPL-SCNC: 142 MMOL/L (ref 136–145)

## 2023-11-06 PROCEDURE — 83735 ASSAY OF MAGNESIUM: CPT

## 2023-11-06 PROCEDURE — 36415 COLL VENOUS BLD VENIPUNCTURE: CPT

## 2023-11-06 PROCEDURE — 82784 ASSAY IGA/IGD/IGG/IGM EACH: CPT

## 2023-11-06 PROCEDURE — 80053 COMPREHEN METABOLIC PANEL: CPT

## 2023-11-06 RX ORDER — FAMCICLOVIR 500 MG/1
TABLET ORAL
Qty: 21 TABLET | Refills: 0 | Status: SHIPPED | OUTPATIENT
Start: 2023-11-06

## 2023-11-06 RX ORDER — POTASSIUM CHLORIDE 1500 MG/1
40 TABLET, EXTENDED RELEASE ORAL DAILY
Qty: 6 TABLET | Refills: 0 | Status: SHIPPED | OUTPATIENT
Start: 2023-11-06 | End: 2023-11-09

## 2023-11-13 RX ORDER — POTASSIUM CHLORIDE 1500 MG/1
TABLET, EXTENDED RELEASE ORAL
Qty: 6 TABLET | Refills: 0 | OUTPATIENT
Start: 2023-11-13

## 2023-11-13 RX ORDER — DULOXETIN HYDROCHLORIDE 60 MG/1
60 CAPSULE, DELAYED RELEASE ORAL DAILY
Qty: 90 CAPSULE | Refills: 0 | Status: SHIPPED | OUTPATIENT
Start: 2023-11-13

## 2023-11-13 RX ORDER — METFORMIN HYDROCHLORIDE 500 MG/1
TABLET, EXTENDED RELEASE ORAL
Qty: 270 TABLET | Refills: 0 | Status: SHIPPED | OUTPATIENT
Start: 2023-11-13

## 2023-11-13 NOTE — TELEPHONE ENCOUNTER
Last OV: Pre-op 23 SD    Future Appointments   Date Time Provider Gomez Miller   2024 10:00 AM Oliver Chang MD [facility]   4/10/2024 10:45 AM [place] DO [facility]        Latest labs:   CMP, Lipid, A1C 23    Latest RX:   Medication Quantity Refills Start End   DULoxetine 60 MG Oral Cap DR Particles 90 capsule 0 2023      Medication Quantity Refills Start End   metFORMIN  MG Oral Tablet 24 Hr  0 2022    Sig:   Take 1 tablet (500 mg total) by mouth 2 (two) times daily with meals. Route:   Oral       Medication Quantity Refills Start End   Potassium Chloride ER 20 MEQ Oral Tab CR () 6 tablet 0 2023   Sig:   Take 40 mEq by mouth daily for 3 days.        Protocol  Diabetic Medication Protocol [id number] 10:00 AM   Protocol Details HgBA1C procedure resulted in past 6 months    Last HgBA1C < 7.5    Microalbumin procedure in past 12 months or taking ACE/ARB    Appointment in past 6 or next 3 months

## 2023-11-27 ENCOUNTER — PATIENT OUTREACH (OUTPATIENT)
Dept: CASE MANAGEMENT | Age: 78
End: 2023-11-27

## 2023-12-04 ENCOUNTER — LAB ENCOUNTER (OUTPATIENT)
Dept: LAB | Age: 78
End: 2023-12-04
Attending: NURSE PRACTITIONER
Payer: MEDICARE

## 2023-12-04 ENCOUNTER — LAB ENCOUNTER (OUTPATIENT)
Dept: LAB | Age: 78
End: 2023-12-04
Attending: ORTHOPAEDIC SURGERY
Payer: MEDICARE

## 2023-12-04 DIAGNOSIS — E87.6 HYPOKALEMIA: ICD-10-CM

## 2023-12-04 DIAGNOSIS — E11.65 TYPE 2 DIABETES MELLITUS WITH HYPERGLYCEMIA, WITHOUT LONG-TERM CURRENT USE OF INSULIN (HCC): ICD-10-CM

## 2023-12-04 DIAGNOSIS — E53.8 B12 DEFICIENCY: ICD-10-CM

## 2023-12-04 DIAGNOSIS — Z01.818 PRE-OP TESTING: ICD-10-CM

## 2023-12-04 LAB
ALBUMIN SERPL-MCNC: 3.8 G/DL (ref 3.4–5)
ALBUMIN/GLOB SERPL: 1.3 {RATIO} (ref 1–2)
ALP LIVER SERPL-CCNC: 82 U/L
ALT SERPL-CCNC: 51 U/L
ANION GAP SERPL CALC-SCNC: 6 MMOL/L (ref 0–18)
ANTIBODY SCREEN: NEGATIVE
APTT PPP: 34.1 SECONDS (ref 23.3–35.6)
AST SERPL-CCNC: 40 U/L (ref 15–37)
BILIRUB SERPL-MCNC: 0.5 MG/DL (ref 0.1–2)
BUN BLD-MCNC: 15 MG/DL (ref 9–23)
CALCIUM BLD-MCNC: 8.9 MG/DL (ref 8.5–10.1)
CHLORIDE SERPL-SCNC: 108 MMOL/L (ref 98–112)
CHOLEST SERPL-MCNC: 122 MG/DL (ref ?–200)
CO2 SERPL-SCNC: 24 MMOL/L (ref 21–32)
CREAT BLD-MCNC: 0.82 MG/DL
CREAT UR-SCNC: 69.4 MG/DL
EGFRCR SERPLBLD CKD-EPI 2021: 74 ML/MIN/1.73M2 (ref 60–?)
ERYTHROCYTE [DISTWIDTH] IN BLOOD BY AUTOMATED COUNT: 13.5 %
EST. AVERAGE GLUCOSE BLD GHB EST-MCNC: 197 MG/DL (ref 68–126)
FASTING PATIENT LIPID ANSWER: YES
FASTING STATUS PATIENT QL REPORTED: YES
GLOBULIN PLAS-MCNC: 2.9 G/DL (ref 2.8–4.4)
GLUCOSE BLD-MCNC: 151 MG/DL (ref 70–99)
HBA1C MFR BLD: 8.5 % (ref ?–5.7)
HCT VFR BLD AUTO: 37.7 %
HDLC SERPL-MCNC: 68 MG/DL (ref 40–59)
HGB BLD-MCNC: 11.9 G/DL
INR BLD: 2.33 (ref 0.8–1.2)
LDLC SERPL CALC-MCNC: 35 MG/DL (ref ?–100)
MCH RBC QN AUTO: 29.1 PG (ref 26–34)
MCHC RBC AUTO-ENTMCNC: 31.6 G/DL (ref 31–37)
MCV RBC AUTO: 92.2 FL
MICROALBUMIN UR-MCNC: 4.59 MG/DL
MICROALBUMIN/CREAT 24H UR-RTO: 66.1 UG/MG (ref ?–30)
NONHDLC SERPL-MCNC: 54 MG/DL (ref ?–130)
OSMOLALITY SERPL CALC.SUM OF ELEC: 290 MOSM/KG (ref 275–295)
PLATELET # BLD AUTO: 144 10(3)UL (ref 150–450)
POTASSIUM SERPL-SCNC: 3.9 MMOL/L (ref 3.5–5.1)
PROT SERPL-MCNC: 6.7 G/DL (ref 6.4–8.2)
PROTHROMBIN TIME: 25.8 SECONDS (ref 11.6–14.8)
RBC # BLD AUTO: 4.09 X10(6)UL
RH BLOOD TYPE: NEGATIVE
SODIUM SERPL-SCNC: 138 MMOL/L (ref 136–145)
TRIGL SERPL-MCNC: 102 MG/DL (ref 30–149)
VIT B12 SERPL-MCNC: 329 PG/ML (ref 193–986)
VLDLC SERPL CALC-MCNC: 14 MG/DL (ref 0–30)
WBC # BLD AUTO: 5.5 X10(3) UL (ref 4–11)

## 2023-12-04 PROCEDURE — 86901 BLOOD TYPING SEROLOGIC RH(D): CPT

## 2023-12-04 PROCEDURE — 36415 COLL VENOUS BLD VENIPUNCTURE: CPT

## 2023-12-04 PROCEDURE — 82607 VITAMIN B-12: CPT

## 2023-12-04 PROCEDURE — 82570 ASSAY OF URINE CREATININE: CPT

## 2023-12-04 PROCEDURE — 82043 UR ALBUMIN QUANTITATIVE: CPT

## 2023-12-04 PROCEDURE — 80061 LIPID PANEL: CPT

## 2023-12-04 PROCEDURE — 80053 COMPREHEN METABOLIC PANEL: CPT

## 2023-12-04 PROCEDURE — 85610 PROTHROMBIN TIME: CPT

## 2023-12-04 PROCEDURE — 86900 BLOOD TYPING SEROLOGIC ABO: CPT

## 2023-12-04 PROCEDURE — 85730 THROMBOPLASTIN TIME PARTIAL: CPT

## 2023-12-04 PROCEDURE — 87081 CULTURE SCREEN ONLY: CPT

## 2023-12-04 PROCEDURE — 86850 RBC ANTIBODY SCREEN: CPT

## 2023-12-04 PROCEDURE — 85027 COMPLETE CBC AUTOMATED: CPT

## 2023-12-04 PROCEDURE — 83036 HEMOGLOBIN GLYCOSYLATED A1C: CPT

## 2023-12-08 ENCOUNTER — TELEPHONE (OUTPATIENT)
Dept: INTERNAL MEDICINE CLINIC | Facility: CLINIC | Age: 78
End: 2023-12-08

## 2023-12-08 NOTE — TELEPHONE ENCOUNTER
Future Appointments   Date Time Provider Gomez Miller   12/11/2023  5:40 PM JUAREZ Aguilar [facility]     Patient having R total knee replacement on 12/20/23 with Dr. Bucky Jones. Paperwork in [employer] ;

## 2023-12-11 ENCOUNTER — OFFICE VISIT (OUTPATIENT)
Dept: INTERNAL MEDICINE CLINIC | Facility: CLINIC | Age: 78
End: 2023-12-11
Payer: MEDICARE

## 2023-12-11 VITALS
HEART RATE: 94 BPM | OXYGEN SATURATION: 98 % | TEMPERATURE: 98 F | WEIGHT: 168.63 LBS | DIASTOLIC BLOOD PRESSURE: 74 MMHG | BODY MASS INDEX: 31.03 KG/M2 | RESPIRATION RATE: 18 BRPM | SYSTOLIC BLOOD PRESSURE: 128 MMHG | HEIGHT: 62 IN

## 2023-12-11 DIAGNOSIS — R06.09 DOE (DYSPNEA ON EXERTION): ICD-10-CM

## 2023-12-11 DIAGNOSIS — Z86.711 HISTORY OF PULMONARY EMBOLISM: ICD-10-CM

## 2023-12-11 DIAGNOSIS — K21.9 GASTROESOPHAGEAL REFLUX DISEASE WITHOUT ESOPHAGITIS: ICD-10-CM

## 2023-12-11 DIAGNOSIS — J45.30 MILD PERSISTENT ASTHMA WITHOUT COMPLICATION: ICD-10-CM

## 2023-12-11 DIAGNOSIS — E11.65 TYPE 2 DIABETES MELLITUS WITH HYPERGLYCEMIA, WITHOUT LONG-TERM CURRENT USE OF INSULIN (HCC): ICD-10-CM

## 2023-12-11 DIAGNOSIS — D64.9 ANEMIA, UNSPECIFIED TYPE: ICD-10-CM

## 2023-12-11 DIAGNOSIS — D80.3 SELECTIVE DEFICIENCY OF IGG SUBCLASSES (HCC): ICD-10-CM

## 2023-12-11 DIAGNOSIS — D80.3 IGG DEFICIENCY (HCC): ICD-10-CM

## 2023-12-11 DIAGNOSIS — G62.9 NEUROPATHY: ICD-10-CM

## 2023-12-11 DIAGNOSIS — Z96.652 HISTORY OF LEFT KNEE REPLACEMENT: ICD-10-CM

## 2023-12-11 DIAGNOSIS — D69.6 THROMBOCYTOPENIA (HCC): ICD-10-CM

## 2023-12-11 DIAGNOSIS — G43.009 MIGRAINE WITHOUT AURA AND WITHOUT STATUS MIGRAINOSUS, NOT INTRACTABLE: ICD-10-CM

## 2023-12-11 DIAGNOSIS — F33.1 MAJOR DEPRESSIVE DISORDER, RECURRENT EPISODE, MODERATE (HCC): Chronic | ICD-10-CM

## 2023-12-11 DIAGNOSIS — I25.10 CORONARY ARTERY DISEASE INVOLVING NATIVE CORONARY ARTERY OF NATIVE HEART WITHOUT ANGINA PECTORIS: ICD-10-CM

## 2023-12-11 DIAGNOSIS — F41.9 ANXIETY: ICD-10-CM

## 2023-12-11 DIAGNOSIS — Z87.01 HISTORY OF RECURRENT PNEUMONIA: ICD-10-CM

## 2023-12-11 DIAGNOSIS — M17.11 PRIMARY OSTEOARTHRITIS OF RIGHT KNEE: Primary | ICD-10-CM

## 2023-12-11 DIAGNOSIS — I10 BENIGN ESSENTIAL HYPERTENSION: ICD-10-CM

## 2023-12-11 DIAGNOSIS — G47.33 OSA ON CPAP: ICD-10-CM

## 2023-12-11 DIAGNOSIS — Z86.718 HISTORY OF DVT (DEEP VEIN THROMBOSIS): ICD-10-CM

## 2023-12-11 PROBLEM — J18.9 COMMUNITY ACQUIRED PNEUMONIA OF RIGHT UPPER LOBE OF LUNG: Status: RESOLVED | Noted: 2023-11-02 | Resolved: 2023-12-11

## 2023-12-11 PROBLEM — R09.02 HYPOXIA: Status: RESOLVED | Noted: 2023-11-02 | Resolved: 2023-12-11

## 2023-12-11 PROBLEM — E87.6 HYPOKALEMIA: Status: RESOLVED | Noted: 2023-11-06 | Resolved: 2023-12-11

## 2023-12-11 RX ORDER — GLIPIZIDE 2.5 MG/1
2.5 TABLET, EXTENDED RELEASE ORAL
Qty: 90 TABLET | Refills: 0 | Status: SHIPPED | OUTPATIENT
Start: 2023-12-11

## 2023-12-14 ENCOUNTER — TELEPHONE (OUTPATIENT)
Dept: INTERNAL MEDICINE CLINIC | Facility: CLINIC | Age: 78
End: 2023-12-14

## 2023-12-29 ENCOUNTER — PATIENT OUTREACH (OUTPATIENT)
Dept: CASE MANAGEMENT | Age: 78
End: 2023-12-29

## 2023-12-29 DIAGNOSIS — E78.00 PURE HYPERCHOLESTEROLEMIA: ICD-10-CM

## 2023-12-29 DIAGNOSIS — Z86.718 HISTORY OF DVT (DEEP VEIN THROMBOSIS): ICD-10-CM

## 2023-12-29 DIAGNOSIS — I25.10 CORONARY ARTERY DISEASE INVOLVING NATIVE CORONARY ARTERY OF NATIVE HEART WITHOUT ANGINA PECTORIS: ICD-10-CM

## 2023-12-29 DIAGNOSIS — E11.65 TYPE 2 DIABETES MELLITUS WITH HYPERGLYCEMIA, WITHOUT LONG-TERM CURRENT USE OF INSULIN (HCC): ICD-10-CM

## 2023-12-29 DIAGNOSIS — F33.1 MAJOR DEPRESSIVE DISORDER, RECURRENT EPISODE, MODERATE (HCC): ICD-10-CM

## 2023-12-29 DIAGNOSIS — D80.3 IGG DEFICIENCY (HCC): Primary | ICD-10-CM

## 2023-12-29 NOTE — PROGRESS NOTES
Spoke to Keily for CCM. Updates to patient care team/comments: UTD. Patient reported changes in medications: together we reviewed with no updates. Med Adherence  Comment: pt reports taking all medications as prescribed. Health Maintenance:   Reviewed with pt    Health Maintenance   Topic Date Due    Zoster Vaccines (1 of 2) Never done- unsure     Diabetes Care Dilated Eye Exam  01/09/2021- reminded     COVID-19 Vaccine (4 - 2023-24 season) 09/01/2023- plans on getting     Influenza Vaccine (1) 10/01/2023- plans on getting     Colorectal Cancer Screening  02/27/2024    Diabetes Care A1C  03/04/2024    Annual Physical  04/10/2024    Diabetes Care: GFR  12/04/2024    Diabetes Care: Microalb/Creat Ratio  12/04/2024    DEXA Scan  Completed    Annual Depression Screening  Completed    Fall Risk Screening (Annual)  Completed    Diabetes Care Foot Exam (Annual)  Completed    Pneumococcal Vaccine: 65+ Years  Completed    Mammogram  Discontinued     Diabetes can harm your eyes. It can damage the small blood vessels in your retina, the back wall of your eyeball. This condition is called diabetic retinopathy. Diabetes also increases your risk of glaucoma and other eye problems. You may not know your eyes are harmed until the problem is very bad. Your doctor can catch problems early if you get regular eye exams. This is very important. The early stages of diabetic retinopathy don't cause changes in vision and you won't have symptoms. Only an eye exam can detect the problem, so that steps can be taken to prevent the retinopathy from getting worse. Patient updates/concerns:   Listened to pt and provided support. Pt says Dr. Chitra Rowland suggested she get back on IgG and complete all vaccines. Pt asking for assistance in getting an appointment. Called there office and scheduled Kyle. 10 th 11:20 am with Dr. Thalia Britt.      Pt has scheduled knee replacement in Feb.  She received clearance from [facility] but was advised she needs clearance from Cardiology (Dr Aleksandar Henry) and Pulmonary (Dr Dandre Monsalve). Reminded     Diabetes  Voices compliance. HGBA1C:    Lab Results   Component Value Date    A1C 8.5 (H) 12/04/2023    A1C 7.4 (A) 08/23/2023    A1C 7.0 (H) 04/07/2023     (H) 12/04/2023     Pt says she doesn't understand why her numbers are up like they are. She is watching her diet and voices medication compliance. Discussed diet and nutrition together. Diet  Fair. Pt states lacks appetite most days. At times only has one meal but tries eating smaller meals she is on Ozempic. Discussed the importance of diet and nutrition together. Goals/Action Plan: Active goal from previous outreach: Improve energy level and fatigue. Weight management. Watching her portion intake but states she only usually eats one meal due to Ozempic. Patient reported progress toward goal: ongoing                - What: improve energy levels and fatigue so she can better manage her weight            - Where/When/How: pt plans on eating smaller meals throughout the day to help with appetite. She'd like to do some walking for better stamina. Patient Reported New Barriers And Concerns:   States she is always tired has no energy. IgG. - Plan for overcoming all barriers: second opinion on IgG. Care Managers Interventions:   Encouraged patient:   Self care: Take the time to do the things you love. Nutrition:  Good nutrition helps us to maintain our weight, fight off infection, and help reduce the risk of developing other chronic issues. Physical activity:  Physical activity is important to help maintain independence and improve quality of life.     Future Appointments:   Future Appointments   Date Time Provider Gomez Miller   2/9/2024 10:00 AM Floridalma Salazar MD [facility]   4/10/2024 10:45 AM Enrique Montoya DO [facility]         Next Care Manager Follow Up Date: in 1 month. Reason For Follow Up: review progress and or barriers towards patient's goals. Time Spent This Encounter Total: 48 min medical record review, telephone communication, care plan updates where needed, education, goals, and action plan recreation/update. Provided acknowledgment and validation to patient's concerns.    Monthly Minute Total including today: 48  Physical assessment, complete health history, and need for CCM established by Igor Baez MD.

## 2024-01-14 ENCOUNTER — APPOINTMENT (OUTPATIENT)
Dept: CT IMAGING | Facility: HOSPITAL | Age: 79
End: 2024-01-14
Attending: EMERGENCY MEDICINE
Payer: MEDICARE

## 2024-01-14 ENCOUNTER — HOSPITAL ENCOUNTER (INPATIENT)
Facility: HOSPITAL | Age: 79
LOS: 3 days | Discharge: HOME | End: 2024-01-17
Attending: EMERGENCY MEDICINE | Admitting: HOSPITALIST
Payer: MEDICARE

## 2024-01-14 ENCOUNTER — APPOINTMENT (OUTPATIENT)
Dept: GENERAL RADIOLOGY | Facility: HOSPITAL | Age: 79
End: 2024-01-14
Attending: EMERGENCY MEDICINE
Payer: MEDICARE

## 2024-01-14 DIAGNOSIS — R09.02 HYPOXIA: ICD-10-CM

## 2024-01-14 DIAGNOSIS — J18.9 COMMUNITY ACQUIRED PNEUMONIA, UNSPECIFIED LATERALITY: Primary | ICD-10-CM

## 2024-01-14 DIAGNOSIS — R41.0 CONFUSION: ICD-10-CM

## 2024-01-14 PROBLEM — N17.9 AKI (ACUTE KIDNEY INJURY) (HCC): Status: ACTIVE | Noted: 2022-05-11

## 2024-01-14 PROBLEM — J96.01 ACUTE RESPIRATORY FAILURE WITH HYPOXIA (HCC): Status: ACTIVE | Noted: 2024-01-14

## 2024-01-14 PROBLEM — N17.9 AKI (ACUTE KIDNEY INJURY): Status: ACTIVE | Noted: 2022-05-11

## 2024-01-14 LAB
ALBUMIN SERPL-MCNC: 3.6 G/DL (ref 3.4–5)
ALBUMIN/GLOB SERPL: 1.2 {RATIO} (ref 1–2)
ALP LIVER SERPL-CCNC: 75 U/L
ALT SERPL-CCNC: 32 U/L
ANION GAP SERPL CALC-SCNC: 7 MMOL/L (ref 0–18)
AST SERPL-CCNC: 26 U/L (ref 15–37)
ATRIAL RATE: 98 BPM
BASOPHILS # BLD AUTO: 0.03 X10(3) UL (ref 0–0.2)
BASOPHILS NFR BLD AUTO: 0.4 %
BILIRUB SERPL-MCNC: 0.7 MG/DL (ref 0.1–2)
BUN BLD-MCNC: 18 MG/DL (ref 9–23)
CALCIUM BLD-MCNC: 8.1 MG/DL (ref 8.5–10.1)
CHLORIDE SERPL-SCNC: 112 MMOL/L (ref 98–112)
CO2 SERPL-SCNC: 25 MMOL/L (ref 21–32)
CREAT BLD-MCNC: 1.51 MG/DL
EGFRCR SERPLBLD CKD-EPI 2021: 35 ML/MIN/1.73M2 (ref 60–?)
EOSINOPHIL # BLD AUTO: 0.19 X10(3) UL (ref 0–0.7)
EOSINOPHIL NFR BLD AUTO: 2.3 %
ERYTHROCYTE [DISTWIDTH] IN BLOOD BY AUTOMATED COUNT: 13.9 %
FLUAV + FLUBV RNA SPEC NAA+PROBE: NEGATIVE
FLUAV + FLUBV RNA SPEC NAA+PROBE: NEGATIVE
GLOBULIN PLAS-MCNC: 2.9 G/DL (ref 2.8–4.4)
GLUCOSE BLD-MCNC: 160 MG/DL (ref 70–99)
GLUCOSE BLD-MCNC: 233 MG/DL (ref 70–99)
GLUCOSE BLD-MCNC: 348 MG/DL (ref 70–99)
GLUCOSE BLD-MCNC: 385 MG/DL (ref 70–99)
HCT VFR BLD AUTO: 37.6 %
HGB BLD-MCNC: 12 G/DL
IMM GRANULOCYTES # BLD AUTO: 0.01 X10(3) UL (ref 0–1)
IMM GRANULOCYTES NFR BLD: 0.1 %
L PNEUMO AG UR QL: NEGATIVE
LYMPHOCYTES # BLD AUTO: 1 X10(3) UL (ref 1–4)
LYMPHOCYTES NFR BLD AUTO: 11.9 %
MCH RBC QN AUTO: 29.6 PG (ref 26–34)
MCHC RBC AUTO-ENTMCNC: 31.9 G/DL (ref 31–37)
MCV RBC AUTO: 92.6 FL
MONOCYTES # BLD AUTO: 0.51 X10(3) UL (ref 0.1–1)
MONOCYTES NFR BLD AUTO: 6.1 %
NEUTROPHILS # BLD AUTO: 6.67 X10 (3) UL (ref 1.5–7.7)
NEUTROPHILS # BLD AUTO: 6.67 X10(3) UL (ref 1.5–7.7)
NEUTROPHILS NFR BLD AUTO: 79.2 %
NT-PROBNP SERPL-MCNC: 326 PG/ML (ref ?–450)
OSMOLALITY SERPL CALC.SUM OF ELEC: 303 MOSM/KG (ref 275–295)
P AXIS: 42 DEGREES
P-R INTERVAL: 176 MS
PLATELET # BLD AUTO: 164 10(3)UL (ref 150–450)
POTASSIUM SERPL-SCNC: 3.8 MMOL/L (ref 3.5–5.1)
PROT SERPL-MCNC: 6.5 G/DL (ref 6.4–8.2)
Q-T INTERVAL: 334 MS
QRS DURATION: 64 MS
QTC CALCULATION (BEZET): 426 MS
R AXIS: -1 DEGREES
RBC # BLD AUTO: 4.06 X10(6)UL
RSV RNA SPEC NAA+PROBE: NEGATIVE
SARS-COV-2 RNA RESP QL NAA+PROBE: NOT DETECTED
SODIUM SERPL-SCNC: 144 MMOL/L (ref 136–145)
STREP PNEUMO ANTIGEN, URINE: NEGATIVE
T AXIS: -2 DEGREES
TROPONIN I SERPL HS-MCNC: 21 NG/L
VENTRICULAR RATE: 98 BPM
WBC # BLD AUTO: 8.4 X10(3) UL (ref 4–11)

## 2024-01-14 PROCEDURE — 99223 1ST HOSP IP/OBS HIGH 75: CPT | Performed by: STUDENT IN AN ORGANIZED HEALTH CARE EDUCATION/TRAINING PROGRAM

## 2024-01-14 PROCEDURE — 71045 X-RAY EXAM CHEST 1 VIEW: CPT | Performed by: EMERGENCY MEDICINE

## 2024-01-14 PROCEDURE — 70450 CT HEAD/BRAIN W/O DYE: CPT | Performed by: EMERGENCY MEDICINE

## 2024-01-14 RX ORDER — PANTOPRAZOLE SODIUM 40 MG/1
40 TABLET, DELAYED RELEASE ORAL
Status: DISCONTINUED | OUTPATIENT
Start: 2024-01-15 | End: 2024-01-17

## 2024-01-14 RX ORDER — BENZONATATE 200 MG/1
200 CAPSULE ORAL 3 TIMES DAILY PRN
Status: DISCONTINUED | OUTPATIENT
Start: 2024-01-14 | End: 2024-01-17

## 2024-01-14 RX ORDER — LISINOPRIL 20 MG/1
20 TABLET ORAL DAILY
Status: DISCONTINUED | OUTPATIENT
Start: 2024-01-15 | End: 2024-01-17

## 2024-01-14 RX ORDER — DULOXETIN HYDROCHLORIDE 30 MG/1
90 CAPSULE, DELAYED RELEASE ORAL DAILY
Status: DISCONTINUED | OUTPATIENT
Start: 2024-01-15 | End: 2024-01-17

## 2024-01-14 RX ORDER — POLYETHYLENE GLYCOL 3350 17 G/17G
17 POWDER, FOR SOLUTION ORAL DAILY PRN
Status: DISCONTINUED | OUTPATIENT
Start: 2024-01-14 | End: 2024-01-17

## 2024-01-14 RX ORDER — NICOTINE POLACRILEX 4 MG
15 LOZENGE BUCCAL
Status: DISCONTINUED | OUTPATIENT
Start: 2024-01-14 | End: 2024-01-17

## 2024-01-14 RX ORDER — ALBUTEROL SULFATE 90 UG/1
8 AEROSOL, METERED RESPIRATORY (INHALATION) ONCE
Status: DISCONTINUED | OUTPATIENT
Start: 2024-01-14 | End: 2024-01-17

## 2024-01-14 RX ORDER — DEXTROSE MONOHYDRATE 25 G/50ML
50 INJECTION, SOLUTION INTRAVENOUS
Status: DISCONTINUED | OUTPATIENT
Start: 2024-01-14 | End: 2024-01-17

## 2024-01-14 RX ORDER — HYDROCODONE BITARTRATE AND ACETAMINOPHEN 5; 325 MG/1; MG/1
1 TABLET ORAL ONCE
Status: COMPLETED | OUTPATIENT
Start: 2024-01-14 | End: 2024-01-14

## 2024-01-14 RX ORDER — METOCLOPRAMIDE HYDROCHLORIDE 5 MG/ML
5 INJECTION INTRAMUSCULAR; INTRAVENOUS EVERY 8 HOURS PRN
Status: DISCONTINUED | OUTPATIENT
Start: 2024-01-14 | End: 2024-01-17

## 2024-01-14 RX ORDER — METHYLPREDNISOLONE SODIUM SUCCINATE 125 MG/2ML
125 INJECTION, POWDER, LYOPHILIZED, FOR SOLUTION INTRAMUSCULAR; INTRAVENOUS ONCE
Status: COMPLETED | OUTPATIENT
Start: 2024-01-14 | End: 2024-01-14

## 2024-01-14 RX ORDER — SENNOSIDES 8.6 MG
17.2 TABLET ORAL NIGHTLY PRN
Status: DISCONTINUED | OUTPATIENT
Start: 2024-01-14 | End: 2024-01-17

## 2024-01-14 RX ORDER — ONDANSETRON 2 MG/ML
4 INJECTION INTRAMUSCULAR; INTRAVENOUS EVERY 4 HOURS PRN
OUTPATIENT
Start: 2024-01-14 | End: 2024-01-14

## 2024-01-14 RX ORDER — ACETAMINOPHEN 500 MG
500 TABLET ORAL EVERY 4 HOURS PRN
Status: DISCONTINUED | OUTPATIENT
Start: 2024-01-14 | End: 2024-01-17

## 2024-01-14 RX ORDER — ALBUTEROL SULFATE 90 UG/1
8 AEROSOL, METERED RESPIRATORY (INHALATION) 4 TIMES DAILY
Status: DISCONTINUED | OUTPATIENT
Start: 2024-01-14 | End: 2024-01-14

## 2024-01-14 RX ORDER — NICOTINE POLACRILEX 4 MG
30 LOZENGE BUCCAL
Status: DISCONTINUED | OUTPATIENT
Start: 2024-01-14 | End: 2024-01-17

## 2024-01-14 RX ORDER — BISACODYL 10 MG
10 SUPPOSITORY, RECTAL RECTAL
Status: DISCONTINUED | OUTPATIENT
Start: 2024-01-14 | End: 2024-01-17

## 2024-01-14 RX ORDER — TIZANIDINE 2 MG/1
4 TABLET ORAL NIGHTLY
Status: DISCONTINUED | OUTPATIENT
Start: 2024-01-14 | End: 2024-01-17

## 2024-01-14 RX ORDER — ENEMA 19; 7 G/133ML; G/133ML
1 ENEMA RECTAL ONCE AS NEEDED
Status: DISCONTINUED | OUTPATIENT
Start: 2024-01-14 | End: 2024-01-17

## 2024-01-14 RX ORDER — MELATONIN
3 NIGHTLY PRN
Status: DISCONTINUED | OUTPATIENT
Start: 2024-01-14 | End: 2024-01-17

## 2024-01-14 RX ORDER — SODIUM CHLORIDE 9 MG/ML
INJECTION, SOLUTION INTRAVENOUS CONTINUOUS
OUTPATIENT
Start: 2024-01-14 | End: 2024-01-14

## 2024-01-14 RX ORDER — IPRATROPIUM BROMIDE AND ALBUTEROL SULFATE 2.5; .5 MG/3ML; MG/3ML
3 SOLUTION RESPIRATORY (INHALATION) EVERY 4 HOURS PRN
Status: DISCONTINUED | OUTPATIENT
Start: 2024-01-14 | End: 2024-01-17

## 2024-01-14 RX ORDER — ROSUVASTATIN CALCIUM 10 MG/1
10 TABLET, COATED ORAL NIGHTLY
Status: DISCONTINUED | OUTPATIENT
Start: 2024-01-14 | End: 2024-01-17

## 2024-01-14 RX ORDER — SODIUM CHLORIDE 9 MG/ML
INJECTION, SOLUTION INTRAVENOUS CONTINUOUS
Status: DISCONTINUED | OUTPATIENT
Start: 2024-01-14 | End: 2024-01-17

## 2024-01-14 RX ORDER — HYDROCODONE BITARTRATE AND ACETAMINOPHEN 10; 325 MG/1; MG/1
1 TABLET ORAL EVERY 4 HOURS PRN
Status: DISCONTINUED | OUTPATIENT
Start: 2024-01-14 | End: 2024-01-17

## 2024-01-14 RX ORDER — ECHINACEA PURPUREA EXTRACT 125 MG
1 TABLET ORAL
Status: DISCONTINUED | OUTPATIENT
Start: 2024-01-14 | End: 2024-01-17

## 2024-01-14 RX ORDER — FLUTICASONE FUROATE AND VILANTEROL 200; 25 UG/1; UG/1
1 POWDER RESPIRATORY (INHALATION) DAILY
Status: DISCONTINUED | OUTPATIENT
Start: 2024-01-14 | End: 2024-01-15

## 2024-01-14 RX ORDER — IPRATROPIUM BROMIDE AND ALBUTEROL SULFATE 2.5; .5 MG/3ML; MG/3ML
3 SOLUTION RESPIRATORY (INHALATION)
Status: DISCONTINUED | OUTPATIENT
Start: 2024-01-14 | End: 2024-01-15

## 2024-01-14 RX ORDER — IPRATROPIUM BROMIDE AND ALBUTEROL SULFATE 2.5; .5 MG/3ML; MG/3ML
3 SOLUTION RESPIRATORY (INHALATION)
Status: DISCONTINUED | OUTPATIENT
Start: 2024-01-14 | End: 2024-01-14

## 2024-01-14 RX ORDER — ONDANSETRON 2 MG/ML
4 INJECTION INTRAMUSCULAR; INTRAVENOUS EVERY 6 HOURS PRN
Status: DISCONTINUED | OUTPATIENT
Start: 2024-01-14 | End: 2024-01-17

## 2024-01-14 RX ORDER — PREGABALIN 100 MG/1
100 CAPSULE ORAL 3 TIMES DAILY
Status: DISCONTINUED | OUTPATIENT
Start: 2024-01-14 | End: 2024-01-17

## 2024-01-14 RX ORDER — GUAIFENESIN 600 MG/1
600 TABLET, EXTENDED RELEASE ORAL 2 TIMES DAILY PRN
Status: DISCONTINUED | OUTPATIENT
Start: 2024-01-14 | End: 2024-01-17

## 2024-01-14 RX ORDER — ASPIRIN 81 MG/1
81 TABLET, CHEWABLE ORAL DAILY
Status: DISCONTINUED | OUTPATIENT
Start: 2024-01-15 | End: 2024-01-17

## 2024-01-14 NOTE — ED PROVIDER NOTES
Patient Seen in: [facility] Emergency Department      History     Chief Complaint   Patient presents with    Pneumonia    Cough/URI    Difficulty Breathing     Stated Complaint: \"I have pneumonia again\"    Subjective:   HPI    This is a 78-year-old female she states that she started coughing about 2:00 in the morning.  She states that she felt short of breath may be some mild chest pain but she says that is gone and then last a few seconds with a few seconds to minutes.  She states that she felt short of breath.  The patient has had no fever, chills.  She states that he was wheezing at home.  Patient does have history of DVT diabetes high cholesterol hypertension, history of pulmonary embolism, anxiety, asthma..  Patient is on Xarelto..  The patient has no chest pain no fevers.  She states that she was wheezing at home and the family is here with her confirmed that story.    Objective:   Past Medical History:   Diagnosis Date    Abdominal distention     Abdominal pain     Anemia     Anesthesia complication     difficulty waking up after gallbladder surgery    Anxiety     Arrhythmia     Arthritis 2000    Asthma     Back pain 1966    Back problem     Black stools     Bloating     Blurred vision     Breast CA (HCC) 1999    left lump and rad.    Cancer ([facility]) 1999    breast, basal cell    Cervical radiculopathy     Cervical spinal stenosis     COVID     12/2020 no sx    Deep vein thrombosis (HCC)     Depression     Diabetes (HCC)     Diabetes mellitus (HCC)     Diarrhea, unspecified     Dizziness     Easy bruising     On Xeralto    Esophageal reflux     Exposure to medical diagnostic radiation     Fatigue     Fitting and adjustment of vascular catheter     Headache disorder     Hearing loss     Heart attack (HCC)     Heartburn     Hemorrhoids     High blood pressure     High cholesterol     History of blood clots     History of COVID-19 12/01/2020    not hospitalized, no continued symptoms, cough, fever, loss of  taste    History of depression     History of syncope 07/26/2014    IgG deficiency (HCC)     ??? pt states she gets IgG infusions due to lack of IgG, unable to fight off infections    Indigestion     Leaking of urine     Migraines     Muscle weakness     Nausea     Neuropathy     rt foot bilateral hands    Osteoarthritis     Pain in joints 2000    Personal history of malignant neoplasm of breast     Pneumonia due to organism     Pulmonary embolism (HCC) 07/25/2015    Pulmonary embolism (HCC)     S/P cervical spinal fusion     Shortness of breath     Sleep apnea     cpap - not using - machine is broken    Stress     Uncomfortable fullness after meals     Visual impairment     glasses/contacts    Wears glasses     Weight gain               Past Surgical History:   Procedure Laterality Date    ABDOMEN SURGERY PROC UNLISTED      duodenal biopsy    BACK SURGERY  2/22    Neck surgery    BREAST SURGERY      CATARACT      CHOLECYSTECTOMY      COLONOSCOPY  10/1/10, 4/21/17    COLONOSCOPY      Due now Sept 2022    COLONOSCOPY N/A 02/27/2023    Procedure: COLONOSCOPY;  Surgeon: Migel Bass MD;  Location:  ENDOSCOPY    EGD  04/21/2017    EYE SURGERY      FEMUR/KNEE SURG UNLISTED      right knee arthroscopy    FRACTURE SURGERY      HEMORRHOIDECTOMY,INT/EXT,SIMPLE      HERNIA SURGERY      HYSTERECTOMY  1984    total hystero.    KNEE REPLACEMENT SURGERY      LUMPECTOMY LEFT Left 1999    lt lumpectomy and radiation.    OOPHORECTOMY Bilateral 1984    total hystero.    OTHER  11/2021    left wrist, ORIF Dr. Stacy Long    OTHER  02/21/2022    CERVICAL 5 AND CERVICAL 6 LAMINECTOMIES, PARTIAL CERVICAL 7 LAMINECTOMY, LEFT CERVICAL 5/CERVICAL 6 AND CERVICAL 6/CERVICAL 7 FORAMINOTOMIES, CERVICAL 6 AND CERVICAL 7 ARTHRODESIS, AUTOGRAFT, ALLOGRAFT    OTHER SURGICAL HISTORY  2010    endoscopy - papillotomy    OTHER SURGICAL HISTORY  2016     under right eye, skin cancer removed    OTHER SURGICAL HISTORY  2019    knee    OTHER  SURGICAL HISTORY  2022    neck    PORT FOR VASCULAR ACCESS      RADIATION LEFT Left     left lumpectomy and radiation.    REMOVAL GALLBLADDER      REPAIR ING HERNIA,5+Y/O,REDUCIBL      SIGMOIDOSCOPY,DIAGNOSTIC      SPINE SURGERY PROCEDURE UNLISTED      TONSILLECTOMY      TOTAL ABDOM HYSTERECTOMY      TOTAL KNEE REPLACEMENT                  Social History     Socioeconomic History    Marital status:    Tobacco Use    Smoking status: Former     Packs/day: 0     Types: Cigarettes     Quit date: 1968     Years since quittin.0    Smokeless tobacco: Never   Vaping Use    Vaping Use: Never used   Substance and Sexual Activity    Alcohol use: No    Drug use: Yes     Types: Cannabis     Comment: uses cream for pain to back/neck with barometer changes.    Sexual activity: Not Currently     Partners: Male   Other Topics Concern    Caffeine Concern Yes     Comment: half of cup of coke daily    Sleep Concern No    Exercise Yes     Comment: walking    Seat Belt Yes     Social Determinants of Health     Financial Resource Strain: Low Risk  (10/25/2023)    Financial Resource Strain     Difficulty of Paying Living Expenses: Not hard at all     Med Affordability: No   Food Insecurity: No Food Insecurity (2023)    Food Insecurity     Food Insecurity: Never true   Transportation Needs: No Transportation Needs (2023)    Transportation Needs     Lack of Transportation: No   Stress: No Stress Concern Present (10/25/2023)    Stress     Feeling of Stress : No   Social Connections: Socially Integrated (10/25/2023)    Social Connections     Frequency of Socialization with Friends and Family: 2   Housing Stability: Low Risk  (2023)    Housing Stability     Housing Instability: No              Review of Systems    Positive for stated complaint: \"I have pneumonia again\"  Other systems are as noted in HPI.  Constitutional and vital signs reviewed.      All other systems reviewed and negative except as noted  above.    Physical Exam     ED Triage Vitals [01/14/24 0859]   /72   Pulse 106   Resp 22   Temp 99.1 °F (37.3 °C)   Temp src Oral   SpO2 (!) 87 %   O2 Device None (Room air)       Current:/56   Pulse 104   Temp 99.1 °F (37.3 °C) (Oral)   Resp 20   Ht 154.9 cm (5' 1\")   Wt 72.6 kg   LMP  (LMP Unknown)   SpO2 96%   BMI 30.23 kg/m²         Physical Exam    General: .  Patient is a pleasant female is in some moderate distress secondary respiratory distress   The patient is not septic or toxic    HEENT: Atraumatic, conjunctiva are not pale.  There is no icterus.  Oral mucosa Is wet.  No facial trauma.  The neck is supple.    LUNGS: Diminished breath sounds bilaterally.    CV: Cardiovascular is regular without murmurs or rubs.    ABD: The abdomen is soft nondistended nontender.  There is no rebound.  There is no guarding.    EXT: There is good pulses bilaterally.  There is no calf tenderness.  There is no rash noted.  There is no edema    NEURO: Alert and oriented x3.  Muscle strength and sensory exam is grossly normal.  And the patient is neurologically intact with no focal findings.      ED Course     Labs Reviewed   COMP METABOLIC PANEL (14) - Abnormal; Notable for the following components:       Result Value    Glucose 160 (*)     Creatinine 1.51 (*)     Calcium, Total 8.1 (*)     Calculated Osmolality 303 (*)     eGFR-Cr 35 (*)     All other components within normal limits   TROPONIN I HIGH SENSITIVITY - Normal   PRO BETA NATRIURETIC PEPTIDE - Normal   SARS-COV-2/FLU A AND B/RSV BY PCR (GENEXPERT) - Normal    Narrative:     This test is intended for the qualitative detection and differentiation of SARS-CoV-2, influenza A, influenza B, and respiratory syncytial virus (RSV) viral RNA in nasopharyngeal or nares swabs from individuals suspected of respiratory viral infection consistent with COVID-19 by their healthcare provider. Signs and symptoms of respiratory viral infection due to SARS-CoV-2,  influenza, and RSV can be similar.    Test performed using the Xpert Xpress SARS-CoV-2/FLU/RSV (real time RT-PCR)  assay on the Gene[employer]pert instrument, [employer], [place] [address].   This test is being used under the Food and Drug Administration's Emergency Use Authorization.    The authorized Fact Sheet for Healthcare Providers for this assay is available upon request from the laboratory.   CBC WITH DIFFERENTIAL WITH PLATELET    Narrative:     The following orders were created for panel order CBC With Differential With Platelet.  Procedure                               Abnormality         Status                     ---------                               -----------         ------                     CBC W/ DIFFERENTIAL[[id number]]                              Final result                 Please view results for these tests on the individual orders.   BLOOD CULTURE   BLOOD CULTURE   CBC W/ DIFFERENTIAL                    The patient was placed on monitors, IV was started, blood was drawn.     Workup was done to rule out an acute coronary syndrome, electrolyte imbalance, pneumonia, pneumothorax    MDM      The EKG shows normal sinus rhythm.  There is no acute ST elevations or ischemic findings.  The rest of the EKG including rate rhythm axis and intervals I agree with the EKG report . The rate is 98.  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    Admission disposition: 1/14/2024 11:55 AM       When compared to old EKG the septal infarct is old.    Patient had echogram echocardiogram in September 2023 which showed an ejection fraction of 60 to 65%.    Patient's proBNP was not elevated troponin was not elevated CBC shows a white count of 8 comprehensive was noted to have a creatinine 1.51.  The patient was noted to be hypoxic.    Cough call COVID, flu is negative the patient has been taking her Xarelto.      Family says she was a little confused although I did not find her confused she is alert and oriented but because  she is on Xarelto possible intracranial bleed was considered a chest x-ray was done to rule out pneumonia also.  Pneumothorax,       I personally reviewed the radiographs and my individual interpretation shows    No obvious intracranial bleed or tumor  Chest x-ray shows a right-sided pneumonia  Also reviewed official report and it shows        CT BRAIN OR HEAD (72671)    Result Date: 1/14/2024  PROCEDURE:  CT BRAIN OR HEAD (69508)  COMPARISON:  [facility] , CT, CT BRAIN OR HEAD (03869), 8/09/2023, 1:45 PM.  [facility] , XR, XR CHEST AP PORTABLE  (CPT=71045), 1/14/2024, 9:32 AM.  INDICATIONS:  Patient presents with progressive pneumonia and severe head pain/headache  TECHNIQUE:  Noncontrast CT scanning is performed through the brain. Dose reduction techniques were used. Dose information is transmitted to the ACR (American College of Radiology) NRDR (National Radiology Data Registry) which includes the Dose Index Registry.  PATIENT STATED HISTORY: (As transcribed by Technologist)  Patient states she has pneumonia, with some head pain.    FINDINGS:  VENTRICLES/SULCI:  Ventricles and sulci are mildly prominent.  INTRACRANIAL:  No acute intracranial hemorrhage, mass effect or midline shift.  Mild diffuse atrophy and white matter disease, stable from prior imaging.  No cerebral edema noted. SINUSES:           No sign of acute sinusitis.  MASTOIDS:          No sign of acute inflammation. SKULL:             No evidence for fracture or osseous abnormality. OTHER:             None.   a          CONCLUSION:  1. No acute process. 2. Stable mild atrophy and white matter disease.    LOCATION:  [facility]   Dictated by (CST): Zo Johnson DO on 1/14/2024 at 11:42 AM     Finalized by (CST): Zo Johnson DO on 1/14/2024 at 11:45 AM       XR CHEST AP PORTABLE  (CPT=71045)    Result Date: 1/14/2024  PROCEDURE:  XR CHEST AP PORTABLE  (CPT=71045)  TECHNIQUE:  AP chest radiograph was obtained.  COMPARISON:  [facility] , XR, XR CHEST AP PORTABLE   (CPT=71045), 11/02/2023, 9:26 AM.  INDICATIONS:  Patient presents with chest pain and difficulty in breathing.  History of pneumonia.  PATIENT STATED HISTORY: (As transcribed by Technologist)  Patient says she gets pneumonia every year and is experiencing pain/difficulty breathing.    FINDINGS:  There has been interval increase in airspace opacity of the right upper lobe as well as new patchy nodular airspace opacities within the lingula, suggesting a progressive multifocal pneumonia.  Stable right IJ central venous power port catheter.  Stable cardiac silhouette.  Normal pulmonary vasculature.  No significant pleural disease noted.            CONCLUSION:  Findings suggesting increasing bilateral opacities of the upper lobes, suggesting multifocal pneumonia.   LOCATION:  [facility]      Dictated by (CST): Zo Johnson DO on 1/14/2024 at 9:56 AM     Finalized by (CST): Zo Johnson DO on 1/14/2024 at 9:57 AM        Patient's case was discussed with the hospitalist.  The patient had gotten some albuterol and I did go back and reexamined the patient there is no findings of any confusion she is alert and oriented she is not hypoxic had no complaints of chest pain or shortness of breath.  No extremities no focal findings.  Consistent with pneumonia, hypoxia, bronchospasm the confusion may have been related to the hypoxia when she first arrived here.  Medical Decision Making      Disposition and Plan     Clinical Impression:  1. Community acquired pneumonia, unspecified laterality    2. Hypoxia    3. Confusion         Disposition:  Admit  1/14/2024 11:55 am    Follow-up:  No follow-up provider specified.        Medications Prescribed:  Current Discharge Medication List                            Hospital Problems       Present on Admission  Date Reviewed: 12/11/2023            ICD-10-CM Noted POA    * (Principal) Community acquired pneumonia, unspecified laterality J18.9 1/14/2024 Unknown

## 2024-01-14 NOTE — ED INITIAL ASSESSMENT (HPI)
Pt here via car with family due to waking up this morning about 0200 coughing. Pt states that she feels that she has pneumonia again.

## 2024-01-14 NOTE — H&P
[facility]     Rayne Morales Patient Status:  Inpatient    1945 MRN [id number]   Location [facility] 5NW-A Attending Francisco Mir, DO   Hosp Day # 0 PCP Jose Daniel Washington MD     Chief Complaint: Cough, dyspnea     Subjective:    History of Present Illness:     Rayne Morales is a 78 year old female with past medical history of PE/DVT on AC, HTN, HLD, Diabetes, Depression who presents to ED for cough and dyspnea. Patient has had symptoms for past day and cough worsened early this morning. She also had wheezing at home. She denies fevers, chills, nausea, vomiting, abdominal pain, chest pain.     History/Other:    Past Medical History:  Past Medical History:   Diagnosis Date    Abdominal distention     Abdominal pain     Anemia     Anesthesia complication     difficulty waking up after gallbladder surgery    Anxiety     Arrhythmia     Arthritis     Asthma     Back pain 1966    Back problem     Black stools     Bloating     Blurred vision     Breast CA (HCC)     left lump and rad.    Cancer (HCC)     breast, basal cell    Cervical radiculopathy     Cervical spinal stenosis     COVID     2020 no sx    Deep vein thrombosis (HCC)     Depression     Diabetes (HCC)     Diabetes mellitus (HCC)     Diarrhea, unspecified     Dizziness     Easy bruising     On Xeralto    Esophageal reflux     Exposure to medical diagnostic radiation     Fatigue     Fitting and adjustment of vascular catheter     Headache disorder     Hearing loss     Heart attack (HCC)     Heartburn     Hemorrhoids     High blood pressure     High cholesterol     History of blood clots     History of COVID-19 2020    not hospitalized, no continued symptoms, cough, fever, loss of taste    History of depression     History of syncope 2014    IgG deficiency (HCC)     ??? pt states she gets IgG infusions due to lack of IgG, unable to fight off infections    Indigestion     Leaking of  urine     Migraines     Muscle weakness     Nausea     Neuropathy     rt foot bilateral hands    Osteoarthritis     Pain in joints 2000    Personal history of malignant neoplasm of breast     Pneumonia due to organism     Pulmonary embolism (HCC) 07/25/2015    Pulmonary embolism (HCC)     S/P cervical spinal fusion     Shortness of breath     Sleep apnea     cpap - not using - machine is broken    Stress     Uncomfortable fullness after meals     Visual impairment     glasses/contacts    Wears glasses     Weight gain      Past Surgical History:   Past Surgical History:   Procedure Laterality Date    ABDOMEN SURGERY PROC UNLISTED      duodenal biopsy    BACK SURGERY  2/22    Neck surgery    BREAST SURGERY      CATARACT      CHOLECYSTECTOMY      COLONOSCOPY  10/1/10, 4/21/17    COLONOSCOPY      Due now Sept 2022    COLONOSCOPY N/A 02/27/2023    Procedure: COLONOSCOPY;  Surgeon: Migel Bass MD;  Location:  ENDOSCOPY    EGD  04/21/2017    EYE SURGERY      FEMUR/KNEE SURG UNLISTED      right knee arthroscopy    FRACTURE SURGERY      HEMORRHOIDECTOMY,INT/EXT,SIMPLE      HERNIA SURGERY      HYSTERECTOMY  1984    total hystero.    KNEE REPLACEMENT SURGERY      LUMPECTOMY LEFT Left 1999    lt lumpectomy and radiation.    OOPHORECTOMY Bilateral 1984    total hystero.    OTHER  11/2021    left wrist, ORIF Dr. Stacy Long    OTHER  02/21/2022    CERVICAL 5 AND CERVICAL 6 LAMINECTOMIES, PARTIAL CERVICAL 7 LAMINECTOMY, LEFT CERVICAL 5/CERVICAL 6 AND CERVICAL 6/CERVICAL 7 FORAMINOTOMIES, CERVICAL 6 AND CERVICAL 7 ARTHRODESIS, AUTOGRAFT, ALLOGRAFT    OTHER SURGICAL HISTORY  2010    endoscopy - papillotomy    OTHER SURGICAL HISTORY  2016     under right eye, skin cancer removed    OTHER SURGICAL HISTORY  2019    knee    OTHER SURGICAL HISTORY  02/2022    neck    PORT FOR VASCULAR ACCESS      RADIATION LEFT Left 1999    left lumpectomy and radiation.    REMOVAL GALLBLADDER      REPAIR ING HERNIA,5+Y/O,REDUCIBL       SIGMOIDOSCOPY,DIAGNOSTIC      SPINE SURGERY PROCEDURE UNLISTED      TONSILLECTOMY      TOTAL ABDOM HYSTERECTOMY      TOTAL KNEE REPLACEMENT        Family History:   Family History   Problem Relation Age of Onset    Heart Attack Paternal Grandfather     Heart Attack Paternal Grandmother     Other (CAD) Father     Other (CAD) Mother     Hypertension Mother     Heart Attack Mother     Other (CAD) Brother     Hypertension Brother     Heart Attack Brother     Stroke Maternal Grandfather         Stroke    Stroke Maternal Grandmother         Stroke    Breast Cancer Self 54     Social History:    reports that she quit smoking about 56 years ago. Her smoking use included cigarettes. She has never used smokeless tobacco. She reports current drug use. Drug: Cannabis. She reports that she does not drink alcohol.     Allergies:   Allergies   Allergen Reactions    Ciprofloxacin HIVES    Mold REACTIVE AIRWAY DISEASE     Mold and smut    Sulfa Antibiotics HIVES     Bactrim (Sulfamethoxazole/TMP)    Diovan [Valsartan] NAUSEA AND VOMITING    Omnicef NAUSEA ONLY and DIZZINESS    Green Pepper OTHER (SEE COMMENTS)     Sensitivity (all peppers - green, orange and red)    Lipitor [Atorvastatin Calcium] OTHER (SEE COMMENTS)     Leg cramping,    Clindamycin DIARRHEA and NAUSEA ONLY    Quinapril Hcl FATIGUE       Medications:    Current Facility-Administered Medications on File Prior to Encounter   Medication Dose Route Frequency Provider Last Rate Last Admin    [COMPLETED] potassium chloride (K-Dur) tab 40 mEq  40 mEq Oral Once Brady Mir, DO   40 mEq at 11/04/23 0845    [COMPLETED] heparin (Porcine) 100 Units/mL lock flush 500 Units  5 mL Intracatheter Once Brady Mir, DO   500 Units at 11/04/23 1608    [COMPLETED] potassium chloride (K-Dur) tab 40 mEq  40 mEq Oral Once Brady Mir, DO   40 mEq at 11/03/23 0923    [COMPLETED] acetaminophen (Tylenol Extra Strength) tab 1,000 mg  1,000 mg Oral Once Lia Chung MD   1,000 mg  at 11/02/23 0847    [COMPLETED] piperacillin-tazobactam (Zosyn) 3.375 g in dextrose 5% 100 mL IVPB-ADDV  3.375 g Intravenous Once Lia Chnug MD   Stopped at 11/02/23 1016    [COMPLETED] ipratropium-albuterol (Duoneb) 0.5-2.5 (3) MG/3ML inhalation solution 3 mL  3 mL Nebulization Once Lia Chung MD   3 mL at 11/02/23 0924    [COMPLETED] ondansetron (Zofran) 4 MG/2ML injection 4 mg  4 mg Intravenous Once Lia Chung MD   4 mg at 11/02/23 0938    [COMPLETED] azithromycin (Zithromax) 500 mg in sodium chloride 0.9% 250mL IVPB premix  500 mg Intravenous Q24H Brady Mir,  250 mL/hr at 11/04/23 1407 500 mg at 11/04/23 1407    [COMPLETED] potassium chloride (K-Dur) tab 40 mEq  40 mEq Oral Once Brady Mir DO   40 mEq at 11/02/23 1445    [COMPLETED] zoledronic acid (Reclast) 5 mg/100mL IVPB premix 5 mg 100 mL  5 mg Intravenous Once Makayla Farley DO   Stopped at 10/20/23 1530     Current Outpatient Medications on File Prior to Encounter   Medication Sig Dispense Refill    glipiZIDE ER 2.5 MG Oral Tablet 24 Hr Take 1 tablet (2.5 mg total) by mouth daily with breakfast. 90 tablet 0    fluticasone-salmeterol 250-50 MCG/ACT Inhalation Aerosol Powder, Breath Activated Inhale 1 puff into the lungs every 12 (twelve) hours.      metFORMIN  MG Oral Tablet 24 Hr Take 2 tablets (1,000 mg total) by mouth daily with breakfast and 1 tablet (500 mg total) every evening. (Patient taking differently: 1 tablet (500 mg total) 2 (two) times daily with meals. Take 2 tablets (1,000 mg total) by mouth daily with breakfast and 1 tablet (500 mg total) every evening.) 270 tablet 0    DULoxetine 60 MG Oral Cap DR Particles Take 1 capsule (60 mg total) by mouth daily. 90 capsule 0    HYDROcodone-acetaminophen  MG Oral Tab Take 1 tablet by mouth every 4 to 6 hours as needed for Pain. DO NOT EXCEED 6 tablets daily      rosuvastatin 10 MG Oral Tab Take 1 tablet (10 mg total) by mouth nightly. 90 tablet 1     albuterol 108 (90 Base) MCG/ACT Inhalation Aero Soln Inhale 2 puffs into the lungs every 6 (six) hours as needed for Wheezing. 1 each 3    LISINOPRIL 20 MG Oral Tab TAKE 1 TABLET BY MOUTH DAILY 90 tablet 3    pregabalin 100 MG Oral Cap Take 1 capsule (100 mg total) by mouth in the morning and 1 capsule (100 mg total) at noon and 1 capsule (100 mg total) in the evening. 270 capsule 1    tiZANidine 4 MG Oral Tab Take 1 tablet (4 mg total) by mouth every 8 (eight) hours as needed. (Patient taking differently: Take 1 tablet (4 mg total) by mouth every 8 (eight) hours as needed. Takes every night) 90 tablet 2    DULoxetine 30 MG Oral Cap DR Particles Take 1 capsule (30 mg total) by mouth daily.      ondansetron (ZOFRAN) 4 mg tablet Take 1 tablet (4 mg total) by mouth every 8 (eight) hours as needed for Nausea. 30 tablet 0    aspirin 81 MG Oral Chew Tab Chew 1 tablet (81 mg total) by mouth daily.      XARELTO 20 MG Oral Tab Take 1 tablet (20 mg total) by mouth daily. 90 tablet 3    PANTOPRAZOLE 40 MG Oral Tab EC Take 1 tablet (40 mg total) by mouth before breakfast. 90 tablet 3    Cholecalciferol (VITAMIN D) 2000 units Oral Tab Take 2,000 Units by mouth daily.      famciclovir 500 MG Oral Tab 3 tabs at onset of symptoms.  Repeat with next episode. (Patient not taking: Reported on 2023) 21 tablet 0    [] cefdinir 300 MG Oral Cap Take 1 capsule (300 mg total) by mouth 2 (two) times daily for 7 days. 14 capsule 0    Fluticasone Propionate 50 MCG/ACT Nasal Suspension 1 spray by Nasal route 2 (two) times daily. 1 Bottle 3       Review of Systems:   A comprehensive review of systems was completed.    Pertinent positives and negatives noted in the HPI.    Objective:   Physical Exam:    /60 (BP Location: Right arm)   Pulse 99   Temp 98.3 °F (36.8 °C) (Oral)   Resp 18   Ht 5' 1\" (1.549 m)   Wt 160 lb (72.6 kg)   LMP  (LMP Unknown)   SpO2 98%   BMI 30.23 kg/m²   General: No acute distress,  Alert  Respiratory: Decreased breath sounds bilaterally  Cardiovascular: S1, S2. Regular rate and rhythm  Abdomen: Soft, Non-tender, non-distended, positive bowel sounds  Neuro: No new focal deficits  Extremities: No edema    Results:    Labs:      Labs Last 24 Hours:    Recent Labs   Lab 01/14/24  0950   RBC 4.06   HGB 12.0   HCT 37.6   MCV 92.6   MCH 29.6   MCHC 31.9   RDW 13.9   NEPRELIM 6.67   WBC 8.4   .0       Recent Labs   Lab 01/14/24  0950   *   BUN 18   CREATSERUM 1.51*   EGFRCR 35*   CA 8.1*   ALB 3.6      K 3.8      CO2 25.0   ALKPHO 75   AST 26   ALT 32   BILT 0.7   TP 6.5       Lab Results   Component Value Date    PT 19.4 (H) 07/29/2014    PT 13.9 07/26/2014    INR 2.33 (H) 12/04/2023    INR 1.00 02/21/2022    INR 2.58 (H) 02/09/2022       Recent Labs   Lab 01/14/24  0950   TROPHS 21       Recent Labs   Lab 01/14/24  0950   PBNP 326       No results for input(s): \"PCT\" in the last 168 hours.    Imaging: Imaging data reviewed in Epic.    Assessment & Plan:      #Acute hypoxic respiratory failure   #Pneumonia  -CXR reviewed  -Blood culture ordered  -Sputum culture ordered  -Urine antigens ordered  -IV antibiotics  -Incentive spirometry     #FREDERICK  -IVF    #Hx of PE/DVT  -Xarelto    #DM  -Hyperglycemia protocol    #Hypertension  -Lisinopril    #Hyperlipidemia  -Statin         Plan of care discussed with patient     Francisco Mir DO    Supplementary Documentation:     The 21st Century Cures Act makes medical notes like these available to patients in the interest of transparency. Please be advised this is a medical document. Medical documents are intended to carry relevant information, facts as evident, and the clinical opinion of the practitioner. The medical note is intended as peer to peer communication and may appear blunt or direct. It is written in medical language and may contain abbreviations or verbiage that are unfamiliar.               **Certification      PHYSICIAN  Certification of Need for Inpatient Hospitalization - Initial Certification    Patient will require inpatient services that will reasonably be expected to span two midnight's based on the clinical documentation in H+P.   Based on patients current state of illness, I anticipate that, after discharge, patient will require TBD.

## 2024-01-14 NOTE — ED QUICK NOTES
Orders for admission, patient is aware of plan and ready to go upstairs. Any questions, please call ED RN Ava at extension [phone].     Patient Covid vaccination status: Fully vaccinated     COVID Test Ordered in ED: SARS-CoV-2/Flu A and B/RSV by PCR (GeneXpert)    COVID Suspicion at Admission: N/A    Running Infusions:      Mental Status/LOC at time of transport: A/Ox4    Other pertinent information:   CIWA score: N/A   NIH score:  N/A

## 2024-01-14 NOTE — PROGRESS NOTES
NURSING ADMISSION NOTE      Patient admitted via Cart  Oriented to room.  Safety precautions initiated.  Bed in low position.  Call light in reach.    Received pt from ED. AOx4. Glasses. RA. Tele. BRP. SBA. SCDS on. Navigator completed w pt and family. Admission orders received.

## 2024-01-14 NOTE — PROGRESS NOTES
[facility] Pharmacy Note:  Renal Dose Adjustment for Metoclopramide (REGLAN)    Rayne Morales has been prescribed metoclopramide (REGLAN) 10 mg every 8 hours as needed for nausea/vomiting,.    Estimated Creatinine Clearance: 23.5 mL/min (A) (based on SCr of 1.51 mg/dL (H)).    Calculated creatinine clearance is < 40 ml/min, therefore, the dose of metoclopramide (REGLAN) has been changed to 5 mg every 8 hours as needed for nausea/vomiting per P&T approved protocol.  Pharmacy will continue to follow, and if renal function improves, will resume the original order.       Thank you,  Kary Malik, PharmD  1/14/2024 12:49 PM

## 2024-01-15 LAB
ALBUMIN SERPL-MCNC: 3 G/DL (ref 3.4–5)
ALBUMIN/GLOB SERPL: 1 {RATIO} (ref 1–2)
ALP LIVER SERPL-CCNC: 73 U/L
ALT SERPL-CCNC: 30 U/L
ANION GAP SERPL CALC-SCNC: 8 MMOL/L (ref 0–18)
AST SERPL-CCNC: 21 U/L (ref 15–37)
BASOPHILS # BLD AUTO: 0.01 X10(3) UL (ref 0–0.2)
BASOPHILS NFR BLD AUTO: 0.1 %
BILIRUB SERPL-MCNC: 0.7 MG/DL (ref 0.1–2)
BUN BLD-MCNC: 21 MG/DL (ref 9–23)
CALCIUM BLD-MCNC: 8.5 MG/DL (ref 8.5–10.1)
CHLORIDE SERPL-SCNC: 110 MMOL/L (ref 98–112)
CO2 SERPL-SCNC: 24 MMOL/L (ref 21–32)
CREAT BLD-MCNC: 0.81 MG/DL
EGFRCR SERPLBLD CKD-EPI 2021: 75 ML/MIN/1.73M2 (ref 60–?)
EOSINOPHIL # BLD AUTO: 0 X10(3) UL (ref 0–0.7)
EOSINOPHIL NFR BLD AUTO: 0 %
ERYTHROCYTE [DISTWIDTH] IN BLOOD BY AUTOMATED COUNT: 13.9 %
GLOBULIN PLAS-MCNC: 3 G/DL (ref 2.8–4.4)
GLUCOSE BLD-MCNC: 139 MG/DL (ref 70–99)
GLUCOSE BLD-MCNC: 180 MG/DL (ref 70–99)
GLUCOSE BLD-MCNC: 181 MG/DL (ref 70–99)
GLUCOSE BLD-MCNC: 183 MG/DL (ref 70–99)
GLUCOSE BLD-MCNC: 255 MG/DL (ref 70–99)
HCT VFR BLD AUTO: 32.5 %
HGB BLD-MCNC: 10.8 G/DL
IMM GRANULOCYTES # BLD AUTO: 0.07 X10(3) UL (ref 0–1)
IMM GRANULOCYTES NFR BLD: 0.5 %
LYMPHOCYTES # BLD AUTO: 0.89 X10(3) UL (ref 1–4)
LYMPHOCYTES NFR BLD AUTO: 6.2 %
MCH RBC QN AUTO: 29.6 PG (ref 26–34)
MCHC RBC AUTO-ENTMCNC: 33.2 G/DL (ref 31–37)
MCV RBC AUTO: 89 FL
MONOCYTES # BLD AUTO: 0.61 X10(3) UL (ref 0.1–1)
MONOCYTES NFR BLD AUTO: 4.3 %
NEUTROPHILS # BLD AUTO: 12.77 X10 (3) UL (ref 1.5–7.7)
NEUTROPHILS # BLD AUTO: 12.77 X10(3) UL (ref 1.5–7.7)
NEUTROPHILS NFR BLD AUTO: 88.9 %
OSMOLALITY SERPL CALC.SUM OF ELEC: 302 MOSM/KG (ref 275–295)
PLATELET # BLD AUTO: 150 10(3)UL (ref 150–450)
POTASSIUM SERPL-SCNC: 3.5 MMOL/L (ref 3.5–5.1)
PROCALCITONIN SERPL-MCNC: 2.01 NG/ML (ref ?–0.16)
PROT SERPL-MCNC: 6 G/DL (ref 6.4–8.2)
RBC # BLD AUTO: 3.65 X10(6)UL
SODIUM SERPL-SCNC: 142 MMOL/L (ref 136–145)
WBC # BLD AUTO: 14.4 X10(3) UL (ref 4–11)

## 2024-01-15 PROCEDURE — 99232 SBSQ HOSP IP/OBS MODERATE 35: CPT | Performed by: STUDENT IN AN ORGANIZED HEALTH CARE EDUCATION/TRAINING PROGRAM

## 2024-01-15 PROCEDURE — 5A09357 ASSISTANCE WITH RESPIRATORY VENTILATION, LESS THAN 24 CONSECUTIVE HOURS, CONTINUOUS POSITIVE AIRWAY PRESSURE: ICD-10-PCS | Performed by: STUDENT IN AN ORGANIZED HEALTH CARE EDUCATION/TRAINING PROGRAM

## 2024-01-15 RX ORDER — IPRATROPIUM BROMIDE AND ALBUTEROL SULFATE 2.5; .5 MG/3ML; MG/3ML
3 SOLUTION RESPIRATORY (INHALATION) EVERY 4 HOURS PRN
Status: DISCONTINUED | OUTPATIENT
Start: 2024-01-15 | End: 2024-01-17

## 2024-01-15 NOTE — PROGRESS NOTES
Patient A&Ox4 but forgetful at times.  She denies SOB/Chest pain.  Saturating above 90% on room air while awake.  She used the CPAP while asleep and required 3L of supplemental O2 piped in to keep her saturations above 90%.  + occasional non productive cough.  NSR to ST on telemetry. She denies nausea, no vomiting, no stool.  Up to the bathroom to void with standby assist. IVF and IV Zosyn as ordered,  Bedtime accucheck = 385.

## 2024-01-15 NOTE — PLAN OF CARE
1/15 AM: Pt is A/O x 4, glasses at bedside, slight tremors BUE. Lung sounds are diminished, on room air. Need 2L for MUKUND. Scheduled nebs. BP and HR are WNL, NSR on tele. No BM today, voids in bathroom. Up SBA. QID accucheck.     Problem: Patient/Family Goals  Goal: Patient/Family Long Term Goal  Description: Patient's Long Term Goal:   Resolution of PNA    Interventions:  - IV antibx as ordered  - supportive care  - See additional Care Plan goals for specific interventions  Outcome: Progressing  Goal: Patient/Family Short Term Goal  Description: Patient's Short Term Goal:   01/14/2024 - \"sleep\"  1/15 AM: Pain control    Interventions:   - cluster care, dim lights, keep room quiet  - See additional Care Plan goals for specific interventions  Outcome: Progressing

## 2024-01-15 NOTE — PROGRESS NOTES
[facility]     Hospitalist Progress Note     Rayne Morales Patient Status:  Inpatient    1945 MRN [id number]   Location [facility] 5NW-A Attending Mir, Neo Wilks, *   Hosp Day # 1 PCP Jose Daniel Washington MD     Chief Complaint: Cough, dyspnea    Subjective:      - Still feeling weak, dyspneic, on O2 while sleeping   - Denies f/c, cp, abd pain, n/v    Objective:    Review of Systems:   A comprehensive review of systems was completed; pertinent positive and negatives stated in subjective.    Vital signs:  Temp:  [97.9 °F (36.6 °C)-99.1 °F (37.3 °C)] 97.9 °F (36.6 °C)  Pulse:  [] 82  Resp:  [16-23] 18  BP: (100-145)/(38-72) 137/55  SpO2:  [86 %-98 %] 92 %    Physical Exam:    General: No acute distress  Respiratory: no wheezes, no rhonchi, crackles in lung bases bilaterally   Cardiovascular: S1, S2, regular rate and rhythm  Abdomen: Soft, Non-tender, non-distended, positive bowel sounds  Neuro: No new focal deficits.   Extremities: no edema    Diagnostic Data:    Labs:  Recent Labs   Lab 24  0950 01/15/24  0632   WBC 8.4 14.4*   HGB 12.0 10.8*   MCV 92.6 89.0   .0 150.0       Recent Labs   Lab 24  0950 01/15/24  0632   * 180*   BUN 18 21   CREATSERUM 1.51* 0.81   CA 8.1* 8.5   ALB 3.6 3.0*    142   K 3.8 3.5    110   CO2 25.0 24.0   ALKPHO 75 73   AST 26 21   ALT 32 30   BILT 0.7 0.7   TP 6.5 6.0*       Estimated Creatinine Clearance: 43.9 mL/min (based on SCr of 0.81 mg/dL).    Recent Labs   Lab 24  0950   TROPHS 21       No results for input(s): \"PTP\", \"INR\" in the last 168 hours.               Microbiology    No results found for this visit on 24.      Imaging: Reviewed in Epic.    Medications:    albuterol  8 puff Inhalation Once    insulin aspart  1-68 Units Subcutaneous TID CC    insulin aspart  1-30 Units Subcutaneous TID AC and HS    azithromycin  500 mg Intravenous Q24H    piperacillin-tazobactam  3.375 g Intravenous Q8H     ipratropium-albuterol  3 mL Nebulization QID    aspirin  81 mg Oral Daily    DULoxetine  90 mg Oral Daily    fluticasone furoate-vilanterol  1 puff Inhalation Daily    lisinopril  20 mg Oral Daily    pantoprazole  40 mg Oral Before breakfast    pregabalin  100 mg Oral TID    rosuvastatin  10 mg Oral Nightly    rivaroxaban  20 mg Oral Daily    tiZANidine  4 mg Oral Nightly       Assessment & Plan:      #Acute hypoxic respiratory failure, resolved   #Pneumonia  -CXR reviewed  -Blood culture pending  -Sputum culture ordered  -Urine antigens negative  -IV antibiotics continued  -Incentive spirometry      #FREDERICK, resolved   -IVF     #Hx of PE/DVT  -Xarelto     #DM  -Hyperglycemia protocol, adding levemir     #Hypertension  -Lisinopril     #Hyperlipidemia  -Statin     Neo Mir MD    Supplementary Documentation:     Quality:  DVT Mechanical Prophylaxis:   SCDs,    DVT Pharmacologic Prophylaxis   Medication    rivaroxaban (Xarelto) tab 20 mg      DVT Pharmacologic prophylaxis: Aspirin 162 mg         Code Status: Full Code  Baxter: No urinary catheter in place  Baxter Duration (in days):   Central line:    CLARI:     The 21st Century Cures Act makes medical notes like these available to patients in the interest of transparency. Please be advised this is a medical document. Medical documents are intended to carry relevant information, facts as evident, and the clinical opinion of the practitioner. The medical note is intended as peer to peer communication and may appear blunt or direct. It is written in medical language and may contain abbreviations or verbiage that are unfamiliar.

## 2024-01-15 NOTE — PLAN OF CARE
Problem: Patient/Family Goals  Goal: Patient/Family Long Term Goal  Description: Patient's Long Term Goal:   Resolution of PNA    Interventions:  - IV antibx as ordered  - supportive care  - See additional Care Plan goals for specific interventions  Outcome: Progressing  Goal: Patient/Family Short Term Goal  Description: Patient's Short Term Goal:   01/14/2024 - \"sleep\"    Interventions:   - cluster care, dim lights, keep room quiet  - See additional Care Plan goals for specific interventions  Outcome: Progressing     Problem: PAIN - ADULT  Goal: Verbalizes/displays adequate comfort level or patient's stated pain goal  Description: INTERVENTIONS:  - Encourage pt to monitor pain and request assistance  - Assess pain using appropriate pain scale  - Administer analgesics based on type and severity of pain and evaluate response  - Implement non-pharmacological measures as appropriate and evaluate response  - Consider cultural and social influences on pain and pain management  - Manage/alleviate anxiety  - Utilize distraction and/or relaxation techniques  - Monitor for opioid side effects  - Notify MD/LIP if interventions unsuccessful or patient reports new pain  - Anticipate increased pain with activity and pre-medicate as appropriate  Outcome: Progressing     Problem: RISK FOR INFECTION - ADULT  Goal: Absence of fever/infection during anticipated neutropenic period  Description: INTERVENTIONS  - Monitor WBC  - Administer growth factors as ordered  - Implement neutropenic guidelines  Outcome: Progressing     Problem: SAFETY ADULT - FALL  Goal: Free from fall injury  Description: INTERVENTIONS:  - Assess pt frequently for physical needs  - Identify cognitive and physical deficits and behaviors that affect risk of falls.  - [place] fall precautions as indicated by assessment.  - Educate pt/family on patient safety including physical limitations  - Instruct pt to call for assistance with activity based on assessment  -  Modify environment to reduce risk of injury  - Provide assistive devices as appropriate  - Consider OT/PT consult to assist with strengthening/mobility  - Encourage toileting schedule  Outcome: Progressing     Problem: RESPIRATORY - ADULT  Goal: Achieves optimal ventilation and oxygenation  Description: INTERVENTIONS:  - Assess for changes in respiratory status  - Assess for changes in mentation and behavior  - Position to facilitate oxygenation and minimize respiratory effort  - Oxygen supplementation based on oxygen saturation or ABGs  - Provide Smoking Cessation handout, if applicable  - Encourage broncho-pulmonary hygiene including cough, deep breathe, Incentive Spirometry  - Assess the need for suctioning and perform as needed  - Assess and instruct to report SOB or any respiratory difficulty  - Respiratory Therapy support as indicated  - Manage/alleviate anxiety  - Monitor for signs/symptoms of CO2 retention  Outcome: Progressing     Problem: METABOLIC/FLUID AND ELECTROLYTES - ADULT  Goal: Glucose maintained within prescribed range  Description: INTERVENTIONS:  - Monitor Blood Glucose as ordered  - Assess for signs and symptoms of hyperglycemia and hypoglycemia  - Administer ordered medications to maintain glucose within target range  - Assess barriers to adequate nutritional intake and initiate nutrition consult as needed  - Instruct patient on self management of diabetes  Outcome: Progressing

## 2024-01-16 LAB
GLUCOSE BLD-MCNC: 122 MG/DL (ref 70–99)
GLUCOSE BLD-MCNC: 158 MG/DL (ref 70–99)
GLUCOSE BLD-MCNC: 196 MG/DL (ref 70–99)
GLUCOSE BLD-MCNC: 205 MG/DL (ref 70–99)

## 2024-01-16 PROCEDURE — 99232 SBSQ HOSP IP/OBS MODERATE 35: CPT | Performed by: STUDENT IN AN ORGANIZED HEALTH CARE EDUCATION/TRAINING PROGRAM

## 2024-01-16 NOTE — PHYSICAL THERAPY NOTE
PHYSICAL THERAPY EVALUATION - INPATIENT     Room Number: 525/525-A  Evaluation Date: 1/16/2024  Type of Evaluation: Initial  Physician Order: PT Eval and Treat    Presenting Problem: PNA, Acute respiratory failure  Co-Morbidities : OA,  Reason for Therapy: Mobility Dysfunction and Discharge Planning      ASSESSMENT   Pt is a 78 year old female admitted on 1/14/2024 for PNA. Functional outcome measures completed include AMPA.  The AM-PAC '6-Clicks' Inpatient Basic Mobility Short Form was completed and this patient is demonstrating a Approx Degree of Impairment: 28.97%  degree of impairment in mobility. Research supports that patients with this level of impairment may benefit from home d/c with assist as needed.  PT Discharge Recommendations: Home      PLAN  Patient has been evaluated and presents with no skilled Physical Therapy needs at this time.  Patient discharged from Physical Therapy services.  Please re-order if a new functional limitation presents during this admission.    GOALS  Patient was able to achieve the following goals ...    Patient was able to transfer Safely and independently   Patient able to ambulate on level surfaces Safely and independently         HOME SITUATION  Type of Home: House   Home Layout: Two level  Stairs to Enter : 2     Stairs to Bedroom: 14       Lives With:  (grandson)  Drives: Yes  Patient Owned Equipment: Cane       Prior Level of [place]: Still works as a  and is amb s and c use of an A.D.   Able to amb s any use of an A.D. at a household distance as weill as in the community unless if walking for  extended time and uneven surfaces. Has a cane to use.    SUBJECTIVE  I walk slower when I am not using the cane and it's due to my knee pain with plan for knee replacement once I am medically able  Knee brace does not work due to my knee structure, they just slip down and does not stay      OBJECTIVE     Fall Risk: Standard fall risk    WEIGHT BEARING  RESTRICTION  Weight Bearing Restriction: None                PAIN ASSESSMENT  Ratin          COGNITION  Overall Cognitive Status:  WFL - within functional limits    RANGE OF MOTION AND STRENGTH ASSESSMENT  Upper extremity ROM and strength are within functional limits     Lower extremity ROM is within functional limits     Lower extremity strength is within functional limits       BALANCE  Static Sitting: Good  Dynamic Sitting: Good  Static Standing: Good  Dynamic Standing: Fair +    ADDITIONAL TESTS        ACTIVITY TOLERANCE: good        AM-PAC '6-Clicks' INPATIENT SHORT FORM - BASIC MOBILITY  How much difficulty does the patient currently have...  Patient Difficulty: Turning over in bed (including adjusting bedclothes, sheets and blankets)?: None   Patient Difficulty: Sitting down on and standing up from a chair with arms (e.g., wheelchair, bedside commode, etc.): None   Patient Difficulty: Moving from lying on back to sitting on the side of the bed?: None   How much help from another person does the patient currently need...   Help from Another: Moving to and from a bed to a chair (including a wheelchair)?: None   Help from Another: Need to walk in hospital room?: None   Help from Another: Climbing 3-5 steps with a railing?: Total       AM-PAC Score:  Raw Score: 21   Approx Degree of Impairment: 28.97%   Standardized Score (AM-PAC Scale): 50.25   CMS Modifier (G-Code): CJ    FUNCTIONAL ABILITY STATUS  Gait Assessment   Functional Mobility/Gait Assessment  Gait Assistance: Supervision  Distance (ft): 400  Assistive Device: Rolling walker  Pattern:  (increase pace when walking with the RW otehrwise waddles when s any use of an A.D.)    Skilled Therapy Provided     Bed Mobility:  Rolling: Ind  Supine to sit: Ind   Sit to supine: ind     Transfer Mobility:  Sit to stand: mod I   Stand to sit: mod I  Gait = gt is steady with RW as support with fast pace with any c/o SOB or dizziness after walking 400'              However able to walk short distance with some uneven gt pattern due to chronic arthritis    Therapist's comments:  Discussed on the importance of mobilities while in hosp  Discussed importance of joint conservation  Addressed all issues and concerns. Nursing is aware of this visit.      Exercise/Education Provided:  Functional activity tolerated  Gait training    Patient End of Session: In bed;Needs met;Call light within reach;RN aware of session/findings;All patient questions and concerns addressed;Family present    Patient Evaluation Complexity Level:  History Moderate - 1 or 2 personal factors and/or co-morbidities   Examination of body systems Low - addressing 1-2 elements   Clinical Presentation Low - Stable   Clinical Decision Making Low Complexity       PT Session Time: 20 minutes  Gait Training: 10 minutes  Therapeutic Activity: 5 minutes

## 2024-01-16 NOTE — PLAN OF CARE
1/16 AM: Pt is A/O x 4, glasses at bedside, slight tremors BUE. Lung sounds are diminished, on room air. Need 2L for MUKUND. Scheduled nebs. BP and HR are WNL, NSR on tele. No BM today, voids in bathroom. Up SBA. QID accucheck. Norco for pain    Patient reports weak and a little nauseous when she gets up, PT/OT eval ordered         Problem: Patient/Family Goals  Goal: Patient/Family Long Term Goal  Description: Patient's Long Term Goal:   Resolution of PNA    Interventions:  - IV antibx as ordered  - supportive care  - See additional Care Plan goals for specific interventions  Outcome: Progressing  Goal: Patient/Family Short Term Goal  Description: Patient's Short Term Goal:   01/14/2024 - \"sleep\"  1/15 AM: Pain control  1/16 AM: Rest    Interventions:   - cluster care, dim lights, keep room quiet  - See additional Care Plan goals for specific interventions  Outcome: Progressing

## 2024-01-16 NOTE — PROGRESS NOTES
[facility]     Hospitalist Progress Note     Rayne Morales Patient Status:  Inpatient    1945 MRN [id number]   Location [facility] 5NW-A Attending Mir, Neo Wilks, *   Hosp Day # 2 PCP Jose Daniel Washington MD     Chief Complaint: Cough, dyspnea    Subjective:      - Now satting well on RA, strength seems improved, able to tolerate increased diet   - However, pt still feels weak upon standing, feels lightheaded     Objective:    Review of Systems:   A comprehensive review of systems was completed; pertinent positive and negatives stated in subjective.    Vital signs:  Temp:  [97.7 °F (36.5 °C)-98.6 °F (37 °C)] 97.7 °F (36.5 °C)  Pulse:  [64-98] 73  Resp:  [18-20] 18  BP: (138-159)/(58-75) 140/75  SpO2:  [93 %-97 %] 96 %    Physical Exam:    General: No acute distress  Respiratory: no wheezes, no rhonchi, crackles in lung bases bilaterally   Cardiovascular: S1, S2, regular rate and rhythm  Abdomen: Soft, Non-tender, non-distended, positive bowel sounds  Neuro: No new focal deficits.   Extremities: no edema    Diagnostic Data:    Labs:  Recent Labs   Lab 24  0950 01/15/24  0632   WBC 8.4 14.4*   HGB 12.0 10.8*   MCV 92.6 89.0   .0 150.0       Recent Labs   Lab 24  0950 01/15/24  0632   * 180*   BUN 18 21   CREATSERUM 1.51* 0.81   CA 8.1* 8.5   ALB 3.6 3.0*    142   K 3.8 3.5    110   CO2 25.0 24.0   ALKPHO 75 73   AST 26 21   ALT 32 30   BILT 0.7 0.7   TP 6.5 6.0*       Estimated Creatinine Clearance: 43.9 mL/min (based on SCr of 0.81 mg/dL).    Recent Labs   Lab 24  0950   TROPHS 21       No results for input(s): \"PTP\", \"INR\" in the last 168 hours.               Microbiology    Hospital Encounter on 24   1. Blood Culture     Status: None (Preliminary result)    Collection Time: 24  9:50 AM    Specimen: Blood,peripheral   Result Value Ref Range    Blood Culture Result No Growth 1 Day N/A         Imaging: Reviewed in Epic.    Medications:     insulin detemir  5 Units Subcutaneous BID    albuterol  8 puff Inhalation Once    insulin aspart  1-68 Units Subcutaneous TID CC    insulin aspart  1-30 Units Subcutaneous TID AC and HS    azithromycin  500 mg Intravenous Q24H    piperacillin-tazobactam  3.375 g Intravenous Q8H    aspirin  81 mg Oral Daily    DULoxetine  90 mg Oral Daily    lisinopril  20 mg Oral Daily    pantoprazole  40 mg Oral Before breakfast    pregabalin  100 mg Oral TID    rosuvastatin  10 mg Oral Nightly    rivaroxaban  20 mg Oral Daily    tiZANidine  4 mg Oral Nightly       Assessment & Plan:      #Acute hypoxic respiratory failure, resolved   #Pneumonia  -CXR reviewed  -Blood culture NGTD  -Sputum culture ordered  -Urine antigens negative  -IV antibiotics continued - possibly transition to PO and dc tmr   -Incentive spirometry   -PT/OT eval     #FREDERICK, resolved   -IVF     #Hx of PE/DVT  -Xarelto     #DM  -Hyperglycemia protocol, adding levemir     #Hypertension  -Lisinopril     #Hyperlipidemia  -Statin     Neo Mri MD    Supplementary Documentation:     Quality:  DVT Mechanical Prophylaxis:   SCDs,    DVT Pharmacologic Prophylaxis   Medication    rivaroxaban (Xarelto) tab 20 mg      DVT Pharmacologic prophylaxis: Aspirin 162 mg         Code Status: Full Code  Baxter: No urinary catheter in place  Baxter Duration (in days):   Central line:    CLARI:     The 21st Century Cures Act makes medical notes like these available to patients in the interest of transparency. Please be advised this is a medical document. Medical documents are intended to carry relevant information, facts as evident, and the clinical opinion of the practitioner. The medical note is intended as peer to peer communication and may appear blunt or direct. It is written in medical language and may contain abbreviations or verbiage that are unfamiliar.

## 2024-01-16 NOTE — PLAN OF CARE
Received pt A&Ox4.  on RA. Utilizing cpap with sleep. Scheduled nebs. IV antibiotics for PNA. NSR on tele. Receiving xarelto. Tolerating diet, accuchecks QID. Voids via BRP. Up SBA. C/o generalized pain - PRN pain meds provided. Plan of care continues, no further needs at this time.     Problem: Patient/Family Goals  Goal: Patient/Family Long Term Goal  Description: Patient's Long Term Goal:   Resolution of PNA    Interventions:  - IV antibx as ordered  - supportive care  - See additional Care Plan goals for specific interventions  Outcome: Progressing  Goal: Patient/Family Short Term Goal  Description: Patient's Short Term Goal:   01/14/2024 - \"sleep\"  1/15 AM: Pain control    Interventions:   - cluster care, dim lights, keep room quiet  - See additional Care Plan goals for specific interventions  Outcome: Progressing     Problem: PAIN - ADULT  Goal: Verbalizes/displays adequate comfort level or patient's stated pain goal  Description: INTERVENTIONS:  - Encourage pt to monitor pain and request assistance  - Assess pain using appropriate pain scale  - Administer analgesics based on type and severity of pain and evaluate response  - Implement non-pharmacological measures as appropriate and evaluate response  - Consider cultural and social influences on pain and pain management  - Manage/alleviate anxiety  - Utilize distraction and/or relaxation techniques  - Monitor for opioid side effects  - Notify MD/LIP if interventions unsuccessful or patient reports new pain  - Anticipate increased pain with activity and pre-medicate as appropriate  Outcome: Progressing     Problem: RISK FOR INFECTION - ADULT  Goal: Absence of fever/infection during anticipated neutropenic period  Description: INTERVENTIONS  - Monitor WBC  - Administer growth factors as ordered  - Implement neutropenic guidelines  Outcome: Progressing     Problem: SAFETY ADULT - FALL  Goal: Free from fall injury  Description: INTERVENTIONS:  - Assess pt  frequently for physical needs  - Identify cognitive and physical deficits and behaviors that affect risk of falls.  - [place] fall precautions as indicated by assessment.  - Educate pt/family on patient safety including physical limitations  - Instruct pt to call for assistance with activity based on assessment  - Modify environment to reduce risk of injury  - Provide assistive devices as appropriate  - Consider OT/PT consult to assist with strengthening/mobility  - Encourage toileting schedule  Outcome: Progressing     Problem: RESPIRATORY - ADULT  Goal: Achieves optimal ventilation and oxygenation  Description: INTERVENTIONS:  - Assess for changes in respiratory status  - Assess for changes in mentation and behavior  - Position to facilitate oxygenation and minimize respiratory effort  - Oxygen supplementation based on oxygen saturation or ABGs  - Provide Smoking Cessation handout, if applicable  - Encourage broncho-pulmonary hygiene including cough, deep breathe, Incentive Spirometry  - Assess the need for suctioning and perform as needed  - Assess and instruct to report SOB or any respiratory difficulty  - Respiratory Therapy support as indicated  - Manage/alleviate anxiety  - Monitor for signs/symptoms of CO2 retention  Outcome: Progressing     Problem: METABOLIC/FLUID AND ELECTROLYTES - ADULT  Goal: Glucose maintained within prescribed range  Description: INTERVENTIONS:  - Monitor Blood Glucose as ordered  - Assess for signs and symptoms of hyperglycemia and hypoglycemia  - Administer ordered medications to maintain glucose within target range  - Assess barriers to adequate nutritional intake and initiate nutrition consult as needed  - Instruct patient on self management of diabetes  Outcome: Progressing     Problem: Diabetes/Glucose Control  Goal: Glucose maintained within prescribed range  Description: INTERVENTIONS:  - Monitor Blood Glucose as ordered  - Assess for signs and symptoms of hyperglycemia and  hypoglycemia  - Administer ordered medications to maintain glucose within target range  - Assess barriers to adequate nutritional intake and initiate nutrition consult as needed  - Instruct patient on self management of diabetes  Outcome: Progressing

## 2024-01-17 VITALS
RESPIRATION RATE: 19 BRPM | WEIGHT: 160 LBS | OXYGEN SATURATION: 94 % | BODY MASS INDEX: 30.21 KG/M2 | DIASTOLIC BLOOD PRESSURE: 73 MMHG | HEIGHT: 61 IN | HEART RATE: 67 BPM | TEMPERATURE: 98 F | SYSTOLIC BLOOD PRESSURE: 168 MMHG

## 2024-01-17 LAB
GLUCOSE BLD-MCNC: 100 MG/DL (ref 70–99)
GLUCOSE BLD-MCNC: 111 MG/DL (ref 70–99)

## 2024-01-17 PROCEDURE — 99239 HOSP IP/OBS DSCHRG MGMT >30: CPT | Performed by: STUDENT IN AN ORGANIZED HEALTH CARE EDUCATION/TRAINING PROGRAM

## 2024-01-17 RX ORDER — AMOXICILLIN AND CLAVULANATE POTASSIUM 875; 125 MG/1; MG/1
1 TABLET, FILM COATED ORAL 2 TIMES DAILY
Qty: 8 TABLET | Refills: 0 | Status: SHIPPED | OUTPATIENT
Start: 2024-01-17 | End: 2024-01-21

## 2024-01-17 RX ORDER — ONDANSETRON 4 MG/1
4 TABLET, ORALLY DISINTEGRATING ORAL EVERY 12 HOURS PRN
Qty: 20 TABLET | Refills: 0 | Status: SHIPPED | OUTPATIENT
Start: 2024-01-17 | End: 2024-01-27

## 2024-01-17 NOTE — PLAN OF CARE
Problem: Patient/Family Goals  Goal: Patient/Family Long Term Goal  Description: Patient's Long Term Goal:   Resolution of PNA    Interventions:  - IV antibx as ordered  - supportive care  - See additional Care Plan goals for specific interventions  Outcome: Completed  Goal: Patient/Family Short Term Goal  Description: Patient's Short Term Goal:   01/14/2024 - \"sleep\"  1/15 AM: Pain control  1/16 AM: Rest  1/16 NOC: pain control, sleep    Interventions:   - cluster care, dim lights, keep room quiet  - See additional Care Plan goals for specific interventions  Outcome: Completed     Problem: PAIN - ADULT  Goal: Verbalizes/displays adequate comfort level or patient's stated pain goal  Description: INTERVENTIONS:  - Encourage pt to monitor pain and request assistance  - Assess pain using appropriate pain scale  - Administer analgesics based on type and severity of pain and evaluate response  - Implement non-pharmacological measures as appropriate and evaluate response  - Consider cultural and social influences on pain and pain management  - Manage/alleviate anxiety  - Utilize distraction and/or relaxation techniques  - Monitor for opioid side effects  - Notify MD/LIP if interventions unsuccessful or patient reports new pain  - Anticipate increased pain with activity and pre-medicate as appropriate  Outcome: Completed     Problem: RISK FOR INFECTION - ADULT  Goal: Absence of fever/infection during anticipated neutropenic period  Description: INTERVENTIONS  - Monitor WBC  - Administer growth factors as ordered  - Implement neutropenic guidelines  Outcome: Completed     Problem: SAFETY ADULT - FALL  Goal: Free from fall injury  Description: INTERVENTIONS:  - Assess pt frequently for physical needs  - Identify cognitive and physical deficits and behaviors that affect risk of falls.  - [place] fall precautions as indicated by assessment.  - Educate pt/family on patient safety including physical limitations  - Instruct  pt to call for assistance with activity based on assessment  - Modify environment to reduce risk of injury  - Provide assistive devices as appropriate  - Consider OT/PT consult to assist with strengthening/mobility  - Encourage toileting schedule  Outcome: Completed     Problem: RESPIRATORY - ADULT  Goal: Achieves optimal ventilation and oxygenation  Description: INTERVENTIONS:  - Assess for changes in respiratory status  - Assess for changes in mentation and behavior  - Position to facilitate oxygenation and minimize respiratory effort  - Oxygen supplementation based on oxygen saturation or ABGs  - Provide Smoking Cessation handout, if applicable  - Encourage broncho-pulmonary hygiene including cough, deep breathe, Incentive Spirometry  - Assess the need for suctioning and perform as needed  - Assess and instruct to report SOB or any respiratory difficulty  - Respiratory Therapy support as indicated  - Manage/alleviate anxiety  - Monitor for signs/symptoms of CO2 retention  Outcome: Completed     Problem: METABOLIC/FLUID AND ELECTROLYTES - ADULT  Goal: Glucose maintained within prescribed range  Description: INTERVENTIONS:  - Monitor Blood Glucose as ordered  - Assess for signs and symptoms of hyperglycemia and hypoglycemia  - Administer ordered medications to maintain glucose within target range  - Assess barriers to adequate nutritional intake and initiate nutrition consult as needed  - Instruct patient on self management of diabetes  Outcome: Completed     Problem: Diabetes/Glucose Control  Goal: Glucose maintained within prescribed range  Description: INTERVENTIONS:  - Monitor Blood Glucose as ordered  - Assess for signs and symptoms of hyperglycemia and hypoglycemia  - Administer ordered medications to maintain glucose within target range  - Assess barriers to adequate nutritional intake and initiate nutrition consult as needed  - Instruct patient on self management of diabetes  Outcome: Completed

## 2024-01-17 NOTE — PROGRESS NOTES
[facility]     Hospitalist Progress Note     Rayne Morales Patient Status:  Inpatient    1945 MRN [id number]   Location [facility] 5NW-A Attending Mir, Neo Wilks, *   Hosp Day # 3 PCP Jose Daniel Washington MD     Chief Complaint: Cough, dyspnea    Subjective:      - Pt feeling improved, strength improved and able to ambulate without difficulty   - Denies new f/c, cp, sob, abd pain, n/v    Objective:    Review of Systems:   A comprehensive review of systems was completed; pertinent positive and negatives stated in subjective.    Vital signs:  Temp:  [97.6 °F (36.4 °C)-98.4 °F (36.9 °C)] 97.6 °F (36.4 °C)  Pulse:  [61-77] 75  Resp:  [16-21] 16  BP: (137-166)/(65-81) 166/73  SpO2:  [91 %-97 %] 94 %    Physical Exam:    General: No acute distress  Respiratory: no wheezes, no rhonchi  Cardiovascular: S1, S2, regular rate and rhythm  Abdomen: Soft, Non-tender, non-distended, positive bowel sounds  Neuro: No new focal deficits.   Extremities: no edema    Diagnostic Data:    Labs:  Recent Labs   Lab 24  0950 01/15/24  0632   WBC 8.4 14.4*   HGB 12.0 10.8*   MCV 92.6 89.0   .0 150.0       Recent Labs   Lab 24  0950 01/15/24  0632   * 180*   BUN 18 21   CREATSERUM 1.51* 0.81   CA 8.1* 8.5   ALB 3.6 3.0*    142   K 3.8 3.5    110   CO2 25.0 24.0   ALKPHO 75 73   AST 26 21   ALT 32 30   BILT 0.7 0.7   TP 6.5 6.0*       Estimated Creatinine Clearance: 43.9 mL/min (based on SCr of 0.81 mg/dL).    Recent Labs   Lab 24  0950   TROPHS 21       No results for input(s): \"PTP\", \"INR\" in the last 168 hours.               Microbiology    Hospital Encounter on 24   1. Blood Culture     Status: None (Preliminary result)    Collection Time: 24  9:50 AM    Specimen: Blood,peripheral   Result Value Ref Range    Blood Culture Result No Growth 2 Days N/A         Imaging: Reviewed in Epic.    Medications:    insulin detemir  5 Units Subcutaneous BID    albuterol  8  puff Inhalation Once    insulin aspart  1-68 Units Subcutaneous TID CC    insulin aspart  1-30 Units Subcutaneous TID AC and HS    piperacillin-tazobactam  3.375 g Intravenous Q8H    aspirin  81 mg Oral Daily    DULoxetine  90 mg Oral Daily    lisinopril  20 mg Oral Daily    pantoprazole  40 mg Oral Before breakfast    pregabalin  100 mg Oral TID    rosuvastatin  10 mg Oral Nightly    rivaroxaban  20 mg Oral Daily    tiZANidine  4 mg Oral Nightly       Assessment & Plan:      #Acute hypoxic respiratory failure, resolved   #Pneumonia  -CXR reviewed  -Blood culture NGTD  -Sputum culture ordered  -Urine antigens negative  -IV antibiotics continued - transition to PO at dc  -Incentive spirometry   -PT/OT eval     #FREDERICK, resolved   -IVF     #Hx of PE/DVT  -Xarelto     #DM  -Hyperglycemia protocol, adding levemir  - Will f/u with PCP for further titration     #Hypertension  -Lisinopril     #Hyperlipidemia  -Statin     Home today    Neo Mir MD    Supplementary Documentation:     Quality:  DVT Mechanical Prophylaxis:   SCDs,    DVT Pharmacologic Prophylaxis   Medication    rivaroxaban (Xarelto) tab 20 mg      DVT Pharmacologic prophylaxis: Aspirin 162 mg         Code Status: Full Code  Baxter: No urinary catheter in place  Baxter Duration (in days):   Central line:    CLARI:     The 21st Century Cures Act makes medical notes like these available to patients in the interest of transparency. Please be advised this is a medical document. Medical documents are intended to carry relevant information, facts as evident, and the clinical opinion of the practitioner. The medical note is intended as peer to peer communication and may appear blunt or direct. It is written in medical language and may contain abbreviations or verbiage that are unfamiliar.

## 2024-01-17 NOTE — PLAN OF CARE
Received pt A&Ox4.  on RA. Utilizing cpap with sleep. Scheduled nebs. IV antibiotics for PNA. NSR on tele. Receiving xarelto. Tolerating diet, accuchecks QID. Voids via BRP. Up SBA. Pain management per MAR. Plan of care continues, no further needs at this time. Possible discharge tomorrow.     Problem: Patient/Family Goals  Goal: Patient/Family Long Term Goal  Description: Patient's Long Term Goal:   Resolution of PNA    Interventions:  - IV antibx as ordered  - supportive care  - See additional Care Plan goals for specific interventions  Outcome: Progressing  Goal: Patient/Family Short Term Goal  Description: Patient's Short Term Goal:   01/14/2024 - \"sleep\"  1/15 AM: Pain control  1/16 AM: Rest  1/16 NOC: pain control, sleep    Interventions:   - cluster care, dim lights, keep room quiet  - See additional Care Plan goals for specific interventions  Outcome: Progressing     Problem: PAIN - ADULT  Goal: Verbalizes/displays adequate comfort level or patient's stated pain goal  Description: INTERVENTIONS:  - Encourage pt to monitor pain and request assistance  - Assess pain using appropriate pain scale  - Administer analgesics based on type and severity of pain and evaluate response  - Implement non-pharmacological measures as appropriate and evaluate response  - Consider cultural and social influences on pain and pain management  - Manage/alleviate anxiety  - Utilize distraction and/or relaxation techniques  - Monitor for opioid side effects  - Notify MD/LIP if interventions unsuccessful or patient reports new pain  - Anticipate increased pain with activity and pre-medicate as appropriate  Outcome: Progressing     Problem: RISK FOR INFECTION - ADULT  Goal: Absence of fever/infection during anticipated neutropenic period  Description: INTERVENTIONS  - Monitor WBC  - Administer growth factors as ordered  - Implement neutropenic guidelines  Outcome: Progressing     Problem: SAFETY ADULT - FALL  Goal: Free from fall  injury  Description: INTERVENTIONS:  - Assess pt frequently for physical needs  - Identify cognitive and physical deficits and behaviors that affect risk of falls.  - Moon fall precautions as indicated by assessment.  - Educate pt/family on patient safety including physical limitations  - Instruct pt to call for assistance with activity based on assessment  - Modify environment to reduce risk of injury  - Provide assistive devices as appropriate  - Consider OT/PT consult to assist with strengthening/mobility  - Encourage toileting schedule  Outcome: Progressing     Problem: RESPIRATORY - ADULT  Goal: Achieves optimal ventilation and oxygenation  Description: INTERVENTIONS:  - Assess for changes in respiratory status  - Assess for changes in mentation and behavior  - Position to facilitate oxygenation and minimize respiratory effort  - Oxygen supplementation based on oxygen saturation or ABGs  - Provide Smoking Cessation handout, if applicable  - Encourage broncho-pulmonary hygiene including cough, deep breathe, Incentive Spirometry  - Assess the need for suctioning and perform as needed  - Assess and instruct to report SOB or any respiratory difficulty  - Respiratory Therapy support as indicated  - Manage/alleviate anxiety  - Monitor for signs/symptoms of CO2 retention  Outcome: Progressing     Problem: METABOLIC/FLUID AND ELECTROLYTES - ADULT  Goal: Glucose maintained within prescribed range  Description: INTERVENTIONS:  - Monitor Blood Glucose as ordered  - Assess for signs and symptoms of hyperglycemia and hypoglycemia  - Administer ordered medications to maintain glucose within target range  - Assess barriers to adequate nutritional intake and initiate nutrition consult as needed  - Instruct patient on self management of diabetes  Outcome: Progressing     Problem: Diabetes/Glucose Control  Goal: Glucose maintained within prescribed range  Description: INTERVENTIONS:  - Monitor Blood Glucose as ordered  -  Assess for signs and symptoms of hyperglycemia and hypoglycemia  - Administer ordered medications to maintain glucose within target range  - Assess barriers to adequate nutritional intake and initiate nutrition consult as needed  - Instruct patient on self management of diabetes  Outcome: Progressing

## 2024-01-17 NOTE — PROGRESS NOTES
NURSING DISCHARGE NOTE    Discharged Home via Wheelchair.  Accompanied by Family member  Belongings Taken by patient/family.    Pt's IV accessed removed. Tele and pulse ox returned. All paperwork are given to pt and dtr. All questions and concerns were answered. Discharge navigator completed.

## 2024-01-17 NOTE — DIETARY NOTE
[facility]   CLINICAL NUTRITION    Rayne Morales admitted on 1/14 presents with CAP.    PMH: PE/DVT on AC, HTN, HLD, Diabetes, Depression     Admitting diagnosis:  Confusion [R41.0]  Hypoxia [R09.02]  Community acquired pneumonia, unspecified laterality [J18.9]    Ht: 154.9 cm (5' 1\")  Wt: 72.6 kg (160 lb).   Body mass index is 30.23 kg/m².    Wt Readings from Last 6 Encounters:   01/15/24 72.6 kg (160 lb)   12/11/23 76.5 kg (168 lb 9.6 oz)   11/06/23 74.8 kg (165 lb)   11/02/23 75.6 kg (166 lb 10.7 oz)   10/20/23 76.2 kg (168 lb)   11/27/23 72.6 kg (160 lb)        Labs/Meds reviewed    Diet:       Procedures    Carbohydrate controlled diet 1800 kcal/60 grams; Is Patient on Accuchecks? No; Misc Restriction: Cardiac       Pt chart reviewed d/t elevated A1c 8.5%. Visited pt at bedside. Pt reports good appetite/PO intake PTA; Nursing notes reports Percent Meals Eaten (%): 100 % intake for last meal. Denies nausea, vomiting and constipation but having diarrhea from abx. No chewing or swallowing difficulties. Confirmed allergy to all bell peppers. No significant weight changes noted.   Pt reports having diabetes for a long time and does try to somewhat count CHO but also admits she has a big sweet tooth. Reports eating ice cream almost every night at home and states she is not willing to change that. Reports she was on Ozempic which was stopped in October. She declined any further education at this time.     Patient is at low nutrition risk at this time.    Please consult if patient status changes or nutrition issues arise.    Cally Walters RD, LDN, [facility]  Clinical Dietitian  Spectra: [phone]

## 2024-01-18 ENCOUNTER — PATIENT OUTREACH (OUTPATIENT)
Dept: CASE MANAGEMENT | Age: 79
End: 2024-01-18

## 2024-01-18 ENCOUNTER — PATIENT OUTREACH (OUTPATIENT)
Dept: INTERNAL MEDICINE CLINIC | Facility: CLINIC | Age: 79
End: 2024-01-18

## 2024-01-18 DIAGNOSIS — Z02.9 ENCOUNTERS FOR UNSPECIFIED ADMINISTRATIVE PURPOSE: Primary | ICD-10-CM

## 2024-01-18 PROCEDURE — 1111F DSCHRG MED/CURRENT MED MERGE: CPT

## 2024-01-18 NOTE — PROGRESS NOTES
Called pt to schedule a hospital follow-up appt :    Jose Daniel Washington as soon as  possible for a visit in 1 week(s)  Specialty: Internal Medicine, IP Consult to  Primary Care  For routine follow-up after hospitilization  [address] [place] [id number]  [phone]  spk directly to MD office \"Meera\" stated that she will contact the pt directly to schedule her diabetes follow-up appt    Returned call to pt to inform that the MD office will contact her directly to schedule her hospital follow-up appt.    Pt verbalized that she understands & no further assistance needed.    Closing encounter

## 2024-01-18 NOTE — PROGRESS NOTES
Attempted to contact pt for TCM however no answer. Call continued to ring and did not go to a VM. Will await a returned phone call.

## 2024-01-18 NOTE — PROGRESS NOTES
Contacted pt for TCM, s/w pt briefly. Pt stated she was doing okay but that she preferred a call back at a later time. NCM to try again as requested.

## 2024-01-22 ENCOUNTER — OFFICE VISIT (OUTPATIENT)
Dept: INTERNAL MEDICINE CLINIC | Facility: CLINIC | Age: 79
End: 2024-01-22
Payer: MEDICARE

## 2024-01-22 VITALS
TEMPERATURE: 98 F | RESPIRATION RATE: 18 BRPM | DIASTOLIC BLOOD PRESSURE: 80 MMHG | SYSTOLIC BLOOD PRESSURE: 132 MMHG | HEIGHT: 61 IN | BODY MASS INDEX: 32.25 KG/M2 | WEIGHT: 170.81 LBS | OXYGEN SATURATION: 90 % | HEART RATE: 100 BPM

## 2024-01-22 DIAGNOSIS — M17.11 PRIMARY OSTEOARTHRITIS OF RIGHT KNEE: ICD-10-CM

## 2024-01-22 DIAGNOSIS — R09.02 HYPOXIA: ICD-10-CM

## 2024-01-22 DIAGNOSIS — E11.9 TYPE 2 DIABETES MELLITUS WITHOUT COMPLICATION, WITHOUT LONG-TERM CURRENT USE OF INSULIN (HCC): ICD-10-CM

## 2024-01-22 DIAGNOSIS — J96.01 ACUTE RESPIRATORY FAILURE WITH HYPOXIA (HCC): Primary | ICD-10-CM

## 2024-01-22 DIAGNOSIS — D80.3 SELECTIVE DEFICIENCY OF IGG SUBCLASSES (HCC): ICD-10-CM

## 2024-01-22 DIAGNOSIS — I25.10 CORONARY ARTERY DISEASE INVOLVING NATIVE CORONARY ARTERY OF NATIVE HEART WITHOUT ANGINA PECTORIS: ICD-10-CM

## 2024-01-22 DIAGNOSIS — F41.9 ANXIETY: ICD-10-CM

## 2024-01-22 DIAGNOSIS — J45.30 MILD PERSISTENT ASTHMA WITHOUT COMPLICATION: ICD-10-CM

## 2024-01-22 DIAGNOSIS — Z87.01 HISTORY OF RECURRENT PNEUMONIA: ICD-10-CM

## 2024-01-22 DIAGNOSIS — G47.33 OSA ON CPAP: ICD-10-CM

## 2024-01-22 DIAGNOSIS — D69.6 THROMBOCYTOPENIA (HCC): ICD-10-CM

## 2024-01-22 DIAGNOSIS — D80.3 IGG DEFICIENCY (HCC): ICD-10-CM

## 2024-01-22 DIAGNOSIS — I10 PRIMARY HYPERTENSION: ICD-10-CM

## 2024-01-22 DIAGNOSIS — R41.0 ACUTE CONFUSION: ICD-10-CM

## 2024-01-22 DIAGNOSIS — N17.9 AKI (ACUTE KIDNEY INJURY) (HCC): ICD-10-CM

## 2024-01-22 RX ORDER — TIRZEPATIDE 2.5 MG/.5ML
2.5 INJECTION, SOLUTION SUBCUTANEOUS WEEKLY
Qty: 2 ML | Refills: 1 | Status: SHIPPED | OUTPATIENT
Start: 2024-01-22

## 2024-01-22 NOTE — PROGRESS NOTES
Subjective:   Rayne Morales is a 78 year old female who presents for hospital follow up.   She was discharged from [facility] to Home or Self Care  Admission Date: 1/14/24   Discharge Date: 1/17/24  Hospital Discharge Diagnosis:   Recurrent pneumonia  Diabetes      Interactive contact within 2 business days post discharge first initiated on Date: 1/18/2024    During the visit, the following was completed:  Obtained and reviewed discharge summary, continuity of care documents, and Hospitalist notes  Reviewed Labs (CBC, CMP)    HPI: presented to ER 1/14 with feeling ill / cough, SOB.    CXR pneumonia.   This episode was associated with confusion which was concerning for her.  No discharge summary available.   Has appt with pulmonary next week.  Hx recurrent pneumonia.   Feeling better.     IgG deficiency  now following with Dr Samuels.    Had appt just before onset of symptoms.   Recevived pneumovax.  Considering subcutaneous immunoglobulin therapy as alternative to IViG. Hizentra 10gweekly.   (Dr Samuels documentation states no previous PCV given.  She received PCV 13 2/22/16, 11/2/14 and PCV 23 9/25/17)  MUKUND  needs CPAP   dropping too low at night.      FREDERICK  improved 21/0.81 at discharge.      Hx DVT/PE  Dr Josiah Montez.   She will f/u with him.     Diabetes   glipizide 2.5mg in am  metformin max.  Glucoses have been high.  Was treated with insulin in the hospital.  She was well controlled on Ozempic but GI upset.  She is open to Monjaro as an option.    This am her glucose was better 105.    Last A1c December 8.5   discussed this in relationship to her upcoming knee surgery.      HTN  stable    lisinopril      Dyslipidemia    rosuvastatin     Neuropathy   lyrica       Right knee OA  knee replacement was postponed and happy we did with events that transpired.  She is rescheduled for Feb.  Needs better A1c control.      History/Other:   Current Medications:  Medication Reconciliation:  I am aware of an  inpatient discharge within the last 30 days.  The discharge medication list has been reconciled with the patient's current medication list and reviewed by me.  See medication list for additions of new medication, and changes to current doses of medications and discontinued medications.  Outpatient Medications Marked as Taking for the 1/22/24 encounter (Office Visit) with Monica Curran APRN   Medication Sig    Tirzepatide (MOUNJARO) 2.5 MG/0.5ML Subcutaneous Solution Pen-injector Inject 2.5 mg into the skin once a week.    ondansetron 4 MG Oral Tablet Dispersible Take 1 tablet (4 mg total) by mouth every 12 (twelve) hours as needed for Nausea.    glipiZIDE ER 2.5 MG Oral Tablet 24 Hr Take 1 tablet (2.5 mg total) by mouth daily with breakfast.    fluticasone-salmeterol 250-50 MCG/ACT Inhalation Aerosol Powder, Breath Activated Inhale 1 puff into the lungs every 12 (twelve) hours.    metFORMIN  MG Oral Tablet 24 Hr Take 2 tablets (1,000 mg total) by mouth daily with breakfast and 1 tablet (500 mg total) every evening. (Patient taking differently: 1 tablet (500 mg total) 2 (two) times daily with meals. Take 2 tablets (1,000 mg total) by mouth daily with breakfast and 1 tablet (500 mg total) every evening.)    DULoxetine 60 MG Oral Cap DR Particles Take 1 capsule (60 mg total) by mouth daily.    HYDROcodone-acetaminophen  MG Oral Tab Take 1 tablet by mouth every 4 to 6 hours as needed for Pain. DO NOT EXCEED 6 tablets daily    rosuvastatin 10 MG Oral Tab Take 1 tablet (10 mg total) by mouth nightly.    albuterol 108 (90 Base) MCG/ACT Inhalation Aero Soln Inhale 2 puffs into the lungs every 6 (six) hours as needed for Wheezing.    LISINOPRIL 20 MG Oral Tab TAKE 1 TABLET BY MOUTH DAILY    pregabalin 100 MG Oral Cap Take 1 capsule (100 mg total) by mouth in the morning and 1 capsule (100 mg total) at noon and 1 capsule (100 mg total) in the evening.    tiZANidine 4 MG Oral Tab Take 1 tablet (4 mg total) by  mouth every 8 (eight) hours as needed. (Patient taking differently: Take 1 tablet (4 mg total) by mouth every 8 (eight) hours as needed. Takes every night)    DULoxetine 30 MG Oral Cap DR Particles Take 1 capsule (30 mg total) by mouth daily.    aspirin 81 MG Oral Chew Tab Chew 1 tablet (81 mg total) by mouth daily.    XARELTO 20 MG Oral Tab Take 1 tablet (20 mg total) by mouth daily.    PANTOPRAZOLE 40 MG Oral Tab EC Take 1 tablet (40 mg total) by mouth before breakfast.    Fluticasone Propionate 50 MCG/ACT Nasal Suspension 1 spray by Nasal route 2 (two) times daily.    Cholecalciferol (VITAMIN D) 2000 units Oral Tab Take 2,000 Units by mouth daily.       Review of Systems:  GENERAL: feeling better.   EYES: denies blurred vision or double vision  HEENT: denies nasal congestion, sinus pain or ST  LUNGS: as above  CARDIOVASCULAR: denies chest pain on exertion or palpitations  GI: denies abdominal pain, denies heartburn, denies diarrhea  MUSCULOSKELETAL: as above    NEURO: denies headaches,     Objective:   No LMP recorded (lmp unknown). Patient has had a hysterectomy.  Estimated body mass index is 32.27 kg/m² as calculated from the following:    Height as of this encounter: 5' 1\" (1.549 m).    Weight as of this encounter: 170 lb 12.8 oz (77.5 kg).   /80   Pulse 100   Temp 98.2 °F (36.8 °C) (Temporal)   Resp 18   Ht 5' 1\" (1.549 m)   Wt 170 lb 12.8 oz (77.5 kg)   LMP  (LMP Unknown)   SpO2 90%   BMI 32.27 kg/m²    GENERAL: well developed, well nourished, in no apparent distress  NECK: supple, no adenopathy,   LUNGS: clear to auscultation  no wheezing.    CARDIO: RRR   GI: good BS's, no masses, HSM or tenderness  MUSCULOSKELETAL: back is not tender, FROM of the extremities  EXTREMITIES: no edema  NEURO: Oriented times three, cranial nerves are intact, motor and sensory are grossly intact  Bilateral barefoot skin diabetic exam is normal, visualized feet and the appearance is normal.  Bilateral  monofilament/sensation of both feet is normal.  Pulsation pedal pulse exam of both lower legs/feet is normal as well.        Assessment & Plan:   1. Acute respiratory failure with hypoxia (HCC) (Primary)  doing better.  Has appt with pulmonary, Dr Chambers this week.   2. History of recurrent pneumonia  3. FREDERICK (acute kidney injury) (HCC)  resolved before discharge  will follow   4. Acute confusion  returned to baseline   resolved.   5. Hypoxia  per pulmoanry.    6. IgG deficiency ([facility])  reviewed notes.  Considering subcutaneous IG  Overview:  ??? pt states she gets IgG infusions due to lack of IgG, unable to fight off infections    7. Type 2 diabetes mellitus without complication, without long-term current use of insulin ([facility])  having trouble with control.  Acute infection and likely received steroids during her hospital course.  Today seemed better.  Metformin and glipizde. She is open to trying Monjaro instead of Ozempic to see if less GI side effects.    8. Thrombocytopenia (HCC)  stable  monitor.   9. Selective deficiency of IgG subclasses ([facility])   per Dr Paiz  10. Mild persistent asthma without complication  stable   monitor.   11. Anxiety  stable  same meds.   12. MUKUND on CPAP  stable  she will check if has O2 bleed at night.   13. Coronary artery disease involving native coronary artery of native heart without angina pectoris  stable  per Cardiology  14. Primary hypertension  bp stable     15. Primary osteoarthritis of right knee  looking to have TKR in February.  I cautioned her for this due to risk of infection and currently poorly controlled diabetes.  She will consider.    Other orders  -     Mounjaro; Inject 2.5 mg into the skin once a week.  Dispense: 2 mL; Refill: 1        No follow-ups on file.

## 2024-01-22 NOTE — PROGRESS NOTES
Multiple attempts to reach pt and messages left with no return call.  Patient went in for HFU appt with PCP office on 1/22/24.  Encounter closing.

## 2024-01-24 ENCOUNTER — PATIENT OUTREACH (OUTPATIENT)
Dept: CASE MANAGEMENT | Age: 79
End: 2024-01-24

## 2024-01-24 NOTE — PROGRESS NOTES
Reviewed pt's chart including recent visits.  Attempted to contact pt for monthly outreach no answer left detailed message for pt to call back.   Total time spent with patient including chart review: 4  Time spent with patient this month: 4    Total time spent with communication and chart review this month to date: 4

## 2024-01-24 NOTE — DISCHARGE SUMMARY
[facility]     Rayne Morales Patient Status:  Inpatient    1945 MRN [id number]   Location [facility] 5NW-A Attending No att. providers found   Hosp Day # 3 PCP Jose Daniel Washington MD     Date of Admission: 2024  Date of Discharge: 2024  Discharge Disposition: Home or Self Care    Admitting Diagnosis:   Confusion [R41.0]  Hypoxia [R09.02]  Community acquired pneumonia, unspecified laterality [J18.9]    Hospital Discharge Diagnoses:   Acute hypoxemic respiratory failure secondary pneumonia  FREDERICK  Hypercoagulable state with history of PE and DVT  Diabetes mellitus type 2  Hypertension  Hyperlipidemia    Lace+ Score: 79  59-90 High Risk  29-58 Medium Risk  0-28   Low Risk.    TCM Follow-Up Recommendation:  LACE > 58: High Risk of readmission after discharge from the hospital.        Discharge Diagnosis:   Acute hypoxemic respiratory failure secondary pneumonia    History of Present Illness: Rayne Morales is a 78 year old female with past medical history of PE/DVT on AC, HTN, HLD, Diabetes, Depression who presents to ED for cough and dyspnea. Patient has had symptoms for past day and cough worsened early this morning. She also had wheezing at home. She denies fevers, chills, nausea, vomiting, abdominal pain, chest pain.      Brief Synopsis: Patient presented with symptoms concerning for bacterial pneumonia, started on IV antibiotics, had improvement in hypoxemia with antibiotic therapy and returned back to baseline room air.  Symptoms of weakness and fevers resolved and patient discharged on oral antibiotic therapy.    Procedures during hospitalization:   None    Incidental or significant findings and recommendations (brief descriptions):  PNA, transition to oral Augmentin at discharge    Lab/Test results pending at Discharge:   None    Consultants:  None    Discharge Medication List:     Discharge Medications        START taking these medications         Instructions Prescription details   ondansetron 4 MG Tbdp  Commonly known as: Zofran-ODT      Take 1 tablet (4 mg total) by mouth every 12 (twelve) hours as needed for Nausea.   Stop taking on: January 27, 2024  Quantity: 20 tablet  Refills: 0            CHANGE how you take these medications        Instructions Prescription details   metFORMIN  MG Tb24  Commonly known as: Glucophage XR  What changed:   how much to take  when to take this      Take 2 tablets (1,000 mg total) by mouth daily with breakfast and 1 tablet (500 mg total) every evening.   Quantity: 270 tablet  Refills: 0            CONTINUE taking these medications        Instructions Prescription details   albuterol 108 (90 Base) MCG/ACT Aers  Commonly known as: Ventolin HFA      Inhale 2 puffs into the lungs every 6 (six) hours as needed for Wheezing.   Quantity: 1 each  Refills: 3     aspirin 81 MG Chew      Chew 1 tablet (81 mg total) by mouth daily.   Refills: 0     DULoxetine 30 MG Cpep  Commonly known as: Cymbalta      Take 1 capsule (30 mg total) by mouth daily.   Refills: 0     DULoxetine 60 MG Cpep  Commonly known as: Cymbalta      Take 1 capsule (60 mg total) by mouth daily.   Quantity: 90 capsule  Refills: 0     fluticasone propionate 50 MCG/ACT Susp  Commonly known as: Flonase      1 spray by Nasal route 2 (two) times daily.   Quantity: 1 Bottle  Refills: 3     fluticasone-salmeterol 250-50 MCG/ACT Aepb  Commonly known as: Advair Diskus      Inhale 1 puff into the lungs every 12 (twelve) hours.   Refills: 0     glipiZIDE ER 2.5 MG Tb24  Commonly known as: Glucotrol XL      Take 1 tablet (2.5 mg total) by mouth daily with breakfast.   Quantity: 90 tablet  Refills: 0     HYDROcodone-acetaminophen  MG Tabs  Commonly known as: Norco      Take 1 tablet by mouth every 4 to 6 hours as needed for Pain. DO NOT EXCEED 6 tablets daily   Refills: 0     lisinopril 20 MG Tabs  Commonly known as: Prinivil; Zestril      TAKE 1 TABLET BY MOUTH  DAILY   Quantity: 90 tablet  Refills: 3     pantoprazole 40 MG Tbec  Commonly known as: Protonix      Take 1 tablet (40 mg total) by mouth before breakfast.   Quantity: 90 tablet  Refills: 3     pregabalin 100 MG Caps  Commonly known as: Lyrica      Take 1 capsule (100 mg total) by mouth in the morning and 1 capsule (100 mg total) at noon and 1 capsule (100 mg total) in the evening.   Quantity: 270 capsule  Refills: 1     rosuvastatin 10 MG Tabs  Commonly known as: Crestor      Take 1 tablet (10 mg total) by mouth nightly.   Quantity: 90 tablet  Refills: 1     tiZANidine 4 MG Tabs  Commonly known as: Zanaflex      Take 1 tablet (4 mg total) by mouth every 8 (eight) hours as needed.   Quantity: 90 tablet  Refills: 2     Vitamin D 50 MCG (2000 UT) Tabs      Take 2,000 Units by mouth daily.   Refills: 0            STOP taking these medications      cefdinir 300 MG Caps  Commonly known as: Omnicef        famciclovir 500 MG Tabs  Commonly known as: Famvir        ondansetron 4 mg tablet  Commonly known as: Zofran               ASK your doctor about these medications        Instructions Prescription details   amoxicillin clavulanate 875-125 MG Tabs  Commonly known as: Augmentin  Ask about: Should I take this medication?      Take 1 tablet by mouth 2 (two) times daily for 4 days.   Stop taking on: January 21, 2024  Quantity: 8 tablet  Refills: 0               Where to Get Your Medications        These medications were sent to [id number] - [facility] [phone], [phone]  [facility] [facility] [address]      Phone: [phone]   amoxicillin clavulanate 875-125 MG Tabs  ondansetron 4 MG Tbdp         [place] substances prescribed discharge    Follow-up appointment:   Jose Daniel Washington MD  [address]  [address]  [facility] [address] [phone]    Schedule an appointment as soon as possible for a visit in 1 week(s)  For routine follow-up after  hospitilization      Vital signs:       Physical Exam:    See exam from day of discharge note  -----------------------------------------------------------------------------------------------  PATIENT DISCHARGE INSTRUCTIONS: See electronic chart    Neo Mir MD 1/24/2024    Time spent:  > 30 minutes

## 2024-01-24 NOTE — TELEPHONE ENCOUNTER
Last VISIT 1/22/[id number] visit    Last CPE 4/10/2023    Last REFILL  XARELTO 20 MG Oral Tab 90 tablet 3 1/30/2023 --    Sig: Take 1 tablet (20 mg total) by mouth daily.    Sent to pharmacy as: Xarelto 20 MG Oral Tablet (rivaroxaban)    E-Prescribing Status: Receipt confirmed by pharmacy (1/30/2023  9:27 AM CST)        Last LABS 1/15/2024-CBC,CMP    Future Appointments   Date Time Provider Department Center   2/9/2024 10:00 AM Zac Christian MD [facility]   4/10/2024 10:45 AM Makayla Farley, DO [facility]         Per PROTOCOL?NONE    Please Approve or Deny.

## 2024-01-30 RX ORDER — DULOXETIN HYDROCHLORIDE 60 MG/1
60 CAPSULE, DELAYED RELEASE ORAL DAILY
Qty: 90 CAPSULE | Refills: 1 | Status: SHIPPED | OUTPATIENT
Start: 2024-01-30

## 2024-01-30 RX ORDER — DULOXETIN HYDROCHLORIDE 30 MG/1
30 CAPSULE, DELAYED RELEASE ORAL DAILY
Qty: 90 CAPSULE | Refills: 1 | Status: SHIPPED | OUTPATIENT
Start: 2024-01-30

## 2024-01-30 RX ORDER — PANTOPRAZOLE SODIUM 40 MG/1
40 TABLET, DELAYED RELEASE ORAL
Qty: 90 TABLET | Refills: 1 | Status: SHIPPED | OUTPATIENT
Start: 2024-01-30

## 2024-02-05 ENCOUNTER — HOSPITAL ENCOUNTER (EMERGENCY)
Facility: HOSPITAL | Age: 79
Discharge: HOME | End: 2024-02-05
Attending: EMERGENCY MEDICINE
Payer: MEDICARE

## 2024-02-05 ENCOUNTER — APPOINTMENT (OUTPATIENT)
Dept: CT IMAGING | Facility: HOSPITAL | Age: 79
End: 2024-02-05
Attending: EMERGENCY MEDICINE
Payer: MEDICARE

## 2024-02-05 ENCOUNTER — OFFICE VISIT (OUTPATIENT)
Dept: FAMILY MEDICINE CLINIC | Facility: CLINIC | Age: 79
End: 2024-02-05
Payer: MEDICARE

## 2024-02-05 VITALS
HEART RATE: 75 BPM | HEIGHT: 61 IN | WEIGHT: 170 LBS | OXYGEN SATURATION: 97 % | RESPIRATION RATE: 18 BRPM | SYSTOLIC BLOOD PRESSURE: 158 MMHG | TEMPERATURE: 98 F | BODY MASS INDEX: 32.1 KG/M2 | DIASTOLIC BLOOD PRESSURE: 77 MMHG

## 2024-02-05 VITALS
DIASTOLIC BLOOD PRESSURE: 76 MMHG | HEART RATE: 102 BPM | OXYGEN SATURATION: 98 % | TEMPERATURE: 98 F | SYSTOLIC BLOOD PRESSURE: 166 MMHG

## 2024-02-05 DIAGNOSIS — N39.0 URINARY TRACT INFECTION WITHOUT HEMATURIA, SITE UNSPECIFIED: Primary | ICD-10-CM

## 2024-02-05 DIAGNOSIS — R39.9 UTI SYMPTOMS: Primary | ICD-10-CM

## 2024-02-05 LAB
ALBUMIN SERPL-MCNC: 3.9 G/DL (ref 3.4–5)
ALBUMIN/GLOB SERPL: 1.1 {RATIO} (ref 1–2)
ALP LIVER SERPL-CCNC: 81 U/L
ALT SERPL-CCNC: 20 U/L
ANION GAP SERPL CALC-SCNC: 7 MMOL/L (ref 0–18)
AST SERPL-CCNC: 17 U/L (ref 15–37)
BASOPHILS # BLD AUTO: 0.06 X10(3) UL (ref 0–0.2)
BASOPHILS NFR BLD AUTO: 0.8 %
BILIRUB SERPL-MCNC: 0.5 MG/DL (ref 0.1–2)
BILIRUB UR QL STRIP.AUTO: NEGATIVE
BILIRUBIN: NEGATIVE
BUN BLD-MCNC: 16 MG/DL (ref 9–23)
CALCIUM BLD-MCNC: 9.5 MG/DL (ref 8.5–10.1)
CHLORIDE SERPL-SCNC: 113 MMOL/L (ref 98–112)
CO2 SERPL-SCNC: 24 MMOL/L (ref 21–32)
COLOR UR AUTO: YELLOW
CREAT BLD-MCNC: 0.86 MG/DL
EGFRCR SERPLBLD CKD-EPI 2021: 70 ML/MIN/1.73M2 (ref 60–?)
EOSINOPHIL # BLD AUTO: 0.39 X10(3) UL (ref 0–0.7)
EOSINOPHIL NFR BLD AUTO: 5.1 %
ERYTHROCYTE [DISTWIDTH] IN BLOOD BY AUTOMATED COUNT: 13.2 %
GLOBULIN PLAS-MCNC: 3.5 G/DL (ref 2.8–4.4)
GLUCOSE (URINE DIPSTICK): NEGATIVE MG/DL
GLUCOSE BLD-MCNC: 108 MG/DL (ref 70–99)
GLUCOSE UR STRIP.AUTO-MCNC: NORMAL MG/DL
HCT VFR BLD AUTO: 40.2 %
HGB BLD-MCNC: 13 G/DL
HYALINE CASTS #/AREA URNS AUTO: PRESENT /LPF
IMM GRANULOCYTES # BLD AUTO: 0.02 X10(3) UL (ref 0–1)
IMM GRANULOCYTES NFR BLD: 0.3 %
KETONES (URINE DIPSTICK): NEGATIVE MG/DL
KETONES UR STRIP.AUTO-MCNC: NEGATIVE MG/DL
LEUKOCYTE ESTERASE UR QL STRIP.AUTO: 500
LYMPHOCYTES # BLD AUTO: 2.17 X10(3) UL (ref 1–4)
LYMPHOCYTES NFR BLD AUTO: 28.4 %
MCH RBC QN AUTO: 29.3 PG (ref 26–34)
MCHC RBC AUTO-ENTMCNC: 32.3 G/DL (ref 31–37)
MCV RBC AUTO: 90.5 FL
MONOCYTES # BLD AUTO: 0.69 X10(3) UL (ref 0.1–1)
MONOCYTES NFR BLD AUTO: 9 %
MULTISTIX LOT#: ABNORMAL NUMERIC
NEUTROPHILS # BLD AUTO: 4.3 X10 (3) UL (ref 1.5–7.7)
NEUTROPHILS # BLD AUTO: 4.3 X10(3) UL (ref 1.5–7.7)
NEUTROPHILS NFR BLD AUTO: 56.4 %
NITRITE UR QL STRIP.AUTO: NEGATIVE
NITRITE, URINE: POSITIVE
OSMOLALITY SERPL CALC.SUM OF ELEC: 300 MOSM/KG (ref 275–295)
PH UR STRIP.AUTO: 5 [PH] (ref 5–8)
PH, URINE: 5.5 (ref 4.5–8)
PLATELET # BLD AUTO: 202 10(3)UL (ref 150–450)
POTASSIUM SERPL-SCNC: 3.8 MMOL/L (ref 3.5–5.1)
PROT SERPL-MCNC: 7.4 G/DL (ref 6.4–8.2)
PROT UR STRIP.AUTO-MCNC: 30 MG/DL
PROTEIN (URINE DIPSTICK): 30 MG/DL
RBC # BLD AUTO: 4.44 X10(6)UL
SODIUM SERPL-SCNC: 144 MMOL/L (ref 136–145)
SP GR UR STRIP.AUTO: 1.02 (ref 1–1.03)
SPECIFIC GRAVITY: 1.02 (ref 1–1.03)
URINE-COLOR: YELLOW
UROBILINOGEN UR STRIP.AUTO-MCNC: NORMAL MG/DL
UROBILINOGEN,SEMI-QN: 0.2 MG/DL (ref 0–1.9)
WBC # BLD AUTO: 7.6 X10(3) UL (ref 4–11)
WBC #/AREA URNS AUTO: >50 /HPF
WBC CLUMPS UR QL AUTO: PRESENT /HPF

## 2024-02-05 PROCEDURE — 87086 URINE CULTURE/COLONY COUNT: CPT | Performed by: EMERGENCY MEDICINE

## 2024-02-05 PROCEDURE — 81001 URINALYSIS AUTO W/SCOPE: CPT | Performed by: EMERGENCY MEDICINE

## 2024-02-05 PROCEDURE — 87186 SC STD MICRODIL/AGAR DIL: CPT | Performed by: EMERGENCY MEDICINE

## 2024-02-05 PROCEDURE — 87088 URINE BACTERIA CULTURE: CPT | Performed by: EMERGENCY MEDICINE

## 2024-02-05 PROCEDURE — 85025 COMPLETE CBC W/AUTO DIFF WBC: CPT

## 2024-02-05 PROCEDURE — 80053 COMPREHEN METABOLIC PANEL: CPT | Performed by: EMERGENCY MEDICINE

## 2024-02-05 PROCEDURE — 99285 EMERGENCY DEPT VISIT HI MDM: CPT

## 2024-02-05 PROCEDURE — 99284 EMERGENCY DEPT VISIT MOD MDM: CPT

## 2024-02-05 PROCEDURE — 80053 COMPREHEN METABOLIC PANEL: CPT

## 2024-02-05 PROCEDURE — 85025 COMPLETE CBC W/AUTO DIFF WBC: CPT | Performed by: EMERGENCY MEDICINE

## 2024-02-05 PROCEDURE — 96365 THER/PROPH/DIAG IV INF INIT: CPT

## 2024-02-05 PROCEDURE — 74176 CT ABD & PELVIS W/O CONTRAST: CPT | Performed by: EMERGENCY MEDICINE

## 2024-02-05 RX ORDER — ONDANSETRON 4 MG/1
4 TABLET, ORALLY DISINTEGRATING ORAL EVERY 4 HOURS PRN
Qty: 10 TABLET | Refills: 0 | Status: ON HOLD | OUTPATIENT
Start: 2024-02-05 | End: 2024-02-12

## 2024-02-05 RX ORDER — CEFUROXIME AXETIL 500 MG/1
500 TABLET ORAL 2 TIMES DAILY
Qty: 14 TABLET | Refills: 0 | Status: ON HOLD | OUTPATIENT
Start: 2024-02-05 | End: 2024-02-12

## 2024-02-05 NOTE — PROGRESS NOTES
CHIEF COMPLAINT:     Chief Complaint   Patient presents with    UTI     5 days, frequency, foul odor, urgency, hx of uti's, no otc        HPI:   Rayne Morales is a 78 year old female who presents with symptoms of UTI. Complaining of urinary frequency and urgency for last 5 days. Symptoms have been worsening since onset.  Treatments tried: none.  Associated symptoms: foul odor and severe \"SI joint pain\" that started 2 days ago.  Denies abdominal pain, fever, hematuria, nausea, or vomiting.  Denies vaginal discharge or itching.  Denies need for STD/STI testing. Last uti was in October.    Current Outpatient Medications   Medication Sig Dispense Refill    semaglutide 2 MG/3ML Subcutaneous Solution Pen-injector Inject 0.5 mg into the skin once a week. Pt takes on Saturdays- has not switched yet to Mounjaro      DULOXETINE 30 MG Oral Cap DR Particles Take 1 capsule (30 mg total) by mouth daily. To take along with 60mg to equal 90mg daily. 90 capsule 1    PANTOPRAZOLE 40 MG Oral Tab EC Take 1 tablet (40 mg total) by mouth before breakfast. 90 tablet 1    DULoxetine 60 MG Oral Cap DR Particles Take 1 capsule (60 mg total) by mouth daily. 90 capsule 1    rivaroxaban (XARELTO) 20 MG Oral Tab Take 1 tablet (20 mg total) by mouth daily. 90 tablet 0    metFORMIN  MG Oral Tablet 24 Hr Take 2 tablets (1,000 mg total) by mouth daily with breakfast and 1 tablet (500 mg total) every evening. (Patient taking differently: 1 tablet (500 mg total) 2 (two) times daily with meals. Take 2 tablets (1,000 mg total) by mouth daily with breakfast and 1 tablet (500 mg total) every evening.) 270 tablet 0    rosuvastatin 10 MG Oral Tab Take 1 tablet (10 mg total) by mouth nightly. 90 tablet 1    LISINOPRIL 20 MG Oral Tab TAKE 1 TABLET BY MOUTH DAILY 90 tablet 3    pregabalin 100 MG Oral Cap Take 1 capsule (100 mg total) by mouth in the morning and 1 capsule (100 mg total) at noon and 1 capsule (100 mg total) in the evening. 270  capsule 1    aspirin 81 MG Oral Chew Tab Chew 1 tablet (81 mg total) by mouth daily.      Cholecalciferol (VITAMIN D) 2000 units Oral Tab Take 2,000 Units by mouth daily.      Tirzepatide (MOUNJARO) 2.5 MG/0.5ML Subcutaneous Solution Pen-injector Inject 2.5 mg into the skin once a week. (Patient taking differently: Inject 2.5 mg into the skin once a week. 1/31/24- pt has not started this yet waiting for insurance approval) 2 mL 1    glipiZIDE ER 2.5 MG Oral Tablet 24 Hr Take 1 tablet (2.5 mg total) by mouth daily with breakfast. 90 tablet 0    fluticasone-salmeterol 250-50 MCG/ACT Inhalation Aerosol Powder, Breath Activated Inhale 1 puff into the lungs every 12 (twelve) hours.      HYDROcodone-acetaminophen  MG Oral Tab Take 1 tablet by mouth every 4 to 6 hours as needed for Pain. DO NOT EXCEED 6 tablets daily      albuterol 108 (90 Base) MCG/ACT Inhalation Aero Soln Inhale 2 puffs into the lungs every 6 (six) hours as needed for Wheezing. 1 each 3    tiZANidine 4 MG Oral Tab Take 1 tablet (4 mg total) by mouth every 8 (eight) hours as needed. (Patient taking differently: Take 1 tablet (4 mg total) by mouth every 8 (eight) hours as needed. Takes every night) 90 tablet 2    Fluticasone Propionate 50 MCG/ACT Nasal Suspension 1 spray by Nasal route 2 (two) times daily. 1 Bottle 3      Past Medical History:   Diagnosis Date    Abdominal distention     Abdominal pain     Anemia     Anesthesia complication     difficulty waking up after gallbladder surgery    Anxiety     Arrhythmia     Arthritis 2000    Asthma     Back pain 1966    Back problem     Black stools     Bloating     Blurred vision     Breast CA (HCC) 1999    left lump and rad.    Cancer ([facility]) 1999    breast, basal cell    Cervical radiculopathy     Cervical spinal stenosis     COVID     12/2020 no sx    Deep vein thrombosis (HCC)     Depression     Diabetes (HCC)     Diabetes mellitus (HCC)     Diarrhea, unspecified     Dizziness     Easy bruising     On  Xeralto    Esophageal reflux     Exposure to medical diagnostic radiation     Fatigue     Fitting and adjustment of vascular catheter     Headache disorder     Hearing loss     Heart attack (HCC)     Heartburn     Hemorrhoids     High blood pressure     High cholesterol     History of blood clots     History of COVID-19 2020    not hospitalized, no continued symptoms, cough, fever, loss of taste    History of depression     History of syncope 2014    IgG deficiency (HCC)     ??? pt states she gets IgG infusions due to lack of IgG, unable to fight off infections    Indigestion     Leaking of urine     Migraines     Muscle weakness     uses cane    Nausea     Neuropathy     rt foot bilateral hands    Osteoarthritis     Pain in joints     Personal history of malignant neoplasm of breast     Pneumonia due to organism     Pulmonary embolism (HCC) 2015    Pulmonary embolism ([facility])     S/P cervical spinal fusion     Shortness of breath     Sleep apnea     cpap - not using - machine is broken    Stress     Uncomfortable fullness after meals     Visual impairment     glasses/contacts    Wears glasses     Weight gain       Social History:  Social History     Socioeconomic History    Marital status:    Tobacco Use    Smoking status: Former     Packs/day: 0     Types: Cigarettes     Quit date: 1968     Years since quittin.1    Smokeless tobacco: Never   Vaping Use    Vaping Use: Never used   Substance and Sexual Activity    Alcohol use: No    Drug use: Yes     Types: Cannabis     Comment: uses cream for pain to back/neck with barometer changes.    Sexual activity: Not Currently     Partners: Male   Other Topics Concern    Caffeine Concern Yes     Comment: half of cup of coke daily    Sleep Concern No    Exercise Yes     Comment: walking    Seat Belt Yes     Social Determinants of Health     Financial Resource Strain: Low Risk  (10/25/2023)    Financial Resource Strain     Difficulty of  Paying Living Expenses: Not hard at all     Med Affordability: No   Food Insecurity: No Food Insecurity (1/14/2024)    Food Insecurity     Food Insecurity: Never true   Transportation Needs: No Transportation Needs (1/14/2024)    Transportation Needs     Lack of Transportation: No   Stress: No Stress Concern Present (10/25/2023)    Stress     Feeling of Stress : No   Social Connections: Socially Integrated (10/25/2023)    Social Connections     Frequency of Socialization with Friends and Family: 2   Housing Stability: Low Risk  (1/14/2024)    Housing Stability     Housing Instability: No         REVIEW OF SYSTEMS:   GENERAL: Denies fever, chills, or body aches  SKIN: no rashes, no skin wounds or ulcers.  EYES:denies blurred vision or double vision  HEENT: no congestion, rhinorrhea, sore throat or ear pain  CARDIOVASCULAR: denies chest pain or palpitations  LUNGS: denies shortness of breath, cough, or wheezing  GI: See HPI. No N/V/C/D.   : See HPI.  NEURO: no headaches.    EXAM:   BP (!) 166/76   Pulse 102   Temp 97.7 °F (36.5 °C)   LMP  (LMP Unknown)   SpO2 98%   GENERAL: well developed, well nourished,in no apparent distress  CARDIO: RRR, no murmurs  LUNGS: clear to ausculation bilaterally, no wheezing or rhonchi  GI: BS present x 4.  No hepatosplenomegaly.   +TTP to RUQ.  .  : No suprapubic tenderness. No bladder distention.  + R CVAT    Recent Results (from the past 24 hour(s))   Urine Dip, auto without Micro    Collection Time: 02/05/24  4:50 PM   Result Value Ref Range    Glucose Urine Negative Negative mg/dL    Bilirubin Urine Negative Negative    Ketones, UA Negative Negative - Trace mg/dL    Spec Gravity 1.020 1.005 - 1.030    Blood Urine Trace-lysed (A) Negative    PH Urine 5.5 5.0 - 8.0    Protein Urine 30 (A) Negative - Trace mg/dL    Urobilinogen Urine 0.2 0.2 - 1.0 mg/dL    Nitrite Urine Positive (A) Negative    Leukocyte Esterase Urine Small (A) Negative    APPEARANCE cloudy Clear    Color  Urine yellow Yellow    Multistix Lot# [id number] Numeric    Multistix Expiration Date 8/31/24 Date         ASSESSMENT AND PLAN:   Rayne Morales is a 78 year old female presents with UTI symptoms.    ASSESSMENT:  Encounter Diagnosis   Name Primary?    UTI symptoms Yes       PLAN: Meds as listed below.  Comfort measures as described in Patient Instructions.  -Patient with abdominal TTP and R flank TTP.  Hx of sepsis due to UTI.  ER advised.  Yann will drive patient to [facility].  Patient added to board.    Meds & Refills for this Visit:  Requested Prescriptions      No prescriptions requested or ordered in this encounter       Risk and benefits of medication discussed. Stressed importance of completing full course of antibiotic unless told otherwise.     There are no Patient Instructions on file for this visit.      The patient indicates understanding of these issues and agrees to the plan.  The patient is asked to return in 3 days if not better. Call if fever, vomiting, worsening symptoms.

## 2024-02-05 NOTE — ED INITIAL ASSESSMENT (HPI)
Pt presents to the ED with UTI symptoms and right flank pain since yesterday. Pt went to urgent care and was told to come to ER for kidney infection. Pt states it is difficult for her to walk due to pain. Pt awake and alert,skin w/d,resps reg/unlabored.

## 2024-02-06 NOTE — ED PROVIDER NOTES
Patient Seen in: [facility] Emergency Department      History     Chief Complaint   Patient presents with    Urinary Symptoms     Stated Complaint: seen at  for UTI, patient states \"its a flaming uti into my kidney\" sent in b*    Subjective:   HPI    This is a 78-year-old woman here for evaluation of discomfort urination increased urinary frequency over the past 5 days, right flank pain over the past 2 days.  Has had urinary tract infections and this feels similar.  Denies any fevers any vomiting diarrhea, any other concerning symptoms at this time.  Discomfort is constant.  Objective:   Past Medical History:   Diagnosis Date    Abdominal distention     Abdominal pain     Anemia     Anesthesia complication     difficulty waking up after gallbladder surgery    Anxiety     Arrhythmia     Arthritis 2000    Asthma     Back pain 1966    Back problem     Black stools     Bloating     Blurred vision     Breast CA ([facility]) 1999    left lump and rad.    Cancer ([facility]) 1999    breast, basal cell    Cervical radiculopathy     Cervical spinal stenosis     COVID     12/2020 no sx    Deep vein thrombosis (HCC)     Depression     Diabetes (HCC)     Diabetes mellitus (HCC)     Diarrhea, unspecified     Dizziness     Easy bruising     On Xeralto    Esophageal reflux     Exposure to medical diagnostic radiation     Fatigue     Fitting and adjustment of vascular catheter     Headache disorder     Hearing loss     Heart attack ([facility])     Heartburn     Hemorrhoids     High blood pressure     High cholesterol     History of blood clots     History of COVID-19 12/01/2020    not hospitalized, no continued symptoms, cough, fever, loss of taste    History of depression     History of syncope 07/26/2014    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 pt states she gets IgG infusions due to lack of IgG, unable to fight off infections    Indigestion     Leaking of urine     Migraines     Muscle weakness     uses cane    Nausea     Neuropathy     rt foot  bilateral hands    Osteoarthritis     Pain in joints 2000    Personal history of malignant neoplasm of breast     Pneumonia due to organism     Pulmonary embolism (HCC) 07/25/2015    Pulmonary embolism (HCC)     S/P cervical spinal fusion     Shortness of breath     Sleep apnea     cpap - not using - machine is broken    Stress     Uncomfortable fullness after meals     Visual impairment     glasses/contacts    Wears glasses     Weight gain               Past Surgical History:   Procedure Laterality Date    ABDOMEN SURGERY PROC UNLISTED      duodenal biopsy    BACK SURGERY  2/22    Neck surgery    BREAST SURGERY      CATARACT      CHOLECYSTECTOMY      COLONOSCOPY  10/1/10, 4/21/17    COLONOSCOPY      Due now Sept 2022    COLONOSCOPY N/A 02/27/2023    Procedure: COLONOSCOPY;  Surgeon: Migel Bass MD;  Location:  ENDOSCOPY    EGD  04/21/2017    EYE SURGERY      FEMUR/KNEE SURG UNLISTED      right knee arthroscopy    FRACTURE SURGERY      HEMORRHOIDECTOMY,INT/EXT,SIMPLE      HERNIA SURGERY      HYSTERECTOMY  1984    total hystero.    KNEE REPLACEMENT SURGERY      LUMPECTOMY LEFT Left 1999    lt lumpectomy and radiation.    OOPHORECTOMY Bilateral 1984    total hystero.    OTHER  11/2021    left wrist, ORIF Dr. tSacy Long    OTHER  02/21/2022    CERVICAL 5 AND CERVICAL 6 LAMINECTOMIES, PARTIAL CERVICAL 7 LAMINECTOMY, LEFT CERVICAL 5/CERVICAL 6 AND CERVICAL 6/CERVICAL 7 FORAMINOTOMIES, CERVICAL 6 AND CERVICAL 7 ARTHRODESIS, AUTOGRAFT, ALLOGRAFT    OTHER SURGICAL HISTORY  2010    endoscopy - papillotomy    OTHER SURGICAL HISTORY  2016     under right eye, skin cancer removed    OTHER SURGICAL HISTORY  2019    knee    OTHER SURGICAL HISTORY  02/2022    neck    PORT FOR VASCULAR ACCESS      RADIATION LEFT Left 1999    left lumpectomy and radiation.    REMOVAL GALLBLADDER      REPAIR ING HERNIA,5+Y/O,REDUCIBL      SIGMOIDOSCOPY,DIAGNOSTIC      SPINE SURGERY PROCEDURE UNLISTED      TONSILLECTOMY      TOTAL ABDOM  HYSTERECTOMY      TOTAL KNEE REPLACEMENT                  Social History     Socioeconomic History    Marital status:    Tobacco Use    Smoking status: Former     Packs/day: 0     Types: Cigarettes     Quit date: 1968     Years since quittin.1    Smokeless tobacco: Never   Vaping Use    Vaping Use: Never used   Substance and Sexual Activity    Alcohol use: No    Drug use: Yes     Types: Cannabis     Comment: uses cream for pain to back/neck with barometer changes.    Sexual activity: Not Currently     Partners: Male   Other Topics Concern    Caffeine Concern Yes     Comment: half of cup of coke daily    Sleep Concern No    Exercise Yes     Comment: walking    Seat Belt Yes     Social Determinants of Health     Financial Resource Strain: Low Risk  (10/25/2023)    Financial Resource Strain     Difficulty of Paying Living Expenses: Not hard at all     Med Affordability: No   Food Insecurity: No Food Insecurity (2024)    Food Insecurity     Food Insecurity: Never true   Transportation Needs: No Transportation Needs (2024)    Transportation Needs     Lack of Transportation: No   Stress: No Stress Concern Present (10/25/2023)    Stress     Feeling of Stress : No   Social Connections: Socially Integrated (10/25/2023)    Social Connections     Frequency of Socialization with Friends and Family: 2   Housing Stability: Low Risk  (2024)    Housing Stability     Housing Instability: No              Review of Systems    Positive for stated complaint: seen at IC for UTI, patient states \"its a flaming uti into my kidney\" sent in b*  Other systems are as noted in HPI.  Constitutional and vital signs reviewed.      All other systems reviewed and negative except as noted above.    Physical Exam     ED Triage Vitals [24 1746]   /85   Pulse 97   Resp 18   Temp 97.6 °F (36.4 °C)   Temp src Oral   SpO2 95 %   O2 Device None (Room air)       Current:/77   Pulse 75   Temp 97.6 °F (36.4  °C) (Oral)   Resp 18   Ht 154.9 cm (5' 1\")   Wt 77.1 kg   LMP  (LMP Unknown)   SpO2 97%   BMI 32.12 kg/m²         Physical Exam      Physical Exam  Vitals signs and nursing note reviewed.   General: Well-appearing older woman lying supine in the bed in no acute distress.  Head: Normocephalic and atraumatic.   HEENT:  Mucous membranes are moist.   Cardiovascular:  Normal rate and regular rhythm.  No Edema  Pulmonary:  Pulmonary effort is normal.  Normal breath sounds. No wheezing, rhonchi or rales.   Abdominal: Soft nontender nondistended, normal bowel sounds, no guarding no rebound tenderness, mild right CVA tenderness  Skin: Warm and dry  Neurological: Awake alert, speech is normal          ED Course     Labs Reviewed   COMP METABOLIC PANEL (14) - Abnormal; Notable for the following components:       Result Value    Glucose 108 (*)     Chloride 113 (*)     Calculated Osmolality 300 (*)     All other components within normal limits   URINALYSIS WITH CULTURE REFLEX - Abnormal; Notable for the following components:    Clarity Urine Turbid (*)     Blood Urine Trace (*)     Protein Urine 30 (*)     Leukocyte Esterase Urine 500 (*)     WBC Urine >50 (*)     RBC Urine 6-10 (*)     Bacteria Urine Rare (*)     Squamous Epi. Cells Few (*)     Hyaline Casts Present (*)     WBC Clump Present (*)     All other components within normal limits   CBC WITH DIFFERENTIAL WITH PLATELET    Narrative:     The following orders were created for panel order CBC With Differential With Platelet.  Procedure                               Abnormality         Status                     ---------                               -----------         ------                     CBC W/ DIFFERENTIAL[[id number]]                              Final result                 Please view results for these tests on the individual orders.   URINE CULTURE, ROUTINE   CBC W/ DIFFERENTIAL     CT ABDOMEN+PELVIS KIDNEYSTONE 2D RNDR(NO IV,NO  ORAL)(CPT=74176)    Result Date: 2/5/2024  PROCEDURE:  CT ABDOMEN+PELVIS KIDNEYSTONE 2D RNDR(NO IV,NO ORAL)(CPT=74176)  COMPARISON:  [facility], MRI ABDOMEN W WO CONT, 7/16/2010, 10:46 AM.  INDICATIONS:  seen at IC for UTI, patient states its a flaming uti into my kidney sent in by IC  TECHNIQUE:  Unenhanced multislice CT scanning from above the kidneys to below the urinary bladder.  2D rendering are generated on the CT scanner workstation to localize potential stones in the cranio-caudal plane.  Dose reduction techniques were used. Dose information is transmitted to the ACR (American College of Radiology) NRDR (National Radiology Data Registry) which includes the Dose Index Registry.  PATIENT STATED HISTORY: (As transcribed by Technologist)  UTI symptoms and right flank pain since yesterday    FINDINGS:  KIDNEYS:  No mass, obstruction, or calcification. BLADDER:  No mass, calculus or significant wall thickening. ADRENALS:  No mass or enlargement.  LIVER:  No enlargement, atrophy, abnormal density, or significant focal lesion.  BILIARY:  There has been previous cholecystectomy. PANCREAS:  No lesion, fluid collection, ductal dilatation, or atrophy.  SPLEEN:  No enlargement or focal lesion.  AORTA/VASCULAR:  Aorta is diffusely atherosclerotic but not aneurysmal. RETROPERITONEUM:  No mass or adenopathy.  BOWEL/MESENTERY:  The appendix is enlarged measuring up to 1.1 cm.  There is fluid within the lumen of the appendix.  There is no significant stranding around the appendix.  Clinical significance of this is uncertain.  This could represent anatomic variation.  If there is any clinical evidence of appendicitis then additional surgical evaluation or short interval follow-up CT could be considered.  There is diverticulosis of the colon.  There is no CT evidence of diverticulitis. ABDOMINAL WALL:  No mass or hernia.  BONES:  No bony lesion or fracture. PELVIC ORGANS:  Uterus and ovaries are not visualized and were  presumably removed. LUNG BASES:  No visible pulmonary or pleural disease.  OTHER:  Negative.             CONCLUSION:  1. Appendix is enlarged measuring up to 1.1 cm.  Lumen of the appendix is fluid-filled.  There is no significant stranding around the appendix.  This could be normal variant.  If there is any clinical evidence of appendicitis then either surgical evaluation or short interval follow-up could be considered.  This finding and recommendation was discussed with Dr. Levine. 2. There is no other acute abnormality detected in the abdomen or pelvis within the limits of a noncontrast study.  There are no renal or ureteral stones. 3. There is diverticulosis of the colon.  There is no CT evidence of diverticulitis.     LOCATION:  [facility]   Dictated by (CST): Kennedy Hughes MD on 2/05/2024 at 8:16 PM     Finalized by (CST): Kennedy Hughes MD on 2/05/2024 at 8:24 PM       CT BRAIN OR HEAD (62511)    Result Date: 1/14/2024  PROCEDURE:  CT BRAIN OR HEAD (90308)  COMPARISON:  [facility] , CT, CT BRAIN OR HEAD (59491), 8/09/2023, 1:45 PM.  [facility] , XR, XR CHEST AP PORTABLE  (CPT=71045), 1/14/2024, 9:32 AM.  INDICATIONS:  Patient presents with progressive pneumonia and severe head pain/headache  TECHNIQUE:  Noncontrast CT scanning is performed through the brain. Dose reduction techniques were used. Dose information is transmitted to the ACR (American College of Radiology) NRDR (National Radiology Data Registry) which includes the Dose Index Registry.  PATIENT STATED HISTORY: (As transcribed by Technologist)  Patient states she has pneumonia, with some head pain.    FINDINGS:  VENTRICLES/SULCI:  Ventricles and sulci are mildly prominent.  INTRACRANIAL:  No acute intracranial hemorrhage, mass effect or midline shift.  Mild diffuse atrophy and white matter disease, stable from prior imaging.  No cerebral edema noted. SINUSES:           No sign of acute sinusitis.  MASTOIDS:          No sign of acute inflammation. SKULL:              No evidence for fracture or osseous abnormality. OTHER:             None.   a          CONCLUSION:  1. No acute process. 2. Stable mild atrophy and white matter disease.    LOCATION:  [facility]   Dictated by (CST): Zo Johnson DO on 1/14/2024 at 11:42 AM     Finalized by (CST): Zo Johnson DO on 1/14/2024 at 11:45 AM       XR CHEST AP PORTABLE  (CPT=71045)    Result Date: 1/14/2024  PROCEDURE:  XR CHEST AP PORTABLE  (CPT=71045)  TECHNIQUE:  AP chest radiograph was obtained.  COMPARISON:  [facility] , XR, XR CHEST AP PORTABLE  (CPT=71045), 11/02/2023, 9:26 AM.  INDICATIONS:  Patient presents with chest pain and difficulty in breathing.  History of pneumonia.  PATIENT STATED HISTORY: (As transcribed by Technologist)  Patient says she gets pneumonia every year and is experiencing pain/difficulty breathing.    FINDINGS:  There has been interval increase in airspace opacity of the right upper lobe as well as new patchy nodular airspace opacities within the lingula, suggesting a progressive multifocal pneumonia.  Stable right IJ central venous power port catheter.  Stable cardiac silhouette.  Normal pulmonary vasculature.  No significant pleural disease noted.            CONCLUSION:  Findings suggesting increasing bilateral opacities of the upper lobes, suggesting multifocal pneumonia.   LOCATION:  [facility]      Dictated by (CST): Zo Johnson DO on 1/14/2024 at 9:56 AM     Finalized by (CST): Zo Johnson DO on 1/14/2024 at 9:57 AM               MDM                                     Medical Decision Making  78-year-old woman here for evaluation of right flank pain, discomfort with urination.  Differential includes acute pyelonephritis, perinephric abscess, cystitis, kidney stone, obstructive uropathy.  CT of the abdomen pelvis was performed, shows no abnormalities around the kidneys, urinalysis is consistent with infection, CT scan did incidentally show an appendix that appears dilated 1.1 cm with 8 mm being upper limit  of normal.  Patient was reexamined, she has no right lower quadrant tenderness.  White count is normal at this time and I do not believe her presentation is consistent with acute appendicitis.  Will discuss with general surgery, anticipate discharge home on oral antibiotics for UTI,     Discussed CT findings with Dr. Bennett from general surgery, recommends follow-up for reevaluation general surgery clinic within 24 to 48 hours discussed this with patient and her son, they are in agreement with plan understands return to the ED if any new discomfort any other concerns and they are in agreement with plan.    Problems Addressed:  Urinary tract infection without hematuria, site unspecified: acute illness or injury    Amount and/or Complexity of Data Reviewed  Independent Historian:      Details: Son at bedside  Labs: ordered. Decision-making details documented in ED Course.  Radiology: ordered and independent interpretation performed. Decision-making details documented in ED Course.     Details: I independently viewed and interpreted the following imaging: CT abdomen pelvis without evidence of bowel obstruction    Risk  Prescription drug management.        Disposition and Plan     Clinical Impression:  1. Urinary tract infection without hematuria, site unspecified         Disposition:  Discharge  2/5/2024  9:51 pm    Follow-up:  Jose Daniel Washington MD  [address] [address]  [phone]    Follow up  Follow-up with your PMD for reevaluation in 24 to 48 hours.  Return to ER if symptoms worsen or change or if any other new concerns.    Antoinette Bennett MD  [address] [address]  [phone]    Follow up  Your appendix on CT scan was 1.1 cm, with 8 or 9 mm pain in the upper limits of normal.  Your clinical exam is not consistent with acute appendicitis and there are no inflammatory changes around her appendix seen on the CT scan here today.  Your case was discussed with  general surgery Dr. Bennett, she recommends follow-up for reevaluation in the clinic within 24 to 48 hours.  Please call tomorrow morning to schedule close reevaluation return to the ER immediately if you develop any new concerning symptoms or tenderness in the right lower quadrant.          Medications Prescribed:  Discharge Medication List as of 2/5/2024 10:13 PM        START taking these medications    Details   cefuroxime 500 MG Oral Tab Take 1 tablet (500 mg total) by mouth 2 (two) times daily for 7 days., Normal, Disp-14 tablet, R-0

## 2024-02-08 NOTE — PROGRESS NOTES
ED Culture Callback Results Review    Pharmacist reviewed culture results from ED visit .    Final urine culture positive for E coli. Patient was prescribed Cefuroxime (Zinacef) on discharge. Current therapy is appropriate based on reported susceptibilities. No further intervention required at this time.    Korey Rondon, PharmD  Emergency Medicine Pharmacist Specialist  02/08/24; 12:39 PM

## 2024-02-09 ENCOUNTER — PATIENT OUTREACH (OUTPATIENT)
Dept: CASE MANAGEMENT | Age: 79
End: 2024-02-09

## 2024-02-09 NOTE — PROGRESS NOTES
1st attempt ER f/up apt request  GEN SURG -decline, pt wants to see PCP 1st     Michelle Curran PA  [address]  [address]  [facility] [address] [phone]  Apt: Feb 12 @9am     Confirmed w/ pt  Closing encounter

## 2024-02-11 NOTE — PROGRESS NOTES
Rayne Morales is a 78 year old female.    Chief Complaint   Patient presents with    ER F/U     Room 11 kb    Care Gap Management       Due for eye exam;shingles-medicare;A1c;colonoscopy       HPI:   here for ER follow up  seen in ER 2/5 for urinary symptoms.    CT abd / pelvis done with some concern surrounding her appendix although she was not symptomatic for this.  Case was discussed with Dr Bennett  follow up vs repeat imaging.   She has is focused today on wanting knee replacement end of Feb   during the ov she was experieincing intermittent worseing right low back pain  when discussing these findings I pressed on her right lower abdomen in the chair and she guarding the area.  On the exam table, RLQ with rebound and guarding with complaint of significant pain.  Upon further questioning  she has been experiencing more pain with decreased appetite  she \"just wants to have her knee done\"      UTI treated with cefuroxime. Sensitive    completed antibiotic this am.      Diabetes.   Metfromin 500mg bid and ozempic 0.5mg  weight is down 4 pounds.  Her glucoses are 114, 125   these are improved.   Last A1c 8.5    HTN   bp stable       IgG deficiency  she is starting injections when her labs are completed and receivs the dosing.   Dr Thurman.  She is having labs done today     Right knee osteoarthritis.  She still wants to have her surgery done as scheduled Feb 28.    Hx DVT / PE  xarelto.      Patient Active Problem List   Diagnosis    Recurrent UTI    Fatigue    Primary hypertension    Pure hypercholesterolemia    Anxiety    Age-related osteoporosis without current pathological fracture    B12 deficiency    Spondylosis of lumbosacral region    Sacroiliac joint dysfunction    History of syncope    History of pulmonary embolism    Asthma    Cyst of brain    History of basal cell carcinoma    History of recurrent pneumonia    Thrombocytopenia (HCC)    Gastroesophageal reflux disease without esophagitis    Enlarged  thyroid    History of left breast cancer    Carpal tunnel syndrome of left wrist    Selective deficiency of IgG subclasses (HCC)     Atherosclerosis of aorta (HCC)    MUKUND on CPAP    Chronic pain syndrome    History of left knee replacement    History of radius fracture    Tremor of both hands    Migraine without aura or status migrainosus    Major depressive disorder, recurrent episode, moderate (HCC)    Coronary artery disease involving native coronary artery of native heart without angina pectoris    Visual impairment    S/P cervical spinal fusion    Neuropathy    IgG deficiency (HCC)    History of DVT (deep vein thrombosis)    Cervical spinal stenosis    Cervical radiculopathy    PHILLIPS (dyspnea on exertion)    Type 2 diabetes mellitus without complication, without long-term current use of insulin (HCC)    Anemia, unspecified type    Primary osteoarthritis of right knee    Community acquired pneumonia, unspecified laterality    Hypoxia    Confusion     Current Outpatient Medications   Medication Sig Dispense Refill    semaglutide 2 MG/3ML Subcutaneous Solution Pen-injector Inject into the skin once a week.      metFORMIN  MG Oral Tablet 24 Hr Take 1 tablet (500 mg total) by mouth 2 (two) times daily with meals. Take 2 tablets (1,000 mg total) by mouth daily with breakfast and 1 tablet (500 mg total) every evening.      metroNIDAZOLE 0.75 % External Gel Apply 1 g topically 2 (two) times daily. Apply to face twice daily 45 g 5    cefuroxime 500 MG Oral Tab Take 1 tablet (500 mg total) by mouth 2 (two) times daily for 7 days. 14 tablet 0    ondansetron 4 MG Oral Tablet Dispersible Take 1 tablet (4 mg total) by mouth every 4 (four) hours as needed for Nausea. 10 tablet 0    semaglutide 2 MG/3ML Subcutaneous Solution Pen-injector Inject 0.5 mg into the skin once a week. Pt takes on Saturdays- has not switched yet to Mounjaro      DULOXETINE 30 MG Oral Cap DR Particles Take 1 capsule (30 mg total) by mouth daily. To  take along with 60mg to equal 90mg daily. 90 capsule 1    PANTOPRAZOLE 40 MG Oral Tab EC Take 1 tablet (40 mg total) by mouth before breakfast. 90 tablet 1    DULoxetine 60 MG Oral Cap DR Particles Take 1 capsule (60 mg total) by mouth daily. 90 capsule 1    rivaroxaban (XARELTO) 20 MG Oral Tab Take 1 tablet (20 mg total) by mouth daily. 90 tablet 0    fluticasone-salmeterol 250-50 MCG/ACT Inhalation Aerosol Powder, Breath Activated Inhale 1 puff into the lungs every 12 (twelve) hours.      HYDROcodone-acetaminophen  MG Oral Tab Take 1 tablet by mouth every 4 to 6 hours as needed for Pain. DO NOT EXCEED 6 tablets daily      rosuvastatin 10 MG Oral Tab Take 1 tablet (10 mg total) by mouth nightly. 90 tablet 1    albuterol 108 (90 Base) MCG/ACT Inhalation Aero Soln Inhale 2 puffs into the lungs every 6 (six) hours as needed for Wheezing. 1 each 3    LISINOPRIL 20 MG Oral Tab TAKE 1 TABLET BY MOUTH DAILY 90 tablet 3    pregabalin 100 MG Oral Cap Take 1 capsule (100 mg total) by mouth in the morning and 1 capsule (100 mg total) at noon and 1 capsule (100 mg total) in the evening. 270 capsule 1    tiZANidine 4 MG Oral Tab Take 1 tablet (4 mg total) by mouth every 8 (eight) hours as needed. (Patient taking differently: Take 1 tablet (4 mg total) by mouth every 8 (eight) hours as needed. Takes every night) 90 tablet 2    aspirin 81 MG Oral Chew Tab Chew 1 tablet (81 mg total) by mouth daily.      Fluticasone Propionate 50 MCG/ACT Nasal Suspension 1 spray by Nasal route 2 (two) times daily. 1 Bottle 3    Cholecalciferol (VITAMIN D) 2000 units Oral Tab Take 2,000 Units by mouth daily.        Past Medical History:   Diagnosis Date    Abdominal distention     Abdominal pain     Anemia     Anesthesia complication     difficulty waking up after gallbladder surgery    Anxiety     Arrhythmia     Arthritis 2000    Asthma     Back pain 1966    Back problem     Black stools     Bloating     Blurred vision     Breast CA (HCC)      left lump and rad.    Cancer (Formerly [facility])     breast, basal cell    Cervical radiculopathy     Cervical spinal stenosis     COVID     2020 no sx    Deep vein thrombosis ([facility])     Depression     Diabetes ([facility])     Diabetes mellitus ([facility])     Diarrhea, unspecified     Dizziness     Easy bruising     On Xeralto    Esophageal reflux     Exposure to medical diagnostic radiation     Fatigue     Fitting and adjustment of vascular catheter     Headache disorder     Hearing loss     Heart attack ([facility])     Heartburn     Hemorrhoids     High blood pressure     High cholesterol     History of blood clots     History of COVID-19 2020    not hospitalized, no continued symptoms, cough, fever, loss of taste    History of depression     History of syncope 2014    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 pt states she gets IgG infusions due to lack of IgG, unable to fight off infections    Indigestion     Leaking of urine     Migraines     Muscle weakness     uses cane    Nausea     Neuropathy     rt foot bilateral hands    Osteoarthritis     Pain in joints     Personal history of malignant neoplasm of breast     Pneumonia due to organism     Pulmonary embolism ([facility]) 2015    Pulmonary embolism ([facility])     S/P cervical spinal fusion     Shortness of breath     Sleep apnea     cpap - not using - machine is broken    Stress     Uncomfortable fullness after meals     Visual impairment     glasses/contacts    Wears glasses     Weight gain       Social History:  Social History     Socioeconomic History    Marital status:    Tobacco Use    Smoking status: Former     Packs/day: 0     Types: Cigarettes     Quit date: 1968     Years since quittin.1    Smokeless tobacco: Never   Vaping Use    Vaping Use: Never used   Substance and Sexual Activity    Alcohol use: No    Drug use: Yes     Types: Cannabis     Comment: uses cream for pain to back/neck with barometer changes.    Sexual activity: Not Currently      Partners: Male   Other Topics Concern    Caffeine Concern Yes     Comment: half of cup of coke daily    Sleep Concern No    Exercise Yes     Comment: walking    Seat Belt Yes     Social Determinants of Health     Financial Resource Strain: Low Risk  (10/25/2023)    Financial Resource Strain     Difficulty of Paying Living Expenses: Not hard at all     Med Affordability: No   Food Insecurity: No Food Insecurity (1/14/2024)    Food Insecurity     Food Insecurity: Never true   Transportation Needs: No Transportation Needs (1/14/2024)    Transportation Needs     Lack of Transportation: No   Stress: No Stress Concern Present (10/25/2023)    Stress     Feeling of Stress : No   Social Connections: Socially Integrated (10/25/2023)    Social Connections     Frequency of Socialization with Friends and Family: 2   Housing Stability: Low Risk  (1/14/2024)    Housing Stability     Housing Instability: No     Family History   Problem Relation Age of Onset    Heart Attack Paternal Grandfather     Heart Attack Paternal Grandmother     Other (CAD) Father     Other (CAD) Mother     Hypertension Mother     Heart Attack Mother     Other (CAD) Brother     Hypertension Brother     Heart Attack Brother     Stroke Maternal Grandfather         Stroke    Stroke Maternal Grandmother         Stroke    Breast Cancer Self 54        Allergies  Allergies   Allergen Reactions    Ciprofloxacin HIVES    Mold REACTIVE AIRWAY DISEASE     Mold and smut    Sulfa Antibiotics HIVES     Bactrim (Sulfamethoxazole/TMP)    Diovan [Valsartan] NAUSEA AND VOMITING    Omnicef NAUSEA ONLY and DIZZINESS    Green Pepper OTHER (SEE COMMENTS)     Sensitivity (all peppers - green, orange and red)    Lipitor [Atorvastatin Calcium] OTHER (SEE COMMENTS)     Leg cramping,    Pneumococcal Vaccines OTHER (SEE COMMENTS)     Pt states she had pneumo vax done at Dr. Boyd on 1/10/24- states developed redness, swelling,itching to the arm    Clindamycin DIARRHEA and NAUSEA  ONLY    Quinapril Hcl FATIGUE       REVIEW OF SYSTEMS:   GENERAL HEALTH:as above  RESPIRATORY: denies shortness of breath with exertion, no cough  CARDIOVASCULAR: denies chest pain on exertion, no palpatations  GI:a above   MUSCULOSKELETAL:  as above     EXAM:   /72   Pulse 82   Resp 16   Ht 5' 1\" (1.549 m)   Wt 166 lb (75.3 kg)   LMP  (LMP Unknown)   SpO2 96%   BMI 31.37 kg/m²   GENERAL: well developed, well nourished,in no apparent distress  LUNGS: normal rate without respiratory distress, lungs clear to auscultation  CARDIO: RRR without murmur  GI: soft.  RLQ pain with rebound and guarding.   EXTREMITIES: no edema, normal strength and tone  PSYCH: alert and oriented x 3    ASSESSMENT AND PLAN:     Encounter Diagnoses   Name Primary?    Acute cystitis without hematuria  treated completed today Yes    Abnormal CT of the abdomen  patient needs to return to ER.  I am concerned for appendicitis and will require repeat imaging.  She understand the importnace of this and agrees.  [facility]       RLQ abdominal pain     Type 2 diabetes mellitus without complication, without long-term current use of insulin ([facility])     Primary hypertension     History of DVT (deep vein thrombosis)     History of pulmonary embolism  Knee arthritis.  She is convinced she will proceed with TKR as scheduled end of Feb.  She will schedule pre op with me for Monday but this will likely change with above.      Code selection for this visit was based on time spent (>40min) on date of service in preparing to see the patient, obtaining and/or reviewing separately obtained history, performing a medically appropriate examination, counseling and educating the patient/family/caregiver, ordering medications or testing, referring and communicating with other healthcare providers, documenting clinical information in the EHR, independently interpreting results and communicating results to the patient/family/caregiver and care  coordination with the patient's other providers.      No orders of the defined types were placed in this encounter.      Meds & Refills for this Visit:  Requested Prescriptions      No prescriptions requested or ordered in this encounter       Imaging & Consults:  None    No follow-ups on file.  There are no Patient Instructions on file for this visit.

## 2024-02-12 ENCOUNTER — HOSPITAL ENCOUNTER (OUTPATIENT)
Facility: HOSPITAL | Age: 79
Setting detail: OBSERVATION
LOS: 1 days | Discharge: HOME | End: 2024-02-13
Attending: EMERGENCY MEDICINE | Admitting: INTERNAL MEDICINE
Payer: MEDICARE

## 2024-02-12 ENCOUNTER — APPOINTMENT (OUTPATIENT)
Dept: CT IMAGING | Facility: HOSPITAL | Age: 79
End: 2024-02-12
Attending: EMERGENCY MEDICINE
Payer: MEDICARE

## 2024-02-12 ENCOUNTER — ANESTHESIA (OUTPATIENT)
Dept: SURGERY | Facility: HOSPITAL | Age: 79
End: 2024-02-12
Payer: MEDICARE

## 2024-02-12 ENCOUNTER — OFFICE VISIT (OUTPATIENT)
Dept: INTERNAL MEDICINE CLINIC | Facility: CLINIC | Age: 79
End: 2024-02-12
Payer: MEDICARE

## 2024-02-12 ENCOUNTER — ANESTHESIA EVENT (OUTPATIENT)
Dept: SURGERY | Facility: HOSPITAL | Age: 79
End: 2024-02-12
Payer: MEDICARE

## 2024-02-12 VITALS
HEART RATE: 82 BPM | OXYGEN SATURATION: 96 % | WEIGHT: 166 LBS | HEIGHT: 61 IN | DIASTOLIC BLOOD PRESSURE: 72 MMHG | BODY MASS INDEX: 31.34 KG/M2 | RESPIRATION RATE: 16 BRPM | SYSTOLIC BLOOD PRESSURE: 124 MMHG

## 2024-02-12 DIAGNOSIS — E11.9 TYPE 2 DIABETES MELLITUS WITHOUT COMPLICATION, WITHOUT LONG-TERM CURRENT USE OF INSULIN (HCC): ICD-10-CM

## 2024-02-12 DIAGNOSIS — N30.00 ACUTE CYSTITIS WITHOUT HEMATURIA: Primary | ICD-10-CM

## 2024-02-12 DIAGNOSIS — K37 APPENDICITIS: ICD-10-CM

## 2024-02-12 DIAGNOSIS — G89.18 POST-OPERATIVE PAIN: ICD-10-CM

## 2024-02-12 DIAGNOSIS — Z86.718 HISTORY OF DVT (DEEP VEIN THROMBOSIS): ICD-10-CM

## 2024-02-12 DIAGNOSIS — I10 PRIMARY HYPERTENSION: ICD-10-CM

## 2024-02-12 DIAGNOSIS — R16.0 LIVER MASS: ICD-10-CM

## 2024-02-12 DIAGNOSIS — R10.31 RLQ ABDOMINAL PAIN: Primary | ICD-10-CM

## 2024-02-12 DIAGNOSIS — R10.31 RLQ ABDOMINAL PAIN: ICD-10-CM

## 2024-02-12 DIAGNOSIS — R93.5 ABNORMAL CT OF THE ABDOMEN: ICD-10-CM

## 2024-02-12 DIAGNOSIS — Z86.711 HISTORY OF PULMONARY EMBOLISM: ICD-10-CM

## 2024-02-12 PROBLEM — R09.02 HYPOXIA: Status: RESOLVED | Noted: 2024-01-14 | Resolved: 2024-02-12

## 2024-02-12 PROBLEM — R41.0 CONFUSION: Status: RESOLVED | Noted: 2024-01-14 | Resolved: 2024-02-12

## 2024-02-12 PROBLEM — R73.9 HYPERGLYCEMIA: Status: ACTIVE | Noted: 2024-02-12

## 2024-02-12 PROBLEM — Z87.81 HISTORY OF RADIUS FRACTURE: Status: RESOLVED | Noted: 2020-08-07 | Resolved: 2024-02-12

## 2024-02-12 PROBLEM — E87.5 HYPERKALEMIA: Status: ACTIVE | Noted: 2024-02-12

## 2024-02-12 LAB
ALBUMIN SERPL-MCNC: 3.9 G/DL (ref 3.4–5)
ALBUMIN/GLOB SERPL: 1.1 {RATIO} (ref 1–2)
ALP LIVER SERPL-CCNC: 72 U/L
ALT SERPL-CCNC: 27 U/L
ANION GAP SERPL CALC-SCNC: 7 MMOL/L (ref 0–18)
AST SERPL-CCNC: 55 U/L (ref 15–37)
BASOPHILS # BLD AUTO: 0.07 X10(3) UL (ref 0–0.2)
BASOPHILS NFR BLD AUTO: 1.2 %
BILIRUB SERPL-MCNC: 0.8 MG/DL (ref 0.1–2)
BILIRUB UR QL STRIP.AUTO: NEGATIVE
BUN BLD-MCNC: 15 MG/DL (ref 9–23)
CALCIUM BLD-MCNC: 9.7 MG/DL (ref 8.5–10.1)
CHLORIDE SERPL-SCNC: 110 MMOL/L (ref 98–112)
CLARITY UR REFRACT.AUTO: CLEAR
CO2 SERPL-SCNC: 22 MMOL/L (ref 21–32)
CREAT BLD-MCNC: 0.83 MG/DL
EGFRCR SERPLBLD CKD-EPI 2021: 73 ML/MIN/1.73M2 (ref 60–?)
EOSINOPHIL # BLD AUTO: 0.68 X10(3) UL (ref 0–0.7)
EOSINOPHIL NFR BLD AUTO: 11.8 %
ERYTHROCYTE [DISTWIDTH] IN BLOOD BY AUTOMATED COUNT: 13.2 %
GLOBULIN PLAS-MCNC: 3.7 G/DL (ref 2.8–4.4)
GLUCOSE BLD-MCNC: 107 MG/DL (ref 70–99)
GLUCOSE BLD-MCNC: 139 MG/DL (ref 70–99)
GLUCOSE BLD-MCNC: 157 MG/DL (ref 70–99)
GLUCOSE BLD-MCNC: 97 MG/DL (ref 70–99)
GLUCOSE UR STRIP.AUTO-MCNC: NORMAL MG/DL
HCT VFR BLD AUTO: 39.9 %
HGB BLD-MCNC: 13.5 G/DL
IMM GRANULOCYTES # BLD AUTO: 0.02 X10(3) UL (ref 0–1)
IMM GRANULOCYTES NFR BLD: 0.3 %
KETONES UR STRIP.AUTO-MCNC: NEGATIVE MG/DL
LEUKOCYTE ESTERASE UR QL STRIP.AUTO: NEGATIVE
LIPASE SERPL-CCNC: 76 U/L (ref 13–75)
LYMPHOCYTES # BLD AUTO: 1.59 X10(3) UL (ref 1–4)
LYMPHOCYTES NFR BLD AUTO: 27.6 %
MCH RBC QN AUTO: 29.7 PG (ref 26–34)
MCHC RBC AUTO-ENTMCNC: 33.8 G/DL (ref 31–37)
MCV RBC AUTO: 87.7 FL
MONOCYTES # BLD AUTO: 0.44 X10(3) UL (ref 0.1–1)
MONOCYTES NFR BLD AUTO: 7.6 %
NEUTROPHILS # BLD AUTO: 2.96 X10 (3) UL (ref 1.5–7.7)
NEUTROPHILS # BLD AUTO: 2.96 X10(3) UL (ref 1.5–7.7)
NEUTROPHILS NFR BLD AUTO: 51.5 %
NITRITE UR QL STRIP.AUTO: NEGATIVE
OSMOLALITY SERPL CALC.SUM OF ELEC: 289 MOSM/KG (ref 275–295)
PH UR STRIP.AUTO: 5 [PH] (ref 5–8)
PLATELET # BLD AUTO: 178 10(3)UL (ref 150–450)
POTASSIUM SERPL-SCNC: 5.3 MMOL/L (ref 3.5–5.1)
PROT SERPL-MCNC: 7.6 G/DL (ref 6.4–8.2)
PROT UR STRIP.AUTO-MCNC: NEGATIVE MG/DL
RBC # BLD AUTO: 4.55 X10(6)UL
RBC UR QL AUTO: NEGATIVE
SODIUM SERPL-SCNC: 139 MMOL/L (ref 136–145)
SP GR UR STRIP.AUTO: >1.03 (ref 1–1.03)
UROBILINOGEN UR STRIP.AUTO-MCNC: NORMAL MG/DL
WBC # BLD AUTO: 5.8 X10(3) UL (ref 4–11)

## 2024-02-12 PROCEDURE — 0DTJ4ZZ RESECTION OF APPENDIX, PERCUTANEOUS ENDOSCOPIC APPROACH: ICD-10-PCS | Performed by: STUDENT IN AN ORGANIZED HEALTH CARE EDUCATION/TRAINING PROGRAM

## 2024-02-12 PROCEDURE — 74177 CT ABD & PELVIS W/CONTRAST: CPT | Performed by: EMERGENCY MEDICINE

## 2024-02-12 PROCEDURE — 0DNU4ZZ RELEASE OMENTUM, PERCUTANEOUS ENDOSCOPIC APPROACH: ICD-10-PCS | Performed by: STUDENT IN AN ORGANIZED HEALTH CARE EDUCATION/TRAINING PROGRAM

## 2024-02-12 PROCEDURE — 99215 OFFICE O/P EST HI 40 MIN: CPT | Performed by: NURSE PRACTITIONER

## 2024-02-12 PROCEDURE — 99223 1ST HOSP IP/OBS HIGH 75: CPT | Performed by: INTERNAL MEDICINE

## 2024-02-12 RX ORDER — NALOXONE HYDROCHLORIDE 0.4 MG/ML
80 INJECTION, SOLUTION INTRAMUSCULAR; INTRAVENOUS; SUBCUTANEOUS AS NEEDED
Status: DISCONTINUED | OUTPATIENT
Start: 2024-02-12 | End: 2024-02-12 | Stop reason: HOSPADM

## 2024-02-12 RX ORDER — POLYETHYLENE GLYCOL 3350 17 G/17G
17 POWDER, FOR SOLUTION ORAL DAILY PRN
Status: DISCONTINUED | OUTPATIENT
Start: 2024-02-12 | End: 2024-02-13

## 2024-02-12 RX ORDER — SENNOSIDES 8.6 MG
17.2 TABLET ORAL NIGHTLY PRN
Status: DISCONTINUED | OUTPATIENT
Start: 2024-02-12 | End: 2024-02-13

## 2024-02-12 RX ORDER — NICOTINE POLACRILEX 4 MG
15 LOZENGE BUCCAL
Status: DISCONTINUED | OUTPATIENT
Start: 2024-02-12 | End: 2024-02-13

## 2024-02-12 RX ORDER — OXYCODONE HYDROCHLORIDE 5 MG/1
2.5 TABLET ORAL EVERY 4 HOURS PRN
Status: DISCONTINUED | OUTPATIENT
Start: 2024-02-12 | End: 2024-02-13

## 2024-02-12 RX ORDER — CEFAZOLIN SODIUM 1 G/3ML
INJECTION, POWDER, FOR SOLUTION INTRAMUSCULAR; INTRAVENOUS AS NEEDED
Status: DISCONTINUED | OUTPATIENT
Start: 2024-02-12 | End: 2024-02-12 | Stop reason: SURG

## 2024-02-12 RX ORDER — SODIUM CHLORIDE, SODIUM LACTATE, POTASSIUM CHLORIDE, CALCIUM CHLORIDE 600; 310; 30; 20 MG/100ML; MG/100ML; MG/100ML; MG/100ML
INJECTION, SOLUTION INTRAVENOUS CONTINUOUS
Status: DISCONTINUED | OUTPATIENT
Start: 2024-02-12 | End: 2024-02-13

## 2024-02-12 RX ORDER — PREGABALIN 100 MG/1
100 CAPSULE ORAL 3 TIMES DAILY
Status: DISCONTINUED | OUTPATIENT
Start: 2024-02-12 | End: 2024-02-13

## 2024-02-12 RX ORDER — METOCLOPRAMIDE HYDROCHLORIDE 5 MG/ML
10 INJECTION INTRAMUSCULAR; INTRAVENOUS EVERY 8 HOURS PRN
Status: DISCONTINUED | OUTPATIENT
Start: 2024-02-12 | End: 2024-02-13

## 2024-02-12 RX ORDER — SODIUM CHLORIDE 9 MG/ML
125 INJECTION, SOLUTION INTRAVENOUS CONTINUOUS
Status: DISCONTINUED | OUTPATIENT
Start: 2024-02-12 | End: 2024-02-12 | Stop reason: ALTCHOICE

## 2024-02-12 RX ORDER — HYDROMORPHONE HYDROCHLORIDE 1 MG/ML
0.2 INJECTION, SOLUTION INTRAMUSCULAR; INTRAVENOUS; SUBCUTANEOUS EVERY 2 HOUR PRN
Status: DISCONTINUED | OUTPATIENT
Start: 2024-02-12 | End: 2024-02-12

## 2024-02-12 RX ORDER — HYDROCODONE BITARTRATE AND ACETAMINOPHEN 5; 325 MG/1; MG/1
1 TABLET ORAL EVERY 4 HOURS PRN
Status: DISCONTINUED | OUTPATIENT
Start: 2024-02-12 | End: 2024-02-12

## 2024-02-12 RX ORDER — ROSUVASTATIN CALCIUM 10 MG/1
10 TABLET, COATED ORAL NIGHTLY
Status: DISCONTINUED | OUTPATIENT
Start: 2024-02-12 | End: 2024-02-13

## 2024-02-12 RX ORDER — HYDROMORPHONE HYDROCHLORIDE 1 MG/ML
0.6 INJECTION, SOLUTION INTRAMUSCULAR; INTRAVENOUS; SUBCUTANEOUS EVERY 5 MIN PRN
Status: DISCONTINUED | OUTPATIENT
Start: 2024-02-12 | End: 2024-02-12 | Stop reason: HOSPADM

## 2024-02-12 RX ORDER — HEPARIN SODIUM 5000 [USP'U]/ML
5000 INJECTION, SOLUTION INTRAVENOUS; SUBCUTANEOUS ONCE
Status: DISCONTINUED | OUTPATIENT
Start: 2024-02-12 | End: 2024-02-12

## 2024-02-12 RX ORDER — ECHINACEA PURPUREA EXTRACT 125 MG
1 TABLET ORAL
Status: DISCONTINUED | OUTPATIENT
Start: 2024-02-12 | End: 2024-02-13

## 2024-02-12 RX ORDER — NICOTINE POLACRILEX 4 MG
30 LOZENGE BUCCAL
Status: DISCONTINUED | OUTPATIENT
Start: 2024-02-12 | End: 2024-02-13

## 2024-02-12 RX ORDER — HYDROMORPHONE HYDROCHLORIDE 1 MG/ML
0.4 INJECTION, SOLUTION INTRAMUSCULAR; INTRAVENOUS; SUBCUTANEOUS EVERY 2 HOUR PRN
Status: DISCONTINUED | OUTPATIENT
Start: 2024-02-12 | End: 2024-02-12

## 2024-02-12 RX ORDER — METOCLOPRAMIDE HYDROCHLORIDE 5 MG/ML
10 INJECTION INTRAMUSCULAR; INTRAVENOUS EVERY 8 HOURS PRN
Status: DISCONTINUED | OUTPATIENT
Start: 2024-02-12 | End: 2024-02-12 | Stop reason: HOSPADM

## 2024-02-12 RX ORDER — BENZONATATE 200 MG/1
200 CAPSULE ORAL 3 TIMES DAILY PRN
Status: DISCONTINUED | OUTPATIENT
Start: 2024-02-12 | End: 2024-02-13

## 2024-02-12 RX ORDER — ACETAMINOPHEN 325 MG/1
650 TABLET ORAL EVERY 4 HOURS PRN
Status: DISCONTINUED | OUTPATIENT
Start: 2024-02-12 | End: 2024-02-12

## 2024-02-12 RX ORDER — BUPIVACAINE HYDROCHLORIDE 2.5 MG/ML
INJECTION, SOLUTION EPIDURAL; INFILTRATION; INTRACAUDAL AS NEEDED
Status: DISCONTINUED | OUTPATIENT
Start: 2024-02-12 | End: 2024-02-12 | Stop reason: HOSPADM

## 2024-02-12 RX ORDER — GLYCOPYRROLATE 0.2 MG/ML
INJECTION, SOLUTION INTRAMUSCULAR; INTRAVENOUS AS NEEDED
Status: DISCONTINUED | OUTPATIENT
Start: 2024-02-12 | End: 2024-02-12 | Stop reason: SURG

## 2024-02-12 RX ORDER — SODIUM CHLORIDE 9 MG/ML
INJECTION, SOLUTION INTRAVENOUS CONTINUOUS
Status: DISCONTINUED | OUTPATIENT
Start: 2024-02-12 | End: 2024-02-12 | Stop reason: HOSPADM

## 2024-02-12 RX ORDER — SODIUM CHLORIDE 9 MG/ML
INJECTION, SOLUTION INTRAVENOUS CONTINUOUS
Status: DISCONTINUED | OUTPATIENT
Start: 2024-02-12 | End: 2024-02-13

## 2024-02-12 RX ORDER — HYDROMORPHONE HYDROCHLORIDE 1 MG/ML
0.4 INJECTION, SOLUTION INTRAMUSCULAR; INTRAVENOUS; SUBCUTANEOUS EVERY 5 MIN PRN
Status: DISCONTINUED | OUTPATIENT
Start: 2024-02-12 | End: 2024-02-12 | Stop reason: HOSPADM

## 2024-02-12 RX ORDER — ONDANSETRON 2 MG/ML
INJECTION INTRAMUSCULAR; INTRAVENOUS
Status: COMPLETED
Start: 2024-02-12 | End: 2024-02-12

## 2024-02-12 RX ORDER — DEXTROSE MONOHYDRATE 25 G/50ML
50 INJECTION, SOLUTION INTRAVENOUS
Status: DISCONTINUED | OUTPATIENT
Start: 2024-02-12 | End: 2024-02-13

## 2024-02-12 RX ORDER — METFORMIN HYDROCHLORIDE 500 MG/1
500 TABLET, EXTENDED RELEASE ORAL 2 TIMES DAILY WITH MEALS
Status: ON HOLD | COMMUNITY
Start: 2024-02-12

## 2024-02-12 RX ORDER — ONDANSETRON 2 MG/ML
4 INJECTION INTRAMUSCULAR; INTRAVENOUS EVERY 6 HOURS PRN
Status: DISCONTINUED | OUTPATIENT
Start: 2024-02-12 | End: 2024-02-12

## 2024-02-12 RX ORDER — ONDANSETRON 2 MG/ML
4 INJECTION INTRAMUSCULAR; INTRAVENOUS EVERY 4 HOURS PRN
Status: DISCONTINUED | OUTPATIENT
Start: 2024-02-12 | End: 2024-02-12 | Stop reason: ALTCHOICE

## 2024-02-12 RX ORDER — LABETALOL HYDROCHLORIDE 5 MG/ML
10 INJECTION, SOLUTION INTRAVENOUS EVERY 4 HOURS PRN
Status: DISCONTINUED | OUTPATIENT
Start: 2024-02-12 | End: 2024-02-13

## 2024-02-12 RX ORDER — HYDROMORPHONE HYDROCHLORIDE 1 MG/ML
0.8 INJECTION, SOLUTION INTRAMUSCULAR; INTRAVENOUS; SUBCUTANEOUS EVERY 2 HOUR PRN
Status: DISCONTINUED | OUTPATIENT
Start: 2024-02-12 | End: 2024-02-12

## 2024-02-12 RX ORDER — ONDANSETRON 2 MG/ML
4 INJECTION INTRAMUSCULAR; INTRAVENOUS EVERY 4 HOURS PRN
Status: DISCONTINUED | OUTPATIENT
Start: 2024-02-12 | End: 2024-02-12

## 2024-02-12 RX ORDER — ALBUTEROL SULFATE 90 UG/1
2 AEROSOL, METERED RESPIRATORY (INHALATION) EVERY 6 HOURS PRN
Status: DISCONTINUED | OUTPATIENT
Start: 2024-02-12 | End: 2024-02-13

## 2024-02-12 RX ORDER — CEFAZOLIN SODIUM/WATER 2 G/20 ML
2 SYRINGE (ML) INTRAVENOUS ONCE
Status: DISCONTINUED | OUTPATIENT
Start: 2024-02-12 | End: 2024-02-12 | Stop reason: ALTCHOICE

## 2024-02-12 RX ORDER — FLUTICASONE FUROATE AND VILANTEROL 200; 25 UG/1; UG/1
1 POWDER RESPIRATORY (INHALATION) DAILY
Status: DISCONTINUED | OUTPATIENT
Start: 2024-02-12 | End: 2024-02-13

## 2024-02-12 RX ORDER — HYDROMORPHONE HYDROCHLORIDE 1 MG/ML
0.2 INJECTION, SOLUTION INTRAMUSCULAR; INTRAVENOUS; SUBCUTANEOUS EVERY 2 HOUR PRN
Status: DISCONTINUED | OUTPATIENT
Start: 2024-02-12 | End: 2024-02-13

## 2024-02-12 RX ORDER — ESMOLOL HYDROCHLORIDE 10 MG/ML
INJECTION INTRAVENOUS AS NEEDED
Status: DISCONTINUED | OUTPATIENT
Start: 2024-02-12 | End: 2024-02-12 | Stop reason: SURG

## 2024-02-12 RX ORDER — HYDROCODONE BITARTRATE AND ACETAMINOPHEN 5; 325 MG/1; MG/1
2 TABLET ORAL EVERY 4 HOURS PRN
Status: DISCONTINUED | OUTPATIENT
Start: 2024-02-12 | End: 2024-02-12

## 2024-02-12 RX ORDER — DULOXETIN HYDROCHLORIDE 30 MG/1
60 CAPSULE, DELAYED RELEASE ORAL DAILY
Status: DISCONTINUED | OUTPATIENT
Start: 2024-02-13 | End: 2024-02-13

## 2024-02-12 RX ORDER — METRONIDAZOLE 500 MG/100ML
INJECTION, SOLUTION INTRAVENOUS AS NEEDED
Status: DISCONTINUED | OUTPATIENT
Start: 2024-02-12 | End: 2024-02-12 | Stop reason: SURG

## 2024-02-12 RX ORDER — METOCLOPRAMIDE HYDROCHLORIDE 5 MG/ML
10 INJECTION INTRAMUSCULAR; INTRAVENOUS EVERY 8 HOURS PRN
Status: DISCONTINUED | OUTPATIENT
Start: 2024-02-12 | End: 2024-02-12

## 2024-02-12 RX ORDER — ROCURONIUM BROMIDE 10 MG/ML
INJECTION, SOLUTION INTRAVENOUS AS NEEDED
Status: DISCONTINUED | OUTPATIENT
Start: 2024-02-12 | End: 2024-02-12 | Stop reason: SURG

## 2024-02-12 RX ORDER — ONDANSETRON 2 MG/ML
4 INJECTION INTRAMUSCULAR; INTRAVENOUS EVERY 6 HOURS PRN
Status: DISCONTINUED | OUTPATIENT
Start: 2024-02-12 | End: 2024-02-12 | Stop reason: HOSPADM

## 2024-02-12 RX ORDER — OXYCODONE HYDROCHLORIDE 5 MG/1
5 TABLET ORAL EVERY 4 HOURS PRN
Status: DISCONTINUED | OUTPATIENT
Start: 2024-02-12 | End: 2024-02-13

## 2024-02-12 RX ORDER — DULOXETIN HYDROCHLORIDE 30 MG/1
30 CAPSULE, DELAYED RELEASE ORAL DAILY
Status: DISCONTINUED | OUTPATIENT
Start: 2024-02-13 | End: 2024-02-13

## 2024-02-12 RX ORDER — HYDROMORPHONE HYDROCHLORIDE 1 MG/ML
0.5 INJECTION, SOLUTION INTRAMUSCULAR; INTRAVENOUS; SUBCUTANEOUS EVERY 30 MIN PRN
Status: DISCONTINUED | OUTPATIENT
Start: 2024-02-12 | End: 2024-02-12

## 2024-02-12 RX ORDER — ONDANSETRON 2 MG/ML
4 INJECTION INTRAMUSCULAR; INTRAVENOUS EVERY 6 HOURS PRN
Status: DISCONTINUED | OUTPATIENT
Start: 2024-02-12 | End: 2024-02-13

## 2024-02-12 RX ORDER — DIPHENHYDRAMINE HYDROCHLORIDE 50 MG/ML
12.5 INJECTION INTRAMUSCULAR; INTRAVENOUS AS NEEDED
Status: DISCONTINUED | OUTPATIENT
Start: 2024-02-12 | End: 2024-02-12 | Stop reason: HOSPADM

## 2024-02-12 RX ORDER — PANTOPRAZOLE SODIUM 40 MG/1
40 TABLET, DELAYED RELEASE ORAL
Status: DISCONTINUED | OUTPATIENT
Start: 2024-02-13 | End: 2024-02-13

## 2024-02-12 RX ORDER — LIDOCAINE HYDROCHLORIDE 10 MG/ML
INJECTION, SOLUTION EPIDURAL; INFILTRATION; INTRACAUDAL; PERINEURAL AS NEEDED
Status: DISCONTINUED | OUTPATIENT
Start: 2024-02-12 | End: 2024-02-12 | Stop reason: SURG

## 2024-02-12 RX ORDER — SODIUM CHLORIDE 9 MG/ML
INJECTION, SOLUTION INTRAVENOUS CONTINUOUS
Status: DISCONTINUED | OUTPATIENT
Start: 2024-02-12 | End: 2024-02-12

## 2024-02-12 RX ORDER — ACETAMINOPHEN 500 MG
1000 TABLET ORAL EVERY 8 HOURS SCHEDULED
Status: DISCONTINUED | OUTPATIENT
Start: 2024-02-12 | End: 2024-02-13

## 2024-02-12 RX ORDER — BISACODYL 10 MG
10 SUPPOSITORY, RECTAL RECTAL
Status: DISCONTINUED | OUTPATIENT
Start: 2024-02-12 | End: 2024-02-13

## 2024-02-12 RX ORDER — ONDANSETRON 2 MG/ML
INJECTION INTRAMUSCULAR; INTRAVENOUS AS NEEDED
Status: DISCONTINUED | OUTPATIENT
Start: 2024-02-12 | End: 2024-02-12 | Stop reason: SURG

## 2024-02-12 RX ORDER — ENEMA 19; 7 G/133ML; G/133ML
1 ENEMA RECTAL ONCE AS NEEDED
Status: DISCONTINUED | OUTPATIENT
Start: 2024-02-12 | End: 2024-02-13

## 2024-02-12 RX ORDER — HYDROMORPHONE HYDROCHLORIDE 1 MG/ML
0.4 INJECTION, SOLUTION INTRAMUSCULAR; INTRAVENOUS; SUBCUTANEOUS EVERY 2 HOUR PRN
Status: DISCONTINUED | OUTPATIENT
Start: 2024-02-12 | End: 2024-02-13

## 2024-02-12 RX ORDER — ACETAMINOPHEN 10 MG/ML
1000 INJECTION, SOLUTION INTRAVENOUS EVERY 6 HOURS
Status: DISCONTINUED | OUTPATIENT
Start: 2024-02-12 | End: 2024-02-12

## 2024-02-12 RX ORDER — HYDROMORPHONE HYDROCHLORIDE 1 MG/ML
0.2 INJECTION, SOLUTION INTRAMUSCULAR; INTRAVENOUS; SUBCUTANEOUS EVERY 5 MIN PRN
Status: DISCONTINUED | OUTPATIENT
Start: 2024-02-12 | End: 2024-02-12 | Stop reason: HOSPADM

## 2024-02-12 RX ORDER — HYDROMORPHONE HYDROCHLORIDE 1 MG/ML
INJECTION, SOLUTION INTRAMUSCULAR; INTRAVENOUS; SUBCUTANEOUS
Status: COMPLETED
Start: 2024-02-12 | End: 2024-02-12

## 2024-02-12 RX ORDER — LABETALOL HYDROCHLORIDE 5 MG/ML
5 INJECTION, SOLUTION INTRAVENOUS EVERY 5 MIN PRN
Status: DISCONTINUED | OUTPATIENT
Start: 2024-02-12 | End: 2024-02-12 | Stop reason: HOSPADM

## 2024-02-12 RX ORDER — SODIUM CHLORIDE 9 MG/ML
INJECTION, SOLUTION INTRAVENOUS CONTINUOUS
Status: DISCONTINUED | OUTPATIENT
Start: 2024-02-12 | End: 2024-02-12 | Stop reason: ALTCHOICE

## 2024-02-12 RX ORDER — LISINOPRIL 20 MG/1
20 TABLET ORAL DAILY
Status: DISCONTINUED | OUTPATIENT
Start: 2024-02-13 | End: 2024-02-13

## 2024-02-12 RX ORDER — MELATONIN
3 NIGHTLY PRN
Status: DISCONTINUED | OUTPATIENT
Start: 2024-02-12 | End: 2024-02-13

## 2024-02-12 RX ORDER — HEPARIN SODIUM 5000 [USP'U]/ML
5000 INJECTION, SOLUTION INTRAVENOUS; SUBCUTANEOUS EVERY 8 HOURS SCHEDULED
Status: DISCONTINUED | OUTPATIENT
Start: 2024-02-13 | End: 2024-02-13

## 2024-02-12 RX ADMIN — ONDANSETRON 4 MG: 2 INJECTION INTRAMUSCULAR; INTRAVENOUS at 17:02:00

## 2024-02-12 RX ADMIN — ESMOLOL HYDROCHLORIDE 20 MG: 10 INJECTION INTRAVENOUS at 17:12:00

## 2024-02-12 RX ADMIN — GLYCOPYRROLATE 0.3 MG: 0.2 INJECTION, SOLUTION INTRAMUSCULAR; INTRAVENOUS at 17:06:00

## 2024-02-12 RX ADMIN — ROCURONIUM BROMIDE 20 MG: 10 INJECTION, SOLUTION INTRAVENOUS at 17:34:00

## 2024-02-12 RX ADMIN — LIDOCAINE HYDROCHLORIDE 50 MG: 10 INJECTION, SOLUTION EPIDURAL; INFILTRATION; INTRACAUDAL; PERINEURAL at 16:41:00

## 2024-02-12 RX ADMIN — CEFAZOLIN SODIUM 2 G: 1 INJECTION, POWDER, FOR SOLUTION INTRAMUSCULAR; INTRAVENOUS at 16:45:00

## 2024-02-12 RX ADMIN — SODIUM CHLORIDE: 9 INJECTION, SOLUTION INTRAVENOUS at 16:33:00

## 2024-02-12 RX ADMIN — ROCURONIUM BROMIDE 10 MG: 10 INJECTION, SOLUTION INTRAVENOUS at 16:41:00

## 2024-02-12 RX ADMIN — METRONIDAZOLE 500 MG: 500 INJECTION, SOLUTION INTRAVENOUS at 16:42:00

## 2024-02-12 NOTE — ED INITIAL ASSESSMENT (HPI)
Pt in from PCP sent here for possible appendicitis. Pt was seen here for a UTI recently and had an abd CT done that showed an enlarged appendix. Pts abdomen was palpated and pt experienced 10/10 pain when palpated.

## 2024-02-12 NOTE — ED QUICK NOTES
Orders for admission, patient is aware of plan and ready to go upstairs. Any questions, please call ED RN Solomon at extension [phone].     Patient Covid vaccination status: Fully vaccinated     COVID Test Ordered in ED: None    COVID Suspicion at Admission: N/A    Running Infusions:    sodium chloride 125 mL/hr (02/12/24 1027)        Mental Status/LOC at time of transport: A&O x4    Other pertinent information:   CIWA score: N/A   NIH score:  N/A

## 2024-02-12 NOTE — ED PROVIDER NOTES
Patient Seen in: [facility] Emergency Department      History     Chief Complaint   Patient presents with    Abdomen/Flank Pain     Stated Complaint: abdominal pain, r/o appy    Subjective:   HPI    Patient is a 78-year-old female who was seen last week for UTI.  Patient's appendix was slightly large at that time.  Patient was on antibiotics and is feeling better with her urinary symptoms.  Patient went to her doctor today and has had abdominal pain for the past week.  Patient states it is mild worse with movement or pushing on the area.  Patient seem to be more tender right lower quadrant.  Patient sent to the emergency room for further evaluation.  Patient denies fevers or chills, no vomiting, no diarrhea.  Patient is supposed to follow-up with surgery but has not seen them yet.  Remainder of review of systems negative.    Objective:   No pertinent past medical history.            No pertinent past surgical history.              No pertinent social history.            Review of Systems    Positive for stated complaint: abdominal pain, r/o appy  Other systems are as noted in HPI.  Constitutional and vital signs reviewed.      All other systems reviewed and negative except as noted above.    Physical Exam     ED Triage Vitals [02/12/24 1007]   /78   Pulse 95   Resp 16   Temp 98 °F (36.7 °C)   Temp src Temporal   SpO2 97 %   O2 Device None (Room air)       Current:BP (!) 165/97   Pulse 93   Temp 98 °F (36.7 °C) (Temporal)   Resp 16   Ht 154.9 cm (5' 1\")   Wt 75.3 kg   LMP  (LMP Unknown)   SpO2 97%   BMI 31.37 kg/m²         Physical Exam  GENERAL: Patient resting on the cart in no acute distress.  HEENT: Extraocular muscles intact, pupils equal round reactive to light, neck supple, no meningismus.  LUNGS: Lungs clear to auscultation bilaterally.  CARDIOVASCULAR: + S1-S2, regular rate and rhythm, no murmurs.  BACK: No CVA tenderness, no midline bony tenderness.  ABDOMEN: + Bowel sounds, soft, mild  diffuse tenderness moderate tenderness right lower quadrant, nondistended.  No rebound, no guarding, no hepatosplenomegaly.  EXTREMITIES: Full range of motion, no tenderness, good capillary refill.  SKIN: No rash, good turgor.  NEURO: Patient answers questions appropriately.  No focal deficits appreciated.           ED Course     Labs Reviewed   COMP METABOLIC PANEL (14) - Abnormal; Notable for the following components:       Result Value    Glucose 107 (*)     Potassium 5.3 (*)     AST 55 (*)     All other components within normal limits   LIPASE - Abnormal; Notable for the following components:    Lipase 76 (*)     All other components within normal limits   URINALYSIS, ROUTINE - Abnormal; Notable for the following components:    Spec Gravity >1.030 (*)     All other components within normal limits   CBC WITH DIFFERENTIAL WITH PLATELET    Narrative:     The following orders were created for panel order CBC With Differential With Platelet.  Procedure                               Abnormality         Status                     ---------                               -----------         ------                     CBC W/ DIFFERENTIAL[[id number]]                              Final result                 Please view results for these tests on the individual orders.   RAINBOW DRAW LAVENDER   RAINBOW DRAW LIGHT GREEN   RAINBOW DRAW BLUE   RAINBOW DRAW GOLD   CBC W/ DIFFERENTIAL             CT abdomen pelvis1. The appendix is dilated to a caliber of 12 mm with associated wall thickening.  No surrounding fluid or inflammatory stranding.  This may represent an appendiceal mucocele versus mucinous neoplasm.  Sequela of acute appendicitis felt less likely,   though not excluded.      2. Lobular hypodensity of the lateral segment left hepatic lobe (4.4 x 3.6 cm).  This may be neoplastic and warrants further nonemergent assessment with liver MRI with Dotarem contrast.      Independent reviewed by myself, no free air         MDM       Patient declines pain medicine.  Patient given IV fluids.  Patient not had to eat or drink since 7 PM last night.  Patient did have tenderness right lower quadrant.  Patient's appendix was dilated to 12 mm with associated wall thickening.  Questionable appendicitis.  I did speak with surgery.  Patient be admitted for further evaluation.  Patient also had a mass hepatic lobe.  Patient was discussed with [facility] ramirezist.  Patient be NPO.  I did consider appendicitis, appendiceal neoplasm, liver cyst, liver neoplasm.  Admission disposition: 2/12/2024 12:46 PM                                        Medical Decision Making      Disposition and Plan     Clinical Impression:  1. RLQ abdominal pain    2. Liver mass         Disposition:  Admit  2/12/2024 12:46 pm    Follow-up:  No follow-up provider specified.        Medications Prescribed:  Current Discharge Medication List        START taking these medications    Details   metFORMIN  MG Oral Tablet 24 Hr Take 1 tablet (500 mg total) by mouth 2 (two) times daily with meals. Take 2 tablets (1,000 mg total) by mouth daily with breakfast and 1 tablet (500 mg total) every evening.                               Hospital Problems       Present on Admission  Date Reviewed: 2/12/2024            ICD-10-CM Noted POA    * (Principal) RLQ abdominal pain R10.31 2/12/2024 Unknown    Hyperglycemia R73.9 2/12/2024 Yes    Hyperkalemia E87.5 2/12/2024 Yes

## 2024-02-12 NOTE — ANESTHESIA PREPROCEDURE EVALUATION
PRE-OP EVALUATION    Patient Name: Rayne Morales    Admit Diagnosis: RLQ abdominal pain [R10.31]  Liver mass [R16.0]    Pre-op Diagnosis: Appendicitis [K37]    LAPAROSCOPIC APPENDECTOMY    Anesthesia Procedure: LAPAROSCOPIC APPENDECTOMY (Abdomen)    Surgeon(s) and Role:     * Chalino Harris MD - Primary    Pre-op vitals reviewed.  Temp: 98.5 °F (36.9 °C)  Pulse: 87  Resp: 17  BP: 183/79  SpO2: 93 %  Body mass index is 33.82 kg/m².    Current medications reviewed.  Hospital Medications:   [COMPLETED] iopamidol 76% (ISOVUE-370) injection for power injector  100 mL Intravenous ONCE PRN    [COMPLETED] prothrombin complex conc (human) (Kcentra) 2,158 Units in 80 mL infusion  2,158 Units Intravenous Once    [MAR Hold] albuterol (Ventolin HFA) 108 (90 Base) MCG/ACT inhaler 2 puff  2 puff Inhalation Q6H PRN    [MAR Hold] DULoxetine (Cymbalta) DR cap 30 mg  30 mg Oral Daily    [MAR Hold] DULoxetine (Cymbalta) DR cap 60 mg  60 mg Oral Daily    [MAR Hold] fluticasone furoate-vilanterol (Breo Ellipta) 200-25 MCG/ACT inhaler 1 puff  1 puff Inhalation Daily    [MAR Hold] lisinopril (Prinivil; Zestril) tab 20 mg  20 mg Oral Daily    [MAR Hold] pantoprazole (Protonix) DR tab 40 mg  40 mg Oral Before breakfast    [MAR Hold] pregabalin (Lyrica) cap 100 mg  100 mg Oral TID    [MAR Hold] rosuvastatin (Crestor) tab 10 mg  10 mg Oral Nightly    [MAR Hold] glucose (Dex4) 15 GM/59ML oral liquid 15 g  15 g Oral Q15 Min PRN    Or    [MAR Hold] glucose (Glutose) 40% oral gel 15 g  15 g Oral Q15 Min PRN    Or    [MAR Hold] glucose-vitamin C (Dex-4) chewable tab 4 tablet  4 tablet Oral Q15 Min PRN    Or    [MAR Hold] dextrose 50% injection 50 mL  50 mL Intravenous Q15 Min PRN    Or    [MAR Hold] glucose (Dex4) 15 GM/59ML oral liquid 30 g  30 g Oral Q15 Min PRN    Or    [MAR Hold] glucose (Glutose) 40% oral gel 30 g  30 g Oral Q15 Min PRN    Or    [MAR Hold] glucose-vitamin C (Dex-4) chewable tab 8 tablet  8 tablet Oral Q15 Min PRN     [MAR Hold] melatonin tab 3 mg  3 mg Oral Nightly PRN    sodium chloride 0.9% infusion   Intravenous Continuous    [MAR Hold] acetaminophen (Tylenol) tab 650 mg  650 mg Oral Q4H PRN    Or    [MAR Hold] HYDROcodone-acetaminophen (Norco) 5-325 MG per tab 1 tablet  1 tablet Oral Q4H PRN    Or    [MAR Hold] HYDROcodone-acetaminophen (Norco) 5-325 MG per tab 2 tablet  2 tablet Oral Q4H PRN    [MAR Hold] HYDROmorphone (Dilaudid) 1 MG/ML injection 0.2 mg  0.2 mg Intravenous Q2H PRN    Or    [MAR Hold] HYDROmorphone (Dilaudid) 1 MG/ML injection 0.4 mg  0.4 mg Intravenous Q2H PRN    Or    [MAR Hold] HYDROmorphone (Dilaudid) 1 MG/ML injection 0.8 mg  0.8 mg Intravenous Q2H PRN    [MAR Hold] ondansetron (Zofran) 4 MG/2ML injection 4 mg  4 mg Intravenous Q6H PRN    [MAR Hold] metoclopramide (Reglan) 5 mg/mL injection 10 mg  10 mg Intravenous Q8H PRN    [MAR Hold] polyethylene glycol (PEG 3350) (Miralax) 17 g oral packet 17 g  17 g Oral Daily PRN    [MAR Hold] sennosides (Senokot) tab 17.2 mg  17.2 mg Oral Nightly PRN    [MAR Hold] bisacodyl (Dulcolax) 10 MG rectal suppository 10 mg  10 mg Rectal Daily PRN    [MAR Hold] fleet enema (Fleet) 7-19 GM/118ML rectal enema 133 mL  1 enema Rectal Once PRN    [MAR Hold] insulin aspart (NovoLOG) 100 Units/mL FlexPen 2-10 Units  2-10 Units Subcutaneous TID AC and HS    [MAR Hold] benzonatate (Tessalon) cap 200 mg  200 mg Oral TID PRN    [MAR Hold] guaiFENesin (Robitussin) 100 MG/5 ML oral liquid 200 mg  200 mg Oral Q4H PRN    [MAR Hold] glycerin-hypromellose- (Artifical Tears) 0.2-0.2-1 % ophthalmic solution 1 drop  1 drop Both Eyes QID PRN    [MAR Hold] sodium chloride (Saline Mist) 0.65 % nasal solution 1 spray  1 spray Each Nare Q3H PRN    [MAR Hold] acetaminophen (Tylenol) tab 650 mg  650 mg Oral Q4H PRN    Or    [MAR Hold] HYDROcodone-acetaminophen (Norco) 5-325 MG per tab 1 tablet  1 tablet Oral Q4H PRN    Or    [MAR Hold] HYDROcodone-acetaminophen (Norco) 5-325 MG per tab 2  tablet  2 tablet Oral Q4H PRN    [MAR Hold] HYDROmorphone (Dilaudid) 1 MG/ML injection 0.2 mg  0.2 mg Intravenous Q2H PRN    Or    [MAR Hold] HYDROmorphone (Dilaudid) 1 MG/ML injection 0.4 mg  0.4 mg Intravenous Q2H PRN    Or    [MAR Hold] HYDROmorphone (Dilaudid) 1 MG/ML injection 0.8 mg  0.8 mg Intravenous Q2H PRN    [MAR Hold] ondansetron (Zofran) 4 MG/2ML injection 4 mg  4 mg Intravenous Q6H PRN    [MAR Hold] metoclopramide (Reglan) 5 mg/mL injection 10 mg  10 mg Intravenous Q8H PRN    [MAR Hold] acetaminophen (Ofirmev) 10 mg/mL infusion premix 1,000 mg  1,000 mg Intravenous Q6H    metRONIDAZOLE in sodium chloride 0.79% (Flagyl) 5 mg/mL IVPB premix   Intravenous PRN    ceFAZolin (Ancef) injection   Intravenous PRN    glycopyrrolate (Robinul) 0.2 MG/ML injection   Intravenous PRN    fentaNYL (Sublimaze) 50 mcg/mL injection   Intravenous PRN    lidocaine PF (Xylocaine-MPF) 1% injection   Injection PRN    propofol (Diprivan) 10 MG/ML injection   Intravenous PRN    rocuronium (Zemuron) 50 mg/5mL injection   Intravenous PRN    succinylcholine (Anectine) 20 MG/ML injection   Intravenous PRN    ondansetron (Zofran) 4 MG/2ML injection   Intravenous PRN    esmolol (Brevibloc) 100 mg/10mL injection   Intravenous PRN    bupivacaine PF (Marcaine) 0.25% injection    PRN       Outpatient Medications:     Medications Prior to Admission   Medication Sig Dispense Refill Last Dose    semaglutide 2 MG/3ML Subcutaneous Solution Pen-injector Inject into the skin once a week.   Past Week    metFORMIN  MG Oral Tablet 24 Hr Take 1 tablet (500 mg total) by mouth 2 (two) times daily with meals. Take 2 tablets (1,000 mg total) by mouth daily with breakfast and 1 tablet (500 mg total) every evening.   2/11/2024    metroNIDAZOLE 0.75 % External Gel Apply 1 g topically 2 (two) times daily. Apply to face twice daily 45 g 5 2/11/2024    ondansetron 4 MG Oral Tablet Dispersible Take 1 tablet (4 mg total) by mouth every 4 (four) hours as  needed for Nausea. 10 tablet 0 Past Week    semaglutide 2 MG/3ML Subcutaneous Solution Pen-injector Inject 0.5 mg into the skin once a week. Pt takes on - has not switched yet to Mounjaro   Past Week    DULOXETINE 30 MG Oral Cap DR Particles Take 1 capsule (30 mg total) by mouth daily. To take along with 60mg to equal 90mg daily. 90 capsule 1 2024    PANTOPRAZOLE 40 MG Oral Tab EC Take 1 tablet (40 mg total) by mouth before breakfast. 90 tablet 1 2024    DULoxetine 60 MG Oral Cap DR Particles Take 1 capsule (60 mg total) by mouth daily. 90 capsule 1 2024    rivaroxaban (XARELTO) 20 MG Oral Tab Take 1 tablet (20 mg total) by mouth daily. 90 tablet 0 2024    fluticasone-salmeterol 250-50 MCG/ACT Inhalation Aerosol Powder, Breath Activated Inhale 1 puff into the lungs every 12 (twelve) hours.   2024    rosuvastatin 10 MG Oral Tab Take 1 tablet (10 mg total) by mouth nightly. 90 tablet 1 2024    albuterol 108 (90 Base) MCG/ACT Inhalation Aero Soln Inhale 2 puffs into the lungs every 6 (six) hours as needed for Wheezing. 1 each 3 2024    LISINOPRIL 20 MG Oral Tab TAKE 1 TABLET BY MOUTH DAILY 90 tablet 3 2024    pregabalin 100 MG Oral Cap Take 1 capsule (100 mg total) by mouth in the morning and 1 capsule (100 mg total) at noon and 1 capsule (100 mg total) in the evening. 270 capsule 1 2024    tiZANidine 4 MG Oral Tab Take 1 tablet (4 mg total) by mouth every 8 (eight) hours as needed. (Patient taking differently: Take 1 tablet (4 mg total) by mouth every 8 (eight) hours as needed. Takes every night) 90 tablet 2 2024    aspirin 81 MG Oral Chew Tab Chew 1 tablet (81 mg total) by mouth daily.   2024    Fluticasone Propionate 50 MCG/ACT Nasal Suspension 1 spray by Nasal route 2 (two) times daily. 1 Bottle 3 Past Month    Cholecalciferol (VITAMIN D) 2000 units Oral Tab Take 2,000 Units by mouth daily.   2024    [] ondansetron 4 MG Oral Tablet  Dispersible Take 1 tablet (4 mg total) by mouth every 12 (twelve) hours as needed for Nausea. 20 tablet 0     [] amoxicillin clavulanate 875-125 MG Oral Tab Take 1 tablet by mouth 2 (two) times daily for 4 days. 8 tablet 0     HYDROcodone-acetaminophen  MG Oral Tab Take 1 tablet by mouth every 4 to 6 hours as needed for Pain. DO NOT EXCEED 6 tablets daily          Allergies: Ciprofloxacin, Mold, Sulfa antibiotics, Diovan [valsartan], Omnicef, Clindamycin, Green pepper, Lipitor [atorvastatin calcium], Pneumococcal vaccines, and Quinapril hcl      Anesthesia Evaluation    Patient summary reviewed.    Anesthetic Complications  (-) history of anesthetic complications         GI/Hepatic/Renal      (+) GERD                           Cardiovascular      ECG reviewed.  Exercise tolerance: good     MET: >4      (+) hypertension     (+) CAD  (+) past MI              (-) CHF  (-) angina     (-) PHILLIPS  (-) orthopnea  (-) PND     Endo/Other      (+) diabetes  type 2,       (-) anemia            (+) arthritis       Pulmonary      (+) asthma         (-) shortness of breath  (-) recent URI   (+) sleep apnea       Neuro/Psych      (+) depression           (+) neuromuscular disease                     Past Surgical History:   Procedure Laterality Date    ABDOMEN SURGERY PROC UNLISTED      duodenal biopsy    BREAST SURGERY      CATARACT      CHOLECYSTECTOMY      COLONOSCOPY  10/1/10, 17    COLONOSCOPY N/A 2023    Procedure: COLONOSCOPY;  Surgeon: Migel Bass MD;  Location:  ENDOSCOPY    EGD  2017    EYE SURGERY      FEMUR/KNEE SURG UNLISTED      right knee arthroscopy    HEMORRHOIDECTOMY,INT/EXT,SIMPLE      HERNIA SURGERY      HYSTERECTOMY      total hystero.    LUMPECTOMY LEFT Left     lt lumpectomy and radiation.    OOPHORECTOMY Bilateral     total hystero.    OTHER  2021    left wrist, ORIF Dr. Stacy Long    OTHER  2022    CERVICAL 5 AND CERVICAL 6 LAMINECTOMIES,  PARTIAL CERVICAL 7 LAMINECTOMY, LEFT CERVICAL 5/CERVICAL 6 AND CERVICAL 6/CERVICAL 7 FORAMINOTOMIES, CERVICAL 6 AND CERVICAL 7 ARTHRODESIS, AUTOGRAFT, ALLOGRAFT    OTHER SURGICAL HISTORY  2010    endoscopy - papillotomy    OTHER SURGICAL HISTORY  2016     under right eye, skin cancer removed    OTHER SURGICAL HISTORY  2022    neck    PORT FOR VASCULAR ACCESS      RADIATION LEFT Left     left lumpectomy and radiation.    REPAIR ING HERNIA,5+Y/O,REDUCIBL      SIGMOIDOSCOPY,DIAGNOSTIC      SPINE SURGERY PROCEDURE UNLISTED      TONSILLECTOMY      TOTAL ABDOM HYSTERECTOMY      TOTAL KNEE REPLACEMENT       Social History     Socioeconomic History    Marital status:    Tobacco Use    Smoking status: Former     Packs/day: 0     Types: Cigarettes     Quit date: 1968     Years since quittin.1    Smokeless tobacco: Never   Vaping Use    Vaping Use: Never used   Substance and Sexual Activity    Alcohol use: No    Drug use: Yes     Types: Cannabis     Comment: uses cream for pain to back/neck with barometer changes.    Sexual activity: Not Currently     Partners: Male   Other Topics Concern    Caffeine Concern Yes     Comment: half of cup of coke daily    Sleep Concern No    Exercise Yes     Comment: walking    Seat Belt Yes     History   Drug Use    Types: Cannabis     Comment: uses cream for pain to back/neck with barometer changes.     Available pre-op labs reviewed.  Lab Results   Component Value Date    WBC 5.8 2024    RBC 4.55 2024    HGB 13.5 2024    HCT 39.9 2024    MCV 87.7 2024    MCH 29.7 2024    MCHC 33.8 2024    RDW 13.2 2024    .0 2024     Lab Results   Component Value Date     2024    K 5.3 (H) 2024     2024    CO2 22.0 2024    BUN 15 2024    CREATSERUM 0.83 2024     (H) 2024    CA 9.7 2024          ECG rhythm:   Normal sinus  Study completion:  There were no  complications.     ----------------------------------------------------------------------------     Conclusions:     1. Left ventricle: The cavity size was normal. Wall thickness was normal.      Systolic function was normal. The estimated ejection fraction was 60-65%,      by visual assessment. No diagnostic evidence for regional wall motion      abnormalities. Left ventricular diastolic function is indeterminate.   2. Left atrium: The left atrial volume was normal.   Impressions:  No previous study was available for comparison.   *   Prepared and electronically signed by   Aleyda Hightower   09/29/2023 12:50   ----------------------------------------------------------------------------      GATED SPECT ANALYSIS:  The gated study revealed normal left ventricular systolic function. The calculated left ventricular ejection fraction was 73%.     PERFUSION IMAGING:  The perfusion images were normal with the exception of soft tissue attenuation of the ventricle.     IMPRESSION:  Normal rest/stress gated SPECT myocardial perfusion scan.  Normal left ventricular systolic function.   Normal electrocardiographic response to regadenoson infusion.         luda Swanson MD 02/02/2021 18:19  t   [id number] 2/3/2021 9:54 AM #[id number]           *       Airway      Mallampati: II  Mouth opening: 3 FB  TM distance: 4 - 6 cm  Neck ROM: limited Cardiovascular    Cardiovascular exam normal.  Rhythm: regular  Rate: normal  (-) murmur   Dental    Dentition appears grossly intact         Pulmonary    Pulmonary exam normal.  Breath sounds clear to auscultation bilaterally.        (-) wheezes       Other findings              ASA: 3   Plan: general  NPO status verified and patient meets guidelines.  Patient has not taken beta blockers in last 24 hours.        Plan/risks discussed with: patient            We discussed GA w/ETT and possible scratchy throat and rarely dental damage.  We discussed analgesic plan and PONV prophylaxis.  The  patient's questions were answered and consent was attained.

## 2024-02-12 NOTE — ANESTHESIA PREPROCEDURE EVALUATION
PRE-OP EVALUATION    Patient Name: Rayne Morales    Admit Diagnosis: RLQ abdominal pain [R10.31]  Liver mass [R16.0]    Pre-op Diagnosis: Appendicitis [K37]    LAPAROSCOPIC APPENDECTOMY    Anesthesia Procedure: LAPAROSCOPIC APPENDECTOMY (Abdomen)    Surgeon(s) and Role:     * Chalino Harris MD - Primary    Pre-op vitals reviewed.  Temp: 98.5 °F (36.9 °C)  Pulse: 87  Resp: 17  BP: 183/79  SpO2: 93 %  Body mass index is 33.82 kg/m².    Current medications reviewed.  Hospital Medications:   [COMPLETED] iopamidol 76% (ISOVUE-370) injection for power injector  100 mL Intravenous ONCE PRN    prothrombin complex conc (human) (Kcentra) 2,158 Units in 80 mL infusion  2,158 Units Intravenous Once    albuterol (Ventolin HFA) 108 (90 Base) MCG/ACT inhaler 2 puff  2 puff Inhalation Q6H PRN    [START ON 2/13/2024] DULoxetine (Cymbalta) DR cap 30 mg  30 mg Oral Daily    [START ON 2/13/2024] DULoxetine (Cymbalta) DR cap 60 mg  60 mg Oral Daily    fluticasone furoate-vilanterol (Breo Ellipta) 200-25 MCG/ACT inhaler 1 puff  1 puff Inhalation Daily    [START ON 2/13/2024] lisinopril (Prinivil; Zestril) tab 20 mg  20 mg Oral Daily    [START ON 2/13/2024] pantoprazole (Protonix) DR tab 40 mg  40 mg Oral Before breakfast    pregabalin (Lyrica) cap 100 mg  100 mg Oral TID    rosuvastatin (Crestor) tab 10 mg  10 mg Oral Nightly    glucose (Dex4) 15 GM/59ML oral liquid 15 g  15 g Oral Q15 Min PRN    Or    glucose (Glutose) 40% oral gel 15 g  15 g Oral Q15 Min PRN    Or    glucose-vitamin C (Dex-4) chewable tab 4 tablet  4 tablet Oral Q15 Min PRN    Or    dextrose 50% injection 50 mL  50 mL Intravenous Q15 Min PRN    Or    glucose (Dex4) 15 GM/59ML oral liquid 30 g  30 g Oral Q15 Min PRN    Or    glucose (Glutose) 40% oral gel 30 g  30 g Oral Q15 Min PRN    Or    glucose-vitamin C (Dex-4) chewable tab 8 tablet  8 tablet Oral Q15 Min PRN    melatonin tab 3 mg  3 mg Oral Nightly PRN    sodium chloride 0.9% infusion   Intravenous  Continuous    acetaminophen (Tylenol) tab 650 mg  650 mg Oral Q4H PRN    Or    HYDROcodone-acetaminophen (Norco) 5-325 MG per tab 1 tablet  1 tablet Oral Q4H PRN    Or    HYDROcodone-acetaminophen (Norco) 5-325 MG per tab 2 tablet  2 tablet Oral Q4H PRN    HYDROmorphone (Dilaudid) 1 MG/ML injection 0.2 mg  0.2 mg Intravenous Q2H PRN    Or    HYDROmorphone (Dilaudid) 1 MG/ML injection 0.4 mg  0.4 mg Intravenous Q2H PRN    Or    HYDROmorphone (Dilaudid) 1 MG/ML injection 0.8 mg  0.8 mg Intravenous Q2H PRN    ondansetron (Zofran) 4 MG/2ML injection 4 mg  4 mg Intravenous Q6H PRN    metoclopramide (Reglan) 5 mg/mL injection 10 mg  10 mg Intravenous Q8H PRN    polyethylene glycol (PEG 3350) (Miralax) 17 g oral packet 17 g  17 g Oral Daily PRN    sennosides (Senokot) tab 17.2 mg  17.2 mg Oral Nightly PRN    bisacodyl (Dulcolax) 10 MG rectal suppository 10 mg  10 mg Rectal Daily PRN    fleet enema (Fleet) 7-19 GM/118ML rectal enema 133 mL  1 enema Rectal Once PRN    insulin aspart (NovoLOG) 100 Units/mL FlexPen 2-10 Units  2-10 Units Subcutaneous TID AC and HS    benzonatate (Tessalon) cap 200 mg  200 mg Oral TID PRN    guaiFENesin (Robitussin) 100 MG/5 ML oral liquid 200 mg  200 mg Oral Q4H PRN    glycerin-hypromellose- (Artifical Tears) 0.2-0.2-1 % ophthalmic solution 1 drop  1 drop Both Eyes QID PRN    sodium chloride (Saline Mist) 0.65 % nasal solution 1 spray  1 spray Each Nare Q3H PRN    acetaminophen (Tylenol) tab 650 mg  650 mg Oral Q4H PRN    Or    HYDROcodone-acetaminophen (Norco) 5-325 MG per tab 1 tablet  1 tablet Oral Q4H PRN    Or    HYDROcodone-acetaminophen (Norco) 5-325 MG per tab 2 tablet  2 tablet Oral Q4H PRN    HYDROmorphone (Dilaudid) 1 MG/ML injection 0.2 mg  0.2 mg Intravenous Q2H PRN    Or    HYDROmorphone (Dilaudid) 1 MG/ML injection 0.4 mg  0.4 mg Intravenous Q2H PRN    Or    HYDROmorphone (Dilaudid) 1 MG/ML injection 0.8 mg  0.8 mg Intravenous Q2H PRN    ondansetron (Zofran) 4 MG/2ML  injection 4 mg  4 mg Intravenous Q6H PRN    metoclopramide (Reglan) 5 mg/mL injection 10 mg  10 mg Intravenous Q8H PRN    acetaminophen (Ofirmev) 10 mg/mL infusion premix 1,000 mg  1,000 mg Intravenous Q6H       Outpatient Medications:     Medications Prior to Admission   Medication Sig Dispense Refill Last Dose    semaglutide 2 MG/3ML Subcutaneous Solution Pen-injector Inject into the skin once a week.   Past Week    metFORMIN  MG Oral Tablet 24 Hr Take 1 tablet (500 mg total) by mouth 2 (two) times daily with meals. Take 2 tablets (1,000 mg total) by mouth daily with breakfast and 1 tablet (500 mg total) every evening.   2/11/2024    metroNIDAZOLE 0.75 % External Gel Apply 1 g topically 2 (two) times daily. Apply to face twice daily 45 g 5 2/11/2024    ondansetron 4 MG Oral Tablet Dispersible Take 1 tablet (4 mg total) by mouth every 4 (four) hours as needed for Nausea. 10 tablet 0 Past Week    semaglutide 2 MG/3ML Subcutaneous Solution Pen-injector Inject 0.5 mg into the skin once a week. Pt takes on Saturdays- has not switched yet to Mounjaro   Past Week    DULOXETINE 30 MG Oral Cap DR Particles Take 1 capsule (30 mg total) by mouth daily. To take along with 60mg to equal 90mg daily. 90 capsule 1 2/12/2024    PANTOPRAZOLE 40 MG Oral Tab EC Take 1 tablet (40 mg total) by mouth before breakfast. 90 tablet 1 2/12/2024    DULoxetine 60 MG Oral Cap DR Particles Take 1 capsule (60 mg total) by mouth daily. 90 capsule 1 2/12/2024    rivaroxaban (XARELTO) 20 MG Oral Tab Take 1 tablet (20 mg total) by mouth daily. 90 tablet 0 2/12/2024    fluticasone-salmeterol 250-50 MCG/ACT Inhalation Aerosol Powder, Breath Activated Inhale 1 puff into the lungs every 12 (twelve) hours.   2/11/2024    rosuvastatin 10 MG Oral Tab Take 1 tablet (10 mg total) by mouth nightly. 90 tablet 1 2/11/2024    albuterol 108 (90 Base) MCG/ACT Inhalation Aero Soln Inhale 2 puffs into the lungs every 6 (six) hours as needed for Wheezing. 1 each  3 2024    LISINOPRIL 20 MG Oral Tab TAKE 1 TABLET BY MOUTH DAILY 90 tablet 3 2024    pregabalin 100 MG Oral Cap Take 1 capsule (100 mg total) by mouth in the morning and 1 capsule (100 mg total) at noon and 1 capsule (100 mg total) in the evening. 270 capsule 1 2024    tiZANidine 4 MG Oral Tab Take 1 tablet (4 mg total) by mouth every 8 (eight) hours as needed. (Patient taking differently: Take 1 tablet (4 mg total) by mouth every 8 (eight) hours as needed. Takes every night) 90 tablet 2 2024    aspirin 81 MG Oral Chew Tab Chew 1 tablet (81 mg total) by mouth daily.   2024    Fluticasone Propionate 50 MCG/ACT Nasal Suspension 1 spray by Nasal route 2 (two) times daily. 1 Bottle 3 Past Month    Cholecalciferol (VITAMIN D) 2000 units Oral Tab Take 2,000 Units by mouth daily.   2024    [] ondansetron 4 MG Oral Tablet Dispersible Take 1 tablet (4 mg total) by mouth every 12 (twelve) hours as needed for Nausea. 20 tablet 0     [] amoxicillin clavulanate 875-125 MG Oral Tab Take 1 tablet by mouth 2 (two) times daily for 4 days. 8 tablet 0     HYDROcodone-acetaminophen  MG Oral Tab Take 1 tablet by mouth every 4 to 6 hours as needed for Pain. DO NOT EXCEED 6 tablets daily          Allergies: Ciprofloxacin, Mold, Sulfa antibiotics, Diovan [valsartan], Omnicef, Green pepper, Lipitor [atorvastatin calcium], Pneumococcal vaccines, Clindamycin, and Quinapril hcl      Anesthesia Evaluation    Patient summary reviewed.    Anesthetic Complications  (+) history of anesthetic complications  History of: PONV       GI/Hepatic/Renal      (+) GERD                           Cardiovascular                (+) obesity  (+) hypertension     (+) CAD                                Endo/Other      (+) diabetes               (+) clotting disorder             Pulmonary      (+) asthma         (+) shortness of breath     (+) sleep apnea       Neuro/Psych      (+) depression           (+)  neuromuscular disease             Hx breast ca        Past Surgical History:   Procedure Laterality Date    ABDOMEN SURGERY PROC UNLISTED      duodenal biopsy    BACK SURGERY      Neck surgery    BREAST SURGERY      CATARACT      CHOLECYSTECTOMY      COLONOSCOPY  10/1/10, 17    COLONOSCOPY      Due now 2022    COLONOSCOPY N/A 2023    Procedure: COLONOSCOPY;  Surgeon: Migel Bass MD;  Location:  ENDOSCOPY    EGD  2017    EYE SURGERY      FEMUR/KNEE SURG UNLISTED      right knee arthroscopy    FRACTURE SURGERY      HEMORRHOIDECTOMY,INT/EXT,SIMPLE      HERNIA SURGERY      HYSTERECTOMY      total hystero.    KNEE REPLACEMENT SURGERY      LUMPECTOMY LEFT Left     lt lumpectomy and radiation.    OOPHORECTOMY Bilateral     total hystero.    OTHER  2021    left wrist, ORIF Dr. Stacy Long    OTHER  2022    CERVICAL 5 AND CERVICAL 6 LAMINECTOMIES, PARTIAL CERVICAL 7 LAMINECTOMY, LEFT CERVICAL 5/CERVICAL 6 AND CERVICAL 6/CERVICAL 7 FORAMINOTOMIES, CERVICAL 6 AND CERVICAL 7 ARTHRODESIS, AUTOGRAFT, ALLOGRAFT    OTHER SURGICAL HISTORY      endoscopy - papillotomy    OTHER SURGICAL HISTORY       under right eye, skin cancer removed    OTHER SURGICAL HISTORY      knee    OTHER SURGICAL HISTORY  2022    neck    PORT FOR VASCULAR ACCESS      RADIATION LEFT Left     left lumpectomy and radiation.    REMOVAL GALLBLADDER      REPAIR ING HERNIA,5+Y/O,REDUCIBL      SIGMOIDOSCOPY,DIAGNOSTIC      SPINE SURGERY PROCEDURE UNLISTED      TONSILLECTOMY      TOTAL ABDOM HYSTERECTOMY      TOTAL KNEE REPLACEMENT       Social History     Socioeconomic History    Marital status:    Tobacco Use    Smoking status: Former     Packs/day: 0     Types: Cigarettes     Quit date: 1968     Years since quittin.1    Smokeless tobacco: Never   Vaping Use    Vaping Use: Never used   Substance and Sexual Activity    Alcohol use: No    Drug use: Yes     Types: Cannabis      Comment: uses cream for pain to back/neck with barometer changes.    Sexual activity: Not Currently     Partners: Male   Other Topics Concern    Caffeine Concern Yes     Comment: half of cup of coke daily    Sleep Concern No    Exercise Yes     Comment: walking    Seat Belt Yes     History   Drug Use    Types: Cannabis     Comment: uses cream for pain to back/neck with barometer changes.     Available pre-op labs reviewed.  Lab Results   Component Value Date    WBC 5.8 02/12/2024    RBC 4.55 02/12/2024    HGB 13.5 02/12/2024    HCT 39.9 02/12/2024    MCV 87.7 02/12/2024    MCH 29.7 02/12/2024    MCHC 33.8 02/12/2024    RDW 13.2 02/12/2024    .0 02/12/2024     Lab Results   Component Value Date     02/12/2024    K 5.3 (H) 02/12/2024     02/12/2024    CO2 22.0 02/12/2024    BUN 15 02/12/2024    CREATSERUM 0.83 02/12/2024     (H) 02/12/2024    CA 9.7 02/12/2024            Airway             Cardiovascular             Dental  Comment: No loose teeth reported by patient           Pulmonary                     Other findings              ASA: 3   Plan: general           Comment:                         Present on Admission:   Hyperkalemia   Hyperglycemia   Abnormal CT of the abdomen

## 2024-02-12 NOTE — RESPIRATORY THERAPY NOTE
Talked with patient prior to going to surgery.  She has not been wearing cpap at home.  She wants to just use oxygen tonight and then if she desaturates will use cpap.  Does not know what her home settings are

## 2024-02-12 NOTE — CONSULTS
[facility]  Report of Consultation    Rayne Morales Patient Status:  Inpatient    1945 MRN [id number]   Location [facility] 0SW-A Attending Luz Franco, DO   Hosp Day # 0 PCP Jose Daniel Washington MD     Requesting Physician:  Abdominal pain    Reason for Consultation:  Abnormal CT scan of the abdomen pelvis    Chief Complaint:  Abdominal pain    History of Present Illness:  Rayne Morales is a 78 year old female who presents to [facility] on 2024 with complaints of abdominal pain.  The patient states that 1 week ago she began to develop right lower quadrant abdominal pain.  She states that she had a CT scan of the abdomen pelvis on 2024 with findings of the appendix to be enlarged measuring up to 1.1 cm.  Lumen of the appendix was fluid-filled.  There was no significant stranding around the appendix.  The patient states that she was diagnosed with a urinary tract infection and discharged to home with oral antibiotics.  She states that she completed the antibiotics.  She reports that she has continued to have the right lower quadrant abdominal pain.  She was evaluated by her PCP and noted to have right lower quadrant tenderness on examination.  She was recommended to return to [facility] emergency department for evaluation.  The patient states that she continues to have right lower quadrant abdominal pain.  She denies nausea or vomiting.  She denies diarrhea or constipation.  She denies fever or chills.  She denies dysuria or hematuria. She denies blood per rectum or melena. She reports melena in the past after her colonoscopy however this has resolved.     Upon presentation to the hospital, patient was afebrile and hemodynamically stable    Past medical history is positive for DVT and PE.  She is on anticoagulation with Xarelto at home.  She took her last dose on the morning of 2024    Past abdominal surgical history includes total abdominal hysterectomy and  laparoscopic cholecystectomy.  Her last colonoscopy was performed on 2/23/2023 which showed 6 polyps, two of them in the cecum. Diverticulosis and grade 2 internal hemorrhoids.         History:  Past Medical History:   Diagnosis Date    Abdominal distention     Abdominal pain     Anemia     Anesthesia complication     difficulty waking up after gallbladder surgery    Anxiety     Arrhythmia     Arthritis 2000    Asthma     Back pain 1966    Back problem     Black stools     Bloating     Blurred vision     Breast CA ([facility]) 1999    left lump and rad.    Cancer ([facility]) 1999    breast, basal cell    Cervical radiculopathy     Cervical spinal stenosis     COVID     12/2020 no sx    Deep vein thrombosis ([facility])     Depression     Diabetes ([facility])     Diabetes mellitus ([facility])     Diarrhea, unspecified     Dizziness     Easy bruising     On Xeralto    Esophageal reflux     Exposure to medical diagnostic radiation     Fatigue     Fitting and adjustment of vascular catheter     Headache disorder     Hearing loss     Heart attack ([facility])     Heartburn     Hemorrhoids     High blood pressure     High cholesterol     History of blood clots     History of COVID-19 12/01/2020    not hospitalized, no continued symptoms, cough, fever, loss of taste    History of depression     History of syncope 07/26/2014    IgG deficiency ([facility])     ??? pt states she gets IgG infusions due to lack of IgG, unable to fight off infections    Indigestion     Leaking of urine     Migraines     Muscle weakness     uses cane    Nausea     Neuropathy     rt foot bilateral hands    Osteoarthritis     Pain in joints 2000    Personal history of malignant neoplasm of breast     Pneumonia due to organism     Pulmonary embolism ([facility]) 07/25/2015    Pulmonary embolism ([facility])     S/P cervical spinal fusion     Shortness of breath     Sleep apnea     cpap - not using - machine is broken    Stress     Uncomfortable fullness after meals     Visual impairment     glasses/contacts     Wears glasses     Weight gain      Past Surgical History:   Procedure Laterality Date    ABDOMEN SURGERY PROC UNLISTED      duodenal biopsy    BACK SURGERY  2/22    Neck surgery    BREAST SURGERY      CATARACT      CHOLECYSTECTOMY      COLONOSCOPY  10/1/10, 4/21/17    COLONOSCOPY      Due now Sept 2022    COLONOSCOPY N/A 02/27/2023    Procedure: COLONOSCOPY;  Surgeon: Migel Bass MD;  Location:  ENDOSCOPY    EGD  04/21/2017    EYE SURGERY      FEMUR/KNEE SURG UNLISTED      right knee arthroscopy    FRACTURE SURGERY      HEMORRHOIDECTOMY,INT/EXT,SIMPLE      HERNIA SURGERY      HYSTERECTOMY  1984    total hystero.    KNEE REPLACEMENT SURGERY      LUMPECTOMY LEFT Left 1999    lt lumpectomy and radiation.    OOPHORECTOMY Bilateral 1984    total hystero.    OTHER  11/2021    left wrist, ORIF Dr. Stacy Long    OTHER  02/21/2022    CERVICAL 5 AND CERVICAL 6 LAMINECTOMIES, PARTIAL CERVICAL 7 LAMINECTOMY, LEFT CERVICAL 5/CERVICAL 6 AND CERVICAL 6/CERVICAL 7 FORAMINOTOMIES, CERVICAL 6 AND CERVICAL 7 ARTHRODESIS, AUTOGRAFT, ALLOGRAFT    OTHER SURGICAL HISTORY  2010    endoscopy - papillotomy    OTHER SURGICAL HISTORY  2016     under right eye, skin cancer removed    OTHER SURGICAL HISTORY  2019    knee    OTHER SURGICAL HISTORY  02/2022    neck    PORT FOR VASCULAR ACCESS      RADIATION LEFT Left 1999    left lumpectomy and radiation.    REMOVAL GALLBLADDER      REPAIR ING HERNIA,5+Y/O,REDUCIBL      SIGMOIDOSCOPY,DIAGNOSTIC      SPINE SURGERY PROCEDURE UNLISTED      TONSILLECTOMY      TOTAL ABDOM HYSTERECTOMY      TOTAL KNEE REPLACEMENT       Family History   Problem Relation Age of Onset    Heart Attack Paternal Grandfather     Heart Attack Paternal Grandmother     Other (CAD) Father     Other (CAD) Mother     Hypertension Mother     Heart Attack Mother     Other (CAD) Brother     Hypertension Brother     Heart Attack Brother     Stroke Maternal Grandfather         Stroke    Stroke Maternal Grandmother          Stroke    Breast Cancer Self 54      reports that she quit smoking about 56 years ago. Her smoking use included cigarettes. She has never used smokeless tobacco. She reports current drug use. Drug: Cannabis. She reports that she does not drink alcohol.    Allergies:  Allergies   Allergen Reactions    Ciprofloxacin HIVES    Mold REACTIVE AIRWAY DISEASE     Mold and smut    Sulfa Antibiotics HIVES     Bactrim (Sulfamethoxazole/TMP)    Diovan [Valsartan] NAUSEA AND VOMITING    Omnicef NAUSEA ONLY and DIZZINESS    Green Pepper OTHER (SEE COMMENTS)     Sensitivity (all peppers - green, orange and red)    Lipitor [Atorvastatin Calcium] OTHER (SEE COMMENTS)     Leg cramping,    Pneumococcal Vaccines OTHER (SEE COMMENTS)     Pt states she had pneumo vax done at Dr. Boyd on 1/10/24- states developed redness, swelling,itching to the arm    Clindamycin DIARRHEA and NAUSEA ONLY    Quinapril Hcl FATIGUE       Medications:  Medications Prior to Admission   Medication Sig    semaglutide 2 MG/3ML Subcutaneous Solution Pen-injector Inject into the skin once a week.    metFORMIN  MG Oral Tablet 24 Hr Take 1 tablet (500 mg total) by mouth 2 (two) times daily with meals. Take 2 tablets (1,000 mg total) by mouth daily with breakfast and 1 tablet (500 mg total) every evening.    metroNIDAZOLE 0.75 % External Gel Apply 1 g topically 2 (two) times daily. Apply to face twice daily    ondansetron 4 MG Oral Tablet Dispersible Take 1 tablet (4 mg total) by mouth every 4 (four) hours as needed for Nausea.    semaglutide 2 MG/3ML Subcutaneous Solution Pen-injector Inject 0.5 mg into the skin once a week. Pt takes on Saturdays- has not switched yet to Mounjaro    DULOXETINE 30 MG Oral Cap DR Particles Take 1 capsule (30 mg total) by mouth daily. To take along with 60mg to equal 90mg daily.    PANTOPRAZOLE 40 MG Oral Tab EC Take 1 tablet (40 mg total) by mouth before breakfast.    DULoxetine 60 MG Oral Cap DR Particles Take 1 capsule  (60 mg total) by mouth daily.    rivaroxaban (XARELTO) 20 MG Oral Tab Take 1 tablet (20 mg total) by mouth daily.    fluticasone-salmeterol 250-50 MCG/ACT Inhalation Aerosol Powder, Breath Activated Inhale 1 puff into the lungs every 12 (twelve) hours.    rosuvastatin 10 MG Oral Tab Take 1 tablet (10 mg total) by mouth nightly.    albuterol 108 (90 Base) MCG/ACT Inhalation Aero Soln Inhale 2 puffs into the lungs every 6 (six) hours as needed for Wheezing.    LISINOPRIL 20 MG Oral Tab TAKE 1 TABLET BY MOUTH DAILY    pregabalin 100 MG Oral Cap Take 1 capsule (100 mg total) by mouth in the morning and 1 capsule (100 mg total) at noon and 1 capsule (100 mg total) in the evening.    tiZANidine 4 MG Oral Tab Take 1 tablet (4 mg total) by mouth every 8 (eight) hours as needed. (Patient taking differently: Take 1 tablet (4 mg total) by mouth every 8 (eight) hours as needed. Takes every night)    aspirin 81 MG Oral Chew Tab Chew 1 tablet (81 mg total) by mouth daily.    Fluticasone Propionate 50 MCG/ACT Nasal Suspension 1 spray by Nasal route 2 (two) times daily.    Cholecalciferol (VITAMIN D) 2000 units Oral Tab Take 2,000 Units by mouth daily.    [] ondansetron 4 MG Oral Tablet Dispersible Take 1 tablet (4 mg total) by mouth every 12 (twelve) hours as needed for Nausea.    [] amoxicillin clavulanate 875-125 MG Oral Tab Take 1 tablet by mouth 2 (two) times daily for 4 days.    HYDROcodone-acetaminophen  MG Oral Tab Take 1 tablet by mouth every 4 to 6 hours as needed for Pain. DO NOT EXCEED 6 tablets daily         Current Facility-Administered Medications:     sodium chloride 0.9% infusion, 125 mL/hr, Intravenous, Continuous    sodium chloride 0.9% infusion, , Intravenous, Continuous    ondansetron (Zofran) 4 MG/2ML injection 4 mg, 4 mg, Intravenous, Q4H PRN    prothrombin complex conc (human) (Kcentra) 2,158 Units in 80 mL infusion, 2,158 Units, Intravenous, Once    acetaminophen (Tylenol) tab 650 mg,  650 mg, Oral, Q4H PRN **OR** HYDROcodone-acetaminophen (Norco) 5-325 MG per tab 1 tablet, 1 tablet, Oral, Q4H PRN **OR** HYDROcodone-acetaminophen (Norco) 5-325 MG per tab 2 tablet, 2 tablet, Oral, Q4H PRN    HYDROmorphone (Dilaudid) 1 MG/ML injection 0.2 mg, 0.2 mg, Intravenous, Q2H PRN **OR** HYDROmorphone (Dilaudid) 1 MG/ML injection 0.4 mg, 0.4 mg, Intravenous, Q2H PRN **OR** HYDROmorphone (Dilaudid) 1 MG/ML injection 0.8 mg, 0.8 mg, Intravenous, Q2H PRN    ondansetron (Zofran) 4 MG/2ML injection 4 mg, 4 mg, Intravenous, Q6H PRN    metoclopramide (Reglan) 5 mg/mL injection 10 mg, 10 mg, Intravenous, Q8H PRN    acetaminophen (Ofirmev) 10 mg/mL infusion premix 1,000 mg, 1,000 mg, Intravenous, Q6H    heparin (Porcine) 5000 UNIT/ML injection 5,000 Units, 5,000 Units, Subcutaneous, Once    Review of Systems:  Review of Systems   Constitutional:  Negative for chills and fever.   HENT:  Negative for congestion, rhinorrhea, sinus pressure and sore throat.    Respiratory:  Negative for cough, shortness of breath and wheezing.    Cardiovascular:  Negative for chest pain and leg swelling.   Gastrointestinal:  Positive for abdominal pain. Negative for vomiting, diarrhea, constipation, blood in stool, abdominal distention and anal bleeding.   Endocrine: Negative for cold intolerance and heat intolerance.   Genitourinary:  Negative for dysuria and hematuria.   Musculoskeletal:  Negative for back pain and joint pain.   Skin:  Negative for rash and wound.   Neurological:  Negative for facial asymmetry, speech difficulty and numbness.   Psychiatric/Behavioral:  Negative for confusion. The patient is not nervous/anxious.        Physical Exam:  BP (!) 183/79   Pulse 87   Temp 98.5 °F (36.9 °C) (Oral)   Resp 17   Ht 61\"   Wt 179 lb (81.2 kg)   LMP  (LMP Unknown)   SpO2 93%   BMI 33.82 kg/m²   Physical Exam  Constitutional:       General: She is not in acute distress.     Appearance: Normal appearance. She is not  ill-appearing.   HENT:      Head: Normocephalic and atraumatic.      Mouth/Throat:      Mouth: Mucous membranes are moist.      Pharynx: Oropharynx is clear.   Eyes:      Conjunctiva/sclera: Conjunctivae normal.   Cardiovascular:      Rate and Rhythm: Normal rate and regular rhythm.      Pulses: Normal pulses.   Pulmonary:      Effort: Pulmonary effort is normal. No respiratory distress.   Abdominal:      General: There is no distension.      Palpations: Abdomen is soft.      Tenderness: There is abdominal tenderness. There is no guarding or rebound.      Comments: Abdomen is soft, nondistended, positive tenderness to palpation of the right lower quadrant.  No rebound tenderness.  No guarding.   Musculoskeletal:         General: Normal range of motion.      Cervical back: Normal range of motion.   Skin:     General: Skin is warm and dry.   Neurological:      General: No focal deficit present.      Mental Status: She is alert and oriented to person, place, and time.   Psychiatric:         Mood and Affect: Mood normal.         Behavior: Behavior normal.         Laboratory Data:  Recent Labs   Lab 02/05/24  1801 02/12/24  1017   RBC 4.44 4.55   HGB 13.0 13.5   HCT 40.2 39.9   MCV 90.5 87.7   MCH 29.3 29.7   MCHC 32.3 33.8   RDW 13.2 13.2   NEPRELIM 4.30 2.96   WBC 7.6 5.8   .0 178.0       Recent Labs   Lab 02/05/24  1801 02/12/24  1017   * 107*   BUN 16 15   CREATSERUM 0.86 0.83   CA 9.5 9.7   ALB 3.9 3.9    139   K 3.8 5.3*   * 110   CO2 24.0 22.0   ALKPHO 81 72   AST 17 55*   ALT 20 27   BILT 0.5 0.8   TP 7.4 7.6         No results for input(s): \"PTP\", \"INR\", \"PTT\" in the last 168 hours.      CT APPENDIX ABD/PEL W CONTRAST (CPT=74177)    Result Date: 2/12/2024  CONCLUSION:   1. The appendix is dilated to a caliber of 12 mm with associated wall thickening.  No surrounding fluid or inflammatory stranding.  This may represent an appendiceal mucocele versus mucinous neoplasm.  Sequela of acute  appendicitis felt less likely, though not excluded.  2. Lobular hypodensity of the lateral segment left hepatic lobe (4.4 x 3.6 cm).  This may be neoplastic and warrants further nonemergent assessment with liver MRI with Dotarem contrast.   LOCATION:  [id number]   Dictated by (CST): Arlene Mills MD on 2/12/2024 at 11:32 AM     Finalized by (CST): Arlene Mills MD on 2/12/2024 at 11:42 AM          Jemima Prakash PA-C  2/12/2024  2:59 PM      Medical Decision Making         Impression:  Patient Active Problem List   Diagnosis    Recurrent UTI    Fatigue    Primary hypertension    Pure hypercholesterolemia    Anxiety    Age-related osteoporosis without current pathological fracture    B12 deficiency    Spondylosis of lumbosacral region    Sacroiliac joint dysfunction    History of syncope    History of pulmonary embolism    Asthma    Cyst of brain    History of basal cell carcinoma    History of recurrent pneumonia    Thrombocytopenia (HCC)    Gastroesophageal reflux disease without esophagitis    Enlarged thyroid    History of left breast cancer    Carpal tunnel syndrome of left wrist    Selective deficiency of IgG subclasses (HCC)     Atherosclerosis of aorta (HCC)    MUKUND on CPAP    Chronic pain syndrome    History of left knee replacement    History of radius fracture    Tremor of both hands    Migraine without aura or status migrainosus    Major depressive disorder, recurrent episode, moderate (HCC)    Coronary artery disease involving native coronary artery of native heart without angina pectoris    Visual impairment    S/P cervical spinal fusion    Neuropathy    IgG deficiency (HCC)    History of DVT (deep vein thrombosis)    Cervical spinal stenosis    Cervical radiculopathy    PHILLIPS (dyspnea on exertion)    Type 2 diabetes mellitus without complication, without long-term current use of insulin (HCC)    Anemia, unspecified type    Primary osteoarthritis of right knee    Community acquired pneumonia, unspecified  laterality    Hypoxia    Confusion    Hyperkalemia    Hyperglycemia    RLQ abdominal pain    Liver mass    Abnormal CT of the abdomen       78 year old female with history of DVT/PE on xeralto who is complaining of abdominal pain and right lower quadrant tenderness for the past week. She has been on antibiotics for the past week or so. I reviewed her CT scan from 2/6 and 2/12  There is a dilated appendix that is fluid filled. There are no to minimal inflammatory changes in the area. There is not a walled off abscess or infection on CT  I discussed with the patient and her family the treatment options. I recommend proceeding with laparoscopic appendectomy and possible open.   The risks, benefits, and alternatives of surgical intervention were explained to the patient and the family in detail, including but not limited to bleeding, infection, nondiagnostic yield, incorrect diagnosis, injury to adjacent organs and structures, postoperative infection and/or abscess, conversion to open procedure, and the need for further therapeutic, diagnostic, or surgical intervention.      Chalino Harris MD

## 2024-02-12 NOTE — ANESTHESIA PROCEDURE NOTES
Airway  Date/Time: 2/12/2024 4:41 PM  Urgency: elective      General Information and Staff    Patient location during procedure: OR  Anesthesiologist: Varun Flores MD  Performed: anesthesiologist   Performed by: Varun Flores MD  Authorized by: Roman Duncan MD      Indications and Patient Condition  Indications for airway management: anesthesia  Spontaneous Ventilation: absent  Sedation level: deep  Preoxygenated: yes  Patient position: sniffing  Mask difficulty assessment: 0 - not attempted    Final Airway Details  Final airway type: endotracheal airway      Successful airway: ETT  Cuffed: yes   Successful intubation technique: Video laryngoscopy  Facilitating devices/methods: intubating stylet  Endotracheal tube insertion site: oral  Blade: GlideScope  Blade size: #3  ETT size (mm): 7.0    Cormack-Lehane Classification: grade I - full view of glottis  Placement verified by: capnometry   Cuff volume (mL): 10  Measured from: lips  ETT to lips (cm): 22  Number of attempts at approach: 1  Number of other approaches attempted: 0

## 2024-02-12 NOTE — H&P
[facility]     Rayne Morales Patient Status:  Emergency    1945 MRN [id number]   Location [facility] EMERGENCY DEPARTMENT Attending Naldo Douglas MD   Hosp Day # 0 PCP Jose Daniel Washington MD     Chief Complaint: Abdominal Pain    Subjective:    History of Present Illness:     Rayne Morales is a 78 year old female with past medical history of anxiety and asthma presented to the emergency department with right lower quadrant abdominal pain.    Patient has been having right lower quadrant abdominal pain for the last week, she was recently evaluated and CT showed a slightly enlarged appendix, she was having urinary symptoms at that time and was treated for urinary tract infection, the symptoms improved but she presented to the emergency department with ongoing right lower quadrant abdominal pain.  No chest pain or difficulty breathing.  No fever or chills.    History/Other:    Past Medical History:  Past Medical History:   Diagnosis Date    Abdominal distention     Abdominal pain     Anemia     Anesthesia complication     difficulty waking up after gallbladder surgery    Anxiety     Arrhythmia     Arthritis     Asthma     Back pain 1966    Back problem     Black stools     Bloating     Blurred vision     Breast CA (HCC)     left lump and rad.    Cancer (HCC)     breast, basal cell    Cervical radiculopathy     Cervical spinal stenosis     COVID     2020 no sx    Deep vein thrombosis (HCC)     Depression     Diabetes (HCC)     Diabetes mellitus (HCC)     Diarrhea, unspecified     Dizziness     Easy bruising     On Xeralto    Esophageal reflux     Exposure to medical diagnostic radiation     Fatigue     Fitting and adjustment of vascular catheter     Headache disorder     Hearing loss     Heart attack (HCC)     Heartburn     Hemorrhoids     High blood pressure     High cholesterol     History of blood clots     History of COVID-19 2020    not  hospitalized, no continued symptoms, cough, fever, loss of taste    History of depression     History of syncope 07/26/2014    IgG deficiency (HCC)     ??? pt states she gets IgG infusions due to lack of IgG, unable to fight off infections    Indigestion     Leaking of urine     Migraines     Muscle weakness     uses cane    Nausea     Neuropathy     rt foot bilateral hands    Osteoarthritis     Pain in joints 2000    Personal history of malignant neoplasm of breast     Pneumonia due to organism     Pulmonary embolism (HCC) 07/25/2015    Pulmonary embolism (HCC)     S/P cervical spinal fusion     Shortness of breath     Sleep apnea     cpap - not using - machine is broken    Stress     Uncomfortable fullness after meals     Visual impairment     glasses/contacts    Wears glasses     Weight gain      Past Surgical History:   Past Surgical History:   Procedure Laterality Date    ABDOMEN SURGERY PROC UNLISTED      duodenal biopsy    BACK SURGERY  2/22    Neck surgery    BREAST SURGERY      CATARACT      CHOLECYSTECTOMY      COLONOSCOPY  10/1/10, 4/21/17    COLONOSCOPY      Due now Sept 2022    COLONOSCOPY N/A 02/27/2023    Procedure: COLONOSCOPY;  Surgeon: Migel Bass MD;  Location:  ENDOSCOPY    EGD  04/21/2017    EYE SURGERY      FEMUR/KNEE SURG UNLISTED      right knee arthroscopy    FRACTURE SURGERY      HEMORRHOIDECTOMY,INT/EXT,SIMPLE      HERNIA SURGERY      HYSTERECTOMY  1984    total hystero.    KNEE REPLACEMENT SURGERY      LUMPECTOMY LEFT Left 1999    lt lumpectomy and radiation.    OOPHORECTOMY Bilateral 1984    total hystero.    OTHER  11/2021    left wrist, ORIF Dr. Stacy Long    OTHER  02/21/2022    CERVICAL 5 AND CERVICAL 6 LAMINECTOMIES, PARTIAL CERVICAL 7 LAMINECTOMY, LEFT CERVICAL 5/CERVICAL 6 AND CERVICAL 6/CERVICAL 7 FORAMINOTOMIES, CERVICAL 6 AND CERVICAL 7 ARTHRODESIS, AUTOGRAFT, ALLOGRAFT    OTHER SURGICAL HISTORY  2010    endoscopy - papillotomy    OTHER SURGICAL HISTORY  2016      under right eye, skin cancer removed    OTHER SURGICAL HISTORY  2019    knee    OTHER SURGICAL HISTORY  02/2022    neck    PORT FOR VASCULAR ACCESS      RADIATION LEFT Left 1999    left lumpectomy and radiation.    REMOVAL GALLBLADDER      REPAIR ING HERNIA,5+Y/O,REDUCIBL      SIGMOIDOSCOPY,DIAGNOSTIC      SPINE SURGERY PROCEDURE UNLISTED      TONSILLECTOMY      TOTAL ABDOM HYSTERECTOMY      TOTAL KNEE REPLACEMENT        Family History:   Family History   Problem Relation Age of Onset    Heart Attack Paternal Grandfather     Heart Attack Paternal Grandmother     Other (CAD) Father     Other (CAD) Mother     Hypertension Mother     Heart Attack Mother     Other (CAD) Brother     Hypertension Brother     Heart Attack Brother     Stroke Maternal Grandfather         Stroke    Stroke Maternal Grandmother         Stroke    Breast Cancer Self 54     Social History:    reports that she quit smoking about 56 years ago. Her smoking use included cigarettes. She has never used smokeless tobacco. She reports current drug use. Drug: Cannabis. She reports that she does not drink alcohol.     Allergies:   Allergies   Allergen Reactions    Ciprofloxacin HIVES    Mold REACTIVE AIRWAY DISEASE     Mold and smut    Sulfa Antibiotics HIVES     Bactrim (Sulfamethoxazole/TMP)    Diovan [Valsartan] NAUSEA AND VOMITING    Omnicef NAUSEA ONLY and DIZZINESS    Green Pepper OTHER (SEE COMMENTS)     Sensitivity (all peppers - green, orange and red)    Lipitor [Atorvastatin Calcium] OTHER (SEE COMMENTS)     Leg cramping,    Pneumococcal Vaccines OTHER (SEE COMMENTS)     Pt states she had pneumo vax done at Dr. Boyd on 1/10/24- states developed redness, swelling,itching to the arm    Clindamycin DIARRHEA and NAUSEA ONLY    Quinapril Hcl FATIGUE       Medications:    Current Facility-Administered Medications on File Prior to Encounter   Medication Dose Route Frequency Provider Last Rate Last Admin    [COMPLETED] cefTRIAXone (Rocephin) 1 g in  D5W 100 mL IVPB-ADD  1 g Intravenous Once Naldo Levine MD   Stopped at 02/05/24 2048    [COMPLETED] methylPREDNISolone sodium succinate (Solu-MEDROL) injection 125 mg  125 mg Intravenous Once James Barr MD   125 mg at 01/14/24 0937    [COMPLETED] piperacillin-tazobactam (Zosyn) 3.375 g in dextrose 5% 100 mL IVPB-ADDV  3.375 g Intravenous Once James Barr MD   Stopped at 01/14/24 1046    [COMPLETED] azithromycin (Zithromax) 500 mg in sodium chloride 0.9% 250mL IVPB premix  500 mg Intravenous Once James Barr MD   Stopped at 01/14/24 1201    [COMPLETED] azithromycin (Zithromax) 500 mg in sodium chloride 0.9% 250mL IVPB premix  500 mg Intravenous Q24H Francisco Mir,  mL/hr at 01/16/24 1142 500 mg at 01/16/24 1142    [COMPLETED] HYDROcodone-acetaminophen (Norco) 5-325 MG per tab 1 tablet  1 tablet Oral Once James Barr MD   1 tablet at 01/14/24 1200     Current Outpatient Medications on File Prior to Encounter   Medication Sig Dispense Refill    semaglutide 2 MG/3ML Subcutaneous Solution Pen-injector Inject into the skin once a week.      metFORMIN  MG Oral Tablet 24 Hr Take 1 tablet (500 mg total) by mouth 2 (two) times daily with meals. Take 2 tablets (1,000 mg total) by mouth daily with breakfast and 1 tablet (500 mg total) every evening.      metroNIDAZOLE 0.75 % External Gel Apply 1 g topically 2 (two) times daily. Apply to face twice daily 45 g 5    cefuroxime 500 MG Oral Tab Take 1 tablet (500 mg total) by mouth 2 (two) times daily for 7 days. 14 tablet 0    ondansetron 4 MG Oral Tablet Dispersible Take 1 tablet (4 mg total) by mouth every 4 (four) hours as needed for Nausea. 10 tablet 0    semaglutide 2 MG/3ML Subcutaneous Solution Pen-injector Inject 0.5 mg into the skin once a week. Pt takes on Saturdays- has not switched yet to Mounjaro      DULOXETINE 30 MG Oral Cap DR Particles Take 1 capsule (30 mg total) by mouth daily. To take along with 60mg to equal 90mg daily. 90  capsule 1    PANTOPRAZOLE 40 MG Oral Tab EC Take 1 tablet (40 mg total) by mouth before breakfast. 90 tablet 1    DULoxetine 60 MG Oral Cap DR Particles Take 1 capsule (60 mg total) by mouth daily. 90 capsule 1    rivaroxaban (XARELTO) 20 MG Oral Tab Take 1 tablet (20 mg total) by mouth daily. 90 tablet 0    [] ondansetron 4 MG Oral Tablet Dispersible Take 1 tablet (4 mg total) by mouth every 12 (twelve) hours as needed for Nausea. 20 tablet 0    [] amoxicillin clavulanate 875-125 MG Oral Tab Take 1 tablet by mouth 2 (two) times daily for 4 days. 8 tablet 0    fluticasone-salmeterol 250-50 MCG/ACT Inhalation Aerosol Powder, Breath Activated Inhale 1 puff into the lungs every 12 (twelve) hours.      HYDROcodone-acetaminophen  MG Oral Tab Take 1 tablet by mouth every 4 to 6 hours as needed for Pain. DO NOT EXCEED 6 tablets daily      rosuvastatin 10 MG Oral Tab Take 1 tablet (10 mg total) by mouth nightly. 90 tablet 1    albuterol 108 (90 Base) MCG/ACT Inhalation Aero Soln Inhale 2 puffs into the lungs every 6 (six) hours as needed for Wheezing. 1 each 3    LISINOPRIL 20 MG Oral Tab TAKE 1 TABLET BY MOUTH DAILY 90 tablet 3    pregabalin 100 MG Oral Cap Take 1 capsule (100 mg total) by mouth in the morning and 1 capsule (100 mg total) at noon and 1 capsule (100 mg total) in the evening. 270 capsule 1    tiZANidine 4 MG Oral Tab Take 1 tablet (4 mg total) by mouth every 8 (eight) hours as needed. (Patient taking differently: Take 1 tablet (4 mg total) by mouth every 8 (eight) hours as needed. Takes every night) 90 tablet 2    aspirin 81 MG Oral Chew Tab Chew 1 tablet (81 mg total) by mouth daily.      Fluticasone Propionate 50 MCG/ACT Nasal Suspension 1 spray by Nasal route 2 (two) times daily. 1 Bottle 3    Cholecalciferol (VITAMIN D) 2000 units Oral Tab Take 2,000 Units by mouth daily.         Review of Systems:   A comprehensive review of systems was completed.    Pertinent positives and negatives  noted in the HPI.    Objective:   Physical Exam:    BP (!) 165/97   Pulse 93   Temp 98 °F (36.7 °C) (Temporal)   Resp 16   Ht 5' 1\" (1.549 m)   Wt 166 lb (75.3 kg)   LMP  (LMP Unknown)   SpO2 97%   BMI 31.37 kg/m²   General: No acute distress, Alert  Respiratory: No rhonchi, no wheezes  Cardiovascular: S1, S2. Regular rate and rhythm  Abdomen: Soft, +RLQ ttp  Neuro: No new focal deficits  Extremities: No edema      Results:    Labs:      Labs Last 24 Hours:    Recent Labs   Lab 02/05/24  1801 02/12/24  1017   RBC 4.44 4.55   HGB 13.0 13.5   HCT 40.2 39.9   MCV 90.5 87.7   MCH 29.3 29.7   MCHC 32.3 33.8   RDW 13.2 13.2   NEPRELIM 4.30 2.96   WBC 7.6 5.8   .0 178.0       Recent Labs   Lab 02/05/24  1801 02/12/24  1017   * 107*   BUN 16 15   CREATSERUM 0.86 0.83   EGFRCR 70 73   CA 9.5 9.7   ALB 3.9 3.9    139   K 3.8 5.3*   * 110   CO2 24.0 22.0   ALKPHO 81 72   AST 17 55*   ALT 20 27   BILT 0.5 0.8   TP 7.4 7.6       Lab Results   Component Value Date    PT 19.4 (H) 07/29/2014    PT 13.9 07/26/2014    INR 2.33 (H) 12/04/2023    INR 1.00 02/21/2022    INR 2.58 (H) 02/09/2022       No results for input(s): \"TROP\", \"TROPHS\", \"CK\" in the last 168 hours.    No results for input(s): \"TROP\", \"PBNP\" in the last 168 hours.    No results for input(s): \"PCT\" in the last 168 hours.    Imaging: Imaging data reviewed in Epic.    Assessment & Plan:      #Appendical dilation  -surgery to take to OR this evening     #Hypertension  -resume Lisinopril, monitor K     #HLD  -Statin     #DM type II  -ISS      #Osteoarthritis  #Prior cervical spine surgery with chronic pain  -Resume home norco, tizanidine and lyrica     #Anxiety  -Duloxetine     #Asthma  -Resume home inhalers  -Follows with Dr. Browning     #Hypercoagulable state with Hx of DVT/PE  -Hold Xarelto pending surgical eval    #Left hepatic lobe lesion (4.4 x 3.6 cm)  -has known hemangioma previously measuring larger on ultrasound in 2014      #MUKUND  -CPAP     #Hx of breast cancer s/p lumpectomy and radiation        Plan of care discussed with pt and her daughter     Luz Franco, DO    Supplementary Documentation:     The 21st Century Cures Act makes medical notes like these available to patients in the interest of transparency. Please be advised this is a medical document. Medical documents are intended to carry relevant information, facts as evident, and the clinical opinion of the practitioner. The medical note is intended as peer to peer communication and may appear blunt or direct. It is written in medical language and may contain abbreviations or verbiage that are unfamiliar.

## 2024-02-13 VITALS
BODY MASS INDEX: 33.79 KG/M2 | HEIGHT: 61 IN | HEART RATE: 75 BPM | SYSTOLIC BLOOD PRESSURE: 158 MMHG | WEIGHT: 179 LBS | RESPIRATION RATE: 17 BRPM | DIASTOLIC BLOOD PRESSURE: 63 MMHG | TEMPERATURE: 98 F | OXYGEN SATURATION: 97 %

## 2024-02-13 LAB
ANION GAP SERPL CALC-SCNC: 12 MMOL/L (ref 0–18)
BASOPHILS # BLD AUTO: 0.05 X10(3) UL (ref 0–0.2)
BASOPHILS NFR BLD AUTO: 0.5 %
BUN BLD-MCNC: 9 MG/DL (ref 9–23)
CALCIUM BLD-MCNC: 8.4 MG/DL (ref 8.5–10.1)
CHLORIDE SERPL-SCNC: 109 MMOL/L (ref 98–112)
CO2 SERPL-SCNC: 20 MMOL/L (ref 21–32)
CREAT BLD-MCNC: 0.72 MG/DL
EGFRCR SERPLBLD CKD-EPI 2021: 86 ML/MIN/1.73M2 (ref 60–?)
EOSINOPHIL # BLD AUTO: 0.01 X10(3) UL (ref 0–0.7)
EOSINOPHIL NFR BLD AUTO: 0.1 %
ERYTHROCYTE [DISTWIDTH] IN BLOOD BY AUTOMATED COUNT: 13.2 %
GLUCOSE BLD-MCNC: 130 MG/DL (ref 70–99)
GLUCOSE BLD-MCNC: 156 MG/DL (ref 70–99)
HCT VFR BLD AUTO: 37.1 %
HGB BLD-MCNC: 11.9 G/DL
IMM GRANULOCYTES # BLD AUTO: 0.04 X10(3) UL (ref 0–1)
IMM GRANULOCYTES NFR BLD: 0.4 %
LYMPHOCYTES # BLD AUTO: 0.89 X10(3) UL (ref 1–4)
LYMPHOCYTES NFR BLD AUTO: 9.5 %
MCH RBC QN AUTO: 29.5 PG (ref 26–34)
MCHC RBC AUTO-ENTMCNC: 32.1 G/DL (ref 31–37)
MCV RBC AUTO: 92.1 FL
MONOCYTES # BLD AUTO: 0.5 X10(3) UL (ref 0.1–1)
MONOCYTES NFR BLD AUTO: 5.3 %
NEUTROPHILS # BLD AUTO: 7.91 X10 (3) UL (ref 1.5–7.7)
NEUTROPHILS # BLD AUTO: 7.91 X10(3) UL (ref 1.5–7.7)
NEUTROPHILS NFR BLD AUTO: 84.2 %
OSMOLALITY SERPL CALC.SUM OF ELEC: 294 MOSM/KG (ref 275–295)
PLATELET # BLD AUTO: 167 10(3)UL (ref 150–450)
POTASSIUM SERPL-SCNC: 3.9 MMOL/L (ref 3.5–5.1)
RBC # BLD AUTO: 4.03 X10(6)UL
SODIUM SERPL-SCNC: 141 MMOL/L (ref 136–145)
WBC # BLD AUTO: 9.4 X10(3) UL (ref 4–11)

## 2024-02-13 PROCEDURE — 99239 HOSP IP/OBS DSCHRG MGMT >30: CPT | Performed by: INTERNAL MEDICINE

## 2024-02-13 RX ORDER — OXYCODONE HYDROCHLORIDE 5 MG/1
5 TABLET ORAL EVERY 6 HOURS PRN
Qty: 15 TABLET | Refills: 0 | Status: SHIPPED | OUTPATIENT
Start: 2024-02-13

## 2024-02-13 RX ORDER — NALOXONE HYDROCHLORIDE 4 MG/.1ML
4 SPRAY NASAL AS NEEDED
Qty: 1 KIT | Refills: 0 | Status: SHIPPED | OUTPATIENT
Start: 2024-02-13

## 2024-02-13 RX ORDER — ONDANSETRON 4 MG/1
4 TABLET, ORALLY DISINTEGRATING ORAL EVERY 12 HOURS PRN
Qty: 20 TABLET | Refills: 0 | Status: SHIPPED | OUTPATIENT
Start: 2024-02-13 | End: 2024-02-23

## 2024-02-13 NOTE — DISCHARGE SUMMARY
[facility]     Rayne Morales Patient Status:  Observation    1945 MRN [id number]   Location [facility] 0SW-A Attending Luz Franco,    Hosp Day # 1 PCP Jose Daniel Washington MD     Date of Admission: 2024  Date of Discharge:   2024    Discharge Disposition: Home or Self Care    Discharge Diagnosis:  Appendicitis     History of Present Illness:      Rayne Morales is a 78 year old female with past medical history of anxiety and asthma presented to the emergency department with right lower quadrant abdominal pain.     Patient has been having right lower quadrant abdominal pain for the last week, she was recently evaluated and CT showed a slightly enlarged appendix, she was having urinary symptoms at that time and was treated for urinary tract infection, the symptoms improved but she presented to the emergency department with ongoing right lower quadrant abdominal pain.  No chest pain or difficulty breathing.  No fever or chills.    Brief Synopsis: Patient was admitted, had laparoscopic appendectomy, diet advanced and cleared for discharge by general surgery, patient being discharged home in stable condition.    Lace+ Score: 78  59-90 High Risk  29-58 Medium Risk  0-28   Low Risk       TCM Follow-Up Recommendation:  LACE > 58: High Risk of readmission after discharge from the hospital.      Procedures during hospitalization:   Laparoscopic appendectomy, laparoscopic lysis of adhesions     Incidental or significant findings and recommendations (brief descriptions):  N/a    Lab/Test results pending at Discharge:   Pathology of appendix     Consultants:  General Surgery     Discharge Medication List:     Discharge Medications        START taking these medications        Instructions Prescription details   Naloxone HCl 4 MG/0.1ML Liqd      4 mg by Nasal route as needed. If patient remains unresponsive, repeat dose in other nostril 2-5 minutes after first dose.    Quantity: 1 kit  Refills: 0     oxyCODONE 5 MG Tabs      Take 1 tablet (5 mg total) by mouth every 6 (six) hours as needed for Pain.   Quantity: 15 tablet  Refills: 0            CHANGE how you take these medications        Instructions Prescription details   ondansetron 4 MG Tbdp  Commonly known as: Zofran-ODT  What changed: Another medication with the same name was removed. Continue taking this medication, and follow the directions you see here.      Take 1 tablet (4 mg total) by mouth every 12 (twelve) hours as needed for Nausea.   Stop taking on: February 23, 2024  Quantity: 20 tablet  Refills: 0            CONTINUE taking these medications        Instructions Prescription details   albuterol 108 (90 Base) MCG/ACT Aers  Commonly known as: Ventolin HFA      Inhale 2 puffs into the lungs every 6 (six) hours as needed for Wheezing.   Quantity: 1 each  Refills: 3     aspirin 81 MG Chew      Chew 1 tablet (81 mg total) by mouth daily.   Refills: 0     DULoxetine 30 MG Cpep  Commonly known as: Cymbalta      Take 1 capsule (30 mg total) by mouth daily. To take along with 60mg to equal 90mg daily.   Quantity: 90 capsule  Refills: 1     DULoxetine 60 MG Cpep  Commonly known as: Cymbalta      Take 1 capsule (60 mg total) by mouth daily.   Quantity: 90 capsule  Refills: 1     fluticasone propionate 50 MCG/ACT Susp  Commonly known as: Flonase      1 spray by Nasal route 2 (two) times daily.   Quantity: 1 Bottle  Refills: 3     fluticasone-salmeterol 250-50 MCG/ACT Aepb  Commonly known as: Advair Diskus      Inhale 1 puff into the lungs every 12 (twelve) hours.   Refills: 0     lisinopril 20 MG Tabs  Commonly known as: Prinivil; Zestril      TAKE 1 TABLET BY MOUTH DAILY   Quantity: 90 tablet  Refills: 3     metFORMIN  MG Tb24  Commonly known as: Glucophage XR      Take 1 tablet (500 mg total) by mouth 2 (two) times daily with meals. Take 2 tablets (1,000 mg total) by mouth daily with breakfast and 1 tablet (500 mg  total) every evening.   Refills: 0     metroNIDAZOLE 0.75 % Gel  Commonly known as: Metrogel      Apply 1 g topically 2 (two) times daily. Apply to face twice daily   Quantity: 45 g  Refills: 5     pantoprazole 40 MG Tbec  Commonly known as: Protonix      Take 1 tablet (40 mg total) by mouth before breakfast.   Quantity: 90 tablet  Refills: 1     pregabalin 100 MG Caps  Commonly known as: Lyrica      Take 1 capsule (100 mg total) by mouth in the morning and 1 capsule (100 mg total) at noon and 1 capsule (100 mg total) in the evening.   Quantity: 270 capsule  Refills: 1     rivaroxaban 20 MG Tabs  Commonly known as: Xarelto      Take 1 tablet (20 mg total) by mouth daily.   Quantity: 90 tablet  Refills: 0     rosuvastatin 10 MG Tabs  Commonly known as: Crestor      Take 1 tablet (10 mg total) by mouth nightly.   Quantity: 90 tablet  Refills: 1     semaglutide 2 MG/3ML Sopn  Commonly known as: Ozempic      Inject 0.5 mg into the skin once a week. Pt takes on Saturdays- has not switched yet to Mounjaro   Refills: 0     tiZANidine 4 MG Tabs  Commonly known as: Zanaflex      Take 1 tablet (4 mg total) by mouth every 8 (eight) hours as needed.   Quantity: 90 tablet  Refills: 2     Vitamin D 50 MCG (2000 UT) Tabs      Take 2,000 Units by mouth daily.   Refills: 0            STOP taking these medications      amoxicillin clavulanate 875-125 MG Tabs  Commonly known as: Augmentin        HYDROcodone-acetaminophen  MG Tabs  Commonly known as: Norco                  Where to Get Your Medications        These medications were sent to [place], [place] - [address], [address] [phone], [phone]  [address], [address], [facility] [address]      Phone: [phone]   Naloxone HCl 4 MG/0.1ML Liqd  ondansetron 4 MG Tbdp  oxyCODONE 5 MG Tabs         [place] reviewed: per Surgery      Follow-up appointment:   Chalino Harris MD  [address] [facility]  [address]  [phone]    Schedule an appointment as soon as possible for a visit      Appointments for Next 30 Days 2024 - 3/14/2024        Date Arrival Time Visit Type Length Department Provider     2024 10:00 AM  [facility] [[id number]] 15 min [facility], [address] [place] [address] LAB RESOURCE    Patient Instructions:         Location Instructions:     Your appointment will be at [facility] located at [address] [place] [address], [address] [place], [place]&nbsp; [address].  Masks are optional for all patients and visitors, unless otherwise indicated.               2024 11:30 AM  FOLLOW UP VISIT [[id number]] 30 min [facility], [address] [place] [address] Monica Curran APRN    Patient Instructions:         Location Instructions:     Masks are optional for all patients and visitors, unless otherwise indicated.                      Vital signs:  Temp:  [97.5 °F (36.4 °C)-98.6 °F (37 °C)] 97.6 °F (36.4 °C)  Pulse:  [] 101  Resp:  [10-18] 17  BP: (137-184)/(66-87) 175/78  SpO2:  [94 %-100 %] 97 %    Physical Exam:    General: No acute distress   Lungs: clear to auscultation  Cardiovascular: S1, S2  Abdomen: Soft, incisions clean/dry, no significant ttp      -----------------------------------------------------------------------------------------------  PATIENT DISCHARGE INSTRUCTIONS: See electronic chart    Luz Franco DO    Total time spent on discharge plannin minutes     The  Century Cures Act makes medical notes like these available to patients in the interest of transparency. Please be advised this is a medical document. Medical documents are intended to carry relevant information, facts as evident, and the clinical opinion of the practitioner. The medical note is intended as peer to peer communication and may appear blunt or direct. It is written in medical language and may contain abbreviations or verbiage that are unfamiliar.

## 2024-02-13 NOTE — PLAN OF CARE
Pt discharged home via wheelchair accompanied by family members. Prescriptions filled with [facility], discharge and follow up instructions given with pt and family verbalizing understanding.

## 2024-02-13 NOTE — OPERATIVE REPORT
[facility]  Operative Note    Rayne Morales Location: OR   [facility] [id number] MRN [id number]    1945 Age 78 year old   Admission Date 2024 Operation Date 2024   Attending Physician Luz Franco DO Operating Physician Chalino Harris MD   PCP Jose Daniel Washington MD        Preoperative Diagnosis: Appendicitis [K37]  Postoperative Diagnosis: Same as pre-op diagnosis.  Procedure: Laparoscopic appendectomy, laparoscopic lysis of adhesions    Anesthesia: General    Anesthesiologist: Anesthesiologist.: Varun Flores MD  Primary Surgeon: Chalino Harris MD  Assistant: KERRY Sanz;  Inez Smith PA-C  Specimen: Appendix  Estimated Blood Loss: Blood Output: 20 mL (2024  6:00 PM)    Complications: None  Drains: None  Operative Findings: chronic inflammatory changes surrounding an enlarge appendix  2 cm cystic change in the caliber of the tip of the appendix with a fairly normal base  The cecum and terminal ileum were identified and left intact  Large amount amount of omental adhesions to the pelvic rim and the midline  No signs of metastatic or peritoneal disease   Indication for Surgery:  Rayne Morales is a 78 year old female who presents with 1 week of generalized abdominal pain that is now localized to the right lower quadrant. The patient is tender to palpation over McBurney's point. Diagnostic imaging is consistent with acute appendicitis without perforation or abscess vs mucocele vs. Mucinous neoplasm  Description of Procedure:   The patient was transported to the operating room and transferred to the OR table and placed in supine position. General endotracheal anesthesia was administered. A Baxter catheter was placed. The abdomen was prepped and draped in sterile fashion. Pre-operative antibiotics were given. A time-out was performed.   A stab incision was made in the left upper quadrant at Pimentel's point. A Veress needle was passed into the peritoneal cavity and  pneumoperitoneum was established to a pressure of 15 mmHg. A supraumbilical incision was made.  A 5-mm trocar was placed through the incision and a laparoscope was inserted into the abdomen. Initial diagnostic survey revealed no injury secondary to our entry. There were omental adhesions from the infraumbilical region to the pelvis and towards the right lower quadrant. Under direct visualization a 5-mm trocar was placed in the right upper quadrant was placed and another in the right flank.   I started with performing a careful and lengthily lysis of adhesions. The omentum was retracted down and using an energy device the adhesions were taken down taking extra care to not cause iatrogenic injuries and to keep the omentum hemostatic. Once this was completed I was able to place a 12 mm port in the left lower quadrant and the patient was placed in trendelenburg and right side up. The lysis of adhesions took approximately 45 minutes.   Attention was turned to the right lower quadrant. The intestines and omentum were gently manipulated to expose the appendix. The appendix was grasped, elevated and retracted. A combination of  blunt dissection and cautery were used to separate the appendix from its adhesions to the pelvic sidewall. The LigaSure device was used to divide the mesoappendix and clear the base of the appendix. An EndoGIA stapler with a blue cartridge was used to divide the base of the appendix. The appendix was placed into a retrival sac and removed from the left lower quadrant port site under direct visualization. The staple line and mesoappendix were inspected. There was no bleeding or drainage. Hemostasis was excellent. The right lower quadrant was judiciously irrigated until the effluent fluid was clear. The pelvis was also irrigated. All fluid was suctioned from the abdomen.   The 12-mm trocar was removed the incision was reapproximated using 1 PDS suture with an Endo Close device. The 5-mm trocar was  removed under direct visualization. Pneumoperitoneum was aspirated and released. The 5-mm camera and trocar were removed from the umbilical position. All wounds were cleansed and irrigated and injected with local anesthetic. All skin incisions were reapproximated using  4-0 Monocryl suture. Skin glue was applied to all incisions.   The drapes were removed, the guzman catheter was removed, and the patient was awakened from anesthesia and brought to the recovery room in good condition. The patient tolerated the procedure without apparent complication. Needle, sponge, and instrument counts were correct at the end of the procedure.     Chalino Harris MD  2/12/2024  6:09 PM

## 2024-02-13 NOTE — PLAN OF CARE
Received from PACU alert and oriented x4. On 2LNC. NSR w/ pvcs. IV fluids. Up w/ SBA. Has mod to severe pain, dilaudid and Oxy given.   BP was high due to pain ,hydralazine IV given.   168/74, rpt BP after an hour  0600: Pain was mod, she didn't look comfortable. Painn meds given.     Problem: Diabetes/Glucose Control  Goal: Glucose maintained within prescribed range  Description: INTERVENTIONS:  - Monitor Blood Glucose as ordered  - Assess for signs and symptoms of hyperglycemia and hypoglycemia  - Administer ordered medications to maintain glucose within target range  - Assess barriers to adequate nutritional intake and initiate nutrition consult as needed  - Instruct patient on self management of diabetes  Outcome: Progressing     Problem: Patient/Family Goals  Goal: Patient/Family Long Term Goal  Description: Patient's Long Term Goal: dc home    Interventions:  - monitor  VS, labs, intake and output  - telemetry   - monitor incision.  - administer meds as ordered.  - See additional Care Plan goals for specific interventions  Outcome: Progressing  Goal: Patient/Family Short Term Goal  Description: Patient's Short Term Goal: VS stale    Interventions:   - monitor  VS, labs, intake and output  - telemetry   - monitor incision.  - administer meds as ordered.  - will tolerate clear liquid diet and will advance in am.  - offer pain meds  - dc planning in am.    - See additional Care Plan goals for specific interventions  Outcome: Progressing     Problem: CARDIOVASCULAR - ADULT  Goal: Absence of cardiac arrhythmias or at baseline  Description: INTERVENTIONS:  - Continuous cardiac monitoring, monitor vital signs, obtain 12 lead EKG if indicated  - Evaluate effectiveness of antiarrhythmic and heart rate control medications as ordered  - Initiate emergency measures for life threatening arrhythmias  - Monitor electrolytes and administer replacement therapy as ordered  Outcome: Progressing     Problem: RESPIRATORY -  ADULT  Goal: Achieves optimal ventilation and oxygenation  Description: INTERVENTIONS:  - Assess for changes in respiratory status  - Assess for changes in mentation and behavior  - Position to facilitate oxygenation and minimize respiratory effort  - Oxygen supplementation based on oxygen saturation or ABGs  - Provide Smoking Cessation handout, if applicable  - Encourage broncho-pulmonary hygiene including cough, deep breathe, Incentive Spirometry  - Assess the need for suctioning and perform as needed  - Assess and instruct to report SOB or any respiratory difficulty  - Respiratory Therapy support as indicated  - Manage/alleviate anxiety  - Monitor for signs/symptoms of CO2 retention  Outcome: Progressing     Problem: GASTROINTESTINAL - ADULT  Goal: Minimal or absence of nausea and vomiting  Description: INTERVENTIONS:  - Maintain adequate hydration with IV or PO as ordered and tolerated  - Nasogastric tube to low intermittent suction as ordered  - Evaluate effectiveness of ordered antiemetic medications  - Provide nonpharmacologic comfort measures as appropriate  - Advance diet as tolerated, if ordered  - Obtain nutritional consult as needed  - Evaluate fluid balance  Outcome: Progressing  Goal: Maintains or returns to baseline bowel function  Description: INTERVENTIONS:  - Assess bowel function  - Maintain adequate hydration with IV or PO as ordered and tolerated  - Evaluate effectiveness of GI medications  - Encourage mobilization and activity  - Obtain nutritional consult as needed  - Establish a toileting routine/schedule  - Consider collaborating with pharmacy to review patient's medication profile  Outcome: Progressing     Problem: METABOLIC/FLUID AND ELECTROLYTES - ADULT  Goal: Glucose maintained within prescribed range  Description: INTERVENTIONS:  - Monitor Blood Glucose as ordered  - Assess for signs and symptoms of hyperglycemia and hypoglycemia  - Administer ordered medications to maintain glucose  within target range  - Assess barriers to adequate nutritional intake and initiate nutrition consult as needed  - Instruct patient on self management of diabetes  Outcome: Progressing  Goal: Electrolytes maintained within normal limits  Description: INTERVENTIONS:  - Monitor labs and rhythm and assess patient for signs and symptoms of electrolyte imbalances  - Administer electrolyte replacement as ordered  - Monitor response to electrolyte replacements, including rhythm and repeat lab results as appropriate    Outcome: Progressing  Goal: Hemodynamic stability and optimal renal function maintained  Description: INTERVENTIONS:  - Monitor labs and assess for signs and symptoms of volume excess or deficit  - Monitor intake, output and patient weight  - Monitor urine specific gravity, serum osmolarity and serum sodium as indicated or ordered  - Monitor response to interventions for patient's volume status, including labs, urine output, blood pressure (other measures as avail)  Outcome: Progressing     Problem: SKIN/TISSUE INTEGRITY - ADULT  Goal: Incision(s), wounds(s) or drain site(s) healing without S/S of infection  Description: INTERVENTIONS:  - Assess and document risk factors for pressure ulcer development  - Assess and document skin integrity  - Assess and document dressing/incision, wound bed, drain sites   Outcome: Progressing

## 2024-02-13 NOTE — ANESTHESIA POSTPROCEDURE EVALUATION
[facility]    Rayne Morales Patient Status:  Inpatient   Age/Gender 78 year old female MRN [id number]   Location [facility] 0SW-A Attending Luz Franco DO   Hosp Day # 1 PCP Jose Daniel Washington MD       Anesthesia Post-op Note    LAPAROSCOPIC LYSIS OF ADHESIONS AND APPENDECTOMY    Procedure Summary       Date: 02/12/24 Room / Location:  MAIN OR 08 /  MAIN OR    Anesthesia Start: 1633 Anesthesia Stop: 1820    Procedure: LAPAROSCOPIC LYSIS OF ADHESIONS AND APPENDECTOMY (Abdomen) Diagnosis:       Appendicitis      (Appendicitis [K37])    Surgeons: Chalino Harris MD Anesthesiologist: Varun Flores MD    Anesthesia Type: general ASA Status: 3            Anesthesia Type: general    Vitals Value Taken Time   /81 02/13/24 0529   Temp 98.6 °F (37 °C) 02/13/24 0529   Pulse 103 02/13/24 0611   Resp 18 02/13/24 0529   SpO2 96 % 02/13/24 0611   Vitals shown include unfiled device data.    Patient Location: PACU    Anesthesia Type: general    Airway Patency: extubated and patent    Postop Pain Control: adequate    Mental Status: preanesthetic baseline    Nausea/Vomiting: none    Cardiopulmonary/Hydration status: stable euvolemic    Complications: no apparent anesthesia related complications    Postop vital signs: stable    Dental Exam: Unchanged from Preop    Patient to be discharged from PACU when criteria met.

## 2024-02-13 NOTE — CM/SW NOTE
Patient failed Inpatient criteria. Second level of review completed and supports Observation. UR committee in agreement. Discussed with Dr Franco, approves.   Chet ANDRADE RN, 02/13/24, 12:37 PM

## 2024-02-13 NOTE — DIETARY NOTE
[facility]   CLINICAL NUTRITION    Rayne Morales     Admitting diagnosis:  RLQ abdominal pain [R10.31]  Liver mass [R16.0]    Ht: 154.9 cm (5' 1\")  Wt: 81.2 kg (179 lb).   Body mass index is 33.82 kg/m².    Wt Readings from Last 6 Encounters:   02/12/24 81.2 kg (179 lb)   02/12/24 75.3 kg (166 lb)   02/05/24 77.1 kg (169 lb 15.6 oz)   01/22/24 77.5 kg (170 lb 12.8 oz)   01/15/24 72.6 kg (160 lb)   12/11/23 76.5 kg (168 lb 9.6 oz)      Labs/Meds reviewed    Diet:       Procedures    Regular/General diet Is Patient on Accuchecks? Yes     Percent Meals Eaten (last 3 days)       Date/Time Percent Meals Eaten (%)    02/12/24 1408 0 %          Pt chart reviewed d/t elevated A1C. Discussed carb controlled diet with patient. Pt follows DM diet at home. Offered handout and pt declined, knows what to do.   Patient reports poor appetite at this time. S/p appendectomy  Nursing notes reports Percent Meals Eaten (%): 0 % intake for last meal.  No significant weight changes noted.     Patient is at low nutrition risk at this time.    Please consult if patient status changes or nutrition issues arise.    Shavonne Tristan RD, LDN  Clinical Dietitian

## 2024-02-13 NOTE — DISCHARGE INSTRUCTIONS
Home Care Instructions  Laparoscopic Appendectomy  Karime Davidson PA-C      WHAT TO EXPECT  You may feel pain at the incisions. This is due to stitches placed during the surgery.    You may feel pain in the shoulders. This is due to irritation of the diaphragm by the air used to inflate the abdomen.    You may feel a sore throat. This is due to the breathing tube used during surgery.     You may feel mild nausea and vomiting for the first 24 hours, this should resolve quickly.    You may have constipation, especially if taking narcotic pain medications. If you have not had a bowel movement by 48 hours after surgery, take Miralax 17g (one cap full) every 12 hours until you have a bowel movement. If another 24 hours goes by without a bowel movement, then take a dose of magnesium citrate or milk of magnesia.     MEDICATIONS  Take 2 Extra Strength Tylenol (1000mg every) 8 hours for pain. For the first 3 days it is best to take the Tylenol every 8 hours even if you do not feel much pain.     For moderate to severe pain take one Oxycodone pill (5mg) every six hours as needed for pain. If you do not feel that narcotics are necessary you shouldn’t take them. If the pain is severe you can take two pills (10mg) every six hours.    You can also take Advil (ibuprofen) 800mg every 8 hours or Alleve (naproxen) 500mg every 12 hours for pain.     Please ask your surgeon before resuming blood thinners such as Aspirin, Plavix, Coumadin, Warfarin, Eliquis, or Xarelto. All other home medications may be resumed as scheduled.     DIET  Start with a bland diet and slowly advance to regular food as your appetite improves. Avoid spicy and greasy/fried foods until your appetite returns. Do not eat large meals. Eat small frequent meals.     Drink plenty of water or a sports drink to stay hydrated.    Do not drink alcohol (beer, wine, liquor) or use tobacco products.     You may notice loose stool or diarrhea if you eat greasy or  fatty foods.      WOUND CARE  The incisions are covered with skin glue. You can shower 24 hours after surgery and get the dressings wet.    The skin glue will stay on for 10 to 14 days after surgery.     Soap and water can get on the incisions but do not scrub the wounds. No hair dye or chemicals of any kind should get on the incisions.     Do not apply any topical ointments such as Neosporin or Hydrogen Peroxide.    Do not swim or submerge the incisions under water for 1 month.    ACTIVITY  Every day you should be up walking around the house or out doors. Do not lie in bed all day. Staying active prevents blood clots and pneumonia.    You can go up and down stairs carefully.      Do not lift more than 20 pounds or perform strenuous activity that requires straining the core muscles for 6 weeks.    You may ride in a car but should not drive the car for at least one week.     APPOINTMENT  Please call our office at [phone] soon to make an appointment.    For questions or concerns please call our office between 8:30 a.m. and 5 p.m. Monday through Friday. The number above directs to the answering service after hours to reach the on-call physician.    Please call our office immediately for fever greater than 100.5, excess bleeding, inability to urinate, severe abdominal pain, severe diarrhea, uncontrollable vomiting.      For life threatening emergencies such as severe chest pain, difficulty breathing, or loss of conciousness call [phone].

## 2024-02-13 NOTE — PROGRESS NOTES
[facility]  Progress Note    Rayne Morales Patient Status:  Inpatient    1945 MRN [id number]   Location [facility] 0SW-A Attending Luz Franco,    Hosp Day # 1 PCP Jose Daniel Washington MD     Subjective:  The patient was seen and examined at bedside. No acute events overnight. The patient states she feels like she was \"hit by a truck.\" She states the pain she was having prior to surgery yesterday in the right lower quadrant has resolved. She states she primarily has soreness now in the left lower quadrant. She states she has not had any PO intake since the time of her operation. She denies nausea or vomiting.      Objective/Physical Exam:  BP (!) 182/78 (BP Location: Right arm)   Pulse 102   Temp 97.5 °F (36.4 °C) (Oral)   Resp 17   Ht 61\"   Wt 179 lb (81.2 kg)   LMP  (LMP Unknown)   SpO2 96%   BMI 33.82 kg/m²     Intake/Output Summary (Last 24 hours) at 2024 0759  Last data filed at 2024 0629  Gross per 24 hour   Intake 1795 ml   Output 171 ml   Net 1624 ml         General: Alert, oriented x3. No acute distress.  HEENT: Normocephalic, atraumatic. No scleral icterus.  Pulmonary: No respiratory distress, effort normal.   Abdomen: Non-distended, without tympany to percussion. Soft, appropriate incisional site tenderness to palpation. No rebound or guarding. No peritoneal signs.   Incision: laparoscopic incision sites are clean, dry, intact without surrounding erythema or cellulitis. Skin glue in place   Extremities: No lower extremity edema. No clubbing or cyanosis.   Skin: Warm, dry. No jaundice.       Labs:  Lab Results   Component Value Date    WBC 5.8 2024    HGB 13.5 2024    HCT 39.9 2024    .0 2024      Lab Results   Component Value Date    PT 19.4 (H) 2014    PT 13.9 2014    INR 2.33 (H) 2023    INR 1.00 2022    INR 2.58 (H) 2022     Lab Results   Component Value Date     2024    K 5.3 2024      02/12/2024    CO2 22.0 02/12/2024    BUN 15 02/12/2024    CREATSERUM 0.83 02/12/2024     02/12/2024    CA 9.7 02/12/2024    ALKPHO 72 02/12/2024    ALT 27 02/12/2024    AST 55 02/12/2024    BILT 0.8 02/12/2024    ALB 3.9 02/12/2024    TP 7.6 02/12/2024     Lab Results   Component Value Date    COLORUR Light-Yellow 02/12/2024    CLARITY Clear 02/12/2024    SPECGRAVITY >1.030 02/12/2024    GLUUR Normal 02/12/2024    BILUR Negative 02/12/2024    KETUR Negative 02/12/2024    BLOODURINE Negative 02/12/2024    PHURINE 5.0 02/12/2024    PROUR Negative 02/12/2024    UROBILINOGEN Normal 02/12/2024    NITRITE Negative 02/12/2024    LEUUR Negative 02/12/2024       Assessment:  Patient Active Problem List   Diagnosis    Recurrent UTI    Fatigue    Primary hypertension    Pure hypercholesterolemia    Anxiety    Age-related osteoporosis without current pathological fracture    B12 deficiency    Spondylosis of lumbosacral region    Sacroiliac joint dysfunction    History of syncope    Asthma    Cyst of brain    History of basal cell carcinoma    History of recurrent pneumonia    Thrombocytopenia (HCC)    Gastroesophageal reflux disease without esophagitis    Enlarged thyroid    History of left breast cancer    Carpal tunnel syndrome of left wrist    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)     Atherosclerosis of aorta (HCC)    MUKUND on CPAP    Chronic pain syndrome    Tremor of both hands    Migraine without aura or status migrainosus    Major depressive disorder, recurrent episode, moderate (HCC)    Coronary artery disease involving native coronary artery of native heart without angina pectoris    Visual impairment    S/P cervical spinal fusion    Neuropathy    IgG deficiency (HCC)    History of DVT (deep vein thrombosis)    Cervical spinal stenosis    Cervical radiculopathy    PHILLIPS (dyspnea on exertion)    Type 2 diabetes mellitus without complication, without long-term current use of insulin (HCC)    Anemia,  unspecified type    Primary osteoarthritis of right knee    Community acquired pneumonia, unspecified laterality    Hyperkalemia    Hyperglycemia    RLQ abdominal pain    Liver mass    Abnormal CT of the abdomen     POD 1 laparoscopic appendectomy and lysis of adhesions     Plan:  Overall, the patient is doing well post-operatively. She may discharge home from a general surgical standpoint once tolerating a diet  Start on a clear liquid diet and advance diet as tolerated  Antiemetics and analgesics as needed. Limit IV pain medications  Encourage ambulation  Encourage incentive spirometer   Medical management per primary   DVT prophylaxis with heparin. The patient may resume her at home xarelto tomorrow  GI prophylaxis with protonix       The patient was discussed with Dr. Harris , and he is in agreement with the assessment and plan. My total face time with this patient was 25 minutes. Greater than half of the visit was spent in counseling the patient on the above listed diagnoses and treatment options.     Karime Davidson PA-C  2/13/2024  7:59 AM      Patient seen and examined, I agree with the documentation above with the following addendum.    No acute events overnight. Reports abdominal pain about the left lower quadrant incision. Denies nausea or vomiting.   Physical exam:  General: NAD  Abdomen: soft, tender about the incisions, non distended, no rebound tenderness or guarding, incisions are clean dry and intact      Assesment & Plan:  78 year old female, 1 Day Post-Op from laparoscopic lysis of adhesions and appendectomy  Post operative pain well controlled  Clear liquid diet and adat  Patient is clear for discharge once tolerating diet and pain well controlled  Ok to restart AC tomorrow  Follow up in the office in 2 weeks      Chalino Harris MD  [facility] General Surgery

## 2024-02-14 ENCOUNTER — PATIENT OUTREACH (OUTPATIENT)
Dept: CASE MANAGEMENT | Age: 79
End: 2024-02-14

## 2024-02-14 DIAGNOSIS — R10.31 RLQ ABDOMINAL PAIN: Primary | ICD-10-CM

## 2024-02-14 PROCEDURE — 1111F DSCHRG MED/CURRENT MED MERGE: CPT

## 2024-02-14 NOTE — PROGRESS NOTES
Initial Post Discharge Follow Up   Discharge Date: 2/13/24  Contact Date: 2/14/2024    Consent Verification:  Assessment Completed With: Patient  HIPAA Verified?  Yes    Discharge Dx:   RLQ abdominal pain     General:   How have you been since your discharge from the hospital? I feel like I've been kicked in the stomach but other than that I'm doing okay.   Do you have any pain since discharge?  Yes   Where: abdomen   Rating on pain scale 1-10, 10 being the worst pain you have ever experienced, what is current pain: 6  Alleviating factors: none  Aggravating factors: none  Is the pain manageable at home? Yes  How well was your pain managed while in the hospital?   On a scale of 1-5   1- Very Poor and 5- Very well   Very Well  When you were leaving the hospital were your discharge instructions reviewed with you? Yes  How well were your discharge instructions explained to you?   On a scale of 1-5   1- Very Poor and 5- Very well   Very Well  Do you have any questions about your discharge instructions?  No  Before leaving the hospital was your diagnoses explained to you? Yes  Do you have any questions about your diagnoses? No  Are you able to perform normal daily activities of living as you have prior to your hospital stay (dressing, bathing, ambulating to the bathroom, etc)? yes  (NCM) Was patient given a different diet per AVS? no      Medications:   Current Outpatient Medications   Medication Sig Dispense Refill    oxyCODONE 5 MG Oral Tab Take 1 tablet (5 mg total) by mouth every 6 (six) hours as needed for Pain. 15 tablet 0    Naloxone HCl 4 MG/0.1ML Nasal Liquid 4 mg by Nasal route as needed. If patient remains unresponsive, repeat dose in other nostril 2-5 minutes after first dose. 1 kit 0    ondansetron 4 MG Oral Tablet Dispersible Take 1 tablet (4 mg total) by mouth every 12 (twelve) hours as needed for Nausea. 20 tablet 0    metFORMIN  MG Oral Tablet 24 Hr Take 1 tablet (500 mg total) by mouth 2 (two)  times daily with meals. Take 2 tablets (1,000 mg total) by mouth daily with breakfast and 1 tablet (500 mg total) every evening.      metroNIDAZOLE 0.75 % External Gel Apply 1 g topically 2 (two) times daily. Apply to face twice daily 45 g 5    semaglutide 2 MG/3ML Subcutaneous Solution Pen-injector Inject 0.5 mg into the skin once a week. Pt takes on Saturdays- has not switched yet to Mounjaro      DULOXETINE 30 MG Oral Cap DR Particles Take 1 capsule (30 mg total) by mouth daily. To take along with 60mg to equal 90mg daily. 90 capsule 1    PANTOPRAZOLE 40 MG Oral Tab EC Take 1 tablet (40 mg total) by mouth before breakfast. 90 tablet 1    DULoxetine 60 MG Oral Cap DR Particles Take 1 capsule (60 mg total) by mouth daily. 90 capsule 1    rivaroxaban (XARELTO) 20 MG Oral Tab Take 1 tablet (20 mg total) by mouth daily. 90 tablet 0    fluticasone-salmeterol 250-50 MCG/ACT Inhalation Aerosol Powder, Breath Activated Inhale 1 puff into the lungs every 12 (twelve) hours.      rosuvastatin 10 MG Oral Tab Take 1 tablet (10 mg total) by mouth nightly. 90 tablet 1    albuterol 108 (90 Base) MCG/ACT Inhalation Aero Soln Inhale 2 puffs into the lungs every 6 (six) hours as needed for Wheezing. 1 each 3    LISINOPRIL 20 MG Oral Tab TAKE 1 TABLET BY MOUTH DAILY 90 tablet 3    pregabalin 100 MG Oral Cap Take 1 capsule (100 mg total) by mouth in the morning and 1 capsule (100 mg total) at noon and 1 capsule (100 mg total) in the evening. 270 capsule 1    tiZANidine 4 MG Oral Tab Take 1 tablet (4 mg total) by mouth every 8 (eight) hours as needed. (Patient taking differently: Take 1 tablet (4 mg total) by mouth every 8 (eight) hours as needed. Takes every night) 90 tablet 2    aspirin 81 MG Oral Chew Tab Chew 1 tablet (81 mg total) by mouth daily.      Fluticasone Propionate 50 MCG/ACT Nasal Suspension 1 spray by Nasal route 2 (two) times daily. 1 Bottle 3    Cholecalciferol (VITAMIN D) 2000 units Oral Tab Take 2,000 Units by mouth  daily.       Were there any changes to your current medication(s) noted on the AVS? Yes  If so, were these medication changes discussed with you prior to leaving the hospital? Yes  If a new medication was prescribed:    Was the new medication's purpose & side effects reviewed? Yes  Do you have any questions about your new medication? No  Did you  your discharge medications when you left the hospital? Yes  Let's go over your medications together to make sure we are not missing anything. Medications Reviewed  Are there any reasons that keep you from taking your medication as prescribed? No  Are you having any concerns with constipation? No  Did patient receive their flu shot (Sept-March)? Yes    Discharge medications reviewed/discussed/and reconciled against outpatient medications with patient.  Any changes or updates to medications sent to PCP.  Patient Acknowledged     Referrals/orders at D/C:  Referrals/orders placed at D/C? no    DME ordered at D/C? No      Discharge orders, AVS reviewed and discussed with patient. Any changes or updates to orders sent to PCP.  Patient Acknowledged      SDOH:   Transportation Needs: No Transportation Needs (2/12/2024)    Transportation Needs     Lack of Transportation: No     Financial Resource Strain: Low Risk  (10/25/2023)    Financial Resource Strain     Difficulty of Paying Living Expenses: Not hard at all     Med Affordability: No         Follow up appointments:      Your appointments       Date & Time Appointment Department ([place])    Feb 16, 2024 10:00 AM CST Pre-Procedure Testing Lab Visit with [facility] RESOURCE [facility], [address] [place] [address] ([address])        Feb 19, 2024 11:30 AM CST Follow Up Visit with Monica Curran APRN [facility], [address] [place] [address] ([address]/[facility])        Apr 10, 2024 10:45 AM CDT Exam - Established with Makayla Farley DO [facility], [facility] [place],  [place] ([facility])        Aug 09, 2024 10:30 AM CDT Established Patient with Zac Christian MD [facility] ([facility])              [facility], [address], [place]  [address]  [address]  [facility] [address]  [phone] [facility], [address] [place] [address], [place]  [facility] [place]  [address]  [facility] [id number]  [phone] [facility], [facility], [facility]  [address]  [facility] [id number] [phone]    ROHINI LUTZ  [address]  [facility] [address]  [phone]            TCC  Was TCC ordered: No      PCP (If no TCC appointment)  Does patient already have a PCP appointment scheduled? Yes  NCM Confirmed PCP office HFU appointment with patient    Specialist    Does the patient have any other follow up appointment(s) needing to be scheduled? Yes  If yes: NCM reviewed upcoming specialist appointment with patient: Yes  Does the patient need assistance scheduling appointment(s): No, pt states that she will schedule post op    Is there any reason as to why you cannot make your appointment(s)?  No     Needs post D/C:   Now that you are home, are there any needs or concerns you need addressed before your next visit with your PCP?  (DME, meds, questions, etc.): No    Interventions by NCM:   NCM reviewed discharge instructions and when to seek medical attention with the patient. She states that she feels like she's been kicked in the stomach but not any worse than it has been since the surgery. She states that the pain medication is helping. NCM instructed on s/s of infection; she v/u. She has not checked her bp or bs yet but states that she will. She denied having any fever, n/v/c/d, sob, lightheadedness, HA or any new or worsening symptoms. Med review  completed. She denied having any questions or concerns at this time.      CCM referral placed:    Not Applicable, pt is enrolled.     BOOK BY DATE: 2/27/24

## 2024-02-16 PROBLEM — J18.9 COMMUNITY ACQUIRED PNEUMONIA, UNSPECIFIED LATERALITY: Status: RESOLVED | Noted: 2024-01-14 | Resolved: 2024-02-16

## 2024-02-16 NOTE — PROGRESS NOTES
Subjective:   Rayne Morales is a 78 year old female who presents for hospital follow up.   She was discharged from Inpatient hospital, [facility] to Home   Admit Date: 2/12  Discharge Date: 2/13  Hospital Discharge Diagnosis:   Appendicitis  pathology pending.       Interactive contact within 2 business days post discharge first initiated on Date: 2/14/2024    During the visit, the following was completed:  Obtained and reviewed discharge summary, continuity of care documents, and Hospitalist notes  Reviewed Labs (CBC, CMP)    HPI: presents for hospital follow up   sent to ER from my office last week with increasing RLQ pain and abnormal CT previous ER visit.  She underwent appendectomy and d/c home.   Pathology still pending.     Right knee OA  wants to have knee surgery she is aware this not recommended at this time.  She will cancel and reschedule.  She agrees.     HTN  lisinopril     Diabetes  A1c elevated 8.5  12/4  Metformin 500/1000  Ozempic   glipizide.     CAD  stable      Depression  duloxetine.     Recurrent pneumonia with IgG deficiency.  Dr Chambers and VanOsdol  MUKUND  Dr Chambers    Hx DVT/PE  xarelto.  Needs hematology f/u at some point.      She is starting IgG injections as soon as the home care nurse is able to come to her house and demonstrate.      History/Other:   Current Medications:  Medication Reconciliation:  I am aware of an inpatient discharge within the last 30 days.  The discharge medication list has been reconciled with the patient's current medication list and reviewed by me.  See medication list for additions of new medication, and changes to current doses of medications and discontinued medications.  Outpatient Medications Marked as Taking for the 2/19/24 encounter (Office Visit) with Monica Curran APRN   Medication Sig    pregabalin 100 MG Oral Cap Take 1 capsule (100 mg total) by mouth in the morning and 1 capsule (100 mg total) at noon and 1 capsule (100 mg total) in the  evening.    oxyCODONE 5 MG Oral Tab Take 1 tablet (5 mg total) by mouth every 6 (six) hours as needed for Pain.    Naloxone HCl 4 MG/0.1ML Nasal Liquid 4 mg by Nasal route as needed. If patient remains unresponsive, repeat dose in other nostril 2-5 minutes after first dose.    ondansetron 4 MG Oral Tablet Dispersible Take 1 tablet (4 mg total) by mouth every 12 (twelve) hours as needed for Nausea.    metFORMIN  MG Oral Tablet 24 Hr Take 1 tablet (500 mg total) by mouth 2 (two) times daily with meals. Take 2 tablets (1,000 mg total) by mouth daily with breakfast and 1 tablet (500 mg total) every evening.    metroNIDAZOLE 0.75 % External Gel Apply 1 g topically 2 (two) times daily. Apply to face twice daily    semaglutide 2 MG/3ML Subcutaneous Solution Pen-injector Inject 0.5 mg into the skin once a week. Pt takes on Saturdays- has not switched yet to Mounjaro    DULOXETINE 30 MG Oral Cap DR Particles Take 1 capsule (30 mg total) by mouth daily. To take along with 60mg to equal 90mg daily.    PANTOPRAZOLE 40 MG Oral Tab EC Take 1 tablet (40 mg total) by mouth before breakfast.    DULoxetine 60 MG Oral Cap DR Particles Take 1 capsule (60 mg total) by mouth daily.    rivaroxaban (XARELTO) 20 MG Oral Tab Take 1 tablet (20 mg total) by mouth daily.    fluticasone-salmeterol 250-50 MCG/ACT Inhalation Aerosol Powder, Breath Activated Inhale 1 puff into the lungs every 12 (twelve) hours.    rosuvastatin 10 MG Oral Tab Take 1 tablet (10 mg total) by mouth nightly.    albuterol 108 (90 Base) MCG/ACT Inhalation Aero Soln Inhale 2 puffs into the lungs every 6 (six) hours as needed for Wheezing.    LISINOPRIL 20 MG Oral Tab TAKE 1 TABLET BY MOUTH DAILY    tiZANidine 4 MG Oral Tab Take 1 tablet (4 mg total) by mouth every 8 (eight) hours as needed. (Patient taking differently: Take 1 tablet (4 mg total) by mouth every 8 (eight) hours as needed. Takes every night)    aspirin 81 MG Oral Chew Tab Chew 1 tablet (81 mg total) by  mouth daily.    Fluticasone Propionate 50 MCG/ACT Nasal Suspension 1 spray by Nasal route 2 (two) times daily.    Cholecalciferol (VITAMIN D) 2000 units Oral Tab Take 2,000 Units by mouth daily.       Review of Systems:  GENERAL: stable   LUNGS: denies shortness of breath with exertion  CARDIOVASCULAR: denies chest pain on exertion or palpitations  GI: as above   MUSCULOSKELETAL: denies pain, normal range of motion of extremities  NEURO: denies headaches, denies dizziness, denies weakness    Objective:   No LMP recorded (lmp unknown). Patient has had a hysterectomy.  Estimated body mass index is 31.29 kg/m² as calculated from the following:    Height as of this encounter: 5' 1\" (1.549 m).    Weight as of this encounter: 165 lb 9.6 oz (75.1 kg).   /52   Pulse 85   Resp 16   Ht 5' 1\" (1.549 m)   Wt 165 lb 9.6 oz (75.1 kg)   LMP  (LMP Unknown)   SpO2 97%   BMI 31.29 kg/m²    GENERAL: well developed, well nourished, in no apparent distress  LUNGS: clear to auscultation  CARDIO: RRR   GI: good BS's, no masses, surgical tenderness.  Lap sites with ecchymosis.  Soft.    MUSCULOSKELETAL: FROM of the extremities  EXTREMITIES: no edema  NEURO: Oriented times three,   Assessment & Plan:   1. Appendicitis, unspecified appendicitis type (Primary)  awaiting pathology   will schedule f/u with surgery   2. History of appendectomy  3. IgG deficiency (HCC)  per Dr Paiz  to receive injections.    Overview:  ??? pt states she gets IgG infusions due to lack of IgG, unable to fight off infections    4. Type 2 diabetes mellitus without complication, without long-term current use of insulin (HCC)  cont meds.  Labs in March/April.   5. Coronary artery disease involving native coronary artery of native heart without angina pectoris stable  per cardiology  6. Primary hypertension  stable    7. Pure hypercholesterolemia  stable  8. Primary osteoarthritis of right knee  needs TKR but now is not ideal.    9. Neuropathy  lyrica   filled.   Overview:  rt foot bilateral hands    Orders:  -     Pregabalin; Take 1 capsule (100 mg total) by mouth in the morning and 1 capsule (100 mg total) at noon and 1 capsule (100 mg total) in the evening.  Dispense: 270 capsule; Refill: 1  10. History of DVT (deep vein thrombosis)  will call to schedule with hematologist.   -     OP REFERRAL TO [facility] HEMATOLOGY/ONCOLOGY GROUP  11. History of left breast cancer  -     OP REFERRAL TO [facility] HEMATOLOGY/ONCOLOGY GROUP  12. Thrombocytopenia (HCC)  monitor.   -     OP REFERRAL TO [facility] HEMATOLOGY/ONCOLOGY GROUP  13. History of pulmonary embolism  -     OP REFERRAL TO [facility] HEMATOLOGY/ONCOLOGY GROUP        No follow-ups on file.

## 2024-02-19 ENCOUNTER — OFFICE VISIT (OUTPATIENT)
Dept: INTERNAL MEDICINE CLINIC | Facility: CLINIC | Age: 79
End: 2024-02-19
Payer: MEDICARE

## 2024-02-19 ENCOUNTER — LAB ENCOUNTER (OUTPATIENT)
Dept: LAB | Age: 79
End: 2024-02-19
Attending: NURSE PRACTITIONER
Payer: MEDICARE

## 2024-02-19 VITALS
SYSTOLIC BLOOD PRESSURE: 118 MMHG | DIASTOLIC BLOOD PRESSURE: 52 MMHG | WEIGHT: 165.63 LBS | OXYGEN SATURATION: 97 % | BODY MASS INDEX: 31.27 KG/M2 | RESPIRATION RATE: 16 BRPM | HEIGHT: 61 IN | HEART RATE: 85 BPM

## 2024-02-19 DIAGNOSIS — Z85.3 HISTORY OF LEFT BREAST CANCER: ICD-10-CM

## 2024-02-19 DIAGNOSIS — K37 APPENDICITIS, UNSPECIFIED APPENDICITIS TYPE: Primary | ICD-10-CM

## 2024-02-19 DIAGNOSIS — D80.3 IGG DEFICIENCY (HCC): ICD-10-CM

## 2024-02-19 DIAGNOSIS — Z86.711 HISTORY OF PULMONARY EMBOLISM: ICD-10-CM

## 2024-02-19 DIAGNOSIS — E11.9 TYPE 2 DIABETES MELLITUS WITHOUT COMPLICATION, WITHOUT LONG-TERM CURRENT USE OF INSULIN (HCC): ICD-10-CM

## 2024-02-19 DIAGNOSIS — E78.00 PURE HYPERCHOLESTEROLEMIA: ICD-10-CM

## 2024-02-19 DIAGNOSIS — M17.11 PRIMARY OSTEOARTHRITIS OF RIGHT KNEE: ICD-10-CM

## 2024-02-19 DIAGNOSIS — G62.9 NEUROPATHY: ICD-10-CM

## 2024-02-19 DIAGNOSIS — D69.6 THROMBOCYTOPENIA (HCC): ICD-10-CM

## 2024-02-19 DIAGNOSIS — Z86.718 HISTORY OF DVT (DEEP VEIN THROMBOSIS): ICD-10-CM

## 2024-02-19 DIAGNOSIS — I10 PRIMARY HYPERTENSION: ICD-10-CM

## 2024-02-19 DIAGNOSIS — Z90.49 HISTORY OF APPENDECTOMY: ICD-10-CM

## 2024-02-19 DIAGNOSIS — I25.10 CORONARY ARTERY DISEASE INVOLVING NATIVE CORONARY ARTERY OF NATIVE HEART WITHOUT ANGINA PECTORIS: ICD-10-CM

## 2024-02-19 RX ORDER — PREGABALIN 100 MG/1
100 CAPSULE ORAL 3 TIMES DAILY
Qty: 270 CAPSULE | Refills: 1 | Status: SHIPPED | OUTPATIENT
Start: 2024-02-19

## 2024-02-20 ENCOUNTER — TELEPHONE (OUTPATIENT)
Dept: HEMATOLOGY/ONCOLOGY | Facility: HOSPITAL | Age: 79
End: 2024-02-20

## 2024-02-20 NOTE — TELEPHONE ENCOUNTER
Pt is calling, she would like to see Dr Jacobsen instead of Dr Rahman. She said the reason is miscommunication-NL

## 2024-02-22 ENCOUNTER — PATIENT OUTREACH (OUTPATIENT)
Dept: CASE MANAGEMENT | Age: 79
End: 2024-02-22

## 2024-02-22 ENCOUNTER — NURSE TRIAGE (OUTPATIENT)
Dept: INTERNAL MEDICINE CLINIC | Facility: CLINIC | Age: 79
End: 2024-02-22

## 2024-02-22 NOTE — TELEPHONE ENCOUNTER
LOV 2/19/24, BP in office 118/52  Spoke w/ pt.     Having home infusion and the nurse concerned with BP   First /102, took pain pill and benadryl, most recent 173/94.   Just had emergency appendectomy (2/12/24, hosp admit)  Has taken Lisinopril this AM, wondering if she should take Amlodipine?   Denies chest pain, SOB, headache, dizziness/lightheadedness       SD - Denies any symptoms. Pt wondering if she should take Amlodipine? Not on current med list, pt stated was discont due to low BP

## 2024-02-22 NOTE — TELEPHONE ENCOUNTER
Spoke w/ pt. Notified SD states if BP is consistently >140/90 should take Amlodipine 2.5mg. Pt stated most recent BP was 164/96. Notified since a few readings over 140/90, advised pt to take Amlodipine 2.5mg. Advised pt to keep record of BP and call us tomorrow with BP update. Pt stated she can do that. Appreciative of call and recs.     Will postpone for BP update tomorrow

## 2024-02-22 NOTE — TELEPHONE ENCOUNTER
I am certain she is nervous as well.  Continue to monitor bp  if consistently > 140/90, take amlodipine 2.5mg

## 2024-02-22 NOTE — PROGRESS NOTES
Finishing up an at home IgG treatment she asked to be called back in 30 minutes.      Called pt back no answer left detailed message to call back.      Total time spent with patient including chart review: 7  Time spent with patient this month: 7    Total time spent with communication and chart review this month to date: 7

## 2024-02-27 ENCOUNTER — HOSPITAL ENCOUNTER (INPATIENT)
Facility: HOSPITAL | Age: 79
LOS: 5 days | Discharge: HOME | End: 2024-03-03
Attending: EMERGENCY MEDICINE | Admitting: HOSPITALIST
Payer: MEDICARE

## 2024-02-27 ENCOUNTER — APPOINTMENT (OUTPATIENT)
Dept: GENERAL RADIOLOGY | Facility: HOSPITAL | Age: 79
End: 2024-02-27
Attending: EMERGENCY MEDICINE
Payer: MEDICARE

## 2024-02-27 DIAGNOSIS — N28.9 ACUTE RENAL INSUFFICIENCY: ICD-10-CM
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
## 2024-02-27 DIAGNOSIS — J18.9 NOSOCOMIAL PNEUMONIA: Primary | ICD-10-CM

## 2024-02-27 DIAGNOSIS — R09.02 HYPOXIA: ICD-10-CM

## 2024-02-27 LAB
ALBUMIN SERPL-MCNC: 3.7 G/DL (ref 3.4–5)
ALBUMIN/GLOB SERPL: 1 {RATIO} (ref 1–2)
ALP LIVER SERPL-CCNC: 73 U/L
ALT SERPL-CCNC: 19 U/L
ANION GAP SERPL CALC-SCNC: 8 MMOL/L (ref 0–18)
AST SERPL-CCNC: 29 U/L (ref 15–37)
ATRIAL RATE: 109 BPM
BASOPHILS # BLD AUTO: 0.02 X10(3) UL (ref 0–0.2)
BASOPHILS NFR BLD AUTO: 0.3 %
BILIRUB SERPL-MCNC: 0.6 MG/DL (ref 0.1–2)
BUN BLD-MCNC: 35 MG/DL (ref 9–23)
CALCIUM BLD-MCNC: 8.6 MG/DL (ref 8.5–10.1)
CHLORIDE SERPL-SCNC: 104 MMOL/L (ref 98–112)
CO2 SERPL-SCNC: 22 MMOL/L (ref 21–32)
CREAT BLD-MCNC: 1.81 MG/DL
EGFRCR SERPLBLD CKD-EPI 2021: 28 ML/MIN/1.73M2 (ref 60–?)
EOSINOPHIL # BLD AUTO: 0.07 X10(3) UL (ref 0–0.7)
EOSINOPHIL NFR BLD AUTO: 1.2 %
ERYTHROCYTE [DISTWIDTH] IN BLOOD BY AUTOMATED COUNT: 13.4 %
FLUAV + FLUBV RNA SPEC NAA+PROBE: NEGATIVE
FLUAV + FLUBV RNA SPEC NAA+PROBE: NEGATIVE
GLOBULIN PLAS-MCNC: 3.6 G/DL (ref 2.8–4.4)
GLUCOSE BLD-MCNC: 129 MG/DL (ref 70–99)
GLUCOSE BLD-MCNC: 158 MG/DL (ref 70–99)
GLUCOSE BLD-MCNC: 194 MG/DL (ref 70–99)
HCT VFR BLD AUTO: 40.2 %
HGB BLD-MCNC: 12.8 G/DL
IMM GRANULOCYTES # BLD AUTO: 0.01 X10(3) UL (ref 0–1)
IMM GRANULOCYTES NFR BLD: 0.2 %
LACTATE SERPL-SCNC: 1.5 MMOL/L (ref 0.4–2)
LACTATE SERPL-SCNC: 3.7 MMOL/L (ref 0.4–2)
LYMPHOCYTES # BLD AUTO: 0.63 X10(3) UL (ref 1–4)
LYMPHOCYTES NFR BLD AUTO: 10.6 %
MCH RBC QN AUTO: 29.7 PG (ref 26–34)
MCHC RBC AUTO-ENTMCNC: 31.8 G/DL (ref 31–37)
MCV RBC AUTO: 93.3 FL
MONOCYTES # BLD AUTO: 0.46 X10(3) UL (ref 0.1–1)
MONOCYTES NFR BLD AUTO: 7.8 %
NEUTROPHILS # BLD AUTO: 4.73 X10 (3) UL (ref 1.5–7.7)
NEUTROPHILS # BLD AUTO: 4.73 X10(3) UL (ref 1.5–7.7)
NEUTROPHILS NFR BLD AUTO: 79.9 %
NT-PROBNP SERPL-MCNC: 520 PG/ML (ref ?–450)
OSMOLALITY SERPL CALC.SUM OF ELEC: 291 MOSM/KG (ref 275–295)
P AXIS: 38 DEGREES
P-R INTERVAL: 170 MS
PLATELET # BLD AUTO: 229 10(3)UL (ref 150–450)
POTASSIUM SERPL-SCNC: 4.5 MMOL/L (ref 3.5–5.1)
PROCALCITONIN SERPL-MCNC: 5.06 NG/ML (ref ?–0.16)
PROT SERPL-MCNC: 7.3 G/DL (ref 6.4–8.2)
Q-T INTERVAL: 316 MS
QRS DURATION: 66 MS
QTC CALCULATION (BEZET): 425 MS
R AXIS: -14 DEGREES
RBC # BLD AUTO: 4.31 X10(6)UL
RSV RNA SPEC NAA+PROBE: NEGATIVE
SARS-COV-2 RNA RESP QL NAA+PROBE: NOT DETECTED
SODIUM SERPL-SCNC: 134 MMOL/L (ref 136–145)
T AXIS: -6 DEGREES
TROPONIN I SERPL HS-MCNC: 14 NG/L
VENTRICULAR RATE: 109 BPM
WBC # BLD AUTO: 5.9 X10(3) UL (ref 4–11)

## 2024-02-27 PROCEDURE — 99223 1ST HOSP IP/OBS HIGH 75: CPT | Performed by: HOSPITALIST

## 2024-02-27 PROCEDURE — 71045 X-RAY EXAM CHEST 1 VIEW: CPT | Performed by: EMERGENCY MEDICINE

## 2024-02-27 RX ORDER — MELATONIN
3 NIGHTLY PRN
Status: DISCONTINUED | OUTPATIENT
Start: 2024-02-27 | End: 2024-03-03

## 2024-02-27 RX ORDER — NICOTINE POLACRILEX 4 MG
15 LOZENGE BUCCAL
Status: DISCONTINUED | OUTPATIENT
Start: 2024-02-27 | End: 2024-03-03

## 2024-02-27 RX ORDER — ASPIRIN 81 MG/1
81 TABLET, CHEWABLE ORAL DAILY
Status: DISCONTINUED | OUTPATIENT
Start: 2024-02-28 | End: 2024-03-03

## 2024-02-27 RX ORDER — METOCLOPRAMIDE HYDROCHLORIDE 5 MG/ML
5 INJECTION INTRAMUSCULAR; INTRAVENOUS EVERY 8 HOURS PRN
Status: DISCONTINUED | OUTPATIENT
Start: 2024-02-27 | End: 2024-03-03

## 2024-02-27 RX ORDER — GUAIFENESIN 600 MG/1
600 TABLET, EXTENDED RELEASE ORAL 2 TIMES DAILY
Status: DISCONTINUED | OUTPATIENT
Start: 2024-02-27 | End: 2024-03-03

## 2024-02-27 RX ORDER — DULOXETIN HYDROCHLORIDE 60 MG/1
60 CAPSULE, DELAYED RELEASE ORAL DAILY
Status: DISCONTINUED | OUTPATIENT
Start: 2024-02-28 | End: 2024-03-03

## 2024-02-27 RX ORDER — SODIUM CHLORIDE 9 MG/ML
INJECTION, SOLUTION INTRAVENOUS CONTINUOUS
Status: DISCONTINUED | OUTPATIENT
Start: 2024-02-27 | End: 2024-02-28

## 2024-02-27 RX ORDER — DEXTROSE MONOHYDRATE 25 G/50ML
50 INJECTION, SOLUTION INTRAVENOUS
Status: DISCONTINUED | OUTPATIENT
Start: 2024-02-27 | End: 2024-03-03

## 2024-02-27 RX ORDER — SENNOSIDES 8.6 MG
17.2 TABLET ORAL NIGHTLY PRN
Status: DISCONTINUED | OUTPATIENT
Start: 2024-02-27 | End: 2024-03-03

## 2024-02-27 RX ORDER — POLYETHYLENE GLYCOL 3350 17 G/17G
17 POWDER, FOR SOLUTION ORAL DAILY PRN
Status: DISCONTINUED | OUTPATIENT
Start: 2024-02-27 | End: 2024-03-03

## 2024-02-27 RX ORDER — PREGABALIN 100 MG/1
100 CAPSULE ORAL 3 TIMES DAILY
Status: DISCONTINUED | OUTPATIENT
Start: 2024-02-27 | End: 2024-03-03

## 2024-02-27 RX ORDER — TIZANIDINE 2 MG/1
4 TABLET ORAL EVERY 8 HOURS PRN
Status: DISCONTINUED | OUTPATIENT
Start: 2024-02-27 | End: 2024-03-03

## 2024-02-27 RX ORDER — HYDROCODONE BITARTRATE AND ACETAMINOPHEN 5; 325 MG/1; MG/1
1 TABLET ORAL EVERY 6 HOURS PRN
Status: DISCONTINUED | OUTPATIENT
Start: 2024-02-27 | End: 2024-03-03

## 2024-02-27 RX ORDER — BISACODYL 10 MG
10 SUPPOSITORY, RECTAL RECTAL
Status: DISCONTINUED | OUTPATIENT
Start: 2024-02-27 | End: 2024-03-03

## 2024-02-27 RX ORDER — PANTOPRAZOLE SODIUM 40 MG/1
40 TABLET, DELAYED RELEASE ORAL
Status: DISCONTINUED | OUTPATIENT
Start: 2024-02-28 | End: 2024-03-03

## 2024-02-27 RX ORDER — ONDANSETRON 2 MG/ML
4 INJECTION INTRAMUSCULAR; INTRAVENOUS EVERY 6 HOURS PRN
Status: DISCONTINUED | OUTPATIENT
Start: 2024-02-27 | End: 2024-03-03

## 2024-02-27 RX ORDER — ACETAMINOPHEN 500 MG
1000 TABLET ORAL EVERY 6 HOURS PRN
Status: DISCONTINUED | OUTPATIENT
Start: 2024-02-27 | End: 2024-03-03

## 2024-02-27 RX ORDER — ROSUVASTATIN CALCIUM 10 MG/1
10 TABLET, COATED ORAL NIGHTLY
Status: DISCONTINUED | OUTPATIENT
Start: 2024-02-27 | End: 2024-03-03

## 2024-02-27 RX ORDER — DULOXETIN HYDROCHLORIDE 30 MG/1
30 CAPSULE, DELAYED RELEASE ORAL DAILY
Status: DISCONTINUED | OUTPATIENT
Start: 2024-02-28 | End: 2024-03-03

## 2024-02-27 RX ORDER — OXYCODONE HYDROCHLORIDE 5 MG/1
5 TABLET ORAL EVERY 6 HOURS PRN
Status: DISCONTINUED | OUTPATIENT
Start: 2024-02-27 | End: 2024-03-03

## 2024-02-27 RX ORDER — NICOTINE POLACRILEX 4 MG
30 LOZENGE BUCCAL
Status: DISCONTINUED | OUTPATIENT
Start: 2024-02-27 | End: 2024-03-03

## 2024-02-27 RX ORDER — ALBUTEROL SULFATE 90 UG/1
2 AEROSOL, METERED RESPIRATORY (INHALATION) EVERY 6 HOURS PRN
Status: DISCONTINUED | OUTPATIENT
Start: 2024-02-27 | End: 2024-03-03

## 2024-02-27 RX ORDER — FLUTICASONE PROPIONATE 50 MCG
1 SPRAY, SUSPENSION (ML) NASAL 2 TIMES DAILY
Status: DISCONTINUED | OUTPATIENT
Start: 2024-02-27 | End: 2024-03-03

## 2024-02-27 RX ORDER — MORPHINE SULFATE 4 MG/ML
2 INJECTION, SOLUTION INTRAMUSCULAR; INTRAVENOUS ONCE
Status: COMPLETED | OUTPATIENT
Start: 2024-02-27 | End: 2024-02-27

## 2024-02-27 RX ORDER — SODIUM CHLORIDE 9 MG/ML
INJECTION, SOLUTION INTRAVENOUS CONTINUOUS
Status: DISCONTINUED | OUTPATIENT
Start: 2024-02-27 | End: 2024-02-29

## 2024-02-27 NOTE — PROGRESS NOTES
[facility] Note:  Renal Adjustment for ampicillin/sulbactam (UNASYN)    Rayne Morales is a 78 year old patient who has been prescribed ampicillin/sulbactam (UNASYN) 3 g every 6 hrs.  The estimated creatinine clearance is 19.6 mL/min (A) (based on SCr of 1.81 mg/dL (H)). The dose has been adjusted to ampicillin/sulbactam (UNASYN) 3 g every 12 hrs per hospital renal dose adjustment protocol for treatment of pneumonia.  Pharmacy will follow and adjust dose as warranted for additional renal function changes.    Thank you,    Fabienne Welch, PharmD  2/27/2024  4:55 PM

## 2024-02-27 NOTE — ED INITIAL ASSESSMENT (HPI)
Pt arrives to ED with family for evaluation of cought and HAYLIE, pt family states the pt was febrile (100.9) this morning, pt states she does feel nausea, -d/v. Pt states she feels weak and is unable to sit up without assistance, pt states her legs ache, and these are all signs of pneumonia. Pt started infusions for AIG her current round about a week ago.

## 2024-02-27 NOTE — H&P
[facility]     Rayne Morales Patient Status:  Emergency    1945 MRN [id number]   Location [facility] EMERGENCY DEPARTMENT Attending Brandon Cabrera MD   Hosp Day # 0 PCP Jose Daniel Washington MD     Chief Complaint: SOB    Subjective:    History of Present Illness:   Rayne Morales is a 78 year old female with increasing shortness of breath and cough.  She has generalized weakness.  She has a history of asthma.  She had low oxygen saturations in the emergency department.  She been feeling congested.  No chest pain.  No bilateral lower extremity swelling.  She developed fevers this morning which led her to the emergency department.    History/Other:    Past Medical History:  Past Medical History:   Diagnosis Date    Abdominal pain     Anemia     Anxiety     Arrhythmia     Arthritis     Asthma ([facility])     Back pain 1966    Back problem     Black stools     Bloating     Blurred vision     Breast CA ([facility])     left lump and rad.    Cancer ([facility])     breast, basal cell    Cervical radiculopathy     Cervical spinal stenosis     Deep vein thrombosis ([facility])     Depression     Diabetes mellitus (HCC)     Diarrhea, unspecified     Dizziness     Easy bruising     On Xeralto    Esophageal reflux     Exposure to medical diagnostic radiation     Fatigue     Fitting and adjustment of vascular catheter     Headache disorder     Hearing loss     Heart attack ([facility])     Heartburn     Hemorrhoids     High blood pressure     High cholesterol     History of blood clots     History of COVID-19 2020    not hospitalized, no continued symptoms, cough, fever, loss of taste    History of depression     History of syncope 2014    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 pt states she gets IgG infusions due to lack of IgG, unable to fight off infections    Indigestion     Leaking of urine     Migraines     Muscle weakness     uses cane    Neuropathy     rt foot bilateral hands     Osteoarthritis     Pain in joints 2000    Pneumonia due to organism     Pulmonary embolism (HCC) 07/25/2015    Shortness of breath     Sleep apnea     cpap - not using - machine is broken    Stress     Uncomfortable fullness after meals     Visual impairment     glasses/contacts    Weight gain      Past Surgical History:   Past Surgical History:   Procedure Laterality Date    ABDOMEN SURGERY PROC UNLISTED      duodenal biopsy    APPENDECTOMY      BREAST SURGERY      CATARACT      CHOLECYSTECTOMY      COLONOSCOPY  10/1/10, 4/21/17    COLONOSCOPY N/A 02/27/2023    Procedure: COLONOSCOPY;  Surgeon: Migel Bass MD;  Location:  ENDOSCOPY    EGD  04/21/2017    EYE SURGERY      FEMUR/KNEE SURG UNLISTED      right knee arthroscopy    HEMORRHOIDECTOMY,INT/EXT,SIMPLE      HERNIA SURGERY      HYSTERECTOMY  1984    total hystero.    LUMPECTOMY LEFT Left 1999    lt lumpectomy and radiation.    OOPHORECTOMY Bilateral 1984    total hystero.    OTHER  11/2021    left wrist, ORIF Dr. Stacy Long    OTHER  02/21/2022    CERVICAL 5 AND CERVICAL 6 LAMINECTOMIES, PARTIAL CERVICAL 7 LAMINECTOMY, LEFT CERVICAL 5/CERVICAL 6 AND CERVICAL 6/CERVICAL 7 FORAMINOTOMIES, CERVICAL 6 AND CERVICAL 7 ARTHRODESIS, AUTOGRAFT, ALLOGRAFT    OTHER SURGICAL HISTORY  2010    endoscopy - papillotomy    OTHER SURGICAL HISTORY  2016     under right eye, skin cancer removed    OTHER SURGICAL HISTORY  02/2022    neck    PORT FOR VASCULAR ACCESS      RADIATION LEFT Left 1999    left lumpectomy and radiation.    REPAIR ING HERNIA,5+Y/O,REDUCIBL      SIGMOIDOSCOPY,DIAGNOSTIC      SPINE SURGERY PROCEDURE UNLISTED      TONSILLECTOMY      TOTAL ABDOM HYSTERECTOMY      TOTAL KNEE REPLACEMENT        Family History:   Family History   Problem Relation Age of Onset    Heart Attack Paternal Grandfather     Heart Attack Paternal Grandmother     Other (CAD) Father     Other (CAD) Mother     Hypertension Mother     Heart Attack Mother     Other (CAD) Brother      Hypertension Brother     Heart Attack Brother     Stroke Maternal Grandfather         Stroke    Stroke Maternal Grandmother         Stroke    Breast Cancer Self 54     Social History:    reports that she quit smoking about 56 years ago. Her smoking use included cigarettes. She has never used smokeless tobacco. She reports current drug use. Drug: Cannabis. She reports that she does not drink alcohol.   Allergies:   Allergies   Allergen Reactions    Ciprofloxacin HIVES    Mold REACTIVE AIRWAY DISEASE     Mold and smut    Sulfa Antibiotics HIVES     Bactrim (Sulfamethoxazole/TMP)    Diovan [Valsartan] NAUSEA AND VOMITING    Omnicef NAUSEA ONLY and DIZZINESS    Clindamycin DIARRHEA and NAUSEA ONLY    Green Pepper OTHER (SEE COMMENTS)     Sensitivity (all peppers - green, orange and red)    Lipitor [Atorvastatin Calcium] OTHER (SEE COMMENTS)     Leg cramping,    Pneumococcal Vaccines OTHER (SEE COMMENTS)     Pt states she had pneumo vax done at Dr. Boyd on 1/10/24- states developed redness, swelling,itching to the arm    Quinapril Hcl FATIGUE     Medications:    No current facility-administered medications on file prior to encounter.     Current Outpatient Medications on File Prior to Encounter   Medication Sig Dispense Refill    pregabalin 100 MG Oral Cap Take 1 capsule (100 mg total) by mouth in the morning and 1 capsule (100 mg total) at noon and 1 capsule (100 mg total) in the evening. 270 capsule 1    oxyCODONE 5 MG Oral Tab Take 1 tablet (5 mg total) by mouth every 6 (six) hours as needed for Pain. 15 tablet 0    Naloxone HCl 4 MG/0.1ML Nasal Liquid 4 mg by Nasal route as needed. If patient remains unresponsive, repeat dose in other nostril 2-5 minutes after first dose. 1 kit 0    metFORMIN  MG Oral Tablet 24 Hr Take 1 tablet (500 mg total) by mouth 2 (two) times daily with meals. Take 2 tablets (1,000 mg total) by mouth daily with breakfast and 1 tablet (500 mg total) every evening.      metroNIDAZOLE  0.75 % External Gel Apply 1 g topically 2 (two) times daily. Apply to face twice daily 45 g 5    DULOXETINE 30 MG Oral Cap DR Particles Take 1 capsule (30 mg total) by mouth daily. To take along with 60mg to equal 90mg daily. 90 capsule 1    PANTOPRAZOLE 40 MG Oral Tab EC Take 1 tablet (40 mg total) by mouth before breakfast. 90 tablet 1    DULoxetine 60 MG Oral Cap DR Particles Take 1 capsule (60 mg total) by mouth daily. 90 capsule 1    rivaroxaban (XARELTO) 20 MG Oral Tab Take 1 tablet (20 mg total) by mouth daily. 90 tablet 0    rosuvastatin 10 MG Oral Tab Take 1 tablet (10 mg total) by mouth nightly. 90 tablet 1    albuterol 108 (90 Base) MCG/ACT Inhalation Aero Soln Inhale 2 puffs into the lungs every 6 (six) hours as needed for Wheezing. 1 each 3    LISINOPRIL 20 MG Oral Tab TAKE 1 TABLET BY MOUTH DAILY 90 tablet 3    tiZANidine 4 MG Oral Tab Take 1 tablet (4 mg total) by mouth every 8 (eight) hours as needed. (Patient taking differently: Take 1 tablet (4 mg total) by mouth every 8 (eight) hours as needed. Takes every night) 90 tablet 2    aspirin 81 MG Oral Chew Tab Chew 1 tablet (81 mg total) by mouth daily.      Fluticasone Propionate 50 MCG/ACT Nasal Suspension 1 spray by Nasal route 2 (two) times daily. 1 Bottle 3    Cholecalciferol (VITAMIN D) 2000 units Oral Tab Take 2,000 Units by mouth daily.       Review of Systems:   A comprehensive review of systems was completed.    Pertinent positives and negatives noted in the HPI.    Objective:   Physical Exam:    /57   Pulse 104   Temp 97.9 °F (36.6 °C) (Temporal)   Resp 26   Ht 5' 1\" (1.549 m)   Wt 160 lb (72.6 kg)   LMP  (LMP Unknown)   SpO2 91%   BMI 30.23 kg/m²   General: No acute distress, Alert.  Ill appearing  Respiratory: Diminished and coarse b/l   Cardiovascular: S1, S2. Tachycardic   Abdomen: Soft, NT/ND, +BS  Neuro: No new focal deficits  Extremities: No edema    Results:    Labs:    Labs Last 24 Hours:  Recent Labs   Lab  02/27/24  0804   RBC 4.31   HGB 12.8   HCT 40.2   MCV 93.3   MCH 29.7   MCHC 31.8   RDW 13.4   NEPRELIM 4.73   WBC 5.9   .0     Recent Labs   Lab 02/27/24  0804   *   BUN 35*   CREATSERUM 1.81*   EGFRCR 28*   CA 8.6   ALB 3.7   *   K 4.5      CO2 22.0   ALKPHO 73   AST 29   ALT 19   BILT 0.6   TP 7.3     Lab Results   Component Value Date    PT 19.4 (H) 07/29/2014    PT 13.9 07/26/2014    INR 2.33 (H) 12/04/2023    INR 1.00 02/21/2022    INR 2.58 (H) 02/09/2022     Recent Labs   Lab 02/27/24  0804   TROPHS 14     Recent Labs   Lab 02/27/24  0804   PBNP 520*     Recent Labs   Lab 02/27/24  0804   PCT 5.06*     Imaging: Imaging data reviewed in Epic.    Assessment & Plan:      #Sepsis 2/2 PNA  -IVF  -IV Abx  -Blood and Sputum Cx  -IVF    #Lactic acidosis- IVF and improving   #FREDERICK 2/2 sepsis   #Hyponatremia-IVF   #DM- Hyperglycemic protocol   #GERD- PPI   #DL- statin   #HTN - hold lisinopril with FREDERICK  #H/O DVT on xarelto         Quality:  DVT Mechanical Prophylaxis:        DVT Pharmacologic Prophylaxis   Medication   None                Code Status: Full Code  Baxter: No urinary catheter in place  Baxter Duration (in days):   Central line:    CLARI:   Plan of care discussed with patient, staff    Sohail Bowman MD  Supplementary Documentation:     The 21st Century Cures Act makes medical notes like these available to patients in the interest of transparency. Please be advised this is a medical document. Medical documents are intended to carry relevant information, facts as evident, and the clinical opinion of the practitioner. The medical note is intended as peer to peer communication and may appear blunt or direct. It is written in medical language and may contain abbreviations or verbiage that are unfamiliar.

## 2024-02-27 NOTE — ED PROVIDER NOTES
Patient Seen in: [facility]      History     Chief Complaint   Patient presents with    Difficulty Breathing     Stated Complaint: HAYLIE    Subjective:   HPI    Patient is a 78-year-old female presenting with family from home for evaluation of worsening cough, shortness of breath and weakness.  She has a history of asthma but is not on oxygen at home.  She reports she has had increased cough for the last few days, feels congested but really not bringing much up.  Cough is very bad overnight, almost constant she was feeling very short of breath this morning.  Low-grade fever of 100.9 this morning as well, she had not noticed fevers or chills at home.  Appetite has been very poor but no vomiting or diarrhea, no abdominal pain.    Objective:   Past Medical History:   Diagnosis Date    Abdominal pain     Anemia     Anxiety     Arrhythmia     Arthritis 2000    Asthma (Formerly [facility])     Back pain 1966    Back problem     Black stools     Bloating     Blurred vision     Breast CA (Formerly [facility]) 1999    left lump and rad.    Cancer (Formerly [facility]) 1999    breast, basal cell    Cervical radiculopathy     Cervical spinal stenosis     Deep vein thrombosis (Formerly [facility])     Depression     Diabetes mellitus (HCC)     Diarrhea, unspecified     Dizziness     Easy bruising     On Xeralto    Esophageal reflux     Exposure to medical diagnostic radiation     Fatigue     Fitting and adjustment of vascular catheter     Headache disorder     Hearing loss     Heart attack (Formerly [facility])     Heartburn     Hemorrhoids     High blood pressure     High cholesterol     History of blood clots     History of COVID-19 12/01/2020    not hospitalized, no continued symptoms, cough, fever, loss of taste    History of depression     History of syncope 07/26/2014    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 pt states she gets IgG infusions due to lack of IgG, unable to fight off infections    Indigestion     Leaking of urine     Migraines     Muscle weakness     uses cane     Neuropathy     rt foot bilateral hands    Osteoarthritis     Pain in joints     Pneumonia due to organism     Pulmonary embolism (HCC) 2015    Shortness of breath     Sleep apnea     cpap - not using - machine is broken    Stress     Uncomfortable fullness after meals     Visual impairment     glasses/contacts    Weight gain               Past Surgical History:   Procedure Laterality Date    ABDOMEN SURGERY PROC UNLISTED      duodenal biopsy    APPENDECTOMY      BREAST SURGERY      CATARACT      CHOLECYSTECTOMY      COLONOSCOPY  10/1/10, 17    COLONOSCOPY N/A 2023    Procedure: COLONOSCOPY;  Surgeon: Migel Bass MD;  Location:  ENDOSCOPY    EGD  2017    EYE SURGERY      FEMUR/KNEE SURG UNLISTED      right knee arthroscopy    HEMORRHOIDECTOMY,INT/EXT,SIMPLE      HERNIA SURGERY      HYSTERECTOMY      total hystero.    LUMPECTOMY LEFT Left     lt lumpectomy and radiation.    OOPHORECTOMY Bilateral     total hystero.    OTHER  2021    left wrist, ORIF Dr. Stacy Long    OTHER  2022    CERVICAL 5 AND CERVICAL 6 LAMINECTOMIES, PARTIAL CERVICAL 7 LAMINECTOMY, LEFT CERVICAL 5/CERVICAL 6 AND CERVICAL 6/CERVICAL 7 FORAMINOTOMIES, CERVICAL 6 AND CERVICAL 7 ARTHRODESIS, AUTOGRAFT, ALLOGRAFT    OTHER SURGICAL HISTORY      endoscopy - papillotomy    OTHER SURGICAL HISTORY  2016     under right eye, skin cancer removed    OTHER SURGICAL HISTORY  2022    neck    PORT FOR VASCULAR ACCESS      RADIATION LEFT Left     left lumpectomy and radiation.    REPAIR ING HERNIA,5+Y/O,REDUCIBL      SIGMOIDOSCOPY,DIAGNOSTIC      SPINE SURGERY PROCEDURE UNLISTED      TONSILLECTOMY      TOTAL ABDOM HYSTERECTOMY      TOTAL KNEE REPLACEMENT                  Social History     Socioeconomic History    Marital status:    Tobacco Use    Smoking status: Former     Packs/day: 0     Types: Cigarettes     Quit date: 1968     Years since quittin.1    Smokeless tobacco:  Never   Vaping Use    Vaping Use: Never used   Substance and Sexual Activity    Alcohol use: No     Comment: rare    Drug use: Yes     Types: Cannabis     Comment: uses cream for pain to back/neck with barometer changes.    Sexual activity: Not Currently     Partners: Male   Other Topics Concern    Caffeine Concern Yes     Comment: half of cup of coke daily    Sleep Concern No    Exercise Yes     Comment: walking    Seat Belt Yes     Social Determinants of Health     Financial Resource Strain: Low Risk  (10/25/2023)    Financial Resource Strain     Difficulty of Paying Living Expenses: Not hard at all     Med Affordability: No   Food Insecurity: No Food Insecurity (2/12/2024)    Food Insecurity     Food Insecurity: Never true   Transportation Needs: No Transportation Needs (2/12/2024)    Transportation Needs     Lack of Transportation: No   Stress: No Stress Concern Present (10/25/2023)    Stress     Feeling of Stress : No   Social Connections: Socially Integrated (10/25/2023)    Social Connections     Frequency of Socialization with Friends and Family: 2   Housing Stability: Low Risk  (2/12/2024)    Housing Stability     Housing Instability: No              Review of Systems    Positive for stated complaint: HAYLIE  Other systems are as noted in HPI.  Constitutional and vital signs reviewed.      All other systems reviewed and negative except as noted above.    Physical Exam     ED Triage Vitals   BP 02/27/24 0800 124/72   Pulse 02/27/24 0740 114   Resp 02/27/24 0800 25   Temp 02/27/24 0800 97.9 °F (36.6 °C)   Temp src 02/27/24 0800 Temporal   SpO2 02/27/24 0740 (!) 84 %   O2 Device 02/27/24 0740 None (Room air)       Current:/49   Pulse 105   Temp 97.9 °F (36.6 °C) (Temporal)   Resp 17   Ht 154.9 cm (5' 1\")   Wt 72.6 kg   LMP  (LMP Unknown)   SpO2 95%   BMI 30.23 kg/m²         Physical Exam  Vitals and nursing note reviewed.   Constitutional:       Appearance: She is well-developed.   HENT:      Head:  Normocephalic and atraumatic.   Eyes:      Conjunctiva/sclera: Conjunctivae normal.      Pupils: Pupils are equal, round, and reactive to light.   Cardiovascular:      Rate and Rhythm: Regular rhythm. Tachycardia present.      Heart sounds: Normal heart sounds.   Pulmonary:      Effort: Pulmonary effort is normal.      Breath sounds: Normal breath sounds.      Comments: Mild conversational dyspnea but she is able to speak full sentences.  Aeration is good.  Abdominal:      General: Bowel sounds are normal.      Palpations: Abdomen is soft.   Musculoskeletal:         General: Normal range of motion.      Cervical back: Normal range of motion and neck supple.   Skin:     General: Skin is warm and dry.   Neurological:      Mental Status: She is alert and oriented to person, place, and time.               ED Course     Labs Reviewed   COMP METABOLIC PANEL (14) - Abnormal; Notable for the following components:       Result Value    Glucose 194 (*)     Sodium 134 (*)     BUN 35 (*)     Creatinine 1.81 (*)     eGFR-Cr 28 (*)     All other components within normal limits   PRO BETA NATRIURETIC PEPTIDE - Abnormal; Notable for the following components:    Pro-Beta Natriuretic Peptide 520 (*)     All other components within normal limits   LACTIC ACID, PLASMA - Abnormal; Notable for the following components:    Lactic Acid 3.7 (*)     All other components within normal limits   CBC W/ DIFFERENTIAL - Abnormal; Notable for the following components:    Lymphocyte Absolute 0.63 (*)     All other components within normal limits   TROPONIN I HIGH SENSITIVITY - Normal   SARS-COV-2/FLU A AND B/RSV BY PCR (GENEXPERT) - Normal    Narrative:     This test is intended for the qualitative detection and differentiation of SARS-CoV-2, influenza A, influenza B, and respiratory syncytial virus (RSV) viral RNA in nasopharyngeal or nares swabs from individuals suspected of respiratory viral infection consistent with COVID-19 by their healthcare  provider. Signs and symptoms of respiratory viral infection due to SARS-CoV-2, influenza, and RSV can be similar.    Test performed using the Xpert Xpress SARS-CoV-2/FLU/RSV (real time RT-PCR)  assay on the [employer] instrument, [address], [place] [address].   This test is being used under the Food and Drug Administration's Emergency Use Authorization.    The authorized Fact Sheet for Healthcare Providers for this assay is available upon request from the laboratory.   CBC WITH DIFFERENTIAL WITH PLATELET    Narrative:     The following orders were created for panel order CBC With Differential With Platelet.  Procedure                               Abnormality         Status                     ---------                               -----------         ------                     CBC W/ DIFFERENTIAL[[id number]]          Abnormal            Final result                 Please view results for these tests on the individual orders.   PROCALCITONIN   URINALYSIS WITH CULTURE REFLEX   LACTIC ACID REFLEX POST POSTIVE   RAINBOW DRAW LAVENDER   RAINBOW DRAW LIGHT GREEN   RAINBOW DRAW BLUE   RAINBOW DRAW GOLD   BLOOD CULTURE   BLOOD CULTURE   SPUTUM CULTURE   ED/MRSA SCREEN BY PCR-CC     EKG    Rate, intervals and axes as noted on EKG Report.  Rate: 109  Rhythm: Sinus Rhythm  Reading: Sinus tachycardia.  No ST segment or T wave changes.  Rate is increased compared to prior EKG dated January 14, 2024, otherwise no changes.                 XR CHEST AP PORTABLE  (CPT=71045)    Result Date: 2/27/2024  PROCEDURE:  XR CHEST AP PORTABLE  (CPT=71045)  TECHNIQUE:  AP chest radiograph was obtained.  COMPARISON:  [facility] , MARK, XR CHEST AP PORTABLE  (CPT=71045), 1/14/2024, 9:32 AM.  INDICATIONS:  HAYLIE  PATIENT STATED HISTORY: (As transcribed by Technologist)  Shortness of breath and cough    FINDINGS:  There is interval worsening of consolidation in right mid and lower lung.  There is interval clearing of consolidation from the left lung.   There is a right-sided chest port with catheter tip in SVC.  Heart size is within normal limits.  Mediastinum and william are unremarkable.  There are surgical clips in the left axilla.            CONCLUSION:  1. There is interval worsening of consolidation in right lung which could represent worsening or recurrence of pneumonia. 2. There is interval clearing of opacities from the left lung.   LOCATION:  [facility]      Dictated by (CST): Kennedy Hughes MD on 2/27/2024 at 9:37 AM     Finalized by (CST): Kennedy Hughes MD on 2/27/2024 at 9:38 AM       CT APPENDIX ABD/PEL W CONTRAST (CPT=74177)    Result Date: 2/12/2024  PROCEDURE:  CT APPENDIX ABD/PEL W CONTRAST (CPT=74177)  COMPARISON:  [facility] , CT, CT ABDOMEN+PELVIS KIDNEYSTONE 2D RNDR(NO IV,NO ORAL)(CPT=74176), 2/05/2024, 7:52 PM.  INDICATIONS:  abdominal pain, r/o appy  TECHNIQUE:  Axial helical acquisitions are obtained from through the abdomen and pelvis after bolus intravenous nonionic contrast administration.  Images of the right lower quadrant reconstructed at 2.5 mm. Coronal MPR imaging was obtained.  Dose reduction techniques were used. Dose information is transmitted to the ACR (American College of Radiology) NRDR (National Radiology Data Registry) which includes the Dose Index Registry.  PATIENT STATED HISTORY:(As transcribed by Technologist)  Patient is here for RLQ abdominal pain.   CONTRAST USED:  100cc of Isovue 370  FINDINGS:  APPENDIX:  Wall thickening and dilation of the appendix with caliber of 12 mm.  No surrounding fluid or inflammatory change. LIVER:  Normal liver morphology.  Lobular hypodensity with peripheral calcification noted within the lateral segment left hepatic lobe measuring 4.4 x 3.6 cm in cross-section.  This measures greater than simple fluid attenuation.  Additional subcentimeter hypodensity of the anterior right hepatic lobe is too small to characterize (series 3, image 35). BILIARY:  Cholecystectomy with expected prominence of the  common bile duct. PANCREAS:  Moderate, diffuse parenchymal atrophy, particularly within the head and uncinate process. SPLEEN:  No enlargement or focal lesion.  KIDNEYS:  No mass, obstruction, or calcification.  ADRENALS:  No mass or enlargement.  AORTA/VASCULAR:  No aneurysm.  RETROPERITONEUM:  No mass or adenopathy.  BOWEL/MESENTERY:  No evidence of bowel inflammation or obstruction.  Few colonic diverticula noted. ABDOMINAL WALL:  No mass or hernia.  URINARY BLADDER:  No visible focal wall thickening, lesion, or calculus.  PELVIC NODES:  No adenopathy.  PELVIC ORGANS:  Hysterectomy. BONES:  No bony lesion or fracture.  Osteoarthritis of the hips and spine.  LUNG BASES:  Stable appearance of the visualized lower chest. OTHER:  Negative.             CONCLUSION:   1. The appendix is dilated to a caliber of 12 mm with associated wall thickening.  No surrounding fluid or inflammatory stranding.  This may represent an appendiceal mucocele versus mucinous neoplasm.  Sequela of acute appendicitis felt less likely, though not excluded.  2. Lobular hypodensity of the lateral segment left hepatic lobe (4.4 x 3.6 cm).  This may be neoplastic and warrants further nonemergent assessment with liver MRI with Dotarem contrast.   LOCATION:  [id number]   Dictated by (CST): Arlene Mills MD on 2/12/2024 at 11:32 AM     Finalized by (CST): Arlene Mills MD on 2/12/2024 at 11:42 AM       CT ABDOMEN+PELVIS KIDNEYSTONE 2D RNDR(NO IV,NO ORAL)(CPT=74176)    Result Date: 2/5/2024  PROCEDURE:  CT ABDOMEN+PELVIS KIDNEYSTONE 2D RNDR(NO IV,NO ORAL)(CPT=74176)  COMPARISON:  [facility], MRI ABDOMEN W WO CONT, 7/16/2010, 10:46 AM.  INDICATIONS:  seen at [facility] for UTI, patient states its a flaming uti into my kidney sent in by [facility]  TECHNIQUE:  Unenhanced multislice CT scanning from above the kidneys to below the urinary bladder.  2D rendering are generated on the CT scanner workstation to localize potential stones in the cranio-caudal plane.  Dose reduction  techniques were used. Dose information is transmitted to the ACR (American College of Radiology) NRDR (National Radiology Data Registry) which includes the Dose Index Registry.  PATIENT STATED HISTORY: (As transcribed by Technologist)  UTI symptoms and right flank pain since yesterday    FINDINGS:  KIDNEYS:  No mass, obstruction, or calcification. BLADDER:  No mass, calculus or significant wall thickening. ADRENALS:  No mass or enlargement.  LIVER:  No enlargement, atrophy, abnormal density, or significant focal lesion.  BILIARY:  There has been previous cholecystectomy. PANCREAS:  No lesion, fluid collection, ductal dilatation, or atrophy.  SPLEEN:  No enlargement or focal lesion.  AORTA/VASCULAR:  Aorta is diffusely atherosclerotic but not aneurysmal. RETROPERITONEUM:  No mass or adenopathy.  BOWEL/MESENTERY:  The appendix is enlarged measuring up to 1.1 cm.  There is fluid within the lumen of the appendix.  There is no significant stranding around the appendix.  Clinical significance of this is uncertain.  This could represent anatomic variation.  If there is any clinical evidence of appendicitis then additional surgical evaluation or short interval follow-up CT could be considered.  There is diverticulosis of the colon.  There is no CT evidence of diverticulitis. ABDOMINAL WALL:  No mass or hernia.  BONES:  No bony lesion or fracture. PELVIC ORGANS:  Uterus and ovaries are not visualized and were presumably removed. LUNG BASES:  No visible pulmonary or pleural disease.  OTHER:  Negative.             CONCLUSION:  1. Appendix is enlarged measuring up to 1.1 cm.  Lumen of the appendix is fluid-filled.  There is no significant stranding around the appendix.  This could be normal variant.  If there is any clinical evidence of appendicitis then either surgical evaluation or short interval follow-up could be considered.  This finding and recommendation was discussed with Dr. Levine. 2. There is no other acute  abnormality detected in the abdomen or pelvis within the limits of a noncontrast study.  There are no renal or ureteral stones. 3. There is diverticulosis of the colon.  There is no CT evidence of diverticulitis.     LOCATION:  [facility]   Dictated by (CST): Kennedy Hughes MD on 2/05/2024 at 8:16 PM     Finalized by (CST): Kennedy Hughes MD on 2/05/2024 at 8:24 PM               MDM      Pleasant 78-year-old female who was admitted here in mid January with pneumonia presenting with recurrent symptoms of cough, shortness of breath and weakness.  Feels congested but not really able to bring much up.  Cough is worse overnight and very short of breath this morning, low-grade fever 100.9 at home as well.  On arrival here in triage she was initially hypoxic 84% on room air.  She is comfortable at 95% on 2 L by nasal cannula and states she feels better.  Just feels very weak.  Differential includes recurrent pneumonia, flu, COVID, dehydration, electrolyte abnormality.  Labs ordered including cultures, procalcitonin.  Admission disposition: 2/27/2024 10:00 AM           Update at 9:55 AM.  Workup demonstrates improved left lower lobe infiltrate but worsened right lower lobe infiltrate.  Will need to be covered for nosocomial pneumonia.  She also has acute renal insufficiency with a creatinine of 1.9, consistent with dehydration due to her poor appetite.  Lactic acid elevated at 3.7 and 30 cc/kg bolus has been ordered.    A total of 35 minutes of critical care time (exclusive of billable procedures) was administered to manage the patient's respiratory instability due to her respiratory symptoms with cough and shortness of breath, hypoxia, recent admission for pneumonia.  This involved direct patient intervention, complex decision making, and/or extensive discussions with the patient, family, and clinical staff.      Past Medical History-asthma, PE/DVT, hypertension, high cholesterol    Differential diagnosis before testing included  pneumonia, CHF, flu, COVID    Co-morbidities that add to the complexity of management include: Recent admissions here for pneumonia and then for appendicitis    Testing ordered during this visit included labs including blood cultures, procalcitonin, lactic acid, chest x-ray    Radiographic images  I personally reviewed the radiographs and my individual interpretation shows worsening right lower lobe infiltrate  I also reviewed the official reports that showed worsening right lower lobe infiltrate    External chart review showed reviewed discharge summary from 2 recent admissions    History obtained by an independent source included from family at bedside    Discussion of management with hospitalist      Medications Provided: IV fluids, Unasyn, azithromycin              Disposition:    Admission  I have discussed with the patient the results of test, differential diagnosis, and treatment plan. They expressed clear understanding of these instructions and agrees to the plan provided.                             [facility]      Sepsis Reassessment Note    /49   Pulse 105   Temp 97.9 °F (36.6 °C) (Temporal)   Resp 17   Ht 154.9 cm (5' 1\")   Wt 72.6 kg   LMP  (LMP Unknown)   SpO2 95%   BMI 30.23 kg/m²      I completed the sepsis reassessment at 1000    Cardiac:  Regularity: Regular  Rate: Tachycardic  Heart Sounds: S1,S2    Lungs:   Right: Clear  Left: Clear    Peripheral Pulses:  Radial: Right 2+ or Left 2+      Capillary Refill:  <3 Secs    Skin:  Temp/Moisture: Warm and Dry  Color: Normal      Brandon Cabrera MD  2/27/2024  10:01 AM      Medical Decision Making      Disposition and Plan     Clinical Impression:  1. Nosocomial pneumonia    2. Hypoxia    3. Acute renal insufficiency         Disposition:  Admit  2/27/2024 10:00 am    Follow-up:  No follow-up provider specified.        Medications Prescribed:  Current Discharge Medication List                            Hospital Problems       Present on  Admission  Date Reviewed: 2/19/2024            ICD-10-CM Noted POA    * (Principal) Nosocomial pneumonia J18.9, Y95 2/27/2024 Unknown

## 2024-02-28 ENCOUNTER — APPOINTMENT (OUTPATIENT)
Dept: CT IMAGING | Facility: HOSPITAL | Age: 79
End: 2024-02-28
Attending: INTERNAL MEDICINE
Payer: MEDICARE

## 2024-02-28 LAB
ANION GAP SERPL CALC-SCNC: 6 MMOL/L (ref 0–18)
BUN BLD-MCNC: 22 MG/DL (ref 9–23)
CALCIUM BLD-MCNC: 7.8 MG/DL (ref 8.5–10.1)
CHLORIDE SERPL-SCNC: 112 MMOL/L (ref 98–112)
CO2 SERPL-SCNC: 20 MMOL/L (ref 21–32)
CREAT BLD-MCNC: 0.86 MG/DL
EGFRCR SERPLBLD CKD-EPI 2021: 70 ML/MIN/1.73M2 (ref 60–?)
GLUCOSE BLD-MCNC: 119 MG/DL (ref 70–99)
GLUCOSE BLD-MCNC: 121 MG/DL (ref 70–99)
GLUCOSE BLD-MCNC: 126 MG/DL (ref 70–99)
GLUCOSE BLD-MCNC: 134 MG/DL (ref 70–99)
GLUCOSE BLD-MCNC: 139 MG/DL (ref 70–99)
OSMOLALITY SERPL CALC.SUM OF ELEC: 291 MOSM/KG (ref 275–295)
POTASSIUM SERPL-SCNC: 3.7 MMOL/L (ref 3.5–5.1)
SODIUM SERPL-SCNC: 138 MMOL/L (ref 136–145)

## 2024-02-28 PROCEDURE — 5A09357 ASSISTANCE WITH RESPIRATORY VENTILATION, LESS THAN 24 CONSECUTIVE HOURS, CONTINUOUS POSITIVE AIRWAY PRESSURE: ICD-10-PCS | Performed by: HOSPITALIST

## 2024-02-28 PROCEDURE — 71250 CT THORAX DX C-: CPT | Performed by: INTERNAL MEDICINE

## 2024-02-28 PROCEDURE — 99233 SBSQ HOSP IP/OBS HIGH 50: CPT | Performed by: HOSPITALIST

## 2024-02-28 NOTE — PROGRESS NOTES
NURSING ADMISSION NOTE      Patient admitted via Ambulatory  Oriented to room.  Safety precautions initiated.  Bed in low position.  Call light in reach.    2/27 AM: Pt is A/O x 4, family at bedside. Lung sounds are diminished, on 2L at rest, room air baseline, CPAP at night. BP and HR are WNL, NSR on tele. BM today, voids in bathroom. Up SBA. IV Abx. QID accucheck. Norco for pain.

## 2024-02-28 NOTE — DIETARY NOTE
[facility]   CLINICAL NUTRITION    Pt chart reviewed for elevated A1C of 8.5%. Noted DM diet education offered by RD 2/13 but pt declined as she follows DM diet at home. No further education warranted at this time. Will follow up as appropriate for full nutrition assessment.    Cally Walters, RD, LDN, [facility]  Clinical Dietitian  Spectra: [phone]

## 2024-02-28 NOTE — PLAN OF CARE
Pt alert and oriented x4. Left arm precaution. Baseline RA. Cpap at night. Pulm consulted during shift. Tele, . Xarelto. Voidsl. C/o pain, see mar. Manjula accuchgordon. Saline-locked.  Pt updated poc. Call light in reach. Safety precautions in place. All needs are met at this time.     Problem: RESPIRATORY - ADULT  Goal: Achieves optimal ventilation and oxygenation  Description: INTERVENTIONS:  - Assess for changes in respiratory status  - Assess for changes in mentation and behavior  - Position to facilitate oxygenation and minimize respiratory effort  - Oxygen supplementation based on oxygen saturation or ABGs  - Provide Smoking Cessation handout, if applicable  - Encourage broncho-pulmonary hygiene including cough, deep breathe, Incentive Spirometry  - Assess the need for suctioning and perform as needed  - Assess and instruct to report SOB or any respiratory difficulty  - Respiratory Therapy support as indicated  - Manage/alleviate anxiety  - Monitor for signs/symptoms of CO2 retention  Outcome: Progressing     Problem: Patient/Family Goals  Goal: Patient/Family Long Term Goal  Description: Patient's Long Term Goal: Discharge to home    Interventions:  - collaborative care  - See additional Care Plan goals for specific interventions  Outcome: Progressing  Goal: Patient/Family Short Term Goal  Description: Patient's Short Term Goal:   2/28: wean O2 and \"to feel better\"    Interventions:   - supportive care  - See additional Care Plan goals for specific interventions  Outcome: Progressing

## 2024-02-28 NOTE — OCCUPATIONAL THERAPY NOTE
OCCUPATIONAL THERAPY EVALUATION - INPATIENT    Room Number: 525/525-A  Evaluation Date: 2/28/2024     Type of Evaluation: Initial  Presenting Problem: PNA, sepsis, ILEANA, hypoxia    Physician Order: IP Consult to Occupational Therapy  Reason for Therapy:  ADL/IADL Dysfunction and Discharge Planning      OCCUPATIONAL THERAPY ASSESSMENT   Patient is a 78 year old female admitted on 2/27/2024 with Presenting Problem: PNA, sepsis, ILEANA, hypoxia. Co-Morbidities : breast cancer, DM2, HTN, asthma, recent appy  Patient is currently functioning at baseline with toileting, bathing, upper body dressing, lower body dressing, bed mobility, and transfers.  Prior to admission, patient's baseline is independent.  Patient met all OT goals at supervision level.  Patient reports no further questions/concerns at this time. Recommend pt DC home when medically ready.       WEIGHT BEARING RESTRICTION  Weight Bearing Restriction: None                Recommendations for nursing staff:   Transfers: supervision without a device   Toileting location: Toilet    EVALUATION SESSION:  Patient at start of session: Pt was found in her bed with her daughter and grandson at the bedside.   FUNCTIONAL TRANSFER ASSESSMENT  Sit to Stand: Edge of Bed  Edge of Bed: Supervision    BED MOBILITY  Supine to Sit : Supervision    BALANCE ASSESSMENT     FUNCTIONAL ADL ASSESSMENT       ACTIVITY TOLERANCE:                          O2 SATURATIONS       COGNITION  Overall Cognitive Status:  WFL - within functional limits  COGNITION ASSESSMENTS       Upper Extremity:   ROM: within functional limits   Strength: is within functional limits     EDUCATION PROVIDED  Patient: Role of Occupational Therapy; Plan of Care; Discharge Recommendations  Patient's Response to Education: Verbalized Understanding  Family/Caregiver: Role of Occupational Therapy; Plan of Care; Discharge Recommendations  Family/Caregiver's Response to Education: Verbalized Understanding    Equipment used:  gait belt   Demonstrates functional use    Therapist comments: supine>sit EOB>stand without a device>walk >250ft>sitting in chair. Pt reports independently toileting in the bathroom with nursing staff.     Patient End of Session: Up in chair;Needs met;Call light within reach;RN aware of session/findings;All patient questions and concerns addressed;Alarm set;Family present    OCCUPATIONAL PROFILE    HOME SITUATION  Type of Home: [facility]  Home Layout: Two level  Lives With: Other (Comment) (grandson)    Toilet and Equipment: Standard height toilet  Shower/Tub and Equipment: Walk-in shower  Other Equipment: None    Occupation/Status: Retired     Drives: Yes  Patient Regularly Uses: Glasses    Prior Level of Function: Ind with ADLs, has Jose castle, assist with LB dressing when R knee has increased pain.    SUBJECTIVE  \"I am not old enough to need a walker!\"     PAIN ASSESSMENT  Ratin  Location: Pt denies pain       OBJECTIVE  Precautions: Limb alert - left;Bed/chair alarm  Fall Risk: Standard fall risk    WEIGHT BEARING RESTRICTION  Weight Bearing Restriction: None                AM-PAC ‘6-Clicks’ Inpatient Daily Activity Short Form  -   Putting on and taking off regular lower body clothing?: A Little  -   Bathing (including washing, rinsing, drying)?: A Little  -   Toileting, which includes using toilet, bedpan or urinal? : A Little  -   Putting on and taking off regular upper body clothing?: None  -   Taking care of personal grooming such as brushing teeth?: None  -   Eating meals?: None    AM-PAC Score:  Score: 21  Approx Degree of Impairment: 32.79%  Standardized Score (AM-PAC Scale): 44.27      ADDITIONAL TESTS     NEUROLOGICAL FINDINGS        PLAN   Patient has been evaluated and presents with no skilled Occupational Therapy needs at this time.  Patient discharged from Occupational Therapy services.  Please re-order if a new functional limitation presents during this admission.      Patient Evaluation  Complexity Level:   Occupational Profile/Medical History MODERATE - Expanded review of history including review of medical or therapy record   Specific performance deficits impacting engagement in ADL/IADL MODERATE  3 - 5 performance deficits   Client Assessment/Performance Deficits MODERATE - Comorbidities and min to mod modifications of tasks    Clinical Decision Making MODERATE - Analysis of occupational profile, detailed assessments, several treatment options    Overall Complexity MODERATE     OT Session Time: 30 minutes  Therapeutic Activity: 15 minutes

## 2024-02-28 NOTE — PROGRESS NOTES
[facility]     Hospitalist Progress Note     Rayne Morales Patient Status:  Inpatient    1945 MRN [id number]   Formerly [facility] 5NW-A Attending Sohail Bowman MD   Hosp Day # 1 PCP Jose Daniel Washington MD     Subjective:   Patient feels little better     Objective:    Review of Systems:   A comprehensive review of systems was completed; pertinent positive and negatives stated in subjective.  Vital signs:  Temp:  [97.9 °F (36.6 °C)-100.1 °F (37.8 °C)] 98.8 °F (37.1 °C)  Pulse:  [] 89  Resp:  [17-30] 22  BP: ()/(38-85) 135/58  SpO2:  [84 %-98 %] 93 %  Physical Exam:    General: No acute distress   Respiratory: diminished, coarse, no wheezes, no rhonchi  Cardiovascular: S1, S2, RRR  Abdomen: Soft, NT/ND, +BS  Extremities: no edema    Diagnostic Data:    Labs:  Recent Labs   Lab 24  0804   WBC 5.9   HGB 12.8   MCV 93.3   .0     Recent Labs   Lab 24  0804   *   BUN 35*   CREATSERUM 1.81*   CA 8.6   ALB 3.7   *   K 4.5      CO2 22.0   ALKPHO 73   AST 29   ALT 19   BILT 0.6   TP 7.3     Estimated Creatinine Clearance: 19.6 mL/min (A) (based on SCr of 1.81 mg/dL (H)).  No results for input(s): \"PTP\", \"INR\" in the last 168 hours.     Microbiology  No results found for this visit on 24.  Imaging: Reviewed in Epic.  Medications:    aspirin  81 mg Oral Daily    DULoxetine  30 mg Oral Daily    DULoxetine  60 mg Oral Daily    fluticasone propionate  1 spray Nasal BID    pantoprazole  40 mg Oral Before breakfast    pregabalin  100 mg Oral TID    rivaroxaban  20 mg Oral Daily    rosuvastatin  10 mg Oral Nightly    ampicillin-sulbactam  3 g Intravenous Q12H    azithromycin  500 mg Intravenous Q24H    insulin aspart  1-10 Units Subcutaneous TID AC and HS    insulin aspart  1-68 Units Subcutaneous TID CC    guaiFENesin ER  600 mg Oral BID       Assessment & Plan:      #Sepsis 2/2 PNA  -IVF  -IV Abx  -Blood and Sputum Cx  -IVF     #Lactic acidosis- IVF and  improved  #FREDERICK 2/2 sepsis - improved   #Hyponatremia-IVF and improved   #DM- Hyperglycemic protocol   #GERD- PPI   #DL- statin   #HTN - hold lisinopril with FREDERICK  #H/O DVT on xarelto     Stop IVF  Pulm consult  CPAP malfunctioning at home      Sohail Bowman MD  Supplementary Documentation:     Quality:  DVT Mechanical Prophylaxis:     Early ambuation  DVT Pharmacologic Prophylaxis   Medication    rivaroxaban (Xarelto) tab 20 mg      DVT Pharmacologic prophylaxis: Aspirin 162 mg         Code Status: Full Code  Baxter: No urinary catheter in place  Baxter Duration (in days):   Central line:    CLARI:   Discharge is dependent on: progress  At this point Ms. Morales is expected to be discharge to: tbd   **Certification      PHYSICIAN Certification of Need for Inpatient Hospitalization - Initial Certification    Patient will require inpatient services that will reasonably be expected to span two midnight's based on the clinical documentation in H+P.   Based on patients current state of illness, I anticipate that, after discharge, patient will require TBD.    The 21st Century Cures Act makes medical notes like these available to patients in the interest of transparency. Please be advised this is a medical document. Medical documents are intended to carry relevant information, facts as evident, and the clinical opinion of the practitioner. The medical note is intended as peer to peer communication and may appear blunt or direct. It is written in medical language and may contain abbreviations or verbiage that are unfamiliar.

## 2024-02-28 NOTE — CONSULTS
[facility]    Rayne Morales Patient Status:  Inpatient    1945 MRN [id number]   Location 525/525-A PCP Jose Daniel Washington MD     Date of Admission: 2024    History of Present Illness: Pt is a 78 year old female consulted for  dyspnea and pneumonia with h/o HTN, HLD, DM, CAD, recurrent PE on xarelto, remote history of breast CA, IgG deficiency, asthma, MUKUND not currently on CPAP, recurrent PNA . Pt was admitted  with worsening dyspnea and cough x 2 days with recent history of appendectomy. Pt reports recurrent pneumonia with 4 in the last several months. She attributes some to interruption of her IVIG injections. Pt notes increasing cough and dyspnea. Otherwise pt denies cough, chest pain, nausea, vomitting or diarrhea.   No other travel or exposures. Has a cat but no other pets. No other sick contacts.     PMH:    Past Medical History:   Diagnosis Date    Abdominal pain     Anemia     Anxiety     Arrhythmia     Arthritis     Asthma (HCC)     Back pain 1966    Back problem     Black stools     Bloating     Blurred vision     Breast CA (HCC)     left lump and rad.    Cancer (HCC)     breast, basal cell    Cervical radiculopathy     Cervical spinal stenosis     Deep vein thrombosis (HCC)     Depression     Diabetes mellitus (HCC)     Diarrhea, unspecified     Dizziness     Easy bruising     On Xeralto    Esophageal reflux     Exposure to medical diagnostic radiation     Fatigue     Fitting and adjustment of vascular catheter     Headache disorder     Hearing loss     Heart attack (HCC)     Heartburn     Hemorrhoids     High blood pressure     High cholesterol     History of blood clots     History of COVID-19 2020    not hospitalized, no continued symptoms, cough, fever, loss of taste    History of depression     History of syncope 2014    IgG deficiency (HCC)     ??? pt states she gets IgG infusions due to lack of IgG, unable to fight off  infections    Indigestion     Leaking of urine     Migraines     Muscle weakness     uses cane    Neuropathy     rt foot bilateral hands    Osteoarthritis     Pain in joints 2000    Pneumonia due to organism     Pulmonary embolism (HCC) 07/25/2015    Shortness of breath     Sleep apnea     cpap - not using - machine is broken    Stress     Uncomfortable fullness after meals     Visual impairment     glasses/contacts    Weight gain        Past Surgical History:   Procedure Laterality Date    ABDOMEN SURGERY PROC UNLISTED      duodenal biopsy    APPENDECTOMY      BREAST SURGERY      CATARACT      CHOLECYSTECTOMY      COLONOSCOPY  10/1/10, 4/21/17    COLONOSCOPY N/A 02/27/2023    Procedure: COLONOSCOPY;  Surgeon: Migel Bass MD;  Location:  ENDOSCOPY    EGD  04/21/2017    EYE SURGERY      FEMUR/KNEE SURG UNLISTED      right knee arthroscopy    HEMORRHOIDECTOMY,INT/EXT,SIMPLE      HERNIA SURGERY      HYSTERECTOMY  1984    total hystero.    LUMPECTOMY LEFT Left 1999    lt lumpectomy and radiation.    OOPHORECTOMY Bilateral 1984    total hystero.    OTHER  11/2021    left wrist, ORIF Dr. Stacy Long    OTHER  02/21/2022    CERVICAL 5 AND CERVICAL 6 LAMINECTOMIES, PARTIAL CERVICAL 7 LAMINECTOMY, LEFT CERVICAL 5/CERVICAL 6 AND CERVICAL 6/CERVICAL 7 FORAMINOTOMIES, CERVICAL 6 AND CERVICAL 7 ARTHRODESIS, AUTOGRAFT, ALLOGRAFT    OTHER SURGICAL HISTORY  2010    endoscopy - papillotomy    OTHER SURGICAL HISTORY  2016     under right eye, skin cancer removed    OTHER SURGICAL HISTORY  02/2022    neck    PORT FOR VASCULAR ACCESS      RADIATION LEFT Left 1999    left lumpectomy and radiation.    REPAIR ING HERNIA,5+Y/O,REDUCIBL      SIGMOIDOSCOPY,DIAGNOSTIC      SPINE SURGERY PROCEDURE UNLISTED      TONSILLECTOMY      TOTAL ABDOM HYSTERECTOMY      TOTAL KNEE REPLACEMENT         Allergies:   Allergies   Allergen Reactions    Ciprofloxacin HIVES    Mold REACTIVE AIRWAY DISEASE     Mold and smut    Sulfa Antibiotics HIVES      Bactrim (Sulfamethoxazole/TMP)    Diovan [Valsartan] NAUSEA AND VOMITING    Omnicef NAUSEA ONLY and DIZZINESS    Clindamycin DIARRHEA and NAUSEA ONLY    Green Pepper OTHER (SEE COMMENTS)     Sensitivity (all peppers - green, orange and red)    Lipitor [Atorvastatin Calcium] OTHER (SEE COMMENTS)     Leg cramping,    Pneumococcal Vaccines OTHER (SEE COMMENTS)     Pt states she had pneumo vax done at Dr. Boyd on 1/10/24- states developed redness, swelling,itching to the arm    Quinapril Hcl FATIGUE       Medications:  Outpatient Medications:    Current Facility-Administered Medications on File Prior to Encounter   Medication Dose Route Frequency Provider Last Rate Last Admin    [COMPLETED] iopamidol 76% (ISOVUE-370) injection for power injector  100 mL Intravenous ONCE PRN Naldo Douglas MD   100 mL at 02/12/24 1124    [COMPLETED] prothrombin complex conc (human) (Kcentra) 2,158 Units in 80 mL infusion  2,158 Units Intravenous Once Ellie Reid PA-C   Stopped at 02/13/24 0700    [COMPLETED] cefTRIAXone (Rocephin) 1 g in D5W 100 mL IVPB-ADD  1 g Intravenous Once Naldo Levine MD   Stopped at 02/05/24 2048    [COMPLETED] methylPREDNISolone sodium succinate (Solu-MEDROL) injection 125 mg  125 mg Intravenous Once James Barr MD   125 mg at 01/14/24 0937    [COMPLETED] piperacillin-tazobactam (Zosyn) 3.375 g in dextrose 5% 100 mL IVPB-ADDV  3.375 g Intravenous Once James Barr MD   Stopped at 01/14/24 1046    [COMPLETED] azithromycin (Zithromax) 500 mg in sodium chloride 0.9% 250mL IVPB premix  500 mg Intravenous Once James Barr MD   Stopped at 01/14/24 1201    [COMPLETED] azithromycin (Zithromax) 500 mg in sodium chloride 0.9% 250mL IVPB premix  500 mg Intravenous Q24H Francisco Mir  mL/hr at 01/16/24 1142 500 mg at 01/16/24 1142    [COMPLETED] HYDROcodone-acetaminophen (Norco) 5-325 MG per tab 1 tablet  1 tablet Oral Once James Barr MD   1 tablet at 01/14/24 1200     Current  Outpatient Medications on File Prior to Encounter   Medication Sig Dispense Refill    pregabalin 100 MG Oral Cap Take 1 capsule (100 mg total) by mouth in the morning and 1 capsule (100 mg total) at noon and 1 capsule (100 mg total) in the evening. 270 capsule 1    oxyCODONE 5 MG Oral Tab Take 1 tablet (5 mg total) by mouth every 6 (six) hours as needed for Pain. 15 tablet 0    metFORMIN  MG Oral Tablet 24 Hr Take 1 tablet (500 mg total) by mouth 2 (two) times daily with meals. Take 2 tablets (1,000 mg total) by mouth daily with breakfast and 1 tablet (500 mg total) every evening.      metroNIDAZOLE 0.75 % External Gel Apply 1 g topically 2 (two) times daily. Apply to face twice daily 45 g 5    DULOXETINE 30 MG Oral Cap DR Particles Take 1 capsule (30 mg total) by mouth daily. To take along with 60mg to equal 90mg daily. 90 capsule 1    PANTOPRAZOLE 40 MG Oral Tab EC Take 1 tablet (40 mg total) by mouth before breakfast. 90 tablet 1    DULoxetine 60 MG Oral Cap DR Particles Take 1 capsule (60 mg total) by mouth daily. 90 capsule 1    rivaroxaban (XARELTO) 20 MG Oral Tab Take 1 tablet (20 mg total) by mouth daily. 90 tablet 0    rosuvastatin 10 MG Oral Tab Take 1 tablet (10 mg total) by mouth nightly. 90 tablet 1    albuterol 108 (90 Base) MCG/ACT Inhalation Aero Soln Inhale 2 puffs into the lungs every 6 (six) hours as needed for Wheezing. 1 each 3    LISINOPRIL 20 MG Oral Tab TAKE 1 TABLET BY MOUTH DAILY 90 tablet 3    tiZANidine 4 MG Oral Tab Take 1 tablet (4 mg total) by mouth every 8 (eight) hours as needed. (Patient taking differently: Take 1 tablet (4 mg total) by mouth every 8 (eight) hours as needed. Takes every night) 90 tablet 2    aspirin 81 MG Oral Chew Tab Chew 1 tablet (81 mg total) by mouth daily.      Fluticasone Propionate 50 MCG/ACT Nasal Suspension 1 spray by Nasal route 2 (two) times daily. 1 Bottle 3    Cholecalciferol (VITAMIN D) 2000 units Oral Tab Take 2,000 Units by mouth daily.       Naloxone HCl 4 MG/0.1ML Nasal Liquid 4 mg by Nasal route as needed. If patient remains unresponsive, repeat dose in other nostril 2-5 minutes after first dose. 1 kit 0    [] ondansetron 4 MG Oral Tablet Dispersible Take 1 tablet (4 mg total) by mouth every 12 (twelve) hours as needed for Nausea. 20 tablet 0       Inpatient Medications:  Scheduled Medications:     aspirin  81 mg Oral Daily    DULoxetine  30 mg Oral Daily    DULoxetine  60 mg Oral Daily    fluticasone propionate  1 spray Nasal BID    pantoprazole  40 mg Oral Before breakfast    pregabalin  100 mg Oral TID    rivaroxaban  20 mg Oral Daily    rosuvastatin  10 mg Oral Nightly    ampicillin-sulbactam  3 g Intravenous Q12H    azithromycin  500 mg Intravenous Q24H    insulin aspart  1-10 Units Subcutaneous TID AC and HS    insulin aspart  1-68 Units Subcutaneous TID CC    guaiFENesin ER  600 mg Oral BID     Infusing Medications:     sodium chloride Stopped (24 1025)     PRN Medications:  albuterol, oxyCODONE, tiZANidine, glucose **OR** glucose **OR** glucose-vitamin C **OR** dextrose **OR** glucose **OR** glucose **OR** glucose-vitamin C, acetaminophen, melatonin, ondansetron, metoclopramide, polyethylene glycol (PEG 3350), sennosides, bisacodyl, guaiFENesin, HYDROcodone-acetaminophen    Social History:    History   Smoking Status    Former    Packs/day: 0.00    Types: Cigarettes    Quit date: 1968   Smokeless Tobacco    Never       History   Alcohol Use No     Comment: rare       History   Drug Use    Types: Cannabis     Comment: uses cream for pain to back/neck with barometer changes.     Work:Retired .    TB: None  Asbestos: None      Family History   Problem Relation Age of Onset    Heart Attack Paternal Grandfather     Heart Attack Paternal Grandmother     Other (CAD) Father     Other (CAD) Mother     Hypertension Mother     Heart Attack Mother     Other (CAD) Brother     Hypertension Brother     Heart Attack Brother      Stroke Maternal Grandfather         Stroke    Stroke Maternal Grandmother         Stroke    Breast Cancer Self 54      REVIEW OF SYSTEMS:   A comprehensive 10 point review of systems was completed.  Pertinent positives and negatives noted in the the HPI.    OBJECTIVE:  Temp (24hrs), Av.1 °F (37.3 °C), Min:98.4 °F (36.9 °C), Max:100.1 °F (37.8 °C)   /62 (BP Location: Right arm)   Pulse 89   Temp 98.5 °F (36.9 °C) (Oral)   Resp 20   Ht 154.9 cm (5' 1\")   Wt 160 lb (72.6 kg)   LMP  (LMP Unknown)   SpO2 99%   BMI 30.23 kg/m²   O2: 2 LNC  General: NAD.   Neuro: Alert, no focal deficits.    HEENT: PERRL  Neck : No LAD  CV: RRR, nl S1, S2, no S4, S3 or murmur.   Lungs: Clear bilaterally.   Abd: Nontender, non distended.   Ext: No edema.   Skin: No rashes.     Labs:  Recent Labs   Lab 24  0804   WBC 5.9   HGB 12.8   HCT 40.2   .0     Recent Labs   Lab 24  0804 24  0811   * 134*   BUN 35* 22   CREATSERUM 1.81* 0.86   CA 8.6 7.8*   * 138   K 4.5 3.7    112   CO2 22.0 20.0*   AST 29  --    ALT 19  --    ALB 3.7  --      No results for input(s): \"INR\", \"PTT\" in the last 168 hours.  No results for input(s): \"ABGPHT\", \"GJFVFA4T\", \"ESCCR9N\", \"ABGHCO3\", \"SITE\", \"DEV\", \"THGB\" in the last 168 hours.      Imaging:  CXR 24:  1. There is interval worsening of consolidation in right lung which could represent worsening or recurrence of pneumonia.   2. There is interval clearing of opacities from the left lung         Chest images personally reviewed.     Prior reports:  CXR (24): Findings suggesting increasing bilateral opacities of the upper lobes, suggesting multifocal pneumonia.    CXR (23): Trace right minor fissural fluid, likely sequelae of pneumonia. No consolidation identified.  Continued follow-up is recommended.    CXR 2022        Chest images personally reviewed.         CXR (21): CONCLUSION: Previously questioned areas of  airspace opacity are not as well appreciated on today's exam. Probable mild atelectasis or scarring is present. If there is persistent clinical concern then consider CT.    CXR (21): There are new reticular opacities in the right upper lobe suggesting interstitial pneumonia.    PSG (18): AHI: 27 REM AHI: 43 Supine AHI: 41 Lateral AHI: 8 Oxygen justa: 72% 22% of the record with saturation below 90%    PFT (23): FVC: 2.61 (111%) FEV1\": 1.98 (109%) Ratio: 76 T.44 (99%) DLCO: 11.33 (65%)  PFT (21): FVC: 2.68 (103%) FEV1: 2.13 (107%) Ratio: 80 T.64 (97%) DLCO: 11.91 64%)  FeNO: 8    Assessment/Plan   Recurrent Pneumonia  ?from hypogammaglobulinemia and follows with Dr. Boyd and Dr. Browning.   I would be concerned about the recurrence in the same location over the last several years and would be concerning for structural change or atypical infection.   Check CT Chest to evaluate for structural change such as bronchiectasis.   Consider further w/u pending CT findings.   IVIG per Dr. Paiz.   Continue abx with unasyn/azithro -  Deescalate pending cultures.   H/o MUKUND  Off machine x 1 year due to malfunction.   Needs repair/replace of machine. Can send to outpt CPAP coordinators at d/c.   H/o DVT   On xarelto.   Proph  Anticoagulated.     My best regards,         Ran Augustin MD  [facility] Pulmonary, Critical Care and Sleep Medicine

## 2024-02-28 NOTE — PHYSICAL THERAPY NOTE
PHYSICAL THERAPY EVALUATION - INPATIENT     Room Number: 525/525-A  Evaluation Date: 2024  Type of Evaluation: Initial  Physician Order: PT Eval and Treat    Presenting Problem: PNA  Co-Morbidities : h/o recent appy  Reason for Therapy: Mobility Dysfunction and Discharge Planning    PHYSICAL THERAPY ASSESSMENT   Patient is a 78 year old female admitted 2024 for PNA.   Patient is currently functioning near baseline with bed mobility, transfers, gait, and stair negotiation. Prior to admission, patient's baseline is ind.     Patient will benefit from continued skilled PT Services For duration of hospitalization, however, given the patient is functioning near baseline level do not anticipate skilled therapy needs at discharge .    PLAN  Patient currently does not meet criteria for skilled inpatient physical therapy services, however patient will remain on Inpatient Mobility Team and will continue with encouraged ambulation to maintain current level of mobility.   The rehab aide will perform treatment activities prescribed by this physical therapist. The rehab aide will communicate with overseeing PT regarding any change in functional mobility. RN aware.       GOALS  Patient was able to achieve the following goals ...    Patient was able to transfer At previous, functional level   Patient able to ambulate on level surfaces At previous, functional level         HOME SITUATION  Type of Home: [facility]   Home Layout: Two level                              Prior Level of [place]: Pt reports was scheduled for knee replacement this date, however cancelled due to ongoing medical issues.  Pt reports chronic knee pain, worsens with increased activity, is present at all times.    SUBJECTIVE  \"What would a walker do for me?  Like, for real?\"      OBJECTIVE  Precautions: Bed/chair alarm  Fall Risk: High fall risk    WEIGHT BEARING RESTRICTION                   PAIN ASSESSMENT  Ratin  Location: R  knee  Management Techniques: Activity promotion;Body mechanics;Relaxation;Repositioning    COGNITION  Following Commands:  follows all commands and directions without difficulty    RANGE OF MOTION AND STRENGTH ASSESSMENT  Upper extremity ROM and strength are within functional limits     Lower extremity ROM is within functional limits     Lower extremity strength is within functional limits       BALANCE  Static Sitting: Good  Dynamic Sitting: Good  Static Standing: Fair +  Dynamic Standing: Fair +    ADDITIONAL TESTS                                    ACTIVITY TOLERANCE                         O2 WALK       NEUROLOGICAL FINDINGS                        AM-PAC '6-Clicks' INPATIENT SHORT FORM - BASIC MOBILITY  How much difficulty does the patient currently have...  Patient Difficulty: Turning over in bed (including adjusting bedclothes, sheets and blankets)?: A Little   Patient Difficulty: Sitting down on and standing up from a chair with arms (e.g., wheelchair, bedside commode, etc.): A Little   Patient Difficulty: Moving from lying on back to sitting on the side of the bed?: A Little   How much help from another person does the patient currently need...   Help from Another: Moving to and from a bed to a chair (including a wheelchair)?: A Little   Help from Another: Need to walk in hospital room?: A Little   Help from Another: Climbing 3-5 steps with a railing?: A Little       AM-PAC Score:  Raw Score: 18   Approx Degree of Impairment: 46.58%   Standardized Score (AM-PAC Scale): 43.63   CMS Modifier (G-Code): CK    FUNCTIONAL ABILITY STATUS  Gait Assessment   Functional Mobility/Gait Assessment  Gait Assistance: Contact guard assist  Distance (ft): 200  Assistive Device: None  Pattern: R Decreased stance time    Skilled Therapy Provided     Bed Mobility:  Rolling: ind  Supine to sit: ind   Sit to supine: NT     Transfer Mobility:  Sit to stand: ind   Stand to sit: ind  Gait = CGA 2/2 antalgic gait.    Therapist's  comments:Pt encouraged to continue to ambulate frequently while in house.  MD present in middle of session for prolonged time with writer stepping out of room.  Discussed role of RW, pt continues to decline trial of use of.    Exercise/Education Provided:  Bed mobility  Body mechanics  Functional activity tolerated  Gait training  Posture  Transfer training    Patient End of Session: Up in chair;Needs met;Call light within reach;RN aware of session/findings;All patient questions and concerns addressed;Alarm set;Discussed recommendations with /    Patient Evaluation Complexity Level:  History Moderate - 1 or 2 personal factors and/or co-morbidities   Examination of body systems Moderate - addressing a total of 3 or more elements   Clinical Presentation Moderate - Evolving   Clinical Decision Making Moderate Complexity       PT Session Time: 20 minutes    Therapeutic Activity: 8 minutes

## 2024-02-28 NOTE — PROGRESS NOTES
2/28/2024    Patient Name: Rayne Morales      To Whom It May Concern:    This letter has been written at the patient's request. The above patient was seen at [facility] for treatment of a medical condition from 2/27/24 - present and unsure when she will be discharged from the hospital.  Please excuse my patient's absence.      Sincerely,        Sohail Bowman  2/28/2024

## 2024-02-29 LAB
GLUCOSE BLD-MCNC: 145 MG/DL (ref 70–99)
GLUCOSE BLD-MCNC: 167 MG/DL (ref 70–99)
GLUCOSE BLD-MCNC: 185 MG/DL (ref 70–99)
GLUCOSE BLD-MCNC: 88 MG/DL (ref 70–99)

## 2024-02-29 PROCEDURE — 99232 SBSQ HOSP IP/OBS MODERATE 35: CPT | Performed by: HOSPITALIST

## 2024-02-29 RX ORDER — FLUTICASONE FUROATE AND VILANTEROL 100; 25 UG/1; UG/1
1 POWDER RESPIRATORY (INHALATION) DAILY
Status: DISCONTINUED | OUTPATIENT
Start: 2024-02-29 | End: 2024-03-03

## 2024-02-29 NOTE — PLAN OF CARE
Pt A/O x4, on room air. NSR on tele. Continent x2, poor appetite. PRN meds given for knee pain. Up SBA. Plan for bronch tomorrow morning, per pulm. To be NPO at midnight.     Problem: Patient/Family Goals  Goal: Patient/Family Long Term Goal  Description: Patient's Long Term Goal: Discharge to home    Interventions:  - collaborative care  - See additional Care Plan goals for specific interventions  Outcome: Progressing     Problem: Patient/Family Goals  Goal: Patient/Family Short Term Goal  Description: Patient's Short Term Goal:   2/28: wean O2 and \"to feel better\"  2/29: pain mgmt    Interventions:   - supportive care  - See additional Care Plan goals for specific interventions  Outcome: Progressing

## 2024-02-29 NOTE — PROGRESS NOTES
[facility]    Rayne Morales Patient Status:  Inpatient    1945 MRN [id number]   Location [facility] 5NW-A Attending Sohail Bowman MD   Hosp Day # 2 PCP Jose Daniel Washington MD     SUBJECTIVE: feels a little better today. Frustrated bc she keeps getting PNA     OBJECTIVE:  /57 (BP Location: Right arm)   Pulse 76   Temp 98.1 °F (36.7 °C) (Oral)   Resp 17   Ht 154.9 cm (5' 1\")   Wt 160 lb (72.6 kg)   LMP  (LMP Unknown)   SpO2 95%   BMI 30.23 kg/m²   O2 requirement: on room air     No intake/output data recorded.  No intake/output data recorded.     Current Medications:   Current Facility-Administered Medications:     ampicillin-sulbactam (Unasyn) 3 g in sodium chloride 0.9% 100mL IVPB-ADD, 3 g, Intravenous, q6h    albuterol (Ventolin HFA) 108 (90 Base) MCG/ACT inhaler 2 puff, 2 puff, Inhalation, Q6H PRN    aspirin chewable tab 81 mg, 81 mg, Oral, Daily    DULoxetine (Cymbalta) DR cap 30 mg, 30 mg, Oral, Daily    DULoxetine (Cymbalta) DR cap 60 mg, 60 mg, Oral, Daily    fluticasone propionate (Flonase) 50 MCG/ACT nasal suspension 1 spray, 1 spray, Nasal, BID    oxyCODONE immediate release tab 5 mg, 5 mg, Oral, Q6H PRN    pantoprazole (Protonix) DR tab 40 mg, 40 mg, Oral, Before breakfast    pregabalin (Lyrica) cap 100 mg, 100 mg, Oral, TID    rivaroxaban (Xarelto) tab 20 mg, 20 mg, Oral, Daily    rosuvastatin (Crestor) tab 10 mg, 10 mg, Oral, Nightly    tiZANidine (Zanaflex) tab 4 mg, 4 mg, Oral, Q8H PRN    glucose (Dex4) 15 GM/59ML oral liquid 15 g, 15 g, Oral, Q15 Min PRN **OR** glucose (Glutose) 40% oral gel 15 g, 15 g, Oral, Q15 Min PRN **OR** glucose-vitamin C (Dex-4) chewable tab 4 tablet, 4 tablet, Oral, Q15 Min PRN **OR** dextrose 50% injection 50 mL, 50 mL, Intravenous, Q15 Min PRN **OR** glucose (Dex4) 15 GM/59ML oral liquid 30 g, 30 g, Oral, Q15 Min PRN **OR** glucose (Glutose) 40% oral gel 30 g, 30 g, Oral, Q15 Min PRN **OR** glucose-vitamin C (Dex-4) chewable tab 8 tablet,  8 tablet, Oral, Q15 Min PRN    acetaminophen (Tylenol Extra Strength) tab 1,000 mg, 1,000 mg, Oral, Q6H PRN    melatonin tab 3 mg, 3 mg, Oral, Nightly PRN    ondansetron (Zofran) 4 MG/2ML injection 4 mg, 4 mg, Intravenous, Q6H PRN    metoclopramide (Reglan) 5 mg/mL injection 5 mg, 5 mg, Intravenous, Q8H PRN    polyethylene glycol (PEG 3350) (Miralax) 17 g oral packet 17 g, 17 g, Oral, Daily PRN    sennosides (Senokot) tab 17.2 mg, 17.2 mg, Oral, Nightly PRN    bisacodyl (Dulcolax) 10 MG rectal suppository 10 mg, 10 mg, Rectal, Daily PRN    insulin aspart (NovoLOG) 100 Units/mL FlexPen 1-10 Units, 1-10 Units, Subcutaneous, TID AC and HS    insulin aspart (NovoLOG) 100 Units/mL FlexPen 1-68 Units, 1-68 Units, Subcutaneous, TID CC    guaiFENesin ER (Mucinex) 12 hr tab 600 mg, 600 mg, Oral, BID    guaiFENesin (Robitussin) 100 MG/5 ML oral liquid 200 mg, 200 mg, Oral, Q4H PRN    HYDROcodone-acetaminophen (Norco) 5-325 MG per tab 1 tablet, 1 tablet, Oral, Q6H PRN     General appearance: alert, appears stated age, and cooperative  Lungs: clear to auscultation bilaterally  Heart: S1, S2 normal, no murmur, click, rub or gallop, regular rate and rhythm  Abdomen: soft, non-tender; bowel sounds normal; no masses,  no organomegaly  Extremities: extremities normal, atraumatic, no cyanosis or edema           Lab Results   Component Value Date    PT 19.4 (H) 07/29/2014    PT 13.9 07/26/2014    INR 2.33 (H) 12/04/2023    INR 1.00 02/21/2022    INR 2.58 (H) 02/09/2022          Imaging: CT chest personally reviewed:  CONCLUSION:    1. Diffuse patchy and ground-glass opacity throughout the right lung.  Findings are likely infectious/inflammatory etiology.  Follow-up to resolution is recommended.   2. Additional findings as detailed in the body of the report.      ASSESSMENT/PLAN:  Dyspnea/hypoxia- due to severe PNA  - supp O2; wean as tolerated- weaned down to room air  - bronchial hygiene  ID h/o recurrent Pneumonia- multifocal in  immunocompromised host  - at risk for pseudomonas or opportunistic infections  - check sputum cultures- not expectorating any phlegm  - urine legionella/strep pending  - proceed with bronchoscopy for BAL- scheduled for tomorrow at 10am.  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 Boyd and Dr. Browning  Asthma- no exacerbation  - breo in lieu of advair  - BDs prn  H/o MUKUND  Off machine x 1 year due to malfunction.   Needs repair/replace of machine. Can send to outpt CPAP coordinators at d/c.   H/o DVT   On xarelto.   Proph  Anticoagulated  Dispo- Full code  - will follow    Javier Kang MD  2/29/2024  11:28 AM

## 2024-02-29 NOTE — PROGRESS NOTES
[facility]     Hospitalist Progress Note     Rayne Morales Patient Status:  Inpatient    1945 MRN [id number]   Location [facility] 5NW-A Attending Sohail Bowman MD   Hosp Day # 2 PCP Jose Daniel Washington MD     Subjective:   Patient feeling better on RA.     Objective:    Review of Systems:   A comprehensive review of systems was completed; pertinent positive and negatives stated in subjective.  Vital signs:  Temp:  [97.7 °F (36.5 °C)-98.5 °F (36.9 °C)] 98.1 °F (36.7 °C)  Pulse:  [76-95] 76  Resp:  [17-20] 17  BP: (121-151)/(50-71) 133/57  SpO2:  [94 %-99 %] 95 %  Physical Exam:    General: No acute distress   Respiratory: diminished, coarse, no wheezes, no rhonchi  Cardiovascular: S1, S2, RRR  Abdomen: Soft, NT/ND, +BS  Extremities: no edema    Diagnostic Data:    Labs:  Recent Labs   Lab 24  0804   WBC 5.9   HGB 12.8   MCV 93.3   .0     Recent Labs   Lab 24  0804 24  0811   * 134*   BUN 35* 22   CREATSERUM 1.81* 0.86   CA 8.6 7.8*   ALB 3.7  --    * 138   K 4.5 3.7    112   CO2 22.0 20.0*   ALKPHO 73  --    AST 29  --    ALT 19  --    BILT 0.6  --    TP 7.3  --      Estimated Creatinine Clearance: 41.3 mL/min (based on SCr of 0.86 mg/dL).  No results for input(s): \"PTP\", \"INR\" in the last 168 hours.     Microbiology  Hospital Encounter on 24   1. Blood Culture     Status: None (Preliminary result)    Collection Time: 24  8:54 AM    Specimen: Blood,peripheral   Result Value Ref Range    Blood Culture Result No Growth 1 Day N/A     Imaging: Reviewed in Epic.  Medications:    ampicillin-sulbactam  3 g Intravenous q6h    aspirin  81 mg Oral Daily    DULoxetine  30 mg Oral Daily    DULoxetine  60 mg Oral Daily    fluticasone propionate  1 spray Nasal BID    pantoprazole  40 mg Oral Before breakfast    pregabalin  100 mg Oral TID    rivaroxaban  20 mg Oral Daily    rosuvastatin  10 mg Oral Nightly    azithromycin  500 mg Intravenous Q24H     insulin aspart  1-10 Units Subcutaneous TID AC and HS    insulin aspart  1-68 Units Subcutaneous TID CC    guaiFENesin ER  600 mg Oral BID       Assessment & Plan:      #Sepsis 2/2 PNA  -IVF and now stopped   -IV Abx  -Blood and Sputum Cx NGTD   -IVF  -pulm consulted and appreciate recs- bronchoscopy tomorrow   -CT Chest reviewed     #Acute hypoxic resp failure 2/2 above- wean O2 and now on room air  #Lactic acidosis- IVF and improved  #FREDERICK 2/2 sepsis - improved   #Hyponatremia-IVF and improved   #DM- Hyperglycemic protocol   #GERD- PPI   #DL- statin   #HTN - hold lisinopril with FREDERICK  #H/O DVT on xarelto   #Hypogammaglobulinemia- IVIG per Dr. Boyd as outpt     CPAP malfunctioning at home- Pulm and SW assisting in new CPAP     ADDENDUM:  Before CPAP broke, patient was using and benefiting from the PAP.       Sohail Bowman MD  Supplementary Documentation:     Quality:  DVT Mechanical Prophylaxis:     Early ambuation  DVT Pharmacologic Prophylaxis   Medication    rivaroxaban (Xarelto) tab 20 mg      DVT Pharmacologic prophylaxis: Aspirin 162 mg         Code Status: Full Code  Baxter: No urinary catheter in place  Baxter Duration (in days):   Central line:    CLARI:   Discharge is dependent on: progress  At this point Ms. Morales is expected to be discharge to: tbd   **Certification      PHYSICIAN Certification of Need for Inpatient Hospitalization - Initial Certification    Patient will require inpatient services that will reasonably be expected to span two midnight's based on the clinical documentation in H+P.   Based on patients current state of illness, I anticipate that, after discharge, patient will require TBD.    The 21st Century Cures Act makes medical notes like these available to patients in the interest of transparency. Please be advised this is a medical document. Medical documents are intended to carry relevant information, facts as evident, and the clinical opinion of the practitioner. The medical note  is intended as peer to peer communication and may appear blunt or direct. It is written in medical language and may contain abbreviations or verbiage that are unfamiliar.

## 2024-02-29 NOTE — CDS QUERY
CLINICAL DOCUMENTATION CLARIFICATION FORM  Dear Dr. Bowman,  Clinical information (provided below) documents hypoxia with the needs for supplemental oxygen. Please further clarify respiratory status .  PLEASE “X” THE DIAGNOSIS THAT APPLIES:  [ X ] Hypoxia with Acute Respiratory Failure   [   ] Hypoxia without Acute Respiratory Failure    [   ] Other (please specify):     Documentation from the Medical Record:   Risk; recurrent PNA , asthma, MUKUND  Clinical indicators; Baseline RA. Presented to ED  worsening cough  , SOB, weakness . RR 25 SpO2 84% RA. Exam; Pulmonary:    Effort: Pulmonary effort is normal.    Breath sounds: Normal breath sounds.      Comments: Mild conversational dyspnea but she is able to speak full sentences.  Aeration is good. Placed on 2L NC SpO2 95%. Clinical impression includes Dx hypoxia     H&P exam; Respiratory: Diminished and coarse b/l      2/28 Pulmonology consult; Exam; lungs clear     Flowsheet; O2 Needs up to 5L NC [id number] RR 18-30. SpO2 93-98%.     Treatment; O2 protocol Pulmonology consult        For questions regarding this query, please contact Clinical :   Lucas Parks RN, BSN, CCDS x [phone]                                                                                   THIS FORM IS A PERMANENT PART OF THE MEDICAL RECORD

## 2024-02-29 NOTE — PROGRESS NOTES
AO x4. RA. 2L PRN overnight. Refusing CPAP. 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 Tele - NSR. Xarelto. Continent. Up standby to bathroom. Reports R knee pain, heat packs given and norco PRN. QID accuchecks. IVF SL. Unasyn. Azithromycin. Carb controlled diet. Pt resting in bed with call light in reach. NO further needs at this moment.

## 2024-02-29 NOTE — CM/SW NOTE
02/29/24 1100   CM/HEIDI Referral Data   Referral Source Social Work (self-referral)   Reason for Referral Discharge planning   Informant Clinical Staff Member;EMR;Patient   Patient Info   Patient's Current Mental Status at Time of Assessment Alert;Oriented   Patient's Home Environment House   Patient lives with Grandchild   Patient Status Prior to Admission   Services in place prior to admission DME/Supplies at home   DME Provider/Supplier [facility]   Type of DME/Supplies Straight Cane;CPAP   Discharge Needs   Anticipated D/C needs No anticipated discharge needs     Spoke with Dr. Bowman regarding patient's home CPAP not working.    Patient is a 79 y/o female who admitted with Nosocomial pneumonia. Met with patient, who was alert and oriented, to discuss discharge planning. She lives at home with her grandson (26 y/o). Her daughter and VERO are supportive and had just stepped out prior to SW's visit. She uses a cane to ambulate, completes ADLs independently, and drives. She has a CPAP from [facility] which sounds like a train when she uses it. She shared she has been trying to get it fixed for about a year. She has it hooked up to the cleaning machine so it does get cleaned. She was ok with SW reaching out to [facility] to try to expedite it being fixed. Discussed HH, she does not feel HH is needed at this time.    Spoke with Linette from [facility] who confirmed she has a CPAP through them which she received in 2019. She shows last contact about devise was 2022. She is going to request CPAP team reaches out to patient as soon as possible to help fix devise.    HEIDI/LEWIS to remain available for support and/or discharge planning.    KODY Barnard  Discharge Planner  [phone]

## 2024-02-29 NOTE — CM/SW NOTE
Spoke with Linette from [facility]. Patient is eligible for a new CPAP. Sent requested information/referral in aidin.    MD Note:  Before CPAP broke, patient was using and benefiting from the PAP.     Spoke with RT who states CPAP settings: Range 4-20.     SW/CM to remain available for support and/or discharge planning.    KODY Barnard  Discharge Planner  [phone]

## 2024-02-29 NOTE — PROGRESS NOTES
02/29/24 1309   BiPAP/CPAP Monitored Parameters   Toleration Refused  (Patient has refused CPAP tonight. Machine removed from room. MUKUND protocol in place)       Told patient that if she needs to wear CPAP, we will help place her on CPAP. Patient also refused CPAP yesterday.     Tam Tirado, Brian, RRT

## 2024-03-01 LAB
BASOPHILS NFR BRONCH: 0 %
EOSINOPHIL NFR BRONCH: 2 %
GLUCOSE BLD-MCNC: 115 MG/DL (ref 70–99)
GLUCOSE BLD-MCNC: 116 MG/DL (ref 70–99)
GLUCOSE BLD-MCNC: 117 MG/DL (ref 70–99)
GLUCOSE BLD-MCNC: 129 MG/DL (ref 70–99)
LYMPHOCYTES NFR BRONCH: 6 %
MONOS+MACROS NFR BRONCH: 22 %
NEUTROPHILS NFR BRONCH: 70 %
TOTAL CELLS COUNTED FLD: 100

## 2024-03-01 PROCEDURE — 0BJ08ZZ INSPECTION OF TRACHEOBRONCHIAL TREE, VIA NATURAL OR ARTIFICIAL OPENING ENDOSCOPIC: ICD-10-PCS | Performed by: INTERNAL MEDICINE

## 2024-03-01 PROCEDURE — 0B9C8ZX DRAINAGE OF RIGHT UPPER LUNG LOBE, VIA NATURAL OR ARTIFICIAL OPENING ENDOSCOPIC, DIAGNOSTIC: ICD-10-PCS | Performed by: INTERNAL MEDICINE

## 2024-03-01 PROCEDURE — 99232 SBSQ HOSP IP/OBS MODERATE 35: CPT | Performed by: HOSPITALIST

## 2024-03-01 RX ORDER — LIDOCAINE HYDROCHLORIDE 20 MG/ML
INJECTION, SOLUTION INFILTRATION; PERINEURAL
Status: DISCONTINUED | OUTPATIENT
Start: 2024-03-01 | End: 2024-03-01 | Stop reason: HOSPADM

## 2024-03-01 RX ORDER — HYDROCODONE BITARTRATE AND ACETAMINOPHEN 5; 325 MG/1; MG/1
1 TABLET ORAL ONCE
Status: COMPLETED | OUTPATIENT
Start: 2024-03-01 | End: 2024-03-01

## 2024-03-01 RX ORDER — HYDRALAZINE HYDROCHLORIDE 20 MG/ML
5 INJECTION INTRAMUSCULAR; INTRAVENOUS EVERY 6 HOURS PRN
Status: DISCONTINUED | OUTPATIENT
Start: 2024-03-01 | End: 2024-03-03

## 2024-03-01 RX ORDER — LISINOPRIL 20 MG/1
20 TABLET ORAL DAILY
Status: DISCONTINUED | OUTPATIENT
Start: 2024-03-02 | End: 2024-03-03

## 2024-03-01 NOTE — OPERATIVE REPORT
Bronchoscopy Procedure Report     Preop diagnosis:       abnormal CT  Postop diagnosis:      abnormal CT  Procedure performed: Bronchoscopy, Diagnostic  Bronchoalveolar lavage, BAL     Sedation used:          6 mg of versed, 175 mcg of fentanyl, topical lidocaine.  I was present and supervised the independent trained observer as they monitored the patient's level of consciousness and physiological status during the entire course of the procedure and moderate sedation.         Description of procedure: Informed consent was obtained. Oxygen applied via facemask.  Bronchoscope was inserted via oral route.  Normal vocal cord movement was noted. Trachea appeared normal. Selena appeared sharp.  Mucous membranes appeared friable. There were no significant secretions noted.     Left and right lung were inspected to the subsegmental level.  No lesions seen.     BAL of RUL completed, 100cc of sterile saline instilled, 25cc of blood tinged fluid aspirated back        Samples obtained:                   BAL of RUL       Post procedure CXR necessary? no  Follow up:       cultures, cytology     Patient tolerated the procedure well and was transferred to the recovery area. EBL was zero.

## 2024-03-01 NOTE — PLAN OF CARE
Pt is A/O x4, on room air, NSR on tele. She is continent x2. Gets up independently in room. Complains of R knee pain, PRN meds given. Bronch done during shift, pt tolerated well, pathology sent. Regular diet resumed.     Problem: Patient/Family Goals  Goal: Patient/Family Long Term Goal  Description: Patient's Long Term Goal: Discharge to home    Interventions:  - collaborative care  - See additional Care Plan goals for specific interventions  Outcome: Progressing     Problem: Patient/Family Goals  Goal: Patient/Family Short Term Goal  Description: Patient's Short Term Goal:   2/28: wean O2 and \"to feel better\"  2/29: pain mgmt  2/29 NOC: sleep  3/1: bronch    Interventions:   - supportive care  - See additional Care Plan goals for specific interventions  Outcome: Progressing

## 2024-03-01 NOTE — PROGRESS NOTES
[facility]     Hospitalist Progress Note     Rayne Morales Patient Status:  Inpatient    1945 MRN [id number]   Location [facility] 5NW-A Attending Sohail Bowman MD   Hosp Day # 3 PCP Jose Daniel Washington MD     Subjective:   Patient feeling better on RA.     Objective:    Review of Systems:   A comprehensive review of systems was completed; pertinent positive and negatives stated in subjective.  Vital signs:  Temp:  [97.6 °F (36.4 °C)-98 °F (36.7 °C)] 97.8 °F (36.6 °C)  Pulse:  [73-85] 78  Resp:  [17-18] 18  BP: (128-171)/(63-81) 153/63  SpO2:  [95 %-99 %] 98 %  Physical Exam:    General: No acute distress   Respiratory: diminished, coarse, no wheezes, no rhonchi  Cardiovascular: S1, S2, RRR  Abdomen: Soft, NT/ND, +BS  Extremities: no edema    Diagnostic Data:    Labs:  Recent Labs   Lab 24  0804   WBC 5.9   HGB 12.8   MCV 93.3   .0     Recent Labs   Lab 24  0804 24  0811   * 134*   BUN 35* 22   CREATSERUM 1.81* 0.86   CA 8.6 7.8*   ALB 3.7  --    * 138   K 4.5 3.7    112   CO2 22.0 20.0*   ALKPHO 73  --    AST 29  --    ALT 19  --    BILT 0.6  --    TP 7.3  --      Estimated Creatinine Clearance: 41.3 mL/min (based on SCr of 0.86 mg/dL).  No results for input(s): \"PTP\", \"INR\" in the last 168 hours.     Microbiology  Hospital Encounter on 24   1. Blood Culture     Status: None (Preliminary result)    Collection Time: 24  8:54 AM    Specimen: Blood,peripheral   Result Value Ref Range    Blood Culture Result No Growth 2 Days N/A     Imaging: Reviewed in Epic.  Medications:    fluticasone furoate-vilanterol  1 puff Inhalation Daily    ampicillin-sulbactam  3 g Intravenous q6h    aspirin  81 mg Oral Daily    DULoxetine  30 mg Oral Daily    DULoxetine  60 mg Oral Daily    fluticasone propionate  1 spray Nasal BID    pantoprazole  40 mg Oral Before breakfast    pregabalin  100 mg Oral TID    rivaroxaban  20 mg Oral Daily    rosuvastatin  10 mg  Oral Nightly    insulin aspart  1-10 Units Subcutaneous TID AC and HS    insulin aspart  1-68 Units Subcutaneous TID CC    guaiFENesin ER  600 mg Oral BID       Assessment & Plan:      #Sepsis 2/2 PNA  -IVF and now stopped   -IV Abx  -Blood and Sputum Cx NGTD   -IVF  -pulm consulted and appreciate recs- bronchoscopy today   -CT Chest reviewed     #Acute hypoxic resp failure 2/2 above- wean O2 and now on room air  #Lactic acidosis- IVF and improved  #FREDERICK 2/2 sepsis - improved   #Hyponatremia-IVF and improved   #DM- Hyperglycemic protocol   #GERD- PPI   #DL- statin   #HTN - hold lisinopril with FREDERICK  #H/O DVT on xarelto   #Hypogammaglobulinemia- IVIG per Dr. Boyd as outpt     CPAP malfunctioning at home- Pulm and SW assisting in new CPAP     ADDENDUM:  Before CPAP broke, patient was using and benefiting from the PAP.     DC planning hoping tomorrow pending bronch       Sohail Bowman MD  Supplementary Documentation:     Quality:  DVT Mechanical Prophylaxis:     Early ambuation  DVT Pharmacologic Prophylaxis   Medication    rivaroxaban (Xarelto) tab 20 mg      DVT Pharmacologic prophylaxis: Aspirin 162 mg         Code Status: Full Code  Baxter: No urinary catheter in place  Baxter Duration (in days):   Central line:    CLARI:   Discharge is dependent on: progress  At this point Ms. Morales is expected to be discharge to: tbd   **Certification      PHYSICIAN Certification of Need for Inpatient Hospitalization - Initial Certification    Patient will require inpatient services that will reasonably be expected to span two midnight's based on the clinical documentation in H+P.   Based on patients current state of illness, I anticipate that, after discharge, patient will require TBD.    The 21st Century Cures Act makes medical notes like these available to patients in the interest of transparency. Please be advised this is a medical document. Medical documents are intended to carry relevant information, facts as evident, and  the clinical opinion of the practitioner. The medical note is intended as peer to peer communication and may appear blunt or direct. It is written in medical language and may contain abbreviations or verbiage that are unfamiliar.

## 2024-03-01 NOTE — CM/SW NOTE
CM spoke to [facility] for CPAP status update; CM informed Written Order form for PAP needs to be completed and signed by ordering physician and sent back to [facility] for referral completion.  CM printed PAP Order Form supplied by [facility] and placed on patient chart.  Care team updated.      Mariangel Bo RN Case Manager [id number]

## 2024-03-01 NOTE — PROGRESS NOTES
[facility]    Rayne Morales Patient Status:  Inpatient    1945 MRN [id number]   Location [facility] 5NW-A Attending Sohail Bowman MD   Hosp Day # 3 PCP Jose Daniel Washington MD     SUBJECTIVE: No acute events overnight, plan for bronchoscopy with BAL today.     OBJECTIVE:  /53 (BP Location: Right arm)   Pulse 75   Temp 97.8 °F (36.6 °C) (Oral)   Resp 18   Ht 5' 1\" (1.549 m)   Wt 160 lb (72.6 kg)   LMP  (LMP Unknown)   SpO2 96%   BMI 30.23 kg/m²   O2 requirement: RA     No intake/output data recorded.  No intake/output data recorded.     Current Medications:   Current Facility-Administered Medications:     fluticasone furoate-vilanterol (Breo Ellipta) 100-25 MCG/ACT inhaler 1 puff, 1 puff, Inhalation, Daily    ampicillin-sulbactam (Unasyn) 3 g in sodium chloride 0.9% 100mL IVPB-ADD, 3 g, Intravenous, q6h    albuterol (Ventolin HFA) 108 (90 Base) MCG/ACT inhaler 2 puff, 2 puff, Inhalation, Q6H PRN    aspirin chewable tab 81 mg, 81 mg, Oral, Daily    DULoxetine (Cymbalta) DR cap 30 mg, 30 mg, Oral, Daily    DULoxetine (Cymbalta) DR cap 60 mg, 60 mg, Oral, Daily    fluticasone propionate (Flonase) 50 MCG/ACT nasal suspension 1 spray, 1 spray, Nasal, BID    oxyCODONE immediate release tab 5 mg, 5 mg, Oral, Q6H PRN    pantoprazole (Protonix) DR tab 40 mg, 40 mg, Oral, Before breakfast    pregabalin (Lyrica) cap 100 mg, 100 mg, Oral, TID    rivaroxaban (Xarelto) tab 20 mg, 20 mg, Oral, Daily    rosuvastatin (Crestor) tab 10 mg, 10 mg, Oral, Nightly    tiZANidine (Zanaflex) tab 4 mg, 4 mg, Oral, Q8H PRN    glucose (Dex4) 15 GM/59ML oral liquid 15 g, 15 g, Oral, Q15 Min PRN **OR** glucose (Glutose) 40% oral gel 15 g, 15 g, Oral, Q15 Min PRN **OR** glucose-vitamin C (Dex-4) chewable tab 4 tablet, 4 tablet, Oral, Q15 Min PRN **OR** dextrose 50% injection 50 mL, 50 mL, Intravenous, Q15 Min PRN **OR** glucose (Dex4) 15 GM/59ML oral liquid 30 g, 30 g, Oral, Q15 Min PRN **OR** glucose (Glutose)  40% oral gel 30 g, 30 g, Oral, Q15 Min PRN **OR** glucose-vitamin C (Dex-4) chewable tab 8 tablet, 8 tablet, Oral, Q15 Min PRN    acetaminophen (Tylenol Extra Strength) tab 1,000 mg, 1,000 mg, Oral, Q6H PRN    melatonin tab 3 mg, 3 mg, Oral, Nightly PRN    ondansetron (Zofran) 4 MG/2ML injection 4 mg, 4 mg, Intravenous, Q6H PRN    metoclopramide (Reglan) 5 mg/mL injection 5 mg, 5 mg, Intravenous, Q8H PRN    polyethylene glycol (PEG 3350) (Miralax) 17 g oral packet 17 g, 17 g, Oral, Daily PRN    sennosides (Senokot) tab 17.2 mg, 17.2 mg, Oral, Nightly PRN    bisacodyl (Dulcolax) 10 MG rectal suppository 10 mg, 10 mg, Rectal, Daily PRN    insulin aspart (NovoLOG) 100 Units/mL FlexPen 1-10 Units, 1-10 Units, Subcutaneous, TID AC and HS    insulin aspart (NovoLOG) 100 Units/mL FlexPen 1-68 Units, 1-68 Units, Subcutaneous, TID CC    guaiFENesin ER (Mucinex) 12 hr tab 600 mg, 600 mg, Oral, BID    guaiFENesin (Robitussin) 100 MG/5 ML oral liquid 200 mg, 200 mg, Oral, Q4H PRN    HYDROcodone-acetaminophen (Norco) 5-325 MG per tab 1 tablet, 1 tablet, Oral, Q6H PRN      Physical Exam:                          General: alert, cooperative, in NAD                          HEENT: oropharynx clear without erythema or exudates, moist mucous membranes                          Lungs: right sided rhonchi                          Chest wall: No tenderness or deformity.                          Heart: Regular rate and rhythm, normal S1S2                          Abdomen: soft, non-tender, non-distended, positive BS.                          Extremity: No clubbing or cyanosis. no edema                          Skin: No rashes or lesions.             Lab Results   Component Value Date    PT 19.4 (H) 07/29/2014    PT 13.9 07/26/2014    INR 2.33 (H) 12/04/2023    INR 1.00 02/21/2022    INR 2.58 (H) 02/09/2022          Imaging: I have independently visualized all relevant chest imaging in PACS.  I agree with the radiology interpretation  except where noted.       ASSESSMENT/PLAN:  Dyspnea/hypoxia- due to severe PNA  - supp O2; wean as tolerated- weaned down to room air  - bronchial hygiene  ID h/o recurrent Pneumonia- multifocal in immunocompromised host  - CT chest with right sided GGO and infiltrates without associated lymphadenopathy   - at risk for pseudomonas or opportunistic infections  - not expectorating any phlegm to check for cultures  - urine legionella/strep pending  - proceed with bronchoscopy for BAL today.  The rationale for performing the bronchoscopy, including risks and benefits were explained to the patient and questions were answered.  The risks of drug reactions, bleeding, collapsed lung, fevers, and hoarseness were discussed, along with other less common complications including death.  The patients understands the procedure and agrees to proceed.    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 Boyd and Dr. Browning  Asthma- no exacerbation  - breo in lieu of advair  - BDs prn  H/o MUKUND  -Off machine x 1 year due to malfunction.   -Needs repair/replace of machine. Can send to outpt CPAP coordinators at d/c.   H/o DVT   -On xarelto.   Proph  -Anticoagulated  Dispo- Full code  - will follow    Betsy Browning MD  3/1/2024  8:18 AM

## 2024-03-01 NOTE — PLAN OF CARE
Pt is A&Ox4. Wears glasses. VSS, afebrile. SPO2 maintained on RA. MUKUND- 2L at [facility]. PHILLIPS. Denies cough- awaiting sputum sample. Tele, NSR. Ep. Xarelto. Last BM 2/29. Carb controlled diet, QID accu-chek. NPO at MN for bronchoscopy 3/1. Voids. Up SB/ad brunilda. C/o R knee pain- PRN given. Port-a-cath is accessed, no blood return, IV ABX. No further needs at this time, continue POC. Safety precautions in place.     Problem: RESPIRATORY - ADULT  Goal: Achieves optimal ventilation and oxygenation  Description: INTERVENTIONS:  - Assess for changes in respiratory status  - Assess for changes in mentation and behavior  - Position to facilitate oxygenation and minimize respiratory effort  - Oxygen supplementation based on oxygen saturation or ABGs  - Provide Smoking Cessation handout, if applicable  - Encourage broncho-pulmonary hygiene including cough, deep breathe, Incentive Spirometry  - Assess the need for suctioning and perform as needed  - Assess and instruct to report SOB or any respiratory difficulty  - Respiratory Therapy support as indicated  - Manage/alleviate anxiety  - Monitor for signs/symptoms of CO2 retention  Outcome: Progressing     Problem: Patient/Family Goals  Goal: Patient/Family Long Term Goal  Description: Patient's Long Term Goal: Discharge to home    Interventions:  - collaborative care  - See additional Care Plan goals for specific interventions  Outcome: Progressing  Goal: Patient/Family Short Term Goal  Description: Patient's Short Term Goal:   2/28: wean O2 and \"to feel better\"  2/29: pain mgmt  2/29 NOC: sleep    Interventions:   - supportive care  - See additional Care Plan goals for specific interventions  Outcome: Progressing

## 2024-03-02 LAB
GLUCOSE BLD-MCNC: 107 MG/DL (ref 70–99)
GLUCOSE BLD-MCNC: 119 MG/DL (ref 70–99)
GLUCOSE BLD-MCNC: 119 MG/DL (ref 70–99)
GLUCOSE BLD-MCNC: 170 MG/DL (ref 70–99)

## 2024-03-02 PROCEDURE — 99233 SBSQ HOSP IP/OBS HIGH 50: CPT | Performed by: HOSPITALIST

## 2024-03-02 NOTE — PROGRESS NOTES
03/02/24 1300   Mobility   O2 walk? Yes   SPO2% on Room Air at Rest 97   At rest oxygen flow (liters per minute) 0   SPO2% Ambulation on Room Air 93   Ambulation oxygen flow (liters per minute) 0

## 2024-03-02 NOTE — PLAN OF CARE
Patient is alert and oriented x4. Wdl on RA while awake, 2L prn for sleep. NS on tele. Hypertensive at start of shift, MD notified, see orders. On Xarelto. On IV abx. Right chest port, dressing c/d/I. C/o knee pain; prn norco administered with some reported relief. QID accu checks. Poc discussed with pt, pt verbalizes understanding.   Problem: RESPIRATORY - ADULT  Goal: Achieves optimal ventilation and oxygenation  Description: INTERVENTIONS:  - Assess for changes in respiratory status  - Assess for changes in mentation and behavior  - Position to facilitate oxygenation and minimize respiratory effort  - Oxygen supplementation based on oxygen saturation or ABGs  - Provide Smoking Cessation handout, if applicable  - Encourage broncho-pulmonary hygiene including cough, deep breathe, Incentive Spirometry  - Assess the need for suctioning and perform as needed  - Assess and instruct to report SOB or any respiratory difficulty  - Respiratory Therapy support as indicated  - Manage/alleviate anxiety  - Monitor for signs/symptoms of CO2 retention  Outcome: Progressing     Problem: Patient/Family Goals  Goal: Patient/Family Long Term Goal  Description: Patient's Long Term Goal: Discharge to home    Interventions:  - collaborative care  - See additional Care Plan goals for specific interventions  Outcome: Progressing  Goal: Patient/Family Short Term Goal  Description: Patient's Short Term Goal:   2/28: wean O2 and \"to feel better\"  2/29: pain mgmt  2/29 NOC: sleep  3/1: bronch    Interventions:   - supportive care  - See additional Care Plan goals for specific interventions  Outcome: Progressing     Problem: SAFETY ADULT - FALL  Goal: Free from fall injury  Description: INTERVENTIONS:  - Assess pt frequently for physical needs  - Identify cognitive and physical deficits and behaviors that affect risk of falls.  - [place] fall precautions as indicated by assessment.  - Educate pt/family on patient safety including physical  limitations  - Instruct pt to call for assistance with activity based on assessment  - Modify environment to reduce risk of injury  - Provide assistive devices as appropriate  - Consider OT/PT consult to assist with strengthening/mobility  - Encourage toileting schedule  Outcome: Progressing     Problem: Diabetes/Glucose Control  Goal: Glucose maintained within prescribed range  Description: INTERVENTIONS:  - Monitor Blood Glucose as ordered  - Assess for signs and symptoms of hyperglycemia and hypoglycemia  - Administer ordered medications to maintain glucose within target range  - Assess barriers to adequate nutritional intake and initiate nutrition consult as needed  - Instruct patient on self management of diabetes  Outcome: Progressing

## 2024-03-02 NOTE — CM/SW NOTE
Received completed CPAP form and added to [facility] referral.  Also faxed directly to [facility].  Contacted [facility] and spoke with Jess who stated they are not able to process and delivery CPAPs on the weekend.  They will follow up on Monday to completed approval and arrange delivery.  Updated RN.  / to remain available for support and/or discharge planning.     Lotus Rivera, [facility]  Discharge Planner  [phone]

## 2024-03-02 NOTE — PROGRESS NOTES
[facility]     Hospitalist Progress Note     Rayne Morales Patient Status:  Inpatient    1945 MRN [id number]   Location [facility] 5NW-A Attending Sohial Bowman MD   Hosp Day # 4 PCP Jose Daniel Washington MD     Subjective:   Feels better and wants to go home    Objective:    Review of Systems:   A comprehensive review of systems was completed; pertinent positive and negatives stated in subjective.  Vital signs:  Temp:  [97.6 °F (36.4 °C)-98.1 °F (36.7 °C)] 98 °F (36.7 °C)  Pulse:  [] 76  Resp:  [10-27] 20  BP: (138-193)/() 161/75  SpO2:  [86 %-100 %] 94 %  FiO2 (%):  [28 %] 28 %  Physical Exam:    General: No acute distress   Respiratory: diminished, clearing nicely, no wheezes  Cardiovascular: S1, S2, RRR  Abdomen: Soft, NT/ND, +BS  Extremities: no edema    Diagnostic Data:    Labs:  Recent Labs   Lab 24  0804   WBC 5.9   HGB 12.8   MCV 93.3   .0     Recent Labs   Lab 24  0804 24  0811   * 134*   BUN 35* 22   CREATSERUM 1.81* 0.86   CA 8.6 7.8*   ALB 3.7  --    * 138   K 4.5 3.7    112   CO2 22.0 20.0*   ALKPHO 73  --    AST 29  --    ALT 19  --    BILT 0.6  --    TP 7.3  --      Estimated Creatinine Clearance: 41.3 mL/min (based on SCr of 0.86 mg/dL).  No results for input(s): \"PTP\", \"INR\" in the last 168 hours.     Microbiology  Hospital Encounter on 24   1. Blood Culture     Status: None (Preliminary result)    Collection Time: 24  8:54 AM    Specimen: Blood,peripheral   Result Value Ref Range    Blood Culture Result No Growth 3 Days N/A     Imaging: Reviewed in Epic.  Medications:    lisinopril  20 mg Oral Daily    fluticasone furoate-vilanterol  1 puff Inhalation Daily    ampicillin-sulbactam  3 g Intravenous q6h    aspirin  81 mg Oral Daily    DULoxetine  30 mg Oral Daily    DULoxetine  60 mg Oral Daily    fluticasone propionate  1 spray Nasal BID    pantoprazole  40 mg Oral Before breakfast    pregabalin  100 mg Oral TID     rivaroxaban  20 mg Oral Daily    rosuvastatin  10 mg Oral Nightly    insulin aspart  1-10 Units Subcutaneous TID AC and HS    insulin aspart  1-68 Units Subcutaneous TID CC    guaiFENesin ER  600 mg Oral BID       Assessment & Plan:      #Sepsis 2/2 PNA  -IVF completed   -IV Abx  -Blood and Sputum Cx NGTD   -IVF  -pulm consulted and appreciate recs- bronchoscopy 3/1- f/u results   -CT Chest reviewed     #Acute hypoxic resp failure 2/2 above- O2 walk today on RA at rest   #Lactic acidosis- IVF and improved  #FREDERICK 2/2 sepsis - improved   #Hyponatremia-IVF and improved   #DM- Hyperglycemic protocol   #GERD- PPI   #DL- statin   #HTN - hold lisinopril with FREDERICK  #H/O DVT on xarelto   #Hypogammaglobulinemia- IVIG per Dr. Boyd as outpt     CPAP malfunctioning at home- Pulm and SW assisting in new CPAP  D/w patient, pulmonary, and family bedside  Total time today 33 minutes       Sohail Bowman MD  Supplementary Documentation:     Quality:  DVT Mechanical Prophylaxis:     Early ambuation  DVT Pharmacologic Prophylaxis   Medication    heparin (Porcine) 100 Units/mL lock flush 500 Units    rivaroxaban (Xarelto) tab 20 mg      DVT Pharmacologic prophylaxis: Aspirin 162 mg         Code Status: Full Code  Baxter: No urinary catheter in place  Baxter Duration (in days):   Central line:    CLARI:   Discharge is dependent on: progress  At this point Ms. Morales is expected to be discharge to: tbd   **Certification      PHYSICIAN Certification of Need for Inpatient Hospitalization - Initial Certification    Patient will require inpatient services that will reasonably be expected to span two midnight's based on the clinical documentation in H+P.   Based on patients current state of illness, I anticipate that, after discharge, patient will require TBD.    The 21st Century Cures Act makes medical notes like these available to patients in the interest of transparency. Please be advised this is a medical document. Medical documents are  intended to carry relevant information, facts as evident, and the clinical opinion of the practitioner. The medical note is intended as peer to peer communication and may appear blunt or direct. It is written in medical language and may contain abbreviations or verbiage that are unfamiliar.

## 2024-03-02 NOTE — PLAN OF CARE
A&Ox4. VSS. Afebrile. Spo2>90% on RA, 2L while sleeping. O2 walk done today, see flowsheets. Tele-NSR. Carb controlled diet, QID accuchecks. IV abx. Up ad brunilda. Patient c/o generalized body pain, PRN meds given per MAR. L arm precautions in place. Son in law at bedside, patient and family updated on POC. All questions answered at this time. Call light within reach.        Problem: RESPIRATORY - ADULT  Goal: Achieves optimal ventilation and oxygenation  Description: INTERVENTIONS:  - Assess for changes in respiratory status  - Assess for changes in mentation and behavior  - Position to facilitate oxygenation and minimize respiratory effort  - Oxygen supplementation based on oxygen saturation or ABGs  - Provide Smoking Cessation handout, if applicable  - Encourage broncho-pulmonary hygiene including cough, deep breathe, Incentive Spirometry  - Assess the need for suctioning and perform as needed  - Assess and instruct to report SOB or any respiratory difficulty  - Respiratory Therapy support as indicated  - Manage/alleviate anxiety  - Monitor for signs/symptoms of CO2 retention  Outcome: Progressing     Problem: Patient/Family Goals  Goal: Patient/Family Long Term Goal  Description: Patient's Long Term Goal: Discharge to home    Interventions:  - collaborative care  - See additional Care Plan goals for specific interventions  Outcome: Progressing

## 2024-03-02 NOTE — PROGRESS NOTES
[facility]    Rayne Morales Patient Status:  Inpatient    1945 MRN [id number]   Location [facility] 5NW-A Attending Sohail Bowman MD   Hosp Day # 4 PCP Jose Daniel Washington MD     Pulm / Critical Care Progress Note     S: feels better and wants to go home      Scheduled Medication:   lisinopril  20 mg Oral Daily    fluticasone furoate-vilanterol  1 puff Inhalation Daily    ampicillin-sulbactam  3 g Intravenous q6h    aspirin  81 mg Oral Daily    DULoxetine  30 mg Oral Daily    DULoxetine  60 mg Oral Daily    fluticasone propionate  1 spray Nasal BID    pantoprazole  40 mg Oral Before breakfast    pregabalin  100 mg Oral TID    rivaroxaban  20 mg Oral Daily    rosuvastatin  10 mg Oral Nightly    insulin aspart  1-10 Units Subcutaneous TID AC and HS    insulin aspart  1-68 Units Subcutaneous TID CC    guaiFENesin ER  600 mg Oral BID     Continuous Infusing Medication:  PRN Medication:heparin, hydrALAzine, albuterol, oxyCODONE, tiZANidine, glucose **OR** glucose **OR** glucose-vitamin C **OR** dextrose **OR** glucose **OR** glucose **OR** glucose-vitamin C, acetaminophen, melatonin, ondansetron, metoclopramide, polyethylene glycol (PEG 3350), sennosides, bisacodyl, guaiFENesin, HYDROcodone-acetaminophen       OBJECTIVE:  Vitals:    24 0159 24 0201 24 0532 24 0707   BP:   (!) 176/67 (!) 161/75   BP Location:   Right arm Right arm   Pulse:  67 70 76   Resp:   18 20   Temp:   97.6 °F (36.4 °C) 98 °F (36.7 °C)   TempSrc:   Oral Oral   SpO2: (!) 86% 92% 99% 94%   Weight:       Height:            O2: ra      Wt Readings from Last 3 Encounters:   24 160 lb (72.6 kg)   24 165 lb 9.6 oz (75.1 kg)   24 179 lb (81.2 kg)        I/O last 3 completed shifts:  In: 400 [I.V.:400]  Out: -   No intake/output data recorded.     Physical Exam:   General: alert, cooperative, No respiratory distress.   Head: Normocephalic, without obvious abnormality, atraumatic.   Lungs:  ctab   Chest wall: No tenderness or deformity.   Heart: Regular rate and rhythm, normal S1S2, no murmur.   Abdomen: soft, non-tender, non-distended, positive BS.   Extremity: no edema     Recent Labs   Lab 02/27/24  0804   WBC 5.9   HGB 12.8   HCT 40.2   .0     No results for input(s): \"INR\" in the last 168 hours.      Recent Labs   Lab 02/27/24  0804 02/28/24  0811   * 138   K 4.5 3.7    112   CO2 22.0 20.0*   BUN 35* 22   CREATSERUM 1.81* 0.86   * 134*   CA 8.6 7.8*   AST 29  --    ALT 19  --    ALKPHO 73  --    BILT 0.6  --    ALB 3.7  --    TP 7.3  --        CREATININE (mg/dL)   Date Value   06/15/2015 0.92   08/08/2014 0.88   07/27/2014 0.70   07/26/2014 0.67   06/10/2014 0.95     Creatinine (mg/dL)   Date Value   02/28/2024 0.86   02/27/2024 1.81 (H)   02/13/2024 0.72   ]    Cx ngtd     ASSESSMENT/PLAN:    Dyspnea/hypoxia- due to severe PNA  - supp O2; wean as tolerated- weaned down to room air  - bronchial hygiene  -check desat study  ID h/o recurrent Pneumonia- multifocal in immunocompromised host  - CT chest with right sided GGO and infiltrates without associated lymphadenopathy   - at risk for pseudomonas or opportunistic infections  - not expectorating any phlegm to check for cultures  - urine legionella/strep pending  - s/p bronchoscopy for BAL 3/1.  cx pending  - in meantime, cont with unasyn/azithro  Hypogammaglobulinemia - on IVIG as OP  - follows with Dr. Boyd and Dr. Browning  Asthma- no exacerbation  - breo in lieu of advair  - BDs prn  H/o MUKUND  -Off machine x 1 year due to malfunction.   -Needs repair/replace of machine.  here attempting to see if it can be resolved.   H/o DVT   -On xarelto.   Proph  -Anticoagulated  Dispo- Full code  - will follow; pt requesting discharge. I would recommend one more night to allow cx from bronch to process.      Francisco Starkey M.D.  [facility]  Pulmonary / Critical care  3/2/2024  9:23 AM

## 2024-03-03 VITALS
SYSTOLIC BLOOD PRESSURE: 170 MMHG | TEMPERATURE: 98 F | HEART RATE: 89 BPM | HEIGHT: 61 IN | DIASTOLIC BLOOD PRESSURE: 66 MMHG | BODY MASS INDEX: 30.21 KG/M2 | RESPIRATION RATE: 20 BRPM | WEIGHT: 160 LBS | OXYGEN SATURATION: 92 %

## 2024-03-03 LAB — GLUCOSE BLD-MCNC: 117 MG/DL (ref 70–99)

## 2024-03-03 PROCEDURE — 99239 HOSP IP/OBS DSCHRG MGMT >30: CPT | Performed by: HOSPITALIST

## 2024-03-03 RX ORDER — AMOXICILLIN AND CLAVULANATE POTASSIUM 875; 125 MG/1; MG/1
1 TABLET, FILM COATED ORAL 2 TIMES DAILY
Qty: 10 TABLET | Refills: 0 | Status: SHIPPED | OUTPATIENT
Start: 2024-03-03 | End: 2024-03-08

## 2024-03-03 NOTE — PROGRESS NOTES
[facility]     Hospitalist Progress Note     Rayne Morales Patient Status:  Inpatient    1945 MRN [id number]   Location [facility] 5NW-A Attending Sohail Bowman MD   Hosp Day # 5 PCP Jose Daniel Washington MD     Subjective:   Feels better and wants to go home    Objective:    Review of Systems:   A comprehensive review of systems was completed; pertinent positive and negatives stated in subjective.  Vital signs:  Temp:  [97.7 °F (36.5 °C)-98.6 °F (37 °C)] 97.7 °F (36.5 °C)  Pulse:  [63-89] 89  Resp:  [20] 20  BP: (117-168)/(55-92) 166/68  SpO2:  [90 %-97 %] 92 %  Physical Exam:    General: No acute distress   Respiratory: diminished, clearing nicely, no wheezes  Cardiovascular: S1, S2, RRR  Abdomen: Soft, NT/ND, +BS  Extremities: no edema    Diagnostic Data:    Labs:  Recent Labs   Lab 24  0804   WBC 5.9   HGB 12.8   MCV 93.3   .0     Recent Labs   Lab 24  0804 24  0811   * 134*   BUN 35* 22   CREATSERUM 1.81* 0.86   CA 8.6 7.8*   ALB 3.7  --    * 138   K 4.5 3.7    112   CO2 22.0 20.0*   ALKPHO 73  --    AST 29  --    ALT 19  --    BILT 0.6  --    TP 7.3  --      Estimated Creatinine Clearance: 41.3 mL/min (based on SCr of 0.86 mg/dL).  No results for input(s): \"PTP\", \"INR\" in the last 168 hours.     Microbiology  Hospital Encounter on 24   1. Blood Culture     Status: None (Preliminary result)    Collection Time: 24  8:54 AM    Specimen: Blood,peripheral   Result Value Ref Range    Blood Culture Result No Growth 4 Days N/A     Imaging: Reviewed in Epic.  Medications:    lisinopril  20 mg Oral Daily    fluticasone furoate-vilanterol  1 puff Inhalation Daily    ampicillin-sulbactam  3 g Intravenous q6h    aspirin  81 mg Oral Daily    DULoxetine  30 mg Oral Daily    DULoxetine  60 mg Oral Daily    fluticasone propionate  1 spray Nasal BID    pantoprazole  40 mg Oral Before breakfast    pregabalin  100 mg Oral TID    rivaroxaban  20 mg Oral  Daily    rosuvastatin  10 mg Oral Nightly    insulin aspart  1-10 Units Subcutaneous TID AC and HS    insulin aspart  1-68 Units Subcutaneous TID CC    guaiFENesin ER  600 mg Oral BID       Assessment & Plan:      #Sepsis 2/2 PNA  -IVF completed   -IV Abx  -Blood and Sputum Cx NGTD   -IVF  -pulm consulted and appreciate recs- bronchoscopy 3/1- f/u results   -CT Chest reviewed     #Acute hypoxic resp failure 2/2 above- O2 walk today on RA at rest   #Lactic acidosis- IVF and improved  #FREDERICK 2/2 sepsis - improved   #Hyponatremia-IVF and improved   #DM- Hyperglycemic protocol   #GERD- PPI   #DL- statin   #HTN - hold lisinopril with FREDERICK  #H/O DVT on xarelto   #Hypogammaglobulinemia- IVIG per Dr. Boyd as outpt     CPAP malfunctioning at home- Pulm and SW assisting in new CPAP    Ok for DC from my standpoint       Sohail Bowman MD  Supplementary Documentation:     Quality:  DVT Mechanical Prophylaxis:     Early ambuation  DVT Pharmacologic Prophylaxis   Medication    heparin (Porcine) 100 Units/mL lock flush 500 Units    rivaroxaban (Xarelto) tab 20 mg      DVT Pharmacologic prophylaxis: Aspirin 162 mg         Code Status: Full Code  Baxter: No urinary catheter in place  Baxter Duration (in days):   Central line:    CLARI: 3/3/2024    The 21st Century Cures Act makes medical notes like these available to patients in the interest of transparency. Please be advised this is a medical document. Medical documents are intended to carry relevant information, facts as evident, and the clinical opinion of the practitioner. The medical note is intended as peer to peer communication and may appear blunt or direct. It is written in medical language and may contain abbreviations or verbiage that are unfamiliar.

## 2024-03-03 NOTE — DISCHARGE PLANNING
Discharged home via wheelchair  Accompanied by support staff  Belongings taken by patient/family  PIV removed  Tele box removed & placed in the drawer  Discharge Navigator complete  Discharge instructions reviewed with patient at bedside  All questions and concerns adressed at this time       Problem: RESPIRATORY - ADULT  Goal: Achieves optimal ventilation and oxygenation  Description: INTERVENTIONS:  - Assess for changes in respiratory status  - Assess for changes in mentation and behavior  - Position to facilitate oxygenation and minimize respiratory effort  - Oxygen supplementation based on oxygen saturation or ABGs  - Provide Smoking Cessation handout, if applicable  - Encourage broncho-pulmonary hygiene including cough, deep breathe, Incentive Spirometry  - Assess the need for suctioning and perform as needed  - Assess and instruct to report SOB or any respiratory difficulty  - Respiratory Therapy support as indicated  - Manage/alleviate anxiety  - Monitor for signs/symptoms of CO2 retention  Outcome: Progressing     Problem: Patient/Family Goals  Goal: Patient/Family Long Term Goal  Description: Patient's Long Term Goal: Discharge to home    Interventions:  - collaborative care  - See additional Care Plan goals for specific interventions  Outcome: Progressing

## 2024-03-03 NOTE — PLAN OF CARE
Patient is alert and oriented x4, forgetful. >90% on RA. HX jarocho, ref cpap. NS on tele. On xarelto. C/o back/shoulder/knee pain, prnpain meds administered. On IV abx. QID accu checks. Right chest port, dressing c/d/I. POC discussed with pt and family at bedside.   Problem: RESPIRATORY - ADULT  Goal: Achieves optimal ventilation and oxygenation  Description: INTERVENTIONS:  - Assess for changes in respiratory status  - Assess for changes in mentation and behavior  - Position to facilitate oxygenation and minimize respiratory effort  - Oxygen supplementation based on oxygen saturation or ABGs  - Provide Smoking Cessation handout, if applicable  - Encourage broncho-pulmonary hygiene including cough, deep breathe, Incentive Spirometry  - Assess the need for suctioning and perform as needed  - Assess and instruct to report SOB or any respiratory difficulty  - Respiratory Therapy support as indicated  - Manage/alleviate anxiety  - Monitor for signs/symptoms of CO2 retention  Outcome: Progressing     Problem: Patient/Family Goals  Goal: Patient/Family Long Term Goal  Description: Patient's Long Term Goal: Discharge to home    Interventions:  - collaborative care  - See additional Care Plan goals for specific interventions  Outcome: Progressing  Goal: Patient/Family Short Term Goal  Description: Patient's Short Term Goal:   2/28: wean O2 and \"to feel better\"  2/29: pain mgmt  2/29 NOC: sleep  3/1: bronch  3/2 noc: stay on RA all night    Interventions:   - supportive care  - See additional Care Plan goals for specific interventions  Outcome: Progressing     Problem: SAFETY ADULT - FALL  Goal: Free from fall injury  Description: INTERVENTIONS:  - Assess pt frequently for physical needs  - Identify cognitive and physical deficits and behaviors that affect risk of falls.  - [place] fall precautions as indicated by assessment.  - Educate pt/family on patient safety including physical limitations  - Instruct pt to call for  assistance with activity based on assessment  - Modify environment to reduce risk of injury  - Provide assistive devices as appropriate  - Consider OT/PT consult to assist with strengthening/mobility  - Encourage toileting schedule  Outcome: Progressing     Problem: Diabetes/Glucose Control  Goal: Glucose maintained within prescribed range  Description: INTERVENTIONS:  - Monitor Blood Glucose as ordered  - Assess for signs and symptoms of hyperglycemia and hypoglycemia  - Administer ordered medications to maintain glucose within target range  - Assess barriers to adequate nutritional intake and initiate nutrition consult as needed  - Instruct patient on self management of diabetes  Outcome: Progressing

## 2024-03-03 NOTE — DISCHARGE SUMMARY
[facility]     Rayne Morales Patient Status:  Inpatient    1945 MRN [id number]   Location [facility] 5NW-A Attending No att. providers found   Hosp Day # 5 PCP Jose Daniel Washington MD     Date of Admission: 2024  Date of Discharge: 3/3/2024    Discharge Disposition: Home or Self Care    Admitting Diagnosis:   Acute renal insufficiency [N28.9]  Hypoxia [R09.02]  Nosocomial pneumonia [J18.9, Y95]    Hospital Discharge Diagnoses:  #Sepsis 2/2 PNA  -IVF completed   -IV Abx  -Blood and Sputum Cx NGTD   -IVF  -pulm consulted and appreciate recs- bronchoscopy 3/1- f/u results   -CT Chest reviewed      #Acute hypoxic resp failure 2/2 above- O2 walk today on RA at rest   #Lactic acidosis- IVF and improved  #FREDERICK 2/2 sepsis - improved   #Hyponatremia-IVF and improved   #DM- Hyperglycemic protocol   #GERD- PPI   #DL- statin   #HTN - hold lisinopril with FREDERICK  #H/O DVT on xarelto   #Hypogammaglobulinemia- IVIG per Dr. Boyd as outpt      CPAP malfunctioning at home- Pulm and SW assisting in new CPAP    Lace+ Score: 81  59-90 High Risk  29-58 Medium Risk  0-28   Low Risk.     History of Present Illness: Rayne Morales is a 78 year old female with increasing shortness of breath and cough.  She has generalized weakness.  She has a history of asthma.  She had low oxygen saturations in the emergency department.  She been feeling congested.  No chest pain.  No bilateral lower extremity swelling.  She developed fevers this morning which led her to the emergency department.     Brief Synopsis: patient evaluated by Pulmonology. Continued on Abx and bronchoscopy performed. Patient weaned to room air. Bronch results pending upon discharge. Pt cleared for discharge by pulmonary and CPAP arranged by SW. Patient DC in stable condition    Procedures during hospitalization:   bronchoscopy    Lab/Test results pending at Discharge:   Bronch results    Consultants:  Pulmonology     Discharge  Medication List:     Discharge Medications        START taking these medications        Instructions Prescription details   amoxicillin clavulanate 875-125 MG Tabs  Commonly known as: Augmentin      Take 1 tablet by mouth 2 (two) times daily for 5 days.   Stop taking on: March 8, 2024  Quantity: 10 tablet  Refills: 0            CONTINUE taking these medications        Instructions Prescription details   albuterol 108 (90 Base) MCG/ACT Aers  Commonly known as: Ventolin HFA      Inhale 2 puffs into the lungs every 6 (six) hours as needed for Wheezing.   Quantity: 1 each  Refills: 3     aspirin 81 MG Chew      Chew 1 tablet (81 mg total) by mouth daily.   Refills: 0     DULoxetine 30 MG Cpep  Commonly known as: Cymbalta      Take 1 capsule (30 mg total) by mouth daily. To take along with 60mg to equal 90mg daily.   Quantity: 90 capsule  Refills: 1     DULoxetine 60 MG Cpep  Commonly known as: Cymbalta      Take 1 capsule (60 mg total) by mouth daily.   Quantity: 90 capsule  Refills: 1     fluticasone propionate 50 MCG/ACT Susp  Commonly known as: Flonase      1 spray by Nasal route 2 (two) times daily.   Quantity: 1 Bottle  Refills: 3     lisinopril 20 MG Tabs  Commonly known as: Prinivil; Zestril      TAKE 1 TABLET BY MOUTH DAILY   Quantity: 90 tablet  Refills: 3     metFORMIN  MG Tb24  Commonly known as: Glucophage XR      Take 1 tablet (500 mg total) by mouth 2 (two) times daily with meals. Take 2 tablets (1,000 mg total) by mouth daily with breakfast and 1 tablet (500 mg total) every evening.   Refills: 0     metroNIDAZOLE 0.75 % Gel  Commonly known as: Metrogel      Apply 1 g topically 2 (two) times daily. Apply to face twice daily   Quantity: 45 g  Refills: 5     Naloxone HCl 4 MG/0.1ML Liqd      4 mg by Nasal route as needed. If patient remains unresponsive, repeat dose in other nostril 2-5 minutes after first dose.   Quantity: 1 kit  Refills: 0     oxyCODONE 5 MG Tabs      Take 1 tablet (5 mg total) by  mouth every 6 (six) hours as needed for Pain.   Quantity: 15 tablet  Refills: 0     pantoprazole 40 MG Tbec  Commonly known as: Protonix      Take 1 tablet (40 mg total) by mouth before breakfast.   Quantity: 90 tablet  Refills: 1     pregabalin 100 MG Caps  Commonly known as: Lyrica      Take 1 capsule (100 mg total) by mouth in the morning and 1 capsule (100 mg total) at noon and 1 capsule (100 mg total) in the evening.   Quantity: 270 capsule  Refills: 1     rivaroxaban 20 MG Tabs  Commonly known as: Xarelto      Take 1 tablet (20 mg total) by mouth daily.   Quantity: 90 tablet  Refills: 0     rosuvastatin 10 MG Tabs  Commonly known as: Crestor      Take 1 tablet (10 mg total) by mouth nightly.   Quantity: 90 tablet  Refills: 1     tiZANidine 4 MG Tabs  Commonly known as: Zanaflex      Take 1 tablet (4 mg total) by mouth every 8 (eight) hours as needed.   Quantity: 90 tablet  Refills: 2     Vitamin D 50 MCG (2000 UT) Tabs      Take 2,000 Units by mouth daily.   Refills: 0            STOP taking these medications      ondansetron 4 MG Tbdp  Commonly known as: Zofran-ODT                  Where to Get Your Medications        These medications were sent to [id number] - [facility] [phone], [phone]  [facility] [facility] [address]      Phone: [phone]   amoxicillin clavulanate 875-125 MG Tabs         Follow-up appointment:   Betsy Browning MD  [address]  [address]  [place] [address]  [phone]    Follow up in 2 week(s)      Jose Daniel Washington MD  [address]  [address]  [place] [address]  [phone]    Follow up in 2 week(s)        -----------------------------------------------------------------------------------------------  PATIENT DISCHARGE INSTRUCTIONS: See electronic chart    Sohail Bowman MD 3/3/2024    Time spent: 33 minutes

## 2024-03-03 NOTE — CM/SW NOTE
03/03/24 1000   Discharge disposition   Expected discharge disposition Home or Self   DME/Infusion Providers [facility]   Discharge transportation Private car     RN informed SW of pt's DC today.     SW received MDO for home O2. HEIDI requested RN to update MD that pt does not qualify for home O2 coverage. Pt must be at 88% or lower and needing O2; dx of \"chronic\" asthma, not asthma.     SW notified [facility] of pt's discharge today and requested they follow up with pt regarding her home CPAP.     [facility]  Main Phone: [phone]    Tnoia Hines, KODY - [id number]

## 2024-03-03 NOTE — PROGRESS NOTES
[facility]    Rayne Morales Patient Status:  Inpatient    1945 MRN [id number]   Location [facility] 5NW-A Attending Sohail Bowman MD   Hosp Day # 5 PCP Jose Daniel Washington MD     Pulm / Critical Care Progress Note     S: pt states that she is ready to leave      Scheduled Medication:   lisinopril  20 mg Oral Daily    fluticasone furoate-vilanterol  1 puff Inhalation Daily    ampicillin-sulbactam  3 g Intravenous q6h    aspirin  81 mg Oral Daily    DULoxetine  30 mg Oral Daily    DULoxetine  60 mg Oral Daily    fluticasone propionate  1 spray Nasal BID    pantoprazole  40 mg Oral Before breakfast    pregabalin  100 mg Oral TID    rivaroxaban  20 mg Oral Daily    rosuvastatin  10 mg Oral Nightly    insulin aspart  1-10 Units Subcutaneous TID AC and HS    insulin aspart  1-68 Units Subcutaneous TID CC    guaiFENesin ER  600 mg Oral BID     Continuous Infusing Medication:  PRN Medication:heparin, hydrALAzine, albuterol, oxyCODONE, tiZANidine, glucose **OR** glucose **OR** glucose-vitamin C **OR** dextrose **OR** glucose **OR** glucose **OR** glucose-vitamin C, acetaminophen, melatonin, ondansetron, metoclopramide, polyethylene glycol (PEG 3350), sennosides, bisacodyl, guaiFENesin, HYDROcodone-acetaminophen       OBJECTIVE:  Vitals:    24 0516 24 0518 24 0630 24 0814   BP:   (!) 166/68 (!) 170/66   BP Location:   Right arm Right arm   Pulse:   89    Resp:   20 20   Temp:    98.3 °F (36.8 °C)   TempSrc:    Oral   SpO2: (!) 88% 92% 92%    Weight:       Height:            O2: ra      Wt Readings from Last 3 Encounters:   24 160 lb (72.6 kg)   24 165 lb 9.6 oz (75.1 kg)   24 179 lb (81.2 kg)        No intake/output data recorded.  No intake/output data recorded.     Physical Exam:   General: alert, cooperative,  No respiratory distress.   Head: Normocephalic, without obvious abnormality, atraumatic.   Lungs: ctab   Chest wall: No tenderness or  deformity.   Heart: Regular rate and rhythm, normal S1S2, no murmur.   Abdomen: soft, non-tender, non-distended, positive BS.   Extremity: no edema     Recent Labs   Lab 02/27/24  0804   WBC 5.9   HGB 12.8   HCT 40.2   .0     No results for input(s): \"INR\" in the last 168 hours.      Recent Labs   Lab 02/27/24  0804 02/28/24  0811   * 138   K 4.5 3.7    112   CO2 22.0 20.0*   BUN 35* 22   CREATSERUM 1.81* 0.86   * 134*   CA 8.6 7.8*   AST 29  --    ALT 19  --    ALKPHO 73  --    BILT 0.6  --    ALB 3.7  --    TP 7.3  --        CREATININE (mg/dL)   Date Value   06/15/2015 0.92   08/08/2014 0.88   07/27/2014 0.70   07/26/2014 0.67   06/10/2014 0.95     Creatinine (mg/dL)   Date Value   02/28/2024 0.86   02/27/2024 1.81 (H)   02/13/2024 0.72   ]        Cx: ngtd      ASSESSMENT/PLAN:    Dyspnea/hypoxia- due to severe PNA in setting of asthma  - supp O2; wean as tolerated- weaned down to room air  - qualifies for O2 at night, will order  - bronchial hygiene  - desat study without exertional o2 need.   ID h/o recurrent Pneumonia- multifocal in immunocompromised host  - CT chest with right sided GGO and infiltrates without associated lymphadenopathy   - at risk for pseudomonas or opportunistic infections  - not expectorating any phlegm to check for cultures  - urine legionella/strep pending  - s/p bronchoscopy for BAL 3/1.  cx pending but ngtd  - in meantime, cont with unasyn/azithro  Hypogammaglobulinemia - on IVIG as OP  - follows with Dr. Boyd and Dr. Browning  Asthma- no exacerbation  - breo in lieu of advair  - BDs prn  H/o MUKUND  -Off machine x 1 year due to malfunction.   -Needs repair/replace of machine.  here attempting to see if it can be resolved.   H/o DVT   -On xarelto.   Proph  -Anticoagulated  Dispo- Full code  - will follow; pt requesting discharge. No objection. She should f/u with Dr. Browning within 2 weeks       Francisco Starkey M.D.  [facility]  Pulmonary /  Critical care  3/3/2024  8:37 AM

## 2024-03-04 ENCOUNTER — TELEPHONE (OUTPATIENT)
Dept: INTERNAL MEDICINE CLINIC | Facility: CLINIC | Age: 79
End: 2024-03-04

## 2024-03-04 LAB — M TB CMPLX RRNA SPEC QL PROBE: NOT DETECTED

## 2024-03-04 NOTE — TELEPHONE ENCOUNTER
Patient states she needs a HFU with only SD and needs an appt this week. Pt states is is better but needs to find out answers about why she is being dx with pneumonia so often.   Pt notified  and scheduled with SD for Monday 3/11/24 at 11:30 for HFU.  Pt states she has pneumonia every year but recently has been dx with Pneumonia 3 times in the last 4 months.  Pt is currently taking Augmentin, has an appt with Dr Browning Pulmonology on 3/18/24, had a CT on 2/28/24 and chest xray on 2/27/24, had a Bronchial Wash while in the hospital and is waiting for those results.  Pt verbalizes understanding.  FYI to SD.

## 2024-03-04 NOTE — TELEPHONE ENCOUNTER
Pt was discharged from the hospital 03/03/24.. she was advised to follow up within a week for a follow up no openings available please advise..  she only wants to see SD declined follow up with other providers.

## 2024-03-05 ENCOUNTER — PATIENT OUTREACH (OUTPATIENT)
Dept: CASE MANAGEMENT | Age: 79
End: 2024-03-05

## 2024-03-05 DIAGNOSIS — Z02.9 ENCOUNTERS FOR UNSPECIFIED ADMINISTRATIVE PURPOSE: Primary | ICD-10-CM

## 2024-03-05 NOTE — PROGRESS NOTES
LM for pt to call [facility] for TCM since discharge. NCM phone number was provided for pt to call back.    [facility] contact information was provided for pt to call back as well.

## 2024-03-06 LAB
NON GYNE INTERPRETATION: NEGATIVE
PNEUMOCYSTIS RESULT: NEGATIVE
PNEUMOCYSTIS RESULT: NEGATIVE

## 2024-03-07 LAB — ASPERGILLUS AG BAL/SERUM: 0.38 INDEX

## 2024-03-07 RX ORDER — GLIPIZIDE 2.5 MG/1
2.5 TABLET, EXTENDED RELEASE ORAL
Qty: 90 TABLET | Refills: 0 | Status: SHIPPED | OUTPATIENT
Start: 2024-03-07 | End: 2024-03-11

## 2024-03-10 NOTE — PROGRESS NOTES
Subjective:   Rayne Morales is a 78 year old female who presents for hospital follow up.   She was discharged from [facility] to Home or Self Care  Admission Date: 2/27/24   Discharge Date: 3/3/24  Hospital Discharge Diagnosis:   Pneumonia, recurrent   sepsis  FREDERICK  Hypoxia  IGG deficiency  now a question of this vs primary lung issue.    DM  HTN        Interactive contact within 2 business days post discharge first initiated on Date: 3/5/2024    During the visit, the following was completed:  Obtained and reviewed discharge summary, continuity of care documents, and Hospitalist notes  Reviewed Labs (CBC, CMP)    HPI: here for hospital follow up  admitted for cough and SOB.  Generalized weakness.  Hx pneumonia.  Bronchoscopy performed.    follows with Dr Chambers.  No malignancy.  Fungus culture positive candida.   Has f/u with Dr Chambers next monday.  Still waiting for CPAP.  Augmentin completed.  Seems her episodes are becoming more severe and closer together.      Asthma  stable     Known IgG deficiency ?  follwowing with Dr Paiz  was to start injections at home  did one dose and had a reaction.  Was asked to stop and pursue w/u with primary lung issue.         DM last A1c Dec 8.5  Metformin and Ozempic 0.25mg  tolerating well.      Hx DVT/PE  xarelto.  Again discussed need to see hematologist.      [facility]      HTN  lisinopril  her bp was elevated in the hospital and states difficult to control     Dyslipidemia.  Crestor.     Anxiety/depression  duloxetine.     GERD  protonix.      History/Other:   Current Medications:  Medication Reconciliation:  I am aware of an inpatient discharge within the last 30 days.  The discharge medication list has been reconciled with the patient's current medication list and reviewed by me.  See medication list for additions of new medication, and changes to current doses of medications and discontinued medications.  Outpatient Medications Marked as Taking for the  3/11/24 encounter (Office Visit) with Monica Curran APRN   Medication Sig    metFORMIN  MG Oral Tablet 24 Hr Take 1 tablet (500 mg total) by mouth 2 (two) times daily with meals.    semaglutide (OZEMPIC, 0.25 OR 0.5 MG/DOSE,) 2 MG/3ML Subcutaneous Solution Pen-injector Inject 0.5 mg into the skin once a week.    pregabalin 100 MG Oral Cap Take 1 capsule (100 mg total) by mouth in the morning and 1 capsule (100 mg total) at noon and 1 capsule (100 mg total) in the evening.    oxyCODONE 5 MG Oral Tab Take 1 tablet (5 mg total) by mouth every 6 (six) hours as needed for Pain.    Naloxone HCl 4 MG/0.1ML Nasal Liquid 4 mg by Nasal route as needed. If patient remains unresponsive, repeat dose in other nostril 2-5 minutes after first dose.    metroNIDAZOLE 0.75 % External Gel Apply 1 g topically 2 (two) times daily. Apply to face twice daily    DULOXETINE 30 MG Oral Cap DR Particles Take 1 capsule (30 mg total) by mouth daily. To take along with 60mg to equal 90mg daily.    PANTOPRAZOLE 40 MG Oral Tab EC Take 1 tablet (40 mg total) by mouth before breakfast.    DULoxetine 60 MG Oral Cap DR Particles Take 1 capsule (60 mg total) by mouth daily.    rivaroxaban (XARELTO) 20 MG Oral Tab Take 1 tablet (20 mg total) by mouth daily.    rosuvastatin 10 MG Oral Tab Take 1 tablet (10 mg total) by mouth nightly.    albuterol 108 (90 Base) MCG/ACT Inhalation Aero Soln Inhale 2 puffs into the lungs every 6 (six) hours as needed for Wheezing.    LISINOPRIL 20 MG Oral Tab TAKE 1 TABLET BY MOUTH DAILY    tiZANidine 4 MG Oral Tab Take 1 tablet (4 mg total) by mouth every 8 (eight) hours as needed. (Patient taking differently: Take 1 tablet (4 mg total) by mouth every 8 (eight) hours as needed. Takes every night)    aspirin 81 MG Oral Chew Tab Chew 1 tablet (81 mg total) by mouth daily.    Fluticasone Propionate 50 MCG/ACT Nasal Suspension 1 spray by Nasal route 2 (two) times daily.    Cholecalciferol (VITAMIN D) 2000 units Oral Tab  Take 2,000 Units by mouth daily.       Review of Systems:  GENERAL: weight stable, energy stable, no sweating  SKIN: denies any unusual skin lesions  EYES: denies blurred vision or double vision  HEENT: denies nasal congestion, sinus pain or ST  LUNGS: denies shortness of breath with exertion  CARDIOVASCULAR: denies chest pain on exertion or palpitations    Objective:   No LMP recorded (lmp unknown). Patient has had a hysterectomy.  Estimated body mass index is 30.61 kg/m² as calculated from the following:    Height as of this encounter: 5' 1\" (1.549 m).    Weight as of this encounter: 162 lb (73.5 kg).   /76   Pulse 84   Resp 16   Ht 5' 1\" (1.549 m)   Wt 162 lb (73.5 kg)   LMP  (LMP Unknown)   SpO2 96%   BMI 30.61 kg/m²    GENERAL: well developed, well nourished, in no apparent distress  LUNGS: clear to auscultation  no wheezing.    CARDIO: RRR   GI: good BS's, no masses, HSM or tenderness  EXTREMITIES: no edema  NEURO: Oriented times three,   Assessment & Plan:   1. Nosocomial pneumonia (Primary)  bronchial wash reviewed  will defer to Dr Chambers. Pulmonary  has appt Monday.   2. Hypoxia  resolved.  Monitor closely   3. Primary hypertension  stable   monitor at home.    4. Coronary artery disease involving native coronary artery of native heart without angina pectoris stable  per cardiology  5. Type 2 diabetes mellitus without complication, without long-term current use of insulin (HCC) improved some  metfromin 500mg bid with ozempic.  Off glipizide.    6. Selective deficiency of IgG subclasses (HCC)   uncertain at this time  she is working closely with Dr Paiz and Dr Chambers due to recurrent pneumonia.  Will await recommendations.  Holding on at home IV iG  7. IgG deficiency (HCC)  as above.   Overview:  ??? pt states she gets IgG infusions due to lack of IgG, unable to fight off infections    8. Anxiety  stable duloxetine.   9. History of DVT (deep vein thrombosis)  xarelto.  She will return to   [facility] given above events.    10. MUKUND on CPAP  still waiting on machine.  Hoping she gets some answers on Monday at pulmonary visit.   11. History of pulmonary embolism  xarelto.          No follow-ups on file.

## 2024-03-11 ENCOUNTER — OFFICE VISIT (OUTPATIENT)
Dept: INTERNAL MEDICINE CLINIC | Facility: CLINIC | Age: 79
End: 2024-03-11
Payer: MEDICARE

## 2024-03-11 VITALS
HEART RATE: 84 BPM | SYSTOLIC BLOOD PRESSURE: 132 MMHG | RESPIRATION RATE: 16 BRPM | WEIGHT: 162 LBS | BODY MASS INDEX: 30.58 KG/M2 | HEIGHT: 61 IN | OXYGEN SATURATION: 96 % | DIASTOLIC BLOOD PRESSURE: 76 MMHG

## 2024-03-11 DIAGNOSIS — G47.33 OSA ON CPAP: ICD-10-CM

## 2024-03-11 DIAGNOSIS — D80.3 SELECTIVE DEFICIENCY OF IGG SUBCLASSES (HCC): ICD-10-CM

## 2024-03-11 DIAGNOSIS — Z86.711 HISTORY OF PULMONARY EMBOLISM: ICD-10-CM
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
## 2024-03-11 DIAGNOSIS — E11.9 TYPE 2 DIABETES MELLITUS WITHOUT COMPLICATION, WITHOUT LONG-TERM CURRENT USE OF INSULIN (HCC): ICD-10-CM

## 2024-03-11 DIAGNOSIS — Z86.718 HISTORY OF DVT (DEEP VEIN THROMBOSIS): ICD-10-CM

## 2024-03-11 DIAGNOSIS — F41.9 ANXIETY: ICD-10-CM

## 2024-03-11 DIAGNOSIS — D80.3 IGG DEFICIENCY (HCC): ICD-10-CM

## 2024-03-11 DIAGNOSIS — I10 PRIMARY HYPERTENSION: ICD-10-CM

## 2024-03-11 DIAGNOSIS — R09.02 HYPOXIA: ICD-10-CM

## 2024-03-11 DIAGNOSIS — I25.10 CORONARY ARTERY DISEASE INVOLVING NATIVE CORONARY ARTERY OF NATIVE HEART WITHOUT ANGINA PECTORIS: ICD-10-CM

## 2024-03-11 DIAGNOSIS — J18.9 NOSOCOMIAL PNEUMONIA: Primary | ICD-10-CM

## 2024-03-11 PROBLEM — R73.9 HYPERGLYCEMIA: Status: RESOLVED | Noted: 2024-02-12 | Resolved: 2024-03-11

## 2024-03-11 RX ORDER — SEMAGLUTIDE 0.68 MG/ML
0.5 INJECTION, SOLUTION SUBCUTANEOUS WEEKLY
COMMUNITY
Start: 2024-03-11

## 2024-03-11 RX ORDER — METFORMIN HYDROCHLORIDE 500 MG/1
500 TABLET, EXTENDED RELEASE ORAL 2 TIMES DAILY WITH MEALS
COMMUNITY
Start: 2024-03-11

## 2024-03-11 NOTE — PROGRESS NOTES
Multiple attempts to reach pt and messages left with no return call.  Patient went in for HFU appt with PCP office on 3/11/24.  Encounter closing.

## 2024-03-18 DIAGNOSIS — J18.9 RECURRENT PNEUMONIA: Primary | ICD-10-CM

## 2024-03-21 ENCOUNTER — PATIENT OUTREACH (OUTPATIENT)
Dept: CASE MANAGEMENT | Age: 79
End: 2024-03-21

## 2024-03-28 ENCOUNTER — APPOINTMENT (OUTPATIENT)
Dept: GENERAL RADIOLOGY | Facility: HOSPITAL | Age: 79
End: 2024-03-28
Attending: EMERGENCY MEDICINE
Payer: MEDICARE

## 2024-03-28 ENCOUNTER — HOSPITAL ENCOUNTER (OUTPATIENT)
Facility: HOSPITAL | Age: 79
Setting detail: OBSERVATION
LOS: 1 days | Discharge: HOME | End: 2024-03-29
Attending: STUDENT IN AN ORGANIZED HEALTH CARE EDUCATION/TRAINING PROGRAM | Admitting: INTERNAL MEDICINE
Payer: MEDICARE

## 2024-03-28 ENCOUNTER — APPOINTMENT (OUTPATIENT)
Dept: CT IMAGING | Facility: HOSPITAL | Age: 79
End: 2024-03-28
Attending: STUDENT IN AN ORGANIZED HEALTH CARE EDUCATION/TRAINING PROGRAM
Payer: MEDICARE

## 2024-03-28 ENCOUNTER — TELEPHONE (OUTPATIENT)
Dept: RHEUMATOLOGY | Facility: CLINIC | Age: 79
End: 2024-03-28

## 2024-03-28 DIAGNOSIS — N17.9 AKI (ACUTE KIDNEY INJURY) (HCC): Primary | ICD-10-CM

## 2024-03-28 DIAGNOSIS — M54.12 CERVICAL RADICULITIS: ICD-10-CM

## 2024-03-28 PROBLEM — R76.8 POSITIVE ANA (ANTINUCLEAR ANTIBODY): Status: ACTIVE | Noted: 2024-03-28

## 2024-03-28 PROBLEM — E86.0 DEHYDRATION: Status: ACTIVE | Noted: 2024-03-28

## 2024-03-28 PROBLEM — R91.8 BILATERAL PULMONARY INFILTRATES: Status: ACTIVE | Noted: 2024-03-28

## 2024-03-28 LAB
ALBUMIN SERPL-MCNC: 3.8 G/DL (ref 3.4–5)
ALBUMIN/GLOB SERPL: 1.3 {RATIO} (ref 1–2)
ALP LIVER SERPL-CCNC: 66 U/L
ALT SERPL-CCNC: 17 U/L
ANION GAP SERPL CALC-SCNC: 5 MMOL/L (ref 0–18)
AST SERPL-CCNC: 15 U/L (ref 15–37)
BASOPHILS # BLD AUTO: 0.07 X10(3) UL (ref 0–0.2)
BASOPHILS NFR BLD AUTO: 1 %
BILIRUB SERPL-MCNC: 0.4 MG/DL (ref 0.1–2)
BUN BLD-MCNC: 34 MG/DL (ref 9–23)
CALCIUM BLD-MCNC: 9.1 MG/DL (ref 8.5–10.1)
CHLORIDE SERPL-SCNC: 105 MMOL/L (ref 98–112)
CO2 SERPL-SCNC: 25 MMOL/L (ref 21–32)
CREAT BLD-MCNC: 2.54 MG/DL
CREAT UR-SCNC: 29.7 MG/DL
EGFRCR SERPLBLD CKD-EPI 2021: 19 ML/MIN/1.73M2 (ref 60–?)
EOSINOPHIL # BLD AUTO: 0.5 X10(3) UL (ref 0–0.7)
EOSINOPHIL NFR BLD AUTO: 7.2 %
ERYTHROCYTE [DISTWIDTH] IN BLOOD BY AUTOMATED COUNT: 13.5 %
EST. AVERAGE GLUCOSE BLD GHB EST-MCNC: 148 MG/DL (ref 68–126)
FLUAV + FLUBV RNA SPEC NAA+PROBE: NEGATIVE
FLUAV + FLUBV RNA SPEC NAA+PROBE: NEGATIVE
GLOBULIN PLAS-MCNC: 3 G/DL (ref 2.8–4.4)
GLUCOSE BLD-MCNC: 127 MG/DL (ref 70–99)
GLUCOSE BLD-MCNC: 129 MG/DL (ref 70–99)
GLUCOSE BLD-MCNC: 130 MG/DL (ref 70–99)
HBA1C MFR BLD: 6.8 % (ref ?–5.7)
HCT VFR BLD AUTO: 38.3 %
HGB BLD-MCNC: 12.2 G/DL
IMM GRANULOCYTES # BLD AUTO: 0.02 X10(3) UL (ref 0–1)
IMM GRANULOCYTES NFR BLD: 0.3 %
LYMPHOCYTES # BLD AUTO: 2.58 X10(3) UL (ref 1–4)
LYMPHOCYTES NFR BLD AUTO: 37 %
MCH RBC QN AUTO: 29.4 PG (ref 26–34)
MCHC RBC AUTO-ENTMCNC: 31.9 G/DL (ref 31–37)
MCV RBC AUTO: 92.3 FL
MONOCYTES # BLD AUTO: 0.73 X10(3) UL (ref 0.1–1)
MONOCYTES NFR BLD AUTO: 10.5 %
NEUTROPHILS # BLD AUTO: 3.08 X10 (3) UL (ref 1.5–7.7)
NEUTROPHILS # BLD AUTO: 3.08 X10(3) UL (ref 1.5–7.7)
NEUTROPHILS NFR BLD AUTO: 44 %
NT-PROBNP SERPL-MCNC: 424 PG/ML (ref ?–450)
OSMOLALITY SERPL CALC.SUM OF ELEC: 289 MOSM/KG (ref 275–295)
PLATELET # BLD AUTO: 176 10(3)UL (ref 150–450)
POTASSIUM SERPL-SCNC: 4.4 MMOL/L (ref 3.5–5.1)
PROT SERPL-MCNC: 6.8 G/DL (ref 6.4–8.2)
RBC # BLD AUTO: 4.15 X10(6)UL
RSV RNA SPEC NAA+PROBE: NEGATIVE
SARS-COV-2 RNA RESP QL NAA+PROBE: NOT DETECTED
SODIUM SERPL-SCNC: 135 MMOL/L (ref 136–145)
SODIUM SERPL-SCNC: 40 MMOL/L
TROPONIN I SERPL HS-MCNC: 18 NG/L
WBC # BLD AUTO: 7 X10(3) UL (ref 4–11)

## 2024-03-28 PROCEDURE — 99223 1ST HOSP IP/OBS HIGH 75: CPT | Performed by: INTERNAL MEDICINE

## 2024-03-28 PROCEDURE — 71045 X-RAY EXAM CHEST 1 VIEW: CPT | Performed by: EMERGENCY MEDICINE

## 2024-03-28 PROCEDURE — 71250 CT THORAX DX C-: CPT | Performed by: STUDENT IN AN ORGANIZED HEALTH CARE EDUCATION/TRAINING PROGRAM

## 2024-03-28 RX ORDER — ALBUTEROL SULFATE 90 UG/1
2 AEROSOL, METERED RESPIRATORY (INHALATION) EVERY 4 HOURS PRN
Status: DISCONTINUED | OUTPATIENT
Start: 2024-03-28 | End: 2024-03-28

## 2024-03-28 RX ORDER — TIZANIDINE 4 MG/1
4 TABLET ORAL NIGHTLY PRN
Status: DISCONTINUED | OUTPATIENT
Start: 2024-03-28 | End: 2024-03-29

## 2024-03-28 RX ORDER — DEXTROSE MONOHYDRATE 25 G/50ML
50 INJECTION, SOLUTION INTRAVENOUS
Status: DISCONTINUED | OUTPATIENT
Start: 2024-03-28 | End: 2024-03-29

## 2024-03-28 RX ORDER — POLYETHYLENE GLYCOL 3350 17 G/17G
17 POWDER, FOR SOLUTION ORAL DAILY PRN
Status: DISCONTINUED | OUTPATIENT
Start: 2024-03-28 | End: 2024-03-29

## 2024-03-28 RX ORDER — FLUTICASONE PROPIONATE 50 MCG
1 SPRAY, SUSPENSION (ML) NASAL 2 TIMES DAILY PRN
Status: DISCONTINUED | OUTPATIENT
Start: 2024-03-28 | End: 2024-03-29

## 2024-03-28 RX ORDER — LISINOPRIL 20 MG/1
20 TABLET ORAL DAILY
Status: DISCONTINUED | OUTPATIENT
Start: 2024-03-29 | End: 2024-03-29

## 2024-03-28 RX ORDER — ONDANSETRON 2 MG/ML
4 INJECTION INTRAMUSCULAR; INTRAVENOUS EVERY 6 HOURS PRN
Status: DISCONTINUED | OUTPATIENT
Start: 2024-03-28 | End: 2024-03-29

## 2024-03-28 RX ORDER — SENNOSIDES 8.6 MG
17.2 TABLET ORAL NIGHTLY PRN
Status: DISCONTINUED | OUTPATIENT
Start: 2024-03-28 | End: 2024-03-29

## 2024-03-28 RX ORDER — PANTOPRAZOLE SODIUM 40 MG/1
40 TABLET, DELAYED RELEASE ORAL
Status: DISCONTINUED | OUTPATIENT
Start: 2024-03-29 | End: 2024-03-29

## 2024-03-28 RX ORDER — ECHINACEA PURPUREA EXTRACT 125 MG
1 TABLET ORAL
Status: DISCONTINUED | OUTPATIENT
Start: 2024-03-28 | End: 2024-03-29

## 2024-03-28 RX ORDER — OXYCODONE HYDROCHLORIDE 5 MG/1
5 TABLET ORAL EVERY 6 HOURS PRN
Status: DISCONTINUED | OUTPATIENT
Start: 2024-03-28 | End: 2024-03-29

## 2024-03-28 RX ORDER — METOCLOPRAMIDE HYDROCHLORIDE 5 MG/ML
5 INJECTION INTRAMUSCULAR; INTRAVENOUS EVERY 8 HOURS PRN
Status: DISCONTINUED | OUTPATIENT
Start: 2024-03-28 | End: 2024-03-29

## 2024-03-28 RX ORDER — MELATONIN
3 NIGHTLY PRN
Status: DISCONTINUED | OUTPATIENT
Start: 2024-03-28 | End: 2024-03-29

## 2024-03-28 RX ORDER — ALBUTEROL SULFATE 90 UG/1
2 AEROSOL, METERED RESPIRATORY (INHALATION) EVERY 6 HOURS PRN
Status: DISCONTINUED | OUTPATIENT
Start: 2024-03-28 | End: 2024-03-29

## 2024-03-28 RX ORDER — SODIUM CHLORIDE 9 MG/ML
INJECTION, SOLUTION INTRAVENOUS CONTINUOUS
Status: DISCONTINUED | OUTPATIENT
Start: 2024-03-28 | End: 2024-03-29

## 2024-03-28 RX ORDER — ASPIRIN 81 MG/1
81 TABLET, CHEWABLE ORAL DAILY
Status: DISCONTINUED | OUTPATIENT
Start: 2024-03-29 | End: 2024-03-29

## 2024-03-28 RX ORDER — NICOTINE POLACRILEX 4 MG
30 LOZENGE BUCCAL
Status: DISCONTINUED | OUTPATIENT
Start: 2024-03-28 | End: 2024-03-29

## 2024-03-28 RX ORDER — ACETAMINOPHEN 500 MG
500 TABLET ORAL EVERY 4 HOURS PRN
Status: DISCONTINUED | OUTPATIENT
Start: 2024-03-28 | End: 2024-03-29

## 2024-03-28 RX ORDER — FLUTICASONE FUROATE AND VILANTEROL 200; 25 UG/1; UG/1
1 POWDER RESPIRATORY (INHALATION) DAILY
Status: DISCONTINUED | OUTPATIENT
Start: 2024-03-29 | End: 2024-03-29

## 2024-03-28 RX ORDER — PREGABALIN 50 MG/1
100 CAPSULE ORAL 3 TIMES DAILY
Status: DISCONTINUED | OUTPATIENT
Start: 2024-03-28 | End: 2024-03-29

## 2024-03-28 RX ORDER — METRONIDAZOLE 7.5 MG/G
1 GEL TOPICAL 2 TIMES DAILY
Status: DISCONTINUED | OUTPATIENT
Start: 2024-03-28 | End: 2024-03-29

## 2024-03-28 RX ORDER — NICOTINE POLACRILEX 4 MG
15 LOZENGE BUCCAL
Status: DISCONTINUED | OUTPATIENT
Start: 2024-03-28 | End: 2024-03-29

## 2024-03-28 RX ORDER — ROSUVASTATIN CALCIUM 5 MG/1
10 TABLET, COATED ORAL NIGHTLY
Status: DISCONTINUED | OUTPATIENT
Start: 2024-03-28 | End: 2024-03-29

## 2024-03-28 RX ORDER — DULOXETIN HYDROCHLORIDE 30 MG/1
90 CAPSULE, DELAYED RELEASE ORAL DAILY
Status: DISCONTINUED | OUTPATIENT
Start: 2024-03-29 | End: 2024-03-29

## 2024-03-28 RX ORDER — BISACODYL 10 MG
10 SUPPOSITORY, RECTAL RECTAL
Status: DISCONTINUED | OUTPATIENT
Start: 2024-03-28 | End: 2024-03-29

## 2024-03-28 RX ORDER — BENZONATATE 200 MG/1
200 CAPSULE ORAL 3 TIMES DAILY PRN
Status: DISCONTINUED | OUTPATIENT
Start: 2024-03-28 | End: 2024-03-29

## 2024-03-28 NOTE — ED QUICK NOTES
Orders for admission, patient is aware of plan and ready to go upstairs. Any questions, please call ED RN stephanie at extension [phone].     Patient Covid vaccination status: Fully vaccinated     COVID Test Ordered in ED: SARS-CoV-2/Flu A and B/RSV by PCR (GeneXpert)    COVID Suspicion at Admission: N/A    Running Infusions:      Mental Status/LOC at time of transport: aox4    Other pertinent information:   CIWA score: N/A   NIH score:  N/A

## 2024-03-28 NOTE — ED PROVIDER NOTES
Patient Seen in: [facility] Emergency Department      History     Chief Complaint   Patient presents with    Difficulty Breathing     Stated Complaint: Shortness of breath, cough    Subjective:   HPI    Patient is a 78-year-old female presented to the emergency room with reports of shortness of breath and cough.  She states she has been feeling pretty weak and has had decreased oral intake and appetite.  She is on Ozempic and has been trying to lose weight.  Today she woke up she went to take a shower and felt very short of breath.  She came out she took her pulse ox and says that it read about 30%.  Her son came over and rechecked it and it was in the 80s however he states that it gradually increased to about 90%.  Given her history of recurrent pneumonia she called her pulmonary clinic who told her to come in to the ER for further evaluation for potential pneumonia.  Of note the patient was scheduled for a CT of her chest today at 4:30 PM.        Objective:   Past Medical History:   Diagnosis Date    Abdominal pain     Anemia     Anxiety     Arrhythmia     Arthritis 2000    Asthma ([facility])     Back pain 1966    Back problem     Black stools     Bloating     Blurred vision     Breast CA ([facility]) 1999    left lump and rad.    Cancer ([facility]) 1999    breast, basal cell    Cervical radiculopathy     Cervical spinal stenosis     Deep vein thrombosis (HCC)     Depression     Diabetes mellitus (HCC)     Diarrhea, unspecified     Dizziness     Easy bruising     On Xeralto    Esophageal reflux     Exposure to medical diagnostic radiation     Fatigue     Fitting and adjustment of vascular catheter     Headache disorder     Hearing loss     Heart attack ([facility])     Heartburn     Hemorrhoids     High blood pressure     High cholesterol     History of blood clots     History of COVID-19 12/01/2020    not hospitalized, no continued symptoms, cough, fever, loss of taste    History of depression     History of syncope 07/26/2014     IgG deficiency (HCC)     ??? pt states she gets IgG infusions due to lack of IgG, unable to fight off infections    Indigestion     Leaking of urine     Migraines     Muscle weakness     uses cane    Neuropathy     rt foot bilateral hands    Osteoarthritis     Pain in joints 2000    Pneumonia due to organism     Pulmonary embolism (HCC) 07/25/2015    Shortness of breath     Sleep apnea     cpap - not using - machine is broken    Stress     Uncomfortable fullness after meals     Visual impairment     glasses/contacts    Weight gain               Past Surgical History:   Procedure Laterality Date    ABDOMEN SURGERY PROC UNLISTED      duodenal biopsy    APPENDECTOMY      BREAST SURGERY      CATARACT      CHOLECYSTECTOMY      COLONOSCOPY  10/1/10, 4/21/17    COLONOSCOPY N/A 02/27/2023    Procedure: COLONOSCOPY;  Surgeon: Migel Bass MD;  Location:  ENDOSCOPY    EGD  04/21/2017    EYE SURGERY      FEMUR/KNEE SURG UNLISTED      right knee arthroscopy    HEMORRHOIDECTOMY,INT/EXT,SIMPLE      HERNIA SURGERY      HYSTERECTOMY  1984    total hystero.    LUMPECTOMY LEFT Left 1999    lt lumpectomy and radiation.    OOPHORECTOMY Bilateral 1984    total hystero.    OTHER  11/2021    left wrist, ORIF Dr. Stacy Long    OTHER  02/21/2022    CERVICAL 5 AND CERVICAL 6 LAMINECTOMIES, PARTIAL CERVICAL 7 LAMINECTOMY, LEFT CERVICAL 5/CERVICAL 6 AND CERVICAL 6/CERVICAL 7 FORAMINOTOMIES, CERVICAL 6 AND CERVICAL 7 ARTHRODESIS, AUTOGRAFT, ALLOGRAFT    OTHER SURGICAL HISTORY  2010    endoscopy - papillotomy    OTHER SURGICAL HISTORY  2016     under right eye, skin cancer removed    OTHER SURGICAL HISTORY  02/2022    neck    PORT FOR VASCULAR ACCESS      RADIATION LEFT Left 1999    left lumpectomy and radiation.    REPAIR ING HERNIA,5+Y/O,REDUCIBL      SIGMOIDOSCOPY,DIAGNOSTIC      SPINE SURGERY PROCEDURE UNLISTED      TONSILLECTOMY      TOTAL ABDOM HYSTERECTOMY      TOTAL KNEE REPLACEMENT                  Social History      Socioeconomic History    Marital status:    Tobacco Use    Smoking status: Former     Packs/day: 0     Types: Cigarettes     Quit date: 1968     Years since quittin.2    Smokeless tobacco: Never   Vaping Use    Vaping Use: Never used   Substance and Sexual Activity    Alcohol use: No     Comment: rare    Drug use: Yes     Types: Cannabis     Comment: uses cream for pain to back/neck with barometer changes.    Sexual activity: Not Currently     Partners: Male   Other Topics Concern    Caffeine Concern Yes     Comment: half of cup of coke daily    Sleep Concern No    Exercise Yes     Comment: walking    Seat Belt Yes     Social Determinants of Health     Financial Resource Strain: Low Risk  (10/25/2023)    Financial Resource Strain     Difficulty of Paying Living Expenses: Not hard at all     Med Affordability: No   Food Insecurity: No Food Insecurity (3/28/2024)    Food Insecurity     Food Insecurity: Never true   Transportation Needs: No Transportation Needs (3/28/2024)    Transportation Needs     Lack of Transportation: No   Stress: No Stress Concern Present (10/25/2023)    Stress     Feeling of Stress : No   Social Connections: Socially Integrated (10/25/2023)    Social Connections     Frequency of Socialization with Friends and Family: 2   Housing Stability: Low Risk  (3/28/2024)    Housing Stability     Housing Instability: No              Review of Systems    Positive for stated complaint: Shortness of breath, cough  Other systems are as noted in HPI.  Constitutional and vital signs reviewed.      All other systems reviewed and negative except as noted above.    Physical Exam     ED Triage Vitals   BP 24 1501 95/61   Pulse 24 1500 77   Resp 24 1500 16   Temp 24 1501 98.3 °F (36.8 °C)   Temp src 24 1501 Oral   SpO2 24 1500 92 %   O2 Device 24 1700 None (Room air)       Current:/69 (BP Location: Right arm)   Pulse 74   Temp 98.1 °F (36.7 °C)  (Oral)   Resp 20   Wt 75 kg   LMP  (LMP Unknown)   SpO2 98%   BMI 31.23 kg/m²         Physical Exam  Vitals and nursing note reviewed.   Constitutional:       General: She is not in acute distress.     Appearance: Normal appearance.   HENT:      Head: Normocephalic.      Nose: Nose normal.      Mouth/Throat:      Mouth: Mucous membranes are moist.   Eyes:      Extraocular Movements: Extraocular movements intact.      Pupils: Pupils are equal, round, and reactive to light.   Cardiovascular:      Rate and Rhythm: Normal rate and regular rhythm.      Pulses: Normal pulses.   Pulmonary:      Effort: Pulmonary effort is normal.   Abdominal:      General: Abdomen is flat. Bowel sounds are normal. There is no distension.      Palpations: Abdomen is soft.      Tenderness: There is no abdominal tenderness. There is no right CVA tenderness, left CVA tenderness, guarding or rebound.      Hernia: No hernia is present.   Musculoskeletal:         General: No swelling or tenderness. Normal range of motion.      Cervical back: Normal range of motion.   Skin:     General: Skin is warm and dry.   Neurological:      Mental Status: She is alert and oriented to person, place, and time. Mental status is at baseline.   Psychiatric:         Mood and Affect: Mood normal.               ED Course     Labs Reviewed   COMP METABOLIC PANEL (14) - Abnormal; Notable for the following components:       Result Value    Glucose 127 (*)     Sodium 135 (*)     BUN 34 (*)     Creatinine 2.54 (*)     eGFR-Cr 19 (*)     All other components within normal limits   HEMOGLOBIN A1C - Abnormal; Notable for the following components:    HgbA1C 6.8 (*)     Estimated Average Glucose 148 (*)     All other components within normal limits   BASIC METABOLIC PANEL (8) - Abnormal; Notable for the following components:    Glucose 154 (*)     Chloride 113 (*)     BUN 26 (*)     Creatinine 1.37 (*)     Calcium, Total 8.1 (*)     Calculated Osmolality 300 (*)      eGFR-Cr 40 (*)     All other components within normal limits   C-REACTIVE PROTEIN - Abnormal; Notable for the following components:    C-Reactive Protein 1.15 (*)     All other components within normal limits   LUPUS ANTICOAGULANT COMP - Abnormal; Notable for the following components:    PT (Lupus) 21.2 (*)     Hexagonal Phase Staclot LA Positive (*)     DRVVT Ratio 1.63 (*)     DRVVT Lupus Anticoagulant Positive (*)     INR (Lupus) 1.81 (*)     StaClot LA Delta 9.60 (*)     All other components within normal limits   IMMUNOGLOBULIN A/G/M, QUANT - Abnormal; Notable for the following components:    Immunoglobulin G 517 (*)     All other components within normal limits   POCT GLUCOSE - Abnormal; Notable for the following components:    POC Glucose 129 (*)     All other components within normal limits   POCT GLUCOSE - Abnormal; Notable for the following components:    POC Glucose 130 (*)     All other components within normal limits   POCT GLUCOSE - Abnormal; Notable for the following components:    POC Glucose 158 (*)     All other components within normal limits   POCT GLUCOSE - Abnormal; Notable for the following components:    POC Glucose 103 (*)     All other components within normal limits   CBC W/ DIFFERENTIAL - Abnormal; Notable for the following components:    RBC 3.49 (*)     HGB 10.2 (*)     HCT 31.8 (*)     .0 (*)     Immature Platelet Fraction 12.4 (*)     All other components within normal limits   TROPONIN I HIGH SENSITIVITY - Normal   PRO BETA NATRIURETIC PEPTIDE - Normal   CONNECTIVE TISSUE DISEASE (DARREN) SCREEN - Normal    Narrative:     DARREN screen determined using [employer] Carmen by fluorescent enzyme immunoassay. Both a specific dsDNA test along with an ROBERT screen detecting antibodies to recombinant U1RNP (RNP 70, A, C), SS-A/Ro, SS-B/La, Centromere B, Scl-70, Kathy-1 proteins and a synthetic SmD3 peptide are utilized to determine DARREN screen results.   SED RATE, WESTERGREN  (AUTOMATED) - Normal   COMPLEMENT C3, SERUM - Normal   BETA-2 GLYCOPROTEIN I AB,G/M - Normal   ANTICARDIOLIPIN AB, IGG/M, QN - Normal   SARS-COV-2/FLU A AND B/RSV BY PCR (GENEXPERT) - Normal    Narrative:     This test is intended for the qualitative detection and differentiation of SARS-CoV-2, influenza A, influenza B, and respiratory syncytial virus (RSV) viral RNA in nasopharyngeal or nares swabs from individuals suspected of respiratory viral infection consistent with COVID-19 by their healthcare provider. Signs and symptoms of respiratory viral infection due to SARS-CoV-2, influenza, and RSV can be similar.    Test performed using the Xpert Xpress SARS-CoV-2/FLU/RSV (real time RT-PCR)  assay on the SkuRun instrument, [employer], [place] [address].   This test is being used under the Food and Drug Administration's Emergency Use Authorization.    The authorized Fact Sheet for Healthcare Providers for this assay is available upon request from the laboratory.   CBC WITH DIFFERENTIAL WITH PLATELET    Narrative:     The following orders were created for panel order CBC With Differential With Platelet.  Procedure                               Abnormality         Status                     ---------                               -----------         ------                     CBC W/ DIFFERENTIAL[[id number]]                              Final result                 Please view results for these tests on the individual orders.   SODIUM, URINE, RANDOM   CREATININE, URINE, RANDOM   CBC WITH DIFFERENTIAL WITH PLATELET    Narrative:     The following orders were created for panel order CBC With Differential With Platelet.  Procedure                               Abnormality         Status                     ---------                               -----------         ------                     CBC W/ DIFFERENTIAL[[id number]]          Abnormal            Final result                 Please view results for these tests on the  individual orders.   LUPUS ANTICOAGULANT/ANTIPHOSPHOLIPID PANEL    Narrative:     The following orders were created for panel order Lupus Anticoagulant/Antiphospholipid Panel.  Procedure                               Abnormality         Status                     ---------                               -----------         ------                     Lupus Anticoagulant Comp[[id number]]     Abnormal            Final result               Beta 2 Glycoprotein I AB...[[id number]]  Normal              Final result               Anticardiolipin AB, IgG/...[[id number]]  Normal              Final result                 Please view results for these tests on the individual orders.   IGG SUBCLASSES   DARREN,DIRECT,REFLEX TITER + SPECIFIC ANTIBODIES   PROTEIN ELECTROPHORESIS WITH WILIAN & IGA,IGG,IGM, SERUM    Narrative:     The following orders were created for panel order Protein Electrophoresis w/ WILIAN & IgA,IgG, IgM Serum.  Procedure                               Abnormality         Status                     ---------                               -----------         ------                     Serum Protein Electropho...[[id number]]                      In process                 Immunoglobulin A/G/M, Quant[[id number]]  Abnormal            Final result               Immunofixation (WILIAN)[[id number]]                             In process                 Immunoglobulin free LT c...[[id number]]                      In process                   Please view results for these tests on the individual orders.   COMPLEMENT, TOTAL (CH50)   IMMUNE COMPLEXES, C1Q BINDING   SERUM PROTEIN ELECTROPHORESIS   IMMUNOFIXATION [ WILIAN]   IMMUNOGLOBULIN FREE LT CHAINS BLOOD   RAINBOW DRAW BLUE   CBC W/ DIFFERENTIAL                 CT CHEST (YIG=72515)    Result Date: 3/28/2024  CONCLUSION:  There is thin near complete resolution of patchy infiltrates and consolidation in the right lower lobe and interval improvement in consolidation and infiltrates in the  right upper lobe since 2/28/2024.  There is some persistent consolidation  in the right upper lobe with bibasilar atelectasis.  Recommend continued follow-up to assess for complete resolution.  Stable bilateral subpleural pulmonary nodules largest in the left lung base measuring 6 mm.  LOCATION:  [id number]   Dictated by (CST): Mita Caruso MD on 3/28/2024 at 4:22 PM     Finalized by (CST): Mita Caruso MD on 3/28/2024 at 4:29 PM       XR CHEST AP PORTABLE  (CPT=71045)    Result Date: 3/28/2024  CONCLUSION:  Elevation of the right hemidiaphragm.  Interval improvement in patchy infiltrate in the right lung.  There is some residual infiltrate/atelectasis in the right midlung laterally.  Recommend continued follow-up to assess for complete resolution.  Normal heart size and pulmonary vascularity.  Port-A-Cath tip SVC.  No pneumothorax or pleural effusion.   LOCATION:  [id number]      Dictated by (CST): Mita Caruso MD on 3/28/2024 at 3:47 PM     Finalized by (CST): Mita Caruso MD on 3/28/2024 at 3:48 PM            MDM      The differential includes the following  Pneumonia, viral respiratory infection   Dehydration       Pertinent comorbidities include  As listed above     Pertinent social history includes  As listed above    ER course  Pt HDS, afebrile, saturating 91-93% on RA. She has dry MMM one exam. Labs notable for a significant FREDERICK with Cr of 2.54 from 0.86 one month ago. Pt started on IVF and I did discuss her case with pulm who pt follows with closely along with renal given her new frederick. [facility]       Imaging studies  I reviewed the images and agree with the radiologist report that showed the following residual right infiltrate of RML. CT chest improvement in consolidation.     External data reviewed    Discussion of management with external providers  Pulmonary Dr. Akhil Gupta renal   [facility]   Admission disposition: 3/28/2024  5:03 PM                                         Medical Decision Making      Disposition and Plan     Clinical Impression:  1. FREDERICK (acute kidney injury) (HCC)    2. Cervical radiculitis         Disposition:  Admit  3/28/2024  5:03 pm    Follow-up:  Betsy Browning MD  [address] [place] [id number]  [phone]    Schedule an appointment as soon as possible for a visit in 2 week(s)  2-3 week follow up          Medications Prescribed:  Discharge Medication List as of 3/29/2024  1:45 PM                            Hospital Problems       Present on Admission  Date Reviewed: 3/11/2024            ICD-10-CM Noted POA    * (Principal) FREDERICK (acute kidney injury) (HCC) N17.9 3/28/2024 Unknown    Bilateral pulmonary infiltrates R91.8 3/28/2024 Unknown    Dehydration E86.0 3/28/2024 Unknown    History of pneumonia, recurrent Z87.01 3/8/2016 Yes    Positive DARREN (antinuclear antibody) R76.8 3/28/2024 Unknown

## 2024-03-28 NOTE — H&P
[facility]     Rayne Morales Patient Status:  Inpatient    1945 MRN [id number]   Location [facility] 4NW-A Attending Brady Mir, DO   Hosp Day # 0 PCP Jose Daniel Washington MD     Chief Complaint: SOB, cough    Subjective:    History of Present Illness:   Rayne Morales is a 78 year old female with PMHx hypertension, hyperlipidemia, diabetes mellitus type 2, osteoarthritis, prior cervical spine surgery with chronic pain, anxiety, asthma, DVT/PE on Xarelto, chronic anemia, MUKUND and breast cancer s/p lumpectomy and radiation who presents to the hospital with cough and shortness of breath for the past few days.  She follows up with pulmonologist Dr. Browning.  Son is at bedside who also assists with the history.  They have a pulse ox at home and the readings were in the low 80s to 90s.  They called their pulmonologist who recommended they come to the ER for evaluation.  She was scheduled to get a CT scan done today as well.  Patient has had recurrent pneumonia since 2023 and has completed 4 rounds of antibiotics.  She reports decreased appetite.  No fever, chills, chest pain, abdominal pain, nausea, vomiting, diarrhea or congestion.  Of note she underwent bronchoscopy with BAL on 3/1/2024 and cultures were negative with the exception of Candida albicans which was not felt to be a pathogen.  She was transition to Augmentin on discharge which she completed on 3/8. In the ER she was found to have FREDERICK.     History/Other:    Past Medical History:  Past Medical History:   Diagnosis Date    Abdominal pain     Anemia     Anxiety     Arrhythmia     Arthritis     Asthma ([facility])     Back pain 1966    Back problem     Black stools     Bloating     Blurred vision     Breast CA ([facility])     left lump and rad.    Cancer ([facility])     breast, basal cell    Cervical radiculopathy     Cervical spinal stenosis     Deep vein thrombosis ([facility])     Depression     Diabetes mellitus  (HCC)     Diarrhea, unspecified     Dizziness     Easy bruising     On Xeralto    Esophageal reflux     Exposure to medical diagnostic radiation     Fatigue     Fitting and adjustment of vascular catheter     Headache disorder     Hearing loss     Heart attack (HCC)     Heartburn     Hemorrhoids     High blood pressure     High cholesterol     History of blood clots     History of COVID-19 12/01/2020    not hospitalized, no continued symptoms, cough, fever, loss of taste    History of depression     History of syncope 07/26/2014    IgG deficiency (HCC)     ??? pt states she gets IgG infusions due to lack of IgG, unable to fight off infections    Indigestion     Leaking of urine     Migraines     Muscle weakness     uses cane    Neuropathy     rt foot bilateral hands    Osteoarthritis     Pain in joints 2000    Pneumonia due to organism     Pulmonary embolism (HCC) 07/25/2015    Shortness of breath     Sleep apnea     cpap - not using - machine is broken    Stress     Uncomfortable fullness after meals     Visual impairment     glasses/contacts    Weight gain      Past Surgical History:   Past Surgical History:   Procedure Laterality Date    ABDOMEN SURGERY PROC UNLISTED      duodenal biopsy    APPENDECTOMY      BREAST SURGERY      CATARACT      CHOLECYSTECTOMY      COLONOSCOPY  10/1/10, 4/21/17    COLONOSCOPY N/A 02/27/2023    Procedure: COLONOSCOPY;  Surgeon: Migel Bass MD;  Location:  ENDOSCOPY    EGD  04/21/2017    EYE SURGERY      FEMUR/KNEE SURG UNLISTED      right knee arthroscopy    HEMORRHOIDECTOMY,INT/EXT,SIMPLE      HERNIA SURGERY      HYSTERECTOMY  1984    total hystero.    LUMPECTOMY LEFT Left 1999    lt lumpectomy and radiation.    OOPHORECTOMY Bilateral 1984    total hystero.    OTHER  11/2021    left wrist, ORIF Dr. Stacy Long    OTHER  02/21/2022    CERVICAL 5 AND CERVICAL 6 LAMINECTOMIES, PARTIAL CERVICAL 7 LAMINECTOMY, LEFT CERVICAL 5/CERVICAL 6 AND CERVICAL 6/CERVICAL 7  FORAMINOTOMIES, CERVICAL 6 AND CERVICAL 7 ARTHRODESIS, AUTOGRAFT, ALLOGRAFT    OTHER SURGICAL HISTORY  2010    endoscopy - papillotomy    OTHER SURGICAL HISTORY  2016     under right eye, skin cancer removed    OTHER SURGICAL HISTORY  02/2022    neck    PORT FOR VASCULAR ACCESS      RADIATION LEFT Left 1999    left lumpectomy and radiation.    REPAIR ING HERNIA,5+Y/O,REDUCIBL      SIGMOIDOSCOPY,DIAGNOSTIC      SPINE SURGERY PROCEDURE UNLISTED      TONSILLECTOMY      TOTAL ABDOM HYSTERECTOMY      TOTAL KNEE REPLACEMENT        Family History:   Family History   Problem Relation Age of Onset    Heart Attack Paternal Grandfather     Heart Attack Paternal Grandmother     Other (CAD) Father     Other (CAD) Mother     Hypertension Mother     Heart Attack Mother     Other (CAD) Brother     Hypertension Brother     Heart Attack Brother     Stroke Maternal Grandfather         Stroke    Stroke Maternal Grandmother         Stroke    Breast Cancer Self 54     Social History:    reports that she quit smoking about 56 years ago. Her smoking use included cigarettes. She has never used smokeless tobacco. She reports current drug use. Drug: Cannabis. She reports that she does not drink alcohol.     Allergies:   Allergies   Allergen Reactions    Ciprofloxacin HIVES    Mold REACTIVE AIRWAY DISEASE     Mold and smut    Sulfa Antibiotics HIVES     Bactrim (Sulfamethoxazole/TMP)    Diovan [Valsartan] NAUSEA AND VOMITING    Omnicef NAUSEA ONLY and DIZZINESS    Clindamycin DIARRHEA and NAUSEA ONLY    Green Pepper OTHER (SEE COMMENTS)     Sensitivity (all peppers - green, orange and red)    Lipitor [Atorvastatin Calcium] OTHER (SEE COMMENTS)     Leg cramping,    Pneumococcal Vaccines OTHER (SEE COMMENTS)     Pt states she had pneumo vax done at Dr. Boyd on 1/10/24- states developed redness, swelling,itching to the arm    Quinapril Hcl FATIGUE     Medications:    Current Facility-Administered Medications on File Prior to Encounter    Medication Dose Route Frequency Provider Last Rate Last Admin    [COMPLETED] HYDROcodone-acetaminophen (Norco) 5-325 MG per tab 1 tablet  1 tablet Oral Once Betsy Browning MD   1 tablet at 03/01/24 0948    [COMPLETED] sodium chloride 0.9 % IV bolus 500 mL  500 mL Intravenous Once Brandon Cabrera MD   Stopped at 02/27/24 0946    [COMPLETED] sodium chloride 0.9 % IV bolus 2,178 mL  30 mL/kg Intravenous Once Brandon Cabrera MD   Stopped at 02/27/24 1323    [COMPLETED] azithromycin (Zithromax) 500 mg in sodium chloride 0.9% 250mL IVPB premix  500 mg Intravenous Once Brandon Cabrera MD   Stopped at 02/27/24 1204    [COMPLETED] ampicillin-sulbactam (Unasyn) 3 g in sodium chloride 0.9% 100mL IVPB-ADD  3 g Intravenous Once Brandon Cabrera MD   Stopped at 02/27/24 1104    [COMPLETED] morphINE PF 4 MG/ML injection 2 mg  2 mg Intravenous Once Brandon Cabrera MD   2 mg at 02/27/24 1520    [COMPLETED] azithromycin (Zithromax) 500 mg in sodium chloride 0.9% 250mL IVPB premix  500 mg Intravenous Q24H Sohail Bowman  mL/hr at 02/29/24 0842 500 mg at 02/29/24 0842    [COMPLETED] iopamidol 76% (ISOVUE-370) injection for power injector  100 mL Intravenous ONCE PRN Naldo Douglas MD   100 mL at 02/12/24 1124    [COMPLETED] prothrombin complex conc (human) (Kcentra) 2,158 Units in 80 mL infusion  2,158 Units Intravenous Once Ellie Reid PA-C   Stopped at 02/13/24 0700    [COMPLETED] cefTRIAXone (Rocephin) 1 g in D5W 100 mL IVPB-ADD  1 g Intravenous Once Naldo Levine MD   Stopped at 02/05/24 2048    [COMPLETED] methylPREDNISolone sodium succinate (Solu-MEDROL) injection 125 mg  125 mg Intravenous Once James Barr MD   125 mg at 01/14/24 0937    [COMPLETED] piperacillin-tazobactam (Zosyn) 3.375 g in dextrose 5% 100 mL IVPB-ADDV  3.375 g Intravenous Once James Barr MD   Stopped at 01/14/24 1046    [COMPLETED] azithromycin (Zithromax) 500 mg in sodium chloride 0.9% 250mL IVPB premix  500 mg Intravenous  Once James Barr MD   Stopped at 01/14/24 1201    [COMPLETED] azithromycin (Zithromax) 500 mg in sodium chloride 0.9% 250mL IVPB premix  500 mg Intravenous Q24H MirJacksonsantiagoDO 250 mL/hr at 01/16/24 1142 500 mg at 01/16/24 1142    [COMPLETED] HYDROcodone-acetaminophen (Norco) 5-325 MG per tab 1 tablet  1 tablet Oral Once James Barr MD   1 tablet at 01/14/24 1200     Current Outpatient Medications on File Prior to Encounter   Medication Sig Dispense Refill    metFORMIN  MG Oral Tablet 24 Hr Take 1 tablet (500 mg total) by mouth 2 (two) times daily with meals.      pregabalin 100 MG Oral Cap Take 1 capsule (100 mg total) by mouth in the morning and 1 capsule (100 mg total) at noon and 1 capsule (100 mg total) in the evening. 270 capsule 1    oxyCODONE 5 MG Oral Tab Take 1 tablet (5 mg total) by mouth every 6 (six) hours as needed for Pain. 15 tablet 0    metroNIDAZOLE 0.75 % External Gel Apply 1 g topically 2 (two) times daily. Apply to face twice daily 45 g 5    DULOXETINE 30 MG Oral Cap DR Particles Take 1 capsule (30 mg total) by mouth daily. To take along with 60mg to equal 90mg daily. 90 capsule 1    PANTOPRAZOLE 40 MG Oral Tab EC Take 1 tablet (40 mg total) by mouth before breakfast. 90 tablet 1    DULoxetine 60 MG Oral Cap DR Particles Take 1 capsule (60 mg total) by mouth daily. 90 capsule 1    rivaroxaban (XARELTO) 20 MG Oral Tab Take 1 tablet (20 mg total) by mouth daily. 90 tablet 0    rosuvastatin 10 MG Oral Tab Take 1 tablet (10 mg total) by mouth nightly. 90 tablet 1    albuterol 108 (90 Base) MCG/ACT Inhalation Aero Soln Inhale 2 puffs into the lungs every 6 (six) hours as needed for Wheezing. 1 each 3    LISINOPRIL 20 MG Oral Tab TAKE 1 TABLET BY MOUTH DAILY 90 tablet 3    tiZANidine 4 MG Oral Tab Take 1 tablet (4 mg total) by mouth every 8 (eight) hours as needed. (Patient taking differently: Take 1 tablet (4 mg total) by mouth every 8 (eight) hours as needed. Takes every night) 90  tablet 2    aspirin 81 MG Oral Chew Tab Chew 1 tablet (81 mg total) by mouth daily.      Fluticasone Propionate 50 MCG/ACT Nasal Suspension 1 spray by Nasal route 2 (two) times daily. 1 Bottle 3    Cholecalciferol (VITAMIN D) 2000 units Oral Tab Take 2,000 Units by mouth daily.      semaglutide (OZEMPIC, 0.25 OR 0.5 MG/DOSE,) 2 MG/3ML Subcutaneous Solution Pen-injector Inject 0.5 mg into the skin once a week.      [] amoxicillin clavulanate 875-125 MG Oral Tab Take 1 tablet by mouth 2 (two) times daily for 5 days. 10 tablet 0    Naloxone HCl 4 MG/0.1ML Nasal Liquid 4 mg by Nasal route as needed. If patient remains unresponsive, repeat dose in other nostril 2-5 minutes after first dose. 1 kit 0     Review of Systems:   A comprehensive review of systems was completed.    Pertinent positives and negatives noted in the HPI.    Objective:   Physical Exam:    /76 (BP Location: Left arm)   Pulse 72   Temp 97.5 °F (36.4 °C) (Oral)   Resp 18   Wt 165 lb 4.8 oz (75 kg)   LMP  (LMP Unknown)   SpO2 99%   BMI 31.23 kg/m²   General: No acute distress, awake and alert  Respiratory: No rhonchi, no wheezes  Cardiovascular: S1, S2. Regular rate and rhythm  Abdomen: Soft, Non-tender, non-distended, positive bowel sounds  Neuro: DEL VALLE x 4  Extremities: No edema    Results:    Labs:      Labs Last 24 Hours:  Recent Labs   Lab 24  1503   RBC 4.15   HGB 12.2   HCT 38.3   MCV 92.3   MCH 29.4   MCHC 31.9   RDW 13.5   NEPRELIM 3.08   WBC 7.0   .0     Recent Labs   Lab 24  1503   *   BUN 34*   CREATSERUM 2.54*   EGFRCR 19*   CA 9.1   ALB 3.8   *   K 4.4      CO2 25.0   ALKPHO 66   AST 15   ALT 17   BILT 0.4   TP 6.8     Lab Results   Component Value Date    PT 19.4 (H) 2014    PT 13.9 2014    INR 2.33 (H) 2023    INR 1.00 2022    INR 2.58 (H) 2022     Recent Labs   Lab 24  1503   TROPHS 18     Recent Labs   Lab 24  1503   PBNP 424     No results  for input(s): \"PCT\" in the last 168 hours.    Imaging: Imaging data reviewed in Epic.    Assessment & Plan:      #Dyspnea with cough and hypoxia, now on room air  #Abnormal CT chest  #Positive DARREN and Anti-dsDNA  -CT chest shows near complete resolution of consolidation in the right lower lobe and improvement in the right upper lobe with residual infiltrate  -Pulm consult  -Consult rheumatology given recurrent PNA and lung infiltrate  -Monitor off abx    #Hypertension  -Resume home meds    #Hyperlipidemia  -Statin    #Diabetes mellitus type 2 with A1c 8.5  -Repeat A1c  -ISS with carb coverage for now, may need tresiba    #Osteoarthritis with prior cervical spine surgery and chronic pain syndrome  -Resume home norco, tizanidine and lyrica    #Anxiety  -Duloxetine    #Moderate persistent asthma  -Resume home inhalers, no wheezing on exam    #History of DVT/PE on Xarelto  -Xarelto    #Acute kidney injury, suspect prerenal etiology  -IVF  -Check urine Na and urine Cr  -Nephrology consulted from ER    #MUKUND  -MUKUND protocol    #IgG deficiency  -Follows with Dr. Paiz and plan is for IVIG    #Breast cancer s/p lumpectomy and radiation    Plan of care discussed with patient and son.    Brady Mir, DO    Supplementary Documentation:     The 21st Century Cures Act makes medical notes like these available to patients in the interest of transparency. Please be advised this is a medical document. Medical documents are intended to carry relevant information, facts as evident, and the clinical opinion of the practitioner. The medical note is intended as peer to peer communication and may appear blunt or direct. It is written in medical language and may contain abbreviations or verbiage that are unfamiliar.             **Certification    PHYSICIAN Certification of Need for Inpatient Hospitalization - Initial Certification    Patient will require inpatient services that will reasonably be expected to span two midnight's based on  the clinical documentation in H+P.   Based on patients current state of illness, I anticipate that, after discharge, patient will require TBD.

## 2024-03-28 NOTE — CONSULTS
[facility] PULMONARY/CRITICAL CARE CONSULTATION    HPI: Pt is a 79 y/o WF with hx of recurrent PNA, moderate persistent asthma, pulm embolism that presented to the ED with c/o fatigue, weakness and lightheadedness.  She has persistent SOB and cough.  She was trying to take her pulse ox, but was showing in the 30's.  She was supposed to have a CT chest at [facility] today to follow up on b/l infiltrates, but was directed to the ED instead.  O2 sat in the ED noted to be 92% on RA.  The patient was noted to be hypotensive and with FREDERICK.    Past Medical History:   Diagnosis Date    Abdominal pain     Anemia     Anxiety     Arrhythmia     Arthritis 2000    Asthma (HCC)     Back pain 1966    Back problem     Black stools     Bloating     Blurred vision     Breast CA (HCC) 1999    left lump and rad.    Cancer ([facility]) 1999    breast, basal cell    Cervical radiculopathy     Cervical spinal stenosis     Deep vein thrombosis (HCC)     Depression     Diabetes mellitus (HCC)     Diarrhea, unspecified     Dizziness     Easy bruising     On Xeralto    Esophageal reflux     Exposure to medical diagnostic radiation     Fatigue     Fitting and adjustment of vascular catheter     Headache disorder     Hearing loss     Heart attack (HCC)     Heartburn     Hemorrhoids     High blood pressure     High cholesterol     History of blood clots     History of COVID-19 12/01/2020    not hospitalized, no continued symptoms, cough, fever, loss of taste    History of depression     History of syncope 07/26/2014    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 pt states she gets IgG infusions due to lack of IgG, unable to fight off infections    Indigestion     Leaking of urine     Migraines     Muscle weakness     uses cane    Neuropathy     rt foot bilateral hands    Osteoarthritis     Pain in joints 2000    Pneumonia due to organism     Pulmonary embolism ([facility]) 07/25/2015    Shortness of breath     Sleep apnea     cpap - not using - machine is broken    Stress      Uncomfortable fullness after meals     Visual impairment     glasses/contacts    Weight gain      Past Surgical History:   Procedure Laterality Date    ABDOMEN SURGERY PROC UNLISTED      duodenal biopsy    APPENDECTOMY      BREAST SURGERY      CATARACT      CHOLECYSTECTOMY      COLONOSCOPY  10/1/10, 4/21/17    COLONOSCOPY N/A 02/27/2023    Procedure: COLONOSCOPY;  Surgeon: Migel Bass MD;  Location:  ENDOSCOPY    EGD  04/21/2017    EYE SURGERY      FEMUR/KNEE SURG UNLISTED      right knee arthroscopy    HEMORRHOIDECTOMY,INT/EXT,SIMPLE      HERNIA SURGERY      HYSTERECTOMY  1984    total hystero.    LUMPECTOMY LEFT Left 1999    lt lumpectomy and radiation.    OOPHORECTOMY Bilateral 1984    total hystero.    OTHER  11/2021    left wrist, ORIF Dr. Stacy Long    OTHER  02/21/2022    CERVICAL 5 AND CERVICAL 6 LAMINECTOMIES, PARTIAL CERVICAL 7 LAMINECTOMY, LEFT CERVICAL 5/CERVICAL 6 AND CERVICAL 6/CERVICAL 7 FORAMINOTOMIES, CERVICAL 6 AND CERVICAL 7 ARTHRODESIS, AUTOGRAFT, ALLOGRAFT    OTHER SURGICAL HISTORY  2010    endoscopy - papillotomy    OTHER SURGICAL HISTORY  2016     under right eye, skin cancer removed    OTHER SURGICAL HISTORY  02/2022    neck    PORT FOR VASCULAR ACCESS      RADIATION LEFT Left 1999    left lumpectomy and radiation.    REPAIR ING HERNIA,5+Y/O,REDUCIBL      SIGMOIDOSCOPY,DIAGNOSTIC      SPINE SURGERY PROCEDURE UNLISTED      TONSILLECTOMY      TOTAL ABDOM HYSTERECTOMY      TOTAL KNEE REPLACEMENT           Prior to Admission Medications   Prescriptions Last Dose Informant Patient Reported? Taking?   Cholecalciferol (VITAMIN D) 2000 units Oral Tab   Yes No   Sig: Take 2,000 Units by mouth daily.   DULOXETINE 30 MG Oral Cap DR Particles   No No   Sig: Take 1 capsule (30 mg total) by mouth daily. To take along with 60mg to equal 90mg daily.   DULoxetine 60 MG Oral Cap DR Particles   No No   Sig: Take 1 capsule (60 mg total) by mouth daily.   Fluticasone Propionate 50 MCG/ACT Nasal  Suspension   No No   Si spray by Nasal route 2 (two) times daily.   LISINOPRIL 20 MG Oral Tab   No No   Sig: TAKE 1 TABLET BY MOUTH DAILY   Naloxone HCl 4 MG/0.1ML Nasal Liquid   No No   Si mg by Nasal route as needed. If patient remains unresponsive, repeat dose in other nostril 2-5 minutes after first dose.   PANTOPRAZOLE 40 MG Oral Tab EC   No No   Sig: Take 1 tablet (40 mg total) by mouth before breakfast.   albuterol 108 (90 Base) MCG/ACT Inhalation Aero Soln   No No   Sig: Inhale 2 puffs into the lungs every 6 (six) hours as needed for Wheezing.   amoxicillin clavulanate 875-125 MG Oral Tab   No No   Sig: Take 1 tablet by mouth 2 (two) times daily for 5 days.   aspirin 81 MG Oral Chew Tab   Yes No   Sig: Chew 1 tablet (81 mg total) by mouth daily.   metFORMIN  MG Oral Tablet 24 Hr   Yes No   Sig: Take 1 tablet (500 mg total) by mouth 2 (two) times daily with meals.   metroNIDAZOLE 0.75 % External Gel   No No   Sig: Apply 1 g topically 2 (two) times daily. Apply to face twice daily   oxyCODONE 5 MG Oral Tab   No No   Sig: Take 1 tablet (5 mg total) by mouth every 6 (six) hours as needed for Pain.   pregabalin 100 MG Oral Cap   No No   Sig: Take 1 capsule (100 mg total) by mouth in the morning and 1 capsule (100 mg total) at noon and 1 capsule (100 mg total) in the evening.   rivaroxaban (XARELTO) 20 MG Oral Tab   No No   Sig: Take 1 tablet (20 mg total) by mouth daily.   rosuvastatin 10 MG Oral Tab   No No   Sig: Take 1 tablet (10 mg total) by mouth nightly.   semaglutide (OZEMPIC, 0.25 OR 0.5 MG/DOSE,) 2 MG/3ML Subcutaneous Solution Pen-injector   Yes No   Sig: Inject 0.5 mg into the skin once a week.   tiZANidine 4 MG Oral Tab   No No   Sig: Take 1 tablet (4 mg total) by mouth every 8 (eight) hours as needed.   Patient taking differently: Take 1 tablet (4 mg total) by mouth every 8 (eight) hours as needed. Takes every night      Facility-Administered Medications: None         Current Meds:  No  current facility-administered medications for this encounter.           Allergies:  Allergies   Allergen Reactions    Ciprofloxacin HIVES    Mold REACTIVE AIRWAY DISEASE     Mold and smut    Sulfa Antibiotics HIVES     Bactrim (Sulfamethoxazole/TMP)    Diovan [Valsartan] NAUSEA AND VOMITING    Omnicef NAUSEA ONLY and DIZZINESS    Clindamycin DIARRHEA and NAUSEA ONLY    Green Pepper OTHER (SEE COMMENTS)     Sensitivity (all peppers - green, orange and red)    Lipitor [Atorvastatin Calcium] OTHER (SEE COMMENTS)     Leg cramping,    Pneumococcal Vaccines OTHER (SEE COMMENTS)     Pt states she had pneumo vax done at Dr. Boyd on 1/10/24- states developed redness, swelling,itching to the arm    Quinapril Hcl FATIGUE       Social History     Socioeconomic History    Marital status:      Spouse name: Not on file    Number of children: Not on file    Years of education: Not on file    Highest education level: Not on file   Occupational History    Not on file   Tobacco Use    Smoking status: Former     Packs/day: 0     Types: Cigarettes     Quit date: 1968     Years since quittin.2    Smokeless tobacco: Never   Vaping Use    Vaping Use: Never used   Substance and Sexual Activity    Alcohol use: No     Comment: rare    Drug use: Yes     Types: Cannabis     Comment: uses cream for pain to back/neck with barometer changes.    Sexual activity: Not Currently     Partners: Male   Other Topics Concern     Service Not Asked    Blood Transfusions Not Asked    Caffeine Concern Yes     Comment: half of cup of coke daily    Occupational Exposure Not Asked    Hobby Hazards Not Asked    Sleep Concern No    Stress Concern Not Asked    Weight Concern Not Asked    Special Diet Not Asked    Back Care Not Asked    Exercise Yes     Comment: walking    Bike Helmet Not Asked    Seat Belt Yes    Self-Exams Not Asked   Social History Narrative    Not on file     Social Determinants of Health     Financial Resource  Strain: Low Risk  (10/25/2023)    Financial Resource Strain     Difficulty of Paying Living Expenses: Not hard at all     Med Affordability: No   Food Insecurity: No Food Insecurity (2/27/2024)    Food Insecurity     Food Insecurity: Never true   Transportation Needs: No Transportation Needs (2/27/2024)    Transportation Needs     Lack of Transportation: No   Physical Activity: Not on file   Stress: No Stress Concern Present (10/25/2023)    Stress     Feeling of Stress : No   Social Connections: Socially Integrated (10/25/2023)    Social Connections     Frequency of Socialization with Friends and Family: 2   Housing Stability: Low Risk  (2/27/2024)    Housing Stability     Housing Instability: No     Housing Instability Emergency: Not on file       Family History   Problem Relation Age of Onset    Heart Attack Paternal Grandfather     Heart Attack Paternal Grandmother     Other (CAD) Father     Other (CAD) Mother     Hypertension Mother     Heart Attack Mother     Other (CAD) Brother     Hypertension Brother     Heart Attack Brother     Stroke Maternal Grandfather         Stroke    Stroke Maternal Grandmother         Stroke    Breast Cancer Self 54       ROS: 10 pt ROS negative except what is mentioned in HPI    OBJECTIVE:  Vitals:    03/28/24 1500 03/28/24 1501   BP:  95/61   Pulse: 77    Resp: 16    Temp:  98.3 °F (36.8 °C)   TempSrc:  Oral   SpO2: 92%    Weight: 158 lb (71.7 kg)        Oxygen Therapy  SpO2: 92 %    No intake/output data recorded.  No intake/output data recorded.    General appearance: alert, appears stated age, cooperative, and no distress  Eyes: negative  Neck: no adenopathy and supple, symmetrical, trachea midline  Lungs: clear to auscultation bilaterally  Heart: regular rate and rhythm  Abdomen: soft, non-tender; bowel sounds normal; no masses,  no organomegaly  Extremities: extremities normal, atraumatic, no cyanosis or edema  Pulses: 2+ and symmetric  Neurologic: Grossly  normal    Labs:  Recent Labs   Lab 03/28/24  1503   RBC 4.15   HGB 12.2   HCT 38.3   MCV 92.3   MCH 29.4   MCHC 31.9   RDW 13.5   NEPRELIM 3.08   WBC 7.0   .0     Recent Labs   Lab 03/28/24  1503   *   BUN 34*   CREATSERUM 2.54*   CA 9.1   ALB 3.8   *   K 4.4      CO2 25.0   ALKPHO 66   AST 15   ALT 17   BILT 0.4   TP 6.8     No results for input(s): \"PCT\" in the last 168 hours.  No results for input(s): \"CRP\", \"DDIMER\", \"LDH\", \"MINOR\", \"CK\" in the last 72 hours.  Lab Results   Component Value Date    COVID19 Not Detected 03/28/2024     No results for input(s): \"ABGPHT\", \"MALBBK9H\", \"TOHZJ2R\", \"ABGHCO3\", \"LM\", \"FIO2\" in the last 72 hours.  No results for input(s): \"LACTIART\", \"LACTI\" in the last 72 hours.    Lab Results   Component Value Date    PT 19.4 (H) 07/29/2014    PT 13.9 07/26/2014    INR 2.33 (H) 12/04/2023    INR 1.00 02/21/2022    INR 2.58 (H) 02/09/2022        No results for input(s): \"TROP\", \"CK\" in the last 168 hours.    Imaging -- reviewed and visualized  CT chest -- marked improvement in extensive b/l pulm infiltrates, with only small amount of residual RUL infiltrate.  Assessment and Plan:  Dyspnea/hypoxia -- low pulse ox at home was likely asso with poor reading due to low BP from dehydration.  -not hypoxic in the ED  -O2 PRN  Abn CT chest -- the patient's was to have a follow up CT today as out patient, but presented to ED.  -repeat CT shows a marked improvement in her extensive B/L pulm infiltrates.  -no f/c/s or leukocytosis.  -small residual RUL infiltrate, will need further out patient follow up  -no indication of PNA, monitor off abx  Moderate Persistent Asthma -- no signs of exacerbation  -Breo 200 for home Advair 500/50  -BD PRN  Hx of PE  -continue with xeralto  FREDERICK -- likely prerenal  -IVF  -per IM/Renal  Dispo -- will follow  -discussed with patient and family at bedside.    Ran Landaverde M.D.  Pulmonary/Critical Care and Sleep Medicine

## 2024-03-28 NOTE — TELEPHONE ENCOUNTER
Patient called the office stating that she has been in and out to the Hospital with Pneumonia. She states that she was told by Pulmonology that this is not pulm related and she was told by her Immunologist that this is Lupus related and they want her to get in and see Dr Farley right away again for this. There is no openings so I explained to patient that  is out of the office this week and returning next week. I told her that I would send a message to  so she would see it when she returns and she verbalized understanding and stated that she understood. I told patient that we would contact her next week and let her know what  advises and when she might be seen. Patient was agreeable to this and denied any questions.

## 2024-03-29 VITALS
OXYGEN SATURATION: 98 % | WEIGHT: 165.31 LBS | HEART RATE: 74 BPM | SYSTOLIC BLOOD PRESSURE: 147 MMHG | DIASTOLIC BLOOD PRESSURE: 69 MMHG | RESPIRATION RATE: 20 BRPM | TEMPERATURE: 98 F | BODY MASS INDEX: 31 KG/M2

## 2024-03-29 LAB
ANION GAP SERPL CALC-SCNC: 4 MMOL/L (ref 0–18)
APTT PPP: 32.1 SECONDS (ref 23.3–35.6)
B2 GLYCOPROT1 IGG SERPL IA-ACNC: 0.9 U/ML (ref ?–7)
B2 GLYCOPROT1 IGM SERPL IA-ACNC: <2.4 U/ML (ref ?–7)
BASOPHILS # BLD AUTO: 0.04 X10(3) UL (ref 0–0.2)
BASOPHILS NFR BLD AUTO: 0.9 %
BUN BLD-MCNC: 26 MG/DL (ref 9–23)
C3 SERPL-MCNC: 109 MG/DL (ref 90–180)
CALCIUM BLD-MCNC: 8.1 MG/DL (ref 8.5–10.1)
CARDIOLIPIN IGG SERPL-ACNC: 3.2 GPL (ref ?–10)
CARDIOLIPIN IGM SERPL-ACNC: 6.1 MPL (ref ?–10)
CHLORIDE SERPL-SCNC: 113 MMOL/L (ref 98–112)
CO2 SERPL-SCNC: 24 MMOL/L (ref 21–32)
CREAT BLD-MCNC: 1.37 MG/DL
CRP SERPL-MCNC: 1.15 MG/DL (ref ?–0.3)
DSDNA IGG SERPL IA-ACNC: 1.8 IU/ML
EGFRCR SERPLBLD CKD-EPI 2021: 40 ML/MIN/1.73M2 (ref 60–?)
ENA AB SER QL IA: 0.2 UG/L
ENA AB SER QL IA: NEGATIVE
EOSINOPHIL # BLD AUTO: 0.36 X10(3) UL (ref 0–0.7)
EOSINOPHIL NFR BLD AUTO: 7.7 %
ERYTHROCYTE [DISTWIDTH] IN BLOOD BY AUTOMATED COUNT: 13.7 %
ERYTHROCYTE [SEDIMENTATION RATE] IN BLOOD: 7 MM/HR
GLUCOSE BLD-MCNC: 103 MG/DL (ref 70–99)
GLUCOSE BLD-MCNC: 154 MG/DL (ref 70–99)
GLUCOSE BLD-MCNC: 158 MG/DL (ref 70–99)
HCT VFR BLD AUTO: 31.8 %
HGB BLD-MCNC: 10.2 G/DL
IGA SERPL-MCNC: 132 MG/DL (ref 70–312)
IGM SERPL-MCNC: 45.6 MG/DL (ref 43–279)
IMM GRANULOCYTES # BLD AUTO: 0.01 X10(3) UL (ref 0–1)
IMM GRANULOCYTES NFR BLD: 0.2 %
IMMUNOGLOBULIN PNL SER-MCNC: 517 MG/DL (ref 791–1643)
INR BLD: 1.81 (ref 0.85–1.16)
LA 3 SCREEN W REFLEX-IMP: POSITIVE
LYMPHOCYTES # BLD AUTO: 1.79 X10(3) UL (ref 1–4)
LYMPHOCYTES NFR BLD AUTO: 38.2 %
MCH RBC QN AUTO: 29.2 PG (ref 26–34)
MCHC RBC AUTO-ENTMCNC: 32.1 G/DL (ref 31–37)
MCV RBC AUTO: 91.1 FL
MONOCYTES # BLD AUTO: 0.4 X10(3) UL (ref 0.1–1)
MONOCYTES NFR BLD AUTO: 8.5 %
NEUTROPHILS # BLD AUTO: 2.08 X10 (3) UL (ref 1.5–7.7)
NEUTROPHILS # BLD AUTO: 2.08 X10(3) UL (ref 1.5–7.7)
NEUTROPHILS NFR BLD AUTO: 44.5 %
OSMOLALITY SERPL CALC.SUM OF ELEC: 300 MOSM/KG (ref 275–295)
PLATELET # BLD AUTO: 119 10(3)UL (ref 150–450)
PLATELETS.RETICULATED NFR BLD AUTO: 12.4 % (ref 0–7)
POTASSIUM SERPL-SCNC: 4.1 MMOL/L (ref 3.5–5.1)
PROTHROMBIN TIME: 21.2 SECONDS (ref 11.6–14.8)
RBC # BLD AUTO: 3.49 X10(6)UL
SCREEN DRVVT: 1.63 S (ref 0–1.29)
SCREEN DRVVT: POSITIVE S
SODIUM SERPL-SCNC: 141 MMOL/L (ref 136–145)
STACLOT LA DELTA: 9.6 SECONDS (ref ?–8)
WBC # BLD AUTO: 4.7 X10(3) UL (ref 4–11)

## 2024-03-29 PROCEDURE — 99239 HOSP IP/OBS DSCHRG MGMT >30: CPT | Performed by: HOSPITALIST

## 2024-03-29 PROCEDURE — 99223 1ST HOSP IP/OBS HIGH 75: CPT | Performed by: INTERNAL MEDICINE

## 2024-03-29 RX ORDER — AMLODIPINE BESYLATE 5 MG/1
2.5 TABLET ORAL DAILY
COMMUNITY
End: 2024-04-01

## 2024-03-29 RX ORDER — LISINOPRIL 20 MG/1
20 TABLET ORAL DAILY
Status: SHIPPED | COMMUNITY
Start: 2024-04-01

## 2024-03-29 RX ORDER — TIZANIDINE 4 MG/1
4 TABLET ORAL EVERY 8 HOURS PRN
Status: SHIPPED | COMMUNITY
Start: 2024-03-29

## 2024-03-29 NOTE — PHYSICAL THERAPY NOTE
PHYSICAL THERAPY EVALUATION - INPATIENT     Room Number: 402/402-A  Evaluation Date: 3/29/2024  Type of Evaluation: Initial  Physician Order: PT Eval and Treat    Presenting Problem: HAYLIE  Co-Morbidities : hx DVT/PE, neuropathy, R knee OA, depression, CPS, DM, OA,anxiety, asthma  Reason for Therapy: Mobility Dysfunction and Discharge Planning    PHYSICAL THERAPY ASSESSMENT   Patient is a 78 year old female admitted 3/28/2024 for cough/SOB.   Work up in progress for autoimmune disease per chart review. Patient is currently functioning at baseline with bed mobility, transfers, gait, stair negotiation, and standing prolonged periods. Prior to admission, patient's baseline is mod I with use of cane. .     CT Chest 3/28/24:  CONCLUSION:  There is thin near complete resolution of patchy infiltrates and consolidation in the right lower lobe and interval improvement in consolidation and infiltrates in the right upper lobe since 2/28/2024.  There is some persistent consolidation    in the right upper lobe with bibasilar atelectasis.  Recommend continued follow-up to assess for complete resolution.    Stable bilateral subpleural pulmonary nodules largest in the left lung base measuring 6 mm.     Pt appropriate for return home when medically cleared, no further IP skilled PT indicated at this time.     PLAN  Patient has been evaluated and presents with no skilled Physical Therapy needs at this time.  Patient discharged from Physical Therapy services.  Please re-order if a new functional limitation presents during this admission.    GOALS  Patient was able to achieve the following goals ...    Patient was able to transfer Safely and independently   Patient able to ambulate on level surfaces Safely and independently  With use of cane         HOME SITUATION  Type of Home: [facility]   Home Layout: Two level  Stairs to Enter : 1             Lives With:  (grandson and roomate)  Drives: Yes  Patient Owned Equipment:  Cane  Patient Regularly Uses: Glasses    Prior Level of [place]: Pt typically ind with all self care and mobility. Pt denies history of falls. Pt reports had to reschedule R knee replacement due to recent hospital admission and is now scheduled for September. Pt works as  and summer is her busy time.     SUBJECTIVE  \"I'm supposed to go home today\"      OBJECTIVE  Precautions: None  Fall Risk: Standard fall risk    WEIGHT BEARING RESTRICTION  Weight Bearing Restriction: None                PAIN ASSESSMENT  Rating:  (does not rate)  Location: generalized and R knee  Management Techniques: Activity promotion;Repositioning    COGNITION  Overall Cognitive Status:  WFL - within functional limits    RANGE OF MOTION AND STRENGTH ASSESSMENT  Upper extremity ROM and strength are within functional limits     Lower extremity ROM is within functional limits except for the following: R knee with limited flexion and lacking full ext approx 5 deg due to OA    Lower extremity strength is within functional limits       BALANCE  Static Sitting: Normal  Dynamic Sitting: Normal  Static Standing: Good  Dynamic Standing: Fair +        ACTIVITY TOLERANCE: no c/o SOB or dizziness during session                             NEUROLOGICAL FINDINGS  Neurological Findings: Sensation           Sensation: numbness/tinging due to neuropathy         AM-PAC '6-Clicks' INPATIENT SHORT FORM - BASIC MOBILITY  How much difficulty does the patient currently have...  Patient Difficulty: Turning over in bed (including adjusting bedclothes, sheets and blankets)?: None   Patient Difficulty: Sitting down on and standing up from a chair with arms (e.g., wheelchair, bedside commode, etc.): None   Patient Difficulty: Moving from lying on back to sitting on the side of the bed?: None   How much help from another person does the patient currently need...   Help from Another: Moving to and from a bed to a chair (including a wheelchair)?: None    Help from Another: Need to walk in hospital room?: None   Help from Another: Climbing 3-5 steps with a railing?: None       AM-PAC Score:  Raw Score: 24   Approx Degree of Impairment: 0%   Standardized Score (AM-PAC Scale): 61.14   CMS Modifier (G-Code): CH    FUNCTIONAL ABILITY STATUS  Gait Assessment   Functional Mobility/Gait Assessment  Gait Assistance: Modified independent  Distance (ft): 150  Assistive Device: Cane  Pattern:  (antalgic due to knee pain)    Skilled Therapy Provided     Bed Mobility:  Rolling: ind  Supine to sit: ind   Sit to supine: ind     Transfer Mobility:  Sit to stand: ind   Stand to sit: ind  Gait = mod I with cane, no LOB    Therapist's comments:Pt presents supine in bed, agreeable to PT. Mobility as above. Pt functioning near baseline. No further PT needs. Encouraged to ambulate in halls to prevent deconditioning during admission.     Exercise/Education Provided:  Functional activity tolerated    Patient End of Session: In bed;Needs met;Call light within reach;RN aware of session/findings;All patient questions and concerns addressed    Patient Evaluation Complexity Level:  History Low - no personal factors and/or co-morbidities   Examination of body systems Low - addressing 1-2 elements   Clinical Presentation Low - Stable   Clinical Decision Making Low Complexity       PT Session Time: 15 minutes  Gait Trainin minutes  Therapeutic Activity: 5 minutes

## 2024-03-29 NOTE — CONSULTS
[facility]  Report of Consultation    Rayne Morales Patient Status:  Inpatient    1945 MRN [id number]   Location [facility] 4NW-A Attending Brady Mir, DO   Hosp Day # 1 PCP Jose Daniel Washington MD     Reason for Consultation:  FREDERICK    History of Present Illness:  Rayne Morales is a a(n) 78 year old woman with mult med probs incl HTN, DM, recurrent pneumonia, asthma, with b/l creat on  was 0.86 mg/dl who presented to ED for eval of hypoxia.  Found to have FREDERICK with creat 2.54 mg/dl.  She notes poor po intake due to lack of appetite which she relates to lyrica.  IVF given overnight and creat sig better today.  O2 sat 98% on RA this AM    History:  Past Medical History:   Diagnosis Date    Abdominal pain     Anemia     Anxiety     Arrhythmia     Arthritis     Asthma (HCC)     Back pain 1966    Back problem     Black stools     Bloating     Blurred vision     Breast CA (HCC)     left lump and rad.    Cancer (HCC)     breast, basal cell    Cervical radiculopathy     Cervical spinal stenosis     Deep vein thrombosis (HCC)     Depression     Diabetes mellitus (HCC)     Diarrhea, unspecified     Dizziness     Easy bruising     On Xeralto    Esophageal reflux     Exposure to medical diagnostic radiation     Fatigue     Fitting and adjustment of vascular catheter     Headache disorder     Hearing loss     Heart attack (HCC)     Heartburn     Hemorrhoids     High blood pressure     High cholesterol     History of blood clots     History of COVID-19 2020    not hospitalized, no continued symptoms, cough, fever, loss of taste    History of depression     History of syncope 2014    IgG deficiency (HCC)     ??? pt states she gets IgG infusions due to lack of IgG, unable to fight off infections    Indigestion     Leaking of urine     Migraines     Muscle weakness     uses cane    Neuropathy     rt foot bilateral hands    Osteoarthritis     Pain in joints     Pneumonia due  to organism     Pulmonary embolism (HCC) 07/25/2015    Shortness of breath     Sleep apnea     cpap - not using - machine is broken    Stress     Uncomfortable fullness after meals     Visual impairment     glasses/contacts    Weight gain      Past Surgical History:   Procedure Laterality Date    ABDOMEN SURGERY PROC UNLISTED      duodenal biopsy    APPENDECTOMY      BREAST SURGERY      CATARACT      CHOLECYSTECTOMY      COLONOSCOPY  10/1/10, 4/21/17    COLONOSCOPY N/A 02/27/2023    Procedure: COLONOSCOPY;  Surgeon: Migel Bass MD;  Location:  ENDOSCOPY    EGD  04/21/2017    EYE SURGERY      FEMUR/KNEE SURG UNLISTED      right knee arthroscopy    HEMORRHOIDECTOMY,INT/EXT,SIMPLE      HERNIA SURGERY      HYSTERECTOMY  1984    total hystero.    LUMPECTOMY LEFT Left 1999    lt lumpectomy and radiation.    OOPHORECTOMY Bilateral 1984    total hystero.    OTHER  11/2021    left wrist, ORIF Dr. Stacy Long    OTHER  02/21/2022    CERVICAL 5 AND CERVICAL 6 LAMINECTOMIES, PARTIAL CERVICAL 7 LAMINECTOMY, LEFT CERVICAL 5/CERVICAL 6 AND CERVICAL 6/CERVICAL 7 FORAMINOTOMIES, CERVICAL 6 AND CERVICAL 7 ARTHRODESIS, AUTOGRAFT, ALLOGRAFT    OTHER SURGICAL HISTORY  2010    endoscopy - papillotomy    OTHER SURGICAL HISTORY  2016     under right eye, skin cancer removed    OTHER SURGICAL HISTORY  02/2022    neck    PORT FOR VASCULAR ACCESS      RADIATION LEFT Left 1999    left lumpectomy and radiation.    REPAIR ING HERNIA,5+Y/O,REDUCIBL      SIGMOIDOSCOPY,DIAGNOSTIC      SPINE SURGERY PROCEDURE UNLISTED      TONSILLECTOMY      TOTAL ABDOM HYSTERECTOMY      TOTAL KNEE REPLACEMENT       Family History   Problem Relation Age of Onset    Heart Attack Paternal Grandfather     Heart Attack Paternal Grandmother     Other (CAD) Father     Other (CAD) Mother     Hypertension Mother     Heart Attack Mother     Other (CAD) Brother     Hypertension Brother     Heart Attack Brother     Stroke Maternal Grandfather         Stroke     Stroke Maternal Grandmother         Stroke    Breast Cancer Self 54      reports that she quit smoking about 56 years ago. Her smoking use included cigarettes. She has never used smokeless tobacco. She reports current drug use. Drug: Cannabis. She reports that she does not drink alcohol.    Allergies:  Allergies   Allergen Reactions    Ciprofloxacin HIVES    Mold REACTIVE AIRWAY DISEASE     Mold and smut    Sulfa Antibiotics HIVES     Bactrim (Sulfamethoxazole/TMP)    Diovan [Valsartan] NAUSEA AND VOMITING    Omnicef NAUSEA ONLY and DIZZINESS    Clindamycin DIARRHEA and NAUSEA ONLY    Green Pepper OTHER (SEE COMMENTS)     Sensitivity (all peppers - green, orange and red)    Lipitor [Atorvastatin Calcium] OTHER (SEE COMMENTS)     Leg cramping,    Pneumococcal Vaccines OTHER (SEE COMMENTS)     Pt states she had pneumo vax done at Dr. Boyd on 1/10/24- states developed redness, swelling,itching to the arm    Quinapril Hcl FATIGUE       Medications:    Current Facility-Administered Medications:     fluticasone furoate-vilanterol (Breo Ellipta) 200-25 MCG/ACT inhaler 1 puff, 1 puff, Inhalation, Daily    glucose (Dex4) 15 GM/59ML oral liquid 15 g, 15 g, Oral, Q15 Min PRN **OR** glucose (Glutose) 40% oral gel 15 g, 15 g, Oral, Q15 Min PRN **OR** glucose-vitamin C (Dex-4) chewable tab 4 tablet, 4 tablet, Oral, Q15 Min PRN **OR** dextrose 50% injection 50 mL, 50 mL, Intravenous, Q15 Min PRN **OR** glucose (Dex4) 15 GM/59ML oral liquid 30 g, 30 g, Oral, Q15 Min PRN **OR** glucose (Glutose) 40% oral gel 30 g, 30 g, Oral, Q15 Min PRN **OR** glucose-vitamin C (Dex-4) chewable tab 8 tablet, 8 tablet, Oral, Q15 Min PRN    insulin aspart (NovoLOG) 100 Units/mL FlexPen 1-68 Units, 1-68 Units, Subcutaneous, TID CC    insulin aspart (NovoLOG) 100 Units/mL FlexPen 1-10 Units, 1-10 Units, Subcutaneous, TID AC and HS    sodium chloride 0.9% infusion, , Intravenous, Continuous    albuterol (Ventolin HFA) 108 (90 Base) MCG/ACT inhaler  2 puff, 2 puff, Inhalation, Q6H PRN    aspirin chewable tab 81 mg, 81 mg, Oral, Daily    DULoxetine (Cymbalta) DR cap 90 mg, 90 mg, Oral, Daily    fluticasone propionate (Flonase) 50 MCG/ACT nasal suspension 1 spray, 1 spray, Nasal, BID PRN    lisinopril (Prinivil; Zestril) tab 20 mg, 20 mg, Oral, Daily    metroNIDAZOLE (Metrogel) 0.75 % gel 1 g, 1 g, Topical, BID    oxyCODONE immediate release tab 5 mg, 5 mg, Oral, Q6H PRN    pantoprazole (Protonix) DR tab 40 mg, 40 mg, Oral, Before breakfast    pregabalin (Lyrica) cap 100 mg, 100 mg, Oral, TID    rivaroxaban (Xarelto) tab 20 mg, 20 mg, Oral, Daily with food    rosuvastatin (Crestor) tab 10 mg, 10 mg, Oral, Nightly    tiZANidine (Zanaflex) tab 4 mg, 4 mg, Oral, Nightly PRN    acetaminophen (Tylenol Extra Strength) tab 500 mg, 500 mg, Oral, Q4H PRN    melatonin tab 3 mg, 3 mg, Oral, Nightly PRN    ondansetron (Zofran) 4 MG/2ML injection 4 mg, 4 mg, Intravenous, Q6H PRN    metoclopramide (Reglan) 5 mg/mL injection 5 mg, 5 mg, Intravenous, Q8H PRN    polyethylene glycol (PEG 3350) (Miralax) 17 g oral packet 17 g, 17 g, Oral, Daily PRN    sennosides (Senokot) tab 17.2 mg, 17.2 mg, Oral, Nightly PRN    bisacodyl (Dulcolax) 10 MG rectal suppository 10 mg, 10 mg, Rectal, Daily PRN    benzonatate (Tessalon) cap 200 mg, 200 mg, Oral, TID PRN    guaiFENesin (Robitussin) 100 MG/5 ML oral liquid 200 mg, 200 mg, Oral, Q4H PRN    glycerin-hypromellose- (Artifical Tears) 0.2-0.2-1 % ophthalmic solution 1 drop, 1 drop, Both Eyes, QID PRN    sodium chloride (Saline Mist) 0.65 % nasal solution 1 spray, 1 spray, Each Nare, Q3H PRN  No current outpatient medications on file.       Review of Systems:  Denies fever/chills  Denies wt loss/gain  Denies HA or visual changes  Denies CP or palpitations  + SOB  Denies abd or flank pain  Denies N/V/D  Denies change in urinary habits or gross hematuria  Denies LE edema  Denies skin rashes/myalgias/arthralgias      Physical Exam:   BP  101/68 (BP Location: Left arm)   Pulse 73   Temp 97.7 °F (36.5 °C) (Oral)   Resp 20   Wt 165 lb 4.8 oz (75 kg)   LMP  (LMP Unknown)   SpO2 93%   BMI 31.23 kg/m²   Temp (24hrs), Av.8 °F (36.6 °C), Min:97.5 °F (36.4 °C), Max:98.3 °F (36.8 °C)       Intake/Output Summary (Last 24 hours) at 3/29/2024 0847  Last data filed at 3/29/2024 0417  Gross per 24 hour   Intake 2110 ml   Output 700 ml   Net 1410 ml     Last 3 Weights   24 1833 165 lb 4.8 oz (75 kg)   24 1500 158 lb (71.7 kg)   24 1135 162 lb (73.5 kg)   24 1629 160 lb (72.6 kg)   24 0800 160 lb (72.6 kg)   24 1134 165 lb 9.6 oz (75.1 kg)   24 1408 179 lb (81.2 kg)   24 1007 166 lb (75.3 kg)     General: Alert and oriented in no apparent distress.  HEENT: No scleral icterus, MMM  Neck: Supple, no GABY or thyromegaly  Cardiac: Regular rate and rhythm, S1, S2 normal, no murmur or rub  Lungs: Clear without wheezes, rales, rhonchi.    Abdomen: Soft, non-tender. + bowel sounds, no palpable organomegaly  Extremities: Without clubbing, cyanosis or edema.  Neurologic: Alert and oriented, cranial nerves grossly intact, moving all extremities  Skin: Warm and dry, no rashes      Laboratory Data:  Lab Results   Component Value Date    WBC 4.7 2024    HGB 10.2 2024    HCT 31.8 2024    .0 2024    CREATSERUM 1.37 2024    BUN 26 2024     2024    K 4.1 2024     2024    CO2 24.0 2024     2024    CA 8.1 2024    ALB 3.8 2024    ALKPHO 66 2024    BILT 0.4 2024    TP 6.8 2024    AST 15 2024    ALT 17 2024    ESRML 7 2024    CRP 1.15 2024    PGLU 158 2024       BUN (mg/dL)   Date Value   2024 26 (H)   2024 34 (H)   2024 22   2014 9   2014 12     UREA NITROGEN (BUN) (mg/dL)   Date Value   06/15/2015 13   2014 9   06/10/2014 19     CREATININE  (mg/dL)   Date Value   06/15/2015 0.92   08/08/2014 0.88   07/27/2014 0.70   07/26/2014 0.67   06/10/2014 0.95     Creatinine (mg/dL)   Date Value   03/29/2024 1.37 (H)   03/28/2024 2.54 (H)   02/28/2024 0.86       Malb/Cre Calc   Date Value Ref Range Status   12/04/2023 66.1 (H) <=30.0 ug/mg Final     Comment:     <30 ug/mg creatinine       Normal     ug/mg creatinine   Microalbuminuria   >300 ug/mg creatinine      Albuminuria       04/07/2023 19.3 <=30.0 ug/mg Final     Comment:     <30 ug/mg creatinine       Normal     ug/mg creatinine   Microalbuminuria   >300 ug/mg creatinine      Albuminuria       02/09/2022 17.9 <=30.0 ug/mg Final     Comment:     <30 ug/mg creatinine       Normal     ug/mg creatinine   Microalbuminuria   >300 ug/mg creatinine      Albuminuria           Recent Labs   Lab 03/28/24  1503 03/29/24  0559   WBC 7.0 4.7   HGB 12.2 10.2*   MCV 92.3 91.1   .0 119.0*       Recent Labs   Lab 03/28/24  1503 03/29/24  0559   * 141   K 4.4 4.1    113*   CO2 25.0 24.0   BUN 34* 26*   CREATSERUM 2.54* 1.37*   CA 9.1 8.1*   * 154*       Recent Labs   Lab 03/28/24  1503   ALT 17   AST 15   ALB 3.8       Recent Labs   Lab 03/28/24  1845 03/28/24  2205 03/29/24  0548   PGLU 129* 130* 158*           Imaging:  Cxr reviewed  CT noted    Impression/Plan:    #1.  FREDERICK- b/l creat nl and FREDERICK c/w prerenal azotemia due to ongoing poor intake.  No other clear nephrotoxins and has essentially resolved with IVF overnight.  Stop IVF and encourage po intake.      #2.   SOB/hypoxia- much better/resolved today.  Mgmt per pulm    Thank you for allowing me to participate in the care of your patient. Please do not hesitate to call with any questions or concerns.         OK for home from nephrology standpoint      Johnnie Valencia MD  3/29/2024  8:47 AM

## 2024-03-29 NOTE — CONSULTS
[facility]  Rheumatology Report of Consultation  Rayne Morales Patient Status:  Inpatient    1945 MRN [id number]   Location [facility] 4NW-A Attending Brady Mir, DO   Hosp Day # 0 PCP Jose Daniel Washington MD     Date of Admission: 3/28/2024  Date of Consult:  3/28/24    Reason for Consultation:   Positive DARREN test, history of recurrent pneumonia, history of possible immune deficiency.    History of Present Illness: Rayne Morales is a a(n) 78 year old female.  Patient presents at this time with shortness of breath fatigue, feeling weak.  Patient states that today she was so short of breath and had shortness of breath on exertion her pulse ox that she checked at home was low she called her primary pulmonary specialist Dr. Browning and was told to go to the ER in the ER and she was evaluated and subsequently admitted.  Patient states that this is her third admission to the [facility] this year.  She actually was admitted in December with pneumonia January with pneumonia in February with pneumonia.  This time she is just short of breath and feels weak.  She does have a slight cough.  She was supposed to have a CT scan at [facility] today of her chest to follow-up on her recent pneumonia but instead ended up at the ER as directed by her pulmonologist Dr. Browning.      Rheumatology has been asked to see her because of her history of a positive DARREN test, history of recurrent pneumonia, and possibility of having a immune deficiency.      The patient has seen my partner Dr. Makayla Farley as outpatient.  There has been no treatment for lupus from from Dr. Farley      The patient has seen an immunologist Dr. Mckeon of [facility] who actually tried the patient on subcutaneous immunoglobulin therapy.  Patient tried it but had a horrible reaction to it so it was discontinued.  Regarding immunoglobulin patient has been on IVIG in the past.  Her previous hematologist Dr. Domenic Roy tell about 7 years ago placed  her on IVIG monthly because of recurrent pneumonia.  According to the patient it did help her definitely and reduced her attacks of pneumonia significantly.  Dr. David mitchell retired this August the new hematologist told her she should not be on IVIG there was no reason to take it.  So IVIG was stopped.  Then this winter she developed pneumonia and then this January and February developed it again.       Regarding the possibility of lupus, she has had a low titer DARREN in the past.  She states that at times she has a little redness in her cheeks.  Occasional canker sores in her mouth she is achy all over her body at times.  However she has never been definitely diagnosed as having lupus.       Current complaints are shortness of breath feeling a bit weak and fatigued.  Achy all over her body.  She feels there is a faint redness in her cheeks.    History:  [facility] patient has extensive medical problems hypertension, hyperlipidemia, type 2 diabetes mellitus, underlying osteoarthritis, asthma, history of a DVT, history of asthma, sleep apnea, breast cancer.  In  she developed breast cancer and had to undergo a left breast lumpectomy and then that was followed up by radiation.  PSH 99 left breast lumpectomy.  2019 left total knee replacement.  Past total hysterectomy, past tonsillectomy.   cervical spine surgery for cervical spinal stenosis.  FAMHX family history of lupus.  Her parents  from heart disease father in his 80s mother in her 60s.  A brother  of heart disease in his 50s  SOCHX  -.  Still working part-time in property management.  Does not smoke does not drink.  Has a daughter in the area grandsons.    Allergies:   Allergies   Allergen Reactions    Ciprofloxacin HIVES    Mold REACTIVE AIRWAY DISEASE     Mold and smut    Sulfa Antibiotics HIVES     Bactrim (Sulfamethoxazole/TMP)    Diovan [Valsartan] NAUSEA AND VOMITING    Omnicef NAUSEA ONLY and DIZZINESS    Clindamycin DIARRHEA and NAUSEA ONLY     Green Pepper OTHER (SEE COMMENTS)     Sensitivity (all peppers - green, orange and red)    Lipitor [Atorvastatin Calcium] OTHER (SEE COMMENTS)     Leg cramping,    Pneumococcal Vaccines OTHER (SEE COMMENTS)     Pt states she had pneumo vax done at Dr. Boyd on 1/10/24- states developed redness, swelling,itching to the arm    Quinapril Hcl FATIGUE       Medications:    [COMPLETED] sodium chloride 0.9 % IV bolus 1,000 mL  1,000 mL Intravenous Once    [START ON 3/29/2024] fluticasone furoate-vilanterol (Breo Ellipta) 200-25 MCG/ACT inhaler 1 puff  1 puff Inhalation Daily    glucose (Dex4) 15 GM/59ML oral liquid 15 g  15 g Oral Q15 Min PRN    Or    glucose (Glutose) 40% oral gel 15 g  15 g Oral Q15 Min PRN    Or    glucose-vitamin C (Dex-4) chewable tab 4 tablet  4 tablet Oral Q15 Min PRN    Or    dextrose 50% injection 50 mL  50 mL Intravenous Q15 Min PRN    Or    glucose (Dex4) 15 GM/59ML oral liquid 30 g  30 g Oral Q15 Min PRN    Or    glucose (Glutose) 40% oral gel 30 g  30 g Oral Q15 Min PRN    Or    glucose-vitamin C (Dex-4) chewable tab 8 tablet  8 tablet Oral Q15 Min PRN    [START ON 3/29/2024] insulin aspart (NovoLOG) 100 Units/mL FlexPen 1-68 Units  1-68 Units Subcutaneous TID CC    insulin aspart (NovoLOG) 100 Units/mL FlexPen 1-10 Units  1-10 Units Subcutaneous TID AC and HS    sodium chloride 0.9% infusion   Intravenous Continuous    albuterol (Ventolin HFA) 108 (90 Base) MCG/ACT inhaler 2 puff  2 puff Inhalation Q6H PRN    [START ON 3/29/2024] aspirin chewable tab 81 mg  81 mg Oral Daily    [START ON 3/29/2024] DULoxetine (Cymbalta) DR cap 90 mg  90 mg Oral Daily    fluticasone propionate (Flonase) 50 MCG/ACT nasal suspension 1 spray  1 spray Nasal BID PRN    [START ON 3/29/2024] lisinopril (Prinivil; Zestril) tab 20 mg  20 mg Oral Daily    metroNIDAZOLE (Metrogel) 0.75 % gel 1 g  1 g Topical BID    oxyCODONE immediate release tab 5 mg  5 mg Oral Q6H PRN    [START ON 3/29/2024] pantoprazole  (Protonix) DR tab 40 mg  40 mg Oral Before breakfast    pregabalin (Lyrica) cap 100 mg  100 mg Oral TID    [START ON 3/29/2024] rivaroxaban (Xarelto) tab 20 mg  20 mg Oral Daily with food    rosuvastatin (Crestor) tab 10 mg  10 mg Oral Nightly    tiZANidine (Zanaflex) tab 4 mg  4 mg Oral Nightly PRN    acetaminophen (Tylenol Extra Strength) tab 500 mg  500 mg Oral Q4H PRN    melatonin tab 3 mg  3 mg Oral Nightly PRN    ondansetron (Zofran) 4 MG/2ML injection 4 mg  4 mg Intravenous Q6H PRN    metoclopramide (Reglan) 5 mg/mL injection 5 mg  5 mg Intravenous Q8H PRN    polyethylene glycol (PEG 3350) (Miralax) 17 g oral packet 17 g  17 g Oral Daily PRN    sennosides (Senokot) tab 17.2 mg  17.2 mg Oral Nightly PRN    bisacodyl (Dulcolax) 10 MG rectal suppository 10 mg  10 mg Rectal Daily PRN    benzonatate (Tessalon) cap 200 mg  200 mg Oral TID PRN    guaiFENesin (Robitussin) 100 MG/5 ML oral liquid 200 mg  200 mg Oral Q4H PRN    glycerin-hypromellose- (Artifical Tears) 0.2-0.2-1 % ophthalmic solution 1 drop  1 drop Both Eyes QID PRN    sodium chloride (Saline Mist) 0.65 % nasal solution 1 spray  1 spray Each Nare Q3H PRN       Review of Systems: See history of present illness.  Main complaints - shortness of breath, weakness, body aches.  No chills.  No abdominal symptoms.  No fever.    Physical Exam:/76 (BP Location: Left arm)   Pulse 79   Temp 97.7 °F (36.5 °C) (Oral)   Resp 20   Wt 165 lb 4.8 oz (75 kg)   LMP  (LMP Unknown)   SpO2 94%   BMI 31.23 kg/m²    Elderly woman who looks in no acute distress.  Afebrile vital signs stable  HEENT -faint erythema of the cheeks.. Nonicteric sclera. Conjunctiva normal.    Neck supple without thyromegaly, no lymphadenopathy.   Lungs are clear to auscultation and percussion.    Heart: normal sinus rhythm without murmur.    Abdomen: soft, nontender, no masses, no organomegaly.    Extremities without any cyanosis, clubbing, or edema.  Normal upper and lower  extremities.  Skin: Within normal limits.      Laboratory Data: BUN 34 creatinine 2.54.  Lab Results   Component Value Date    WBC 7.0 03/28/2024    HGB 12.2 03/28/2024    HCT 38.3 03/28/2024    .0 03/28/2024    CREATSERUM 2.54 03/28/2024    BUN 34 03/28/2024     03/28/2024    K 4.4 03/28/2024     03/28/2024    CO2 25.0 03/28/2024     03/28/2024    CA 9.1 03/28/2024    ALB 3.8 03/28/2024    ALKPHO 66 03/28/2024    BILT 0.4 03/28/2024    TP 6.8 03/28/2024    AST 15 03/28/2024    ALT 17 03/28/2024   In the past had results there is a positive DARREN test of 1-40.  And a negative DARREN test positive DARREN test which was in 2013.     Imaging: Chest CT-there is near complete resolution of patchy infiltrates and consolidation in the right lower lobe and interval improvement in consolidation and infiltrates in the right upper lobe from prior study of 2/28/2024.  There is some persistent consolidation right upper lobe with bibasilar atelectasis present.  Stable bilateral subpleural pulmonary nodules.  Largest 6 mm.    Impression:-78 year-old woman admitted with shortness of breath and low pulse ox at home.  Was not hypoxic in the ER.    From a rheumatologic standpoint, patient has a borderline positive DARREN test, some nonspecific aches and pains, certainly not clear whether she has an autoimmune disease or not  - needs further lab testing.    Historically there is a question of her having an immune deficiency, leading to recurrent pneumonia attacks.  This also needs to be further characterized.    Current labs show elevated BUN and creatinine, the patient is dehydrated,  this might have contributed to her feeling weak, also may be exacerbated her shortness of breath.    Patient's chest CT has shown improvement of her infiltrates.  No apparent pneumonia present at this time.  Probably her past pneumonia just has not cleared fully.    Recommendations: Recheck autoimmune tests including DARREN, ROBERT, DNA,  complement levels, immunoglobulin levels, CRP, sed rate.  If patient is discharged Dr. Farley is her main rheumatologist who can follow-up in the office.  Suggest referral back to Dr. Mckeon immunologist with [facility].  Possibly she would benefit from monthly IV Ig treatments to prevent recurrent pneumonia.      Thank you for allowing me to participate in the care of your patient.    Aiden Orta MD  3/28/2024  9:13 PM

## 2024-03-29 NOTE — PROGRESS NOTES
[facility] PULMONARY/CRITICAL CARE    S: No new events overnight    Meds:   fluticasone furoate-vilanterol  1 puff Inhalation Daily    insulin aspart  1-68 Units Subcutaneous TID CC    insulin aspart  1-10 Units Subcutaneous TID AC and HS    aspirin  81 mg Oral Daily    DULoxetine  90 mg Oral Daily    lisinopril  20 mg Oral Daily    metroNIDAZOLE  1 g Topical BID    pantoprazole  40 mg Oral Before breakfast    pregabalin  100 mg Oral TID    rivaroxaban  20 mg Oral Daily with food    rosuvastatin  10 mg Oral Nightly       Prn Meds:  glucose **OR** glucose **OR** glucose-vitamin C **OR** dextrose **OR** glucose **OR** glucose **OR** glucose-vitamin C, albuterol, fluticasone propionate, oxyCODONE, tiZANidine, acetaminophen, melatonin, ondansetron, metoclopramide, polyethylene glycol (PEG 3350), sennosides, bisacodyl, benzonatate, guaiFENesin, glycerin-hypromellose-, sodium chloride    Infusions:   sodium chloride 100 mL/hr at 03/29/24 0417       OBJECTIVE:  Vitals:    03/28/24 1833 03/28/24 2110 03/29/24 0040 03/29/24 0416   BP: 119/76 136/65 94/56 101/68   Pulse: 72 79 75 73   Resp: 18 20 18 20   Temp: 97.5 °F (36.4 °C) 97.7 °F (36.5 °C) 97.7 °F (36.5 °C)    TempSrc: Oral Oral Oral Oral   SpO2: 99% 94% 91% 93%   Weight: 165 lb 4.8 oz (75 kg)          Oxygen Therapy  SpO2: 93 %  O2 Device: None (Room air)  Pulse Oximetry Type: Continuous  Pulse Ox Probe Location: Left hand           I/O last 3 completed shifts:  In: 2110 [P.O.:340; I.V.:1770]  Out: 700 [Urine:700]  No intake/output data recorded.    Lungs: clear to auscultation bilaterally  Heart: regular rate and rhythm  Abdomen: soft, non-tender; bowel sounds normal; no masses,  no organomegaly  Extremities: extremities normal, atraumatic, no cyanosis or edema    Labs:  Recent Labs   Lab 03/28/24  1503   RBC 4.15   HGB 12.2   HCT 38.3   MCV 92.3   MCH 29.4   MCHC 31.9   RDW 13.5   NEPRELIM 3.08   WBC 7.0   .0     Recent Labs   Lab 03/28/24  1503 03/29/24  0559    * 154*   BUN 34* 26*   CREATSERUM 2.54* 1.37*   CA 9.1 8.1*   ALB 3.8  --    * 141   K 4.4 4.1    113*   CO2 25.0 24.0   ALKPHO 66  --    AST 15  --    ALT 17  --    BILT 0.4  --    TP 6.8  --      No results for input(s): \"PCT\" in the last 168 hours.  Recent Labs     03/29/24  0559   CRP 1.15*     Lab Results   Component Value Date    COVID19 Not Detected 03/28/2024     No results for input(s): \"ABGPHT\", \"FGUTTP0F\", \"MSPXF6G\", \"ABGHCO3\", \"LM\", \"FIO2\" in the last 72 hours.  No results for input(s): \"LACTIART\", \"LACTI\" in the last 72 hours.    Lab Results   Component Value Date    PT 19.4 (H) 07/29/2014    PT 13.9 07/26/2014    INR 2.33 (H) 12/04/2023    INR 1.00 02/21/2022    INR 2.58 (H) 02/09/2022     Lab Results   Component Value Date    PGLU 158 03/29/2024     No results for input(s): \"TROP\", \"CK\" in the last 168 hours.    Imaging -- reviewed and visualized  Assessment and Plan    Dyspnea/hypoxia -- low pulse ox at home was likely asso with poor reading due to low BP from dehydration.  -not hypoxic since admission  Abn CT chest -- the patient's was to have a follow up CT today as out patient, but presented to ED.  -repeat CT shows a marked improvement in her extensive B/L pulm infiltrates.  -no f/c/s or leukocytosis.  -small residual RUL infiltrate, will need further out patient follow up  -no indication of PNA, monitor off abx  Moderate Persistent Asthma -- no signs of exacerbation  -Breo 200 for home Advair 500/50  -BD PRN  Hx of PE  -continue with xeralto  FREDERICK -- likely prerenal  -sig improved  -IVF  -per IM/Renal  Dispo -- OK for dc from a pulm standpoint.  -patient should follow up with Dr. Browning in 2-3 weeks.  -discussed with patient at bedside.  -will sign off, call with questions.    Ran Landaverde M.D.  Pulmonary/Critical Care and Sleep Medicine

## 2024-03-29 NOTE — CM/SW NOTE
Patient failed inpatient criteria. Second level of review completed and supports observation. UR committee in agreement. Lo GARNICA RN discussed with Dr. Jerry who approves observation status. Observation letter given to the patient and order written.  Lucas Heredia RN, [facility]  Extension [phone]

## 2024-03-29 NOTE — PLAN OF CARE
NURSING DISCHARGE NOTE    Discharged Home via Wheelchair.  Accompanied by Support staff  Belongings Taken by patient/family.    Pt a/o x4. VSS, remained afebrile. Meds given per MAR. Denies any shortness of breath. Ambulates with cane. AVS reviewed with patient. PIV removed prior to discharge.     Problem: Diabetes/Glucose Control  Goal: Glucose maintained within prescribed range  Description: INTERVENTIONS:  - Monitor Blood Glucose as ordered  - Assess for signs and symptoms of hyperglycemia and hypoglycemia  - Administer ordered medications to maintain glucose within target range  - Assess barriers to adequate nutritional intake and initiate nutrition consult as needed  - Instruct patient on self management of diabetes  Outcome: Progressing

## 2024-03-29 NOTE — PLAN OF CARE
NURSING ADMISSION NOTE      Patient admitted via Cart  Oriented to room.  Safety precautions initiated.  Bed in low position.  Call light in reach.    Pt admitted for difficulty in breathing and dyspneic with exertion. Pt reports wearing CPAP machine at night. Admissions in navigator completed. Glasses and cane confirmed at bedside. Denies any shortness of breath, N/V, or pain. Call light within reach.

## 2024-03-31 NOTE — DISCHARGE SUMMARY
[facility]     Rayne Morales Patient Status:  Observation    1945 MRN [id number]   Location [facility] 4NW-A Attending No att. providers found   Hosp Day # 1 PCP Jose Daniel Washington MD     Date of Admission:  3/28/2024  Date of Discharge:   3/29/2024    Discharge Disposition: Home or Self Care    Discharge Diagnosis:    #Dyspnea with cough and hypoxia, now on room air  #Abnormal CT chest  #Positive DARREN and Anti-dsDNA  #Hypertension   #Hyperlipidemia   #Diabetes mellitus type 2  #Osteoarthritis with prior cervical spine surgery and chronic pain syndrome   #Anxiety   #Moderate persistent asthma   #History of DVT/PE on Xarelto  #Acute kidney injury, suspect prerenal etiology   #MUKUND   #IgG deficiency         History of Present Illness:    Rayne Morales is a 78 year old female with PMHx hypertension, hyperlipidemia, diabetes mellitus type 2, osteoarthritis, prior cervical spine surgery with chronic pain, anxiety, asthma, DVT/PE on Xarelto, chronic anemia, MUKUND and breast cancer s/p lumpectomy and radiation who presents to the hospital with cough and shortness of breath for the past few days.  She follows up with pulmonologist Dr. Browning.  Son is at bedside who also assists with the history.  They have a pulse ox at home and the readings were in the low 80s to 90s.  They called their pulmonologist who recommended they come to the ER for evaluation.  She was scheduled to get a CT scan done today as well.  Patient has had recurrent pneumonia since 2023 and has completed 4 rounds of antibiotics.  She reports decreased appetite.  No fever, chills, chest pain, abdominal pain, nausea, vomiting, diarrhea or congestion.  Of note she underwent bronchoscopy with BAL on 3/1/2024 and cultures were negative with the exception of Candida albicans which was not felt to be a pathogen.  She was transition to Augmentin on discharge which she completed on 3/8. In the ER she was found to  have FREDERICK.           Brief Synopsis:    The patient was admitted due to abnormal chest CT and concerns for possible persistent pneumonia.  She also had FREDERICK.  Renal function improved with IV fluid hydration.  Abnormal imaging was felt to be secondary to recent pneumonia.  She remained stable off antibiotics.  She was stable for discharge with plans to follow-up with her rheumatologist.    Lace+ Score: 78  59-90 High Risk  29-58 Medium Risk  0-28   Low Risk  Patient was referred to the [facility].    TCM Follow-Up Recommendation:  LACE > 58: High Risk of readmission after discharge from the hospital.      Consultants:  Rheum  Pulm  renal    Discharge Medication List:     Discharge Medications        CONTINUE taking these medications        Instructions Prescription details   albuterol 108 (90 Base) MCG/ACT Aers  Commonly known as: Ventolin HFA      Inhale 2 puffs into the lungs every 6 (six) hours as needed for Wheezing.   Quantity: 1 each  Refills: 3     amLODIPine 5 MG Tabs  Commonly known as: Norvasc      Take 0.5 tablets (2.5 mg total) by mouth daily.   Refills: 0     aspirin 81 MG Chew      Chew 1 tablet (81 mg total) by mouth daily.   Refills: 0     DULoxetine 30 MG Cpep  Commonly known as: Cymbalta      Take 1 capsule (30 mg total) by mouth daily. To take along with 60mg to equal 90mg daily.   Quantity: 90 capsule  Refills: 1     DULoxetine 60 MG Cpep  Commonly known as: Cymbalta      Take 1 capsule (60 mg total) by mouth daily.   Quantity: 90 capsule  Refills: 1     fluticasone propionate 50 MCG/ACT Susp  Commonly known as: Flonase      1 spray by Nasal route 2 (two) times daily.   Quantity: 1 Bottle  Refills: 3     lisinopril 20 MG Tabs  Commonly known as: Prinivil; Zestril  Start taking on: April 1, 2024      Take 1 tablet (20 mg total) by mouth daily.   Refills: 0     metFORMIN  MG Tb24  Commonly known as: Glucophage XR      Take 1 tablet (500 mg total) by mouth 2 (two) times daily  with meals.   Refills: 0     metroNIDAZOLE 0.75 % Gel  Commonly known as: Metrogel      Apply 1 g topically 2 (two) times daily. Apply to face twice daily   Quantity: 45 g  Refills: 5     Naloxone HCl 4 MG/0.1ML Liqd      4 mg by Nasal route as needed. If patient remains unresponsive, repeat dose in other nostril 2-5 minutes after first dose.   Quantity: 1 kit  Refills: 0     oxyCODONE 5 MG Tabs      Take 1 tablet (5 mg total) by mouth every 6 (six) hours as needed for Pain.   Quantity: 15 tablet  Refills: 0     Ozempic (0.25 or 0.5 MG/DOSE) 2 MG/3ML Sopn  Generic drug: semaglutide      Inject 0.5 mg into the skin once a week.   Refills: 0     pantoprazole 40 MG Tbec  Commonly known as: Protonix      Take 1 tablet (40 mg total) by mouth before breakfast.   Quantity: 90 tablet  Refills: 1     pregabalin 100 MG Caps  Commonly known as: Lyrica      Take 1 capsule (100 mg total) by mouth in the morning and 1 capsule (100 mg total) at noon and 1 capsule (100 mg total) in the evening.   Quantity: 270 capsule  Refills: 1     rivaroxaban 20 MG Tabs  Commonly known as: Xarelto      Take 1 tablet (20 mg total) by mouth daily.   Quantity: 90 tablet  Refills: 0     rosuvastatin 10 MG Tabs  Commonly known as: Crestor      Take 1 tablet (10 mg total) by mouth nightly.   Quantity: 90 tablet  Refills: 1     tiZANidine 4 MG Tabs  Commonly known as: Zanaflex      Take 1 tablet (4 mg total) by mouth every 8 (eight) hours as needed. Takes every night   Refills: 0     Vitamin D 50 MCG (2000 UT) Tabs      Take 2,000 Units by mouth daily.   Refills: 0            STOP taking these medications      amoxicillin clavulanate 875-125 MG Tabs  Commonly known as: Augmentin                 [place]    Follow-up appointment:   Betsy Browning MD  [address]  [facility] [id number] [phone]    Schedule an appointment as soon as possible for a visit in 2 week(s)  2-3 week follow up      Vital signs:       Physical  Exam:    General: No acute distress   Lungs: mild rhonchi  Cardiovascular: S1, S2  Abdomen: Soft      -----------------------------------------------------------------------------------------------  PATIENT DISCHARGE INSTRUCTIONS: See electronic chart    Tin Rodrigues MD    Total minutes spent on discharge plannin      The  [employer] Cures Act makes medical notes like these available to patients in the interest of transparency. Please be advised this is a medical document. Medical documents are intended to carry relevant information, facts as evident, and the clinical opinion of the practitioner. The medical note is intended as peer to peer communication and may appear blunt or direct. It is written in medical language and may contain abbreviations or verbiage that are unfamiliar.

## 2024-04-01 ENCOUNTER — PATIENT OUTREACH (OUTPATIENT)
Dept: CASE MANAGEMENT | Age: 79
End: 2024-04-01

## 2024-04-01 LAB
CH50 COMPLEMENT: 38 U/ML
IGG SUBCLASS 1: 297 MG/DL
IGG SUBCLASS 2: 136 MG/DL
IGG SUBCLASS 3: 18 MG/DL
IGG SUBCLASS 4: 3 MG/DL
KAPPA LC FREE SER-MCNC: 5.86 MG/DL (ref 0.33–1.94)
KAPPA LC FREE/LAMBDA FREE SER NEPH: 2.57 {RATIO} (ref 0.26–1.65)
LAMBDA LC FREE SERPL-MCNC: 2.28 MG/DL (ref 0.57–2.63)
NUCLEAR IGG TITR SER IF: POSITIVE {TITER}

## 2024-04-01 RX ORDER — SEMAGLUTIDE 0.68 MG/ML
0.5 INJECTION, SOLUTION SUBCUTANEOUS WEEKLY
Qty: 9 ML | Refills: 0 | Status: SHIPPED | OUTPATIENT
Start: 2024-04-01

## 2024-04-01 RX ORDER — AMLODIPINE BESYLATE 5 MG/1
5 TABLET ORAL DAILY
Qty: 90 TABLET | Refills: 0 | Status: SHIPPED | OUTPATIENT
Start: 2024-04-01

## 2024-04-01 NOTE — PROGRESS NOTES
Pt is currently in TCM.   Attempted to contact pt for condition update however no answer. Call continued to ring and then disconnected. Unable to leave a VM at this time. NCM to try again at a later time.

## 2024-04-01 NOTE — TELEPHONE ENCOUNTER
Requested Prescriptions     Pending Prescriptions Disp Refills    OZEMPIC, 0.25 OR 0.5 MG/DOSE, 2 MG/3ML Subcutaneous Solution Pen-injector [Pharmacy Med Name: Ozempic 2 Mg/3ml Inj Tia] 9 mL 0     Sig: Inject 0.5 mg into the skin once a week.       LOV: 3--sd-acute visit     LAST CPE:Overdue     Last Labs: 3--cbc,cmp    Last Refill: 3-- historical    Your appointments       Date & Time Appointment Department ([place])    Apr 10, 2024 10:45 AM CDT Exam - Established with Makayla Farley DO [facility] ([facility])        Aug 09, 2024 10:30 AM CDT Established Patient with Zac Christian MD [facility] ([facility])              [facility] [place]  [address]  [facility] [address] [phone] [facility]  [facility]  [address]  [facility] [address]  [phone]

## 2024-04-01 NOTE — TELEPHONE ENCOUNTER
Requested Prescriptions     Pending Prescriptions Disp Refills    AMLODIPINE 5 MG Oral Tab [Pharmacy Med Name: Amlodipine Besylate 5 Mg Tab Unic] 90 tablet 0     Sig: Take 1 tablet (5 mg total) by mouth daily.       LOV: 3--acute visit-SD    LAST CPE: 4--sd-physical    Last Labs: 3--cbc,bmp    Last Refill: historical    Your appointments       Date & Time Appointment Department ([place])    Apr 10, 2024 10:45 AM CDT Exam - Established with Makayla Farley DO [facility] ([facility])        Aug 09, 2024 10:30 AM CDT Established Patient with Zac Christian MD [facility] ([facility])              [facility]  [address]  [facility] [address] [phone] ROHINI LUTZ  [employer]  [address]  [facility] [address]  [phone]

## 2024-04-02 ENCOUNTER — PATIENT OUTREACH (OUTPATIENT)
Dept: CASE MANAGEMENT | Age: 79
End: 2024-04-02

## 2024-04-02 ENCOUNTER — TELEPHONE (OUTPATIENT)
Dept: INTERNAL MEDICINE CLINIC | Facility: CLINIC | Age: 79
End: 2024-04-02

## 2024-04-02 LAB
ANA NUCLEOLAR TITR SER IF: 80 {TITER}
C1Q BIND IMMUNE COMPLEX: <1.2 UG EQ/ML
CENTROMERE IGG SER-ACNC: <0.4 U/ML
DSDNA AB TITR SER: <10 {TITER}
ENA JO1 AB SER IA-ACNC: <0.3 U/ML
ENA RNP IGG SER IA-ACNC: 0.3 U/ML
ENA SCL70 IGG SER IA-ACNC: <0.6 U/ML
ENA SM IGG SER IA-ACNC: <0.7 U/ML
ENA SS-A IGG SER IA-ACNC: <0.4 U/ML
ENA SS-B IGG SER IA-ACNC: <0.4 U/ML
U1 SNRNP IGG SER IA-ACNC: 1 U/ML

## 2024-04-02 NOTE — TELEPHONE ENCOUNTER
Attempted to contact patient for condition update today however unable to reach the pt.  Patient does not have an appointment scheduled at this time.  HFU appointment recommended by 4/5/24 as patient is a High risk for readmission.  Please advise.      Clinical staff:  Please follow-up with patient and try to get them to schedule as patient would greatly benefit from HFU appointment.  Thank you!

## 2024-04-02 NOTE — PROGRESS NOTES
Hospital follow up.    TCC request.    Patient prefers to follow up with PCP.  Confirmed with patient.    Closing encounter.

## 2024-04-02 NOTE — PROGRESS NOTES
LM for pt to call [facility] for condition update since discharge. [facility] phone number was provided for pt to call back.     TCC contact information was provided for pt to call back as well.  TE will be sent to PCP office to FU on HFU appt as pt is a high risk for readmission.

## 2024-04-02 NOTE — TELEPHONE ENCOUNTER
Pt scheduled on below for HFU  Future Appointments   Date Time Provider Department Center   8/9/2024 10:30 AM Zac Christian MD [facility]

## 2024-04-03 LAB
ALBUMIN SERPL ELPH-MCNC: 3.26 G/DL (ref 3.75–5.21)
ALBUMIN/GLOB SERPL: 1.52 {RATIO} (ref 1–2)
ALPHA1 GLOB SERPL ELPH-MCNC: 0.27 G/DL (ref 0.19–0.46)
ALPHA2 GLOB SERPL ELPH-MCNC: 0.8 G/DL (ref 0.48–1.05)
B-GLOBULIN SERPL ELPH-MCNC: 0.58 G/DL (ref 0.68–1.23)
GAMMA GLOB SERPL ELPH-MCNC: 0.49 G/DL (ref 0.62–1.7)
PROT SERPL-MCNC: 5.4 G/DL (ref 5.7–8.2)

## 2024-04-04 NOTE — TELEPHONE ENCOUNTER
Future Appointments   Date Time Provider Department Center   4/10/2024 10:45 AM Makayla Farley DO [facility]   4/11/2024 11:30 AM Monica Curran APRN [facility] [address] [facility]   8/9/2024 10:30 AM Zac Christian MD [facility]

## 2024-04-08 ENCOUNTER — LAB ENCOUNTER (OUTPATIENT)
Dept: LAB | Age: 79
End: 2024-04-08
Attending: NURSE PRACTITIONER
Payer: MEDICARE

## 2024-04-08 DIAGNOSIS — E11.65 TYPE 2 DIABETES MELLITUS WITH HYPERGLYCEMIA, WITHOUT LONG-TERM CURRENT USE OF INSULIN (HCC): ICD-10-CM

## 2024-04-08 LAB
ALBUMIN SERPL-MCNC: 3.9 G/DL (ref 3.4–5)
ALBUMIN/GLOB SERPL: 1.3 {RATIO} (ref 1–2)
ALP LIVER SERPL-CCNC: 63 U/L
ALT SERPL-CCNC: 24 U/L
ANION GAP SERPL CALC-SCNC: 8 MMOL/L (ref 0–18)
AST SERPL-CCNC: 35 U/L (ref 15–37)
BILIRUB SERPL-MCNC: 0.5 MG/DL (ref 0.1–2)
BUN BLD-MCNC: 19 MG/DL (ref 9–23)
CALCIUM BLD-MCNC: 9.6 MG/DL (ref 8.5–10.1)
CHLORIDE SERPL-SCNC: 108 MMOL/L (ref 98–112)
CHOLEST SERPL-MCNC: 101 MG/DL (ref ?–200)
CO2 SERPL-SCNC: 22 MMOL/L (ref 21–32)
CREAT BLD-MCNC: 0.95 MG/DL
CREAT UR-SCNC: 212 MG/DL
EGFRCR SERPLBLD CKD-EPI 2021: 62 ML/MIN/1.73M2 (ref 60–?)
EST. AVERAGE GLUCOSE BLD GHB EST-MCNC: 151 MG/DL (ref 68–126)
FASTING PATIENT LIPID ANSWER: YES
FASTING STATUS PATIENT QL REPORTED: YES
GLOBULIN PLAS-MCNC: 2.9 G/DL (ref 2.8–4.4)
GLUCOSE BLD-MCNC: 110 MG/DL (ref 70–99)
HBA1C MFR BLD: 6.9 % (ref ?–5.7)
HDLC SERPL-MCNC: 49 MG/DL (ref 40–59)
LDLC SERPL CALC-MCNC: 31 MG/DL (ref ?–100)
MICROALBUMIN UR-MCNC: 6.64 MG/DL
MICROALBUMIN/CREAT 24H UR-RTO: 31.3 UG/MG (ref ?–30)
NONHDLC SERPL-MCNC: 52 MG/DL (ref ?–130)
OSMOLALITY SERPL CALC.SUM OF ELEC: 289 MOSM/KG (ref 275–295)
POTASSIUM SERPL-SCNC: 4.7 MMOL/L (ref 3.5–5.1)
PROT SERPL-MCNC: 6.8 G/DL (ref 6.4–8.2)
SODIUM SERPL-SCNC: 138 MMOL/L (ref 136–145)
TRIGL SERPL-MCNC: 118 MG/DL (ref 30–149)
VLDLC SERPL CALC-MCNC: 16 MG/DL (ref 0–30)

## 2024-04-08 PROCEDURE — 82570 ASSAY OF URINE CREATININE: CPT

## 2024-04-08 PROCEDURE — 36415 COLL VENOUS BLD VENIPUNCTURE: CPT

## 2024-04-08 PROCEDURE — 80053 COMPREHEN METABOLIC PANEL: CPT

## 2024-04-08 PROCEDURE — 83036 HEMOGLOBIN GLYCOSYLATED A1C: CPT

## 2024-04-08 PROCEDURE — 82043 UR ALBUMIN QUANTITATIVE: CPT

## 2024-04-08 PROCEDURE — 80061 LIPID PANEL: CPT

## 2024-04-10 ENCOUNTER — TELEPHONE (OUTPATIENT)
Dept: RHEUMATOLOGY | Facility: CLINIC | Age: 79
End: 2024-04-10

## 2024-04-10 NOTE — TELEPHONE ENCOUNTER
Pt had an appt today, and showed up 25 min late, pt went to the old office of Zee crenshaw, pt states she was not thinking, so we have pt at top of cx list for dr martel

## 2024-04-11 ENCOUNTER — OFFICE VISIT (OUTPATIENT)
Dept: INTERNAL MEDICINE CLINIC | Facility: CLINIC | Age: 79
End: 2024-04-11
Payer: MEDICARE

## 2024-04-11 VITALS
BODY MASS INDEX: 31 KG/M2 | HEART RATE: 92 BPM | WEIGHT: 161.81 LBS | TEMPERATURE: 97 F | DIASTOLIC BLOOD PRESSURE: 50 MMHG | OXYGEN SATURATION: 96 % | SYSTOLIC BLOOD PRESSURE: 100 MMHG

## 2024-04-11 DIAGNOSIS — R06.09 DOE (DYSPNEA ON EXERTION): ICD-10-CM

## 2024-04-11 DIAGNOSIS — R76.8 POSITIVE ANA (ANTINUCLEAR ANTIBODY): ICD-10-CM

## 2024-04-11 DIAGNOSIS — I25.10 CORONARY ARTERY DISEASE INVOLVING NATIVE CORONARY ARTERY OF NATIVE HEART WITHOUT ANGINA PECTORIS: ICD-10-CM

## 2024-04-11 DIAGNOSIS — G89.4 CHRONIC PAIN SYNDROME: ICD-10-CM

## 2024-04-11 DIAGNOSIS — M81.0 AGE-RELATED OSTEOPOROSIS WITHOUT CURRENT PATHOLOGICAL FRACTURE: ICD-10-CM

## 2024-04-11 DIAGNOSIS — N17.9 AKI (ACUTE KIDNEY INJURY) (HCC): Primary | ICD-10-CM

## 2024-04-11 DIAGNOSIS — I10 PRIMARY HYPERTENSION: ICD-10-CM

## 2024-04-11 DIAGNOSIS — R09.02 HYPOXIA: ICD-10-CM

## 2024-04-11 DIAGNOSIS — E11.9 TYPE 2 DIABETES MELLITUS WITHOUT COMPLICATION, WITHOUT LONG-TERM CURRENT USE OF INSULIN (HCC): ICD-10-CM

## 2024-04-11 DIAGNOSIS — Z87.01 HISTORY OF PNEUMONIA, RECURRENT: ICD-10-CM

## 2024-04-11 DIAGNOSIS — Z86.718 HISTORY OF DVT (DEEP VEIN THROMBOSIS): ICD-10-CM

## 2024-04-11 PROBLEM — N28.9 ACUTE RENAL INSUFFICIENCY: Status: RESOLVED | Noted: 2024-02-27 | Resolved: 2024-04-11

## 2024-04-11 PROBLEM — J18.9 NOSOCOMIAL PNEUMONIA: Status: RESOLVED | Noted: 2024-02-27 | Resolved: 2024-04-11

## 2024-04-11 PROBLEM — E86.0 DEHYDRATION: Status: RESOLVED | Noted: 2024-03-28 | Resolved: 2024-04-11

## 2024-04-11 PROBLEM — E87.5 HYPERKALEMIA: Status: RESOLVED | Noted: 2024-02-12 | Resolved: 2024-04-11
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
## 2024-04-11 PROCEDURE — G2211 COMPLEX E/M VISIT ADD ON: HCPCS | Performed by: NURSE PRACTITIONER

## 2024-04-11 PROCEDURE — 99215 OFFICE O/P EST HI 40 MIN: CPT | Performed by: NURSE PRACTITIONER

## 2024-04-11 NOTE — PROGRESS NOTES
Rayne Morales is a 78 year old female.    Chief Complaint   Patient presents with    Hospital F/U       HPI:   here for hospital follow up   admitted via ER with cough and SOB  hx recurrent pneumonia requiring hospitalization.  She follows with Dr Chambers for pulmonary and Dr Boyd for question of immunodeficiency.  CT with concern for persistent pneumonia despite antibiotic treatment and she was therefore admitted for evaluation.      FREDERICK on admission resolved with IVF  stable on labs 4/9  Rheumatology consultaiton with f/u Dr Farley. Saw Dr Orta in the hospital   she feels he was quite helpful.  ? Lupus.       Diabetes. Has remained overall stable   A1c recently 6.9  her appetite is low due to overall health.  Metformin 500mg bid and ozempic 0.5mg.  on ace and statin.      HTN low today.    lisinopril.   not checking bp at home.  Repeat low for her today.   Will consider 1/2 lisinopril for a week or so.  No longer on amlodipine.      Anxiety/depression/ chronic pain.  Has done well with cymbalta.  She continues to work with pain service and takes oxycodone  pain is not well controlled.  She saw them yesterday.       Hx DVT/PE  xarelto.  Again discussed need to return to hematology.      Dyslipidemia.  Crestor.     Osteoporosis.  Reclast. 10/20/23    Patient Active Problem List   Diagnosis    Fatigue    Primary hypertension    Pure hypercholesterolemia    Anxiety    Age-related osteoporosis without current pathological fracture    B12 deficiency    Spondylosis of lumbosacral region    Sacroiliac joint dysfunction    History of syncope    Asthma (HCC)    Cyst of brain    History of basal cell carcinoma    History of pneumonia, recurrent    Thrombocytopenia (HCC)    Gastroesophageal reflux disease without esophagitis    Enlarged thyroid    History of left breast cancer    Carpal tunnel syndrome of left wrist    Selective deficiency of IgG subclasses (HCC)     Atherosclerosis of aorta (HCC)    MUKUND on CPAP     Chronic pain syndrome    Tremor of both hands    Migraine without aura or status migrainosus    Major depressive disorder, recurrent episode, moderate (HCC)    Coronary artery disease involving native coronary artery of native heart without angina pectoris    Visual impairment    S/P cervical spinal fusion    Neuropathy    IgG deficiency ([facility])    History of DVT (deep vein thrombosis)    Cervical spinal stenosis    Cervical radiculopathy    PHILLIPS (dyspnea on exertion)    Type 2 diabetes mellitus without complication, without long-term current use of insulin ([facility])    Anemia, unspecified type    Primary osteoarthritis of right knee    RLQ abdominal pain    Liver mass    Abnormal CT of the abdomen    Hypoxia    Positive DARREN (antinuclear antibody)    Bilateral pulmonary infiltrates     Current Outpatient Medications   Medication Sig Dispense Refill    OZEMPIC, 0.25 OR 0.5 MG/DOSE, 2 MG/3ML Subcutaneous Solution Pen-injector Inject 0.5 mg into the skin once a week. 9 mL 0    lisinopril 20 MG Oral Tab Take 1 tablet (20 mg total) by mouth daily.      tiZANidine 4 MG Oral Tab Take 1 tablet (4 mg total) by mouth every 8 (eight) hours as needed. Takes every night      metFORMIN  MG Oral Tablet 24 Hr Take 1 tablet (500 mg total) by mouth 2 (two) times daily with meals.      pregabalin 100 MG Oral Cap Take 1 capsule (100 mg total) by mouth in the morning and 1 capsule (100 mg total) at noon and 1 capsule (100 mg total) in the evening. 270 capsule 1    oxyCODONE 5 MG Oral Tab Take 1 tablet (5 mg total) by mouth every 6 (six) hours as needed for Pain. 15 tablet 0    Naloxone HCl 4 MG/0.1ML Nasal Liquid 4 mg by Nasal route as needed. If patient remains unresponsive, repeat dose in other nostril 2-5 minutes after first dose. 1 kit 0    metroNIDAZOLE 0.75 % External Gel Apply 1 g topically 2 (two) times daily. Apply to face twice daily 45 g 5    DULOXETINE 30 MG Oral Cap DR Particles Take 1 capsule (30 mg total) by mouth daily.  To take along with 60mg to equal 90mg daily. 90 capsule 1    PANTOPRAZOLE 40 MG Oral Tab EC Take 1 tablet (40 mg total) by mouth before breakfast. 90 tablet 1    DULoxetine 60 MG Oral Cap DR Particles Take 1 capsule (60 mg total) by mouth daily. 90 capsule 1    rivaroxaban (XARELTO) 20 MG Oral Tab Take 1 tablet (20 mg total) by mouth daily. 90 tablet 0    rosuvastatin 10 MG Oral Tab Take 1 tablet (10 mg total) by mouth nightly. 90 tablet 1    albuterol 108 (90 Base) MCG/ACT Inhalation Aero Soln Inhale 2 puffs into the lungs every 6 (six) hours as needed for Wheezing. 1 each 3    aspirin 81 MG Oral Chew Tab Chew 1 tablet (81 mg total) by mouth daily.      Fluticasone Propionate 50 MCG/ACT Nasal Suspension 1 spray by Nasal route 2 (two) times daily. 1 Bottle 3    Cholecalciferol (VITAMIN D) 2000 units Oral Tab Take 2,000 Units by mouth daily.        Past Medical History:    Abdominal pain    Anemia    Anxiety    Arrhythmia    Arthritis    Asthma (HCC)    Back pain    Back problem    Black stools    Bloating    Blurred vision    Breast CA (HCC)    left lump and rad.    Cancer (HCC)    breast, basal cell    Cervical radiculopathy    Cervical spinal stenosis    Deep vein thrombosis (HCC)    Depression    Diabetes mellitus (HCC)    Diarrhea, unspecified    Dizziness    Easy bruising    On Xeralto    Esophageal reflux    Exposure to medical diagnostic radiation    Fatigue    Fitting and adjustment of vascular catheter    Headache disorder    Hearing loss    Heart attack (HCC)    Heartburn    Hemorrhoids    High blood pressure    High cholesterol    History of blood clots    History of COVID-19    not hospitalized, no continued symptoms, cough, fever, loss of taste    History of depression    History of syncope    IgG deficiency (HCC)    ??? pt states she gets IgG infusions due to lack of IgG, unable to fight off infections    Indigestion    Leaking of urine    Migraines    Muscle weakness    uses cane    Neuropathy    rt  foot bilateral hands    Osteoarthritis    Pain in joints    Pneumonia due to organism    Pulmonary embolism (HCC)    Shortness of breath    Sleep apnea    cpap - not using - machine is broken    Stress    Uncomfortable fullness after meals    Visual impairment    glasses/contacts    Weight gain      Social History:  Social History     Socioeconomic History    Marital status:    Tobacco Use    Smoking status: Former     Current packs/day: 0.00     Types: Cigarettes     Quit date: 1968     Years since quittin.3    Smokeless tobacco: Never   Vaping Use    Vaping status: Never Used   Substance and Sexual Activity    Alcohol use: No     Comment: rare    Drug use: Yes     Types: Cannabis     Comment: uses cream for pain to back/neck with barometer changes.    Sexual activity: Not Currently     Partners: Male   Other Topics Concern    Caffeine Concern Yes     Comment: half of cup of coke daily    Sleep Concern No    Exercise Yes     Comment: walking    Seat Belt Yes     Social Determinants of Health     Financial Resource Strain: Low Risk  (10/25/2023)    Financial Resource Strain     Difficulty of Paying Living Expenses: Not hard at all     Med Affordability: No   Food Insecurity: No Food Insecurity (3/28/2024)    Food Insecurity     Food Insecurity: Never true   Transportation Needs: No Transportation Needs (3/28/2024)    Transportation Needs     Lack of Transportation: No   Physical Activity: Inactive (2019)    Received from [facility]    Exercise Vital Sign     Days of Exercise per Week: 0 days     Minutes of Exercise per Session: 0 min   Stress: No Stress Concern Present (10/25/2023)    Stress     Feeling of Stress : No   Social Connections: Socially Integrated (10/25/2023)    Social Connections     Frequency of Socialization with Friends and Family: 2   Housing Stability: Low Risk  (3/28/2024)    Housing Stability     Housing Instability: No     Family History    Problem Relation Age of Onset    Heart Attack Paternal Grandfather     Heart Attack Paternal Grandmother     Other (CAD) Father     Other (CAD) Mother     Hypertension Mother     Heart Attack Mother     Other (CAD) Brother     Hypertension Brother     Heart Attack Brother     Stroke Maternal Grandfather         Stroke    Stroke Maternal Grandmother         Stroke    Breast Cancer Self 54        Allergies  Allergies   Allergen Reactions    Ciprofloxacin HIVES    Mold REACTIVE AIRWAY DISEASE     Mold and smut    Sulfa Antibiotics HIVES     Bactrim (Sulfamethoxazole/TMP)    Diovan [Valsartan] NAUSEA AND VOMITING    Omnicef NAUSEA ONLY and DIZZINESS    Clindamycin DIARRHEA and NAUSEA ONLY    Green Pepper OTHER (SEE COMMENTS)     Sensitivity (all peppers - green, orange and red)    Lipitor [Atorvastatin Calcium] OTHER (SEE COMMENTS)     Leg cramping,    Pneumococcal Vaccines OTHER (SEE COMMENTS)     Pt states she had pneumo vax done at Dr. Boyd on 1/10/24- states developed redness, swelling,itching to the arm    Quinapril Hcl FATIGUE       REVIEW OF SYSTEMS:   GENERAL HEALTH: feels better.    RESPIRATORY: denies shortness of breath with exertion, no cough  CARDIOVASCULAR: denies chest pain on exertion, no palpatations  GI: denies abdominal pain and denies heartburn, no diarrhea or constipation  MUSCULOSKELETAL: chronic pain.      EXAM:   /50 (BP Location: Right arm, Patient Position: Sitting, Cuff Size: adult)   Pulse 92   Temp 97.3 °F (36.3 °C) (Temporal)   Wt 161 lb 12.8 oz (73.4 kg)   LMP  (LMP Unknown)   SpO2 96%   BMI 30.57 kg/m²   GENERAL: well developed, well nourished,in no apparent distress  LUNGS: normal rate without respiratory distress, lungs clear to auscultation  CARDIO: RRR without murmur  GI: normal bowel sounds, no masses, HSM or tenderness  EXTREMITIES: no edema, normal strength and tone  PSYCH: alert and oriented x 3    ASSESSMENT AND PLAN:     Encounter Diagnoses   Name Primary?     FREDERICK (acute kidney injury) (HCC)  resolved.  Yes    Type 2 diabetes mellitus without complication, without long-term current use of insulin (HCC)  stable  metformin and ozempic.      Coronary artery disease involving native coronary artery of native heart without angina pectoris  stalbe      PHILLIPS (dyspnea on exertion) stable      Primary hypertension  bp low today  montior at home.  May consider lower lisinopril     History of pneumonia, recurrent  still a big questions.  Defer to pulmonary  immunology and now rheumatology     Hypoxia  resolved.      Positive DARREN (antinuclear antibody)  Timiilva.      History of DVT (deep vein thrombosis)  DOAC     Chronic pain syndrome  per pain service.      Age-related osteoporosis without current pathological fracture  reclast    Code selection for this visit was based on time spent (>40min) on date of service in preparing to see the patient, obtaining and/or reviewing separately obtained history, performing a medically appropriate examination, counseling and educating the patient/family/caregiver, ordering medications or testing, referring and communicating with other healthcare providers, documenting clinical information in the EHR, independently interpreting results and communicating results to the patient/family/caregiver and care coordination with the patient's other providers.  A  Hospital inpatient notes and data reviewed to the best of my ability prior to ov today.   No orders of the defined types were placed in this encounter.      Meds & Refills for this Visit:  Requested Prescriptions      No prescriptions requested or ordered in this encounter       Imaging & Consults:  None    No follow-ups on file.  There are no Patient Instructions on file for this visit.

## 2024-04-11 NOTE — PROGRESS NOTES
Multiple attempts to reach pt and messages left with no return call.  Patient went in for HFU appt with PCP office on 4/11/24.  Encounter closing.

## 2024-04-18 ENCOUNTER — TELEPHONE (OUTPATIENT)
Dept: RHEUMATOLOGY | Facility: CLINIC | Age: 79
End: 2024-04-18

## 2024-04-18 ENCOUNTER — PATIENT OUTREACH (OUTPATIENT)
Dept: CASE MANAGEMENT | Age: 79
End: 2024-04-18

## 2024-04-18 DIAGNOSIS — F33.1 MAJOR DEPRESSIVE DISORDER, RECURRENT EPISODE, MODERATE (HCC): ICD-10-CM

## 2024-04-18 DIAGNOSIS — I25.10 CORONARY ARTERY DISEASE INVOLVING NATIVE CORONARY ARTERY OF NATIVE HEART WITHOUT ANGINA PECTORIS: ICD-10-CM

## 2024-04-18 DIAGNOSIS — E11.9 TYPE 2 DIABETES MELLITUS WITHOUT COMPLICATION, WITHOUT LONG-TERM CURRENT USE OF INSULIN (HCC): ICD-10-CM

## 2024-04-18 DIAGNOSIS — E78.00 PURE HYPERCHOLESTEROLEMIA: ICD-10-CM

## 2024-04-18 DIAGNOSIS — I10 PRIMARY HYPERTENSION: ICD-10-CM

## 2024-04-18 DIAGNOSIS — Z86.718 HISTORY OF DVT (DEEP VEIN THROMBOSIS): ICD-10-CM

## 2024-04-18 DIAGNOSIS — D80.3 IGG DEFICIENCY (HCC): Primary | ICD-10-CM

## 2024-04-18 RX ORDER — ROSUVASTATIN CALCIUM 10 MG/1
10 TABLET, COATED ORAL NIGHTLY
Qty: 90 TABLET | Refills: 0 | Status: SHIPPED | OUTPATIENT
Start: 2024-04-18

## 2024-04-18 NOTE — TELEPHONE ENCOUNTER
Nurse from Dr. Dadren office called office in regards to missed appt. Pt has upcoming appt in May, and requesting pt to be seen sooner. Explained we will add pt to cancellation list to be seen sooner.

## 2024-04-18 NOTE — PROGRESS NOTES
Spoke to Rayne for CCM.      Updates to patient care team/comments:   Patient reported changes in medications: together we reviewed with no updates.   Med Adherence  Comment: pt reports taking all medications as prescribed.       Health Maintenance:   Reviewed with pt    Health Maintenance   Topic Date Due    Zoster Vaccines (1 of 2) Never done    Diabetes Care Dilated Eye Exam  01/09/2021    COVID-19 Vaccine (4 - 2023-24 season) 09/01/2023    Colorectal Cancer Screening  02/27/2024    Annual Physical  04/10/2024    Diabetes Care A1C  10/08/2024    Diabetes Care Foot Exam  01/22/2025    Diabetes Care: GFR  04/08/2025    Diabetes Care: Microalb/Creat Ratio  04/08/2025    Influenza Vaccine  Completed    DEXA Scan  Completed    Annual Depression Screening  Completed    Fall Risk Screening (Annual)  Completed    Pneumococcal Vaccine: 65+ Years  Completed    Mammogram  Discontinued       Patient updates/concerns:   Listened to pt and provided support.     Pt says she had a rough several months and apologizes for the disconnect.  She also had emergency Appendectomy amongst so many other health scares she voices.  Pt says she had visit with Dr. Farley and had to reschedule due to being late to appointment because she went to the wrong location.  Pt asking if there is anything I can do to help get her a sooner visit.  Called Dr. Farley's office asked if there where any sooner visits there is not and they have pt on cancellation list.        Pulmonary   Recent visit with Dr. Browning.   Per note:   ASSESSMENT AND PLAN:     * Hospital follow up  -inpatient documentation, labs, and imaging were reviewed  -since discharge pt states that cough is improved, still with some SOB and fatigue    *Recurrent multifocal PNA: unclear at this time if the recurrent nature is due to recurrent infection in the setting of immunodeficiency vs noninfectious PNA (organizing PNA)  -s/p bronch with BAL 3/1, cultures NGTD  -s/p unasyn/azithro, which  was transitioned to PO augmentin on discharge which she completed 3/8  -repeat CT chest in 4 weeks to ensure radiographic resolution  -if abnormalities persist would then favor proceeding with biopsies to rule out organizing PNA or other inflammatory process, will need to hold DOAC prior to procedure.   -will check autoimmune/inflammatory serologic workup - DARREN, RF, ESR/CRP, ANCA    *Moderate Persistent Asthma:   -PFT with mild decreased DLCO, no obstruction.   -advair 500/50  -albuterol PRN    *MUKUND: AHI: 27  -pt's current machine is not functioning, needs loaner machine   -pt is benefiting from use, recommend continued use  -explained the diagnosis of MUKUND in detail to patient   -encouraged diet/exercise to promote healthy weight loss  -pt instructed to not drive while drowsy  -avoid alcohol or sedative medications prior to sleep     *h/o PE:  -on xarelto, perioperative AC per heme     IgG deficiency:  -follows with   -plan for IVIG    *Healthcare maintenance  -pulmonary specific vaccinations reviewed  -influenza: recommend yearly vaccination  -Prevnar 13:2/2016  -Pneumovax 23: 9/2017, 1/2024  -covid: recommend booster  -RSV: recommend     Goals/Action Plan:  Active goal from previous outreach: Improve energy level and fatigue.  Weight management.    Watching her portion intake but states she only usually eats one meal due to Ozempic.        Patient reported progress toward goal: ongoing                - What: improve energy levels and fatigue so she can better manage her weight            - Where/When/How: pt plans on eating smaller meals throughout the day to help with appetite.  She'd like to do some walking for better stamina.       Patient Reported New Barriers And Concerns:   States she is always tired has no energy.  IgG.                    - Plan for overcoming all barriers: second opinion on IgG.      Care Managers Interventions: Encouraged patient:   Self care: Take the time to do the things you  love.   Nutrition:  Good nutrition helps us to maintain our weight, fight off infection, and help reduce the risk of developing other chronic issues.   Physical activity:  Physical activity is important to help maintain independence and improve quality of life.     Future Appointments:   Future Appointments   Date Time Provider Department Center   5/21/2024 10:00 AM Makayla Farley DO [facility]   8/9/2024 10:30 AM Zac Christian MD [facility]         Next Care Manager Follow Up Date: in 1 month.     Reason For Follow Up: review progress and or barriers towards patient's goals.     Time Spent This Encounter Total: 30 min medical record review, telephone communication, care plan updates where needed, education, goals, and action plan recreation/update. Provided acknowledgment and validation to patient's concerns.   Monthly Minute Total including today: 30  Physical assessment, complete health history, and need for CCM established by Jose Daniel Washington MD.     9

## 2024-04-22 ENCOUNTER — LAB ENCOUNTER (OUTPATIENT)
Dept: LAB | Age: 79
End: 2024-04-22
Attending: INTERNAL MEDICINE
Payer: MEDICARE

## 2024-04-22 ENCOUNTER — OFFICE VISIT (OUTPATIENT)
Dept: RHEUMATOLOGY | Facility: CLINIC | Age: 79
End: 2024-04-22
Payer: MEDICARE

## 2024-04-22 VITALS
SYSTOLIC BLOOD PRESSURE: 132 MMHG | WEIGHT: 159 LBS | HEIGHT: 61 IN | HEART RATE: 96 BPM | DIASTOLIC BLOOD PRESSURE: 70 MMHG | BODY MASS INDEX: 30.02 KG/M2 | OXYGEN SATURATION: 95 % | TEMPERATURE: 97 F | RESPIRATION RATE: 18 BRPM

## 2024-04-22 DIAGNOSIS — R76.8 ANA POSITIVE: ICD-10-CM

## 2024-04-22 DIAGNOSIS — M25.50 POLYARTHRALGIA: ICD-10-CM

## 2024-04-22 DIAGNOSIS — M15.9 PRIMARY OSTEOARTHRITIS INVOLVING MULTIPLE JOINTS: Primary | ICD-10-CM

## 2024-04-22 DIAGNOSIS — E55.9 VITAMIN D DEFICIENCY: ICD-10-CM

## 2024-04-22 DIAGNOSIS — G89.4 CHRONIC PAIN SYNDROME: ICD-10-CM

## 2024-04-22 LAB
C3 SERPL-MCNC: 140 MG/DL (ref 90–180)
C4 SERPL-MCNC: 21.8 MG/DL (ref 10–40)
CRP SERPL-MCNC: <0.29 MG/DL (ref ?–0.3)
ERYTHROCYTE [SEDIMENTATION RATE] IN BLOOD: 18 MM/HR
IGA SERPL-MCNC: 157 MG/DL (ref 70–312)
IGM SERPL-MCNC: 65.4 MG/DL (ref 43–279)
IMMUNOGLOBULIN PNL SER-MCNC: 634 MG/DL (ref 791–1643)
VIT D+METAB SERPL-MCNC: 48.6 NG/ML (ref 30–100)

## 2024-04-22 PROCEDURE — 99215 OFFICE O/P EST HI 40 MIN: CPT | Performed by: INTERNAL MEDICINE

## 2024-04-22 PROCEDURE — 36415 COLL VENOUS BLD VENIPUNCTURE: CPT

## 2024-04-22 PROCEDURE — 86038 ANTINUCLEAR ANTIBODIES: CPT

## 2024-04-22 PROCEDURE — 86160 COMPLEMENT ANTIGEN: CPT

## 2024-04-22 PROCEDURE — 86225 DNA ANTIBODY NATIVE: CPT

## 2024-04-22 PROCEDURE — 82306 VITAMIN D 25 HYDROXY: CPT

## 2024-04-22 PROCEDURE — 82784 ASSAY IGA/IGD/IGG/IGM EACH: CPT

## 2024-04-22 PROCEDURE — 86235 NUCLEAR ANTIGEN ANTIBODY: CPT

## 2024-04-22 PROCEDURE — 86039 ANTINUCLEAR ANTIBODIES (ANA): CPT

## 2024-04-22 PROCEDURE — 85652 RBC SED RATE AUTOMATED: CPT

## 2024-04-22 PROCEDURE — 86140 C-REACTIVE PROTEIN: CPT

## 2024-04-22 RX ORDER — OXYCODONE AND ACETAMINOPHEN 10; 325 MG/1; MG/1
TABLET ORAL
COMMUNITY
Start: 2024-04-10

## 2024-04-22 NOTE — PROGRESS NOTES
RHEUMATOLOGY Follow up   Date of visit: 04/22/2024  ?  Chief Complaint   Patient presents with    Follow - Up     6 month f/u. Not doing great. Has been in and out of the hospital several times with pneumonia, UTI, and appendectomy. Back pain. Carpal tunnel and neuropathy.     ASSESSMENT, DISCUSSION & PLAN   Assessment:  1. Primary osteoarthritis involving multiple joints    2. Chronic pain syndrome    3. Polyarthralgia    4. DARREN positive    5. Vitamin D deficiency        Discussion:  Ms. Rayne Morales is a 79 yo woman with long standing osteoarthritis as well as depression. She was initially sent to be for evaluation of a borderline RF as well as hand and back pain. I think the RF is positive due to age as pt's history and exam is not consistent with an inflammatory arthropathy. Repeat testing showed negative RF/CCP as well as DARREN. I believe her symptoms were related to osteoarthritis as well as depression. She has responded very nicely to Cymbalta however since her left knee replacement, has been dealing with worsened low back pain and sciatica. While she was able to stop fentanyl patches, she continues to take Norco, tramadol and Lyrica as prescribed by pain management.  At this time, symptoms are overall better since increasing cymbalta and stopping welbutrin. She continues taking lyrica and tizanidine which are prescribed by neurology.  She has been applying topical CBD/THC which is also helping with the pain.    She continues to have worsened pain with weather changes.  Continues to follow with pain management for injections.  Knows she needs right knee replacement. Last set of injections did not provide sustained relief.     Since her last visit, she has been suffering from recurrent pneumonia, UTI as well as suffered appendicitis.  She was told to follow back with rheumatology due to concerns for lupus.  She did have an DARREN with borderline dsDNA and Sanches antibody positivity but this was checked when  she had an active infection.  Repeat studies show that while the DARREN was equivocal at 1: 80, the rest of the ROBERT panel including double-stranded DNA and Smith antibodies were negative.  She does not really have signs or symptoms consistent with lupus at this time.  More recent symptoms seem more related to her immunodeficiency as well as underlying lung issues.  Strongly recommended that she follow back with her immunologist about IVIG.  Will have her get updated labs and go from there.  She has no signs of synovitis to suggest active rheumatoid at this point.    Will need to discuss osteoporosis further at her next office visit.  Did not previously tolerate oral bisphosphonate therapy due to significant GI upset. She needs tx for osteoporosis due to significant fall risk and risk of fracture.  Recommended she try reclast. She will get labs prior to infusion.   Will consider repeat BMD in one year.     Will have her follow back with me in 1 month.  Will fax copy of note to both immunology and pulmonology.    Patient verbalized understanding of above instructions. No further questions at this time.    Code selection for this visit was based on time spent (40min) on date of service in preparing to see the patient, obtaining and/or reviewing separately obtained history, performing a medically appropriate examination, counseling and educating the patient/family/caregiver, ordering medications or testing, referring and communicating with other healthcare providers, documenting clinical information in the E HR, independently interpreting results and communicating results to the patient/family/caregiver and care coordination with the patient's other providers.        ?  Plan:  Diagnoses and all orders for this visit:    Primary osteoarthritis involving multiple joints    Chronic pain syndrome    Polyarthralgia  -     C-Reactive Protein; Future  -     Sed Rate, Westergren (Automated); Future  -     Complement C3, Serum;  Future  -     Complement C4, Serum; Future  -     Immunoglobulin A/G/M, Quant; Future  -     Anti-Nuclear Antibody (DARREN) by IFA, Reflex Titer + Specific Antibodies; Future    DARREN positive  -     C-Reactive Protein; Future  -     Sed Rate, Westergren (Automated); Future  -     Complement C3, Serum; Future  -     Complement C4, Serum; Future  -     Immunoglobulin A/G/M, Quant; Future  -     Anti-Nuclear Antibody (DARREN) by IFA, Reflex Titer + Specific Antibodies; Future    Vitamin D deficiency  -     Vitamin D; Future          Return in about 4 weeks (around 5/20/2024).  ?  HPI   Rayne Morales is a 78 year old female with the following active problems who is seen for medically necessary follow-up today. She was seen as a new patient initially for evaluation of low titer RF positivity and joint pain. We rechecked her levels which were negative. She presents for follow up today.  Added onto schedule due to worsened symptoms lately    Continues following with pain management:  Norco 10-325mg 3-5x/day  Lyrica 100mg BID to TID if needed   Cymbalta 90mg daily (increased)   Tizanidine 8mg nightly and 2mg during the day    Since her last visit, patient has been suffering.  She has multiple hospitalizations for pneumonia, UTI as well as appendicitis requiring surgery.  Went back on IVIG Which was stopped by hematology.  Then had to stop it again due to significant reaction. States she had diffuse severe pain. Took OxyContin but that didn't even help with the pain. Told to stop the IVIG and has been suffering from frequent infections.     Continues with pain in her spine diffusely- varies from day to day.  Also felt in the shoulders and the knees bilaterally.  Knows she needs replacement but due to frequent infections as well as A1c and generalized weakness.   + deconditioning   Denies any new areas of pain.   Denies overt swelling of the joints.   Has plan for injection of the spine through pain management    + redness  over the face- thought to be rosacea, tried medication without much improvement. Has follow up in a few months.   Migraines worse lately and relates to weather.       Has cut back on working due to above. Now working 3-4 days a week.     + dry eyes, not using drops. Thinks related to allergies since worse in the spring time (knows mold/oak makes it worse)   + dry mouth (worst in the mornings), improves with drinking water  Denies oral or nasal ulcers   Denies hair thinning that she's worried about. Attributes to age.   Denies raynauds       HPI from initial consultation  States she has had arthritis throughout her whole spine from neck down to her lower back for over 30 years. States she has been diagnosed with significant degenerative disc disease. 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 Burr for this. Was referred due to having autoimmune disease due to borderline elevated RF.      Admits to pain on a daily basis in her spine, knees and hands.   Morning stiffness significant but lasting for only 2 minutes. Pain gets worse as the day goes on. Pain also affected by changes in barometric pressure.   Does have neuropathy in her feet for which she takes Lyrica.  Does note nodules over small joints of hands.      Was on celebrex for years which helped significant with her symptoms but was discontinued due to potential cardiac risk. She also admits to some bleeding in her GI tract.   Has been on opioids for years and wants to wean off of it. She does take Lyrica 75mg daily, tried 100mg but that made her stick to her stomach.     Admits to getting pneumonia and sepsis at least once yearly.   Follows with pulmonology for the chronic asthma as well as pneumonia. Does get hypoxemia with sleeping.   Follows with psychiatry for years, on buproprion for years for depression. Just changed buproprion to extended release and trying to monitor for a response.       + iron deficiency anemia   + hx of DVT/PE on Xarelto, first one thought to be due to breast cancer      No history of significant wrist pain or swelling, pain or swelling of the MCPs, pain or swelling of the ankles and bones of the feet.  The patient denies hair loss, oral or nasal ulcers, photosensitive rash, elevated or scarring rashes, Raynaud's phenomenon, prior renal disease, or history of seizures.  The patient denies any history of uveitis.  There are no symptoms of severe dry eyes, dry mouth, recurrent cavities, or swelling of the cheeks or under the jawbone. No prior history of punctal plugs being applied.  No fevers, chills, lymphadenopathy, night sweats, unexpected weight loss, bony pain, easy bruising or bleeding, or unexplained weakness.  Denies chronic sinus infections/disease or epistaxis.  Denies chronic cough or hemoptysis.     ?  Past Medical History:  Past Medical History:    Abdominal pain    Anemia    Anxiety    Arrhythmia    Arthritis    Asthma (HCC)    Back pain    Back problem    Black stools    Bloating    Blurred vision    Breast CA (HCC)    left lump and rad.    Cancer (HCC)    breast, basal cell    Cervical radiculopathy    Cervical spinal stenosis    Deep vein thrombosis (HCC)    Depression    Diabetes mellitus (HCC)    Diarrhea, unspecified    Dizziness    Easy bruising    On Xeralto    Esophageal reflux    Exposure to medical diagnostic radiation    Fatigue    Fitting and adjustment of vascular catheter    Headache disorder    Hearing loss    Heart attack (HCC)    Heartburn    Hemorrhoids    High blood pressure    High cholesterol    History of blood clots    History of COVID-19    not hospitalized, no continued symptoms, cough, fever, loss of taste    History of depression    History of syncope    IgG deficiency (HCC)    ??? pt states she gets IgG infusions due to lack of IgG, unable to fight off infections    Indigestion    Leaking of urine    Migraines    Muscle weakness     uses cane    Neuropathy    rt foot bilateral hands    Osteoarthritis    Pain in joints    Pneumonia due to organism    Pulmonary embolism (HCC)    Shortness of breath    Sleep apnea    cpap - not using - machine is broken    Stress    Uncomfortable fullness after meals    Visual impairment    glasses/contacts    Weight gain     Past Surgical History:  Past Surgical History:   Procedure Laterality Date    Abdomen surgery proc unlisted      duodenal biopsy    Appendectomy      Breast surgery      Cataract      Cholecystectomy      Colonoscopy  10/1/10, 4/21/17    Colonoscopy N/A 02/27/2023    Procedure: COLONOSCOPY;  Surgeon: Migel Bass MD;  Location:  ENDOSCOPY    [facility]  04/21/2017    Eye surgery      Femur/knee surg unlisted      right knee arthroscopy    Hemorrhoidectomy,int/ext,simple      Hernia surgery      Hysterectomy  1984    total hystero.    Lumpectomy left Left 1999    lt lumpectomy and radiation.    Oophorectomy Bilateral 1984    total hystero.    Other  11/2021    left wrist, ORIF Dr. Stacy Long    Other  02/21/2022    CERVICAL 5 AND CERVICAL 6 LAMINECTOMIES, PARTIAL CERVICAL 7 LAMINECTOMY, LEFT CERVICAL 5/CERVICAL 6 AND CERVICAL 6/CERVICAL 7 FORAMINOTOMIES, CERVICAL 6 AND CERVICAL 7 ARTHRODESIS, AUTOGRAFT, ALLOGRAFT    Other surgical history  2010    endoscopy - papillotomy    Other surgical history  2016     under right eye, skin cancer removed    Other surgical history  02/2022    neck    Port for vascular access      Radiation left Left 1999    left lumpectomy and radiation.    Repair ing hernia,5+y/o,reducibl      Sigmoidoscopy,diagnostic      Spine surgery procedure unlisted      Tonsillectomy      Total abdom hysterectomy      Total knee replacement       Family History:  Family History   Problem Relation Age of Onset    Heart Attack Paternal Grandfather     Heart Attack Paternal Grandmother     Other (CAD) Father     Other (CAD) Mother     Hypertension Mother     Heart Attack Mother      Other (CAD) Brother     Hypertension Brother     Heart Attack Brother     Stroke Maternal Grandfather         Stroke    Stroke Maternal Grandmother         Stroke    Breast Cancer Self 54     Social History:  Social History     Socioeconomic History    Marital status:    Tobacco Use    Smoking status: Former     Current packs/day: 0.00     Types: Cigarettes     Quit date: 1968     Years since quittin.3    Smokeless tobacco: Never   Vaping Use    Vaping status: Never Used   Substance and Sexual Activity    Alcohol use: No     Comment: rare    Drug use: Yes     Types: Cannabis     Comment: uses cream for pain to back/neck with barometer changes.    Sexual activity: Not Currently     Partners: Male   Other Topics Concern    Caffeine Concern Yes     Comment: half of cup of coke daily    Sleep Concern No    Exercise Yes     Comment: walking    Seat Belt Yes     Social Determinants of Health     Financial Resource Strain: Low Risk  (10/25/2023)    Financial Resource Strain     Difficulty of Paying Living Expenses: Not hard at all     Med Affordability: No   Food Insecurity: No Food Insecurity (3/28/2024)    Food Insecurity     Food Insecurity: Never true   Transportation Needs: No Transportation Needs (3/28/2024)    Transportation Needs     Lack of Transportation: No   Physical Activity: Inactive (2019)    Received from [facility]    Exercise Vital Sign     Days of Exercise per Week: 0 days     Minutes of Exercise per Session: 0 min   Stress: No Stress Concern Present (10/25/2023)    Stress     Feeling of Stress : No   Social Connections: Socially Integrated (10/25/2023)    Social Connections     Frequency of Socialization with Friends and Family: 2   Housing Stability: Low Risk  (3/28/2024)    Housing Stability     Housing Instability: No     Medications:  Outpatient Medications Marked as Taking for the 24 encounter (Office Visit) with Makayla Farley DO    Medication Sig Dispense Refill    oxyCODONE-acetaminophen  MG Oral Tab       ROSUVASTATIN 10 MG Oral Tab Take 1 tablet (10 mg total) by mouth nightly. 90 tablet 0    OZEMPIC, 0.25 OR 0.5 MG/DOSE, 2 MG/3ML Subcutaneous Solution Pen-injector Inject 0.5 mg into the skin once a week. 9 mL 0    lisinopril 20 MG Oral Tab Take 1 tablet (20 mg total) by mouth daily.      tiZANidine 4 MG Oral Tab Take 1 tablet (4 mg total) by mouth every 8 (eight) hours as needed. Takes every night      metFORMIN  MG Oral Tablet 24 Hr Take 1 tablet (500 mg total) by mouth 2 (two) times daily with meals.      pregabalin 100 MG Oral Cap Take 1 capsule (100 mg total) by mouth in the morning and 1 capsule (100 mg total) at noon and 1 capsule (100 mg total) in the evening. 270 capsule 1    metroNIDAZOLE 0.75 % External Gel Apply 1 g topically 2 (two) times daily. Apply to face twice daily 45 g 5    DULOXETINE 30 MG Oral Cap DR Particles Take 1 capsule (30 mg total) by mouth daily. To take along with 60mg to equal 90mg daily. 90 capsule 1    PANTOPRAZOLE 40 MG Oral Tab EC Take 1 tablet (40 mg total) by mouth before breakfast. 90 tablet 1    DULoxetine 60 MG Oral Cap DR Particles Take 1 capsule (60 mg total) by mouth daily. 90 capsule 1    rivaroxaban (XARELTO) 20 MG Oral Tab Take 1 tablet (20 mg total) by mouth daily. 90 tablet 0    albuterol 108 (90 Base) MCG/ACT Inhalation Aero Soln Inhale 2 puffs into the lungs every 6 (six) hours as needed for Wheezing. 1 each 3    aspirin 81 MG Oral Chew Tab Chew 1 tablet (81 mg total) by mouth daily.      Fluticasone Propionate 50 MCG/ACT Nasal Suspension 1 spray by Nasal route 2 (two) times daily. 1 Bottle 3    Cholecalciferol (VITAMIN D) 2000 units Oral Tab Take 2,000 Units by mouth daily.       Modified Medications    No medications on file     Medications Discontinued During This Encounter   Medication Reason    oxyCODONE 5 MG Oral Tab      ?  ?  Allergies:  Allergies   Allergen Reactions     Ciprofloxacin HIVES    Mold REACTIVE AIRWAY DISEASE     Mold and smut    Sulfa Antibiotics HIVES     Bactrim (Sulfamethoxazole/TMP)    Diovan [Valsartan] NAUSEA AND VOMITING    Omnicef NAUSEA ONLY and DIZZINESS    Clindamycin DIARRHEA and NAUSEA ONLY    Green Pepper OTHER (SEE COMMENTS)     Sensitivity (all peppers - green, orange and red)    Lipitor [Atorvastatin Calcium] OTHER (SEE COMMENTS)     Leg cramping,    Pneumococcal Vaccines OTHER (SEE COMMENTS)     Pt states she had pneumo vax done at Dr. Boyd on 1/10/24- states developed redness, swelling,itching to the arm    Quinapril Hcl FATIGUE     ?  REVIEW OF SYSTEMS   ?  Review of Systems   Constitutional:  Positive for malaise/fatigue and weight loss. Negative for chills and fever.   HENT:  Positive for congestion, nosebleeds and tinnitus. Negative for hearing loss.    Eyes:  Negative for pain and redness.   Respiratory:  Positive for cough and shortness of breath. Negative for hemoptysis, sputum production and wheezing (only with pneumonia).    Cardiovascular:  Negative for chest pain and leg swelling.   Gastrointestinal:  Positive for abdominal pain and heartburn. Negative for blood in stool, constipation, diarrhea and nausea.   Genitourinary:  Negative for dysuria, frequency and urgency.        + stress incontinence   Musculoskeletal:  Positive for back pain, joint pain, myalgias and neck pain.   Skin:  Positive for rash. Negative for itching.   Neurological:  Positive for weakness (generalized) and headaches. Negative for dizziness.   Endo/Heme/Allergies:  Bruises/bleeds easily.     PHYSICAL EXAM   Today's Vitals:  Temperature Blood Pressure Heart Rate Resp Rate SpO2   Temp: 97.3 °F (36.3 °C) BP: 132/70 Pulse: 96 Resp: 18 SpO2: 95 %   ?  Current Weight Height BMI BSA Pain   Wt Readings from Last 1 Encounters:   04/22/24 159 lb (72.1 kg)    Height: 5' 1\" (154.9 cm) Body mass index is 30.04 kg/m². Body surface area is 1.71 meters squared.          Physical Exam  Vitals and nursing note reviewed.   Constitutional:       General: She is not in acute distress.     Appearance: Normal appearance. She is well-developed. She is not diaphoretic.   HENT:      Head: Normocephalic and atraumatic.   Eyes:      General: No scleral icterus.     Extraocular Movements: Extraocular movements intact.      Conjunctiva/sclera: Conjunctivae normal.   Neck:      Vascular: No JVD.      Trachea: No tracheal deviation.   Cardiovascular:      Rate and Rhythm: Normal rate and regular rhythm.      Heart sounds: Normal heart sounds. No murmur heard.  Pulmonary:      Effort: Pulmonary effort is normal. No respiratory distress.      Breath sounds: Normal breath sounds. No wheezing.   Musculoskeletal:         General: Deformity present. No swelling.      Cervical back: Neck supple.      Comments: Diffuse small heberden and joslyn nodes of the fingers, no basilar joint tenderness of the 1st CMC bilaterally. Slightly larger middle finger DIPs compared to last visit.   No other swelling or restriction of motion of the MCPs, wrists, elbows.   Right knee with moderate crepitus  Left knee post surgical   Lymphadenopathy:      Cervical: No cervical adenopathy.   Skin:     General: Skin is warm and dry.      Comments: No periungal erythema   Neurological:      Mental Status: She is alert and oriented to person, place, and time.      Cranial Nerves: No cranial nerve deficit.      Gait: Gait normal.   Psychiatric:         Mood and Affect: Mood normal.         Behavior: Behavior normal.       ?  Radiology review:       Narrative   PROCEDURE:  CT CHEST (CPT=71250)     COMPARISON:  [facility] CT, CT CHEST (CPT=71250), 2/28/2024, 2:44 PM.     INDICATIONS:  Shortness of breath, cough,hx of recurrent pneumonia     TECHNIQUE:  Unenhanced multislice CT scanning is performed through the chest.  Dose reduction techniques were used. Dose information is transmitted to the ACR (American College of  Radiology) NRDR (National Radiology Data Registry) which includes the Dose   Index Registry.     PATIENT STATED HISTORY: (As transcribed by Technologist)  Patient is here for shortness of breath and persistent cough.         FINDINGS:    LUNGS:  There has been interval improvement in right lung patchy infiltrates/airspace disease when compared to 2/28/2024.  The consolidative infiltrates in the right lower lobe and right upper lobe have near completely resolved.  There is some persistent   discoid opacity in the right upper lobe with mild consolidation with air bronchograms, images 46 through 54. There is discoid atelectasis and linear atelectasis in the lung bases and lingula.  Stable subpleural nodules in the left lung measuring 4 mm in   the lingula and 6 mm left lower lobe.  3 mm subpleural nodule in the right lung base previously obscured by consolidation.  There is mild bilateral bronchiectasis.  VASCULATURE:  Pulmonary vessels are unremarkable within the limits of a noncontrast CT.    YAYA:  No enlarged adenopathy.    MEDIASTINUM:  No enlarged adenopathy.    CARDIAC:  There is coronary artery calcification.    PLEURA:  No pneumothorax or effusion.    THORACIC AORTA:  No aneurysm  CHEST WALL:  No enlarged axillary adenopathy.    LIMITED ABDOMEN:  Limited images of the upper abdomen are unremarkable.    BONES:  Multilevel endplate hypertrophic changes with degenerative disc disease thoracic spine.                    Impression   CONCLUSION:  There is thin near complete resolution of patchy infiltrates and consolidation in the right lower lobe and interval improvement in consolidation and infiltrates in the right upper lobe since 2/28/2024.  There is some persistent consolidation   in the right upper lobe with bibasilar atelectasis.  Recommend continued follow-up to assess for complete resolution.     Stable bilateral subpleural pulmonary nodules largest in the left lung base measuring 6 mm.     LOCATION:   [id number]        Dictated by (CST): Mita Caruso MD on 3/28/2024 at 4:22 PM      Finalized by (CST): Mita Caruso MD on 3/28/2024 at 4:29 PM       Narrative   PROCEDURE:  CT APPENDIX ABD/PEL W CONTRAST (CPT=74177)     COMPARISON:  [facility] , CT, CT ABDOMEN+PELVIS KIDNEYSTONE 2D RNDR(NO IV,NO ORAL)(CPT=74176), 2/05/2024, 7:52 PM.     INDICATIONS:  abdominal pain, r/o appy     TECHNIQUE:  Axial helical acquisitions are obtained from through the abdomen and pelvis after bolus intravenous nonionic contrast administration.  Images of the right lower quadrant reconstructed at 2.5 mm. Coronal MPR imaging was obtained.  Dose  reduction techniques were used. Dose information is transmitted to the ACR (American College of Radiology) NRDR (National Radiology Data Registry) which includes the Dose Index Registry.     PATIENT STATED HISTORY:(As transcribed by Technologist)  Patient is here for RLQ abdominal pain.      CONTRAST USED:  100cc of Isovue 370     FINDINGS:    APPENDIX:  Wall thickening and dilation of the appendix with caliber of 12 mm.  No surrounding fluid or inflammatory change.  LIVER:  Normal liver morphology.  Lobular hypodensity with peripheral calcification noted within the lateral segment left hepatic lobe measuring 4.4 x 3.6 cm in cross-section.  This measures greater than simple fluid attenuation.  Additional  subcentimeter hypodensity of the anterior right hepatic lobe is too small to characterize (series 3, image 35).  BILIARY:  Cholecystectomy with expected prominence of the common bile duct.  PANCREAS:  Moderate, diffuse parenchymal atrophy, particularly within the head and uncinate process.  SPLEEN:  No enlargement or focal lesion.    KIDNEYS:  No mass, obstruction, or calcification.    ADRENALS:  No mass or enlargement.    AORTA/VASCULAR:  No aneurysm.    RETROPERITONEUM:  No mass or adenopathy.    BOWEL/MESENTERY:  No evidence of bowel inflammation or obstruction.  Few colonic diverticula  noted.  ABDOMINAL WALL:  No mass or hernia.    URINARY BLADDER:  No visible focal wall thickening, lesion, or calculus.    PELVIC NODES:  No adenopathy.    PELVIC ORGANS:  Hysterectomy.  BONES:  No bony lesion or fracture.  Osteoarthritis of the hips and spine.    LUNG BASES:  Stable appearance of the visualized lower chest.  OTHER:  Negative.                     Impression   CONCLUSION:       1. The appendix is dilated to a caliber of 12 mm with associated wall thickening.  No surrounding fluid or inflammatory stranding.  This may represent an appendiceal mucocele versus mucinous neoplasm.  Sequela of acute appendicitis felt less likely,  though not excluded.     2. Lobular hypodensity of the lateral segment left hepatic lobe (4.4 x 3.6 cm).  This may be neoplastic and warrants further nonemergent assessment with liver MRI with Dotarem contrast.        LOCATION:  [id number]        Dictated by (CST): Arlene Mills MD on 2/12/2024 at 11:32 AM      Finalized by (CST): Arlene Mills MD on 2/12/2024 at 11:42 AM       Narrative   PROCEDURE:  MRI SPINE LUMBAR (CPT=72148)     COMPARISON:  [facility], , MRI SPINE LUMBAR (CPT=72148), 8/26/2020, 9:42 AM.     INDICATIONS:  M46.96 Inflammatory spondylopathy of lumbar region (HCC) M54.16 Lumbar radiculopathy     TECHNIQUE:  Multiplanar T1 and T2 weighted images including fat suppression sequences.  Images acquired in sagittal and axial planes.       FINDINGS:  Minimal rightward curvature.  There is normal lumbar lordosis with anatomic alignment.  Vertebral body heights are well-maintained.  Moderate degenerative disc space loss, disc desiccation, endplate spurring, and degenerative endplate marrow signal  changes throughout the lumbar spine, most pronounced at L4-5.  No focal worrisome marrow signal abnormality is seen.  Scattered areas of focal fat and/or hemangiomas noted in the visualized marrow space.     The distal spinal cord and conus medullaris have a normal signal and  morphology.  The conus medullaris terminates at the mid L2 level.  The roots of the cauda equina are unremarkable.  No focal mass or fluid collection is seen in the lumbar spinal canal.    The paraspinal soft tissues are unremarkable.     Scattered degenerative changes in the partially imaged lower thoracic spine can be further assessed with a dedicated MRI of the thoracic spine as clinically directed.     T12-L1:  Diffuse disc bulge with endplate osteophytes and a small superimposed right foraminal disc protrusion.  Mild facet arthropathy.  No significant spinal canal stenosis.  Mild to moderate right neural foraminal stenosis. No significant interval  change.     L1-2:  Diffuse disc bulge with endplate osteophytes and interval increase in size of a superimposed left paracentral/foraminal disc protrusion.  Mild facet arthropathy with thickening of the ligamentum flavum.  No significant spinal canal stenosis.    Interval worsening of moderate left neural foraminal stenosis.     L2-3:  Diffuse disc bulge with endplate osteophytes and a small superimposed left foraminal disc protrusion.  Mild facet arthropathy.  No significant spinal canal stenosis.  Mild left neural foraminal stenosis. No significant interval change.     L3-4:  Diffuse disc bulge with endplate osteophytes and a small superimposed left paracentral disc protrusion with a corresponding annular fissure.  Mild facet arthropathy.  There is no significant spinal canal or neural foraminal stenosis. No  significant interval change.     L4-5:  Diffuse disc bulge with endplate osteophytes and a small superimposed right paracentral/foraminal disc protrusion.  Mild facet arthropathy.  No significant spinal canal stenosis.  Moderate right neural foraminal stenosis. No significant interval  change.     L5-S1:  Diffuse disc bulge with endplate osteophytes and a superimposed left paracentral/foraminal/far lateral disc protrusion.  Mild facet arthropathy.  No  significant spinal canal stenosis.  Moderate left neural foraminal stenosis. No significant  interval change.                      Impression   CONCLUSION:       1. Interval increase in size of a left paracentral/ foraminal disc protrusion at L1-2 resulting in interval worsening of moderate left neural foraminal stenosis at L1-2.     2. The remaining multilevel degenerative changes lumbar spine are otherwise unchanged.  No significant spinal canal stenosis at any level in the lumbar spine.     3. Stable mild-to-moderate right neural foraminal stenosis at T12-L1.  Stable mild left neural foraminal stenosis at L2-3.  Stable moderate right neural foraminal stenosis at L4-5.  Stable moderate left neural foraminal stenosis at L5-S1.     4. Facet arthropathy throughout the lumbar spine as above.     Please see above for further details.     LOCATION:  [id number]        Dictated by (CST): Rodger Roberts MD on 10/02/2023 at 1:57 PM      Finalized by (CST): Rodger Roberts MD on 10/02/2023 at 2:13 PM       PROCEDURE:  XR DEXA BONE DENSITOMETRY (CPT=77080)     COMPARISON:  [facility], XR, XR DEXA BONE DENSITOMETRY (CPT=77080), 9/09/2020, 2:04 PM.  [facility], XR, XR DEXA BONE DENSITOMETRY (CPT=77080), 4/04/2018, 9:37 AM.     INDICATIONS:    Z78.0 Postmenopausal     PATIENT STATED HISTORY: (As transcribed by Technologist)  Screening.          LUMBAR SPINE ANALYSIS RESULTS:      Bone mineral density (BMD) (g/cm2):  0.979    Lumbar T-Score:  -0.6      % young normals:  94      % age matched controls:  127      Change from prior spine examination:  -2.1%              TOTAL HIP ANALYSIS RESULTS:        Bone mineral density (BMD) (g/cm2):  0.644      Total Hip T-Score:  -2.4      % young normals:  68      % age matched controls:  90      Change from prior hip examination:  -2.1%              FEMORAL NECK ANALYSIS RESULTS:        Bone mineral density (BMD) (g/cm2):  0.571      Femoral neck T-Score:  -2.5      % young normals:  67       % age matched controls:  94      Change from prior hip examination:  -5.4*%            ADDITIONAL FINDINGS:  No significant additional findings.                     Impression   CONCLUSION:  Bone mineral density values for left femoral neck are compatible with the diagnosis of osteoporosis by WHO definition (T score less than -2.5). If therapy is initiated, a follow-up study in 1 year may aid in evaluation of therapeutic  efficacy.           The World Health Organization has defined the following categories based on bone density:  Normal bone:  T-score better than -1  Osteopenia: T-score between -1 and -2.5  Osteoporosis:  T-score less than -2.5        LOCATION:  [place] [id number]              Dictated by (CST): Tony Shukla MD on 6/23/2023 at 1:32 PM      Finalized by (CST): Tony Shukla MD on 6/23/2023 at 1:33 PM       Narrative   PROCEDURE:  MRI SPINE LUMBAR (CPT=72148)     COMPARISON:  [facility], MRI SPINE LUMBAR (CPT=72148), 8/26/2020, 9:42 AM.     INDICATIONS:  M46.96 Inflammatory spondylopathy of lumbar region (HCC) M54.16 Lumbar radiculopathy     TECHNIQUE:  Multiplanar T1 and T2 weighted images including fat suppression sequences.  Images acquired in sagittal and axial planes.       FINDINGS:  Minimal rightward curvature.  There is normal lumbar lordosis with anatomic alignment.  Vertebral body heights are well-maintained.  Moderate degenerative disc space loss, disc desiccation, endplate spurring, and degenerative endplate marrow signal  changes throughout the lumbar spine, most pronounced at L4-5.  No focal worrisome marrow signal abnormality is seen.  Scattered areas of focal fat and/or hemangiomas noted in the visualized marrow space.     The distal spinal cord and conus medullaris have a normal signal and morphology.  The conus medullaris terminates at the mid L2 level.  The roots of the cauda equina are unremarkable.  No focal mass or fluid collection is seen in the lumbar spinal canal.     The paraspinal soft tissues are unremarkable.     Scattered degenerative changes in the partially imaged lower thoracic spine can be further assessed with a dedicated MRI of the thoracic spine as clinically directed.     T12-L1:  Diffuse disc bulge with endplate osteophytes and a small superimposed right foraminal disc protrusion.  Mild facet arthropathy.  No significant spinal canal stenosis.  Mild to moderate right neural foraminal stenosis. No significant interval  change.     L1-2:  Diffuse disc bulge with endplate osteophytes and interval increase in size of a superimposed left paracentral/foraminal disc protrusion.  Mild facet arthropathy with thickening of the ligamentum flavum.  No significant spinal canal stenosis.    Interval worsening of moderate left neural foraminal stenosis.     L2-3:  Diffuse disc bulge with endplate osteophytes and a small superimposed left foraminal disc protrusion.  Mild facet arthropathy.  No significant spinal canal stenosis.  Mild left neural foraminal stenosis. No significant interval change.     L3-4:  Diffuse disc bulge with endplate osteophytes and a small superimposed left paracentral disc protrusion with a corresponding annular fissure.  Mild facet arthropathy.  There is no significant spinal canal or neural foraminal stenosis. No  significant interval change.     L4-5:  Diffuse disc bulge with endplate osteophytes and a small superimposed right paracentral/foraminal disc protrusion.  Mild facet arthropathy.  No significant spinal canal stenosis.  Moderate right neural foraminal stenosis. No significant interval  change.     L5-S1:  Diffuse disc bulge with endplate osteophytes and a superimposed left paracentral/foraminal/far lateral disc protrusion.  Mild facet arthropathy.  No significant spinal canal stenosis.  Moderate left neural foraminal stenosis. No significant  interval change.         Impression   CONCLUSION:       1. Interval increase in size of a left  paracentral/ foraminal disc protrusion at L1-2 resulting in interval worsening of moderate left neural foraminal stenosis at L1-2.     2. The remaining multilevel degenerative changes lumbar spine are otherwise unchanged.  No significant spinal canal stenosis at any level in the lumbar spine.     3. Stable mild-to-moderate right neural foraminal stenosis at T12-L1.  Stable mild left neural foraminal stenosis at L2-3.  Stable moderate right neural foraminal stenosis at L4-5.  Stable moderate left neural foraminal stenosis at L5-S1.     4. Facet arthropathy throughout the lumbar spine as above.     Please see above for further details.     LOCATION:  [id number]        Dictated by (CST): Rodger Roberts MD on 10/02/2023 at 1:57 PM      Finalized by (CST): Rodger Roberts MD on 10/02/2023 at 2:13 PM       PROCEDURE:  XR KNEE (1 OR 2 VIEWS), LEFT (CPT=73560)     COMPARISON:  [facility] , XR, XR KNEE (1 OR 2 VIEWS), LEFT (CPT=73560), 7/31/2020, 5:29 PM.     INDICATIONS:  fell, +hit head no LOC, +xarelto left knee pain     PATIENT STATED HISTORY: (As transcribed by Technologist)  Patient stated she fell and is has pain and bruising on thte anterior part of her knee.         FINDINGS:    BONES:  Status post left knee arthroplasty.  No evidence of hardware failure.  No acute fracture or dislocation.  SOFT TISSUES:  Negative.  No visible soft tissue swelling.  EFFUSION:  None visible.  OTHER:  Negative.                   Impression   CONCLUSION:  No acute fracture or dislocation in the left knee.        LOCATION:  [id number]        Dictated by (CST): Kamari Mir MD on 8/09/2023 at 5:43 PM      Finalized by (CST): Kamari Mir MD on 8/09/2023 at 5:44 PM       PROCEDURE:  XR SHOULDER, COMPLETE (MIN 2 VIEWS), RIGHT (CPT=73030)     TECHNIQUE:  Multiple views were obtained.     COMPARISON:  None.     INDICATIONS:  fell, +hit head no LOC, +xarelto right shoulder pain.     PATIENT STATED HISTORY: (As transcribed by Technologist)  Patient  stated she fell and is having pain on the neck and the posterior part of her shoulder.         FINDINGS:  No acute fracture or dislocation.  Narrowing of the rotator cuff interval is noted with mild superior subluxation of the wrist is present.  These findings may represent rotator cuff injury.     The AC joint is intact with mild DJD. Soft tissues are within normal limits.                   Impression   CONCLUSION:  No acute fracture or dislocation.  Findings suggestive of rotator cuff injury     LOCATION:  [facility]        Dictated by (CST): Kamari Mir MD on 8/09/2023 at 5:42 PM      Finalized by (CST): Kamari Mir MD on 8/09/2023 at 5:43 PM       PROCEDURE:  XR DEXA BONE DENSITOMETRY (CPT=77080)     COMPARISON:  [facility], XR, XR DEXA BONE DENSITOMETRY (CPT=77080), 9/09/2020, 2:04 PM.  [facility], XR, XR DEXA BONE DENSITOMETRY (CPT=77080), 4/04/2018, 9:37 AM.     INDICATIONS:    Z78.0 Postmenopausal     PATIENT STATED HISTORY: (As transcribed by Technologist)  Screening.          LUMBAR SPINE ANALYSIS RESULTS:      Bone mineral density (BMD) (g/cm2):  0.979    Lumbar T-Score:  -0.6      % young normals:  94      % age matched controls:  127      Change from prior spine examination:  -2.1%              TOTAL HIP ANALYSIS RESULTS:        Bone mineral density (BMD) (g/cm2):  0.644      Total Hip T-Score:  -2.4      % young normals:  68      % age matched controls:  90      Change from prior hip examination:  -2.1%              FEMORAL NECK ANALYSIS RESULTS:        Bone mineral density (BMD) (g/cm2):  0.571      Femoral neck T-Score:  -2.5      % young normals:  67      % age matched controls:  94      Change from prior hip examination:  -5.4*%            ADDITIONAL FINDINGS:  No significant additional findings.                     Impression   CONCLUSION:  Bone mineral density values for left femoral neck are compatible with the diagnosis of osteoporosis by WHO definition (T score less than -2.5). If therapy  is initiated, a follow-up study in 1 year may aid in evaluation of therapeutic  efficacy.           The World Health Organization has defined the following categories based on bone density:  Normal bone:  T-score better than -1  Osteopenia: T-score between -1 and -2.5  Osteoporosis:  T-score less than -2.5        LOCATION:  [id number]              Dictated by (CST): Tony Shukla MD on 6/23/2023 at 1:32 PM      Finalized by (CST): Tony Shukla MD on 6/23/2023 at 1:33 PM         PROCEDURE:  MRI SPINE CERVICAL (CPT=72141)     COMPARISON:  [facility] , CT, CT SPINE CERVICAL (CPT=72125), 7/31/2020, 4:30 PM.     INDICATIONS:  M54.16 Radiculopathy, lumbar region     TECHNIQUE:  Multiplanar T1 and T2 weighted images including fat suppression sequences.  Images acquired in sagittal and axial planes.       PATIENT STATED HISTORY: (As transcribed by Technologist)  Per patient, uncontrollable falling for two months.        FINDINGS:  There is normal cervical lordosis with anatomic alignment.  Vertebral body heights are well-maintained.  Mild to moderate degenerative disc space loss, endplate spurring, and degenerative endplate marrow signal changes scattered in the cervical spine,   most pronounced at C6-7.  No focal worrisome marrow signal abnormality is seen.  Incidental small effusion noted associated with the C1-2 articulation where there are mild erosive arthritic changes noted and mild pannus formation.     The cervical spinal cord has a normal course and caliber.  No focal cord signal abnormality is seen.  No focal mass or fluid collection is seen in the cervical spinal canal.  The paraspinal soft tissues are unremarkable.  The craniocervical junction is   unremarkable.     C2-3: There is a minimal posterior disk osteophyte complex with minimal uncovertebral and facet joint degenerative changes.  There is no significant spinal canal or neural foraminal stenosis.     C3-4: There is a small posterior disk osteophyte  complex with mild uncovertebral and facet joint degenerative changes.  There is no significant spinal canal or neural foraminal stenosis.     C4-5: There is a posterior disk osteophyte complex with mild-to-moderate uncovertebral and facet joint degenerative changes.  There is no significant spinal canal stenosis.  There is mild to moderate bilateral neural foraminal stenosis.     C5-6: There is a posterior disk osteophyte complex with mild-to-moderate uncovertebral and facet joint degenerative changes.  There is no significant spinal canal or neural foraminal stenosis.     C6-7: There is a posterior disk osteophyte complex with mild to moderate uncovertebral and facet joint degenerative changes.  There is no significant spinal canal stenosis.  There is mild bilateral neural foraminal stenosis.     C7-T1: There is a small posterior disk osteophyte complex with minimal uncovertebral and facet joint degenerative changes.  There is no significant spinal canal or neural foraminal stenosis.     Scattered small disc protrusions in the partially imaged upper thoracic spine could be further assessed with dedicated thoracic spine MRI as clinically directed.     =====  CONCLUSION:       1. Multilevel degenerative changes in the cervical spine as above without significant spinal canal stenosis at any level.     2. Mild to moderate bilateral neural foraminal stenosis at C4-5.  Mild bilateral neural foraminal stenosis at C6-7.     3. Incidental small effusion noted associated with the C1-2 articulation where there are mild erosive arthritic changes noted and mild pannus formation.      4. No focal spinal cord signal abnormality identified.     Please see above for further details.         Dictated by (CST): Rodger Roberts MD on 8/26/2020 at 10:33 AM           09/21/2020  X-rays 3 views of the left wrist show a healed Colles' fracture.  There is significant arthritic change at the wrist and carpal metacarpal joints.     X-rays 3  views of the right elbow show a healed radial head and neck fracture.  The joint is well reduced.  Dr. Torre     Labs:  Lab Results   Component Value Date    WBC 4.7 03/29/2024    RBC 3.49 (L) 03/29/2024    HGB 10.2 (L) 03/29/2024    HCT 31.8 (L) 03/29/2024    .0 (L) 03/29/2024    MCV 91.1 03/29/2024    MCH 29.2 03/29/2024    MCHC 32.1 03/29/2024    RDW 13.7 03/29/2024    NEPRELIM 2.08 03/29/2024    NEUTABS 9.36 (H) 03/08/2016    LYMPHABS 0.52 (L) 03/08/2016    EOSABS 0.00 03/08/2016    BASABS 0.00 03/08/2016    NEUT 67 03/08/2016    LYMPH 5 03/08/2016    MON 5 03/08/2016    EOS 0 03/08/2016    BASO 0 03/08/2016    NEPERCENT 44.5 03/29/2024    LYPERCENT 38.2 03/29/2024    MOPERCENT 8.5 03/29/2024    EOPERCENT 7.7 03/29/2024    BAPERCENT 0.9 03/29/2024    NE 2.08 03/29/2024    LYMABS 1.79 03/29/2024    MOABSO 0.40 03/29/2024    EOABSO 0.36 03/29/2024    BAABSO 0.04 03/29/2024     Lab Results   Component Value Date     (H) 04/08/2024    BUN 19 04/08/2024    BUNCREA 26.1 (H) 06/03/2021    CREATSERUM 0.95 04/08/2024    ANIONGAP 8 04/08/2024    GFR 70 12/07/2017    GFRNAA 58 (L) 06/13/2022    GFRAA 67 06/13/2022    CA 9.6 04/08/2024    OSMOCALC 289 04/08/2024    ALKPHO 63 04/08/2024    AST 35 04/08/2024    ALT 24 04/08/2024    BILT 0.5 04/08/2024    TP 6.8 04/08/2024    ALB 3.9 04/08/2024    GLOBULIN 2.9 04/08/2024    AGRATIO 2.2 06/15/2015     04/08/2024    K 4.7 04/08/2024     04/08/2024    CO2 22.0 04/08/2024       Additional Labs:  04/02/2024  DARREN 1: 80 homogenous  dsDNA negative  SSA, SSB, Smith, U1 RNP, RNP 70, centromere, SCL 70, Kathy 1 negative  C1q immune complex negative  C3 109 normal  SPEP with no monoclonal protein seen on electrophoresis  Lupus anticoagulant positive  Beta-2 glycoprotein and ACL negative  ESR 7 normal  CRP 1.15 elevated  IgG subclasses normal    03/26/2024  DARREN screen positive  RF negative  ESR 9 normal  CRP normal  C ANCA negative  P ANCA negative  Atypical  p-ANCA negative  dsDNA 5 borderline  SM/RNP 3.1 positive  Sanches, SSA, SSB, centromere, SCL 70, chromatin, ribosomal P, RNP, Kathy 1 negative    02/2024  IgG 542 low  IgA, IgM normal    07/2021  Uric acid 5.6  CRP normal  ESR 15 normal    11/2018  Uric acid 7.0  CCP normal   RF 14 (N)  DARREN negative     10/2018  Vit D 48.7  Vit B12 373  Hep C nonreactive  CRP 0.46 (N)  RF 15 (N<15)  ESR 36 (given age and sex, this is normal)     Makayla Farley,   [facility]  04/22/2024

## 2024-04-22 NOTE — TELEPHONE ENCOUNTER
Future Appointments   Date Time Provider Department Center   5/2/2024  9:30 AM Monica Curran APRN [facility]

## 2024-04-22 NOTE — PATIENT INSTRUCTIONS
-- get updated labs but doubt active lupus   -- follow back with both immunology and pulmonology  -- start magnesium glycinate to see if helps with some muscle aches   -- keep follow up with me in 4 weeks  -- will be in touch with results when available    Dr. Farley

## 2024-04-24 NOTE — TELEPHONE ENCOUNTER
Spoke with patient and notified needing to hematology. She is aware future fills should come from hematology. Patient states she does have the contact information to schedule.

## 2024-04-25 LAB
DSDNA AB TITR SER: <10 {TITER}
NUCLEAR IGG TITR SER IF: POSITIVE {TITER}

## 2024-04-26 LAB — ANA NUCLEOLAR TITR SER IF: 80 {TITER}

## 2024-04-30 LAB
CENTROMERE IGG SER-ACNC: <0.4 U/ML
ENA JO1 AB SER IA-ACNC: 0.3 U/ML
ENA RNP IGG SER IA-ACNC: 0.4 U/ML
ENA SCL70 IGG SER IA-ACNC: <0.6 U/ML
ENA SM IGG SER IA-ACNC: <0.7 U/ML
ENA SS-A IGG SER IA-ACNC: 0.4 U/ML
ENA SS-B IGG SER IA-ACNC: 0.5 U/ML
U1 SNRNP IGG SER IA-ACNC: 0.9 U/ML

## 2024-05-02 ENCOUNTER — OFFICE VISIT (OUTPATIENT)
Dept: INTERNAL MEDICINE CLINIC | Facility: CLINIC | Age: 79
End: 2024-05-02
Payer: MEDICARE

## 2024-05-02 VITALS
WEIGHT: 160.81 LBS | SYSTOLIC BLOOD PRESSURE: 102 MMHG | HEART RATE: 94 BPM | DIASTOLIC BLOOD PRESSURE: 54 MMHG | TEMPERATURE: 98 F | RESPIRATION RATE: 16 BRPM | BODY MASS INDEX: 30.36 KG/M2 | HEIGHT: 61.02 IN | OXYGEN SATURATION: 97 %

## 2024-05-02 DIAGNOSIS — R53.83 FATIGUE, UNSPECIFIED TYPE: ICD-10-CM

## 2024-05-02 DIAGNOSIS — G43.009 MIGRAINE WITHOUT AURA AND WITHOUT STATUS MIGRAINOSUS, NOT INTRACTABLE: ICD-10-CM

## 2024-05-02 DIAGNOSIS — D80.3 IGG DEFICIENCY (HCC): ICD-10-CM

## 2024-05-02 DIAGNOSIS — Z00.00 ROUTINE GENERAL MEDICAL EXAMINATION AT A HEALTH CARE FACILITY: Primary | ICD-10-CM

## 2024-05-02 DIAGNOSIS — F33.1 MAJOR DEPRESSIVE DISORDER, RECURRENT EPISODE, MODERATE (HCC): Chronic | ICD-10-CM

## 2024-05-02 DIAGNOSIS — G47.33 OSA ON CPAP: ICD-10-CM

## 2024-05-02 DIAGNOSIS — Z86.718 HISTORY OF DVT (DEEP VEIN THROMBOSIS): ICD-10-CM

## 2024-05-02 DIAGNOSIS — E53.8 B12 DEFICIENCY: ICD-10-CM

## 2024-05-02 DIAGNOSIS — M47.817 SPONDYLOSIS OF LUMBOSACRAL REGION WITHOUT MYELOPATHY OR RADICULOPATHY: ICD-10-CM

## 2024-05-02 DIAGNOSIS — I25.10 CORONARY ARTERY DISEASE INVOLVING NATIVE CORONARY ARTERY OF NATIVE HEART WITHOUT ANGINA PECTORIS: ICD-10-CM

## 2024-05-02 DIAGNOSIS — M54.12 CERVICAL RADICULOPATHY: ICD-10-CM

## 2024-05-02 DIAGNOSIS — Z86.711 HISTORY OF PULMONARY EMBOLISM: ICD-10-CM

## 2024-05-02 DIAGNOSIS — Z87.01 HISTORY OF PNEUMONIA, RECURRENT: ICD-10-CM

## 2024-05-02 DIAGNOSIS — Z85.3 HISTORY OF LEFT BREAST CANCER: ICD-10-CM

## 2024-05-02 DIAGNOSIS — M81.0 AGE-RELATED OSTEOPOROSIS WITHOUT CURRENT PATHOLOGICAL FRACTURE: ICD-10-CM

## 2024-05-02 DIAGNOSIS — J45.30 MILD PERSISTENT ASTHMA WITHOUT COMPLICATION (HCC): ICD-10-CM

## 2024-05-02 DIAGNOSIS — E11.9 TYPE 2 DIABETES MELLITUS WITHOUT COMPLICATION, WITHOUT LONG-TERM CURRENT USE OF INSULIN (HCC): ICD-10-CM

## 2024-05-02 DIAGNOSIS — R06.09 DOE (DYSPNEA ON EXERTION): ICD-10-CM

## 2024-05-02 DIAGNOSIS — E04.9 ENLARGED THYROID: ICD-10-CM

## 2024-05-02 DIAGNOSIS — G56.02 CARPAL TUNNEL SYNDROME OF LEFT WRIST: ICD-10-CM

## 2024-05-02 DIAGNOSIS — M17.11 PRIMARY OSTEOARTHRITIS OF RIGHT KNEE: ICD-10-CM

## 2024-05-02 DIAGNOSIS — I10 PRIMARY HYPERTENSION: ICD-10-CM

## 2024-05-02 DIAGNOSIS — D69.6 THROMBOCYTOPENIA (HCC): ICD-10-CM

## 2024-05-02 DIAGNOSIS — I70.0 ATHEROSCLEROSIS OF AORTA (HCC): ICD-10-CM

## 2024-05-02 DIAGNOSIS — G93.0 CYST OF BRAIN: ICD-10-CM

## 2024-05-02 DIAGNOSIS — E78.00 PURE HYPERCHOLESTEROLEMIA: ICD-10-CM

## 2024-05-02 DIAGNOSIS — Z12.31 ENCOUNTER FOR SCREENING MAMMOGRAM FOR MALIGNANT NEOPLASM OF BREAST: ICD-10-CM

## 2024-05-02 DIAGNOSIS — Z87.898 HISTORY OF SYNCOPE: ICD-10-CM

## 2024-05-02 DIAGNOSIS — D64.9 ANEMIA, UNSPECIFIED TYPE: ICD-10-CM

## 2024-05-02 DIAGNOSIS — R76.8 POSITIVE ANA (ANTINUCLEAR ANTIBODY): ICD-10-CM

## 2024-05-02 DIAGNOSIS — F41.9 ANXIETY: ICD-10-CM

## 2024-05-02 DIAGNOSIS — G89.4 CHRONIC PAIN SYNDROME: ICD-10-CM

## 2024-05-02 DIAGNOSIS — Z98.1 S/P CERVICAL SPINAL FUSION: ICD-10-CM

## 2024-05-02 DIAGNOSIS — M53.3 SACROILIAC JOINT DYSFUNCTION: ICD-10-CM

## 2024-05-02 DIAGNOSIS — H54.7 VISUAL IMPAIRMENT: ICD-10-CM

## 2024-05-02 DIAGNOSIS — Z85.828 HISTORY OF BASAL CELL CARCINOMA: ICD-10-CM

## 2024-05-02 DIAGNOSIS — R16.0 LIVER MASS: ICD-10-CM

## 2024-05-02 DIAGNOSIS — M48.02 CERVICAL SPINAL STENOSIS: ICD-10-CM

## 2024-05-02 DIAGNOSIS — K21.9 GASTROESOPHAGEAL REFLUX DISEASE WITHOUT ESOPHAGITIS: ICD-10-CM

## 2024-05-02 DIAGNOSIS — G62.9 NEUROPATHY: ICD-10-CM

## 2024-05-02 DIAGNOSIS — R25.1 TREMOR OF BOTH HANDS: ICD-10-CM

## 2024-05-02 PROBLEM — R10.31 RLQ ABDOMINAL PAIN: Status: RESOLVED | Noted: 2024-02-12 | Resolved: 2024-05-02

## 2024-05-02 PROBLEM — R91.8 BILATERAL PULMONARY INFILTRATES: Status: RESOLVED | Noted: 2024-03-28 | Resolved: 2024-05-02

## 2024-05-02 PROBLEM — R09.02 HYPOXIA: Status: RESOLVED | Noted: 2024-02-27 | Resolved: 2024-05-02

## 2024-05-02 PROCEDURE — G0439 PPPS, SUBSEQ VISIT: HCPCS | Performed by: NURSE PRACTITIONER

## 2024-05-02 PROCEDURE — 99214 OFFICE O/P EST MOD 30 MIN: CPT | Performed by: NURSE PRACTITIONER

## 2024-05-02 NOTE — PROGRESS NOTES
Subjective:   Rayne Morales is a 78 year old female who presents for a Medicare Subsequent Annual Wellness visit (Pt already had Initial Annual Wellness) and Here for AWV and follow up of chronic health issues.  .   complicated medical history. She has been in and out of the hospital recently with recurrent pneumonia and question of IgG deficiency.      Dr Reyes Farley/Lichon     reclast October. Dexa 6/23 -2.5 FN  Dr Howard Christian derm  Hematology  s calling for appt with Dr Rahman to re establish care   continues on xarelto    for hx DVT/PE  Outside pain service getting back injections next week.        Dyslipidemia.  Crestor 10mg.  LDL 31  DM  ozempic metformin   A1c 6.9  Depression / anxiety duloxetine.   liver mass. Pt states known hemangioma   Noted on CT appendix in 2/24.  Recommendation for MRI with Dotarem contrast.  She does have multiple things going on right now but needs further w/u of this in addition to above.     Looking to have her other knee replaced (right) with Dr Torre.  This has been postponed due to multiple issues above.  Her activity is significantly limited by her OA.        History/Other:   Fall Risk Assessment:   She has been screened for Falls and is High Risk. Fall Prevention information provided to patient in After Visit Summary.    Do you feel unsteady when standing or walking?: Yes  Do you worry about falling?: Yes  Have you fallen in the past year?: Yes  How many times have you fallen?: 3  Were you injured?: No     Cognitive Assessment:   She had a completely normal cognitive assessment - see flowsheet entries       Functional Ability/Status:   Rayne Morales has some abnormal functions as listed below:  She has Meal Preparation difficulties based on screening of functional status.She has difficulties Managing Money/Bills based on screening of functional status.She has difficulties Shopping for Groceries based on screening of  functional status. She has Vision problems based on screening of functional status. She has Walking problems based on screening of functional status.       Depression Screening (PHQ-2/PHQ-9): PHQ-2 SCORE: 0  , done 5/2/2024            Advanced Directives:   She does have a Living Will but we do NOT have it on file in Epic.    She does have a POA but we do NOT have it [facility].    Patient has Advance Care Planning documents present in EMR. Reviewed documents with patient (and family/surrogate if present).      Patient Active Problem List   Diagnosis    Fatigue    Primary hypertension    Pure hypercholesterolemia    Anxiety    Age-related osteoporosis without current pathological fracture    B12 deficiency    Spondylosis of lumbosacral region    Sacroiliac joint dysfunction    History of syncope    History of pulmonary embolism    Asthma (HCC)    Cyst of brain    History of basal cell carcinoma    History of pneumonia, recurrent    Thrombocytopenia (HCC)    Gastroesophageal reflux disease without esophagitis    Enlarged thyroid    History of left breast cancer    Carpal tunnel syndrome of left wrist    Atherosclerosis of aorta (HCC)    MUKUND on CPAP    Chronic pain syndrome    Tremor of both hands    Migraine without aura or status migrainosus    Major depressive disorder, recurrent episode, moderate (HCC)    Coronary artery disease involving native coronary artery of native heart without angina pectoris    Visual impairment    S/P cervical spinal fusion    Neuropathy    IgG deficiency (HCC)    History of DVT (deep vein thrombosis)    Cervical spinal stenosis    Cervical radiculopathy    PHILLIPS (dyspnea on exertion)    Type 2 diabetes mellitus without complication, without long-term current use of insulin (HCC)    Anemia, unspecified type    Primary osteoarthritis of right knee    Liver mass    Abnormal CT of the abdomen    Positive DARREN (antinuclear antibody)     Allergies:  She is allergic to ciprofloxacin, mold,  sulfa antibiotics, diovan [valsartan], omnicef, clindamycin, green pepper, lipitor [atorvastatin calcium], pneumococcal vaccines, and quinapril hcl.    Current Medications:  Outpatient Medications Marked as Taking for the 5/2/24 encounter (Office Visit) with Monica Curran APRN   Medication Sig    rivaroxaban (XARELTO) 20 MG Oral Tab Take 1 tablet (20 mg total) by mouth daily.    oxyCODONE-acetaminophen  MG Oral Tab     ROSUVASTATIN 10 MG Oral Tab Take 1 tablet (10 mg total) by mouth nightly.    OZEMPIC, 0.25 OR 0.5 MG/DOSE, 2 MG/3ML Subcutaneous Solution Pen-injector Inject 0.5 mg into the skin once a week.    lisinopril 20 MG Oral Tab Take 1 tablet (20 mg total) by mouth daily.    tiZANidine 4 MG Oral Tab Take 1 tablet (4 mg total) by mouth every 8 (eight) hours as needed. Takes every night    metFORMIN  MG Oral Tablet 24 Hr Take 1 tablet (500 mg total) by mouth 2 (two) times daily with meals.    pregabalin 100 MG Oral Cap Take 1 capsule (100 mg total) by mouth in the morning and 1 capsule (100 mg total) at noon and 1 capsule (100 mg total) in the evening.    Naloxone HCl 4 MG/0.1ML Nasal Liquid 4 mg by Nasal route as needed. If patient remains unresponsive, repeat dose in other nostril 2-5 minutes after first dose.    metroNIDAZOLE 0.75 % External Gel Apply 1 g topically 2 (two) times daily. Apply to face twice daily    DULOXETINE 30 MG Oral Cap DR Particles Take 1 capsule (30 mg total) by mouth daily. To take along with 60mg to equal 90mg daily.    PANTOPRAZOLE 40 MG Oral Tab EC Take 1 tablet (40 mg total) by mouth before breakfast.    DULoxetine 60 MG Oral Cap DR Particles Take 1 capsule (60 mg total) by mouth daily.    albuterol 108 (90 Base) MCG/ACT Inhalation Aero Soln Inhale 2 puffs into the lungs every 6 (six) hours as needed for Wheezing.    aspirin 81 MG Oral Chew Tab Chew 1 tablet (81 mg total) by mouth daily.    Fluticasone Propionate 50 MCG/ACT Nasal Suspension 1 spray by Nasal route 2  (two) times daily.    Cholecalciferol (VITAMIN D) 2000 units Oral Tab Take 2,000 Units by mouth daily.       Medical History:  She  has a past medical history of Abdominal pain, Anemia, Anxiety, Arrhythmia, Arthritis (2000), Asthma (HCC), Back pain (1966), Back problem, Black stools, Bloating, Blurred vision, Breast CA ([facility]) (1999), Cancer ([facility]) (1999), Cervical radiculopathy, Cervical spinal stenosis, Deep vein thrombosis ([facility]), Depression, Diabetes mellitus ([facility]), Diarrhea, unspecified, Dizziness, Easy bruising, Esophageal reflux, Exposure to medical diagnostic radiation, Fatigue, Fitting and adjustment of vascular catheter, Headache disorder, Hearing loss, Heart attack ([facility]), Heartburn, Hemorrhoids, High blood pressure, High cholesterol, History of blood clots, History of COVID-19 (12/01/2020), History of depression, History of syncope (07/26/2014), IgG deficiency ([facility]), Indigestion, Leaking of urine, Migraines, Muscle weakness, Neuropathy, Osteoarthritis, Pain in joints (2000), Pneumonia due to organism, Pulmonary embolism ([facility]) (07/25/2015), Shortness of breath, Sleep apnea, Stress, Uncomfortable fullness after meals, Visual impairment, and Weight gain.  Surgical History:  She  has a past surgical history that includes colonoscopy (10/1/10, 4/21/17); abdomen surgery proc unlisted; hernia surgery; femur/knee surg unlisted; Breast Surgery; egd (04/21/2017); May use port for vascular access; cholecystectomy; lumpectomy left (Left, 1999); radiation left (Left, 1999); hysterectomy (1984); oophorectomy (Bilateral, 1984); repair ing hernia,5+y/o,reducibl; total abdom hysterectomy; other (11/2021); other (02/21/2022); other surgical history (2010); other surgical history (2016); other surgical history (02/2022); sigmoidoscopy,diagnostic; hemorrhoidectomy,int/ext,simple; spine surgery procedure unlisted; tonsillectomy; colonoscopy (N/A, 02/27/2023); cataract; Eye surgery; total knee replacement; and appendectomy.    Family History:  Her family history includes Breast Cancer (age of onset: 53) in her self; CAD in her brother, father, and mother; Heart Attack in her brother, mother, paternal grandfather, and paternal grandmother; Hypertension in her brother and mother; Stroke in her maternal grandfather and maternal grandmother.  Social History:  She  reports that she quit smoking about 56 years ago. Her smoking use included cigarettes. She has never used smokeless tobacco. She reports current drug use. Drug: Cannabis. She reports that she does not drink alcohol.    Tobacco:  She smoked tobacco in the past but quit greater than 12 months ago.  Social History     Tobacco Use   Smoking Status Former    Current packs/day: 0.00    Types: Cigarettes    Quit date: 1968    Years since quittin.3   Smokeless Tobacco Never        CAGE Alcohol Screen:   CAGE screening score of 0 on 2024, showing low risk of alcohol abuse.      Patient Care Team:  Jose Daniel Washington MD as PCP - General (Internal Medicine)  Azam Escamilla MD (OPHTHALMOLOGY)  Katie Vicente APN (PAIN MANAGEMENT)  Monica Curran APRN (Internal Medicine)  Curt Mo (Anesthesiology)  Drake Torre MD (SURGERY, ORTHOPEDIC)  Joy Pimentel CMA as [facility] Care Manager (Care Management)  Betsy Browning MD (PULMONARY DISEASES)  Makayla Farley DO (RHEUMATOLOGY)  Lotus Villagran APN (Nurse Practitioner Adult Health)  Cristal Segal DPM (PODIATRIST)  Rito Connor, PA (Physician Assistant)  Jong Lawrence MD as Consulting Physician (Cardiovascular Diseases)  Frandy Meng DO (NEUROLOGY)  Rito Boyd MD (Allergy and Immunology)  Migel Bass MD (GASTROENTEROLOGY)  Nuvia Traylor RN as [facility] Care Manager (Registered Nurse)    Review of Systems     Negative except as above     Objective:   Physical Exam  Physical Exam  General Appearance:  Alert, cooperative, no distress, appears stated age   Head:  Normocephalic,  without obvious abnormality, atraumatic   Eyes:  PERRL, conjunctiva/corneas clear, EOM's intact both eyes   Ears:  Normal TM's and external ear canals, both ears   Nose: Nares normal, septum midline,mucosa normal, no drainage or sinus tenderness   Throat: Lips, mucosa, and tongue normal; teeth and gums normal   Neck: Supple, symmetrical, trachea midline, no adenopathy;  thyroid: not enlarged, symmetric, no JVD   Back:   Symmetric, no curvature, ROM normal, no CVA tenderness   Lungs:   Clear to auscultation bilaterally, respirations unlabored   Heart:  Regular rate and rhythm, S1 and S2 normal,    Abdomen:   Soft, non-tender, bowel sounds active all four quadrants,  no masses,    Extremities: Extremities normal, atraumatic, no edema   Pulses: symmetric   Skin: Skin color, texture, turgor normal,    Lymph nodes: Cervical, supraclavicular nodes normal   Neurologic: Normal       BREAST: Normal appearance.  No dominant or suspicious mass. No tenderness.  Lumpectomy changes on the left.          /54 (BP Location: Right arm, Patient Position: Sitting, Cuff Size: adult)   Pulse 94   Temp 97.8 °F (36.6 °C) (Temporal)   Resp 16   Ht 5' 1.02\" (1.55 m)   Wt 160 lb 12.8 oz (72.9 kg)   LMP  (LMP Unknown)   SpO2 97%   BMI 30.36 kg/m²  Estimated body mass index is 30.36 kg/m² as calculated from the following:    Height as of this encounter: 5' 1.02\" (1.55 m).    Weight as of this encounter: 160 lb 12.8 oz (72.9 kg).    Medicare Hearing Assessment:   Hearing Screening    Time taken: 5/2/2024  9:31 AM  Entry User: Luisana Everett MA  Screening Method: Finger Rub  Finger Rub Result: Pass         Visual Acuity:   Right Eye Visual Acuity: Corrected Right Eye Chart Acuity: 20/50   Left Eye Visual Acuity: Corrected Left Eye Chart Acuity: 20/30   Both Eyes Visual Acuity: Corrected Both Eyes Chart Acuity: 20/30   Able To Tolerate Visual Acuity: Yes        Assessment & Plan:   Rayne Morales is a 78 year old female who  presents for a Medicare Assessment.     1. Routine general medical examination at a health care facility (Primary)  2. Atherosclerosis of aorta (HCC)  stable  statin.   3. IgG deficiency (HCC)  stable  Dr Chris.    Overview:  ??? pt states she gets IgG infusions due to lack of IgG, unable to fight off infections    4. Major depressive disorder, recurrent episode, moderate (HCC)  stable  cymbalta  5. Thrombocytopenia (HCC)  stable  monitor.   6. Type 2 diabetes mellitus without complication, without long-term current use of insulin (HCC)  stable  ozempic.  A1c stable  monitor.   7. Coronary artery disease involving native coronary artery of native heart without angina pectoris  stable  Dr Lawrence  statin   8. PHILLIPS (dyspnea on exertion) stable  monitor.   9. Primary hypertension  stable  same meds. Monitor.   10. Pure hypercholesterolemia  stable statin.   11. Mild persistent asthma without complication (HCC)  stable  monitor  Dr Chambers.    12. History of pneumonia, recurrent  as above  monitor closely.    13. Age-related osteoporosis without current pathological fracture  stable   reclast  Dr Farley. October.   14. B12 deficiency  oral supplement.  Feb >2000  monitor   15. Enlarged thyroid  stable  monitor.   16. Carpal tunnel syndrome of left wrist  stable  monitor.   17. Cervical radiculopathy stable  monitor  18. Cervical spinal stenosis stable  monitor  19. Chronic pain syndrome  has upcoming appt for LESI on Tuesday.   20. Cyst of brain  stable monitor.   21. Migraine without aura and without status migrainosus, not intractable stable  monitor.   22. Neuropathy  stable  gabapentin.   Overview:  rt foot bilateral hands    23. Spondylosis of lumbosacral region without myelopathy or radiculopathy  stable  monitor  24. S/P cervical spinal fusion stable  monitor  25. Tremor of both hands stable  monitor  26. Anxiety  stable  cymbalta    27. Fatigue, unspecified type  multifactorial  monitor.   28. MUKUND on CPAP  stable   monitor.   29. History of syncope  stable  montior.   30. Visual impairment  stable  monitor.   Overview:  glasses/contacts    31. History of left breast cancer s/p lumpectomy.    32. History of basal cell carcinoma  stable monitor.   33. Anemia, unspecified type  stable  monitor.   34. History of DVT (deep vein thrombosis)  monitor  DOAC  35. History of pulmonary embolism  stable  DOAC   36. Gastroesophageal reflux disease without esophagitis  stable  PPI  37. Primary osteoarthritis of right knee  stable  Dr Torre.  Limited options other than replacement.   38. Sacroiliac joint dysfunction  stable   montior.   39. Positive DARREN (antinuclear antibody)  stable  per rheum.  Monitor.   40. Liver mass  she states hemagioma she has had for some time.  I have recommended MRI as stated on CT.  She agrees to complete for further evaluation especially in light of above.     The patient indicates understanding of these issues and agrees to the plan.  Reinforced healthy diet, lifestyle, and exercise.      No follow-ups on file.     JUAREZ Roe, 5/2/2024     Supplementary Documentation:   General Health:  In the past six months, have you lost more than 10 pounds without trying?: 2 - No  Has your appetite been poor?: Yes  Type of Diet: Balanced  How does the patient maintain a good energy level?: Daily Walks  How would you describe your daily physical activity?: Moderate  How would you describe your current health state?: Fair  How do you maintain positive mental well-being?: Social Interaction  On a scale of 0 to 10, with 0 being no pain and 10 being severe pain, what is your pain level?: 8 - (Severe) (spine and R knee)  In the past six months, have you experienced urine leakage?: 1-Yes  At any time do you feel concerned for the safety/well-being of yourself and/or your children, in your home or elsewhere?: No  Have you had any immunizations at another office such as Influenza, Hepatitis B, Tetanus, or Pneumococcal?:  Yes       Rayne Morales's SCREENING SCHEDULE   Tests on this list are recommended by your physician but may not be covered, or covered at this frequency, by your insurer.   Please check with your insurance carrier before scheduling to verify coverage.   PREVENTATIVE SERVICES FREQUENCY &  COVERAGE DETAILS LAST COMPLETION DATE   Diabetes Screening    Fasting Blood Sugar /  Glucose    One screening every 12 months if never tested or if previously tested but not diagnosed with pre-diabetes   One screening every 6 months if diagnosed with pre-diabetes Lab Results   Component Value Date     (H) 04/08/2024        Cardiovascular Disease Screening    Lipid Panel  Cholesterol  Lipoprotein (HDL)  Triglycerides Covered every 5 years for all Medicare beneficiaries without apparent signs or symptoms of cardiovascular disease Lab Results   Component Value Date    CHOLEST 101 04/08/2024    HDL 49 04/08/2024    LDL 31 04/08/2024    TRIG 118 04/08/2024         Electrocardiogram (EKG)   Covered if needed at Welcome to Medicare, and non-screening if indicated for medical reasons 02/27/2024      Ultrasound Screening for Abdominal Aortic Aneurysm (AAA) Covered once in a lifetime for one of the following risk factors    Men who are 65-75 years old and have ever smoked    Anyone with a family history -     Colorectal Cancer Screening  Covered for ages 50-85; only need ONE of the following:    Colonoscopy   Covered every 10 years    Covered every 2 years if patient is at high risk or previous colonoscopy was abnormal 02/27/2023    Health Maintenance   Topic Date Due    Colorectal Cancer Screening  02/27/2024       Flexible Sigmoidoscopy   Covered every 4 years -    Fecal Occult Blood Test Covered annually -   Bone Density Screening    Bone density screening    Covered every 2 years after age 65 if diagnosed with risk of osteoporosis or estrogen deficiency.    Covered yearly for long-term glucocorticoid medication use (Steroids)  Last Dexa Scan:    XR DEXA BONE DENSITOMETRY (CPT=77080) 06/23/2023      No recommendations at this time   Pap and Pelvic    Pap   Covered every 2 years for women at normal risk; Annually if at high risk -  No recommendations at this time    Chlamydia Annually if high risk -  No recommendations at this time   Screening Mammogram    Mammogram     Recommend annually for all female patients aged 40 and older    One baseline mammogram covered for patients aged 35-39 06/23/2023    Health Maintenance   Topic Date Due    Mammogram  Discontinued       Immunizations    Influenza Covered once per flu season  Please get every year 01/10/2024  No recommendations at this time    Pneumococcal Each vaccine (Tsexawo85 & Iekndzczy88) covered once after 65 Prevnar 13: 02/22/2016    Doxtiqiui45: 01/10/2024     No recommendations at this time    Hepatitis B One screening covered for patients with certain risk factors   -  No recommendations at this time    Tetanus Toxoid Not covered by Medicare Part B unless medically necessary (cut with metal); may be covered with your pharmacy prescription benefits 07/16/2020    Tetanus, Diptheria and Pertusis TD and TDaP Not covered by Medicare Part B -  No recommendations at this time    Zoster Not covered by Medicare Part B; may be covered with your pharmacy  prescription benefits -  Zoster Vaccines(1 of 2) Never done     Diabetes      Hemoglobin A1C Annually; if last result is elevated, may be asked to retest more frequently.    Medicare covers every 3 months Lab Results   Component Value Date     (H) 04/08/2024    A1C 6.9 (H) 04/08/2024       No recommendations at this time    Creat/alb ratio Annually Lab Results   Component Value Date    MICROALBCREA 31.3 (H) 04/08/2024       LDL Annually Lab Results   Component Value Date    LDL 31 04/08/2024       Dilated Eye Exam Annually Last Diabetic Eye Exam:  No data recorded  No data recorded       Annual Monitoring of Persistent Medications  (ACE/ARB, digoxin diuretics, anticonvulsants)    Potassium Annually Lab Results   Component Value Date    K 4.7 04/08/2024         Creatinine   Annually Lab Results   Component Value Date    CREATSERUM 0.95 04/08/2024         BUN Annually Lab Results   Component Value Date    BUN 19 04/08/2024       Drug Serum Conc Annually No results found for: \"DIGOXIN\", \"DIG\", \"VALP\"

## 2024-05-09 NOTE — PROGRESS NOTES
Echo      The patient is requesting assistance with scheduling for the following provider in 1-2 weeks:     Dr. Tayler Savage 8 [facility]  [place], [address] [place] [address] Rd    [phone]      You may contact the patient on the home phone numb

## 2024-05-16 RX ORDER — METFORMIN HYDROCHLORIDE 500 MG/1
TABLET, EXTENDED RELEASE ORAL
Qty: 270 TABLET | Refills: 3 | Status: SHIPPED | OUTPATIENT
Start: 2024-05-16

## 2024-05-16 NOTE — TELEPHONE ENCOUNTER
Refill passed per [facility] protocol.  Requested Prescriptions   Pending Prescriptions Disp Refills    METFORMIN  MG Oral Tablet 24 Hr [Pharmacy Med Name: Metformin Hydrochloride Er 24hr 500 Mg Tab Gran] 270 tablet 0     Sig: Take 2 tablets (1,000 mg total) by mouth daily with breakfast and 1 tablet (500 mg total) every evening.       Diabetes Medication Protocol Passed - 5/14/2024 10:31 AM        Passed - Last A1C < 7.5 and within past 6 months     Lab Results   Component Value Date    A1C 6.9 (H) 04/08/2024             Passed - In person appointment or virtual visit in the past 6 mos or appointment in next 3 mos     Recent Outpatient Visits              2 weeks ago Routine general medical examination at a health care facility    [facility], [address] [place] [address] Monica Curran APRN    Office Visit    3 weeks ago Primary osteoarthritis involving multiple joints    [facility], [address] [place] [address] Makayla Farley DO    Office Visit    1 month ago FREDERICK (acute kidney injury) (HCC)    [address] Monica Curran APRN    Office Visit    2 months ago Nosocomial pneumonia    [address] Monica Curran APRN    Office Visit    2 months ago Appendicitis, unspecified appendicitis type    [address] Monica Curran APRN    Office Visit          Future Appointments         Provider Department Appt Notes    In 5 days Makayla Farley DO [facility]    In 2 months Zac Christian MD [facility]     In 6 months Makayla Farley DO [facility] f/u oct nov                    Passed - Microalbumin procedure in past 12 months or taking ACE/ARB        Passed - EGFRCR or GFRNAA > 50     GFR Evaluation  EGFRCR: 62 , resulted on 4/8/2024          Passed - GFR in  the past 12 months           Recent Outpatient Visits              2 weeks ago Routine general medical examination at a health care facility    [facility] ([place] [place]), [address] [place] [address], MarlowMonica Bal APRN    Office Visit    3 weeks ago Primary osteoarthritis involving multiple joints    [facility] ([place] [place]), [address] [place] [address] Makayla Farley DO    Office Visit    1 month ago FREDERICK (acute kidney injury) (HCC)    [facility] ([place], [place]), [address] [place] [address], MarlowMonica Kerr, JUAREZ    Office Visit    2 months ago Nosocomial pneumonia    [facility] ([place] [place]), [address] [place] [address], [place] Monica Curran, JUAREZ    Office Visit    2 months ago Appendicitis, unspecified appendicitis type    [facility] ([place], [place]), [address] [place] [address], [place] Monica Curran, JUAREZ    Office Visit          Future Appointments         Provider Department Appt Notes    In 5 days Makayla Farley DO [facility]    In 2 months Zac Christian MD [facility]     In 6 months Makayla Farley DO [facility] f/u oct nov

## 2024-05-21 ENCOUNTER — TELEPHONE (OUTPATIENT)
Dept: RHEUMATOLOGY | Facility: CLINIC | Age: 79
End: 2024-05-21

## 2024-05-21 ENCOUNTER — PATIENT OUTREACH (OUTPATIENT)
Dept: CASE MANAGEMENT | Age: 79
End: 2024-05-21

## 2024-05-21 NOTE — TELEPHONE ENCOUNTER
Called pt and informed her that she missed her 10 am appointment on 5/21/2024. Pt stated that she forgot about the appointment. She does have a November appointment scheduled, but will put her on the cancellation list. She does feel fine at this time.

## 2024-05-21 NOTE — PROGRESS NOTES
Reviewed pt's chart including recent visits.  Attempted to contact pt for monthly outreach no answer left detailed message for pt to call back.   Total time spent with patient including chart review: 5  Time spent with patient this month: 5    Total time spent with communication and chart review this month to date: 5

## 2024-06-20 ENCOUNTER — PATIENT OUTREACH (OUTPATIENT)
Dept: CASE MANAGEMENT | Age: 79
End: 2024-06-20

## 2024-07-09 RX ORDER — SEMAGLUTIDE 0.68 MG/ML
0.5 INJECTION, SOLUTION SUBCUTANEOUS WEEKLY
Qty: 9 ML | Refills: 1 | Status: SHIPPED | OUTPATIENT
Start: 2024-07-09

## 2024-07-09 NOTE — TELEPHONE ENCOUNTER
Refill passed per protocol.    Requested Prescriptions   Pending Prescriptions Disp Refills    OZEMPIC, 0.25 OR 0.5 MG/DOSE, 2 MG/3ML Subcutaneous Solution Pen-injector [Pharmacy Med Name: Ozempic 2 Mg/3ml Inj Tia] 9 mL 0     Sig: Inject 0.5 mg into the skin once a week.       Diabetes Medication Protocol Passed - 7/6/2024  1:14 PM        Passed - Last A1C < 7.5 and within past 6 months     Lab Results   Component Value Date    A1C 6.9 (H) 04/08/2024             Passed - In person appointment or virtual visit in the past 6 mos or appointment in next 3 mos     Recent Outpatient Visits              2 months ago Routine general medical examination at a health care facility    [facility], [address] [place] [address], GardnersMonica Kerr APRN    Office Visit    2 months ago Primary osteoarthritis involving multiple joints    [facility], [address] [place] [address] Makayla Farley DO    Office Visit    2 months ago FREDERICK (acute kidney injury) (HCC)    [facility], [address] [place] [address] GardnersMonica Kerr APRN    Office Visit    4 months ago Nosocomial pneumonia    [address]    Office Visit    4 months ago Appendicitis, unspecified appendicitis type    [address] Monica Curran, JUAREZ    Office Visit          Future Appointments         Provider Department Appt Notes    In 1 month Zac Christian MD [facility]     In 4 months Makayla Farley DO [facility], [facility] f/u oct nov                    Passed - Microalbumin procedure in past 12 months or taking ACE/ARB        Passed - EGFRCR or GFRNAA > 50     GFR Evaluation  EGFRCR: 62 , resulted on 4/8/2024          Passed - GFR in the past 12 months             Future Appointments         Provider Department Appt Notes    In 1 month Zac Christian MD [facility]     In 4  months Makayla Farley DO [facility], [facility] f/u oct nov          Recent Outpatient Visits              2 months ago Routine general medical examination at a health care facility    [facility], [address] [place] [address], Monica Monzon APRN    Office Visit    2 months ago Primary osteoarthritis involving multiple joints    [facility], [address] [place] [address] Makayla Farley DO    Office Visit    2 months ago FREDERICK (acute kidney injury) (HCC)    [facility], [address] [place] [address] Monica Monzon APRN    Office Visit    4 months ago Nosocomial pneumonia    [facility], [address] [place] [address], Monica Monzon APRN    Office Visit    4 months ago Appendicitis, unspecified appendicitis type    [facility], [address] [place] [address] Monica Monzon APRN    Office Visit

## 2024-07-17 RX ORDER — ROSUVASTATIN CALCIUM 10 MG/1
10 TABLET, COATED ORAL NIGHTLY
Qty: 90 TABLET | Refills: 3 | Status: SHIPPED | OUTPATIENT
Start: 2024-07-17

## 2024-07-17 NOTE — TELEPHONE ENCOUNTER
Refill Per Protocol   Please review pended refill request as unable to refill due to high/very high interaction warning copied here:    High  Allergy/Contraindication: rosuvastatinReactions: OTHER (SEE COMMENTS). Reaction type: Side effect. User documented allergy severity: Low.  Level 2 with ATORVASTATIN CALCIUM (Class: STATINS).  \"Leg cramping,\"  Details  Requested Prescriptions   Pending Prescriptions Disp Refills    ROSUVASTATIN 10 MG Oral Tab [Pharmacy Med Name: Rosuvastatin Calcium 10 Mg Tab Nort] 90 tablet 0     Sig: Take 1 tablet (10 mg total) by mouth nightly.       Cholesterol Medication Protocol Passed - 7/14/2024  9:40 AM        Passed - ALT < 80     Lab Results   Component Value Date    ALT 24 04/08/2024             Passed - ALT resulted within past year        Passed - Lipid panel within past 12 months     Lab Results   Component Value Date    CHOLEST 101 04/08/2024    TRIG 118 04/08/2024    HDL 49 04/08/2024    LDL 31 04/08/2024    VLDL 16 04/08/2024    TCHDLRATIO 1.84 04/04/2018    NONHDLC 52 04/08/2024             Passed - In person appointment or virtual visit in the past 12 mos or appointment in next 3 mos     Recent Outpatient Visits              2 months ago Routine general medical examination at a health care facility    [facility], [address] [place] [address], Monica Monzon APRN    Office Visit    2 months ago Primary osteoarthritis involving multiple joints    [facility], [address] [place] [address] Makayla Farley DO    Office Visit    3 months ago FREDERICK (acute kidney injury) (HCC)    [facility], [address] [place] [address] Monica Monzon APRN    Office Visit    4 months ago Nosocomial pneumonia    [facility], [address] [place] [address] Monica Monzon APRN    Office Visit    4 months ago Appendicitis, unspecified appendicitis type    [facility], [address] [place] [address], Monica Monzon APRN    Office Visit           Future Appointments         Provider Department Appt Notes    In 3 weeks Zac Christian MD [facility]     In 3 months Makayla Farley,  [facility]/ oct nov                           Future Appointments         Provider Department Appt Notes    In 3 weeks Zac Christian MD [facility]     In 3 months Makayla Farley,  [facility]/ oct nov          Recent Outpatient Visits              2 months ago Routine general medical examination at a health care facility    [facility], [address] [place] [address], Monica Monzon APRN    Office Visit    2 months ago Primary osteoarthritis involving multiple joints    [facility], [address] [place] [address] Makayla Farley DO    Office Visit    3 months ago FREDERICK (acute kidney injury) (HCC)    [facility], [address] [place] [address], Monica Monzon APRN    Office Visit    4 months ago Nosocomial pneumonia    [facility], [address] [place] [address], Monica Monzon APRN    Office Visit    4 months ago Appendicitis, unspecified appendicitis type    [facility], [address] [place] [address], Monica Monzon APRN    Office Visit

## 2024-07-24 ENCOUNTER — HOSPITAL ENCOUNTER (OUTPATIENT)
Dept: MAMMOGRAPHY | Age: 79
Discharge: HOME | End: 2024-07-24
Attending: NURSE PRACTITIONER
Payer: MEDICARE

## 2024-07-24 DIAGNOSIS — Z12.31 ENCOUNTER FOR SCREENING MAMMOGRAM FOR MALIGNANT NEOPLASM OF BREAST: ICD-10-CM

## 2024-07-24 PROCEDURE — 77063 BREAST TOMOSYNTHESIS BI: CPT | Performed by: NURSE PRACTITIONER

## 2024-07-24 PROCEDURE — 77067 SCR MAMMO BI INCL CAD: CPT | Performed by: NURSE PRACTITIONER

## 2024-07-30 ENCOUNTER — PATIENT OUTREACH (OUTPATIENT)
Dept: CASE MANAGEMENT | Age: 79
End: 2024-07-30

## 2024-08-01 ENCOUNTER — NURSE ONLY (OUTPATIENT)
Dept: HEMATOLOGY/ONCOLOGY | Facility: HOSPITAL | Age: 79
End: 2024-08-01
Attending: INTERNAL MEDICINE
Payer: MEDICARE

## 2024-08-01 VITALS
TEMPERATURE: 97 F | DIASTOLIC BLOOD PRESSURE: 106 MMHG | OXYGEN SATURATION: 95 % | HEART RATE: 69 BPM | WEIGHT: 154.19 LBS | SYSTOLIC BLOOD PRESSURE: 164 MMHG | BODY MASS INDEX: 29 KG/M2

## 2024-08-01 DIAGNOSIS — Z86.711 HISTORY OF PULMONARY EMBOLISM: ICD-10-CM

## 2024-08-01 DIAGNOSIS — D69.6 THROMBOCYTOPENIA (HCC): ICD-10-CM

## 2024-08-01 DIAGNOSIS — D64.9 ANEMIA, UNSPECIFIED TYPE: Primary | ICD-10-CM

## 2024-08-01 LAB
BASOPHILS # BLD AUTO: 0.03 X10(3) UL (ref 0–0.2)
BASOPHILS NFR BLD AUTO: 0.6 %
DEPRECATED HBV CORE AB SER IA-ACNC: 91.6 NG/ML
EOSINOPHIL # BLD AUTO: 0.14 X10(3) UL (ref 0–0.7)
EOSINOPHIL NFR BLD AUTO: 3 %
ERYTHROCYTE [DISTWIDTH] IN BLOOD BY AUTOMATED COUNT: 13.6 %
FOLATE SERPL-MCNC: 13.7 NG/ML (ref 5.4–?)
HCT VFR BLD AUTO: 37 %
HGB BLD-MCNC: 12.3 G/DL
IMM GRANULOCYTES # BLD AUTO: 0.01 X10(3) UL (ref 0–1)
IMM GRANULOCYTES NFR BLD: 0.2 %
IRON SATN MFR SERPL: 23 %
IRON SERPL-MCNC: 68 UG/DL
LYMPHOCYTES # BLD AUTO: 1.73 X10(3) UL (ref 1–4)
LYMPHOCYTES NFR BLD AUTO: 37.1 %
MCH RBC QN AUTO: 29.9 PG (ref 26–34)
MCHC RBC AUTO-ENTMCNC: 33.2 G/DL (ref 31–37)
MCV RBC AUTO: 90 FL
MONOCYTES # BLD AUTO: 0.43 X10(3) UL (ref 0.1–1)
MONOCYTES NFR BLD AUTO: 9.2 %
NEUTROPHILS # BLD AUTO: 2.32 X10 (3) UL (ref 1.5–7.7)
NEUTROPHILS # BLD AUTO: 2.32 X10(3) UL (ref 1.5–7.7)
NEUTROPHILS NFR BLD AUTO: 49.9 %
PLATELET # BLD AUTO: 166 10(3)UL (ref 150–450)
RBC # BLD AUTO: 4.11 X10(6)UL
TOTAL IRON BINDING CAPACITY: 302 UG/DL (ref 250–425)
TRANSFERRIN SERPL-MCNC: 238 MG/DL (ref 250–380)
VIT B12 SERPL-MCNC: 359 PG/ML (ref 211–911)
WBC # BLD AUTO: 4.7 X10(3) UL (ref 4–11)

## 2024-08-01 PROCEDURE — G2211 COMPLEX E/M VISIT ADD ON: HCPCS | Performed by: INTERNAL MEDICINE

## 2024-08-01 PROCEDURE — 99214 OFFICE O/P EST MOD 30 MIN: CPT | Performed by: INTERNAL MEDICINE

## 2024-08-01 PROCEDURE — 36591 DRAW BLOOD OFF VENOUS DEVICE: CPT

## 2024-08-01 NOTE — PROGRESS NOTES
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    - here for follow up; she decided to not transition her care to a new hematologist  - she is now receiving IVIG replacement with Dr Boyd and says she feels like a new woman  - she had prior episodes of pneumonia since she last saw me  - tolerating xarelto without issues  - had appendicitis s/p appendectomy in 2/2024, path was negative for malignancy  - she intends to have R knee replacement in October    Past Medical History:  Past Medical History:    Abdominal pain    Anemia    Anxiety    Arrhythmia    Arthritis    Asthma (HCC)    Back pain    Back problem    Black stools    Bloating    Blurred vision    Breast CA (HCC)    left lump and rad.    Cancer (HCC)    breast, basal cell    Cervical radiculopathy    Cervical spinal stenosis    Deep vein thrombosis (HCC)    Depression    Diabetes mellitus (HCC)    Diarrhea, unspecified    Dizziness    Easy bruising    On Xeralto    Esophageal reflux    Exposure to medical diagnostic radiation    Fatigue    Fitting and adjustment of vascular catheter    Headache disorder    Hearing loss    Heart attack (HCC)    Heartburn    Hemorrhoids    High blood pressure    High cholesterol    History of blood clots    History of COVID-19    not hospitalized, no continued symptoms, cough, fever, loss of taste    History of depression    History of syncope    IgG deficiency (HCC)    ??? pt states she gets IgG infusions due to lack of IgG, unable to fight off infections    Indigestion    Leaking  of urine    Migraines    Muscle weakness    uses cane    Neuropathy    rt foot bilateral hands    Osteoarthritis    Pain in joints    Pneumonia due to organism    Pulmonary embolism (HCC)    Shortness of breath    Sleep apnea    cpap - not using - machine is broken    Stress    Uncomfortable fullness after meals    Visual impairment    glasses/contacts    Weight gain     Past Surgical History:   Procedure Laterality Date    Abdomen surgery proc unlisted      duodenal biopsy    Appendectomy      Breast surgery      Cataract      Cholecystectomy      Colonoscopy  10/1/10, 4/21/17    Colonoscopy N/A 02/27/2023    Procedure: COLONOSCOPY;  Surgeon: Migel Bass MD;  Location:  ENDOSCOPY    Egd  04/21/2017    Eye surgery      Femur/knee surg unlisted      right knee arthroscopy    Hemorrhoidectomy,int/ext,simple      Hernia surgery      Hysterectomy  1984    total hystero.    Lumpectomy left Left 1999    lt lumpectomy and radiation.    Oophorectomy Bilateral 1984    total hystero.    Other  11/2021    left wrist, ORIF Dr. Stacy Long    Other  02/21/2022    CERVICAL 5 AND CERVICAL 6 LAMINECTOMIES, PARTIAL CERVICAL 7 LAMINECTOMY, LEFT CERVICAL 5/CERVICAL 6 AND CERVICAL 6/CERVICAL 7 FORAMINOTOMIES, CERVICAL 6 AND CERVICAL 7 ARTHRODESIS, AUTOGRAFT, ALLOGRAFT    Other surgical history  2010    endoscopy - papillotomy    Other surgical history  2016     under right eye, skin cancer removed    Other surgical history  02/2022    neck    Port for vascular access      Radiation left Left 1999    left lumpectomy and radiation.    Repair ing hernia,5+y/o,reducibl      Sigmoidoscopy,diagnostic      Spine surgery procedure unlisted      Tonsillectomy      Total abdom hysterectomy      Total knee replacement         Home Medications:   rivaroxaban (XARELTO) 20 MG Oral Tab Take 1 tablet (20 mg total) by mouth daily. 90 tablet 3       Allergies:   Allergies   Allergen Reactions    Ciprofloxacin HIVES    Mold REACTIVE AIRWAY  DISEASE     Mold and smut    Sulfa Antibiotics HIVES     Bactrim (Sulfamethoxazole/TMP)    Diovan [Valsartan] NAUSEA AND VOMITING    Omnicef NAUSEA ONLY and DIZZINESS    Clindamycin DIARRHEA and NAUSEA ONLY    Green Pepper OTHER (SEE COMMENTS)     Sensitivity (all peppers - green, orange and red)    Lipitor [Atorvastatin Calcium] OTHER (SEE COMMENTS)     Leg cramping,    Pneumococcal Vaccines OTHER (SEE COMMENTS)     Pt states she had pneumo vax done at Dr. Boyd on 1/10/24- states developed redness, swelling,itching to the arm    Quinapril Hcl FATIGUE       Psychosocial History:  Social History     Socioeconomic History    Marital status:      Spouse name: Not on file    Number of children: Not on file    Years of education: Not on file    Highest education level: Not on file   Occupational History    Not on file   Tobacco Use    Smoking status: Former     Current packs/day: 0.00     Types: Cigarettes     Quit date: 1968     Years since quittin.6    Smokeless tobacco: Never   Vaping Use    Vaping status: Never Used   Substance and Sexual Activity    Alcohol use: No     Comment: rare    Drug use: Yes     Types: Cannabis     Comment: uses cream for pain to back/neck with barometer changes.    Sexual activity: Not Currently     Partners: Male   Other Topics Concern     Service Not Asked    Blood Transfusions Not Asked    Caffeine Concern Yes     Comment: half of cup of coke daily    Occupational Exposure Not Asked    Hobby Hazards Not Asked    Sleep Concern No    Stress Concern Not Asked    Weight Concern Not Asked    Special Diet Not Asked    Back Care Not Asked    Exercise Yes     Comment: walking    Bike Helmet Not Asked    Seat Belt Yes    Self-Exams Not Asked   Social History Narrative    Not on file     Social Determinants of Health     Financial Resource Strain: Low Risk  (10/25/2023)    Financial Resource Strain     Difficulty of Paying Living Expenses: Not hard at all     Med  Affordability: No   Food Insecurity: No Food Insecurity (3/28/2024)    Food Insecurity     Food Insecurity: Never true   Transportation Needs: No Transportation Needs (3/28/2024)    Transportation Needs     Lack of Transportation: No   Physical Activity: Inactive (11/8/2019)    Received from [facility], [facility]    Exercise Vital Sign     Days of Exercise per Week: 0 days     Minutes of Exercise per Session: 0 min   Stress: No Stress Concern Present (10/25/2023)    Stress     Feeling of Stress : No   Social Connections: Socially Integrated (10/25/2023)    Social Connections     Frequency of Socialization with Friends and Family: 2   Housing Stability: Low Risk  (3/28/2024)    Housing Stability     Housing Instability: No     Housing Instability Emergency: Not on file     Crib or Bassinette: Not on file       Family Medical History:  Family History   Problem Relation Age of Onset    Heart Attack Paternal Grandfather     Heart Attack Paternal Grandmother     Other (CAD) Father     Other (CAD) Mother     Hypertension Mother     Heart Attack Mother     Other (CAD) Brother     Hypertension Brother     Heart Attack Brother     Stroke Maternal Grandfather         Stroke    Stroke Maternal Grandmother         Stroke    Breast Cancer Self 54       Review of Systems:  A 10-point ROS was done with pertinent positives and negative per the HPI    Vital Signs:  Height: --  Weight: 69.9 kg (154 lb 3.2 oz) (08/01 1146)  BSA (Calculated - sq m): --  Pulse: 69 (08/01 1146)  BP: 164/106 (08/01 1146)  Temp: 97.1 °F (36.2 °C) (08/01 1146)  Do Not Use - Resp Rate: --  SpO2: 95 % (08/01 1146)    Wt Readings from Last 6 Encounters:   08/01/24 69.9 kg (154 lb 3.2 oz)   05/02/24 72.9 kg (160 lb 12.8 oz)   04/22/24 72.1 kg (159 lb)   04/11/24 73.4 kg (161 lb 12.8 oz)   03/28/24 75 kg (165 lb 4.8 oz)   03/11/24 73.5 kg (162 lb)       Physical Examination:  General: Patient is alert and oriented, not in acute  distress  Psych: Mood and affect are appropriate  Eyes: EOMI, PERRL  ENT: Oropharynx is clear, no adenopathy  CV: no LE edema  Respiratory: Non-labored respirations  Abd: soft, nontender, no hepatosplenomegaly appreciated  Neurological: Grossly intact   Skin: no rashes or petechiae  Lines: port cdi    Laboratory:  Lab Results   Component Value Date    WBC 4.7 08/01/2024    WBC 4.7 03/29/2024    WBC 7.0 03/28/2024    HGB 12.3 08/01/2024    HGB 10.2 (L) 03/29/2024    HGB 12.2 03/28/2024    HCT 37.0 08/01/2024    MCV 90.0 08/01/2024    MCH 29.9 08/01/2024    MCHC 33.2 08/01/2024    RDW 13.6 08/01/2024    .0 08/01/2024    .0 (L) 03/29/2024    .0 03/28/2024     Lab Results   Component Value Date     (H) 04/08/2024    BUN 19 04/08/2024    BUNCREA 26.1 (H) 06/03/2021    CREATSERUM 0.95 04/08/2024    CREATSERUM 1.37 (H) 03/29/2024    CREATSERUM 2.54 (H) 03/28/2024    ANIONGAP 8 04/08/2024    GFR 70 12/07/2017    GFRNAA 58 (L) 06/13/2022    GFRAA 67 06/13/2022    CA 9.6 04/08/2024    OSMOCALC 289 04/08/2024    ALKPHO 63 04/08/2024    AST 35 04/08/2024    ALT 24 04/08/2024    BILT 0.5 04/08/2024    TP 6.8 04/08/2024    ALB 3.9 04/08/2024    GLOBULIN 2.9 04/08/2024    AGRATIO 2.2 06/15/2015     04/08/2024    K 4.7 04/08/2024     04/08/2024    CO2 22.0 04/08/2024     Lab Results   Component Value Date    PTT 32.1 03/29/2024    PT 19.4 (H) 07/29/2014    INR 2.33 (H) 12/04/2023       Impression & Plan:     Recurrent VTE/PE  - continue lifelong anticoagulation with rivaroxaban. Last PE was in 2014. Year's supply given    Anemia  Thrombocytopenia  May have been due to bone marrow suppression in setting of prior infections. Hemoglobin and platelet count normal today.    Vit B12 deficiency  On vit B12 injections. Vitamin b12 level normal today.    Hx breast cancer  - 1999 s/p lumpectomy/RT + 5 years endocrine therapy. Since then no evidence of recurrence  - continue annual bilateral  mammograms    Hx Iron deficiency  - iron levels today normal  - colonoscopy by Dr Bass on 2/2023 showed polyps, grade II internal hemorrhoids. She is due for her colonoscopy, was supposed to receive it 2/2024    R knee replacement  - ok to proceed with R knee replacement. Discussed she will need to hold xarelto for 2 days prior to knee replacement, and then restart after knee replacement when cleared by surgery.    Follow up: 1 year    Bran Rahman  Hematology/Medical Oncology  [facility]

## 2024-08-06 RX ORDER — DULOXETIN HYDROCHLORIDE 60 MG/1
60 CAPSULE, DELAYED RELEASE ORAL DAILY
Qty: 90 CAPSULE | Refills: 3 | Status: ON HOLD | OUTPATIENT
Start: 2024-08-06

## 2024-08-06 RX ORDER — DULOXETIN HYDROCHLORIDE 30 MG/1
30 CAPSULE, DELAYED RELEASE ORAL DAILY
Qty: 90 CAPSULE | Refills: 3 | Status: ON HOLD | OUTPATIENT
Start: 2024-08-06

## 2024-08-06 RX ORDER — PANTOPRAZOLE SODIUM 40 MG/1
40 TABLET, DELAYED RELEASE ORAL
Qty: 90 TABLET | Refills: 3 | Status: ON HOLD | OUTPATIENT
Start: 2024-08-06

## 2024-08-06 NOTE — TELEPHONE ENCOUNTER
Refill passes per [facility] protocol.    LAST OFFICE VISIT: 5/2/2024 (physical exam)    Requested Prescriptions   Pending Prescriptions Disp Refills    DULOXETINE 60 MG Oral Cap DR Particles [Pharmacy Med Name: Duloxetine Hydrochloride Ec 60 Mg Cap Risi] 90 capsule 0     Sig: Take 1 capsule (60 mg total) by mouth daily.       Psychiatric Non-Scheduled (Anti-Anxiety) Passed - 8/2/2024  3:01 AM        Passed - In person appointment or virtual visit in the past 6 mos or appointment in next 3 mos     Recent Outpatient Visits              5 days ago     [facility] [place]    Nurse Only    5 days ago Anemia, unspecified type    [facility] in [place] Bran Rahman MD    Office Visit    3 months ago Routine general medical examination at a health care facility    [facility], [address] [place], [place] [address]Monica APRN    Office Visit    3 months ago Primary osteoarthritis involving multiple joints    [facility], [address] [place] [address] Makayla Farley DO    Office Visit    3 months ago FREDERICK (acute kidney injury) (HCC)    [facility], [address] [place], [place] [address]Monica APRN    Office Visit          Future Appointments         Provider Department Appt Notes    In 3 days Zac Mcmahon MD [facility]     In 3 months Makayla Farley DO [facility], [facility] f/u oct nov                    Passed - Depression Screening completed within the past 12 months          DULOXETINE 30 MG Oral Cap DR Particles [Pharmacy Med Name: Duloxetine Hydrochloride Ec 30 Mg Cap Risi] 90 capsule 0     Sig: Take 1 capsule (30 mg total) by mouth daily. To take along with 60mg to equal 90mg daily.       Psychiatric Non-Scheduled (Anti-Anxiety) Passed - 8/2/2024  3:01 AM        Passed - In person appointment or virtual visit in the past 6 mos or appointment in next 3 mos     Recent Outpatient Visits               5 days ago     [facility]    Nurse Only    5 days ago Anemia, unspecified type    [facility]    Office Visit    3 months ago Routine general medical examination at a health care facility    [address] [place]UAREZ    Office Visit    3 months ago Primary osteoarthritis involving multiple joints    [facility], [address] [place] [address] Makayla Farley, DO    Office Visit    3 months ago FREDERICK (acute kidney injury) (HCC)    [address] [place]U[address]    Office Visit          Future Appointments         Provider Department Appt Notes    In 3 days Zac Christian MD [facility]     In 3 months Makayla Farley DO [facility] f/u oct nov                    Passed - Depression Screening completed within the past 12 months          PANTOPRAZOLE 40 MG Oral Tab EC [Pharmacy Med Name: Pantoprazole Sodium Ec 40 Mg Tab Auro] 90 tablet 0     Sig: Take 1 tablet (40 mg total) by mouth before breakfast.       Gastrointestional Medication Protocol Passed - 8/2/2024  3:01 AM        Passed - In person appointment or virtual visit in the past 12 mos or appointment in next 3 mos     Recent Outpatient Visits              5 days ago     [facility]    Nurse Only    5 days ago Anemia, unspecified type    [facility] in [place] JosiahBran MD    Office Visit    3 months ago Routine general medical examination at a health care facility    [address] Monica Curran, JUAREZ    Office Visit    3 months ago Primary osteoarthritis involving multiple joints    [facility], [address] [place] [address] Makayla Farley,     Office Visit    3 months ago FREDERICK (acute kidney injury) (HCC)     [facility], [address] [place] [address] Monica Curran, JUAREZ    Office Visit          Future Appointments         Provider Department Appt Notes    In 3 days Zac Christian MD [facility]     In 3 months Makayla Farley DO [facility] f/u oct nov                         Future Appointments         Provider Department Appt Notes    In 3 days Zac Christian MD [facility]     In 3 months Makayla Farley DO [facility] f/ oct nov          Recent Outpatient Visits              5 days ago     [facility] [place]    Nurse Only    5 days ago Anemia, unspecified type    [facility] [place] Bran Rahman MD    Office Visit    3 months ago Routine general medical examination at a health care facility    [facility], [address] [place] [address] Monica Curran, JUAREZ    Office Visit    3 months ago Primary osteoarthritis involving multiple joints    [facility], [address] [place] [address] Makayla Farley DO    Office Visit    3 months ago FREDERICK (acute kidney injury) (HCC)    [facility], [address] [place] [address] Monica Curran, JUAREZ    Office Visit

## 2024-08-08 ENCOUNTER — APPOINTMENT (OUTPATIENT)
Dept: GENERAL RADIOLOGY | Facility: HOSPITAL | Age: 79
DRG: 853 | End: 2024-08-08
Payer: MEDICARE

## 2024-08-08 ENCOUNTER — HOSPITAL ENCOUNTER (INPATIENT)
Facility: HOSPITAL | Age: 79
LOS: 4 days | Discharge: HOME HEALTH | End: 2024-08-13
Attending: EMERGENCY MEDICINE | Admitting: HOSPITALIST
Payer: MEDICARE

## 2024-08-08 ENCOUNTER — APPOINTMENT (OUTPATIENT)
Dept: CT IMAGING | Facility: HOSPITAL | Age: 79
End: 2024-08-08
Attending: EMERGENCY MEDICINE
Payer: MEDICARE

## 2024-08-08 ENCOUNTER — APPOINTMENT (OUTPATIENT)
Dept: GENERAL RADIOLOGY | Facility: HOSPITAL | Age: 79
End: 2024-08-08
Payer: MEDICARE

## 2024-08-08 ENCOUNTER — APPOINTMENT (OUTPATIENT)
Dept: CT IMAGING | Facility: HOSPITAL | Age: 79
DRG: 853 | End: 2024-08-08
Attending: EMERGENCY MEDICINE
Payer: MEDICARE

## 2024-08-08 ENCOUNTER — HOSPITAL ENCOUNTER (INPATIENT)
Facility: HOSPITAL | Age: 79
LOS: 4 days | Discharge: HOME | DRG: 853 | End: 2024-08-13
Attending: EMERGENCY MEDICINE | Admitting: HOSPITALIST
Payer: MEDICARE

## 2024-08-08 DIAGNOSIS — J18.9 COMMUNITY ACQUIRED PNEUMONIA, UNSPECIFIED LATERALITY: Primary | ICD-10-CM

## 2024-08-08 DIAGNOSIS — R09.02 HYPOXIA: ICD-10-CM

## 2024-08-08 DIAGNOSIS — A41.9 SEPSIS, DUE TO UNSPECIFIED ORGANISM, UNSPECIFIED WHETHER ACUTE ORGAN DYSFUNCTION PRESENT (HCC): ICD-10-CM

## 2024-08-08 DIAGNOSIS — I95.9 HYPOTENSION, UNSPECIFIED HYPOTENSION TYPE: ICD-10-CM

## 2024-08-08 PROBLEM — D72.829 LEUKOCYTOSIS: Status: ACTIVE | Noted: 2024-08-08

## 2024-08-08 PROBLEM — E87.3 METABOLIC ALKALOSIS: Status: ACTIVE | Noted: 2024-08-08

## 2024-08-08 PROBLEM — E87.1 HYPONATREMIA: Status: ACTIVE | Noted: 2024-08-08

## 2024-08-08 PROBLEM — R73.9 HYPERGLYCEMIA: Status: ACTIVE | Noted: 2024-08-08

## 2024-08-08 PROBLEM — E87.3 RESPIRATORY ALKALOSIS: Status: ACTIVE | Noted: 2024-08-08

## 2024-08-08 LAB
ALBUMIN SERPL-MCNC: 4.6 G/DL (ref 3.2–4.8)
ALBUMIN/GLOB SERPL: 2 {RATIO} (ref 1–2)
ALP LIVER SERPL-CCNC: 59 U/L
ALT SERPL-CCNC: 16 U/L
ANION GAP SERPL CALC-SCNC: 5 MMOL/L (ref 0–18)
AST SERPL-CCNC: 20 U/L (ref ?–34)
BASE EXCESS BLDA CALC-SCNC: -2.6 MMOL/L (ref ?–2)
BASOPHILS # BLD AUTO: 0.04 X10(3) UL (ref 0–0.2)
BASOPHILS NFR BLD AUTO: 0.3 %
BILIRUB SERPL-MCNC: 0.8 MG/DL (ref 0.2–1.1)
BILIRUB UR QL STRIP.AUTO: NEGATIVE
BODY TEMPERATURE: 98.6 F
BUN BLD-MCNC: 16 MG/DL (ref 9–23)
CA-I BLD-SCNC: 1.15 MMOL/L (ref 0.95–1.32)
CALCIUM BLD-MCNC: 9.3 MG/DL (ref 8.7–10.4)
CHLORIDE SERPL-SCNC: 102 MMOL/L (ref 98–112)
CLARITY UR REFRACT.AUTO: CLEAR
CO2 SERPL-SCNC: 24 MMOL/L (ref 21–32)
COHGB MFR BLD: 1.9 % SAT (ref 0–3)
COLOR UR AUTO: COLORLESS
CREAT BLD-MCNC: 1.06 MG/DL
EGFRCR SERPLBLD CKD-EPI 2021: 54 ML/MIN/1.73M2 (ref 60–?)
EOSINOPHIL # BLD AUTO: 0.04 X10(3) UL (ref 0–0.7)
EOSINOPHIL NFR BLD AUTO: 0.3 %
ERYTHROCYTE [DISTWIDTH] IN BLOOD BY AUTOMATED COUNT: 13.2 %
GLOBULIN PLAS-MCNC: 2.3 G/DL (ref 2–3.5)
GLUCOSE BLD-MCNC: 170 MG/DL (ref 70–99)
GLUCOSE BLD-MCNC: 195 MG/DL (ref 70–99)
GLUCOSE UR STRIP.AUTO-MCNC: NORMAL MG/DL
HCO3 BLDA-SCNC: 22.9 MEQ/L (ref 21–27)
HCT VFR BLD AUTO: 31.3 %
HGB BLD-MCNC: 10.8 G/DL
HGB BLD-MCNC: 9.7 G/DL
IMM GRANULOCYTES # BLD AUTO: 0.13 X10(3) UL (ref 0–1)
IMM GRANULOCYTES NFR BLD: 0.9 %
KETONES UR STRIP.AUTO-MCNC: NEGATIVE MG/DL
L/M: 2 L/MIN
LACTATE BLD-SCNC: 1.2 MMOL/L (ref 0.5–2)
LACTATE SERPL-SCNC: 0.8 MMOL/L (ref 0.5–2)
LACTATE SERPL-SCNC: 2.1 MMOL/L (ref 0.5–2)
LEUKOCYTE ESTERASE UR QL STRIP.AUTO: 75
LYMPHOCYTES # BLD AUTO: 1.32 X10(3) UL (ref 1–4)
LYMPHOCYTES NFR BLD AUTO: 8.8 %
MCH RBC QN AUTO: 30.7 PG (ref 26–34)
MCHC RBC AUTO-ENTMCNC: 34.5 G/DL (ref 31–37)
MCV RBC AUTO: 88.9 FL
METHGB MFR BLD: 0.4 % SAT (ref 0.4–1.5)
METHYLMALONIC ACID: 178 NMOL/L
MONOCYTES # BLD AUTO: 1.09 X10(3) UL (ref 0.1–1)
MONOCYTES NFR BLD AUTO: 7.3 %
NEUTROPHILS # BLD AUTO: 12.36 X10 (3) UL (ref 1.5–7.7)
NEUTROPHILS # BLD AUTO: 12.36 X10(3) UL (ref 1.5–7.7)
NEUTROPHILS NFR BLD AUTO: 82.4 %
NITRITE UR QL STRIP.AUTO: NEGATIVE
OSMOLALITY SERPL CALC.SUM OF ELEC: 277 MOSM/KG (ref 275–295)
OXYHGB MFR BLDA: 96.7 % (ref 92–100)
PCO2 BLDA: 33 MM HG (ref 35–45)
PH BLDA: 7.42 [PH] (ref 7.35–7.45)
PH UR STRIP.AUTO: 6.5 [PH] (ref 5–8)
PLATELET # BLD AUTO: 155 10(3)UL (ref 150–450)
PO2 BLDA: 85 MM HG (ref 80–100)
POTASSIUM BLD-SCNC: 3.9 MMOL/L (ref 3.6–5.1)
POTASSIUM SERPL-SCNC: 3.8 MMOL/L (ref 3.5–5.1)
PROT SERPL-MCNC: 6.9 G/DL (ref 5.7–8.2)
PROT UR STRIP.AUTO-MCNC: NEGATIVE MG/DL
RBC # BLD AUTO: 3.52 X10(6)UL
RBC UR QL AUTO: NEGATIVE
SARS-COV-2 RNA RESP QL NAA+PROBE: NOT DETECTED
SODIUM BLD-SCNC: 133 MMOL/L (ref 135–145)
SODIUM SERPL-SCNC: 131 MMOL/L (ref 136–145)
SP GR UR STRIP.AUTO: <1.005 (ref 1–1.03)
TROPONIN I SERPL HS-MCNC: 19 NG/L
UROBILINOGEN UR STRIP.AUTO-MCNC: NORMAL MG/DL
WBC # BLD AUTO: 15 X10(3) UL (ref 4–11)

## 2024-08-08 PROCEDURE — 71275 CT ANGIOGRAPHY CHEST: CPT | Performed by: EMERGENCY MEDICINE

## 2024-08-08 PROCEDURE — 74177 CT ABD & PELVIS W/CONTRAST: CPT | Performed by: EMERGENCY MEDICINE

## 2024-08-08 PROCEDURE — 99223 1ST HOSP IP/OBS HIGH 75: CPT | Performed by: STUDENT IN AN ORGANIZED HEALTH CARE EDUCATION/TRAINING PROGRAM

## 2024-08-08 PROCEDURE — 71045 X-RAY EXAM CHEST 1 VIEW: CPT | Performed by: EMERGENCY MEDICINE

## 2024-08-08 NOTE — ED INITIAL ASSESSMENT (HPI)
Pt to ER with c/o with past 1x hr, chills, shaking, bilat legs hurting. Fam reports temp of \"101\". Family reports dehydration, family reports that patient is more disorientated. Family reports last meal was yesterday. Patient patient a&ox3

## 2024-08-09 PROBLEM — R09.02 HYPOXIA: Status: ACTIVE | Noted: 2024-08-09

## 2024-08-09 LAB
ATRIAL RATE: 88 BPM
EST. AVERAGE GLUCOSE BLD GHB EST-MCNC: 151 MG/DL (ref 68–126)
GLUCOSE BLD-MCNC: 105 MG/DL (ref 70–99)
GLUCOSE BLD-MCNC: 124 MG/DL (ref 70–99)
GLUCOSE BLD-MCNC: 147 MG/DL (ref 70–99)
GLUCOSE BLD-MCNC: 99 MG/DL (ref 70–99)
HBA1C MFR BLD: 6.9 % (ref ?–5.7)
P AXIS: 24 DEGREES
P-R INTERVAL: 192 MS
Q-T INTERVAL: 348 MS
QRS DURATION: 68 MS
QTC CALCULATION (BEZET): 421 MS
R AXIS: -15 DEGREES
T AXIS: -16 DEGREES
VENTRICULAR RATE: 88 BPM

## 2024-08-09 PROCEDURE — 99232 SBSQ HOSP IP/OBS MODERATE 35: CPT | Performed by: HOSPITALIST

## 2024-08-09 PROCEDURE — 5A09357 ASSISTANCE WITH RESPIRATORY VENTILATION, LESS THAN 24 CONSECUTIVE HOURS, CONTINUOUS POSITIVE AIRWAY PRESSURE: ICD-10-PCS | Performed by: HOSPITALIST

## 2024-08-09 RX ORDER — ALBUTEROL SULFATE 0.83 MG/ML
2.5 SOLUTION RESPIRATORY (INHALATION) EVERY 4 HOURS PRN
Status: DISCONTINUED | OUTPATIENT
Start: 2024-08-09 | End: 2024-08-13

## 2024-08-09 RX ORDER — ONDANSETRON 2 MG/ML
4 INJECTION INTRAMUSCULAR; INTRAVENOUS EVERY 4 HOURS PRN
Status: ACTIVE | OUTPATIENT
Start: 2024-08-09 | End: 2024-08-09

## 2024-08-09 RX ORDER — POLYETHYLENE GLYCOL 3350 17 G/17G
17 POWDER, FOR SOLUTION ORAL DAILY PRN
Status: DISCONTINUED | OUTPATIENT
Start: 2024-08-09 | End: 2024-08-13

## 2024-08-09 RX ORDER — GUAIFENESIN 600 MG/1
600 TABLET, EXTENDED RELEASE ORAL 2 TIMES DAILY
Status: DISCONTINUED | OUTPATIENT
Start: 2024-08-09 | End: 2024-08-13

## 2024-08-09 RX ORDER — ACETAMINOPHEN 500 MG
500 TABLET ORAL EVERY 4 HOURS PRN
Status: DISCONTINUED | OUTPATIENT
Start: 2024-08-09 | End: 2024-08-13

## 2024-08-09 RX ORDER — ENOXAPARIN SODIUM 100 MG/ML
40 INJECTION SUBCUTANEOUS DAILY
Status: DISCONTINUED | OUTPATIENT
Start: 2024-08-09 | End: 2024-08-09

## 2024-08-09 RX ORDER — SODIUM PHOSPHATE, DIBASIC AND SODIUM PHOSPHATE, MONOBASIC 7; 19 G/230ML; G/230ML
1 ENEMA RECTAL ONCE AS NEEDED
Status: DISCONTINUED | OUTPATIENT
Start: 2024-08-09 | End: 2024-08-13

## 2024-08-09 RX ORDER — ROSUVASTATIN CALCIUM 10 MG/1
10 TABLET, COATED ORAL NIGHTLY
Status: DISCONTINUED | OUTPATIENT
Start: 2024-08-09 | End: 2024-08-13

## 2024-08-09 RX ORDER — PREGABALIN 100 MG/1
100 CAPSULE ORAL 3 TIMES DAILY
Status: DISCONTINUED | OUTPATIENT
Start: 2024-08-09 | End: 2024-08-13

## 2024-08-09 RX ORDER — LISINOPRIL 20 MG/1
20 TABLET ORAL DAILY
Status: DISCONTINUED | OUTPATIENT
Start: 2024-08-09 | End: 2024-08-13

## 2024-08-09 RX ORDER — OXYCODONE AND ACETAMINOPHEN 10; 325 MG/1; MG/1
1 TABLET ORAL EVERY 4 HOURS PRN
Status: DISCONTINUED | OUTPATIENT
Start: 2024-08-09 | End: 2024-08-13

## 2024-08-09 RX ORDER — PANTOPRAZOLE SODIUM 40 MG/1
40 TABLET, DELAYED RELEASE ORAL
Status: DISCONTINUED | OUTPATIENT
Start: 2024-08-09 | End: 2024-08-13

## 2024-08-09 RX ORDER — DEXTROSE MONOHYDRATE 25 G/50ML
50 INJECTION, SOLUTION INTRAVENOUS
Status: DISCONTINUED | OUTPATIENT
Start: 2024-08-09 | End: 2024-08-13

## 2024-08-09 RX ORDER — ECHINACEA PURPUREA EXTRACT 125 MG
1 TABLET ORAL
Status: DISCONTINUED | OUTPATIENT
Start: 2024-08-09 | End: 2024-08-13

## 2024-08-09 RX ORDER — CHOLECALCIFEROL (VITAMIN D3) 50 MCG
2000 TABLET ORAL DAILY
Status: DISCONTINUED | OUTPATIENT
Start: 2024-08-09 | End: 2024-08-13

## 2024-08-09 RX ORDER — NICOTINE POLACRILEX 4 MG
30 LOZENGE BUCCAL
Status: DISCONTINUED | OUTPATIENT
Start: 2024-08-09 | End: 2024-08-13

## 2024-08-09 RX ORDER — SENNOSIDES 8.6 MG
17.2 TABLET ORAL NIGHTLY PRN
Status: DISCONTINUED | OUTPATIENT
Start: 2024-08-09 | End: 2024-08-13

## 2024-08-09 RX ORDER — BENZONATATE 200 MG/1
200 CAPSULE ORAL 3 TIMES DAILY PRN
Status: DISCONTINUED | OUTPATIENT
Start: 2024-08-09 | End: 2024-08-13

## 2024-08-09 RX ORDER — DULOXETIN HYDROCHLORIDE 30 MG/1
60 CAPSULE, DELAYED RELEASE ORAL DAILY
Status: DISCONTINUED | OUTPATIENT
Start: 2024-08-09 | End: 2024-08-13

## 2024-08-09 RX ORDER — BISACODYL 10 MG
10 SUPPOSITORY, RECTAL RECTAL
Status: DISCONTINUED | OUTPATIENT
Start: 2024-08-09 | End: 2024-08-13

## 2024-08-09 RX ORDER — ASPIRIN 81 MG/1
81 TABLET, CHEWABLE ORAL DAILY
Status: DISCONTINUED | OUTPATIENT
Start: 2024-08-09 | End: 2024-08-11

## 2024-08-09 RX ORDER — SODIUM CHLORIDE 9 MG/ML
INJECTION, SOLUTION INTRAVENOUS CONTINUOUS
Status: ACTIVE | OUTPATIENT
Start: 2024-08-09 | End: 2024-08-09

## 2024-08-09 RX ORDER — NICOTINE POLACRILEX 4 MG
15 LOZENGE BUCCAL
Status: DISCONTINUED | OUTPATIENT
Start: 2024-08-09 | End: 2024-08-13

## 2024-08-09 RX ORDER — METOCLOPRAMIDE HYDROCHLORIDE 5 MG/ML
5 INJECTION INTRAMUSCULAR; INTRAVENOUS EVERY 8 HOURS PRN
Status: DISCONTINUED | OUTPATIENT
Start: 2024-08-09 | End: 2024-08-13

## 2024-08-09 RX ORDER — DULOXETIN HYDROCHLORIDE 30 MG/1
30 CAPSULE, DELAYED RELEASE ORAL DAILY
Status: DISCONTINUED | OUTPATIENT
Start: 2024-08-09 | End: 2024-08-13

## 2024-08-09 RX ORDER — ONDANSETRON 2 MG/ML
4 INJECTION INTRAMUSCULAR; INTRAVENOUS EVERY 6 HOURS PRN
Status: DISCONTINUED | OUTPATIENT
Start: 2024-08-09 | End: 2024-08-13

## 2024-08-09 NOTE — CONSULTS
Pulmonary H&P/Consult       NAME: Rayne Morales - ROOM: 2613/2613-A - MRN: [id number] - Age: 78 year old - :  1945    Date of Admission: 2024  6:59 PM  Admission Diagnosis: Hypoxia [R09.02]  Community acquired pneumonia, unspecified laterality [J18.9]  Reason for consult: PNA    History of Present Illness: 79 y/o w/ h/o Asthma, PE, IGG deficiency who presented to [facility] w/ c/o dyspnea, shaking chills, productive cough. and fever.  Symptoms started yesterday afternoon.  She noted similar symptoms as when she was dx'd w/ PNA previously. She had a CT in the ED that showed snow opacities- atelectasis vs infiltrates.  She was started on Abx and has been admitted for further care.   Currently she states that she feels ok.  She notes that she doesn't think she's sick enough for us to find anything on a bronchoscopy.  States that she has felt worse than this on prior admissions.  Took her xarelto yesterday.  She is overall frustrated with her health and the fact that she's back in the hospital.    Past Medical History:    Abdominal pain    Anemia    Anxiety    Arrhythmia    Arthritis    Asthma (HCC)    Back pain    Back problem    Black stools    Bloating    Blurred vision    Breast CA (HCC)    left lump and rad.    Cancer (HCC)    breast, basal cell    Cervical radiculopathy    Cervical spinal stenosis    Deep vein thrombosis (HCC)    Depression    Diabetes (HCC)    Diabetes mellitus (HCC)    Diarrhea, unspecified    Dizziness    Easy bruising    On Xeralto    Esophageal reflux    Exposure to medical diagnostic radiation    Fatigue    Fitting and adjustment of vascular catheter    Headache disorder    Hearing loss    Heart attack (HCC)    Heartburn    Hemorrhoids    High blood pressure    High cholesterol    History of blood clots    History of COVID-19    not hospitalized, no continued symptoms, cough, fever, loss of taste    History of depression    History of syncope    IgG deficiency (HCC)    ??? pt states  she gets IgG infusions due to lack of IgG, unable to fight off infections    Indigestion    Leaking of urine    Migraines    Muscle weakness    uses cane    Neuropathy    rt foot bilateral hands    Osteoarthritis    Pain in joints    Pneumonia due to organism    Pulmonary embolism (HCC)    Shortness of breath    Sleep apnea    cpap - not using - machine is broken    Stress    Uncomfortable fullness after meals    Visual impairment    glasses/contacts    Weight gain      Past Surgical History:   Procedure Laterality Date    Abdomen surgery proc unlisted      duodenal biopsy    Appendectomy      Appendectomy      Breast surgery      Cataract      Cholecystectomy      Colonoscopy  10/1/10, 4/21/17    Colonoscopy N/A 02/27/2023    Procedure: COLONOSCOPY;  Surgeon: Migel Bass MD;  Location:  ENDOSCOPY    [facility]  04/21/2017    Eye surgery      Femur/knee surg unlisted      right knee arthroscopy    Hemorrhoidectomy,int/ext,simple      Hernia surgery      Hysterectomy  1984    total hystero.    Lumpectomy left Left 1999    lt lumpectomy and radiation.    Oophorectomy Bilateral 1984    total hystero.    Other  11/2021    left wrist, ORIF Dr. Stacy Long    Other  02/21/2022    CERVICAL 5 AND CERVICAL 6 LAMINECTOMIES, PARTIAL CERVICAL 7 LAMINECTOMY, LEFT CERVICAL 5/CERVICAL 6 AND CERVICAL 6/CERVICAL 7 FORAMINOTOMIES, CERVICAL 6 AND CERVICAL 7 ARTHRODESIS, AUTOGRAFT, ALLOGRAFT    Other surgical history  2010    endoscopy - papillotomy    Other surgical history  2016     under right eye, skin cancer removed    Other surgical history  02/2022    neck    Port for vascular access      Radiation left Left 1999    left lumpectomy and radiation.    Repair ing hernia,5+y/o,reducibl      Sigmoidoscopy,diagnostic      Spine surgery procedure unlisted      Tonsillectomy      Total abdom hysterectomy      Total knee replacement        Medications Prior to Admission   Medication Sig Dispense Refill Last Dose    DULoxetine 60 MG  Oral Cap DR Particles Take 1 capsule (60 mg total) by mouth daily. TO TAKE ALONG WITH THE 30 MG TO EQUAL 90 MG DAILY. 90 capsule 3 8/9/2024 at 0900    DULoxetine 30 MG Oral Cap DR Particles Take 1 capsule (30 mg total) by mouth daily. TO TAKE ALONG WITH THE 60 MG TO EQUAL 90 MG DAILY. 90 capsule 3 8/9/2024 at 0900    pantoprazole 40 MG Oral Tab EC Take 1 tablet (40 mg total) by mouth before breakfast. 90 tablet 3 8/8/2024 at 0600    rivaroxaban (XARELTO) 20 MG Oral Tab Take 1 tablet (20 mg total) by mouth daily. 90 tablet 3 8/8/2024 at 0900    ROSUVASTATIN 10 MG Oral Tab Take 1 tablet (10 mg total) by mouth nightly. 90 tablet 3 Past Week at 2100    semaglutide (OZEMPIC, 0.25 OR 0.5 MG/DOSE,) 2 MG/3ML Subcutaneous Solution Pen-injector Inject 0.5 mg into the skin once a week. 9 mL 1 Past Week    metFORMIN  MG Oral Tablet 24 Hr Take 2 tablets (1,000 mg total) by mouth daily with breakfast and 1 tablet (500 mg total) every evening. 270 tablet 3 8/9/2024 at 0900    oxyCODONE-acetaminophen  MG Oral Tab Take 1 tablet by mouth every 4 (four) hours as needed for Pain (takes at home for back pain).   8/9/2024 at 0000    lisinopril 20 MG Oral Tab Take 1 tablet (20 mg total) by mouth daily.   8/8/2024 at 0900    tiZANidine 4 MG Oral Tab Take 2.5 tablets (10 mg total) by mouth nightly. Takes every night   Taking at 2200    pregabalin 100 MG Oral Cap Take 1 capsule (100 mg total) by mouth in the morning and 1 capsule (100 mg total) at noon and 1 capsule (100 mg total) in the evening. 270 capsule 1 8/8/2024 at 0900    Naloxone HCl 4 MG/0.1ML Nasal Liquid 4 mg by Nasal route as needed. If patient remains unresponsive, repeat dose in other nostril 2-5 minutes after first dose. 1 kit 0 Taking    albuterol 108 (90 Base) MCG/ACT Inhalation Aero Soln Inhale 2 puffs into the lungs every 6 (six) hours as needed for Wheezing. 1 each 3 8/8/2024    aspirin 81 MG Oral Chew Tab Chew 1 tablet (81 mg total) by mouth daily.    2024 at 0900    Fluticasone Propionate 50 MCG/ACT Nasal Suspension 1 spray by Nasal route 2 (two) times daily. 1 Bottle 3 Past Month    metroNIDAZOLE 0.75 % External Gel Apply 1 g topically 2 (two) times daily. Apply to face twice daily 45 g 5     Cholecalciferol (VITAMIN D) 2000 units Oral Tab Take 2,000 Units by mouth daily.        Allergies   Allergen Reactions    Ciprofloxacin HIVES    Mold REACTIVE AIRWAY DISEASE     Mold and smut    Sulfa Antibiotics HIVES     Bactrim (Sulfamethoxazole/TMP)    Diovan [Valsartan] NAUSEA AND VOMITING    Omnicef NAUSEA ONLY and DIZZINESS    Clindamycin DIARRHEA and NAUSEA ONLY    Green Pepper OTHER (SEE COMMENTS)     Sensitivity (all peppers - green, orange and red)    Lipitor [Atorvastatin Calcium] OTHER (SEE COMMENTS)     Leg cramping,    Pneumococcal Vaccines OTHER (SEE COMMENTS)     Pt states she had pneumo vax done at Dr. Boyd on 1/10/24- states developed redness, swelling,itching to the arm    Quinapril Hcl FATIGUE       Social History:  Social History     Socioeconomic History    Marital status:      Spouse name: Not on file    Number of children: Not on file    Years of education: Not on file    Highest education level: Not on file   Occupational History    Not on file   Tobacco Use    Smoking status: Former     Current packs/day: 0.00     Types: Cigarettes     Quit date: 1968     Years since quittin.6    Smokeless tobacco: Never   Vaping Use    Vaping status: Never Used   Substance and Sexual Activity    Alcohol use: No     Comment: rare    Drug use: Not Currently     Types: Cannabis     Comment: uses cream for pain to back/neck with barometer changes.    Sexual activity: Not Currently     Partners: Male   Other Topics Concern     Service Not Asked    Blood Transfusions Not Asked    Caffeine Concern Yes     Comment: half of cup of coke daily    Occupational Exposure Not Asked    Hobby Hazards Not Asked    Sleep Concern No    Stress  Concern Not Asked    Weight Concern Not Asked    Special Diet Not Asked    Back Care Not Asked    Exercise Yes     Comment: walking    Bike Helmet Not Asked    Seat Belt Yes    Self-Exams Not Asked   Social History Narrative    Not on file     Social Determinants of Health     Financial Resource Strain: Low Risk  (10/25/2023)    Financial Resource Strain     Difficulty of Paying Living Expenses: Not hard at all     Med Affordability: No   Food Insecurity: No Food Insecurity (8/9/2024)    Food Insecurity     Food Insecurity: Never true   Transportation Needs: No Transportation Needs (8/9/2024)    Transportation Needs     Lack of Transportation: No     Car Seat: Not on file   Physical Activity: Inactive (11/8/2019)    [facility]    Exercise Vital Sign     Days of Exercise per Week: 0 days     Minutes of Exercise per Session: 0 min   Stress: No Stress Concern Present (10/25/2023)    Stress     Feeling of Stress : No   Social Connections: Socially Integrated (10/25/2023)    Social Connections     Frequency of Socialization with Friends and Family: 2   Housing Stability: Low Risk  (8/9/2024)    Housing Stability     Housing Instability: No     Housing Instability Emergency: Not on file     Crib or Bassinette: Not on file          Family History:  Family History   Problem Relation Age of Onset    Heart Attack Paternal Grandfather     Heart Attack Paternal Grandmother     Other (CAD) Father     Other (CAD) Mother     Hypertension Mother     Heart Attack Mother     Other (CAD) Brother     Hypertension Brother     Heart Attack Brother     Stroke Maternal Grandfather         Stroke    Stroke Maternal Grandmother         Stroke    Breast Cancer Self 54        Home Medications:  Outpatient Medications Marked as Taking for the 8/8/24 encounter (Hospital Encounter)   Medication Sig Dispense Refill    DULoxetine 60 MG Oral Cap DR Particles Take 1 capsule (60 mg total) by mouth  daily. TO TAKE ALONG WITH THE 30 MG TO EQUAL 90 MG DAILY. 90 capsule 3    DULoxetine 30 MG Oral Cap DR Particles Take 1 capsule (30 mg total) by mouth daily. TO TAKE ALONG WITH THE 60 MG TO EQUAL 90 MG DAILY. 90 capsule 3    pantoprazole 40 MG Oral Tab EC Take 1 tablet (40 mg total) by mouth before breakfast. 90 tablet 3    rivaroxaban (XARELTO) 20 MG Oral Tab Take 1 tablet (20 mg total) by mouth daily. 90 tablet 3    ROSUVASTATIN 10 MG Oral Tab Take 1 tablet (10 mg total) by mouth nightly. 90 tablet 3    semaglutide (OZEMPIC, 0.25 OR 0.5 MG/DOSE,) 2 MG/3ML Subcutaneous Solution Pen-injector Inject 0.5 mg into the skin once a week. 9 mL 1    metFORMIN  MG Oral Tablet 24 Hr Take 2 tablets (1,000 mg total) by mouth daily with breakfast and 1 tablet (500 mg total) every evening. 270 tablet 3    oxyCODONE-acetaminophen  MG Oral Tab Take 1 tablet by mouth every 4 (four) hours as needed for Pain (takes at home for back pain).      lisinopril 20 MG Oral Tab Take 1 tablet (20 mg total) by mouth daily.      tiZANidine 4 MG Oral Tab Take 2.5 tablets (10 mg total) by mouth nightly. Takes every night      pregabalin 100 MG Oral Cap Take 1 capsule (100 mg total) by mouth in the morning and 1 capsule (100 mg total) at noon and 1 capsule (100 mg total) in the evening. 270 capsule 1    Naloxone HCl 4 MG/0.1ML Nasal Liquid 4 mg by Nasal route as needed. If patient remains unresponsive, repeat dose in other nostril 2-5 minutes after first dose. 1 kit 0    albuterol 108 (90 Base) MCG/ACT Inhalation Aero Soln Inhale 2 puffs into the lungs every 6 (six) hours as needed for Wheezing. 1 each 3    aspirin 81 MG Oral Chew Tab Chew 1 tablet (81 mg total) by mouth daily.      Fluticasone Propionate 50 MCG/ACT Nasal Suspension 1 spray by Nasal route 2 (two) times daily. 1 Bottle 3       Scheduled Medication:   guaiFENesin ER  600 mg Oral BID    insulin aspart  2-10 Units Subcutaneous TID AC and HS    lisinopril  20 mg Oral Daily     aspirin  81 mg Oral Daily    cholecalciferol  2,000 Units Oral Daily    DULoxetine  30 mg Oral Daily    DULoxetine  60 mg Oral Daily    pantoprazole  40 mg Oral Before breakfast    pregabalin  100 mg Oral TID    rivaroxaban  20 mg Oral Daily    rosuvastatin  10 mg Oral Nightly    tiZANidine  10 mg Oral Nightly     Continuous Infusing Medication:    PRN Medication:  acetaminophen    melatonin    polyethylene glycol (PEG 3350)    sennosides    bisacodyl    fleet enema    ondansetron    metoclopramide    benzonatate    glycerin-hypromellose-    sodium chloride    glucose **OR** glucose **OR** glucose-vitamin C **OR** dextrose **OR** glucose **OR** glucose **OR** glucose-vitamin C    albuterol    heparin    oxyCODONE-acetaminophen     REVIEW OF SYSTEMS:   14 point ROS conducted, negative save for above       OBJECTIVE:  Vitals:    08/09/24 0115 08/09/24 0131 08/09/24 0136 08/09/24 0530   BP:    132/66   BP Location:    Left arm   Pulse: 90  83 71   Resp: 20   20   Temp: 99 °F (37.2 °C)   97.2 °F (36.2 °C)   TempSrc: Oral   Oral   SpO2:   92% 97%   Weight: 158 lb 15.2 oz (72.1 kg) 158 lb 15.2 oz (72.1 kg)     Height:           Oxygen Therapy  SpO2: 97 %  O2 Device: Nasal cannula  O2 Flow Rate (L/min): 2 L/min  Pulse Oximetry Type: Continuous  Oximetry Probe Site Changed: No  Pulse Ox Probe Location: Right hand                  Wt Readings from Last 3 Encounters:   08/09/24 158 lb 15.2 oz (72.1 kg)   08/01/24 154 lb 3.2 oz (69.9 kg)   05/02/24 160 lb 12.8 oz (72.9 kg)         Intake/Output Summary (Last 24 hours) at 8/9/2024 0842  Last data filed at 8/8/2024 2329  Gross per 24 hour   Intake 2167 ml   Output --   Net 2167 ml       /66 (BP Location: Right arm)   Pulse 78   Temp 98.2 °F (36.8 °C) (Oral)   Resp 18   Ht 5' 1\" (1.549 m)   Wt 158 lb 15.2 oz (72.1 kg)   LMP  (LMP Unknown)   SpO2 93%   BMI 30.03 kg/m²     General Appearance:    Alert, cooperative, no distress, appears stated age   Head:     Normocephalic, without obvious abnormality, atraumatic   Eyes:    PERRL, conjunctiva/corneas clear, EOM's intact, both eyes   Throat:   Lips, mucosa, and tongue normal; teeth and gums normal   Neck:   Supple, symmetrical, trachea midline, no adenopathy;        thyroid:  No enlargement/tenderness/nodules; no carotid    bruit or JVD   Back:     Symmetric, no curvature, ROM normal, no CVA tenderness   Lungs:     Rales in left base, respirations unlabored   Chest wall:    No tenderness or deformity   Heart:    Regular rate and rhythm, S1 and S2 normal, no murmur, rub   or gallop   Abdomen:     Soft, non-tender, bowel sounds active all four quadrants,     no masses, no organomegaly   Extremities:   Extremities normal, atraumatic, no cyanosis or edema   Pulses:   2+ and symmetric all extremities   Skin:   Skin color, texture, turgor normal, no rashes or lesions   Lymph nodes:   Cervical, supraclavicular, and axillary nodes normal   Neurologic:   CNII-XII intact. Normal strength, sensation and reflexes       throughout       Labs reviewed as noted below    Imaging: CT reviewed as noted above    ASSESSMENT/PLAN:    Multifocal PNA  -3rd episode this year  -agree w/ abx- cont  -reviewed Dr. Browning's last office note which discusses doing a bronch w/ BAL and TBBx if pt is admitted again w/ PNA  -discussed bronch w/ pt and she is willing to proceed on Monday if she is still here  Asthma  -moderate persistent  -advair 500  -albuterol  -montelukast  MUKUND  -cont CPAP w/ sleep  H/o PE  -on xarelto  IgG deficiency  -on IVIG per allergy/immunology  Dispo  -called Endo scheduling, currently Monday at 15:30 is their first available, pt will need to be NPO Sunday night  -cont to hold nohemi Jones  [facility]  Pulmonary and Critical Care

## 2024-08-09 NOTE — PROGRESS NOTES
08/09/24 0110 08/09/24 0111 08/09/24 0112   Vital Signs   /55 122/61 104/78   MAP (mmHg) 75 78 88   BP Location Left arm Left arm Left arm   BP Method Automatic Automatic Automatic   Patient Position Lying Sitting Standing

## 2024-08-09 NOTE — H&P
[facility]     Rayne Morales Patient Status:  Emergency    1945 MRN [id number]   [facility] EMERGENCY DEPARTMENT Attending James Barr MD   Hosp Day # 0 PCP Jose Daniel Washington MD     Chief Complaint: cough, weakness    Subjective:    History of Present Illness:     Rayne Morales is a 78 year old female with PMHx HTN/ HLD/ PE/ GERD/ MUKUND/ DVT/ IgG deficiency/ DM who presented to the hospital for weakness and cough. She has a history of frequent PNA and has been on IV Gammagard monthly to try and prevent infections. Her last dose was supposed to be 3 days ago but there was an issue with the order and it had to be deferred. This afternoon, she suddenly felt fatigued and chilled and had pain in her bl thighs which is her usual symptoms on pneumonia. She notes an increase in a dry cough for the past 2 days and intermittent dyspnea.    History/Other:    Past Medical History:  Past Medical History:    Abdominal pain    Anemia    Anxiety    Arrhythmia    Arthritis    Asthma (HCC)    Back pain    Back problem    Black stools    Bloating    Blurred vision    Breast CA (HCC)    left lump and rad.    Cancer (HCC)    breast, basal cell    Cervical radiculopathy    Cervical spinal stenosis    Deep vein thrombosis (HCC)    Depression    Diabetes mellitus (HCC)    Diarrhea, unspecified    Dizziness    Easy bruising    On Xeralto    Esophageal reflux    Exposure to medical diagnostic radiation    Fatigue    Fitting and adjustment of vascular catheter    Headache disorder    Hearing loss    Heart attack (HCC)    Heartburn    Hemorrhoids    High blood pressure    High cholesterol    History of blood clots    History of COVID-19    not hospitalized, no continued symptoms, cough, fever, loss of taste    History of depression    History of syncope    IgG deficiency (HCC)    ??? pt states she gets IgG infusions due to lack of IgG, unable to fight off infections     Indigestion    Leaking of urine    Migraines    Muscle weakness    uses cane    Neuropathy    rt foot bilateral hands    Osteoarthritis    Pain in joints    Pneumonia due to organism    Pulmonary embolism (HCC)    Shortness of breath    Sleep apnea    cpap - not using - machine is broken    Stress    Uncomfortable fullness after meals    Visual impairment    glasses/contacts    Weight gain     Past Surgical History:   Past Surgical History:   Procedure Laterality Date    Abdomen surgery proc unlisted      duodenal biopsy    Appendectomy      Breast surgery      Cataract      Cholecystectomy      Colonoscopy  10/1/10, 4/21/17    Colonoscopy N/A 02/27/2023    Procedure: COLONOSCOPY;  Surgeon: Migel Bass MD;  Location:  ENDOSCOPY    Egd  04/21/2017    Eye surgery      Femur/knee surg unlisted      right knee arthroscopy    Hemorrhoidectomy,int/ext,simple      Hernia surgery      Hysterectomy  1984    total hystero.    Lumpectomy left Left 1999    lt lumpectomy and radiation.    Oophorectomy Bilateral 1984    total hystero.    Other  11/2021    left wrist, ORIF Dr. Stacy Long    Other  02/21/2022    CERVICAL 5 AND CERVICAL 6 LAMINECTOMIES, PARTIAL CERVICAL 7 LAMINECTOMY, LEFT CERVICAL 5/CERVICAL 6 AND CERVICAL 6/CERVICAL 7 FORAMINOTOMIES, CERVICAL 6 AND CERVICAL 7 ARTHRODESIS, AUTOGRAFT, ALLOGRAFT    Other surgical history  2010    endoscopy - papillotomy    Other surgical history  2016     under right eye, skin cancer removed    Other surgical history  02/2022    neck    Port for vascular access      Radiation left Left 1999    left lumpectomy and radiation.    Repair ing hernia,5+y/o,reducibl      Sigmoidoscopy,diagnostic      Spine surgery procedure unlisted      Tonsillectomy      Total abdom hysterectomy      Total knee replacement        Family History:   Family History   Problem Relation Age of Onset    Heart Attack Paternal Grandfather     Heart Attack Paternal Grandmother     Other (CAD) Father      Other (CAD) Mother     Hypertension Mother     Heart Attack Mother     Other (CAD) Brother     Hypertension Brother     Heart Attack Brother     Stroke Maternal Grandfather         Stroke    Stroke Maternal Grandmother         Stroke    Breast Cancer Self 54     Social History:    reports that she quit smoking about 56 years ago. Her smoking use included cigarettes. She has never used smokeless tobacco. She reports current drug use. Drug: Cannabis. She reports that she does not drink alcohol.     Allergies:   Allergies   Allergen Reactions    Ciprofloxacin HIVES    Mold REACTIVE AIRWAY DISEASE     Mold and smut    Sulfa Antibiotics HIVES     Bactrim (Sulfamethoxazole/TMP)    Diovan [Valsartan] NAUSEA AND VOMITING    Omnicef NAUSEA ONLY and DIZZINESS    Clindamycin DIARRHEA and NAUSEA ONLY    Green Pepper OTHER (SEE COMMENTS)     Sensitivity (all peppers - green, orange and red)    Lipitor [Atorvastatin Calcium] OTHER (SEE COMMENTS)     Leg cramping,    Pneumococcal Vaccines OTHER (SEE COMMENTS)     Pt states she had pneumo vax done at Dr. Boyd on 1/10/24- states developed redness, swelling,itching to the arm    Quinapril Hcl FATIGUE       Medications:    No current facility-administered medications on file prior to encounter.     Current Outpatient Medications on File Prior to Encounter   Medication Sig Dispense Refill    DULoxetine 60 MG Oral Cap DR Particles Take 1 capsule (60 mg total) by mouth daily. TO TAKE ALONG WITH THE 30 MG TO EQUAL 90 MG DAILY. 90 capsule 3    DULoxetine 30 MG Oral Cap DR Particles Take 1 capsule (30 mg total) by mouth daily. TO TAKE ALONG WITH THE 60 MG TO EQUAL 90 MG DAILY. 90 capsule 3    pantoprazole 40 MG Oral Tab EC Take 1 tablet (40 mg total) by mouth before breakfast. 90 tablet 3    rivaroxaban (XARELTO) 20 MG Oral Tab Take 1 tablet (20 mg total) by mouth daily. 90 tablet 3    ROSUVASTATIN 10 MG Oral Tab Take 1 tablet (10 mg total) by mouth nightly. 90 tablet 3    metFORMIN   MG Oral Tablet 24 Hr Take 2 tablets (1,000 mg total) by mouth daily with breakfast and 1 tablet (500 mg total) every evening. 270 tablet 3    oxyCODONE-acetaminophen  MG Oral Tab       tiZANidine 4 MG Oral Tab Take 1 tablet (4 mg total) by mouth every 8 (eight) hours as needed. Takes every night      pregabalin 100 MG Oral Cap Take 1 capsule (100 mg total) by mouth in the morning and 1 capsule (100 mg total) at noon and 1 capsule (100 mg total) in the evening. 270 capsule 1    Naloxone HCl 4 MG/0.1ML Nasal Liquid 4 mg by Nasal route as needed. If patient remains unresponsive, repeat dose in other nostril 2-5 minutes after first dose. 1 kit 0    albuterol 108 (90 Base) MCG/ACT Inhalation Aero Soln Inhale 2 puffs into the lungs every 6 (six) hours as needed for Wheezing. 1 each 3    aspirin 81 MG Oral Chew Tab Chew 1 tablet (81 mg total) by mouth daily.      Fluticasone Propionate 50 MCG/ACT Nasal Suspension 1 spray by Nasal route 2 (two) times daily. 1 Bottle 3    semaglutide (OZEMPIC, 0.25 OR 0.5 MG/DOSE,) 2 MG/3ML Subcutaneous Solution Pen-injector Inject 0.5 mg into the skin once a week. 9 mL 1    lisinopril 20 MG Oral Tab Take 1 tablet (20 mg total) by mouth daily.      metroNIDAZOLE 0.75 % External Gel Apply 1 g topically 2 (two) times daily. Apply to face twice daily 45 g 5    Cholecalciferol (VITAMIN D) 2000 units Oral Tab Take 2,000 Units by mouth daily.         Review of Systems:   A comprehensive review of systems was completed.    Pertinent positives and negatives noted in the HPI.    Objective:   Physical Exam:    /65   Pulse 86   Temp 99 °F (37.2 °C) (Oral)   Resp 24   Ht 5' 1\" (1.549 m)   Wt 152 lb (68.9 kg)   LMP  (LMP Unknown)   SpO2 93%   BMI 28.72 kg/m²   General: No acute distress, Alert  Respiratory: rhonchi in R lower lung zone posteriorly, no wheezes, on room air  Cardiovascular: S1, S2. Regular rate and rhythm  Abdomen: Soft, Non-tender, non-distended, positive bowel  sounds  Neuro: No new focal deficits  Extremities: No edema    Results:    Labs:      Labs Last 24 Hours:    Recent Labs   Lab 08/08/24 1917   RBC 3.52*   HGB 10.8*   HCT 31.3*   MCV 88.9   MCH 30.7   MCHC 34.5   RDW 13.2   NEPRELIM 12.36*   WBC 15.0*   .0       Recent Labs   Lab 08/08/24 1917   *   BUN 16   CREATSERUM 1.06*   EGFRCR 54*   CA 9.3   ALB 4.6   *   K 3.8      CO2 24.0   ALKPHO 59   AST 20   ALT 16   BILT 0.8   TP 6.9       Lab Results   Component Value Date    PT 19.4 (H) 07/29/2014    PT 13.9 07/26/2014    INR 2.33 (H) 12/04/2023    INR 1.00 02/21/2022    INR 2.58 (H) 02/09/2022       Recent Labs   Lab 08/08/24 1917   TROPHS 19       No results for input(s): \"TROP\", \"PBNP\" in the last 168 hours.    No results for input(s): \"PCT\" in the last 168 hours.    Imaging: Imaging data reviewed in Epic.    Assessment & Plan:      #Sepsis 2/2 PNA with acute hypoxic respiratory failure  -CTA chest/ A/P showing no PE, scattered opacities LLL (atelectasis vs early PNA), stable lobulated lesion in left lobe of liver  -met 3 SIRS criteria: WBC 15.0, HR 95, RR 24: lactate 2.1 --> 0.8  -SpO2 as low as 85% on room air  -wean oxygen as able with goal SpO2 >94%  -antibiotics: azithromycin, rocephin  -obtain sputum cx  -blood cx pending  -Tylenol, nebs, mucinex  -cont to trend CBC  #lactic acidosis  #leukocytosis    #hyponatremia  -Na 131  -likely 2/2 SIADH from acute pulmonary process  -can allow to correct to normal over 24 hrs  -cont to monitor BMP    #CKD 3a  -Cr at baseline    #normocytic anemia  -hgb 10.8: baseline 10-12  -no signs acute bleeding  -cont to monitor CBC    #Depression  -cont home duloxetine    #GERD  -cont home PPI    #DVT  -cont home Xarelto    #HLD  -cont home statin    #chronic pain  -cont home norco, tizanidine, pregabalin    #HTN  -cont home lisinopril          Plan of care discussed with ED physician    Jazz Rutherford DO    Supplementary Documentation:     The 21st  Century Cures Act makes medical notes like these available to patients in the interest of transparency. Please be advised this is a medical document. Medical documents are intended to carry relevant information, facts as evident, and the clinical opinion of the practitioner. The medical note is intended as peer to peer communication and may appear blunt or direct. It is written in medical language and may contain abbreviations or verbiage that are unfamiliar.

## 2024-08-09 NOTE — DISCHARGE INSTRUCTIONS
Medication Assistance Resources    Visit [employer] for discounted prescription drug coupons or [employer] for $4 prescriptions https://www.Ganeselo.com/cp/4-dollar-prescriptions/5448748    The Extra Help Program: is designed to help eligible Medicare Part D patients with high out of pocket costs. Contact this program directly by calling [employer] at 1-348.354.3817    Through the Medicare Plan Finder, you can search to find more information about cost and your specific prescription coverage.  www.medicare.gov/find-a-plan.    [facility] (only apply if you do not Qualify for medicaid)  [facility]  [facility]; [address]  [place]  Phone: [phone]  [email or web address]    [facility] [address] [address]  [phone]  [email or web address]

## 2024-08-09 NOTE — PLAN OF CARE
Assumed care of patient at 0115, alert and oriented x4,   Sinus rhythm on tele,   Room air, 2L NC overnight, fine crackles auscultated bilaterally, pt reported productive cough. Continuous pulse oximetry maintained. Denies shortness of breath and dizziness.   Complaints of bilateral leg pain.   Continent of bowel and bladder.   Glasses present at bedside.  Safety precautions maintained    Discussed plan of care with patient. All questions answered.    Problem: CARDIOVASCULAR - ADULT  Goal: Maintains optimal cardiac output and hemodynamic stability  Description: INTERVENTIONS:  - Monitor vital signs, rhythm, and trends  - Monitor for bleeding, hypotension and signs of decreased cardiac output  - Evaluate effectiveness of vasoactive medications to optimize hemodynamic stability  - Monitor arterial and/or venous puncture sites for bleeding and/or hematoma  - Assess quality of pulses, skin color and temperature  - Assess for signs of decreased coronary artery perfusion - ex. Angina  - Evaluate fluid balance, assess for edema, trend weights  Outcome: Progressing  Goal: Absence of cardiac arrhythmias or at baseline  Description: INTERVENTIONS:  - Continuous cardiac monitoring, monitor vital signs, obtain 12 lead EKG if indicated  - Evaluate effectiveness of antiarrhythmic and heart rate control medications as ordered  - Initiate emergency measures for life threatening arrhythmias  - Monitor electrolytes and administer replacement therapy as ordered  Outcome: Progressing     Problem: Patient/Family Goals  Goal: Patient/Family Long Term Goal  Description: Patient's Long Term Goal: Go home    Interventions:  - routine lab draws  - medication compliance   - MD rounding  - See additional Care Plan goals for specific interventions  Outcome: Progressing  Goal: Patient/Family Short Term Goal  Description: Patient's Short Term Goal: pain free    Interventions:   - ice pack  -pain medication  - See additional Care Plan goals for  specific interventions  Outcome: Progressing     Problem: SAFETY ADULT - FALL  Goal: Free from fall injury  Description: INTERVENTIONS:  - Assess pt frequently for physical needs  - Identify cognitive and physical deficits and behaviors that affect risk of falls.  - [place] fall precautions as indicated by assessment.  - Educate pt/family on patient safety including physical limitations  - Instruct pt to call for assistance with activity based on assessment  - Modify environment to reduce risk of injury  - Provide assistive devices as appropriate  - Consider OT/PT consult to assist with strengthening/mobility  - Encourage toileting schedule  Outcome: Progressing     Problem: PAIN - ADULT  Goal: Verbalizes/displays adequate comfort level or patient's stated pain goal  Description: INTERVENTIONS:  - Encourage pt to monitor pain and request assistance  - Assess pain using appropriate pain scale  - Administer analgesics based on type and severity of pain and evaluate response  - Implement non-pharmacological measures as appropriate and evaluate response  - Consider cultural and social influences on pain and pain management  - Manage/alleviate anxiety  - Utilize distraction and/or relaxation techniques  - Monitor for opioid side effects  - Notify MD/LIP if interventions unsuccessful or patient reports new pain  - Anticipate increased pain with activity and pre-medicate as appropriate  Outcome: Progressing     Problem: RESPIRATORY - ADULT  Goal: Achieves optimal ventilation and oxygenation  Description: INTERVENTIONS:  - Assess for changes in respiratory status  - Assess for changes in mentation and behavior  - Position to facilitate oxygenation and minimize respiratory effort  - Oxygen supplementation based on oxygen saturation or ABGs  - Provide Smoking Cessation handout, if applicable  - Encourage broncho-pulmonary hygiene including cough, deep breathe, Incentive Spirometry  - Assess the need for suctioning and  perform as needed  - Assess and instruct to report SOB or any respiratory difficulty  - Respiratory Therapy support as indicated  - Manage/alleviate anxiety  - Monitor for signs/symptoms of CO2 retention  Outcome: Progressing

## 2024-08-09 NOTE — PROGRESS NOTES
[facility]   part of [facility]     Hospitalist Progress Note     Rayne Morales Patient Status:  Inpatient    1945 MRN [id number]   Location [facility] 2NE-A Attending Sohail Bowman MD   Hosp Day # 0 PCP Jose Daniel Washington MD     Subjective:   Doing better     Objective:    Review of Systems:   A comprehensive review of systems was completed; pertinent positive and negatives stated in subjective.  Vital signs:  Temp:  [97.2 °F (36.2 °C)-100.2 °F (37.9 °C)] 97.2 °F (36.2 °C)  Pulse:  [71-95] 71  Resp:  [16-24] 20  BP: ()/(50-78) 132/66  SpO2:  [85 %-99 %] 97 %  Physical Exam:    General: No acute distress   Respiratory: no wheezes, no rhonchi, scant coarse BS in LLL   Cardiovascular: S1, S2, RRR  Abdomen: Soft, NT/ND, +BS  Extremities: no edema    Diagnostic Data:    Labs:  Recent Labs   Lab 24   WBC 15.0*   HGB 10.8*   MCV 88.9   .0     Recent Labs   Lab 24   *   BUN 16   CREATSERUM 1.06*   CA 9.3   ALB 4.6   *   K 3.8      CO2 24.0   ALKPHO 59   AST 20   ALT 16   BILT 0.8   TP 6.9     Estimated Creatinine Clearance: 33.5 mL/min (A) (based on SCr of 1.06 mg/dL (H)).  No results for input(s): \"PTP\", \"INR\" in the last 168 hours.     Microbiology  No results found for this visit on 24.  Imaging: Reviewed in Epic.  Medications:    guaiFENesin ER  600 mg Oral BID    insulin aspart  2-10 Units Subcutaneous TID AC and HS    lisinopril  20 mg Oral Daily    aspirin  81 mg Oral Daily    cholecalciferol  2,000 Units Oral Daily    DULoxetine  30 mg Oral Daily    DULoxetine  60 mg Oral Daily    pantoprazole  40 mg Oral Before breakfast    pregabalin  100 mg Oral TID    rivaroxaban  20 mg Oral Daily    rosuvastatin  10 mg Oral Nightly    tiZANidine  10 mg Oral Nightly       Assessment & Plan:    #Sepsis 2/2 PNA with acute hypoxic respiratory failure and IgG deficiency   -consult Pulm Dr. Browning   -abx- ?broaden spectrum but will cont as  improving quickly  -consider Bronch pending clinical course   -blood cx, sputum Cx pending  -IVF as hypotensive in the ED     #lactic acidosis- resolved on sepsis protocol   #leukocytosis- trend     #IgG deficiency- as above      #hyponatremia-likely 2/2 SIADH from acute pulmonary process  -will trend      #CKD 3a-Cr at baseline     #normocytic anemia-hgb 10.8: baseline 10-12  -no signs acute bleeding     #Depression-cont home duloxetine     #GERD-cont home PPI     #DVT-cont home Xarelto     #HLD-cont home statin     #chronic pain-cont home norco, tizanidine, pregabalin     #HTN-cont home lisinopril as BP tolerates         Supplementary Documentation:   Quality:  DVT Mechanical Prophylaxis:   SCDs,    DVT Pharmacologic Prophylaxis   Medication    heparin (Porcine) 100 Units/mL lock flush 500 Units    rivaroxaban (Xarelto) tab 20 mg      DVT Pharmacologic prophylaxis: Aspirin 162 mg         Code Status: Full Code  Baxter: External urinary catheter in place  Baxter Duration (in days):   Central line:    CLARI:   At this point Ms. Morales is expected to be discharge to: home     The 21st Century Cures Act makes medical notes like these available to patients in the interest of transparency. Please be advised this is a medical document. Medical documents are intended to carry relevant information, facts as evident, and the clinical opinion of the practitioner. The medical note is intended as peer to peer communication and may appear blunt or direct. It is written in medical language and may contain abbreviations or verbiage that are unfamiliar.      **Certification    PHYSICIAN Certification of Need for Inpatient Hospitalization - Initial Certification    Patient will require inpatient services that will reasonably be expected to span two midnight's based on the clinical documentation in H+P.   Based on patients current state of illness, I anticipate that, after discharge, patient will require TBD.

## 2024-08-09 NOTE — PLAN OF CARE
Rec'd pt at 0730. A&O x 4. Tele shows NSR. O2 sats adequate on RA. Pt continent, up w/ SBA. No C/O pain or SOB. Skin dry and intact. Bed locked and in low position, call light and personal items within reach. Will continue to monitor. POC - IV abx for PNA, Xarelto on hold for possible bronchoscopy Monday.    Problem: CARDIOVASCULAR - ADULT  Goal: Maintains optimal cardiac output and hemodynamic stability  Description: INTERVENTIONS:  - Monitor vital signs, rhythm, and trends  - Monitor for bleeding, hypotension and signs of decreased cardiac output  - Evaluate effectiveness of vasoactive medications to optimize hemodynamic stability  - Monitor arterial and/or venous puncture sites for bleeding and/or hematoma  - Assess quality of pulses, skin color and temperature  - Assess for signs of decreased coronary artery perfusion - ex. Angina  - Evaluate fluid balance, assess for edema, trend weights  Outcome: Progressing  Goal: Absence of cardiac arrhythmias or at baseline  Description: INTERVENTIONS:  - Continuous cardiac monitoring, monitor vital signs, obtain 12 lead EKG if indicated  - Evaluate effectiveness of antiarrhythmic and heart rate control medications as ordered  - Initiate emergency measures for life threatening arrhythmias  - Monitor electrolytes and administer replacement therapy as ordered  Outcome: Progressing     Problem: Patient/Family Goals  Goal: Patient/Family Long Term Goal  Description: Patient's Long Term Goal: Go home    Interventions:  - routine lab draws  - medication compliance   - MD rounding  - See additional Care Plan goals for specific interventions  Outcome: Progressing  Goal: Patient/Family Short Term Goal  Description: Patient's Short Term Goal: pain free    Interventions:   - ice pack  -pain medication  - See additional Care Plan goals for specific interventions  Outcome: Progressing     Problem: SAFETY ADULT - FALL  Goal: Free from fall injury  Description: INTERVENTIONS:  - Assess pt  frequently for physical needs  - Identify cognitive and physical deficits and behaviors that affect risk of falls.  - [place] fall precautions as indicated by assessment.  - Educate pt/family on patient safety including physical limitations  - Instruct pt to call for assistance with activity based on assessment  - Modify environment to reduce risk of injury  - Provide assistive devices as appropriate  - Consider OT/PT consult to assist with strengthening/mobility  - Encourage toileting schedule  Outcome: Progressing     Problem: PAIN - ADULT  Goal: Verbalizes/displays adequate comfort level or patient's stated pain goal  Description: INTERVENTIONS:  - Encourage pt to monitor pain and request assistance  - Assess pain using appropriate pain scale  - Administer analgesics based on type and severity of pain and evaluate response  - Implement non-pharmacological measures as appropriate and evaluate response  - Consider cultural and social influences on pain and pain management  - Manage/alleviate anxiety  - Utilize distraction and/or relaxation techniques  - Monitor for opioid side effects  - Notify MD/LIP if interventions unsuccessful or patient reports new pain  - Anticipate increased pain with activity and pre-medicate as appropriate  Outcome: Progressing     Problem: RESPIRATORY - ADULT  Goal: Achieves optimal ventilation and oxygenation  Description: INTERVENTIONS:  - Assess for changes in respiratory status  - Assess for changes in mentation and behavior  - Position to facilitate oxygenation and minimize respiratory effort  - Oxygen supplementation based on oxygen saturation or ABGs  - Provide Smoking Cessation handout, if applicable  - Encourage broncho-pulmonary hygiene including cough, deep breathe, Incentive Spirometry  - Assess the need for suctioning and perform as needed  - Assess and instruct to report SOB or any respiratory difficulty  - Respiratory Therapy support as indicated  - Manage/alleviate  anxiety  - Monitor for signs/symptoms of CO2 retention  Outcome: Progressing

## 2024-08-09 NOTE — ED QUICK NOTES
Orders for admission, patient is aware of plan and ready to go upstairs. Any questions, please call ED RN Leonel at extension [phone].     Patient Covid vaccination status: Fully vaccinated     COVID Test Ordered in ED: SARS-CoV-2 by PCR ([facility])    COVID Suspicion at Admission: N/A    Running Infusions:      Mental Status/LOC at time of transport: Alert and oriented x 4    Other pertinent information:   CIWA score: N/A   NIH score:  N/A

## 2024-08-10 LAB
ANION GAP SERPL CALC-SCNC: 6 MMOL/L (ref 0–18)
BASOPHILS # BLD AUTO: 0.04 X10(3) UL (ref 0–0.2)
BASOPHILS NFR BLD AUTO: 0.5 %
BUN BLD-MCNC: 12 MG/DL (ref 9–23)
CALCIUM BLD-MCNC: 8.7 MG/DL (ref 8.7–10.4)
CHLORIDE SERPL-SCNC: 110 MMOL/L (ref 98–112)
CO2 SERPL-SCNC: 26 MMOL/L (ref 21–32)
CREAT BLD-MCNC: 0.58 MG/DL
EGFRCR SERPLBLD CKD-EPI 2021: 93 ML/MIN/1.73M2 (ref 60–?)
EOSINOPHIL # BLD AUTO: 0.2 X10(3) UL (ref 0–0.7)
EOSINOPHIL NFR BLD AUTO: 2.5 %
ERYTHROCYTE [DISTWIDTH] IN BLOOD BY AUTOMATED COUNT: 13.4 %
GLUCOSE BLD-MCNC: 101 MG/DL (ref 70–99)
GLUCOSE BLD-MCNC: 112 MG/DL (ref 70–99)
GLUCOSE BLD-MCNC: 132 MG/DL (ref 70–99)
GLUCOSE BLD-MCNC: 137 MG/DL (ref 70–99)
GLUCOSE BLD-MCNC: 140 MG/DL (ref 70–99)
HCT VFR BLD AUTO: 28.9 %
HGB BLD-MCNC: 9.7 G/DL
IMM GRANULOCYTES # BLD AUTO: 0.02 X10(3) UL (ref 0–1)
IMM GRANULOCYTES NFR BLD: 0.2 %
LYMPHOCYTES # BLD AUTO: 1.82 X10(3) UL (ref 1–4)
LYMPHOCYTES NFR BLD AUTO: 22.4 %
MCH RBC QN AUTO: 30.5 PG (ref 26–34)
MCHC RBC AUTO-ENTMCNC: 33.6 G/DL (ref 31–37)
MCV RBC AUTO: 90.9 FL
MONOCYTES # BLD AUTO: 0.54 X10(3) UL (ref 0.1–1)
MONOCYTES NFR BLD AUTO: 6.7 %
NEUTROPHILS # BLD AUTO: 5.5 X10 (3) UL (ref 1.5–7.7)
NEUTROPHILS # BLD AUTO: 5.5 X10(3) UL (ref 1.5–7.7)
NEUTROPHILS NFR BLD AUTO: 67.7 %
OSMOLALITY SERPL CALC.SUM OF ELEC: 295 MOSM/KG (ref 275–295)
PLATELET # BLD AUTO: 117 10(3)UL (ref 150–450)
POTASSIUM SERPL-SCNC: 3.6 MMOL/L (ref 3.5–5.1)
RBC # BLD AUTO: 3.18 X10(6)UL
SODIUM SERPL-SCNC: 142 MMOL/L (ref 136–145)
WBC # BLD AUTO: 8.1 X10(3) UL (ref 4–11)

## 2024-08-10 PROCEDURE — 99233 SBSQ HOSP IP/OBS HIGH 50: CPT | Performed by: HOSPITALIST

## 2024-08-10 RX ORDER — FLUTICASONE PROPIONATE AND SALMETEROL 500; 50 UG/1; UG/1
1 POWDER RESPIRATORY (INHALATION) 2 TIMES DAILY
Status: DISCONTINUED | OUTPATIENT
Start: 2024-08-10 | End: 2024-08-13

## 2024-08-10 NOTE — PROGRESS NOTES
[facility]   part of [facility]     Hospitalist Progress Note     Rayne Morales Patient Status:  Inpatient    1945 MRN [id number]   Location [facility] 2NE-A Attending Sohail Bowman MD   Hosp Day # 1 PCP Jose Daniel Washington MD     Subjective:   Planning on staying for the bronch after extensive discussion.   Feeling little better     Objective:    Review of Systems:   A comprehensive review of systems was completed; pertinent positive and negatives stated in subjective.  Vital signs:  Temp:  [97.6 °F (36.4 °C)-98.2 °F (36.8 °C)] 97.9 °F (36.6 °C)  Pulse:  [62-82] 62  Resp:  [18-19] 19  BP: (135-158)/(65-91) 138/79  SpO2:  [91 %-99 %] 95 %  Physical Exam:    General: No acute distress   Respiratory: no wheezes, no rhonchi, scant coarse BS in LLL   Cardiovascular: S1, S2, RRR  Abdomen: Soft, NT/ND, +BS  Extremities: no edema    Diagnostic Data:    Labs:  Recent Labs   Lab 08/08/24  1917 08/10/24  0611   WBC 15.0* 8.1   HGB 10.8* 9.7*   MCV 88.9 90.9   .0 117.0*     Recent Labs   Lab 08/08/24  1917 08/10/24  0611   * 112*   BUN 16 12   CREATSERUM 1.06* 0.58   CA 9.3 8.7   ALB 4.6  --    * 142   K 3.8 3.6    110   CO2 24.0 26.0   ALKPHO 59  --    AST 20  --    ALT 16  --    BILT 0.8  --    TP 6.9  --      Estimated Creatinine Clearance: 61.3 mL/min (based on SCr of 0.58 mg/dL).  No results for input(s): \"PTP\", \"INR\" in the last 168 hours.     Microbiology  Hospital Encounter on 24   1. Urine Culture, Routine     Status: None    Collection Time: 24  8:36 PM    Specimen: Urine, clean catch   Result Value Ref Range    Urine Culture  N/A     <10,000 cfu/ml Multiple species present- probable contamination.   2. Blood Culture     Status: None (Preliminary result)    Collection Time: 24  7:19 PM    Specimen: Blood,peripheral   Result Value Ref Range    Blood Culture Result No Growth 1 Day N/A     Imaging: Reviewed in Epic.  Medications:    guaiFENesin ER   600 mg Oral BID    insulin aspart  2-10 Units Subcutaneous TID AC and HS    lisinopril  20 mg Oral Daily    aspirin  81 mg Oral Daily    cholecalciferol  2,000 Units Oral Daily    DULoxetine  30 mg Oral Daily    DULoxetine  60 mg Oral Daily    pantoprazole  40 mg Oral Before breakfast    pregabalin  100 mg Oral TID    rosuvastatin  10 mg Oral Nightly    tiZANidine  10 mg Oral Nightly    azithromycin  500 mg Intravenous Daily    cefTRIAXone  1 g Intravenous Q24H       Assessment & Plan:    #Sepsis 2/2 PNA with acute hypoxic respiratory failure and IgG deficiency   -consulted Pulm - input appreciated    -abx  -consider Bronch- Monday       #lactic acidosis- resolved on sepsis protocol   #leukocytosis- resolved     #IgG deficiency- as above      #hyponatremia-likely 2/2 SIADH from acute pulmonary process- resolved      #CKD 3a     #normocytic anemia-hgb 10.8: baseline 10-12- dilutional effect  -no signs acute bleeding     #Depression-cont home duloxetine     #GERD-cont home PPI     #DVT-cont home Xarelto     #HLD-cont home statin     #chronic pain-cont home norco, tizanidine, pregabalin     #HTN-cont home lisinopril as BP tolerates    Extensive d/w Patient  Leaning on Bronch on Monday to finalize w/u   Hold DOAC        Total time today 33 minutes     Supplementary Documentation:   Quality:  DVT Mechanical Prophylaxis:   SCDs,    DVT Pharmacologic Prophylaxis   Medication    heparin (Porcine) 100 Units/mL lock flush 500 Units      DVT Pharmacologic prophylaxis: Aspirin 162 mg         Code Status: Full Code  Baxter: External urinary catheter in place  Baxter Duration (in days):   Central line:    CLARI:   At this point Ms. Morales is expected to be discharge to: home     The 21st Century Cures Act makes medical notes like these available to patients in the interest of transparency. Please be advised this is a medical document. Medical documents are intended to carry relevant information, facts as evident, and the clinical  opinion of the practitioner. The medical note is intended as peer to peer communication and may appear blunt or direct. It is written in medical language and may contain abbreviations or verbiage that are unfamiliar.      **Certification    PHYSICIAN Certification of Need for Inpatient Hospitalization - Initial Certification    Patient will require inpatient services that will reasonably be expected to span two midnight's based on the clinical documentation in H+P.   Based on patients current state of illness, I anticipate that, after discharge, patient will require TBD.

## 2024-08-10 NOTE — PROGRESS NOTES
[facility]    Rayne Morales Patient Status:  Inpatient    1945 MRN [id number]   Location [facility] 2NE-A Attending Sohail Bowman MD   Hosp Day # 1 PCP Jose Daniel Washington MD     Pulm / Critical Care Progress Note     S: pt states that she feels much better and wants to go home.       Scheduled Medication:   guaiFENesin ER  600 mg Oral BID    insulin aspart  2-10 Units Subcutaneous TID AC and HS    lisinopril  20 mg Oral Daily    aspirin  81 mg Oral Daily    cholecalciferol  2,000 Units Oral Daily    DULoxetine  30 mg Oral Daily    DULoxetine  60 mg Oral Daily    pantoprazole  40 mg Oral Before breakfast    pregabalin  100 mg Oral TID    rosuvastatin  10 mg Oral Nightly    tiZANidine  10 mg Oral Nightly    azithromycin  500 mg Intravenous Daily    cefTRIAXone  1 g Intravenous Q24H     Continuous Infusing Medication:  PRN Medication:  acetaminophen    melatonin    polyethylene glycol (PEG 3350)    sennosides    bisacodyl    fleet enema    ondansetron    metoclopramide    benzonatate    glycerin-hypromellose-    sodium chloride    glucose **OR** glucose **OR** glucose-vitamin C **OR** dextrose **OR** glucose **OR** glucose **OR** glucose-vitamin C    albuterol    heparin    oxyCODONE-acetaminophen       OBJECTIVE:  Vitals:    24 2100 24 2327 08/10/24 0515 08/10/24 0829   BP: (!) 158/91 152/75 138/79 125/62   BP Location: Right arm Right arm Right arm Right arm   Pulse: 82 79 62 77   Resp: 18 18    Temp: 97.7 °F (36.5 °C) 97.6 °F (36.4 °C) 97.9 °F (36.6 °C) 98.3 °F (36.8 °C)   TempSrc: Oral Oral Oral Oral   SpO2: 96% 91% 95% 94%   Weight:       Height:            O2: ra      Wt Readings from Last 3 Encounters:   24 158 lb 15.2 oz (72.1 kg)   24 154 lb 3.2 oz (69.9 kg)   24 160 lb 12.8 oz (72.9 kg)        I/O last 3 completed shifts:  In: 2287 [P.O.:120; I.V.:7; IV PIGGYBACK:100]  Out: -   No intake/output data recorded.     Physical Exam:   General:  alert, cooperative, No respiratory distress.   Head: Normocephalic, without obvious abnormality, atraumatic.   Lungs: ctab   Chest wall: No tenderness or deformity.   Heart: Regular rate and rhythm, normal S1S2, no murmur.   Abdomen: soft, non-tender, non-distended, positive BS.   Extremity: no edema     Recent Labs   Lab 08/08/24  1917 08/10/24  0611   WBC 15.0* 8.1   HGB 10.8* 9.7*   HCT 31.3* 28.9*   .0 117.0*     No results for input(s): \"INR\" in the last 168 hours.      Recent Labs   Lab 08/08/24  1917 08/10/24  0611   * 142   K 3.8 3.6    110   CO2 24.0 26.0   BUN 16 12   CREATSERUM 1.06* 0.58   * 112*   CA 9.3 8.7   AST 20  --    ALT 16  --    ALKPHO 59  --    BILT 0.8  --    ALB 4.6  --    TP 6.9  --        CREATININE (mg/dL)   Date Value   06/15/2015 0.92   08/08/2014 0.88   07/27/2014 0.70   07/26/2014 0.67   06/10/2014 0.95     Creatinine (mg/dL)   Date Value   08/10/2024 0.58   08/08/2024 1.06 (H)   04/08/2024 0.95   ]     ASSESSMENT/PLAN:    Multifocal PNA  -3rd episode this year  -agree w/ abx- cont  -reviewed Dr. Browning's last office note which discusses doing a bronch w/ BAL and TBBx if pt is admitted again w/ PNA  -at this point pt feels she does not want to proceed with bronch given level of improvement. She wishes to discharge  Asthma  -moderate persistent  -advair 500  -albuterol  -montelukast  MUKUND  -cont CPAP w/ sleep  H/o PE  -on xarelto  IgG deficiency  -on IVIG per allergy/immunology  Dispo  -currently on schedule for bronch Monday pm   -cont to hold xarelto  -pt requesting discharge. As clinically she is doing well I won't  her way.  I did discuss with her that our recommendation is for her to stay for bronch however for now she declines.  Clinically she is ok to discharge. She should f/u with Dr. Browning in 1-2 weeks.    - if she remains in house we will reassess on Monday, I will leave her npo after Sunday night                    Francisco Starkey M.D.  [facility]  [facility]  Pulmonary / Critical care  8/10/2024  8:32 AM

## 2024-08-10 NOTE — PLAN OF CARE
Assumed patient care around 0730 this AM. Patient alert and oriented x4. Spo2 maintained on RA. NSR on tele. Patient continent of bowel and bladder. Patient reports back pain. Managing with PRN pain medication per order. Patient is up with standby assist in the room. Updated on POC. Needs met.     POC: IV abx. Bronchoscopy 8/12.          Problem: CARDIOVASCULAR - ADULT  Goal: Maintains optimal cardiac output and hemodynamic stability  Description: INTERVENTIONS:  - Monitor vital signs, rhythm, and trends  - Monitor for bleeding, hypotension and signs of decreased cardiac output  - Evaluate effectiveness of vasoactive medications to optimize hemodynamic stability  - Monitor arterial and/or venous puncture sites for bleeding and/or hematoma  - Assess quality of pulses, skin color and temperature  - Assess for signs of decreased coronary artery perfusion - ex. Angina  - Evaluate fluid balance, assess for edema, trend weights  Outcome: Progressing  Goal: Absence of cardiac arrhythmias or at baseline  Description: INTERVENTIONS:  - Continuous cardiac monitoring, monitor vital signs, obtain 12 lead EKG if indicated  - Evaluate effectiveness of antiarrhythmic and heart rate control medications as ordered  - Initiate emergency measures for life threatening arrhythmias  - Monitor electrolytes and administer replacement therapy as ordered  Outcome: Progressing     Problem: Patient/Family Goals  Goal: Patient/Family Long Term Goal  Description: Patient's Long Term Goal: Go home    Interventions:  - routine lab draws  - medication compliance   - MD rounding  8/10:  -Tele monitoring   -Monitor labs  -Bronchoscopy 8/12  -Ambulate 3x a day as tolerated  -IV abx  - See additional Care Plan goals for specific interventions  Outcome: Progressing  Goal: Patient/Family Short Term Goal  Description: Patient's Short Term Goal: pain free    Interventions:   - ice pack  -pain medication  8/10:  -PRN pain medication per order  -Pain  management   -Frequently assess pain   -Reposition as tolerated  - See additional Care Plan goals for specific interventions  Outcome: Progressing     Problem: SAFETY ADULT - FALL  Goal: Free from fall injury  Description: INTERVENTIONS:  - Assess pt frequently for physical needs  - Identify cognitive and physical deficits and behaviors that affect risk of falls.  - [place] fall precautions as indicated by assessment.  - Educate pt/family on patient safety including physical limitations  - Instruct pt to call for assistance with activity based on assessment  - Modify environment to reduce risk of injury  - Provide assistive devices as appropriate  - Consider OT/PT consult to assist with strengthening/mobility  - Encourage toileting schedule  Outcome: Progressing     Problem: PAIN - ADULT  Goal: Verbalizes/displays adequate comfort level or patient's stated pain goal  Description: INTERVENTIONS:  - Encourage pt to monitor pain and request assistance  - Assess pain using appropriate pain scale  - Administer analgesics based on type and severity of pain and evaluate response  - Implement non-pharmacological measures as appropriate and evaluate response  - Consider cultural and social influences on pain and pain management  - Manage/alleviate anxiety  - Utilize distraction and/or relaxation techniques  - Monitor for opioid side effects  - Notify MD/LIP if interventions unsuccessful or patient reports new pain  - Anticipate increased pain with activity and pre-medicate as appropriate  Outcome: Progressing     Problem: RESPIRATORY - ADULT  Goal: Achieves optimal ventilation and oxygenation  Description: INTERVENTIONS:  - Assess for changes in respiratory status  - Assess for changes in mentation and behavior  - Position to facilitate oxygenation and minimize respiratory effort  - Oxygen supplementation based on oxygen saturation or ABGs  - Provide Smoking Cessation handout, if applicable  - Encourage broncho-pulmonary  hygiene including cough, deep breathe, Incentive Spirometry  - Assess the need for suctioning and perform as needed  - Assess and instruct to report SOB or any respiratory difficulty  - Respiratory Therapy support as indicated  - Manage/alleviate anxiety  - Monitor for signs/symptoms of CO2 retention  Outcome: Progressing

## 2024-08-10 NOTE — PLAN OF CARE
Assumed patient care at 1930  Awake , alert and oriented x 4.  Resting in bed.   No apparent distress at this time  Plan of care updated for evening medication and fall  Prevention measures. IV antibiotics and scheduling of pain medication before bedtime is requested.  Denies shortness of breath , chest pain or palpitations. 2 liters of supplemental oxygen in use overnight.    Problem: CARDIOVASCULAR - ADULT  Vital signs are stable  Telemetry monitoring in progress , a-fib  Xarelto now on hold , bronchoscopy in MON.    Goal: Maintains optimal cardiac output and hemodynamic stability  Description: INTERVENTIONS:  - Monitor vital signs, rhythm, and trends  - Monitor for bleeding, hypotension and signs of decreased cardiac output  - Evaluate effectiveness of vasoactive medications to optimize hemodynamic stability  - Monitor arterial and/or venous puncture sites for bleeding and/or hematoma  - Assess quality of pulses, skin color and temperature  - Assess for signs of decreased coronary artery perfusion - ex. Angina  - Evaluate fluid balance, assess for edema, trend weights  Outcome: Progressing  Goal: Absence of cardiac arrhythmias or at baseline  Description: INTERVENTIONS:  - Continuous cardiac monitoring, monitor vital signs, obtain 12 lead EKG if indicated  - Evaluate effectiveness of antiarrhythmic and heart rate control medications as ordered  - Initiate emergency measures for life threatening arrhythmias  - Monitor electrolytes and administer replacement therapy as ordered  Outcome: Progressing     Problem: Patient/Family Goals  Goal: Patient/Family Long Term Goal  Description: Patient's Long Term Goal: Go home    Interventions:  - routine lab draws  - medication compliance   - MD rounding  - See additional Care Plan goals for specific interventions  Outcome: Progressing  Goal: Patient/Family Short Term Goal  Description: Patient's Short Term Goal: pain free    Interventions:   - ice pack  -pain medication  -  See additional Care Plan goals for specific interventions  Outcome: Progressing     Problem: SAFETY ADULT - FALL  Goal: Free from fall injury  Description: INTERVENTIONS:  - Assess pt frequently for physical needs  - Identify cognitive and physical deficits and behaviors that affect risk of falls.  - [place] fall precautions as indicated by assessment.  - Educate pt/family on patient safety including physical limitations  - Instruct pt to call for assistance with activity based on assessment  - Modify environment to reduce risk of injury  - Provide assistive devices as appropriate  - Consider OT/PT consult to assist with strengthening/mobility  - Encourage toileting schedule  Outcome: Progressing     Problem: PAIN - ADULT  Goal: Verbalizes/displays adequate comfort level or patient's stated pain goal  Description: INTERVENTIONS:  - Encourage pt to monitor pain and request assistance  - Assess pain using appropriate pain scale  - Administer analgesics based on type and severity of pain and evaluate response  - Implement non-pharmacological measures as appropriate and evaluate response  - Consider cultural and social influences on pain and pain management  - Manage/alleviate anxiety  - Utilize distraction and/or relaxation techniques  - Monitor for opioid side effects  - Notify MD/LIP if interventions unsuccessful or patient reports new pain  - Anticipate increased pain with activity and pre-medicate as appropriate  Outcome: Progressing     Problem: RESPIRATORY - ADULT  Goal: Achieves optimal ventilation and oxygenation  Description: INTERVENTIONS:  - Assess for changes in respiratory status  - Assess for changes in mentation and behavior  - Position to facilitate oxygenation and minimize respiratory effort  - Oxygen supplementation based on oxygen saturation or ABGs  - Provide Smoking Cessation handout, if applicable  - Encourage broncho-pulmonary hygiene including cough, deep breathe, Incentive Spirometry  -  Assess the need for suctioning and perform as needed  - Assess and instruct to report SOB or any respiratory difficulty  - Respiratory Therapy support as indicated  - Manage/alleviate anxiety  - Monitor for signs/symptoms of CO2 retention  Outcome: Progressing

## 2024-08-10 NOTE — PLAN OF CARE
No acute events overnight. Resting comfortably. Labs drawn from [place] this am. Flushed per protocol.

## 2024-08-11 LAB
GLUCOSE BLD-MCNC: 122 MG/DL (ref 70–99)
GLUCOSE BLD-MCNC: 127 MG/DL (ref 70–99)
GLUCOSE BLD-MCNC: 144 MG/DL (ref 70–99)
GLUCOSE BLD-MCNC: 153 MG/DL (ref 70–99)

## 2024-08-11 PROCEDURE — 99233 SBSQ HOSP IP/OBS HIGH 50: CPT | Performed by: HOSPITALIST

## 2024-08-11 RX ORDER — DEXTROSE MONOHYDRATE AND SODIUM CHLORIDE 5; .9 G/100ML; G/100ML
INJECTION, SOLUTION INTRAVENOUS CONTINUOUS
Status: ACTIVE | OUTPATIENT
Start: 2024-08-12 | End: 2024-08-12

## 2024-08-11 RX ORDER — HYDRALAZINE HYDROCHLORIDE 20 MG/ML
10 INJECTION INTRAMUSCULAR; INTRAVENOUS EVERY 4 HOURS PRN
Status: DISCONTINUED | OUTPATIENT
Start: 2024-08-11 | End: 2024-08-13

## 2024-08-11 NOTE — PLAN OF CARE
Assumed care of patient at 1930  Alert and oriented x 4  Resting in bed. Reports generalized chronic back pain.  Plan of care discussed with patient, pain management  Evening medications and fall prevention measures.  IV antibiotics and vital signs procedures.  Reports nausea occurs with administration of antibiotics, requested Zofran.  Ambulating to bathroom, stand by assist,  Denies chest pain or shortness of breath.   Problem: CARDIOVASCULAR - ADULT  Afebrile  Xarelto on hold for Bronchoscopy on Monday  Telemetry monitoring in progress  NSR  Goal: Maintains optimal cardiac output and hemodynamic stability  Description: INTERVENTIONS:  - Monitor vital signs, rhythm, and trends  - Monitor for bleeding, hypotension and signs of decreased cardiac output  - Evaluate effectiveness of vasoactive medications to optimize hemodynamic stability  - Monitor arterial and/or venous puncture sites for bleeding and/or hematoma  - Assess quality of pulses, skin color and temperature  - Assess for signs of decreased coronary artery perfusion - ex. Angina  - Evaluate fluid balance, assess for edema, trend weights  Outcome: Progressing  Goal: Absence of cardiac arrhythmias or at baseline  Description: INTERVENTIONS:  - Continuous cardiac monitoring, monitor vital signs, obtain 12 lead EKG if indicated  - Evaluate effectiveness of antiarrhythmic and heart rate control medications as ordered  - Initiate emergency measures for life threatening arrhythmias  - Monitor electrolytes and administer replacement therapy as ordered  Outcome: Progressing     Problem: Patient/Family Goals  Goal: Patient/Family Long Term Goal  Description: Patient's Long Term Goal: Go home    Interventions:  - routine lab draws  - medication compliance   - MD rounding  8/10:  -Tele monitoring   -Monitor labs  -Bronchoscopy 8/12  -Ambulate 3x a day as tolerated  -IV abx  - See additional Care Plan goals for specific interventions  Outcome: Progressing  Goal:  Patient/Family Short Term Goal  Description: Patient's Short Term Goal: pain free    Interventions:   - ice pack  -pain medication  8/10:  -PRN pain medication per order  -Pain management   -Frequently assess pain   -Reposition as tolerated  - See additional Care Plan goals for specific interventions  Outcome: Progressing     Problem: SAFETY ADULT - FALL  Goal: Free from fall injury  Description: INTERVENTIONS:  - Assess pt frequently for physical needs  - Identify cognitive and physical deficits and behaviors that affect risk of falls.  - [place] fall precautions as indicated by assessment.  - Educate pt/family on patient safety including physical limitations  - Instruct pt to call for assistance with activity based on assessment  - Modify environment to reduce risk of injury  - Provide assistive devices as appropriate  - Consider OT/PT consult to assist with strengthening/mobility  - Encourage toileting schedule  Outcome: Progressing     Problem: PAIN - ADULT  Goal: Verbalizes/displays adequate comfort level or patient's stated pain goal  Description: INTERVENTIONS:  - Encourage pt to monitor pain and request assistance  - Assess pain using appropriate pain scale  - Administer analgesics based on type and severity of pain and evaluate response  - Implement non-pharmacological measures as appropriate and evaluate response  - Consider cultural and social influences on pain and pain management  - Manage/alleviate anxiety  - Utilize distraction and/or relaxation techniques  - Monitor for opioid side effects  - Notify MD/LIP if interventions unsuccessful or patient reports new pain  - Anticipate increased pain with activity and pre-medicate as appropriate  Outcome: Progressing     Problem: RESPIRATORY - ADULT  Goal: Achieves optimal ventilation and oxygenation  Description: INTERVENTIONS:  - Assess for changes in respiratory status  - Assess for changes in mentation and behavior  - Position to facilitate oxygenation  and minimize respiratory effort  - Oxygen supplementation based on oxygen saturation or ABGs  - Provide Smoking Cessation handout, if applicable  - Encourage broncho-pulmonary hygiene including cough, deep breathe, Incentive Spirometry  - Assess the need for suctioning and perform as needed  - Assess and instruct to report SOB or any respiratory difficulty  - Respiratory Therapy support as indicated  - Manage/alleviate anxiety  - Monitor for signs/symptoms of CO2 retention  Outcome: Progressing

## 2024-08-11 NOTE — PROGRESS NOTES
[facility]    Rayne Morales Patient Status:  Inpatient    1945 MRN [id number]   Location [facility] 2NE-A Attending Sohail Bowman MD   Hosp Day # 2 PCP Jose Daniel Washington MD     Pulm / Critical Care Progress Note     S: pt elected to stay for bronch       Scheduled Medication:   fluticasone-salmeterol  1 puff Inhalation BID    guaiFENesin ER  600 mg Oral BID    insulin aspart  2-10 Units Subcutaneous TID AC and HS    lisinopril  20 mg Oral Daily    cholecalciferol  2,000 Units Oral Daily    DULoxetine  30 mg Oral Daily    DULoxetine  60 mg Oral Daily    pantoprazole  40 mg Oral Before breakfast    pregabalin  100 mg Oral TID    rosuvastatin  10 mg Oral Nightly    tiZANidine  10 mg Oral Nightly    cefTRIAXone  1 g Intravenous Q24H     Continuous Infusing Medication:  PRN Medication:  acetaminophen    melatonin    polyethylene glycol (PEG 3350)    sennosides    bisacodyl    fleet enema    ondansetron    metoclopramide    benzonatate    glycerin-hypromellose-    sodium chloride    glucose **OR** glucose **OR** glucose-vitamin C **OR** dextrose **OR** glucose **OR** glucose **OR** glucose-vitamin C    albuterol    heparin    oxyCODONE-acetaminophen       OBJECTIVE:  Vitals:    08/10/24 1942 08/10/24 2220 24 0525 24 0811   BP: (!) 180/91  134/71 (!) 184/80   BP Location: Right arm  Right arm Right arm   Pulse: 77 78 63 67   Resp: 18  19 15   Temp: 97.4 °F (36.3 °C)  97.7 °F (36.5 °C) 97.6 °F (36.4 °C)   TempSrc: Oral  Oral Oral   SpO2: 98% 93% 97% 96%   Weight:       Height:         O2: ra       Wt Readings from Last 3 Encounters:   24 158 lb 15.2 oz (72.1 kg)   24 154 lb 3.2 oz (69.9 kg)   24 160 lb 12.8 oz (72.9 kg)        I/O last 3 completed shifts:  In: 360 [P.O.:360]  Out: -   No intake/output data recorded.     Physical Exam:   General: alert, cooperative,  No respiratory distress.   Head: Normocephalic, without obvious abnormality, atraumatic.   Lungs:  ctab   Chest wall: No tenderness or deformity.   Heart: Regular rate and rhythm, normal S1S2, no murmur.   Abdomen: soft, non-tender, non-distended, positive BS.   Extremity: no edema     Recent Labs   Lab 08/08/24  1917 08/10/24  0611   WBC 15.0* 8.1   HGB 10.8* 9.7*   HCT 31.3* 28.9*   .0 117.0*     No results for input(s): \"INR\" in the last 168 hours.      Recent Labs   Lab 08/08/24  1917 08/10/24  0611   * 142   K 3.8 3.6    110   CO2 24.0 26.0   BUN 16 12   CREATSERUM 1.06* 0.58   * 112*   CA 9.3 8.7   AST 20  --    ALT 16  --    ALKPHO 59  --    BILT 0.8  --    ALB 4.6  --    TP 6.9  --        CREATININE (mg/dL)   Date Value   06/15/2015 0.92   08/08/2014 0.88   07/27/2014 0.70   07/26/2014 0.67   06/10/2014 0.95     Creatinine (mg/dL)   Date Value   08/10/2024 0.58   08/08/2024 1.06 (H)   04/08/2024 0.95   ]         ASSESSMENT/PLAN:    Multifocal PNA  -3rd episode this year  -agree w/ abx- cont  -reviewed Dr. Browning's last office note which discusses doing a bronch w/ BAL and TBBx if pt is admitted again w/ PNA  -pt agreeable to bronch now. Npo after mn.   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  [facility]  Pulmonary / Critical care  8/11/2024  8:37 AM

## 2024-08-11 NOTE — PLAN OF CARE
Assumed patient care around 0730 this AM. Patient alert and oriented x4. Spo2 maintained on RA. NSR on tele. Patient continent of bowel and bladder. Patient reports back pain, managing with PRN pain medication per order. Patient is up with standby assist in the room. Updated on POC. Needs met.    POC: Bronchoscopy 8/12.        Problem: CARDIOVASCULAR - ADULT  Goal: Maintains optimal cardiac output and hemodynamic stability  Description: INTERVENTIONS:  - Monitor vital signs, rhythm, and trends  - Monitor for bleeding, hypotension and signs of decreased cardiac output  - Evaluate effectiveness of vasoactive medications to optimize hemodynamic stability  - Monitor arterial and/or venous puncture sites for bleeding and/or hematoma  - Assess quality of pulses, skin color and temperature  - Assess for signs of decreased coronary artery perfusion - ex. Angina  - Evaluate fluid balance, assess for edema, trend weights  Outcome: Progressing  Goal: Absence of cardiac arrhythmias or at baseline  Description: INTERVENTIONS:  - Continuous cardiac monitoring, monitor vital signs, obtain 12 lead EKG if indicated  - Evaluate effectiveness of antiarrhythmic and heart rate control medications as ordered  - Initiate emergency measures for life threatening arrhythmias  - Monitor electrolytes and administer replacement therapy as ordered  Outcome: Progressing     Problem: Patient/Family Goals  Goal: Patient/Family Long Term Goal  Description: Patient's Long Term Goal: Go home  8/11: Stay out of the hospital when discharged    Interventions:  - routine lab draws  - medication compliance   - MD rounding  8/10:  -Tele monitoring   -Monitor labs  -Bronchoscopy 8/12  -Ambulate 3x a day as tolerated  -IV abx  8/11:   -Attend follow up appointments as needed   -Follow medication regimen  -Advance activity as tolerated   - See additional Care Plan goals for specific interventions  Outcome: Progressing  Goal: Patient/Family Short Term  Goal  Description: Patient's Short Term Goal: pain free  8/11: Go home    Interventions:   - ice pack  -pain medication  8/10:  -PRN pain medication per order  -Pain management   -Frequently assess pain   -Reposition as tolerated  8/11:  -Bronchoscopy 8/12  -Pain management   -Prn pain medication per order  -Tele monitoring  -Monitor labs  -Ambulate 3x a day as tolerated   -IV abx  - See additional Care Plan goals for specific interventions  Outcome: Progressing     Problem: SAFETY ADULT - FALL  Goal: Free from fall injury  Description: INTERVENTIONS:  - Assess pt frequently for physical needs  - Identify cognitive and physical deficits and behaviors that affect risk of falls.  - [place] fall precautions as indicated by assessment.  - Educate pt/family on patient safety including physical limitations  - Instruct pt to call for assistance with activity based on assessment  - Modify environment to reduce risk of injury  - Provide assistive devices as appropriate  - Consider OT/PT consult to assist with strengthening/mobility  - Encourage toileting schedule  Outcome: Progressing     Problem: PAIN - ADULT  Goal: Verbalizes/displays adequate comfort level or patient's stated pain goal  Description: INTERVENTIONS:  - Encourage pt to monitor pain and request assistance  - Assess pain using appropriate pain scale  - Administer analgesics based on type and severity of pain and evaluate response  - Implement non-pharmacological measures as appropriate and evaluate response  - Consider cultural and social influences on pain and pain management  - Manage/alleviate anxiety  - Utilize distraction and/or relaxation techniques  - Monitor for opioid side effects  - Notify MD/LIP if interventions unsuccessful or patient reports new pain  - Anticipate increased pain with activity and pre-medicate as appropriate  Outcome: Progressing     Problem: RESPIRATORY - ADULT  Goal: Achieves optimal ventilation and oxygenation  Description:  INTERVENTIONS:  - Assess for changes in respiratory status  - Assess for changes in mentation and behavior  - Position to facilitate oxygenation and minimize respiratory effort  - Oxygen supplementation based on oxygen saturation or ABGs  - Provide Smoking Cessation handout, if applicable  - Encourage broncho-pulmonary hygiene including cough, deep breathe, Incentive Spirometry  - Assess the need for suctioning and perform as needed  - Assess and instruct to report SOB or any respiratory difficulty  - Respiratory Therapy support as indicated  - Manage/alleviate anxiety  - Monitor for signs/symptoms of CO2 retention  Outcome: Progressing

## 2024-08-11 NOTE — PROGRESS NOTES
[facility]   part of [facility]     Hospitalist Progress Note     Rayne Morales Patient Status:  Inpatient    1945 MRN [id number]   Location [facility] 2NE-A Attending Sohail Bowman MD   Hosp Day # 2 PCP Jose Daniel Washington MD     Subjective:   Will stay for the scope  Feeling anxious. Elevated BP  Doesn't want benzos at this time.      Objective:    Review of Systems:   A comprehensive review of systems was completed; pertinent positive and negatives stated in subjective.  Vital signs:  Temp:  [97.4 °F (36.3 °C)-98.3 °F (36.8 °C)] 97.7 °F (36.5 °C)  Pulse:  [63-78] 63  Resp:  [17-19] 19  BP: (125-180)/() 134/71  SpO2:  [93 %-100 %] 97 %  Physical Exam:    General: No acute distress   Respiratory: no wheezes, no rhonchi, scant coarse BS in LLL   Cardiovascular: S1, S2, RRR  Abdomen: Soft, NT/ND, +BS  Extremities: no edema    Diagnostic Data:    Labs:  Recent Labs   Lab 08/08/24  1917 08/10/24  0611   WBC 15.0* 8.1   HGB 10.8* 9.7*   MCV 88.9 90.9   .0 117.0*     Recent Labs   Lab 08/08/24  1917 08/10/24  0611   * 112*   BUN 16 12   CREATSERUM 1.06* 0.58   CA 9.3 8.7   ALB 4.6  --    * 142   K 3.8 3.6    110   CO2 24.0 26.0   ALKPHO 59  --    AST 20  --    ALT 16  --    BILT 0.8  --    TP 6.9  --      Estimated Creatinine Clearance: 61.3 mL/min (based on SCr of 0.58 mg/dL).  No results for input(s): \"PTP\", \"INR\" in the last 168 hours.     Microbiology  Hospital Encounter on 24   1. Urine Culture, Routine     Status: None    Collection Time: 24  8:36 PM    Specimen: Urine, clean catch   Result Value Ref Range    Urine Culture  N/A     <10,000 cfu/ml Multiple species present- probable contamination.   2. Blood Culture     Status: None (Preliminary result)    Collection Time: 24  7:19 PM    Specimen: Blood,peripheral   Result Value Ref Range    Blood Culture Result No Growth 2 Days N/A     Imaging: Reviewed in Epic.  Medications:     fluticasone-salmeterol  1 puff Inhalation BID    guaiFENesin ER  600 mg Oral BID    insulin aspart  2-10 Units Subcutaneous TID AC and HS    lisinopril  20 mg Oral Daily    aspirin  81 mg Oral Daily    cholecalciferol  2,000 Units Oral Daily    DULoxetine  30 mg Oral Daily    DULoxetine  60 mg Oral Daily    pantoprazole  40 mg Oral Before breakfast    pregabalin  100 mg Oral TID    rosuvastatin  10 mg Oral Nightly    tiZANidine  10 mg Oral Nightly    cefTRIAXone  1 g Intravenous Q24H       Assessment & Plan:    #Sepsis 2/2 PNA with acute hypoxic respiratory failure and IgG deficiency   -consulted Pulm - input appreciated    -abx  -consider Bronch- Monday       #lactic acidosis- resolved on sepsis protocol   #leukocytosis- resolved     #IgG deficiency- as above      #hyponatremia-likely 2/2 SIADH from acute pulmonary process- resolved      #CKD 3a     #normocytic anemia-hgb 10.8: baseline 10-12- dilutional effect  -no signs acute bleeding     #Depression-cont home duloxetine     #GERD-cont home PPI     #DVT-cont home Xarelto     #HLD-cont home statin     #chronic pain-cont home norco, tizanidine, pregabalin     #HTN-cont home lisinopril as BP tolerates- add PRN meds as elevated likely 2/2 stress/anxiety     Extensive d/w Patient and family bedside today   Total time today 33 minutes   Hold DOAC        Total time today 33 minutes     Supplementary Documentation:   Quality:  DVT Mechanical Prophylaxis:   SCDs,    DVT Pharmacologic Prophylaxis   Medication    heparin (Porcine) 100 Units/mL lock flush 500 Units      DVT Pharmacologic prophylaxis: Aspirin 162 mg         Code Status: Full Code  Baxter: External urinary catheter in place  Baxter Duration (in days):   Central line:    CLARI:   At this point Ms. Morales is expected to be discharge to: home     The 21st Century Cures Act makes medical notes like these available to patients in the interest of transparency. Please be advised this is a medical document. Medical  documents are intended to carry relevant information, facts as evident, and the clinical opinion of the practitioner. The medical note is intended as peer to peer communication and may appear blunt or direct. It is written in medical language and may contain abbreviations or verbiage that are unfamiliar.      **Certification    PHYSICIAN Certification of Need for Inpatient Hospitalization - Initial Certification    Patient will require inpatient services that will reasonably be expected to span two midnight's based on the clinical documentation in H+P.   Based on patients current state of illness, I anticipate that, after discharge, patient will require TBD.

## 2024-08-12 ENCOUNTER — ANESTHESIA (OUTPATIENT)
Dept: ENDOSCOPY | Facility: HOSPITAL | Age: 79
End: 2024-08-12
Payer: MEDICARE

## 2024-08-12 ENCOUNTER — APPOINTMENT (OUTPATIENT)
Dept: GENERAL RADIOLOGY | Facility: HOSPITAL | Age: 79
End: 2024-08-12
Attending: INTERNAL MEDICINE
Payer: MEDICARE

## 2024-08-12 ENCOUNTER — APPOINTMENT (OUTPATIENT)
Dept: GENERAL RADIOLOGY | Facility: HOSPITAL | Age: 79
DRG: 853 | End: 2024-08-12
Attending: INTERNAL MEDICINE
Payer: MEDICARE

## 2024-08-12 ENCOUNTER — ANESTHESIA EVENT (OUTPATIENT)
Dept: ENDOSCOPY | Facility: HOSPITAL | Age: 79
End: 2024-08-12
Payer: MEDICARE

## 2024-08-12 LAB
BASOPHILS NFR BRONCH: 0 %
COLOR FLD: COLORLESS
EOSINOPHIL NFR BRONCH: 0 %
GLUCOSE BLD-MCNC: 129 MG/DL (ref 70–99)
GLUCOSE BLD-MCNC: 139 MG/DL (ref 70–99)
GLUCOSE BLD-MCNC: 211 MG/DL (ref 70–99)
GLUCOSE BLD-MCNC: 97 MG/DL (ref 70–99)
LYMPHOCYTES NFR BRONCH: 7 %
MONOS+MACROS NFR BRONCH: 84 %
NEUTROPHILS NFR BRONCH: 9 %
TOTAL CELLS COUNTED FLD: 100
TURBIDITY CSF QL: CLEAR

## 2024-08-12 PROCEDURE — 0B9D8ZX DRAINAGE OF RIGHT MIDDLE LUNG LOBE, VIA NATURAL OR ARTIFICIAL OPENING ENDOSCOPIC, DIAGNOSTIC: ICD-10-PCS | Performed by: INTERNAL MEDICINE

## 2024-08-12 PROCEDURE — 0BBJ8ZX EXCISION OF LEFT LOWER LUNG LOBE, VIA NATURAL OR ARTIFICIAL OPENING ENDOSCOPIC, DIAGNOSTIC: ICD-10-PCS | Performed by: INTERNAL MEDICINE

## 2024-08-12 PROCEDURE — 71045 X-RAY EXAM CHEST 1 VIEW: CPT | Performed by: INTERNAL MEDICINE

## 2024-08-12 PROCEDURE — 99232 SBSQ HOSP IP/OBS MODERATE 35: CPT | Performed by: HOSPITALIST

## 2024-08-12 RX ORDER — LIDOCAINE HYDROCHLORIDE 20 MG/ML
INJECTION, SOLUTION INFILTRATION; PERINEURAL
Status: DISCONTINUED | OUTPATIENT
Start: 2024-08-12 | End: 2024-08-12 | Stop reason: HOSPADM

## 2024-08-12 RX ORDER — MIDAZOLAM HYDROCHLORIDE 1 MG/ML
INJECTION INTRAMUSCULAR; INTRAVENOUS AS NEEDED
Status: DISCONTINUED | OUTPATIENT
Start: 2024-08-12 | End: 2024-08-12 | Stop reason: SURG

## 2024-08-12 RX ORDER — HYDROMORPHONE HYDROCHLORIDE 1 MG/ML
0.4 INJECTION, SOLUTION INTRAMUSCULAR; INTRAVENOUS; SUBCUTANEOUS EVERY 5 MIN PRN
Status: DISCONTINUED | OUTPATIENT
Start: 2024-08-12 | End: 2024-08-12 | Stop reason: HOSPADM

## 2024-08-12 RX ORDER — HYDROMORPHONE HYDROCHLORIDE 1 MG/ML
0.6 INJECTION, SOLUTION INTRAMUSCULAR; INTRAVENOUS; SUBCUTANEOUS EVERY 5 MIN PRN
Status: DISCONTINUED | OUTPATIENT
Start: 2024-08-12 | End: 2024-08-12 | Stop reason: HOSPADM

## 2024-08-12 RX ORDER — HYDROMORPHONE HYDROCHLORIDE 1 MG/ML
0.2 INJECTION, SOLUTION INTRAMUSCULAR; INTRAVENOUS; SUBCUTANEOUS EVERY 5 MIN PRN
Status: DISCONTINUED | OUTPATIENT
Start: 2024-08-12 | End: 2024-08-12 | Stop reason: HOSPADM

## 2024-08-12 RX ORDER — SODIUM CHLORIDE, SODIUM LACTATE, POTASSIUM CHLORIDE, CALCIUM CHLORIDE 600; 310; 30; 20 MG/100ML; MG/100ML; MG/100ML; MG/100ML
INJECTION, SOLUTION INTRAVENOUS CONTINUOUS PRN
Status: DISCONTINUED | OUTPATIENT
Start: 2024-08-12 | End: 2024-08-12 | Stop reason: SURG

## 2024-08-12 RX ORDER — SODIUM CHLORIDE, SODIUM LACTATE, POTASSIUM CHLORIDE, CALCIUM CHLORIDE 600; 310; 30; 20 MG/100ML; MG/100ML; MG/100ML; MG/100ML
INJECTION, SOLUTION INTRAVENOUS CONTINUOUS
Status: DISCONTINUED | OUTPATIENT
Start: 2024-08-12 | End: 2024-08-13

## 2024-08-12 RX ORDER — NALOXONE HYDROCHLORIDE 0.4 MG/ML
0.08 INJECTION, SOLUTION INTRAMUSCULAR; INTRAVENOUS; SUBCUTANEOUS AS NEEDED
Status: DISCONTINUED | OUTPATIENT
Start: 2024-08-12 | End: 2024-08-12 | Stop reason: HOSPADM

## 2024-08-12 RX ADMIN — SODIUM CHLORIDE, SODIUM LACTATE, POTASSIUM CHLORIDE, CALCIUM CHLORIDE: 600; 310; 30; 20 INJECTION, SOLUTION INTRAVENOUS at 15:02:00

## 2024-08-12 RX ADMIN — MIDAZOLAM HYDROCHLORIDE 2 MG: 1 INJECTION INTRAMUSCULAR; INTRAVENOUS at 15:01:00

## 2024-08-12 NOTE — RESPIRATORY THERAPY NOTE
MUKUND ( CPAP/BIPAP ) DAILY USAGE SUMMARY    PATIENT DID NOT USE CPAP LAST NIGHT. PATIENT DOES NOT WANT TO USE CPAP DURING HOSPITAL STAY. DC CPAP.

## 2024-08-12 NOTE — OPERATIVE REPORT
Bronchoscopy Procedure Report     Preop diagnosis:       atypical PNA  Postop diagnosis:      same  Procedure performed: Bronchoscopy, Diagnostic  Bronchoalveolar lavage, BAL  Transbronchial biopsy     Sedation used:  MAC anesthesia was used     Description of procedure: Informed consent was obtained. Oxygen applied via nasal canula.  Bronchoscope was inserted via oral route.  Normal vocal cord movement was noted. Trachea appeared normal. Selena appeared sharp and normal.  Mucous membranes appeared normal. There were scant secretions of a mucoid nature eminating from LLL.      Left lung was inspected and appeared normal.  Right lung was inspected and appeared normal.     Lesions seen: none      Samples obtained:                   BAL of RML: 100mL sterile saline instilled, ~30mL clear fluid drawn back                  Transbronchial biopsy of LLL, 7 passes made        Post procedure CXR necessary? yes  Follow up:       pt is currently hospitalized     Patient tolerated the procedure well and was transferred to the recovery area. EBL was 50mL.    Javier Kang MD  08/12/24

## 2024-08-12 NOTE — PLAN OF CARE
NURSING NOTES:  0800: Pt received AOx4. Room air. IVF. SR. NPO since MN. R port sub. Voiding.  0820: Consent signed for Bronchoscopy placed to chart.  1630: Pt returned from recovery, drowsy. Denies pain. Pt's daughter and son at bedside.    Problem: CARDIOVASCULAR - ADULT  Goal: Maintains optimal cardiac output and hemodynamic stability  Description: INTERVENTIONS:  - Monitor vital signs, rhythm, and trends  - Monitor for bleeding, hypotension and signs of decreased cardiac output  - Evaluate effectiveness of vasoactive medications to optimize hemodynamic stability  - Monitor arterial and/or venous puncture sites for bleeding and/or hematoma  - Assess quality of pulses, skin color and temperature  - Assess for signs of decreased coronary artery perfusion - ex. Angina  - Evaluate fluid balance, assess for edema, trend weights  Outcome: Progressing     Problem: PAIN - ADULT  Goal: Verbalizes/displays adequate comfort level or patient's stated pain goal  Description: INTERVENTIONS:  - Encourage pt to monitor pain and request assistance  - Assess pain using appropriate pain scale  - Administer analgesics based on type and severity of pain and evaluate response  - Implement non-pharmacological measures as appropriate and evaluate response  - Consider cultural and social influences on pain and pain management  - Manage/alleviate anxiety  - Utilize distraction and/or relaxation techniques  - Monitor for opioid side effects  - Notify MD/LIP if interventions unsuccessful or patient reports new pain  - Anticipate increased pain with activity and pre-medicate as appropriate  Outcome: Progressing     Problem: RESPIRATORY - ADULT  Goal: Achieves optimal ventilation and oxygenation  Description: INTERVENTIONS:  - Assess for changes in respiratory status  - Assess for changes in mentation and behavior  - Position to facilitate oxygenation and minimize respiratory effort  - Oxygen supplementation based on oxygen saturation or  ABGs  - Provide Smoking Cessation handout, if applicable  - Encourage broncho-pulmonary hygiene including cough, deep breathe, Incentive Spirometry  - Assess the need for suctioning and perform as needed  - Assess and instruct to report SOB or any respiratory difficulty  - Respiratory Therapy support as indicated  - Manage/alleviate anxiety  - Monitor for signs/symptoms of CO2 retention  Outcome: Progressing

## 2024-08-12 NOTE — PLAN OF CARE
Assumed care of patient at 1930.   Pt has D5 0.9NS running as ordered starting at midnight.   Pt is Aox4 on RA.  Lung sounds diminished.   NSR on tele.    Carb controlled diet, NPO since midnight for scheduled bronch today, 8/12. QID accuchecks.   Pt is continent.  Pt c/o back/neck pain.  Taking prn pain meds as ordered.   Pt is ambulating SBA.   Skin in unremarkable.   Bed at lowest position, room cleared of clutter, bed alarm is on, and call light is within reach.   POC discussed, fall precautions reviewed, and questions answered.      Problem: CARDIOVASCULAR - ADULT  Goal: Maintains optimal cardiac output and hemodynamic stability  Description: INTERVENTIONS:  - Monitor vital signs, rhythm, and trends  - Monitor for bleeding, hypotension and signs of decreased cardiac output  - Evaluate effectiveness of vasoactive medications to optimize hemodynamic stability  - Monitor arterial and/or venous puncture sites for bleeding and/or hematoma  - Assess quality of pulses, skin color and temperature  - Assess for signs of decreased coronary artery perfusion - ex. Angina  - Evaluate fluid balance, assess for edema, trend weights  Outcome: Progressing  Goal: Absence of cardiac arrhythmias or at baseline  Description: INTERVENTIONS:  - Continuous cardiac monitoring, monitor vital signs, obtain 12 lead EKG if indicated  - Evaluate effectiveness of antiarrhythmic and heart rate control medications as ordered  - Initiate emergency measures for life threatening arrhythmias  - Monitor electrolytes and administer replacement therapy as ordered  Outcome: Progressing     Problem: Patient/Family Goals  Goal: Patient/Family Long Term Goal  Description: Patient's Long Term Goal: Go home  8/11: Stay out of the hospital when discharged    Interventions:  - routine lab draws  - medication compliance   - MD rounding  8/10:  -Tele monitoring   -Monitor labs  -Bronchoscopy 8/12  -Ambulate 3x a day as tolerated  -IV abx  8/11:   -Attend  follow up appointments as needed   -Follow medication regimen  -Advance activity as tolerated   - See additional Care Plan goals for specific interventions  Outcome: Progressing  Goal: Patient/Family Short Term Goal  Description: Patient's Short Term Goal: pain free  8/11: Go home    Interventions:   - ice pack  -pain medication  8/10:  -PRN pain medication per order  -Pain management   -Frequently assess pain   -Reposition as tolerated  8/11:  -Bronchoscopy 8/12  -Pain management   -Prn pain medication per order  -Tele monitoring  -Monitor labs  -Ambulate 3x a day as tolerated   -IV abx  - See additional Care Plan goals for specific interventions  Outcome: Progressing     Problem: SAFETY ADULT - FALL  Goal: Free from fall injury  Description: INTERVENTIONS:  - Assess pt frequently for physical needs  - Identify cognitive and physical deficits and behaviors that affect risk of falls.  - [place] fall precautions as indicated by assessment.  - Educate pt/family on patient safety including physical limitations  - Instruct pt to call for assistance with activity based on assessment  - Modify environment to reduce risk of injury  - Provide assistive devices as appropriate  - Consider OT/PT consult to assist with strengthening/mobility  - Encourage toileting schedule  Outcome: Progressing     Problem: PAIN - ADULT  Goal: Verbalizes/displays adequate comfort level or patient's stated pain goal  Description: INTERVENTIONS:  - Encourage pt to monitor pain and request assistance  - Assess pain using appropriate pain scale  - Administer analgesics based on type and severity of pain and evaluate response  - Implement non-pharmacological measures as appropriate and evaluate response  - Consider cultural and social influences on pain and pain management  - Manage/alleviate anxiety  - Utilize distraction and/or relaxation techniques  - Monitor for opioid side effects  - Notify MD/LIP if interventions unsuccessful or patient  reports new pain  - Anticipate increased pain with activity and pre-medicate as appropriate  Outcome: Progressing     Problem: RESPIRATORY - ADULT  Goal: Achieves optimal ventilation and oxygenation  Description: INTERVENTIONS:  - Assess for changes in respiratory status  - Assess for changes in mentation and behavior  - Position to facilitate oxygenation and minimize respiratory effort  - Oxygen supplementation based on oxygen saturation or ABGs  - Provide Smoking Cessation handout, if applicable  - Encourage broncho-pulmonary hygiene including cough, deep breathe, Incentive Spirometry  - Assess the need for suctioning and perform as needed  - Assess and instruct to report SOB or any respiratory difficulty  - Respiratory Therapy support as indicated  - Manage/alleviate anxiety  - Monitor for signs/symptoms of CO2 retention  Outcome: Progressing

## 2024-08-12 NOTE — ANESTHESIA POSTPROCEDURE EVALUATION
[facility]    Rayne Morales Patient Status:  Inpatient   Age/Gender 78 year old female MRN [id number]   Location [facility] ENDOSCOPY PAIN CENTER Attending Sohail Bowman MD   Hosp Day # 3 PCP Jose Daniel Washington MD       Anesthesia Post-op Note    Bronchoscopy with Bronchoalveolar Lavage with Transbronchial Biopsies    Procedure Summary       Date: 08/12/24 Room / Location:  ENDOSCOPY 04 / [facility] END[facility]    Anesthesia Start: 1501 Anesthesia Stop: 1543    Procedure: Bronchoscopy with Bronchoalveolar Lavage with Transbronchial Biopsies Diagnosis: (ABNORMAL CT)    Surgeons: Javier Kang MD Responsible Provider: Josep Garza DO    Anesthesia Type: MAC ASA Status: 3            Anesthesia Type: MAC    Vitals Value Taken Time   /65 08/12/24 1543   Temp 98 °F (36.7 °C) 08/12/24 1543   Pulse 101 08/12/24 1543   Resp 16 08/12/24 1543   SpO2 97 % 08/12/24 1543   Vitals shown include unfiled device data.    Patient Location: Endoscopy    Anesthesia Type: MAC    Airway Patency: patent    Postop Pain Control: adequate    Mental Status: mildly sedated but able to meaningfully participate in the post-anesthesia evaluation    Nausea/Vomiting: none    Cardiopulmonary/Hydration status: stable euvolemic    Complications: no apparent anesthesia related complications    Postop vital signs: stable    Dental Exam: Unchanged from Preop    Patient to be discharged from PACU when criteria met.

## 2024-08-12 NOTE — ANESTHESIA PREPROCEDURE EVALUATION
PRE-OP EVALUATION    Patient Name: Rayne Morales    Admit Diagnosis: Hypoxia [R09.02]  Community acquired pneumonia, unspecified laterality [J18.9]    Pre-op Diagnosis: ABNORMAL CT    Bronchoscopy with Bronchoalveolar Lavage with Transbronchial Biopsies    Anesthesia Procedure: Bronchoscopy with Bronchoalveolar Lavage with Transbronchial Biopsies    Surgeons and Role:     * Javier Kang MD - Primary    Pre-op vitals reviewed.  Temp: 97.9 °F (36.6 °C)  Pulse: 96  Resp: 16  BP: 173/74  SpO2: 97 %  Body mass index is 30.03 kg/m².    Current medications reviewed.  Hospital Medications:   dextrose 5%-sodium chloride 0.9% infusion   Intravenous Continuous    hydrALAzine (Apresoline) 20 mg/mL injection 10 mg  10 mg Intravenous Q4H PRN    tiZANidine (Zanaflex) tab 10 mg  10 mg Oral Q8H PRN    fluticasone-salmeterol (Advair Diskus) 500-50 MCG/ACT inhaler 1 puff  1 puff Inhalation BID    [] sodium chloride 0.9% infusion   Intravenous Continuous    [] ondansetron (Zofran) 4 MG/2ML injection 4 mg  4 mg Intravenous Q4H PRN    acetaminophen (Tylenol Extra Strength) tab 500 mg  500 mg Oral Q4H PRN    melatonin tab 3 mg  3 mg Oral Nightly PRN    polyethylene glycol (PEG 3350) (Miralax) 17 g oral packet 17 g  17 g Oral Daily PRN    sennosides (Senokot) tab 17.2 mg  17.2 mg Oral Nightly PRN    bisacodyl (Dulcolax) 10 MG rectal suppository 10 mg  10 mg Rectal Daily PRN    fleet enema (Fleet) 7-19 GM/118ML rectal enema 133 mL  1 enema Rectal Once PRN    ondansetron (Zofran) 4 MG/2ML injection 4 mg  4 mg Intravenous Q6H PRN    metoclopramide (Reglan) 5 mg/mL injection 5 mg  5 mg Intravenous Q8H PRN    guaiFENesin ER (Mucinex) 12 hr tab 600 mg  600 mg Oral BID    benzonatate (Tessalon) cap 200 mg  200 mg Oral TID PRN    glycerin-hypromellose- (Artificial Tears) 0.2-0.2-1 % ophthalmic solution 1 drop  1 drop Both Eyes QID PRN    sodium chloride (Saline Mist) 0.65 % nasal solution 1 spray  1 spray Each Nare  Q3H PRN    glucose (Dex4) 15 GM/59ML oral liquid 15 g  15 g Oral Q15 Min PRN    Or    glucose (Glutose) 40% oral gel 15 g  15 g Oral Q15 Min PRN    Or    glucose-vitamin C (Dex-4) chewable tab 4 tablet  4 tablet Oral Q15 Min PRN    Or    dextrose 50% injection 50 mL  50 mL Intravenous Q15 Min PRN    Or    glucose (Dex4) 15 GM/59ML oral liquid 30 g  30 g Oral Q15 Min PRN    Or    glucose (Glutose) 40% oral gel 30 g  30 g Oral Q15 Min PRN    Or    glucose-vitamin C (Dex-4) chewable tab 8 tablet  8 tablet Oral Q15 Min PRN    insulin aspart (NovoLOG) 100 Units/mL FlexPen 2-10 Units  2-10 Units Subcutaneous TID AC and HS    albuterol (Ventolin) (2.5 MG/3ML) 0.083% nebulizer solution 2.5 mg  2.5 mg Nebulization Q4H PRN    heparin (Porcine) 100 Units/mL lock flush 500 Units  5 mL Intravenous PRN    lisinopril (Prinivil; Zestril) tab 20 mg  20 mg Oral Daily    cholecalciferol (Vitamin D3) tab 2,000 Units  2,000 Units Oral Daily    DULoxetine (Cymbalta) DR cap 30 mg  30 mg Oral Daily    DULoxetine (Cymbalta) DR cap 60 mg  60 mg Oral Daily    oxyCODONE-acetaminophen (Percocet)  MG per tab 1 tablet  1 tablet Oral Q4H PRN    pantoprazole (Protonix) DR tab 40 mg  40 mg Oral Before breakfast    pregabalin (Lyrica) cap 100 mg  100 mg Oral TID    rosuvastatin (Crestor) tab 10 mg  10 mg Oral Nightly    [COMPLETED] azithromycin (Zithromax) 500 mg in sodium chloride 0.9% 250mL IVPB premix  500 mg Intravenous Daily    cefTRIAXone (Rocephin) 1 g in sodium chloride 0.9% 100 mL IVPB-ADDV  1 g Intravenous Q24H    [COMPLETED] cefTRIAXone (Rocephin) 1 g in sodium chloride 0.9% 100 mL IVPB-ADDV  1 g Intravenous Once    [COMPLETED] sodium chloride 0.9 % IV bolus 2,067 mL  30 mL/kg Intravenous Once    [COMPLETED] iopamidol 76% (ISOVUE-370) injection for power injector  100 mL Intravenous ONCE PRN    [COMPLETED] azithromycin (Zithromax) 500 mg in sodium chloride 0.9% 250mL IVPB premix  500 mg Intravenous Once       Outpatient  Medications:     Medications Prior to Admission   Medication Sig Dispense Refill Last Dose    DULoxetine 60 MG Oral Cap DR Particles Take 1 capsule (60 mg total) by mouth daily. TO TAKE ALONG WITH THE 30 MG TO EQUAL 90 MG DAILY. 90 capsule 3 8/9/2024 at 0900    DULoxetine 30 MG Oral Cap DR Particles Take 1 capsule (30 mg total) by mouth daily. TO TAKE ALONG WITH THE 60 MG TO EQUAL 90 MG DAILY. 90 capsule 3 8/9/2024 at 0900    pantoprazole 40 MG Oral Tab EC Take 1 tablet (40 mg total) by mouth before breakfast. 90 tablet 3 8/8/2024 at 0600    rivaroxaban (XARELTO) 20 MG Oral Tab Take 1 tablet (20 mg total) by mouth daily. 90 tablet 3 8/8/2024 at 0900    ROSUVASTATIN 10 MG Oral Tab Take 1 tablet (10 mg total) by mouth nightly. 90 tablet 3 Past Week at 2100    semaglutide (OZEMPIC, 0.25 OR 0.5 MG/DOSE,) 2 MG/3ML Subcutaneous Solution Pen-injector Inject 0.5 mg into the skin once a week. 9 mL 1 Past Week    metFORMIN  MG Oral Tablet 24 Hr Take 2 tablets (1,000 mg total) by mouth daily with breakfast and 1 tablet (500 mg total) every evening. 270 tablet 3 8/9/2024 at 0900    oxyCODONE-acetaminophen  MG Oral Tab Take 1 tablet by mouth every 4 (four) hours as needed for Pain (takes at home for back pain).   8/9/2024 at 0000    lisinopril 20 MG Oral Tab Take 1 tablet (20 mg total) by mouth daily.   8/8/2024 at 0900    tiZANidine 4 MG Oral Tab Take 2.5 tablets (10 mg total) by mouth nightly. Takes every night   Taking at 2200    pregabalin 100 MG Oral Cap Take 1 capsule (100 mg total) by mouth in the morning and 1 capsule (100 mg total) at noon and 1 capsule (100 mg total) in the evening. 270 capsule 1 8/8/2024 at 0900    Naloxone HCl 4 MG/0.1ML Nasal Liquid 4 mg by Nasal route as needed. If patient remains unresponsive, repeat dose in other nostril 2-5 minutes after first dose. 1 kit 0 Taking    albuterol 108 (90 Base) MCG/ACT Inhalation Aero Soln Inhale 2 puffs into the lungs every 6 (six) hours as needed for  Wheezing. 1 each 3 8/8/2024    aspirin 81 MG Oral Chew Tab Chew 1 tablet (81 mg total) by mouth daily.   8/9/2024 at 0900    Fluticasone Propionate 50 MCG/ACT Nasal Suspension 1 spray by Nasal route 2 (two) times daily. 1 Bottle 3 Past Month    metroNIDAZOLE 0.75 % External Gel Apply 1 g topically 2 (two) times daily. Apply to face twice daily 45 g 5     Cholecalciferol (VITAMIN D) 2000 units Oral Tab Take 2,000 Units by mouth daily.          Allergies: Ciprofloxacin, Mold, Sulfa antibiotics, Diovan [valsartan], Omnicef, Clindamycin, Green pepper, Lipitor [atorvastatin calcium], Pneumococcal vaccines, and Quinapril hcl      Anesthesia Evaluation        Anesthetic Complications  (-) history of anesthetic complications         GI/Hepatic/Renal      (+) GERD and well controlled                          Cardiovascular      ECG reviewed.      MET: >4      (+) hypertension and well controlled  (+) hyperlipidemia  (+) CAD  (+) past MI                              Endo/Other  Comment: On metformin and ozempic for dm, last ozempic on the 9th    IgG defic    Hx dvt    Breast ca sp surgery    (+) diabetes and well controlled, type 2, not using insulin                         Pulmonary  Comment: Multiple pna this year    (+) asthma         (+) shortness of breath     (+) sleep apnea and CPAP      Neuro/Psych  Comment: Cervical spine fusion    (+) depression           (+) neuromuscular disease                     Past Surgical History:   Procedure Laterality Date    Abdomen surgery proc unlisted      duodenal biopsy    Appendectomy      Appendectomy      Breast surgery      Cataract      Cholecystectomy      Colonoscopy  10/1/10, 4/21/17    Colonoscopy N/A 02/27/2023    Procedure: COLONOSCOPY;  Surgeon: Migel Bass MD;  Location:  ENDOSCOPY    Egd  04/21/2017    Eye surgery      Femur/knee surg unlisted      right knee arthroscopy    Hemorrhoidectomy,int/ext,simple      Hernia surgery      Hysterectomy  1984    total  hystero.    Lumpectomy left Left     lt lumpectomy and radiation.    Oophorectomy Bilateral 1984    total hystero.    Other  2021    left wrist, ORIF Dr. Stacy Long    Other  2022    CERVICAL 5 AND CERVICAL 6 LAMINECTOMIES, PARTIAL CERVICAL 7 LAMINECTOMY, LEFT CERVICAL 5/CERVICAL 6 AND CERVICAL 6/CERVICAL 7 FORAMINOTOMIES, CERVICAL 6 AND CERVICAL 7 ARTHRODESIS, AUTOGRAFT, ALLOGRAFT    Other surgical history  2010    endoscopy - papillotomy    Other surgical history  2016     under right eye, skin cancer removed    Other surgical history  2022    neck    Port for vascular access      Radiation left Left     left lumpectomy and radiation.    Repair ing hernia,5+y/o,reducibl      Sigmoidoscopy,diagnostic      Spine surgery procedure unlisted      Tonsillectomy      Total abdom hysterectomy      Total knee replacement       Social History     Socioeconomic History    Marital status:    Tobacco Use    Smoking status: Former     Current packs/day: 0.00     Types: Cigarettes     Quit date: 1968     Years since quittin.6    Smokeless tobacco: Never   Vaping Use    Vaping status: Never Used   Substance and Sexual Activity    Alcohol use: No     Comment: rare    Drug use: Not Currently     Types: Cannabis     Comment: uses cream for pain to back/neck with barometer changes.    Sexual activity: Not Currently     Partners: Male   Other Topics Concern    Caffeine Concern Yes     Comment: half of cup of coke daily    Sleep Concern No    Exercise Yes     Comment: walking    Seat Belt Yes     History   Drug Use Unknown     Comment: uses cream for pain to back/neck with barometer changes.     Available pre-op labs reviewed.  Lab Results   Component Value Date    WBC 8.1 08/10/2024    RBC 3.18 (L) 08/10/2024    HGB 9.7 (L) 08/10/2024    HCT 28.9 (L) 08/10/2024    MCV 90.9 08/10/2024    MCH 30.5 08/10/2024    MCHC 33.6 08/10/2024    RDW 13.4 08/10/2024    .0 (L) 08/10/2024     Lab  Results   Component Value Date     08/10/2024    K 3.6 08/10/2024     08/10/2024    CO2 26.0 08/10/2024    BUN 12 08/10/2024    CREATSERUM 0.58 08/10/2024     (H) 08/10/2024    CA 8.7 08/10/2024            Airway      Mallampati: III  Mouth opening: 3 FB  TM distance: 4 - 6 cm  Neck ROM: limited Cardiovascular      Rhythm: regular  Rate: normal     Dental             Pulmonary      Breath sounds clear to auscultation bilaterally.    (+) rhonchi  (+) decreased breath sounds         Other findings              ASA: 3   Plan: MAC  NPO status verified and         Comment: Chat review  Plan/risks discussed with: patient                Present on Admission:   Hyponatremia   Leukocytosis   Metabolic alkalosis   Hyperglycemia   Respiratory alkalosis   Lactic acidosis

## 2024-08-12 NOTE — PROGRESS NOTES
[facility]   part of [facility]     Hospitalist Progress Note     Rayne Morales Patient Status:  Inpatient    1945 MRN [id number]   Location [facility] 2NE-A Attending Sohail Bowman MD   Hosp Day # 3 PCP Jose Daniel Washington MD     Subjective:   Feels good     Objective:    Review of Systems:   A comprehensive review of systems was completed; pertinent positive and negatives stated in subjective.  Vital signs:  Temp:  [97.6 °F (36.4 °C)-98.2 °F (36.8 °C)] 97.9 °F (36.6 °C)  Pulse:  [67-81] 75  Resp:  [15-20] 20  BP: (134-184)/(65-83) 134/65  SpO2:  [92 %-97 %] 96 %  Physical Exam:    General: No acute distress   Respiratory: no wheezes, no rhonchi, scant coarse BS in LLL   Cardiovascular: S1, S2, RRR  Abdomen: Soft, NT/ND, +BS  Extremities: no edema    Diagnostic Data:    Labs:  Recent Labs   Lab 08/08/24  1917 08/10/24  0611   WBC 15.0* 8.1   HGB 10.8* 9.7*   MCV 88.9 90.9   .0 117.0*     Recent Labs   Lab 08/08/24  1917 08/10/24  0611   * 112*   BUN 16 12   CREATSERUM 1.06* 0.58   CA 9.3 8.7   ALB 4.6  --    * 142   K 3.8 3.6    110   CO2 24.0 26.0   ALKPHO 59  --    AST 20  --    ALT 16  --    BILT 0.8  --    TP 6.9  --      Estimated Creatinine Clearance: 61.3 mL/min (based on SCr of 0.58 mg/dL).  No results for input(s): \"PTP\", \"INR\" in the last 168 hours.     Microbiology  Hospital Encounter on 24   1. Urine Culture, Routine     Status: None    Collection Time: 24  8:36 PM    Specimen: Urine, clean catch   Result Value Ref Range    Urine Culture  N/A     <10,000 cfu/ml Multiple species present- probable contamination.   2. Blood Culture     Status: None (Preliminary result)    Collection Time: 24  7:19 PM    Specimen: Blood,peripheral   Result Value Ref Range    Blood Culture Result No Growth 3 Days N/A     Imaging: Reviewed in Epic.  Medications:    fluticasone-salmeterol  1 puff Inhalation BID    guaiFENesin ER  600 mg Oral BID    insulin  aspart  2-10 Units Subcutaneous TID AC and HS    lisinopril  20 mg Oral Daily    cholecalciferol  2,000 Units Oral Daily    DULoxetine  30 mg Oral Daily    DULoxetine  60 mg Oral Daily    pantoprazole  40 mg Oral Before breakfast    pregabalin  100 mg Oral TID    rosuvastatin  10 mg Oral Nightly    cefTRIAXone  1 g Intravenous Q24H       Assessment & Plan:    #Sepsis 2/2 PNA with acute hypoxic respiratory failure and IgG deficiency   -consulted Pulm - input appreciated    -abx  -Bronch today     #lactic acidosis- resolved on sepsis protocol   #leukocytosis- resolved     #IgG deficiency- as above      #hyponatremia-likely 2/2 SIADH from acute pulmonary process- resolved      #CKD 3a     #normocytic anemia-hgb 10.8: baseline 10-12- dilutional effect  -no signs acute bleeding     #Depression-cont home duloxetine     #GERD-cont home PPI     #DVT-cont home Xarelto     #HLD-cont home statin     #chronic pain-cont home norco, tizanidine, pregabalin     #HTN-cont home lisinopril as BP tolerates- add PRN meds as elevated likely 2/2 stress/anxiety     Possible DC after Bronch today - f/u pulm recs        Total time today 33 minutes     Supplementary Documentation:   Quality:  DVT Mechanical Prophylaxis:   SCDs,    DVT Pharmacologic Prophylaxis   Medication    heparin (Porcine) 100 Units/mL lock flush 500 Units                Code Status: Full Code  Baxter: External urinary catheter in place  Baxter Duration (in days):   Central line:    CLARI:   At this point Ms. Morales is expected to be discharge to: home     The 21st Century Cures Act makes medical notes like these available to patients in the interest of transparency. Please be advised this is a medical document. Medical documents are intended to carry relevant information, facts as evident, and the clinical opinion of the practitioner. The medical note is intended as peer to peer communication and may appear blunt or direct. It is written in medical language and may contain  abbreviations or verbiage that are unfamiliar.      **Certification    PHYSICIAN Certification of Need for Inpatient Hospitalization - Initial Certification    Patient will require inpatient services that will reasonably be expected to span two midnight's based on the clinical documentation in H+P.   Based on patients current state of illness, I anticipate that, after discharge, patient will require TBD.

## 2024-08-12 NOTE — PROGRESS NOTES
[facility]    Rayne Morales Patient Status:  Inpatient    1945 MRN [id number]   Location [facility] 2NE-A Attending Sohail Bowman MD   Hosp Day # 3 PCP Jose Daniel Washington MD     SUBJECTIVE: overall better.  Slight cough.     OBJECTIVE:  BP (!) 161/81 (BP Location: Right arm)   Pulse 71   Temp 97.7 °F (36.5 °C) (Oral)   Resp 18   Ht 154.9 cm (5' 1\")   Wt 158 lb 15.2 oz (72.1 kg)   LMP  (LMP Unknown)   SpO2 97%   BMI 30.03 kg/m²   O2 requirement: on room air     I/O last 3 completed shifts:  In: 840 [P.O.:840]  Out: 1 [Urine:1]  No intake/output data recorded.     Current Medications:   Current Facility-Administered Medications:     dextrose 5%-sodium chloride 0.9% infusion, , Intravenous, Continuous    hydrALAzine (Apresoline) 20 mg/mL injection 10 mg, 10 mg, Intravenous, Q4H PRN    tiZANidine (Zanaflex) tab 10 mg, 10 mg, Oral, Q8H PRN    fluticasone-salmeterol (Advair Diskus) 500-50 MCG/ACT inhaler 1 puff, 1 puff, Inhalation, BID    acetaminophen (Tylenol Extra Strength) tab 500 mg, 500 mg, Oral, Q4H PRN    melatonin tab 3 mg, 3 mg, Oral, Nightly PRN    polyethylene glycol (PEG 3350) (Miralax) 17 g oral packet 17 g, 17 g, Oral, Daily PRN    sennosides (Senokot) tab 17.2 mg, 17.2 mg, Oral, Nightly PRN    bisacodyl (Dulcolax) 10 MG rectal suppository 10 mg, 10 mg, Rectal, Daily PRN    fleet enema (Fleet) 7-19 GM/118ML rectal enema 133 mL, 1 enema, Rectal, Once PRN    ondansetron (Zofran) 4 MG/2ML injection 4 mg, 4 mg, Intravenous, Q6H PRN    metoclopramide (Reglan) 5 mg/mL injection 5 mg, 5 mg, Intravenous, Q8H PRN    guaiFENesin ER (Mucinex) 12 hr tab 600 mg, 600 mg, Oral, BID    benzonatate (Tessalon) cap 200 mg, 200 mg, Oral, TID PRN    glycerin-hypromellose- (Artificial Tears) 0.2-0.2-1 % ophthalmic solution 1 drop, 1 drop, Both Eyes, QID PRN    sodium chloride (Saline Mist) 0.65 % nasal solution 1 spray, 1 spray, Each Nare, Q3H PRN    glucose (Dex4) 15 GM/59ML oral liquid 15  g, 15 g, Oral, Q15 Min PRN **OR** glucose (Glutose) 40% oral gel 15 g, 15 g, Oral, Q15 Min PRN **OR** glucose-vitamin C (Dex-4) chewable tab 4 tablet, 4 tablet, Oral, Q15 Min PRN **OR** dextrose 50% injection 50 mL, 50 mL, Intravenous, Q15 Min PRN **OR** glucose (Dex4) 15 GM/59ML oral liquid 30 g, 30 g, Oral, Q15 Min PRN **OR** glucose (Glutose) 40% oral gel 30 g, 30 g, Oral, Q15 Min PRN **OR** glucose-vitamin C (Dex-4) chewable tab 8 tablet, 8 tablet, Oral, Q15 Min PRN    insulin aspart (NovoLOG) 100 Units/mL FlexPen 2-10 Units, 2-10 Units, Subcutaneous, TID AC and HS    albuterol (Ventolin) (2.5 MG/3ML) 0.083% nebulizer solution 2.5 mg, 2.5 mg, Nebulization, Q4H PRN    heparin (Porcine) 100 Units/mL lock flush 500 Units, 5 mL, Intravenous, PRN    lisinopril (Prinivil; Zestril) tab 20 mg, 20 mg, Oral, Daily    cholecalciferol (Vitamin D3) tab 2,000 Units, 2,000 Units, Oral, Daily    DULoxetine (Cymbalta) DR cap 30 mg, 30 mg, Oral, Daily    DULoxetine (Cymbalta) DR cap 60 mg, 60 mg, Oral, Daily    oxyCODONE-acetaminophen (Percocet)  MG per tab 1 tablet, 1 tablet, Oral, Q4H PRN    pantoprazole (Protonix) DR tab 40 mg, 40 mg, Oral, Before breakfast    pregabalin (Lyrica) cap 100 mg, 100 mg, Oral, TID    rosuvastatin (Crestor) tab 10 mg, 10 mg, Oral, Nightly    cefTRIAXone (Rocephin) 1 g in sodium chloride 0.9% 100 mL IVPB-ADDV, 1 g, Intravenous, Q24H     General appearance: alert, appears stated age, and cooperative  Lungs: clear to auscultation bilaterally  Heart: regular rate and rhythm  Abdomen: soft, non-tender; bowel sounds normal; no masses,  no organomegaly  Extremities: extremities normal, atraumatic, no cyanosis or edema           Lab Results   Component Value Date    PT 19.4 (H) 07/29/2014    PT 13.9 07/26/2014    INR 2.33 (H) 12/04/2023    INR 1.00 02/21/2022    INR 2.58 (H) 02/09/2022          Imaging: CT chest personally reviewed:  FINDINGS:    CHEST:    LUNGS:  Scattered areas of atelectasis.  No  pneumothorax.  Scattered opacities within left lower lobe.   PLEURA:  No pleural effusions.   MEDIASTINUM/YAYA:  No mediastinal or hilar adenopathy.   CARDIAC:  No pericardial effusions.  Coronary artery calcifications.    CHEST WALL:  Right-sided chest port.      ASSESSMENT/PLAN:   Multifocal PNA - clinically better today  -3rd episode this year  -continue with current abx course  -reviewed Dr. Browning's last office note which discusses doing a bronch w/ BAL and TBBx if pt is admitted again w/ PNA  -pt agreeable to bronch and is currently NPO  Asthma - no signs of exacerbation  -moderate persistent  -advair 500  -albuterol  -montelukast  MUKUND  -cont CPAP w/ sleep  H/o PE  -on xarelto (held for bronch)  IgG deficiency  -on IVIG per allergy/immunology  Dispo - Full code  -currently on schedule for today at 3:30pm   -cont to hold xarelto    Javier Kang MD  8/12/2024  10:51 AM

## 2024-08-13 VITALS
BODY MASS INDEX: 30.01 KG/M2 | RESPIRATION RATE: 20 BRPM | OXYGEN SATURATION: 90 % | WEIGHT: 158.94 LBS | HEIGHT: 61 IN | DIASTOLIC BLOOD PRESSURE: 93 MMHG | SYSTOLIC BLOOD PRESSURE: 182 MMHG | HEART RATE: 77 BPM | TEMPERATURE: 98 F

## 2024-08-13 LAB
GLUCOSE BLD-MCNC: 112 MG/DL (ref 70–99)
GLUCOSE BLD-MCNC: 133 MG/DL (ref 70–99)

## 2024-08-13 PROCEDURE — 99239 HOSP IP/OBS DSCHRG MGMT >30: CPT | Performed by: HOSPITALIST

## 2024-08-13 RX ORDER — AMLODIPINE BESYLATE 5 MG/1
5 TABLET ORAL ONCE
Status: COMPLETED | OUTPATIENT
Start: 2024-08-13 | End: 2024-08-13

## 2024-08-13 RX ORDER — ONDANSETRON 4 MG/1
4 TABLET, FILM COATED ORAL EVERY 8 HOURS PRN
Qty: 20 TABLET | Refills: 0 | Status: SHIPPED | OUTPATIENT
Start: 2024-08-13 | End: 2024-12-20

## 2024-08-13 RX ORDER — FLUTICASONE PROPIONATE AND SALMETEROL 500; 50 UG/1; UG/1
1 POWDER RESPIRATORY (INHALATION) 2 TIMES DAILY
COMMUNITY
Start: 2024-06-24

## 2024-08-13 NOTE — PROGRESS NOTES
[facility]    Rayne Morales Patient Status:  Inpatient    1945 MRN [id number]   Location [facility] 2NE-A Attending Sohail Bowman MD   Hosp Day # 4 PCP Jose Daniel Washington MD     SUBJECTIVE: overall doing well.     OBJECTIVE:  BP (!) 182/93 (BP Location: Right arm)   Pulse 77   Temp 98 °F (36.7 °C) (Oral)   Resp 20   Ht 154.9 cm (5' 1\")   Wt 158 lb 15.2 oz (72.1 kg)   LMP  (LMP Unknown)   SpO2 90%   BMI 30.03 kg/m²   O2 requirement: on room air     I/O last 3 completed shifts:  In: 1000 [P.O.:600; I.V.:400]  Out: 4 [Urine:4]  No intake/output data recorded.     Current Medications:   Current Facility-Administered Medications:     lactated ringers infusion, , Intravenous, Continuous    tiZANidine (Zanaflex) tab 10 mg, 10 mg, Oral, Q8H PRN    fluticasone-salmeterol (Advair Diskus) 500-50 MCG/ACT inhaler 1 puff, 1 puff, Inhalation, BID    acetaminophen (Tylenol Extra Strength) tab 500 mg, 500 mg, Oral, Q4H PRN    melatonin tab 3 mg, 3 mg, Oral, Nightly PRN    polyethylene glycol (PEG 3350) (Miralax) 17 g oral packet 17 g, 17 g, Oral, Daily PRN    sennosides (Senokot) tab 17.2 mg, 17.2 mg, Oral, Nightly PRN    bisacodyl (Dulcolax) 10 MG rectal suppository 10 mg, 10 mg, Rectal, Daily PRN    fleet enema (Fleet) 7-19 GM/118ML rectal enema 133 mL, 1 enema, Rectal, Once PRN    ondansetron (Zofran) 4 MG/2ML injection 4 mg, 4 mg, Intravenous, Q6H PRN    metoclopramide (Reglan) 5 mg/mL injection 5 mg, 5 mg, Intravenous, Q8H PRN    guaiFENesin ER (Mucinex) 12 hr tab 600 mg, 600 mg, Oral, BID    benzonatate (Tessalon) cap 200 mg, 200 mg, Oral, TID PRN    glycerin-hypromellose- (Artificial Tears) 0.2-0.2-1 % ophthalmic solution 1 drop, 1 drop, Both Eyes, QID PRN    sodium chloride (Saline Mist) 0.65 % nasal solution 1 spray, 1 spray, Each Nare, Q3H PRN    glucose (Dex4) 15 GM/59ML oral liquid 15 g, 15 g, Oral, Q15 Min PRN **OR** glucose (Glutose) 40% oral gel 15 g, 15 g, Oral, Q15 Min PRN  **OR** glucose-vitamin C (Dex-4) chewable tab 4 tablet, 4 tablet, Oral, Q15 Min PRN **OR** dextrose 50% injection 50 mL, 50 mL, Intravenous, Q15 Min PRN **OR** glucose (Dex4) 15 GM/59ML oral liquid 30 g, 30 g, Oral, Q15 Min PRN **OR** glucose (Glutose) 40% oral gel 30 g, 30 g, Oral, Q15 Min PRN **OR** glucose-vitamin C (Dex-4) chewable tab 8 tablet, 8 tablet, Oral, Q15 Min PRN    insulin aspart (NovoLOG) 100 Units/mL FlexPen 2-10 Units, 2-10 Units, Subcutaneous, TID AC and HS    albuterol (Ventolin) (2.5 MG/3ML) 0.083% nebulizer solution 2.5 mg, 2.5 mg, Nebulization, Q4H PRN    heparin (Porcine) 100 Units/mL lock flush 500 Units, 5 mL, Intravenous, PRN    lisinopril (Prinivil; Zestril) tab 20 mg, 20 mg, Oral, Daily    cholecalciferol (Vitamin D3) tab 2,000 Units, 2,000 Units, Oral, Daily    DULoxetine (Cymbalta) DR cap 30 mg, 30 mg, Oral, Daily    DULoxetine (Cymbalta) DR cap 60 mg, 60 mg, Oral, Daily    oxyCODONE-acetaminophen (Percocet)  MG per tab 1 tablet, 1 tablet, Oral, Q4H PRN    pantoprazole (Protonix) DR tab 40 mg, 40 mg, Oral, Before breakfast    pregabalin (Lyrica) cap 100 mg, 100 mg, Oral, TID    rosuvastatin (Crestor) tab 10 mg, 10 mg, Oral, Nightly    cefTRIAXone (Rocephin) 1 g in sodium chloride 0.9% 100 mL IVPB-ADDV, 1 g, Intravenous, Q24H     General appearance: alert, appears stated age, and cooperative  Lungs: clear to auscultation bilaterally  Heart: regular rate and rhythm  Abdomen: soft, non-tender; bowel sounds normal; no masses,  no organomegaly  Extremities: extremities normal, atraumatic, no cyanosis or edema           Lab Results   Component Value Date    PT 19.4 (H) 07/29/2014    PT 13.9 07/26/2014    INR 2.33 (H) 12/04/2023    INR 1.00 02/21/2022    INR 2.58 (H) 02/09/2022          Imaging: reviewed. Per EMR     ASSESSMENT/PLAN:   Multifocal PNA - improving.  -3rd episode this year  -completed azithro, on ceftriaxone - will add 5d amoxicillin to complete 10d course of abx as  OP  -s/p bronch w/ BAL and TBBx- results pending  Asthma - no signs of exacerbation  -moderate persistent  -advair 500  -albuterol  -montelukast  MUKUND  -cont CPAP w/ sleep  H/o PE  -on xarelto (held for bronch)  IgG deficiency  -on IVIG per allergy/immunology  Dispo - Full code  - OK for dc  - can f/u with Dr. Browning in 1-2 weeks    Javier Kang MD  8/13/2024  1:14 PM

## 2024-08-13 NOTE — PLAN OF CARE
NURSING NOTES:  0800: Pt received AOx4. 2L NC. SR. R raul cath capped. Voiding. Pt looking forward to go home today.  1000: Room air sat 92-93%. Pt's daughter and son at bedside.  1200: Room air sat 90-91%. Denies SOB. Pt refused to walk in the hallway at this time.  1330: Dr. Kang at bedside. Pt ambulated in the hallway room air saturating 93-94%. Tolerated well. R chest raul cath decannulated. Pt going home.  1400: Discharge instructions discussed with patient and pt's daughter Yuridia such as follow up appointments, medications to take and signs and symptoms to report to MD.  They verbalized understanding. Discharge pt to home via wheelchair accompanied by PCT.      Problem: CARDIOVASCULAR - ADULT  Goal: Maintains optimal cardiac output and hemodynamic stability  Description: INTERVENTIONS:  - Monitor vital signs, rhythm, and trends  - Monitor for bleeding, hypotension and signs of decreased cardiac output  - Evaluate effectiveness of vasoactive medications to optimize hemodynamic stability  - Monitor arterial and/or venous puncture sites for bleeding and/or hematoma  - Assess quality of pulses, skin color and temperature  - Assess for signs of decreased coronary artery perfusion - ex. Angina  - Evaluate fluid balance, assess for edema, trend weights  Outcome: Adequate for Discharge  Goal: Absence of cardiac arrhythmias or at baseline  Description: INTERVENTIONS:  - Continuous cardiac monitoring, monitor vital signs, obtain 12 lead EKG if indicated  - Evaluate effectiveness of antiarrhythmic and heart rate control medications as ordered  - Initiate emergency measures for life threatening arrhythmias  - Monitor electrolytes and administer replacement therapy as ordered  Outcome: Adequate for Discharge     Problem: Patient/Family Goals  Goal: Patient/Family Long Term Goal  Description: Patient's Long Term Goal: Go home  8/11: Stay out of the hospital when discharged    Interventions:  - routine lab draws  -  medication compliance   - MD rounding  8/10:  -Tele monitoring   -Monitor labs  -Bronchoscopy 8/12  -Ambulate 3x a day as tolerated  -IV abx  8/11:   -Attend follow up appointments as needed   -Follow medication regimen  -Advance activity as tolerated   - See additional Care Plan goals for specific interventions  Outcome: Adequate for Discharge  Goal: Patient/Family Short Term Goal  Description: Patient's Short Term Goal: pain free  8/11: Go home    Interventions:   - ice pack  -pain medication  8/10:  -PRN pain medication per order  -Pain management   -Frequently assess pain   -Reposition as tolerated  8/11:  -Bronchoscopy 8/12  -Pain management   -Prn pain medication per order  -Tele monitoring  -Monitor labs  -Ambulate 3x a day as tolerated   -IV abx  - See additional Care Plan goals for specific interventions  Outcome: Adequate for Discharge     Problem: SAFETY ADULT - FALL  Goal: Free from fall injury  Description: INTERVENTIONS:  - Assess pt frequently for physical needs  - Identify cognitive and physical deficits and behaviors that affect risk of falls.  - [place] fall precautions as indicated by assessment.  - Educate pt/family on patient safety including physical limitations  - Instruct pt to call for assistance with activity based on assessment  - Modify environment to reduce risk of injury  - Provide assistive devices as appropriate  - Consider OT/PT consult to assist with strengthening/mobility  - Encourage toileting schedule  Outcome: Adequate for Discharge     Problem: PAIN - ADULT  Goal: Verbalizes/displays adequate comfort level or patient's stated pain goal  Description: INTERVENTIONS:  - Encourage pt to monitor pain and request assistance  - Assess pain using appropriate pain scale  - Administer analgesics based on type and severity of pain and evaluate response  - Implement non-pharmacological measures as appropriate and evaluate response  - Consider cultural and social influences on pain and  pain management  - Manage/alleviate anxiety  - Utilize distraction and/or relaxation techniques  - Monitor for opioid side effects  - Notify MD/LIP if interventions unsuccessful or patient reports new pain  - Anticipate increased pain with activity and pre-medicate as appropriate  Outcome: Adequate for Discharge     Problem: RESPIRATORY - ADULT  Goal: Achieves optimal ventilation and oxygenation  Description: INTERVENTIONS:  - Assess for changes in respiratory status  - Assess for changes in mentation and behavior  - Position to facilitate oxygenation and minimize respiratory effort  - Oxygen supplementation based on oxygen saturation or ABGs  - Provide Smoking Cessation handout, if applicable  - Encourage broncho-pulmonary hygiene including cough, deep breathe, Incentive Spirometry  - Assess the need for suctioning and perform as needed  - Assess and instruct to report SOB or any respiratory difficulty  - Respiratory Therapy support as indicated  - Manage/alleviate anxiety  - Monitor for signs/symptoms of CO2 retention  Outcome: Adequate for Discharge

## 2024-08-13 NOTE — PLAN OF CARE
Assumed care of patient at 1930.   Pt has LR running per order.   Pt was drowsy d/t anesthesia received earlier during bronch.   Aox4 on 4L NC, weaned to RA. Needing 2L NC overnight. Productive cough with blood tinged sputum.   Lung sounds diminished.   NSR on tele.    Pt is on carb controlled diet. QID accuchecks.   Pt is continent , last bm was 8/10.   Complains of back pain, giving pain meds as ordered.   Pt is ambulating SBA.   Skin in unremarkable.   Bed at lowest position, room cleared of clutter, bed alarm is on, and call light is within reach.   POC discussed, fall precautions reviewed, and questions answered.      Per pt she is only taking tizanidine 8mg at home and would like to only take that dosage here at the hospital.  Currently 10mg tizanidine ordered.     Problem: CARDIOVASCULAR - ADULT  Goal: Maintains optimal cardiac output and hemodynamic stability  Description: INTERVENTIONS:  - Monitor vital signs, rhythm, and trends  - Monitor for bleeding, hypotension and signs of decreased cardiac output  - Evaluate effectiveness of vasoactive medications to optimize hemodynamic stability  - Monitor arterial and/or venous puncture sites for bleeding and/or hematoma  - Assess quality of pulses, skin color and temperature  - Assess for signs of decreased coronary artery perfusion - ex. Angina  - Evaluate fluid balance, assess for edema, trend weights  Outcome: Progressing  Goal: Absence of cardiac arrhythmias or at baseline  Description: INTERVENTIONS:  - Continuous cardiac monitoring, monitor vital signs, obtain 12 lead EKG if indicated  - Evaluate effectiveness of antiarrhythmic and heart rate control medications as ordered  - Initiate emergency measures for life threatening arrhythmias  - Monitor electrolytes and administer replacement therapy as ordered  Outcome: Progressing     Problem: Patient/Family Goals  Goal: Patient/Family Long Term Goal  Description: Patient's Long Term Goal: Go home  8/11: Stay out  of the hospital when discharged    Interventions:  - routine lab draws  - medication compliance   - MD milton  8/10:  -Tele monitoring   -Monitor labs  -Bronchoscopy 8/12  -Ambulate 3x a day as tolerated  -IV abx  8/11:   -Attend follow up appointments as needed   -Follow medication regimen  -Advance activity as tolerated   - See additional Care Plan goals for specific interventions  Outcome: Progressing  Goal: Patient/Family Short Term Goal  Description: Patient's Short Term Goal: pain free  8/11: Go home    Interventions:   - ice pack  -pain medication  8/10:  -PRN pain medication per order  -Pain management   -Frequently assess pain   -Reposition as tolerated  8/11:  -Bronchoscopy 8/12  -Pain management   -Prn pain medication per order  -Tele monitoring  -Monitor labs  -Ambulate 3x a day as tolerated   -IV abx  - See additional Care Plan goals for specific interventions  Outcome: Progressing     Problem: SAFETY ADULT - FALL  Goal: Free from fall injury  Description: INTERVENTIONS:  - Assess pt frequently for physical needs  - Identify cognitive and physical deficits and behaviors that affect risk of falls.  - [place] fall precautions as indicated by assessment.  - Educate pt/family on patient safety including physical limitations  - Instruct pt to call for assistance with activity based on assessment  - Modify environment to reduce risk of injury  - Provide assistive devices as appropriate  - Consider OT/PT consult to assist with strengthening/mobility  - Encourage toileting schedule  Outcome: Progressing     Problem: PAIN - ADULT  Goal: Verbalizes/displays adequate comfort level or patient's stated pain goal  Description: INTERVENTIONS:  - Encourage pt to monitor pain and request assistance  - Assess pain using appropriate pain scale  - Administer analgesics based on type and severity of pain and evaluate response  - Implement non-pharmacological measures as appropriate and evaluate response  - Consider  cultural and social influences on pain and pain management  - Manage/alleviate anxiety  - Utilize distraction and/or relaxation techniques  - Monitor for opioid side effects  - Notify MD/LIP if interventions unsuccessful or patient reports new pain  - Anticipate increased pain with activity and pre-medicate as appropriate  Outcome: Progressing     Problem: RESPIRATORY - ADULT  Goal: Achieves optimal ventilation and oxygenation  Description: INTERVENTIONS:  - Assess for changes in respiratory status  - Assess for changes in mentation and behavior  - Position to facilitate oxygenation and minimize respiratory effort  - Oxygen supplementation based on oxygen saturation or ABGs  - Provide Smoking Cessation handout, if applicable  - Encourage broncho-pulmonary hygiene including cough, deep breathe, Incentive Spirometry  - Assess the need for suctioning and perform as needed  - Assess and instruct to report SOB or any respiratory difficulty  - Respiratory Therapy support as indicated  - Manage/alleviate anxiety  - Monitor for signs/symptoms of CO2 retention  Outcome: Progressing

## 2024-08-13 NOTE — PROGRESS NOTES
[facility]   part of [facility]     Hospitalist Progress Note     Rayne Morales Patient Status:  Inpatient    1945 MRN [id number]   Location [facility] 2NE-A Attending Sohail Bowman MD   Hosp Day # 4 PCP Jose Daniel Washington MD     Subjective:   Doing well     Objective:    Review of Systems:   A comprehensive review of systems was completed; pertinent positive and negatives stated in subjective.  Vital signs:  Temp:  [97.6 °F (36.4 °C)-98.6 °F (37 °C)] 97.6 °F (36.4 °C)  Pulse:  [] 70  Resp:  [16-21] 16  BP: ()/(48-81) 126/61  SpO2:  [90 %-98 %] 95 %  Physical Exam:    General: No acute distress   Respiratory: no wheezes, no rhonchi, coarse BS better   Cardiovascular: S1, S2, RRR  Abdomen: Soft, NT/ND, +BS  Extremities: no edema    Diagnostic Data:    Labs:  Recent Labs   Lab 08/08/24  1917 08/10/24  0611   WBC 15.0* 8.1   HGB 10.8* 9.7*   MCV 88.9 90.9   .0 117.0*     Recent Labs   Lab 08/08/24  1917 08/10/24  0611   * 112*   BUN 16 12   CREATSERUM 1.06* 0.58   CA 9.3 8.7   ALB 4.6  --    * 142   K 3.8 3.6    110   CO2 24.0 26.0   ALKPHO 59  --    AST 20  --    ALT 16  --    BILT 0.8  --    TP 6.9  --      Estimated Creatinine Clearance: 61.3 mL/min (based on SCr of 0.58 mg/dL).  No results for input(s): \"PTP\", \"INR\" in the last 168 hours.     Microbiology  Hospital Encounter on 24   1. Urine Culture, Routine     Status: None    Collection Time: 24  8:36 PM    Specimen: Urine, clean catch   Result Value Ref Range    Urine Culture  N/A     <10,000 cfu/ml Multiple species present- probable contamination.   2. Blood Culture     Status: None (Preliminary result)    Collection Time: 24  7:19 PM    Specimen: Blood,peripheral   Result Value Ref Range    Blood Culture Result No Growth 4 Days N/A     Imaging: Reviewed in Epic.  Medications:    fluticasone-salmeterol  1 puff Inhalation BID    guaiFENesin ER  600 mg Oral BID    insulin aspart   2-10 Units Subcutaneous TID AC and HS    lisinopril  20 mg Oral Daily    cholecalciferol  2,000 Units Oral Daily    DULoxetine  30 mg Oral Daily    DULoxetine  60 mg Oral Daily    pantoprazole  40 mg Oral Before breakfast    pregabalin  100 mg Oral TID    rosuvastatin  10 mg Oral Nightly    cefTRIAXone  1 g Intravenous Q24H       Assessment & Plan:    #Sepsis 2/2 PNA with acute hypoxic respiratory failure and IgG deficiency   -consulted Pulm - input appreciated    -abx  -Bronch and transbronchial bx on 8/12- f/u results     #lactic acidosis- resolved on sepsis protocol   #leukocytosis- resolved     #IgG deficiency- as above      #hyponatremia-likely 2/2 SIADH from acute pulmonary process- resolved      #CKD 3a     #normocytic anemia-hgb 10.8: baseline 10-12- dilutional effect  -no signs acute bleeding     #Depression-cont home duloxetine     #GERD-cont home PPI     #DVT-cont home Xarelto     #HLD-cont home statin     #chronic pain-cont home norco, tizanidine, pregabalin     #HTN-cont home lisinopril as BP tolerates- add PRN meds as elevated likely 2/2 stress/anxiety - improved post procedure     DC planning once ok with Pulm       Supplementary Documentation:   Quality:  DVT Mechanical Prophylaxis:   SCDs,    DVT Pharmacologic Prophylaxis   Medication    heparin (Porcine) 100 Units/mL lock flush 500 Units                Code Status: Full Code  Baxter: External urinary catheter in place  Baxter Duration (in days):   Central line:    CLARI:   At this point Ms. Morales is expected to be discharge to: home     The 21st Century Cures Act makes medical notes like these available to patients in the interest of transparency. Please be advised this is a medical document. Medical documents are intended to carry relevant information, facts as evident, and the clinical opinion of the practitioner. The medical note is intended as peer to peer communication and may appear blunt or direct. It is written in medical language and may  contain abbreviations or verbiage that are unfamiliar.      **Certification    PHYSICIAN Certification of Need for Inpatient Hospitalization - Initial Certification    Patient will require inpatient services that will reasonably be expected to span two midnight's based on the clinical documentation in H+P.   Based on patients current state of illness, I anticipate that, after discharge, patient will require TBD.

## 2024-08-14 ENCOUNTER — PATIENT OUTREACH (OUTPATIENT)
Dept: CASE MANAGEMENT | Age: 79
End: 2024-08-14

## 2024-08-14 NOTE — DISCHARGE SUMMARY
[facility]     Rayne Morales Patient Status:  Inpatient    1945 MRN [id number]   Location [facility] 2NE-A Attending No att. providers found   Hosp Day # 4 PCP Jose Daniel Washington MD     Date of Admission: 2024  Date of Discharge: 2024    Discharge Disposition: Home Health Care Services    Admitting Diagnosis:   Hypoxia [R09.02]  Community acquired pneumonia, unspecified laterality [J18.9]    Hospital Discharge Diagnoses:  #Sepsis 2/2 PNA with acute hypoxic respiratory failure and IgG deficiency   -consulted Pulm - input appreciated    -abx  -Bronch and transbronchial bx on - f/u results      #lactic acidosis- resolved on sepsis protocol   #leukocytosis- resolved      #IgG deficiency- as above      #hyponatremia-likely 2/2 SIADH from acute pulmonary process- resolved      #CKD 3a     #normocytic anemia-hgb 10.8: baseline 10-- dilutional effect  -no signs acute bleeding     #Depression-cont home duloxetine     #GERD-cont home PPI     #DVT-cont home Xarelto     #HLD-cont home statin     #chronic pain-cont home norco, tizanidine, pregabalin     #HTN-cont home lisinopril as BP tolerates- add PRN meds as elevated likely 2/2 stress/anxiety - improved post procedure      DC planning once ok with Pulm    Lace+ Score: 81  59-90 High Risk  29-58 Medium Risk  0-28   Low Risk.     History of Present Illness: Rayne Morales is a 78 year old female with PMHx HTN/ HLD/ PE/ GERD/ MUKUND/ DVT/ IgG deficiency/ DM who presented to the hospital for weakness and cough. She has a history of frequent PNA and has been on IV Gammagard monthly to try and prevent infections. Her last dose was supposed to be 3 days ago but there was an issue with the order and it had to be deferred. This afternoon, she suddenly felt fatigued and chilled and had pain in her bl thighs which is her usual symptoms on pneumonia. She notes an increase in a dry cough for the past 2 days and intermittent  dyspnea.     Brief Synopsis: Patient was placed on IV antibiotics.  Patient was slowly improving throughout the hospital course.  Pulmonology placed on consultation.  Due to risks with comorbidities pulmonology recommended inpatient bronchoscopy.  Patient had bronchoscopy performed with mild hemoptysis afterwards and was monitored overnight.  No further episodes and doing well.  Cultures and results from the bronchoscopy to be followed up with pulmonology service as outpatient.  Patient cleared by pulmonary and discharged in good condition.    Procedures during hospitalization:   Bronchoscopy    Lab/Test results pending at Discharge:   Bronchoscopy results    Consultants:  Pulmonology    Discharge Medication List:     Discharge Medications        START taking these medications        Instructions Prescription details   amoxicillin clavulanate 875-125 MG Tabs  Commonly known as: Augmentin      Take 1 tablet by mouth 2 (two) times daily for 10 days.   Stop taking on: August 20, 2024  Quantity: 10 tablet  Refills: 0     ondansetron 4 mg tablet  Commonly known as: Zofran      Take 1 tablet (4 mg total) by mouth every 8 (eight) hours as needed for Nausea.   Quantity: 20 tablet  Refills: 0            CONTINUE taking these medications        Instructions Prescription details   albuterol 108 (90 Base) MCG/ACT Aers  Commonly known as: Ventolin HFA      Inhale 2 puffs into the lungs every 6 (six) hours as needed for Wheezing.   Quantity: 1 each  Refills: 3     aspirin 81 MG Chew      Chew 1 tablet (81 mg total) by mouth daily.   Refills: 0     DULoxetine 60 MG Cpep  Commonly known as: Cymbalta      Take 1 capsule (60 mg total) by mouth daily. TO TAKE ALONG WITH THE 30 MG TO EQUAL 90 MG DAILY.   Quantity: 90 capsule  Refills: 3     DULoxetine 30 MG Cpep  Commonly known as: Cymbalta      Take 1 capsule (30 mg total) by mouth daily. TO TAKE ALONG WITH THE 60 MG TO EQUAL 90 MG DAILY.   Quantity: 90 capsule  Refills: 3      fluticasone propionate 50 MCG/ACT Susp  Commonly known as: Flonase      1 spray by Nasal route 2 (two) times daily.   Quantity: 1 Bottle  Refills: 3     fluticasone-salmeterol 500-50 MCG/ACT Aepb  Commonly known as: Advair Diskus      Inhale 1 puff into the lungs 2 (two) times daily.   Refills: 0     lisinopril 20 MG Tabs  Commonly known as: Prinivil; Zestril      Take 1 tablet (20 mg total) by mouth daily.   Refills: 0     metFORMIN  MG Tb24  Commonly known as: Glucophage XR      Take 2 tablets (1,000 mg total) by mouth daily with breakfast and 1 tablet (500 mg total) every evening.   Quantity: 270 tablet  Refills: 3     metroNIDAZOLE 0.75 % Gel  Commonly known as: Metrogel      Apply 1 g topically 2 (two) times daily. Apply to face twice daily   Quantity: 45 g  Refills: 5     Naloxone HCl 4 MG/0.1ML Liqd      4 mg by Nasal route as needed. If patient remains unresponsive, repeat dose in other nostril 2-5 minutes after first dose.   Quantity: 1 kit  Refills: 0     oxyCODONE-acetaminophen  MG Tabs  Commonly known as: Percocet      Take 1 tablet by mouth every 4 (four) hours as needed for Pain (takes at home for back pain).   Refills: 0     Ozempic (0.25 or 0.5 MG/DOSE) 2 MG/3ML Sopn  Generic drug: semaglutide      Inject 0.5 mg into the skin once a week.   Quantity: 9 mL  Refills: 1     pantoprazole 40 MG Tbec  Commonly known as: Protonix      Take 1 tablet (40 mg total) by mouth before breakfast.   Quantity: 90 tablet  Refills: 3     pregabalin 100 MG Caps  Commonly known as: Lyrica      Take 1 capsule (100 mg total) by mouth in the morning and 1 capsule (100 mg total) at noon and 1 capsule (100 mg total) in the evening.   Quantity: 270 capsule  Refills: 1     rivaroxaban 20 MG Tabs  Commonly known as: Xarelto      Take 1 tablet (20 mg total) by mouth daily.   Quantity: 90 tablet  Refills: 3     rosuvastatin 10 MG Tabs  Commonly known as: Crestor      Take 1 tablet (10 mg total) by mouth nightly.    Quantity: 90 tablet  Refills: 3     tiZANidine 4 MG Tabs  Commonly known as: Zanaflex      Take 2.5 tablets (10 mg total) by mouth nightly. Takes every night   Refills: 0     Vitamin D 50 MCG (2000 UT) Tabs      Take 2,000 Units by mouth daily.   Refills: 0               Where to Get Your Medications        These medications were sent to [id number] - [place], [place] - [address] [phone], [phone]  [address], [facility] [address]      Phone: [phone]   amoxicillin clavulanate 875-125 MG Tabs  ondansetron 4 mg tablet         Follow-up appointment:   Betsy Browning MD  [address] [facility]  [address]  [place] [id number] [phone]    Follow up in 1 week(s)      Jose Daniel Washington MD  [address]  [place] [address]  [phone]    Schedule an appointment as soon as possible for a visit in 1 week(s)        -----------------------------------------------------------------------------------------------  PATIENT DISCHARGE INSTRUCTIONS: See electronic chart    Sohail Bowman MD 8/14/2024    Time spent: 33 minutes

## 2024-08-16 LAB — NON GYNE INTERPRETATION: NEGATIVE

## 2024-08-21 RX ORDER — LISINOPRIL 20 MG/1
20 TABLET ORAL DAILY
Qty: 90 TABLET | Refills: 0 | Status: SHIPPED | OUTPATIENT
Start: 2024-08-21

## 2024-08-21 NOTE — TELEPHONE ENCOUNTER
Please review; protocol failed. Note on med module states patient requested removal?    Requested Prescriptions   Pending Prescriptions Disp Refills    LISINOPRIL 20 MG Oral Tab [Pharmacy Med Name: LISINOPRIL 20MG TABLETS] 90 tablet 0     Sig: TAKE 1 TABLET BY MOUTH DAILY       Hypertension Medications Protocol Failed - 8/18/2024  5:42 AM        Failed - Last BP reading less than 140/90     BP Readings from Last 1 Encounters:   08/13/24 (!) 182/93               Passed - CMP or BMP in past 12 months        Passed - In person appointment or virtual visit in the past 12 mos or appointment in next 3 mos     Recent Outpatient Visits              2 weeks ago     [facility]    Nurse Only    2 weeks ago Anemia, unspecified type    [facility] [place] Bran Rahman MD    Office Visit    3 months ago Routine general medical examination at a health care facility    [address] Monica Curran APRN    Office Visit    4 months ago Primary osteoarthritis involving multiple joints    [facility], [address] [place] [address] Makayla Farley DO    Office Visit    4 months ago FREDERICK (acute kidney injury) (HCC)    [address] Monica Curran APRN    Office Visit          Future Appointments         Provider Department Appt Notes    Tomorrow Zac Christian MD [facility]     In 2 days [address] Curran [address]N [address]    In 2 months Makayla Farley DO [facility] f/u oct nov                    Passed - EGFRCR or GFRNAA > 50     GFR Evaluation  EGFRCR: 93 , resulted on 8/10/2024

## 2024-08-22 ENCOUNTER — PATIENT OUTREACH (OUTPATIENT)
Dept: CASE MANAGEMENT | Age: 79
End: 2024-08-22

## 2024-08-22 ENCOUNTER — OFFICE VISIT (OUTPATIENT)
Dept: INTERNAL MEDICINE CLINIC | Facility: CLINIC | Age: 79
End: 2024-08-22
Payer: MEDICARE

## 2024-08-22 VITALS
OXYGEN SATURATION: 95 % | DIASTOLIC BLOOD PRESSURE: 72 MMHG | HEART RATE: 85 BPM | BODY MASS INDEX: 30 KG/M2 | TEMPERATURE: 96 F | WEIGHT: 158.81 LBS | SYSTOLIC BLOOD PRESSURE: 136 MMHG | RESPIRATION RATE: 21 BRPM

## 2024-08-22 DIAGNOSIS — R54 FRAIL ELDERLY: ICD-10-CM

## 2024-08-22 DIAGNOSIS — J18.9 COMMUNITY ACQUIRED PNEUMONIA, UNSPECIFIED LATERALITY: Primary | ICD-10-CM

## 2024-08-22 DIAGNOSIS — J45.30 MILD PERSISTENT ASTHMA WITHOUT COMPLICATION (HCC): ICD-10-CM

## 2024-08-22 DIAGNOSIS — Z86.718 HISTORY OF DVT (DEEP VEIN THROMBOSIS): ICD-10-CM

## 2024-08-22 DIAGNOSIS — R09.02 HYPOXIA: ICD-10-CM

## 2024-08-22 DIAGNOSIS — D80.3 IGG DEFICIENCY (HCC): ICD-10-CM

## 2024-08-22 DIAGNOSIS — R16.0 LIVER MASS: ICD-10-CM

## 2024-08-22 DIAGNOSIS — D64.9 ANEMIA, UNSPECIFIED TYPE: ICD-10-CM

## 2024-08-22 DIAGNOSIS — E11.9 TYPE 2 DIABETES MELLITUS WITHOUT COMPLICATION, WITHOUT LONG-TERM CURRENT USE OF INSULIN (HCC): ICD-10-CM

## 2024-08-22 DIAGNOSIS — D69.6 THROMBOCYTOPENIA (HCC): ICD-10-CM

## 2024-08-22 DIAGNOSIS — G47.33 OSA ON CPAP: ICD-10-CM

## 2024-08-22 DIAGNOSIS — I10 PRIMARY HYPERTENSION: ICD-10-CM

## 2024-08-22 DIAGNOSIS — I25.10 CORONARY ARTERY DISEASE INVOLVING NATIVE CORONARY ARTERY OF NATIVE HEART WITHOUT ANGINA PECTORIS: ICD-10-CM

## 2024-08-22 DIAGNOSIS — E87.1 HYPONATREMIA: ICD-10-CM

## 2024-08-22 DIAGNOSIS — E78.00 PURE HYPERCHOLESTEROLEMIA: ICD-10-CM

## 2024-08-22 DIAGNOSIS — F33.1 MAJOR DEPRESSIVE DISORDER, RECURRENT EPISODE, MODERATE (HCC): Chronic | ICD-10-CM

## 2024-08-22 DIAGNOSIS — A41.9 SEPSIS, DUE TO UNSPECIFIED ORGANISM, UNSPECIFIED WHETHER ACUTE ORGAN DYSFUNCTION PRESENT (HCC): ICD-10-CM

## 2024-08-22 PROBLEM — E87.3 RESPIRATORY ALKALOSIS: Status: RESOLVED | Noted: 2024-08-08 | Resolved: 2024-08-22

## 2024-08-22 PROBLEM — R73.9 HYPERGLYCEMIA: Status: RESOLVED | Noted: 2024-08-08 | Resolved: 2024-08-22

## 2024-08-22 PROBLEM — E87.3 METABOLIC ALKALOSIS: Status: RESOLVED | Noted: 2024-08-08 | Resolved: 2024-08-22

## 2024-08-22 PROCEDURE — 99499 UNLISTED E&M SERVICE: CPT | Performed by: NURSE PRACTITIONER

## 2024-08-22 PROCEDURE — 99496 TRANSJ CARE MGMT HIGH F2F 7D: CPT | Performed by: NURSE PRACTITIONER

## 2024-08-22 RX ORDER — LIDOCAINE 50 MG/G
PATCH TOPICAL
COMMUNITY
Start: 2024-06-03

## 2024-08-22 RX ORDER — IMMUNE GLOBULIN INFUSION (HUMAN) 100 MG/ML
30 INJECTION, SOLUTION INTRAVENOUS; SUBCUTANEOUS
COMMUNITY

## 2024-08-22 NOTE — PROGRESS NOTES
Subjective:   Rayne Morales is a 78 year old female who presents for hospital follow up.   She was discharged from [facility]  Admission Date: 8/8/24   Discharge Date: 8/13/24  Hospital Discharge Diagnosis:   Hypoxia  Sepsis  Pneumonia  IgG deficiency  Dr Paiz  Hyponatremia. 131 on admission  Stable at discharge. Will follow  Anemia  9.7 at d/c  Thrombocytopenia.  117 at d/c  Hx PE/DVT  Dr Rahman.  Would like to see Dr Seferino BOB  Anxiety/depresion  duloxetine.  HTN  lisinopril   has exacerbation of bp in the hospital   Dyslipidemia. rosuvastatin  GERD  PPI  Diabetes.  Ozempic metformin. A1c 6.9     Chencho Crabtree, Nicho, Miguelito, Gino Lawrence, Josiah      Interactive contact within 2 business days post discharge first initiated on Date: 8/14/2024    During the visit, the following was completed:  Obtained and reviewed discharge summary, continuity of care documents, and Hospitalist notes  Reviewed Labs (CBC, CMP)    HPI: here for hospital follow up  admitted via ER with SOB, weakness and cough  prior hx of pneumonia multiple times.  IgG def on IV Gammagard monthly.  IV antibiotics, pulmonary consultation, bronchoscopy.    Has f/u with Dr Chambers next week.   Feeling better.  Continues to have concern about recurrence of pneumonia.      Liver mass reviewed on CT abd.  Will continue to followed.  Will cancel MRI abd for now.    As above     History/Other:   Current Medications:  Medication Reconciliation:  I am aware of an inpatient discharge within the last 30 days.  The discharge medication list has been reconciled with the patient's current medication list and reviewed by me.  See medication list for additions of new medication, and changes to current doses of medications and discontinued medications.  Outpatient Medications Marked as Taking for the 8/22/24 encounter (Office Visit) with Monica Curran APRN   Medication Sig    lidocaine 5 % External Patch Apply 1 patch to  skin once a day as needed    immune globulin, human, (GAMMAGARD) 10 g/100mL Injection Solution Inject 30 g into the vein.    LISINOPRIL 20 MG Oral Tab TAKE 1 TABLET BY MOUTH DAILY    fluticasone-salmeterol 500-50 MCG/ACT Inhalation Aerosol Powder, Breath Activated Inhale 1 puff into the lungs 2 (two) times daily.    ondansetron (ZOFRAN) 4 mg tablet Take 1 tablet (4 mg total) by mouth every 8 (eight) hours as needed for Nausea.    DULoxetine 60 MG Oral Cap DR Particles Take 1 capsule (60 mg total) by mouth daily. TO TAKE ALONG WITH THE 30 MG TO EQUAL 90 MG DAILY.    DULoxetine 30 MG Oral Cap DR Particles Take 1 capsule (30 mg total) by mouth daily. TO TAKE ALONG WITH THE 60 MG TO EQUAL 90 MG DAILY.    pantoprazole 40 MG Oral Tab EC Take 1 tablet (40 mg total) by mouth before breakfast.    rivaroxaban (XARELTO) 20 MG Oral Tab Take 1 tablet (20 mg total) by mouth daily.    ROSUVASTATIN 10 MG Oral Tab Take 1 tablet (10 mg total) by mouth nightly.    semaglutide (OZEMPIC, 0.25 OR 0.5 MG/DOSE,) 2 MG/3ML Subcutaneous Solution Pen-injector Inject 0.5 mg into the skin once a week.    metFORMIN  MG Oral Tablet 24 Hr Take 2 tablets (1,000 mg total) by mouth daily with breakfast and 1 tablet (500 mg total) every evening.    oxyCODONE-acetaminophen  MG Oral Tab Take 1 tablet by mouth every 4 (four) hours as needed for Pain (takes at home for back pain).    tiZANidine 4 MG Oral Tab Take 2.5 tablets (10 mg total) by mouth nightly. Takes every night    pregabalin 100 MG Oral Cap Take 1 capsule (100 mg total) by mouth in the morning and 1 capsule (100 mg total) at noon and 1 capsule (100 mg total) in the evening.    Naloxone HCl 4 MG/0.1ML Nasal Liquid 4 mg by Nasal route as needed. If patient remains unresponsive, repeat dose in other nostril 2-5 minutes after first dose.    metroNIDAZOLE 0.75 % External Gel Apply 1 g topically 2 (two) times daily. Apply to face twice daily    albuterol 108 (90 Base) MCG/ACT Inhalation  Aero Soln Inhale 2 puffs into the lungs every 6 (six) hours as needed for Wheezing.    aspirin 81 MG Oral Chew Tab Chew 1 tablet (81 mg total) by mouth daily.    Fluticasone Propionate 50 MCG/ACT Nasal Suspension 1 spray by Nasal route 2 (two) times daily.    Cholecalciferol (VITAMIN D) 2000 units Oral Tab Take 2,000 Units by mouth daily.       Review of Systems:  GENERAL:feeling better  LUNGS: denies shortness of breath with exertion  CARDIOVASCULAR: denies chest pain on exertion or palpitations  GI: denies abdominal pain, denies heartburn, denies diarrhea  MUSCULOSKELETAL: denies pain, normal range of motion of extremities  NEURO: denies headaches, denies dizziness, denies weakness    Objective:   No LMP recorded (lmp unknown). Patient has had a hysterectomy.  Estimated body mass index is 30 kg/m² as calculated from the following:    Height as of 8/8/24: 5' 1\" (1.549 m).    Weight as of this encounter: 158 lb 12.8 oz (72 kg).   /72   Pulse 85   Temp (!) 95.8 °F (35.4 °C) (Temporal)   Resp 21   Wt 158 lb 12.8 oz (72 kg)   LMP  (LMP Unknown)   SpO2 95%   BMI 30.00 kg/m²    GENERAL: well developed, well nourished, in no apparent distress  CHEST: no chest tenderness  LUNGS: clear to auscultation  CARDIO: RRR   GI: good BS's, no masses, HSM or tenderness  MUSCULOSKELETAL: back is not tender, FROM of the extremities  EXTREMITIES: no edema  NEURO: Oriented times three,       Assessment & Plan:   1. Community acquired pneumonia, unspecified laterality (Primary)  2. Hypoxia  stable   3. Sepsis, due to unspecified organism, unspecified whether acute organ dysfunction present (HCC)  stable resolved.   4. Primary hypertension   stable  exacerbation in the hospital of uncertain etiology.  Lisinopril.    -     Comp Metabolic Panel (14); Future; Expected date: 08/22/2024  5. IgG deficiency (HCC)  monthly gammaguard.  At home.    Overview:  ??? pt states she gets IgG infusions due to lack of IgG, unable to fight off  infections    6. Major depressive disorder, recurrent episode, moderate (HCC)  cymbalta.  She is doing well overall.    7. Thrombocytopenia (HCC)  will follow     -     CBC With Differential With Platelet; Future; Expected date: 08/22/2024    8. Pure hypercholesterolemia  stable statin.   9. Mild persistent asthma without complication (HCC)  stable  montior closely    10. Hyponatremia  stable  will follow   -     Comp Metabolic Panel (14); Future; Expected date: 08/22/2024  11. Anemia, unspecified type  stable  follow.    -     CBC With Differential With Platelet; Future; Expected date: 08/22/2024  12. History of DVT (deep vein thrombosis)  DOAC  13. MUKUND on CPAP  compliant  14. Frail elderly  bp acceptable for age.    15. Type 2 diabetes mellitus without complication, without long-term current use of insulin (HCC)  metformin ozempic.  A1c acceptable   16. Coronary artery disease involving native coronary artery of native heart without angina pectoris   stable  Dr Lawrence.   17. Liver mass  stable on CT abd.    18.  Right knee osteoarthritis.  Still considering TKR with Dr Torre.  She is wanting to do PT.          No follow-ups on file.

## 2024-08-22 NOTE — PROGRESS NOTES
Multiple attempts to reach patient and messages left with no return call.  Patient went in for hospital follow-up appointment with primary care provider office on 8/22/24.  Encounter closing.

## 2024-09-11 ENCOUNTER — HOSPITAL ENCOUNTER (OUTPATIENT)
Age: 79
Discharge: HOME | End: 2024-09-11
Payer: MEDICARE

## 2024-09-11 VITALS
SYSTOLIC BLOOD PRESSURE: 105 MMHG | DIASTOLIC BLOOD PRESSURE: 74 MMHG | HEART RATE: 100 BPM | RESPIRATION RATE: 22 BRPM | OXYGEN SATURATION: 96 % | TEMPERATURE: 99 F

## 2024-09-11 DIAGNOSIS — R04.0 EPISTAXIS: Primary | ICD-10-CM

## 2024-09-11 PROCEDURE — 30901 CONTROL OF NOSEBLEED: CPT | Performed by: NURSE PRACTITIONER

## 2024-09-11 PROCEDURE — 99213 OFFICE O/P EST LOW 20 MIN: CPT | Performed by: NURSE PRACTITIONER

## 2024-09-11 RX ORDER — OXYMETAZOLINE HYDROCHLORIDE 0.05 G/100ML
1 SPRAY NASAL ONCE
Status: COMPLETED | OUTPATIENT
Start: 2024-09-11 | End: 2024-09-11

## 2024-09-11 NOTE — ED PROVIDER NOTES
Patient Seen in: [facility]      History     Chief Complaint   Patient presents with    Nose Bleed     Stated Complaint: NOSE BLEED    Subjective:   This is a 78-year-old female with below stated medical history.  Presents to immediate care for nosebleed.  She is anticoagulated on Xarelto.  She sneezed and nose began to bleed.  No falls, trauma, or injury.  Attempting pressure with no success.  Nose began bleeding 30 to 40 minutes prior to arrival.     The history is provided by the patient.           Objective:   Past Medical History:    Abdominal pain    Anemia    Anxiety    Arrhythmia    Arthritis    Asthma (HCC)    Back pain    Back problem    Black stools    Bloating    Blurred vision    Breast CA (HCC)    left lump and rad.    Cancer (HCC)    breast, basal cell    Cervical radiculopathy    Cervical spinal stenosis    Deep vein thrombosis (HCC)    Depression    Diabetes (HCC)    Diabetes mellitus (HCC)    Diarrhea, unspecified    Dizziness    Easy bruising    On Xeralto    Esophageal reflux    Exposure to medical diagnostic radiation    Fatigue    Fitting and adjustment of vascular catheter    Headache disorder    Hearing loss    Heart attack ([facility])    Heartburn    Hemorrhoids    High blood pressure    High cholesterol    History of blood clots    History of COVID-19    not hospitalized, no continued symptoms, cough, fever, loss of taste    History of depression    History of syncope    IgG deficiency ([facility])    ??? pt states she gets IgG infusions due to lack of IgG, unable to fight off infections    Indigestion    Leaking of urine    Migraines    Muscle weakness    uses cane    Neuropathy    rt foot bilateral hands    Osteoarthritis    Pain in joints    Pneumonia due to organism    Pulmonary embolism ([facility])    Shortness of breath    Sleep apnea    cpap - not using - machine is broken    Stress    Uncomfortable fullness after meals    Visual impairment    glasses/contacts    Weight gain               No pertinent past surgical history.              No pertinent social history.            Review of Systems   Constitutional:  Negative for chills and fever.   HENT:  Positive for nosebleeds.    Cardiovascular:  Negative for chest pain.   Gastrointestinal:  Negative for abdominal pain.   Skin:  Negative for rash.   Neurological:  Negative for syncope and headaches.       Positive for stated Chief Complaint: Nose Bleed    Other systems are as noted in HPI.  Constitutional and vital signs reviewed.      All other systems reviewed and negative except as noted above.    Physical Exam     ED Triage Vitals [09/11/24 1234]   /74   Pulse 100   Resp 22   Temp 98.5 °F (36.9 °C)   Temp src Temporal   SpO2 96 %   O2 Device None (Room air)       Current Vitals:   Vital Signs  BP: 105/74  Pulse: 100  Resp: 22  Temp: 98.5 °F (36.9 °C)  Temp src: Temporal    Oxygen Therapy  SpO2: 96 %  O2 Device: None (Room air)            Physical Exam  Vitals and nursing note reviewed.   Constitutional:       Appearance: Normal appearance. She is normal weight.   HENT:      Head: Normocephalic and atraumatic.      Right Ear: External ear normal.      Left Ear: External ear normal.      Nose: No signs of injury.      Right Nostril: Epistaxis present. No foreign body, septal hematoma or occlusion.      Left Nostril: No foreign body, epistaxis, septal hematoma or occlusion.      Mouth/Throat:      Mouth: Mucous membranes are moist.      Pharynx: Oropharynx is clear.   Eyes:      General:         Right eye: No discharge.         Left eye: No discharge.      Extraocular Movements: Extraocular movements intact.      Conjunctiva/sclera: Conjunctivae normal.   Cardiovascular:      Rate and Rhythm: Normal rate.   Pulmonary:      Effort: Pulmonary effort is normal.   Musculoskeletal:      Cervical back: Neck supple.   Skin:     General: Skin is warm and dry.      Capillary Refill: Capillary refill takes less than 2 seconds.      Findings:  No rash.   Neurological:      Mental Status: She is alert and oriented to person, place, and time.   Psychiatric:         Mood and Affect: Mood normal.         Behavior: Behavior normal.               ED Course   Labs Reviewed - No data to display          Afrin and nose clamp         MDM        Vital signs stable.  Patient is well-appearing and nontoxic looking.  Presents to immediate care for epistaxis.  She is anticoagulated on Xarelto.    Initial exam reveals bleeding from the right nare.  No evidence of trauma or injury to the nose.    Patient evacuated nasal contents.  Right nare was flooded with Afrin.  Nasal clamp placed.    Patient was evaluated after Afrin and 20 minutes of nose clamping.  No evidence of further bleeding.  No septal hematoma on exam.    DC home.  Patient was instructed to use Afrin and nasal clamp if there is any further nosebleeding.  Reasons to return and/or seek emergent reevaluation reviewed.  She verbalized understanding, and agreed with plan of care.  All questions were answered.                                    Medical Decision Making      Disposition and Plan     Clinical Impression:  1. Epistaxis         Disposition:  Discharge  9/11/2024  1:06 pm    Follow-up:  Jose Daniel Washington MD  [address] [place] [id number]  [phone]      As needed          Medications Prescribed:  Current Discharge Medication List

## 2024-09-11 NOTE — DISCHARGE INSTRUCTIONS
If your nose begins to bleed again.  Please evacuate your nasal passages and use Afrin and nose clamp as instructed.  Return if worse.

## 2024-09-14 NOTE — PHYSICAL THERAPY NOTE
PHYSICAL THERAPY KNEE TREATMENT NOTE - INPATIENT     Room Number: 365/365-A     Session: 2  Number of Visits to Meet Established Goals: 4    Presenting Problem: s/p left TKA 11/20/19     History related to current admission: Pt admitted 11/20/19 for elect CHOLECYSTECTOMY     • COLONOSCOPY  10/1/10, 4/21/17   • EGD  4/21/17   • FEMUR/KNEE SURG UNLISTED      right knee arthroscopy   • HERNIA SURGERY     • HYSTERECTOMY      @ age 46   • KNEE TOTAL REPLACEMENT Left 11/20/2019    Performed by Brenna Bennett denies pain/discomfort (Rating = 0) steps with a railing?: A Lot       AM-PAC Score:  Raw Score: 19   Approx Degree of Impairment: 41.77%   Standardized Score (AM-PAC Scale): 45.44   CMS Modifier (G-Code): CK    FUNCTIONAL ABILITY STATUS  Gait Assessment   Gait Assistance: Contact guard assi days. Anticipate pt will progress to complete goals with pain controlled . Will continue to follow to progress as able, RN aware. DISCHARGE RECOMMENDATIONS  PT Discharge Recommendations: Home with home health PT; Intermittent Supervision    PLAN  P

## 2024-09-19 ENCOUNTER — HOSPITAL ENCOUNTER (EMERGENCY)
Facility: HOSPITAL | Age: 79
Discharge: LEFT BEFORE DISCHARGE | End: 2024-09-19
Payer: MEDICARE

## 2024-09-19 VITALS
WEIGHT: 152 LBS | TEMPERATURE: 97 F | SYSTOLIC BLOOD PRESSURE: 173 MMHG | HEART RATE: 86 BPM | RESPIRATION RATE: 18 BRPM | HEIGHT: 61 IN | BODY MASS INDEX: 28.7 KG/M2 | DIASTOLIC BLOOD PRESSURE: 78 MMHG | OXYGEN SATURATION: 95 %

## 2024-09-20 NOTE — ED INITIAL ASSESSMENT (HPI)
Pt to ER with c/o nose bleed since 6pm. Pt states this is the 3rd time in a week.   Nose clip applied in triage, bleeding controled.   Pt states she was using nasal decongestant that was provided at immediate care but is not helping.   Pt on Xarelto.

## 2024-09-21 ENCOUNTER — HOSPITAL ENCOUNTER (INPATIENT)
Facility: HOSPITAL | Age: 79
LOS: 2 days | Discharge: HOME | End: 2024-09-25
Attending: EMERGENCY MEDICINE | Admitting: EMERGENCY MEDICINE
Payer: MEDICARE

## 2024-09-21 DIAGNOSIS — M54.12 CERVICAL RADICULITIS: ICD-10-CM

## 2024-09-21 DIAGNOSIS — D64.9 ANEMIA, UNSPECIFIED TYPE: ICD-10-CM

## 2024-09-21 DIAGNOSIS — R55 SYNCOPE AND COLLAPSE: ICD-10-CM

## 2024-09-21 DIAGNOSIS — R04.0 EPISTAXIS: ICD-10-CM

## 2024-09-21 PROBLEM — D68.59 HYPERCOAGULABLE STATE (HCC): Status: ACTIVE | Noted: 2024-09-21

## 2024-09-21 LAB
ALBUMIN SERPL-MCNC: 4.1 G/DL (ref 3.2–4.8)
ALBUMIN/GLOB SERPL: 1.2 {RATIO} (ref 1–2)
ALP LIVER SERPL-CCNC: 62 U/L
ALT SERPL-CCNC: 34 U/L
ANION GAP SERPL CALC-SCNC: 10 MMOL/L (ref 0–18)
ANTIBODY SCREEN: NEGATIVE
APTT PPP: 33.2 SECONDS (ref 23–36)
AST SERPL-CCNC: 55 U/L (ref ?–34)
BASOPHILS # BLD AUTO: 0.04 X10(3) UL (ref 0–0.2)
BASOPHILS NFR BLD AUTO: 0.7 %
BILIRUB SERPL-MCNC: 0.4 MG/DL (ref 0.2–1.1)
BUN BLD-MCNC: 11 MG/DL (ref 9–23)
CALCIUM BLD-MCNC: 9.5 MG/DL (ref 8.7–10.4)
CHLORIDE SERPL-SCNC: 108 MMOL/L (ref 98–112)
CO2 SERPL-SCNC: 22 MMOL/L (ref 21–32)
CREAT BLD-MCNC: 0.93 MG/DL
EGFRCR SERPLBLD CKD-EPI 2021: 63 ML/MIN/1.73M2 (ref 60–?)
EOSINOPHIL # BLD AUTO: 0.19 X10(3) UL (ref 0–0.7)
EOSINOPHIL NFR BLD AUTO: 3.1 %
ERYTHROCYTE [DISTWIDTH] IN BLOOD BY AUTOMATED COUNT: 12.5 %
GLOBULIN PLAS-MCNC: 3.4 G/DL (ref 2–3.5)
GLUCOSE BLD-MCNC: 206 MG/DL (ref 70–99)
HCT VFR BLD AUTO: 35.3 %
HGB BLD-MCNC: 11.9 G/DL
IMM GRANULOCYTES # BLD AUTO: 0.02 X10(3) UL (ref 0–1)
IMM GRANULOCYTES NFR BLD: 0.3 %
INR BLD: 1.81 (ref 0.8–1.2)
LYMPHOCYTES # BLD AUTO: 1.53 X10(3) UL (ref 1–4)
LYMPHOCYTES NFR BLD AUTO: 25.1 %
MAGNESIUM SERPL-MCNC: 1.5 MG/DL (ref 1.6–2.6)
MCH RBC QN AUTO: 30.3 PG (ref 26–34)
MCHC RBC AUTO-ENTMCNC: 33.7 G/DL (ref 31–37)
MCV RBC AUTO: 89.8 FL
MONOCYTES # BLD AUTO: 0.43 X10(3) UL (ref 0.1–1)
MONOCYTES NFR BLD AUTO: 7.1 %
NEUTROPHILS # BLD AUTO: 3.88 X10 (3) UL (ref 1.5–7.7)
NEUTROPHILS # BLD AUTO: 3.88 X10(3) UL (ref 1.5–7.7)
NEUTROPHILS NFR BLD AUTO: 63.7 %
OSMOLALITY SERPL CALC.SUM OF ELEC: 295 MOSM/KG (ref 275–295)
PLATELET # BLD AUTO: 187 10(3)UL (ref 150–450)
POTASSIUM SERPL-SCNC: 3.7 MMOL/L (ref 3.5–5.1)
PROT SERPL-MCNC: 7.5 G/DL (ref 5.7–8.2)
PROTHROMBIN TIME: 21.1 SECONDS (ref 11.6–14.8)
RBC # BLD AUTO: 3.93 X10(6)UL
RH BLOOD TYPE: NEGATIVE
SODIUM SERPL-SCNC: 140 MMOL/L (ref 136–145)
TROPONIN I SERPL HS-MCNC: 26 NG/L
TSI SER-ACNC: 1.17 MIU/ML (ref 0.55–4.78)
WBC # BLD AUTO: 6.1 X10(3) UL (ref 4–11)

## 2024-09-21 PROCEDURE — 99223 1ST HOSP IP/OBS HIGH 75: CPT | Performed by: STUDENT IN AN ORGANIZED HEALTH CARE EDUCATION/TRAINING PROGRAM

## 2024-09-21 RX ORDER — ONDANSETRON 2 MG/ML
INJECTION INTRAMUSCULAR; INTRAVENOUS
Status: COMPLETED
Start: 2024-09-21 | End: 2024-09-21

## 2024-09-21 RX ORDER — ONDANSETRON 2 MG/ML
4 INJECTION INTRAMUSCULAR; INTRAVENOUS ONCE
Status: COMPLETED | OUTPATIENT
Start: 2024-09-21 | End: 2024-09-21

## 2024-09-21 RX ORDER — TRANEXAMIC ACID 100 MG/ML
5 INJECTION, SOLUTION INTRAVENOUS ONCE
Status: COMPLETED | OUTPATIENT
Start: 2024-09-21 | End: 2024-09-21

## 2024-09-21 RX ORDER — MORPHINE SULFATE 2 MG/ML
2 INJECTION, SOLUTION INTRAMUSCULAR; INTRAVENOUS ONCE
Status: COMPLETED | OUTPATIENT
Start: 2024-09-21 | End: 2024-09-21

## 2024-09-22 PROBLEM — R55 SYNCOPE AND COLLAPSE: Status: ACTIVE | Noted: 2024-09-22

## 2024-09-22 LAB
ALBUMIN SERPL-MCNC: 3.9 G/DL (ref 3.2–4.8)
ALBUMIN/GLOB SERPL: 1.6 {RATIO} (ref 1–2)
ALP LIVER SERPL-CCNC: 50 U/L
ALT SERPL-CCNC: 22 U/L
ANION GAP SERPL CALC-SCNC: 10 MMOL/L (ref 0–18)
AST SERPL-CCNC: 26 U/L (ref ?–34)
ATRIAL RATE: 110 BPM
BASOPHILS # BLD AUTO: 0.01 X10(3) UL (ref 0–0.2)
BASOPHILS NFR BLD AUTO: 0.1 %
BILIRUB SERPL-MCNC: 0.2 MG/DL (ref 0.2–1.1)
BUN BLD-MCNC: 28 MG/DL (ref 9–23)
CALCIUM BLD-MCNC: 8.6 MG/DL (ref 8.7–10.4)
CHLORIDE SERPL-SCNC: 111 MMOL/L (ref 98–112)
CO2 SERPL-SCNC: 22 MMOL/L (ref 21–32)
CREAT BLD-MCNC: 0.92 MG/DL
EGFRCR SERPLBLD CKD-EPI 2021: 64 ML/MIN/1.73M2 (ref 60–?)
EOSINOPHIL # BLD AUTO: 0 X10(3) UL (ref 0–0.7)
EOSINOPHIL NFR BLD AUTO: 0 %
ERYTHROCYTE [DISTWIDTH] IN BLOOD BY AUTOMATED COUNT: 12.9 %
GLOBULIN PLAS-MCNC: 2.4 G/DL (ref 2–3.5)
GLUCOSE BLD-MCNC: 159 MG/DL (ref 70–99)
GLUCOSE BLD-MCNC: 161 MG/DL (ref 70–99)
GLUCOSE BLD-MCNC: 178 MG/DL (ref 70–99)
GLUCOSE BLD-MCNC: 209 MG/DL (ref 70–99)
GLUCOSE BLD-MCNC: 222 MG/DL (ref 70–99)
GLUCOSE BLD-MCNC: 224 MG/DL (ref 70–99)
HCT VFR BLD AUTO: 24.3 %
HGB BLD-MCNC: 7.7 G/DL
HGB BLD-MCNC: 8 G/DL
IMM GRANULOCYTES # BLD AUTO: 0.02 X10(3) UL (ref 0–1)
IMM GRANULOCYTES NFR BLD: 0.3 %
INR BLD: 1.56 (ref 0.8–1.2)
IRON SATN MFR SERPL: 15 %
IRON SERPL-MCNC: 39 UG/DL
LYMPHOCYTES # BLD AUTO: 0.57 X10(3) UL (ref 1–4)
LYMPHOCYTES NFR BLD AUTO: 8.1 %
MAGNESIUM SERPL-MCNC: 1.4 MG/DL (ref 1.6–2.6)
MCH RBC QN AUTO: 30.1 PG (ref 26–34)
MCHC RBC AUTO-ENTMCNC: 32.9 G/DL (ref 31–37)
MCV RBC AUTO: 91.4 FL
MONOCYTES # BLD AUTO: 0.34 X10(3) UL (ref 0.1–1)
MONOCYTES NFR BLD AUTO: 4.8 %
NEUTROPHILS # BLD AUTO: 6.08 X10 (3) UL (ref 1.5–7.7)
NEUTROPHILS # BLD AUTO: 6.08 X10(3) UL (ref 1.5–7.7)
NEUTROPHILS NFR BLD AUTO: 86.7 %
OSMOLALITY SERPL CALC.SUM OF ELEC: 308 MOSM/KG (ref 275–295)
P AXIS: 51 DEGREES
P-R INTERVAL: 160 MS
PHOSPHATE SERPL-MCNC: 2.6 MG/DL (ref 2.4–5.1)
PLATELET # BLD AUTO: 141 10(3)UL (ref 150–450)
POTASSIUM SERPL-SCNC: 3.7 MMOL/L (ref 3.5–5.1)
PROT SERPL-MCNC: 6.3 G/DL (ref 5.7–8.2)
PROTHROMBIN TIME: 18.8 SECONDS (ref 11.6–14.8)
Q-T INTERVAL: 354 MS
QRS DURATION: 74 MS
QTC CALCULATION (BEZET): 479 MS
R AXIS: -21 DEGREES
RBC # BLD AUTO: 2.66 X10(6)UL
SODIUM SERPL-SCNC: 143 MMOL/L (ref 136–145)
T AXIS: 53 DEGREES
TOTAL IRON BINDING CAPACITY: 265 UG/DL (ref 250–425)
TRANSFERRIN SERPL-MCNC: 201 MG/DL (ref 250–380)
VENTRICULAR RATE: 110 BPM
WBC # BLD AUTO: 7 X10(3) UL (ref 4–11)

## 2024-09-22 PROCEDURE — 99223 1ST HOSP IP/OBS HIGH 75: CPT | Performed by: INTERNAL MEDICINE

## 2024-09-22 PROCEDURE — 99232 SBSQ HOSP IP/OBS MODERATE 35: CPT | Performed by: INTERNAL MEDICINE

## 2024-09-22 RX ORDER — MAGNESIUM HYDROXIDE/ALUMINUM HYDROXICE/SIMETHICONE 120; 1200; 1200 MG/30ML; MG/30ML; MG/30ML
30 SUSPENSION ORAL 4 TIMES DAILY PRN
Status: DISCONTINUED | OUTPATIENT
Start: 2024-09-22 | End: 2024-09-25

## 2024-09-22 RX ORDER — METOCLOPRAMIDE HYDROCHLORIDE 5 MG/ML
10 INJECTION INTRAMUSCULAR; INTRAVENOUS EVERY 8 HOURS PRN
Status: DISCONTINUED | OUTPATIENT
Start: 2024-09-22 | End: 2024-09-22

## 2024-09-22 RX ORDER — ONDANSETRON 2 MG/ML
4 INJECTION INTRAMUSCULAR; INTRAVENOUS ONCE
Status: COMPLETED | OUTPATIENT
Start: 2024-09-22 | End: 2024-09-22

## 2024-09-22 RX ORDER — SENNOSIDES 8.6 MG
17.2 TABLET ORAL NIGHTLY PRN
Status: DISCONTINUED | OUTPATIENT
Start: 2024-09-22 | End: 2024-09-25

## 2024-09-22 RX ORDER — DULOXETIN HYDROCHLORIDE 30 MG/1
30 CAPSULE, DELAYED RELEASE ORAL DAILY
Status: DISCONTINUED | OUTPATIENT
Start: 2024-09-22 | End: 2024-09-25

## 2024-09-22 RX ORDER — BENZONATATE 100 MG/1
200 CAPSULE ORAL 3 TIMES DAILY PRN
Status: DISCONTINUED | OUTPATIENT
Start: 2024-09-22 | End: 2024-09-25

## 2024-09-22 RX ORDER — MORPHINE SULFATE 2 MG/ML
2 INJECTION, SOLUTION INTRAMUSCULAR; INTRAVENOUS ONCE
Status: COMPLETED | OUTPATIENT
Start: 2024-09-22 | End: 2024-09-22

## 2024-09-22 RX ORDER — DULOXETIN HYDROCHLORIDE 60 MG/1
60 CAPSULE, DELAYED RELEASE ORAL DAILY
Status: DISCONTINUED | OUTPATIENT
Start: 2024-09-22 | End: 2024-09-25

## 2024-09-22 RX ORDER — HYDROMORPHONE HYDROCHLORIDE 1 MG/ML
0.2 INJECTION, SOLUTION INTRAMUSCULAR; INTRAVENOUS; SUBCUTANEOUS EVERY 2 HOUR PRN
Status: DISCONTINUED | OUTPATIENT
Start: 2024-09-22 | End: 2024-09-24

## 2024-09-22 RX ORDER — NICOTINE POLACRILEX 4 MG
15 LOZENGE BUCCAL
Status: DISCONTINUED | OUTPATIENT
Start: 2024-09-22 | End: 2024-09-25

## 2024-09-22 RX ORDER — PREGABALIN 100 MG/1
100 CAPSULE ORAL 3 TIMES DAILY
Status: DISCONTINUED | OUTPATIENT
Start: 2024-09-22 | End: 2024-09-25

## 2024-09-22 RX ORDER — OXYCODONE AND ACETAMINOPHEN 10; 325 MG/1; MG/1
1 TABLET ORAL EVERY 4 HOURS PRN
Status: DISCONTINUED | OUTPATIENT
Start: 2024-09-22 | End: 2024-09-25

## 2024-09-22 RX ORDER — SODIUM CHLORIDE, SODIUM LACTATE, POTASSIUM CHLORIDE, CALCIUM CHLORIDE 600; 310; 30; 20 MG/100ML; MG/100ML; MG/100ML; MG/100ML
INJECTION, SOLUTION INTRAVENOUS CONTINUOUS
Status: DISCONTINUED | OUTPATIENT
Start: 2024-09-22 | End: 2024-09-22

## 2024-09-22 RX ORDER — MELATONIN
3 NIGHTLY PRN
Status: DISCONTINUED | OUTPATIENT
Start: 2024-09-22 | End: 2024-09-25

## 2024-09-22 RX ORDER — ECHINACEA PURPUREA EXTRACT 125 MG
1 TABLET ORAL
Status: DISCONTINUED | OUTPATIENT
Start: 2024-09-22 | End: 2024-09-25

## 2024-09-22 RX ORDER — HYDROMORPHONE HYDROCHLORIDE 1 MG/ML
0.8 INJECTION, SOLUTION INTRAMUSCULAR; INTRAVENOUS; SUBCUTANEOUS EVERY 2 HOUR PRN
Status: DISCONTINUED | OUTPATIENT
Start: 2024-09-22 | End: 2024-09-24

## 2024-09-22 RX ORDER — DEXTROSE MONOHYDRATE 25 G/50ML
50 INJECTION, SOLUTION INTRAVENOUS
Status: DISCONTINUED | OUTPATIENT
Start: 2024-09-22 | End: 2024-09-25

## 2024-09-22 RX ORDER — METOCLOPRAMIDE HYDROCHLORIDE 5 MG/ML
5 INJECTION INTRAMUSCULAR; INTRAVENOUS EVERY 8 HOURS PRN
Status: DISCONTINUED | OUTPATIENT
Start: 2024-09-22 | End: 2024-09-25

## 2024-09-22 RX ORDER — ACETAMINOPHEN 500 MG
1000 TABLET ORAL EVERY 8 HOURS PRN
Status: DISCONTINUED | OUTPATIENT
Start: 2024-09-22 | End: 2024-09-25

## 2024-09-22 RX ORDER — POLYETHYLENE GLYCOL 3350 17 G/17G
17 POWDER, FOR SOLUTION ORAL DAILY PRN
Status: DISCONTINUED | OUTPATIENT
Start: 2024-09-22 | End: 2024-09-25

## 2024-09-22 RX ORDER — HYDROMORPHONE HYDROCHLORIDE 1 MG/ML
0.4 INJECTION, SOLUTION INTRAMUSCULAR; INTRAVENOUS; SUBCUTANEOUS EVERY 2 HOUR PRN
Status: DISCONTINUED | OUTPATIENT
Start: 2024-09-22 | End: 2024-09-24

## 2024-09-22 RX ORDER — FLUTICASONE PROPIONATE AND SALMETEROL 500; 50 UG/1; UG/1
1 POWDER RESPIRATORY (INHALATION) 2 TIMES DAILY
Status: DISCONTINUED | OUTPATIENT
Start: 2024-09-22 | End: 2024-09-25

## 2024-09-22 RX ORDER — PANTOPRAZOLE SODIUM 40 MG/1
40 TABLET, DELAYED RELEASE ORAL
Status: DISCONTINUED | OUTPATIENT
Start: 2024-09-22 | End: 2024-09-25

## 2024-09-22 RX ORDER — AMLODIPINE BESYLATE 5 MG/1
5 TABLET ORAL DAILY
Status: DISCONTINUED | OUTPATIENT
Start: 2024-09-22 | End: 2024-09-23

## 2024-09-22 RX ORDER — LABETALOL HYDROCHLORIDE 5 MG/ML
10 INJECTION, SOLUTION INTRAVENOUS EVERY 4 HOURS PRN
Status: DISCONTINUED | OUTPATIENT
Start: 2024-09-22 | End: 2024-09-25

## 2024-09-22 RX ORDER — BUTALBITAL, ACETAMINOPHEN AND CAFFEINE 50; 325; 40 MG/1; MG/1; MG/1
1 TABLET ORAL EVERY 4 HOURS PRN
Status: DISCONTINUED | OUTPATIENT
Start: 2024-09-22 | End: 2024-09-25

## 2024-09-22 RX ORDER — ROSUVASTATIN CALCIUM 10 MG/1
10 TABLET, COATED ORAL NIGHTLY
Status: DISCONTINUED | OUTPATIENT
Start: 2024-09-22 | End: 2024-09-25

## 2024-09-22 RX ORDER — ONDANSETRON 2 MG/ML
4 INJECTION INTRAMUSCULAR; INTRAVENOUS EVERY 6 HOURS PRN
Status: DISCONTINUED | OUTPATIENT
Start: 2024-09-22 | End: 2024-09-25

## 2024-09-22 RX ORDER — NICOTINE POLACRILEX 4 MG
30 LOZENGE BUCCAL
Status: DISCONTINUED | OUTPATIENT
Start: 2024-09-22 | End: 2024-09-25

## 2024-09-22 RX ORDER — ALBUTEROL SULFATE 90 UG/1
2 INHALANT RESPIRATORY (INHALATION) EVERY 6 HOURS PRN
Status: DISCONTINUED | OUTPATIENT
Start: 2024-09-22 | End: 2024-09-25

## 2024-09-22 RX ORDER — MAGNESIUM OXIDE 400 MG/1
800 TABLET ORAL ONCE
Status: COMPLETED | OUTPATIENT
Start: 2024-09-22 | End: 2024-09-22

## 2024-09-22 RX ORDER — BISACODYL 10 MG
10 SUPPOSITORY, RECTAL RECTAL
Status: DISCONTINUED | OUTPATIENT
Start: 2024-09-22 | End: 2024-09-25

## 2024-09-22 RX ORDER — FLUTICASONE PROPIONATE AND SALMETEROL 500; 50 UG/1; UG/1
1 POWDER RESPIRATORY (INHALATION) 2 TIMES DAILY
Status: DISCONTINUED | OUTPATIENT
Start: 2024-09-22 | End: 2024-09-22

## 2024-09-22 RX ORDER — LISINOPRIL 20 MG/1
20 TABLET ORAL DAILY
Status: DISCONTINUED | OUTPATIENT
Start: 2024-09-22 | End: 2024-09-25

## 2024-09-22 RX ORDER — HYDRALAZINE HYDROCHLORIDE 20 MG/ML
10 INJECTION INTRAMUSCULAR; INTRAVENOUS EVERY 6 HOURS PRN
Status: DISCONTINUED | OUTPATIENT
Start: 2024-09-22 | End: 2024-09-22

## 2024-09-22 NOTE — CONSULTS
[facility]  Report of Consultation    Rayne Morales Patient Status:  Observation    1945 MRN [id number]   Location [facility] 3NW-A Attending Brady Mir,    Hosp Day # 0 PCP Jose Daniel Washington MD     Reason for Consultation:    Recurrent epistaxis, history of VTE.    History of Present Illness:    Rayne Morales is a 78 year old female that was admitted through the ER with recurrent nosebleeds.  Currently on rivaroxaban.  She has had epistaxis on and off for the last 9 days and has had to seek medical attention 4 times.  Nasal packing was done.  She was found to be hypotensive and pressure was better after IV fluids.  She is admitted for the same.    She is not a good historian.  Apparently in , she had a DVT after she underwent breast cancer surgery.  She was treated with warfarin for a couple of years and then this was stopped.  In , she had a syncopal episode and CTA  showed small PE in right upper lobe.  At that time she was started on rivaroxaban and has been on the same since then.    History:  Past Medical History:    Abdominal pain    Anemia    Anxiety    Arrhythmia    Arthritis    Asthma (HCC)    Back pain    Back problem    Black stools    Bloating    Blurred vision    Breast CA (HCC)    left lump and rad.    Cancer (HCC)    breast, basal cell    Cervical radiculopathy    Cervical spinal stenosis    Deep vein thrombosis (HCC)    Depression    Diabetes (HCC)    Diabetes mellitus (HCC)    Diarrhea, unspecified    Dizziness    Easy bruising    On Xeralto    Esophageal reflux    Exposure to medical diagnostic radiation    Fatigue    Fitting and adjustment of vascular catheter    Headache disorder    Hearing loss    Heart attack (HCC)    Heartburn    Hemorrhoids    High blood pressure    High cholesterol    History of blood clots    History of COVID-19    not hospitalized, no continued symptoms, cough, fever, loss of taste    History of depression    History of syncope     IgG deficiency (HCC)    ??? pt states she gets IgG infusions due to lack of IgG, unable to fight off infections    Indigestion    Leaking of urine    Migraines    Muscle weakness    uses cane    Neuropathy    rt foot bilateral hands    Osteoarthritis    Pain in joints    Pneumonia due to organism    Pulmonary embolism (HCC)    Shortness of breath    Sleep apnea    cpap - not using - machine is broken    Stress    Uncomfortable fullness after meals    Visual impairment    glasses/contacts    Weight gain     Past Surgical History:   Procedure Laterality Date    Abdomen surgery proc unlisted      duodenal biopsy    Appendectomy      Appendectomy      Breast surgery      Cataract      Cholecystectomy      Colonoscopy  10/1/10, 4/21/17    Colonoscopy N/A 02/27/2023    Procedure: COLONOSCOPY;  Surgeon: Migel Bass MD;  Location:  ENDOSCOPY    Egd  04/21/2017    Eye surgery      Femur/knee surg unlisted      right knee arthroscopy    Hemorrhoidectomy,int/ext,simple      Hernia surgery      Hysterectomy  1984    total hystero.    Lumpectomy left Left 1999    lt lumpectomy and radiation.    Oophorectomy Bilateral 1984    total hystero.    Other  11/2021    left wrist, ORIF Dr. Stacy Long    Other  02/21/2022    CERVICAL 5 AND CERVICAL 6 LAMINECTOMIES, PARTIAL CERVICAL 7 LAMINECTOMY, LEFT CERVICAL 5/CERVICAL 6 AND CERVICAL 6/CERVICAL 7 FORAMINOTOMIES, CERVICAL 6 AND CERVICAL 7 ARTHRODESIS, AUTOGRAFT, ALLOGRAFT    Other surgical history  2010    endoscopy - papillotomy    Other surgical history  2016     under right eye, skin cancer removed    Other surgical history  02/2022    neck    Port for vascular access      Radiation left Left 1999    left lumpectomy and radiation.    Repair ing hernia,5+y/o,reducibl      Sigmoidoscopy,diagnostic      Spine surgery procedure unlisted      Tonsillectomy      Total abdom hysterectomy      Total knee replacement       Family History   Problem Relation Age of Onset     Heart Attack Paternal Grandfather     Heart Attack Paternal Grandmother     Other (CAD) Father     Other (CAD) Mother     Hypertension Mother     Heart Attack Mother     Other (CAD) Brother     Hypertension Brother     Heart Attack Brother     Stroke Maternal Grandfather         Stroke    Stroke Maternal Grandmother         Stroke    Breast Cancer Self 54      reports that she quit smoking about 56 years ago. Her smoking use included cigarettes. She has never used smokeless tobacco. She reports that she does not currently use drugs after having used the following drugs: Cannabis. She reports that she does not drink alcohol.    Allergies:  Allergies   Allergen Reactions    Bactrim HIVES and OTHER (SEE COMMENTS)    Ciprofloxacin HIVES    Mold REACTIVE AIRWAY DISEASE     Mold and smut    Sulfa Antibiotics HIVES     Bactrim (Sulfamethoxazole/TMP)    Diovan [Valsartan] NAUSEA AND VOMITING    Omnicef NAUSEA ONLY and DIZZINESS    Atorvastatin OTHER (SEE COMMENTS) and UNKNOWN    Clindamycin DIARRHEA and NAUSEA ONLY    Green Pepper OTHER (SEE COMMENTS)     Sensitivity (all peppers - green, orange and red)    Lipitor [Atorvastatin Calcium] OTHER (SEE COMMENTS)     Leg cramping, sensitivity    Pneumococcal Vaccines OTHER (SEE COMMENTS)     Pt states she had pneumo vax done at Dr. Boyd on 1/10/24- states developed redness, swelling,itching to the arm    Quinapril Hcl FATIGUE       Medications:   ceFAZolin  2 g Intravenous Q8H    DULoxetine  30 mg Oral Daily    DULoxetine  60 mg Oral Daily    insulin aspart  1-10 Units Subcutaneous TID AC and HS    pantoprazole  40 mg Oral Before breakfast    pregabalin  100 mg Oral TID    rosuvastatin  10 mg Oral Nightly    tiZANidine  10 mg Oral Nightly    lisinopril  20 mg Oral Daily    fluticasone-salmeterol  1 puff Inhalation BID       glucose **OR** glucose **OR** glucose-vitamin C **OR** dextrose **OR** glucose **OR** glucose **OR** glucose-vitamin C    oxyCODONE-acetaminophen     melatonin    polyethylene glycol (PEG 3350)    sennosides    bisacodyl    ondansetron    benzonatate    guaiFENesin    glycerin-hypromellose-    sodium chloride    HYDROmorphone **OR** HYDROmorphone **OR** HYDROmorphone    acetaminophen    albuterol    alum-mag hydroxide-simethicone    butalbital-acetaminophen-caffeine    metoclopramide    Review of Systems:  A comprehensive 14 point review of systems was completed.  Pertinent positives and negatives noted in the the HPI.    Physical Exam:  Vital signs: Blood pressure (!) 179/84, pulse 96, temperature 97.3 °F (36.3 °C), temperature source Axillary, resp. rate 13, height 1.549 m (5' 1\"), weight 68.9 kg (152 lb), SpO2 96%, not currently breastfeeding.  General: Hard of hearing, has nasal packing.  Respiratory: Clear to auscultation bilaterally.  No wheezes. No rhonchi.  Cardiovascular: S1, S2.  Regular rate and rhythm.  No murmurs.   Abdomen: Soft, nontender  Neurologic: No focal neurological deficits.      Laboratory Data:    Lab Results   Component Value Date    WBC 7.0 09/22/2024    HGB 8.0 09/22/2024    HCT 24.3 09/22/2024    .0 09/22/2024    CREATSERUM 0.92 09/22/2024    BUN 28 09/22/2024     09/22/2024    K 3.7 09/22/2024     09/22/2024    CO2 22.0 09/22/2024     09/22/2024    CA 8.6 09/22/2024    ALB 3.9 09/22/2024    ALKPHO 50 09/22/2024    BILT 0.2 09/22/2024    TP 6.3 09/22/2024    AST 26 09/22/2024    ALT 22 09/22/2024    PTT 33.2 09/21/2024    INR 1.56 09/22/2024    PTP 18.8 09/22/2024    TSH 1.171 09/21/2024    MG 1.4 09/22/2024    PHOS 2.6 09/22/2024    PGLU 224 09/22/2024       Imaging:    Imaging data reviewed in Epic.    Assessment/Plan:    # H/o VTE: Patient is not a good historian.  She has a documented small PE on CT 7/14 when she had a syncopal episode.  She has been on rivaroxaban since then.  Prior to that, maybe in 2002, she had a DVT after she underwent breast cancer surgery.    For now, I have recommended  discontinuing DOAC.  Her initial episode of DVT in 2002 was likely provoked.  She was treated with warfarin for some time.  While the small PE in 2014 appears to have been unprovoked, at present, risk of anticoagulation outweighs benefit.  Since 2014, she has had 4 CTs chest which have not shown PE.  Would recommend holding anticoagulation for now with plans to follow-up outpatient with Dr. Rahman to discuss risk/benefit of resuming anticoagulation.    # Anemia: Due to blood loss.  Will check for iron deficiency.    Thank you for allowing me to participate in the care Rayne Morales.  Will follow peripherally for now.    Rachael Jacobsen M.D.    [facility]    [address] [place], [address]    9/22/2024

## 2024-09-22 NOTE — CONSULTS
[facility]  Otolaryngology - Head and Neck Surgery Consultation Note    Rayne Morales Patient Status:  Observation    1945 MRN [id number]   Location [facility] 3NW-A Attending Brady Mir,    Hosp Day # 0 PCP Jose Daniel Washington MD     Reason for Consultation:  Epistaxis    History of Present Illness:  Rayne Morales is a a(n) 78 year old female with PMH IgG deficiency, Hypercoagulable state with hx DVT, HLD, chronic pain, HTN, breast cancer s/p lumpectomy/radiation presenting for epistaxis. ENT is consulted for managing epistaxis. Patient reports having 4 episodes of nosebleeds over the last two weeks. She denies prior history of nose bleeds. She presented to urgent care initially and was controlled with Afrin. She notes then having a nosebleed that lasted several hours without stopping after holding pressure nor using Afrin. No allergy history. H/o nasal trauma not requiring surgery in  after sustaining a fall to the face on cement. She has been on Xarelto and ASA, but now held. She had bilateral Rhino Rockets placed yesterday and has vasovagal syncope, leading to admission. Ears feel clogged.     History:  Past Medical History:    Abdominal pain    Anemia    Anxiety    Arrhythmia    Arthritis    Asthma (HCC)    Back pain    Back problem    Black stools    Bloating    Blurred vision    Breast CA (HCC)    left lump and rad.    Cancer (HCC)    breast, basal cell    Cervical radiculopathy    Cervical spinal stenosis    Deep vein thrombosis (HCC)    Depression    Diabetes (HCC)    Diabetes mellitus (HCC)    Diarrhea, unspecified    Dizziness    Easy bruising    On Xeralto    Esophageal reflux    Exposure to medical diagnostic radiation    Fatigue    Fitting and adjustment of vascular catheter    Headache disorder    Hearing loss    Heart attack (HCC)    Heartburn    Hemorrhoids    High blood pressure    High cholesterol    History of blood clots    History of COVID-19    not  hospitalized, no continued symptoms, cough, fever, loss of taste    History of depression    History of syncope    IgG deficiency (HCC)    ??? pt states she gets IgG infusions due to lack of IgG, unable to fight off infections    Indigestion    Leaking of urine    Migraines    Muscle weakness    uses cane    Neuropathy    rt foot bilateral hands    Osteoarthritis    Pain in joints    Pneumonia due to organism    Pulmonary embolism (HCC)    Shortness of breath    Sleep apnea    cpap - not using - machine is broken    Stress    Uncomfortable fullness after meals    Visual impairment    glasses/contacts    Weight gain     Past Surgical History:   Procedure Laterality Date    Abdomen surgery proc unlisted      duodenal biopsy    Appendectomy      Appendectomy      Breast surgery      Cataract      Cholecystectomy      Colonoscopy  10/1/10, 4/21/17    Colonoscopy N/A 02/27/2023    Procedure: COLONOSCOPY;  Surgeon: Migel Bass MD;  Location:  ENDOSCOPY    Egd  04/21/2017    Eye surgery      Femur/knee surg unlisted      right knee arthroscopy    Hemorrhoidectomy,int/ext,simple      Hernia surgery      Hysterectomy  1984    total hystero.    Lumpectomy left Left 1999    lt lumpectomy and radiation.    Oophorectomy Bilateral 1984    total hystero.    Other  11/2021    left wrist, ORIF Dr. Stacy Long    Other  02/21/2022    CERVICAL 5 AND CERVICAL 6 LAMINECTOMIES, PARTIAL CERVICAL 7 LAMINECTOMY, LEFT CERVICAL 5/CERVICAL 6 AND CERVICAL 6/CERVICAL 7 FORAMINOTOMIES, CERVICAL 6 AND CERVICAL 7 ARTHRODESIS, AUTOGRAFT, ALLOGRAFT    Other surgical history  2010    endoscopy - papillotomy    Other surgical history  2016     under right eye, skin cancer removed    Other surgical history  02/2022    neck    Port for vascular access      Radiation left Left 1999    left lumpectomy and radiation.    Repair ing hernia,5+y/o,reducibl      Sigmoidoscopy,diagnostic      Spine surgery procedure unlisted      Tonsillectomy       Total abdom hysterectomy      Total knee replacement       Family History   Problem Relation Age of Onset    Heart Attack Paternal Grandfather     Heart Attack Paternal Grandmother     Other (CAD) Father     Other (CAD) Mother     Hypertension Mother     Heart Attack Mother     Other (CAD) Brother     Hypertension Brother     Heart Attack Brother     Stroke Maternal Grandfather         Stroke    Stroke Maternal Grandmother         Stroke    Breast Cancer Self 54      reports that she quit smoking about 56 years ago. Her smoking use included cigarettes. She has never used smokeless tobacco. She reports that she does not currently use drugs after having used the following drugs: Cannabis. She reports that she does not drink alcohol.    Allergies:  Allergies   Allergen Reactions    Bactrim HIVES and OTHER (SEE COMMENTS)    Ciprofloxacin HIVES    Mold REACTIVE AIRWAY DISEASE     Mold and smut    Sulfa Antibiotics HIVES     Bactrim (Sulfamethoxazole/TMP)    Diovan [Valsartan] NAUSEA AND VOMITING    Omnicef NAUSEA ONLY and DIZZINESS    Atorvastatin OTHER (SEE COMMENTS) and UNKNOWN    Clindamycin DIARRHEA and NAUSEA ONLY    Green Pepper OTHER (SEE COMMENTS)     Sensitivity (all peppers - green, orange and red)    Lipitor [Atorvastatin Calcium] OTHER (SEE COMMENTS)     Leg cramping, sensitivity    Pneumococcal Vaccines OTHER (SEE COMMENTS)     Pt states she had pneumo vax done at Dr. Boyd on 1/10/24- states developed redness, swelling,itching to the arm    Quinapril Hcl FATIGUE       Medications:    Current Facility-Administered Medications:     ceFAZolin (Ancef) 2g in 10mL IV syringe premix, 2 g, Intravenous, Q8H    DULoxetine (Cymbalta) DR cap 30 mg, 30 mg, Oral, Daily    DULoxetine (Cymbalta) DR cap 60 mg, 60 mg, Oral, Daily    glucose (Dex4) 15 GM/59ML oral liquid 15 g, 15 g, Oral, Q15 Min PRN **OR** glucose (Glutose) 40% oral gel 15 g, 15 g, Oral, Q15 Min PRN **OR** glucose-vitamin C (Dex-4) chewable tab 4 tablet,  4 tablet, Oral, Q15 Min PRN **OR** dextrose 50% injection 50 mL, 50 mL, Intravenous, Q15 Min PRN **OR** glucose (Dex4) 15 GM/59ML oral liquid 30 g, 30 g, Oral, Q15 Min PRN **OR** glucose (Glutose) 40% oral gel 30 g, 30 g, Oral, Q15 Min PRN **OR** glucose-vitamin C (Dex-4) chewable tab 8 tablet, 8 tablet, Oral, Q15 Min PRN    insulin aspart (NovoLOG) 100 Units/mL FlexPen 1-10 Units, 1-10 Units, Subcutaneous, TID AC and HS    oxyCODONE-acetaminophen (Percocet)  MG per tab 1 tablet, 1 tablet, Oral, Q4H PRN    pantoprazole (Protonix) DR tab 40 mg, 40 mg, Oral, Before breakfast    pregabalin (Lyrica) cap 100 mg, 100 mg, Oral, TID    rosuvastatin (Crestor) tab 10 mg, 10 mg, Oral, Nightly    tiZANidine (Zanaflex) tab 10 mg, 10 mg, Oral, Nightly    melatonin tab 3 mg, 3 mg, Oral, Nightly PRN    polyethylene glycol (PEG 3350) (Miralax) 17 g oral packet 17 g, 17 g, Oral, Daily PRN    sennosides (Senokot) tab 17.2 mg, 17.2 mg, Oral, Nightly PRN    bisacodyl (Dulcolax) 10 MG rectal suppository 10 mg, 10 mg, Rectal, Daily PRN    ondansetron (Zofran) 4 MG/2ML injection 4 mg, 4 mg, Intravenous, Q6H PRN    benzonatate (Tessalon) cap 200 mg, 200 mg, Oral, TID PRN    guaiFENesin (Robitussin) 100 MG/5 ML oral liquid 200 mg, 200 mg, Oral, Q4H PRN    glycerin-hypromellose- (Artificial Tears) 0.2-0.2-1 % ophthalmic solution 1 drop, 1 drop, Both Eyes, QID PRN    sodium chloride (Saline Mist) 0.65 % nasal solution 1 spray, 1 spray, Each Nare, Q3H PRN    HYDROmorphone (Dilaudid) 1 MG/ML injection 0.2 mg, 0.2 mg, Intravenous, Q2H PRN **OR** HYDROmorphone (Dilaudid) 1 MG/ML injection 0.4 mg, 0.4 mg, Intravenous, Q2H PRN **OR** HYDROmorphone (Dilaudid) 1 MG/ML injection 0.8 mg, 0.8 mg, Intravenous, Q2H PRN    acetaminophen (Tylenol Extra Strength) tab 1,000 mg, 1,000 mg, Oral, Q8H PRN    albuterol (Ventolin HFA) 108 (90 Base) MCG/ACT inhaler 2 puff, 2 puff, Inhalation, Q6H PRN    lisinopril (Prinivil; Zestril) tab 20 mg, 20 mg,  Oral, Daily    fluticasone-salmeterol (Advair Diskus) 500-50 MCG/ACT inhaler 1 puff, 1 puff, Inhalation, BID    alum-mag hydroxide-simethicone (Maalox) 200-200-20 MG/5ML oral suspension 30 mL, 30 mL, Oral, QID PRN    butalbital-acetaminophen-caffeine (Fioricet) -40 MG per tab 1 tablet, 1 tablet, Oral, Q4H PRN    metoclopramide (Reglan) 5 mg/mL injection 5 mg, 5 mg, Intravenous, Q8H PRN    hydrALAzine (Apresoline) 20 mg/mL injection 10 mg, 10 mg, Intravenous, Q6H PRN    Review of Systems:  Pertinent items are noted in HPI.    Physical Exam:  Blood pressure (!) 179/84, pulse 96, temperature 97.3 °F (36.3 °C), temperature source Axillary, resp. rate 13, height 5' 1\" (1.549 m), weight 152 lb (68.9 kg), SpO2 96%, not currently breastfeeding.    General: AxOx4, comfortable, healthy, no apparent distress.   Voice: Normal  Eyes: Anicteric, EOMI, no nystagmus    Ears: Auricles normal; no external lesions, EACs patent  Nose: external nose without deformity, bilateral Rhino Rockets in place, epistaxis controlled  OC/OP: lips: normal, no masses or lesions              tongue: normal mobility, no masses or lesions              oropharyngeal: normal, no masses    Larynx: indirect laryngoscopy; deferred  Neck: No lymphadenopathy, thyromegaly or masses.  Parotid/submandibular glands without mass             or lesion; trachea midline  Neuro: AxOx4, calm mood, appears comfortable    Skin: No lesions scalp or face  MSK: Normocephalic. Sinuses nontender to palp. Facial strength symmetric/full. SCM symmetric, FROM neck.    Laboratory Data:  Recent Labs   Lab 09/21/24 2055 09/22/24  0709   RBC 3.93 2.66*   HGB 11.9* 8.0*   HCT 35.3 24.3*   MCV 89.8 91.4   MCH 30.3 30.1   MCHC 33.7 32.9   RDW 12.5 12.9   NEPRELIM 3.88 6.08   WBC 6.1 7.0   .0 141.0*           Imaging:  NA    Impression and Plan:  Patient Active Problem List   Diagnosis    Fatigue    Primary hypertension    Pure hypercholesterolemia    Anxiety    Age-related  osteoporosis without current pathological fracture    B12 deficiency    Spondylosis of lumbosacral region    Sacroiliac joint dysfunction    History of syncope    History of pulmonary embolism    Asthma (HCC)    Cyst of brain    History of basal cell carcinoma    History of pneumonia, recurrent    Thrombocytopenia (HCC)    Gastroesophageal reflux disease without esophagitis    Enlarged thyroid    History of left breast cancer    Carpal tunnel syndrome of left wrist    Atherosclerosis of aorta (HCC)    MUKUND on CPAP    Chronic pain syndrome    Tremor of both hands    Migraine without aura or status migrainosus    Major depressive disorder, recurrent episode, moderate (HCC)    Coronary artery disease involving native coronary artery of native heart without angina pectoris    Visual impairment    S/P cervical spinal fusion    Neuropathy    IgG deficiency (HCC)    History of DVT (deep vein thrombosis)    Hyperlipidemia    Cervical spinal stenosis    Cervical radiculopathy    PHILLIPS (dyspnea on exertion)    Syncope    Type 2 diabetes mellitus without complication, without long-term current use of insulin (HCC)    Anemia, unspecified type    Primary osteoarthritis of right knee    Liver mass    Abnormal CT of the abdomen    Positive DARREN (antinuclear antibody)    Hyponatremia    Leukocytosis    Community acquired pneumonia, unspecified laterality    Hypoxia    Frail elderly    Epistaxis    Hypercoagulable state (HCC)    Syncope and collapse       Epistaxis    -Will leave Rhino Rockets nasal packs in place bilaterally and plan for removal in 3-5 days on outpatient basis  -Recommend Augmentin 875 mg BID on discharge for duration of packing in place (1 week supply)  -Appreciate heme/onc recs on discontinuing/holding DOAC  -No active bleeding at this time  -Please notify me if there is any active/uncontrolled epistaxis during this admission  -OK for discharge home at this time from ENT perspective. I will continue to be available  as needed while admitted.     Time spent on counseling/coordination of care:  30 Minutes    Total time spent with patient:  30 Minutes    Amanda Angeles MD  9/22/2024  10:15 AM

## 2024-09-22 NOTE — H&P
[facility]     Rayne Morales Patient Status:  Emergency    1945 MRN [id number]   Location [facility] EMERGENCY DEPARTMENT Attending Tony Bro MD   Hosp Day # 0 PCP Jose Daniel Washington MD     Chief Complaint: Epistaxis     History of Present Illness: Rayne Morales is a 78 year old female with PMHx IgG deficiency, Hypercoagulable state with hx DVT, HLD, chronic pain, HTN, breast cancer s/p lumpectomy/radiation who presents for epistaxis.     Patient has been having episodes of epistaxis for the past 9 days, states that she has had 4 separate occasions of epistaxis that have intermittently been ongoing.  States that initially was managed at urgent care, trialed Afrin with relief of symptoms.  Subsequently seen in the ER for recurrent episode, patient having bleeding from right and left naris.  She denies any nasal trauma, recent surgeries.  Denies any recent falls.  Has been taking her home Xarelto and aspirin during this duration.  No prior history of significant nosebleeds prior to the last week.  When in the ER, patient had episode of hypotension, clamminess, syncope.  Now back to baseline mentation.  Bilateral Rhino Rocket's placed in ER, no further nasal bleeding noted.    Past Medical History:  Past Medical History:    Abdominal pain    Anemia    Anxiety    Arrhythmia    Arthritis    Asthma (HCC)    Back pain    Back problem    Black stools    Bloating    Blurred vision    Breast CA (HCC)    left lump and rad.    Cancer (HCC)    breast, basal cell    Cervical radiculopathy    Cervical spinal stenosis    Deep vein thrombosis (HCC)    Depression    Diabetes (HCC)    Diabetes mellitus (HCC)    Diarrhea, unspecified    Dizziness    Easy bruising    On Xeralto    Esophageal reflux    Exposure to medical diagnostic radiation    Fatigue    Fitting and adjustment of vascular catheter    Headache disorder    Hearing loss    Heart attack (HCC)    Heartburn     Hemorrhoids    High blood pressure    High cholesterol    History of blood clots    History of COVID-19    not hospitalized, no continued symptoms, cough, fever, loss of taste    History of depression    History of syncope    IgG deficiency (HCC)    ??? pt states she gets IgG infusions due to lack of IgG, unable to fight off infections    Indigestion    Leaking of urine    Migraines    Muscle weakness    uses cane    Neuropathy    rt foot bilateral hands    Osteoarthritis    Pain in joints    Pneumonia due to organism    Pulmonary embolism (HCC)    Shortness of breath    Sleep apnea    cpap - not using - machine is broken    Stress    Uncomfortable fullness after meals    Visual impairment    glasses/contacts    Weight gain        Past Surgical History:   Past Surgical History:   Procedure Laterality Date    Abdomen surgery proc unlisted      duodenal biopsy    Appendectomy      Appendectomy      Breast surgery      Cataract      Cholecystectomy      Colonoscopy  10/1/10, 4/21/17    Colonoscopy N/A 02/27/2023    Procedure: COLONOSCOPY;  Surgeon: Migel Bass MD;  Location:  ENDOSCOPY    Egd  04/21/2017    Eye surgery      Femur/knee surg unlisted      right knee arthroscopy    Hemorrhoidectomy,int/ext,simple      Hernia surgery      Hysterectomy  1984    total hystero.    Lumpectomy left Left 1999    lt lumpectomy and radiation.    Oophorectomy Bilateral 1984    total hystero.    Other  11/2021    left wrist, ORIF Dr. Stacy Long    Other  02/21/2022    CERVICAL 5 AND CERVICAL 6 LAMINECTOMIES, PARTIAL CERVICAL 7 LAMINECTOMY, LEFT CERVICAL 5/CERVICAL 6 AND CERVICAL 6/CERVICAL 7 FORAMINOTOMIES, CERVICAL 6 AND CERVICAL 7 ARTHRODESIS, AUTOGRAFT, ALLOGRAFT    Other surgical history  2010    endoscopy - papillotomy    Other surgical history  2016     under right eye, skin cancer removed    Other surgical history  02/2022    neck    Port for vascular access      Radiation left Left 1999    left lumpectomy and  radiation.    Repair ing hernia,5+y/o,reducibl      Sigmoidoscopy,diagnostic      Spine surgery procedure unlisted      Tonsillectomy      Total abdom hysterectomy      Total knee replacement         Social History:  reports that she quit smoking about 56 years ago. Her smoking use included cigarettes. She has never used smokeless tobacco. She reports that she does not currently use drugs after having used the following drugs: Cannabis. She reports that she does not drink alcohol.    Family History:   Family History   Problem Relation Age of Onset    Heart Attack Paternal Grandfather     Heart Attack Paternal Grandmother     Other (CAD) Father     Other (CAD) Mother     Hypertension Mother     Heart Attack Mother     Other (CAD) Brother     Hypertension Brother     Heart Attack Brother     Stroke Maternal Grandfather         Stroke    Stroke Maternal Grandmother         Stroke    Breast Cancer Self 54       Allergies:   Allergies   Allergen Reactions    Bactrim HIVES and OTHER (SEE COMMENTS)    Ciprofloxacin HIVES    Mold REACTIVE AIRWAY DISEASE     Mold and smut    Sulfa Antibiotics HIVES     Bactrim (Sulfamethoxazole/TMP)    Diovan [Valsartan] NAUSEA AND VOMITING    Omnicef NAUSEA ONLY and DIZZINESS    Atorvastatin OTHER (SEE COMMENTS) and UNKNOWN    Clindamycin DIARRHEA and NAUSEA ONLY    Green Pepper OTHER (SEE COMMENTS)     Sensitivity (all peppers - green, orange and red)    Lipitor [Atorvastatin Calcium] OTHER (SEE COMMENTS)     Leg cramping, sensitivity    Pneumococcal Vaccines OTHER (SEE COMMENTS)     Pt states she had pneumo vax done at Dr. Boyd on 1/10/24- states developed redness, swelling,itching to the arm    Quinapril Hcl FATIGUE       Medications:    Current Facility-Administered Medications on File Prior to Encounter   Medication Dose Route Frequency Provider Last Rate Last Admin    [COMPLETED] lidocaine-epinephrine-tetracaine (LET) 1:1000-0.5 % topical solution 3 mL  3 mL Topical Once  Jen Carey APRN   3 mL at 24 1218    [COMPLETED] oxymetazoline (Nasal Decongestant) 0.05 % nasal solution 1 spray  1 spray Nasal Once Jen Carey APRN   1 spray at 24 1218    [COMPLETED] amLODIPine (Norvasc) tab 5 mg  5 mg Oral Once Sohail Bowman MD   5 mg at 24 1200    [] dextrose 5%-sodium chloride 0.9% infusion   Intravenous Continuous Sohail Bowman MD   Stopped at 24 1430    [] sodium chloride 0.9% infusion   Intravenous Continuous James Barr MD   Stopped at 24 0115    [] ondansetron (Zofran) 4 MG/2ML injection 4 mg  4 mg Intravenous Q4H PRN James Barr MD        [COMPLETED] azithromycin (Zithromax) 500 mg in sodium chloride 0.9% 250mL IVPB premix  500 mg Intravenous Daily Karen IVJohnnie  mL/hr at 08/10/24 2206 500 mg at 08/10/24 2206    [COMPLETED] cefTRIAXone (Rocephin) 1 g in sodium chloride 0.9% 100 mL IVPB-ADDV  1 g Intravenous Once James Barr MD   Stopped at 24 2313    [COMPLETED] sodium chloride 0.9 % IV bolus 2,067 mL  30 mL/kg Intravenous Once James Barr MD   Stopped at 24 2329    [COMPLETED] iopamidol 76% (ISOVUE-370) injection for power injector  100 mL Intravenous ONCE PRN James Barr MD   100 mL at 24 2247    [COMPLETED] azithromycin (Zithromax) 500 mg in sodium chloride 0.9% 250mL IVPB premix  500 mg Intravenous Once James Barr  mL/hr at 24 2328 500 mg at 24 2328     Current Outpatient Medications on File Prior to Encounter   Medication Sig Dispense Refill    lidocaine 5 % External Patch Apply 1 patch to skin once a day as needed      immune globulin, human, (GAMMAGARD) 10 g/100mL Injection Solution Inject 30 g into the vein.      LISINOPRIL 20 MG Oral Tab TAKE 1 TABLET BY MOUTH DAILY 90 tablet 0    fluticasone-salmeterol 500-50 MCG/ACT Inhalation Aerosol Powder, Breath Activated Inhale 1 puff into the lungs 2 (two) times daily.       ondansetron (ZOFRAN) 4 mg tablet Take 1 tablet (4 mg total) by mouth every 8 (eight) hours as needed for Nausea. 20 tablet 0    [] amoxicillin clavulanate 875-125 MG Oral Tab Take 1 tablet by mouth 2 (two) times daily for 10 days. 10 tablet 0    DULoxetine 60 MG Oral Cap DR Particles Take 1 capsule (60 mg total) by mouth daily. TO TAKE ALONG WITH THE 30 MG TO EQUAL 90 MG DAILY. 90 capsule 3    DULoxetine 30 MG Oral Cap DR Particles Take 1 capsule (30 mg total) by mouth daily. TO TAKE ALONG WITH THE 60 MG TO EQUAL 90 MG DAILY. 90 capsule 3    pantoprazole 40 MG Oral Tab EC Take 1 tablet (40 mg total) by mouth before breakfast. 90 tablet 3    rivaroxaban (XARELTO) 20 MG Oral Tab Take 1 tablet (20 mg total) by mouth daily. 90 tablet 3    ROSUVASTATIN 10 MG Oral Tab Take 1 tablet (10 mg total) by mouth nightly. 90 tablet 3    semaglutide (OZEMPIC, 0.25 OR 0.5 MG/DOSE,) 2 MG/3ML Subcutaneous Solution Pen-injector Inject 0.5 mg into the skin once a week. 9 mL 1    metFORMIN  MG Oral Tablet 24 Hr Take 2 tablets (1,000 mg total) by mouth daily with breakfast and 1 tablet (500 mg total) every evening. 270 tablet 3    oxyCODONE-acetaminophen  MG Oral Tab Take 1 tablet by mouth every 4 (four) hours as needed for Pain (takes at home for back pain).      tiZANidine 4 MG Oral Tab Take 2.5 tablets (10 mg total) by mouth nightly. Takes every night      pregabalin 100 MG Oral Cap Take 1 capsule (100 mg total) by mouth in the morning and 1 capsule (100 mg total) at noon and 1 capsule (100 mg total) in the evening. 270 capsule 1    Naloxone HCl 4 MG/0.1ML Nasal Liquid 4 mg by Nasal route as needed. If patient remains unresponsive, repeat dose in other nostril 2-5 minutes after first dose. 1 kit 0    metroNIDAZOLE 0.75 % External Gel Apply 1 g topically 2 (two) times daily. Apply to face twice daily 45 g 5    albuterol 108 (90 Base) MCG/ACT Inhalation Aero Soln Inhale 2 puffs into the lungs every 6 (six) hours as  needed for Wheezing. 1 each 3    aspirin 81 MG Oral Chew Tab Chew 1 tablet (81 mg total) by mouth daily.      Fluticasone Propionate 50 MCG/ACT Nasal Suspension 1 spray by Nasal route 2 (two) times daily. 1 Bottle 3    Cholecalciferol (VITAMIN D) 2000 units Oral Tab Take 2,000 Units by mouth daily.         Review of Systems:   A comprehensive 14 point review of systems was completed.    Pertinent positives and negatives noted in the HPI.    Physical Exam:    BP 95/64   Pulse 113   Temp 97.4 °F (36.3 °C) (Temporal)   Resp 14   Ht 5' 1\" (1.549 m)   Wt 152 lb (68.9 kg)   LMP  (LMP Unknown)   SpO2 100%   BMI 28.72 kg/m²   General: No acute distress. Alert and oriented x 3.  HEENT: Normocephalic atraumatic. Moist mucous membranes. EOM-I. PERRLA. Anicteric.  Bilateral Rhino Rocket's in place without further epistaxis noted  Neck: No lymphadenopathy. No JVD. No carotid bruits.  Respiratory: Clear to auscultation bilaterally. No wheezes. No rhonchi.  Cardiovascular: S1, S2. Regular rate and rhythm. No murmurs, rubs or gallops. Equal pulses.   Chest and Back: No tenderness or deformity.  Abdomen: Soft, nontender, nondistended.  Positive bowel sounds. No rebound, guarding or organomegaly.  Neurologic: No focal neurological deficits. CNII-XII grossly intact.  Musculoskeletal: Moves all extremities.  Extremities: No edema or cyanosis.  Integument: No rashes or lesions.   Psychiatric: Appropriate mood and affect.    Diagnostic Data:      Labs:  Recent Labs   Lab 09/21/24 2055   WBC 6.1   HGB 11.9*   MCV 89.8   .0   INR 1.81*       Recent Labs   Lab 09/21/24 2055   *   BUN 11   CREATSERUM 0.93   CA 9.5   ALB 4.1      K 3.7      CO2 22.0   ALKPHO 62   AST 55*   ALT 34   BILT 0.4   TP 7.5       Estimated Creatinine Clearance: 38.2 mL/min (based on SCr of 0.93 mg/dL).    Recent Labs   Lab 09/21/24 2055   PTP 21.1*   INR 1.81*       No results for input(s): \"TROP\", \"CK\" in the last 168  hours.    Imaging: Imaging data reviewed in Epic.  No results found.      ASSESSMENT / PLAN:     # Epistaxis    - Bleeding controlled s/p rhinorocket in ER    - IV abx for prophy    - ENT consulted   - trend CBC; holding Xarelto, ASA    # Hypercoagulable state with hx VTE/PE    - lifelong AC per hematology noted on 8/1/24    - Heme consulted for dc AC reccs given significant bleed on admission and 4x bleeds in past 9 days     # Syncope   - suspect vasovagal in setting of epistaxis, CTM    # DM2 - LDSSI; Last A1c value was 6.9% done 8/9/2024.  # Hx breast cancer s/p lumpectomy/RT - no evidence of recurrence per last note  # IgG deficiency   # Hypertension - Hypotensive on arrival, hold home oral antihypertensives and resume in AM as tolerated   # GERD - continue PPI  # Hyperlipidemia - continue home statin   # Chronic pain - continue home oxy, tizandine, lyrica      Code Status: Full Code    Plan of care discussed with patient, ED physician    Neo Mir MD  9/21/2024      Supplementary Documentation:      MDM : Patient's ER labs, imaging reviewed.  A.m. labs ordered.  ER management discussed with ED physician, decision made for patient to be admitted to the hospital for further medical management.

## 2024-09-22 NOTE — ED QUICK NOTES
Orders for admission, patient is aware of plan and ready to go upstairs. Any questions, please call ED RN Leonel at extension [phone].     Patient Covid vaccination status: Fully vaccinated     COVID Test Ordered in ED: None    COVID Suspicion at Admission: N/A    Running Infusions:      Mental Status/LOC at time of transport: Alert and  Oriented x 4    Other pertinent information:   CIWA score: N/A   NIH score:  N/A

## 2024-09-22 NOTE — PROGRESS NOTES
[facility]   part of [facility]     Hospitalist Progress Note     Rayne Morales Patient Status:  Observation    1945 MRN [id number]   Location [facility] 3NW-A Attending Brady Mir,    Hosp Day # 0 PCP Jose Daniel Washington MD     Chief Complaint: Epistaxis    Subjective:     Patient reports a headache. Difficulty breathing with the rhino rockets in bilateral nares.  States she had stomach discomfort at home because of all the blood she had swallowed but that has improved today.    Objective:    Review of Systems:   A comprehensive review of systems was completed; pertinent positive and negatives stated in subjective.    Vital signs:  Temp:  [97.3 °F (36.3 °C)-98.3 °F (36.8 °C)] 97.3 °F (36.3 °C)  Pulse:  [] 96  Resp:  [11-22] 13  BP: ()/(64-96) 179/84  SpO2:  [95 %-100 %] 96 %    Physical Exam:    General: No acute distress, awake and alert  HEENT: Normocephalic atraumatic. Anicteric.  Bilateral Rhino Rocket's in place without further epistaxis noted   Respiratory: No wheezes, no rhonchi  Cardiovascular: S1, S2, regular rate and rhythm  Abdomen: Soft, Non-tender, non-distended, positive bowel sounds  Extremities: No edema    Diagnostic Data:    Labs:  Recent Labs   Lab 24  0709   WBC 6.1 7.0   HGB 11.9* 8.0*   MCV 89.8 91.4   .0 141.0*   INR 1.81* 1.56*     Recent Labs   Lab 24  0709   * 209*   BUN 11 28*   CREATSERUM 0.93 0.92   CA 9.5 8.6*   ALB 4.1 3.9    143   K 3.7 3.7    111   CO2 22.0 22.0   ALKPHO 62 50*   AST 55* 26   ALT 34 22   BILT 0.4 0.2   TP 7.5 6.3     Estimated Creatinine Clearance: 38.6 mL/min (based on SCr of 0.92 mg/dL).    Recent Labs   Lab 24   TROPHS 26     Recent Labs   Lab 24  0709   PTP 21.1* 18.8*   INR 1.81* 1.56*     Lab Results   Component Value Date    TSH 1.171 2024     Microbiology  No results found for this visit on 24.    Imaging:  Reviewed in Epic.    Medications:    ceFAZolin  2 g Intravenous Q8H    DULoxetine  30 mg Oral Daily    DULoxetine  60 mg Oral Daily    insulin aspart  1-10 Units Subcutaneous TID AC and HS    pantoprazole  40 mg Oral Before breakfast    pregabalin  100 mg Oral TID    rosuvastatin  10 mg Oral Nightly    tiZANidine  10 mg Oral Nightly    lisinopril  20 mg Oral Daily    fluticasone-salmeterol  1 puff Inhalation BID       Assessment & Plan:      #Epistaxis   -Bleeding controlled s/p rhinorocket in ER   -Abx for prophylaxis   -ENT consulted  -Trend CBC; holding Xarelto, ASA     #Hypercoagulable state with hx VTE/PE   -Lifelong AC per hematology noted on 8/1/24   -Heme consulted for dc AC recs given significant bleed on admission and 4x bleeds in past 9 days      #Syncope  -Suspect vasovagal in setting of epistaxis, CTM     #DM2 - LDSSI; Last A1c value was 6.9% done 8/9/2024.  #Hx breast cancer s/p lumpectomy/RT - no evidence of recurrence per last onc note  #IgG deficiency on Gammagard outpatient  #Hypertension - Hypotensive on arrival, but BP now elevated, will resume lisinopril with PRN hydralazine  #GERD - continue PPI  #Hyperlipidemia - continue home statin   #Chronic pain - continue home oxy, tizandine, duloxetine, lyrica  #Asthma - Advair, PRN albuterol       Brady Mir DO    Supplementary Documentation:     Quality:  DVT Mechanical Prophylaxis:   SCDs, Early ambuation  DVT Pharmacologic Prophylaxis   Medication   None     Code Status: Full Code  Baxter: No urinary catheter in place  CLARI: TBD    Discharge is dependent on: Clinical state, consultant recs  At this point Ms. Morales is expected to be discharge to: Home    The 21st Century Cures Act makes medical notes like these available to patients in the interest of transparency. Please be advised this is a medical document. Medical documents are intended to carry relevant information, facts as evident, and the clinical opinion of the practitioner. The medical  note is intended as peer to peer communication and may appear blunt or direct. It is written in medical language and may contain abbreviations or verbiage that are unfamiliar.

## 2024-09-22 NOTE — PLAN OF CARE
Pt a&o x 4.  Friendly & cooperative w/ care  Family visiting this am.  Participating in plan of care  Tolerating diabetic diet.  Denies n&v  Up to bathroom w/ sba  Rhino Rocket maintained to nares.   Old blood drainage noted to site  B/p noted elevated this am.  Lisinopril,  norvasc & labetalol given  Hgb = 8.0 this am.  Orders noted to repeat hgb @ 1600  Reports headache & bilat ears clogged.  Also c/o [place]  Dilaudid, percocet & lyrica given for pain.  Declines fioricet  Ancef cont, as ordered

## 2024-09-22 NOTE — PROGRESS NOTES
NURSING ADMISSION NOTE      Patient admitted via Cart  Oriented to room.  Safety precautions initiated.  Bed in low position.  Call light in reach.    0130: pt admitted to the floor in stable condition.    A/Ox4, RA, IVF, IV ab, 1800 carb control diet ordered.  Voiding via purewick.  Pain and nausea managed with prn medication.  Rhinorocket applied to BL nares to help stop bleeding.  Pt and family updated on plan of care, call light and belongings within reach, questions and concerns addressed.     2 person admission skin check completed with JOSHUA Marroquin.  No signs of breakdown or redness anywhere.

## 2024-09-22 NOTE — ED INITIAL ASSESSMENT (HPI)
Pt c/o nosebleed for 2hrs. She denies injury. Pt on Xarelto and ASA. States nosebleed x4 in the last 9 days

## 2024-09-22 NOTE — PROGRESS NOTES
PAC flushing w/out difficulty, but not drawing back.   Hgb @ 4pm cancelled & will reorder for lab to draw

## 2024-09-22 NOTE — ED PROVIDER NOTES
Patient Seen in: [facility] Emergency Department      History     Chief Complaint   Patient presents with    Nose Bleed     Stated Complaint: nosebleed    Subjective:   HPI    Patient is a 78-year-old female on Xarelto presents to ED for evaluation of epistaxis.  Patient complains of right-sided epistaxis for the last 3 hours.  Patient has had 4 nosebleeds over the course the last 9 days.  Patient feeling lightheaded.  Denies nasal trauma.  Denies chest pain or shortness of breath.  Patient also on aspirin.  History of DVT and pulmonary embolism in 2015.    Objective:   No pertinent past medical history.            No pertinent past surgical history.              No pertinent social history.            Review of Systems    Positive for stated Chief Complaint: Nose Bleed    Other systems are as noted in HPI.  Constitutional and vital signs reviewed.      All other systems reviewed and negative except as noted above.    Physical Exam     ED Triage Vitals [09/21/24 2021]   BP (!) 162/94   Pulse 99   Resp 22   Temp 97.4 °F (36.3 °C)   Temp src Temporal   SpO2 95 %   O2 Device None (Room air)       Current Vitals:   Vital Signs  BP: (!) 138/94  Pulse: 101  Resp: 18  Temp: 98.3 °F (36.8 °C)  Temp src: Axillary  MAP (mmHg): (!) 105    Oxygen Therapy  SpO2: 99 %  O2 Device: None (Room air)  O2 Flow Rate (L/min): 0 L/min            Physical Exam    Constitutional: Well-appearing in no acute distress  HEENT: Patient has significant bleeding from the right naris.  Unable to visualize site of bleeding due to significant bleeding.  She has lesser bleeding coming from the left nares.  She has blood in her posterior pharynx  Lungs: Clear  Cardiovascular: Regular rate and rhythm, S1-S2  Abdominal: Soft, nontender, nondistended  Skin: Warm and dry  Neurological: No focal deficits    ED Course     Labs Reviewed   CBC WITH DIFFERENTIAL WITH PLATELET - Abnormal; Notable for the following components:       Result Value    HGB  11.9 (*)     All other components within normal limits   COMP METABOLIC PANEL (14) - Abnormal; Notable for the following components:    Glucose 206 (*)     AST 55 (*)     All other components within normal limits   MAGNESIUM - Abnormal; Notable for the following components:    Magnesium 1.5 (*)     All other components within normal limits   PROTHROMBIN TIME (PT) - Abnormal; Notable for the following components:    PT 21.1 (*)     INR 1.81 (*)     All other components within normal limits   TSH W REFLEX TO FREE T4 - Normal   TROPONIN I HIGH SENSITIVITY - Normal   PTT, ACTIVATED - Normal   COMP METABOLIC PANEL (14)   MAGNESIUM   PHOSPHORUS   CBC WITH DIFFERENTIAL WITH PLATELET   PROTHROMBIN TIME (PT)   TYPE AND SCREEN    Narrative:     The following orders were created for panel order Type and screen.  Procedure                               Abnormality         Status                     ---------                               -----------         ------                     ABORH (Blood Type)[[id number]]                               Final result               Antibody Screen[[id number]]                                  Final result                 Please view results for these tests on the individual orders.   ABORH (BLOOD TYPE)   ANTIBODY SCREEN   RAINBOW DRAW BLUE   Hemoglobin 11.9.  Magnesium 1.5  EKG    Rate, intervals and axes as noted on EKG Report.  Rate: 110  Rhythm: Sinus Rhythm  Reading: Sinus tachycardia.  No acute changes.                          MDM      Patient is a 78-year-old female presents to ED for evaluation of significant epistaxis.  Differential includes anterior epistaxis, anemia, volume depletion, posterior epistaxis.  Patient had laboratory workup performed showing hemoglobin 11.9.  EKG showing sinus tachycardia.  Patient had TXA soaked gauze instilled into the right naris but still bleeding.  Patient had emergent placement of bilateral 7.5 cm Rhino Rocket with 10 cc of air and balloons.  She  was given a liter of IV fluids.  Prior to nasal packing, patient had an episode of syncope and blood pressure dropped to 64.  She had a pulse during this time.  Blood pressure normalized after IV fluids.  Patient reevaluated after Rhino Rocket inserted and bleeding was controlled.  Case was discussed with hospitalist and ENT.  Recommend admission to hospital for follow H&H and ENT evaluation.  Admission disposition: 9/21/2024 10:34 PM                                        Medical Decision Making      Disposition and Plan     Clinical Impression:  1. Epistaxis    2. Anemia, unspecified type    3. Syncope and collapse         Disposition:  Admit  9/21/2024 10:34 pm    Follow-up:  No follow-up provider specified.        Medications Prescribed:  Current Discharge Medication List                            Hospital Problems       Present on Admission  Date Reviewed: 9/11/2024            ICD-10-CM Noted POA    * (Principal) Epistaxis R04.0 9/21/2024 Unknown    Anemia, unspecified type D64.9 11/2/2023 Unknown    Hypercoagulable state (HCC) D68.59 9/21/2024 Unknown    Hyperlipidemia E78.5 Unknown Yes    Syncope R55 7/26/2014 Yes    Syncope and collapse R55 9/22/2024 Unknown

## 2024-09-23 ENCOUNTER — PATIENT OUTREACH (OUTPATIENT)
Dept: CASE MANAGEMENT | Age: 79
End: 2024-09-23

## 2024-09-23 LAB
ANION GAP SERPL CALC-SCNC: 7 MMOL/L (ref 0–18)
BASOPHILS # BLD AUTO: 0.02 X10(3) UL (ref 0–0.2)
BASOPHILS # BLD AUTO: 0.03 X10(3) UL (ref 0–0.2)
BASOPHILS NFR BLD AUTO: 0.3 %
BASOPHILS NFR BLD AUTO: 0.3 %
BUN BLD-MCNC: 19 MG/DL (ref 9–23)
CALCIUM BLD-MCNC: 8.7 MG/DL (ref 8.7–10.4)
CHLORIDE SERPL-SCNC: 110 MMOL/L (ref 98–112)
CO2 SERPL-SCNC: 23 MMOL/L (ref 21–32)
CREAT BLD-MCNC: 0.8 MG/DL
EGFRCR SERPLBLD CKD-EPI 2021: 76 ML/MIN/1.73M2 (ref 60–?)
EOSINOPHIL # BLD AUTO: 0 X10(3) UL (ref 0–0.7)
EOSINOPHIL # BLD AUTO: 0.02 X10(3) UL (ref 0–0.7)
EOSINOPHIL NFR BLD AUTO: 0 %
EOSINOPHIL NFR BLD AUTO: 0.2 %
ERYTHROCYTE [DISTWIDTH] IN BLOOD BY AUTOMATED COUNT: 13.2 %
ERYTHROCYTE [DISTWIDTH] IN BLOOD BY AUTOMATED COUNT: 13.7 %
GLUCOSE BLD-MCNC: 121 MG/DL (ref 70–99)
GLUCOSE BLD-MCNC: 127 MG/DL (ref 70–99)
GLUCOSE BLD-MCNC: 135 MG/DL (ref 70–99)
GLUCOSE BLD-MCNC: 135 MG/DL (ref 70–99)
GLUCOSE BLD-MCNC: 149 MG/DL (ref 70–99)
GLUCOSE BLD-MCNC: 195 MG/DL (ref 70–99)
GLUCOSE BLD-MCNC: 213 MG/DL (ref 70–99)
HCT VFR BLD AUTO: 18.8 %
HCT VFR BLD AUTO: 25.8 %
HGB BLD-MCNC: 6.1 G/DL
HGB BLD-MCNC: 8.2 G/DL
HGB BLD-MCNC: 8.2 G/DL
IMM GRANULOCYTES # BLD AUTO: 0.03 X10(3) UL (ref 0–1)
IMM GRANULOCYTES # BLD AUTO: 0.04 X10(3) UL (ref 0–1)
IMM GRANULOCYTES NFR BLD: 0.4 %
IMM GRANULOCYTES NFR BLD: 0.4 %
LYMPHOCYTES # BLD AUTO: 1.51 X10(3) UL (ref 1–4)
LYMPHOCYTES # BLD AUTO: 1.94 X10(3) UL (ref 1–4)
LYMPHOCYTES NFR BLD AUTO: 19.1 %
LYMPHOCYTES NFR BLD AUTO: 21.6 %
MAGNESIUM SERPL-MCNC: 1.7 MG/DL (ref 1.6–2.6)
MCH RBC QN AUTO: 29.8 PG (ref 26–34)
MCH RBC QN AUTO: 29.9 PG (ref 26–34)
MCHC RBC AUTO-ENTMCNC: 31.8 G/DL (ref 31–37)
MCHC RBC AUTO-ENTMCNC: 32.4 G/DL (ref 31–37)
MCV RBC AUTO: 92.2 FL
MCV RBC AUTO: 93.8 FL
MONOCYTES # BLD AUTO: 0.59 X10(3) UL (ref 0.1–1)
MONOCYTES # BLD AUTO: 0.63 X10(3) UL (ref 0.1–1)
MONOCYTES NFR BLD AUTO: 7 %
MONOCYTES NFR BLD AUTO: 7.5 %
NEUTROPHILS # BLD AUTO: 5.74 X10 (3) UL (ref 1.5–7.7)
NEUTROPHILS # BLD AUTO: 5.74 X10(3) UL (ref 1.5–7.7)
NEUTROPHILS # BLD AUTO: 6.31 X10 (3) UL (ref 1.5–7.7)
NEUTROPHILS # BLD AUTO: 6.31 X10(3) UL (ref 1.5–7.7)
NEUTROPHILS NFR BLD AUTO: 70.5 %
NEUTROPHILS NFR BLD AUTO: 72.7 %
OSMOLALITY SERPL CALC.SUM OF ELEC: 294 MOSM/KG (ref 275–295)
PLATELET # BLD AUTO: 118 10(3)UL (ref 150–450)
PLATELET # BLD AUTO: 127 10(3)UL (ref 150–450)
POTASSIUM SERPL-SCNC: 3.6 MMOL/L (ref 3.5–5.1)
RBC # BLD AUTO: 2.04 X10(6)UL
RBC # BLD AUTO: 2.75 X10(6)UL
SODIUM SERPL-SCNC: 140 MMOL/L (ref 136–145)
WBC # BLD AUTO: 7.9 X10(3) UL (ref 4–11)
WBC # BLD AUTO: 9 X10(3) UL (ref 4–11)

## 2024-09-23 PROCEDURE — 30233N1 TRANSFUSION OF NONAUTOLOGOUS RED BLOOD CELLS INTO PERIPHERAL VEIN, PERCUTANEOUS APPROACH: ICD-10-PCS | Performed by: STUDENT IN AN ORGANIZED HEALTH CARE EDUCATION/TRAINING PROGRAM

## 2024-09-23 PROCEDURE — 99233 SBSQ HOSP IP/OBS HIGH 50: CPT | Performed by: INTERNAL MEDICINE

## 2024-09-23 PROCEDURE — 2Y41X5Z PACKING OF NASAL REGION USING PACKING MATERIAL: ICD-10-PCS | Performed by: EMERGENCY MEDICINE

## 2024-09-23 RX ORDER — SODIUM CHLORIDE 9 MG/ML
INJECTION, SOLUTION INTRAVENOUS ONCE
Status: DISCONTINUED | OUTPATIENT
Start: 2024-09-23 | End: 2024-09-25

## 2024-09-23 RX ORDER — MAGNESIUM OXIDE 400 MG/1
400 TABLET ORAL ONCE
Status: COMPLETED | OUTPATIENT
Start: 2024-09-23 | End: 2024-09-23

## 2024-09-23 NOTE — PROGRESS NOTES
Alert & oriented x4.With tolerable pain.Bilateral Rhinorocket present initially at 1530 then removed by Dr.Ibrahim jiang 1745 and bilateral nasal packing placed.No nosebleeding noted.Verbalized she felt better after rhinorockets taken out.Assisted to bathroom.Plan of care discussed with patient.All questions and concerns addressed.

## 2024-09-23 NOTE — PROGRESS NOTES
A&Ox4. VSS. RA. . Denies chest pain and SOB.   Telemetry: NSR.   GI: Abdomen soft, nondistended. Passing gas.   Denies nausea.   : Voids.   Pain controlled with PRN pain medications.   Up with standby assist.   Drains:   Incisions:   Diet:   IVF running per order.   All appropriate safety measures in place. All questions and concerns addressed.

## 2024-09-23 NOTE — PHYSICAL THERAPY NOTE
PHYSICAL THERAPY EVALUATION - INPATIENT     Room Number: 311/311-A  Evaluation Date: 9/23/2024  Type of Evaluation: Initial  Physician Order: PT Eval and Treat    Presenting Problem: Epistaxis s/p rhinorocket, syncope/collapse, anemia  Co-Morbidities : IgG deficiency, h/o DVT/PE, neuropathy, R knee OA, depression, CPS, DM, OA, anxiety, asthma, breast CA s/p lumpectomy/radiation  Reason for Therapy: Mobility Dysfunction and Discharge Planning    PHYSICAL THERAPY ASSESSMENT   Patient is currently functioning below baseline with gait and standing prolonged periods.  Prior to admission, patient's baseline is indep c all ADls, IADLs, amb and works full time.  Patient is requiring contact guard assist as a result of the following impairments: pain and impaired dynamic standing balance. Physical Therapy will continue to follow for duration of hospitalization.      Patient will benefit from continued skilled PT Services for 1-2 additional sessions to progress gait, balance and stair training. Anticipate return to home c no needs when appropriate.     PLAN  PT Treatment Plan: Body mechanics;Patient education;Gait training;Strengthening;Stair training;Transfer training;Balance training  Rehab Potential : Good  Frequency (Obs): 3x/week  Number of Visits to Meet Established Goals: Other (Comment) (1-2)      CURRENT GOALS    Goal #1 Patient is able to demonstrate supine - sit EOB @ level: independent     Goal #2 Patient is able to demonstrate transfers EOB to/from Chair/Wheelchair at assistance level: independent     Goal #3 Patient is able to ambulate 150 feet with at assistance level: modified independent     Goal #4 Pt will demo ability to negotiate 1 flight of stairs c mod indep by DC   Goal #5    Goal #6    Goal Comments: Goals established on 9/23/2024      PHYSICAL THERAPY MEDICAL/SOCIAL HISTORY  History related to current admission: Patient is a 78 year old female admitted on 9/21/2024 from home for Epistaxis.  Pt  diagnosed with Epistaxis s/p rhinorocket, syncope/collapse, anemia.      HOME SITUATION  Type of Home: [facility]   Home Layout: Two level        Stairs to Bedroom: 14  Railing: Yes    Lives With: Family;Other (Comment) (adult grandchild)  Drives: Yes  Patient Owned Equipment: Cane;Rolling walker  Patient Regularly Uses: Glasses;Other (Comment) (CPAP)    Prior Level of Porter: Pt is indep c all ADls/IADls, amb and works full time; intermittently uses cane for amb however primarily indep    SUBJECTIVE  \"Have you ever had one of these things shoved up your nose?\"      OBJECTIVE  Precautions: Limb alert - left;Other (Comment) (B rhinorocket)  Fall Risk: Standard fall risk    WEIGHT BEARING RESTRICTION  Weight Bearing Restriction: None                PAIN ASSESSMENT  Ratin  Location: head/nose  Management Techniques: Activity promotion;Body mechanics;Repositioning    COGNITION  Overall Cognitive Status:  WFL - within functional limits  Arousal/Alertness:  appropriate responses to stimuli  Orientation Level:  oriented x4  Following Commands:  follows all commands and directions without difficulty  Safety Judgement:  good awareness of safety precautions    RANGE OF MOTION AND STRENGTH ASSESSMENT  Upper extremity ROM and strength are-see OT eval    Lower extremity ROM is within functional limits     Lower extremity strength is within functional limits       BALANCE  Static Sitting: Good  Dynamic Sitting: Fair +  Static Standing: Fair -  Dynamic Standing: Fair -    ADDITIONAL TESTS                                    ACTIVITY TOLERANCE                         O2 WALK       NEUROLOGICAL FINDINGS  Neurological Findings: Sensation           Sensation: BLE symmetrical/intact to light touch         AM-PAC '6-Clicks' INPATIENT SHORT FORM - BASIC MOBILITY  How much difficulty does the patient currently have...  Patient Difficulty: Turning over in bed (including adjusting bedclothes, sheets and blankets)?: None   Patient  Difficulty: Sitting down on and standing up from a chair with arms (e.g., wheelchair, bedside commode, etc.): A Little   Patient Difficulty: Moving from lying on back to sitting on the side of the bed?: None   How much help from another person does the patient currently need...   Help from Another: Moving to and from a bed to a chair (including a wheelchair)?: A Little   Help from Another: Need to walk in hospital room?: A Little   Help from Another: Climbing 3-5 steps with a railing?: A Little       AM-PAC Score:  Raw Score: 20   Approx Degree of Impairment: 35.83%   Standardized Score (AM-PAC Scale): 47.67   CMS Modifier (G-Code): CJ    FUNCTIONAL ABILITY STATUS  Gait Assessment   Functional Mobility/Gait Assessment  Gait Assistance: Contact guard assist  Distance (ft): 10  Assistive Device: Other (Comment) (intermittent HHA)  Pattern: Shuffle;Comment (pt reporting she just had dilaudid and felt a bit unsteady)    Skilled Therapy Provided     Bed Mobility:  Rolling: mod indep  Supine to sit: mod indep   Sit to supine: mod indep     Transfer Mobility:  Sit to stand: CGA   Stand to sit: CGA  Gait = CGA x10' no device    Therapist's Comments: Pt presents to PT lying supine in bed. Pt is agreeable to participate, however reporting significant pn, therefore requested modified mobility. Pt is able to roll to her side and t/f up to sitting at the EOB c mod indep. Pt is able to scoot and reposition indep. No LOB noted or dizziness. Sit/stand c CGA and no device. SBP in standing 180's-however incr c/o pn in head. Pt is able to amb within her room x 10', no device, however intermittently will reach for assist and be provided c HHA. Pt reporting she had just received dilaudid and felt off balance. Following this, pt returned to sitting on the EOB and back to supine c mod indep. RN and PCT aware of session. Pt provided c ice packs for pain relief.     Exercise/Education Provided:  Bed mobility  Body mechanics  Functional  activity tolerated  Gait training  Posture  Transfer training    Patient End of Session: In bed;Needs met;Call light within reach;RN aware of session/findings;All patient questions and concerns addressed;Discussed recommendations with /      Patient Evaluation Complexity Level:  History Moderate - 1 or 2 personal factors and/or co-morbidities   Examination of body systems Low -  addressing 1-2 elements   Clinical Presentation Low- Stable   Clinical Decision Making Low Complexity       PT Session Time: 30 minutes  Gait Trainin minutes  Therapeutic Activity: 8 minutes

## 2024-09-23 NOTE — PROGRESS NOTES
Alert and oriented X 4. VSS. RA. Denies chest pain and sob.   PT OT to reevaluate. Hypertension controlled by PRN labatelol.   Repeat hgb wnl will repeat today at 1600.   Abdomen is soft no complaints on nausea. Bowel sounds are hypoactive.   Diet : 1800 ADA  Accucheck Q6.   Rhinorocket intact. Right side leaking a scant amount every so often.   PRN dilaudid and percocet controlling pain.   Call light in reach. Safety precautions in place. Scds on.   Port o cath accessed by ED. Draw blood back.

## 2024-09-23 NOTE — PROGRESS NOTES
Reviewed pt's chart including recent visits.  Attempted to contact pt for monthly outreach no answer left detailed message for pt to call back.  Currently admitted will follow up after discharge.    Total time spent with patient including chart review: 5  Time spent with patient this month: 5    Total time spent with communication and chart review this month to date: 5

## 2024-09-23 NOTE — PROGRESS NOTES
[facility]   part of [facility]     Hospitalist Progress Note     Rayne Morales Patient Status:  Observation    1945 MRN [id number]   Location [facility] 3NW-A Attending Brady Mir,    Hosp Day # 0 PCP Jose Daniel Washington MD     Chief Complaint: Epistaxis    Subjective:     Patient hypotensive overnight and received 1L IVF bolus. Repeat CBC showed hgb 6.1 and she was transfused 1 unit. Repeat hgb is 8.2. She did notice epistaxis. No nausea, chest pain or SOB. She reports a migraine which she typically gets with weather changes.     Objective:    Review of Systems:   A comprehensive review of systems was completed; pertinent positive and negatives stated in subjective.    Vital signs:  Temp:  [97.4 °F (36.3 °C)-98.4 °F (36.9 °C)] 97.4 °F (36.3 °C)  Pulse:  [] 90  Resp:  [14-18] 16  BP: ()/(40-97) 178/70  SpO2:  [93 %-98 %] 93 %    Physical Exam:    General: No acute distress, awake and alert  HEENT: Normocephalic atraumatic. Anicteric.  Bilateral Rhino Rocket's in place without further epistaxis noted   Respiratory: No wheezes, no rhonchi  Cardiovascular: S1, S2, regular rate and rhythm  Abdomen: Soft, Non-tender, non-distended, positive bowel sounds  Extremities: No edema    Diagnostic Data:    Labs:  Recent Labs   Lab 24  0709 24  1636 24  0150 24  0635   WBC 6.1 7.0  --  7.9 9.0   HGB 11.9* 8.0* 7.7* 6.1* 8.2*   MCV 89.8 91.4  --  92.2 93.8   .0 141.0*  --  127.0* 118.0*   INR 1.81* 1.56*  --   --   --      Recent Labs   Lab 24  0709 24  0648   * 209* 121*   BUN 11 28* 19   CREATSERUM 0.93 0.92 0.80   CA 9.5 8.6* 8.7   ALB 4.1 3.9  --     143 140   K 3.7 3.7 3.6    111 110   CO2 22.0 22.0 23.0   ALKPHO 62 50*  --    AST 55* 26  --    ALT 34 22  --    BILT 0.4 0.2  --    TP 7.5 6.3  --      Estimated Creatinine Clearance: 44.4 mL/min (based on SCr of 0.8 mg/dL).    Recent Labs   Lab  09/21/24 2055   TROPHS 26     Recent Labs   Lab 09/21/24 2055 09/22/24  0709   PTP 21.1* 18.8*   INR 1.81* 1.56*      Microbiology  No results found for this visit on 09/21/24.    Imaging: Reviewed in Epic.    Medications:    sodium chloride   Intravenous Once    magnesium oxide  400 mg Oral Once    ceFAZolin  2 g Intravenous Q8H    DULoxetine  30 mg Oral Daily    DULoxetine  60 mg Oral Daily    insulin aspart  1-10 Units Subcutaneous TID AC and HS    pantoprazole  40 mg Oral Before breakfast    pregabalin  100 mg Oral TID    rosuvastatin  10 mg Oral Nightly    tiZANidine  10 mg Oral Nightly    lisinopril  20 mg Oral Daily    fluticasone-salmeterol  1 puff Inhalation BID       Assessment & Plan:      #Epistaxis   -Bleeding controlled s/p rhinorocket in ER   -Abx for prophylaxis   -ENT consulted    #Acute blood loss anemia due to above  -Monitor hgb and transfuse if under 7, so far has received 1 unit  -Holding Xarelto, ASA     #Hypercoagulable state with hx VTE/PE   -Lifelong AC per hematology noted on 8/1/24   -Heme recommending to discontinue xarelto    #Thrombocytopenia  -Likely due to consumption  -Monitor     #Syncope  -Suspect vasovagal in setting of epistaxis, CTM    #Migraines  -States tizanidine helps  -Percocet PRN     #DM2 - LDSSI; Last A1c value was 6.9% done 8/9/2024.  #Hx breast cancer s/p lumpectomy/RT - no evidence of recurrence per last onc note  #IgG deficiency on Gammagard outpatient  #Hypertension - Hypotensive on arrival, but BP now elevated, will resume lisinopril with PRN labetalol  #GERD - continue PPI  #Hyperlipidemia - continue home statin   #Chronic pain - continue home oxy, tizandine, duloxetine, lyrica  #Asthma - Advair, PRN albuterol       Brady Mir DO    Supplementary Documentation:     Quality:  DVT Mechanical Prophylaxis:   SCDs, Early ambuation  DVT Pharmacologic Prophylaxis   Medication   None     Code Status: Full Code  Baxter: No urinary catheter in place  CLARI: 1-2  days    Discharge is dependent on: Clinical state, consultant recs  At this point Ms. Morales is expected to be discharge to: Home    The 21st Century Cures Act makes medical notes like these available to patients in the interest of transparency. Please be advised this is a medical document. Medical documents are intended to carry relevant information, facts as evident, and the clinical opinion of the practitioner. The medical note is intended as peer to peer communication and may appear blunt or direct. It is written in medical language and may contain abbreviations or verbiage that are unfamiliar.

## 2024-09-24 LAB
BASOPHILS # BLD AUTO: 0.04 X10(3) UL (ref 0–0.2)
BASOPHILS NFR BLD AUTO: 0.6 %
BLOOD TYPE BARCODE: 600
EOSINOPHIL # BLD AUTO: 0.23 X10(3) UL (ref 0–0.7)
EOSINOPHIL NFR BLD AUTO: 3.4 %
ERYTHROCYTE [DISTWIDTH] IN BLOOD BY AUTOMATED COUNT: 14.4 %
GLUCOSE BLD-MCNC: 106 MG/DL (ref 70–99)
GLUCOSE BLD-MCNC: 123 MG/DL (ref 70–99)
GLUCOSE BLD-MCNC: 172 MG/DL (ref 70–99)
GLUCOSE BLD-MCNC: 229 MG/DL (ref 70–99)
HCT VFR BLD AUTO: 22.2 %
HGB BLD-MCNC: 7.2 G/DL
HGB BLD-MCNC: 7.9 G/DL
IMM GRANULOCYTES # BLD AUTO: 0.02 X10(3) UL (ref 0–1)
IMM GRANULOCYTES NFR BLD: 0.3 %
LYMPHOCYTES # BLD AUTO: 2 X10(3) UL (ref 1–4)
LYMPHOCYTES NFR BLD AUTO: 29.3 %
MAGNESIUM SERPL-MCNC: 1.7 MG/DL (ref 1.6–2.6)
MCH RBC QN AUTO: 29.9 PG (ref 26–34)
MCHC RBC AUTO-ENTMCNC: 32.4 G/DL (ref 31–37)
MCV RBC AUTO: 92.1 FL
MONOCYTES # BLD AUTO: 0.49 X10(3) UL (ref 0.1–1)
MONOCYTES NFR BLD AUTO: 7.2 %
NEUTROPHILS # BLD AUTO: 4.05 X10 (3) UL (ref 1.5–7.7)
NEUTROPHILS # BLD AUTO: 4.05 X10(3) UL (ref 1.5–7.7)
NEUTROPHILS NFR BLD AUTO: 59.2 %
PLATELET # BLD AUTO: 107 10(3)UL (ref 150–450)
RBC # BLD AUTO: 2.41 X10(6)UL
UNIT VOLUME: 350 ML
WBC # BLD AUTO: 6.8 X10(3) UL (ref 4–11)

## 2024-09-24 PROCEDURE — 99232 SBSQ HOSP IP/OBS MODERATE 35: CPT | Performed by: INTERNAL MEDICINE

## 2024-09-24 RX ORDER — TIZANIDINE 2 MG/1
2 TABLET ORAL NIGHTLY
Status: DISCONTINUED | OUTPATIENT
Start: 2024-09-24 | End: 2024-09-25

## 2024-09-24 RX ORDER — MAGNESIUM OXIDE 400 MG/1
400 TABLET ORAL ONCE
Status: COMPLETED | OUTPATIENT
Start: 2024-09-24 | End: 2024-09-24

## 2024-09-24 NOTE — PLAN OF CARE
Neuro: A&Ox4, VSS, RA, Nonrebreather at night, , IS, tele. Denies cough, chest pain, and SOB.   Telemetry: Normal sinus rhythm , rare PVCS.  GI: Abdomen soft, passing gas and belching. Denies nausea. Last BM was today.  : Voids   Pain controlled with meds per mar  Up ad brunilda.   Drains: None  Incisions: None  Diet: tolerating 1800 ADA  IVF running per order through All appropriate safety measures in place. All questions and concerns addressed. Bed locked and in lowest position. Call light in reach.

## 2024-09-24 NOTE — CDS QUERY
Dear Doctor Clarke ,   Is the epistaxis associated with or due to Xarelto use?  ( x ) Yes, the epistaxis is associated with or due to the Xarelto use   (  ) No, the epistaxis is not associated with or due to Xarelto use  (   ) Clinically Unable to Determine  (   ) Other      Documentation from the Medical Record:     78 F w/PMH IgG deficiency, Hypercoagulable state with hx DVT and PE, HLD, chronic pain, HTN, breast cancer s/p lumpectomy/radiation who presents for epistaxis.    Clinical Indicators/possible risk factors    H/o VTE: She has a documented small PE on CT 7/14 She has been on rivaroxaban since then.  Prior to that, maybe in 2002, she had a DVT after she underwent breast cancer surgery.    HemOnc consult: For now, I have recommended discontinuing DOAC.  Her initial episode of DVT in 2002 was likely provoked.  She was treated with warfarin for some time.  While the small PE in 2014 appears to have been unprovoked, at present, risk of anticoagulation outweighs benefit        Treatment: Hematology consult, Xarelto stopped         For questions regarding this query, please contact Clinical Documentation : Kathleen Carey RN [phone] [email or web address]  Thank you!                                                                           THIS FORM IS A PERMANENT PART OF THE MEDICAL RECORD

## 2024-09-24 NOTE — PROGRESS NOTES
A&Ox4. RA. Denies chest pain, or shortness of breath.  Bilat. Nasal packings in place. CDI.  Telemetry: NSR, PVCs  GI: Abdomen soft, nondistended. Passing gas.  Denies nausea.  : Voids.  Pain controlled with PRN pain medications  Up with standby assist.  Diet: 1800 ADA.  IVF running per order.  All appropriate safety measures in place. All questions and concerns addressed.

## 2024-09-24 NOTE — SPIRITUAL CARE NOTE
Spiritual Care Visit Note    Patient Name: Rayne Morales Date of Spiritual Care Visit: 24   : 1945 Primary Dx: Epistaxis       Referred By: Referral From: Nurse    Spiritual Care Taxonomy:    Intended Effects: Aligning care plan with patient's values    Methods: Offer emotional support;Offer spiritual/Anglican support;Explore shanel and values    Interventions: Acknowledge current situation;Active listening;[place];Share words of hope and inspiration;Explain  role    Visit Type/Summary:     - Spiritual Care: Responded to a request for spiritual care and assessed the patient for spiritual care needs. Consulted with RN prior to visit. Offered empathic listening and emotional support. Provided information regarding how to contact Spiritual Care and left a Spiritual Care information card. Provided support for Patient's spiritual/Anglican requests. Offered prayer.  remains available for follow up.  - PoA: Identified Existing PoAH - No Updates Needed: Visited patient in response to POA for Healthcare consult/request. Prior to visit, reviewed the patient's EHR and paper chart to identify any existing PoAH documentation. A previously completed PoAH document was located. The document was reviewed with the patient during the visit. Patient states that the current document is accurate. No changes or updates are required. Confirmed that the PoAH primary agent name and contact information have been entered into the Epic, Advance Directives section. A copy of the existing document was printed and given to the patient. A copy of the document has been placed on the patient's chart.     Spiritual Care support can be requested via an Epic consult. For urgent/immediate needs, please contact the On Call  at: Edward: ext [phone]

## 2024-09-24 NOTE — PROGRESS NOTES
[facility]  Otolaryngology - Head and Neck Surgery Progress Note    Rayne Morales Patient Status:  Observation    1945 MRN [id number]   Location [facility] 3NW-A Attending Brady Mir,    Hosp Day # 0 PCP Jose Daniel Washington MD     Chief Complaint:  Epistaxis    Subjective:  Patient notes R nares still oozing blood this morning when blood pressures were high. +headache from Rhinorocket.     Physical Exam:  Blood pressure (!) 166/71, pulse 79, temperature 97.7 °F (36.5 °C), temperature source Axillary, resp. rate 18, height 5' 1\" (1.549 m), weight 152 lb (68.9 kg), SpO2 95%, not currently breastfeeding.    General: AxOx4, comfortable, healthy, no apparent distress.   Voice: Normal  Eyes: Anicteric, EOMI, no nystagmus    Ears: Auricles normal; no external lesions, EACs patent  Nose: external nose without deformity, bilateral Rhino Rockets in place, oozing around it R>L. Rhinorockets were removed and L septal bleed was noted along mid-septum, no bleeding on R septum  OC/OP: lips: normal, no masses or lesions              tongue: normal mobility, no masses or lesions              oropharyngeal: normal, no masses    Larynx: indirect laryngoscopy; deferred  Neck: No lymphadenopathy, thyromegaly or masses.  Parotid/submandibular glands without mass             or lesion; trachea midline  Neuro: AxOx4, calm mood, appears comfortable    Skin: No lesions scalp or face  MSK: Normocephalic. Sinuses nontender to palp. Facial strength symmetric/full. SCM symmetric, FROM neck.    Procedure: Nasal packing, bilateral  Surgeon: Amanda Angeles MD  Anesthesia: none  Description of procedure: After obtaining consent, anterior rhinoscopy was performed, showing an active L sided anterior septal bleed. No bleeding was evident on the R side, but given report of bilateral bleeding in last 2 days, we proceeded with bilateral nasal packing. Surgifoam was packed into the bilateral nasal cavities. Surgiflo was then applied  surrounding the Surgifoam. There was no evidence of further bleeding on exam. Patient tolerated this procedure well without complications.     Laboratory Data:  Recent Labs   Lab 09/22/24  0709 09/22/24  1636 09/23/24  0150 09/23/24  0635 09/23/24  1551   RBC 2.66*  --  2.04* 2.75*  --    HGB 8.0*   < > 6.1* 8.2* 8.2*   HCT 24.3*  --  18.8* 25.8*  --    MCV 91.4  --  92.2 93.8  --    MCH 30.1  --  29.9 29.8  --    MCHC 32.9  --  32.4 31.8  --    RDW 12.9  --  13.2 13.7  --    NEPRELIM 6.08  --  5.74 6.31  --    WBC 7.0  --  7.9 9.0  --    .0*  --  127.0* 118.0*  --     < > = values in this interval not displayed.           Imaging:  NA    Impression and Plan:  Patient Active Problem List   Diagnosis    Fatigue    Primary hypertension    Pure hypercholesterolemia    Anxiety    Age-related osteoporosis without current pathological fracture    B12 deficiency    Spondylosis of lumbosacral region    Sacroiliac joint dysfunction    History of syncope    History of pulmonary embolism    Asthma (HCC)    Cyst of brain    History of basal cell carcinoma    History of pneumonia, recurrent    Thrombocytopenia (HCC)    Gastroesophageal reflux disease without esophagitis    Enlarged thyroid    History of left breast cancer    Carpal tunnel syndrome of left wrist    Atherosclerosis of aorta (HCC)    MUKUND on CPAP    Chronic pain syndrome    Tremor of both hands    Migraine without aura or status migrainosus    Major depressive disorder, recurrent episode, moderate (HCC)    Coronary artery disease involving native coronary artery of native heart without angina pectoris    Visual impairment    S/P cervical spinal fusion    Neuropathy    IgG deficiency (HCC)    History of DVT (deep vein thrombosis)    Hyperlipidemia    Cervical spinal stenosis    Cervical radiculopathy    PHILLIPS (dyspnea on exertion)    Syncope    Type 2 diabetes mellitus without complication, without long-term current use of insulin (HCC)    Anemia, unspecified  type    Primary osteoarthritis of right knee    Liver mass    Abnormal CT of the abdomen    Positive DARREN (antinuclear antibody)    Hyponatremia    Leukocytosis    Community acquired pneumonia, unspecified laterality    Hypoxia    Frail elderly    Epistaxis    Hypercoagulable state (HCC)    Syncope and collapse       Epistaxis    -Rhinorockets removed tonight and replaced with absorbable nasal packing  -Recommend Augmentin 875 mg BID on discharge for duration of packing in place (1 week supply)  -I will continue to be available as needed  -FU with me outpatient in 1 week    Time spent on counseling/coordination of care:  30 Minutes    Total time spent with patient:  30 Minutes    Amanda Angeles MD  9/22/2024  10:15 AM

## 2024-09-24 NOTE — PROGRESS NOTES
[facility]   part of [facility]     Hospitalist Progress Note     Rayne Morales Patient Status:  Observation    1945 MRN [id number]   Location [facility] 3NW-A Attending Brady Mir,    Hosp Day # 1 PCP Jose Daniel Washington MD     Chief Complaint: Epistaxis    Subjective:     Patient feels much better today. No further pain. BP again low at night and required 500 mL bolus.     Objective:    Review of Systems:   A comprehensive review of systems was completed; pertinent positive and negatives stated in subjective.    Vital signs:  Temp:  [97.4 °F (36.3 °C)-98.6 °F (37 °C)] 98.6 °F (37 °C)  Pulse:  [74-85] 75  Resp:  [16-18] 18  BP: ()/(40-72) 145/57  SpO2:  [94 %-98 %] 97 %    Physical Exam:    General: No acute distress, awake and alert  HEENT: Normocephalic atraumatic. Anicteric.  Bilateral nasal packing in place  Respiratory: No wheezes, no rhonchi  Cardiovascular: S1, S2, regular rate and rhythm  Abdomen: Soft, Non-tender, non-distended, positive bowel sounds  Extremities: No edema    Diagnostic Data:    Labs:  Recent Labs   Lab 24  0709 24  1636 24  0150 24  0635 24  1551 24  0440   WBC 6.1 7.0  --  7.9 9.0  --  6.8   HGB 11.9* 8.0* 7.7* 6.1* 8.2* 8.2* 7.2*   MCV 89.8 91.4  --  92.2 93.8  --  92.1   .0 141.0*  --  127.0* 118.0*  --  107.0*   INR 1.81* 1.56*  --   --   --   --   --      Recent Labs   Lab 24  0709 24  0648   * 209* 121*   BUN 11 28* 19   CREATSERUM 0.93 0.92 0.80   CA 9.5 8.6* 8.7   ALB 4.1 3.9  --     143 140   K 3.7 3.7 3.6    111 110   CO2 22.0 22.0 23.0   ALKPHO 62 50*  --    AST 55* 26  --    ALT 34 22  --    BILT 0.4 0.2  --    TP 7.5 6.3  --      Estimated Creatinine Clearance: 44.4 mL/min (based on SCr of 0.8 mg/dL).    Recent Labs   Lab 24   TROPHS 26     Recent Labs   Lab 24  0709   PTP 21.1* 18.8*   INR 1.81* 1.56*       Microbiology  No results found for this visit on 09/21/24.    Imaging: Reviewed in Epic.    Medications:    sodium chloride   Intravenous Once    ceFAZolin  2 g Intravenous Q8H    DULoxetine  30 mg Oral Daily    DULoxetine  60 mg Oral Daily    insulin aspart  1-10 Units Subcutaneous TID AC and HS    pantoprazole  40 mg Oral Before breakfast    pregabalin  100 mg Oral TID    rosuvastatin  10 mg Oral Nightly    tiZANidine  10 mg Oral Nightly    lisinopril  20 mg Oral Daily    fluticasone-salmeterol  1 puff Inhalation BID       Assessment & Plan:      #Epistaxis   -Bleeding controlled s/p rhinorocket in ER   -Abx for prophylaxis   -ENT consulted. Rhinorockets removed and bilateral packing placed    #Acute blood loss anemia due to above  -Monitor hgb and transfuse if under 7, so far has received 1 unit  -Repeat hgb this afternoon  -Holding Xarelto, ASA     #Hypercoagulable state with hx VTE/PE   -Lifelong AC per hematology noted on 8/1/24   -Heme recommending to discontinue xarelto    #Thrombocytopenia  -Likely due to consumption  -Monitor     #Syncope  -Suspect vasovagal in setting of epistaxis, CTM    #Migraines  -Percocet PRN     #DM2 - LDSSI; Last A1c value was 6.9% done 8/9/2024.  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 BP lower at night, possibly related to tizanidine, will decrease dose  #GERD - continue PPI  #Hyperlipidemia - continue home statin   #Chronic pain - continue home oxy, tizandine, duloxetine, lyrica  #Asthma - Advair, PRN albuterol     Plan: monitor hgb as slightly lower today. Will monitor overnight BP with decreased dose of tizanidine.     Brady Mir,     Supplementary Documentation:     Quality:  DVT Mechanical Prophylaxis:   SCDs, Early ambuation  DVT Pharmacologic Prophylaxis   Medication   None      Code Status: Full Code  Baxter: No urinary catheter in place  CLARI: 1 day    Discharge is dependent on: Clinical state, consultant recs  At this point Ms. Morales is expected to be discharge to: Home    The 21st Century Cures Act makes medical notes like these available to patients in the interest of transparency. Please be advised this is a medical document. Medical documents are intended to carry relevant information, facts as evident, and the clinical opinion of the practitioner. The medical note is intended as peer to peer communication and may appear blunt or direct. It is written in medical language and may contain abbreviations or verbiage that are unfamiliar.

## 2024-09-25 VITALS
RESPIRATION RATE: 16 BRPM | BODY MASS INDEX: 28.7 KG/M2 | WEIGHT: 152 LBS | DIASTOLIC BLOOD PRESSURE: 70 MMHG | HEART RATE: 73 BPM | TEMPERATURE: 97 F | HEIGHT: 61 IN | SYSTOLIC BLOOD PRESSURE: 164 MMHG | OXYGEN SATURATION: 93 %

## 2024-09-25 LAB
ANION GAP SERPL CALC-SCNC: 6 MMOL/L (ref 0–18)
BASOPHILS # BLD AUTO: 0.03 X10(3) UL (ref 0–0.2)
BASOPHILS NFR BLD AUTO: 0.6 %
BUN BLD-MCNC: 10 MG/DL (ref 9–23)
CALCIUM BLD-MCNC: 8.9 MG/DL (ref 8.7–10.4)
CHLORIDE SERPL-SCNC: 110 MMOL/L (ref 98–112)
CO2 SERPL-SCNC: 28 MMOL/L (ref 21–32)
CREAT BLD-MCNC: 0.62 MG/DL
EGFRCR SERPLBLD CKD-EPI 2021: 92 ML/MIN/1.73M2 (ref 60–?)
EOSINOPHIL # BLD AUTO: 0.27 X10(3) UL (ref 0–0.7)
EOSINOPHIL NFR BLD AUTO: 5 %
ERYTHROCYTE [DISTWIDTH] IN BLOOD BY AUTOMATED COUNT: 14.2 %
GLUCOSE BLD-MCNC: 106 MG/DL (ref 70–99)
GLUCOSE BLD-MCNC: 125 MG/DL (ref 70–99)
GLUCOSE BLD-MCNC: 172 MG/DL (ref 70–99)
HCT VFR BLD AUTO: 23.3 %
HGB BLD-MCNC: 7.8 G/DL
IMM GRANULOCYTES # BLD AUTO: 0.02 X10(3) UL (ref 0–1)
IMM GRANULOCYTES NFR BLD: 0.4 %
LYMPHOCYTES # BLD AUTO: 1.97 X10(3) UL (ref 1–4)
LYMPHOCYTES NFR BLD AUTO: 36.4 %
MAGNESIUM SERPL-MCNC: 1.8 MG/DL (ref 1.6–2.6)
MCH RBC QN AUTO: 30.5 PG (ref 26–34)
MCHC RBC AUTO-ENTMCNC: 33.5 G/DL (ref 31–37)
MCV RBC AUTO: 91 FL
MONOCYTES # BLD AUTO: 0.48 X10(3) UL (ref 0.1–1)
MONOCYTES NFR BLD AUTO: 8.9 %
NEUTROPHILS # BLD AUTO: 2.64 X10 (3) UL (ref 1.5–7.7)
NEUTROPHILS # BLD AUTO: 2.64 X10(3) UL (ref 1.5–7.7)
NEUTROPHILS NFR BLD AUTO: 48.7 %
OSMOLALITY SERPL CALC.SUM OF ELEC: 297 MOSM/KG (ref 275–295)
PLATELET # BLD AUTO: 120 10(3)UL (ref 150–450)
POTASSIUM SERPL-SCNC: 3.2 MMOL/L (ref 3.5–5.1)
RBC # BLD AUTO: 2.56 X10(6)UL
SODIUM SERPL-SCNC: 144 MMOL/L (ref 136–145)
WBC # BLD AUTO: 5.4 X10(3) UL (ref 4–11)

## 2024-09-25 PROCEDURE — 99239 HOSP IP/OBS DSCHRG MGMT >30: CPT | Performed by: STUDENT IN AN ORGANIZED HEALTH CARE EDUCATION/TRAINING PROGRAM

## 2024-09-25 RX ORDER — MAGNESIUM OXIDE 400 MG/1
400 TABLET ORAL ONCE
Status: COMPLETED | OUTPATIENT
Start: 2024-09-25 | End: 2024-09-25

## 2024-09-25 RX ORDER — POTASSIUM CHLORIDE 1500 MG/1
40 TABLET, EXTENDED RELEASE ORAL ONCE
Status: COMPLETED | OUTPATIENT
Start: 2024-09-25 | End: 2024-09-25

## 2024-09-25 RX ORDER — TIZANIDINE 2 MG/1
2 TABLET ORAL NIGHTLY
Qty: 30 TABLET | Refills: 0 | Status: SHIPPED | OUTPATIENT
Start: 2024-09-25 | End: 2024-10-25

## 2024-09-25 NOTE — PHYSICAL THERAPY NOTE
PHYSICAL THERAPY TREATMENT NOTE - INPATIENT    Room Number: 311/311-A     Session: 1     Number of Visits to Meet Established Goals: Other (Comment) (1-2)    Presenting Problem: Epistaxis s/p rhinorocket, syncope/collapse, anemia  Co-Morbidities : IgG deficiency, h/o DVT/PE, neuropathy, R knee OA, depression, CPS, DM, OA, anxiety, asthma, breast CA s/p lumpectomy/radiation    ASSESSMENT   Patient demonstrates good  progress this session, goals  met; stair goal met with supervision .    Patient continues to function near baseline with bed mobility, transfers, gait, and stair negotiation. Contributing factors to remaining limitations include decreased functional strength, decreased endurance/aerobic capacity, pain, and decreased muscular endurance.      Patient continues to benefit from continued skilled PT services: at discharge to promote prior level of function.  Anticipate patient will return home with home health PT.    PLAN  PT Treatment Plan: Body mechanics;Patient education;Gait training;Strengthening;Stair training;Transfer training;Balance training  Rehab Potential : Good  Frequency (Obs): 3x/week    CURRENT GOALS   Goal #1 Patient is able to demonstrate supine - sit EOB @ level: independent      Goal #2 Patient is able to demonstrate transfers EOB to/from Chair/Wheelchair at assistance level: independent      Goal #3 Patient is able to ambulate 150 feet with at assistance level: modified independent      Goal #4 Pt will demo ability to negotiate 1 flight of stairs c mod indep by DC   Goal #5     Goal #6     Goal Comments: Goals established on 9/23/2024 9/25/2024 all goals achieved; goal 4 met with supervision    PHYSICAL THERAPY MEDICAL/SOCIAL HISTORY  History related to current admission: Patient is a 78 year old female admitted on 9/21/2024 from home for Epistaxis.  Pt diagnosed with Epistaxis s/p rhinorocket, syncope/collapse, anemia.        HOME SITUATION  Type of Home: [facility]   Home Layout: Two  level  Stairs to Bedroom: 14  Railing: Yes     Lives With: Family;Other (Comment) (adult grandchild)  Drives: Yes  Patient Owned Equipment: Cane;Rolling walker  Patient Regularly Uses: Glasses;Other (Comment) (CPAP)     Prior Level of [place]: Pt is indep c all ADls/IADls, amb and works full time; intermittently uses cane for amb however primarily indep    SUBJECTIVE  Pt states she is tired bc of all the blood loss.    OBJECTIVE  Precautions: Limb alert - left;Other (Comment) (B rhinorocket)    WEIGHT BEARING RESTRICTION  Weight Bearing Restriction: None                PAIN ASSESSMENT   Ratin  Location: back (chronic)  Management Techniques: Activity promotion;Body mechanics;Repositioning    BALANCE                                                                                                                       Static Sitting: Good  Dynamic Sitting: Good           Static Standing: Good  Dynamic Standing: Fair +    ACTIVITY TOLERANCE                         O2 WALK  Oxygen Therapy  SPO2% on Room Air at Rest: 98      AM-PAC '6-Clicks' INPATIENT SHORT FORM - BASIC MOBILITY  How much difficulty does the patient currently have...  Patient Difficulty: Turning over in bed (including adjusting bedclothes, sheets and blankets)?: None   Patient Difficulty: Sitting down on and standing up from a chair with arms (e.g., wheelchair, bedside commode, etc.): None   Patient Difficulty: Moving from lying on back to sitting on the side of the bed?: None   How much help from another person does the patient currently need...   Help from Another: Moving to and from a bed to a chair (including a wheelchair)?: None   Help from Another: Need to walk in hospital room?: None   Help from Another: Climbing 3-5 steps with a railing?: A Little       AM-PAC Score:  Raw Score: 23   Approx Degree of Impairment: 11.2%   Standardized Score (AM-PAC Scale): 56.93   CMS Modifier (G-Code): CI    FUNCTIONAL ABILITY STATUS  Gait Assessment    Functional Mobility/Gait Assessment  Gait Assistance: Supervision;Modified independent  Distance (ft): 280  Assistive Device: None  Pattern:  (varying step length at times)  Stairs: Stairs  How Many Stairs: 12  Device: 1 Rail  Assist: Supervision  Pattern: Ascend;Descend  Ascend:  (step to for the first 3 stairs and then reciprocal)  Descend: Step to (step to for 9 stairs and reciprocal for last 3)    Skilled Therapy Provided    Bed Mobility:  Rolling: mod I   Supine<>Sit: mod I   Sit<>Supine: mod I     Transfer Mobility:  Sit<>Stand: mod I   Stand<>Sit: mod I   Gait: Pt ambulated 280' with supervision that progressed to mod I    Therapist's Comments: pt sitting EOB and agreeable to PT. Educated pt on taking time on stairs and on allowing body to acclimate to changes in position. Pt verbalized understanding.   Instructed pt in sidelying hip abduction and supine B bridging - pt completed 10 reps of each. Pt demonstrated unilateral knee to chest exercise she does on a regular basis - instructed pt on improved form, to hold behind right thigh instead of over knee to reduce knee pain (as pt reported increased knee pain initially), and moving slowly with gentle pull. Pt verbalized understanding.  Small piece of gauze fell out of patient's left nostril when ambulating - RN aware.        Patient End of Session: In bed;Needs met;Call light within reach;RN aware of session/findings;All patient questions and concerns addressed    PT Session Time: 28 minutes  Gait Trainin minutes  Therapeutic Activity: 5 minutes  Therapeutic Exercise: 10 minutes      Myself

## 2024-09-25 NOTE — DISCHARGE SUMMARY
[facility]     Rayne Morales Patient Status:  Inpatient    1945 MRN [id number]   Location [facility] 3NW-A Attending No att. providers found   Hosp Day # 2 PCP Jose Daniel Washington MD     Date of Admission: 2024  Date of Discharge: 2024  Discharge Disposition: Home or Self Care    Admitting Diagnosis:   Syncope and collapse [R55]  Epistaxis [R04.0]  Anemia, unspecified type [D64.9]    Hospital Discharge Diagnoses:   Bilateral epistaxis  Acute blood loss anemia  Hypercoagulable state with history of VTE and PE  Thrombocytopenia  Syncope  Migraine headaches  Diabetes mellitus type 2  History of breast cancer status post lumpectomy and RT  IgG deficiency  Hypertension  GERD  Hyperlipidemia  Chronic pain  Asthma    Lace+ Score: 80  59-90 High Risk  29-58 Medium Risk  0-28   Low Risk.    TCM Follow-Up Recommendation:  LACE > 58: High Risk of readmission after discharge from the hospital.        Discharge Diagnosis:   Bilateral Epistaxis     History of Present Illness: Rayne Morales is a 78 year old female with PMHx IgG deficiency, Hypercoagulable state with hx DVT, HLD, chronic pain, HTN, breast cancer s/p lumpectomy/radiation who presents for epistaxis.      Patient has been having episodes of epistaxis for the past 9 days, states that she has had 4 separate occasions of epistaxis that have intermittently been ongoing.  States that initially was managed at urgent care, trialed Afrin with relief of symptoms.  Subsequently seen in the ER for recurrent episode, patient having bleeding from right and left naris.  She denies any nasal trauma, recent surgeries.  Denies any recent falls.  Has been taking her home Xarelto and aspirin during this duration.  No prior history of significant nosebleeds prior to the last week.  When in the ER, patient had episode of hypotension, clamminess, syncope.  Now back to baseline mentation.  Bilateral Rhino Rocket's placed in ER, no  further nasal bleeding noted.       Brief Synopsis: Patient initially presented for epistaxis, found to have bilateral bleeding worse on right.  Status post Rhino Rocket placement in ER.  Xarelto and aspirin held.  ENT consulted.  Patient had improvement in bleeding status post Rhino Rocket's, which were subsequently removed and bilateral packing placed by ENT.  Hemoglobin initially down trended requiring 1 unit PRBC transfusion during admission however subsequently remained stable.  Patient cleared for discharge by all consultants, discharged on p.o. antibiotic prophy given bilateral packing in place.  Patient to follow-up with ENT in 1 week for removal of packing and further management.  Hematology consulted and recommended holding Xarelto at discharge, follow-up in outpatient hematology clinic for determination of final anticoagulation recommendations.  Patient's tizanidine decreased in dose due to hypotension noted overnight, blood pressure remained stable after decreased dose.    Procedures during hospitalization:   None    Incidental or significant findings and recommendations (brief descriptions):  Home Xarelto held due to epistaxis, follow-up outpatient in hematology clinic to determine final anticoagulation plan  Follow-up in ENT clinic for removal of packing and further management of epistaxis  Tizanidine dose decreased due to overnight hypotension    Lab/Test results pending at Discharge:   None    Consultants:  ENT, hematology    Discharge Medication List:     Discharge Medications        CHANGE how you take these medications        Instructions Prescription details   tiZANidine 2 MG Tabs  Commonly known as: Zanaflex  What changed:   medication strength  how much to take      Take 1 tablet (2 mg total) by mouth nightly. Takes every night   Stop taking on: October 25, 2024  Quantity: 30 tablet  Refills: 0            CONTINUE taking these medications        Instructions Prescription details   albuterol 108  (90 Base) MCG/ACT Aers  Commonly known as: Ventolin HFA      Inhale 2 puffs into the lungs every 6 (six) hours as needed for Wheezing.   Quantity: 1 each  Refills: 3     amoxicillin clavulanate 875-125 MG Tabs  Commonly known as: Augmentin      Take 1 tablet by mouth 2 (two) times daily for 7 days.   Stop taking on: October 2, 2024  Quantity: 14 tablet  Refills: 0     aspirin 81 MG Chew      Chew 1 tablet (81 mg total) by mouth daily.   Refills: 0     DULoxetine 60 MG Cpep  Commonly known as: Cymbalta      Take 1 capsule (60 mg total) by mouth daily. TO TAKE ALONG WITH THE 30 MG TO EQUAL 90 MG DAILY.   Quantity: 90 capsule  Refills: 3     DULoxetine 30 MG Cpep  Commonly known as: Cymbalta      Take 1 capsule (30 mg total) by mouth daily. TO TAKE ALONG WITH THE 60 MG TO EQUAL 90 MG DAILY.   Quantity: 90 capsule  Refills: 3     fluticasone propionate 50 MCG/ACT Susp  Commonly known as: Flonase      1 spray by Nasal route 2 (two) times daily.   Quantity: 1 Bottle  Refills: 3     fluticasone-salmeterol 500-50 MCG/ACT Aepb  Commonly known as: Advair Diskus      Inhale 1 puff into the lungs 2 (two) times daily.   Refills: 0     Gammagard 10 GM/100ML Soln  Generic drug: immune globulin (human)      Inject 30 g into the vein.   Refills: 0     lidocaine 5 % Ptch  Commonly known as: Lidoderm      Apply 1 patch to skin once a day as needed   Refills: 0     lisinopril 20 MG Tabs  Commonly known as: Prinivil; Zestril      TAKE 1 TABLET BY MOUTH DAILY   Quantity: 90 tablet  Refills: 0     metFORMIN  MG Tb24  Commonly known as: Glucophage XR      Take 2 tablets (1,000 mg total) by mouth daily with breakfast and 1 tablet (500 mg total) every evening.   Quantity: 270 tablet  Refills: 3     metroNIDAZOLE 0.75 % Gel  Commonly known as: Metrogel      Apply 1 g topically 2 (two) times daily. Apply to face twice daily   Quantity: 45 g  Refills: 5     Naloxone HCl 4 MG/0.1ML Liqd      4 mg by Nasal route as needed. If patient  remains unresponsive, repeat dose in other nostril 2-5 minutes after first dose.   Quantity: 1 kit  Refills: 0     ondansetron 4 mg tablet  Commonly known as: Zofran      Take 1 tablet (4 mg total) by mouth every 8 (eight) hours as needed for Nausea.   Quantity: 20 tablet  Refills: 0     oxyCODONE-acetaminophen  MG Tabs  Commonly known as: Percocet      Take 1 tablet by mouth every 4 (four) hours as needed for Pain (takes at home for back pain).   Refills: 0     Ozempic (0.25 or 0.5 MG/DOSE) 2 MG/3ML Sopn  Generic drug: semaglutide      Inject 0.5 mg into the skin once a week.   Quantity: 9 mL  Refills: 1     pantoprazole 40 MG Tbec  Commonly known as: Protonix      Take 1 tablet (40 mg total) by mouth before breakfast.   Quantity: 90 tablet  Refills: 3     pregabalin 100 MG Caps  Commonly known as: Lyrica      Take 1 capsule (100 mg total) by mouth in the morning and 1 capsule (100 mg total) at noon and 1 capsule (100 mg total) in the evening.   Quantity: 270 capsule  Refills: 1     rosuvastatin 10 MG Tabs  Commonly known as: Crestor      Take 1 tablet (10 mg total) by mouth nightly.   Quantity: 90 tablet  Refills: 3     Vitamin D 50 MCG (2000 UT) Tabs      Take 2,000 Units by mouth daily.   Refills: 0            STOP taking these medications      rivaroxaban 20 MG Tabs  Commonly known as: Xarelto                  Where to Get Your Medications        These medications were sent to [id number] - [facility] [phone], [phone]  [facility]  [facility] [address]      Phone: [phone]   amoxicillin clavulanate 875-125 MG Tabs  tiZANidine 2 MG Tabs         [facility]    Follow-up appointment:   Bran Rahman MD  [address]  [facility] [address] [phone]    Follow up in 1 month(s)      Amanda Angeles MD  [address] [facility] [address] [phone]    Schedule an appointment as soon as possible for a visit in 1 week(s)  For  follow-up after hospitilization    Jose Daniel Washington MD  [address] [place] [id number]  [phone]    Call in 1 week(s)  For routine follow-up after hospitilization      Vital signs:  Temp:  [97.1 °F (36.2 °C)-98.2 °F (36.8 °C)] 97.1 °F (36.2 °C)  Pulse:  [71-84] 73  Resp:  [16-18] 16  BP: (111-167)/(45-70) 164/70  SpO2:  [93 %-98 %] 93 %    Physical Exam:  See exam from day of discharge note  -----------------------------------------------------------------------------------------------  PATIENT DISCHARGE INSTRUCTIONS: See electronic chart    Neo Mir MD 9/25/2024    Time spent:  35 minutes

## 2024-09-25 NOTE — PLAN OF CARE
Received pt at 1930. Pt is A&O x 4; follows commands, witty and playful with nose packing from prolonged epistaxis ( xarelto currently on hold). Pt is on RA, not using cpap for MUKUND d/t nasal dressing.  Left arm precautions for hx of breast cancer. NSR on tele, 1800 ADA diet with QID accuchecks. Pt is continent of bowel and bladder. Pt's pain is managed with Q4 Percocet for chronic spinal pain and ambulates with standby assist. IV access is patent through rt port, IV ancet given, currently SL.  Shift assessment performed, HS meds given. NOC safety plan in place; bed in low position, call light and personal items within reach, pt encouraged to call staff for assistance.    POC:  Monitor Hgb

## 2024-09-25 NOTE — PROGRESS NOTES
Alert & oriented x4. VSS on room air. Voids. Tolerating diet. Ambulates independently. Gauze in bilateral nares, PT noted left gauze packing fell out during therapy, no blood or drainage, will leave out for now. Denies nausea/chest pain/SOB. Pain controlled. Patient updated on plan of care. Questions and concerns addressed. Safety precautions in place. Frequent rounds performed.

## 2024-09-25 NOTE — PROGRESS NOTES
Patient had a port access ed , flush with 20cc normal saline , good  blood returns noted . Flushed well .

## 2024-09-25 NOTE — PROGRESS NOTES
[facility]   part of [facility]     Hospitalist Progress Note     Rayne Morales Patient Status:  Inpatient    1945 MRN [id number]   Location [facility] 3NW-A Attending Clarke, Neo Wilks, *   Hosp Day # 2 PCP Jose Daniel Washington MD     Chief Complaint: Epistaxis     Subjective:      - No further bleeding noted, Hbg stable this AM    - Vitals stable overnight, no episodes of hypotension after decreased dose of tizanidine    - denies other acute complaints     Objective:    Review of Systems:   A comprehensive review of systems was completed; pertinent positive and negatives stated in subjective.    Vital signs:  Temp:  [97.2 °F (36.2 °C)-98.6 °F (37 °C)] 97.2 °F (36.2 °C)  Pulse:  [71-93] 71  Resp:  [16-18] 16  BP: (111-167)/(45-64) 167/62  SpO2:  [94 %-98 %] 95 %    Physical Exam:    General: No acute distress  Respiratory: no wheezes, no rhonchi  Cardiovascular: S1, S2, regular rate and rhythm  Abdomen: Soft, Non-tender, non-distended, positive bowel sounds  Neuro: No new focal deficits.   Extremities: no edema    Diagnostic Data:    Labs:  Recent Labs   Lab 24  0709 24  1636 24  0150 24  0635 24  1551 24  0440 24  1631 24  0438   WBC 6.1 7.0  --  7.9 9.0  --  6.8  --  5.4   HGB 11.9* 8.0*   < > 6.1* 8.2* 8.2* 7.2* 7.9* 7.8*   MCV 89.8 91.4  --  92.2 93.8  --  92.1  --  91.0   .0 141.0*  --  127.0* 118.0*  --  107.0*  --  120.0*   INR 1.81* 1.56*  --   --   --   --   --   --   --     < > = values in this interval not displayed.       Recent Labs   Lab 24  0709 24  0648 24  0438   * 209* 121* 106*   BUN 11 28* 19 10   CREATSERUM 0.93 0.92 0.80 0.62   CA 9.5 8.6* 8.7 8.9   ALB 4.1 3.9  --   --     143 140 144   K 3.7 3.7 3.6 3.2*    111 110 110   CO2 22.0 22.0 23.0 28.0   ALKPHO 62 50*  --   --    AST 55* 26  --   --    ALT 34 22  --   --    BILT 0.4 0.2  --   --    TP  7.5 6.3  --   --        Estimated Creatinine Clearance: 57.3 mL/min (based on SCr of 0.62 mg/dL).    Recent Labs   Lab 09/21/24 2055   TROPHS 26       Recent Labs   Lab 09/21/24 2055 09/22/24  0709   PTP 21.1* 18.8*   INR 1.81* 1.56*                  Microbiology    No results found for this visit on 09/21/24.      Imaging: Reviewed in Epic.    Medications:    tiZANidine  2 mg Oral Nightly    sodium chloride   Intravenous Once    ceFAZolin  2 g Intravenous Q8H    DULoxetine  30 mg Oral Daily    DULoxetine  60 mg Oral Daily    insulin aspart  1-10 Units Subcutaneous TID AC and HS    pantoprazole  40 mg Oral Before breakfast    pregabalin  100 mg Oral TID    rosuvastatin  10 mg Oral Nightly    lisinopril  20 mg Oral Daily    fluticasone-salmeterol  1 puff Inhalation BID       Assessment & Plan:      #Epistaxis   -Bleeding controlled s/p rhinorocket in ER   -Abx for prophylaxis   -ENT consulted. Rhinorockets removed and bilateral packing placed     #Acute blood loss anemia due to above  -Monitor hgb and transfuse if under 7, so far has received 1 unit  -Repeat hgb stable   -Holding Xarelto, ASA     #Hypercoagulable state with hx VTE/PE   -Lifelong AC per hematology noted on 8/1/24   -Heme recommending to discontinue xarelto, outpatient f/u with Dr. Rahman to determine AC reccs      #Thrombocytopenia  -Likely due to consumption  -Monitor     #Syncope  -Suspect vasovagal in setting of epistaxis, CTM     #Migraines  -Percocet PRN     #DM2 - LDSSI; Last A1c value was 6.9% done 8/9/2024.  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 BP lower at night, possibly related to tizanidine, will decrease dose; now BP stable  #GERD - continue PPI  #Hyperlipidemia - continue home statin   #Chronic pain - continue home oxy, tizandine, duloxetine,  lyrica  #Asthma - Advair, PRN albuterol       Home later today    Neo Mir MD    Supplementary Documentation:     Quality:  DVT Mechanical Prophylaxis:   SCDs, Early ambuation  DVT Pharmacologic Prophylaxis   Medication   None                Code Status: Full Code  Baxter: No urinary catheter in place  Baxter Duration (in days):   Central line:    CLARI:     The 21st Century Cures Act makes medical notes like these available to patients in the interest of transparency. Please be advised this is a medical document. Medical documents are intended to carry relevant information, facts as evident, and the clinical opinion of the practitioner. The medical note is intended as peer to peer communication and may appear blunt or direct. It is written in medical language and may contain abbreviations or verbiage that are unfamiliar.

## 2024-09-26 ENCOUNTER — OFFICE VISIT (OUTPATIENT)
Dept: INTERNAL MEDICINE CLINIC | Facility: CLINIC | Age: 79
End: 2024-09-26
Payer: MEDICARE

## 2024-09-26 ENCOUNTER — PATIENT OUTREACH (OUTPATIENT)
Dept: CASE MANAGEMENT | Age: 79
End: 2024-09-26

## 2024-09-26 VITALS
WEIGHT: 151.38 LBS | RESPIRATION RATE: 18 BRPM | DIASTOLIC BLOOD PRESSURE: 64 MMHG | BODY MASS INDEX: 28.58 KG/M2 | HEIGHT: 61 IN | OXYGEN SATURATION: 100 % | HEART RATE: 99 BPM | SYSTOLIC BLOOD PRESSURE: 138 MMHG | TEMPERATURE: 97 F

## 2024-09-26 DIAGNOSIS — R55 SYNCOPE, UNSPECIFIED SYNCOPE TYPE: ICD-10-CM

## 2024-09-26 DIAGNOSIS — Z86.718 HISTORY OF DVT (DEEP VEIN THROMBOSIS): ICD-10-CM

## 2024-09-26 DIAGNOSIS — D62 ACUTE BLOOD LOSS ANEMIA: ICD-10-CM

## 2024-09-26 DIAGNOSIS — E87.6 HYPOKALEMIA: ICD-10-CM

## 2024-09-26 DIAGNOSIS — Z87.01 HISTORY OF PNEUMONIA, RECURRENT: ICD-10-CM

## 2024-09-26 DIAGNOSIS — J45.30 MILD PERSISTENT ASTHMA WITHOUT COMPLICATION (HCC): ICD-10-CM

## 2024-09-26 DIAGNOSIS — Z86.711 HISTORY OF PULMONARY EMBOLISM: ICD-10-CM

## 2024-09-26 DIAGNOSIS — D80.3 IGG DEFICIENCY (HCC): ICD-10-CM

## 2024-09-26 DIAGNOSIS — E11.9 TYPE 2 DIABETES MELLITUS WITHOUT COMPLICATION, WITHOUT LONG-TERM CURRENT USE OF INSULIN (HCC): ICD-10-CM

## 2024-09-26 DIAGNOSIS — F33.1 MAJOR DEPRESSIVE DISORDER, RECURRENT EPISODE, MODERATE (HCC): Chronic | ICD-10-CM

## 2024-09-26 DIAGNOSIS — Z02.9 ENCOUNTERS FOR ADMINISTRATIVE PURPOSE: Primary | ICD-10-CM

## 2024-09-26 DIAGNOSIS — E78.00 PURE HYPERCHOLESTEROLEMIA: ICD-10-CM

## 2024-09-26 DIAGNOSIS — R54 FRAIL ELDERLY: ICD-10-CM

## 2024-09-26 DIAGNOSIS — I10 PRIMARY HYPERTENSION: ICD-10-CM

## 2024-09-26 DIAGNOSIS — R04.0 EPISTAXIS: Primary | ICD-10-CM

## 2024-09-26 DIAGNOSIS — D68.59 HYPERCOAGULABLE STATE (HCC): ICD-10-CM

## 2024-09-26 DIAGNOSIS — I25.10 CORONARY ARTERY DISEASE INVOLVING NATIVE CORONARY ARTERY OF NATIVE HEART WITHOUT ANGINA PECTORIS: ICD-10-CM

## 2024-09-26 PROBLEM — J18.9 COMMUNITY ACQUIRED PNEUMONIA, UNSPECIFIED LATERALITY: Status: RESOLVED | Noted: 2024-08-08 | Resolved: 2024-09-26

## 2024-09-26 PROCEDURE — 99499 UNLISTED E&M SERVICE: CPT | Performed by: NURSE PRACTITIONER

## 2024-09-26 PROCEDURE — 99496 TRANSJ CARE MGMT HIGH F2F 7D: CPT | Performed by: NURSE PRACTITIONER

## 2024-09-26 RX ORDER — AMLODIPINE BESYLATE 2.5 MG/1
2.5 TABLET ORAL DAILY
Qty: 90 TABLET | Refills: 0 | Status: SHIPPED | OUTPATIENT
Start: 2024-09-26

## 2024-09-26 NOTE — PROGRESS NOTES
Subjective:   Rayne Morales is a 78 year old female who presents for hospital follow up.   She was discharged from [facility] to Home or Self Care  Admission Date: 9/21/24   Discharge Date: 9/25/24  Hospital Discharge Diagnosis:   Epistaxis  calling today for ENT f/u  Anemia  Hypercoagulable state with hx PE DVT  holding xarelto x 1 mo   Anxiety Depression  cymbalta  Syncope  DM  Ozempic / metformin  A1c 6.9 August.   IgG deficiency  Dr Paiz  GERD  Dyslipidemia/CAD  rosuvastatin.  Dr Lawrence  Asthma.  Dr Chambers  hx recurrent pneumonia.     Interactive contact within 2 business days post discharge first initiated on Date: 9/26/24    During the visit, the following was completed:  Obtained and reviewed discharge summary, continuity of care documents, and Hospitalist notes  Reviewed Labs (CBC, CMP)    HPI: here for hospital follow up   complicated PMH.  presented to ER for evaluation of 9 days epistaxis.  Was previously seen at  but persistent bleeding.  Episode of hypotension, clamminess and syncope in ER.  RinoRocket placement in ER.  ENT consultation. Packing placed.    Required 1 u PRBC for acute blood loss anemia.   Hematology consultation.  Xarelto on hold.  She is looking for alternative follow up for hematology.  Discussed at length with mathew.  Given her PMH she needs to follow with hematology  her xarelto is for DVT/PE prophylaxisis.  She is concerned for CVA off meds.  Discussed  she will look into [facility] and message me with appt   Needs f/u for ENT and will call today   hypokalemia 3.2 at discharge.  Follow closely  CMP ordered.   Anemia  hgb 7.8 s/p 1 u PRBC   follow CBC  HTN  she states high in afternoon and attributes her nose bleed to her high bp   she follows closely at home.  She has been on amlodipine in the past but stopped due to weight loss and low bp.  reports higher in afternoon.  Will order and restart at 2.5mg   will send readings via [employer] [facility]    Anticoagulation        History/Other:   Current Medications:  Medication Reconciliation:  I am aware of an inpatient discharge within the last 30 days.  The discharge medication list has been reconciled with the patient's current medication list and reviewed by me.  See medication list for additions of new medication, and changes to current doses of medications and discontinued medications.  Outpatient Medications Marked as Taking for the 9/26/24 encounter (Office Visit) with Monica Curran APRN   Medication Sig    amLODIPine 2.5 MG Oral Tab Take 1 tablet (2.5 mg total) by mouth daily.    amoxicillin clavulanate 875-125 MG Oral Tab Take 1 tablet by mouth 2 (two) times daily for 7 days.    tiZANidine 2 MG Oral Tab Take 1 tablet (2 mg total) by mouth nightly. Takes every night    lidocaine 5 % External Patch Apply 1 patch to skin once a day as needed    immune globulin, human, (GAMMAGARD) 10 g/100mL Injection Solution Inject 30 g into the vein.    LISINOPRIL 20 MG Oral Tab TAKE 1 TABLET BY MOUTH DAILY    fluticasone-salmeterol 500-50 MCG/ACT Inhalation Aerosol Powder, Breath Activated Inhale 1 puff into the lungs 2 (two) times daily.    ondansetron (ZOFRAN) 4 mg tablet Take 1 tablet (4 mg total) by mouth every 8 (eight) hours as needed for Nausea.    DULoxetine 60 MG Oral Cap DR Particles Take 1 capsule (60 mg total) by mouth daily. TO TAKE ALONG WITH THE 30 MG TO EQUAL 90 MG DAILY.    DULoxetine 30 MG Oral Cap DR Particles Take 1 capsule (30 mg total) by mouth daily. TO TAKE ALONG WITH THE 60 MG TO EQUAL 90 MG DAILY.    pantoprazole 40 MG Oral Tab EC Take 1 tablet (40 mg total) by mouth before breakfast.    ROSUVASTATIN 10 MG Oral Tab Take 1 tablet (10 mg total) by mouth nightly.    semaglutide (OZEMPIC, 0.25 OR 0.5 MG/DOSE,) 2 MG/3ML Subcutaneous Solution Pen-injector Inject 0.5 mg into the skin once a week.    metFORMIN  MG Oral Tablet 24 Hr Take 2 tablets (1,000 mg total) by mouth daily with breakfast and  1 tablet (500 mg total) every evening.    oxyCODONE-acetaminophen  MG Oral Tab Take 1 tablet by mouth every 4 (four) hours as needed for Pain (takes at home for back pain).    pregabalin 100 MG Oral Cap Take 1 capsule (100 mg total) by mouth in the morning and 1 capsule (100 mg total) at noon and 1 capsule (100 mg total) in the evening.    Naloxone HCl 4 MG/0.1ML Nasal Liquid 4 mg by Nasal route as needed. If patient remains unresponsive, repeat dose in other nostril 2-5 minutes after first dose.    metroNIDAZOLE 0.75 % External Gel Apply 1 g topically 2 (two) times daily. Apply to face twice daily    albuterol 108 (90 Base) MCG/ACT Inhalation Aero Soln Inhale 2 puffs into the lungs every 6 (six) hours as needed for Wheezing.    Fluticasone Propionate 50 MCG/ACT Nasal Suspension 1 spray by Nasal route 2 (two) times daily.    Cholecalciferol (VITAMIN D) 2000 units Oral Tab Take 2,000 Units by mouth daily.       Review of Systems:  GENERAL:  stable  HEENT: as above   LUNGS: denies shortness of breath with exertion  CARDIOVASCULAR: denies chest pain on exertion or palpitations  GI: denies abdominal pain, denies heartburn, denies diarrhea  MUSCULOSKELETAL: denies pain, normal range of motion of extremities  NEURO: denies headaches, denies dizziness, denies weakness      Objective:   No LMP recorded (lmp unknown). Patient has had a hysterectomy.  Estimated body mass index is 28.61 kg/m² as calculated from the following:    Height as of this encounter: 5' 1\" (1.549 m).    Weight as of this encounter: 151 lb 6.4 oz (68.7 kg).   /64 (BP Location: Right arm, Patient Position: Sitting, Cuff Size: large)   Pulse 99   Temp 97 °F (36.1 °C) (Temporal)   Resp 18   Ht 5' 1\" (1.549 m)   Wt 151 lb 6.4 oz (68.7 kg)   LMP  (LMP Unknown)   SpO2 100%   BMI 28.61 kg/m²    GENERAL: well developed, well nourished, in no apparent distress  HEENT: unable to view any packing   NECK: supple, no adenopathy,   LUNGS: clear to  auscultation  CARDIO: RRR   GI: good BS's, no masses, HSM or tenderness  EXTREMITIES: no edema  NEURO: Oriented times three,   Assessment & Plan:   1. Epistaxis (Primary)  to make appt with ENT.  Follow CBC  -     CBC With Differential With Platelet; Future; Expected date: 09/26/2024  2. Syncope, unspecified syncope type situational   monitor.   3. Acute blood loss anemia  monitor CBC  -     CBC With Differential With Platelet; Future; Expected date: 09/26/2024  4. Hypokalemia check CMP.    -     Comp Metabolic Panel (14); Future; Expected date: 09/26/2024  5. History of DVT (deep vein thrombosis)  needs hematology consultation she is happy with   will consider [facility]  6. History of pulmonary embolism  7. Hypercoagulable state (HCC)  holding xarelto for 1 mo.  Needs hematology evaluation.    8. IgG deficiency (HCC)  per DR Paiz  Overview:  ??? pt states she gets IgG infusions due to lack of IgG, unable to fight off infections    9. Type 2 diabetes mellitus without complication, without long-term current use of insulin (HCC)  ozempic and metformin  monitor.   10. Coronary artery disease involving native coronary artery of native heart without angina pectoris stable  DR Lawrence  11. Hyperlipidemia, unspecified hyperlipidemia type  statin.  12. Primary hypertension  cont lisinopril in am   add amlodipine 2.5mg pm.  Check bp at home and email readings.    -     Comp Metabolic Panel (14); Future; Expected date: 09/26/2024  14. History of pneumonia, recurrent  monitor   15. Major depressive disorder, recurrent episode, moderate (HCC) cymbalta.   16. Frail elderly    17. Mild persistent asthma without complication ([facility])  Dr Chambers.    Other orders  -     amLODIPine Besylate; Take 1 tablet (2.5 mg total) by mouth daily.  Dispense: 90 tablet; Refill: 0  She will message me with upcoming appts.  She understands the importance and the necessity of hematology and ENT.  Her daughter also verbalized understanding. She  is aware it is outside my area of expertise to manage her xarelto.       No follow-ups on file.

## 2024-10-03 ENCOUNTER — TELEPHONE (OUTPATIENT)
Dept: INTERNAL MEDICINE CLINIC | Facility: CLINIC | Age: 79
End: 2024-10-03

## 2024-10-21 ENCOUNTER — PATIENT OUTREACH (OUTPATIENT)
Dept: CASE MANAGEMENT | Age: 79
End: 2024-10-21

## 2024-10-22 DIAGNOSIS — G62.9 NEUROPATHY: ICD-10-CM

## 2024-10-24 RX ORDER — PREGABALIN 100 MG/1
100 CAPSULE ORAL 3 TIMES DAILY
Qty: 270 CAPSULE | Refills: 0 | Status: SHIPPED | OUTPATIENT
Start: 2024-10-24

## 2024-10-24 NOTE — TELEPHONE ENCOUNTER
Please review. Protocol Failed; No Protocol      Recent fills: 6/24/2024  Last Rx written: 2/19/2024  Last office visit: 9/26/2024          Requested Prescriptions   Pending Prescriptions Disp Refills    PREGABALIN 100 MG Oral Cap [Pharmacy Med Name: Pregabalin 100 Mg Cap Nova] 270 capsule 0     Sig: Take 1 capsule (100 mg total) by mouth in the morning and 1 capsule (100 mg total) at noon and 1 capsule (100 mg total) in the evening.       Controlled Substance Medication Failed - 10/24/2024 11:45 AM        Failed - This medication is a controlled substance - forward to provider to refill       Neurology Medications Passed - 10/24/2024 11:45 AM        Passed - In person appointment or virtual visit in the past 6 mos or appointment in next 3 mos     Recent Outpatient Visits              4 weeks ago Epistaxis    [facility], [address] [place] [address] Monica Curran APRN    Office Visit    2 months ago Community acquired pneumonia, unspecified laterality    [address] Monica Curran APRN    Office Visit    2 months ago Scar condition and fibrosis of skin    [facility] [place], MD    Office Visit    2 months ago     [facility] Only    2 months ago Anemia, unspecified type    [facility] [place] Bran Rahman MD    Office Visit          Future Appointments         Provider Department Appt Notes    In 2 weeks Makayla Farley DO [facility] f/u oct nov    In 4 months Zac Christian MD [facility]                            Future Appointments         Provider Department Appt Notes    In 2 weeks Makayla Farley DO [facility] f/u oct nov    In 4 months Zac Christian MD [facility]           Recent Outpatient Visits              4 weeks ago Epistaxis    [facility], [address] [place], [place] [address]Monica Kerr APRN    Office Visit    2 months ago Community acquired pneumonia, unspecified laterality    [facility], [address] [place] [address] Monica Curran APRN    Office Visit    2 months ago Scar condition and fibrosis of skin    [facility] [place], MD    Office Visit    2 months ago     [facility]    Nurse Only    2 months ago Anemia, unspecified type    [facility] [place] Bran Rahman MD    Office Visit

## 2024-11-12 ENCOUNTER — OFFICE VISIT (OUTPATIENT)
Dept: RHEUMATOLOGY | Facility: CLINIC | Age: 79
End: 2024-11-12
Payer: MEDICARE

## 2024-11-12 VITALS
HEIGHT: 61 IN | RESPIRATION RATE: 16 BRPM | DIASTOLIC BLOOD PRESSURE: 80 MMHG | OXYGEN SATURATION: 97 % | SYSTOLIC BLOOD PRESSURE: 188 MMHG | HEART RATE: 82 BPM | TEMPERATURE: 98 F | BODY MASS INDEX: 29.64 KG/M2 | WEIGHT: 157 LBS

## 2024-11-12 DIAGNOSIS — M81.0 AGE-RELATED OSTEOPOROSIS WITHOUT CURRENT PATHOLOGICAL FRACTURE: ICD-10-CM

## 2024-11-12 DIAGNOSIS — G89.4 CHRONIC PAIN SYNDROME: ICD-10-CM

## 2024-11-12 DIAGNOSIS — M50.30 DDD (DEGENERATIVE DISC DISEASE), CERVICAL: ICD-10-CM

## 2024-11-12 DIAGNOSIS — Z51.81 VISIT FOR MONITORING RECLAST THERAPY: ICD-10-CM

## 2024-11-12 DIAGNOSIS — Z79.83 VISIT FOR MONITORING RECLAST THERAPY: ICD-10-CM

## 2024-11-12 DIAGNOSIS — M51.362 DEGENERATION OF INTERVERTEBRAL DISC OF LUMBAR REGION WITH DISCOGENIC BACK PAIN AND LOWER EXTREMITY PAIN: ICD-10-CM

## 2024-11-12 DIAGNOSIS — M15.0 PRIMARY OSTEOARTHRITIS INVOLVING MULTIPLE JOINTS: Primary | ICD-10-CM

## 2024-11-12 DIAGNOSIS — M25.50 POLYARTHRALGIA: ICD-10-CM

## 2024-11-12 DIAGNOSIS — R76.8 RHEUMATOID FACTOR POSITIVE: ICD-10-CM

## 2024-11-12 DIAGNOSIS — R29.3 POOR POSTURE: ICD-10-CM

## 2024-11-12 PROCEDURE — G2211 COMPLEX E/M VISIT ADD ON: HCPCS | Performed by: INTERNAL MEDICINE

## 2024-11-12 PROCEDURE — 99214 OFFICE O/P EST MOD 30 MIN: CPT | Performed by: INTERNAL MEDICINE

## 2024-11-12 NOTE — PROGRESS NOTES
RHEUMATOLOGY Follow up   Date of visit: 11/12/2024  ?  Chief Complaint   Patient presents with    Osteoarthritis     6 month f/u. Having a lot of right knee pian. Hoping to get it replaced at the started of the year. Taking magnesium and calcium supplements and that has helped leg cramps. Back on IVIG and feeling better. Converted rapid score of 5.3     ASSESSMENT, DISCUSSION & PLAN   Assessment:  1. Primary osteoarthritis involving multiple joints    2. Chronic pain syndrome    3. Polyarthralgia    4. Rheumatoid factor positive    5. Age-related osteoporosis without current pathological fracture    6. DDD (degenerative disc disease), cervical    7. Degeneration of intervertebral disc of lumbar region with discogenic back pain and lower extremity pain    8. Poor posture    9. Visit for monitoring Reclast therapy          Discussion:  Ms. Rayne Morales is a 78 yo woman with long standing osteoarthritis as well as depression. She was initially sent to be for evaluation of a borderline RF as well as hand and back pain. I think the RF is positive due to age as pt's history and exam is not consistent with an inflammatory arthropathy. Repeat testing showed negative RF/CCP as well as DARREN. I believe her symptoms were related to osteoarthritis as well as depression. She has responded very nicely to Cymbalta however since her left knee replacement, has been dealing with worsened low back pain and sciatica. While she was able to stop fentanyl patches, she continues to take Norco, tramadol and Lyrica as prescribed by pain management.  At this time, symptoms are overall better since increasing cymbalta and stopping welbutrin. She continues taking lyrica and tizanidine which are prescribed by neurology.  She has been applying topical CBD/THC which is also helping with the pain.    She continues to have worsened pain with weather changes.  Continues to follow with pain management for injections.- recommended she consider  this  Knows she needs right knee replacement. Last set of injections did not provide sustained relief. Recommended she follow with orthopedic now that she is off blood thinners.     From an infection standpoint, she's doing better since getting back on her monthly IVIG.  Main concern today is her discontinuation of the xarelto. However, pt was admitted to the hospital with severe anemia and difficult to control epistaxis. She even required blood transfusion. Decision was made to hold off on AC d/t high risk. She has an appt with another hematologist and encouraged her to discuss these questions with them.     Will have her start PT now for the gait disturbance and posture     She has been started on Reclast- had one infusion last Oct. Due now, so labs ordered and will fax order to infusion center for new infusion.   Will consider repeat BMD in one year.     Follow back with me in about 6months but encouraged to reach out in the meantime with updates.     Patient verbalized understanding of above instructions. No further questions at this time.    Code selection for this visit was based on time spent (40min) on date of service in preparing to see the patient, obtaining and/or reviewing separately obtained history, performing a medically appropriate examination, counseling and educating the patient/family/caregiver, ordering medications or testing, referring and communicating with other healthcare providers, documenting clinical information in the E HR, independently interpreting results and communicating results to the patient/family/caregiver and care coordination with the patient's other providers.        ?  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  ?  HPI   Rayne Morales is a 79 year old female with the following active problems who is seen for medically necessary follow-up today. She was seen as a new patient initially for evaluation of low titer RF positivity and joint pain. We rechecked her levels which were negative. She presents for follow up today.  Added onto schedule due to worsened symptoms lately    Continues following with pain management:  Norco 10-325mg 3-5x/day  Lyrica 100mg BID to TID if needed   Cymbalta 90mg daily (increased)   Tizanidine 8mg nightly and 2mg during the day    Since her last visit, patient has had a lot going on.  Is back on IVIG monthly through immunology- had been taken off by lily and pt was in hospital multiple times for pneumonia, UTI as well as appendicitis requiring surgery.  Is feeling better now since being back on IVIG and has not had any infections since being back on IVIG.     Taken off xarelto d/t severe nosebleeds. Has not discussed other options. Off since hospitalization in September.   Has had hx of DVT/PE and TIAs.   States she would rather die from bleeding than a clot/stroke.   Is taking aspirin 81mg daily.     Started  magnesium/calcium and having less leg cramping.     Lung wise- told likely bacterial- told no steroids and needed abx- no current issues lung wise.     Continues to suffer from pain- primarily lower back and neck. Feels more pain on the left side. Thinks related to the arthritis in the knee and compensating.   Feels like right knee need to be replaced. Had to cancel last appt     Does feel the combination of duloxetine and lyrica has been keeping her pain reasonable controlled  Energy varies.    Denies any new areas of pain.   Denies overt swelling of the joints.   Hasn't been able to get injection of the spine through pain management    + dry eyes, not using drops. Thinks related to allergies since was bad over the summer   + dry mouth (worst in the mornings), improves with drinking water  Denies oral or nasal ulcers   Keeping nasal passage moist now due to recent epistaxis s/p cautery   Denies raynauds     Has cut back on working due to above. Now working 3 days a week.       HPI from initial consultation  States she has had arthritis throughout her whole spine from neck down to her lower back for over 30 years. States she has been diagnosed with significant degenerative disc disease. 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 Burr for this. Was referred due to having autoimmune disease due to borderline elevated RF.      Admits to pain on a daily basis in her spine, knees and hands.   Morning stiffness significant but lasting for only 2 minutes. Pain gets worse as the day goes on. Pain also affected by changes in barometric pressure.   Does have neuropathy in her feet for which she takes Lyrica.  Does note nodules over small joints of hands.      Was on celebrex for years which helped significant with her symptoms but was discontinued due to potential cardiac risk. She also admits to some bleeding in her GI tract.   Has been on opioids for  years and wants to wean off of it. She does take Lyrica 75mg daily, tried 100mg but that made her stick to her stomach.     Admits to getting pneumonia and sepsis at least once yearly.   Follows with pulmonology for the chronic asthma as well as pneumonia. Does get hypoxemia with sleeping.   Follows with psychiatry for years, on buproprion for years for depression. Just changed buproprion to extended release and trying to monitor for a response.      + iron deficiency anemia   + hx of DVT/PE on Xarelto, first one thought to be due to breast cancer      No history of significant wrist pain or swelling, pain or swelling of the MCPs, pain or swelling of the ankles and bones of the feet.  The patient denies hair loss, oral or nasal ulcers, photosensitive rash, elevated or scarring rashes, Raynaud's phenomenon, prior renal disease, or history of seizures.  The patient denies any history of uveitis.  There are no symptoms of severe dry eyes, dry mouth, recurrent cavities, or swelling of the cheeks or under the jawbone. No prior history of punctal plugs being applied.  No fevers, chills, lymphadenopathy, night sweats, unexpected weight loss, bony pain, easy bruising or bleeding, or unexplained weakness.  Denies chronic sinus infections/disease or epistaxis.  Denies chronic cough or hemoptysis.     ?  Past Medical History:  Past Medical History:    Abdominal pain    Anemia    Anxiety    Arrhythmia    Arthritis    Asthma (HCC)    Back pain    Back problem    Black stools    Bloating    Blurred vision    Breast CA (HCC)    left lump and rad.    Cancer (HCC)    breast, basal cell    Cervical radiculopathy    Cervical spinal stenosis    Deep vein thrombosis (HCC)    Depression    Diabetes (HCC)    Diabetes mellitus (HCC)    Diarrhea, unspecified    Dizziness    Easy bruising    On Xeralto    Esophageal reflux    Exposure to medical diagnostic radiation    Fatigue    Fitting and adjustment of vascular catheter    Headache  disorder    Hearing loss    Heart attack (HCC)    Heartburn    Hemorrhoids    High blood pressure    High cholesterol    History of blood clots    History of COVID-19    not hospitalized, no continued symptoms, cough, fever, loss of taste    History of depression    History of syncope    IgG deficiency (HCC)    ??? pt states she gets IgG infusions due to lack of IgG, unable to fight off infections    Indigestion    Leaking of urine    Migraines    Muscle weakness    uses cane    Neuropathy    rt foot bilateral hands    Osteoarthritis    Pain in joints    Pneumonia due to organism    Pulmonary embolism (HCC)    Shortness of breath    Sleep apnea    cpap - not using - machine is broken    Stress    Uncomfortable fullness after meals    Visual impairment    glasses/contacts    Weight gain     Past Surgical History:  Past Surgical History:   Procedure Laterality Date    Abdomen surgery proc unlisted      duodenal biopsy    Appendectomy      Appendectomy      Breast surgery      Cataract      Cholecystectomy      Colonoscopy  10/1/10, 4/21/17    Colonoscopy N/A 02/27/2023    Procedure: COLONOSCOPY;  Surgeon: Migel Bass MD;  Location:  ENDOSCOPY    [facility]  04/21/2017    Eye surgery      Femur/knee surg unlisted      right knee arthroscopy    Hemorrhoidectomy,int/ext,simple      Hernia surgery      Hysterectomy  1984    total hystero.    Lumpectomy left Left 1999    lt lumpectomy and radiation.    Oophorectomy Bilateral 1984    total hystero.    Other  11/2021    left wrist, ORIF Dr. Stacy Long    Other  02/21/2022    CERVICAL 5 AND CERVICAL 6 LAMINECTOMIES, PARTIAL CERVICAL 7 LAMINECTOMY, LEFT CERVICAL 5/CERVICAL 6 AND CERVICAL 6/CERVICAL 7 FORAMINOTOMIES, CERVICAL 6 AND CERVICAL 7 ARTHRODESIS, AUTOGRAFT, ALLOGRAFT    Other surgical history  2010    endoscopy - papillotomy    Other surgical history  2016     under right eye, skin cancer removed    Other surgical history  02/2022    neck    Port for vascular  access      Radiation left Left 1999    left lumpectomy and radiation.    Repair ing hernia,5+y/o,reducibl      Sigmoidoscopy,diagnostic      Spine surgery procedure unlisted      Tonsillectomy      Total abdom hysterectomy      Total knee replacement       Family History:  Family History   Problem Relation Age of Onset    Heart Attack Paternal Grandfather     Heart Attack Paternal Grandmother     Other (CAD) Father     Other (CAD) Mother     Hypertension Mother     Heart Attack Mother     Other (CAD) Brother     Hypertension Brother     Heart Attack Brother     Stroke Maternal Grandfather         Stroke    Stroke Maternal Grandmother         Stroke    Breast Cancer Self 54     Social History:  Social History     Socioeconomic History    Marital status:    Tobacco Use    Smoking status: Former     Current packs/day: 0.00     Types: Cigarettes     Quit date: 1968     Years since quittin.9    Smokeless tobacco: Never   Vaping Use    Vaping status: Never Used   Substance and Sexual Activity    Alcohol use: No     Comment: rare    Drug use: Not Currently     Types: Cannabis     Comment: uses cream for pain to back/neck with barometer changes.    Sexual activity: Not Currently     Partners: Male   Other Topics Concern    Caffeine Concern Yes     Comment: half of cup of coke daily    Sleep Concern No    Exercise Yes     Comment: walking    Seat Belt Yes     Social Drivers of Health     Financial Resource Strain: Low Risk  (10/25/2023)    Financial Resource Strain     Difficulty of Paying Living Expenses: Not hard at all     Med Affordability: No   Food Insecurity: No Food Insecurity (2024)    Food Insecurity     Food Insecurity: Never true   Transportation Needs: No Transportation Needs (2024)    Transportation Needs     Lack of Transportation: No   Physical Activity: Inactive (2019)    [facility], [facility]    Exercise Vital Sign     Days of Exercise  per Week: 0 days     Minutes of Exercise per Session: 0 min   Stress: No Stress Concern Present (10/25/2023)    Stress     Feeling of Stress : No   Social Connections: Socially Integrated (10/25/2023)    Social Connections     Frequency of Socialization with Friends and Family: 2   Housing Stability: Low Risk  (9/22/2024)    Housing Stability     Housing Instability: No     Medications:  Outpatient Medications Marked as Taking for the 11/12/24 encounter (Office Visit) with Makayla Farley DO   Medication Sig Dispense Refill    pregabalin 100 MG Oral Cap Take 1 capsule (100 mg total) by mouth in the morning and 1 capsule (100 mg total) at noon and 1 capsule (100 mg total) in the evening. 270 capsule 0    amLODIPine 2.5 MG Oral Tab Take 1 tablet (2.5 mg total) by mouth daily. 90 tablet 0    lidocaine 5 % External Patch Apply 1 patch to skin once a day as needed      immune globulin, human, (GAMMAGARD) 10 g/100mL Injection Solution Inject 30 g into the vein.      LISINOPRIL 20 MG Oral Tab TAKE 1 TABLET BY MOUTH DAILY 90 tablet 0    fluticasone-salmeterol 500-50 MCG/ACT Inhalation Aerosol Powder, Breath Activated Inhale 1 puff into the lungs 2 (two) times daily.      ondansetron (ZOFRAN) 4 mg tablet Take 1 tablet (4 mg total) by mouth every 8 (eight) hours as needed for Nausea. 20 tablet 0    DULoxetine 60 MG Oral Cap DR Particles Take 1 capsule (60 mg total) by mouth daily. TO TAKE ALONG WITH THE 30 MG TO EQUAL 90 MG DAILY. 90 capsule 3    DULoxetine 30 MG Oral Cap DR Particles Take 1 capsule (30 mg total) by mouth daily. TO TAKE ALONG WITH THE 60 MG TO EQUAL 90 MG DAILY. 90 capsule 3    pantoprazole 40 MG Oral Tab EC Take 1 tablet (40 mg total) by mouth before breakfast. 90 tablet 3    ROSUVASTATIN 10 MG Oral Tab Take 1 tablet (10 mg total) by mouth nightly. 90 tablet 3    semaglutide (OZEMPIC, 0.25 OR 0.5 MG/DOSE,) 2 MG/3ML Subcutaneous Solution Pen-injector Inject 0.5 mg into the skin once a week. 9 mL 1    metFORMIN   MG Oral Tablet 24 Hr Take 2 tablets (1,000 mg total) by mouth daily with breakfast and 1 tablet (500 mg total) every evening. 270 tablet 3    oxyCODONE-acetaminophen  MG Oral Tab Take 1 tablet by mouth every 4 (four) hours as needed for Pain (takes at home for back pain).      albuterol 108 (90 Base) MCG/ACT Inhalation Aero Soln Inhale 2 puffs into the lungs every 6 (six) hours as needed for Wheezing. 1 each 3    Fluticasone Propionate 50 MCG/ACT Nasal Suspension 1 spray by Nasal route 2 (two) times daily. 1 Bottle 3    Cholecalciferol (VITAMIN D) 2000 units Oral Tab Take 2,000 Units by mouth daily.       Modified Medications    No medications on file     Medications Discontinued During This Encounter   Medication Reason    metroNIDAZOLE 0.75 % External Gel      ?  ?  Allergies:  Allergies   Allergen Reactions    Bactrim HIVES and OTHER (SEE COMMENTS)    Ciprofloxacin HIVES    Mold REACTIVE AIRWAY DISEASE     Mold and smut    Sulfa Antibiotics HIVES     Bactrim (Sulfamethoxazole/TMP)    Diovan [Valsartan] NAUSEA AND VOMITING    Omnicef NAUSEA ONLY and DIZZINESS    Atorvastatin OTHER (SEE COMMENTS) and UNKNOWN    Clindamycin DIARRHEA and NAUSEA ONLY    Green Pepper OTHER (SEE COMMENTS)     Sensitivity (all peppers - green, orange and red)    Lipitor [Atorvastatin Calcium] OTHER (SEE COMMENTS)     Leg cramping, sensitivity    Pneumococcal Vaccines OTHER (SEE COMMENTS)     Pt states she had pneumo vax done at Dr. Boyd on 1/10/24- states developed redness, swelling,itching to the arm    Quinapril Hcl FATIGUE     ?  REVIEW OF SYSTEMS   ?  Review of Systems   Constitutional:  Positive for malaise/fatigue and weight loss. Negative for chills and fever.   HENT:  Positive for congestion, nosebleeds and tinnitus. Negative for hearing loss.    Eyes:  Negative for pain and redness.   Respiratory:  Positive for cough and shortness of breath. Negative for hemoptysis, sputum production and wheezing (only with  pneumonia).    Cardiovascular:  Negative for chest pain and leg swelling.   Gastrointestinal:  Positive for abdominal pain and heartburn. Negative for blood in stool, constipation, diarrhea and nausea.   Genitourinary:  Negative for dysuria, frequency and urgency.        + stress incontinence   Musculoskeletal:  Positive for back pain, joint pain, myalgias and neck pain.   Skin:  Positive for rash. Negative for itching.   Neurological:  Positive for weakness (generalized) and headaches. Negative for dizziness.   Endo/Heme/Allergies:  Bruises/bleeds easily.     PHYSICAL EXAM   Today's Vitals:  Temperature Blood Pressure Heart Rate Resp Rate SpO2   Temp: 97.7 °F (36.5 °C) BP: (!) 188/80 Pulse: 82 Resp: 16 SpO2: 97 %   ?  Current Weight Height BMI BSA Pain   Wt Readings from Last 1 Encounters:   11/12/24 157 lb (71.2 kg)    Height: 5' 1\" (154.9 cm) Body mass index is 29.66 kg/m². Body surface area is 1.7 meters squared.         Physical Exam  Vitals and nursing note reviewed.   Constitutional:       General: She is not in acute distress.     Appearance: Normal appearance. She is well-developed. She is not diaphoretic.   HENT:      Head: Normocephalic.   Eyes:      General: No scleral icterus.     Extraocular Movements: Extraocular movements intact.      Conjunctiva/sclera: Conjunctivae normal.   Neck:      Vascular: No JVD.      Trachea: No tracheal deviation.   Cardiovascular:      Rate and Rhythm: Normal rate and regular rhythm.      Heart sounds: Normal heart sounds. No murmur heard.  Pulmonary:      Effort: Pulmonary effort is normal. No respiratory distress.      Breath sounds: Normal breath sounds. No wheezing.   Musculoskeletal:         General: Deformity present. No swelling.      Cervical back: Neck supple.      Comments: Diffuse small heberden and joslyn nodes of the fingers, no basilar joint tenderness of the 1st CMC bilaterally. Slightly larger middle finger DIPs compared to last visit.   No other  swelling or restriction of motion of the MCPs, wrists, elbows.   Right knee with moderate crepitus and bony enlargement   Left knee post surgical  Abnormal gait with abnormal standing structural exam- uneven shoulders, thoracic kyphosis with scoliosis    Lymphadenopathy:      Cervical: No cervical adenopathy.   Skin:     General: Skin is warm and dry.      Coloration: Skin is pale.      Findings: No erythema or rash.      Comments: No periungal erythema  No malar rash   Neurological:      Mental Status: She is alert and oriented to person, place, and time.      Cranial Nerves: No cranial nerve deficit.      Gait: Gait abnormal.   Psychiatric:         Mood and Affect: Mood normal.         Behavior: Behavior normal.       ?  Radiology review:       Narrative   PROCEDURE:  CT CHEST (CPT=71250)     COMPARISON:  [facility] , CT, CT CHEST (CPT=71250), 2/28/2024, 2:44 PM.     INDICATIONS:  Shortness of breath, cough,hx of recurrent pneumonia     TECHNIQUE:  Unenhanced multislice CT scanning is performed through the chest.  Dose reduction techniques were used. Dose information is transmitted to the ACR (American College of Radiology) NRDR (National Radiology Data Registry) which includes the Dose   Index Registry.     PATIENT STATED HISTORY: (As transcribed by Technologist)  Patient is here for shortness of breath and persistent cough.         FINDINGS:    LUNGS:  There has been interval improvement in right lung patchy infiltrates/airspace disease when compared to 2/28/2024.  The consolidative infiltrates in the right lower lobe and right upper lobe have near completely resolved.  There is some persistent   discoid opacity in the right upper lobe with mild consolidation with air bronchograms, images 46 through 54. There is discoid atelectasis and linear atelectasis in the lung bases and lingula.  Stable subpleural nodules in the left lung measuring 4 mm in   the lingula and 6 mm left lower lobe.  3 mm subpleural nodule in the  right lung base previously obscured by consolidation.  There is mild bilateral bronchiectasis.  VASCULATURE:  Pulmonary vessels are unremarkable within the limits of a noncontrast CT.    YAYA:  No enlarged adenopathy.    MEDIASTINUM:  No enlarged adenopathy.    CARDIAC:  There is coronary artery calcification.    PLEURA:  No pneumothorax or effusion.    THORACIC AORTA:  No aneurysm  CHEST WALL:  No enlarged axillary adenopathy.    LIMITED ABDOMEN:  Limited images of the upper abdomen are unremarkable.    BONES:  Multilevel endplate hypertrophic changes with degenerative disc disease thoracic spine.                    Impression   CONCLUSION:  There is thin near complete resolution of patchy infiltrates and consolidation in the right lower lobe and interval improvement in consolidation and infiltrates in the right upper lobe since 2/28/2024.  There is some persistent consolidation   in the right upper lobe with bibasilar atelectasis.  Recommend continued follow-up to assess for complete resolution.     Stable bilateral subpleural pulmonary nodules largest in the left lung base measuring 6 mm.     LOCATION:  [id number]        Dictated by (CST): Mita Caruso MD on 3/28/2024 at 4:22 PM      Finalized by (CST): Mita Caruso MD on 3/28/2024 at 4:29 PM       Narrative   PROCEDURE:  CT APPENDIX ABD/PEL W CONTRAST (CPT=74177)     COMPARISON:  [facility] , CT, CT ABDOMEN+PELVIS KIDNEYSTONE 2D RNDR(NO IV,NO ORAL)(CPT=74176), 2/05/2024, 7:52 PM.     INDICATIONS:  abdominal pain, r/o appy     TECHNIQUE:  Axial helical acquisitions are obtained from through the abdomen and pelvis after bolus intravenous nonionic contrast administration.  Images of the right lower quadrant reconstructed at 2.5 mm. Coronal MPR imaging was obtained.  Dose  reduction techniques were used. Dose information is transmitted to the ACR (American College of Radiology) NRDR (National Radiology Data Registry) which includes the Dose Index Registry.     PATIENT  STATED HISTORY:(As transcribed by Technologist)  Patient is here for RLQ abdominal pain.      CONTRAST USED:  100cc of Isovue 370     FINDINGS:    APPENDIX:  Wall thickening and dilation of the appendix with caliber of 12 mm.  No surrounding fluid or inflammatory change.  LIVER:  Normal liver morphology.  Lobular hypodensity with peripheral calcification noted within the lateral segment left hepatic lobe measuring 4.4 x 3.6 cm in cross-section.  This measures greater than simple fluid attenuation.  Additional  subcentimeter hypodensity of the anterior right hepatic lobe is too small to characterize (series 3, image 35).  BILIARY:  Cholecystectomy with expected prominence of the common bile duct.  PANCREAS:  Moderate, diffuse parenchymal atrophy, particularly within the head and uncinate process.  SPLEEN:  No enlargement or focal lesion.    KIDNEYS:  No mass, obstruction, or calcification.    ADRENALS:  No mass or enlargement.    AORTA/VASCULAR:  No aneurysm.    RETROPERITONEUM:  No mass or adenopathy.    BOWEL/MESENTERY:  No evidence of bowel inflammation or obstruction.  Few colonic diverticula noted.  ABDOMINAL WALL:  No mass or hernia.    URINARY BLADDER:  No visible focal wall thickening, lesion, or calculus.    PELVIC NODES:  No adenopathy.    PELVIC ORGANS:  Hysterectomy.  BONES:  No bony lesion or fracture.  Osteoarthritis of the hips and spine.    LUNG BASES:  Stable appearance of the visualized lower chest.  OTHER:  Negative.                     Impression   CONCLUSION:       1. The appendix is dilated to a caliber of 12 mm with associated wall thickening.  No surrounding fluid or inflammatory stranding.  This may represent an appendiceal mucocele versus mucinous neoplasm.  Sequela of acute appendicitis felt less likely,  though not excluded.     2. Lobular hypodensity of the lateral segment left hepatic lobe (4.4 x 3.6 cm).  This may be neoplastic and warrants further nonemergent assessment with liver MRI  with Dotarem contrast.        LOCATION:  [id number]        Dictated by (CST): Arlene Mills MD on 2/12/2024 at 11:32 AM      Finalized by (CST): Arlene Mills MD on 2/12/2024 at 11:42 AM       Narrative   PROCEDURE:  MRI SPINE LUMBAR (CPT=72148)     COMPARISON:  [facility], MR, MRI SPINE LUMBAR (CPT=72148), 8/26/2020, 9:42 AM.     INDICATIONS:  M46.96 Inflammatory spondylopathy of lumbar region (HCC) M54.16 Lumbar radiculopathy     TECHNIQUE:  Multiplanar T1 and T2 weighted images including fat suppression sequences.  Images acquired in sagittal and axial planes.       FINDINGS:  Minimal rightward curvature.  There is normal lumbar lordosis with anatomic alignment.  Vertebral body heights are well-maintained.  Moderate degenerative disc space loss, disc desiccation, endplate spurring, and degenerative endplate marrow signal  changes throughout the lumbar spine, most pronounced at L4-5.  No focal worrisome marrow signal abnormality is seen.  Scattered areas of focal fat and/or hemangiomas noted in the visualized marrow space.     The distal spinal cord and conus medullaris have a normal signal and morphology.  The conus medullaris terminates at the mid L2 level.  The roots of the cauda equina are unremarkable.  No focal mass or fluid collection is seen in the lumbar spinal canal.    The paraspinal soft tissues are unremarkable.     Scattered degenerative changes in the partially imaged lower thoracic spine can be further assessed with a dedicated MRI of the thoracic spine as clinically directed.     T12-L1:  Diffuse disc bulge with endplate osteophytes and a small superimposed right foraminal disc protrusion.  Mild facet arthropathy.  No significant spinal canal stenosis.  Mild to moderate right neural foraminal stenosis. No significant interval  change.     L1-2:  Diffuse disc bulge with endplate osteophytes and interval increase in size of a superimposed left paracentral/foraminal disc protrusion.  Mild facet arthropathy  with thickening of the ligamentum flavum.  No significant spinal canal stenosis.    Interval worsening of moderate left neural foraminal stenosis.     L2-3:  Diffuse disc bulge with endplate osteophytes and a small superimposed left foraminal disc protrusion.  Mild facet arthropathy.  No significant spinal canal stenosis.  Mild left neural foraminal stenosis. No significant interval change.     L3-4:  Diffuse disc bulge with endplate osteophytes and a small superimposed left paracentral disc protrusion with a corresponding annular fissure.  Mild facet arthropathy.  There is no significant spinal canal or neural foraminal stenosis. No  significant interval change.     L4-5:  Diffuse disc bulge with endplate osteophytes and a small superimposed right paracentral/foraminal disc protrusion.  Mild facet arthropathy.  No significant spinal canal stenosis.  Moderate right neural foraminal stenosis. No significant interval  change.     L5-S1:  Diffuse disc bulge with endplate osteophytes and a superimposed left paracentral/foraminal/far lateral disc protrusion.  Mild facet arthropathy.  No significant spinal canal stenosis.  Moderate left neural foraminal stenosis. No significant  interval change.                      Impression   CONCLUSION:       1. Interval increase in size of a left paracentral/ foraminal disc protrusion at L1-2 resulting in interval worsening of moderate left neural foraminal stenosis at L1-2.     2. The remaining multilevel degenerative changes lumbar spine are otherwise unchanged.  No significant spinal canal stenosis at any level in the lumbar spine.     3. Stable mild-to-moderate right neural foraminal stenosis at T12-L1.  Stable mild left neural foraminal stenosis at L2-3.  Stable moderate right neural foraminal stenosis at L4-5.  Stable moderate left neural foraminal stenosis at L5-S1.     4. Facet arthropathy throughout the lumbar spine as above.     Please see above for further details.      LOCATION:  [id number]        Dictated by (CST): Rodger Roberts MD on 10/02/2023 at 1:57 PM      Finalized by (CST): Rodger Roberts MD on 10/02/2023 at 2:13 PM       PROCEDURE:  XR DEXA BONE DENSITOMETRY (CPT=77080)     COMPARISON:  [facility], XR, XR DEXA BONE DENSITOMETRY (CPT=77080), 9/09/2020, 2:04 PM.  [facility], XR, XR DEXA BONE DENSITOMETRY (CPT=77080), 4/04/2018, 9:37 AM.     INDICATIONS:    Z78.0 Postmenopausal     PATIENT STATED HISTORY: (As transcribed by Technologist)  Screening.          LUMBAR SPINE ANALYSIS RESULTS:      Bone mineral density (BMD) (g/cm2):  0.979    Lumbar T-Score:  -0.6      % young normals:  94      % age matched controls:  127      Change from prior spine examination:  -2.1%              TOTAL HIP ANALYSIS RESULTS:        Bone mineral density (BMD) (g/cm2):  0.644      Total Hip T-Score:  -2.4      % young normals:  68      % age matched controls:  90      Change from prior hip examination:  -2.1%              FEMORAL NECK ANALYSIS RESULTS:        Bone mineral density (BMD) (g/cm2):  0.571      Femoral neck T-Score:  -2.5      % young normals:  67      % age matched controls:  94      Change from prior hip examination:  -5.4*%            ADDITIONAL FINDINGS:  No significant additional findings.                     Impression   CONCLUSION:  Bone mineral density values for left femoral neck are compatible with the diagnosis of osteoporosis by WHO definition (T score less than -2.5). If therapy is initiated, a follow-up study in 1 year may aid in evaluation of therapeutic  efficacy.           The World Health Organization has defined the following categories based on bone density:  Normal bone:  T-score better than -1  Osteopenia: T-score between -1 and -2.5  Osteoporosis:  T-score less than -2.5        LOCATION:  [id number]              Dictated by (CST): Tony Shukla MD on 6/23/2023 at 1:32 PM      Finalized by (CST): Tony Shukla MD on 6/23/2023 at 1:33 PM        Narrative   PROCEDURE:  MRI SPINE LUMBAR (CPT=72148)     COMPARISON:  [facility], MR, MRI SPINE LUMBAR (CPT=72148), 8/26/2020, 9:42 AM.     INDICATIONS:  M46.96 Inflammatory spondylopathy of lumbar region (HCC) M54.16 Lumbar radiculopathy     TECHNIQUE:  Multiplanar T1 and T2 weighted images including fat suppression sequences.  Images acquired in sagittal and axial planes.       FINDINGS:  Minimal rightward curvature.  There is normal lumbar lordosis with anatomic alignment.  Vertebral body heights are well-maintained.  Moderate degenerative disc space loss, disc desiccation, endplate spurring, and degenerative endplate marrow signal  changes throughout the lumbar spine, most pronounced at L4-5.  No focal worrisome marrow signal abnormality is seen.  Scattered areas of focal fat and/or hemangiomas noted in the visualized marrow space.     The distal spinal cord and conus medullaris have a normal signal and morphology.  The conus medullaris terminates at the mid L2 level.  The roots of the cauda equina are unremarkable.  No focal mass or fluid collection is seen in the lumbar spinal canal.    The paraspinal soft tissues are unremarkable.     Scattered degenerative changes in the partially imaged lower thoracic spine can be further assessed with a dedicated MRI of the thoracic spine as clinically directed.     T12-L1:  Diffuse disc bulge with endplate osteophytes and a small superimposed right foraminal disc protrusion.  Mild facet arthropathy.  No significant spinal canal stenosis.  Mild to moderate right neural foraminal stenosis. No significant interval  change.     L1-2:  Diffuse disc bulge with endplate osteophytes and interval increase in size of a superimposed left paracentral/foraminal disc protrusion.  Mild facet arthropathy with thickening of the ligamentum flavum.  No significant spinal canal stenosis.    Interval worsening of moderate left neural foraminal stenosis.     L2-3:  Diffuse disc bulge  with endplate osteophytes and a small superimposed left foraminal disc protrusion.  Mild facet arthropathy.  No significant spinal canal stenosis.  Mild left neural foraminal stenosis. No significant interval change.     L3-4:  Diffuse disc bulge with endplate osteophytes and a small superimposed left paracentral disc protrusion with a corresponding annular fissure.  Mild facet arthropathy.  There is no significant spinal canal or neural foraminal stenosis. No  significant interval change.     L4-5:  Diffuse disc bulge with endplate osteophytes and a small superimposed right paracentral/foraminal disc protrusion.  Mild facet arthropathy.  No significant spinal canal stenosis.  Moderate right neural foraminal stenosis. No significant interval  change.     L5-S1:  Diffuse disc bulge with endplate osteophytes and a superimposed left paracentral/foraminal/far lateral disc protrusion.  Mild facet arthropathy.  No significant spinal canal stenosis.  Moderate left neural foraminal stenosis. No significant  interval change.         Impression   CONCLUSION:       1. Interval increase in size of a left paracentral/ foraminal disc protrusion at L1-2 resulting in interval worsening of moderate left neural foraminal stenosis at L1-2.     2. The remaining multilevel degenerative changes lumbar spine are otherwise unchanged.  No significant spinal canal stenosis at any level in the lumbar spine.     3. Stable mild-to-moderate right neural foraminal stenosis at T12-L1.  Stable mild left neural foraminal stenosis at L2-3.  Stable moderate right neural foraminal stenosis at L4-5.  Stable moderate left neural foraminal stenosis at L5-S1.     4. Facet arthropathy throughout the lumbar spine as above.     Please see above for further details.     LOCATION:  [id number]        Dictated by (KATTY): Rodger Roberts MD on 10/02/2023 at 1:57 PM      Finalized by (KATTY): Rodger Roberts MD on 10/02/2023 at 2:13 PM       PROCEDURE:  XR KNEE (1 OR 2  VIEWS), LEFT (CPT=73560)     COMPARISON:  [facility] , XR, XR KNEE (1 OR 2 VIEWS), LEFT (CPT=73560), 7/31/2020, 5:29 PM.     INDICATIONS:  fell, +hit head no LOC, +xarelto left knee pain     PATIENT STATED HISTORY: (As transcribed by Technologist)  Patient stated she fell and is has pain and bruising on thte anterior part of her knee.         FINDINGS:    BONES:  Status post left knee arthroplasty.  No evidence of hardware failure.  No acute fracture or dislocation.  SOFT TISSUES:  Negative.  No visible soft tissue swelling.  EFFUSION:  None visible.  OTHER:  Negative.                   Impression   CONCLUSION:  No acute fracture or dislocation in the left knee.        LOCATION:  [id number]        Dictated by (CST): Kamari Mir MD on 8/09/2023 at 5:43 PM      Finalized by (CST): Kamari Mir MD on 8/09/2023 at 5:44 PM       PROCEDURE:  XR SHOULDER, COMPLETE (MIN 2 VIEWS), RIGHT (CPT=73030)     TECHNIQUE:  Multiple views were obtained.     COMPARISON:  None.     INDICATIONS:  fell, +hit head no LOC, +xarelto right shoulder pain.     PATIENT STATED HISTORY: (As transcribed by Technologist)  Patient stated she fell and is having pain on the neck and the posterior part of her shoulder.         FINDINGS:  No acute fracture or dislocation.  Narrowing of the rotator cuff interval is noted with mild superior subluxation of the wrist is present.  These findings may represent rotator cuff injury.     The AC joint is intact with mild DJD. Soft tissues are within normal limits.                   Impression   CONCLUSION:  No acute fracture or dislocation.  Findings suggestive of rotator cuff injury     LOCATION:  [id number]        Dictated by (CST): Kamari Mir MD on 8/09/2023 at 5:42 PM      Finalized by (CST): Kamari Mir MD on 8/09/2023 at 5:43 PM       PROCEDURE:  XR DEXA BONE DENSITOMETRY (CPT=77080)     COMPARISON:  [facility], XR, XR DEXA BONE DENSITOMETRY (CPT=77080), 9/09/2020, 2:04 PM.  [facility], XR, XR DEXA BONE  DENSITOMETRY (CPT=77080), 4/04/2018, 9:37 AM.     INDICATIONS:    Z78.0 Postmenopausal     PATIENT STATED HISTORY: (As transcribed by Technologist)  Screening.          LUMBAR SPINE ANALYSIS RESULTS:      Bone mineral density (BMD) (g/cm2):  0.979    Lumbar T-Score:  -0.6      % young normals:  94      % age matched controls:  127      Change from prior spine examination:  -2.1%              TOTAL HIP ANALYSIS RESULTS:        Bone mineral density (BMD) (g/cm2):  0.644      Total Hip T-Score:  -2.4      % young normals:  68      % age matched controls:  90      Change from prior hip examination:  -2.1%              FEMORAL NECK ANALYSIS RESULTS:        Bone mineral density (BMD) (g/cm2):  0.571      Femoral neck T-Score:  -2.5      % young normals:  67      % age matched controls:  94      Change from prior hip examination:  -5.4*%            ADDITIONAL FINDINGS:  No significant additional findings.                     Impression   CONCLUSION:  Bone mineral density values for left femoral neck are compatible with the diagnosis of osteoporosis by WHO definition (T score less than -2.5). If therapy is initiated, a follow-up study in 1 year may aid in evaluation of therapeutic  efficacy.           The World Health Organization has defined the following categories based on bone density:  Normal bone:  T-score better than -1  Osteopenia: T-score between -1 and -2.5  Osteoporosis:  T-score less than -2.5        LOCATION:  [id number]              Dictated by (CST): Tony Shukla MD on 6/23/2023 at 1:32 PM      Finalized by (CST): Tony Shukla MD on 6/23/2023 at 1:33 PM         PROCEDURE:  MRI SPINE CERVICAL (CPT=72141)     COMPARISON:  [facility] CT, CT SPINE CERVICAL (CPT=72125), 7/31/2020, 4:30 PM.     INDICATIONS:  M54.16 Radiculopathy, lumbar region     TECHNIQUE:  Multiplanar T1 and T2 weighted images including fat suppression sequences.  Images acquired in sagittal and axial planes.       PATIENT STATED HISTORY:  (As transcribed by Technologist)  Per patient, uncontrollable falling for two months.        FINDINGS:  There is normal cervical lordosis with anatomic alignment.  Vertebral body heights are well-maintained.  Mild to moderate degenerative disc space loss, endplate spurring, and degenerative endplate marrow signal changes scattered in the cervical spine,   most pronounced at C6-7.  No focal worrisome marrow signal abnormality is seen.  Incidental small effusion noted associated with the C1-2 articulation where there are mild erosive arthritic changes noted and mild pannus formation.     The cervical spinal cord has a normal course and caliber.  No focal cord signal abnormality is seen.  No focal mass or fluid collection is seen in the cervical spinal canal.  The paraspinal soft tissues are unremarkable.  The craniocervical junction is   unremarkable.     C2-3: There is a minimal posterior disk osteophyte complex with minimal uncovertebral and facet joint degenerative changes.  There is no significant spinal canal or neural foraminal stenosis.     C3-4: There is a small posterior disk osteophyte complex with mild uncovertebral and facet joint degenerative changes.  There is no significant spinal canal or neural foraminal stenosis.     C4-5: There is a posterior disk osteophyte complex with mild-to-moderate uncovertebral and facet joint degenerative changes.  There is no significant spinal canal stenosis.  There is mild to moderate bilateral neural foraminal stenosis.     C5-6: There is a posterior disk osteophyte complex with mild-to-moderate uncovertebral and facet joint degenerative changes.  There is no significant spinal canal or neural foraminal stenosis.     C6-7: There is a posterior disk osteophyte complex with mild to moderate uncovertebral and facet joint degenerative changes.  There is no significant spinal canal stenosis.  There is mild bilateral neural foraminal stenosis.     C7-T1: There is a small  posterior disk osteophyte complex with minimal uncovertebral and facet joint degenerative changes.  There is no significant spinal canal or neural foraminal stenosis.     Scattered small disc protrusions in the partially imaged upper thoracic spine could be further assessed with dedicated thoracic spine MRI as clinically directed.     =====  CONCLUSION:       1. Multilevel degenerative changes in the cervical spine as above without significant spinal canal stenosis at any level.     2. Mild to moderate bilateral neural foraminal stenosis at C4-5.  Mild bilateral neural foraminal stenosis at C6-7.     3. Incidental small effusion noted associated with the C1-2 articulation where there are mild erosive arthritic changes noted and mild pannus formation.      4. No focal spinal cord signal abnormality identified.     Please see above for further details.         Dictated by (CST): Rodger Roberts MD on 8/26/2020 at 10:33 AM           09/21/2020  X-rays 3 views of the left wrist show a healed Colles' fracture.  There is significant arthritic change at the wrist and carpal metacarpal joints.     X-rays 3 views of the right elbow show a healed radial head and neck fracture.  The joint is well reduced.  Dr. Torre     Labs:  Lab Results   Component Value Date    WBC 5.4 09/25/2024    RBC 2.56 (L) 09/25/2024    HGB 7.8 (L) 09/25/2024    HCT 23.3 (L) 09/25/2024    .0 (L) 09/25/2024    MCV 91.0 09/25/2024    MCH 30.5 09/25/2024    MCHC 33.5 09/25/2024    RDW 14.2 09/25/2024    NEPRELIM 2.64 09/25/2024    NEUTABS 9.36 (H) 03/08/2016    LYMPHABS 0.52 (L) 03/08/2016    EOSABS 0.00 03/08/2016    BASABS 0.00 03/08/2016    NEUT 67 03/08/2016    LYMPH 5 03/08/2016    MON 5 03/08/2016    EOS 0 03/08/2016    BASO 0 03/08/2016    NEPERCENT 48.7 09/25/2024    LYPERCENT 36.4 09/25/2024    MOPERCENT 8.9 09/25/2024    EOPERCENT 5.0 09/25/2024    BAPERCENT 0.6 09/25/2024    NE 2.64 09/25/2024    LYMABS 1.97 09/25/2024    MOABSO 0.48  09/25/2024    EOABSO 0.27 09/25/2024    BAABSO 0.03 09/25/2024     Lab Results   Component Value Date     (H) 09/25/2024    BUN 10 09/25/2024    BUNCREA 26.1 (H) 06/03/2021    CREATSERUM 0.62 09/25/2024    ANIONGAP 6 09/25/2024    GFR 70 12/07/2017    GFRNAA 58 (L) 06/13/2022    GFRAA 67 06/13/2022    CA 8.9 09/25/2024    OSMOCALC 297 (H) 09/25/2024    ALKPHO 50 (L) 09/22/2024    AST 26 09/22/2024    ALT 22 09/22/2024    BILT 0.2 09/22/2024    TP 6.3 09/22/2024    ALB 3.9 09/22/2024    GLOBULIN 2.4 09/22/2024    AGRATIO 2.2 06/15/2015     09/25/2024    K 3.2 (L) 09/25/2024     09/25/2024    CO2 28.0 09/25/2024       Additional Labs:  04/02/2024  DARREN 1: 80 homogenous  dsDNA negative  SSA, SSB, Smith, U1 RNP, RNP 70, centromere, SCL 70, Kathy 1 negative  C1q immune complex negative  C3 109 normal  SPEP with no monoclonal protein seen on electrophoresis  Lupus anticoagulant positive  Beta-2 glycoprotein and ACL negative  ESR 7 normal  CRP 1.15 elevated  IgG subclasses normal    03/26/2024  DARREN screen positive  RF negative  ESR 9 normal  CRP normal  C ANCA negative  P ANCA negative  Atypical p-ANCA negative  dsDNA 5 borderline  SM/RNP 3.1 positive  Sanches, SSA, SSB, centromere, SCL 70, chromatin, ribosomal P, RNP, Kathy 1 negative    02/2024  IgG 542 low  IgA, IgM normal    07/2021  Uric acid 5.6  CRP normal  ESR 15 normal    11/2018  Uric acid 7.0  CCP normal   RF 14 (N)  DARREN negative     10/2018  Vit D 48.7  Vit B12 373  Hep C nonreactive  CRP 0.46 (N)  RF 15 (N<15)  ESR 36 (given age and sex, this is normal)     Makayla Farley, DO  [facility]  11/12/2024

## 2024-11-21 ENCOUNTER — PATIENT OUTREACH (OUTPATIENT)
Dept: CASE MANAGEMENT | Age: 79
End: 2024-11-21

## 2024-11-22 ENCOUNTER — NURSE TRIAGE (OUTPATIENT)
Dept: INTERNAL MEDICINE CLINIC | Facility: CLINIC | Age: 79
End: 2024-11-22

## 2024-11-22 ENCOUNTER — APPOINTMENT (OUTPATIENT)
Dept: HEMATOLOGY/ONCOLOGY | Facility: HOSPITAL | Age: 79
End: 2024-11-22
Attending: INTERNAL MEDICINE
Payer: MEDICARE

## 2024-11-22 NOTE — TELEPHONE ENCOUNTER
Action Requested: Summary for Provider     []  Critical Lab, Recommendations Needed  [x] Need Additional Advice  []   FYI    []   Need Orders  [] Need Medications Sent to Pharmacy  []  Other     SUMMARY: Patient called to make an appointment with Michelle due to her elevated blood pressure.  187/100, 157/90.  +H/A & +blurred vision.  Patient refused to go to ED.  Wants to see Michelle, made an appointment for 12/13/24.  States Michelle is aware of her elevated blood pressure.    Patient called to report elevated blood pressure and that she has been told by multiple providers (Hematologist & rheumatologist) suggested with follow up with her PCP.  She currently takes amlodipine as directed.  She reports headache and blurred vision however she contributes those symptoms to working too many hours at the computer and the weather change.  It was recommended she go to ED, she refuses stating \"I'm using my common sense\".  Instructed to continue taking home blood pressure and to keep a log to bring to her appointment.  Instructed if symptoms worsen to go to ED or call the office.  Patient verbalizes understanding.    Michelle - any other recommendations?        Reason for call: Blood Pressure  Onset: Data Unavailable        Reason for Disposition   Systolic BP >= 160 OR Diastolic >= 100, and any cardiac (e.g., breathing difficulty, chest pain) or neurologic symptoms (e.g., new-onset blurred or double vision)    Protocols used: Blood Pressure - High-A-OH

## 2024-11-22 NOTE — TELEPHONE ENCOUNTER
Called patient and relayed below instructions. Scheduled appt. Patient verbalizes understanding     Future Appointments   Date Time Provider Department Center   11/22/2024  2:00 PM [facility] RN3 [facility]   11/27/2024 10:30 AM Monica Curran APRN [facility]   12/13/2024  2:40 PM Dunia Watt, PACIRA [facility] [facility]   2/24/2025  2:00 PM Zac Christian MD [facility]   7/15/2025  1:00 PM Makayla Farley,  [facility]

## 2024-11-22 NOTE — TELEPHONE ENCOUNTER
Confirm amlodipine 2.5mg  if yes, increase to 5mg and if needed over the weekend take 7.5mg.  see me Wednesday at 10:30 or 2:30.  Bring cuff.   Send or call with bp readings on Monday.

## 2024-11-25 NOTE — PROGRESS NOTES
Rayne Morales is a 79 year old female.    Chief Complaint   Patient presents with    Blood Pressure     Room # 11 KB    Care Gap Management     Eye exam- Dr. Escamilla;colonoscopy-next year;flu shot;shingles         HPI:   here for evaluation of her blood pressure.  See TE from last week.  BP was quite elevated.  Was directed to ED and she deferred.  Asked to increase amlodipine to 5mg and possibly 7.5mg until ov today.  Other BP medications include lisinopril 20mg.  No new meds or supplments.   Her BP at Dr Almanzar office 11/12 was 188/80  last ov here end of Sept her bp was 138/64   Today her bp is stable  She is currently taking lisinopril 20mg with amlodipine 2.5mg  her morning bp is stable but high by afternoon.    Repeat today 122/72.  She will opt for amlodipine 2.5mg bid and monitor  she unfortunately did not bring her cuff to the visit today.       She is experiencing excessive fatigue from low iron.    Hx DVT/PE  hematology  She established care with [facility] due to some issues she was having with EHOG.  We decided it was better to \"part ways\"    she had an ov with them on Monday with Dr Jacobson.   Receiving IV iron per them.        Common variable immunodeficiency  home injectable through her port.  Dr Paiz  doing well.  No recent illness.    Diabetes  metformin ozempic.      CAD  follows with Dr Lawrence.   She still wants a knee replacement with Dr Torre.   Depression  I do not feel her fatigue is related to depression but will follow closely.     Patient Active Problem List   Diagnosis    Fatigue    Primary hypertension    Pure hypercholesterolemia    Anxiety    Age-related osteoporosis without current pathological fracture    B12 deficiency    Spondylosis of lumbosacral region    Sacroiliac joint dysfunction    History of syncope    History of pulmonary embolism    Asthma (HCC)    Cyst of brain    History of basal cell carcinoma    History of pneumonia, recurrent    Thrombocytopenia  (HCC)    Gastroesophageal reflux disease without esophagitis    Enlarged thyroid    History of left breast cancer    Carpal tunnel syndrome of left wrist    Atherosclerosis of aorta (HCC)    MUKUND on CPAP    Chronic pain syndrome    Tremor of both hands    Migraine without aura or status migrainosus    Major depressive disorder, recurrent episode, moderate (HCC)    Coronary artery disease involving native coronary artery of native heart without angina pectoris    Visual impairment    S/P cervical spinal fusion    Neuropathy    Common variable immunodeficiency (HCC)    History of DVT (deep vein thrombosis)    Hyperlipidemia    Cervical spinal stenosis    Cervical radiculopathy    PHILLIPS (dyspnea on exertion)    Syncope    Type 2 diabetes mellitus without complication, without long-term current use of insulin (HCC)    Anemia, unspecified type    Primary osteoarthritis of right knee    Liver mass    Abnormal CT of the abdomen    Positive DARREN (antinuclear antibody)    Hyponatremia    Leukocytosis    Hypoxia    Frail elderly    Epistaxis    Hypercoagulable state (HCC)     Current Outpatient Medications   Medication Sig Dispense Refill    amLODIPine 2.5 MG Oral Tab Take 1 tablet (2.5 mg total) by mouth in the morning and 1 tablet (2.5 mg total) before bedtime. 180 tablet 1    famciclovir 500 MG Oral Tab 3 tabs at onset of symptoms.  Repeat with each exacerbation. 21 tablet 0    rivaroxaban (XARELTO) 20 MG Oral Tab Take 0.5 tablets (10 mg total) by mouth daily.      pregabalin 100 MG Oral Cap Take 1 capsule (100 mg total) by mouth in the morning and 1 capsule (100 mg total) at noon and 1 capsule (100 mg total) in the evening. 270 capsule 0    lidocaine 5 % External Patch Apply 1 patch to skin once a day as needed      immune globulin, human, (GAMMAGARD) 10 g/100mL Injection Solution Inject 30 g into the vein.      LISINOPRIL 20 MG Oral Tab TAKE 1 TABLET BY MOUTH DAILY 90 tablet 0    fluticasone-salmeterol 500-50 MCG/ACT  Inhalation Aerosol Powder, Breath Activated Inhale 1 puff into the lungs 2 (two) times daily.      ondansetron (ZOFRAN) 4 mg tablet Take 1 tablet (4 mg total) by mouth every 8 (eight) hours as needed for Nausea. 20 tablet 0    DULoxetine 60 MG Oral Cap DR Particles Take 1 capsule (60 mg total) by mouth daily. TO TAKE ALONG WITH THE 30 MG TO EQUAL 90 MG DAILY. 90 capsule 3    DULoxetine 30 MG Oral Cap DR Particles Take 1 capsule (30 mg total) by mouth daily. TO TAKE ALONG WITH THE 60 MG TO EQUAL 90 MG DAILY. 90 capsule 3    pantoprazole 40 MG Oral Tab EC Take 1 tablet (40 mg total) by mouth before breakfast. 90 tablet 3    ROSUVASTATIN 10 MG Oral Tab Take 1 tablet (10 mg total) by mouth nightly. 90 tablet 3    semaglutide (OZEMPIC, 0.25 OR 0.5 MG/DOSE,) 2 MG/3ML Subcutaneous Solution Pen-injector Inject 0.5 mg into the skin once a week. 9 mL 1    metFORMIN  MG Oral Tablet 24 Hr Take 2 tablets (1,000 mg total) by mouth daily with breakfast and 1 tablet (500 mg total) every evening. 270 tablet 3    oxyCODONE-acetaminophen  MG Oral Tab Take 1 tablet by mouth every 4 (four) hours as needed for Pain (takes at home for back pain).      Naloxone HCl 4 MG/0.1ML Nasal Liquid 4 mg by Nasal route as needed. If patient remains unresponsive, repeat dose in other nostril 2-5 minutes after first dose. 1 kit 0    albuterol 108 (90 Base) MCG/ACT Inhalation Aero Soln Inhale 2 puffs into the lungs every 6 (six) hours as needed for Wheezing. 1 each 3    aspirin 81 MG Oral Chew Tab Chew 1 tablet (81 mg total) by mouth daily.      Fluticasone Propionate 50 MCG/ACT Nasal Suspension 1 spray by Nasal route 2 (two) times daily. 1 Bottle 3    Cholecalciferol (VITAMIN D) 2000 units Oral Tab Take 2,000 Units by mouth daily.        Past Medical History:    Abdominal pain    Anemia    Anxiety    Arrhythmia    Arthritis    Asthma (HCC)    Back pain    Back problem    Black stools    Bloating    Blurred vision    Breast CA (HCC)    left  lump and rad.    Cancer (HCC)    breast, basal cell    Cervical radiculopathy    Cervical spinal stenosis    Deep vein thrombosis (HCC)    Depression    Diabetes (HCC)    Diabetes mellitus (HCC)    Diarrhea, unspecified    Dizziness    Easy bruising    On Xeralto    Esophageal reflux    Exposure to medical diagnostic radiation    Fatigue    Fitting and adjustment of vascular catheter    Headache disorder    Hearing loss    Heart attack (HCC)    Heartburn    Hemorrhoids    High blood pressure    High cholesterol    History of blood clots    History of COVID-19    not hospitalized, no continued symptoms, cough, fever, loss of taste    History of depression    History of syncope    IgG deficiency (HCC)    ??? pt states she gets IgG infusions due to lack of IgG, unable to fight off infections    Indigestion    Leaking of urine    Migraines    Muscle weakness    uses cane    Neuropathy    rt foot bilateral hands    Osteoarthritis    Pain in joints    Pneumonia due to organism    Pulmonary embolism (HCC)    Shortness of breath    Sleep apnea    cpap - not using - machine is broken    Stress    Uncomfortable fullness after meals    Visual impairment    glasses/contacts    Weight gain      Social History:  Social History     Socioeconomic History    Marital status:    Tobacco Use    Smoking status: Former     Current packs/day: 0.00     Types: Cigarettes     Quit date: 1968     Years since quittin.9    Smokeless tobacco: Never   Vaping Use    Vaping status: Never Used   Substance and Sexual Activity    Alcohol use: No     Comment: rare    Drug use: Not Currently     Types: Cannabis     Comment: uses cream for pain to back/neck with barometer changes.    Sexual activity: Not Currently     Partners: Male   Other Topics Concern    Caffeine Concern Yes     Comment: half of cup of coke daily    Sleep Concern No    Exercise Yes     Comment: walking    Seat Belt Yes     Social Drivers of Health     Financial  Resource Strain: Low Risk  (10/25/2023)    Financial Resource Strain     Difficulty of Paying Living Expenses: Not hard at all     Med Affordability: No   Food Insecurity: No Food Insecurity (9/22/2024)    Food Insecurity     Food Insecurity: Never true   Transportation Needs: No Transportation Needs (9/22/2024)    Transportation Needs     Lack of Transportation: No   Physical Activity: Inactive (11/8/2019)    [facility]    Exercise Vital Sign     Days of Exercise per Week: 0 days     Minutes of Exercise per Session: 0 min   Stress: No Stress Concern Present (10/25/2023)    Stress     Feeling of Stress : No   Social Connections: Socially Integrated (10/25/2023)    Social Connections     Frequency of Socialization with Friends and Family: 2   Housing Stability: Low Risk  (9/22/2024)    Housing Stability     Housing Instability: No     Family History   Problem Relation Age of Onset    Heart Attack Paternal Grandfather     Heart Attack Paternal Grandmother     Other (CAD) Father     Other (CAD) Mother     Hypertension Mother     Heart Attack Mother     Other (CAD) Brother     Hypertension Brother     Heart Attack Brother     Stroke Maternal Grandfather         Stroke    Stroke Maternal Grandmother         Stroke    Breast Cancer Self 54        Allergies  Allergies[1]    REVIEW OF SYSTEMS:   GENERAL HEALTH: as above   RESPIRATORY: denies shortness of breath with exertion, no cough  CARDIOVASCULAR: denies chest pain on exertion, no palpatations  GI: denies abdominal pain and denies heartburn, no diarrhea or constipation  MUSCULOSKELETAL:  No arthralgias or myalgias  NEURO: denies headaches,     EXAM:   /72   Pulse 82   Resp 18   Ht 5' 1\" (1.549 m)   Wt 151 lb (68.5 kg)   LMP  (LMP Unknown)   SpO2 96%   BMI 28.53 kg/m²   GENERAL: well developed, well nourished,in no apparent distress  LUNGS: normal rate without respiratory distress, lungs clear to  auscultation  CARDIO: RRR   GI: normal bowel sounds, no masses, HSM or tenderness  EXTREMITIES: no edema, normal strength and tone  PSYCH: alert and oriented x 3    ASSESSMENT AND PLAN:     Encounter Diagnoses   Name Primary?    Primary hypertension  bp stable today with lisinopril 20mg and amlodipine 2.5mg  will increase amlodipien to 2.5mg bid to help with reported high bp in afternoon.  She will send readings in a few weeks.  Yes    History of pulmonary embolism  now per Dr Indira Ochoa.  Xarelto 10mg.       Common variable immunodeficiency (HCC)  per DR Boyd  having infusions at home.      Type 2 diabetes mellitus without complication, without long-term current use of insulin (HCC)  metformin and ozempic  stable       Anemia, iron deficiency.  Iv iron pe rhematology     Fatigue, unspecified type  multifactorial  monitor.      Major depressive disorder, recurrent episode, moderate (HCC)  stable  montior.         No orders of the defined types were placed in this encounter.      Meds & Refills for this Visit:  Requested Prescriptions     Signed Prescriptions Disp Refills    amLODIPine 2.5 MG Oral Tab 180 tablet 1     Sig: Take 1 tablet (2.5 mg total) by mouth in the morning and 1 tablet (2.5 mg total) before bedtime.    famciclovir 500 MG Oral Tab 21 tablet 0     Sig: 3 tabs at onset of symptoms.  Repeat with each exacerbation.       Imaging & Consults:  None    No follow-ups on file.  There are no Patient Instructions on file for this visit.         [1]   Allergies  Allergen Reactions    Bactrim HIVES and OTHER (SEE COMMENTS)    Ciprofloxacin HIVES    Mold REACTIVE AIRWAY DISEASE     Mold and smut    Sulfa Antibiotics HIVES     Bactrim (Sulfamethoxazole/TMP)    Diovan [Valsartan] NAUSEA AND VOMITING    Omnicef NAUSEA ONLY and DIZZINESS    Atorvastatin OTHER (SEE COMMENTS) and UNKNOWN    Clindamycin DIARRHEA and NAUSEA ONLY    Green Pepper OTHER (SEE COMMENTS)     Sensitivity (all peppers - green, orange and  red)    Lipitor [Atorvastatin Calcium] OTHER (SEE COMMENTS)     Leg cramping, sensitivity    Pneumococcal Vaccines OTHER (SEE COMMENTS)     Pt states she had pneumo vax done at Dr. Boyd on 1/10/24- states developed redness, swelling,itching to the arm    Quinapril Hcl FATIGUE

## 2024-11-27 ENCOUNTER — OFFICE VISIT (OUTPATIENT)
Dept: INTERNAL MEDICINE CLINIC | Facility: CLINIC | Age: 79
End: 2024-11-27
Payer: MEDICARE

## 2024-11-27 VITALS
RESPIRATION RATE: 18 BRPM | HEIGHT: 61 IN | SYSTOLIC BLOOD PRESSURE: 122 MMHG | HEART RATE: 82 BPM | OXYGEN SATURATION: 96 % | WEIGHT: 151 LBS | DIASTOLIC BLOOD PRESSURE: 72 MMHG | BODY MASS INDEX: 28.51 KG/M2

## 2024-11-27 DIAGNOSIS — F33.1 MAJOR DEPRESSIVE DISORDER, RECURRENT EPISODE, MODERATE (HCC): Chronic | ICD-10-CM

## 2024-11-27 DIAGNOSIS — D83.9 COMMON VARIABLE IMMUNODEFICIENCY (HCC): ICD-10-CM

## 2024-11-27 DIAGNOSIS — D50.9 IRON DEFICIENCY ANEMIA, UNSPECIFIED IRON DEFICIENCY ANEMIA TYPE: ICD-10-CM

## 2024-11-27 DIAGNOSIS — Z86.711 HISTORY OF PULMONARY EMBOLISM: ICD-10-CM

## 2024-11-27 DIAGNOSIS — R53.83 FATIGUE, UNSPECIFIED TYPE: ICD-10-CM

## 2024-11-27 DIAGNOSIS — I10 PRIMARY HYPERTENSION: Primary | ICD-10-CM

## 2024-11-27 DIAGNOSIS — E11.9 TYPE 2 DIABETES MELLITUS WITHOUT COMPLICATION, WITHOUT LONG-TERM CURRENT USE OF INSULIN (HCC): ICD-10-CM

## 2024-11-27 PROCEDURE — G2211 COMPLEX E/M VISIT ADD ON: HCPCS | Performed by: NURSE PRACTITIONER

## 2024-11-27 PROCEDURE — 99214 OFFICE O/P EST MOD 30 MIN: CPT | Performed by: NURSE PRACTITIONER

## 2024-11-27 RX ORDER — FAMCICLOVIR 500 MG/1
TABLET ORAL
Qty: 21 TABLET | Refills: 0 | Status: SHIPPED | OUTPATIENT
Start: 2024-11-27

## 2024-11-27 RX ORDER — LISINOPRIL 20 MG/1
20 TABLET ORAL DAILY
Qty: 90 TABLET | Refills: 3 | Status: SHIPPED | OUTPATIENT
Start: 2024-11-27

## 2024-11-27 RX ORDER — AMLODIPINE BESYLATE 2.5 MG/1
2.5 TABLET ORAL 2 TIMES DAILY
Qty: 180 TABLET | Refills: 1 | Status: SHIPPED | OUTPATIENT
Start: 2024-11-27

## 2024-11-27 NOTE — TELEPHONE ENCOUNTER
Refill passed per [facility] protocol.    Please review pended refill request as unable to refill due to high/very high drug interaction warning copied here:     High  Allergy/Contraindication: lisinoprilReactions: FATIGUE. Reaction type: Side effect. User documented allergy severity: Low.  Level 2 with QUINAPRIL HCL (Class: ACE INHIBITORS).    Requested Prescriptions   Pending Prescriptions Disp Refills    LISINOPRIL 20 MG Oral Tab [Pharmacy Med Name: LISINOPRIL 20MG TABLETS] 90 tablet 0     Sig: TAKE 1 TABLET BY MOUTH DAILY       Hypertension Medications Protocol Passed - 11/27/2024  1:02 PM        Passed - CMP or BMP in past 12 months        Passed - Last BP reading less than 140/90     BP Readings from Last 1 Encounters:   11/27/24 122/72               Passed - In person appointment or virtual visit in the past 12 mos or appointment in next 3 mos     Recent Outpatient Visits              Today Primary hypertension    [address] Monica Curran APRN    Office Visit    2 weeks ago Primary osteoarthritis involving multiple joints    [facility] Makayla Farley DO    Office Visit    2 months ago Epistaxis    [address] Monica Curran APRN    Office Visit    3 months ago Community acquired pneumonia, unspecified laterality    [address] Monica Curran APRN    Office Visit    3 months ago Scar condition and fibrosis of skin    [facility], MD    Office Visit          Future Appointments         Provider Department Appt Notes    In 2 months Zac Christian MD [facility]     In 7 months Makayla Farley DO [facility]                    Passed - EGFRCR or GFRNAA > 50     GFR Evaluation  EGFRCR: 92 , resulted on 9/25/2024             Future Appointments          Provider Department Appt Notes    In 2 months Zac Christian MD [facility]     In 7 months Makayla Farley DO [facility]          Recent Outpatient Visits              Today Primary hypertension    [facility], [address] [place] [address] Monica Curran, JUAREZ    Office Visit    2 weeks ago Primary osteoarthritis involving multiple joints    [facility] Makayla Farley DO    Office Visit    2 months ago Epistaxis    [address] Monica Curran, APRCECILE    Office Visit    3 months ago Community acquired pneumonia, unspecified laterality    [address] Monica Curran, APRCECILE    Office Visit    3 months ago Scar condition and fibrosis of skin    [facility], Zac Shaw MD    Office Visit

## 2024-12-20 ENCOUNTER — PATIENT OUTREACH (OUTPATIENT)
Dept: CASE MANAGEMENT | Age: 79
End: 2024-12-20

## 2024-12-20 DIAGNOSIS — E11.9 TYPE 2 DIABETES MELLITUS WITHOUT COMPLICATION, WITHOUT LONG-TERM CURRENT USE OF INSULIN (HCC): ICD-10-CM

## 2024-12-20 DIAGNOSIS — E78.00 PURE HYPERCHOLESTEROLEMIA: ICD-10-CM

## 2024-12-20 DIAGNOSIS — F33.1 MAJOR DEPRESSIVE DISORDER, RECURRENT EPISODE, MODERATE (HCC): ICD-10-CM

## 2024-12-20 DIAGNOSIS — I10 PRIMARY HYPERTENSION: ICD-10-CM

## 2024-12-20 DIAGNOSIS — E78.5 HYPERLIPIDEMIA, UNSPECIFIED HYPERLIPIDEMIA TYPE: Primary | ICD-10-CM

## 2024-12-20 NOTE — TELEPHONE ENCOUNTER
Triage: Good Day :)     Pt see's Michelle Magdy she is requesting refill on Zofran sent to [place] on file.     Please address and thank you in advance!

## 2024-12-20 NOTE — PROGRESS NOTES
Spoke to Rayne for Chronic Care Management.      Updates to patient care team/comments: yes, added and updated.   Patient reported changes in medications: together we reviewed with no updates.   Med Adherence  Comment: pt reports taking all medications as prescribed.       Health Maintenance:   Reviewed with pt    Health Maintenance   Topic Date Due    Zoster Vaccines (1 of 2) Never done- unsure     Diabetes Care Dilated Eye Exam  01/09/2021- reminded     Colorectal Cancer Screening  02/27/2024- reminded     COVID-19 Vaccine (4 - 2024-25 season) 09/01/2024- unsure     Influenza Vaccine (1) 10/01/2024- unsure     Diabetes Care Foot Exam  01/22/2025    Diabetes Care A1C  02/09/2025    Diabetes Care: Microalb/Creat Ratio  04/08/2025    Annual Physical  05/02/2025    Diabetes Care: GFR  09/25/2025    DEXA Scan  Completed    Annual Depression Screening  Completed    Fall Risk Screening (Annual)  Completed    Pneumococcal Vaccine: 65+ Years  Completed    Mammogram  Discontinued     Discussed the importance of compliance.     Patient updates/concerns:   Listened to pt and provided support.     Using cane to help steady herself.     Needs refill on Zofran.   Telephone encounter sent to [facility]    Per inpatient and previous hematologist notes--  - here for follow up; she decided to not transition her care to a new hematologist  - she is now receiving IVIG replacement with Dr Boyd and says she feels like a new woman  - she had prior episodes of pneumonia since she last saw me  - tolerating xarelto without issues  - had appendicitis s/p appendectomy in 2/2024, path was negative for malignancy    Oncology/Hematology   Recent visit with JUAREZ Torres.  Per note:  Impression & Plan:  Recurrent VTE/PE  - previous plans were to continue lifelong anticoagulation with rivaroxaban. Last PE was in 2014.   She ended up admitted at [facility] with epistaxis that was very difficult to  control    Anemia  Thrombocytopenia  May have been due to bone marrow suppression in setting of prior infections. Hemoglobin and platelet count pending today  Iron studies 11/19/24: Serum iron-27, saturation percent-7, ferritin-23.3    Vit B12 deficiency  On vit B12 injections. Vitamin b12 level normal today.    Hx breast cancer  - 1999 s/p lumpectomy/RT + 5 years endocrine therapy. Since then no evidence of recurrence  - continue annual bilateral mammograms    Hx Iron deficiency  - iron levels today pending. Recent marked blood loss noted.   - colonoscopy by Dr Bass on 2/2023 showed polyps, grade II internal hemorrhoids. She is due for her colonoscopy, was supposed to receive it 2/2024  - IV iron ordered today, 11/25/24    Plan  Extensive discussion previously as to the risk to benefit ratio of resuming anticoagulation  Hold ASA as of 11/19/24  Continue Xarelto 10 mg daily, resumed 11/24/24. Rationale for this reviewed previously  CBC to be done on Friday, 11/29/24  Iron deficiency anemia- Serum iron-27, saturation percent-7, ferritin-23.3. Order placed for IV Feraheme, message sent to referral team for insurance auth. Patient aware infusion nurses will call her when authorized to schedule IV iron infusions  Phone follow up visit in 1 week, scheduled 12/5/24 with Dr. Jacobson    Diabetes  Voices compliance.   HGBA1C:    Lab Results   Component Value Date    A1C 6.9 (H) 08/09/2024    A1C 6.9 (H) 04/08/2024    A1C 6.8 (H) 03/28/2024     (H) 08/09/2024       She is watching her diet and voices medication compliance.     Goals/Action Plan:    Active goal from previous outreach: Improve energy level and fatigue.  Weight management.    Watching her portion intake but states she only usually eats one meal due to Ozempic.        Patient reported progress toward goal: ongoing                - What: improve energy levels and fatigue so she can better manage her weight            - Where/When/How: pt plans on eating  smaller meals throughout the day to help with appetite.  She'd like to do some walking for better stamina.       Patient Reported New Barriers And Concerns:   States she is always tired has no energy.  IgG.                    - Plan for overcoming all barriers: second opinion on IgG.    Care Managers Interventions:   Encouraged patient:   Self care: Take the time to do the things you love.   Nutrition:  Good nutrition helps us to maintain our weight, fight off infection, and help reduce the risk of developing other chronic issues.   Physical activity:  Physical activity is important to help maintain independence and improve quality of life.    Future Appointments:   Future Appointments   Date Time Provider Department Center   2/24/2025  2:00 PM Zac Christian MD [facility]   7/15/2025  1:00 PM Makayla Farley DO [facility]         Next Care Manager Follow Up Date: in 1 month.     Reason For Follow Up: review progress and or barriers towards patient's goals.     Time Spent This Encounter Total: 29 min medical record review, telephone communication, care plan updates where needed, education, goals, and action plan recreation/update. Provided acknowledgment and validation to patient's concerns.   Monthly Minute Total including today: 29  Physical assessment, complete health history, and need for CCM established by Jose Daniel Washington MD.

## 2024-12-26 NOTE — TELEPHONE ENCOUNTER
Please review. Protocol Failed; No Protocol    Medication is listed as patient reported.     Requested Prescriptions   Pending Prescriptions Disp Refills    ondansetron (ZOFRAN) 4 mg tablet 30 tablet 3     Sig: Take 1 tablet (4 mg total) by mouth every 8 (eight) hours as needed for Nausea.       There is no refill protocol information for this order            Future Appointments         Provider Department Appt Notes    In 2 months Zac Christian MD [facility]     In 6 months Makayla Farley DO [facility]          Recent Outpatient Visits              4 weeks ago Primary hypertension    [address] Monica Curran APRN    Office Visit    1 month ago Primary osteoarthritis involving multiple joints    [facility] Makayla Farley DO    Office Visit    3 months ago Epistaxis    [address] Monica Curran APRN    Office Visit    4 months ago Community acquired pneumonia, unspecified laterality    [address] Monica Curran APRN    Office Visit    4 months ago Scar condition and fibrosis of skin    [facility], Zac Shaw MD    Office Visit

## 2024-12-27 RX ORDER — ONDANSETRON 4 MG/1
4 TABLET, FILM COATED ORAL EVERY 8 HOURS PRN
Qty: 30 TABLET | Refills: 0 | Status: SHIPPED | OUTPATIENT
Start: 2024-12-27

## 2025-01-22 ENCOUNTER — TELEPHONE (OUTPATIENT)
Dept: INTERNAL MEDICINE CLINIC | Facility: CLINIC | Age: 80
End: 2025-01-22

## 2025-01-22 NOTE — TELEPHONE ENCOUNTER
Right knee surgery with Dr. Drake Torre on 3/5/2025      Forms in red folder     Future Appointments   Date Time Provider Department Center   2/13/2025 10:30 AM Monica Curran APRN [facility]

## 2025-01-24 ENCOUNTER — PATIENT OUTREACH (OUTPATIENT)
Dept: CASE MANAGEMENT | Age: 80
End: 2025-01-24

## 2025-01-26 NOTE — PROCEDURES
[address] [address],[facility]       Accredited by the [facility] ([facility])    PATIENT'S NAME:        Yasmine Bee  ATTENDING PHYSICIAN:   Joana Leon M.D. REFERRING PHYSICIAN:   Asif Taylor.  Callum Thurston MD Patient has  newly diagnosed  atrial fibrillation last month during previous hospitalization for pneumonia. Patient is currently in sinus rhythm. [id number] Score: 4. The patients heart rate in the last 24 hours is as follows:  Pulse  Min: 56  Max: 113     Antiarrhythmics  -Metoprolol 50 mg b.i.d.    Anticoagulants  -Eliquis 5 mg b.i.d. prescribed     Plan  -Replete lytes with a goal of K>4, Mg >2  -Patient is anticoagulated, see medications listed above.  -Patient's afib is currently controlled  -Continued metoprolol  -Hold Eliquis   Obtain collateral regarding prior adverse effects regarding previous prescription for eliquis  Cardiology recommendations resuming eliquis 2.5mg bid if no contraindications  Will resume after thoracentesis and monitor      above 90% while on CPAP. PERIODIC LIMB MOVEMENT:  PLM index is 6. PLM arousal index is 0. EEG:  With the limited recording montage, no EEG abnormalities were detected. EKG: The EKG demonstrates sinus rhythm.     IMPRESSION:  Unsuccessful CPAP ti

## 2025-02-12 PROBLEM — R04.0 EPISTAXIS: Status: RESOLVED | Noted: 2024-09-21 | Resolved: 2025-02-12

## 2025-02-12 PROBLEM — R09.02 HYPOXIA: Status: RESOLVED | Noted: 2024-08-09 | Resolved: 2025-02-12

## 2025-02-12 PROBLEM — E87.1 HYPONATREMIA: Status: RESOLVED | Noted: 2024-08-08 | Resolved: 2025-02-12

## 2025-02-12 PROBLEM — D72.829 LEUKOCYTOSIS: Status: RESOLVED | Noted: 2024-08-08 | Resolved: 2025-02-12

## 2025-02-14 NOTE — PROGRESS NOTES
NURSING DISCHARGE NOTE    Discharged Home via Wheelchair.  Accompanied by RN  Belongings Taken by patient/family.    Patient's VSS, tolerating diet, voiding adequately, pain controlled, tolerating ambulating, Discharge education provided, reviewed medications and follow up appointments. All questions answered and concerns addressed, patient verbalized understanding. Patient discharged in stable condition.    left arm/done Yes

## 2025-02-17 ENCOUNTER — LABORATORY ENCOUNTER (OUTPATIENT)
Dept: LAB | Age: 80
End: 2025-02-17
Attending: ORTHOPAEDIC SURGERY
Payer: MEDICARE

## 2025-02-17 ENCOUNTER — EKG ENCOUNTER (OUTPATIENT)
Dept: LAB | Age: 80
End: 2025-02-17
Attending: ORTHOPAEDIC SURGERY
Payer: MEDICARE

## 2025-02-17 DIAGNOSIS — I25.10 CORONARY ARTERY DISEASE INVOLVING NATIVE CORONARY ARTERY OF NATIVE HEART WITHOUT ANGINA PECTORIS: ICD-10-CM

## 2025-02-17 DIAGNOSIS — E78.5 HYPERLIPIDEMIA, UNSPECIFIED HYPERLIPIDEMIA TYPE: ICD-10-CM

## 2025-02-17 DIAGNOSIS — Z01.818 PRE-OP TESTING: ICD-10-CM

## 2025-02-17 DIAGNOSIS — E11.9 TYPE 2 DIABETES MELLITUS WITHOUT COMPLICATION, WITHOUT LONG-TERM CURRENT USE OF INSULIN (HCC): ICD-10-CM

## 2025-02-17 DIAGNOSIS — I10 PRIMARY HYPERTENSION: ICD-10-CM

## 2025-02-17 LAB
ANION GAP SERPL CALC-SCNC: 15 MMOL/L (ref 0–18)
ANTIBODY SCREEN: NEGATIVE
APTT PPP: 31 SECONDS (ref 23–36)
BUN BLD-MCNC: 28 MG/DL (ref 9–23)
CALCIUM BLD-MCNC: 9.7 MG/DL (ref 8.7–10.6)
CHLORIDE SERPL-SCNC: 97 MMOL/L (ref 98–112)
CO2 SERPL-SCNC: 22 MMOL/L (ref 21–32)
CREAT BLD-MCNC: 1.7 MG/DL
EGFRCR SERPLBLD CKD-EPI 2021: 31 ML/MIN/1.73M2 (ref 60–?)
ERYTHROCYTE [DISTWIDTH] IN BLOOD BY AUTOMATED COUNT: 17 %
GLUCOSE BLD-MCNC: 147 MG/DL (ref 70–99)
HCT VFR BLD AUTO: 42.5 %
HGB BLD-MCNC: 14.1 G/DL
INR BLD: 1.43 (ref 0.8–1.2)
MCH RBC QN AUTO: 29.6 PG (ref 26–34)
MCHC RBC AUTO-ENTMCNC: 33.2 G/DL (ref 31–37)
MCV RBC AUTO: 89.3 FL
OSMOLALITY SERPL CALC.SUM OF ELEC: 286 MOSM/KG (ref 275–295)
PLATELET # BLD AUTO: 149 10(3)UL (ref 150–450)
POTASSIUM SERPL-SCNC: 4.5 MMOL/L (ref 3.5–5.1)
PROTHROMBIN TIME: 17.6 SECONDS (ref 11.6–14.8)
RBC # BLD AUTO: 4.76 X10(6)UL
RH BLOOD TYPE: NEGATIVE
SODIUM SERPL-SCNC: 134 MMOL/L (ref 136–145)
WBC # BLD AUTO: 6.8 X10(3) UL (ref 4–11)

## 2025-02-17 PROCEDURE — 80048 BASIC METABOLIC PNL TOTAL CA: CPT

## 2025-02-17 PROCEDURE — 84439 ASSAY OF FREE THYROXINE: CPT

## 2025-02-17 PROCEDURE — 86850 RBC ANTIBODY SCREEN: CPT

## 2025-02-17 PROCEDURE — 84443 ASSAY THYROID STIM HORMONE: CPT

## 2025-02-17 PROCEDURE — 87081 CULTURE SCREEN ONLY: CPT

## 2025-02-17 PROCEDURE — 86900 BLOOD TYPING SEROLOGIC ABO: CPT

## 2025-02-17 PROCEDURE — 80061 LIPID PANEL: CPT

## 2025-02-17 PROCEDURE — 85730 THROMBOPLASTIN TIME PARTIAL: CPT

## 2025-02-17 PROCEDURE — 85610 PROTHROMBIN TIME: CPT

## 2025-02-17 PROCEDURE — 85027 COMPLETE CBC AUTOMATED: CPT

## 2025-02-17 PROCEDURE — 86901 BLOOD TYPING SEROLOGIC RH(D): CPT

## 2025-02-17 PROCEDURE — 36415 COLL VENOUS BLD VENIPUNCTURE: CPT

## 2025-02-17 NOTE — PROGRESS NOTES
[facility]  PRE OP RISK ASSESSMENT    REASON FOR CONSULT: Pre-op risk assessment for surgical procedure right total knee arthroplasty planned for 3/5/25 with Dr Torre .    REQUESTING PHYSICIAN: Dr Torre    CHIEF COMPLAINT:   Chief Complaint   Patient presents with    Pre-Op Exam     ES rm - 10 - R knee surgery with Dr. Torre on 3/5/25       HISTORY OF PRESENT ILLNESS:    The patient is a 79 year old  female  presents for pre procedure evaluation  Ms Morales has been following with Dr Torre for progressive right knee osteoarthritis.  She has failed conservative treatment including physical therapy and injections.  The pain has progressed and is not impacting her activity of daily living including exercise.  She has opted to proceed.       Diabetes. a1c in August 6.9     today 6.7   on metformin and ozempic   she will need to hold ozempic 1 week prior to surgery.     ace and statin.      MUKUND/Hx recurrent pneumonia with underlying asthma.  Dr Chambers, [facility].  Asthma.  25  CAD  Dr Lawrence, [facility]  she will obtain clearance for surgery from him as well.   She will call their office.    HTN  amlodipine and lisinopril  GERD  PPI  Dyslipidemia rosuvastatin 10mg  LDL pending.   IgG deficiency  Dr Paiz  home IgG   last infusion prior to surgery will be right before surgery.    Hx DVT/PE, MATTY  following with [facility] Dr Jacobson.  Receiving IV iron.    Has appt with hematology on Monday.  They will determine DOAC schedule.  Mild thrombocyopenia will defer to hematology.   Ynes  DARREN positive, polyarthralgia, chronic pain.   Depression.  Cymbalta  also helps chronic pain syndrome.   CKD 3 GFR 31.         EXERCISE TOLERANCE: Ms Morales is active but her activity is limited by her knee pain.      PREVIOUS SURGERY:   Patient has had previous surgery without incident.     ASA: yes hold per PAT  DIABETIC MEDICATIONS: yes  Ozempic and metformin  hold ozempic per PAT.  Hold metformin am of surgery.     MUKUND; yes  compliant  [facility]  Hx of VTE: yes  DOAC per hematology.  [facility].     Past Medical History:    Past Medical History:    Abdominal pain    Anemia    Anesthesia complication    DIFFICULT TO AWAKEN    Anxiety    Arrhythmia    Arthritis    Asthma (HCC)    Back pain    Back problem    Black stools    Bloating    Blurred vision    Breast CA (HCC)    left lump and rad.    Cancer (HCC)    breast, basal cell    Cervical radiculopathy    Cervical spinal stenosis    Deep vein thrombosis (HCC)    Depression    Diabetes (HCC)    Diabetes mellitus (HCC)    Diarrhea, unspecified    Dizziness    Easy bruising    On Xeralto    Esophageal reflux    Exposure to medical diagnostic radiation    Fatigue    Fitting and adjustment of vascular catheter    Headache disorder    Hearing loss    Heart attack (HCC)    Heartburn    Hemorrhoids    High blood pressure    High cholesterol    History of blood clots    History of COVID-19    not hospitalized, no continued symptoms, cough, fever, loss of taste    History of depression    History of syncope    IgG deficiency (HCC)    ??? pt states she gets IgG infusions due to lack of IgG, unable to fight off infections    Indigestion    Leaking of urine    Migraines    Muscle weakness    uses cane    Neuropathy    rt foot bilateral hands    Osteoarthritis    Pain in joints    Pneumonia due to organism    Pulmonary embolism (HCC)    Shortness of breath    Sleep apnea    cpap - not using - machine is broken    Stress    Uncomfortable fullness after meals    Visual impairment    glasses/contacts    Weight gain         Past Surgical History:    Past Surgical History:   Procedure Laterality Date    Abdomen surgery proc unlisted      duodenal biopsy    Appendectomy      Appendectomy      Breast surgery      Cataract      Cholecystectomy      Colonoscopy  10/1/10, 4/21/17    Colonoscopy N/A 02/27/2023    Procedure: COLONOSCOPY;  Surgeon: Migel Bass MD;  Location:   ENDOSCOPY    [facility]  04/21/2017    Eye surgery      Femur/knee surg unlisted      right knee arthroscopy    Hemorrhoidectomy,int/ext,simple      Hernia surgery      Hysterectomy  1984    total hystero.    Lumpectomy left Left 1999    lt lumpectomy and radiation.    Oophorectomy Bilateral 1984    total hystero.    Other  11/2021    left wrist, ORIF Dr. Stacy Long    Other  02/21/2022    CERVICAL 5 AND CERVICAL 6 LAMINECTOMIES, PARTIAL CERVICAL 7 LAMINECTOMY, LEFT CERVICAL 5/CERVICAL 6 AND CERVICAL 6/CERVICAL 7 FORAMINOTOMIES, CERVICAL 6 AND CERVICAL 7 ARTHRODESIS, AUTOGRAFT, ALLOGRAFT    Other surgical history  2010    endoscopy - papillotomy    Other surgical history  2016     under right eye, skin cancer removed    Other surgical history  02/2022    neck    Port for vascular access      Radiation left Left 1999    left lumpectomy and radiation.    Repair ing hernia,5+y/o,reducibl      Sigmoidoscopy,diagnostic      Spine surgery procedure unlisted      Tonsillectomy      Total abdom hysterectomy      Total knee replacement         Current Medications:    Current Outpatient Medications   Medication Sig Dispense Refill    ondansetron (ZOFRAN) 4 mg tablet Take 1 tablet (4 mg total) by mouth every 12 (twelve) hours as needed for Nausea. 30 tablet 0    rivaroxaban (XARELTO) 10 MG Oral Tab Take 1 tablet (10 mg total) by mouth daily.      famciclovir 500 MG Oral Tab 3 tabs at onset of symptoms.  Repeat with each exacerbation. 30 tablet 1    tiZANidine 4 MG Oral Tab Take 1.5 tablet by mouth once a day      lisinopril 20 MG Oral Tab Take 1 tablet (20 mg total) by mouth daily. 90 tablet 3    amLODIPine 2.5 MG Oral Tab Take 1 tablet (2.5 mg total) by mouth in the morning and 1 tablet (2.5 mg total) before bedtime. 180 tablet 1    pregabalin 100 MG Oral Cap Take 1 capsule (100 mg total) by mouth in the morning and 1 capsule (100 mg total) at noon and 1 capsule (100 mg total) in the evening. 270 capsule 0    lidocaine 5 %  External Patch Apply 1 patch to skin once a day as needed      immune globulin, human, (GAMMAGARD) 10 g/100mL Injection Solution Inject 30 g into the vein. ONCE A MONTH INFUSION THROUGH PORT, ON SUNDAYS      fluticasone-salmeterol 500-50 MCG/ACT Inhalation Aerosol Powder, Breath Activated Inhale 1 puff into the lungs 2 (two) times daily.      DULoxetine 60 MG Oral Cap DR Particles Take 1 capsule (60 mg total) by mouth daily. TO TAKE ALONG WITH THE 30 MG TO EQUAL 90 MG DAILY. 90 capsule 3    DULoxetine 30 MG Oral Cap DR Particles Take 1 capsule (30 mg total) by mouth daily. TO TAKE ALONG WITH THE 60 MG TO EQUAL 90 MG DAILY. 90 capsule 3    pantoprazole 40 MG Oral Tab EC Take 1 tablet (40 mg total) by mouth before breakfast. 90 tablet 3    ROSUVASTATIN 10 MG Oral Tab Take 1 tablet (10 mg total) by mouth nightly. 90 tablet 3    semaglutide (OZEMPIC, 0.25 OR 0.5 MG/DOSE,) 2 MG/3ML Subcutaneous Solution Pen-injector Inject 0.5 mg into the skin once a week. 9 mL 1    metFORMIN  MG Oral Tablet 24 Hr Take 2 tablets (1,000 mg total) by mouth daily with breakfast and 1 tablet (500 mg total) every evening. 270 tablet 3    oxyCODONE-acetaminophen  MG Oral Tab Take 1 tablet by mouth every 4 (four) hours as needed for Pain (takes at home for back pain).      albuterol 108 (90 Base) MCG/ACT Inhalation Aero Soln Inhale 2 puffs into the lungs every 6 (six) hours as needed for Wheezing. 1 each 3    Cholecalciferol (VITAMIN D) 2000 units Oral Tab Take 2,000 Units by mouth daily.      Fluticasone Propionate 50 MCG/ACT Nasal Suspension 1 spray by Nasal route 2 (two) times daily. (Patient not taking: Reported on 2/19/2025) 1 Bottle 3        Allergies:    Allergies as of 02/19/2025 - Review Complete 02/19/2025   Allergen Reaction Noted    Bactrim HIVES and OTHER (SEE COMMENTS) 06/27/2012    Ciprofloxacin HIVES 06/27/2012    Mold REACTIVE AIRWAY DISEASE 06/27/2012    Sulfa antibiotics HIVES 08/14/2018    Diovan [valsartan]  NAUSEA AND VOMITING 2012    Omnicef NAUSEA ONLY and DIZZINESS 2012    Atorvastatin OTHER (SEE COMMENTS) and UNKNOWN 2022    Clindamycin DIARRHEA and NAUSEA ONLY 10/06/2014    Green pepper OTHER (SEE COMMENTS) 2012    Lipitor [atorvastatin calcium] OTHER (SEE COMMENTS) 10/29/2012    Pneumococcal vaccines OTHER (SEE COMMENTS) 2024    Quinapril hcl FATIGUE 2012       SOCIAL HISTORY:   Social History     Socioeconomic History    Marital status:    Tobacco Use    Smoking status: Former     Current packs/day: 0.00     Types: Cigarettes     Quit date: 1968     Years since quittin.1    Smokeless tobacco: Never   Vaping Use    Vaping status: Never Used   Substance and Sexual Activity    Alcohol use: No     Comment: rare    Drug use: Not Currently     Types: Cannabis     Comment: uses cream for pain to back/neck with barometer changes.    Sexual activity: Not Currently     Partners: Male   Other Topics Concern    Caffeine Concern Yes     Comment: half of cup of coke daily    Sleep Concern No    Exercise Yes     Comment: walking    Seat Belt Yes     Social Drivers of Health     Food Insecurity: No Food Insecurity (2024)    Food Insecurity     Food Insecurity: Never true   Transportation Needs: No Transportation Needs (2024)    Transportation Needs     Lack of Transportation: No   Stress: No Stress Concern Present (10/25/2023)    Stress     Feeling of Stress : No   Housing Stability: Low Risk  (2024)    Housing Stability     Housing Instability: No            FAMILY HISTORY:   Family History   Problem Relation Age of Onset    Heart Attack Paternal Grandfather     Heart Attack Paternal Grandmother     Other (CAD) Father     Other (CAD) Mother     Hypertension Mother     Heart Attack Mother     Other (CAD) Brother     Hypertension Brother     Heart Attack Brother     Stroke Maternal Grandfather         Stroke    Stroke Maternal Grandmother         Stroke     Breast Cancer Self 54        REVIEW OF SYSTEMS:    GENERAL: feels well otherwise  HEENT: denies blurred vision or double vision denies nasal congestion  LUNGS: denies shortness of breath with exertion  CARDIOVASCULAR: denies chest pain on exertion  GI: denies abdominal pain, denies heartburn  : denies dysuria, urgency, frequency, hematuria  MUSCULOSKELETAL: denies back pain  NEURO: denies headaches    PHYSICAL EXAM:  LMP  (LMP Unknown)    GENERAL: well developed, well nourished,in no apparent distress  HEENT: atraumatic, normocephalic,ears and throat are clear  EYES:PERRLA, EOMI, conjunctiva are clear  NECK: supple,no adenopathy  CHEST: no chest tenderness  LUNGS: clear to auscultation  CARDIO: RRR without murmur  GI: good BS's,no masses, HSM or tenderness  EXTREMITIES: no edema  NEURO: Oriented times three,  Bilateral barefoot skin diabetic exam is normal, visualized feet and the appearance is normal.  Bilateral monofilament/sensation of both feet is normal.  Pulsation pedal pulse exam of both lower legs/feet is normal as well.        LABS:  Reviewed.    EKG: will need to be reviewed by cardiology    Sinus rhythm with occasional Premature ventricular complexes and Fusion complexes   Minimal voltage criteria for LVH, may be normal variant   Inferior infarct , age undetermined   Anterior infarct (cited on or before 11-MAY-2022)   Abnormal ECG   When compared with ECG of 21-SEP-2024 21:26,   Premature ventricular complexes are now Present   T wave inversion now evident in Inferior leads   Confirmed by NOLAN BERNARD, ERICK ([id number]) on 2/18/2025 5:06:23 AM       ASSESSMENT AND PLAN:    Encounter Diagnoses   Name Primary?    Primary osteoarthritis of right knee Yes    Thrombocytopenia  stable  has appt with heme prior to surgery.      Stage 3b chronic kidney disease (HCC)  stable  monitor.      History of pulmonary embolism  DOAC per hematology     Atherosclerosis of aorta  stable  statin.      Common variable  immunodeficiency (HCC)  stable  per Dr Paiz.  To have infusion prior to surgery.      Hypercoagulable state (HCC)  per Hematology  has upcoming visit.      Major depressive disorder, recurrent episode, moderate (HCC)  stable    stable  cymbalta.       Type 2 diabetes mellitus without complication, without long-term current use of insulin (HCC)  A1c stable  holding ozempic as instructed  metformin bid.  Will hold am of surgery.       Coronary artery disease involving native coronary artery of native heart without angina pectoris  stabl e per Dr Lawrence   will call for appt  will require cardiac clearance.       PHILLIPS (dyspnea on exertion)  baseline stable      Hyperlipidemia, unspecified hyperlipidemia type  stable  statin  added lipid to blood in lab from yesterday.       Primary hypertension  stable  same meds.      Mild persistent asthma without complication (HCC)  score 25.  Stable      History of pneumonia, recurrent  stable   monitor.      Age-related osteoporosis without current pathological fracture  stable   montior.       B12 deficiency stable  cont supplement.      Enlarged thyroid  stable  monitor.      Cervical radiculopathy  stable  no recent flare.  Monitor.      Neuropathy  stable  montior.      Migraine without aura and without status migrainosus, not intractable  stable  monitor.      S/P cervical spinal fusion  stable  montior.      Anxiety  stable   monitor.      Gastroesophageal reflux disease without esophagitis stable  PPI     Positive DARREN (antinuclear antibody)  stable  Dr Farley      History of DVT (deep vein thrombosis)  stable  per hematology  DOAC     Anemia, unspecified type  stable  monitor      History of basal cell carcinoma     History of left breast cancer     MUKUND on CPAP  compliant.       Frail elderly        Orders Placed This Encounter   Procedures    Lipid Panel [E]    TSH W Reflex To Free T4 [E]    Microalb/Creat Ratio, Random Urine [E]    POC Hemoglobin A1C     Ms Morales has  had surgery before without incident.  She will require cardiology clearance prior to surgery and will call today for appt with Dr Lawrence.  She has upcoming visit with hematology.  From my persective, there are no contraindications proceeding with surgery.  Recommend close monitoring post operatively due to history of sleep apnea.  Please feel free to call with questions or concerns.      Meds & Refills for this Visit:  Requested Prescriptions     Signed Prescriptions Disp Refills    ondansetron (ZOFRAN) 4 mg tablet 30 tablet 0     Sig: Take 1 tablet (4 mg total) by mouth every 12 (twelve) hours as needed for Nausea.    famciclovir 500 MG Oral Tab 30 tablet 1     Sig: 3 tabs at onset of symptoms.  Repeat with each exacerbation.       Imaging & Consults:  None    No follow-ups on file.  There are no Patient Instructions on file for this visit.

## 2025-02-18 LAB
ATRIAL RATE: 85 BPM
P AXIS: 45 DEGREES
P-R INTERVAL: 196 MS
Q-T INTERVAL: 380 MS
QRS DURATION: 72 MS
QTC CALCULATION (BEZET): 452 MS
R AXIS: -17 DEGREES
T AXIS: -18 DEGREES
VENTRICULAR RATE: 85 BPM

## 2025-02-19 ENCOUNTER — OFFICE VISIT (OUTPATIENT)
Dept: INTERNAL MEDICINE CLINIC | Facility: CLINIC | Age: 80
End: 2025-02-19
Payer: MEDICARE

## 2025-02-19 DIAGNOSIS — D68.59 HYPERCOAGULABLE STATE (HCC): ICD-10-CM

## 2025-02-19 DIAGNOSIS — R06.09 DOE (DYSPNEA ON EXERTION): ICD-10-CM

## 2025-02-19 DIAGNOSIS — I25.10 CORONARY ARTERY DISEASE INVOLVING NATIVE CORONARY ARTERY OF NATIVE HEART WITHOUT ANGINA PECTORIS: ICD-10-CM

## 2025-02-19 DIAGNOSIS — Z85.828 HISTORY OF BASAL CELL CARCINOMA: ICD-10-CM

## 2025-02-19 DIAGNOSIS — G62.9 NEUROPATHY: ICD-10-CM

## 2025-02-19 DIAGNOSIS — G43.009 MIGRAINE WITHOUT AURA AND WITHOUT STATUS MIGRAINOSUS, NOT INTRACTABLE: ICD-10-CM

## 2025-02-19 DIAGNOSIS — Z85.3 HISTORY OF LEFT BREAST CANCER: ICD-10-CM

## 2025-02-19 DIAGNOSIS — N18.32 STAGE 3B CHRONIC KIDNEY DISEASE (HCC): ICD-10-CM

## 2025-02-19 DIAGNOSIS — I10 PRIMARY HYPERTENSION: ICD-10-CM

## 2025-02-19 DIAGNOSIS — E53.8 B12 DEFICIENCY: ICD-10-CM

## 2025-02-19 DIAGNOSIS — Z86.718 HISTORY OF DVT (DEEP VEIN THROMBOSIS): ICD-10-CM

## 2025-02-19 DIAGNOSIS — D69.6 THROMBOCYTOPENIA: ICD-10-CM

## 2025-02-19 DIAGNOSIS — J45.30 MILD PERSISTENT ASTHMA WITHOUT COMPLICATION (HCC): ICD-10-CM

## 2025-02-19 DIAGNOSIS — E11.9 TYPE 2 DIABETES MELLITUS WITHOUT COMPLICATION, WITHOUT LONG-TERM CURRENT USE OF INSULIN (HCC): ICD-10-CM

## 2025-02-19 DIAGNOSIS — M17.11 PRIMARY OSTEOARTHRITIS OF RIGHT KNEE: Primary | ICD-10-CM

## 2025-02-19 DIAGNOSIS — G47.33 OSA ON CPAP: ICD-10-CM

## 2025-02-19 DIAGNOSIS — E78.5 HYPERLIPIDEMIA, UNSPECIFIED HYPERLIPIDEMIA TYPE: ICD-10-CM

## 2025-02-19 DIAGNOSIS — R54 FRAIL ELDERLY: ICD-10-CM

## 2025-02-19 DIAGNOSIS — Z98.1 S/P CERVICAL SPINAL FUSION: ICD-10-CM

## 2025-02-19 DIAGNOSIS — R76.8 POSITIVE ANA (ANTINUCLEAR ANTIBODY): ICD-10-CM

## 2025-02-19 DIAGNOSIS — D64.9 ANEMIA, UNSPECIFIED TYPE: ICD-10-CM

## 2025-02-19 DIAGNOSIS — Z86.711 HISTORY OF PULMONARY EMBOLISM: ICD-10-CM

## 2025-02-19 DIAGNOSIS — R79.89 ABNORMAL TSH: ICD-10-CM

## 2025-02-19 DIAGNOSIS — I70.0 ATHEROSCLEROSIS OF AORTA: ICD-10-CM

## 2025-02-19 DIAGNOSIS — M54.12 CERVICAL RADICULOPATHY: ICD-10-CM

## 2025-02-19 DIAGNOSIS — E04.9 ENLARGED THYROID: ICD-10-CM

## 2025-02-19 DIAGNOSIS — D83.9 COMMON VARIABLE IMMUNODEFICIENCY (HCC): ICD-10-CM

## 2025-02-19 DIAGNOSIS — F41.9 ANXIETY: ICD-10-CM

## 2025-02-19 DIAGNOSIS — M81.0 AGE-RELATED OSTEOPOROSIS WITHOUT CURRENT PATHOLOGICAL FRACTURE: ICD-10-CM

## 2025-02-19 DIAGNOSIS — F33.1 MAJOR DEPRESSIVE DISORDER, RECURRENT EPISODE, MODERATE (HCC): Chronic | ICD-10-CM

## 2025-02-19 DIAGNOSIS — K21.9 GASTROESOPHAGEAL REFLUX DISEASE WITHOUT ESOPHAGITIS: ICD-10-CM

## 2025-02-19 DIAGNOSIS — Z87.01 HISTORY OF PNEUMONIA, RECURRENT: ICD-10-CM

## 2025-02-19 LAB
CHOLEST SERPL-MCNC: 105 MG/DL (ref ?–200)
CREAT UR-SCNC: 59.4 MG/DL
HDLC SERPL-MCNC: 55 MG/DL (ref 40–59)
HEMOGLOBIN A1C: 6.7 % (ref 4.3–5.6)
LDLC SERPL CALC-MCNC: 28 MG/DL (ref ?–100)
MICROALBUMIN UR-MCNC: 8.3 MG/DL
MICROALBUMIN/CREAT 24H UR-RTO: 139.7 UG/MG (ref ?–30)
NONHDLC SERPL-MCNC: 50 MG/DL (ref ?–130)
T4 FREE SERPL-MCNC: 1.7 NG/DL (ref 0.8–1.7)
TRIGL SERPL-MCNC: 125 MG/DL (ref 30–149)
TSI SER-ACNC: 5.01 UIU/ML (ref 0.55–4.78)
VLDLC SERPL CALC-MCNC: 17 MG/DL (ref 0–30)

## 2025-02-19 PROCEDURE — 82570 ASSAY OF URINE CREATININE: CPT | Performed by: NURSE PRACTITIONER

## 2025-02-19 PROCEDURE — 99499 UNLISTED E&M SERVICE: CPT | Performed by: NURSE PRACTITIONER

## 2025-02-19 PROCEDURE — 83036 HEMOGLOBIN GLYCOSYLATED A1C: CPT | Performed by: NURSE PRACTITIONER

## 2025-02-19 PROCEDURE — 82043 UR ALBUMIN QUANTITATIVE: CPT | Performed by: NURSE PRACTITIONER

## 2025-02-19 PROCEDURE — 99215 OFFICE O/P EST HI 40 MIN: CPT | Performed by: NURSE PRACTITIONER

## 2025-02-19 PROCEDURE — G2211 COMPLEX E/M VISIT ADD ON: HCPCS | Performed by: NURSE PRACTITIONER

## 2025-02-19 RX ORDER — FAMCICLOVIR 500 MG/1
TABLET ORAL
Qty: 30 TABLET | Refills: 1 | Status: SHIPPED | OUTPATIENT
Start: 2025-02-19

## 2025-02-19 RX ORDER — ONDANSETRON 4 MG/1
4 TABLET, FILM COATED ORAL EVERY 12 HOURS PRN
Qty: 30 TABLET | Refills: 0 | Status: SHIPPED | OUTPATIENT
Start: 2025-02-19

## 2025-02-25 ENCOUNTER — PATIENT OUTREACH (OUTPATIENT)
Dept: CASE MANAGEMENT | Age: 80
End: 2025-02-25

## 2025-02-25 DIAGNOSIS — E78.5 HYPERLIPIDEMIA, UNSPECIFIED HYPERLIPIDEMIA TYPE: Primary | ICD-10-CM

## 2025-02-25 DIAGNOSIS — E11.9 TYPE 2 DIABETES MELLITUS WITHOUT COMPLICATION, WITHOUT LONG-TERM CURRENT USE OF INSULIN (HCC): ICD-10-CM

## 2025-02-25 DIAGNOSIS — F33.1 MAJOR DEPRESSIVE DISORDER, RECURRENT EPISODE, MODERATE (HCC): ICD-10-CM

## 2025-02-25 DIAGNOSIS — M81.0 AGE-RELATED OSTEOPOROSIS WITHOUT CURRENT PATHOLOGICAL FRACTURE: ICD-10-CM

## 2025-02-25 DIAGNOSIS — G47.33 OSA ON CPAP: ICD-10-CM

## 2025-02-25 NOTE — PROGRESS NOTES
Spoke to Rayne for Chronic Care Management.      Updates to patient care team/comments: UTD.  Patient reported changes in medications: together we reviewed with no updates.   Med Adherence  Comment: pt reports taking all medications as prescribed.      Health Maintenance:   Reviewed with pt     Health Maintenance   Topic Date Due    Zoster Vaccines (1 of 2) Never done- plans on getting     Diabetes Care Dilated Eye Exam  01/09/2021- reminded     Colorectal Cancer Screening  02/27/2024- reminded     COVID-19 Vaccine (4 - 2024-25 season) 09/01/2024- plans on getting     Annual Physical  05/02/2025    Influenza Vaccine (1) 06/30/2025 (Originally 10/1/2024)    Diabetes Care A1C  08/19/2025    Diabetes Care: GFR  02/17/2026    DEXA Scan  Completed    Annual Depression Screening  Completed    Fall Risk Screening (Annual)  Completed    Diabetes Care: Foot Exam (Annual)  Completed    Diabetes Care: Microalb/Creat Ratio (Annual)  Completed    Pneumococcal Vaccine: 50+ Years  Completed    Meningococcal B Vaccine  Aged Out    Mammogram  Discontinued     Depression screen completed today.      Patient updates/concerns:   Listened to pt and provided support.     Has been doing better since going back on her therapy and following Dr. Jacobson.    Recent visit 2/24/25.   Per note:   Impression & Plan:  Recurrent VTE/PE  - previous plans were to continue lifelong anticoagulation with rivaroxaban. Last PE was in 2014.   She ended up admitted at [facility] with epistaxis that was very difficult to control    Anemia  Thrombocytopenia--now resolved.  May have been due to bone marrow suppression in setting of prior infections.   CBC trend noted and hemoglobin is lower at 11.3 on last check.  Iron studies 11/19/24: Serum iron-27, saturation percent-7, ferritin-23.3    Vit B12 deficiency  On vit B12 injections. Vitamin b12 level aimee on last check but dropped down to 292    Hx breast cancer  - 1999 s/p lumpectomy/RT + 5 years endocrine therapy.  Since then no evidence of recurrence  - continue annual bilateral mammograms    Hx Iron deficiency  - iron levels reviewed. Recent marked blood loss noted.   - colonoscopy by Dr Bass on 2/2023 showed polyps, grade II internal hemorrhoids. She is due for her colonoscopy, was supposed to receive it 2/2024  - IV iron completed at our office 12/2024 with excellent clinical response.     Plan  Extensive discussion previously as to the risk to benefit ratio of resuming anticoagulation. She has done well with this thus far at 10 mg daily.   Hold ASA as of 11/19/24  Continue Xarelto 10 mg daily, resumed 11/24/24. Rationale for this reviewed previously. Continues to do well on this. She was counseled to hold this martin operatively. She is cleared for surgery and we have sent a letter to her surgical team in January.   Iron deficiency anemia- Serum iron-27, saturation percent-7, ferritin-23.3. Received Feraheme 12/13/24 and 12/20/24. Hemoglobin and MCV improved since receiving IV iron.   Follow up in 4-6 weeks post planned surgery on 3/5/25     Still in Physical Therapy.      Right knee replacement 3/5/25 Dr. Torre.  Completed pre op with Michelle Curran.     Diabetes  Voices compliance.   HGBA1C:    Lab Results   Component Value Date    A1C 6.7 (A) 02/19/2025    A1C 6.9 (H) 08/09/2024    A1C 6.9 (H) 04/08/2024     (H) 08/09/2024     She is watching her diet and voices medication compliance.     Resources given:  [facility]  [phone]  Danny Aguirre   [phone],   Goals/Action Plan:    Active goal from previous outreach: Improve energy level and fatigue.  Weight management.    Watching her portion intake but states she only usually eats one meal due to Ozempic.        Patient reported progress toward goal: ongoing                - What: improve energy levels and fatigue so she can better manage her weight            - Where/When/How: pt plans on eating smaller meals throughout the day to help with appetite.   She'd like to do some walking for better stamina.       Patient Reported New Barriers And Concerns:   States she is always tired has no energy.  IgG.                    - Plan for overcoming all barriers: second opinion on IgG.       Care Managers Interventions:   Encouraged patient:   Self care: Take the time to do the things you love.   Nutrition:  Good nutrition helps us to maintain our weight, fight off infection, and help reduce the risk of developing other chronic issues.   Physical activity:  Physical activity is important to help maintain independence and improve quality of life.     Future Appointments:   Future Appointments   Date Time Provider Department Center   7/15/2025  1:00 PM Makayla Farley DO [facility]         Next Care Manager Follow Up Date: in 1 month.     Reason For Follow Up: review progress and or barriers towards patient's goals.     Time Spent This Encounter Total: 20 min medical record review, telephone communication, care plan updates where needed, education, goals, and action plan recreation/update. Provided acknowledgment and validation to patient's concerns.   Monthly Minute Total including today: 20  Physical assessment, complete health history, and need for CCM established by Jose Daniel Washington MD.

## 2025-02-27 NOTE — H&P
[facility]  History & Physical    Rayne Morales Patient Status:  Outpatient in a Bed    1945 MRN [id number]   Location [facility] SURGERY Attending Drake Torre MD   Hosp Day # 0 PCP Jose Daniel Washington MD     Date of Admission:  (Not on file)    History of Present Illness:  Rayne Morales is a(n) 79 year old female.  The patient has failed conservative treatment for right knee osteoarthritis and has significant limitations in ADL's including ambulation and exercise, and presents for total joint arthroplasty at this time.    History:  Past Medical History:    Abdominal pain    Anemia    Anesthesia complication    DIFFICULT TO AWAKEN    Anxiety    Arrhythmia    Arthritis    Asthma (HCC)    Back pain    Back problem    Black stools    Bloating    Blurred vision    Breast CA (HCC)    left lump and rad.    Cancer (HCC)    breast, basal cell    Cervical radiculopathy    Cervical spinal stenosis    Deep vein thrombosis (HCC)    Depression    Diabetes (HCC)    Diabetes mellitus (HCC)    Diarrhea, unspecified    Dizziness    Easy bruising    On Xeralto    Esophageal reflux    Exposure to medical diagnostic radiation    Fatigue    Fitting and adjustment of vascular catheter    Headache disorder    Hearing loss    Heart attack (HCC)    Heartburn    Hemorrhoids    High blood pressure    High cholesterol    History of blood clots    History of COVID-19    not hospitalized, no continued symptoms, cough, fever, loss of taste    History of depression    History of syncope    IgG deficiency (HCC)    ??? pt states she gets IgG infusions due to lack of IgG, unable to fight off infections    Indigestion    Leaking of urine    Migraines    Muscle weakness    uses cane    Neuropathy    rt foot bilateral hands    Osteoarthritis    Pain in joints    Pneumonia due to organism    Pulmonary embolism (HCC)    Shortness of breath    Sleep apnea    cpap - not using - machine is broken    Stress    Uncomfortable  fullness after meals    Visual impairment    glasses/contacts    Weight gain     Past Surgical History:   Procedure Laterality Date    Abdomen surgery proc unlisted      duodenal biopsy    Appendectomy      Appendectomy      Breast surgery      Cataract      Cholecystectomy      Colonoscopy  10/1/10, 4/21/17    Colonoscopy N/A 02/27/2023    Procedure: COLONOSCOPY;  Surgeon: Migel Bass MD;  Location:  ENDOSCOPY    Egd  04/21/2017    Eye surgery      Femur/knee surg unlisted      right knee arthroscopy    Hemorrhoidectomy,int/ext,simple      Hernia surgery      Hysterectomy  1984    total hystero.    Lumpectomy left Left 1999    lt lumpectomy and radiation.    Oophorectomy Bilateral 1984    total hystero.    Other  11/2021    left wrist, ORIF Dr. Stacy Long    Other  02/21/2022    CERVICAL 5 AND CERVICAL 6 LAMINECTOMIES, PARTIAL CERVICAL 7 LAMINECTOMY, LEFT CERVICAL 5/CERVICAL 6 AND CERVICAL 6/CERVICAL 7 FORAMINOTOMIES, CERVICAL 6 AND CERVICAL 7 ARTHRODESIS, AUTOGRAFT, ALLOGRAFT    Other surgical history  2010    endoscopy - papillotomy    Other surgical history  2016     under right eye, skin cancer removed    Other surgical history  02/2022    neck    Port for vascular access      Radiation left Left 1999    left lumpectomy and radiation.    Repair ing hernia,5+y/o,reducibl      Sigmoidoscopy,diagnostic      Spine surgery procedure unlisted      Tonsillectomy      Total abdom hysterectomy      Total knee replacement       Family History   Problem Relation Age of Onset    Heart Attack Paternal Grandfather     Heart Attack Paternal Grandmother     Other (CAD) Father     Other (CAD) Mother     Hypertension Mother     Heart Attack Mother     Other (CAD) Brother     Hypertension Brother     Heart Attack Brother     Stroke Maternal Grandfather         Stroke    Stroke Maternal Grandmother         Stroke    Breast Cancer Self 54      reports that she quit smoking about 57 years ago. Her smoking use included  cigarettes. She has never used smokeless tobacco. She reports that she does not currently use drugs after having used the following drugs: Cannabis. She reports that she does not drink alcohol.    Allergies:  Allergies[1]    Home Medications:  Prescriptions Prior to Admission[2]    Physical Exam:   General: Alert, orientated x3.  Cooperative.  No apparent distress.  Vital Signs:  Height 5' 1\" (1.549 m), weight 150 lb (68 kg), not currently breastfeeding.  HEENT: Exam is unremarkable.    Neck: No tenderness to palpitation. No JVD. Supple.   Lungs: Clear to auscultation bilaterally.  Cardiac: Regular rate and rhythm. No murmur.  Abdomen:  Soft, non-distended, non-tender, with no rebound or guarding.   Extremities:  No lower extremity edema noted.  Without clubbing or cyanosis.    The right knee is tender at the medial and lateral joint lines, with crepitus on range of motion    Laboratory Data:  xrays show severe osteoarthritis of the right knee    Impression and Plan:  Patient Active Problem List   Diagnosis    Fatigue    Primary hypertension    Anxiety    Age-related osteoporosis without current pathological fracture    B12 deficiency    Spondylosis of lumbosacral region    Sacroiliac joint dysfunction    History of syncope    History of pulmonary embolism    Asthma (HCC)    Cyst of brain    History of basal cell carcinoma    History of pneumonia, recurrent    Thrombocytopenia    Gastroesophageal reflux disease without esophagitis    Enlarged thyroid    History of left breast cancer    Carpal tunnel syndrome of left wrist    Atherosclerosis of aorta    MUKUND on CPAP    Chronic pain syndrome    Tremor of both hands    Migraine without aura or status migrainosus    Major depressive disorder, recurrent episode, moderate (HCC)    Coronary artery disease involving native coronary artery of native heart without angina pectoris    Visual impairment    S/P cervical spinal fusion    Neuropathy    Common variable  immunodeficiency (HCC)    History of DVT (deep vein thrombosis)    Hyperlipidemia    Cervical radiculopathy    PHILLIPS (dyspnea on exertion)    Type 2 diabetes mellitus without complication, without long-term current use of insulin (HCC)    Anemia, unspecified type    Primary osteoarthritis of right knee    Liver mass    Abnormal CT of the abdomen    Positive DARREN (antinuclear antibody)    Frail elderly    Hypercoagulable state (HCC)       Plan is for cemented right total knee arthroplasty        Drake Torre MD  2/27/2025  5:46 PM        [1]   Allergies  Allergen Reactions    Bactrim HIVES and OTHER (SEE COMMENTS)    Ciprofloxacin HIVES    Mold REACTIVE AIRWAY DISEASE     Mold and smut    Sulfa Antibiotics HIVES     Bactrim (Sulfamethoxazole/TMP)    Diovan [Valsartan] NAUSEA AND VOMITING    Omnicef NAUSEA ONLY and DIZZINESS    Atorvastatin OTHER (SEE COMMENTS) and UNKNOWN    Clindamycin DIARRHEA and NAUSEA ONLY    Green Pepper OTHER (SEE COMMENTS)     Sensitivity (all peppers - green, orange and red)    Lipitor [Atorvastatin Calcium] OTHER (SEE COMMENTS)     Leg cramping, sensitivity    Pneumococcal Vaccines OTHER (SEE COMMENTS)     Pt states she had pneumo vax done at Dr. Boyd on 1/10/24- states developed redness, swelling,itching to the arm    Quinapril Hcl FATIGUE   [2]   No medications prior to admission.

## 2025-02-28 DIAGNOSIS — G62.9 NEUROPATHY: ICD-10-CM

## 2025-03-04 ENCOUNTER — ANESTHESIA EVENT (OUTPATIENT)
Dept: SURGERY | Facility: HOSPITAL | Age: 80
End: 2025-03-04
Payer: MEDICARE

## 2025-03-04 RX ORDER — PREGABALIN 100 MG/1
100 CAPSULE ORAL 3 TIMES DAILY
Qty: 270 CAPSULE | Refills: 0 | Status: SHIPPED | OUTPATIENT
Start: 2025-03-04

## 2025-03-04 NOTE — TELEPHONE ENCOUNTER
1024/2024  LAST WRITTEN: 10/24/2024  Quantity: 270  Recent Visits  Date Type Provider Dept   02/19/25 Office Visit Monica Curran APRN [facility] [address]

## 2025-03-04 NOTE — DISCHARGE INSTRUCTIONS
Sometimes managing your health at home requires assistance.  The [facility] team has recognized your preference to use [facility].  They can be reached by phone at [phone].  The fax number for your reference is [phone].  A representative from the home health agency will contact you or your family to schedule your first visit.     Knee Replacement Discharge Instructions    Dear Patient,     [facility] cares about your progress with recovery following your joint replacement surgery.     300 days from your scheduled surgery, [facility] will send you a follow-up survey to help us understand how your surgery impacted your mobility, pain, and overall quality of life. Please make every effort to complete this survey. The information collected from this survey will be used by your physician to track your recovery.     Sincerely,     [facility] [place]      Activity    Bathing  No tub baths, pools, or saunas until cleared by surgeon (about 4-6 weeks because it takes that long for the incision on the skin to heal and be a barrier to prevent infection).  When allowed to shower:    IF AQUACEL - dressing is waterproof and does not require being covered to keep it dry.  Pat dressing and surrounding skin dry after shower              AQUACEL          Gauze dressings are NOT waterproof and REQUIRE being covered with a waterproof barrier to keep the dressing and the incision dry.  SARAN WRAP, GLAD WRAP, and PRESS N SEAL WORK  REALLY WELL BUT ANY PLASTIC WRAP WILL DO.   Do not wash incision.   Remove entire wrapping and old dressing (if Gauze) after showering. Pat dry if necessary WITH A CLEAN TOWEL and cover incision with dry sterile gauze and paper tape. For other types of dressings, follow surgeon’s orders.                                 GAUZE          Driving  Do not drive until cleared by surgeon. This is usually in four to six weeks after surgery.  Discuss at follow-up office visit.   Not allowed while taking narcotic pain medication or muscle relaxants.    Sex  Usually allowed after four to six weeks - check with surgeon at your office visit.    Return to work  Usually allowed after four to six weeks. Discuss specific work activities with your surgeon.    Restrictions  For knee replacement surgery, follow instructions provided by physical therapy.  Do NOT put a pillow under your knee as it may be more difficult to straighten afterwards.    No smoking  Avoid smoking. It is known to cause breathing problems and can decrease the rate of healing.    Incision care/Dressing changes  Wash hands before and after dressing changes.  FOR DRY GAUZE DRESSING:  Change dressing daily using dry sterile gauze and paper tape once Aquacel (waterproof) dressing changed (which is about 7 days after surgery). Continue this until you follow up in the office with your surgeon. Have knee bent when changing dressing for more ease of bending afterwards.  There could be a small amount of redness around the staples or incision; this is normal.  Watch for increased redness, warmth, any odor, increased drainage or opening of the incision. A little clear yellow or blood tinged drainage is normal up to 2 weeks after surgery but it should be less every day until it stops.  Call physician if you notice any concerning changes.  Sutures/staples will be removed at first office visit (10 days- 3 weeks).                          GAUZE                                                   Medication  Anticoagulants = blood thinners (Xarelto, Eliquis, Lovenox, Coumadin, or Aspirin)  Pill or shot form depending on what your physician orders.   IF placed on Coumadin, you may also need lab work done for monitoring.  You will bleed easier and bruise easier while on these medications.   Usually you will be on a blood thinner for about 2-5 weeks depending what physician orders.  Contact your physician if you  have signs of bruising, nose bleeds or blood in your urine. You may consider using electric razors and soft bristle toothbrushes.  Do not take aspirin while taking blood thinners unless ordered by your physician.  Review anticoagulant education information sheet provided.    Discomfort  Surgical discomfort is normal for one to two months.  Have realistic goals and keep a positive outlook.  You may need pain medication regularly (every 4-6 hours) the first 2 weeks and then begin to decrease how often you are taking it.  Take pain medication as prescribed with food, especially before therapy, allowing 30-60 minutes to take effect.  Do not drink alcohol while on pain medication.  Keep pain manageable; pain should not disrupt your sleep or activities like getting out of bed or walking.  As you have less discomfort, decrease the amount of pain medication you take. Use plain Tylenol (acetaminophen) for less severe pain.  Some pain medications have Tylenol (acetaminophen) in them such as Norco and Percocet. Do NOT take Tylenol (acetaminophen) within 4 hours of a dose of these medications.  Apply ice or cold therapy to surgical site for 20 minutes at least four times a day, especially after therapy. Be sure there is a thin cloth barrier between skin and ice or cold therapy.  Change position at least every 45 minutes while awake to avoid stiffness or increased discomfort.  For knee replacements- may elevate your leg by placing a pillow under entire leg. Do not place pillow only under the knee.  Have realistic goals and keep a positive outlook.  Deep breathing and relaxation techniques and distractions can help!  If you focus on something else, you do not experience the pain the same. Take advantage of everything available to your to help control you discomfort.  Contact physician if discomfort does not respond to pain medication.    Body changes  Constipation is common with the use of narcotics.  Eat fiber rich foods and  drink plenty of fluids.  Use stool softeners such as Colace or Senakot while on narcotics, and laxatives such as Miralax or Milk of Magnesia if needed.   An enema or suppository may be needed if above measures do not work.    Prevention of infection and promotion of healing  Good hand washing is important. Everyone should wash their hands or use hand  as soon as they walk in your house-whether they live there or are visiting.  Keep bed linen/clothing freshly laundered.  Do not allow others or pets to touch your incision.  Avoid people that have colds or the flu.  Your surgeon may recommend that you take antibiotics before you undergo any dental or other invasive surgical procedures after your joint replacement. Speak with your physician about this at your post-op office visit.  Eat a balanced diet high in fiber and drink plenty of fluids.   Continue using incentive spirometry because narcotics make you sleepy so you may not take good deep breaths. We do not want you to get pneumonia.     Post op Office visits  Schedule 10 days to 3 weeks after surgery WITH SURGEON’s office.  Additional visits may need to be scheduled. Your physician will discuss this at first post-op office visit.  Schedule outpatient physical therapy per your surgeon’s orders.  Schedule one week follow up after surgery WITH PRIMARY CARE PHYSICIAN; review your medications over last 6 months. Your body gets stressed by surgery and that stress can affect all your other health issues (such as high blood pressure, diabetes, CHF, afib, and asthma just to name a few).  We don’t want those other health issues to cause you to get readmitted to the hospital; much better for you to catch developing problems and prevent them from becoming larger ones.  JENIFFER HOSE - IF ordered by your surgeon, wear these during the day and off at night.      Notify your surgeon if you notice any  of the following signs  Separation of incision line.  Increased redness,  swelling, or warmth of skin around incision.  Increased or foul smelling drainage from incision  Red streaks on skin near incision.  Temperature >101 F.  Increased pain at incision not relieved by pain medication.      Signs of blood clot  Pain, excessive tenderness, redness, or swelling in leg or calf (other than incision site).  (CALL SURGEON)      Go directly to the ER or CALL 911 if  you:  become short of breath  have chest pain  cough up blood  have unexplained anxiety with breathing  Traveling and Handicapped parking  Check with you surgeon when allowed so you don’t set yourself up for greater chance of complications.  If traveling by car, get out to stretch every 2 hours.  This helps prevent stiffness.  You may need to do this any time you travel for the first year after surgery.  If traveling by plane, BEFORE you get into a security line, let them know that you had your hip replaced, as you will most likely set off the metal detector. The doctors no longer provide an identification card for this as they are easily copied. ALSO request a wheelchair the first year to board and get off a plane…this aids in priority seating and you should sit on the aisle or at the bulkhead where you can easily stretch your legs and get up to walk up and down the aisles…this helps prevent blood clots and stiffness.  TEMPORARY HANDICAP PARKING APPLICATION  (good for 3-6 months)  - At Surgeon or PCP visit, request they fill out the form, then go to DM (only time you do not wait in a long line there). Some [facility] offices provide the same service. ([place] service; if you live in another [facility], you may check with them as well). You need space to open car doors to position yourself properly with walker to get in and out of your car safely; some parking spaces are  practically on top of each other and do not give you enough room.

## 2025-03-05 ENCOUNTER — HOSPITAL ENCOUNTER (OUTPATIENT)
Facility: HOSPITAL | Age: 80
Discharge: HOME HEALTH | End: 2025-03-06
Attending: ORTHOPAEDIC SURGERY | Admitting: ORTHOPAEDIC SURGERY
Payer: MEDICARE

## 2025-03-05 ENCOUNTER — ANESTHESIA (OUTPATIENT)
Dept: SURGERY | Facility: HOSPITAL | Age: 80
End: 2025-03-05
Payer: MEDICARE

## 2025-03-05 DIAGNOSIS — M17.11 PRIMARY OSTEOARTHRITIS OF RIGHT KNEE: Primary | ICD-10-CM

## 2025-03-05 DIAGNOSIS — Z01.818 PRE-OP TESTING: ICD-10-CM

## 2025-03-05 LAB
GLUCOSE BLD-MCNC: 116 MG/DL (ref 70–99)
GLUCOSE BLD-MCNC: 131 MG/DL (ref 70–99)
GLUCOSE BLD-MCNC: 145 MG/DL (ref 70–99)
GLUCOSE BLD-MCNC: 348 MG/DL (ref 70–99)

## 2025-03-05 PROCEDURE — 0SRC0J9 REPLACEMENT OF RIGHT KNEE JOINT WITH SYNTHETIC SUBSTITUTE, CEMENTED, OPEN APPROACH: ICD-10-PCS | Performed by: ORTHOPAEDIC SURGERY

## 2025-03-05 PROCEDURE — 76942 ECHO GUIDE FOR BIOPSY: CPT | Performed by: ANESTHESIOLOGY

## 2025-03-05 PROCEDURE — 99223 1ST HOSP IP/OBS HIGH 75: CPT | Performed by: HOSPITALIST

## 2025-03-05 DEVICE — ATTUNE PATELLA MEDIALIZED DOME 35MM CEMENTED AOX
Type: IMPLANTABLE DEVICE | Site: KNEE | Status: FUNCTIONAL
Brand: ATTUNE

## 2025-03-05 DEVICE — ATTUNE KNEE SYSTEM TIBIAL INSERT ROTATING PLATFORM POSTERIOR STABILIZED 5 10MM AOX
Type: IMPLANTABLE DEVICE | Site: KNEE | Status: FUNCTIONAL
Brand: ATTUNE

## 2025-03-05 DEVICE — ATTUNE KNEE SYSTEM TIBIAL BASE ROTATING PLATFORM SIZE 4 CEMENTED
Type: IMPLANTABLE DEVICE | Site: KNEE | Status: FUNCTIONAL
Brand: ATTUNE

## 2025-03-05 DEVICE — ATTUNE KNEE SYSTEM FEMORAL POSTERIOR STABILIZED SIZE 5 RIGHT CEMENTED
Type: IMPLANTABLE DEVICE | Site: KNEE | Status: FUNCTIONAL
Brand: ATTUNE

## 2025-03-05 DEVICE — SMARTSET HV HIGH VISCOSITY BONE CEMENT 40G
Type: IMPLANTABLE DEVICE | Site: KNEE | Status: FUNCTIONAL
Brand: SMARTSET

## 2025-03-05 RX ORDER — DIPHENHYDRAMINE HYDROCHLORIDE 50 MG/ML
25 INJECTION INTRAMUSCULAR; INTRAVENOUS ONCE AS NEEDED
Status: ACTIVE | OUTPATIENT
Start: 2025-03-05 | End: 2025-03-05

## 2025-03-05 RX ORDER — NICOTINE POLACRILEX 4 MG
30 LOZENGE BUCCAL
Status: DISCONTINUED | OUTPATIENT
Start: 2025-03-05 | End: 2025-03-05 | Stop reason: HOSPADM

## 2025-03-05 RX ORDER — HYDROMORPHONE HYDROCHLORIDE 1 MG/ML
0.4 INJECTION, SOLUTION INTRAMUSCULAR; INTRAVENOUS; SUBCUTANEOUS EVERY 2 HOUR PRN
Status: DISCONTINUED | OUTPATIENT
Start: 2025-03-05 | End: 2025-03-06

## 2025-03-05 RX ORDER — SODIUM CHLORIDE, SODIUM LACTATE, POTASSIUM CHLORIDE, CALCIUM CHLORIDE 600; 310; 30; 20 MG/100ML; MG/100ML; MG/100ML; MG/100ML
INJECTION, SOLUTION INTRAVENOUS CONTINUOUS
Status: DISCONTINUED | OUTPATIENT
Start: 2025-03-05 | End: 2025-03-06

## 2025-03-05 RX ORDER — HYDROMORPHONE HYDROCHLORIDE 1 MG/ML
INJECTION, SOLUTION INTRAMUSCULAR; INTRAVENOUS; SUBCUTANEOUS
Status: COMPLETED
Start: 2025-03-05 | End: 2025-03-05

## 2025-03-05 RX ORDER — ROSUVASTATIN CALCIUM 5 MG/1
10 TABLET, COATED ORAL NIGHTLY
Status: DISCONTINUED | OUTPATIENT
Start: 2025-03-05 | End: 2025-03-06

## 2025-03-05 RX ORDER — ACETAMINOPHEN 325 MG/1
650 TABLET ORAL ONCE
Status: COMPLETED | OUTPATIENT
Start: 2025-03-05 | End: 2025-03-05

## 2025-03-05 RX ORDER — ACETAMINOPHEN 500 MG
1000 TABLET ORAL 3 TIMES DAILY
Qty: 90 TABLET | Refills: 0 | Status: SHIPPED | OUTPATIENT
Start: 2025-03-05

## 2025-03-05 RX ORDER — DIPHENHYDRAMINE HYDROCHLORIDE 50 MG/ML
12.5 INJECTION INTRAMUSCULAR; INTRAVENOUS EVERY 4 HOURS PRN
Status: DISCONTINUED | OUTPATIENT
Start: 2025-03-05 | End: 2025-03-06

## 2025-03-05 RX ORDER — HYDROMORPHONE HYDROCHLORIDE 1 MG/ML
0.4 INJECTION, SOLUTION INTRAMUSCULAR; INTRAVENOUS; SUBCUTANEOUS EVERY 5 MIN PRN
Status: DISCONTINUED | OUTPATIENT
Start: 2025-03-05 | End: 2025-03-05 | Stop reason: HOSPADM

## 2025-03-05 RX ORDER — ALBUTEROL SULFATE 0.83 MG/ML
2.5 SOLUTION RESPIRATORY (INHALATION) AS NEEDED
Status: DISCONTINUED | OUTPATIENT
Start: 2025-03-05 | End: 2025-03-05 | Stop reason: HOSPADM

## 2025-03-05 RX ORDER — ACETAMINOPHEN 500 MG
1000 TABLET ORAL ONCE
Status: DISCONTINUED | OUTPATIENT
Start: 2025-03-05 | End: 2025-03-05 | Stop reason: HOSPADM

## 2025-03-05 RX ORDER — DEXTROSE MONOHYDRATE 25 G/50ML
50 INJECTION, SOLUTION INTRAVENOUS
Status: DISCONTINUED | OUTPATIENT
Start: 2025-03-05 | End: 2025-03-06

## 2025-03-05 RX ORDER — SODIUM CHLORIDE, SODIUM LACTATE, POTASSIUM CHLORIDE, CALCIUM CHLORIDE 600; 310; 30; 20 MG/100ML; MG/100ML; MG/100ML; MG/100ML
INJECTION, SOLUTION INTRAVENOUS CONTINUOUS
Status: DISCONTINUED | OUTPATIENT
Start: 2025-03-05 | End: 2025-03-05 | Stop reason: HOSPADM

## 2025-03-05 RX ORDER — PANTOPRAZOLE SODIUM 40 MG/1
40 TABLET, DELAYED RELEASE ORAL
Status: DISCONTINUED | OUTPATIENT
Start: 2025-03-06 | End: 2025-03-06

## 2025-03-05 RX ORDER — METOCLOPRAMIDE HYDROCHLORIDE 5 MG/ML
10 INJECTION INTRAMUSCULAR; INTRAVENOUS EVERY 8 HOURS PRN
Status: DISCONTINUED | OUTPATIENT
Start: 2025-03-05 | End: 2025-03-05 | Stop reason: HOSPADM

## 2025-03-05 RX ORDER — ROPIVACAINE HYDROCHLORIDE 5 MG/ML
INJECTION, SOLUTION EPIDURAL; INFILTRATION; PERINEURAL AS NEEDED
Status: DISCONTINUED | OUTPATIENT
Start: 2025-03-05 | End: 2025-03-05 | Stop reason: SURG

## 2025-03-05 RX ORDER — TRANEXAMIC ACID 10 MG/ML
INJECTION, SOLUTION INTRAVENOUS AS NEEDED
Status: DISCONTINUED | OUTPATIENT
Start: 2025-03-05 | End: 2025-03-05 | Stop reason: SURG

## 2025-03-05 RX ORDER — CYCLOBENZAPRINE HCL 10 MG
10 TABLET ORAL EVERY 8 HOURS PRN
Status: DISCONTINUED | OUTPATIENT
Start: 2025-03-05 | End: 2025-03-06

## 2025-03-05 RX ORDER — HYDROMORPHONE HYDROCHLORIDE 1 MG/ML
0.2 INJECTION, SOLUTION INTRAMUSCULAR; INTRAVENOUS; SUBCUTANEOUS EVERY 2 HOUR PRN
Status: DISCONTINUED | OUTPATIENT
Start: 2025-03-05 | End: 2025-03-06

## 2025-03-05 RX ORDER — ONDANSETRON 2 MG/ML
INJECTION INTRAMUSCULAR; INTRAVENOUS AS NEEDED
Status: DISCONTINUED | OUTPATIENT
Start: 2025-03-05 | End: 2025-03-05 | Stop reason: SURG

## 2025-03-05 RX ORDER — TRAMADOL HYDROCHLORIDE 50 MG/1
50 TABLET ORAL EVERY 6 HOURS PRN
Qty: 50 TABLET | Refills: 0 | Status: SHIPPED | OUTPATIENT
Start: 2025-03-05

## 2025-03-05 RX ORDER — BISACODYL 10 MG
10 SUPPOSITORY, RECTAL RECTAL
Status: DISCONTINUED | OUTPATIENT
Start: 2025-03-05 | End: 2025-03-06

## 2025-03-05 RX ORDER — FERROUS SULFATE 325(65) MG
325 TABLET, DELAYED RELEASE (ENTERIC COATED) ORAL
Status: DISCONTINUED | OUTPATIENT
Start: 2025-03-06 | End: 2025-03-06

## 2025-03-05 RX ORDER — DEXAMETHASONE SODIUM PHOSPHATE 10 MG/ML
8 INJECTION, SOLUTION INTRAMUSCULAR; INTRAVENOUS ONCE
Status: COMPLETED | OUTPATIENT
Start: 2025-03-06 | End: 2025-03-06

## 2025-03-05 RX ORDER — ACETAMINOPHEN 325 MG/1
TABLET ORAL
Status: COMPLETED
Start: 2025-03-05 | End: 2025-03-05

## 2025-03-05 RX ORDER — NALOXONE HYDROCHLORIDE 0.4 MG/ML
0.08 INJECTION, SOLUTION INTRAMUSCULAR; INTRAVENOUS; SUBCUTANEOUS AS NEEDED
Status: DISCONTINUED | OUTPATIENT
Start: 2025-03-05 | End: 2025-03-05 | Stop reason: HOSPADM

## 2025-03-05 RX ORDER — DULOXETIN HYDROCHLORIDE 30 MG/1
30 CAPSULE, DELAYED RELEASE ORAL DAILY
Status: DISCONTINUED | OUTPATIENT
Start: 2025-03-06 | End: 2025-03-06

## 2025-03-05 RX ORDER — NICOTINE POLACRILEX 4 MG
15 LOZENGE BUCCAL
Status: DISCONTINUED | OUTPATIENT
Start: 2025-03-05 | End: 2025-03-05 | Stop reason: HOSPADM

## 2025-03-05 RX ORDER — ONDANSETRON 2 MG/ML
4 INJECTION INTRAMUSCULAR; INTRAVENOUS EVERY 6 HOURS PRN
Status: DISCONTINUED | OUTPATIENT
Start: 2025-03-05 | End: 2025-03-05 | Stop reason: HOSPADM

## 2025-03-05 RX ORDER — OXYCODONE HYDROCHLORIDE 5 MG/1
5 TABLET ORAL EVERY 4 HOURS PRN
Status: DISCONTINUED | OUTPATIENT
Start: 2025-03-05 | End: 2025-03-06

## 2025-03-05 RX ORDER — HYDROMORPHONE HYDROCHLORIDE 1 MG/ML
0.6 INJECTION, SOLUTION INTRAMUSCULAR; INTRAVENOUS; SUBCUTANEOUS EVERY 5 MIN PRN
Status: DISCONTINUED | OUTPATIENT
Start: 2025-03-05 | End: 2025-03-05 | Stop reason: HOSPADM

## 2025-03-05 RX ORDER — ACETAMINOPHEN 500 MG
1000 TABLET ORAL EVERY 8 HOURS
Status: DISCONTINUED | OUTPATIENT
Start: 2025-03-05 | End: 2025-03-06

## 2025-03-05 RX ORDER — ALBUTEROL SULFATE 90 UG/1
2 INHALANT RESPIRATORY (INHALATION) EVERY 6 HOURS PRN
Status: DISCONTINUED | OUTPATIENT
Start: 2025-03-05 | End: 2025-03-06

## 2025-03-05 RX ORDER — METOCLOPRAMIDE HYDROCHLORIDE 5 MG/ML
10 INJECTION INTRAMUSCULAR; INTRAVENOUS EVERY 8 HOURS PRN
Status: DISCONTINUED | OUTPATIENT
Start: 2025-03-05 | End: 2025-03-06

## 2025-03-05 RX ORDER — DULOXETIN HYDROCHLORIDE 30 MG/1
60 CAPSULE, DELAYED RELEASE ORAL DAILY
Status: DISCONTINUED | OUTPATIENT
Start: 2025-03-06 | End: 2025-03-06

## 2025-03-05 RX ORDER — LABETALOL HYDROCHLORIDE 5 MG/ML
INJECTION, SOLUTION INTRAVENOUS AS NEEDED
Status: DISCONTINUED | OUTPATIENT
Start: 2025-03-05 | End: 2025-03-05 | Stop reason: SURG

## 2025-03-05 RX ORDER — DIPHENHYDRAMINE HCL 25 MG
25 CAPSULE ORAL EVERY 4 HOURS PRN
Status: DISCONTINUED | OUTPATIENT
Start: 2025-03-05 | End: 2025-03-06

## 2025-03-05 RX ORDER — HYDROMORPHONE HYDROCHLORIDE 1 MG/ML
0.2 INJECTION, SOLUTION INTRAMUSCULAR; INTRAVENOUS; SUBCUTANEOUS EVERY 5 MIN PRN
Status: DISCONTINUED | OUTPATIENT
Start: 2025-03-05 | End: 2025-03-05 | Stop reason: HOSPADM

## 2025-03-05 RX ORDER — PREGABALIN 100 MG/1
100 CAPSULE ORAL 3 TIMES DAILY
Status: DISCONTINUED | OUTPATIENT
Start: 2025-03-05 | End: 2025-03-06

## 2025-03-05 RX ORDER — OXYCODONE HYDROCHLORIDE 5 MG/1
5 TABLET ORAL EVERY 4 HOURS PRN
Qty: 60 TABLET | Refills: 0 | Status: SHIPPED | OUTPATIENT
Start: 2025-03-05

## 2025-03-05 RX ORDER — POLYETHYLENE GLYCOL 3350 17 G/17G
17 POWDER, FOR SOLUTION ORAL DAILY PRN
Status: DISCONTINUED | OUTPATIENT
Start: 2025-03-05 | End: 2025-03-06

## 2025-03-05 RX ORDER — DOCUSATE SODIUM 100 MG/1
100 CAPSULE, LIQUID FILLED ORAL 2 TIMES DAILY
Status: DISCONTINUED | OUTPATIENT
Start: 2025-03-05 | End: 2025-03-06

## 2025-03-05 RX ORDER — MIDAZOLAM HYDROCHLORIDE 1 MG/ML
INJECTION INTRAMUSCULAR; INTRAVENOUS AS NEEDED
Status: DISCONTINUED | OUTPATIENT
Start: 2025-03-05 | End: 2025-03-05 | Stop reason: SURG

## 2025-03-05 RX ORDER — LISINOPRIL 20 MG/1
20 TABLET ORAL DAILY
Status: DISCONTINUED | OUTPATIENT
Start: 2025-03-05 | End: 2025-03-06

## 2025-03-05 RX ORDER — SENNOSIDES 8.6 MG
17.2 TABLET ORAL NIGHTLY
Status: DISCONTINUED | OUTPATIENT
Start: 2025-03-05 | End: 2025-03-06

## 2025-03-05 RX ORDER — NICOTINE POLACRILEX 4 MG
15 LOZENGE BUCCAL
Status: DISCONTINUED | OUTPATIENT
Start: 2025-03-05 | End: 2025-03-06

## 2025-03-05 RX ORDER — NICOTINE POLACRILEX 4 MG
30 LOZENGE BUCCAL
Status: DISCONTINUED | OUTPATIENT
Start: 2025-03-05 | End: 2025-03-06

## 2025-03-05 RX ORDER — DEXAMETHASONE SODIUM PHOSPHATE 4 MG/ML
VIAL (ML) INJECTION AS NEEDED
Status: DISCONTINUED | OUTPATIENT
Start: 2025-03-05 | End: 2025-03-05 | Stop reason: SURG

## 2025-03-05 RX ORDER — ONDANSETRON 2 MG/ML
4 INJECTION INTRAMUSCULAR; INTRAVENOUS EVERY 6 HOURS PRN
Status: DISCONTINUED | OUTPATIENT
Start: 2025-03-05 | End: 2025-03-06

## 2025-03-05 RX ORDER — AMLODIPINE BESYLATE 5 MG/1
5 TABLET ORAL
Status: DISCONTINUED | OUTPATIENT
Start: 2025-03-05 | End: 2025-03-06

## 2025-03-05 RX ORDER — DEXTROSE MONOHYDRATE 25 G/50ML
50 INJECTION, SOLUTION INTRAVENOUS
Status: DISCONTINUED | OUTPATIENT
Start: 2025-03-05 | End: 2025-03-05 | Stop reason: HOSPADM

## 2025-03-05 RX ORDER — FLUTICASONE PROPIONATE AND SALMETEROL 500; 50 UG/1; UG/1
1 POWDER RESPIRATORY (INHALATION) 2 TIMES DAILY
Status: DISCONTINUED | OUTPATIENT
Start: 2025-03-05 | End: 2025-03-06

## 2025-03-05 RX ORDER — TRANEXAMIC ACID 10 MG/ML
INJECTION, SOLUTION INTRAVENOUS
Status: COMPLETED
Start: 2025-03-05 | End: 2025-03-05

## 2025-03-05 RX ORDER — TRANEXAMIC ACID 10 MG/ML
1000 INJECTION, SOLUTION INTRAVENOUS ONCE
Status: COMPLETED | OUTPATIENT
Start: 2025-03-05 | End: 2025-03-05

## 2025-03-05 RX ORDER — CHOLECALCIFEROL (VITAMIN D3) 50 MCG
2000 TABLET ORAL DAILY
Status: DISCONTINUED | OUTPATIENT
Start: 2025-03-05 | End: 2025-03-06

## 2025-03-05 RX ORDER — LABETALOL HYDROCHLORIDE 5 MG/ML
5 INJECTION, SOLUTION INTRAVENOUS EVERY 5 MIN PRN
Status: DISCONTINUED | OUTPATIENT
Start: 2025-03-05 | End: 2025-03-05 | Stop reason: HOSPADM

## 2025-03-05 RX ORDER — SODIUM PHOSPHATE, DIBASIC AND SODIUM PHOSPHATE, MONOBASIC 7; 19 G/230ML; G/230ML
1 ENEMA RECTAL ONCE AS NEEDED
Status: DISCONTINUED | OUTPATIENT
Start: 2025-03-05 | End: 2025-03-06

## 2025-03-05 RX ADMIN — ROPIVACAINE HYDROCHLORIDE 15 ML: 5 INJECTION, SOLUTION EPIDURAL; INFILTRATION; PERINEURAL at 11:02:00

## 2025-03-05 RX ADMIN — MIDAZOLAM HYDROCHLORIDE 2 MG: 1 INJECTION INTRAMUSCULAR; INTRAVENOUS at 10:50:00

## 2025-03-05 RX ADMIN — TRANEXAMIC ACID 1000 MG: 10 INJECTION, SOLUTION INTRAVENOUS at 11:15:00

## 2025-03-05 RX ADMIN — ONDANSETRON 4 MG: 2 INJECTION INTRAMUSCULAR; INTRAVENOUS at 11:15:00

## 2025-03-05 RX ADMIN — SODIUM CHLORIDE, SODIUM LACTATE, POTASSIUM CHLORIDE, CALCIUM CHLORIDE: 600; 310; 30; 20 INJECTION, SOLUTION INTRAVENOUS at 10:46:00

## 2025-03-05 RX ADMIN — DEXAMETHASONE SODIUM PHOSPHATE 4 MG: 4 MG/ML VIAL (ML) INJECTION at 11:15:00

## 2025-03-05 RX ADMIN — LABETALOL HYDROCHLORIDE 5 MG: 5 INJECTION, SOLUTION INTRAVENOUS at 11:31:00

## 2025-03-05 RX ADMIN — MIDAZOLAM HYDROCHLORIDE 2 MG: 1 INJECTION INTRAMUSCULAR; INTRAVENOUS at 11:01:00

## 2025-03-05 NOTE — ANESTHESIA PREPROCEDURE EVALUATION
PRE-OP EVALUATION    Patient Name: Rayne Morales    Admit Diagnosis: PRIMARY OSTEOARTHRITIS OF RIGHT KNEE    Pre-op Diagnosis: PRIMARY OSTEOARTHRITIS OF RIGHT KNEE    RIGHT TOTAL KNEE ARTHROPLASTY    Anesthesia Procedure: RIGHT TOTAL KNEE ARTHROPLASTY (Right)    Surgeons and Role:     * Drake Torre MD - Primary    Pre-op vitals reviewed.  Temp: 97.9 °F (36.6 °C)  Pulse: 88  Resp: 16  BP: 135/97  SpO2: 98 %  Body mass index is 29.66 kg/m².    Current medications reviewed.  Hospital Medications:   acetaminophen (Tylenol Extra Strength) tab 1,000 mg  1,000 mg Oral Once    glucose (Dex4) 15 GM/59ML oral liquid 15 g  15 g Oral Q15 Min PRN    Or    glucose (Glutose) 40% oral gel 15 g  15 g Oral Q15 Min PRN    Or    glucose-vitamin C (Dex-4) chewable tab 4 tablet  4 tablet Oral Q15 Min PRN    Or    dextrose 50% injection 50 mL  50 mL Intravenous Q15 Min PRN    Or    glucose (Dex4) 15 GM/59ML oral liquid 30 g  30 g Oral Q15 Min PRN    Or    glucose (Glutose) 40% oral gel 30 g  30 g Oral Q15 Min PRN    Or    glucose-vitamin C (Dex-4) chewable tab 8 tablet  8 tablet Oral Q15 Min PRN    lactated ringers infusion   Intravenous Continuous    ceFAZolin (Ancef) 2g in 10mL IV syringe premix  2 g Intravenous Once    ceFAZolin (Ancef) 2 g/10mL IV syringe premix        clonidine/epinephrine/ropivacaine/ketorolac in 0.9% NaCl 60 mL pain cocktail syringe for knee arthroplasty   Infiltration Once (Intra-Op)       Outpatient Medications:   Prescriptions Prior to Admission[1]    Allergies: Bactrim, Ciprofloxacin, Mold, Sulfa antibiotics, Diovan [valsartan], Omnicef, Atorvastatin, Clindamycin, Green pepper, Lipitor [atorvastatin calcium], Pneumococcal vaccines, and Quinapril hcl      Anesthesia Evaluation    Patient summary reviewed.    Anesthetic Complications  (-) history of anesthetic complications         GI/Hepatic/Renal      (+) GERD and well controlled                          Cardiovascular      ECG  reviewed.          (+) obesity  (+) hypertension     (+) CAD  (+) past MI           (+) dysrhythmias and PVC  (-) CHF  (-) angina     (+) PHILLIPS         Endo/Other      (+) diabetes  type 2,       (-) anemia        (-) thrombocytopenia           Pulmonary      (+) asthma  (-) COPD       (+) shortness of breath     (+) sleep apnea       Neuro/Psych      (+) depression    (-) CVA       (+) neuromuscular disease             Xarelto appropriately held leading up to surgery        Past Surgical History:   Procedure Laterality Date    Abdomen surgery proc unlisted      duodenal biopsy    Appendectomy      Appendectomy      Breast surgery      Cataract      Cholecystectomy      Colonoscopy  10/1/10, 4/21/17    Colonoscopy N/A 02/27/2023    Procedure: COLONOSCOPY;  Surgeon: Migel Bass MD;  Location:  ENDOSCOPY    Egd  04/21/2017    Eye surgery      Femur/knee surg unlisted      right knee arthroscopy    Hemorrhoidectomy,int/ext,simple      Hernia surgery      Hysterectomy  1984    total hystero.    Lumpectomy left Left 1999    lt lumpectomy and radiation.    Oophorectomy Bilateral 1984    total hystero.    Other  11/2021    left wrist, ORIF Dr. Stacy Long    Other  02/21/2022    CERVICAL 5 AND CERVICAL 6 LAMINECTOMIES, PARTIAL CERVICAL 7 LAMINECTOMY, LEFT CERVICAL 5/CERVICAL 6 AND CERVICAL 6/CERVICAL 7 FORAMINOTOMIES, CERVICAL 6 AND CERVICAL 7 ARTHRODESIS, AUTOGRAFT, ALLOGRAFT    Other surgical history  2010    endoscopy - papillotomy    Other surgical history  2016     under right eye, skin cancer removed    Other surgical history  02/2022    neck    Port for vascular access      Radiation left Left 1999    left lumpectomy and radiation.    Repair ing hernia,5+y/o,reducibl      Sigmoidoscopy,diagnostic      Spine surgery procedure unlisted      Tonsillectomy      Total abdom hysterectomy      Total knee replacement       Social History     Socioeconomic History    Marital status:    Tobacco Use     Smoking status: Former     Current packs/day: 0.00     Types: Cigarettes     Quit date: 1968     Years since quittin.2    Smokeless tobacco: Never   Vaping Use    Vaping status: Never Used   Substance and Sexual Activity    Alcohol use: No     Comment: rare    Drug use: Not Currently     Types: Cannabis     Comment: uses cream for pain to back/neck with barometer changes.    Sexual activity: Not Currently     Partners: Male   Other Topics Concern    Caffeine Concern Yes     Comment: half of cup of coke daily    Sleep Concern No    Exercise Yes     Comment: walking    Seat Belt Yes     History   Drug Use Unknown     Comment: uses cream for pain to back/neck with barometer changes.     Available pre-op labs reviewed.  Lab Results   Component Value Date    WBC 6.8 2025    RBC 4.76 2025    HGB 14.1 2025    HCT 42.5 2025    MCV 89.3 2025    MCH 29.6 2025    MCHC 33.2 2025    RDW 17.0 2025    .0 (L) 2025     Lab Results   Component Value Date     (L) 2025    K 4.5 2025    CL 97 (L) 2025    CO2 22.0 2025    BUN 28 (H) 2025    CREATSERUM 1.70 (H) 2025     (H) 2025    CA 9.7 2025     Lab Results   Component Value Date    INR 1.43 (H) 2025         Airway      Mallampati: II  Mouth opening: >3 FB    Neck ROM: full Cardiovascular    Cardiovascular exam normal.         Dental    Dentition appears grossly intact         Pulmonary    Pulmonary exam normal.                 Other findings              ASA: 3   Plan: spinal  NPO status verified and Patient does not meet NPO guidelines.    Post-procedure pain management plan discussed with surgeon and patient.  Surgeon requests: regional block  Comment: Right Adductor Canal Block  Plan/risks discussed with: patient  Use of blood product(s) discussed with: patient    Consented to blood products.          Present on Admission:  **None**             [1]    Medications Prior to Admission   Medication Sig Dispense Refill Last Dose/Taking    PREGABALIN 100 MG Oral Cap Take 1 capsule (100 mg total) by mouth in the morning and 1 capsule (100 mg total) at noon and 1 capsule (100 mg total) in the evening. 270 capsule 0 3/5/2025 Morning    famciclovir 500 MG Oral Tab 3 tabs at onset of symptoms.  Repeat with each exacerbation. 30 tablet 1 Past Month    tiZANidine 4 MG Oral Tab Take 1 tablet (4 mg total) by mouth 2 (two) times daily as needed.   3/4/2025 at  7:00 PM    lisinopril 20 MG Oral Tab Take 1 tablet (20 mg total) by mouth daily. 90 tablet 3 3/4/2025 at  8:00 AM    amLODIPine 2.5 MG Oral Tab Take 1 tablet (2.5 mg total) by mouth in the morning and 1 tablet (2.5 mg total) before bedtime. 180 tablet 1 Taking    fluticasone-salmeterol 500-50 MCG/ACT Inhalation Aerosol Powder, Breath Activated Inhale 1 puff into the lungs 2 (two) times daily.   3/5/2025 at  6:00 AM    DULoxetine 60 MG Oral Cap DR Particles Take 1 capsule (60 mg total) by mouth daily. TO TAKE ALONG WITH THE 30 MG TO EQUAL 90 MG DAILY. 90 capsule 3 3/5/2025 at  6:00 AM    DULoxetine 30 MG Oral Cap DR Particles Take 1 capsule (30 mg total) by mouth daily. TO TAKE ALONG WITH THE 60 MG TO EQUAL 90 MG DAILY. 90 capsule 3 3/5/2025 at  6:00 AM    pantoprazole 40 MG Oral Tab EC Take 1 tablet (40 mg total) by mouth before breakfast. 90 tablet 3 3/5/2025 at  6:00 AM    ROSUVASTATIN 10 MG Oral Tab Take 1 tablet (10 mg total) by mouth nightly. 90 tablet 3 3/4/2025 at  7:00 PM    semaglutide (OZEMPIC, 0.25 OR 0.5 MG/DOSE,) 2 MG/3ML Subcutaneous Solution Pen-injector Inject 0.5 mg into the skin once a week. 9 mL 1 Past Week    metFORMIN  MG Oral Tablet 24 Hr Take 2 tablets (1,000 mg total) by mouth daily with breakfast and 1 tablet (500 mg total) every evening. 270 tablet 3 3/4/2025 at  7:00 PM    oxyCODONE-acetaminophen  MG Oral Tab Take 1 tablet by mouth every 4 to 6 hours as needed for Pain.   3/5/2025  at  6:00 AM    Cholecalciferol (VITAMIN D) 2000 units Oral Tab Take 2,000 Units by mouth daily.   3/4/2025 at  8:00 AM    ondansetron (ZOFRAN) 4 mg tablet Take 1 tablet (4 mg total) by mouth every 12 (twelve) hours as needed for Nausea. 30 tablet 0 More than a month    rivaroxaban (XARELTO) 10 MG Oral Tab Take 1 tablet (10 mg total) by mouth daily.   2/28/2025    lidocaine 5 % External Patch Place 1 patch onto the skin daily.   More than a month    immune globulin, human, (GAMMAGARD) 10 g/100mL Injection Solution Inject 30 g into the vein. ONCE A MONTH INFUSION THROUGH PORT, ON SUNDAYS   3/2/2025    albuterol 108 (90 Base) MCG/ACT Inhalation Aero Soln Inhale 2 puffs into the lungs every 6 (six) hours as needed for Wheezing. 1 each 3 More than a month    Fluticasone Propionate 50 MCG/ACT Nasal Suspension 1 spray by Nasal route 2 (two) times daily. 1 Bottle 3 More than a month

## 2025-03-05 NOTE — PHYSICAL THERAPY NOTE
PT orders received and chart reviewed. Pt declined participating at this time reporting she is waiting on a pain pill and would like to finish eating. Will follow and re-attempt as able and appropriate.

## 2025-03-05 NOTE — ANESTHESIA POSTPROCEDURE EVALUATION
[facility]    Rayne Morales Patient Status:  Outpatient in a Bed   Age/Gender 79 year old female MRN [id number]   Location [facility] POST ANESTHESIA CARE UNIT Attending Drake Torre MD   Hosp Day # 0 PCP Jose Daniel Washington MD       Anesthesia Post-op Note    RIGHT TOTAL KNEE ARTHROPLASTY    Procedure Summary       Date: 03/05/25 Room / Location:  MAIN OR 14 /  MAIN OR    Anesthesia Start: 1046 Anesthesia Stop: 1209    Procedure: RIGHT TOTAL KNEE ARTHROPLASTY (Right: Knee) Diagnosis: (PRIMARY OSTEOARTHRITIS OF RIGHT KNEE)    Surgeons: Drake Torre MD Anesthesiologist: Malcom Kellogg MD    Anesthesia Type: spinal ASA Status: 3            Anesthesia Type: spinal    Vitals Value Taken Time   /62 03/05/25 1245   Temp 98.5 °F (36.9 °C) 03/05/25 1204   Pulse 68 03/05/25 1254   Resp 13 03/05/25 1254   SpO2 99 % 03/05/25 1254   Vitals shown include unfiled device data.        Patient Location: PACU    Anesthesia Type: spinal    Airway Patency: patent    Postop Pain Control: adequate    Mental Status: preanesthetic baseline    Nausea/Vomiting: none    Cardiopulmonary/Hydration status: stable euvolemic    Complications: no apparent anesthesia related complications    Postop vital signs: stable    Comments: Report given to RN    Dental Exam: Unchanged from Preop    Patient to be discharged from PACU when criteria met.

## 2025-03-05 NOTE — ANESTHESIA PROCEDURE NOTES
Spinal Block    Date/Time: 3/5/2025 10:52 AM    Performed by: Malcom Kellogg MD  Authorized by: Malcom Kellogg MD      General Information and Staff    Start Time:  3/5/2025 10:52 AM  End Time:  3/5/2025 10:58 AM  Anesthesiologist:  Malcom Kellogg MD  Performed by:  Anesthesiologist  Patient Location:  OR  Site identification: surface landmarks    Preanesthetic Checklist: patient identified, IV checked, risks and benefits discussed, monitors and equipment checked, pre-op evaluation, timeout performed, anesthesia consent and sterile technique used      Procedure Details    Patient Position:  Sitting  Prep: ChloraPrep    Monitoring:  Cardiac monitor, heart rate and continuous pulse ox  Approach:  Left paramedian  Location:  L3-4  Injection Technique:  Single-shot    Needle    Needle Type:  Sprotte  Needle Gauge:  24 G  Needle Length:  3.5 in    Assessment    Sensory Level:  T8  Events: blood positive, clear CSF, paresthesia, CSF aspirated, well tolerated, difficulty and blood negative      Additional Comments     Transient L sided \"sciatica\" pain per patient report while performing L-paramedian approach to SAB. Pain immediately resolved after the needle was withdrawn. Blood-tinged CSF. Skin infiltrated with 1% lidocaine. Much os with midline attempt at SAB.

## 2025-03-05 NOTE — CONSULTS
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  She is admitted know due to right knee osteoarthritis  She had R TKA.  She has some pain but not severe.      History/Other:    Past Medical History:  Past Medical History:    Abdominal pain    Anemia    Anesthesia complication    DIFFICULT TO AWAKEN    Anxiety    Arrhythmia    Arthritis    Asthma (HCC)    Back pain    Back problem    Black stools    Bloating    Blurred vision    Breast CA (HCC)    left lump and rad.    Cancer (HCC)    breast, basal cell    Cervical radiculopathy    Cervical spinal stenosis    Deep vein thrombosis (HCC)    Depression    Diabetes (HCC)    Diabetes mellitus (HCC)    Diarrhea, unspecified    Dizziness    Easy bruising    On Xeralto    Esophageal reflux    Exposure to medical diagnostic radiation    Fatigue    Fitting and adjustment of vascular catheter    Headache disorder    Hearing loss    Heart attack (HCC)    Heartburn    Hemorrhoids    High blood pressure    High cholesterol    History of blood clots    History of COVID-19    not hospitalized, no continued symptoms, cough, fever, loss of taste    History of depression    History of syncope    IgG deficiency (HCC)    ??? pt states she gets IgG infusions due to lack of IgG, unable to fight off infections    Indigestion    Leaking of urine    Migraines    Muscle weakness    uses cane    Neuropathy    rt foot bilateral hands    Osteoarthritis    Pain in joints    Pneumonia due to organism     Pulmonary embolism (HCC)    Shortness of breath    Sleep apnea    cpap - not using - machine is broken    Stress    Uncomfortable fullness after meals    Visual impairment    glasses/contacts    Weight gain     Past Surgical History:   Past Surgical History:   Procedure Laterality Date    Abdomen surgery proc unlisted      duodenal biopsy    Appendectomy      Appendectomy      Breast surgery      Cataract      Cholecystectomy      Colonoscopy  10/1/10, 4/21/17    Colonoscopy N/A 02/27/2023    Procedure: COLONOSCOPY;  Surgeon: Migel Bass MD;  Location:  ENDOSCOPY    Egd  04/21/2017    Eye surgery      Femur/knee surg unlisted      right knee arthroscopy    Hemorrhoidectomy,int/ext,simple      Hernia surgery      Hysterectomy  1984    total hystero.    Lumpectomy left Left 1999    lt lumpectomy and radiation.    Oophorectomy Bilateral 1984    total hystero.    Other  11/2021    left wrist, ORIF Dr. Stacy Long    Other  02/21/2022    CERVICAL 5 AND CERVICAL 6 LAMINECTOMIES, PARTIAL CERVICAL 7 LAMINECTOMY, LEFT CERVICAL 5/CERVICAL 6 AND CERVICAL 6/CERVICAL 7 FORAMINOTOMIES, CERVICAL 6 AND CERVICAL 7 ARTHRODESIS, AUTOGRAFT, ALLOGRAFT    Other surgical history  2010    endoscopy - papillotomy    Other surgical history  2016     under right eye, skin cancer removed    Other surgical history  02/2022    neck    Port for vascular access      Radiation left Left 1999    left lumpectomy and radiation.    Repair ing hernia,5+y/o,reducibl      Sigmoidoscopy,diagnostic      Spine surgery procedure unlisted      Tonsillectomy      Total abdom hysterectomy      Total knee replacement        Family History:   Family History   Problem Relation Age of Onset    Heart Attack Paternal Grandfather     Heart Attack Paternal Grandmother     Other (CAD) Father     Other (CAD) Mother     Hypertension Mother     Heart Attack Mother     Other (CAD) Brother     Hypertension Brother     Heart Attack Brother     Stroke Maternal  Grandfather         Stroke    Stroke Maternal Grandmother         Stroke    Breast Cancer Self 54     Social History:    reports that she quit smoking about 57 years ago. Her smoking use included cigarettes. She has never used smokeless tobacco. She reports that she does not currently use drugs after having used the following drugs: Cannabis. She reports that she does not drink alcohol.     Allergies: Allergies[1]    Medications:  Medications Ordered Prior to Encounter[2]    Review of Systems:   A comprehensive review of systems was completed.    Pertinent positives and negatives noted in the HPI.    Objective:   Physical Exam:    BP (!) 161/73 (BP Location: Right arm)   Pulse 75   Temp 97.8 °F (36.6 °C) (Oral)   Resp 16   Ht 5' 1\" (1.549 m)   Wt 157 lb (71.2 kg)   LMP  (LMP Unknown)   SpO2 99%   BMI 29.66 kg/m²   General: No acute distress, Alert  Respiratory: No rhonchi, no wheezes  Cardiovascular: S1, S2. Regular rate and rhythm  Abdomen: Soft, Non-tender, Non-distended, Positive bowel sounds  Neuro: No new focal deficits  Musculoskeletal: dec ROM RLE  Extremities: No edema      Results:    Labs:      Labs Last 24 Hours:  No results for input(s): \"WBC\", \"HGB\", \"MCV\", \"PLT\", \"BAND\", \"INR\" in the last 168 hours.    Invalid input(s): \"LYM#\", \"MONO#\", \"BASOS#\", \"EOSIN#\"    No results for input(s): \"GLU\", \"BUN\", \"CREATSERUM\", \"GFRAA\", \"GFRNAA\", \"CA\", \"ALB\", \"NA\", \"K\", \"CL\", \"CO2\", \"ALKPHO\", \"AST\", \"ALT\", \"BILT\", \"TP\" in the last 168 hours.    No results for input(s): \"PTP\", \"INR\" in the last 168 hours.    No results for input(s): \"TROP\", \"CK\" in the last 168 hours.      Imaging: Imaging data reviewed in Epic.    Assessment & Plan:      #OA s/p R TKA  Cont. Pain control  Back on xarelto    # Hypercarbia state with history of DVT  Continue Xarelto    # History of breast cancer on remission    # CVID  On IVIG as an outpatient next  # Hypertension  Continue home medical regimen  Takes Norvasc in the afternoon    #  Diabetes w/ DM neuropathy  Insulin protocol  Resume metformin and Ozempic on discharge    #CAD  Statin, OAC  # Gastric reflux  # MUKUND  #Anxiety/depresssion        All diagnosis' and recommendations discussed with patient and/or family in detail.      Tin Rodrigues MD  3/5/2025        The 21st Century Cures Act makes medical notes like these available to patients in the interest of transparency. Please be advised this is a medical document. Medical documents are intended to carry relevant information, facts as evident, and the clinical opinion of the practitioner. The medical note is intended as peer to peer communication and may appear blunt or direct. It is written in medical language and may contain abbreviations or verbiage that are unfamiliar.                    [1]   Allergies  Allergen Reactions    Bactrim HIVES and OTHER (SEE COMMENTS)    Ciprofloxacin HIVES    Mold REACTIVE AIRWAY DISEASE     Mold and smut    Sulfa Antibiotics HIVES     Bactrim (Sulfamethoxazole/TMP)    Diovan [Valsartan] NAUSEA AND VOMITING    Omnicef NAUSEA ONLY and DIZZINESS    Atorvastatin OTHER (SEE COMMENTS) and UNKNOWN    Clindamycin DIARRHEA and NAUSEA ONLY    Green Pepper OTHER (SEE COMMENTS)     Sensitivity (all peppers - green, orange and red)    Lipitor [Atorvastatin Calcium] OTHER (SEE COMMENTS)     Leg cramping, sensitivity    Pneumococcal Vaccines OTHER (SEE COMMENTS)     Pt states she had pneumo vax done at Dr. Boyd on 1/10/24- states developed redness, swelling,itching to the arm    Quinapril Hcl FATIGUE   [2]   No current facility-administered medications on file prior to encounter.     Current Outpatient Medications on File Prior to Encounter   Medication Sig Dispense Refill    tiZANidine 4 MG Oral Tab Take 1 tablet (4 mg total) by mouth 2 (two) times daily as needed.      lisinopril 20 MG Oral Tab Take 1 tablet (20 mg total) by mouth daily. 90 tablet 3    amLODIPine 2.5 MG Oral Tab Take 1 tablet (2.5 mg total) by  mouth in the morning and 1 tablet (2.5 mg total) before bedtime. 180 tablet 1    fluticasone-salmeterol 500-50 MCG/ACT Inhalation Aerosol Powder, Breath Activated Inhale 1 puff into the lungs 2 (two) times daily.      DULoxetine 60 MG Oral Cap DR Particles Take 1 capsule (60 mg total) by mouth daily. TO TAKE ALONG WITH THE 30 MG TO EQUAL 90 MG DAILY. 90 capsule 3    DULoxetine 30 MG Oral Cap DR Particles Take 1 capsule (30 mg total) by mouth daily. TO TAKE ALONG WITH THE 60 MG TO EQUAL 90 MG DAILY. 90 capsule 3    pantoprazole 40 MG Oral Tab EC Take 1 tablet (40 mg total) by mouth before breakfast. 90 tablet 3    ROSUVASTATIN 10 MG Oral Tab Take 1 tablet (10 mg total) by mouth nightly. 90 tablet 3    semaglutide (OZEMPIC, 0.25 OR 0.5 MG/DOSE,) 2 MG/3ML Subcutaneous Solution Pen-injector Inject 0.5 mg into the skin once a week. 9 mL 1    metFORMIN  MG Oral Tablet 24 Hr Take 2 tablets (1,000 mg total) by mouth daily with breakfast and 1 tablet (500 mg total) every evening. 270 tablet 3    oxyCODONE-acetaminophen  MG Oral Tab Take 1 tablet by mouth every 4 to 6 hours as needed for Pain.      Cholecalciferol (VITAMIN D) 2000 units Oral Tab Take 2,000 Units by mouth daily.      lidocaine 5 % External Patch Place 1 patch onto the skin daily.      immune globulin, human, (GAMMAGARD) 10 g/100mL Injection Solution Inject 30 g into the vein. ONCE A MONTH INFUSION THROUGH PORT, ON SUNDAYS      albuterol 108 (90 Base) MCG/ACT Inhalation Aero Soln Inhale 2 puffs into the lungs every 6 (six) hours as needed for Wheezing. 1 each 3    Fluticasone Propionate 50 MCG/ACT Nasal Suspension 1 spray by Nasal route 2 (two) times daily. 1 Bottle 3

## 2025-03-05 NOTE — OPERATIVE REPORT
DATE OF PROCEDURE:  3/5/2025  PREOPERATIVE DIAGNOSIS: Right knee osteoarthritis.   POSTOPERATIVE DIAGNOSIS: Right knee osteoarthritis.   PROCEDURE PERFORMED: Cemented right total knee arthroplasty.   SURGEON:  Drake Torre M.D.  FIRST ASSISTANT: Willy Montalvo PA-C.   SECOND ASSISTANT:  Jacques Lezama  ANESTHESIA: Spinal plus femoral nerve block.   INDICATIONS: The patient has severe knee osteoarthritis that has not responded to conservative treatment including antiinflammatories and injections.  The patient's pain has limited activities of daily living including ambulation and exercise.    DESCRIPTION OF PROCEDURE: The patient was brought to the operating room and under spinal anesthetic, the right lower extremity was sterilely prepped and draped.  Preoperative antibiotics had been administered.   A high thigh tourniquet was inflated to 300.   It was medically necessary for the presence of the assistants listed for safe patient care.  This included positioning of the leg, holding of retractors to protect the underlying neurovascular structures and soft tissues.  It was required for the assistants to hold retractors to protect soft tissues while the primary surgeon made the bone cuts on the femur, the tibia, and the patella.  The assistants also held the leg stable and positioned while the primary surgeon made the approach, and the bone cuts, and affixed the guides and later the components to the bones.  An anterior incision was made, medial and lateral flaps were created. A medial parapatellar arthrotomy was created. The patella was everted, the knee was flexed.  Severe arthritic changes with areas of eburnated bone were noted.  A drill was placed in the distal femur and a 6-degree 10 mm cut was made.  The irisnote rotating platform system was used.   Sizing was performed and a size 5 cutting block was selected to make anterior, posterior, chamfer and box cuts for a cruciate-sacrificing knee. Attention was turned to  the tibia. An external alignment guide was utilized to take 10 mm off the less involved lateral side. Sizing was performed and a size 4 fit well. There were equal medial and lateral gaps when checked with a spacer.  Equal flexion and extension gaps were obtained. The stem cut was made for the tibia and 10 mm was taken off the posterior patella. Sizing was performed and a size 35 patella was selected. Three drill holes were placed.   Trial was performed with a 5 femur, 4 tibia, 10 mm insert and 35 mm patella. This gave good varus and valgus stability, good flexion and extension, good tracking of the patella.  Distal femoral condyle drill holes were made using the trial template.  Final components the same size as the trials were utilized.   Trial components were removed. Bony surfaces were irrigated and dried. Final components were precoated with cement which had been mixed under vacuum conditions. The bony surfaces were precoated as well. Components were impacted into place and excess cement removed. The knee was held in extension while the cement hardened.   The tourniquet was let down, bleeders were cauterized.  Lavage was performed. The arthrotomy was closed with a barbed running suture. Subcutaneous tissue was closed after further irrigation. This was closed with inverted 2-0 Vicryl for the subcutaneous tissue and staples for the skin. An aquacell dressing plus gauze covering was applied.  A lightly compressive dressing from toe to groin was applied. Estimated blood loss was 150 mL. There were no complications. There were no specimens.   The patient was brought to the PACU in stable condition.

## 2025-03-05 NOTE — ANESTHESIA PROCEDURE NOTES
Regional Block    Date/Time: 3/5/2025 11:00 AM    Performed by: Malcom Kellogg MD  Authorized by: Malcom Kellogg MD      General Information and Staff    Start Time:  3/5/2025 11:00 AM  End Time:  3/5/2025 11:02 AM  Anesthesiologist:  Malcom Kellogg MD  Performed by:  Anesthesiologist  Patient Location:  OR    Block Placement: Post Induction  Site Identification: real time ultrasound guided and image stored and retrievable    Block site/laterality marked before start: site marked  Reason for Block: at surgeon's request and post-op pain management    Preanesthetic Checklist: 2 patient identifers, IV checked, site marked, risks and benefits discussed, monitors and equipment checked, pre-op evaluation, timeout performed, anesthesia consent, sterile technique used, no prohibitive neurological deficits and no local skin infection at insertion site      Procedure Details    Patient Position:  Supine  Prep: ChloraPrep    Monitoring:  Cardiac monitor, continuous pulse ox, blood pressure cuff and heart rate  Block Type:  Adductor canal  Laterality:  Right  Injection Technique:  Single-shot    Needle    Needle Type:  Short-bevel and echogenic  Needle Gauge:  21 G  Needle Length:  110 mm  Needle Localization:  Ultrasound guidance  Reason for Ultrasound Use: appropriate spread of the medication was noted in real time and no ultrasound evidence of intravascular and/or intraneural injection            Assessment    Injection Assessment:  Good spread noted, negative resistance, negative aspiration for heme, incremental injection and low pressure  Heart Rate Change: No    - Patient tolerated block procedure well without evidence of immediate block related complications.     Medications  3/5/2025 11:00 AM      Additional Comments    Medication:  Ropivacaine 0.5% 15mL

## 2025-03-05 NOTE — INTERVAL H&P NOTE
Pre-op Diagnosis: PRIMARY OSTEOARTHRITIS OF RIGHT KNEE    The above referenced H&P was reviewed by Drake Torre MD on 3/5/2025, the patient was examined and no significant changes have occurred in the patient's condition since the H&P was performed.  I discussed with the patient and/or legal representative the potential benefits, risks and side effects of this procedure; the likelihood of the patient achieving goals; and potential problems that might occur during recuperation.  I discussed reasonable alternatives to the procedure, including risks, benefits and side effects related to the alternatives and risks related to not receiving this procedure.  We will proceed with procedure as planned.

## 2025-03-05 NOTE — PROGRESS NOTES
Pt is A&Ox4, VS currently stable on RA. POD # 0. Daughter at Bedside and present for POC. Pt was able to tolerate ambulation to the bathroom; up S/B w/ a walker + gait belt. Bed and Chair alarm, Call light within reach.

## 2025-03-06 VITALS
DIASTOLIC BLOOD PRESSURE: 68 MMHG | BODY MASS INDEX: 29.64 KG/M2 | HEART RATE: 83 BPM | OXYGEN SATURATION: 92 % | SYSTOLIC BLOOD PRESSURE: 151 MMHG | TEMPERATURE: 98 F | HEIGHT: 61 IN | WEIGHT: 157 LBS | RESPIRATION RATE: 16 BRPM

## 2025-03-06 LAB
GLUCOSE BLD-MCNC: 125 MG/DL (ref 70–99)
GLUCOSE BLD-MCNC: 238 MG/DL (ref 70–99)
GLUCOSE BLD-MCNC: 321 MG/DL (ref 70–99)
HCT VFR BLD AUTO: 27.8 %
HGB BLD-MCNC: 9.6 G/DL

## 2025-03-06 PROCEDURE — 99232 SBSQ HOSP IP/OBS MODERATE 35: CPT | Performed by: HOSPITALIST

## 2025-03-06 RX ORDER — OXYCODONE AND ACETAMINOPHEN 10; 325 MG/1; MG/1
2 TABLET ORAL EVERY 4 HOURS PRN
Status: DISCONTINUED | OUTPATIENT
Start: 2025-03-06 | End: 2025-03-06

## 2025-03-06 RX ORDER — OXYCODONE AND ACETAMINOPHEN 10; 325 MG/1; MG/1
1 TABLET ORAL EVERY 4 HOURS PRN
Status: DISCONTINUED | OUTPATIENT
Start: 2025-03-06 | End: 2025-03-06

## 2025-03-06 RX ORDER — OXYCODONE HYDROCHLORIDE 10 MG/1
10 TABLET ORAL EVERY 4 HOURS PRN
Status: DISCONTINUED | OUTPATIENT
Start: 2025-03-06 | End: 2025-03-06

## 2025-03-06 NOTE — PHYSICAL THERAPY NOTE
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   SURGEON:  Drake Torre M.D.    Recent admissions:  9/21/24 - 9/25/24: syncope and collapse>> home  8/8/24 - 8/13/24: PNA>> HHC  3/28/24 - 3/29/24: dyspnea with cough>> home    PHYSICAL THERAPY ASSESSMENT   Patient is a 79 year old female admitted 3/5/2025 for R TKA.   Patient is currently functioning near baseline with bed mobility, transfers, gait, and stair negotiation. Prior to admission, patient's baseline is independent.     Patient will benefit from continued skilled PT Services at discharge to promote prior level of function.  Anticipate patient will return home with home health PT.    PLAN  Patient has been evaluated and presents with no skilled Physical Therapy needs at this time.  Patient discharged from Physical Therapy services.  Please re-order if a new functional limitation presents during this admission.    PT Device Recommendation: Rolling walker    GOALS  Patient was able to achieve the following goals ...    Patient was able to transfer Safely with RW   Patient able to ambulate on level surfaces Safely with RW     HOME SITUATION  Type of Home: Townhouse  Home Layout: Two level, Able to live on main level           Stairs to Bedroom: 14    Railing: Yes    Lives With:  (adult grandchild, 2 cats)    Drives: Yes   Patient Regularly Uses: Glasses     Prior Level of [place]: Pt lives in a two story townhouse with her adult grandchild and two cats. There are 14 stairs up to the bedroom with a rial. Pt states she will be able to stay on the  first floor. Pt drives and wears glasses. Pt owns a RW and cane but did not use prior. Pt states she works as a .     SUBJECTIVE  \"I dont need that, Emma already done this.\"    OBJECTIVE  Precautions: Bed/chair alarm  Fall Risk: Standard fall risk    WEIGHT BEARING RESTRICTION  R Lower Extremity: Weight Bearing as Tolerated    PAIN ASSESSMENT  Rating: Unable to rate  Location: R knee  Management Techniques: Activity promotion, Repositioning    COGNITION  Overall Cognitive Status:  WFL - within functional limits    RANGE OF MOTION AND STRENGTH ASSESSMENT  Upper extremity ROM and strength are within functional limits     R Lower extremity ROM is limited as expected s/p TKA    R Lower extremity strength is limited as expected s/p TKA    BALANCE  Static Sitting: Normal  Dynamic Sitting: Good  Static Standing: Fair -  Dynamic Standing: Fair -    ADDITIONAL TESTS                                    ACTIVITY TOLERANCE                         O2 WALK       NEUROLOGICAL FINDINGS                        AM-PAC '6-Clicks' INPATIENT SHORT FORM - BASIC MOBILITY  How much difficulty does the patient currently have...  Patient Difficulty: Turning over in bed (including adjusting bedclothes, sheets and blankets)?: A Little   Patient Difficulty: Sitting down on and standing up from a chair with arms (e.g., wheelchair, bedside commode, etc.): A Little   Patient Difficulty: Moving from lying on back to sitting on the side of the bed?: A Little   How much help from another person does the patient currently need...   Help from Another: Moving to and from a bed to a chair (including a wheelchair)?: A Little   Help from Another: Need to walk in hospital room?: A Little   Help from Another: Climbing 3-5 steps with a railing?: A Little       AM-PAC Score:  Raw Score: 18   Approx Degree of Impairment: 46.58%   Standardized Score (AM-PAC Scale): 43.63   CMS Modifier (G-Code): CK    FUNCTIONAL ABILITY STATUS  Gait Assessment    Functional Mobility/Gait Assessment  Gait Assistance: Supervision  Distance (ft): 150, 150  Assistive Device: Rolling walker  Pattern: R Decreased stance time  Stairs: Stairs  How Many Stairs: 4  Device: 1 Rail  Assist: Supervision  Pattern: Ascend and Descend  Ascend and Descend : Step to    Skilled Therapy Provided   Educated on importance of continued out of bed mobility and ambulation  Educated on weight bearing as tolerated  Educated on use of ice for management of pain and swelling  Educated on HEP to be performed once home  Educated on no pillow behind the knee to maintain total knee extension    Bed mobility> sit to stand> toileting> ambulated 150 feet with RW> ascend/descend 4 stairs> ambulated 150 feet with RW> up in chair at end of session    Bed Mobility:  Rolling: NT  Supine to sit: indp   Sit to supine: NT     Transfer Mobility:  Sit to stand: supervision to RW   Stand to sit: indp  Gait = Supervision 150, 150 with RW. Pt demonstrated decreased stance time on R with step through gait pattern. Pt demonstrated on other gait deviations or LOB.    Therapist's comments: RN gave clearance to see patient. Discussed role and goal of physical therapy in hospital setting. Pt in agreement to session.       Exercise/Education Provided:  Energy conservation  Functional activity tolerated  Gait training  ROM  Strengthening  Transfer training    Patient End of Session: Up in chair, Needs met, Call light within reach, RN aware of session/findings, All patient questions and concerns addressed, Hospital anti-slip socks, SCDs in place, Ice applied, Alarm set    Patient Evaluation Complexity Level:  History Low - no personal factors and/or co-morbidities   Examination of body systems Low -  addressing 1-2 elements   Clinical Presentation Low- Stable   Clinical Decision Making Low Complexity       PT Session Time: 30 minutes  Gait Trainin minutes

## 2025-03-06 NOTE — CM/SW NOTE
03/06/25 1000   CM/SW Referral Data   Referral Source Social Work (self-referral)   Reason for Referral Discharge planning   Informant EMR;Clinical Staff Member   Discharge Needs   Anticipated D/C needs Home health care       Patient is a 78 y/o woman admitted s/p TKR.  Pt with pre-operative plan for [facility] at [place].  PT recommending [facility] services at discharge.  Jess from [facility] confirmed pt accepted for [facility] services at [place].  No other [place] needs/concerns identified at this time.  / to remain available for support and/or discharge planning.     Lotus Rivera, [facility]  Discharge Planner  [phone]

## 2025-03-06 NOTE — PROGRESS NOTES
[facility]   part of [facility]     Hospitalist Progress Note     Rayne Morales Patient Status:  Outpatient in a Bed    1945 MRN [id number]   Location [facility] 3SW-A Attending Drake Torre MD   Hosp Day # 0 PCP Jose Daniel Washington MD     Chief Complaint: TKA    Subjective:     Patient doing well. Minimal pain    Objective:    Review of Systems:   A comprehensive review of systems was completed; pertinent positive and negatives stated in subjective.    Vital signs:  Temp:  [97.9 °F (36.6 °C)-98.2 °F (36.8 °C)] 98.2 °F (36.8 °C)  Pulse:  [67-86] 78  Resp:  [16-18] 16  BP: (132-154)/(55-70) 154/70  SpO2:  [93 %-96 %] 93 %    Physical Exam:    General: No acute distress  Respiratory: No wheezes, no rhonchi  Cardiovascular: S1, S2, regular rate and rhythm  Abdomen: Soft, Non-tender, non-distended, positive bowel sounds  Neuro: No new focal deficits.   Extremities: No edema, knee bandaged    Diagnostic Data:    Labs:  Recent Labs   Lab 25  0458   HGB 9.6*       No results for input(s): \"GLU\", \"BUN\", \"CREATSERUM\", \"GFRAA\", \"GFRNAA\", \"CA\", \"ALB\", \"NA\", \"K\", \"CL\", \"CO2\", \"ALKPHO\", \"AST\", \"ALT\", \"BILT\", \"TP\" in the last 168 hours.    eGFR cannot be calculated (Patient's most recent lab result is older than the maximum 7 days allowed.).    No results for input(s): \"TROP\", \"TROPHS\", \"CK\" in the last 168 hours.    No results for input(s): \"PTP\", \"INR\" in the last 168 hours.               Microbiology    No results found for this visit on 25.      Imaging: Reviewed in Epic.    Medications:    sennosides  17.2 mg Oral Nightly    docusate sodium  100 mg Oral BID    ferrous sulfate  325 mg Oral Daily with breakfast    rivaroxaban  10 mg Oral Q24H    acetaminophen  1,000 mg Oral Q8H    cholecalciferol  2,000 Units Oral Daily    rosuvastatin  10 mg Oral Nightly    DULoxetine  60 mg Oral Daily    DULoxetine  30 mg Oral Daily    pantoprazole  40 mg Oral Before breakfast     fluticasone-salmeterol  1 puff Inhalation BID    lisinopril  20 mg Oral Daily    pregabalin  100 mg Oral TID    insulin aspart  1-10 Units Subcutaneous TID AC and HS    amLODIPine  5 mg Oral Every afternoon at 1400       Assessment & Plan:      #OA s/p R TKA  Cont. Pain control  Back on xarelto     # Hypercarbia state with history of DVT  Continue Xarelto     # History of breast cancer on remission     # CVID  On IVIG as an outpatient next  # Hypertension  Continue home medical regimen  Takes Norvasc in the afternoon     # Diabetes w/ DM neuropathy  Insulin protocol  Resume metformin and Ozempic on discharge     #CAD  Statin, OAC  # Gastric reflux  # MUKUND  #Anxiety/depresssion    Ok to DC from my standpoint    Kareem Smith MD    Supplementary Documentation:     Quality:  DVT Mechanical Prophylaxis:   SCDs, Early ambuation  DVT Pharmacologic Prophylaxis   Medication    rivaroxaban (Xarelto) tab 10 mg                Code Status: Full Code  Baxter: No urinary catheter in place  Baxter Duration (in days):   Central line:    CLARI: 3/6/2025    Discharge is dependent on: ok to dc  At this point Ms. Morales is expected to be discharge to: home    The 21st Century Cures Act makes medical notes like these available to patients in the interest of transparency. Please be advised this is a medical document. Medical documents are intended to carry relevant information, facts as evident, and the clinical opinion of the practitioner. The medical note is intended as peer to peer communication and may appear blunt or direct. It is written in medical language and may contain abbreviations or verbiage that are unfamiliar.

## 2025-03-06 NOTE — PLAN OF CARE
A&Ox4. VSS on RA. Reports severe pain unmanaged with current orders, contacted MD, see new orders. Dressing C/D/I. Voiding freely in the bathroom. Call light in reach. SCDs on. Pt updated on POC. PT/OT to see

## 2025-03-06 NOTE — PROGRESS NOTES
[facility]    Rayne Morales Patient Status:  Outpatient in a Bed    1945 MRN [id number]   Location [facility] 3SW-A Attending Drake Torre MD   Hosp Day # 0 PCP Jose Daniel Washington MD     Subjective:  S/P RIGHT Total Knee Arthroplasty  Systemic or Specific Complaints: Patient resting in bed. Complains of post op pain, has been treated by pain service for years. Current pain meds not controlling discomfort.      Objective:  Vital signs in last 24 hours:  Temp:  [97.8 °F (36.6 °C)-98.5 °F (36.9 °C)] 97.9 °F (36.6 °C)  Pulse:  [66-88] 67  Resp:  [10-21] 16  BP: (118-184)/(55-97) 133/64  SpO2:  [95 %-100 %] 96 %    General: alert, appears stated age and cooperative   Wound: Dressing with moderate drainage.   DVT Exam: Right Calf :  Nontender  Left Calf:  Nontender   Neurovascular Intact sensation and pulses     Data Review  CBC:   Lab Results   Component Value Date    WBC 6.8 2025    RBC 4.76 2025    HGB 9.6 (L) 2025    HCT 27.8 (L) 2025    .0 (L) 2025       Assessment/Plan:  Status Post Right Total Knee Arthroplasty.  Continue P.T.  Anticipate discharge home today.  Add home dose of percocet while admitted. Percocet 10/325 panel ordered.  F/U in 2 weeks.

## 2025-03-06 NOTE — OCCUPATIONAL THERAPY NOTE
OCCUPATIONAL THERAPY EVALUATION - INPATIENT    Room Number: 359/359-A  Evaluation Date: 3/6/2025     Type of Evaluation: Initial  Presenting Problem: S/p R TKA    Physician Order: IP Consult to Occupational Therapy  Reason for Therapy:  ADL/IADL Dysfunction and Discharge Planning    OCCUPATIONAL THERAPY ASSESSMENT   Patient is a 79 year old female admitted on 3/5/2025 with Presenting Problem: S/p R TKA.   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 MIN A only needed for footwear management, which family can provide initially at home. All necessary education completed and Pt verbalized/demo'd understanding as appropriate. No further questions/concerns voiced. Has all necessary AE/DME. Family will be present to assist as needed. No OT needs anticipated at DC.     Recommendations for nursing staff:   Transfers: SBA with RW  Toileting location: Toilet    EVALUATION SESSION:  Patient at start of session: seated in chair     FUNCTIONAL TRANSFER ASSESSMENT  Sit to Stand: Chair  Chair: Modified Independent  Toilet Transfer: Supervision    BED MOBILITY     BALANCE ASSESSMENT  Static Sitting: Independent  Static Standing: Modified Independent    FUNCTIONAL ADL ASSESSMENT  Grooming Standing: Modified Independent  UB Dressing Seated: Supervision  UB Dressing Standing: Supervision  LB Dressing Seated: Minimal Assist (MIN A for footwear, family will provide)  LB Dressing Standing: Supervision  Toileting Seated: Independent  Toileting Standing: Supervision    ACTIVITY TOLERANCE: Pt on room air and denies SOB, dizziness or lightheadedness throughout session. No significant change in vitals noted.     COGNITION  Arousal/Alertness:  appropriate responses to stimuli  Orientation Level:  oriented  x4  Following Commands:  follows all commands and directions without difficulty  Safety Judgement:  decreased awareness of need for safety    Upper Extremity:   ROM: within functional limits   Strength: is within functional limits   Coordination:  Gross motor: wfl  Fine motor: wfl    EDUCATION PROVIDED  Patient Education : Role of Occupational Therapy; Plan of Care; Discharge Recommendations; DME Recommendations; Functional Transfer Techniques; Fall Prevention; Weight Bear Status; Surgical Precautions; Posture/Positioning; Energy Conservation; Proper Body Mechanics  Patient's Response to Education: Verbalized Understanding; Returned Demonstration    Equipment used: RW  Demonstrates functional use    Therapist comments: Education given re: compensatory strategies for Adls management, safety and fall prevention.     Patient End of Session: Up in chair, Needs met, Call light within reach, RN aware of session/findings, All patient questions and concerns addressed, Alarm set, Ice applied    OCCUPATIONAL PROFILE    HOME SITUATION  Type of Home: [facility]  Home Layout: Two level, Able to live on main level  Lives With:  (adult grandchild, 2 cats)  Toilet and Equipment: Comfort height toilet, Grab bar  Shower/Tub and Equipment: Walk-in shower, Grab bar, Shower chair  Other Equipment: Reacher (leg , canes, RW)  Occupation/Status: property managemnt  Drives: Yes  Patient Regularly Uses: Glasses    Prior Level of Function: Pt is typically independent with all aspects of mobility and self-cares without device. Will plan to stay on the first floor for a few days.     SUBJECTIVE  \"I'm 79, way too young to retire.\"    PAIN ASSESSMENT  Ratin  Location: RLE  Management Techniques: Activity promotion, Body mechanics, Ice    OBJECTIVE  Precautions: Bed/chair alarm  Fall Risk: Standard fall risk    WEIGHT BEARING RESTRICTION  R Lower Extremity: Weight Bearing as Tolerated      AM-PAC ‘6-Clicks’ Inpatient Daily Activity Short  Form  -   Putting on and taking off regular lower body clothing?: A Little  -   Bathing (including washing, rinsing, drying)?: A Little  -   Toileting, which includes using toilet, bedpan or urinal? : None  -   Putting on and taking off regular upper body clothing?: None  -   Taking care of personal grooming such as brushing teeth?: None  -   Eating meals?: None    AM-PAC Score:  Score: 22  Approx Degree of Impairment: 25.8%  Standardized Score (AM-PAC Scale): 47.1    PLAN   Patient has been evaluated and presents with no skilled Occupational Therapy needs at this time.  Patient discharged from Occupational Therapy services.  Please re-order if a new functional limitation presents during this admission.    OT Device Recommendations: None    Patient Evaluation Complexity Level:   Occupational Profile/Medical History LOW - Brief history including review of medical or therapy records    Specific performance deficits impacting engagement in ADL/IADL LOW  1 - 3 performance deficits    Client Assessment/Performance Deficits MODERATE - Comorbidities and min to mod modifications of tasks    Clinical Decision Making LOW - Analysis of occupational profile, problem-focused assessments, limited treatment options    Overall Complexity LOW     OT Session Time: 25 minutes  Self-Care Home Management: 13 minutes

## 2025-03-06 NOTE — PLAN OF CARE
A&Ox4. VSS. On room air. /IS. Hx MUKUND w/ CPAP - telemetry monitoring. SCDs. Ankle pumps encouraged. Tolerating diet. Last BM 3/4. Voiding. Pain managed with PO and IV medication - weaning off IV pain medication as tolerated. Dressing to right knee C/D/I, spandagrip and gel ice in place. WBAT. Ambulating with standby assist with walker. Plan is to dc home with HH when cleared. Patient updated and in agreement with plan of care. Safety precautions in place. Instructed patient to call for assistance, call light within reach.

## 2025-03-07 NOTE — PROGRESS NOTES
Patient cleared for discharge. Discharge education completed at bedside, all questions answered. IV removed. Belongings sent home with patient.

## 2025-03-19 ENCOUNTER — PATIENT OUTREACH (OUTPATIENT)
Dept: CASE MANAGEMENT | Age: 80
End: 2025-03-19

## 2025-04-21 NOTE — TELEPHONE ENCOUNTER
No change in assessment.   Requested Prescriptions     Pending Prescriptions Disp Refills    XARELTO 20 MG Oral Tab [Pharmacy Med Name: Xarelto 20 Mg Tab Jans] 90 tablet 0     Sig: Take 1 tablet (20 mg total) by mouth daily.       LOV: 4--sd-FREDERICK    LAST CPE: overdue    Last Labs: 4-8-2024-cbc,cmp,lipid,a1c    Last Refill: 1-- 90 tabs with 0 refills     Your appointments       Date & Time Appointment Department ([place])    May 02, 2024 9:30 AM CDT Medicare Annual Well Visit with Monica Curran APRN [address] ([facility])        May 21, 2024 10:00 AM CDT Exam - Established with Makayla Farley DO [facility], [facility] ([facility])        Aug 09, 2024 10:30 AM CDT Established Patient with Zac Christian MD [facility] ([facility])        Nov 12, 2024 1:00 PM CST Exam - Established with Makayla Farley DO [facility], [facility] ([facility])              [address]  [facility]  [address]  [facility] [id number] [phone] [facility]  [address]  [facility] [id number] [phone] ROHINI LUTZ  [facility]  [address]  [facility] [id number] [phone]

## 2025-04-22 ENCOUNTER — PATIENT OUTREACH (OUTPATIENT)
Dept: CASE MANAGEMENT | Age: 80
End: 2025-04-22

## 2025-04-23 NOTE — PROGRESS NOTES
SHANIA verified for CCM monthly outreach.   I called patient and left a detailed message to call back.   I will follow up with patient at a later time.      Medical record reviewed including recent office visits and test results

## 2025-05-07 ENCOUNTER — TELEPHONE (OUTPATIENT)
Dept: INTERNAL MEDICINE CLINIC | Facility: CLINIC | Age: 80
End: 2025-05-07

## 2025-05-07 NOTE — TELEPHONE ENCOUNTER
Patient called request labs prior to their annual physical.  Annual physical scheduled for 5/13/25 .   Please order labs. Patient preferred lab is [facility].  Patient informed request was sent to clinical team.  Patient informed to fast for labs.  No callback required.     Future Appointments   Date Time Provider Department Center   5/13/2025 10:00 AM Monica Curran APRN [facility]   7/15/2025  1:00 PM Makayla Farley,  [facility] [place]

## 2025-05-09 ENCOUNTER — LAB ENCOUNTER (OUTPATIENT)
Dept: LAB | Age: 80
End: 2025-05-09
Attending: NURSE PRACTITIONER
Payer: MEDICARE

## 2025-05-09 NOTE — TELEPHONE ENCOUNTER
Pt states she was told she had to get labs done prior to her Medicare AWV on 5/13/25. Pt went to get her labs drawn and was told she can't get them drawn until 5/20/25 (see 2/19/25 labs). Pt states she \"the nurse\" was adamant that she got her labs done prior to her appt. Discussed with pt that the  staff routinely calls to remind patients of this. Her labs were ordered from her visit on 2/19/25 as a follow up. This is unrelated to her Medicare AWV. Pt wants her lab orders to line up with the visit and rescheduled her Medicare AWV to a later date 5/22/25. Pt voiced appreciation and understanding.

## 2025-05-20 ENCOUNTER — LAB ENCOUNTER (OUTPATIENT)
Dept: LAB | Age: 80
End: 2025-05-20
Attending: NURSE PRACTITIONER
Payer: MEDICARE

## 2025-05-20 DIAGNOSIS — E11.9 TYPE 2 DIABETES MELLITUS WITHOUT COMPLICATION, WITHOUT LONG-TERM CURRENT USE OF INSULIN (HCC): ICD-10-CM

## 2025-05-20 DIAGNOSIS — D68.59 HYPERCOAGULABLE STATE (HCC): ICD-10-CM

## 2025-05-20 DIAGNOSIS — F33.1 MAJOR DEPRESSIVE DISORDER, RECURRENT EPISODE, MODERATE (HCC): Chronic | ICD-10-CM

## 2025-05-20 DIAGNOSIS — R79.89 ABNORMAL TSH: ICD-10-CM

## 2025-05-20 LAB
ALBUMIN SERPL-MCNC: 4.9 G/DL (ref 3.2–4.8)
ALBUMIN/GLOB SERPL: 1.9 {RATIO} (ref 1–2)
ALP LIVER SERPL-CCNC: 73 U/L (ref 55–142)
ALT SERPL-CCNC: 18 U/L (ref 10–49)
ANION GAP SERPL CALC-SCNC: 9 MMOL/L (ref 0–18)
AST SERPL-CCNC: 19 U/L (ref ?–34)
BILIRUB SERPL-MCNC: 0.5 MG/DL (ref 0.2–1.1)
BUN BLD-MCNC: 18 MG/DL (ref 9–23)
CALCIUM BLD-MCNC: 9.9 MG/DL (ref 8.7–10.6)
CHLORIDE SERPL-SCNC: 104 MMOL/L (ref 98–112)
CO2 SERPL-SCNC: 26 MMOL/L (ref 21–32)
CREAT BLD-MCNC: 1.01 MG/DL (ref 0.55–1.02)
EGFRCR SERPLBLD CKD-EPI 2021: 57 ML/MIN/1.73M2 (ref 60–?)
EST. AVERAGE GLUCOSE BLD GHB EST-MCNC: 163 MG/DL (ref 68–126)
FASTING STATUS PATIENT QL REPORTED: YES
GLOBULIN PLAS-MCNC: 2.6 G/DL (ref 2–3.5)
GLUCOSE BLD-MCNC: 125 MG/DL (ref 70–99)
HBA1C MFR BLD: 7.3 % (ref ?–5.7)
OSMOLALITY SERPL CALC.SUM OF ELEC: 291 MOSM/KG (ref 275–295)
POTASSIUM SERPL-SCNC: 4.4 MMOL/L (ref 3.5–5.1)
PROT SERPL-MCNC: 7.5 G/DL (ref 5.7–8.2)
SODIUM SERPL-SCNC: 139 MMOL/L (ref 136–145)
T4 FREE SERPL-MCNC: 1.3 NG/DL (ref 0.8–1.7)
TSI SER-ACNC: 0.86 UIU/ML (ref 0.55–4.78)

## 2025-05-20 PROCEDURE — 36415 COLL VENOUS BLD VENIPUNCTURE: CPT

## 2025-05-20 PROCEDURE — 80053 COMPREHEN METABOLIC PANEL: CPT

## 2025-05-20 PROCEDURE — 84439 ASSAY OF FREE THYROXINE: CPT

## 2025-05-20 PROCEDURE — 83036 HEMOGLOBIN GLYCOSYLATED A1C: CPT

## 2025-05-20 PROCEDURE — 84443 ASSAY THYROID STIM HORMONE: CPT

## 2025-05-22 ENCOUNTER — OFFICE VISIT (OUTPATIENT)
Dept: INTERNAL MEDICINE CLINIC | Facility: CLINIC | Age: 80
End: 2025-05-22
Payer: MEDICARE

## 2025-05-22 VITALS
RESPIRATION RATE: 16 BRPM | DIASTOLIC BLOOD PRESSURE: 82 MMHG | SYSTOLIC BLOOD PRESSURE: 120 MMHG | HEIGHT: 60.8 IN | HEART RATE: 99 BPM | OXYGEN SATURATION: 100 % | TEMPERATURE: 99 F | BODY MASS INDEX: 27.73 KG/M2 | WEIGHT: 145 LBS

## 2025-05-22 DIAGNOSIS — F33.1 MAJOR DEPRESSIVE DISORDER, RECURRENT EPISODE, MODERATE (HCC): Chronic | ICD-10-CM

## 2025-05-22 DIAGNOSIS — Z85.828 HISTORY OF BASAL CELL CARCINOMA: ICD-10-CM

## 2025-05-22 DIAGNOSIS — Z96.651 HISTORY OF TOTAL RIGHT KNEE REPLACEMENT (TKR): ICD-10-CM

## 2025-05-22 DIAGNOSIS — G62.9 NEUROPATHY: ICD-10-CM

## 2025-05-22 DIAGNOSIS — G47.33 OSA ON CPAP: ICD-10-CM

## 2025-05-22 DIAGNOSIS — E04.9 ENLARGED THYROID: ICD-10-CM

## 2025-05-22 DIAGNOSIS — E78.5 HYPERLIPIDEMIA, UNSPECIFIED HYPERLIPIDEMIA TYPE: ICD-10-CM

## 2025-05-22 DIAGNOSIS — D68.59 HYPERCOAGULABLE STATE (HCC): ICD-10-CM

## 2025-05-22 DIAGNOSIS — M54.12 CERVICAL RADICULOPATHY: ICD-10-CM

## 2025-05-22 DIAGNOSIS — D64.9 ANEMIA, UNSPECIFIED TYPE: ICD-10-CM

## 2025-05-22 DIAGNOSIS — Z85.3 HISTORY OF LEFT BREAST CANCER: ICD-10-CM

## 2025-05-22 DIAGNOSIS — G89.4 CHRONIC PAIN SYNDROME: ICD-10-CM

## 2025-05-22 DIAGNOSIS — I10 PRIMARY HYPERTENSION: ICD-10-CM

## 2025-05-22 DIAGNOSIS — D83.9 COMMON VARIABLE IMMUNODEFICIENCY (HCC): Primary | ICD-10-CM

## 2025-05-22 DIAGNOSIS — Z87.898 HISTORY OF SYNCOPE: ICD-10-CM

## 2025-05-22 DIAGNOSIS — I70.0 ATHEROSCLEROSIS OF AORTA: ICD-10-CM

## 2025-05-22 DIAGNOSIS — G43.009 MIGRAINE WITHOUT AURA AND WITHOUT STATUS MIGRAINOSUS, NOT INTRACTABLE: ICD-10-CM

## 2025-05-22 DIAGNOSIS — K63.5 POLYP OF COLON, UNSPECIFIED PART OF COLON, UNSPECIFIED TYPE: ICD-10-CM

## 2025-05-22 DIAGNOSIS — F41.9 ANXIETY: ICD-10-CM

## 2025-05-22 DIAGNOSIS — J45.30 MILD PERSISTENT ASTHMA WITHOUT COMPLICATION (HCC): ICD-10-CM

## 2025-05-22 DIAGNOSIS — R16.0 LIVER MASS: ICD-10-CM

## 2025-05-22 DIAGNOSIS — R76.8 POSITIVE ANA (ANTINUCLEAR ANTIBODY): ICD-10-CM

## 2025-05-22 DIAGNOSIS — Z00.00 ROUTINE GENERAL MEDICAL EXAMINATION AT A HEALTH CARE FACILITY: ICD-10-CM

## 2025-05-22 DIAGNOSIS — M53.3 SACROILIAC JOINT DYSFUNCTION: ICD-10-CM

## 2025-05-22 DIAGNOSIS — M47.817 SPONDYLOSIS OF LUMBOSACRAL REGION WITHOUT MYELOPATHY OR RADICULOPATHY: ICD-10-CM

## 2025-05-22 DIAGNOSIS — G93.0 CYST OF BRAIN: ICD-10-CM

## 2025-05-22 DIAGNOSIS — E53.8 B12 DEFICIENCY: ICD-10-CM

## 2025-05-22 DIAGNOSIS — M81.0 AGE-RELATED OSTEOPOROSIS WITHOUT CURRENT PATHOLOGICAL FRACTURE: ICD-10-CM

## 2025-05-22 DIAGNOSIS — R54 FRAIL ELDERLY: ICD-10-CM

## 2025-05-22 DIAGNOSIS — D69.6 THROMBOCYTOPENIA: ICD-10-CM

## 2025-05-22 DIAGNOSIS — G56.02 CARPAL TUNNEL SYNDROME OF LEFT WRIST: ICD-10-CM

## 2025-05-22 DIAGNOSIS — K21.9 GASTROESOPHAGEAL REFLUX DISEASE WITHOUT ESOPHAGITIS: ICD-10-CM

## 2025-05-22 DIAGNOSIS — I82.5Z9 CHRONIC DEEP VEIN THROMBOSIS (DVT) OF DISTAL VEIN OF LOWER EXTREMITY, UNSPECIFIED LATERALITY (HCC): ICD-10-CM

## 2025-05-22 DIAGNOSIS — R93.5 ABNORMAL CT OF THE ABDOMEN: ICD-10-CM

## 2025-05-22 DIAGNOSIS — R06.09 DOE (DYSPNEA ON EXERTION): ICD-10-CM

## 2025-05-22 DIAGNOSIS — Z98.1 S/P CERVICAL SPINAL FUSION: ICD-10-CM

## 2025-05-22 DIAGNOSIS — I27.82 CHRONIC PULMONARY EMBOLISM, UNSPECIFIED PULMONARY EMBOLISM TYPE, UNSPECIFIED WHETHER ACUTE COR PULMONALE PRESENT (HCC): ICD-10-CM

## 2025-05-22 DIAGNOSIS — E11.9 TYPE 2 DIABETES MELLITUS WITHOUT COMPLICATION, WITHOUT LONG-TERM CURRENT USE OF INSULIN (HCC): ICD-10-CM

## 2025-05-22 DIAGNOSIS — I25.10 CORONARY ARTERY DISEASE INVOLVING NATIVE CORONARY ARTERY OF NATIVE HEART WITHOUT ANGINA PECTORIS: ICD-10-CM

## 2025-05-22 DIAGNOSIS — H54.7 VISUAL IMPAIRMENT: ICD-10-CM

## 2025-05-22 DIAGNOSIS — R53.83 FATIGUE, UNSPECIFIED TYPE: ICD-10-CM

## 2025-05-22 DIAGNOSIS — Z87.01 HISTORY OF PNEUMONIA, RECURRENT: ICD-10-CM

## 2025-05-22 DIAGNOSIS — R25.1 TREMOR OF BOTH HANDS: ICD-10-CM

## 2025-05-22 RX ORDER — SEMAGLUTIDE 0.68 MG/ML
0.5 INJECTION, SOLUTION SUBCUTANEOUS WEEKLY
Qty: 9 ML | Refills: 1 | Status: SHIPPED | OUTPATIENT
Start: 2025-05-22

## 2025-05-22 RX ORDER — ONDANSETRON 4 MG/1
4 TABLET, FILM COATED ORAL EVERY 8 HOURS PRN
Qty: 20 TABLET | Refills: 2 | Status: SHIPPED | OUTPATIENT
Start: 2025-05-22

## 2025-05-22 NOTE — PROGRESS NOTES
Subjective:   Rayne Morales is a 79 year old female who presents for a Subsequent Annual Wellness visit (Pt already had Initial Annual Wellness) and scheduled follow up of multiple significant but stable problems.   History of Present Illness  Rayne Morales is a 79 year old female who presents for her annual wellness visit.    She is recovering from a right total knee replacement and notes a slower recovery. She is not currently in physical therapy but feels she needs an additional six weeks.    Her diabetes is managed with Ozempic 0.5 mg and metformin at maximum dosage, with a recent A1c of 7.3. She is concerned about the cost of Ozempic and has gained two pounds in the past week.    She is also on an ARB and rosuvastatin 10 mg for dyslipidemia, with an LDL of 28.  Her hypertension is well controlled with lisinopril 20 mg and amlodipine 2.5 mg.  High at times during IVIG infusion.      She has an immunodeficiency and receives monthly IVIG at home.   She has asthma and follows with a pulmonologist.   Osteoporosis is managed with Reclast, with a DEXA scan showing a T score of -2.5 in the femoral neck. Next dexa scheduled.     A B12 deficiency noted by hematology.  Is monitored by a hematologist, and she plans to start B12 injections next week.   She takes pantoprazole for GERD  which could be affecting vitamin absorption. She experiences issues with pill absorption, noting larger pills pass unchanged.    She is currently taking oxycodone and requires Zofran for nausea. She needs to renew prescriptions for Ozempic and Zofran.    History/Other:   Fall Risk Assessment:   She has been screened for Falls and is low risk.      Cognitive Assessment:   She had a completely normal cognitive assessment - see flowsheet entries     Functional Ability/Status:   Rayne Morales has some abnormal functions as listed below:  She has Meal Preparation difficulties based on screening of functional status.She has  difficulties Managing Money/Bills based on screening of functional status.She has difficulties Shopping for Groceries based on screening of functional status. She has difficulties Affording Meds based on screening of functional status. She has Hearing problems based on screening of functional status.      Depression Screening (PHQ):  PHQ-2 SCORE: 0  , done 5/22/2025   Trouble falling or staying asleep, or sleeping too much: 1     Feeling tired or having little energy: 1    If you checked off any problems, how difficult have these problems made it for you to do your work, take care of things at home, or get along with other people?: Not difficult at all              Advanced Directives:   She does have a Living Will but we do NOT have it on file in Epic.    She does have a POA but we do NOT have it on file in Epic.    Not discussed      Problem List[1]  Allergies:  She is allergic to bactrim, ciprofloxacin, mold, sulfa antibiotics, diovan [valsartan], omnicef, atorvastatin, clindamycin, green pepper, lipitor [atorvastatin calcium], pneumococcal vaccines, and quinapril hcl.    Current Medications:  Active Meds, Sig Only[2]    Medical History:  She  has a past medical history of Abdominal pain, Anemia, Anesthesia complication, Anxiety, Arrhythmia, Arthritis (2000), Asthma ([facility]), Back pain (1966), Back problem, Black stools, Bloating, Blurred vision, Breast CA ([facility]) (1999), Cancer ([facility]) (1999), Cervical radiculopathy, Cervical spinal stenosis, Deep vein thrombosis ([facility]), Depression, Diabetes ([facility]), Diabetes mellitus ([facility]), Diarrhea, unspecified, Dizziness, Easy bruising, Esophageal reflux, Exposure to medical diagnostic radiation, Fatigue, Fitting and adjustment of vascular catheter, Headache disorder, Hearing loss, Heart attack ([facility]), Heartburn, Hemorrhoids, High blood pressure, High cholesterol, History of blood clots, History of COVID-19 (12/01/2020), History of depression, History of syncope (07/26/2014), IgG  deficiency (HCC), Indigestion, Leaking of urine, Migraines, Muscle weakness, Neuropathy, Osteoarthritis, Pain in joints (2000), Pneumonia due to organism, Pulmonary embolism (HCC) (07/25/2015), Shortness of breath, Sleep apnea, Stress, Uncomfortable fullness after meals, Visual impairment, and Weight gain.  Surgical History:  She  has a past surgical history that includes colonoscopy (10/1/10, 4/21/17); abdomen surgery proc unlisted; hernia surgery; femur/knee surg unlisted; Breast Surgery; egd (04/21/2017); May use port for vascular access; cholecystectomy; lumpectomy left (Left, 1999); radiation left (Left, 1999); hysterectomy (1984); oophorectomy (Bilateral, 1984); repair ing hernia,5+y/o,reducibl; total abdom hysterectomy; other (11/2021); other (02/21/2022); other surgical history (2010); other surgical history (2016); other surgical history (02/2022); sigmoidoscopy,diagnostic; hemorrhoidectomy,int/ext,simple; spine surgery procedure unlisted; tonsillectomy; colonoscopy (N/A, 02/27/2023); cataract; Eye surgery; total knee replacement; appendectomy; and appendectomy.   Family History:  Her family history includes Breast Cancer (age of onset: 53) in her self; CAD in her brother, father, and mother; Heart Attack in her brother, mother, paternal grandfather, and paternal grandmother; Hypertension in her brother and mother; Stroke in her maternal grandfather and maternal grandmother.  Social History:  She  reports that she quit smoking about 57 years ago. Her smoking use included cigarettes. She has never used smokeless tobacco. She reports that she does not currently use drugs after having used the following drugs: Cannabis. She reports that she does not drink alcohol.    Tobacco:  She smoked tobacco in the past but quit greater than 12 months ago.  Tobacco Use[3]     CAGE Alcohol Screen:   CAGE screening score of 0 on 5/22/2025, showing low risk of alcohol abuse.      Patient Care Team:  Jose Daniel Washington MD as  PCP - General (Internal Medicine)  Azam Escamilla MD (OPHTHALMOLOGY)  Katie Vicente APN (PAIN MANAGEMENT)  Monica Curran APRN (Internal Medicine)  Curt Mo (Anesthesiology)  Drake Torre MD (SURGERY, ORTHOPEDIC)  Betsy Browning MD (PULMONARY DISEASES)  Makayla Farley DO (RHEUMATOLOGY)  Lotus Villagran APN (Nurse Practitioner Adult Health)  Cristal Segal DPM (PODIATRIST)  Rito Connor PA (Physician Assistant)  Jong Lawrence MD as Consulting Physician (Cardiovascular Diseases)  Frandy Meng DO (NEUROLOGY)  Rito Boyd MD (Allergy and Immunology)  Migel Bass MD (GASTROENTEROLOGY)  Reagan Jacobson MD (Hematology and Oncology)  Porsha Sanderson RMA as [facility] Care Manager    Review of Systems     Negative except as above     Objective:   Physical Exam  Physical Exam  General Appearance:  Alert, cooperative, no distress, appears stated age   Head:  Normocephalic, without obvious abnormality, atraumatic   Eyes:  PERRL, conjunctiva/corneas clear, EOM's intact both eyes   Ears:  Normal TM's and external ear canals, both ears   Nose: Nares normal, septum midline,mucosa normal, no drainage or sinus tenderness   Throat: Lips, mucosa, and tongue normal; teeth and gums normal   Neck: Supple, symmetrical, trachea midline, no adenopathy;  thyroid: not enlarged, symmetric, no JVD   Back:   Symmetric, no curvature, ROM normal, no CVA tenderness   Lungs:   Clear to auscultation bilaterally, respirations unlabored   Heart:  Regular rate and rhythm, S1 and S2 normal,    Abdomen:   Soft, non-tender, bowel sounds active all four quadrants,  no masses,    Extremities: Extremities normal, atraumatic, no edema     Pulses: symmetric   Skin: Skin color, texture, turgor normal,    Lymph nodes: Cervical, supraclavicular nodes normal   Neurologic: Normal         /82   Pulse 99   Temp 98.7 °F (37.1 °C) (Temporal)   Resp 16   Ht 5' 0.8\" (1.544 m)   Wt 145 lb (65.8 kg)   LMP   (LMP Unknown)   SpO2 100%   BMI 27.58 kg/m²  Estimated body mass index is 27.58 kg/m² as calculated from the following:    Height as of this encounter: 5' 0.8\" (1.544 m).    Weight as of this encounter: 145 lb (65.8 kg).    Medicare Hearing Assessment:   Hearing Screening    Time taken: 5/22/2025  1:06 PM  Entry User: Fide Ford CMA  Screening Method: Finger Rub  Finger Rub Result: Pass         Visual Acuity:   Right Eye Visual Acuity: Uncorrected Right Eye Chart Acuity: 20/25   Left Eye Visual Acuity: Uncorrected Left Eye Chart Acuity: 20/25   Both Eyes Visual Acuity: Uncorrected Both Eyes Chart Acuity: 20/25   Able To Tolerate Visual Acuity: Yes        Assessment & Plan:   Rayne Morales is a 79 year old female who presents for a Medicare Assessment.     1. Routine general medical examination at a health care facility (Primary)  2. Atherosclerosis of aorta  stable  statin.   3. Common variable immunodeficiency (HCC)  stable  IVIG infusions  Overview:   ??? pt states she gets IgG infusions due to lack of IgG, unable to fight off infections    4. Hypercoagulable state (HCC)  per hematology  Xarelto  stable    5. Major depressive disorder, recurrent episode, moderate (HCC)  stable  duloxetine.   6. Thrombocytopenia stable  monitor.   7. Type 2 diabetes mellitus without complication, without long-term current use of insulin (HCC) stable  metformin and ozempic.  A1c acceptable    8. Coronary artery disease involving native coronary artery of native heart without angina pectoris  stable  per Dr Lawrence  monitor.   9. PHILLIPS (dyspnea on exertion)  stable  monitor.   10. Hyperlipidemia, unspecified hyperlipidemia type  stable  statin.   11. Primary hypertension  stable  same meds.   12. Mild persistent asthma without complication (HCC)  stable  per pulmonary   13. History of pneumonia, recurrent  stable  monitor very closely.   14. Age-related osteoporosis without current pathological fracture  stable  dexa  scheduled.  Reclast per Dr Farley.   15. B12 deficiency  starting b12 injections with hematology.  Monitor.   16. Enlarged thyroid  stabl e monitor.   17. Carpal tunnel syndrome of left wrist  stable  monitor.   18. Cervical radiculopathy  stabl e monitor.   19. Chronic pain syndrome  stable  follows with external pain service.  Monitor.   20. Cyst of brain  stabl e monitor.   21. Migraine without aura and without status migrainosus, not intractable  stable  monitor.   22. Neuropathy  stable  monitor.   Overview:  rt foot bilateral hands    23. S/P cervical spinal fusion  stable  monitor.   24. Spondylosis of lumbosacral region without myelopathy or radiculopathy stable  monitor.   25. Tremor of both hands  stable  monitor.   26. Anxiety  stable  same meds.    27. Abnormal CT of the abdomen  stable  monitor.   28. Gastroesophageal reflux disease without esophagitis  stable  monitor.   29. Liver mass  stable  montior.   She opted not to complete MRI abd as ordered.    30. History of total right knee replacement (TKR)  stable  montior.   31. Sacroiliac joint dysfunction  stable  monitor.   32. Positive DARREN (antinuclear antibody) stable  monitor.   33.  DVT  managed by hematology  continue xarelto  34. pulmonary embolism  continue xarelto  managed by hematology   35. Anemia, unspecified type  stable  monitor.   36. History of basal cell carcinoma  stable  monitor.  Per derm.    37. History of left breast cancer  stable  monitor.    38. Visual impairment  stable  montior.   Overview:  glasses/contacts    39. MUKUND on CPAP compliant  Dr Chambers.   40. Fatigue, unspecified type  improved.  Monitor.  Multifactorial   41. Frail elderly  stable  montior.   42. History of syncope  stable  monitor.   Other orders  -     Ondansetron HCl; Take 1 tablet (4 mg total) by mouth every 8 (eight) hours as needed for Nausea.  Dispense: 20 tablet; Refill: 2  -     Ozempic (0.25 or 0.5 MG/DOSE); Inject 0.5 mg into the skin once a week.   Dispense: 9 mL; Refill: 1    Assessment & Plan  Type 2 diabetes mellitus  A1c at 7.3% post-surgery and stress. Continues Ozempic  A1c acceptable for age.  - Prescribe Ozempic.  - Prescribe Zofran for nausea. PRN    Hypertension  Well controlled with lisinopril and amlodipine.    Dyslipidemia  Managed with rosuvastatin. LDL at 28.    Gastroesophageal reflux disease (GERD)   Managed with pantoprazole.     B12 deficiency  Identified by Dr. Jacobson. Scheduled for B12 injections.  - Administer B12 injections.  Can do here if she prefers.  She will discuss with hematology     Immunodeficiency  Receives IVIG at home under Dr. Walker care.    Osteoporosis  DEXA scan T-score -2.5. Received Reclast. Under Dr. Farley's care for rheumatology.  Dexa scheduled.     Asthma  Well controlled.  The patient indicates understanding of these issues and agrees to the plan.  Reinforced healthy diet, lifestyle, and exercise.      No follow-ups on file.     JUAREZ Roe, 5/22/2025     Supplementary Documentation:   General Health:  In the past six months, have you lost more than 10 pounds without trying?: 1 - Yes  Has your appetite been poor?: Yes  Type of Diet: Balanced  How does the patient maintain a good energy level?: Appropriate Exercise, Stretching  How would you describe your daily physical activity?: Light  How would you describe your current health state?: Fair  How do you maintain positive mental well-being?: Social Interaction, Visiting Family  On a scale of 0 to 10, with 0 being no pain and 10 being severe pain, what is your pain level?: 0 - (None)  In the past six months, have you experienced urine leakage?: 1-Yes  At any time do you feel concerned for the safety/well-being of yourself and/or your children, in your home or elsewhere?: No  Have you had any immunizations at another office such as Influenza, Hepatitis B, Tetanus, or Pneumococcal?: Yes    Health Maintenance   Topic Date Due    Zoster Vaccines (1 of 2)  Never done    Diabetes Care Dilated Eye Exam  01/09/2021    Colorectal Cancer Screening  02/27/2024    COVID-19 Vaccine (4 - 2024-25 season) 09/01/2024    Annual Physical  05/02/2025    Influenza Vaccine (Season Ended) 10/01/2025    Diabetes Care A1C  11/20/2025    Diabetes Care Foot Exam  02/19/2026    Diabetes Care: GFR  05/20/2026    DEXA Scan  Completed    Annual Depression Screening  Completed    Fall Risk Screening (Annual)  Completed    Diabetes Care: Microalb/Creat Ratio (Annual)  Completed    Pneumococcal Vaccine: 50+ Years  Completed    Meningococcal B Vaccine  Aged Out    Mammogram  Discontinued          The following individual(s) verbally consented to be recorded using ambient AI listening technology and understand that they can each withdraw their consent to this listening technology at any point by asking the clinician to turn off or pause the recording:    Patient name: Rayne Morales  Additional names:           [1]   Patient Active Problem List  Diagnosis    Fatigue    Primary hypertension    Anxiety    Age-related osteoporosis without current pathological fracture    B12 deficiency    Spondylosis of lumbosacral region    Sacroiliac joint dysfunction    History of syncope    History of pulmonary embolism    Asthma (HCC)    Cyst of brain    History of basal cell carcinoma    History of pneumonia, recurrent    Thrombocytopenia    Gastroesophageal reflux disease without esophagitis    Enlarged thyroid    History of left breast cancer    Carpal tunnel syndrome of left wrist    Atherosclerosis of aorta    MUKUND on CPAP    Chronic pain syndrome    Tremor of both hands    Migraine without aura or status migrainosus    Major depressive disorder, recurrent episode, moderate (HCC)    Coronary artery disease involving native coronary artery of native heart without angina pectoris    Visual impairment    S/P cervical spinal fusion    Neuropathy    Common variable immunodeficiency (HCC)    History of DVT  (deep vein thrombosis)    Hyperlipidemia    Cervical radiculopathy    PHILLIPS (dyspnea on exertion)    Type 2 diabetes mellitus without complication, without long-term current use of insulin (HCC)    Anemia, unspecified type    History of total right knee replacement (TKR)    Liver mass    Abnormal CT of the abdomen    Positive DARREN (antinuclear antibody)    Frail elderly    Hypercoagulable state (HCC)   [2]   Outpatient Medications Marked as Taking for the 5/22/25 encounter (Office Visit) with Monica Curran APRN   Medication Sig    ondansetron (ZOFRAN) 4 mg tablet Take 1 tablet (4 mg total) by mouth every 8 (eight) hours as needed for Nausea.    semaglutide (OZEMPIC, 0.25 OR 0.5 MG/DOSE,) 2 MG/3ML Subcutaneous Solution Pen-injector Inject 0.5 mg into the skin once a week.    traMADol 50 MG Oral Tab Take 1 tablet (50 mg total) by mouth every 6 (six) hours as needed.    oxyCODONE 5 MG Oral Tab Take 1 tablet (5 mg total) by mouth every 4 (four) hours as needed.    PREGABALIN 100 MG Oral Cap Take 1 capsule (100 mg total) by mouth in the morning and 1 capsule (100 mg total) at noon and 1 capsule (100 mg total) in the evening.    ondansetron (ZOFRAN) 4 mg tablet Take 1 tablet (4 mg total) by mouth every 12 (twelve) hours as needed for Nausea.    rivaroxaban (XARELTO) 10 MG Oral Tab Take 1 tablet (10 mg total) by mouth daily.    famciclovir 500 MG Oral Tab 3 tabs at onset of symptoms.  Repeat with each exacerbation.    tiZANidine 4 MG Oral Tab Take 1 tablet (4 mg total) by mouth 2 (two) times daily as needed.    lisinopril 20 MG Oral Tab Take 1 tablet (20 mg total) by mouth daily.    amLODIPine 2.5 MG Oral Tab Take 1 tablet (2.5 mg total) by mouth in the morning and 1 tablet (2.5 mg total) before bedtime.    lidocaine 5 % External Patch Place 1 patch onto the skin daily.    immune globulin, human, (GAMMAGARD) 10 g/100mL Injection Solution Inject 30 g into the vein. ONCE A MONTH INFUSION THROUGH PORT, ON SUNDAYS     fluticasone-salmeterol 500-50 MCG/ACT Inhalation Aerosol Powder, Breath Activated Inhale 1 puff into the lungs 2 (two) times daily.    DULoxetine 60 MG Oral Cap DR Particles Take 1 capsule (60 mg total) by mouth daily. TO TAKE ALONG WITH THE 30 MG TO EQUAL 90 MG DAILY.    DULoxetine 30 MG Oral Cap DR Particles Take 1 capsule (30 mg total) by mouth daily. TO TAKE ALONG WITH THE 60 MG TO EQUAL 90 MG DAILY.    pantoprazole 40 MG Oral Tab EC Take 1 tablet (40 mg total) by mouth before breakfast.    ROSUVASTATIN 10 MG Oral Tab Take 1 tablet (10 mg total) by mouth nightly.    metFORMIN  MG Oral Tablet 24 Hr Take 2 tablets (1,000 mg total) by mouth daily with breakfast and 1 tablet (500 mg total) every evening.    oxyCODONE-acetaminophen  MG Oral Tab Take 1 tablet by mouth every 4 to 6 hours as needed for Pain.    albuterol 108 (90 Base) MCG/ACT Inhalation Aero Soln Inhale 2 puffs into the lungs every 6 (six) hours as needed for Wheezing.    Fluticasone Propionate 50 MCG/ACT Nasal Suspension 1 spray by Nasal route 2 (two) times daily.    Cholecalciferol (VITAMIN D) 2000 units Oral Tab Take 2,000 Units by mouth daily.   [3]   Social History  Tobacco Use   Smoking Status Former    Current packs/day: 0.00    Types: Cigarettes    Quit date: 1968    Years since quittin.4   Smokeless Tobacco Never

## 2025-06-04 ENCOUNTER — PATIENT OUTREACH (OUTPATIENT)
Dept: CASE MANAGEMENT | Age: 80
End: 2025-06-04

## 2025-06-12 DIAGNOSIS — G62.9 NEUROPATHY: ICD-10-CM

## 2025-06-13 RX ORDER — PREGABALIN 100 MG/1
100 CAPSULE ORAL 3 TIMES DAILY
Qty: 270 CAPSULE | Refills: 0 | Status: SHIPPED | OUTPATIENT
Start: 2025-06-13

## 2025-06-13 NOTE — TELEPHONE ENCOUNTER
Please review; protocol failed/ has no protocol      Recent fills: 03/04/2025  Last Rx written: 03/04/2025  Last Office Visit: 05/22/2025    Recent Visits  Date Type Provider Dept   05/22/25 Office Visit Monica Curran APRN [facility] [address]

## 2025-06-18 ENCOUNTER — PATIENT OUTREACH (OUTPATIENT)
Dept: CASE MANAGEMENT | Age: 80
End: 2025-06-18

## 2025-06-18 NOTE — PROGRESS NOTES
Multiple attempts have been made to reach patient for monthly CCM outreach with no success. Patient will be removed from CCM.

## 2025-06-25 ENCOUNTER — HOSPITAL ENCOUNTER (OUTPATIENT)
Dept: BONE DENSITY | Age: 80
Discharge: HOME | End: 2025-06-25
Attending: INTERNAL MEDICINE
Payer: MEDICARE

## 2025-06-25 DIAGNOSIS — Z51.81 VISIT FOR MONITORING RECLAST THERAPY: ICD-10-CM

## 2025-06-25 DIAGNOSIS — M81.0 AGE-RELATED OSTEOPOROSIS WITHOUT CURRENT PATHOLOGICAL FRACTURE: ICD-10-CM

## 2025-06-25 DIAGNOSIS — Z79.83 VISIT FOR MONITORING RECLAST THERAPY: ICD-10-CM

## 2025-06-25 PROCEDURE — 77080 DXA BONE DENSITY AXIAL: CPT | Performed by: INTERNAL MEDICINE

## 2025-07-11 ENCOUNTER — MED REC SCAN ONLY (OUTPATIENT)
Dept: INTERNAL MEDICINE CLINIC | Facility: CLINIC | Age: 80
End: 2025-07-11

## 2025-07-11 NOTE — PROGRESS NOTES
physicians order from [facility] scripts for CPAP accessories. Placed in SD bin for signature

## 2025-07-15 ENCOUNTER — OFFICE VISIT (OUTPATIENT)
Dept: RHEUMATOLOGY | Facility: CLINIC | Age: 80
End: 2025-07-15
Payer: MEDICARE

## 2025-07-15 VITALS
SYSTOLIC BLOOD PRESSURE: 116 MMHG | TEMPERATURE: 98 F | OXYGEN SATURATION: 99 % | HEIGHT: 60 IN | BODY MASS INDEX: 28.27 KG/M2 | RESPIRATION RATE: 16 BRPM | WEIGHT: 144 LBS | DIASTOLIC BLOOD PRESSURE: 70 MMHG | HEART RATE: 90 BPM

## 2025-07-15 DIAGNOSIS — Z79.83 VISIT FOR MONITORING RECLAST THERAPY: ICD-10-CM

## 2025-07-15 DIAGNOSIS — M25.50 POLYARTHRALGIA: ICD-10-CM

## 2025-07-15 DIAGNOSIS — G89.4 CHRONIC PAIN SYNDROME: ICD-10-CM

## 2025-07-15 DIAGNOSIS — E55.9 VITAMIN D DEFICIENCY: ICD-10-CM

## 2025-07-15 DIAGNOSIS — M81.0 AGE-RELATED OSTEOPOROSIS WITHOUT CURRENT PATHOLOGICAL FRACTURE: Primary | ICD-10-CM

## 2025-07-15 DIAGNOSIS — R76.8 RHEUMATOID FACTOR POSITIVE: ICD-10-CM

## 2025-07-15 DIAGNOSIS — Z51.81 VISIT FOR MONITORING RECLAST THERAPY: ICD-10-CM

## 2025-07-15 DIAGNOSIS — M15.0 PRIMARY OSTEOARTHRITIS INVOLVING MULTIPLE JOINTS: ICD-10-CM

## 2025-07-15 PROCEDURE — G2211 COMPLEX E/M VISIT ADD ON: HCPCS | Performed by: INTERNAL MEDICINE

## 2025-07-15 PROCEDURE — 99214 OFFICE O/P EST MOD 30 MIN: CPT | Performed by: INTERNAL MEDICINE

## 2025-07-15 NOTE — PROGRESS NOTES
RHEUMATOLOGY Follow up   Date of visit: 07/15/2025  ?  Chief Complaint   Patient presents with    Osteoarthritis     8 month f/u. Not feeling great. All over joint pain. Had right knee replacement in march. No falls or fractures.      ASSESSMENT, DISCUSSION & PLAN   Assessment:  1. Age-related osteoporosis without current pathological fracture    2. Primary osteoarthritis involving multiple joints    3. Chronic pain syndrome    4. Polyarthralgia    5. Rheumatoid factor positive    6. Visit for monitoring Reclast therapy    7. Vitamin D deficiency            Discussion:  Ms. Rayne Morales is a 78 yo woman with long standing osteoarthritis as well as depression. She was initially sent to be for evaluation of a borderline RF as well as hand and back pain. I think the RF is positive due to age as pt's history and exam is not consistent with an inflammatory arthropathy. Repeat testing showed negative RF/CCP as well as DARREN. I believe her symptoms were related to osteoarthritis as well as depression. She has responded very nicely to Cymbalta however since her left knee replacement, has been dealing with worsened low back pain and sciatica. While she was able to stop fentanyl patches, she continues to take Norco, tramadol and Lyrica as prescribed by pain management.  At this time, symptoms are overall better since increasing cymbalta and stopping welbutrin. She continues taking lyrica and tizanidine which are prescribed by neurology.  She has been applying topical CBD/THC which is also helping with the pain.    She continues to have worsened pain with weather changes.  Continues to follow with pain management for injections.- recommended she consider this  Has completed right knee replacement but had flare of pain immediately post-op. Does feel better but not quite where she wants to be.   She continues with PT.     From an infection standpoint, she's doing better since getting back on her monthly IVIG.  Main  concern today is her discontinuation of the xarelto. However, pt was admitted to the hospital with severe anemia and difficult to control epistaxis. She even required blood transfusion. Decision was made to hold off on AC d/t high risk. She has an appt with another hematologist and encouraged her to discuss these questions with them.       She has been started on Reclast- had one infusion Oct 2023. Pt was due in the fall but cancelled her infusion in November due to being sick/hospitalized and she forget to reschedule.  Recent BMD was stable and likely related to the missed reclast dose. Will continue reclast- she was instructed to get updated labs and schedule rechast for when she returns from upcoming vacation.   Okay to follow back with me in one year  Next BMD will be planned for summer of 2027.   Reminded of importance of calcium/vit d supplementation as well as weight bearing exercise (as much as can be tolerated with her pain)     Patient verbalized understanding of above instructions. No further questions at this time.    Code selection for this visit was based on time spent (30min) on date of service in preparing to see the patient, obtaining and/or reviewing separately obtained history, performing a medically appropriate examination, counseling and educating the patient/family/caregiver, ordering medications or testing, referring and communicating with other healthcare providers, documenting clinical information in the E HR, independently interpreting results and communicating results to the patient/family/caregiver and care coordination with the patient's other providers.        ?  Plan:  Diagnoses and all orders for this visit:    Age-related osteoporosis without current pathological fracture  -     CBC With Differential With Platelet; Future  -     Comp Metabolic Panel (14); Future  -     C-Reactive Protein; Future  -     Sed Rate, Westergren (Automated); Future  -     Vitamin D; Future  -      Magnesium; Future  -     Phosphorus; Future    Primary osteoarthritis involving multiple joints    Chronic pain syndrome    Polyarthralgia    Rheumatoid factor positive    Visit for monitoring Reclast therapy  -     CBC With Differential With Platelet; Future  -     Comp Metabolic Panel (14); Future  -     C-Reactive Protein; Future  -     Sed Rate, Westergren (Automated); Future  -     Vitamin D; Future  -     Magnesium; Future  -     Phosphorus; Future    Vitamin D deficiency  -     CBC With Differential With Platelet; Future  -     Comp Metabolic Panel (14); Future  -     C-Reactive Protein; Future  -     Sed Rate, Westergren (Automated); Future  -     Vitamin D; Future  -     Magnesium; Future  -     Phosphorus; Future              Return in about 1 year (around 7/15/2026).  ?  HPI   Rayne Morales is a 79 year old female with the following active problems who is seen for medically necessary follow-up today. She was seen as a new patient initially for evaluation of low titer RF positivity and joint pain. We rechecked her levels which were negative. She presents for follow up today.  Added onto schedule due to worsened symptoms lately    Continues following with pain management:  Norco 10-325mg 3-5x/day  Lyrica 100mg BID to TID if needed   Cymbalta 90mg daily (increased)   Tizanidine 8mg nightly and 2mg during the day    Since her last visit, she hasn't been feeling well.  Underwent right knee replacement this spring and felt like her pain was not well controlled post-operatively  And now feels worsened pain in general at that time that has improved since then  Right knee is doing well, is at about 80% and continuing physical therapy. Still with knee pain at night but better than prior to the surgery.   Balance not quite there.     Otherwise, feels okay.   Feels like she can decrease the duloxetine further  Has new oncologist/hematology for hx of blood clots. Had severe epistaxis in September. Taken off  xarelto d/t severe nosebleeds. Has not discussed other options. Off since hospitalization in September. And now on a lower dose of the xarelto. Hx of DVT/PE and TIAs. Stopped baby aspirin.   Is feeling better now since being back on IVIG and has not had any infections since being back on IVIG.     Continues to suffer from pain- primarily lower back and neck. Denies improvement after knee replacement. Had an injection and has plans for another one after her upcoming vacation and will be considering an ablation   Does take CBD/THC gummy which helps her sleep at night   Denies any new areas of pain.   Denies overt swelling of the joints.   + dry eyes, not using drops. Thinks related to allergies since was bad over the summer   + dry mouth (worst in the mornings), improves with drinking water  Denies oral or nasal ulcers   Denies raynauds     Started magnesium/calcium and having less leg cramping.     HPI from initial consultation  States she has had arthritis throughout her whole spine from neck down to her lower back for over 30 years. States she has been diagnosed with significant degenerative disc disease. 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 Burr for this. Was referred due to having autoimmune disease due to borderline elevated RF.      Admits to pain on a daily basis in her spine, knees and hands.   Morning stiffness significant but lasting for only 2 minutes. Pain gets worse as the day goes on. Pain also affected by changes in barometric pressure.   Does have neuropathy in her feet for which she takes Lyrica.  Does note nodules over small joints of hands.      Was on celebrex for years which helped significant with her symptoms but was discontinued due to potential cardiac risk. She also admits to some bleeding in her GI tract.   Has been on opioids for years and wants to wean off of it. She does take Lyrica 75mg daily, tried 100mg but  that made her stick to her stomach.     Admits to getting pneumonia and sepsis at least once yearly.   Follows with pulmonology for the chronic asthma as well as pneumonia. Does get hypoxemia with sleeping.   Follows with psychiatry for years, on buproprion for years for depression. Just changed buproprion to extended release and trying to monitor for a response.      + iron deficiency anemia   + hx of DVT/PE on Xarelto, first one thought to be due to breast cancer      No history of significant wrist pain or swelling, pain or swelling of the MCPs, pain or swelling of the ankles and bones of the feet.  The patient denies hair loss, oral or nasal ulcers, photosensitive rash, elevated or scarring rashes, Raynaud's phenomenon, prior renal disease, or history of seizures.  The patient denies any history of uveitis.  There are no symptoms of severe dry eyes, dry mouth, recurrent cavities, or swelling of the cheeks or under the jawbone. No prior history of punctal plugs being applied.  No fevers, chills, lymphadenopathy, night sweats, unexpected weight loss, bony pain, easy bruising or bleeding, or unexplained weakness.  Denies chronic sinus infections/disease or epistaxis.  Denies chronic cough or hemoptysis.     ?  Past Medical History:  Past Medical History:    Abdominal pain    Anemia    Anesthesia complication    DIFFICULT TO AWAKEN    Anxiety    Arrhythmia    Arthritis    Asthma (HCC)    Back pain    Back problem    Black stools    Bloating    Blurred vision    Breast CA (HCC)    left lump and rad.    Cancer (HCC)    breast, basal cell    Cervical radiculopathy    Cervical spinal stenosis    Deep vein thrombosis (HCC)    Depression    Diabetes (HCC)    Diabetes mellitus (HCC)    Diarrhea, unspecified    Dizziness    Easy bruising    On Xeralto    Esophageal reflux    Exposure to medical diagnostic radiation    Fatigue    Fitting and adjustment of vascular catheter    Headache disorder    Hearing loss    Heart attack  (HCC)    Heartburn    Hemorrhoids    High blood pressure    High cholesterol    History of blood clots    History of COVID-19    not hospitalized, no continued symptoms, cough, fever, loss of taste    History of depression    History of syncope    IgG deficiency (HCC)    ??? pt states she gets IgG infusions due to lack of IgG, unable to fight off infections    Indigestion    Leaking of urine    Migraines    Muscle weakness    uses cane    Neuropathy    rt foot bilateral hands    Osteoarthritis    Pain in joints    Pneumonia due to organism    Pulmonary embolism (HCC)    Shortness of breath    Sleep apnea    cpap - not using - machine is broken    Stress    Uncomfortable fullness after meals    Visual impairment    glasses/contacts    Weight gain     Past Surgical History:  Past Surgical History:   Procedure Laterality Date    Abdomen surgery proc unlisted      duodenal biopsy    Appendectomy      Appendectomy      Breast surgery      Cataract      Cholecystectomy      Colonoscopy  10/1/10, 4/21/17    Colonoscopy N/A 02/27/2023    Procedure: COLONOSCOPY;  Surgeon: Migel Bass MD;  Location:  ENDOSCOPY    [facility]  04/21/2017    Eye surgery      Femur/knee surg unlisted      right knee arthroscopy    Hemorrhoidectomy,int/ext,simple      Hernia surgery      Hysterectomy  1984    total hystero.    Knee replacement surgery Right 03/2025    Lumpectomy left Left 1999    lt lumpectomy and radiation.    Oophorectomy Bilateral 1984    total hystero.    Other  11/2021    left wrist, ORIF Dr. Stacy Long    Other  02/21/2022    CERVICAL 5 AND CERVICAL 6 LAMINECTOMIES, PARTIAL CERVICAL 7 LAMINECTOMY, LEFT CERVICAL 5/CERVICAL 6 AND CERVICAL 6/CERVICAL 7 FORAMINOTOMIES, CERVICAL 6 AND CERVICAL 7 ARTHRODESIS, AUTOGRAFT, ALLOGRAFT    Other surgical history  2010    endoscopy - papillotomy    Other surgical history  2016     under right eye, skin cancer removed    Other surgical history  02/2022    neck    Port for vascular  access      Radiation left Left 1999    left lumpectomy and radiation.    Repair ing hernia,5+y/o,reducibl      Sigmoidoscopy,diagnostic      Spine surgery procedure unlisted      Tonsillectomy      Total abdom hysterectomy      Total knee replacement       Family History:  Family History   Problem Relation Age of Onset    Heart Attack Paternal Grandfather     Heart Attack Paternal Grandmother     Other (CAD) Father     Other (CAD) Mother     Hypertension Mother     Heart Attack Mother     Other (CAD) Brother     Hypertension Brother     Heart Attack Brother     Stroke Maternal Grandfather         Stroke    Stroke Maternal Grandmother         Stroke    Breast Cancer Self 54     Social History:  Social History     Socioeconomic History    Marital status:    Tobacco Use    Smoking status: Former     Current packs/day: 0.00     Types: Cigarettes     Quit date: 1968     Years since quittin.5    Smokeless tobacco: Never   Vaping Use    Vaping status: Never Used   Substance and Sexual Activity    Alcohol use: No     Comment: rare    Drug use: Not Currently     Types: Cannabis     Comment: uses cream for pain to back/neck with barometer changes.    Sexual activity: Not Currently     Partners: Male   Other Topics Concern    Caffeine Concern Yes     Comment: half of cup of coke daily    Sleep Concern No    Exercise Yes     Comment: walking    Seat Belt Yes     Social Drivers of Health     Food Insecurity: No Food Insecurity (3/5/2025)    NCSS - Food Insecurity     Worried About Running Out of Food in the Last Year: No     Ran Out of Food in the Last Year: No   Transportation Needs: No Transportation Needs (3/5/2025)    NCSS - Transportation     Lack of Transportation: No   Stress: No Stress Concern Present (10/25/2023)    Stress     Feeling of Stress : No   Housing Stability: Not At Risk (3/5/2025)    NCSS - Housing/Utilities     Has Housing: Yes     Worried About Losing Housing: No     Unable to Get  Utilities: No     Medications:  Outpatient Medications Marked as Taking for the 7/15/25 encounter (Office Visit) with Makayla Farley DO   Medication Sig Dispense Refill    pregabalin 100 MG Oral Cap Take 1 capsule (100 mg total) by mouth in the morning and 1 capsule (100 mg total) at noon and 1 capsule (100 mg total) in the evening. 270 capsule 0    semaglutide (OZEMPIC, 0.25 OR 0.5 MG/DOSE,) 2 MG/3ML Subcutaneous Solution Pen-injector Inject 0.5 mg into the skin once a week. 9 mL 1    ondansetron (ZOFRAN) 4 mg tablet Take 1 tablet (4 mg total) by mouth every 12 (twelve) hours as needed for Nausea. 30 tablet 0    rivaroxaban (XARELTO) 10 MG Oral Tab Take 1 tablet (10 mg total) by mouth daily.      famciclovir 500 MG Oral Tab 3 tabs at onset of symptoms.  Repeat with each exacerbation. 30 tablet 1    tiZANidine 4 MG Oral Tab Take 1 tablet (4 mg total) by mouth 2 (two) times daily as needed.      lisinopril 20 MG Oral Tab Take 1 tablet (20 mg total) by mouth daily. 90 tablet 3    amLODIPine 2.5 MG Oral Tab Take 1 tablet (2.5 mg total) by mouth in the morning and 1 tablet (2.5 mg total) before bedtime. 180 tablet 1    lidocaine 5 % External Patch Place 1 patch onto the skin daily.      immune globulin, human, (GAMMAGARD) 10 g/100mL Injection Solution Inject 30 g into the vein. ONCE A MONTH INFUSION THROUGH PORT, ON SUNDAYS      fluticasone-salmeterol 500-50 MCG/ACT Inhalation Aerosol Powder, Breath Activated Inhale 1 puff into the lungs 2 (two) times daily.      DULoxetine 60 MG Oral Cap DR Particles Take 1 capsule (60 mg total) by mouth daily. TO TAKE ALONG WITH THE 30 MG TO EQUAL 90 MG DAILY. 90 capsule 3    DULoxetine 30 MG Oral Cap DR Particles Take 1 capsule (30 mg total) by mouth daily. TO TAKE ALONG WITH THE 60 MG TO EQUAL 90 MG DAILY. 90 capsule 3    pantoprazole 40 MG Oral Tab EC Take 1 tablet (40 mg total) by mouth before breakfast. 90 tablet 3    ROSUVASTATIN 10 MG Oral Tab Take 1 tablet (10 mg total) by  mouth nightly. 90 tablet 3    metFORMIN  MG Oral Tablet 24 Hr Take 2 tablets (1,000 mg total) by mouth daily with breakfast and 1 tablet (500 mg total) every evening. 270 tablet 3    oxyCODONE-acetaminophen  MG Oral Tab Take 1 tablet by mouth every 4 to 6 hours as needed for Pain.      albuterol 108 (90 Base) MCG/ACT Inhalation Aero Soln Inhale 2 puffs into the lungs every 6 (six) hours as needed for Wheezing. 1 each 3    Fluticasone Propionate 50 MCG/ACT Nasal Suspension 1 spray by Nasal route 2 (two) times daily. 1 Bottle 3    Cholecalciferol (VITAMIN D) 2000 units Oral Tab Take 2,000 Units by mouth daily.       Modified Medications    No medications on file     Medications Discontinued During This Encounter   Medication Reason    traMADol 50 MG Oral Tab      ?  ?  Allergies:  Allergies   Allergen Reactions    Bactrim HIVES and OTHER (SEE COMMENTS)    Ciprofloxacin HIVES    Mold REACTIVE AIRWAY DISEASE     Mold and smut    Sulfa Antibiotics HIVES     Bactrim (Sulfamethoxazole/TMP)    Diovan [Valsartan] NAUSEA AND VOMITING    Omnicef NAUSEA ONLY and DIZZINESS    Atorvastatin OTHER (SEE COMMENTS) and UNKNOWN    Clindamycin DIARRHEA and NAUSEA ONLY    Green Pepper OTHER (SEE COMMENTS)     Sensitivity (all peppers - green, orange and red)    Lipitor [Atorvastatin Calcium] OTHER (SEE COMMENTS)     Leg cramping, sensitivity    Pneumococcal Vaccines OTHER (SEE COMMENTS)     Pt states she had pneumo vax done at Dr. Boyd on 1/10/24- states developed redness, swelling,itching to the arm    Quinapril Hcl FATIGUE     ?  REVIEW OF SYSTEMS   ?  Review of Systems   Constitutional:  Positive for malaise/fatigue and weight loss. Negative for chills and fever.   HENT:  Positive for congestion, nosebleeds and tinnitus. Negative for hearing loss.    Eyes:  Negative for pain and redness.   Respiratory:  Positive for cough and shortness of breath. Negative for hemoptysis, sputum production and wheezing (only with  pneumonia).    Cardiovascular:  Negative for chest pain and leg swelling.   Gastrointestinal:  Positive for abdominal pain and heartburn. Negative for blood in stool, constipation, diarrhea and nausea.   Genitourinary:  Negative for dysuria, frequency and urgency.        + stress incontinence   Musculoskeletal:  Positive for back pain, joint pain, myalgias and neck pain.   Skin:  Positive for rash. Negative for itching.   Neurological:  Positive for weakness (generalized) and headaches. Negative for dizziness.   Endo/Heme/Allergies:  Bruises/bleeds easily.     PHYSICAL EXAM   Today's Vitals:  Temperature Blood Pressure Heart Rate Resp Rate SpO2   Temp: 97.6 °F (36.4 °C) BP: 116/70 Pulse: 90 Resp: 16 SpO2: 99 %   ?  Current Weight Height BMI BSA Pain   Wt Readings from Last 1 Encounters:   07/15/25 144 lb (65.3 kg)    Height: 5' (152.4 cm) Body mass index is 28.12 kg/m². Body surface area is 1.62 meters squared.         Physical Exam  Vitals and nursing note reviewed.   Constitutional:       General: She is not in acute distress.     Appearance: Normal appearance. She is well-developed. She is not diaphoretic.   HENT:      Head: Normocephalic.   Eyes:      General: No scleral icterus.     Extraocular Movements: Extraocular movements intact.      Conjunctiva/sclera: Conjunctivae normal.   Neck:      Vascular: No JVD.      Trachea: No tracheal deviation.   Cardiovascular:      Rate and Rhythm: Normal rate and regular rhythm.      Heart sounds: Normal heart sounds. No murmur heard.  Pulmonary:      Effort: Pulmonary effort is normal. No respiratory distress.      Breath sounds: Normal breath sounds. No wheezing.   Musculoskeletal:         General: Tenderness and deformity present. No swelling.      Cervical back: Neck supple.      Comments: Diffuse small heberden and joslyn nodes of the fingers, no basilar joint tenderness of the 1st CMC bilaterally. Slightly larger middle finger DIPs compared to last visit.   No  other swelling or restriction of motion of the MCPs, wrists, elbows.   Right knee now post surgical   Left knee post surgical  Abnormal gait with abnormal standing structural exam- uneven shoulders, thoracic kyphosis with scoliosis    Lymphadenopathy:      Cervical: No cervical adenopathy.   Skin:     General: Skin is warm and dry.      Coloration: Skin is pale.      Findings: No erythema or rash.      Comments: No periungal erythema  No malar rash   Neurological:      Mental Status: She is alert and oriented to person, place, and time.      Cranial Nerves: No cranial nerve deficit.      Gait: Gait abnormal.   Psychiatric:         Mood and Affect: Mood normal.         Behavior: Behavior normal.       ?  Radiology review:       Narrative   PROCEDURE: XR DEXA BONE DENSITOMETRY (CPT=77080)    INDICATIONS: DX-Z79.83 Visit for monitoring Reclast therapy;DX-Z51.81 Visit for monitoring Reclast therapy;DX-M81.0 Age-related osteoporosis without current pathological fracture;    PATIENT STATED HISTORY: Screening for osteoporosis.    COMPARISON: No comparisons.    FINDINGS:      LUMBAR SPINE ANALYSIS RESULTS:  Bone mineral density (BMD) (g/cm2): 1.016  Lumbar T-Score: -0.3  % young normals: 97  % age matched controls: 134  Change from prior spine examination: 3.7%. Statistically significant.    TOTAL HIP ANALYSIS RESULTS:  Bone mineral density (BMD) (g/cm2): 0.645  Total Hip T-Score: -2.4  % young normals: 68  % age matched controls: 92  Change from prior hip examination: 0.1%    FEMORAL NECK ANALYSIS RESULTS:  Bone mineral density (BMD) (g/cm2): 0.570  Femoral neck T-Score: -2.5  % young normals: 67  % age matched controls: 95  Change from prior hip examination: -0.2%      ADDITIONAL FINDINGS: There are no significant additional findings.        Impression   CONCLUSION:    Osteoporosis by WHO criteria.    Statistically significant interval increase in bone density of the lumbar spine.    Recommendation: The Bone Health and  Osteoporosis Foundation (BHOF) recommends pharmacological treatment for patients with a FRAX 10-year risk score of 3% or higher for a hip fracture or 20% or higher for a major osteoporotic fracture, to prevent  osteoporosis and reduce fracture risk.  All treatment decisions require clinical judgment and consideration of individual patient factors, including patient preferences, comorbidities, previous drug use, risk fractures not captured in the FRAX model (e.g. frailty, falls, vitamin D deficiency,  increased bone turnover, interval significant decline in bone density) and possible under- or over-estimation of fracture risk by FRAX.  Additional information regarding the FRAX model can be found at the BHOF website: bonehealthandosteoporosis.org.    The World Health Organization has defined the following categories based on bone density:  Normal bone: T-score greater than or equal to -1  Osteopenia: T-score less than -1 and greater than -2.5  Osteoporosis: T-score less than or equal -2.5      Electronically Verified and Signed by Attending Radiologist: Varun Boyd MD 6/26/2025 1:13 PM  Workstation: [id number]     Narrative   PROCEDURE:  CT CHEST (CPT=71250)     COMPARISON:  YONATHAN CHARLES, CT CHEST (CPT=71250), 2/28/2024, 2:44 PM.     INDICATIONS:  Shortness of breath, cough,hx of recurrent pneumonia     TECHNIQUE:  Unenhanced multislice CT scanning is performed through the chest.  Dose reduction techniques were used. Dose information is transmitted to the ACR (American College of Radiology) NRDR (National Radiology Data Registry) which includes the Dose   Index Registry.     PATIENT STATED HISTORY: (As transcribed by Technologist)  Patient is here for shortness of breath and persistent cough.         FINDINGS:    LUNGS:  There has been interval improvement in right lung patchy infiltrates/airspace disease when compared to 2/28/2024.  The consolidative infiltrates in the right lower lobe and right upper lobe have near  completely resolved.  There is some persistent   discoid opacity in the right upper lobe with mild consolidation with air bronchograms, images 46 through 54. There is discoid atelectasis and linear atelectasis in the lung bases and lingula.  Stable subpleural nodules in the left lung measuring 4 mm in   the lingula and 6 mm left lower lobe.  3 mm subpleural nodule in the right lung base previously obscured by consolidation.  There is mild bilateral bronchiectasis.  VASCULATURE:  Pulmonary vessels are unremarkable within the limits of a noncontrast CT.    YAYA:  No enlarged adenopathy.    MEDIASTINUM:  No enlarged adenopathy.    CARDIAC:  There is coronary artery calcification.    PLEURA:  No pneumothorax or effusion.    THORACIC AORTA:  No aneurysm  CHEST WALL:  No enlarged axillary adenopathy.    LIMITED ABDOMEN:  Limited images of the upper abdomen are unremarkable.    BONES:  Multilevel endplate hypertrophic changes with degenerative disc disease thoracic spine.                    Impression   CONCLUSION:  There is thin near complete resolution of patchy infiltrates and consolidation in the right lower lobe and interval improvement in consolidation and infiltrates in the right upper lobe since 2/28/2024.  There is some persistent consolidation   in the right upper lobe with bibasilar atelectasis.  Recommend continued follow-up to assess for complete resolution.     Stable bilateral subpleural pulmonary nodules largest in the left lung base measuring 6 mm.     LOCATION:  [id number]        Dictated by (CST): Mita Caruso MD on 3/28/2024 at 4:22 PM      Finalized by (CST): Mita Caruso MD on 3/28/2024 at 4:29 PM       Narrative   PROCEDURE:  CT APPENDIX ABD/PEL W CONTRAST (CPT=74177)     COMPARISON:  [facility] , CT, CT ABDOMEN+PELVIS KIDNEYSTONE 2D RNDR(NO IV,NO ORAL)(CPT=74176), 2/05/2024, 7:52 PM.     INDICATIONS:  abdominal pain, r/o appy     TECHNIQUE:  Axial helical acquisitions are obtained from through the  abdomen and pelvis after bolus intravenous nonionic contrast administration.  Images of the right lower quadrant reconstructed at 2.5 mm. Coronal MPR imaging was obtained.  Dose  reduction techniques were used. Dose information is transmitted to the ACR (American College of Radiology) NRDR (National Radiology Data Registry) which includes the Dose Index Registry.     PATIENT STATED HISTORY:(As transcribed by Technologist)  Patient is here for RLQ abdominal pain.      CONTRAST USED:  100cc of Isovue 370     FINDINGS:    APPENDIX:  Wall thickening and dilation of the appendix with caliber of 12 mm.  No surrounding fluid or inflammatory change.  LIVER:  Normal liver morphology.  Lobular hypodensity with peripheral calcification noted within the lateral segment left hepatic lobe measuring 4.4 x 3.6 cm in cross-section.  This measures greater than simple fluid attenuation.  Additional  subcentimeter hypodensity of the anterior right hepatic lobe is too small to characterize (series 3, image 35).  BILIARY:  Cholecystectomy with expected prominence of the common bile duct.  PANCREAS:  Moderate, diffuse parenchymal atrophy, particularly within the head and uncinate process.  SPLEEN:  No enlargement or focal lesion.    KIDNEYS:  No mass, obstruction, or calcification.    ADRENALS:  No mass or enlargement.    AORTA/VASCULAR:  No aneurysm.    RETROPERITONEUM:  No mass or adenopathy.    BOWEL/MESENTERY:  No evidence of bowel inflammation or obstruction.  Few colonic diverticula noted.  ABDOMINAL WALL:  No mass or hernia.    URINARY BLADDER:  No visible focal wall thickening, lesion, or calculus.    PELVIC NODES:  No adenopathy.    PELVIC ORGANS:  Hysterectomy.  BONES:  No bony lesion or fracture.  Osteoarthritis of the hips and spine.    LUNG BASES:  Stable appearance of the visualized lower chest.  OTHER:  Negative.                     Impression   CONCLUSION:       1. The appendix is dilated to a caliber of 12 mm with  associated wall thickening.  No surrounding fluid or inflammatory stranding.  This may represent an appendiceal mucocele versus mucinous neoplasm.  Sequela of acute appendicitis felt less likely,  though not excluded.     2. Lobular hypodensity of the lateral segment left hepatic lobe (4.4 x 3.6 cm).  This may be neoplastic and warrants further nonemergent assessment with liver MRI with Dotarem contrast.        LOCATION:  [id number]        Dictated by (CST): Arlene Mills MD on 2/12/2024 at 11:32 AM      Finalized by (CST): Arlene Mills MD on 2/12/2024 at 11:42 AM       Narrative   PROCEDURE:  MRI SPINE LUMBAR (CPT=72148)     COMPARISON:  [facility], MR, MRI SPINE LUMBAR (CPT=72148), 8/26/2020, 9:42 AM.     INDICATIONS:  M46.96 Inflammatory spondylopathy of lumbar region (HCC) M54.16 Lumbar radiculopathy     TECHNIQUE:  Multiplanar T1 and T2 weighted images including fat suppression sequences.  Images acquired in sagittal and axial planes.       FINDINGS:  Minimal rightward curvature.  There is normal lumbar lordosis with anatomic alignment.  Vertebral body heights are well-maintained.  Moderate degenerative disc space loss, disc desiccation, endplate spurring, and degenerative endplate marrow signal  changes throughout the lumbar spine, most pronounced at L4-5.  No focal worrisome marrow signal abnormality is seen.  Scattered areas of focal fat and/or hemangiomas noted in the visualized marrow space.     The distal spinal cord and conus medullaris have a normal signal and morphology.  The conus medullaris terminates at the mid L2 level.  The roots of the cauda equina are unremarkable.  No focal mass or fluid collection is seen in the lumbar spinal canal.    The paraspinal soft tissues are unremarkable.     Scattered degenerative changes in the partially imaged lower thoracic spine can be further assessed with a dedicated MRI of the thoracic spine as clinically directed.     T12-L1:  Diffuse disc bulge with endplate  osteophytes and a small superimposed right foraminal disc protrusion.  Mild facet arthropathy.  No significant spinal canal stenosis.  Mild to moderate right neural foraminal stenosis. No significant interval  change.     L1-2:  Diffuse disc bulge with endplate osteophytes and interval increase in size of a superimposed left paracentral/foraminal disc protrusion.  Mild facet arthropathy with thickening of the ligamentum flavum.  No significant spinal canal stenosis.    Interval worsening of moderate left neural foraminal stenosis.     L2-3:  Diffuse disc bulge with endplate osteophytes and a small superimposed left foraminal disc protrusion.  Mild facet arthropathy.  No significant spinal canal stenosis.  Mild left neural foraminal stenosis. No significant interval change.     L3-4:  Diffuse disc bulge with endplate osteophytes and a small superimposed left paracentral disc protrusion with a corresponding annular fissure.  Mild facet arthropathy.  There is no significant spinal canal or neural foraminal stenosis. No  significant interval change.     L4-5:  Diffuse disc bulge with endplate osteophytes and a small superimposed right paracentral/foraminal disc protrusion.  Mild facet arthropathy.  No significant spinal canal stenosis.  Moderate right neural foraminal stenosis. No significant interval  change.     L5-S1:  Diffuse disc bulge with endplate osteophytes and a superimposed left paracentral/foraminal/far lateral disc protrusion.  Mild facet arthropathy.  No significant spinal canal stenosis.  Moderate left neural foraminal stenosis. No significant  interval change.                      Impression   CONCLUSION:       1. Interval increase in size of a left paracentral/ foraminal disc protrusion at L1-2 resulting in interval worsening of moderate left neural foraminal stenosis at L1-2.     2. The remaining multilevel degenerative changes lumbar spine are otherwise unchanged.  No significant spinal canal stenosis  at any level in the lumbar spine.     3. Stable mild-to-moderate right neural foraminal stenosis at T12-L1.  Stable mild left neural foraminal stenosis at L2-3.  Stable moderate right neural foraminal stenosis at L4-5.  Stable moderate left neural foraminal stenosis at L5-S1.     4. Facet arthropathy throughout the lumbar spine as above.     Please see above for further details.     LOCATION:  [id number]        Dictated by (CST): Rodger Roberts MD on 10/02/2023 at 1:57 PM      Finalized by (CST): Rodger Roberts MD on 10/02/2023 at 2:13 PM       PROCEDURE:  XR DEXA BONE DENSITOMETRY (CPT=77080)     COMPARISON:  [facility], XR, XR DEXA BONE DENSITOMETRY (CPT=77080), 9/09/2020, 2:04 PM.  [facility], XR, XR DEXA BONE DENSITOMETRY (CPT=77080), 4/04/2018, 9:37 AM.     INDICATIONS:    Z78.0 Postmenopausal     PATIENT STATED HISTORY: (As transcribed by Technologist)  Screening.          LUMBAR SPINE ANALYSIS RESULTS:      Bone mineral density (BMD) (g/cm2):  0.979    Lumbar T-Score:  -0.6      % young normals:  94      % age matched controls:  127      Change from prior spine examination:  -2.1%              TOTAL HIP ANALYSIS RESULTS:        Bone mineral density (BMD) (g/cm2):  0.644      Total Hip T-Score:  -2.4      % young normals:  68      % age matched controls:  90      Change from prior hip examination:  -2.1%              FEMORAL NECK ANALYSIS RESULTS:        Bone mineral density (BMD) (g/cm2):  0.571      Femoral neck T-Score:  -2.5      % young normals:  67      % age matched controls:  94      Change from prior hip examination:  -5.4*%            ADDITIONAL FINDINGS:  No significant additional findings.                     Impression   CONCLUSION:  Bone mineral density values for left femoral neck are compatible with the diagnosis of osteoporosis by WHO definition (T score less than -2.5). If therapy is initiated, a follow-up study in 1 year may aid in evaluation of therapeutic  efficacy.           The World  Health Organization has defined the following categories based on bone density:  Normal bone:  T-score better than -1  Osteopenia: T-score between -1 and -2.5  Osteoporosis:  T-score less than -2.5        LOCATION:  [id number]              Dictated by (CST): Tony Shukla MD on 6/23/2023 at 1:32 PM      Finalized by (CST): Tony Shukla MD on 6/23/2023 at 1:33 PM       Narrative   PROCEDURE:  MRI SPINE LUMBAR (CPT=72148)     COMPARISON:  [facility], MR, MRI SPINE LUMBAR (CPT=72148), 8/26/2020, 9:42 AM.     INDICATIONS:  M46.96 Inflammatory spondylopathy of lumbar region (HCC) M54.16 Lumbar radiculopathy     TECHNIQUE:  Multiplanar T1 and T2 weighted images including fat suppression sequences.  Images acquired in sagittal and axial planes.       FINDINGS:  Minimal rightward curvature.  There is normal lumbar lordosis with anatomic alignment.  Vertebral body heights are well-maintained.  Moderate degenerative disc space loss, disc desiccation, endplate spurring, and degenerative endplate marrow signal  changes throughout the lumbar spine, most pronounced at L4-5.  No focal worrisome marrow signal abnormality is seen.  Scattered areas of focal fat and/or hemangiomas noted in the visualized marrow space.     The distal spinal cord and conus medullaris have a normal signal and morphology.  The conus medullaris terminates at the mid L2 level.  The roots of the cauda equina are unremarkable.  No focal mass or fluid collection is seen in the lumbar spinal canal.    The paraspinal soft tissues are unremarkable.     Scattered degenerative changes in the partially imaged lower thoracic spine can be further assessed with a dedicated MRI of the thoracic spine as clinically directed.     T12-L1:  Diffuse disc bulge with endplate osteophytes and a small superimposed right foraminal disc protrusion.  Mild facet arthropathy.  No significant spinal canal stenosis.  Mild to moderate right neural foraminal stenosis. No  significant interval  change.     L1-2:  Diffuse disc bulge with endplate osteophytes and interval increase in size of a superimposed left paracentral/foraminal disc protrusion.  Mild facet arthropathy with thickening of the ligamentum flavum.  No significant spinal canal stenosis.    Interval worsening of moderate left neural foraminal stenosis.     L2-3:  Diffuse disc bulge with endplate osteophytes and a small superimposed left foraminal disc protrusion.  Mild facet arthropathy.  No significant spinal canal stenosis.  Mild left neural foraminal stenosis. No significant interval change.     L3-4:  Diffuse disc bulge with endplate osteophytes and a small superimposed left paracentral disc protrusion with a corresponding annular fissure.  Mild facet arthropathy.  There is no significant spinal canal or neural foraminal stenosis. No  significant interval change.     L4-5:  Diffuse disc bulge with endplate osteophytes and a small superimposed right paracentral/foraminal disc protrusion.  Mild facet arthropathy.  No significant spinal canal stenosis.  Moderate right neural foraminal stenosis. No significant interval  change.     L5-S1:  Diffuse disc bulge with endplate osteophytes and a superimposed left paracentral/foraminal/far lateral disc protrusion.  Mild facet arthropathy.  No significant spinal canal stenosis.  Moderate left neural foraminal stenosis. No significant  interval change.         Impression   CONCLUSION:       1. Interval increase in size of a left paracentral/ foraminal disc protrusion at L1-2 resulting in interval worsening of moderate left neural foraminal stenosis at L1-2.     2. The remaining multilevel degenerative changes lumbar spine are otherwise unchanged.  No significant spinal canal stenosis at any level in the lumbar spine.     3. Stable mild-to-moderate right neural foraminal stenosis at T12-L1.  Stable mild left neural foraminal stenosis at L2-3.  Stable moderate right neural foraminal  stenosis at L4-5.  Stable moderate left neural foraminal stenosis at L5-S1.     4. Facet arthropathy throughout the lumbar spine as above.     Please see above for further details.     LOCATION:  [id number]        Dictated by (CST): Rodger Roberts MD on 10/02/2023 at 1:57 PM      Finalized by (CST): Rodger Roberts MD on 10/02/2023 at 2:13 PM       PROCEDURE:  XR KNEE (1 OR 2 VIEWS), LEFT (CPT=73560)     COMPARISON:  [facility] , XR, XR KNEE (1 OR 2 VIEWS), LEFT (CPT=73560), 7/31/2020, 5:29 PM.     INDICATIONS:  fell, +hit head no LOC, +xarelto left knee pain     PATIENT STATED HISTORY: (As transcribed by Technologist)  Patient stated she fell and is has pain and bruising on thte anterior part of her knee.         FINDINGS:    BONES:  Status post left knee arthroplasty.  No evidence of hardware failure.  No acute fracture or dislocation.  SOFT TISSUES:  Negative.  No visible soft tissue swelling.  EFFUSION:  None visible.  OTHER:  Negative.                   Impression   CONCLUSION:  No acute fracture or dislocation in the left knee.        LOCATION:  [id number]        Dictated by (CST): Kamari Mir MD on 8/09/2023 at 5:43 PM      Finalized by (CST): Kamari Mir MD on 8/09/2023 at 5:44 PM       PROCEDURE:  XR SHOULDER, COMPLETE (MIN 2 VIEWS), RIGHT (CPT=73030)     TECHNIQUE:  Multiple views were obtained.     COMPARISON:  None.     INDICATIONS:  fell, +hit head no LOC, +xarelto right shoulder pain.     PATIENT STATED HISTORY: (As transcribed by Technologist)  Patient stated she fell and is having pain on the neck and the posterior part of her shoulder.         FINDINGS:  No acute fracture or dislocation.  Narrowing of the rotator cuff interval is noted with mild superior subluxation of the wrist is present.  These findings may represent rotator cuff injury.     The AC joint is intact with mild DJD. Soft tissues are within normal limits.                   Impression   CONCLUSION:  No acute fracture or dislocation.   Findings suggestive of rotator cuff injury     LOCATION:  [id number]        Dictated by (CST): Kamari Mir MD on 8/09/2023 at 5:42 PM      Finalized by (CST): Kamari Mir MD on 8/09/2023 at 5:43 PM       PROCEDURE:  XR DEXA BONE DENSITOMETRY (CPT=77080)     COMPARISON:  [facility], XR, XR DEXA BONE DENSITOMETRY (CPT=77080), 9/09/2020, 2:04 PM.  [facility], XR, XR DEXA BONE DENSITOMETRY (CPT=77080), 4/04/2018, 9:37 AM.     INDICATIONS:    Z78.0 Postmenopausal     PATIENT STATED HISTORY: (As transcribed by Technologist)  Screening.          LUMBAR SPINE ANALYSIS RESULTS:      Bone mineral density (BMD) (g/cm2):  0.979    Lumbar T-Score:  -0.6      % young normals:  94      % age matched controls:  127      Change from prior spine examination:  -2.1%              TOTAL HIP ANALYSIS RESULTS:        Bone mineral density (BMD) (g/cm2):  0.644      Total Hip T-Score:  -2.4      % young normals:  68      % age matched controls:  90      Change from prior hip examination:  -2.1%              FEMORAL NECK ANALYSIS RESULTS:        Bone mineral density (BMD) (g/cm2):  0.571      Femoral neck T-Score:  -2.5      % young normals:  67      % age matched controls:  94      Change from prior hip examination:  -5.4*%            ADDITIONAL FINDINGS:  No significant additional findings.                     Impression   CONCLUSION:  Bone mineral density values for left femoral neck are compatible with the diagnosis of osteoporosis by WHO definition (T score less than -2.5). If therapy is initiated, a follow-up study in 1 year may aid in evaluation of therapeutic  efficacy.           The World Health Organization has defined the following categories based on bone density:  Normal bone:  T-score better than -1  Osteopenia: T-score between -1 and -2.5  Osteoporosis:  T-score less than -2.5        LOCATION:  [id number]              Dictated by (CST): Tony Shukla MD on 6/23/2023 at 1:32 PM      Finalized by (CST): Tony Shukla,  MD on 6/23/2023 at 1:33 PM         PROCEDURE:  MRI SPINE CERVICAL (CPT=72141)     COMPARISON:  [facility], CT SPINE CERVICAL (CPT=72125), 7/31/2020, 4:30 PM.     INDICATIONS:  M54.16 Radiculopathy, lumbar region     TECHNIQUE:  Multiplanar T1 and T2 weighted images including fat suppression sequences.  Images acquired in sagittal and axial planes.       PATIENT STATED HISTORY: (As transcribed by Technologist)  Per patient, uncontrollable falling for two months.        FINDINGS:  There is normal cervical lordosis with anatomic alignment.  Vertebral body heights are well-maintained.  Mild to moderate degenerative disc space loss, endplate spurring, and degenerative endplate marrow signal changes scattered in the cervical spine,   most pronounced at C6-7.  No focal worrisome marrow signal abnormality is seen.  Incidental small effusion noted associated with the C1-2 articulation where there are mild erosive arthritic changes noted and mild pannus formation.     The cervical spinal cord has a normal course and caliber.  No focal cord signal abnormality is seen.  No focal mass or fluid collection is seen in the cervical spinal canal.  The paraspinal soft tissues are unremarkable.  The craniocervical junction is   unremarkable.     C2-3: There is a minimal posterior disk osteophyte complex with minimal uncovertebral and facet joint degenerative changes.  There is no significant spinal canal or neural foraminal stenosis.     C3-4: There is a small posterior disk osteophyte complex with mild uncovertebral and facet joint degenerative changes.  There is no significant spinal canal or neural foraminal stenosis.     C4-5: There is a posterior disk osteophyte complex with mild-to-moderate uncovertebral and facet joint degenerative changes.  There is no significant spinal canal stenosis.  There is mild to moderate bilateral neural foraminal stenosis.     C5-6: There is a posterior disk osteophyte complex with mild-to-moderate  uncovertebral and facet joint degenerative changes.  There is no significant spinal canal or neural foraminal stenosis.     C6-7: There is a posterior disk osteophyte complex with mild to moderate uncovertebral and facet joint degenerative changes.  There is no significant spinal canal stenosis.  There is mild bilateral neural foraminal stenosis.     C7-T1: There is a small posterior disk osteophyte complex with minimal uncovertebral and facet joint degenerative changes.  There is no significant spinal canal or neural foraminal stenosis.     Scattered small disc protrusions in the partially imaged upper thoracic spine could be further assessed with dedicated thoracic spine MRI as clinically directed.     =====  CONCLUSION:       1. Multilevel degenerative changes in the cervical spine as above without significant spinal canal stenosis at any level.     2. Mild to moderate bilateral neural foraminal stenosis at C4-5.  Mild bilateral neural foraminal stenosis at C6-7.     3. Incidental small effusion noted associated with the C1-2 articulation where there are mild erosive arthritic changes noted and mild pannus formation.      4. No focal spinal cord signal abnormality identified.     Please see above for further details.         Dictated by (CST): Rodger Roberts MD on 8/26/2020 at 10:33 AM           09/21/2020  X-rays 3 views of the left wrist show a healed Colles' fracture.  There is significant arthritic change at the wrist and carpal metacarpal joints.     X-rays 3 views of the right elbow show a healed radial head and neck fracture.  The joint is well reduced.  Dr. Torre     Labs:  Lab Results   Component Value Date    WBC 6.8 02/17/2025    RBC 4.76 02/17/2025    HGB 9.6 (L) 03/06/2025    HCT 27.8 (L) 03/06/2025    .0 (L) 02/17/2025    MCV 89.3 02/17/2025    MCH 29.6 02/17/2025    MCHC 33.2 02/17/2025    RDW 17.0 02/17/2025    NEPRELIM 2.64 09/25/2024    NEUTABS 9.36 (H) 03/08/2016    LYMPHABS 0.52 (L)  03/08/2016    EOSABS 0.00 03/08/2016    BASABS 0.00 03/08/2016    NEUT 67 03/08/2016    LYMPH 5 03/08/2016    MON 5 03/08/2016    EOS 0 03/08/2016    BASO 0 03/08/2016    NEPERCENT 48.7 09/25/2024    LYPERCENT 36.4 09/25/2024    MOPERCENT 8.9 09/25/2024    EOPERCENT 5.0 09/25/2024    BAPERCENT 0.6 09/25/2024    NE 2.64 09/25/2024    LYMABS 1.97 09/25/2024    MOABSO 0.48 09/25/2024    EOABSO 0.27 09/25/2024    BAABSO 0.03 09/25/2024     Lab Results   Component Value Date     (H) 05/20/2025    BUN 18 05/20/2025    BUNCREA 26.1 (H) 06/03/2021    CREATSERUM 1.01 05/20/2025    ANIONGAP 9 05/20/2025    GFR 70 12/07/2017    GFRNAA 58 (L) 06/13/2022    GFRAA 67 06/13/2022    CA 9.9 05/20/2025    OSMOCALC 291 05/20/2025    ALKPHO 73 05/20/2025    AST 19 05/20/2025    ALT 18 05/20/2025    BILT 0.5 05/20/2025    TP 7.5 05/20/2025    ALB 4.9 (H) 05/20/2025    GLOBULIN 2.6 05/20/2025    AGRATIO 2.2 06/15/2015     05/20/2025    K 4.4 05/20/2025     05/20/2025    CO2 26.0 05/20/2025       Additional Labs:    Final Diagnosis:   Bone and soft tissue, right knee, excision:  -Bone and cartilage with degenerative changes consistent with osteoarthritis.  -Fragments of synovial lined fibrous tissue.   Electronically signed by Tom Troy MD on 3/10/2025 at 1254 CDT       04/02/2024  DARREN 1: 80 homogenous  dsDNA negative  SSA, SSB, Smith, U1 RNP, RNP 70, centromere, SCL 70, Kathy 1 negative  C1q immune complex negative  C3 109 normal  SPEP with no monoclonal protein seen on electrophoresis  Lupus anticoagulant positive  Beta-2 glycoprotein and ACL negative  ESR 7 normal  CRP 1.15 elevated  IgG subclasses normal    03/26/2024  DARREN screen positive  RF negative  ESR 9 normal  CRP normal  C ANCA negative  P ANCA negative  Atypical p-ANCA negative  dsDNA 5 borderline  SM/RNP 3.1 positive  Sanches, SSA, SSB, centromere, SCL 70, chromatin, ribosomal P, RNP, Kathy 1 negative    02/2024  IgG 542 low  IgA, IgM normal    07/2021  Uric  acid 5.6  CRP normal  ESR 15 normal    11/2018  Uric acid 7.0  CCP normal   RF 14 (N)  DARREN negative     10/2018  Vit D 48.7  Vit B12 373  Hep C nonreactive  CRP 0.46 (N)  RF 15 (N<15)  ESR 36 (given age and sex, this is normal)     Makayla Farley DO  [facility]  07/15/2025

## 2025-07-22 RX ORDER — ROSUVASTATIN CALCIUM 10 MG/1
10 TABLET, COATED ORAL NIGHTLY
Qty: 90 TABLET | Refills: 3 | Status: SHIPPED | OUTPATIENT
Start: 2025-07-22

## 2025-07-22 NOTE — TELEPHONE ENCOUNTER
REFILL PASSED PER [facility]       Please review pended refill request as unable to refill due to high/very high drug interaction warning copied here;          High  Allergy/Contraindication: rosuvastatinReactions: OTHER (SEE COMMENTS). Reaction type: Side effect. User documented allergy severity: Low.  Level 2 with ATORVASTATIN CALCIUM (Class: STATINS).  \"Leg cramping, sensitivity\"  Details  Override reason        rosuvastatin 10 MG Oral Tab [Pharmacy Med Name: Rosuvastatin Calcium 10 Mg Tab Nort]  Prescription. Reordered.  High  Allergy/Contraindication: rosuvastatinReactions: OTHER (SEE COMMENTS), UNKNOWN. No reaction type specified. User documented allergy severity: None specified.  Level 2 with ATORVASTATIN (Class: STATINS).  Details

## 2025-07-22 NOTE — TELEPHONE ENCOUNTER
Received pre-op medical clearance request from Dr. Narendra Jimenez for surgery 11-22-23, right total knee arthroplasty. Unable to LM as pt's VM full to schedule appt with SD. Sent mcm as well. Clearance letter placed in blue folder awaiting callback. Applied

## 2025-08-01 RX ORDER — METFORMIN HYDROCHLORIDE 500 MG/1
TABLET, EXTENDED RELEASE ORAL
Qty: 270 TABLET | Refills: 3 | Status: SHIPPED | OUTPATIENT
Start: 2025-08-01

## 2025-08-13 ENCOUNTER — LAB ENCOUNTER (OUTPATIENT)
Dept: LAB | Age: 80
End: 2025-08-13
Attending: NURSE PRACTITIONER

## 2025-08-21 ENCOUNTER — LAB ENCOUNTER (OUTPATIENT)
Dept: LAB | Age: 80
End: 2025-08-21
Attending: NURSE PRACTITIONER

## 2025-08-21 DIAGNOSIS — Z79.83 VISIT FOR MONITORING RECLAST THERAPY: ICD-10-CM

## 2025-08-21 DIAGNOSIS — E11.9 TYPE 2 DIABETES MELLITUS WITHOUT COMPLICATION, WITHOUT LONG-TERM CURRENT USE OF INSULIN (HCC): ICD-10-CM

## 2025-08-21 DIAGNOSIS — M81.0 AGE-RELATED OSTEOPOROSIS WITHOUT CURRENT PATHOLOGICAL FRACTURE: ICD-10-CM

## 2025-08-21 DIAGNOSIS — E87.1 HYPONATREMIA: ICD-10-CM

## 2025-08-21 DIAGNOSIS — D62 ACUTE BLOOD LOSS ANEMIA: ICD-10-CM

## 2025-08-21 DIAGNOSIS — E87.6 HYPOKALEMIA: ICD-10-CM

## 2025-08-21 DIAGNOSIS — Z51.81 VISIT FOR MONITORING RECLAST THERAPY: ICD-10-CM

## 2025-08-21 DIAGNOSIS — E55.9 VITAMIN D DEFICIENCY: ICD-10-CM

## 2025-08-21 DIAGNOSIS — D69.6 THROMBOCYTOPENIA: ICD-10-CM

## 2025-08-21 DIAGNOSIS — D64.9 ANEMIA, UNSPECIFIED TYPE: ICD-10-CM

## 2025-08-21 DIAGNOSIS — R04.0 EPISTAXIS: ICD-10-CM

## 2025-08-21 DIAGNOSIS — I10 PRIMARY HYPERTENSION: ICD-10-CM

## 2025-08-21 LAB
ALBUMIN SERPL-MCNC: 4.6 G/DL (ref 3.2–4.8)
ALBUMIN/GLOB SERPL: 1.5 (ref 1–2)
ALP LIVER SERPL-CCNC: 53 U/L (ref 55–142)
ALT SERPL-CCNC: 30 U/L (ref 10–49)
ANION GAP SERPL CALC-SCNC: 14 MMOL/L (ref 0–18)
AST SERPL-CCNC: 34 U/L (ref ?–34)
BASOPHILS # BLD AUTO: 0.08 X10(3) UL (ref 0–0.2)
BASOPHILS NFR BLD AUTO: 1.6 %
BILIRUB SERPL-MCNC: 0.5 MG/DL (ref 0.2–1.1)
BUN BLD-MCNC: 11 MG/DL (ref 9–23)
CALCIUM BLD-MCNC: 9.7 MG/DL (ref 8.7–10.6)
CHLORIDE SERPL-SCNC: 105 MMOL/L (ref 98–112)
CHOLEST SERPL-MCNC: 115 MG/DL (ref ?–200)
CO2 SERPL-SCNC: 24 MMOL/L (ref 21–32)
CREAT BLD-MCNC: 0.97 MG/DL (ref 0.55–1.02)
CRP SERPL-MCNC: <0.5 MG/DL (ref ?–0.5)
EGFRCR SERPLBLD CKD-EPI 2021: 60 ML/MIN/1.73M2 (ref 60–?)
EOSINOPHIL # BLD AUTO: 0.16 X10(3) UL (ref 0–0.7)
EOSINOPHIL NFR BLD AUTO: 3.2 %
ERYTHROCYTE [DISTWIDTH] IN BLOOD BY AUTOMATED COUNT: 13.3 %
ERYTHROCYTE [SEDIMENTATION RATE] IN BLOOD: 16 MM/HR (ref 0–30)
EST. AVERAGE GLUCOSE BLD GHB EST-MCNC: 134 MG/DL (ref 68–126)
FASTING PATIENT LIPID ANSWER: YES
FASTING STATUS PATIENT QL REPORTED: YES
GLOBULIN PLAS-MCNC: 3.1 G/DL (ref 2–3.5)
GLUCOSE BLD-MCNC: 153 MG/DL (ref 70–99)
HBA1C MFR BLD: 6.3 % (ref ?–5.7)
HCT VFR BLD AUTO: 40.2 % (ref 35–48)
HDLC SERPL-MCNC: 62 MG/DL (ref 40–59)
HGB BLD-MCNC: 13.4 G/DL (ref 12–16)
IMM GRANULOCYTES # BLD AUTO: 0.01 X10(3) UL (ref 0–1)
IMM GRANULOCYTES NFR BLD: 0.2 %
LDLC SERPL CALC-MCNC: 31 MG/DL (ref ?–100)
LYMPHOCYTES # BLD AUTO: 1.86 X10(3) UL (ref 1–4)
LYMPHOCYTES NFR BLD AUTO: 36.9 %
MAGNESIUM SERPL-MCNC: 1.4 MG/DL (ref 1.6–2.6)
MCH RBC QN AUTO: 31.5 PG (ref 26–34)
MCHC RBC AUTO-ENTMCNC: 33.3 G/DL (ref 31–37)
MCV RBC AUTO: 94.4 FL (ref 80–100)
MONOCYTES # BLD AUTO: 0.42 X10(3) UL (ref 0.1–1)
MONOCYTES NFR BLD AUTO: 8.3 %
NEUTROPHILS # BLD AUTO: 2.51 X10 (3) UL (ref 1.5–7.7)
NEUTROPHILS # BLD AUTO: 2.51 X10(3) UL (ref 1.5–7.7)
NEUTROPHILS NFR BLD AUTO: 49.8 %
NONHDLC SERPL-MCNC: 53 MG/DL (ref ?–130)
OSMOLALITY SERPL CALC.SUM OF ELEC: 298 MOSM/KG (ref 275–295)
PHOSPHATE SERPL-MCNC: 4.2 MG/DL (ref 2.4–5.1)
PLATELET # BLD AUTO: 199 10(3)UL (ref 150–450)
POTASSIUM SERPL-SCNC: 4.1 MMOL/L (ref 3.5–5.1)
PROT SERPL-MCNC: 7.7 G/DL (ref 5.7–8.2)
RBC # BLD AUTO: 4.26 X10(6)UL (ref 3.8–5.3)
SODIUM SERPL-SCNC: 143 MMOL/L (ref 136–145)
TRIGL SERPL-MCNC: 127 MG/DL (ref 30–149)
VIT D+METAB SERPL-MCNC: 43.8 NG/ML (ref 30–100)
VLDLC SERPL CALC-MCNC: 17 MG/DL (ref 0–30)
WBC # BLD AUTO: 5 X10(3) UL (ref 4–11)

## 2025-08-21 PROCEDURE — 84100 ASSAY OF PHOSPHORUS: CPT

## 2025-08-21 PROCEDURE — 86140 C-REACTIVE PROTEIN: CPT

## 2025-08-21 PROCEDURE — 82306 VITAMIN D 25 HYDROXY: CPT

## 2025-08-21 PROCEDURE — 80053 COMPREHEN METABOLIC PANEL: CPT

## 2025-08-21 PROCEDURE — 36415 COLL VENOUS BLD VENIPUNCTURE: CPT

## 2025-08-21 PROCEDURE — 80061 LIPID PANEL: CPT

## 2025-08-21 PROCEDURE — 85652 RBC SED RATE AUTOMATED: CPT

## 2025-08-21 PROCEDURE — 83735 ASSAY OF MAGNESIUM: CPT

## 2025-08-21 PROCEDURE — 85025 COMPLETE CBC W/AUTO DIFF WBC: CPT

## 2025-08-21 PROCEDURE — 83036 HEMOGLOBIN GLYCOSYLATED A1C: CPT

## 2025-08-22 ENCOUNTER — NURSE TRIAGE (OUTPATIENT)
Dept: INTERNAL MEDICINE CLINIC | Facility: CLINIC | Age: 80
End: 2025-08-22

## 2025-08-22 RX ORDER — DULOXETIN HYDROCHLORIDE 30 MG/1
30 CAPSULE, DELAYED RELEASE ORAL DAILY
Qty: 90 CAPSULE | Refills: 3 | Status: SHIPPED | OUTPATIENT
Start: 2025-08-22

## 2025-08-22 RX ORDER — DULOXETIN HYDROCHLORIDE 60 MG/1
60 CAPSULE, DELAYED RELEASE ORAL DAILY
Qty: 90 CAPSULE | Refills: 3 | Status: SHIPPED | OUTPATIENT
Start: 2025-08-22

## 2025-08-25 RX ORDER — PANTOPRAZOLE SODIUM 40 MG/1
40 TABLET, DELAYED RELEASE ORAL
Qty: 90 TABLET | Refills: 3 | Status: SHIPPED | OUTPATIENT
Start: 2025-08-25

## 2025-08-29 ENCOUNTER — HOSPITAL ENCOUNTER (OUTPATIENT)
Dept: CV DIAGNOSTICS | Facility: HOSPITAL | Age: 80
Discharge: HOME | End: 2025-08-29
Attending: INTERNAL MEDICINE

## 2025-08-29 DIAGNOSIS — Z01.810 HIGH RISK SURGERY, PRE-OPERATIVE CARDIOVASCULAR EXAMINATION: ICD-10-CM

## 2025-08-29 DIAGNOSIS — R06.09 DOE (DYSPNEA ON EXERTION): ICD-10-CM

## 2025-08-29 DIAGNOSIS — R42 DIZZINESS: ICD-10-CM

## 2025-08-29 PROCEDURE — 93306 TTE W/DOPPLER COMPLETE: CPT | Performed by: INTERNAL MEDICINE

## (undated) DIAGNOSIS — D80.3 SELECTIVE DEFICIENCY OF IGG SUBCLASSES (HCC): ICD-10-CM

## (undated) DIAGNOSIS — J45.30 MILD PERSISTENT ASTHMA WITHOUT COMPLICATION: ICD-10-CM

## (undated) DIAGNOSIS — I25.10 CORONARY ARTERY DISEASE INVOLVING NATIVE CORONARY ARTERY OF NATIVE HEART WITHOUT ANGINA PECTORIS: ICD-10-CM

## (undated) DIAGNOSIS — E78.00 PURE HYPERCHOLESTEROLEMIA: ICD-10-CM

## (undated) DIAGNOSIS — I10 BENIGN ESSENTIAL HTN: ICD-10-CM

## (undated) DIAGNOSIS — G47.33 OSA ON CPAP: ICD-10-CM

## (undated) DIAGNOSIS — F33.1 MAJOR DEPRESSIVE DISORDER, RECURRENT EPISODE, MODERATE (HCC): Chronic | ICD-10-CM

## (undated) DIAGNOSIS — E11.65 UNCONTROLLED TYPE 2 DIABETES MELLITUS WITH HYPERGLYCEMIA (HCC): ICD-10-CM

## (undated) DIAGNOSIS — M48.02 CERVICAL STENOSIS OF SPINAL CANAL: ICD-10-CM

## (undated) DIAGNOSIS — Z86.711 HISTORY OF PULMONARY EMBOLISM: ICD-10-CM

## (undated) DIAGNOSIS — R29.898 WEAKNESS OF BOTH UPPER EXTREMITIES: ICD-10-CM

## (undated) DIAGNOSIS — Z02.9 ENCOUNTERS FOR UNSPECIFIED ADMINISTRATIVE PURPOSE: ICD-10-CM

## (undated) DIAGNOSIS — F41.9 ANXIETY: ICD-10-CM

## (undated) DIAGNOSIS — G56.02 CARPAL TUNNEL SYNDROME OF LEFT WRIST: ICD-10-CM

## (undated) DIAGNOSIS — Z99.89 OSA ON CPAP: ICD-10-CM

## (undated) DIAGNOSIS — J45.31 MILD PERSISTENT ASTHMA WITH ACUTE EXACERBATION: ICD-10-CM

## (undated) DIAGNOSIS — K21.9 GASTROESOPHAGEAL REFLUX DISEASE WITHOUT ESOPHAGITIS: ICD-10-CM

## (undated) DIAGNOSIS — E53.8 B12 DEFICIENCY: ICD-10-CM

## (undated) DIAGNOSIS — Z85.3 HISTORY OF LEFT BREAST CANCER: ICD-10-CM

## (undated) DIAGNOSIS — Z85.828 HISTORY OF BASAL CELL CARCINOMA: ICD-10-CM

## (undated) DIAGNOSIS — D50.8 OTHER IRON DEFICIENCY ANEMIA: ICD-10-CM

## (undated) DIAGNOSIS — M54.12 CERVICAL MYELOPATHY WITH CERVICAL RADICULOPATHY (HCC): ICD-10-CM

## (undated) DIAGNOSIS — Z86.718 HISTORY OF DVT (DEEP VEIN THROMBOSIS): ICD-10-CM

## (undated) DIAGNOSIS — I70.0 ATHEROSCLEROSIS OF AORTA (HCC): ICD-10-CM

## (undated) DIAGNOSIS — R26.9 NEUROLOGIC GAIT DYSFUNCTION: ICD-10-CM

## (undated) DIAGNOSIS — Z85.3 HISTORY OF BREAST CANCER: ICD-10-CM

## (undated) DIAGNOSIS — D80.3 IGG DEFICIENCY (HCC): ICD-10-CM

## (undated) DIAGNOSIS — M54.12 CERVICAL RADICULITIS: ICD-10-CM

## (undated) DIAGNOSIS — E11.65 CONTROLLED TYPE 2 DIABETES MELLITUS WITH HYPERGLYCEMIA, WITHOUT LONG-TERM CURRENT USE OF INSULIN (HCC): ICD-10-CM

## (undated) DIAGNOSIS — G95.9 CERVICAL MYELOPATHY WITH CERVICAL RADICULOPATHY (HCC): ICD-10-CM

## (undated) DIAGNOSIS — R29.898 WEAKNESS OF BOTH LOWER EXTREMITIES: Primary | ICD-10-CM

## (undated) DIAGNOSIS — R29.6 FALLS FREQUENTLY: ICD-10-CM

## (undated) DIAGNOSIS — E11.69 TYPE 2 DIABETES MELLITUS WITH OTHER SPECIFIED COMPLICATION, UNSPECIFIED WHETHER LONG TERM INSULIN USE (HCC): ICD-10-CM

## (undated) DEVICE — SOL  .9 1000ML BTL

## (undated) DEVICE — TIP GRSP DISP HNTR RENEW

## (undated) DEVICE — EXOFIN TISSUE ADHESIVE 1.0ML

## (undated) DEVICE — DRAPE,U/SHT,SPLIT,FILM,60X84,STERILE: Brand: MEDLINE

## (undated) DEVICE — 450 ML BOTTLE OF 0.05% CHLORHEXIDINE GLUCONATE IN 99.95% STERILE WATER FOR IRRIGATION, USP AND APPLICATOR.: Brand: IRRISEPT ANTIMICROBIAL WOUND LAVAGE

## (undated) DEVICE — LIGHT HANDLE

## (undated) DEVICE — BIPOLAR SEALER 23-112-1 AQM 6.0: Brand: AQUAMANTYS ®

## (undated) DEVICE — GOWN SURG AERO CHROME XXL

## (undated) DEVICE — GLOVE SUR 8.5 SENSICARE PI PIP GRN PWD F

## (undated) DEVICE — FORCEP RADIAL JAW 4

## (undated) DEVICE — NEB HANDHELD W/MOUTHPC

## (undated) DEVICE — GOWN SUR 2XL LEV 4 BLU W/ WHT V NK BND AERO

## (undated) DEVICE — 3 BONE CEMENT MIXER: Brand: MIXEVAC

## (undated) DEVICE — RECIPROCATING BLADE, DOUBLE SIDED, OFFSET  (70.0 X 1.0 X 12.5MM)

## (undated) DEVICE — SUTURE VICRYL 2-0 CP-1

## (undated) DEVICE — GLOVE SUR 8.5 SENSICARE PI PIP CRM PWD F

## (undated) DEVICE — BANDAGE ELASTIC ACE 6\" X-LONG

## (undated) DEVICE — NEEDLE SPNL 18GA L3.5IN PNK QNCKE STYL DISP

## (undated) DEVICE — Device: Brand: INTELLICART™

## (undated) DEVICE — GRASPER LAP L35MM DIA5MM EPIX

## (undated) DEVICE — SYSTEM POS W20XH1XL29IN PT PIGAZZI

## (undated) DEVICE — CHLORAPREP 26ML APPLICATOR

## (undated) DEVICE — BITEBLOCK ENDOSCP 60FR MAXI STRP

## (undated) DEVICE — SIGMA LCS HIGH PERFORMANCE INSTRUMENTS STERILE THREADED PINS: Brand: SIGMA LCS HIGH PERFORMANCE

## (undated) DEVICE — BIPOLAR SEALER 23-112-1 AQM 6.0: Brand: AQUAMANTYS™

## (undated) DEVICE — Device: Brand: STABLECUT®

## (undated) DEVICE — STRYKER PERFORMANCE SERIES SAGITTAL BLADE: Brand: STRYKER PERFORMANCE SERIES

## (undated) DEVICE — GOWN,SIRUS,FABRIC-REINFORCED,X-LARGE: Brand: MEDLINE

## (undated) DEVICE — SINGLE USE BIOPSY VALVE MAJ-210: Brand: SINGLE USE BIOPSY VALVE (STERILE)

## (undated) DEVICE — 1200CC GUARDIAN II: Brand: GUARDIAN

## (undated) DEVICE — ANTIBACTERIAL UNDYED BRAIDED (POLYGLACTIN 910), SYNTHETIC ABSORBABLE SUTURE: Brand: COATED VICRYL

## (undated) DEVICE — ENDOSCOPY PACK - LOWER: Brand: MEDLINE INDUSTRIES, INC.

## (undated) DEVICE — CONVERTORS STOCKINETTE: Brand: CONVERTORS

## (undated) DEVICE — DRAPE TABLE COVER 44X90 TC-10

## (undated) DEVICE — SOLUTION IRRIG 1000ML 0.9% NACL USP BTL

## (undated) DEVICE — KIT CUSTOM ENDOPROCEDURE STERIS

## (undated) DEVICE — COVER,LIGHT,CAMERA,HARD,1/PK,STRL: Brand: MEDLINE

## (undated) DEVICE — NEEDLE VERESS 120MM

## (undated) DEVICE — FORCEPS BX 100CM 1.8MM RJ STD

## (undated) DEVICE — 3M™ RED DOT™ MONITORING ELECTRODE WITH FOAM TAPE AND STICKY GEL, 50/BAG, 20/CASE, 72/PLT 2570: Brand: RED DOT™

## (undated) DEVICE — KENDALL SCD EXPRESS SLEEVES, KNEE LENGTH, MEDIUM: Brand: KENDALL SCD

## (undated) DEVICE — BLADE #15 STRL STAINLESS

## (undated) DEVICE — C-ARM: Brand: UNBRANDED

## (undated) DEVICE — DISSECTOR MIRCO TIP MARYLAND

## (undated) DEVICE — APPLICATOR SKIN PREP 26ML HI LT ORNG 2% CHG

## (undated) DEVICE — SLEEVE COMPR M KNEE LEN SGL USE KENDALL SCD

## (undated) DEVICE — FILTERLINE NASAL ADULT O2/CO2

## (undated) DEVICE — SCD SLEEVE KNEE HI BLEND

## (undated) DEVICE — STERILE POLYISOPRENE POWDER-FREE SURGICAL GLOVES: Brand: PROTEXIS

## (undated) DEVICE — FORCEP BIOPSY RJ4 LG CAP W/ND

## (undated) DEVICE — DECANTER BAG 9": Brand: MEDLINE INDUSTRIES, INC.

## (undated) DEVICE — SPECIMEN CONTAINER,POSITIVE SEAL INDICATOR, OR PACKAGED: Brand: PRECISION

## (undated) DEVICE — ALCOHOL 70% 4 OZ

## (undated) DEVICE — SLEEVE TRCR L100MM DIA5MM ABD Z THRD KII

## (undated) DEVICE — 3.0MM PRECISION NEURO (MATCH HEAD)

## (undated) DEVICE — 60 ML SYRINGE REGULAR TIP: Brand: MONOJECT

## (undated) DEVICE — 2T11 #2 PDO 36 X 36: Brand: 2T11 #2 PDO 36 X 36

## (undated) DEVICE — BOWL CEMENT MIX QUICK-VAC

## (undated) DEVICE — SYSTEM OPT ACCS L100MM TIP 5MM 1ST ENTRY KII

## (undated) DEVICE — SUTURE PASSOR WITH GUIDE

## (undated) DEVICE — UNIVERSAL STERIBUMP® STERILE (5/CASE): Brand: UNIVERSAL STERIBUMP®

## (undated) DEVICE — SNARE 9MM 230CM 2.4MM EXACTO

## (undated) DEVICE — ENDOSCOPY CHANNEL ADAPTER: Brand: ERBE

## (undated) DEVICE — MAYFIELD® DISPOSABLE ADULT SKULL PIN (PLASTIC BASE): Brand: MAYFIELD®

## (undated) DEVICE — SUTURE MCRYL SZ 4-0 L18IN ABSRB UD L19MM PS-2

## (undated) DEVICE — MARKER SKIN PREP RESIST STRL

## (undated) DEVICE — LAMINECTOMY CDS: Brand: MEDLINE INDUSTRIES, INC.

## (undated) DEVICE — TRAP SPEC REMOVAL ETRAP 15CM

## (undated) DEVICE — SYRINGE MED 10ML SLIP TIP CLR BRL TAPR PLUNG

## (undated) DEVICE — RELOAD STPLR 45X3.6MM 6 ROW BLU ENDOSCP LIN

## (undated) DEVICE — Device: Brand: DEFENDO AIR/WATER/SUCTION AND BIOPSY VALVE

## (undated) DEVICE — GLOVE SUR 8 SENSICARE PIP WHT PWD F

## (undated) DEVICE — SUTURE VICRYL 2-0 CT-2

## (undated) DEVICE — HOOD: Brand: FLYTE

## (undated) DEVICE — SUTURE VICRYL 3-0 RB-1

## (undated) DEVICE — SUTURE PDS II 1 CT-1

## (undated) DEVICE — PACK PBDS LAP CHOLE MODULE

## (undated) DEVICE — STERILE SYNTHETIC POLYISOPRENE POWDER-FREE SURGICAL GLOVES WITH HYDROGEL COATING: Brand: PROTEXIS

## (undated) DEVICE — SLEEVE TRCR L100MM DIA5MM ABD ADV FIX KII

## (undated) DEVICE — CATH IV 14G X 5-1/4 ANGIO

## (undated) DEVICE — ZIMMER® STERILE DISPOSABLE TOURNIQUET CUFF WITH PLC, DUAL PORT, SINGLE BLADDER, 34 IN. (86 CM)

## (undated) DEVICE — SNARE CAPTIFLEX MICRO-OVL OLY

## (undated) DEVICE — GLOVE SUR 7 PROTEXIS PI PIP CRM PWD F

## (undated) DEVICE — DISPOSABLE TOURNIQUET CUFF SINGLE BLADDER, DUAL PORT AND QUICK CONNECT CONNECTOR: Brand: COLOR CUFF

## (undated) DEVICE — SRG GLV PROTEXIS BLUE 7.5

## (undated) DEVICE — PROXIMATE SKIN STAPLERS (35 WIDE) CONTAINS 35 STAINLESS STEEL STAPLES (FIXED HEAD): Brand: PROXIMATE

## (undated) DEVICE — REM POLYHESIVE ADULT PATIENT RETURN ELECTRODE: Brand: VALLEYLAB

## (undated) DEVICE — TRAP,MUCUS SPECIMEN, 80CC: Brand: MEDLINE

## (undated) DEVICE — NEPTUNE E-SEP SMOKE EVACUATION PENCIL, COATED, 70MM BLADE, PUSH BUTTON SWITCH: Brand: NEPTUNE E-SEP

## (undated) DEVICE — SUTURE VICRYL 0 CT-1

## (undated) DEVICE — SIGMA LCS HIGH PERFORMANCE STERILE THREADED HEADED PINS: Brand: SIGMA LCS HIGH PERFORMANCE

## (undated) DEVICE — ENDOSCOPY PACK UPPER: Brand: MEDLINE INDUSTRIES, INC.

## (undated) DEVICE — BNDG,ELSTC,MATRIX,STRL,4"X5YD,LF,HOOK&LP: Brand: MEDLINE

## (undated) DEVICE — SOLUTION IRRIG 3000ML 0.9% NACL FLX CONT

## (undated) DEVICE — TOTAL KNEE CDS: Brand: MEDLINE INDUSTRIES, INC.

## (undated) DEVICE — AIRLIFE™ MISTY MAX 10™ NEBULIZER WITH 7 FOOT (2.1 M) FEMALE U/CONNECT-IT CRUSH RESISTANT OXYGEN TUBING, BAFFLED TEE ADAPTER (22 MM I.D./ 22 MM O.D.), MOUTHPIECE AND 6 INCH (15 CM) FLEXTUBE: Brand: AIRLIFE™

## (undated) DEVICE — GLOVE SUR 8 SENSICARE PI PIP CRM PWD F

## (undated) DEVICE — SINGLE USE SUCTION VALVE MAJ-209: Brand: SINGLE USE SUCTION VALVE (STERILE)

## (undated) DEVICE — SYRINGE MED 30ML STD CLR PLAS LL TIP N CTRL

## (undated) DEVICE — WRAP COOLING KNEE W/ICE PILLOW

## (undated) DEVICE — STOCKINETTE,IMPERVIOUS,12X48,STERILE: Brand: MEDLINE

## (undated) DEVICE — Device: Brand: PLUMEPEN

## (undated) DEVICE — WRAP COMPR UNIV KNEE HOT CLD GEL MICWV AND

## (undated) DEVICE — STAPLER MED SHFT L340MM JAW L45MM STD ECHELON

## (undated) DEVICE — SLEEVE COMPR MD KNEE LEN SGL USE KENDALL SCD

## (undated) DEVICE — HOOD, PEEL-AWAY: Brand: FLYTE

## (undated) DEVICE — SUTURE VICRYL 1 OS-6

## (undated) DEVICE — DRESSING AQUACEL AG 3.5X12

## (undated) DEVICE — ENSEAL SEALER TISS L37 CM CURV

## (undated) DEVICE — TRAY CATH 16FR F INCL BARDX IC COMPLT CARE

## (undated) DEVICE — SYSTEM ACCS L100MM DIA12MM ABD 1ST ENTRY Z

## (undated) DEVICE — POUCH SPECIMEN WIRE 6X3 250ML

## (undated) DEVICE — ADHESIVE SKIN TOP FOR WND CLSR DERMBND ADV

## (undated) NOTE — LETTER
[facility]  Cyndee Costa 62 [address] [facility], [address] [place] [address]    Consent for Operation    Date: __________________    Time: _______________    1. I authorize the performance upon Lm Grimes the following operatio    2.  I authorize videotape. [facility] will not be responsible for storage or maintenance of this tape. 6. For the purpose of advancing medical education, I consent to the admittance of observers to the Operating Room.     7. I authorize the use of any specimen, organs Signature of Patient:   ___________________________    When the patient is a minor or mentally incompetent to give consent:  Signature of person authorized to consent for patient: ___________________________   Relationship to patient: _____________________ drugs/illegal medications). Failure to inform my anesthesiologist about these medicines may increase my risk of anesthetic complications. · If I am allergic to anything or have had a reaction to anesthesia before.     3. I understand how the anesthesia med I have discussed the procedure and information above with the patient (or patient’s representative) and answered their questions. The patient or their representative has agreed to have anesthesia services.     _______________________________________________

## (undated) NOTE — LETTER
[address] [facility]  [place] [address]  [address] [facility] [place], operated by [facility],     Below is the number provided to discuss CPAP machine further. [facility] [address], [facility] [place], [address] [facility] Manager/Health   [address] [facility] Management Dept. Yessenia Last 95 Jalyn Rubyey, [address] [facility]  (Office [phone]  Health. org

## (undated) NOTE — LETTER
09/05/17        [address] [facility] [address]      Dear Surekha Anderson,    [address] [place] [address] records indicate that you have outstanding lab work and or testing that was ordered for you and has not yet been completed:    Vitamin B12 [E]  Vitamin

## (undated) NOTE — MR AVS SNAPSHOT
[facility] [address] [phone]               Thank you for choosing us for your health care visit with [facility] NURSE.   We are glad to serve you and happy to provide you with this summary of This list is accurate as of: 2/20/17  9:49 AM.  Always use your most recent med list.                Acidophilus/Pectin Caps   Take 1 capsule by mouth nightly. Commonly known as:  PROBIOTIC           aspirin 81 MG Tabs   Take 81 mg by mouth daily. XARELTO 20 MG Tabs   Generic drug:  Rivaroxaban   TAKE 1 TABLET BY MOUTH DAILY.                    Medications Administered in the Office Today     cyanocobalamin (VITAMIN B12) 1000 MCG/ML injection 1,000 mcg                    Eliz     Visit Eliz COLLINS

## (undated) NOTE — LETTER
OUTSIDE TESTING RESULT REQUEST     IMPORTANT: FOR YOUR IMMEDIATE ATTENTION  Please FAX all test results listed below to: [phone]     Testing already done on or about: 22     * * * * If testing is NOT complete, arrange with patient A.S.A.P. * * * *      Patient Name: Ilir Bruner  Surgery Date: 2022  CSN: [id number]  Medical Record: [id number]   : 1945 - A: 77 y      Sex: female  Surgeon(s):  Kade Mcpherson MD  Procedure: CERVICAL 5 AND CERVICAL 6 LAMINECTOMIES, PARTIAL CERVICAL 7 LAMINECTOMY, LEFT CERVICAL 5, CERVICAL 6 AND CERVICAL 7 FORAMINOTOMIES, POSSIBLE CERVICAL 6 AND CERVICAL 7 ARTHRODESIS, AUTOGRAFT, ALLOGRAFT AND ALL INDICATED PROCEDURES  Anesthesia Type: General     Surgeon: Kade Mcpherson MD     The following Testing and Time Line are REQUIRED PER ANESTHESIA     EKG READ AND SIGNED WITHIN   90 days  Chest X-Ray within 6 months  CBC [with Differential & Platelets] within  90 days  CMP (requires 4 hour fast) within  90 days  PT/INR within  30 days  PTT within  30 days  Type and Screen for Pre-Admission Testing (must be within 28 days of surgery)  MSSA/MRSA Nasal screening within 30 days      Thank You,   Sent by:Natalie Willie Crigler, RN    Orders all placed under Dr. Curtis Morfin. Please have collected at preop appt on 22 with any other additional labs you would like ordered. Thank you!     Davion  5/13/15

## (undated) NOTE — LETTER
ASTHMA ACTION PLAN for Gene Calvin     : 1945     Date: 2022  Provider:  JUAREZ Merida  Phone for doctor or clinic: [address] [facility] [address] [place] [address] [phone]    ACT Score: 25      You can use the colors of a traffic light to help learn about your asthma medicines. 1. Green - Go! % of Personal Best Peak Flow Use controller medicine. Breathing is good  No cough or wheeze  Can work and play Medicine How much to take When to take it    Breo Ellipta                   1 puff                                   Once daily  Flonase NS                   2 sprays in each nostril       Once daily      2. Yellow - Caution. 50-79% Personal Best Peak  Flow. Use reliever medicine to keep an asthma attack from getting bad. Cough  Wheezing  Tight Chest  Wake up at night Medicine How much to take When to take it    Albuterol HFA                  2 puffs                             Every 6 hours as needed       Additional instructions If no improvement after using your Albuterol - please contact your PCP        3. Red - Stop! Danger!  <50% Personal Best Peak  Flow. Take these medications until  Get help from a doctor   Medicine not helping  Breathing is hard and fast  Nose opens wide  Can't walk  Ribs show  Can't talk well Medicine How much to take When to take it    Call 911 or go to the ER     Additional Instructions If your symptoms do not improve and you cannot contact your doctor, [facility] or call 911 immediately! [x] Asthma Action Plan reviewed with patient (and caregiver if necessary) and a copy of the plan was given to the patient/caregiver. [x] Asthma Action Plan reviewed with patient (and caregiver if necessary) on the phone and mailed copy to patient or submitted via [address]      Signatures:  Provider  JUAREZ Merida   Patient Caretaker

## (undated) NOTE — Clinical Note
FYI- pt home now, still not feeling well. [facility] to [facility]. TE sent to PCP today regarding pt's sx and concerns (hgb continues to be low- highest level at time of d/c was 8.3).

## (undated) NOTE — LETTER
04/29/19        [address] [facility] [address]      Dear Baron Chan,    [facility] records indicate that you have outstanding lab work and or testing that was ordered for you and has not yet been completed:  Orders Placed This Encount

## (undated) NOTE — Clinical Note
Pt was wondering about status of knee injection approval?    Leonel Starr, DO  [facility]  10/14/2022

## (undated) NOTE — MR AVS SNAPSHOT
[facility] [place] [id number] [phone]               Thank you for choosing us for your health care visit with Dorian Mendoza MD.  We are glad to serve you and happy to provide you with this summar Dilaudid [Hydromorphone] Nausea and vomiting    Diovan [Valsartan] Nausea and vomiting    Green Pepper     Lipitor [Atorvastatin Calcium]     Leg cramping     Omnicef Nausea only, Dizziness    Penicillins Rash    Quinapril Hcl Fatigue                Today INHALE 2 PUFFS INTO THE LUNGS EVERY 4 (FOUR) HOURS AS NEEDED (COUGH). Risedronate Sodium 150 MG Tabs   TAKE 1 TABLET BY MOUTH EVERY 30 (THIRTY) DAYS.    Commonly known as:  ACTONEL           Sertraline HCl 50 MG Tabs   Take 1 tablet (50 mg total)

## (undated) NOTE — LETTER
[facility] [address], [address]  Consent for Procedure/Sedation    Date:     Time:       1. I authorize the performance upon Lm Grimes the following:  VENOUS ACCESS PORT IMPLANT     2.  I authorize Dr. Caryn Wallace (and whomever ________________________________    ___________________    Witness: _________________________      Date: ___________________    Printed: 2018   11:09 AM  Patient Name: Abraham Santy        : 1945       Medical Record #: [id number]

## (undated) NOTE — LETTER
[address]  [address]  Authorization for Surgical Operation and Procedure     Date:__02/12/2024___                                                                                                         Time:15:02___  I hereby authorize Surgeon(s):  Chalino Harris MD, my physician and his/her assistants (if applicable), which may include medical students, residents, and/or fellows, to perform the following surgical operation/ procedure and administer such anesthesia as may be determined necessary by my physician:  Operation/Procedure name (s) Procedure(s):  LAPAROSCOPIC APPENDECTOMY on Rayne Morales   2.   I recognize that during the surgical operation/procedure, unforeseen conditions may necessitate additional or different procedures than those listed above.  I, therefore, further authorize and request that the above-named surgeon, assistants, or designees perform such procedures as are, in their judgment, necessary and desirable.    3.   My surgeon/physician has discussed prior to my surgery the potential benefits, risks and side effects of this procedure; the likelihood of achieving goals; and potential problems that might occur during recuperation.  They also discussed reasonable alternatives to the procedure, including risks, benefits, and side effects related to the alternatives and risks related to not receiving this procedure.  I have had all my questions answered and I acknowledge that no guarantee has been made as to the result that may be obtained.    4.   Should the need arise during my operation/procedure, which includes change of level of care prior to discharge, I also consent to the administration of blood and/or blood products.  Further, I understand that despite careful testing and screening of blood or blood products by collecting agencies, I may still be subject to ill effects as a result of receiving a blood transfusion and/or blood products.  The  following are some, but not all, of the potential risks that can occur: fever and allergic reactions, hemolytic reactions, transmission of diseases such as Hepatitis, AIDS and Cytomegalovirus (CMV) and fluid overload.  In the event that I wish to have an autologous transfusion of my own blood, or a directed donor transfusion, I will discuss this with my physician.  Check only if Refusing Blood or Blood Products  I understand refusal of blood or blood products as deemed necessary by my physician may have serious consequences to my condition to include possible death. I hereby assume responsibility for my refusal and release the hospital, its personnel, and my physicians from any responsibility for the consequences of my refusal.          o  Refuse      5.   I authorize the use of any specimen, organs, tissues, body parts or foreign objects that may be removed from my body during the operation/procedure for diagnosis, research or teaching purposes and their subsequent disposal by hospital authorities.  I also authorize the release of specimen test results and/or written reports to my treating physician on the hospital medical staff or other referring or consulting physicians involved in my care, at the discretion of the Pathologist or my treating physician.    6.   I consent to the photographing or videotaping of the operations or procedures to be performed, including appropriate portions of my body for medical, scientific, or educational purposes, provided my identity is not revealed by the pictures or by descriptive texts accompanying them.  If the procedure has been photographed/videotaped, the surgeon will obtain the original picture, image, videotape or CD.  The hospital will not be responsible for storage, release or maintenance of the picture, image, tape or CD.    7.   I consent to the presence of a  or observers in the operating room as deemed necessary by my physician or their designees.     8.   I recognize that in the event my procedure results in extended X-Ray/fluoroscopy time, I may develop a skin reaction.    9. If I have a Do Not Attempt Resuscitation (DNAR) order in place, that status will be suspended while in the operating room, procedural suite, and during the recovery period unless otherwise explicitly stated by me (or a person authorized to consent on my behalf). The surgeon or my attending physician will determine when the applicable recovery period ends for purposes of reinstating the DNAR order.  10. Patients having a sterilization procedure: I understand that if the procedure is successful the results will be permanent and it will therefore be impossible for me to inseminate, conceive, or bear children.  I also understand that the procedure is intended to result in sterility, although the result has not been guaranteed.   11. I acknowledge that my physician has explained sedation/analgesia administration to me including the risk and benefits I consent to the administration of sedation/analgesia as may be necessary or desirable in the judgment of my physician.    I CERTIFY THAT I HAVE READ AND FULLY UNDERSTAND THE ABOVE CONSENT TO OPERATION and/or OTHER PROCEDURE.    _________________________________________  __________________________________  Signature of Patient     Signature of Responsible Person         ___________________________________         Printed Name of Responsible Person           _________________________________                 Relationship to Patient  _________________________________________  ______________________________  Signature of Witness          Date  Time      Patient Name: Rayne Morales     : 1945                 Printed: 2024     Medical Record #: [id number]                     Page 1 of [address] [place] [address] [place]  [address]    Consent for Anesthesia    I, Rayne Kirk  Andrew agree to be cared for by an anesthesiologist, who is specially trained to monitor me and give me medicine to put me to sleep or keep me comfortable during my procedure    I understand that my anesthesiologist is not an employee or agent of [facility] [employer]. He or she works for [employer].    As the patient asking for anesthesia services, I agree to:  Allow the anesthesiologist (anesthesia doctor) to give me medicine and do additional procedures as necessary. Some examples are: Starting or using an “IV” to give me medicine, fluids or blood during my procedure, and having a breathing tube placed to help me breathe when I’m asleep (intubation). In the event that my heart stops working properly, I understand that my anesthesiologist will make every effort to sustain my life, unless otherwise directed by [facility] Do Not Resuscitate documents.  Tell my anesthesia doctor before my procedure:  If I am pregnant.  The last time that I ate or drank.  All of the medicines I take (including prescriptions, herbal supplements, and pills I can buy without a prescription (including street drugs/illegal medications). Failure to inform my anesthesiologist about these medicines may increase my risk of anesthetic complications.  If I am allergic to anything or have had a reaction to anesthesia before.  I understand how the anesthesia medicine will help me (benefits).  I understand that with any type of anesthesia medicine there are risks:  The most common risks are: nausea, vomiting, sore throat, muscle soreness, damage to my eyes, mouth, or teeth (from breathing tube placement).  Rare risks include: remembering what happened during my procedure, allergic reactions to medications, injury to my airway, heart, lungs, vision, nerves, or muscles and in extremely rare instances death.  My doctor has explained to me other choices available to me for my care (alternatives).  Pregnant  Patients (“epidural”):  I understand that the risks of having an epidural (medicine given into my back to help control pain during labor), include itching, low blood pressure, difficulty urinating, headache or slowing of the baby’s heart. Very rare risks include infection, bleeding, seizure, irregular heart rhythms and nerve injury.  Regional Anesthesia (“spinal”, “epidural”, & “nerve blocks”):  I understand that rare but potential complications include headache, bleeding, infection, seizure, irregular heart rhythms, and nerve injury.    I can change my mind about having anesthesia services at any time before I get the medicine.    _____________________________________________________________________________  Patient (or Representative) Signature/Relationship to Patient  Date   Time    _____________________________________________________________________________   Name (if used)    Language/Organization   Time    _____________________________________________________________________________  Anesthesiologist Signature     Date   Time  I have discussed the procedure and information above with the patient (or patient’s representative) and answered their questions. The patient or their representative has agreed to have anesthesia services.    _____________________________________________________________________________  Witness        Date   Time  I have verified that the signature is that of the patient or patient’s representative, and that it was signed before the procedure  Patient Name: Rayne Morales     : 1945                 Printed: 2024     Medical Record #: [id number]                     Page 2 of 2

## (undated) NOTE — LETTER
[address]  [address]  Authorization for Surgical Operation and Procedure     Date:___________                                                                                                         Time:__________  I hereby authorize Surgeon(s):  Javier Kang MD, my physician and his/her assistants (if applicable), which may include medical students, residents, and/or fellows, to perform the following surgical operation/ procedure and administer such anesthesia as may be determined necessary by my physician:  Operation/Procedure name (s) Procedure(s):  Bronchoscopy with Bronchoalveolar Lavage with Transbronchial Biopsies on Rayne Morales   2.   I recognize that during the surgical operation/procedure, unforeseen conditions may necessitate additional or different procedures than those listed above.  I, therefore, further authorize and request that the above-named surgeon, assistants, or designees perform such procedures as are, in their judgment, necessary and desirable.    3.   My surgeon/physician has discussed prior to my surgery the potential benefits, risks and side effects of this procedure; the likelihood of achieving goals; and potential problems that might occur during recuperation.  They also discussed reasonable alternatives to the procedure, including risks, benefits, and side effects related to the alternatives and risks related to not receiving this procedure.  I have had all my questions answered and I acknowledge that no guarantee has been made as to the result that may be obtained.    4.   Should the need arise during my operation/procedure, which includes change of level of care prior to discharge, I also consent to the administration of blood and/or blood products.  Further, I understand that despite careful testing and screening of blood or blood products by collecting agencies, I may still be subject to ill effects as a result of receiving a blood  transfusion and/or blood products.  The following are some, but not all, of the potential risks that can occur: fever and allergic reactions, hemolytic reactions, transmission of diseases such as Hepatitis, AIDS and Cytomegalovirus (CMV) and fluid overload.  In the event that I wish to have an autologous transfusion of my own blood, or a directed donor transfusion, I will discuss this with my physician.  Check only if Refusing Blood or Blood Products  I understand refusal of blood or blood products as deemed necessary by my physician may have serious consequences to my condition to include possible death. I hereby assume responsibility for my refusal and release the hospital, its personnel, and my physicians from any responsibility for the consequences of my refusal.          o  Refuse      5.   I authorize the use of any specimen, organs, tissues, body parts or foreign objects that may be removed from my body during the operation/procedure for diagnosis, research or teaching purposes and their subsequent disposal by hospital authorities.  I also authorize the release of specimen test results and/or written reports to my treating physician on the hospital medical staff or other referring or consulting physicians involved in my care, at the discretion of the Pathologist or my treating physician.    6.   I consent to the photographing or videotaping of the operations or procedures to be performed, including appropriate portions of my body for medical, scientific, or educational purposes, provided my identity is not revealed by the pictures or by descriptive texts accompanying them.  If the procedure has been photographed/videotaped, the surgeon will obtain the original picture, image, videotape or CD.  The hospital will not be responsible for storage, release or maintenance of the picture, image, tape or CD.    7.   I consent to the presence of a  or observers in the operating room as deemed  necessary by my physician or their designees.    8.   I recognize that in the event my procedure results in extended X-Ray/fluoroscopy time, I may develop a skin reaction.    9. If I have a Do Not Attempt Resuscitation (DNAR) order in place, that status will be suspended while in the operating room, procedural suite, and during the recovery period unless otherwise explicitly stated by me (or a person authorized to consent on my behalf). The surgeon or my attending physician will determine when the applicable recovery period ends for purposes of reinstating the DNAR order.  10. Patients having a sterilization procedure: I understand that if the procedure is successful the results will be permanent and it will therefore be impossible for me to inseminate, conceive, or bear children.  I also understand that the procedure is intended to result in sterility, although the result has not been guaranteed.   11. I acknowledge that my physician has explained sedation/analgesia administration to me including the risk and benefits I consent to the administration of sedation/analgesia as may be necessary or desirable in the judgment of my physician.    I CERTIFY THAT I HAVE READ AND FULLY UNDERSTAND THE ABOVE CONSENT TO OPERATION and/or OTHER PROCEDURE.    _________________________________________  __________________________________  Signature of Patient     Signature of Responsible Person         ___________________________________         Printed Name of Responsible Person           _________________________________                 Relationship to Patient  _________________________________________  ______________________________  Signature of Witness          Date  Time      Patient Name: Rayne Kirk Andrew     : 1945                 Printed: 2024     Medical Record #: [id number]                     Page 1 of [address] [place] [address]   [id number]    Consent for Anesthesia    IRayne agree to be cared for by an anesthesiologist, who is specially trained to monitor me and give me medicine to put me to sleep or keep me comfortable during my procedure    I understand that my anesthesiologist is not an employee or agent of [facility] or [employer]. He or she works for [employer].    As the patient asking for anesthesia services, I agree to:  Allow the anesthesiologist (anesthesia doctor) to give me medicine and do additional procedures as necessary. Some examples are: Starting or using an “IV” to give me medicine, fluids or blood during my procedure, and having a breathing tube placed to help me breathe when I’m asleep (intubation). In the event that my heart stops working properly, I understand that my anesthesiologist will make every effort to sustain my life, unless otherwise directed by [facility] Do Not Resuscitate documents.  Tell my anesthesia doctor before my procedure:  If I am pregnant.  The last time that I ate or drank.  All of the medicines I take (including prescriptions, herbal supplements, and pills I can buy without a prescription (including street drugs/illegal medications). Failure to inform my anesthesiologist about these medicines may increase my risk of anesthetic complications.  If I am allergic to anything or have had a reaction to anesthesia before.  I understand how the anesthesia medicine will help me (benefits).  I understand that with any type of anesthesia medicine there are risks:  The most common risks are: nausea, vomiting, sore throat, muscle soreness, damage to my eyes, mouth, or teeth (from breathing tube placement).  Rare risks include: remembering what happened during my procedure, allergic reactions to medications, injury to my airway, heart, lungs, vision, nerves, or muscles and in extremely rare instances death.  My doctor has explained to me other choices  available to me for my care (alternatives).  Pregnant Patients (“epidural”):  I understand that the risks of having an epidural (medicine given into my back to help control pain during labor), include itching, low blood pressure, difficulty urinating, headache or slowing of the baby’s heart. Very rare risks include infection, bleeding, seizure, irregular heart rhythms and nerve injury.  Regional Anesthesia (“spinal”, “epidural”, & “nerve blocks”):  I understand that rare but potential complications include headache, bleeding, infection, seizure, irregular heart rhythms, and nerve injury.    I can change my mind about having anesthesia services at any time before I get the medicine.    _____________________________________________________________________________  Patient (or Representative) Signature/Relationship to Patient  Date   Time    _____________________________________________________________________________   Name (if used)    Language/Organization   Time    _____________________________________________________________________________  Anesthesiologist Signature     Date   Time  I have discussed the procedure and information above with the patient (or patient’s representative) and answered their questions. The patient or their representative has agreed to have anesthesia services.    _____________________________________________________________________________  Witness        Date   Time  I have verified that the signature is that of the patient or patient’s representative, and that it was signed before the procedure  Patient Name: Rayne Morales     : 1945                 Printed: 2024     Medical Record #: [id number]                     Page 2 of 2

## (undated) NOTE — MR AVS SNAPSHOT
[facility] [place] [id number] [phone]               Thank you for choosing us for your health care visit with NICKIE Sellers.   We are glad to serve you and happy to provide you with this summar BuPROPion HCl ER (SR) 150 MG Tb12   TAKE 1 TABLET BY MOUTH TWICE DAILY AS DIRECTED   Commonly known as:  WELLBUTRIN SR           FentaNYL 75 MCG/HR Pt72   Place 1 patch onto the skin every third day.    Commonly known as:  Eulene Aase These medications were sent to [address] [phone], [phone]  [facility] [address], [address]     Phone:  [phone]    - Sertraline HCl 50 MG Tabs            Today's Orders     CMP now    Complete b If you've recently had a stay at the Hospital you can access your discharge instructions in [employer] by going to Visits < Admission Summaries.  If you've been to the Emergency Department or your doctor's office, you can view your past visit information in My ? Avoid walking on snowy or icy surfaces. ? Use a cane or walker (indoors and out) if you are unsteady on your feet.              Visit [facility] online at  [email or web address]

## (undated) NOTE — MR AVS SNAPSHOT
After Visit Summary   10/21/2020    Danica Colvin    MRN: [id number]           Visit Information     Date & Time  10/21/2020 12:00 PM Provider  France Mcqueen MD Department  [address] [place] [address] Dept.  Phone  [phone]      Your Akosua XARELTO 20 MG Oral Tab TAKE ONE TABLET BY MOUTH ONE TIME DAILY    clotrimazole 10 MG Mouth/Throat Gilbert Take 1 tablet (10 mg total) by mouth 5 (five) times daily.     Fluticasone Propionate 50 MCG/ACT Nasal Suspension 1 spray by Nasal route 2 (two) times [facility] now offers Video Visits through [address] for adult and pediatric patients. Video Visits are available Monday - Friday for many common conditions such as allergies, colds, cough, fever, rash, sore throat, headache and pink eye.   The cost for a Video Vi SideStripe   Monday – Friday  4:00 pm – 10:00 pm   Saturday – Sunday  10:00 am – 4:00 pm  WALK-IN CARE  Emergency Medicine Providers  Conditions needing urgent attention, but are   non-life-threatening.     Also available by appointment Average cost  $120*

## (undated) NOTE — LETTER
11/06/19        [address] [place] [id number]      Dear Kely Fenton,    Our records indicate that you have outstanding lab work and or testing that was ordered for you and has not yet been completed:  Orders Placed This Encounter

## (undated) NOTE — Clinical Note
TCM call completed. A TCM-HFU appointment is scheduled for 6/15/2021. A TE was sent to triage for an appointment review and condition update. The patient is c/o abdominal upset with the prescribed antibiotic. Thank you.

## (undated) NOTE — LETTER
ASTHMA ACTION PLAN for Liborio Avila     : 1945     Date: 10/26/2021  Provider:  JUAREZ Wooten  Phone for doctor or clinic: [facility], [address] [place], [place] [address]e [facility], [facility]. [address], [address] [place] [address] [phone]

## (undated) NOTE — LETTER
Date: 5/16/2022    Patient Name: Kalyn Rosas          To Whom it may concern: The above patient was seen at the [facility] for treatment of a medical condition. This patient should be excused from attending work from 5/11/2022 through 5/17/2022. The patient may return to work on 5/18/2022 with the no limitations. Please call with any questions or concerns [phone].         Sincerely,      JUAREZ Rich

## (undated) NOTE — LETTER
Patient Name: Nelly Leyva  YOB: 1945          MRN number:  [id number]  Date:  2/21/2018  Referring Physician:  No ref. provider found      Progress Summary    Pt has attended 7 visits in Physical Therapy.      Subjective: R leg continu

## (undated) NOTE — LETTER
Date: 3/7/2022    Patient Name: Moriah Johnson          To Whom it may concern: This letter has been written at the patient's request. The above patient was seen at the [facility] for treatment of a medical condition. 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         Sincerely,    Iveth Pablo M.S., ONEIL, [address]  [facility]5 [facility], [address] [place] [address] Osmel Gallo, [address] [place] [address] Rd  [phone]

## (undated) NOTE — LETTER
12/12/19        [address] [facility] [id number]      Dear Robyn Cortes,    [facility] records indicate that you have outstanding lab work and or testing that was ordered for you and has not yet been completed:  Orders Placed This Encounter

## (undated) NOTE — LETTER
[address]  [address]  Authorization for Surgical Operation and Procedure     Date:___________                                                                                                         Time:__________  I hereby authorize Surgeon(s):  Chalino Harris MD, my physician and his/her assistants (if applicable), which may include medical students, residents, and/or fellows, to perform the following surgical operation/ procedure and administer such anesthesia as may be determined necessary by my physician:  Operation/Procedure name (s) Procedure(s):  LAPAROSCOPIC APPENDECTOMY on Rayne Morales   2.   I recognize that during the surgical operation/procedure, unforeseen conditions may necessitate additional or different procedures than those listed above.  I, therefore, further authorize and request that the above-named surgeon, assistants, or designees perform such procedures as are, in their judgment, necessary and desirable.    3.   My surgeon/physician has discussed prior to my surgery the potential benefits, risks and side effects of this procedure; the likelihood of achieving goals; and potential problems that might occur during recuperation.  They also discussed reasonable alternatives to the procedure, including risks, benefits, and side effects related to the alternatives and risks related to not receiving this procedure.  I have had all my questions answered and I acknowledge that no guarantee has been made as to the result that may be obtained.    4.   Should the need arise during my operation/procedure, which includes change of level of care prior to discharge, I also consent to the administration of blood and/or blood products.  Further, I understand that despite careful testing and screening of blood or blood products by collecting agencies, I may still be subject to ill effects as a result of receiving a blood transfusion and/or blood products.  The  following are some, but not all, of the potential risks that can occur: fever and allergic reactions, hemolytic reactions, transmission of diseases such as Hepatitis, AIDS and Cytomegalovirus (CMV) and fluid overload.  In the event that I wish to have an autologous transfusion of my own blood, or a directed donor transfusion, I will discuss this with my physician.  Check only if Refusing Blood or Blood Products  I understand refusal of blood or blood products as deemed necessary by my physician may have serious consequences to my condition to include possible death. I hereby assume responsibility for my refusal and release the hospital, its personnel, and my physicians from any responsibility for the consequences of my refusal.          o  Refuse      5.   I authorize the use of any specimen, organs, tissues, body parts or foreign objects that may be removed from my body during the operation/procedure for diagnosis, research or teaching purposes and their subsequent disposal by hospital authorities.  I also authorize the release of specimen test results and/or written reports to my treating physician on the hospital medical staff or other referring or consulting physicians involved in my care, at the discretion of the Pathologist or my treating physician.    6.   I consent to the photographing or videotaping of the operations or procedures to be performed, including appropriate portions of my body for medical, scientific, or educational purposes, provided my identity is not revealed by the pictures or by descriptive texts accompanying them.  If the procedure has been photographed/videotaped, the surgeon will obtain the original picture, image, videotape or CD.  The hospital will not be responsible for storage, release or maintenance of the picture, image, tape or CD.    7.   I consent to the presence of a  or observers in the operating room as deemed necessary by my physician or their designees.     8.   I recognize that in the event my procedure results in extended X-Ray/fluoroscopy time, I may develop a skin reaction.    9. If I have a Do Not Attempt Resuscitation (DNAR) order in place, that status will be suspended while in the operating room, procedural suite, and during the recovery period unless otherwise explicitly stated by me (or a person authorized to consent on my behalf). The surgeon or my attending physician will determine when the applicable recovery period ends for purposes of reinstating the DNAR order.  10. Patients having a sterilization procedure: I understand that if the procedure is successful the results will be permanent and it will therefore be impossible for me to inseminate, conceive, or bear children.  I also understand that the procedure is intended to result in sterility, although the result has not been guaranteed.   11. I acknowledge that my physician has explained sedation/analgesia administration to me including the risk and benefits I consent to the administration of sedation/analgesia as may be necessary or desirable in the judgment of my physician.    I CERTIFY THAT I HAVE READ AND FULLY UNDERSTAND THE ABOVE CONSENT TO OPERATION and/or OTHER PROCEDURE.    _________________________________________  __________________________________  Signature of Patient     Signature of Responsible Person         ___________________________________         Printed Name of Responsible Person           _________________________________                 Relationship to Patient  _________________________________________  ______________________________  Signature of Witness          Date  Time      Patient Name: Rayne Morales     : 1945                 Printed: 2024     Medical Record #: [id number]                     Page 1 of [address] [place] [address] [place]  [address]    Consent for Anesthesia    I, Rayne Kirk  Andrew agree to be cared for by an anesthesiologist, who is specially trained to monitor me and give me medicine to put me to sleep or keep me comfortable during my procedure    I understand that my anesthesiologist is not an employee or agent of [facility] [employer]. He or she works for [employer].    As the patient asking for anesthesia services, I agree to:  Allow the anesthesiologist (anesthesia doctor) to give me medicine and do additional procedures as necessary. Some examples are: Starting or using an “IV” to give me medicine, fluids or blood during my procedure, and having a breathing tube placed to help me breathe when I’m asleep (intubation). In the event that my heart stops working properly, I understand that my anesthesiologist will make every effort to sustain my life, unless otherwise directed by [facility] Do Not Resuscitate documents.  Tell my anesthesia doctor before my procedure:  If I am pregnant.  The last time that I ate or drank.  All of the medicines I take (including prescriptions, herbal supplements, and pills I can buy without a prescription (including street drugs/illegal medications). Failure to inform my anesthesiologist about these medicines may increase my risk of anesthetic complications.  If I am allergic to anything or have had a reaction to anesthesia before.  I understand how the anesthesia medicine will help me (benefits).  I understand that with any type of anesthesia medicine there are risks:  The most common risks are: nausea, vomiting, sore throat, muscle soreness, damage to my eyes, mouth, or teeth (from breathing tube placement).  Rare risks include: remembering what happened during my procedure, allergic reactions to medications, injury to my airway, heart, lungs, vision, nerves, or muscles and in extremely rare instances death.  My doctor has explained to me other choices available to me for my care (alternatives).  Pregnant  Patients (“epidural”):  I understand that the risks of having an epidural (medicine given into my back to help control pain during labor), include itching, low blood pressure, difficulty urinating, headache or slowing of the baby’s heart. Very rare risks include infection, bleeding, seizure, irregular heart rhythms and nerve injury.  Regional Anesthesia (“spinal”, “epidural”, & “nerve blocks”):  I understand that rare but potential complications include headache, bleeding, infection, seizure, irregular heart rhythms, and nerve injury.    I can change my mind about having anesthesia services at any time before I get the medicine.    _____________________________________________________________________________  Patient (or Representative) Signature/Relationship to Patient  Date   Time    _____________________________________________________________________________   Name (if used)    Language/Organization   Time    _____________________________________________________________________________  Anesthesiologist Signature     Date   Time  I have discussed the procedure and information above with the patient (or patient’s representative) and answered their questions. The patient or their representative has agreed to have anesthesia services.    _____________________________________________________________________________  Witness        Date   Time  I have verified that the signature is that of the patient or patient’s representative, and that it was signed before the procedure  Patient Name: Rayne Morales     : 1945                 Printed: 2024     Medical Record #: [id number]                     Page 2 of 2

## (undated) NOTE — MR AVS SNAPSHOT
[facility] [address] [address] [phone]               Thank you for choosing us for your health care visit with [facility] N[facility].   We are glad to serve you and happy to provide you with this summary of Place 1 patch onto the skin every third day. DO NOT FILL BEFORE 2/10/17   Commonly known as:  DURAGESIC           HYDROcodone-acetaminophen  MG Tabs   Take 1 tablet by mouth every 6 (six) hours as needed for Pain.  DO NOT FILL BEFORE 2/10/16   Commonl If you've recently had a stay at the Hospital you can access your discharge instructions in [employer] by going to Visits < Admission Summaries.  If you've been to the Emergency Department or your doctor's office, you can view your past visit information in My

## (undated) NOTE — IP AVS SNAPSHOT
[address]            (For Outpatient Use Only) Initial Admit Date: 11/20/2019   Inpt/Obs Admit Date: Inpt: 11/20/19 / Obs: N/A   Discharge Date:    Min Medardo:  [de-identified]   MRN: [de-identified]   CSN: [id number]   CEID: [id number] TERTIARY INSURANCE   Payor:  Plan:    Group Number:  Insurance Type:    Subscriber Name:  Subscriber :    Subscriber ID:  Pt Rel to Subscriber:    Hospital Account Financial Class: Medicare    2019

## (undated) NOTE — ED AVS SNAPSHOT
Ms. John Aguiar   MRN: [id number]    Department:  [facility] Emergency Department   Date of Visit:  2/17/2019           Disclosure     Insurance plans vary and the physician(s) referred by the ER may not be covered by your plan.  Please con tell this physician (or your personal doctor if your instructions are to return to your personal doctor) about any new or lasting problems. The primary care or specialist physician will see patients referred from the [facility] Emergency Department.  Chris Davila

## (undated) NOTE — LETTER
[facility]NW-A  [address] [facility] [address]  [phone]    Blood Transfusion Consent    In the course of your treatment, it may become necessary to administer a transfusion of blood or blood components. This form provides basic information concerning this procedure and, if signed by you, authorizes its administration. By signing this form, you agree that all of your questions about the administration of blood or blood products have been answered by the ordering medical professional or designee.    Description of Procedure  Blood is introduced into one of your veins, commonly in the arm, using a sterilized disposable needle. The amount of blood transfused, and whether the transfusion will be of blood or blood components is a judgement the physician will make based on your particular needs.    Risks  The transfusion is a common procedure of low risk.  MINOR AND TEMPORARY REACTIONS ARE NOT UNCOMMON, including a slight bruise, swelling or local reaction in the area where the needle pierces your skin, or a nonserious reaction to the transfused material itself, including headache, fever or mild skin reaction, such as rash.  Serious reactions are possible, though very unlikely, and include severe allergic reaction (shock) and destruction (hemolysis) of transfused blood cells.  Infectious diseases which are known to be transmitted by blood transfusion include certain types of viral Hepatitis(liver infection from a virus), Human Immunodeficiency Virus (HIV-1,2) infection, a viral infection known to cause Acquired Immunodeficiency Syndrome (AIDS), as well as certain other bacterial, viral, and parasitic diseases. While a minimal risk of acquiring an infectious disease from transfused blood exists, in accordance with the Federal and State law, all due care has been taken in donor selection and testing to avoid transmission of disease.    Alternatives  If loss of blood poses serious threats during your  treatment, THERE IS NO EFFECTIVE ALTERNATIVE TO BLOOD TRANSFUSION. However, if you have any further questions on this matter, your provider will fully explain the alternatives to you if it has not already been done.    I, ______________________________, have read/had read to me the above. I understand the matters bearing on the decision whether or not to authorize a transfusion of blood or blood components. I have no questions which have not been answered to my full satisfaction. I hereby consent to such transfusion as my physician may deem necessary or advisable in the course of my treatment.    ______________________________________________                    ___________________________  (Signature of Patient or Responsible party in case of minor,                 (Printed Name of Patient or incompetent, or unconscious patient)              Responsible Party)    ___________________________               _____________________  (Relationship to Patient if not self)                                    (Date and Time)    __________________________                                                           ______________________              (Signature of Witness)               (Printed Name of Witness)     Language line ()    Telephone/Verbal/Video Consent    __________________________                     ____________________  (Signature of 2nd Witness           (Printed Name of 2nd  Telephone/Verbal/Video Consent)           Witness)    Patient Name: Rayne Morales     : 1945                 Printed: 2024     Medical Record #: [id number]      Rev: 2023

## (undated) NOTE — Clinical Note
Please call immunology, Dr. Piedad Sesay and see if pt can be put on cancellation list. Was on IVIG by her prior hematologist and recently stopped. 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    Alyssa Mukherjee,  [facility] 10/5/2023

## (undated) NOTE — LETTER
ASTHMA ACTION PLAN for Camille Bamberger     : 1945     Date: 10/26/2021  Provider:  JUAREZ Howell  Phone for doctor or clinic: [facility], [address], [facility]. [address], [address] [place] [address] [phone]

## (undated) NOTE — LETTER
10/05/18        [address] [facility] [address]. [address]      Dear Taina Gallo,    [address] [facility] records indicate that you have outstanding lab work and or testing that was ordered for you and has not yet been completed:  Orders Placed This Encounter

## (undated) NOTE — MR AVS SNAPSHOT
[facility] IM [address] [address] [phone]               Thank you for choosing us for your health care visit with NICKIE Warner.   We are glad to serve you and happy to provide you with this summar [address], [address], [place] ([facility] IM [place])    [facility] [address], [address] [facility] [address] [address] [phone]              Allergies as of Feb 06, 2017     Bactrim Hives    Ciprofloxacin Hives    Mold Reactive airway dis What changed:  reasons to take this   Commonly known as:  ZOFRAN           Pantoprazole Sodium 40 MG Tbec   TAKE ONE TABLET BY MOUTH EVERY MORNING BEFORE BREAKFAST   Commonly known as:  PROTONIX           PROAIR  (90 Base) MCG/ACT Aers   Generic froilan Visit [facility] online at  [email or web address]

## (undated) NOTE — LETTER
03/29/19        [address] [facility] [address]. [address]      Dear Karo Nava,    [address] [facility] records indicate that you have outstanding FASTING lab work and or testing that was ordered for you and has not yet been completed:  Orders Placed This Enco

## (undated) NOTE — LETTER
02/10/20        [address] [facility] [address]      Dear Em Benavides,    Our records indicate that you have outstanding lab work and or testing that was ordered for you and has not yet been completed:  Orders Placed This Encounter

## (undated) NOTE — LETTER
06/18/25    Rayne Morales  [address] [facility] [address]      Dear Rayne:    You had previously enrolled in our Chronic Care Management program and had been speaking with your Health . We have since made several attempts to reach you by phone over the past several months to continue with these services . Unfortunately, we have not heard back from you. We hope all is going well with you and your health.     As we have not heard back from you, we assume you no longer wish to have these services and want to be removed from the program. If at any point you would like to re-enroll, please call your Primary Care Doctor for further assistance. There are no enrollment or cancellation fees. You can enroll at any time.      It was a pleasure working with you,    [facility]  Care Management Department

## (undated) NOTE — Clinical Note
Elwood Prader! Thank you for referring Ms. Ashlee Mendoza for rheumatologic evaluation. Please see the discussion portion of my note and let me know if you have any questions.  ARA Navarro Mcuypqvgzkbs82/26/2018

## (undated) NOTE — LETTER
Patient Name: Aysha Coleman  YOB: 1945          MRN number:  [id number]  Date:  2/27/2020  Referring Physician:  Alia Barcenas    Discharge Summary  Initial Functional Outcome Score *65/100  Final Functional Outcome Score 97/100  Number of V ambulation. Goal met. 5. Pt will improve cervical AROM 10 degrees rot and SB to improve tolerance for computer work and driving. GOAL MET. 6. Pt will report decreased frequency of headaches to <2/week. Progressing.   Pt will be independent and compliant

## (undated) NOTE — LETTER
Patient Name: Anna Renae  YOB: 1945          MRN number:  [id number]  Date:  3/23/2018  Referring Physician:  No ref. provider found     Progress Summary    Pt has [id number] visits in Physical Therapy.      Subjective: feeling much b Electronically signed by therapist: Miguel Angel [facility]    Physician's certification required: Yes  I certify the need for these services furnished under this plan of treatment and while under my care.     X___________________________________________________

## (undated) NOTE — LETTER
[facility] Testing Department  Phone: [phone]  Right Fax: [phone]  [facility] 20 Brenda Moore RN    Date: 11/11/19    Patient Name: Bernadine Nogueira  Surgery Date: 11/20/2019    CSN: [id number]  Medical Record: [id number]

## (undated) NOTE — LETTER
11/13/20        [address] [facility] [address]. [address]      Dear Lawrence Bush,    [address] [facility] records indicate that you have outstanding lab work and or testing that was ordered for you and has not yet been completed:  Orders Placed This Encount

## (undated) NOTE — LETTER
December 19, 2017    [address] [facility] 22. [address]      Dear Baron Chan: It was a pleasure speaking to you over the phone recently. I have enclosed some information that you may find helpful.   I look forward to talking wi

## (undated) NOTE — LETTER
05/30/19        [facility]  [address] [facility]      Dear Cassy Chan,    [address] [facility] records indicate that you have outstanding lab work and or testing that was ordered for you and has not yet been completed:  Orders Placed This Encount

## (undated) NOTE — Clinical Note
ASTHMA ACTION PLAN for Trupti Lutz     : 1945     Date: 2017  Provider:  NICKIE Lacy  Phone for doctor or clinic: [address] [facility], [address] [facility], [address] [place] [address] [phone]

## (undated) NOTE — LETTER
ASTHMA ACTION PLAN for Titus Mello     : 1945     Date: 2018  Provider:  NICKIE Nobles  Phone for doctor or clinic: [facility], [address] [facility], [address] [place] [address] [phone]

## (undated) NOTE — IP AVS SNAPSHOT
Patient Demographics     Address  [address] [place] [address] Phone  [phone] [facility])  [phone] (Work)  [phone] (Mobile) *Preferred* E-mail Address  Paloma [email or web address]      Emergency Contact(s)     Name Relation Home Work waterproof barrier to keep the dressing and the incision dry. o SARAN WRAP, GLAD WRAP,  and PRESS N SEAL WORK  REALLY WELL BUT ANY PLASTIC WRAP WILL DO.   o Do not wash incision. o Remove entire wrapping and old dressing (if Gauze) after showering.  Malachi Baez ? Sutures/staples will be removed at first office visit (10 days- 3 weeks).                                                          GAUZE                                             Medication  Anticoagulants = blood thinners (Xarelto, Eliquis, Lovenox, Co or increased discomfort.  o For knee replacements– may elevate your leg by placing a pillow under entire leg. Do not place pillow only under the knee. ? Have realistic goals and keep a positive outlook. ?  Deep breathing and relaxation techniques and dist ? Schedule one week follow up after surgery WITH PRIMARY CARE PHYSICIAN; review your medications over last 6 months.  Your body gets stressed by surgery and that stress can affect all your other health issues (such as high blood pressure, diabetes, CHF, toño and get up to walk up and down the aisles…this helps prevent blood clots and stiffness.   ? TEMPORARY HANDICAP PARKING APPLICATION  (good for 3-6 months)  – At Surgeon or PCP visit, request they fill out the form, then go to [facility] (only time you do not wait i Commonly known as:  PROAIR HFA      Inhale 2 puffs into the lungs every 6 (six) hours as needed (cough). JUAREZ Jones         aspirin 81 MG Tabs  Next dose due: Tomorrow morning      Take 81 mg by mouth daily.           BREO ELLIPTA 100-25 MCG/INH LYRICA 75 MG Caps  Generic drug:  pregabalin  Next dose due: Tonight at bedtime      Take 75 mg by mouth 2 (two) times daily. metFORMIN HCl  MG Tb24  Commonly known as:  GLUCOPHAGE-XR  Next dose due:   Tonight, resume as scheduled Where to Get Your Medications      Please  your prescriptions at the location directed by your doctor or nurse    Bring a paper prescription for each of these medications  HYDROcodone-acetaminophen  MG Tabs     Information about where to get t [id number] docusate sodium (COLACE) cap 100 mg 11/22/19 2254 Given      [id number] docusate sodium (COLACE) cap 100 mg 11/23/19 0856 Given      [id number] ferrous sulfate EC tab 325 mg 11/23/19 0856 Given      [id number] lisinopril tab 20 mg 11/23/19 0856 Giv RDW-SD 41.8 35.1 - 46.3 fL — Edward Time Kay Performed By     Lab - Abbreviation Name Director Address Valid Date Range    [address] Antonietta Aguirre  [address] [address] 02/03/16 1201 - Present • Pulmonary embolism (HCC)    • Sleep apnea    • Type II or unspecified type diabetes mellitus without mention of complication, not stated as uncontrolled 6/29/2012     Past Surgical History:   Procedure Laterality Date   • ABDOMEN SURGERY PROC UNLISTED Quinapril Hcl           FATIGUE  Sulfa Antibiotics       UNKNOWN  Clindamycin             DIARRHEA, NAUSEA ONLY    Home Medications:  No medications prior to admission. Physical Exam:   General: Alert, orientated x3. Cooperative.   No apparent distres 12:50 PM[TK.1]      Electronically signed by Maria D Gao MD on 11/19/2019 12:50 PM   Attribution Key    TK. 1 - Maria D Gao MD on 11/19/2019 12:50 PM                        Consults - MD Consult Notes      Consults signed by Mary Jacob MD • PE (pulmonary embolism)    • Personal history of malignant neoplasm of breast    • Pneumonia due to organism    • Pulmonary embolism (HCC)    • Sleep apnea    • Type II or unspecified type diabetes mellitus without mention of complication, not stated as Green Pepper            OTHER (SEE COMMENTS)    Comment:Sensitivity  Lipitor [Atorvastat*    OTHER (SEE COMMENTS)    Comment:Leg cramping,  Omnicef                 NAUSEA ONLY, DIZZINESS  Quinapril Hcl           FATIGUE  Sulfa Antibiotics       UNKNOWN  Cl by mouth every 6 (six) hours as needed for Pain. DO NOT FILL BEFORE 8/6/17, Disp: 120 tablet, Rfl: 0        Review of Systems:   A comprehensive 14 point review of systems was completed. Pertinent positives and negatives noted in the HPI.     Physical Ex Plan of care discussed with patient, RN    Matt Pérez MD  11/20/2019[AR.1]            Electronically signed by Thania Evangelista MD on 11/20/2019  6:49 PM   Attribution Kelly    [address] 1 - Thania Evangelista MD on 11/20/2019  6:41 PM                     D/C ??? pt states she gets IgG infusions due to lack of IgG, unable to fight off infections   • Migraines    • Muscle weakness    • Neuropathy     bilateral feet   • Osteoarthritis    • PE (pulmonary embolism)    • Personal history of malignant neoplasm of br Management Techniques: Activity promotion; Body mechanics;Breathing techniques;Repositioning    BALANCE  Static Sitting: Fair +  Dynamic Sitting: Fair -  Static Standing: Fair -  Dynamic Standing: Fair -    ACTIVITY TOLERANCE                         O2 WALK Exercises AM Session   Ankle Pumps 20x   Quad Sets 20x   Glut Sets 20x   Hip Abd/Add 20x   Heel slides 20x   Saq 20x   SLR 20x   Sitting Knee Flexion 20x   Standing heel/toe raises 10x   Standing knee flexion 10x   Extension stretch  0     Comments: Patient will negotiate 4 stairs/one curb w/ assistive device and supervision    Goal #5     AROM 0 degrees extension to 95 degrees flexion      Goal #6           Goal Comments: Goals established on 11/21/2019              All goals ongoing as of 11/23/2 • Muscle weakness    • Neuropathy     bilateral feet   • Osteoarthritis    • PE (pulmonary embolism)    • Personal history of malignant neoplasm of breast    • Pneumonia due to organism    • Pulmonary embolism (HCC)    • Sleep apnea    • Type II or unspeci Static Standing: Fair -  Dynamic Standing: Fair -    ACTIVITY TOLERANCE                         O2 WALK                  AM-PAC '6-Clicks' INPATIENT SHORT FORM - BASIC MOBILITY  How much difficulty does the patient currently have. ..  -   Turning over in be Standing heel/toe raises 0 reps    Standing knee flexion 0 reps    Extension stretch  0      Comments: Pt participated in group session, tolerance was poor   was present yes   is a daughter     Knee ROM                 Patient End of Session: Up Filed:  11/21/2019  2:58 PM Date of Service:  11/21/2019 10:56 AM Status:  Signed    :  Leslie Flores PT (Physical Therapist)        PHYSICAL THERAPY KNEE TREATMENT NOTE - INPATIENT     Room Number: 365/365-A     Session:[SP.1] 1[SP.2]  Number of • CERVICAL FACET INJECTION Left 5/29/2014    Performed by Laney Yeh MD at [facility] MAIN OR   • CERVICAL FACET INJECTION Right 5/15/2014    Performed by Laney Yeh MD at [facility] MAIN OR   • CHOLECYSTECTOMY     • COLONOSCOPY  10/1/10, 4/21/17   • EGD  4/ -   Need to walk in hospital room?: A Little   -   Climbing 3-5 steps with a railing?: A Little[SP. 3]       AM-PAC Score:[SP.1]  Raw Score: 18   Approx Degree of Impairment: 46.58%   Standardized Score (AM-PAC Scale): 43.63   CMS Modifier (G-Code): CK[SP.3 PM:  PT held, pt going for chest xray, pt to ambulate later with nsg.         Exercises AM Session    Ankle Pumps 10 reps    Quad Sets 10 reps    Glut Sets 10 reps    Hip Abd/Add 10 reps    Heel slides 10 reps    Saq 10 reps    SLR 10 reps    Sitting Knee F Patient is able to demonstrate transfers Sit to/from Stand at assistance level: supervison    Goal #3     Patient is able to ambulate 150 feet with assistive device at assistance level: modified  independent    Goal #4     Patient will negotiate 4 stair • Asthma    • [facility] 1999    breast, basal cell   • Deep vein thrombosis (HCC)    • Exposure to medical diagnostic radiation    • Fitting and adjustment of vascular catheter    • HEART ATTACK    • High blood pressure    • High cholesterol    • Histor Prior Level of [place]:  Pt reports that she lives with her supportive adult grandson, and feels that she has adequate assist for home. Pt lives in one level home, was ambulating with cane pta.       SUBJECTIVE  \"I am not going to let this frickin' k Approx Degree of Impairment: 46.58%   Standardized Score (AM-PAC Scale): 43.63   CMS Modifier (G-Code): CK    FUNCTIONAL ABILITY STATUS  Gait Assessment   Gait Assistance: Maximum assistance(actual min a)  Distance (ft): 100  Assistive Device: Rolling walk patient is demonstrating a 46.58% degree of impairment in mobility. Research supports that patients with this level of impairment may benefit from home with home PT.   Based on this evaluation, patient's clinical presentation is stable and overall the evalu OCCUPATIONAL THERAPY TREATMENT NOTE - INPATIENT     Room Number: 365/365-A  Session: 1   Number of Visits to Meet Established Goals: 3    Presenting Problem: s/p L TKA 11/20/19    History related to current admission: Patient is 69year old female admi • CHOLECYSTECTOMY     • COLONOSCOPY  10/1/10, 4/21/17   • EGD  4/21/17   • FEMUR/KNEE SURG UNLISTED      right knee arthroscopy   • HERNIA SURGERY     • HYSTERECTOMY      @ age 46   • KNEE TOTAL REPLACEMENT Left 11/20/2019    Performed by Maria D Gao Skilled Therapy Provided: Pt received sup in chair for session, pt completed sit to stand from chair height with supervision, pt doffed socks and donned shoes with supervision, pt amb in room with CGA and RW, pt completed t/f to and from toilet with superv admitted on 11/20/2019 from home with history of L knee OA, currently s/p L TKA. Therapy significant co-morbidities include PE, HTN, DM, breast cancer, migraines, IVIG for frequent pneumonia, also reports chronic back pain with pain 6-7/10. .    Problem Lis • OTHER SURGICAL HISTORY  2010    endoscopy - papillotomy   • OTHER SURGICAL HISTORY  2016     under right eye, skin cancer removed   • PORT FOR VASCULAR ACCESS     • RADIATION LEFT  1999   • RADIOFREQUENCY DENERVATION OF CERVICAL MEDIAL BRANCH Left 3/5/20 -   Putting on and taking off regular lower body clothing?: A Lot  -   Bathing (including washing, rinsing, drying)?: A Little  -   Toileting, which includes using toilet, bedpan or urinal? : A Little  -   Putting on and taking off regular upper body cloth ability to participate in lower body dressing/bathing, toileting, toilet transfers, bed mobility, functional mobility, instrumental activities of daily living, rest and sleep, work, leisure and social participation.      The patient is functioning below her Depo-Medrol 40mg Inj 09/05/17     Fluad 0.5ml 09/13/17     INFLUENZA defer-11/21/19     Deferral:  Contraindication     INFLUENZA 10/31/16     INFLUENZA 11/05/15     INFLUENZA 10/09/14     INFLUENZA 09/27/13     INFLUENZA 10/29/12     INFLUENZA 11/03/11

## (undated) NOTE — MR AVS SNAPSHOT
[facility] [address] [address] [phone]               Thank you for choosing us for your health care visit with [facility] N[facility]SE.   We are glad to serve you and happy to provide you with this summary of Diovan [Valsartan] Nausea and vomiting    Green Pepper     Lipitor [Atorvastatin Calcium]     Leg cramping     Omnicef Nausea only, Dizziness    Penicillins Rash    Quinapril Hcl Fatigue                   Current Medications          This list is accurate Commonly known as:  ACTONEL           Sertraline HCl 50 MG Tabs   Take 1 tablet (50 mg total) by mouth daily.    Commonly known as:  ZOLOFT           simvastatin 40 MG Tabs   TAKE 1 TABLET BY MOUTH EVERY NIGHT   Commonly known as:  Car Paez 2

## (undated) NOTE — LETTER
ASTHMA ACTION PLAN for Ihor Cockayne     : 1945     Date: 2019  Provider:  JUAREZ Oconnor  Phone for doctor or clinic: [address] [facility] [address] [place] [address] [phone]

## (undated) NOTE — Clinical Note
Please do PA for left carpal tunnel US guided injection. Then also check for synvisc one.  Pt prefers to have left hand done first.     Charline Hanna, DO  [facility]  10/5/2022

## (undated) NOTE — Clinical Note
Pt will be coming in for HFU appt on 7/6 with [facility]. TE sent to triage regarding pt's sx and concerns. Thank you.

## (undated) NOTE — LETTER
[facility]  Phone: [phone]  ANTIBIOTIC ALLERGY/SENSITIVITY/CONTRAINDICATION    Sent By:  Fauzia Kumar RN Date: 10/29/19    Patient Name: Micaela Galvez  Surgery Date: 11/20/2019    CSN: [id number]  Medical Record: [id number] copying of this information is strictly prohibited. If you have received this transmission in error, please notify us immediately by telephone, and return the original documents to us at the address listed above.

## (undated) NOTE — ED AVS SNAPSHOT
Crow Ambrocio   MRN: [id number]    Department:  [facility] Emergency Department   Date of Visit:  5/1/2019           Disclosure     Insurance plans vary and the physician(s) referred by the ER may not be covered by your plan.  Please contact tell this physician (or your personal doctor if your instructions are to return to your personal doctor) about any new or lasting problems. The primary care or specialist physician will see patients referred from the [facility] Emergency Department.  Cheryle Levins

## (undated) NOTE — LETTER
Patient Name: Ashlee Mendoza  YOB: 1945          MRN number:  [id number]  Date:  1/29/2018  Referring Physician:  No ref. provider found          Physical Therapy  EVALUATION:    Referring Physician: Dr. Khoury ref.  provider found  Diagnosis Flexibility: limited piriformis extensibility B    Special tests:bridge w/ single leg lift w/ limited rotational stability    Neuro Screen: L posterior thigh pain in R sdly.      Today’s Treatment and Response:  Patient education provided on eval findings a Sincerely,  Electronically signed by therapist: Jeane Nugent    Physician's certification required: Yes  I certify the need for these services furnished under this plan of treatment and while under my care.     X________________________________________

## (undated) NOTE — MR AVS SNAPSHOT
[facility] [address] [place] [id number] [phone]               Thank you for choosing us for your health care visit with [facility].   We are glad to serve you and happy to provide you with this summary of TAKE 1 TABLET BY MOUTH DAILY   Commonly known as:  PRINIVIL,ZESTRIL           LYRICA 50 MG Caps   Generic drug:  pregabalin   TAKE 1 CAPSULE BY MOUTH TWO TIMES A DAY           MetFORMIN HCl 500 MG Tabs   TAKE 1 TABLET (500 MG TOTAL) BY MOUTH 2 (TWO) TIMES [employer] questions? Call [phone] for help. [employer] is NOT to be used for urgent needs. For medical emergencies, dial 911.            Visit [facility] online at  [email or web address]